# Patient Record
Sex: MALE | Race: WHITE | NOT HISPANIC OR LATINO | Employment: OTHER | URBAN - METROPOLITAN AREA
[De-identification: names, ages, dates, MRNs, and addresses within clinical notes are randomized per-mention and may not be internally consistent; named-entity substitution may affect disease eponyms.]

---

## 2017-01-26 ENCOUNTER — ALLSCRIPTS OFFICE VISIT (OUTPATIENT)
Dept: OTHER | Facility: OTHER | Age: 72
End: 2017-01-26

## 2017-11-02 ENCOUNTER — GENERIC CONVERSION - ENCOUNTER (OUTPATIENT)
Dept: OTHER | Facility: OTHER | Age: 72
End: 2017-11-02

## 2017-11-02 LAB
LEFT EYE DIABETIC RETINOPATHY: NORMAL
RIGHT EYE DIABETIC RETINOPATHY: NORMAL

## 2018-01-03 ENCOUNTER — ALLSCRIPTS OFFICE VISIT (OUTPATIENT)
Dept: OTHER | Facility: OTHER | Age: 73
End: 2018-01-03

## 2018-01-03 DIAGNOSIS — C61 MALIGNANT NEOPLASM OF PROSTATE (HCC): ICD-10-CM

## 2018-01-03 DIAGNOSIS — Z85.46 PERSONAL HISTORY OF MALIGNANT NEOPLASM OF PROSTATE: ICD-10-CM

## 2018-01-04 NOTE — PROGRESS NOTES
Assessment   1  History of malignant neoplasm of prostate (V10 46) (Z85 46)   2  Benign essential HTN (401 1) (I10)   3  Adenocarcinoma of prostate (185) (C61)   4  Controlled type 2 diabetes mellitus without complication, without long-term current use     of insulin (250 00) (E11 9)   5  Hypercholesterolemia (272 0) (E78 00)    Plan   Adenocarcinoma of prostate, PMH: History of malignant neoplasm of prostate    · (1) PSA, DIAGNOSTIC (FOLLOW-UP); Status:Active; Requested VUN:[id number]; Benign essential HTN    · AmLODIPine Besylate 10 MG Oral Tablet; Take 1 tablet by mouth  daily for blood    pressure   · Lisinopril 10 MG Oral Tablet; Take one tablet daily   · Metoprolol Succinate  MG Oral Tablet Extended Release 24 Hour; TAKE 1    TABLET ONCE DAILY  Controlled type 2 diabetes mellitus without complication, without long-term current use    of insulin    · MetFORMIN HCl  MG Oral Tablet Extended Release 24 Hour; Take 1    tablet daily   · *VB - Foot Exam; Status:Complete;   Done: 80XAW7316 04:57PM  Hypercholesterolemia    · Simvastatin 10 MG Oral Tablet; TAKE 1 TABLET BY MOUTH DAILY AS DIRECTED    Discussion/Summary      Check labs, will call with results, meds renewed  Possible side effects of new medications were reviewed with the patient/guardian today  The treatment plan was reviewed with the patient/guardian  The patient/guardian understands and agrees with the treatment plan      Chief Complaint   patient presented here for follow up on chronic conditions      History of Present Illness   feeling well, due for labs, hasn't been checking his sugars, taking his meds regularly  BP slightly elevated  denies ankle swelling  Active Problems   1  Benign essential HTN (401 1) (I10)   2  Controlled type 2 diabetes mellitus without complication, without long-term current use     of insulin (250 00) (E11 9)   3  History of malignant neoplasm of prostate (V10 46) (Z85 46)   4   Hypercholesterolemia (272 0) (E78 00)   5  Inguinal hernia (550 90) (K40 90)   6  Need for pneumococcal vaccination (V03 82) (Z23)    Past Medical History   1  History of Ankle fracture, right (824 8) (S82 891A)   2  History of Colon cancer screening (V76 51) (Z12 11)   3  History of Cough due to bronchospasm (519 11) (J98 01)   4  History of malignant neoplasm of prostate (V10 46) (Z85 46)   5  History of Muscle ache (729 1) (M79 1)     The active problems and past medical history were reviewed and updated today  Surgical History   1  Denied: History Of Prior Surgery    Family History   Mother    1  Family history of myocardial infarction (V17 3) (Z82 49)  Father    2  Family history of     Social History    · Never a smoker   · Occasional alcohol use  The social history was reviewed and updated today  Current Meds    1  Accu-Chek Jackie Plus In Vitro Strip; TEST once daily as directed; Therapy: 92OMY9046 to (Last Rx:14Clp2897)  Requested for: 64NEY5733 Ordered   2  Accu-Chek Jackie Plus w/Device Kit; TEST  ONCE DAILY; Therapy: 48ESI2327 to (Last Rx:19Iof9669)  Requested for: 21TYD4966 Ordered   3  Accu-Chek FastClix Lancets Miscellaneous; TEST DAILY; Therapy: 07ITK3992 to (Lisa Swan)  Requested for: 51YDS4109; Last     Rx:29Usd2258 Ordered   4  AmLODIPine Besylate 10 MG Oral Tablet; take 1 tablet every day; Therapy: 45Wfn8488 to (Evaluate:2017)  Requested for: 73Itb4123; Last     Rx:48Ljv7502 Ordered   5  Aspirin 81 MG TABS; Therapy: (Recorded:2016) to Recorded   6  Lisinopril 10 MG Oral Tablet; TAKE 1 TABLET DAILY; Therapy: 69FUA5372 to (Bradley Morales)  Requested for: 73Cmm7284; Last     Rx:90Brp2375 Ordered   7  MetFORMIN HCl  MG Oral Tablet Extended Release 24 Hour; TAKE 1 TABLET     DAILY AS DIRECTED  - PATIENT NEEDS APPT; Therapy: 75RVT0063 to (EGJZDJLA:37WTI5080)  Requested for: 17Qrb2929; Last     Rx:45Cxs4873 Ordered   8   Metoprolol Succinate  MG Oral Tablet Extended Release 24 Hour; TAKE 1 TABLET     DAILY - PATIENT NEEDS APPT; Therapy: 05Ycw4745 to (Evaluate:20Jan2018)  Requested for: 62Fwe4572; Last     Rx:35Mgx0288 Ordered   9  Simvastatin 10 MG Oral Tablet; TAKE 1 TABLET DAILY  - PATIENT NEEDS APPT; Therapy: 57DGP4080 to [phone])  Requested for: 18Yjz8466; Last     Rx:02Geh8473 Ordered     The medication list was reviewed and updated today  Allergies   1  No Known Drug Allergies    Vitals   Vital Signs    Recorded: 54NIW8629 04:34PM   Temperature 97 6 F, Tympanic   Heart Rate 68   Pulse Quality Normal   Respiration Quality Normal   Respiration 16   Systolic 586, LUE, Sitting   Diastolic 86, LUE, Sitting   Height 5 ft 8 in   Weight 188 lb 8 oz   BMI Calculated 28 66   BSA Calculated 1 99   O2 Saturation 97     Physical Exam        Constitutional      General appearance: No acute distress, well appearing and well nourished  Pulmonary      Respiratory effort: No increased work of breathing or signs of respiratory distress  Auscultation of lungs: Clear to auscultation, equal breath sounds bilaterally, no wheezes, no rales, no rhonci  Cardiovascular      Auscultation of heart: Normal rate and rhythm, normal S1 and S2, without murmurs  Examination of extremities for edema and/or varicosities: Normal        Psychiatric      Orientation to person, place and time: Normal        Mood and affect: Normal        Diabetic Foot Screen: Normal        Socks and shoes removed, the Right Foot: the foot was normal, no swelling, no erythema  The toes on the right were normal-- and-- with full range of motion  The sensory exam showed  normal vibratory sensation at the level of the toes on the right  Normal tactile sensation with monofilament testing throughout the right foot  Socks and shoes removed, Left Foot: the foot was normal, no swelling, no erythema   The toes on the left were normal-- and-- with full range of motion  The sensory exam showed  normal vibratory sensation at the level of the toes on the left , Normal tactile sensation with monofilament testing throughout the left foot  Pulses:      2+ in the dorsalis pedis on the right  Pulses:      2+ in the dorsalis pedis on the left  Assign Risk Category: 0: No loss of protective sensation, no deformity  No present risk        Results/Data   Trini Rush Foot Exam 77DBH7941 04:57PM Rich Harvey      Test Name Result Flag Reference   FOOT Geo Smith 45BKY4296          Future Appointments      Date/Time Provider Specialty Site   06/20/2018 04:15 PM Rachele Khalil [facility]     Signatures    Electronically signed by : SAY Stewart ; Carson  3 2018  5:48PM EST                       (Author)

## 2018-01-13 ENCOUNTER — APPOINTMENT (OUTPATIENT)
Dept: LAB | Facility: MEDICAL CENTER | Age: 73
End: 2018-01-13
Payer: COMMERCIAL

## 2018-01-13 DIAGNOSIS — C61 MALIGNANT NEOPLASM OF PROSTATE (HCC): ICD-10-CM

## 2018-01-13 DIAGNOSIS — Z23 ENCOUNTER FOR IMMUNIZATION: ICD-10-CM

## 2018-01-13 DIAGNOSIS — E78.00 PURE HYPERCHOLESTEROLEMIA: ICD-10-CM

## 2018-01-13 DIAGNOSIS — I10 ESSENTIAL (PRIMARY) HYPERTENSION: ICD-10-CM

## 2018-01-13 DIAGNOSIS — Z85.46 PERSONAL HISTORY OF MALIGNANT NEOPLASM OF PROSTATE: ICD-10-CM

## 2018-01-13 DIAGNOSIS — E11.9 TYPE 2 DIABETES MELLITUS WITHOUT COMPLICATIONS (HCC): ICD-10-CM

## 2018-01-13 LAB
ALBUMIN SERPL BCP-MCNC: 3.6 G/DL (ref 3.5–5)
ALP SERPL-CCNC: 108 U/L (ref 46–116)
ALT SERPL W P-5'-P-CCNC: 26 U/L (ref 12–78)
ANION GAP SERPL CALCULATED.3IONS-SCNC: 7 MMOL/L (ref 4–13)
AST SERPL W P-5'-P-CCNC: 12 U/L (ref 5–45)
BILIRUB SERPL-MCNC: 0.5 MG/DL (ref 0.2–1)
BUN SERPL-MCNC: 20 MG/DL (ref 5–25)
CALCIUM SERPL-MCNC: 8.6 MG/DL (ref 8.3–10.1)
CHLORIDE SERPL-SCNC: 112 MMOL/L (ref 100–108)
CHOLEST SERPL-MCNC: 136 MG/DL (ref 50–200)
CO2 SERPL-SCNC: 27 MMOL/L (ref 21–32)
CREAT SERPL-MCNC: 0.97 MG/DL (ref 0.6–1.3)
CREAT UR-MCNC: 67.2 MG/DL
EST. AVERAGE GLUCOSE BLD GHB EST-MCNC: 120 MG/DL
GFR SERPL CREATININE-BSD FRML MDRD: 78 ML/MIN/1.73SQ M
GLUCOSE P FAST SERPL-MCNC: 130 MG/DL (ref 65–99)
HBA1C MFR BLD: 5.8 % (ref 4.2–6.3)
HDLC SERPL-MCNC: 55 MG/DL (ref 40–60)
LDLC SERPL CALC-MCNC: 69 MG/DL (ref 0–100)
MICROALBUMIN UR-MCNC: 16.8 MG/L (ref 0–20)
MICROALBUMIN/CREAT 24H UR: 25 MG/G CREATININE (ref 0–30)
POTASSIUM SERPL-SCNC: 3.6 MMOL/L (ref 3.5–5.3)
PROT SERPL-MCNC: 6.9 G/DL (ref 6.4–8.2)
PSA SERPL-MCNC: 0.1 NG/ML (ref 0–4)
SODIUM SERPL-SCNC: 146 MMOL/L (ref 136–145)
TRIGL SERPL-MCNC: 62 MG/DL

## 2018-01-13 PROCEDURE — 80061 LIPID PANEL: CPT

## 2018-01-13 PROCEDURE — 80053 COMPREHEN METABOLIC PANEL: CPT

## 2018-01-13 PROCEDURE — 82570 ASSAY OF URINE CREATININE: CPT

## 2018-01-13 PROCEDURE — 36415 COLL VENOUS BLD VENIPUNCTURE: CPT

## 2018-01-13 PROCEDURE — 83036 HEMOGLOBIN GLYCOSYLATED A1C: CPT

## 2018-01-13 PROCEDURE — 84153 ASSAY OF PSA TOTAL: CPT

## 2018-01-13 PROCEDURE — 82043 UR ALBUMIN QUANTITATIVE: CPT

## 2018-01-13 NOTE — RESULT NOTES
Verified Results  (1) COMPREHENSIVE METABOLIC PANEL 52VID9684 76:24QH Phylicia Lowe Order Number: [id number]     Test Name Result Flag Reference   GLUCOSE,RANDM 112 mg/dL     If the patient is fasting, the ADA then defines impaired fasting glucose as > 100 mg/dL and diabetes as > or equal to 123 mg/dL  SODIUM 142 mmol/L  136-145   POTASSIUM 3 6 mmol/L  3 5-5 3   CHLORIDE 105 mmol/L  100-108   CARBON DIOXIDE 31 mmol/L  21-32   ANION GAP (CALC) 6 mmol/L  4-13   BLOOD UREA NITROGEN 17 mg/dL  5-25   CREATININE 1 13 mg/dL  0 60-1 30   Standardized to IDMS reference method   CALCIUM 8 9 mg/dL  8 3-10 1   BILI, TOTAL 0 51 mg/dL  0 20-1 00   ALK PHOSPHATAS 106 U/L     ALT (SGPT) 25 U/L  12-78   AST(SGOT) 14 U/L  5-45   ALBUMIN 3 8 g/dL  3 5-5 0   TOTAL PROTEIN 7 2 g/dL  6 4-8 2   eGFR Non-African American      >60 0 ml/min/1 73sq m   - Patient Instructions: This is a fasting blood test  Water,black tea or black  coffee only after 9:00pm the night before test Drink 2 glasses of water the morning of test   National Kidney Disease Education Program recommendations are as follows:  GFR calculation is accurate only with a steady state creatinine  Chronic Kidney disease less than 60 ml/min/1 73 sq  meters  Kidney failure less than 15 ml/min/1 73 sq  meters  (1) LIPID PANEL, FASTING 65Baw5180 08:29AM Uzma PEREZ Order Number: [id number]     Test Name Result Flag Reference   CHOLESTEROL 202 mg/dL H    HDL,DIRECT 69 mg/dL H 40-60   Specimen collection should occur prior to Metamizole administration due to the potential for falsely depressed results  LDL CHOLESTEROL CALCULATED 116 mg/dL H 0-100   - Patient Instructions: This is a fasting blood test  Water,black tea or black  coffee only after 9:00pm the night before test   Drink 2 glasses of water the morning of test     - Patient Instructions:  This is a fasting blood test  Water,black tea or black  coffee only after 9:00pm the night before test Drink 2 glasses of water the morning of test   Triglyceride:         Normal              <150 mg/dl       Borderline High    150-199 mg/dl       High               200-499 mg/dl       Very High          >499 mg/dl  Cholesterol:         Desirable        <200 mg/dl      Borderline High  200-239 mg/dl      High             >239 mg/dl  HDL Cholesterol:        High    >59 mg/dL      Low     <41 mg/dL  LDL CALCULATED:    This screening LDL is a calculated result  It does not have the accuracy of the Direct Measured LDL in the monitoring of patients with hyperlipidemia and/or statin therapy  Direct Measure LDL (RRV037) must be ordered separately in these patients  TRIGLYCERIDES 83 mg/dL  <=150   Specimen collection should occur prior to N-Acetylcysteine or Metamizole administration due to the potential for falsely depressed results  (1) HEMOGLOBIN A1C 28Dec2016 08:29AM Harry Newsome   TW Order Number: [id number]     Test Name Result Flag Reference   HEMOGLOBIN A1C 5 6 %  4 2-6 3   EST  AVG   GLUCOSE 114 mg/dl       (1) MICROALBUMIN CREATININE RATIO, RANDOM URINE 28Dec2016 08:29AM Harry Newsome   TW Order Number: [id number]     Test Name Result Flag Reference   MICROALBUMIN/ CREAT R 36 mg/g creatinine H 0-30   MICROALBUMIN,URINE 14 0 mg/L  0 0-20 0   CREATININE URINE 38 6 mg/dL

## 2018-01-14 ENCOUNTER — GENERIC CONVERSION - ENCOUNTER (OUTPATIENT)
Dept: OTHER | Facility: OTHER | Age: 73
End: 2018-01-14

## 2018-01-14 VITALS
RESPIRATION RATE: 16 BRPM | WEIGHT: 180 LBS | BODY MASS INDEX: 27.28 KG/M2 | DIASTOLIC BLOOD PRESSURE: 72 MMHG | SYSTOLIC BLOOD PRESSURE: 146 MMHG | TEMPERATURE: 97.8 F | HEIGHT: 68 IN | HEART RATE: 84 BPM | OXYGEN SATURATION: 97 %

## 2018-01-14 NOTE — MISCELLANEOUS
Message  spoke with his wife(he is hard of hearing)  we will mail his psa to dr waite-urology  also his vit d is low  his wife feels he stopped taking it and she will have him restart it   rec 2,000 units/day      Signatures   Electronically signed by : Mar De Los Santos DO; Jun 14 2016 11:04AM EST                       (Author)

## 2018-01-15 ENCOUNTER — GENERIC CONVERSION - ENCOUNTER (OUTPATIENT)
Dept: OTHER | Facility: OTHER | Age: 73
End: 2018-01-15

## 2018-01-23 VITALS
DIASTOLIC BLOOD PRESSURE: 86 MMHG | HEIGHT: 68 IN | OXYGEN SATURATION: 97 % | HEART RATE: 68 BPM | WEIGHT: 188.5 LBS | TEMPERATURE: 97.6 F | RESPIRATION RATE: 16 BRPM | SYSTOLIC BLOOD PRESSURE: 144 MMHG | BODY MASS INDEX: 28.57 KG/M2

## 2018-01-23 NOTE — RESULT NOTES
Verified Results  (1) COMPREHENSIVE METABOLIC PANEL 94NYS0710 96:73SY Sagar Nice Order Number: [id number]     Test Name Result Flag Reference   SODIUM 146 mmol/L H 136-145   POTASSIUM 3 6 mmol/L  3 5-5 3   CHLORIDE 112 mmol/L H 100-108   CARBON DIOXIDE 27 mmol/L  21-32   ANION GAP (CALC) 7 mmol/L  4-13   BLOOD UREA NITROGEN 20 mg/dL  5-25   CREATININE 0 97 mg/dL  0 60-1 30   Standardized to IDMS reference method   CALCIUM 8 6 mg/dL  8 3-10 1   BILI, TOTAL 0 50 mg/dL  0 20-1 00   ALK PHOSPHATAS 108 U/L     ALT (SGPT) 26 U/L  12-78   Specimen collection should occur prior to Sulfasalazine and/or Sulfapyridine administration due to the potential for falsely depressed results  AST(SGOT) 12 U/L  5-45   Specimen collection should occur prior to Sulfasalazine administration due to the potential for falsely depressed results  ALBUMIN 3 6 g/dL  3 5-5 0   TOTAL PROTEIN 6 9 g/dL  6 4-8 2   eGFR 78 ml/min/1 73sq m     National Kidney Disease Education Program recommendations are as follows:  GFR calculation is accurate only with a steady state creatinine  Chronic Kidney disease less than 60 ml/min/1 73 sq  meters  Kidney failure less than 15 ml/min/1 73 sq  meters  GLUCOSE FASTING 130 mg/dL H 65-99   Specimen collection should occur prior to Sulfasalazine administration due to the potential for falsely depressed results  Specimen collection should occur prior to Sulfapyridine administration due to the potential for falsely elevated results  (1) LIPID PANEL, FASTING 00YTD3800 09:11AM Sagar Nice Order Number: [id number]     Test Name Result Flag Reference   CHOLESTEROL 136 mg/dL     Cholesterol:    Desirable <200 mg/dl    Borderline 200-239 mg/dl    High>239   HDL,DIRECT 55 mg/dL  40-60   HDL Cholesterol:    High>59 mg/dL    Low <41 mg/dL   LDL CHOLESTEROL CALCULATED 69 mg/dL  0-100   This screening LDL is a calculated result     It does not have the accuracy of the Direct Measured LDL in the monitoring of patients with hyperlipidemia and/or statin therapy  Direct Measure LDL (CSG007) must be ordered separately in these patients  - Patient Instructions: This is a fasting blood test  Water,black tea or black  coffee only after 9:00pm the night before test   Drink 2 glasses of water the morning of test       Triglyceride:        Normal <150 mg/dl   Borderline High 150-199 mg/dl   High 200-499 mg/dl   Very High >499 mg/dl   TRIGLYCERIDES 62 mg/dL  <=150   Specimen collection should occur prior to N-Acetylcysteine or Metamizole administration due to the potential for falsely depressed results  (1) HEMOGLOBIN A1C 48GEN1314 09:11AM Sharmin Eloy    Order Number: [id number]     Test Name Result Flag Reference   HEMOGLOBIN A1C 5 8 %  4 2-6 3   EST  AVG   GLUCOSE 120 mg/dl       (1) MICROALBUMIN CREATININE RATIO, RANDOM URINE 34NQD2188 09:11AM Julien Viveros Order Number: [id number]     Test Name Result Flag Reference   MICROALBUMIN/ CREAT R 25 mg/g creatinine  0-30   MICROALBUMIN,URINE 16 8 mg/L  0 0-20 0   CREATININE URINE 67 2 mg/dL

## 2018-02-26 NOTE — RESULT NOTES
Verified Results  (1) PSA, DIAGNOSTIC (FOLLOW-UP) 62QMG3670 09:11AM Madelaine Tomasamelia Order Number: [id number]     Test Name Result Flag Reference   PSA 0 1 ng/mL  0 0-4 0   American Urological Association Guidelines define biochemical recurrence of prostate cancer as a detectable or rising PSA value post-radical prostatectomy that is greater than or equal to 0 2 ng/mL with a second confirmatory level of greater than or equal to 0 2 ng/mL

## 2018-04-26 DIAGNOSIS — E78.2 MIXED HYPERLIPIDEMIA: Primary | ICD-10-CM

## 2018-04-30 DIAGNOSIS — E78.2 MIXED HYPERLIPIDEMIA: ICD-10-CM

## 2018-04-30 RX ORDER — SIMVASTATIN 10 MG
10 TABLET ORAL DAILY
Qty: 90 TABLET | Refills: 0 | Status: SHIPPED | OUTPATIENT
Start: 2018-04-30 | End: 2018-04-30 | Stop reason: SDUPTHER

## 2018-05-03 RX ORDER — SIMVASTATIN 10 MG
TABLET ORAL
Qty: 90 TABLET | Refills: 0 | Status: SHIPPED | OUTPATIENT
Start: 2018-05-03 | End: 2018-08-27 | Stop reason: SDUPTHER

## 2018-06-24 DIAGNOSIS — I10 ESSENTIAL HYPERTENSION: Primary | ICD-10-CM

## 2018-06-25 RX ORDER — AMLODIPINE BESYLATE 10 MG/1
TABLET ORAL
Qty: 90 TABLET | Refills: 0 | Status: SHIPPED | OUTPATIENT
Start: 2018-06-25 | End: 2018-10-11 | Stop reason: SDUPTHER

## 2018-07-25 DIAGNOSIS — I10 ESSENTIAL HYPERTENSION: Primary | ICD-10-CM

## 2018-07-25 DIAGNOSIS — E11.9 TYPE 2 DIABETES MELLITUS WITHOUT COMPLICATION, WITHOUT LONG-TERM CURRENT USE OF INSULIN (HCC): ICD-10-CM

## 2018-07-25 RX ORDER — LISINOPRIL 10 MG/1
TABLET ORAL
Qty: 90 TABLET | Refills: 0 | Status: SHIPPED | OUTPATIENT
Start: 2018-07-25 | End: 2018-10-11 | Stop reason: SDUPTHER

## 2018-07-25 RX ORDER — METOPROLOL SUCCINATE 100 MG/1
TABLET, EXTENDED RELEASE ORAL
Qty: 90 TABLET | Refills: 0 | Status: SHIPPED | OUTPATIENT
Start: 2018-07-25 | End: 2018-10-11 | Stop reason: SDUPTHER

## 2018-07-25 RX ORDER — METFORMIN HYDROCHLORIDE 500 MG/1
TABLET, EXTENDED RELEASE ORAL
Qty: 90 TABLET | Refills: 0 | Status: SHIPPED | OUTPATIENT
Start: 2018-07-25 | End: 2018-10-11 | Stop reason: SDUPTHER

## 2018-08-27 DIAGNOSIS — E78.2 MIXED HYPERLIPIDEMIA: ICD-10-CM

## 2018-08-27 RX ORDER — SIMVASTATIN 10 MG
10 TABLET ORAL DAILY
Qty: 90 TABLET | Refills: 0 | Status: SHIPPED | OUTPATIENT
Start: 2018-08-27 | End: 2018-10-11 | Stop reason: SDUPTHER

## 2018-09-24 DIAGNOSIS — I10 ESSENTIAL HYPERTENSION: ICD-10-CM

## 2018-09-24 NOTE — TELEPHONE ENCOUNTER
Left message on home # advising he needs to make an appointment  Called the cell # but the mailbox was full and I could not leave a message

## 2018-09-26 RX ORDER — AMLODIPINE BESYLATE 10 MG/1
10 TABLET ORAL DAILY
Qty: 90 TABLET | Refills: 0 | OUTPATIENT
Start: 2018-09-26

## 2018-10-03 PROBLEM — C61 ADENOCARCINOMA OF PROSTATE (HCC): Status: ACTIVE | Noted: 2018-01-03

## 2018-10-05 ENCOUNTER — TRANSITIONAL CARE MANAGEMENT (OUTPATIENT)
Dept: FAMILY MEDICINE CLINIC | Facility: CLINIC | Age: 73
End: 2018-10-05

## 2018-10-05 DIAGNOSIS — I10 ESSENTIAL HYPERTENSION: ICD-10-CM

## 2018-10-05 RX ORDER — AMLODIPINE BESYLATE 10 MG/1
10 TABLET ORAL DAILY
Qty: 90 TABLET | Refills: 0 | OUTPATIENT
Start: 2018-10-05

## 2018-10-11 ENCOUNTER — OFFICE VISIT (OUTPATIENT)
Dept: FAMILY MEDICINE CLINIC | Facility: CLINIC | Age: 73
End: 2018-10-11
Payer: COMMERCIAL

## 2018-10-11 VITALS
TEMPERATURE: 97.8 F | BODY MASS INDEX: 27.16 KG/M2 | DIASTOLIC BLOOD PRESSURE: 84 MMHG | WEIGHT: 183.4 LBS | OXYGEN SATURATION: 97 % | SYSTOLIC BLOOD PRESSURE: 136 MMHG | HEIGHT: 69 IN | HEART RATE: 67 BPM

## 2018-10-11 DIAGNOSIS — E11.9 CONTROLLED TYPE 2 DIABETES MELLITUS WITHOUT COMPLICATION, WITHOUT LONG-TERM CURRENT USE OF INSULIN (HCC): Primary | ICD-10-CM

## 2018-10-11 DIAGNOSIS — E11.9 TYPE 2 DIABETES MELLITUS WITHOUT COMPLICATION, WITHOUT LONG-TERM CURRENT USE OF INSULIN (HCC): ICD-10-CM

## 2018-10-11 DIAGNOSIS — E78.00 HYPERCHOLESTEROLEMIA: ICD-10-CM

## 2018-10-11 DIAGNOSIS — I10 BENIGN ESSENTIAL HTN: ICD-10-CM

## 2018-10-11 DIAGNOSIS — I10 ESSENTIAL HYPERTENSION: ICD-10-CM

## 2018-10-11 DIAGNOSIS — E78.2 MIXED HYPERLIPIDEMIA: ICD-10-CM

## 2018-10-11 PROBLEM — H40.9 GLAUCOMA: Status: ACTIVE | Noted: 2018-10-11

## 2018-10-11 PROCEDURE — 99214 OFFICE O/P EST MOD 30 MIN: CPT | Performed by: PHYSICIAN ASSISTANT

## 2018-10-11 PROCEDURE — 1101F PT FALLS ASSESS-DOCD LE1/YR: CPT | Performed by: PHYSICIAN ASSISTANT

## 2018-10-11 PROCEDURE — 4010F ACE/ARB THERAPY RXD/TAKEN: CPT | Performed by: PHYSICIAN ASSISTANT

## 2018-10-11 PROCEDURE — 1160F RVW MEDS BY RX/DR IN RCRD: CPT | Performed by: PHYSICIAN ASSISTANT

## 2018-10-11 RX ORDER — METOPROLOL SUCCINATE 100 MG/1
100 TABLET, EXTENDED RELEASE ORAL DAILY
Qty: 90 TABLET | Refills: 0 | Status: SHIPPED | OUTPATIENT
Start: 2018-10-11 | End: 2019-01-19 | Stop reason: SDUPTHER

## 2018-10-11 RX ORDER — LISINOPRIL 10 MG/1
10 TABLET ORAL DAILY
Qty: 90 TABLET | Refills: 0 | Status: SHIPPED | OUTPATIENT
Start: 2018-10-11 | End: 2019-01-19 | Stop reason: SDUPTHER

## 2018-10-11 RX ORDER — TRAVOPROST 0.004 %
DROPS OPHTHALMIC (EYE)
Refills: 6 | COMMUNITY
Start: 2018-09-17 | End: 2019-04-18 | Stop reason: ALTCHOICE

## 2018-10-11 RX ORDER — AMLODIPINE BESYLATE 10 MG/1
10 TABLET ORAL DAILY
Qty: 90 TABLET | Refills: 0 | Status: CANCELLED | OUTPATIENT
Start: 2018-10-11

## 2018-10-11 RX ORDER — TAMSULOSIN HYDROCHLORIDE 0.4 MG/1
0.4 CAPSULE ORAL
Refills: 3 | COMMUNITY
Start: 2018-10-02 | End: 2019-12-11 | Stop reason: ALTCHOICE

## 2018-10-11 RX ORDER — BLOOD-GLUCOSE METER
EACH MISCELLANEOUS DAILY
Qty: 1 KIT | Refills: 0 | Status: SHIPPED | OUTPATIENT
Start: 2018-10-11 | End: 2019-10-04

## 2018-10-11 RX ORDER — METFORMIN HYDROCHLORIDE 500 MG/1
500 TABLET, EXTENDED RELEASE ORAL DAILY
Qty: 90 TABLET | Refills: 0 | Status: SHIPPED | OUTPATIENT
Start: 2018-10-11 | End: 2019-01-19 | Stop reason: SDUPTHER

## 2018-10-11 RX ORDER — LANCETS
EACH MISCELLANEOUS DAILY
Qty: 100 EACH | Refills: 3 | Status: SHIPPED | OUTPATIENT
Start: 2018-10-11

## 2018-10-11 RX ORDER — AMLODIPINE BESYLATE 10 MG/1
10 TABLET ORAL DAILY
Qty: 90 TABLET | Refills: 0 | Status: SHIPPED | OUTPATIENT
Start: 2018-10-11 | End: 2018-12-17 | Stop reason: SDUPTHER

## 2018-10-11 RX ORDER — SIMVASTATIN 10 MG
10 TABLET ORAL DAILY
Qty: 90 TABLET | Refills: 0 | Status: SHIPPED | OUTPATIENT
Start: 2018-10-11 | End: 2019-02-22 | Stop reason: SDUPTHER

## 2018-10-11 NOTE — PROGRESS NOTES
Assessment/Plan:  =   Diagnoses and all orders for this visit:    Controlled type 2 diabetes mellitus without complication, without long-term current use of insulin ([facility])  Comments:  Diabetes is at goal continue current regimen  Follow-up in 3 months  Orders:  -     Hemoglobin A1C  -     Blood Glucose Monitoring Suppl (ACCU-CHEK LORA PLUS) w/Device KIT; by Does not apply route daily E11 9 check  fbs  daily  -     glucose blood (ACCU-CHEK LORA PLUS) test strip; Check   Blood  Sugar  Daily  E11 9  -     ACCU-CHEK FASTCLIX LANCETS MISC; by Does not apply route daily E11 9  Check  fbs  daily    Benign essential HTN  Comments:  Blood pressure is at goal continue current regimen  Orders:  -     Comprehensive metabolic panel    Hypercholesterolemia  Comments:  Patient to continue statin and low-fat diet  Proceed with labs  Orders:  -     Comprehensive metabolic panel  -     Lipid panel    Mixed hyperlipidemia  -     simvastatin (ZOCOR) 10 mg tablet; Take 1 tablet (10 mg total) by mouth daily    Essential hypertension  -     metoprolol succinate (TOPROL-XL) 100 mg 24 hr tablet; Take 1 tablet (100 mg total) by mouth daily  -     lisinopril (ZESTRIL) 10 mg tablet; Take 1 tablet (10 mg total) by mouth daily  -     amLODIPine (NORVASC) 10 mg tablet; Take 1 tablet (10 mg total) by mouth daily For blood pressure    Type 2 diabetes mellitus without complication, without long-term current use of insulin ([facility])  -     metFORMIN (GLUCOPHAGE-XR) 500 mg 24 hr tablet; Take 1 tablet (500 mg total) by mouth daily    Other orders  -     tamsulosin (FLOMAX) 0 4 mg; 0 4 mg daily at bedtime  -     TRAVATAN Z 0 004 % ophthalmic solution; ADMINISTER 1 DROP INTO BOTH EYES AT BEDTIME, BRAND NECESSARY DROP          Subjective:      Patient ID: Erik Sanchez is a 68 y o  male  Patient presents for follow up chronic conditions  Patient has non-insulin diabetes mellitus type 2 without any complications    He is on metformin  mg at suppertime  Patient not checking his blood sugar at home  Patient has hyperlipidemia he is on simvastatin 10 mg  Patient is due for lipid panel  Patient has hypertension as well  He is maintained on amlodipine metoprolol 100 mg and lisinopril 10 mg  Patient is currently under care of eye doctor for glaucoma in his left eye  Patient currently using drops daily  Patient needs to make a follow-up patient has prostate cancer he is under care of Dr Lane Russ, oncology  The patient finished radiation treatments  Was recently placed on Flomax to help with nocturia  Patient will get flu vaccine at work  Patient's A1c in office is 5 3  Diabetes is under good control  Patient is interested in getting a glucometer and supplies        The following portions of the patient's history were reviewed and updated as appropriate:   He  has a past medical history of Prostate cancer ([facility] ) (2005)    He   Patient Active Problem List    Diagnosis Date Noted    Glaucoma 10/11/2018    Adenocarcinoma of prostate ([facility] ) 01/03/2018    Controlled type 2 diabetes mellitus without complication, without long-term current use of insulin ([place] [address] ) 07/25/2016    Inguinal hernia 02/08/2016    Benign essential HTN 01/12/2016    Hypercholesterolemia 01/12/2016     Current Outpatient Prescriptions   Medication Sig Dispense Refill    ACCU-CHEK FASTCLIX LANCETS MISC by Does not apply route daily E11 9  Check  fbs  daily 100 each 3    amLODIPine (NORVASC) 10 mg tablet Take 1 tablet (10 mg total) by mouth daily For blood pressure 90 tablet 0    aspirin 81 mg chewable tablet Chew 81 mg      Blood Glucose Monitoring Suppl (ACCU-CHEK LORA PLUS) w/Device KIT by Does not apply route daily E11 9 check  fbs  daily 1 kit 0    glucose blood (ACCU-CHEK LORA PLUS) test strip Check   Blood  Sugar  Daily  E11 9 100 each 3    lisinopril (ZESTRIL) 10 mg tablet Take 1 tablet (10 mg total) by mouth daily 90 tablet 0    metFORMIN (GLUCOPHAGE-XR) 500 mg 24 hr tablet Take 1 tablet (500 mg total) by mouth daily 90 tablet 0    metoprolol succinate (TOPROL-XL) 100 mg 24 hr tablet Take 1 tablet (100 mg total) by mouth daily 90 tablet 0    simvastatin (ZOCOR) 10 mg tablet Take 1 tablet (10 mg total) by mouth daily 90 tablet 0    tamsulosin (FLOMAX) 0 4 mg 0 4 mg daily at bedtime  3    TRAVATAN Z 0 004 % ophthalmic solution ADMINISTER 1 DROP INTO BOTH EYES AT BEDTIME, BRAND NECESSARY DROP  6     No current facility-administered medications for this visit  Current Outpatient Prescriptions on File Prior to Visit   Medication Sig    aspirin 81 mg chewable tablet Chew 81 mg    [DISCONTINUED] ACCU-CHEK FASTCLIX LANCETS MISC by Does not apply route daily    [DISCONTINUED] amLODIPine (NORVASC) 10 mg tablet TAKE 1 TABLET BY MOUTH DAILY FOR BLOOD PRESSURE    [DISCONTINUED] Blood Glucose Monitoring Suppl (ACCU-CHEK LORA PLUS) w/Device KIT by Does not apply route    [DISCONTINUED] Glucose Blood (ACCU-CHEK LORA PLUS VI) by In Vitro route daily    [DISCONTINUED] lisinopril (ZESTRIL) 10 mg tablet TAKE ONE TABLET DAILY    [DISCONTINUED] metFORMIN (GLUCOPHAGE-XR) 500 mg 24 hr tablet TAKE 1 TABLET DAILY    [DISCONTINUED] metoprolol succinate (TOPROL-XL) 100 mg 24 hr tablet TAKE 1 TABLET ONCE DAILY   [DISCONTINUED] simvastatin (ZOCOR) 10 mg tablet Take 1 tablet (10 mg total) by mouth daily     No current facility-administered medications on file prior to visit  He has No Known Allergies       Review of Systems   Constitutional: Negative for activity change, appetite change, chills, fatigue and fever  HENT: Negative for ear pain and sore throat  Eyes: Positive for visual disturbance  Respiratory: Negative for cough and shortness of breath  Cardiovascular: Negative for chest pain, palpitations and leg swelling  Gastrointestinal: Negative for abdominal pain, blood in stool, constipation, diarrhea and nausea  Genitourinary: Negative for difficulty urinating  Nocturia  Musculoskeletal: Negative for arthralgias, back pain and myalgias  Skin: Negative for rash  Neurological: Negative for dizziness, syncope and headaches  Psychiatric/Behavioral: Negative for sleep disturbance  Objective:        Physical Exam   Constitutional: He is oriented to person, place, and time  He appears well-developed and well-nourished  HENT:   Right Ear: External ear normal    Left Ear: External ear normal    Mouth/Throat: Oropharynx is clear and moist    Bilateral  Hearing  aides   Eyes: Pupils are equal, round, and reactive to light  Conjunctivae are normal    Neck: Neck supple  Carotid bruit is not present  No thyromegaly present  Cardiovascular: Normal rate, regular rhythm and normal heart sounds  No murmur heard  Pulmonary/Chest: Effort normal and breath sounds normal  He has no wheezes  Abdominal: Soft  Bowel sounds are normal  He exhibits no mass  There is no tenderness  Musculoskeletal: He exhibits no edema  Lymphadenopathy:     He has no cervical adenopathy  Neurological: He is alert and oriented to person, place, and time  Skin: Skin is warm and dry  Psychiatric: He has a normal mood and affect  His behavior is normal  Judgment and thought content normal    Nursing note and vitals reviewed

## 2018-10-20 ENCOUNTER — APPOINTMENT (OUTPATIENT)
Dept: LAB | Facility: MEDICAL CENTER | Age: 73
End: 2018-10-20
Payer: COMMERCIAL

## 2018-10-20 LAB
ALBUMIN SERPL BCP-MCNC: 3.6 G/DL (ref 3.5–5)
ALP SERPL-CCNC: 89 U/L (ref 46–116)
ALT SERPL W P-5'-P-CCNC: 34 U/L (ref 12–78)
ANION GAP SERPL CALCULATED.3IONS-SCNC: 7 MMOL/L (ref 4–13)
AST SERPL W P-5'-P-CCNC: 17 U/L (ref 5–45)
BILIRUB SERPL-MCNC: 0.53 MG/DL (ref 0.2–1)
BUN SERPL-MCNC: 18 MG/DL (ref 5–25)
CALCIUM SERPL-MCNC: 8.1 MG/DL (ref 8.3–10.1)
CHLORIDE SERPL-SCNC: 109 MMOL/L (ref 100–108)
CHOLEST SERPL-MCNC: 170 MG/DL (ref 50–200)
CO2 SERPL-SCNC: 26 MMOL/L (ref 21–32)
CREAT SERPL-MCNC: 0.98 MG/DL (ref 0.6–1.3)
EST. AVERAGE GLUCOSE BLD GHB EST-MCNC: 117 MG/DL
GFR SERPL CREATININE-BSD FRML MDRD: 77 ML/MIN/1.73SQ M
GLUCOSE P FAST SERPL-MCNC: 107 MG/DL (ref 65–99)
HBA1C MFR BLD: 5.7 % (ref 4.2–6.3)
HDLC SERPL-MCNC: 57 MG/DL (ref 40–60)
LDLC SERPL CALC-MCNC: 98 MG/DL (ref 0–100)
NONHDLC SERPL-MCNC: 113 MG/DL
POTASSIUM SERPL-SCNC: 3.4 MMOL/L (ref 3.5–5.3)
PROT SERPL-MCNC: 6.8 G/DL (ref 6.4–8.2)
SODIUM SERPL-SCNC: 142 MMOL/L (ref 136–145)
TRIGL SERPL-MCNC: 75 MG/DL

## 2018-10-20 PROCEDURE — 83036 HEMOGLOBIN GLYCOSYLATED A1C: CPT | Performed by: PHYSICIAN ASSISTANT

## 2018-10-20 PROCEDURE — 80053 COMPREHEN METABOLIC PANEL: CPT | Performed by: PHYSICIAN ASSISTANT

## 2018-10-20 PROCEDURE — 3044F HG A1C LEVEL LT 7.0%: CPT | Performed by: PHYSICIAN ASSISTANT

## 2018-10-20 PROCEDURE — 80061 LIPID PANEL: CPT | Performed by: PHYSICIAN ASSISTANT

## 2018-10-20 PROCEDURE — 36415 COLL VENOUS BLD VENIPUNCTURE: CPT | Performed by: PHYSICIAN ASSISTANT

## 2018-10-22 ENCOUNTER — TELEPHONE (OUTPATIENT)
Dept: FAMILY MEDICINE CLINIC | Facility: CLINIC | Age: 73
End: 2018-10-22

## 2018-10-22 DIAGNOSIS — E83.51 HYPOCALCEMIA: ICD-10-CM

## 2018-10-22 DIAGNOSIS — E87.6 HYPOKALEMIA: Primary | ICD-10-CM

## 2018-10-25 ENCOUNTER — TELEPHONE (OUTPATIENT)
Dept: FAMILY MEDICINE CLINIC | Facility: CLINIC | Age: 73
End: 2018-10-25

## 2018-10-29 ENCOUNTER — TELEPHONE (OUTPATIENT)
Dept: FAMILY MEDICINE CLINIC | Facility: CLINIC | Age: 73
End: 2018-10-29

## 2018-10-29 NOTE — TELEPHONE ENCOUNTER
----- Message from Jigar De Los Santos PA-C sent at 10/29/2018  8:34 AM EDT -----  I  Have  Called  This  Pt and  No  Response  Please  Call  Again  Mail  Repeat  Lab  Slips  To  Pt

## 2018-11-03 ENCOUNTER — APPOINTMENT (OUTPATIENT)
Dept: LAB | Facility: MEDICAL CENTER | Age: 73
End: 2018-11-03
Payer: COMMERCIAL

## 2018-11-03 DIAGNOSIS — E83.51 HYPOCALCEMIA: ICD-10-CM

## 2018-11-03 DIAGNOSIS — E87.6 HYPOKALEMIA: ICD-10-CM

## 2018-11-03 LAB
25(OH)D3 SERPL-MCNC: 18.6 NG/ML (ref 30–100)
ANION GAP SERPL CALCULATED.3IONS-SCNC: 3 MMOL/L (ref 4–13)
BUN SERPL-MCNC: 22 MG/DL (ref 5–25)
CALCIUM SERPL-MCNC: 8.2 MG/DL (ref 8.3–10.1)
CHLORIDE SERPL-SCNC: 107 MMOL/L (ref 100–108)
CO2 SERPL-SCNC: 27 MMOL/L (ref 21–32)
CREAT SERPL-MCNC: 1.02 MG/DL (ref 0.6–1.3)
GFR SERPL CREATININE-BSD FRML MDRD: 73 ML/MIN/1.73SQ M
GLUCOSE P FAST SERPL-MCNC: 125 MG/DL (ref 65–99)
POTASSIUM SERPL-SCNC: 3.5 MMOL/L (ref 3.5–5.3)
PTH-INTACT SERPL-MCNC: 109.6 PG/ML (ref 18.4–80.1)
SODIUM SERPL-SCNC: 137 MMOL/L (ref 136–145)

## 2018-11-03 PROCEDURE — 36415 COLL VENOUS BLD VENIPUNCTURE: CPT | Performed by: PHYSICIAN ASSISTANT

## 2018-11-03 PROCEDURE — 83970 ASSAY OF PARATHORMONE: CPT

## 2018-11-03 PROCEDURE — 80048 BASIC METABOLIC PNL TOTAL CA: CPT | Performed by: PHYSICIAN ASSISTANT

## 2018-11-03 PROCEDURE — 82306 VITAMIN D 25 HYDROXY: CPT | Performed by: PHYSICIAN ASSISTANT

## 2018-11-06 ENCOUNTER — TELEPHONE (OUTPATIENT)
Dept: FAMILY MEDICINE CLINIC | Facility: CLINIC | Age: 73
End: 2018-11-06

## 2018-11-06 DIAGNOSIS — E11.9 CONTROLLED TYPE 2 DIABETES MELLITUS WITHOUT COMPLICATION, WITHOUT LONG-TERM CURRENT USE OF INSULIN (HCC): Primary | ICD-10-CM

## 2018-11-06 DIAGNOSIS — E83.51 HYPOCALCEMIA: ICD-10-CM

## 2018-11-06 DIAGNOSIS — E21.3 HYPERPARATHYROIDISM (HCC): ICD-10-CM

## 2018-11-06 NOTE — TELEPHONE ENCOUNTER
Spoke    With  Pt's  Wife  Repeat  Labs   Show  Low  Calcium  Low  Vit  D and  HighPTH    Refer  To endocrinology

## 2018-12-17 DIAGNOSIS — I10 ESSENTIAL HYPERTENSION: ICD-10-CM

## 2018-12-18 RX ORDER — AMLODIPINE BESYLATE 10 MG/1
10 TABLET ORAL DAILY
Qty: 90 TABLET | Refills: 0 | Status: SHIPPED | OUTPATIENT
Start: 2018-12-18 | End: 2019-04-07 | Stop reason: SDUPTHER

## 2019-01-09 ENCOUNTER — OFFICE VISIT (OUTPATIENT)
Dept: FAMILY MEDICINE CLINIC | Facility: CLINIC | Age: 74
End: 2019-01-09
Payer: COMMERCIAL

## 2019-01-09 VITALS
BODY MASS INDEX: 27.67 KG/M2 | HEIGHT: 69 IN | SYSTOLIC BLOOD PRESSURE: 124 MMHG | HEART RATE: 75 BPM | TEMPERATURE: 97.9 F | DIASTOLIC BLOOD PRESSURE: 84 MMHG | OXYGEN SATURATION: 99 % | WEIGHT: 186.8 LBS

## 2019-01-09 DIAGNOSIS — E78.00 HYPERCHOLESTEROLEMIA: ICD-10-CM

## 2019-01-09 DIAGNOSIS — E11.9 CONTROLLED TYPE 2 DIABETES MELLITUS WITHOUT COMPLICATION, WITHOUT LONG-TERM CURRENT USE OF INSULIN (HCC): Primary | ICD-10-CM

## 2019-01-09 DIAGNOSIS — E11.9 CONTROLLED TYPE 2 DIABETES MELLITUS WITHOUT COMPLICATION, WITHOUT LONG-TERM CURRENT USE OF INSULIN (HCC): ICD-10-CM

## 2019-01-09 DIAGNOSIS — Z86.010 HISTORY OF COLON POLYPS: ICD-10-CM

## 2019-01-09 DIAGNOSIS — I10 BENIGN ESSENTIAL HTN: ICD-10-CM

## 2019-01-09 PROCEDURE — 99214 OFFICE O/P EST MOD 30 MIN: CPT | Performed by: PHYSICIAN ASSISTANT

## 2019-01-09 PROCEDURE — 3079F DIAST BP 80-89 MM HG: CPT | Performed by: PHYSICIAN ASSISTANT

## 2019-01-09 PROCEDURE — 3074F SYST BP LT 130 MM HG: CPT | Performed by: PHYSICIAN ASSISTANT

## 2019-01-09 RX ORDER — MELATONIN
1000 DAILY
COMMUNITY
End: 2020-07-06

## 2019-01-09 NOTE — PROGRESS NOTES
Assessment/Plan:     Diagnoses and all orders for this visit:    Controlled type 2 diabetes mellitus without complication, without long-term current use of insulin ([facility] [address] )  Comments:  Patient to continue his metformin daily  Check blood sugar daily  Continue low carb diet for good glycemic control  Orders:  -     glucose blood (ACCU-CHEK LORA PLUS) test strip; Check   Blood  Sugar  Daily  E11 9  -     Hemoglobin A1C; Future  -     Microalbumin / creatinine urine ratio; Future    Benign essential HTN  Comments:  Blood pressure is at goal continue current regimen  Orders:  -     Comprehensive metabolic panel; Future    Hypercholesterolemia  Comments:  Patient to continue statin therapy and low fat diet  Orders:  -     Lipid panel; Future    History of colon polyps  Comments: The patient to follow up with:  Rectal specialist for repeat colonoscopy due to history of polyps  Orders:  -     Ambulatory referral to Colorectal Surgery; Future    Controlled type 2 diabetes mellitus without complication, without long-term current use of insulin ([facility])  Comments:  Diabetes is at goal continue current regimen  Follow-up in 3 months  Orders:  -     glucose blood (ACCU-CHEK LORA PLUS) test strip; Check   Blood  Sugar  Daily  E11 9  -     Hemoglobin A1C; Future  -     Microalbumin / creatinine urine ratio; Future    Other orders  -     Calcium Carb-Cholecalciferol (CALCIUM 1000 + D PO); Take by mouth  -     cholecalciferol (VITAMIN D3) 1,000 units tablet; Take 1,000 Units by mouth daily          Subjective:      Patient ID: Yovana Mosquera is a 68 y o  male  Patient presents for follow up chronic conditions  Patient has non-insulin diabetes type 2 he is on metformin  mg once a day  Patient has been checking his blood sugar daily  Patient is also compliant with low carb low sugar diet  Patient has hypertension he is on amlodipine 10 mg, lisinopril 10 mg, and metoprolol  mg once a day    Patient also has hyperlipidemia he is on simvastatin 10 mg daily  Patient's last lab work showed that he has hyperparathyroidism  He has low calcium and low vitamin-D  Will start calcium and vitamin-D supplements over-the-counter  Patient is asymptomatic  The patient has a history of BPH and prostate cancer he is under care of Urology he is currently on generic Flomax 0 4 mg once a day  Patient has glaucoma he is on drops he is seen by his ophthalmologist regularly  Due for blood work in 2-3 months  The following portions of the patient's history were reviewed and updated as appropriate:   He  has a past medical history of Prostate cancer ([place] [id number] ) (2005)    He   Patient Active Problem List    Diagnosis Date Noted    History of colon polyps 01/09/2019    Hyperparathyroidism ([place] [address] ) 11/06/2018    Hypokalemia 10/22/2018    Hypocalcemia 10/22/2018    Glaucoma 10/11/2018    Adenocarcinoma of prostate ([place] [address] ) 01/03/2018    Controlled type 2 diabetes mellitus without complication, without long-term current use of insulin ([place] [id number] ) 07/25/2016    Inguinal hernia 02/08/2016    Benign essential HTN 01/12/2016    Hypercholesterolemia 01/12/2016     Current Outpatient Prescriptions   Medication Sig Dispense Refill    Calcium Carb-Cholecalciferol (CALCIUM 1000 + D PO) Take by mouth      cholecalciferol (VITAMIN D3) 1,000 units tablet Take 1,000 Units by mouth daily      ACCU-CHEK FASTCLIX LANCETS MISC by Does not apply route daily E11 9  Check  fbs  daily 100 each 3    amLODIPine (NORVASC) 10 mg tablet TAKE 1 TABLET (10 MG TOTAL) BY MOUTH DAILY FOR BLOOD PRESSURE 90 tablet 0    aspirin 81 mg chewable tablet Chew 81 mg      Blood Glucose Monitoring Suppl (ACCU-CHEK LORA PLUS) w/Device KIT by Does not apply route daily E11 9 check  fbs  daily 1 kit 0    glucose blood (ACCU-CHEK LORA PLUS) test strip Check   Blood  Sugar  Daily  E11 9 100 each 0    lisinopril (ZESTRIL) 10 mg tablet Take 1 tablet (10 mg total) by mouth daily 90 tablet 0    metFORMIN (GLUCOPHAGE-XR) 500 mg 24 hr tablet Take 1 tablet (500 mg total) by mouth daily 90 tablet 0    metoprolol succinate (TOPROL-XL) 100 mg 24 hr tablet Take 1 tablet (100 mg total) by mouth daily 90 tablet 0    simvastatin (ZOCOR) 10 mg tablet Take 1 tablet (10 mg total) by mouth daily 90 tablet 0    tamsulosin (FLOMAX) 0 4 mg 0 4 mg daily at bedtime  3    TRAVATAN Z 0 004 % ophthalmic solution ADMINISTER 1 DROP INTO BOTH EYES AT BEDTIME, BRAND NECESSARY DROP  6     No current facility-administered medications for this visit  Current Outpatient Prescriptions on File Prior to Visit   Medication Sig    ACCU-CHEK FASTCLIX LANCETS MISC by Does not apply route daily E11 9  Check  fbs  daily    amLODIPine (NORVASC) 10 mg tablet TAKE 1 TABLET (10 MG TOTAL) BY MOUTH DAILY FOR BLOOD PRESSURE    aspirin 81 mg chewable tablet Chew 81 mg    Blood Glucose Monitoring Suppl (ACCU-CHEK LORA PLUS) w/Device KIT by Does not apply route daily E11 9 check  fbs  daily    lisinopril (ZESTRIL) 10 mg tablet Take 1 tablet (10 mg total) by mouth daily    metFORMIN (GLUCOPHAGE-XR) 500 mg 24 hr tablet Take 1 tablet (500 mg total) by mouth daily    metoprolol succinate (TOPROL-XL) 100 mg 24 hr tablet Take 1 tablet (100 mg total) by mouth daily    simvastatin (ZOCOR) 10 mg tablet Take 1 tablet (10 mg total) by mouth daily    tamsulosin (FLOMAX) 0 4 mg 0 4 mg daily at bedtime    TRAVATAN Z 0 004 % ophthalmic solution ADMINISTER 1 DROP INTO BOTH EYES AT BEDTIME, BRAND NECESSARY DROP    [DISCONTINUED] glucose blood (ACCU-CHEK LORA PLUS) test strip Check   Blood  Sugar  Daily  E11 9     No current facility-administered medications on file prior to visit  He has No Known Allergies       Review of Systems   Constitutional: Negative for activity change, appetite change, chills, fatigue and fever  HENT: Negative for ear pain and sore throat  Eyes: Negative for visual disturbance     Respiratory: Negative for cough and shortness of breath  Cardiovascular: Negative for chest pain, palpitations and leg swelling  Gastrointestinal: Negative for abdominal pain, blood in stool, constipation, diarrhea and nausea  Genitourinary: Negative for difficulty urinating  Nocturia  Improved  With  flomax   Musculoskeletal: Negative for arthralgias, back pain and myalgias  Skin: Negative for rash  Neurological: Negative for dizziness, syncope and headaches  Psychiatric/Behavioral: Negative for sleep disturbance  Objective:        Physical Exam   Constitutional: He is oriented to person, place, and time  He appears well-developed and well-nourished  HENT:   Right Ear: Tympanic membrane, external ear and ear canal normal    Left Ear: Tympanic membrane, external ear and ear canal normal    Mouth/Throat: Oropharynx is clear and moist    Bilateral  Varinder  aides   Eyes: Pupils are equal, round, and reactive to light  Conjunctivae are normal    Neck: Neck supple  Carotid bruit is not present  No thyromegaly present  Cardiovascular: Normal rate, regular rhythm and normal heart sounds  Pulses are no weak pulses  No murmur heard  Pulses:       Dorsalis pedis pulses are 2+ on the right side, and 2+ on the left side  Pulmonary/Chest: Effort normal and breath sounds normal  He has no wheezes  Abdominal: Soft  Bowel sounds are normal  He exhibits no mass  There is no tenderness  Musculoskeletal: He exhibits no edema  Feet:   Right Foot:   Skin Integrity: Positive for callus  Left Foot:   Skin Integrity: Positive for callus  Lymphadenopathy:     He has no cervical adenopathy  Neurological: He is alert and oriented to person, place, and time  Skin: Skin is warm and dry  No erythema  No pallor  Psychiatric: He has a normal mood and affect  His behavior is normal  Judgment and thought content normal    Nursing note and vitals reviewed  Patient's shoes and socks removed  Right Foot/Ankle   Right Foot Inspection  Skin Exam: callus and callus                          Toe Exam: right toe deformity  Sensory   Vibration: intact    Monofilament testing: intact  Vascular    The right DP pulse is 2+  Left Foot/Ankle  Left Foot Inspection  Skin Exam: callus                         Toe Exam: left toe deformity                   Sensory   Vibration: intact    Monofilament: intact  Vascular    The left DP pulse is 2+  Assign Risk Category:  Deformity present; No loss of protective sensation;  No weak pulses       Risk: 1

## 2019-01-19 DIAGNOSIS — E78.2 MIXED HYPERLIPIDEMIA: ICD-10-CM

## 2019-01-19 DIAGNOSIS — E11.9 TYPE 2 DIABETES MELLITUS WITHOUT COMPLICATION, WITHOUT LONG-TERM CURRENT USE OF INSULIN (HCC): ICD-10-CM

## 2019-01-19 DIAGNOSIS — I10 ESSENTIAL HYPERTENSION: ICD-10-CM

## 2019-01-21 PROCEDURE — 4010F ACE/ARB THERAPY RXD/TAKEN: CPT | Performed by: PHYSICIAN ASSISTANT

## 2019-01-21 RX ORDER — LISINOPRIL 10 MG/1
TABLET ORAL
Qty: 90 TABLET | Refills: 0 | Status: SHIPPED | OUTPATIENT
Start: 2019-01-21 | End: 2019-04-20 | Stop reason: SDUPTHER

## 2019-01-21 RX ORDER — METOPROLOL SUCCINATE 100 MG/1
TABLET, EXTENDED RELEASE ORAL
Qty: 90 TABLET | Refills: 0 | Status: SHIPPED | OUTPATIENT
Start: 2019-01-21 | End: 2019-04-18 | Stop reason: SDUPTHER

## 2019-01-21 RX ORDER — METFORMIN HYDROCHLORIDE 500 MG/1
TABLET, EXTENDED RELEASE ORAL
Qty: 90 TABLET | Refills: 0 | Status: SHIPPED | OUTPATIENT
Start: 2019-01-21 | End: 2019-04-18 | Stop reason: SDUPTHER

## 2019-02-22 RX ORDER — SIMVASTATIN 10 MG
10 TABLET ORAL DAILY
Qty: 90 TABLET | Refills: 0 | Status: SHIPPED | OUTPATIENT
Start: 2019-02-22 | End: 2019-05-19 | Stop reason: SDUPTHER

## 2019-03-02 ENCOUNTER — HOSPITAL ENCOUNTER (EMERGENCY)
Facility: HOSPITAL | Age: 74
Discharge: HOME | End: 2019-03-02
Attending: EMERGENCY MEDICINE
Payer: COMMERCIAL

## 2019-03-02 ENCOUNTER — APPOINTMENT (EMERGENCY)
Dept: RADIOLOGY | Facility: HOSPITAL | Age: 74
End: 2019-03-02
Payer: COMMERCIAL

## 2019-03-02 VITALS
TEMPERATURE: 98.8 F | WEIGHT: 182.54 LBS | RESPIRATION RATE: 18 BRPM | DIASTOLIC BLOOD PRESSURE: 78 MMHG | HEART RATE: 77 BPM | SYSTOLIC BLOOD PRESSURE: 162 MMHG | BODY MASS INDEX: 26.96 KG/M2 | OXYGEN SATURATION: 98 %

## 2019-03-02 DIAGNOSIS — S40.011A CONTUSION OF RIGHT SHOULDER, INITIAL ENCOUNTER: Primary | ICD-10-CM

## 2019-03-02 DIAGNOSIS — S46.001A INJURY OF RIGHT ROTATOR CUFF, INITIAL ENCOUNTER: ICD-10-CM

## 2019-03-02 PROCEDURE — 73030 X-RAY EXAM OF SHOULDER: CPT

## 2019-03-02 PROCEDURE — 99283 EMERGENCY DEPT VISIT LOW MDM: CPT

## 2019-03-02 RX ORDER — ACETAMINOPHEN 325 MG/1
650 TABLET ORAL ONCE
Status: COMPLETED | OUTPATIENT
Start: 2019-03-02 | End: 2019-03-02

## 2019-03-02 RX ORDER — NAPROXEN 500 MG/1
500 TABLET ORAL 2 TIMES DAILY PRN
Qty: 20 TABLET | Refills: 0 | Status: SHIPPED | OUTPATIENT
Start: 2019-03-02 | End: 2019-04-18 | Stop reason: ALTCHOICE

## 2019-03-02 RX ADMIN — ACETAMINOPHEN 650 MG: 325 TABLET, FILM COATED ORAL at 16:43
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
## 2019-03-07 ENCOUNTER — OFFICE VISIT (OUTPATIENT)
Dept: OBGYN CLINIC | Facility: MEDICAL CENTER | Age: 74
End: 2019-03-07
Payer: COMMERCIAL

## 2019-03-07 VITALS
SYSTOLIC BLOOD PRESSURE: 137 MMHG | HEIGHT: 69 IN | WEIGHT: 186 LBS | BODY MASS INDEX: 27.55 KG/M2 | DIASTOLIC BLOOD PRESSURE: 74 MMHG | HEART RATE: 71 BPM

## 2019-03-07 DIAGNOSIS — M19.011 PRIMARY OSTEOARTHRITIS OF RIGHT SHOULDER: Primary | ICD-10-CM

## 2019-03-07 DIAGNOSIS — S46.001A ROTATOR CUFF INJURY, RIGHT, INITIAL ENCOUNTER: ICD-10-CM

## 2019-03-07 DIAGNOSIS — M25.512 CHRONIC LEFT SHOULDER PAIN: ICD-10-CM

## 2019-03-07 DIAGNOSIS — G89.29 CHRONIC LEFT SHOULDER PAIN: ICD-10-CM

## 2019-03-07 DIAGNOSIS — M12.812 LEFT ROTATOR CUFF TEAR ARTHROPATHY: ICD-10-CM

## 2019-03-07 DIAGNOSIS — M75.102 LEFT ROTATOR CUFF TEAR ARTHROPATHY: ICD-10-CM

## 2019-03-07 PROCEDURE — 20610 DRAIN/INJ JOINT/BURSA W/O US: CPT | Performed by: ORTHOPAEDIC SURGERY

## 2019-03-07 PROCEDURE — 99204 OFFICE O/P NEW MOD 45 MIN: CPT | Performed by: ORTHOPAEDIC SURGERY

## 2019-03-07 RX ORDER — LIDOCAINE HYDROCHLORIDE 10 MG/ML
1 INJECTION, SOLUTION INFILTRATION; PERINEURAL
Status: COMPLETED | OUTPATIENT
Start: 2019-03-07 | End: 2019-03-07

## 2019-03-07 RX ORDER — BUPIVACAINE HYDROCHLORIDE 2.5 MG/ML
1 INJECTION, SOLUTION INFILTRATION; PERINEURAL
Status: COMPLETED | OUTPATIENT
Start: 2019-03-07 | End: 2019-03-07

## 2019-03-07 RX ORDER — BETAMETHASONE SODIUM PHOSPHATE AND BETAMETHASONE ACETATE 3; 3 MG/ML; MG/ML
6 INJECTION, SUSPENSION INTRA-ARTICULAR; INTRALESIONAL; INTRAMUSCULAR; SOFT TISSUE
Status: COMPLETED | OUTPATIENT
Start: 2019-03-07 | End: 2019-03-07

## 2019-03-07 RX ADMIN — BETAMETHASONE SODIUM PHOSPHATE AND BETAMETHASONE ACETATE 6 MG: 3; 3 INJECTION, SUSPENSION INTRA-ARTICULAR; INTRALESIONAL; INTRAMUSCULAR; SOFT TISSUE at 15:07

## 2019-03-07 RX ADMIN — LIDOCAINE HYDROCHLORIDE 1 ML: 10 INJECTION, SOLUTION INFILTRATION; PERINEURAL at 15:07

## 2019-03-07 RX ADMIN — BUPIVACAINE HYDROCHLORIDE 1 ML: 2.5 INJECTION, SOLUTION INFILTRATION; PERINEURAL at 15:07
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
## 2019-03-14 ENCOUNTER — EVALUATION (OUTPATIENT)
Dept: PHYSICAL THERAPY | Facility: MEDICAL CENTER | Age: 74
End: 2019-03-14
Payer: COMMERCIAL

## 2019-03-14 DIAGNOSIS — G89.29 CHRONIC LEFT SHOULDER PAIN: ICD-10-CM

## 2019-03-14 DIAGNOSIS — M25.512 CHRONIC LEFT SHOULDER PAIN: ICD-10-CM

## 2019-03-14 DIAGNOSIS — M19.011 PRIMARY OSTEOARTHRITIS OF RIGHT SHOULDER: ICD-10-CM

## 2019-03-14 PROCEDURE — 97161 PT EVAL LOW COMPLEX 20 MIN: CPT | Performed by: PHYSICAL MEDICINE & REHABILITATION

## 2019-03-14 NOTE — PROGRESS NOTES
PT Evaluation     Today's date: 3/14/2019  Patient name: Erik Sanchez  : 1945  MRN: [id number]  Referring provider: Estevan Beltran DO  Dx:   Encounter Diagnosis     ICD-10-CM    1  Primary osteoarthritis of right shoulder M19 011 Ambulatory referral to Physical Therapy   2  Chronic left shoulder pain M25 512 Ambulatory referral to Physical Therapy    G89 [id number]                   Assessment  Assessment details: Pt is a pleasant 68 y o  male presenting to outpatient physical therapy with R shoulder pain following two falls within the past month secondary to slipping on ice  Pt presents with pain, decreased range of motion, decreased strength, and decreased tolerance to activity  Symptoms are consistent with mechanism of injury  Pt is a good candidate for outpatient physical therapy and would benefit from skilled physical therapy to address limitations and to achieve goals  If patient does not show improvements by end of current plan of care will likely refer back to MD for additional imaging to r/o underlying pathology  Thank you for this referral    Impairments: abnormal coordination, abnormal or restricted ROM, activity intolerance, impaired physical strength and pain with function  Understanding of Dx/Px/POC: good   Prognosis: good    Goals  ST  Patient will report 25% decrease in pain in 4 weeks  2  Patient will demonstrate 25% improvement in ROM in 4 weeks  3  Patient will demonstrate 1/2 grade improvement in strength in 4 weeks  LT  Patient will be able to perform IADLS without restriction or pain by discharge  2  Patient will be independent in HEP by discharge  3  Patient will be able to return to recreational/work duties without restriction or pain by discharge        Plan  Patient would benefit from: PT eval and skilled PT  Planned modality interventions: cryotherapy and thermotherapy: hydrocollator packs  Planned therapy interventions: IADL retraining, body mechanics training, flexibility, functional ROM exercises, home exercise program, neuromuscular re-education, manual therapy, postural training, strengthening, stretching, therapeutic activities, therapeutic exercise and joint mobilization  Frequency: 2x week  Duration in visits: 8  Duration in weeks: 4  Treatment plan discussed with: patient        Subjective Evaluation    History of Present Illness  Mechanism of injury: Patient presents with R shoulder pain present since 2 falls on ice within the past month  Since falls patient notes overall improvement since receiving injection at last visit with MD, is scheduled to receive additional injection tomorrow  Patient's main complaint at this time is pain at night which keeps him from sleeping, also tightness and discomfort through the R upper trap area  Patient notes intermittent extension of pain to the level of the elbow which wakes him  Patient denies N/T or other radicular symptoms    Pain  Current pain rating: 3  At best pain rating: 3  At worst pain ratin          Objective     Active Range of Motion     Right Shoulder   Flexion: 150 degrees   Abduction: 140 degrees   External rotation BTH: C7   Internal rotation BTB: L1     Passive Range of Motion     Additional Passive Range of Motion Details  PROM grossly WFL, increased tightness/discomfort through posterior arm with abduction and flexion at end range, patient denies change in sxs with elbow flexion/extension       Strength/Myotome Testing     Right Shoulder     Planes of Motion   Flexion: 4   Abduction: 4+   External rotation at 0°: 4+   Internal rotation at 0°: 4+     Isolated Muscles   Subscapularis: 4     General Comments:      Shoulder Comments   (+) empty can, subscap lift off, crossbody adduction  Patient denies TTP over joint line, proximal bicep tendon, deltoid insertion, upper trap, AC joint, supraspinatus  Mild soft tissue restrictions through R upper trap area      Flowsheet Rows      Most Recent Value   PT/OT G-Codes Current Score  58   Projected Score  68          Precautions: h/o L shoulder injury limits functional use of LUE    Daily Treatment Diary     Manual              R shoulder PROM             UT STM                                                        Exercise Diary              Pulleys             Scap squeeze             Wall slides             UT stretch             Rhomboid st             Levator st                                                                                                                                                                                                       Modalities

## 2019-03-15 ENCOUNTER — OFFICE VISIT (OUTPATIENT)
Dept: OBGYN CLINIC | Facility: MEDICAL CENTER | Age: 74
End: 2019-03-15
Payer: COMMERCIAL

## 2019-03-15 VITALS
SYSTOLIC BLOOD PRESSURE: 161 MMHG | HEIGHT: 69 IN | WEIGHT: 186 LBS | DIASTOLIC BLOOD PRESSURE: 84 MMHG | BODY MASS INDEX: 27.55 KG/M2 | HEART RATE: 68 BPM

## 2019-03-15 DIAGNOSIS — M25.511 RIGHT SHOULDER PAIN, UNSPECIFIED CHRONICITY: ICD-10-CM

## 2019-03-15 DIAGNOSIS — M19.011 ARTHRITIS OF RIGHT ACROMIOCLAVICULAR JOINT: Primary | ICD-10-CM

## 2019-03-15 DIAGNOSIS — S46.001A ROTATOR CUFF INJURY, RIGHT, INITIAL ENCOUNTER: ICD-10-CM

## 2019-03-15 PROCEDURE — 20605 DRAIN/INJ JOINT/BURSA W/O US: CPT | Performed by: ORTHOPAEDIC SURGERY

## 2019-03-15 PROCEDURE — 99213 OFFICE O/P EST LOW 20 MIN: CPT | Performed by: ORTHOPAEDIC SURGERY

## 2019-03-15 RX ORDER — METAXALONE 800 MG/1
800 TABLET ORAL 3 TIMES DAILY
Qty: 30 TABLET | Refills: 0 | Status: SHIPPED | OUTPATIENT
Start: 2019-03-15 | End: 2019-04-18 | Stop reason: ALTCHOICE

## 2019-03-15 RX ADMIN — LIDOCAINE HYDROCHLORIDE 1 ML: 10 INJECTION, SOLUTION INFILTRATION; PERINEURAL at 15:54

## 2019-03-15 RX ADMIN — BETAMETHASONE SODIUM PHOSPHATE AND BETAMETHASONE ACETATE 6 MG: 3; 3 INJECTION, SUSPENSION INTRA-ARTICULAR; INTRALESIONAL; INTRAMUSCULAR; SOFT TISSUE at 15:54

## 2019-03-15 NOTE — PROGRESS NOTES
Assessment/Plan:  1  Arthritis of right acromioclavicular joint  Medium joint arthrocentesis: R acromioclavicular    lidocaine (XYLOCAINE) 1 % injection 1 mL    betamethasone acetate-betamethasone sodium phosphate (CELESTONE) injection 6 mg    DISCONTINUED: metaxalone (SKELAXIN) 800 mg tablet   2  Rotator cuff injury, right, initial encounter  DISCONTINUED: metaxalone (SKELAXIN) 800 mg tablet   3  Right shoulder pain, unspecified chronicity  DISCONTINUED: metaxalone (SKELAXIN) 800 mg tablet     Patient Active Problem List   Diagnosis    Benign essential HTN    Controlled type 2 diabetes mellitus without complication, without long-term current use of insulin ([facility] [address] [facility]    Hypercholesterolemia    Adenocarcinoma of prostate ([facility] [address] [facility]    Glaucoma    Inguinal hernia    Hypokalemia    Hypocalcemia    Hyperparathyroidism ([facility] [address] [facility]    History of colon polyps    Primary osteoarthritis of right shoulder    Chronic left shoulder pain    Left rotator cuff tear arthropathy    Rotator cuff injury, right, initial encounter    Arthritis of right acromioclavicular joint       Discussion/Summary:    68 y o  male  Right shoulder pain secondary to rotator cuff tendinitis and AC arthritis  He received  AC injection of left shoulder today  Recommended he use heating pad and stretching for the trapezius but is also given prescription for Skelaxin as needed  He will continue physical therapy  Follow up in 6 weeks for repeat evaluation  If no improvement symptoms might consider MRI right shoulder  The assessment and plan were formulated by Dr Romana Prose and I assisted in carrying it out  Subjective:   Patient ID: Ellie Grimes is a 68 y o  male   HPI    Patient presents to the office for follow up of  Right shoulder pain  Since the last visit, the patient reports  Noticeable improvement in his symptoms since last week's subacromial injection  He still has pain mostly over the superior trapezius right shoulder    Does have some moderatel pain in the [facility] joint the right side  Pain is worse with cross-body activity,  Intermittent and does not radiate  Patient   Denies any new injuries to the  Right shoulder since the last visit  The following portions of the patient's history were reviewed and updated as appropriate: allergies, current medications, past family history, past social history, past surgical history and problem list     Social History     Socioeconomic History    Marital status: /Civil Union     Spouse name: Not on file    Number of children: Not on file    Years of education: Not on file    Highest education level: Not on file   Occupational History    Not on file   Social Needs    Financial resource strain: Not on file    Food insecurity:     Worry: Not on file     Inability: Not on file    Transportation needs:     Medical: Not on file     Non-medical: Not on file   Tobacco Use    Smoking status: Never Smoker    Smokeless tobacco: Never Used   Substance and Sexual Activity    Alcohol use: Yes     Comment: Occasional    Drug use: Not on file    Sexual activity: Not on file   Lifestyle    Physical activity:     Days per week: Not on file     Minutes per session: Not on file    Stress: Not on file   Relationships    Social connections:     Talks on phone: Not on file     Gets together: Not on file     Attends Christian service: Not on file     Active member of club or organization: Not on file     Attends meetings of clubs or organizations: Not on file     Relationship status: Not on file    Intimate partner violence:     Fear of current or ex partner: Not on file     Emotionally abused: Not on file     Physically abused: Not on file     Forced sexual activity: Not on file   Other Topics Concern    Not on file   Social History Narrative    Not on file     Past Medical History:   Diagnosis Date    Prostate cancer ([facility] [address] ) 2005    S/P prostate ca-2005 had radiation tx  History reviewed   No pertinent surgical history  No Known Allergies  Current Outpatient Medications on File Prior to Visit   Medication Sig Dispense Refill    ACCU-CHEK FASTCLIX LANCETS MISC by Does not apply route daily E11 9  Check  fbs  daily 100 each 3    aspirin 81 mg chewable tablet Chew 81 mg      Blood Glucose Monitoring Suppl (ACCU-CHEK LORA PLUS) w/Device KIT by Does not apply route daily E11 9 check  fbs  daily 1 kit 0    Calcium Carb-Cholecalciferol (CALCIUM 1000 + D PO) Take by mouth      cholecalciferol (VITAMIN D3) 1,000 units tablet Take 1,000 Units by mouth daily      glucose blood (ACCU-CHEK LORA PLUS) test strip Check   Blood  Sugar  Daily  E11 9 100 each 0    tamsulosin (FLOMAX) 0 4 mg 0 4 mg daily at bedtime  3     No current facility-administered medications on file prior to visit  Review of Systems - Negative except as noted in HPI        Objective:    Vitals:    03/15/19 1530   BP: 161/84   Pulse: 68       Physical Exam   Constitutional: He is oriented to person, place, and time  He appears well-developed and well-nourished  No distress  HENT:   Head: Normocephalic and atraumatic  Eyes: Conjunctivae are normal  No scleral icterus  Neck: Neck supple  No tracheal deviation present  Pulmonary/Chest: Effort normal  No respiratory distress  Neurological: He is alert and oriented to person, place, and time  Skin: Skin is warm and dry  Psychiatric: He has a normal mood and affect  His behavior is normal        Right Shoulder Exam     Tenderness   The patient is experiencing tenderness in the acromioclavicular joint      Range of Motion   Active abduction: abnormal   Passive abduction: normal   Extension: normal   External rotation: normal   Forward flexion: normal   Internal rotation 0 degrees: abnormal     Muscle Strength   Abduction: 5/5   Internal rotation: 5/5   External rotation: 5/5   Supraspinatus: 5/5   Subscapularis: 5/5   Biceps: 5/5     Tests   Hoang test: negative  Cross arm: positive  Impingement: negative  Drop arm: negative    Other   Erythema: absent  Scars: absent  Right shoulder sensation:  sensation to light touch intact in axillary, musculocutaneous, radial, median, and ulnar nerve distribution  Right shoulder pulse absent:  2+ radial pulse  I have personally reviewed pertinent films in PACS  Medium joint arthrocentesis: R acromioclavicular  Date/Time: 3/15/2019 3:54 PM  Consent given by: patient  Site marked: site marked  Timeout: Immediately prior to procedure a time out was called to verify the correct patient, procedure, equipment, support staff and site/side marked as required   Supporting Documentation  Indications: pain   Procedure Details  Location: shoulder - R acromioclavicular  Preparation: Patient was prepped and draped in the usual sterile fashion  Needle size: 25 G  Medications administered: 1 mL lidocaine 1 %; 6 mg betamethasone acetate-betamethasone sodium phosphate 6 (3-3) mg/mL    Patient tolerance: patient tolerated the procedure well with no immediate complications  Dressing:  Sterile dressing applied             Portions of the record may have been created with voice recognition software  Occasional wrong word or "sound a like" substitutions may have occurred due to the inherent limitations of voice recognition software  Read the chart carefully and recognize, using context, where substitutions have occurred

## 2019-03-18 RX ORDER — BETAMETHASONE SODIUM PHOSPHATE AND BETAMETHASONE ACETATE 3; 3 MG/ML; MG/ML
6 INJECTION, SUSPENSION INTRA-ARTICULAR; INTRALESIONAL; INTRAMUSCULAR; SOFT TISSUE
Status: COMPLETED | OUTPATIENT
Start: 2019-03-15 | End: 2019-03-15

## 2019-03-18 RX ORDER — LIDOCAINE HYDROCHLORIDE 10 MG/ML
1 INJECTION, SOLUTION INFILTRATION; PERINEURAL
Status: COMPLETED | OUTPATIENT
Start: 2019-03-15 | End: 2019-03-15

## 2019-03-20 ENCOUNTER — OFFICE VISIT (OUTPATIENT)
Dept: PHYSICAL THERAPY | Facility: MEDICAL CENTER | Age: 74
End: 2019-03-20
Payer: COMMERCIAL

## 2019-03-20 DIAGNOSIS — M19.011 PRIMARY OSTEOARTHRITIS OF RIGHT SHOULDER: Primary | ICD-10-CM

## 2019-03-20 DIAGNOSIS — G89.29 CHRONIC LEFT SHOULDER PAIN: ICD-10-CM

## 2019-03-20 DIAGNOSIS — M25.512 CHRONIC LEFT SHOULDER PAIN: ICD-10-CM

## 2019-03-20 PROCEDURE — 97140 MANUAL THERAPY 1/> REGIONS: CPT | Performed by: PHYSICAL MEDICINE & REHABILITATION

## 2019-03-20 PROCEDURE — 97110 THERAPEUTIC EXERCISES: CPT | Performed by: PHYSICAL MEDICINE & REHABILITATION

## 2019-03-20 PROCEDURE — 97112 NEUROMUSCULAR REEDUCATION: CPT | Performed by: PHYSICAL MEDICINE & REHABILITATION

## 2019-03-20 NOTE — PROGRESS NOTES
Daily Note     Today's date: 3/20/2019  Patient name: Lorenzo Duarte  : 1945  MRN: [id number]  Referring provider: Estefanía Palomino DO  Dx:   Encounter Diagnosis     ICD-10-CM    1  Primary osteoarthritis of right shoulder M19 011    2  Chronic left shoulder pain M25 512     G89 29                   Subjective: Patient presents without complaints of pain  Improved sxs following recent injection  Objective: See treatment diary below    Precautions: h/o L shoulder injury limits functional use of LUE    Daily Treatment Diary     Manual  3/20            R shoulder PROM LH            UT STM np                                                       Exercise Diary  3/20            Pulleys 5' UBE rev           Scap squeeze 15x Rows w/ TB           Wall slides 10x10"            UT stretch 3x20"            Rhomboid st 3x20"            Levator st 3x20"            No moneys with TB                                                                                                                                                                                          Modalities                                                           Assessment: Tolerated treatment well  Patient denies pain with TE or manual therapy today, UT STM np secondary to lack of tenderness or palpable restrictions  Patient exhibited good technique with therapeutic exercises and would benefit from continued PT  Progress per patient tolerance  Plan: Continue per plan of care

## 2019-03-25 ENCOUNTER — OFFICE VISIT (OUTPATIENT)
Dept: PHYSICAL THERAPY | Facility: MEDICAL CENTER | Age: 74
End: 2019-03-25
Payer: COMMERCIAL

## 2019-03-25 DIAGNOSIS — G89.29 CHRONIC LEFT SHOULDER PAIN: Primary | ICD-10-CM

## 2019-03-25 DIAGNOSIS — M25.512 CHRONIC LEFT SHOULDER PAIN: Primary | ICD-10-CM

## 2019-03-25 DIAGNOSIS — M19.011 PRIMARY OSTEOARTHRITIS OF RIGHT SHOULDER: ICD-10-CM

## 2019-03-25 PROCEDURE — 97140 MANUAL THERAPY 1/> REGIONS: CPT

## 2019-03-25 PROCEDURE — 97110 THERAPEUTIC EXERCISES: CPT

## 2019-03-25 PROCEDURE — 97112 NEUROMUSCULAR REEDUCATION: CPT

## 2019-03-25 NOTE — PROGRESS NOTES
Daily Note     Today's date: 3/25/2019  Patient name: Roseline Chun  : 1945  MRN: [id number]  Referring provider: Clarita Linares DO  Dx:   Encounter Diagnosis     ICD-10-CM    1  Chronic left shoulder pain M25 512     G89 29    2  Primary osteoarthritis of right shoulder M19 011                   Subjective: Patient noted no new complaints  Objective: See treatment diary below      Assessment: Tolerated treatment well  Added Rows with TB and added no monies into treatment with good patient response and no pain  Patient exhibited good technique with therapeutic exercises and would benefit from continued PT      Plan: Continue per plan of care       Precautions: h/o L shoulder injury limits functional use of LUE     Daily Treatment Diary      Manual  3/20 3/25                   R shoulder PROM LH  LH                    UT STM np  np                                                                                                  Exercise Diary  3/20  3/25                   Pulleys 5' UBE rev 5 '                   Scap squeeze 15x Rows w/ RTB  15x                    Wall slides 10x10"  10"X10                   UT stretch 3x20"  3X30"                   Rhomboid st 3x20"  3X20"                   Levator st 3x20"  3X20"                   No moneys with TB    15x RTB                                                                                                                                                                                                                                                                                                                                                 Modalities

## 2019-04-07 DIAGNOSIS — I10 ESSENTIAL HYPERTENSION: ICD-10-CM

## 2019-04-07 RX ORDER — AMLODIPINE BESYLATE 10 MG/1
10 TABLET ORAL DAILY
Qty: 90 TABLET | Refills: 0 | Status: SHIPPED | OUTPATIENT
Start: 2019-04-07 | End: 2019-06-30 | Stop reason: SDUPTHER

## 2019-04-18 ENCOUNTER — OFFICE VISIT (OUTPATIENT)
Dept: FAMILY MEDICINE CLINIC | Facility: CLINIC | Age: 74
End: 2019-04-18
Payer: COMMERCIAL

## 2019-04-18 VITALS
BODY MASS INDEX: 27.28 KG/M2 | WEIGHT: 184.2 LBS | HEART RATE: 74 BPM | TEMPERATURE: 98.5 F | OXYGEN SATURATION: 97 % | SYSTOLIC BLOOD PRESSURE: 140 MMHG | RESPIRATION RATE: 16 BRPM | HEIGHT: 69 IN | DIASTOLIC BLOOD PRESSURE: 80 MMHG

## 2019-04-18 DIAGNOSIS — C61 ADENOCARCINOMA OF PROSTATE (HCC): ICD-10-CM

## 2019-04-18 DIAGNOSIS — E11.9 TYPE 2 DIABETES MELLITUS WITHOUT COMPLICATION, WITHOUT LONG-TERM CURRENT USE OF INSULIN (HCC): ICD-10-CM

## 2019-04-18 DIAGNOSIS — I10 BENIGN ESSENTIAL HTN: ICD-10-CM

## 2019-04-18 DIAGNOSIS — E78.00 HYPERCHOLESTEROLEMIA: ICD-10-CM

## 2019-04-18 DIAGNOSIS — I10 ESSENTIAL HYPERTENSION: ICD-10-CM

## 2019-04-18 DIAGNOSIS — E11.9 CONTROLLED TYPE 2 DIABETES MELLITUS WITHOUT COMPLICATION, WITHOUT LONG-TERM CURRENT USE OF INSULIN (HCC): Primary | ICD-10-CM

## 2019-04-18 LAB — SL AMB POCT HEMOGLOBIN AIC: 5.5 (ref ?–6.5)

## 2019-04-18 PROCEDURE — 99214 OFFICE O/P EST MOD 30 MIN: CPT | Performed by: PHYSICIAN ASSISTANT

## 2019-04-18 PROCEDURE — 3008F BODY MASS INDEX DOCD: CPT | Performed by: PHYSICIAN ASSISTANT

## 2019-04-18 PROCEDURE — 1036F TOBACCO NON-USER: CPT | Performed by: PHYSICIAN ASSISTANT

## 2019-04-18 PROCEDURE — 1160F RVW MEDS BY RX/DR IN RCRD: CPT | Performed by: PHYSICIAN ASSISTANT

## 2019-04-18 PROCEDURE — 83036 HEMOGLOBIN GLYCOSYLATED A1C: CPT | Performed by: PHYSICIAN ASSISTANT

## 2019-04-18 PROCEDURE — 3044F HG A1C LEVEL LT 7.0%: CPT | Performed by: PHYSICIAN ASSISTANT

## 2019-04-19 ENCOUNTER — APPOINTMENT (OUTPATIENT)
Dept: LAB | Facility: MEDICAL CENTER | Age: 74
End: 2019-04-19
Payer: COMMERCIAL

## 2019-04-19 DIAGNOSIS — C61 ADENOCARCINOMA OF PROSTATE (HCC): ICD-10-CM

## 2019-04-19 DIAGNOSIS — E11.9 CONTROLLED TYPE 2 DIABETES MELLITUS WITHOUT COMPLICATION, WITHOUT LONG-TERM CURRENT USE OF INSULIN (HCC): ICD-10-CM

## 2019-04-19 DIAGNOSIS — I10 BENIGN ESSENTIAL HTN: ICD-10-CM

## 2019-04-19 DIAGNOSIS — E78.00 HYPERCHOLESTEROLEMIA: ICD-10-CM

## 2019-04-19 LAB
ALBUMIN SERPL BCP-MCNC: 3.6 G/DL (ref 3.5–5)
ALP SERPL-CCNC: 92 U/L (ref 46–116)
ALT SERPL W P-5'-P-CCNC: 26 U/L (ref 12–78)
ANION GAP SERPL CALCULATED.3IONS-SCNC: 3 MMOL/L (ref 4–13)
AST SERPL W P-5'-P-CCNC: 14 U/L (ref 5–45)
BILIRUB SERPL-MCNC: 0.63 MG/DL (ref 0.2–1)
BUN SERPL-MCNC: 22 MG/DL (ref 5–25)
CALCIUM SERPL-MCNC: 8.3 MG/DL (ref 8.3–10.1)
CHLORIDE SERPL-SCNC: 109 MMOL/L (ref 100–108)
CHOLEST SERPL-MCNC: 155 MG/DL (ref 50–200)
CO2 SERPL-SCNC: 27 MMOL/L (ref 21–32)
CREAT SERPL-MCNC: 0.93 MG/DL (ref 0.6–1.3)
CREAT UR-MCNC: 70.9 MG/DL
EST. AVERAGE GLUCOSE BLD GHB EST-MCNC: 123 MG/DL
GFR SERPL CREATININE-BSD FRML MDRD: 82 ML/MIN/1.73SQ M
GLUCOSE P FAST SERPL-MCNC: 119 MG/DL (ref 65–99)
HBA1C MFR BLD: 5.9 % (ref 4.2–6.3)
HDLC SERPL-MCNC: 52 MG/DL (ref 40–60)
LDLC SERPL CALC-MCNC: 87 MG/DL (ref 0–100)
MICROALBUMIN UR-MCNC: 10.9 MG/L (ref 0–20)
MICROALBUMIN/CREAT 24H UR: 15 MG/G CREATININE (ref 0–30)
NONHDLC SERPL-MCNC: 103 MG/DL
POTASSIUM SERPL-SCNC: 3.5 MMOL/L (ref 3.5–5.3)
PROT SERPL-MCNC: 6.8 G/DL (ref 6.4–8.2)
PSA SERPL-MCNC: 0.1 NG/ML (ref 0–4)
SODIUM SERPL-SCNC: 139 MMOL/L (ref 136–145)
TRIGL SERPL-MCNC: 82 MG/DL

## 2019-04-19 PROCEDURE — 83036 HEMOGLOBIN GLYCOSYLATED A1C: CPT

## 2019-04-19 PROCEDURE — 82570 ASSAY OF URINE CREATININE: CPT

## 2019-04-19 PROCEDURE — 80061 LIPID PANEL: CPT

## 2019-04-19 PROCEDURE — 82043 UR ALBUMIN QUANTITATIVE: CPT

## 2019-04-19 PROCEDURE — 84153 ASSAY OF PSA TOTAL: CPT

## 2019-04-19 PROCEDURE — 80053 COMPREHEN METABOLIC PANEL: CPT

## 2019-04-19 PROCEDURE — 3061F NEG MICROALBUMINURIA REV: CPT | Performed by: PHYSICIAN ASSISTANT

## 2019-04-19 PROCEDURE — 36415 COLL VENOUS BLD VENIPUNCTURE: CPT

## 2019-04-19 RX ORDER — METOPROLOL SUCCINATE 100 MG/1
100 TABLET, EXTENDED RELEASE ORAL DAILY
Qty: 90 TABLET | Refills: 0 | Status: SHIPPED | OUTPATIENT
Start: 2019-04-19 | End: 2019-07-21 | Stop reason: SDUPTHER

## 2019-04-19 RX ORDER — METFORMIN HYDROCHLORIDE 500 MG/1
500 TABLET, EXTENDED RELEASE ORAL DAILY
Qty: 90 TABLET | Refills: 0 | Status: SHIPPED | OUTPATIENT
Start: 2019-04-19 | End: 2019-07-21 | Stop reason: SDUPTHER

## 2019-04-20 DIAGNOSIS — I10 ESSENTIAL HYPERTENSION: ICD-10-CM

## 2019-04-22 PROCEDURE — 4010F ACE/ARB THERAPY RXD/TAKEN: CPT | Performed by: PHYSICIAN ASSISTANT

## 2019-04-22 RX ORDER — LISINOPRIL 10 MG/1
TABLET ORAL
Qty: 90 TABLET | Refills: 0 | Status: SHIPPED | OUTPATIENT
Start: 2019-04-22 | End: 2019-07-21 | Stop reason: SDUPTHER

## 2019-04-22 NOTE — PROGRESS NOTES
Patient has not returned to therapy in nearly a month and has not communicated intention to return  Patient will be formally discharged at this time

## 2019-05-19 DIAGNOSIS — E78.2 MIXED HYPERLIPIDEMIA: ICD-10-CM

## 2019-05-20 RX ORDER — SIMVASTATIN 10 MG
TABLET ORAL
Qty: 90 TABLET | Refills: 0 | Status: SHIPPED | OUTPATIENT
Start: 2019-05-20 | End: 2019-08-21 | Stop reason: SDUPTHER

## 2019-06-30 DIAGNOSIS — I10 ESSENTIAL HYPERTENSION: ICD-10-CM

## 2019-06-30 RX ORDER — AMLODIPINE BESYLATE 10 MG/1
10 TABLET ORAL DAILY
Qty: 30 TABLET | Refills: 2 | Status: SHIPPED | OUTPATIENT
Start: 2019-06-30 | End: 2019-08-21 | Stop reason: SDUPTHER

## 2019-07-21 DIAGNOSIS — E11.9 TYPE 2 DIABETES MELLITUS WITHOUT COMPLICATION, WITHOUT LONG-TERM CURRENT USE OF INSULIN (HCC): ICD-10-CM

## 2019-07-21 DIAGNOSIS — I10 ESSENTIAL HYPERTENSION: ICD-10-CM

## 2019-07-22 RX ORDER — LISINOPRIL 10 MG/1
TABLET ORAL
Qty: 30 TABLET | Refills: 2 | Status: SHIPPED | OUTPATIENT
Start: 2019-07-22 | End: 2019-08-21 | Stop reason: SDUPTHER

## 2019-07-22 RX ORDER — METOPROLOL SUCCINATE 100 MG/1
TABLET, EXTENDED RELEASE ORAL
Qty: 30 TABLET | Refills: 2 | Status: SHIPPED | OUTPATIENT
Start: 2019-07-22 | End: 2019-08-21 | Stop reason: SDUPTHER

## 2019-07-22 RX ORDER — METFORMIN HYDROCHLORIDE 500 MG/1
TABLET, EXTENDED RELEASE ORAL
Qty: 30 TABLET | Refills: 2 | Status: SHIPPED | OUTPATIENT
Start: 2019-07-22 | End: 2019-08-21 | Stop reason: SDUPTHER

## 2019-08-21 ENCOUNTER — OFFICE VISIT (OUTPATIENT)
Dept: FAMILY MEDICINE CLINIC | Facility: CLINIC | Age: 74
End: 2019-08-21
Payer: COMMERCIAL

## 2019-08-21 VITALS
TEMPERATURE: 97 F | HEIGHT: 69 IN | SYSTOLIC BLOOD PRESSURE: 170 MMHG | OXYGEN SATURATION: 96 % | RESPIRATION RATE: 16 BRPM | WEIGHT: 189.6 LBS | BODY MASS INDEX: 28.08 KG/M2 | HEART RATE: 77 BPM | DIASTOLIC BLOOD PRESSURE: 90 MMHG

## 2019-08-21 DIAGNOSIS — E78.00 HYPERCHOLESTEROLEMIA: ICD-10-CM

## 2019-08-21 DIAGNOSIS — C61 ADENOCARCINOMA OF PROSTATE (HCC): ICD-10-CM

## 2019-08-21 DIAGNOSIS — E11.9 CONTROLLED TYPE 2 DIABETES MELLITUS WITHOUT COMPLICATION, WITHOUT LONG-TERM CURRENT USE OF INSULIN (HCC): ICD-10-CM

## 2019-08-21 DIAGNOSIS — Z86.010 HISTORY OF COLON POLYPS: ICD-10-CM

## 2019-08-21 DIAGNOSIS — G89.29 CHRONIC PAIN OF BOTH KNEES: ICD-10-CM

## 2019-08-21 DIAGNOSIS — R35.1 BPH ASSOCIATED WITH NOCTURIA: ICD-10-CM

## 2019-08-21 DIAGNOSIS — E11.9 TYPE 2 DIABETES MELLITUS WITHOUT COMPLICATION, WITHOUT LONG-TERM CURRENT USE OF INSULIN (HCC): ICD-10-CM

## 2019-08-21 DIAGNOSIS — I10 ESSENTIAL HYPERTENSION: ICD-10-CM

## 2019-08-21 DIAGNOSIS — I10 BENIGN ESSENTIAL HTN: ICD-10-CM

## 2019-08-21 DIAGNOSIS — M25.561 CHRONIC PAIN OF BOTH KNEES: ICD-10-CM

## 2019-08-21 DIAGNOSIS — E78.2 MIXED HYPERLIPIDEMIA: ICD-10-CM

## 2019-08-21 DIAGNOSIS — M25.562 CHRONIC PAIN OF BOTH KNEES: ICD-10-CM

## 2019-08-21 DIAGNOSIS — E11.9 CONTROLLED TYPE 2 DIABETES MELLITUS WITHOUT COMPLICATION, WITHOUT LONG-TERM CURRENT USE OF INSULIN (HCC): Primary | ICD-10-CM

## 2019-08-21 DIAGNOSIS — N40.1 BPH ASSOCIATED WITH NOCTURIA: ICD-10-CM

## 2019-08-21 LAB — SL AMB POCT HEMOGLOBIN AIC: 5.5 (ref ?–6.5)

## 2019-08-21 PROCEDURE — 3008F BODY MASS INDEX DOCD: CPT | Performed by: PHYSICIAN ASSISTANT

## 2019-08-21 PROCEDURE — 3044F HG A1C LEVEL LT 7.0%: CPT | Performed by: PHYSICIAN ASSISTANT

## 2019-08-21 PROCEDURE — 1036F TOBACCO NON-USER: CPT | Performed by: PHYSICIAN ASSISTANT

## 2019-08-21 PROCEDURE — 99214 OFFICE O/P EST MOD 30 MIN: CPT | Performed by: PHYSICIAN ASSISTANT

## 2019-08-21 PROCEDURE — 1160F RVW MEDS BY RX/DR IN RCRD: CPT | Performed by: PHYSICIAN ASSISTANT

## 2019-08-21 PROCEDURE — 4010F ACE/ARB THERAPY RXD/TAKEN: CPT | Performed by: PHYSICIAN ASSISTANT

## 2019-08-21 PROCEDURE — 83036 HEMOGLOBIN GLYCOSYLATED A1C: CPT | Performed by: PHYSICIAN ASSISTANT

## 2019-08-21 RX ORDER — METFORMIN HYDROCHLORIDE 500 MG/1
TABLET, EXTENDED RELEASE ORAL
Qty: 30 TABLET | Refills: 2 | Status: SHIPPED | OUTPATIENT
Start: 2019-08-21 | End: 2019-08-21

## 2019-08-21 RX ORDER — LISINOPRIL 10 MG/1
10 TABLET ORAL DAILY
Qty: 90 TABLET | Refills: 1 | Status: SHIPPED | OUTPATIENT
Start: 2019-08-21 | End: 2020-03-12

## 2019-08-21 RX ORDER — SIMVASTATIN 10 MG
10 TABLET ORAL DAILY
Qty: 90 TABLET | Refills: 1 | Status: SHIPPED | OUTPATIENT
Start: 2019-08-21 | End: 2020-06-12 | Stop reason: SDUPTHER

## 2019-08-21 RX ORDER — AMLODIPINE BESYLATE 10 MG/1
10 TABLET ORAL DAILY
Qty: 90 TABLET | Refills: 1 | Status: SHIPPED | OUTPATIENT
Start: 2019-08-21 | End: 2019-10-13 | Stop reason: SDUPTHER

## 2019-08-21 RX ORDER — METOPROLOL SUCCINATE 100 MG/1
TABLET, EXTENDED RELEASE ORAL
Qty: 30 TABLET | Refills: 2 | Status: SHIPPED | OUTPATIENT
Start: 2019-08-21 | End: 2019-08-21

## 2019-08-21 RX ORDER — METFORMIN HYDROCHLORIDE 500 MG/1
500 TABLET, EXTENDED RELEASE ORAL DAILY
Qty: 90 TABLET | Refills: 1 | Status: SHIPPED | OUTPATIENT
Start: 2019-08-21 | End: 2020-03-12

## 2019-08-21 RX ORDER — SIMVASTATIN 10 MG
TABLET ORAL
Qty: 30 TABLET | Refills: 2 | Status: SHIPPED | OUTPATIENT
Start: 2019-08-21 | End: 2019-08-21

## 2019-08-21 RX ORDER — METOPROLOL SUCCINATE 100 MG/1
100 TABLET, EXTENDED RELEASE ORAL DAILY
Qty: 90 TABLET | Refills: 1 | Status: SHIPPED | OUTPATIENT
Start: 2019-08-21 | End: 2020-03-12

## 2019-08-21 RX ORDER — LISINOPRIL 10 MG/1
TABLET ORAL
Qty: 30 TABLET | Refills: 2 | Status: SHIPPED | OUTPATIENT
Start: 2019-08-21 | End: 2019-08-21

## 2019-08-21 NOTE — PROGRESS NOTES
Assessment/Plan:     Diagnoses and all orders for this visit:    Controlled type 2 diabetes mellitus without complication, without long-term current use of insulin (HCC)  Comments:  Diabetes is at goal continue current regimen  Check blood sugar daily  Orders:  -     POCT hemoglobin A1c  -     Hemoglobin A1C; Future    Benign essential HTN  Comments:  Blood pressure is at goal continue current regimen  Orders:  -     Comprehensive metabolic panel; Future    Hypercholesterolemia  Comments:  Continue low-fat diet and statin therapy  Orders:  -     Lipid panel; Future    History of colon polyps  Comments: Follow-up with:  Rectal  Orders:  -     Ambulatory referral to Colorectal Surgery; Future    BPH associated with nocturia  Comments:  BPH symptoms not controlled  Referred to Urology  Orders:  -     Ambulatory referral to Urology; Future    Adenocarcinoma of prostate [facility])  -     Ambulatory referral to Urology; Future    Chronic pain of both knees  Comments: Will obtain bilateral knee x-rays and refer patient to Orthopedic  Orders:  -     XR knee 3 vw right non injury; Future  -     XR knee 3 vw left non injury; Future  -     Ambulatory referral to Orthopedic Surgery; Future          Subjective:      Patient ID: Pita Augustin is a 69 y o  male  Patient presents for follow up chronic conditions  Patient has non-insulin diabetes mellitus type 2  He is on metformin  mg once a day  Patient is compliant with low carb diet  Patient checks his blood sugar daily  His current A1c done in the office today is 5 5  Patient has history of hyperparathyroidism he is asymptomatic he is on calcium vitamin-D and vitamin-D supplements over-the-counter  For lipid control the is on simvastatin 10 mg  For hypertension he is on amlodipine 10, lisinopril 10, and metoprolol  mg daily  Patient has a history of prostate cancer  He is having symptomatic BPH with nocturia  He is currently on Flomax 0 4 mg   We will refer patient back to his urologist   Patient is also due for repeat colonoscopy for colon polyps  Patient also having bilateral knee pain  Patient is interested in seeing orthopedic for possible cortisone or gel shot    Patient does have known arthritis in his  shoulders      The following portions of the patient's history were reviewed and updated as appropriate:   He   Patient Active Problem List    Diagnosis Date Noted    BPH associated with nocturia 08/21/2019    Arthritis of right acromioclavicular joint 03/15/2019    Primary osteoarthritis of right shoulder 03/07/2019    Chronic left shoulder pain 03/07/2019    Left rotator cuff tear arthropathy 03/07/2019    Rotator cuff injury, right, initial encounter 03/07/2019    History of colon polyps 01/09/2019    Hyperparathyroidism ([facility] [address] ) 11/06/2018    Hypokalemia 10/22/2018    Hypocalcemia 10/22/2018    Glaucoma 10/11/2018    Adenocarcinoma of prostate ([facility] [address] ) 01/03/2018    Controlled type 2 diabetes mellitus without complication, without long-term current [facility] 07/25/2016    Inguinal hernia 02/08/2016    Benign essential HTN 01/12/2016    Hypercholesterolemia 01/12/2016     Current Outpatient Medications   Medication Sig Dispense Refill    ACCU-CHEK FASTCLIX LANCETS MISC by Does not apply route daily E11 9  Check  fbs  daily 100 each 3    amLODIPine (NORVASC) 10 mg tablet TAKE 1 TABLET (10 MG TOTAL) BY MOUTH DAILY FOR BLOOD PRESSURE 30 tablet 2    aspirin 81 mg chewable tablet Chew 81 mg      Blood Glucose Monitoring Suppl (ACCU-CHEK LORA PLUS) w/Device KIT by Does not apply route daily E11 9 check  fbs  daily 1 kit 0    Calcium Carb-Cholecalciferol (CALCIUM 1000 + D PO) Take by mouth      cholecalciferol (VITAMIN D3) 1,000 units tablet Take 1,000 Units by mouth daily      glucose blood (ACCU-CHEK LORA PLUS) test strip Check   Blood  Sugar  Daily  E11 9 100 each 0    glucose blood (ACCU-CHEK LORA PLUS) test strip CHECK BLOOD SUGAR DAILY E11 9 90 each 3    lisinopril (ZESTRIL) 10 mg tablet TAKE 1 TABLET BY MOUTH EVERY DAY 30 tablet 2    metFORMIN (GLUCOPHAGE-XR) 500 mg 24 hr tablet TAKE 1 TABLET BY MOUTH EVERY DAY 30 tablet 2    metoprolol succinate (TOPROL-XL) 100 mg 24 hr tablet TAKE 1 TABLET BY MOUTH EVERY DAY 30 tablet 2    simvastatin (ZOCOR) 10 mg tablet TAKE 1 TABLET BY MOUTH EVERY DAY 90 tablet 0    tamsulosin (FLOMAX) 0 4 mg 0 4 mg daily at bedtime  3     No current facility-administered medications for this visit  Current Outpatient Medications on File Prior to Visit   Medication Sig    ACCU-CHEK FASTCLIX LANCETS MISC by Does not apply route daily E11 9  Check  fbs  daily    amLODIPine (NORVASC) 10 mg tablet TAKE 1 TABLET (10 MG TOTAL) BY MOUTH DAILY FOR BLOOD PRESSURE    aspirin 81 mg chewable tablet Chew 81 mg    Blood Glucose Monitoring Suppl (ACCU-CHEK LORA PLUS) w/Device KIT by Does not apply route daily E11 9 check  fbs  daily    Calcium Carb-Cholecalciferol (CALCIUM 1000 + D PO) Take by mouth    cholecalciferol (VITAMIN D3) 1,000 units tablet Take 1,000 Units by mouth daily    glucose blood (ACCU-CHEK LORA PLUS) test strip Check   Blood  Sugar  Daily  E11 9    lisinopril (ZESTRIL) 10 mg tablet TAKE 1 TABLET BY MOUTH EVERY DAY    metFORMIN (GLUCOPHAGE-XR) 500 mg 24 hr tablet TAKE 1 TABLET BY MOUTH EVERY DAY    metoprolol succinate (TOPROL-XL) 100 mg 24 hr tablet TAKE 1 TABLET BY MOUTH EVERY DAY    simvastatin (ZOCOR) 10 mg tablet TAKE 1 TABLET BY MOUTH EVERY DAY    tamsulosin (FLOMAX) 0 4 mg 0 4 mg daily at bedtime     No current facility-administered medications on file prior to visit  He has No Known Allergies       Review of Systems   Constitutional: Negative for activity change, appetite change, chills, fatigue and fever  HENT: Negative for ear pain and sore throat  Eyes: Negative for visual disturbance  Respiratory: Negative for cough and shortness of breath      Cardiovascular: Negative for chest pain, palpitations and leg swelling  Gastrointestinal: Negative for abdominal pain, blood in stool, constipation, diarrhea and nausea  Genitourinary: Positive for frequency (nocturia)  Negative for difficulty urinating  Musculoskeletal: Negative for arthralgias, back pain and myalgias  Skin: Negative for rash  Neurological: Negative for dizziness, syncope and headaches  Psychiatric/Behavioral: Positive for sleep disturbance  Objective:        Physical Exam   Constitutional: He is oriented to person, place, and time  He appears well-developed and well-nourished  HENT:   Head: Normocephalic and atraumatic  Right Ear: Tympanic membrane, external ear and ear canal normal    Left Ear: Tympanic membrane, external ear and ear canal normal    Mouth/Throat: Oropharynx is clear and moist    Eyes: Pupils are equal, round, and reactive to light  Conjunctivae are normal    Neck: Neck supple  Carotid bruit is not present  No thyromegaly present  Cardiovascular: Normal rate, regular rhythm and normal heart sounds  No murmur heard  Pulmonary/Chest: Effort normal and breath sounds normal  He has no wheezes  Abdominal: Soft  Bowel sounds are normal  He exhibits no mass  There is no tenderness  Musculoskeletal: He exhibits no edema  Bilateral knees are enlarged  No erythema no effusion  Lymphadenopathy:     He has no cervical adenopathy  Neurological: He is alert and oriented to person, place, and time  Skin: Skin is warm and dry  Psychiatric: He has a normal mood and affect  His behavior is normal  Judgment and thought content normal    Nursing note and vitals reviewed

## 2019-09-25 ENCOUNTER — APPOINTMENT (EMERGENCY)
Dept: RADIOLOGY | Facility: HOSPITAL | Age: 74
End: 2019-09-25
Payer: COMMERCIAL

## 2019-09-25 ENCOUNTER — HOSPITAL ENCOUNTER (EMERGENCY)
Facility: HOSPITAL | Age: 74
Discharge: HOME | End: 2019-09-25
Attending: EMERGENCY MEDICINE | Admitting: EMERGENCY MEDICINE
Payer: COMMERCIAL

## 2019-09-25 VITALS
RESPIRATION RATE: 18 BRPM | HEART RATE: 69 BPM | WEIGHT: 187.61 LBS | OXYGEN SATURATION: 100 % | TEMPERATURE: 98.2 F | DIASTOLIC BLOOD PRESSURE: 81 MMHG | BODY MASS INDEX: 27.79 KG/M2 | HEIGHT: 69 IN | SYSTOLIC BLOOD PRESSURE: 173 MMHG

## 2019-09-25 DIAGNOSIS — S93.409A SPRAIN OF ANKLE: Primary | ICD-10-CM

## 2019-09-25 PROCEDURE — 99283 EMERGENCY DEPT VISIT LOW MDM: CPT | Performed by: EMERGENCY MEDICINE

## 2019-09-25 PROCEDURE — 99283 EMERGENCY DEPT VISIT LOW MDM: CPT

## 2019-09-25 PROCEDURE — 73610 X-RAY EXAM OF ANKLE: CPT

## 2019-09-25 NOTE — ED PROVIDER NOTES
History  Chief Complaint   Patient presents with    Ankle Injury     right ankle injury, slipped and rolled injury     69 y o  Male presents with chief complaint of right ankle pain after a rolling type injury this morning  Patient slipped on some wet grass this morning and his right leg folded underneath him  Patient has a history of remote fracture to his right ankle when he was 18 that was never set properly and he has a chronic deformity of the joint  He reports increased swelling since the injury this morning only about 1 hour ago  Patient did not strike head with fall, no other injuries  History provided by:  Patient   used: No    Ankle Injury   Location:  Right ankle  Quality:  Rolled ankle  Severity:  Moderate  Onset quality:  Sudden  Duration:  1 hour  Timing:  Constant  Progression:  Unchanged  Chronicity:  New  Context:  Slipped on wet grass  Relieved by:  Rest  Worsened by:  Weight bearing, ambulation  Associated symptoms: rash (chronic medial malleolus right ankle)    Associated symptoms: no fever and no headaches        Prior to Admission Medications   Prescriptions Last Dose Informant Patient Reported? Taking?    ACCU-CHEK FASTCLIX LANCETS MISC  Self No No   Sig: by Does not apply route daily E11 9  Check  fbs  daily   Blood Glucose Monitoring Suppl (ACCU-CHEK LORA PLUS) w/Device KIT  Self No No   Sig: by Does not apply route daily E11 9 check  fbs  daily   Calcium Carb-Cholecalciferol (CALCIUM 1000 + D PO)  Self Yes No   Sig: Take by mouth   amLODIPine (NORVASC) 10 mg tablet   No No   Sig: TAKE 1 TABLET (10 MG TOTAL) BY MOUTH DAILY FOR BLOOD PRESSURE   aspirin 81 mg chewable tablet  Self Yes No   Sig: Chew 81 mg   cholecalciferol (VITAMIN D3) 1,000 units tablet  Self Yes No   Sig: Take 1,000 Units by mouth daily   glucose blood (ACCU-CHEK LORA PLUS) test strip  Self No No   Sig: Check   Blood  Sugar  Daily  E11 9   glucose blood (ACCU-CHEK LORA PLUS) test strip  Self No No   Sig: CHECK BLOOD SUGAR DAILY E11 9   lisinopril (ZESTRIL) 10 mg tablet   No No   Sig: Take 1 tablet (10 mg total) by mouth daily   metFORMIN (GLUCOPHAGE-XR) 500 mg 24 hr tablet   No No   Sig: Take 1 tablet (500 mg total) by mouth daily   metoprolol succinate (TOPROL-XL) 100 mg 24 hr tablet   No No   Sig: Take 1 tablet (100 mg total) by mouth daily   simvastatin (ZOCOR) 10 mg tablet   No No   Sig: Take 1 tablet (10 mg total) by mouth daily   tamsulosin (FLOMAX) 0 4 mg  Self Yes No   Si 4 mg daily at bedtime      Facility-Administered Medications: None       Past Medical History:   Diagnosis Date    Prostate cancer ([facility] [address] )     S/P prostate ca- had radiation tx  History reviewed  No pertinent surgical history  Family History   Problem Relation Age of Onset    Heart attack Mother      I have reviewed and agree with the history as documented  Social History     Tobacco Use    Smoking status: Never Smoker    Smokeless tobacco: Never Used   Substance Use Topics    Alcohol use: Yes     Comment: Occasional    Drug use: Not on file        Review of Systems   Constitutional: Negative for chills and fever  Musculoskeletal: Positive for arthralgias (right ankle) and joint swelling (right ankle)  Skin: Positive for rash (chronic medial malleolus right ankle)  Negative for wound  Neurological: Negative for weakness, numbness and headaches  Physical Exam  Physical Exam   Constitutional: He is oriented to person, place, and time  He appears well-developed and well-nourished  He appears distressed (mild)  HENT:   Head: Normocephalic and atraumatic  Eyes: Pupils are equal, round, and reactive to light  EOM are normal    Neck: Normal range of motion  No JVD present  Cardiovascular: Normal rate, regular rhythm, normal heart sounds and intact distal pulses  Exam reveals no gallop and no friction rub  No murmur heard    Pulmonary/Chest: Effort normal and breath sounds normal  No respiratory distress  He has no wheezes  He has no rales  He exhibits no tenderness  Musculoskeletal: Normal range of motion  He exhibits edema (right ankle), tenderness (right ankle) and deformity (chronic deformity right ankle)  Neurological: He is alert and oriented to person, place, and time  Skin: Skin is warm and dry  Psychiatric: He has a normal mood and affect  His behavior is normal  Judgment and thought content normal    Nursing note and vitals reviewed  Vital Signs  ED Triage Vitals [09/25/19 0642]   Temperature Pulse Respirations Blood Pressure SpO2   98 2 °F (36 8 °C) 69 18 (!) 173/81 100 %      Temp Source Heart Rate Source Patient Position - Orthostatic VS BP Location FiO2 (%)   Oral Monitor Sitting Left arm --      Pain Score       5           Vitals:    09/25/19 0642   BP: (!) 173/81   Pulse: 69   Patient Position - Orthostatic VS: Sitting         Visual Acuity      ED Medications  Medications - No data to display    Diagnostic Studies  Results Reviewed     None                 XR ankle 3+ views RIGHT   ED Interpretation by Prateek Bhandari MD (09/25 7924)   This film was interpreted independently by me  No acute fracture identified, chronic deformity does cause difficulty visualizing distal fibula in 2nd view                    Procedures  Procedures       ED Course                               MDM  Number of Diagnoses or Management Options  Sprain of ankle: new and requires workup  Diagnosis management comments: Background: 69 y o  male with right ankle pain after injury    Differential DX includes but is not limited to: fracture vs contusion vs sprain     Plan: imaging, symptom control         Amount and/or Complexity of Data Reviewed  Tests in the radiology section of CPT®: ordered and reviewed  Independent visualization of images, tracings, or specimens: yes    Patient Progress  Patient progress: stable      Disposition  Final diagnoses:   Sprain of ankle     Time reflects when diagnosis was documented in both MDM as applicable and the Disposition within this note     Time User Action Codes Description Comment    9/25/2019  7:49 AM Luis Cisneros Add [Z71 671A] Sprain of ankle       ED Disposition     ED Disposition Condition Date/Time Comment    Discharge Stable Wed Sep 25, 2019  7:49 AM Nolvia July discharge to home/self care  Follow-up Information     Follow up With Specialties Details Why Contact Info Additional [address] [facility] Schedule an appointment as soon as possible for a visit in 1 week  [address], [address] [address] [place], [place], [address]          Patient's Medications   Discharge Prescriptions    No medications on file     No discharge procedures on file      ED Provider  Electronically Signed by           Norma Pope MD  09/25/19 5012

## 2019-10-04 ENCOUNTER — APPOINTMENT (EMERGENCY)
Dept: ULTRASOUND IMAGING | Facility: HOSPITAL | Age: 74
End: 2019-10-04
Payer: COMMERCIAL

## 2019-10-04 ENCOUNTER — HOSPITAL ENCOUNTER (EMERGENCY)
Facility: HOSPITAL | Age: 74
Discharge: HOME | End: 2019-10-04
Attending: EMERGENCY MEDICINE
Payer: COMMERCIAL

## 2019-10-04 ENCOUNTER — APPOINTMENT (EMERGENCY)
Dept: RADIOLOGY | Facility: HOSPITAL | Age: 74
End: 2019-10-04
Payer: COMMERCIAL

## 2019-10-04 ENCOUNTER — OFFICE VISIT (OUTPATIENT)
Dept: URGENT CARE | Facility: MEDICAL CENTER | Age: 74
End: 2019-10-04
Payer: COMMERCIAL

## 2019-10-04 VITALS
TEMPERATURE: 97.5 F | BODY MASS INDEX: 27.62 KG/M2 | DIASTOLIC BLOOD PRESSURE: 70 MMHG | RESPIRATION RATE: 18 BRPM | HEART RATE: 70 BPM | WEIGHT: 187 LBS | OXYGEN SATURATION: 96 % | SYSTOLIC BLOOD PRESSURE: 155 MMHG

## 2019-10-04 VITALS
HEIGHT: 69 IN | TEMPERATURE: 97.6 F | RESPIRATION RATE: 18 BRPM | DIASTOLIC BLOOD PRESSURE: 84 MMHG | WEIGHT: 187.6 LBS | SYSTOLIC BLOOD PRESSURE: 158 MMHG | OXYGEN SATURATION: 97 % | BODY MASS INDEX: 27.78 KG/M2 | HEART RATE: 73 BPM

## 2019-10-04 DIAGNOSIS — S93.401A RIGHT ANKLE SPRAIN: Primary | ICD-10-CM

## 2019-10-04 DIAGNOSIS — M79.604 RIGHT LEG PAIN: Primary | ICD-10-CM

## 2019-10-04 PROCEDURE — 73610 X-RAY EXAM OF ANKLE: CPT

## 2019-10-04 PROCEDURE — 99284 EMERGENCY DEPT VISIT MOD MDM: CPT

## 2019-10-04 PROCEDURE — 99284 EMERGENCY DEPT VISIT MOD MDM: CPT | Performed by: EMERGENCY MEDICINE

## 2019-10-04 PROCEDURE — 99213 OFFICE O/P EST LOW 20 MIN: CPT | Performed by: PHYSICIAN ASSISTANT

## 2019-10-04 PROCEDURE — 93971 EXTREMITY STUDY: CPT

## 2019-10-04 RX ORDER — IBUPROFEN 400 MG/1
400 TABLET ORAL ONCE
Status: COMPLETED | OUTPATIENT
Start: 2019-10-04 | End: 2019-10-04

## 2019-10-04 RX ADMIN — IBUPROFEN 400 MG: 400 TABLET ORAL at 21:10

## 2019-10-04 NOTE — PATIENT INSTRUCTIONS
Right leg pain/ swelling  Referred to ER for evaluation  Follow up with PCP in 3-5 days  Proceed to  ER if symptoms worsen

## 2019-10-04 NOTE — PROGRESS NOTES
[facility]        NAME: Evelia Howe is a 69 y o  male  : 1945    MRN: [id number]  DATE: 2019  TIME: 5:01 PM    Assessment and Plan   Right leg pain [M79 604]  1  Right leg pain           Patient Instructions     Right leg pain/ swelling  Referred to ER for evaluation  Follow up with PCP in 3-5 days  Proceed to  ER if symptoms worsen  Chief Complaint     Chief Complaint   Patient presents with    Ankle Injury     Pt fell 9 days ago and injured his right ankle  Pt was seen at [facility] and was told his ankle was not broken  Since the fall, pt states his right knee feels like it is twisting inward  ambulating   Leg Injury         History of Present Illness       70 y/o male presents c/o pain to right leg  Patient states he injured his ankle 1 week ago but over the last three days he developed pain to calf and swelling of leg  Patient denies chest pain, SOB, palpitations      Review of Systems   Review of Systems   Constitutional: Negative  HENT: Negative  Eyes: Negative  Respiratory: Negative  Negative for apnea, cough, choking, chest tightness, shortness of breath, wheezing and stridor  Cardiovascular: Negative  Negative for chest pain  Musculoskeletal: Positive for gait problem           Current Medications       Current Outpatient Medications:     ACCU-CHEK FASTCLIX LANCETS MISC, by Does not apply route daily E11 9  Check  fbs  daily, Disp: 100 each, Rfl: 3    amLODIPine (NORVASC) 10 mg tablet, TAKE 1 TABLET (10 MG TOTAL) BY MOUTH DAILY FOR BLOOD PRESSURE, Disp: 90 tablet, Rfl: 1    aspirin 81 mg chewable tablet, Chew 81 mg, Disp: , Rfl:     Blood Glucose Monitoring Suppl (ACCU-CHEK LORA PLUS) w/Device KIT, by Does not apply route daily E11 9 check  fbs  daily, Disp: 1 kit, Rfl: 0    Calcium Carb-Cholecalciferol (CALCIUM 1000 + D PO), Take by mouth, Disp: , Rfl:     cholecalciferol (VITAMIN D3) 1,000 units tablet, Take 1,000 Units by mouth daily, Disp: , Rfl:     glucose blood (ACCU-CHEK LORA PLUS) test strip, Check   Blood  Sugar  Daily  E11 9, Disp: 100 each, Rfl: 0    lisinopril (ZESTRIL) 10 mg tablet, Take 1 tablet (10 mg total) by mouth daily, Disp: 90 tablet, Rfl: 1    metFORMIN (GLUCOPHAGE-XR) 500 mg 24 hr tablet, Take 1 tablet (500 mg total) by mouth daily, Disp: 90 tablet, Rfl: 1    metoprolol succinate (TOPROL-XL) 100 mg 24 hr tablet, Take 1 tablet (100 mg total) by mouth daily, Disp: 90 tablet, Rfl: 1    simvastatin (ZOCOR) 10 mg tablet, Take 1 tablet (10 mg total) by mouth daily, Disp: 90 tablet, Rfl: 1    tamsulosin (FLOMAX) 0 4 mg, 0 4 mg daily at bedtime, Disp: , Rfl: 3    glucose blood (ACCU-CHEK LORA PLUS) test strip, CHECK BLOOD SUGAR DAILY E11 9 (Patient not taking: Reported on 10/4/2019), Disp: 90 each, Rfl: 3    Current Allergies     Allergies as of 10/04/2019    (No Known Allergies)            The following portions of the patient's history were reviewed and updated as appropriate: allergies, current medications, past family history, past medical history, past social history, past surgical history and problem list      Past Medical History:   Diagnosis Date    Prostate cancer ([facility] [address] ) 2005    S/P prostate ca-2005 had radiation tx  History reviewed  No pertinent surgical history  Family History   Problem Relation Age of Onset    Heart attack Mother          Medications have been verified  Objective   /84   Pulse 73   Temp 97 6 °F (36 4 °C) (Temporal)   Resp 18   Ht 5' 9" (1 753 m)   Wt 85 1 kg (187 lb 9 6 oz)   SpO2 97%   BMI 27 70 kg/m²        Physical Exam     Physical Exam   Constitutional: He appears well-developed and well-nourished  No distress  Neck: Normal range of motion  Neck supple  Cardiovascular: Normal rate, regular rhythm, normal heart sounds and intact distal pulses  Pulmonary/Chest: Effort normal and breath sounds normal  No respiratory distress  He has no wheezes   He has no rales  He exhibits no tenderness  Musculoskeletal:        Right lower leg: He exhibits tenderness and swelling  He exhibits no bony tenderness, no edema, no deformity and no laceration  Legs:  Lymphadenopathy:     He has no cervical adenopathy  Skin: He is not diaphoretic

## 2019-10-05 NOTE — ED PROVIDER NOTES
History  Chief Complaint   Patient presents with    Leg Pain     pt c/o right leg pain from the knee down after he slipped on wet grass on an incline last week  He states it gets the worse as the day goes by       History provided by:  Patient   used: No     43-year-old male presented for further evaluation of continued right ankle and lower leg pain after a fall about a week ago when he slipped on wet grass at home  Pain moderate, worse with ambulation, now fairly diffuse throughout the calf  He was seen in the ED at that time, had negative x-rays  Noted worsening pain after walking continuously for work over the last week  Seen in urgent care and referred to the ED for concerns possible DVT as he has some edema in calf tenderness as well  He still has persistent pain along the medial malleolus  Chronic ankle deformity from prior fracture as a child  Prior to Admission Medications   Prescriptions Last Dose Informant Patient Reported? Taking?    ACCU-CHEK FASTCLIX LANCETS MISC  Self No Yes   Sig: by Does not apply route daily E11 9  Check  fbs  daily   Calcium Carb-Cholecalciferol (CALCIUM 1000 + D PO)  Self Yes Yes   Sig: Take by mouth   amLODIPine (NORVASC) 10 mg tablet  Self No Yes   Sig: TAKE 1 TABLET (10 MG TOTAL) BY MOUTH DAILY FOR BLOOD PRESSURE   aspirin 81 mg chewable tablet Not Taking at Unknown time Self Yes No   Sig: Chew 81 mg   cholecalciferol (VITAMIN D3) 1,000 units tablet  Self Yes Yes   Sig: Take 1,000 Units by mouth daily   lisinopril (ZESTRIL) 10 mg tablet  Self No Yes   Sig: Take 1 tablet (10 mg total) by mouth daily   metFORMIN (GLUCOPHAGE-XR) 500 mg 24 hr tablet  Self No Yes   Sig: Take 1 tablet (500 mg total) by mouth daily   metoprolol succinate (TOPROL-XL) 100 mg 24 hr tablet  Self No Yes   Sig: Take 1 tablet (100 mg total) by mouth daily   simvastatin (ZOCOR) 10 mg tablet  Self No Yes   Sig: Take 1 tablet (10 mg total) by mouth daily   tamsulosin (FLOMAX) 0 4 mg Self Yes Yes   Si 4 mg daily at bedtime      Facility-Administered Medications: None       Past Medical History:   Diagnosis Date    [facility] mellitus ([facility] [address] )     Hyperlipidemia     Hypertension     Prostate cancer ([facility] )     S/P prostate ca-2005 had radiation tx  History reviewed  No pertinent surgical history  Family History   Problem Relation Age of Onset    Heart attack Mother      I have reviewed and agree with the history as documented  Social History     Tobacco Use    Smoking status: Never Smoker    Smokeless tobacco: Never Used   Substance Use Topics    Alcohol use: Yes     Comment: Occasional    Drug use: Never        Review of Systems   Constitutional: Negative for activity change, appetite change and fever  Respiratory: Negative for chest tightness and shortness of breath  Musculoskeletal: Positive for gait problem  Negative for neck pain  Skin: Negative for color change and wound  Neurological: Negative for dizziness, weakness and numbness  All other systems reviewed and are negative  Physical Exam  Physical Exam   Constitutional: He is oriented to person, place, and time  He appears well-developed and well-nourished  HENT:   Head: Normocephalic  Cardiovascular: Normal rate, regular rhythm and intact distal pulses  Pulmonary/Chest: Effort normal  No respiratory distress  Musculoskeletal:   Chronic deformity right ankle  Tenderness over the medial malleolus  Some nonpitting edema and generalized right calf tenderness  Neurological: He is alert and oriented to person, place, and time  Skin: Skin is warm and dry  Psychiatric: He has a normal mood and affect  His behavior is normal    Nursing note and vitals reviewed        Vital Signs  ED Triage Vitals   Temperature Pulse Respirations Blood Pressure SpO2   10/04/19 2022 10/04/19 2022 10/04/19 2022 10/04/19 2022 10/04/19 2022   97 5 °F (36 4 °C) 73 18 168/77 95 %      Temp src Heart Rate Source Patient Position - Orthostatic VS BP Location FiO2 (%)   -- 10/04/19 2022 10/04/19 2022 10/04/19 2111 --    Monitor Lying Right arm       Pain Score       10/04/19 2022       9           Vitals:    10/04/19 2022 10/04/19 2111   BP: 168/77 155/70   Pulse: 73 70   Patient Position - Orthostatic VS: Lying Sitting         Visual Acuity      ED Medications  Medications   ibuprofen (MOTRIN) tablet 400 mg (400 mg Oral Given 10/4/19 2110)       Diagnostic Studies  Results Reviewed     None                 VAS lower limb venous duplex study, unilateral/limited   ED Interpretation by Sincere Edouard MD (10/04 2216)   Negative      XR ankle 3+ views RIGHT   ED Interpretation by Sincere Edouard MD (10/04 2201)   No acute fracture  Chronic deformity  Procedures  Procedures       ED Course                               MDM  Number of Diagnoses or Management Options  Right ankle sprain: established and worsening  Diagnosis management comments: 78-year-old male presented for evaluation of right ankle pain about a week after slipping and twisting it, injuring at home  X-rays negative previously  He has continued to have fairly persistent pain walking on it  Noted some swelling and calf pain as well  Was referred from urgent care for duplex  Duplex with negative  Repeat x-ray negative for fracture  He does have a chronic deformity from a prior fracture as a child  Advised continuing supportive care, OTC meds for pain         Amount and/or Complexity of Data Reviewed  Tests in the radiology section of CPT®: ordered and reviewed    Patient Progress  Patient progress: stable      Disposition  Final diagnoses:   Right ankle sprain     Time reflects when diagnosis was documented in both MDM as applicable and the Disposition within this note     Time User Action Codes Description Comment    10/4/2019 10:03 PM Ousmane Ireland Add [B88 670D] Left ankle sprain     10/5/2019  1:24 AM Yolette Pollard [S93 402A] Left ankle sprain     10/5/2019  1:24 AM Jeanette Seals Add [J43 663P] Right ankle sprain       ED Disposition     ED Disposition Condition Date/Time Comment    Discharge Stable Fri Oct 4, 2019 10:02 PM Bryce Lowery discharge to home/self care  Follow-up Information     Follow up With Specialties Details Why Contact Info    Lakeshia Hadley PA-C Family Medicine, Physician Assistant   [phone]  [address] [address]  [phone]            Discharge Medication List as of 10/4/2019 10:03 PM      CONTINUE these medications which have NOT CHANGED    Details   ACCU-CHEK FASTCLIX LANCETS MISC by Does not apply route daily E11 9  Check  fbs  daily, Starting Thu 10/11/2018, Normal      amLODIPine (NORVASC) 10 mg tablet TAKE 1 TABLET (10 MG TOTAL) BY MOUTH DAILY FOR BLOOD PRESSURE, Starting Wed 8/21/2019, Normal      Calcium Carb-Cholecalciferol (CALCIUM 1000 + D PO) Take by mouth, Historical Med      cholecalciferol (VITAMIN D3) 1,000 units tablet Take 1,000 Units by mouth daily, Historical Med      lisinopril (ZESTRIL) 10 mg tablet Take 1 tablet (10 mg total) by mouth daily, Starting Wed 8/21/2019, Normal      metFORMIN (GLUCOPHAGE-XR) 500 mg 24 hr tablet Take 1 tablet (500 mg total) by mouth daily, Starting Wed 8/21/2019, Normal      metoprolol succinate (TOPROL-XL) 100 mg 24 hr tablet Take 1 tablet (100 mg total) by mouth daily, Starting Wed 8/21/2019, Normal      simvastatin (ZOCOR) 10 mg tablet Take 1 tablet (10 mg total) by mouth daily, Starting Wed 8/21/2019, Normal      tamsulosin (FLOMAX) 0 4 mg 0 4 mg daily at bedtime, Starting Tue 10/2/2018, Historical Med      aspirin 81 mg chewable tablet Chew 81 mg, Starting Sat 3/14/2015, Historical Med           No discharge procedures on file      ED Provider  Electronically Signed by           Farhan Hays MD  10/05/19 3916

## 2019-10-06 PROCEDURE — 93971 EXTREMITY STUDY: CPT | Performed by: SURGERY

## 2019-10-08 ENCOUNTER — OFFICE VISIT (OUTPATIENT)
Dept: FAMILY MEDICINE CLINIC | Facility: CLINIC | Age: 74
End: 2019-10-08
Payer: COMMERCIAL

## 2019-10-08 VITALS
RESPIRATION RATE: 16 BRPM | SYSTOLIC BLOOD PRESSURE: 140 MMHG | HEART RATE: 80 BPM | BODY MASS INDEX: 26.84 KG/M2 | WEIGHT: 181.2 LBS | DIASTOLIC BLOOD PRESSURE: 78 MMHG | TEMPERATURE: 96.8 F | OXYGEN SATURATION: 96 % | HEIGHT: 69 IN

## 2019-10-08 DIAGNOSIS — Z87.81 HISTORY OF FRACTURE OF RIGHT ANKLE: ICD-10-CM

## 2019-10-08 DIAGNOSIS — S96.911A SPRAIN AND STRAIN OF RIGHT ANKLE: Primary | ICD-10-CM

## 2019-10-08 DIAGNOSIS — S93.401A SPRAIN AND STRAIN OF RIGHT ANKLE: Primary | ICD-10-CM

## 2019-10-08 PROCEDURE — 99214 OFFICE O/P EST MOD 30 MIN: CPT | Performed by: PHYSICIAN ASSISTANT

## 2019-10-08 NOTE — PROGRESS NOTES
Assessment/Plan:     Diagnoses and all orders for this visit:    Sprain and strain of right ankle  Comments:  Patient to continue rest ice and elevation  Referred to Orthopedic  Continue Aleve over-the-counter  Orders:  -     Ambulatory referral to Orthopedic Surgery; Future    History of fracture of right ankle  -     Ambulatory referral to Orthopedic Surgery; Future    Other orders  -     Naproxen Sodium (ALEVE PO); Take by mouth          Subjective:      Patient ID: Ras Dubon is a 69 y o  male  Patient presents for follow-up from emergency room  Patient had sprained his right ankle approximately 2 weeks ago  He twisted it in his yd  Patient was seen at urgent care he had an x-ray and that was normal   Patient went to urgent care again on October 4th for continued pain and swelling of the right ankle  Patient states his right lower leg also began to swell and be painful  Urgent care referred him to the emergency room to rule out DVT  Patient was re-x-rayed in the emergency room the x-ray was shown to be normal with no fracture  Venous Doppler of the right lower leg was negative for DVT  Patient has continued pain and swelling of the right lower leg and his ankle  Of note patient has a history of previously right ankle fracture  No surgical intervention  Patient states the fracture was left to heal by itself  Patient is having pain and difficulty ambulating  Patient currently using a cane        The following portions of the patient's history were reviewed and updated as appropriate:   He   Patient Active Problem List    Diagnosis Date Noted    Sprain and strain of right ankle 10/08/2019    BPH associated with nocturia 08/21/2019    Arthritis of right acromioclavicular joint 03/15/2019    Primary osteoarthritis of right shoulder 03/07/2019    Chronic left shoulder pain 03/07/2019    Left rotator cuff tear arthropathy 03/07/2019    Rotator cuff injury, right, initial encounter 03/07/2019  History of colon polyps 01/09/2019    Hyperparathyroidism ([place] [address] ) 11/06/2018    Hypokalemia 10/22/2018    Hypocalcemia 10/22/2018    Glaucoma 10/11/2018    Adenocarcinoma of prostate ([place] [address] ) 01/03/2018    Controlled type 2 diabetes mellitus without complication, without long-term current use of insulin ([place] [address] ) 07/25/2016    Inguinal hernia 02/08/2016    Benign essential HTN 01/12/2016    Hypercholesterolemia 01/12/2016     He  has no past surgical history on file  Current Outpatient Medications   Medication Sig Dispense Refill    ACCU-CHEK FASTCLIX LANCETS MISC by Does not apply route daily E11 9  Check  fbs  daily 100 each 3    amLODIPine (NORVASC) 10 mg tablet TAKE 1 TABLET (10 MG TOTAL) BY MOUTH DAILY FOR BLOOD PRESSURE 90 tablet 1    aspirin 81 mg chewable tablet Chew 81 mg      Calcium Carb-Cholecalciferol (CALCIUM 1000 + D PO) Take by mouth      cholecalciferol (VITAMIN D3) 1,000 units tablet Take 1,000 Units by mouth daily      lisinopril (ZESTRIL) 10 mg tablet Take 1 tablet (10 mg total) by mouth daily 90 tablet 1    metFORMIN (GLUCOPHAGE-XR) 500 mg 24 hr tablet Take 1 tablet (500 mg total) by mouth daily 90 tablet 1    metoprolol succinate (TOPROL-XL) 100 mg 24 hr tablet Take 1 tablet (100 mg total) by mouth daily 90 tablet 1    Naproxen Sodium (ALEVE PO) Take by mouth      simvastatin (ZOCOR) 10 mg tablet Take 1 tablet (10 mg total) by mouth daily 90 tablet 1    tamsulosin (FLOMAX) 0 4 mg 0 4 mg daily at bedtime  3     No current facility-administered medications for this visit        Current Outpatient Medications on File Prior to Visit   Medication Sig    ACCU-CHEK FASTCLIX LANCETS MISC by Does not apply route daily E11 9  Check  fbs  daily    amLODIPine (NORVASC) 10 mg tablet TAKE 1 TABLET (10 MG TOTAL) BY MOUTH DAILY FOR BLOOD PRESSURE    aspirin 81 mg chewable tablet Chew 81 mg    Calcium Carb-Cholecalciferol (CALCIUM 1000 + D PO) Take by mouth    cholecalciferol (VITAMIN D3) 1,000 units tablet Take 1,000 Units by mouth daily    lisinopril (ZESTRIL) 10 mg tablet Take 1 tablet (10 mg total) by mouth daily    metFORMIN (GLUCOPHAGE-XR) 500 mg 24 hr tablet Take 1 tablet (500 mg total) by mouth daily    metoprolol succinate (TOPROL-XL) 100 mg 24 hr tablet Take 1 tablet (100 mg total) by mouth daily    Naproxen Sodium (ALEVE PO) Take by mouth    simvastatin (ZOCOR) 10 mg tablet Take 1 tablet (10 mg total) by mouth daily    tamsulosin (FLOMAX) 0 4 mg 0 4 mg daily at bedtime     No current facility-administered medications on file prior to visit  He has No Known Allergies       Review of Systems   Respiratory: Negative for cough and shortness of breath  Cardiovascular: Negative for chest pain  Musculoskeletal: Positive for arthralgias and joint swelling  Objective:        Physical Exam   Constitutional: He is oriented to person, place, and time  He appears well-developed and well-nourished  No distress  HENT:   Head: Normocephalic and atraumatic  Right Ear: External ear normal    Left Ear: External ear normal    Bilateral  Hearing  aides   Eyes: Conjunctivae are normal    Pulmonary/Chest: Effort normal    Musculoskeletal: He exhibits edema, tenderness and deformity  Right ankle deformity  Medial malleolus  Patient has marked swelling of the ankle foot and of the right lower leg  Edema is nonpitting  Area is tender  Dorsalis pedis pulses intact  Patient has limited range of motion in the ankle  Patient states now because he is limping on the ankle he is going sone hip pain and low back pain  Patient has been resting elevating and applying ice  Patient taking over-the-counter Aleve   Neurological: He is oriented to person, place, and time  Skin: Skin is warm and dry  He is not diaphoretic  Vitals reviewed

## 2019-10-09 ENCOUNTER — APPOINTMENT (OUTPATIENT)
Dept: RADIOLOGY | Facility: MEDICAL CENTER | Age: 74
End: 2019-10-09
Payer: COMMERCIAL

## 2019-10-09 ENCOUNTER — OFFICE VISIT (OUTPATIENT)
Dept: OBGYN CLINIC | Facility: MEDICAL CENTER | Age: 74
End: 2019-10-09
Payer: COMMERCIAL

## 2019-10-09 VITALS
DIASTOLIC BLOOD PRESSURE: 65 MMHG | BODY MASS INDEX: 26.81 KG/M2 | WEIGHT: 181 LBS | SYSTOLIC BLOOD PRESSURE: 128 MMHG | HEART RATE: 79 BPM | HEIGHT: 69 IN

## 2019-10-09 DIAGNOSIS — S82.401A CLOSED FRACTURE OF SHAFT OF RIGHT FIBULA, UNSPECIFIED FRACTURE MORPHOLOGY, INITIAL ENCOUNTER: Primary | ICD-10-CM

## 2019-10-09 DIAGNOSIS — S96.911A SPRAIN AND STRAIN OF RIGHT ANKLE: ICD-10-CM

## 2019-10-09 DIAGNOSIS — Z87.81 HISTORY OF FRACTURE OF RIGHT ANKLE: ICD-10-CM

## 2019-10-09 DIAGNOSIS — S93.401A SPRAIN AND STRAIN OF RIGHT ANKLE: ICD-10-CM

## 2019-10-09 PROCEDURE — 73590 X-RAY EXAM OF LOWER LEG: CPT

## 2019-10-09 PROCEDURE — 99214 OFFICE O/P EST MOD 30 MIN: CPT | Performed by: ORTHOPAEDIC SURGERY

## 2019-10-09 RX ORDER — NAPROXEN 500 MG/1
500 TABLET ORAL 2 TIMES DAILY WITH MEALS
Qty: 60 TABLET | Refills: 1 | Status: SHIPPED | OUTPATIENT
Start: 2019-10-09 | End: 2019-12-10 | Stop reason: SDUPTHER

## 2019-10-09 NOTE — PROGRESS NOTES
Assessment:    History right ankle/subtalar injuries  Fibular fracture    Plan:     Weight Bearing  as Tolerated right LE    Prescribed patient Naprosyn 500 mg prn for pain    Provided patient with ankle brace    Will obtain new x-rays of right tibia/fibula at next office visit    Follow up in 1 month         The above stated was discussed in layman's terms and the patient expressed understanding  All questions were answered to the patient's satisfaction  Subjective:   Emy Lopez is a 69 y o  male who presents right ankle pain  Patient states 2 weeks ago  On 9/25/19 he was walking down a hill slipped on wet grass  and rolled his right ankle  Patient states he  felt increase pain and swelling   Patient went to the ED at [facility] and had x-rays right ankle and Ultrasound  of right LE to r/o DVT which came out negative     Patient was seen by his PCP yesterday and was advised to take OTC Aleve and to ice and elevate  Patient states he is having constant pain over lateral, medial and top right ankle  He notes swelling over lower leg  He notes pain with walking and radiating pain up the  leg  He is using a cane when walking  Patient has a history right ankle fracture in 1965 that  was treated non operatively  Review of systems negative unless otherwise specified in HPI    Past Medical History:   Diagnosis Date    Diabetes mellitus ([facility] [address] )     Hyperlipidemia     Hypertension     Prostate cancer ([facility] [address] ) 2005    S/P prostate ca-2005 had radiation tx  History reviewed  No pertinent surgical history      Family History   Problem Relation Age of Onset    Heart attack Mother        Social History     Occupational History    Not on file   Tobacco Use    Smoking status: Never Smoker    Smokeless tobacco: Never Used   Substance and Sexual Activity    Alcohol use: Yes     Comment: Occasional    Drug use: Never    Sexual activity: Not on file         Current Outpatient Medications:    ACCU-CHEK FASTCLIX LANCETS MISC, by Does not apply route daily E11 9  Check  fbs  daily, Disp: 100 each, Rfl: 3    amLODIPine (NORVASC) 10 mg tablet, TAKE 1 TABLET (10 MG TOTAL) BY MOUTH DAILY FOR BLOOD PRESSURE, Disp: 90 tablet, Rfl: 1    aspirin 81 mg chewable tablet, Chew 81 mg, Disp: , Rfl:     Calcium Carb-Cholecalciferol (CALCIUM 1000 + D PO), Take by mouth, Disp: , Rfl:     cholecalciferol (VITAMIN D3) 1,000 units tablet, Take 1,000 Units by mouth daily, Disp: , Rfl:     lisinopril (ZESTRIL) 10 mg tablet, Take 1 tablet (10 mg total) by mouth daily, Disp: 90 tablet, Rfl: 1    metFORMIN (GLUCOPHAGE-XR) 500 mg 24 hr tablet, Take 1 tablet (500 mg total) by mouth daily, Disp: 90 tablet, Rfl: 1    metoprolol succinate (TOPROL-XL) 100 mg 24 hr tablet, Take 1 tablet (100 mg total) by mouth daily, Disp: 90 tablet, Rfl: 1    Naproxen Sodium (ALEVE PO), Take by mouth, Disp: , Rfl:     simvastatin (ZOCOR) 10 mg tablet, Take 1 tablet (10 mg total) by mouth daily, Disp: 90 tablet, Rfl: 1    tamsulosin (FLOMAX) 0 4 mg, 0 4 mg daily at bedtime, Disp: , Rfl: 3    No Known Allergies         Vitals:    10/09/19 1411   BP: 128/65   Pulse: 79       Objective:            Physical Exam  · General: Awake, Alert, Oriented  · Eyes: Pupils equal, round and reactive to light  · Heart: regular rate and rhythm  · Lungs: No audible wheezing  · Abdomen: soft                    Ortho Exam  Right ankle  No lacerations, no abrasions, no open wounds   No erythema   Moderate swelling   TTP over posterior medial malleolus  TTP over proximal fibular  Neurovascularly Intact Distally     Diagnostics, reviewed and taken today if performed as documented:     The attending physician has personally reviewed the pertinent films in PACS and interpretation is as follows:  XR Right ankle:  Post traumatic arthritis tibio-talar joint, chronic medial clear space widening  XR Right tibia fibula:  Proximal fibular fracture with no medial clear space widening     Procedures, if performed today:    Procedures    None performed      Scribe Attestation    I,:   Thormiguel Yemi am acting as a scribe while in the presence of the attending physician :        I,:   Lyn Leon MD personally performed the services described in this documentation    as scribed in my presence :              Portions of the record may have been created with voice recognition software  Occasional wrong word or "sound a like" substitutions may have occurred due to the inherent limitations of voice recognition software  Read the chart carefully and recognize, using context, where substitutions have occurred

## 2019-10-13 DIAGNOSIS — I10 ESSENTIAL HYPERTENSION: ICD-10-CM

## 2019-10-14 RX ORDER — AMLODIPINE BESYLATE 10 MG/1
10 TABLET ORAL DAILY
Qty: 30 TABLET | Refills: 5 | Status: SHIPPED | OUTPATIENT
Start: 2019-10-14 | End: 2020-03-12

## 2019-11-20 ENCOUNTER — APPOINTMENT (OUTPATIENT)
Dept: RADIOLOGY | Facility: MEDICAL CENTER | Age: 74
End: 2019-11-20
Payer: COMMERCIAL

## 2019-11-20 ENCOUNTER — OFFICE VISIT (OUTPATIENT)
Dept: OBGYN CLINIC | Facility: MEDICAL CENTER | Age: 74
End: 2019-11-20
Payer: COMMERCIAL

## 2019-11-20 VITALS
SYSTOLIC BLOOD PRESSURE: 170 MMHG | DIASTOLIC BLOOD PRESSURE: 85 MMHG | WEIGHT: 186 LBS | HEART RATE: 69 BPM | BODY MASS INDEX: 27.47 KG/M2

## 2019-11-20 DIAGNOSIS — M25.571 PAIN, JOINT, ANKLE AND FOOT, RIGHT: ICD-10-CM

## 2019-11-20 DIAGNOSIS — M25.571 PAIN, JOINT, ANKLE AND FOOT, RIGHT: Primary | ICD-10-CM

## 2019-11-20 PROCEDURE — 99213 OFFICE O/P EST LOW 20 MIN: CPT | Performed by: ORTHOPAEDIC SURGERY

## 2019-11-20 PROCEDURE — 73610 X-RAY EXAM OF ANKLE: CPT

## 2019-11-20 PROCEDURE — 73590 X-RAY EXAM OF LOWER LEG: CPT

## 2019-11-20 NOTE — PROGRESS NOTES
69 y o male presenting his 2 month follow-up from right midshaft fibula fracture  Patient is overall doing well  He has some pain in the fibula with walking, but is able to tolerate ambulation without much difficulty  He does continue to complain of anterior ankle and medial/lateral malleolar pain, but states this is back to his baseline prior to injury  His other complaint this afternoon is restless legs at sleep  He states this has begun since his fall 2 months ago  Otherwise no numbness or tingling to the extremity  Review of Systems  Review of systems negative unless otherwise specified in HPI    Past Medical History  Past Medical History:   Diagnosis Date    [facility] mellitus [facility] [address] [facility]     Hyperlipidemia     Hypertension     Prostate cancer ([facility] [address] ) 2005    S/P prostate ca-2005 had radiation tx  Past Surgical History  History reviewed  No pertinent surgical history      Current Medications  Current Outpatient Medications on File Prior to Visit   Medication Sig Dispense Refill    ACCU-CHEK FASTCLIX LANCETS MISC by Does not apply route daily E11 9  Check  fbs  daily 100 each 3    amLODIPine (NORVASC) 10 mg tablet TAKE 1 TABLET (10 MG TOTAL) BY MOUTH DAILY FOR BLOOD PRESSURE 30 tablet 5    aspirin 81 mg chewable tablet Chew 81 mg      Calcium Carb-Cholecalciferol (CALCIUM 1000 + D PO) Take by mouth      cholecalciferol (VITAMIN D3) 1,000 units tablet Take 1,000 Units by mouth daily      lisinopril (ZESTRIL) 10 mg tablet Take 1 tablet (10 mg total) by mouth daily 90 tablet 1    metFORMIN (GLUCOPHAGE-XR) 500 mg 24 hr tablet Take 1 tablet (500 mg total) by mouth daily 90 tablet 1    metoprolol succinate (TOPROL-XL) 100 mg 24 hr tablet Take 1 tablet (100 mg total) by mouth daily 90 tablet 1    naproxen (NAPROSYN) 500 mg tablet Take 1 tablet (500 mg total) by mouth 2 (two) times a day with meals 60 tablet 1    Naproxen Sodium (ALEVE PO) Take by mouth      simvastatin (ZOCOR) 10 mg tablet Take 1 tablet (10 mg total) by mouth daily 90 tablet 1    tamsulosin (FLOMAX) 0 4 mg 0 4 mg daily at bedtime  3     No current facility-administered medications on file prior to visit  Recent Labs [facility])  0   Lab Value Date/Time    HCT 42 3 06/11/2016 0821    HGB 14 4 06/11/2016 0821    WBC 5 53 06/11/2016 0821    HGBA1C 5 5 08/21/2019 1703    HGBA1C 5 9 04/19/2019 0913         Physical exam  · General: Awake, Alert, Oriented  · Eyes: Pupils equal, round and reactive to light  · Heart: regular rate and rhythm  · Lungs: No audible wheezing  · Abdomen: soft  MSK right leg  -skin intact over the fibula without erythema or ecchymosis  -severe pes planus deformity  -minimal tenderness to palpation over the fibula  -motor intact to ankle dorsiflexion and plantar flexion, minimal foot inversion/eversion, and intact EHL/FHL  -Mild pitting edema noted to the mid leg region  -sensation intact sural/saphenous/SP/DP/tib  -2+ DP pulse    Imaging  Imaging reviewed with the patient  X-ray of the right hand tib-fib shows healing of previously visualized midshaft fibular fracture  Procedure  None indicated    Assessment/Plan:   74 y o male 2 months out from a right fibular shaft fracture  Patient is doing well  There is good evidence of fracture healing on x-rays  Patient should follow up with his primary physician for evaluation of a restless leg syndrome  No further orthopedic follow-up necessary

## 2019-11-22 ENCOUNTER — APPOINTMENT (OUTPATIENT)
Dept: LAB | Facility: MEDICAL CENTER | Age: 74
End: 2019-11-22
Payer: COMMERCIAL

## 2019-12-10 DIAGNOSIS — Z87.81 HISTORY OF FRACTURE OF RIGHT ANKLE: ICD-10-CM

## 2019-12-10 RX ORDER — NAPROXEN 500 MG/1
TABLET ORAL
Qty: 60 TABLET | Refills: 1 | Status: SHIPPED | OUTPATIENT
Start: 2019-12-10 | End: 2020-07-06

## 2019-12-11 ENCOUNTER — OFFICE VISIT (OUTPATIENT)
Dept: FAMILY MEDICINE CLINIC | Facility: CLINIC | Age: 74
End: 2019-12-11
Payer: COMMERCIAL

## 2019-12-11 VITALS
OXYGEN SATURATION: 98 % | WEIGHT: 186 LBS | HEIGHT: 69 IN | RESPIRATION RATE: 16 BRPM | TEMPERATURE: 98.2 F | DIASTOLIC BLOOD PRESSURE: 86 MMHG | BODY MASS INDEX: 27.55 KG/M2 | HEART RATE: 69 BPM | SYSTOLIC BLOOD PRESSURE: 162 MMHG

## 2019-12-11 DIAGNOSIS — E11.9 CONTROLLED TYPE 2 DIABETES MELLITUS WITHOUT COMPLICATION, WITHOUT LONG-TERM CURRENT USE OF INSULIN (HCC): Primary | ICD-10-CM

## 2019-12-11 DIAGNOSIS — N40.1 BPH ASSOCIATED WITH NOCTURIA: ICD-10-CM

## 2019-12-11 DIAGNOSIS — E21.3 HYPERPARATHYROIDISM (HCC): ICD-10-CM

## 2019-12-11 DIAGNOSIS — I10 BENIGN ESSENTIAL HTN: ICD-10-CM

## 2019-12-11 DIAGNOSIS — E87.6 HYPOKALEMIA: ICD-10-CM

## 2019-12-11 DIAGNOSIS — C61 ADENOCARCINOMA OF PROSTATE (HCC): ICD-10-CM

## 2019-12-11 DIAGNOSIS — R35.1 BPH ASSOCIATED WITH NOCTURIA: ICD-10-CM

## 2019-12-11 DIAGNOSIS — E78.00 HYPERCHOLESTEROLEMIA: ICD-10-CM

## 2019-12-11 PROCEDURE — 99214 OFFICE O/P EST MOD 30 MIN: CPT | Performed by: PHYSICIAN ASSISTANT

## 2019-12-11 PROCEDURE — 1036F TOBACCO NON-USER: CPT | Performed by: PHYSICIAN ASSISTANT

## 2019-12-11 PROCEDURE — 1160F RVW MEDS BY RX/DR IN RCRD: CPT | Performed by: PHYSICIAN ASSISTANT

## 2019-12-11 PROCEDURE — 3008F BODY MASS INDEX DOCD: CPT | Performed by: PHYSICIAN ASSISTANT

## 2019-12-11 RX ORDER — TERAZOSIN 2 MG/1
2 CAPSULE ORAL
Qty: 90 CAPSULE | Refills: 1 | Status: SHIPPED | OUTPATIENT
Start: 2019-12-11 | End: 2020-03-02

## 2019-12-11 NOTE — PROGRESS NOTES
Assessment/Plan:     Diagnoses and all orders for this visit:    Controlled type 2 diabetes mellitus without complication, without long-term current use of insulin ([facility])  Comments:  Diabetes is at goal continue current regimen  Continue low sugar and carb diet  Check blood sugar daily  Orders:  -     Hemoglobin A1C; Future  -     Microalbumin / creatinine urine ratio; Future    Benign essential HTN  Comments:  Continue current regimen  BP is a little elevated today  Patient states he had a stressful day at work  Hyperparathyroidism ([facility] [address] )  Comments:  Asymptomatic    Adenocarcinoma of prostate ([facility] [address] )  Comments: Follow-up with Urology    Hypercholesterolemia    Hypokalemia  Comments:  Repeat electrolytes  Orders:  -     Electrolyte panel    BPH associated with nocturia  Comments: Will try Hytrin 2 mg  Orders:  -     terazosin (HYTRIN) 2 mg capsule; Take 1 capsule (2 mg total) by mouth daily at bedtime          Subjective:      Patient ID: Panchito Ba is a 69 y o  male  Patient presents patient presents for follow up chronic conditions  Patient has non-insulin diabetes mellitus type 2 without complications  Patient is on metformin  a day  His current A1c is 5 3  Patient is compliant with low carb low sugar diet  Patient checks his blood sugar daily  For hypertension he is on metoprolol , amlodipine 10 and lisinopril 10 mg  For lipid control he is on simvastatin 10 mg  Patient also has hyperparathyroidism he is asymptomatic and not on any medication  Patient has a history of BPH he has nocturia times 3-4 night  Patient was on Flomax in the past this did not work  We will try Hytrin  Patient has a history of prostate cancer he is following with Urology  Patient has a deformity of his right ankle due to old fracture  Patient's all the orthopedic and there is nothing they can do for him he has chronic pain  When he is on his feet all day he has some pain and swelling    Other labs reviewed show normal lipid panel normal hepatic profile    Potassium was slightly low at 3 4 we will repeat potassium      The following portions of the patient's history were reviewed and updated as appropriate:   He   Patient Active Problem List    Diagnosis Date Noted    Sprain and strain of right ankle 10/08/2019    BPH associated with nocturia 08/21/2019    Arthritis of right acromioclavicular joint 03/15/2019    Primary osteoarthritis of right shoulder 03/07/2019    Chronic left shoulder pain 03/07/2019    Left rotator cuff tear arthropathy 03/07/2019    Rotator cuff injury, right, initial encounter 03/07/2019    History of colon polyps 01/09/2019    Hyperparathyroidism ([facility] [address] ) 11/06/2018    Hypokalemia 10/22/2018    Hypocalcemia 10/22/2018    Glaucoma 10/11/2018    Adenocarcinoma of prostate ([facility] [address] ) 01/03/2018    Controlled type 2 diabetes mellitus without complication, without long-term current use of insulin ([facility] [address] ) 07/25/2016    Inguinal hernia 02/08/2016    Benign essential HTN 01/12/2016    Hypercholesterolemia 01/12/2016     Current Outpatient Medications   Medication Sig Dispense Refill    ACCU-CHEK FASTCLIX LANCETS MISC by Does not apply route daily E11 9  Check  fbs  daily 100 each 3    amLODIPine (NORVASC) 10 mg tablet TAKE 1 TABLET (10 MG TOTAL) BY MOUTH DAILY FOR BLOOD PRESSURE 30 tablet 5    aspirin 81 mg chewable tablet Chew 81 mg      Calcium Carb-Cholecalciferol (CALCIUM 1000 + D PO) Take by mouth      cholecalciferol (VITAMIN D3) 1,000 units tablet Take 1,000 Units by mouth daily      lisinopril (ZESTRIL) 10 mg tablet Take 1 tablet (10 mg total) by mouth daily 90 tablet 1    metFORMIN (GLUCOPHAGE-XR) 500 mg 24 hr tablet Take 1 tablet (500 mg total) by mouth daily 90 tablet 1    metoprolol succinate (TOPROL-XL) 100 mg 24 hr tablet Take 1 tablet (100 mg total) by mouth daily 90 tablet 1    naproxen (NAPROSYN) 500 mg tablet TAKE 1 TABLET BY MOUTH TWICE A DAY WITH MEALS 60 tablet 1    Naproxen Sodium (ALEVE PO) Take by mouth      simvastatin (ZOCOR) 10 mg tablet Take 1 tablet (10 mg total) by mouth daily 90 tablet 1    terazosin (HYTRIN) 2 mg capsule Take 1 capsule (2 mg total) by mouth daily at bedtime 90 capsule 1     No current facility-administered medications for this visit  Current Outpatient Medications on File Prior to Visit   Medication Sig    ACCU-CHEK FASTCLIX LANCETS MISC by Does not apply route daily E11 9  Check  fbs  daily    amLODIPine (NORVASC) 10 mg tablet TAKE 1 TABLET (10 MG TOTAL) BY MOUTH DAILY FOR BLOOD PRESSURE    aspirin 81 mg chewable tablet Chew 81 mg    Calcium Carb-Cholecalciferol (CALCIUM 1000 + D PO) Take by mouth    cholecalciferol (VITAMIN D3) 1,000 units tablet Take 1,000 Units by mouth daily    lisinopril (ZESTRIL) 10 mg tablet Take 1 tablet (10 mg total) by mouth daily    metFORMIN (GLUCOPHAGE-XR) 500 mg 24 hr tablet Take 1 tablet (500 mg total) by mouth daily    metoprolol succinate (TOPROL-XL) 100 mg 24 hr tablet Take 1 tablet (100 mg total) by mouth daily    naproxen (NAPROSYN) 500 mg tablet TAKE 1 TABLET BY MOUTH TWICE A DAY WITH MEALS    Naproxen Sodium (ALEVE PO) Take by mouth    simvastatin (ZOCOR) 10 mg tablet Take 1 tablet (10 mg total) by mouth daily    [DISCONTINUED] tamsulosin (FLOMAX) 0 4 mg 0 4 mg daily at bedtime     No current facility-administered medications on file prior to visit  He has No Known Allergies       Review of Systems   Constitutional: Negative for activity change, appetite change, chills, fatigue and fever  HENT: Positive for sore throat and voice change  Negative for ear pain  Eyes: Negative for visual disturbance  Respiratory: Positive for cough  Negative for shortness of breath  Cardiovascular: Negative for chest pain, palpitations and leg swelling  Gastrointestinal: Negative for abdominal pain, blood in stool, constipation, diarrhea and nausea     Genitourinary: Positive for frequency  Negative for difficulty urinating  Nocturia   Musculoskeletal: Positive for arthralgias (right   ankle  deformity  andpain  old  fracture)  Negative for back pain and myalgias  Skin: Negative for rash  Neurological: Negative for dizziness, syncope and headaches  Psychiatric/Behavioral: Negative for sleep disturbance  Objective:        Physical Exam   Constitutional: He is oriented to person, place, and time  He appears well-developed and well-nourished  HENT:   Head: Normocephalic and atraumatic  Right Ear: Tympanic membrane, external ear and ear canal normal    Left Ear: Tympanic membrane, external ear and ear canal normal    Mouth/Throat: Oropharynx is clear and moist    Eyes: Pupils are equal, round, and reactive to light  Conjunctivae are normal    Neck: Carotid bruit is not present  No thyromegaly present  Cardiovascular: Normal rate, regular rhythm and normal heart sounds  Pulses are no weak pulses  No murmur heard  Pulses:       Dorsalis pedis pulses are 2+ on the right side, and 2+ on the left side  Pulmonary/Chest: Effort normal and breath sounds normal  He has no wheezes  Abdominal: Soft  Bowel sounds are normal  He exhibits no mass  There is no tenderness  Musculoskeletal: He exhibits no edema  Feet:   Right Foot:   Skin Integrity: Positive for callus and dry skin  Left Foot:   Skin Integrity: Positive for callus and dry skin  Lymphadenopathy:     He has no cervical adenopathy  Neurological: He is alert and oriented to person, place, and time  Skin: Skin is warm and dry  No rash noted  No erythema  Psychiatric: He has a normal mood and affect  His behavior is normal  Judgment and thought content normal    Nursing note and vitals reviewed  Diabetic Foot Exam    Patient's shoes and socks removed  Right Foot/Ankle   Right Foot Inspection  Skin Exam: dry skin, callus and callus                            Sensory   Vibration: intact    Monofilament testing: intact  Vascular    The right DP pulse is 2+  Left Foot/Ankle  Left Foot Inspection  Skin Exam: dry skin and callus                                         Sensory   Vibration: intact    Monofilament: intact  Vascular    The left DP pulse is 2+  Assign Risk Category:  Deformity present; No loss of protective sensation;  No weak pulses       Risk: 2

## 2020-03-01 DIAGNOSIS — N40.1 BPH ASSOCIATED WITH NOCTURIA: ICD-10-CM

## 2020-03-01 DIAGNOSIS — R35.1 BPH ASSOCIATED WITH NOCTURIA: ICD-10-CM

## 2020-03-02 RX ORDER — TERAZOSIN 2 MG/1
2 CAPSULE ORAL
Qty: 90 CAPSULE | Refills: 0 | Status: SHIPPED | OUTPATIENT
Start: 2020-03-02 | End: 2020-06-01

## 2020-03-12 ENCOUNTER — OFFICE VISIT (OUTPATIENT)
Dept: FAMILY MEDICINE CLINIC | Facility: CLINIC | Age: 75
End: 2020-03-12
Payer: MEDICARE

## 2020-03-12 VITALS
HEART RATE: 81 BPM | TEMPERATURE: 98.1 F | RESPIRATION RATE: 16 BRPM | HEIGHT: 69 IN | BODY MASS INDEX: 27.4 KG/M2 | DIASTOLIC BLOOD PRESSURE: 82 MMHG | OXYGEN SATURATION: 96 % | WEIGHT: 185 LBS | SYSTOLIC BLOOD PRESSURE: 170 MMHG

## 2020-03-12 DIAGNOSIS — C61 ADENOCARCINOMA OF PROSTATE (HCC): ICD-10-CM

## 2020-03-12 DIAGNOSIS — E11.9 TYPE 2 DIABETES MELLITUS WITHOUT COMPLICATION, WITHOUT LONG-TERM CURRENT USE OF INSULIN (HCC): ICD-10-CM

## 2020-03-12 DIAGNOSIS — E21.3 HYPERPARATHYROIDISM (HCC): ICD-10-CM

## 2020-03-12 DIAGNOSIS — I10 ESSENTIAL HYPERTENSION: ICD-10-CM

## 2020-03-12 DIAGNOSIS — R35.1 BPH ASSOCIATED WITH NOCTURIA: ICD-10-CM

## 2020-03-12 DIAGNOSIS — E78.00 HYPERCHOLESTEROLEMIA: ICD-10-CM

## 2020-03-12 DIAGNOSIS — I10 BENIGN ESSENTIAL HTN: ICD-10-CM

## 2020-03-12 DIAGNOSIS — N40.1 BPH ASSOCIATED WITH NOCTURIA: ICD-10-CM

## 2020-03-12 DIAGNOSIS — E11.9 CONTROLLED TYPE 2 DIABETES MELLITUS WITHOUT COMPLICATION, WITHOUT LONG-TERM CURRENT USE OF INSULIN (HCC): Primary | ICD-10-CM

## 2020-03-12 LAB — SL AMB POCT HEMOGLOBIN AIC: 5.2 (ref ?–6.5)

## 2020-03-12 PROCEDURE — 1036F TOBACCO NON-USER: CPT | Performed by: PHYSICIAN ASSISTANT

## 2020-03-12 PROCEDURE — 3044F HG A1C LEVEL LT 7.0%: CPT | Performed by: PHYSICIAN ASSISTANT

## 2020-03-12 PROCEDURE — 3077F SYST BP >= 140 MM HG: CPT | Performed by: PHYSICIAN ASSISTANT

## 2020-03-12 PROCEDURE — 1160F RVW MEDS BY RX/DR IN RCRD: CPT | Performed by: PHYSICIAN ASSISTANT

## 2020-03-12 PROCEDURE — 4010F ACE/ARB THERAPY RXD/TAKEN: CPT | Performed by: PHYSICIAN ASSISTANT

## 2020-03-12 PROCEDURE — 3008F BODY MASS INDEX DOCD: CPT | Performed by: PHYSICIAN ASSISTANT

## 2020-03-12 PROCEDURE — 83036 HEMOGLOBIN GLYCOSYLATED A1C: CPT | Performed by: PHYSICIAN ASSISTANT

## 2020-03-12 PROCEDURE — 4040F PNEUMOC VAC/ADMIN/RCVD: CPT | Performed by: PHYSICIAN ASSISTANT

## 2020-03-12 PROCEDURE — 99214 OFFICE O/P EST MOD 30 MIN: CPT | Performed by: PHYSICIAN ASSISTANT

## 2020-03-12 PROCEDURE — 3079F DIAST BP 80-89 MM HG: CPT | Performed by: PHYSICIAN ASSISTANT

## 2020-03-12 RX ORDER — METOPROLOL SUCCINATE 100 MG/1
100 TABLET, EXTENDED RELEASE ORAL DAILY
Qty: 90 TABLET | Refills: 1 | Status: SHIPPED | OUTPATIENT
Start: 2020-03-12 | End: 2020-06-12 | Stop reason: SDUPTHER

## 2020-03-12 RX ORDER — LISINOPRIL 10 MG/1
10 TABLET ORAL DAILY
Qty: 90 TABLET | Refills: 1 | Status: SHIPPED | OUTPATIENT
Start: 2020-03-12 | End: 2020-06-12 | Stop reason: SDUPTHER

## 2020-03-12 RX ORDER — AMLODIPINE BESYLATE 10 MG/1
10 TABLET ORAL DAILY
Qty: 90 TABLET | Refills: 1 | Status: SHIPPED | OUTPATIENT
Start: 2020-03-12 | End: 2020-06-12 | Stop reason: SDUPTHER

## 2020-03-12 RX ORDER — METFORMIN HYDROCHLORIDE 500 MG/1
500 TABLET, EXTENDED RELEASE ORAL DAILY
Qty: 90 TABLET | Refills: 1 | Status: SHIPPED | OUTPATIENT
Start: 2020-03-12 | End: 2020-06-12 | Stop reason: SDUPTHER

## 2020-03-12 NOTE — PROGRESS NOTES
Diabetic Foot Exam    Patient's shoes and socks removed  Right Foot/Ankle   Right Foot Inspection  Skin Exam: dry skin, callus, pre-ulcer and callus                          Toe Exam: Right toe deformity: Right foot and ankle deformity  Sensory   Vibration: intact    Monofilament testing: intact  Vascular    The right DP pulse is 2+  Left Foot/Ankle  Left Foot Inspection  Skin Exam: dry skin and callus                         Toe Exam: no left toe deformity                   Sensory   Vibration: intact    Monofilament: intact  Vascular    The left DP pulse is 2+  Assign Risk Category:  Deformity present; No loss of protective sensation; No weak pulses       Risk: 1  Assessment/Plan:     Diagnoses and all orders for this visit:    Controlled type 2 diabetes mellitus without complication, without long-term current use of insulin ([facility])  Comments:  Continue metformin ER 1 a day  Check blood sugar daily  Eat low carb low sugar diet  Orders:  -     POCT hemoglobin A1c  -     Hemoglobin A1C; Future  -     Microalbumin / creatinine urine ratio  -     Ambulatory referral to Podiatry; Future    Benign essential HTN  Comments:  Blood pressure is at goal continue current regimen  Orders:  -     Comprehensive metabolic panel; Future    Hypercholesterolemia  Comments:  Continue statin therapy and low-fat diet  Orders:  -     Lipid panel; Future    Adenocarcinoma of prostate ([facility] [address] )    Essential hypertension  -     metoprolol succinate (TOPROL-XL) 100 mg 24 hr tablet; Take 1 tablet (100 mg total) by mouth daily  -     lisinopril (ZESTRIL) 10 mg tablet; Take 1 tablet (10 mg total) by mouth daily  -     amLODIPine (NORVASC) 10 mg tablet; Take 1 tablet (10 mg total) by mouth daily For blood pressure    Type 2 diabetes mellitus without complication, without long-term current use of insulin ([facility])  Comments:  Referred to Podiatry  Orders:  -     metFORMIN (GLUCOPHAGE-XR) 500 mg 24 hr tablet;  Take 1 tablet (500 mg total) by mouth daily    Hyperparathyroidism ([facility] [address] )  Comments:  Stable no symptoms    BPH associated with nocturia  Comments:  Minimal improvement with terazosin 2 mg          Subjective:      Patient ID: Miguelito Rapp is a 69 y o  male  Patient presents for up chronic conditions  Patient has non-insulin diabetes mellitus type 2  He is currently on metformin  mg once a day  Patient checks his blood sugar daily  Hemoglobin A1c done in the office is 5 2  For blood pressure control he is on lisinopril 10 mg, amlodipine 10 mg, and metoprolol  mg 1 a day  For lipid control he is on simvastatin 10 mg  History of prostate cancer  He has BPH he is on Hytrin 2 mg a day  Patient states minimal improvement in urination  History of hyperparathyroidism  Patient is on calcium vitamin-D supplements  Patient states he has been dealing with some constipation on and off  Advised increase fluids and add some fiber  Patient has chronic right ankle deformity and swelling of his lower right leg  History of fracture  Patient states that causes his left lower back to have discomfort at times due to his abnormal gait  Patient due for colonoscopy          The following portions of the patient's history were reviewed and updated as appropriate:   He   Patient Active Problem List    Diagnosis Date Noted    Sprain and strain of right ankle 10/08/2019    BPH associated with nocturia 08/21/2019    Arthritis of right acromioclavicular joint 03/15/2019    Primary osteoarthritis of right shoulder 03/07/2019    Chronic left shoulder pain 03/07/2019    Left rotator cuff tear arthropathy 03/07/2019    Rotator cuff injury, right, initial encounter 03/07/2019    History of colon polyps 01/09/2019    Hyperparathyroidism ([facility] [address] ) 11/06/2018    Hypokalemia 10/22/2018    Hypocalcemia 10/22/2018    Glaucoma 10/11/2018    Adenocarcinoma of prostate ([facility] [address] ) 01/03/2018    Controlled type 2 diabetes mellitus without complication, without long-term current use of insulin ([facility] [address] ) 07/25/2016    Inguinal hernia 02/08/2016    Benign essential HTN 01/12/2016    Hypercholesterolemia 01/12/2016     Current Outpatient Medications   Medication Sig Dispense Refill    ACCU-CHEK FASTCLIX LANCETS MISC by Does not apply route daily E11 9  Check  fbs  daily 100 each 3    amLODIPine (NORVASC) 10 mg tablet Take 1 tablet (10 mg total) by mouth daily For blood pressure 90 tablet 1    aspirin 81 mg chewable tablet Chew 81 mg      Calcium Carb-Cholecalciferol (CALCIUM 1000 + D PO) Take by mouth      cholecalciferol (VITAMIN D3) 1,000 units tablet Take 1,000 Units by mouth daily      lisinopril (ZESTRIL) 10 mg tablet Take 1 tablet (10 mg total) by mouth daily 90 tablet 1    metFORMIN (GLUCOPHAGE-XR) 500 mg 24 hr tablet Take 1 tablet (500 mg total) by mouth daily 90 tablet 1    metoprolol succinate (TOPROL-XL) 100 mg 24 hr tablet Take 1 tablet (100 mg total) by mouth daily 90 tablet 1    naproxen (NAPROSYN) 500 mg tablet TAKE 1 TABLET BY MOUTH TWICE A DAY WITH MEALS 60 tablet 1    simvastatin (ZOCOR) 10 mg tablet Take 1 tablet (10 mg total) by mouth daily 90 tablet 1    terazosin (HYTRIN) 2 mg capsule TAKE 1 CAPSULE (2 MG TOTAL) BY MOUTH DAILY AT BEDTIME 90 capsule 0     No current facility-administered medications for this visit        Current Outpatient Medications on File Prior to Visit   Medication Sig    ACCU-CHEK FASTCLIX LANCETS MISC by Does not apply route daily E11 9  Check  fbs  daily    aspirin 81 mg chewable tablet Chew 81 mg    Calcium Carb-Cholecalciferol (CALCIUM 1000 + D PO) Take by mouth    cholecalciferol (VITAMIN D3) 1,000 units tablet Take 1,000 Units by mouth daily    naproxen (NAPROSYN) 500 mg tablet TAKE 1 TABLET BY MOUTH TWICE A DAY WITH MEALS    simvastatin (ZOCOR) 10 mg tablet Take 1 tablet (10 mg total) by mouth daily    terazosin (HYTRIN) 2 mg capsule TAKE 1 CAPSULE (2 MG TOTAL) BY MOUTH DAILY AT BEDTIME    [DISCONTINUED] amLODIPine (NORVASC) 10 mg tablet TAKE 1 TABLET (10 MG TOTAL) BY MOUTH DAILY FOR BLOOD PRESSURE    [DISCONTINUED] lisinopril (ZESTRIL) 10 mg tablet Take 1 tablet (10 mg total) by mouth daily    [DISCONTINUED] metFORMIN (GLUCOPHAGE-XR) 500 mg 24 hr tablet Take 1 tablet (500 mg total) by mouth daily    [DISCONTINUED] metoprolol succinate (TOPROL-XL) 100 mg 24 hr tablet Take 1 tablet (100 mg total) by mouth daily    [DISCONTINUED] Naproxen Sodium (ALEVE PO) Take by mouth     No current facility-administered medications on file prior to visit  He has No Known Allergies       Review of Systems   Constitutional: Negative for activity change, appetite change, chills, fatigue and fever  HENT: Negative for ear pain and sore throat  Eyes: Negative for visual disturbance  Respiratory: Negative for cough and shortness of breath  Cardiovascular: Negative for chest pain, palpitations and leg swelling  Gastrointestinal: Negative for abdominal pain, blood in stool, constipation, diarrhea and nausea  Genitourinary: Negative for difficulty urinating  Musculoskeletal: Positive for arthralgias, back pain and gait problem  Negative for myalgias  Skin: Negative for rash  Neurological: Negative for dizziness, syncope and headaches  Psychiatric/Behavioral: Negative for sleep disturbance  Objective:        Physical Exam   Constitutional: He is oriented to person, place, and time  He appears well-developed and well-nourished  No distress  HENT:   Head: Normocephalic and atraumatic  Right Ear: Tympanic membrane, external ear and ear canal normal    Left Ear: Tympanic membrane, external ear and ear canal normal    Mouth/Throat: Oropharynx is clear and moist    Eyes: Pupils are equal, round, and reactive to light  Conjunctivae are normal    Neck: Carotid bruit is not present  No thyromegaly present  Cardiovascular: Normal rate, regular rhythm and normal heart sounds  Pulses are no weak pulses     No murmur heard  Pulses:       Dorsalis pedis pulses are 2+ on the right side, and 2+ on the left side  Pulmonary/Chest: Effort normal and breath sounds normal  He has no wheezes  Abdominal: Soft  Bowel sounds are normal  He exhibits no mass  There is no tenderness  Diastasis rectus   Musculoskeletal: He exhibits deformity  He exhibits no edema  Left ankle deformity from prior fracture  Patient has chronic edema  She is nonpitting  Feet:   Right Foot:   Skin Integrity: Positive for callus and dry skin  Left Foot:   Skin Integrity: Positive for callus and dry skin  Lymphadenopathy:     He has no cervical adenopathy  Neurological: He is alert and oriented to person, place, and time  Skin: Skin is warm and dry  No rash noted  He is not diaphoretic  No erythema  Psychiatric: He has a normal mood and affect  His behavior is normal  Judgment and thought content normal    Nursing note and vitals reviewed

## 2020-05-31 DIAGNOSIS — N40.1 BPH ASSOCIATED WITH NOCTURIA: ICD-10-CM

## 2020-05-31 DIAGNOSIS — R35.1 BPH ASSOCIATED WITH NOCTURIA: ICD-10-CM

## 2020-06-01 RX ORDER — TERAZOSIN 2 MG/1
2 CAPSULE ORAL
Qty: 90 CAPSULE | Refills: 0 | Status: SHIPPED | OUTPATIENT
Start: 2020-06-01 | End: 2020-08-24

## 2020-06-12 ENCOUNTER — APPOINTMENT (OUTPATIENT)
Dept: LAB | Facility: MEDICAL CENTER | Age: 75
End: 2020-06-12
Payer: MEDICARE

## 2020-06-12 ENCOUNTER — OFFICE VISIT (OUTPATIENT)
Dept: FAMILY MEDICINE CLINIC | Facility: CLINIC | Age: 75
End: 2020-06-12
Payer: MEDICARE

## 2020-06-12 VITALS
TEMPERATURE: 97.1 F | OXYGEN SATURATION: 97 % | SYSTOLIC BLOOD PRESSURE: 150 MMHG | WEIGHT: 178.4 LBS | HEART RATE: 70 BPM | BODY MASS INDEX: 26.42 KG/M2 | HEIGHT: 69 IN | RESPIRATION RATE: 16 BRPM | DIASTOLIC BLOOD PRESSURE: 80 MMHG

## 2020-06-12 DIAGNOSIS — Z87.81 HISTORY OF FRACTURE OF RIGHT ANKLE: ICD-10-CM

## 2020-06-12 DIAGNOSIS — I10 BENIGN ESSENTIAL HTN: ICD-10-CM

## 2020-06-12 DIAGNOSIS — C61 ADENOCARCINOMA OF PROSTATE (HCC): ICD-10-CM

## 2020-06-12 DIAGNOSIS — E78.00 HYPERCHOLESTEROLEMIA: ICD-10-CM

## 2020-06-12 DIAGNOSIS — E11.9 CONTROLLED TYPE 2 DIABETES MELLITUS WITHOUT COMPLICATION, WITHOUT LONG-TERM CURRENT USE OF INSULIN (HCC): ICD-10-CM

## 2020-06-12 DIAGNOSIS — I10 ESSENTIAL HYPERTENSION: ICD-10-CM

## 2020-06-12 DIAGNOSIS — E78.2 MIXED HYPERLIPIDEMIA: ICD-10-CM

## 2020-06-12 DIAGNOSIS — E21.3 HYPERPARATHYROIDISM (HCC): ICD-10-CM

## 2020-06-12 DIAGNOSIS — E11.9 TYPE 2 DIABETES MELLITUS WITHOUT COMPLICATION, WITHOUT LONG-TERM CURRENT USE OF INSULIN (HCC): ICD-10-CM

## 2020-06-12 DIAGNOSIS — Z00.00 MEDICARE ANNUAL WELLNESS VISIT, SUBSEQUENT: Primary | ICD-10-CM

## 2020-06-12 DIAGNOSIS — Z01.818 PREOPERATIVE CLEARANCE: ICD-10-CM

## 2020-06-12 LAB
ALBUMIN SERPL BCP-MCNC: 3.7 G/DL (ref 3.5–5)
ALP SERPL-CCNC: 101 U/L (ref 46–116)
ALT SERPL W P-5'-P-CCNC: 22 U/L (ref 12–78)
ANION GAP SERPL CALCULATED.3IONS-SCNC: 5 MMOL/L (ref 4–13)
AST SERPL W P-5'-P-CCNC: 12 U/L (ref 5–45)
BILIRUB SERPL-MCNC: 0.42 MG/DL (ref 0.2–1)
BUN SERPL-MCNC: 23 MG/DL (ref 5–25)
CALCIUM SERPL-MCNC: 8.5 MG/DL (ref 8.3–10.1)
CHLORIDE SERPL-SCNC: 108 MMOL/L (ref 100–108)
CHOLEST SERPL-MCNC: 161 MG/DL (ref 50–200)
CO2 SERPL-SCNC: 24 MMOL/L (ref 21–32)
CREAT SERPL-MCNC: 1.29 MG/DL (ref 0.6–1.3)
CREAT UR-MCNC: 108 MG/DL
GFR SERPL CREATININE-BSD FRML MDRD: 55 ML/MIN/1.73SQ M
GLUCOSE P FAST SERPL-MCNC: 114 MG/DL (ref 65–99)
HDLC SERPL-MCNC: 58 MG/DL
LDLC SERPL CALC-MCNC: 90 MG/DL (ref 0–100)
MICROALBUMIN UR-MCNC: 10 MG/L (ref 0–20)
MICROALBUMIN/CREAT 24H UR: 9 MG/G CREATININE (ref 0–30)
NONHDLC SERPL-MCNC: 103 MG/DL
POTASSIUM SERPL-SCNC: 3.7 MMOL/L (ref 3.5–5.3)
PROT SERPL-MCNC: 7.2 G/DL (ref 6.4–8.2)
PSA SERPL-MCNC: 0.1 NG/ML (ref 0–4)
SL AMB POCT HEMOGLOBIN AIC: 5.4 (ref ?–6.5)
SODIUM SERPL-SCNC: 137 MMOL/L (ref 136–145)
TRIGL SERPL-MCNC: 64 MG/DL

## 2020-06-12 PROCEDURE — 3008F BODY MASS INDEX DOCD: CPT | Performed by: PHYSICIAN ASSISTANT

## 2020-06-12 PROCEDURE — 99214 OFFICE O/P EST MOD 30 MIN: CPT | Performed by: PHYSICIAN ASSISTANT

## 2020-06-12 PROCEDURE — 1160F RVW MEDS BY RX/DR IN RCRD: CPT | Performed by: PHYSICIAN ASSISTANT

## 2020-06-12 PROCEDURE — 1123F ACP DISCUSS/DSCN MKR DOCD: CPT | Performed by: PHYSICIAN ASSISTANT

## 2020-06-12 PROCEDURE — 4010F ACE/ARB THERAPY RXD/TAKEN: CPT | Performed by: PHYSICIAN ASSISTANT

## 2020-06-12 PROCEDURE — 82043 UR ALBUMIN QUANTITATIVE: CPT | Performed by: PHYSICIAN ASSISTANT

## 2020-06-12 PROCEDURE — 36415 COLL VENOUS BLD VENIPUNCTURE: CPT | Performed by: PHYSICIAN ASSISTANT

## 2020-06-12 PROCEDURE — 80061 LIPID PANEL: CPT | Performed by: PHYSICIAN ASSISTANT

## 2020-06-12 PROCEDURE — G0402 INITIAL PREVENTIVE EXAM: HCPCS | Performed by: PHYSICIAN ASSISTANT

## 2020-06-12 PROCEDURE — 3044F HG A1C LEVEL LT 7.0%: CPT | Performed by: PHYSICIAN ASSISTANT

## 2020-06-12 PROCEDURE — 80053 COMPREHEN METABOLIC PANEL: CPT | Performed by: PHYSICIAN ASSISTANT

## 2020-06-12 PROCEDURE — 1170F FXNL STATUS ASSESSED: CPT | Performed by: PHYSICIAN ASSISTANT

## 2020-06-12 PROCEDURE — 83036 HEMOGLOBIN GLYCOSYLATED A1C: CPT | Performed by: PHYSICIAN ASSISTANT

## 2020-06-12 PROCEDURE — 1125F AMNT PAIN NOTED PAIN PRSNT: CPT | Performed by: PHYSICIAN ASSISTANT

## 2020-06-12 PROCEDURE — 1036F TOBACCO NON-USER: CPT | Performed by: PHYSICIAN ASSISTANT

## 2020-06-12 PROCEDURE — 82570 ASSAY OF URINE CREATININE: CPT | Performed by: PHYSICIAN ASSISTANT

## 2020-06-12 PROCEDURE — 3077F SYST BP >= 140 MM HG: CPT | Performed by: PHYSICIAN ASSISTANT

## 2020-06-12 PROCEDURE — 4040F PNEUMOC VAC/ADMIN/RCVD: CPT | Performed by: PHYSICIAN ASSISTANT

## 2020-06-12 PROCEDURE — 3079F DIAST BP 80-89 MM HG: CPT | Performed by: PHYSICIAN ASSISTANT

## 2020-06-12 PROCEDURE — 84153 ASSAY OF PSA TOTAL: CPT

## 2020-06-12 RX ORDER — LISINOPRIL 10 MG/1
10 TABLET ORAL DAILY
Qty: 90 TABLET | Refills: 1 | Status: SHIPPED | OUTPATIENT
Start: 2020-06-12 | End: 2020-10-21

## 2020-06-12 RX ORDER — METOPROLOL SUCCINATE 100 MG/1
100 TABLET, EXTENDED RELEASE ORAL DAILY
Qty: 90 TABLET | Refills: 1 | Status: SHIPPED | OUTPATIENT
Start: 2020-06-12 | End: 2021-03-30

## 2020-06-12 RX ORDER — METFORMIN HYDROCHLORIDE 500 MG/1
500 TABLET, EXTENDED RELEASE ORAL DAILY
Qty: 90 TABLET | Refills: 1 | Status: SHIPPED | OUTPATIENT
Start: 2020-06-12 | End: 2021-03-15

## 2020-06-12 RX ORDER — AMLODIPINE BESYLATE 10 MG/1
10 TABLET ORAL DAILY
Qty: 90 TABLET | Refills: 1 | Status: SHIPPED | OUTPATIENT
Start: 2020-06-12 | End: 2021-01-27

## 2020-06-12 RX ORDER — SIMVASTATIN 10 MG
10 TABLET ORAL DAILY
Qty: 90 TABLET | Refills: 1 | Status: SHIPPED | OUTPATIENT
Start: 2020-06-12 | End: 2020-09-30

## 2020-06-17 ENCOUNTER — OFFICE VISIT (OUTPATIENT)
Dept: OBGYN CLINIC | Facility: MEDICAL CENTER | Age: 75
End: 2020-06-17
Payer: MEDICARE

## 2020-06-17 ENCOUNTER — APPOINTMENT (OUTPATIENT)
Dept: RADIOLOGY | Facility: MEDICAL CENTER | Age: 75
End: 2020-06-17
Payer: MEDICARE

## 2020-06-17 ENCOUNTER — APPOINTMENT (OUTPATIENT)
Dept: LAB | Facility: MEDICAL CENTER | Age: 75
End: 2020-06-17
Payer: MEDICARE

## 2020-06-17 VITALS
BODY MASS INDEX: 26.6 KG/M2 | HEIGHT: 69 IN | WEIGHT: 179.6 LBS | SYSTOLIC BLOOD PRESSURE: 138 MMHG | DIASTOLIC BLOOD PRESSURE: 73 MMHG | HEART RATE: 67 BPM

## 2020-06-17 DIAGNOSIS — Z87.81 HISTORY OF FRACTURE OF RIGHT ANKLE: ICD-10-CM

## 2020-06-17 DIAGNOSIS — M19.171 POST-TRAUMATIC OSTEOARTHRITIS, RIGHT ANKLE AND FOOT: Primary | ICD-10-CM

## 2020-06-17 DIAGNOSIS — Z01.818 PREOP TESTING: ICD-10-CM

## 2020-06-17 DIAGNOSIS — M19.171 POST-TRAUMATIC OSTEOARTHRITIS, RIGHT ANKLE AND FOOT: ICD-10-CM

## 2020-06-17 LAB
BASOPHILS # BLD AUTO: 0.05 THOUSANDS/ΜL (ref 0–0.1)
BASOPHILS NFR BLD AUTO: 1 % (ref 0–1)
EOSINOPHIL # BLD AUTO: 0.18 THOUSAND/ΜL (ref 0–0.61)
EOSINOPHIL NFR BLD AUTO: 3 % (ref 0–6)
ERYTHROCYTE [DISTWIDTH] IN BLOOD BY AUTOMATED COUNT: 12.5 % (ref 11.6–15.1)
HCT VFR BLD AUTO: 39.1 % (ref 36.5–49.3)
HGB BLD-MCNC: 13.8 G/DL (ref 12–17)
IMM GRANULOCYTES # BLD AUTO: 0.02 THOUSAND/UL (ref 0–0.2)
IMM GRANULOCYTES NFR BLD AUTO: 0 % (ref 0–2)
LYMPHOCYTES # BLD AUTO: 1.19 THOUSANDS/ΜL (ref 0.6–4.47)
LYMPHOCYTES NFR BLD AUTO: 17 % (ref 14–44)
MCH RBC QN AUTO: 32.1 PG (ref 26.8–34.3)
MCHC RBC AUTO-ENTMCNC: 35.3 G/DL (ref 31.4–37.4)
MCV RBC AUTO: 91 FL (ref 82–98)
MONOCYTES # BLD AUTO: 0.73 THOUSAND/ΜL (ref 0.17–1.22)
MONOCYTES NFR BLD AUTO: 10 % (ref 4–12)
NEUTROPHILS # BLD AUTO: 5.04 THOUSANDS/ΜL (ref 1.85–7.62)
NEUTS SEG NFR BLD AUTO: 69 % (ref 43–75)
NRBC BLD AUTO-RTO: 0 /100 WBCS
PLATELET # BLD AUTO: 151 THOUSANDS/UL (ref 149–390)
PMV BLD AUTO: 12.5 FL (ref 8.9–12.7)
RBC # BLD AUTO: 4.3 MILLION/UL (ref 3.88–5.62)
WBC # BLD AUTO: 7.21 THOUSAND/UL (ref 4.31–10.16)

## 2020-06-17 PROCEDURE — 3008F BODY MASS INDEX DOCD: CPT | Performed by: ORTHOPAEDIC SURGERY

## 2020-06-17 PROCEDURE — 1160F RVW MEDS BY RX/DR IN RCRD: CPT | Performed by: ORTHOPAEDIC SURGERY

## 2020-06-17 PROCEDURE — 85610 PROTHROMBIN TIME: CPT

## 2020-06-17 PROCEDURE — 85730 THROMBOPLASTIN TIME PARTIAL: CPT

## 2020-06-17 PROCEDURE — 1170F FXNL STATUS ASSESSED: CPT | Performed by: ORTHOPAEDIC SURGERY

## 2020-06-17 PROCEDURE — 73610 X-RAY EXAM OF ANKLE: CPT

## 2020-06-17 PROCEDURE — 3075F SYST BP GE 130 - 139MM HG: CPT | Performed by: ORTHOPAEDIC SURGERY

## 2020-06-17 PROCEDURE — 4040F PNEUMOC VAC/ADMIN/RCVD: CPT | Performed by: ORTHOPAEDIC SURGERY

## 2020-06-17 PROCEDURE — 3044F HG A1C LEVEL LT 7.0%: CPT | Performed by: ORTHOPAEDIC SURGERY

## 2020-06-17 PROCEDURE — 73590 X-RAY EXAM OF LOWER LEG: CPT

## 2020-06-17 PROCEDURE — 3078F DIAST BP <80 MM HG: CPT | Performed by: ORTHOPAEDIC SURGERY

## 2020-06-17 PROCEDURE — 85025 COMPLETE CBC W/AUTO DIFF WBC: CPT

## 2020-06-17 PROCEDURE — 1036F TOBACCO NON-USER: CPT | Performed by: ORTHOPAEDIC SURGERY

## 2020-06-17 PROCEDURE — 36415 COLL VENOUS BLD VENIPUNCTURE: CPT

## 2020-06-17 PROCEDURE — 99214 OFFICE O/P EST MOD 30 MIN: CPT | Performed by: ORTHOPAEDIC SURGERY

## 2020-06-17 RX ORDER — CEFAZOLIN SODIUM 2 G/50ML
2000 SOLUTION INTRAVENOUS ONCE
Status: CANCELLED | OUTPATIENT
Start: 2020-07-10 | End: 2020-06-17

## 2020-06-17 NOTE — H&P (VIEW-ONLY)
SUBJECTIVE  Patient is a 41-year-old male with diabetes, hypertension and history of prostate cancer here for right ankle pain  Patient was previously seen for right fibular shaft fracture that was sustained 9/23/2019  Today patient states that he continues to have diffuse right ankle pain with all ambulation  Patient states that he is only taking Aleve for extreme pain  Pt denies right ankle pain prior to injury  Patient states that at rest he has had restless leg syndrome since injury  Patient denies new injury  Pt is taking 81 mg aspirin  ROS:   General: No fever, no chills, no weight loss, no weight gain  HEENT:  No loss of hearing, no nose bleeds, no sore throat  Eyes:  No eye pain, no red eyes, no visual disturbance  Respiratory:  No cough, no shortness of breath, no wheezing  Cardiovascular:  No chest pain, no palpitations, no edema  GI: No abdominal pain, no nausea, no vomiting  Endocrine: No frequent urination, no excessive thirst  Urinary:  No dysuria, no hematuria, no incontinence  Musculoskeletal: see HPI and PE  Skin:  No rash, no wounds  Neurological:  No dizziness, no headache, no numbness  Psychiatric:  No difficulty concentrating, no depression, no suicide thoughts, no anxiety  Review of all other systems is negative    PMH:  Past Medical History:   Diagnosis Date    Diabetes mellitus ([facility] [address] )     Hyperlipidemia     Hypertension     Prostate cancer ([facility] [address] ) 2005    S/P prostate ca-2005 had radiation tx  PSH:  History reviewed  No pertinent surgical history      Medications:  Current Outpatient Medications   Medication Sig Dispense Refill    ACCU-CHEK FASTCLIX LANCETS MISC by Does not apply route daily E11 9  Check  fbs  daily 100 each 3    amLODIPine (NORVASC) 10 mg tablet Take 1 tablet (10 mg total) by mouth daily For blood pressure 90 tablet 1    aspirin 81 mg chewable tablet Chew 81 mg      Calcium Carb-Cholecalciferol (CALCIUM 1000 + D PO) Take by mouth      cholecalciferol (VITAMIN D3) 1,000 units tablet Take 1,000 Units by mouth daily      lisinopril (ZESTRIL) 10 mg tablet Take 1 tablet (10 mg total) by mouth daily 90 tablet 1    metFORMIN (GLUCOPHAGE-XR) 500 mg 24 hr tablet Take 1 tablet (500 mg total) by mouth daily 90 tablet 1    metoprolol succinate (TOPROL-XL) 100 mg 24 hr tablet Take 1 tablet (100 mg total) by mouth daily 90 tablet 1    naproxen (NAPROSYN) 500 mg tablet TAKE 1 TABLET BY MOUTH TWICE A DAY WITH MEALS 60 tablet 1    simvastatin (ZOCOR) 10 mg tablet Take 1 tablet (10 mg total) by mouth daily 90 tablet 1    terazosin (HYTRIN) 2 mg capsule TAKE 1 CAPSULE (2 MG TOTAL) BY MOUTH DAILY AT BEDTIME 90 capsule 0     No current facility-administered medications for this visit  Allergies:  No Known Allergies    Family History:  Family History   Problem Relation Age of Onset    Heart attack Mother        Social History:  Social History     Occupational History    Not on file   Tobacco Use    Smoking status: Never Smoker    Smokeless tobacco: Never Used   Substance and Sexual Activity    Alcohol use: Yes     Comment: Occasional    Drug use: Never    Sexual activity: Not on file       Physical Exam:  General :  Alert, cooperative, no distress, appears stated age  Blood pressure 138/73, pulse 67, height 5' 9" (1 753 m), weight 81 5 kg (179 lb 9 6 oz)  Head:  Normocephalic, without obvious abnormality, atraumatic   Eyes:  Conjunctiva/corneas clear, EOM's intact,   Ears: Both ears normal appearance, no hearing deficits      Nose: Nares normal, septum midline, no drainage    Neck: Supple,  trachea midline, no adenopathy, no tenderness, no mass   Back:   Symmetric, no curvature, ROM normal, no tenderness   Lungs:   Respirations unlabored   Chest Wall:  No tenderness or deformity   Extremities: Extremities normal, atraumatic, no cyanosis or edema      Pulses: 2+ and symmetric   Skin: Skin color, texture, turgor normal, no rashes or lesions      Neurologic: Normal Right Ankle Exam     Comments:  Sensation intact to light touch  psorisis medial ankle  Neurologically and vascularly intact   Burning sensation on the lateral aspect of the ankle with inversion of subtalar joint  No pain with eversion           Generalized swelling with about 1+ pitting edema going to the mid leg region  Limited dorsiflexion of the ankle    Imaging Studies: The following imaging studies were reviewed in office today  My findings are noted  X-rays of the right ankle and tib-fib performed today show increased degenerative changes at the tibiotalar calcaneal joint and calcification at the syndesmosis    Assessment  Encounter Diagnosis   Name Primary?  History of fracture of right ankle Yes         Plan:    After reviewing x-rays and examining patient he was advised that he may have had some instability caused by his previous injury that subsequently increase the degenerative changes in his foot and ankle  Patient was advised that the swelling is currently experiencing is also due to the previous injury  Patient was provided with Tubigrip to help reduce his swelling  TTC fusion was discussed at length with patient as the best way to resolve his daily pain with ambulation  including risks and benefits  CT scan was ordered for further evaluation and pre-surgical planning  Detailed consent was signed in the office today  Complications include but not limited to infection, bleeding, scarring, nerve injury, vascular injury, continued pain, malunion, nonunion, continued pain, decreased range of motion (as suspected), DVT, PE, death, loss of limb    All these were understood and patient was consented for tibiotalar calcaneal fusion of the right lower extremity    Will obtain medical clearance prior to surgical intervention    Scribe Attestation    I,:   Capo Grant am acting as a scribe while in the presence of the attending physician :        I,:   Sandrine Romo Ezra Elder MD personally performed the services described in this documentation    as scribed in my presence :

## 2020-06-18 LAB
APTT PPP: 27 SECONDS (ref 23–37)
INR PPP: 1.07 (ref 0.84–1.19)
PROTHROMBIN TIME: 13.5 SECONDS (ref 11.6–14.5)

## 2020-06-26 ENCOUNTER — HOSPITAL ENCOUNTER (OUTPATIENT)
Dept: CT IMAGING | Facility: HOSPITAL | Age: 75
Discharge: HOME | End: 2020-06-26
Attending: ORTHOPAEDIC SURGERY
Payer: MEDICARE

## 2020-06-26 DIAGNOSIS — M19.171 POST-TRAUMATIC OSTEOARTHRITIS, RIGHT ANKLE AND FOOT: ICD-10-CM

## 2020-06-26 DIAGNOSIS — Z87.81 HISTORY OF FRACTURE OF RIGHT ANKLE: ICD-10-CM

## 2020-06-26 PROCEDURE — 73700 CT LOWER EXTREMITY W/O DYE: CPT

## 2020-07-01 ENCOUNTER — TELEPHONE (OUTPATIENT)
Dept: OBGYN CLINIC | Facility: MEDICAL CENTER | Age: 75
End: 2020-07-01

## 2020-07-02 ENCOUNTER — ANESTHESIA EVENT (OUTPATIENT)
Dept: PERIOP | Facility: HOSPITAL | Age: 75
DRG: 494 | End: 2020-07-02
Payer: MEDICARE

## 2020-07-03 DIAGNOSIS — M19.171 POST-TRAUMATIC OSTEOARTHRITIS, RIGHT ANKLE AND FOOT: ICD-10-CM

## 2020-07-03 DIAGNOSIS — Z01.818 PREOP TESTING: ICD-10-CM

## 2020-07-03 PROCEDURE — U0003 INFECTIOUS AGENT DETECTION BY NUCLEIC ACID (DNA OR RNA); SEVERE ACUTE RESPIRATORY SYNDROME CORONAVIRUS 2 (SARS-COV-2) (CORONAVIRUS DISEASE [COVID-19]), AMPLIFIED PROBE TECHNIQUE, MAKING USE OF HIGH THROUGHPUT TECHNOLOGIES AS DESCRIBED BY CMS-2020-01-R: HCPCS | Performed by: PHYSICIAN ASSISTANT

## 2020-07-08 LAB — SARS-COV-2 RNA SPEC QL NAA+PROBE: NOT DETECTED

## 2020-07-10 ENCOUNTER — HOSPITAL ENCOUNTER (INPATIENT)
Facility: HOSPITAL | Age: 75
LOS: 2 days | Discharge: HOME | DRG: 494 | End: 2020-07-14
Attending: ORTHOPAEDIC SURGERY | Admitting: ORTHOPAEDIC SURGERY
Payer: MEDICARE

## 2020-07-10 ENCOUNTER — ANESTHESIA (OUTPATIENT)
Dept: PERIOP | Facility: HOSPITAL | Age: 75
DRG: 494 | End: 2020-07-10
Payer: MEDICARE

## 2020-07-10 ENCOUNTER — HOSPITAL ENCOUNTER (OUTPATIENT)
Dept: RADIOLOGY | Facility: HOSPITAL | Age: 75
Setting detail: OUTPATIENT SURGERY
Discharge: HOME | DRG: 494 | End: 2020-07-10
Payer: MEDICARE

## 2020-07-10 DIAGNOSIS — M19.171 POST-TRAUMATIC OSTEOARTHRITIS, RIGHT ANKLE AND FOOT: ICD-10-CM

## 2020-07-10 DIAGNOSIS — Z87.81 STATUS POST ORIF OF FRACTURE OF ANKLE: Primary | ICD-10-CM

## 2020-07-10 DIAGNOSIS — Z98.890 STATUS POST SURGERY: ICD-10-CM

## 2020-07-10 DIAGNOSIS — Z98.890 STATUS POST ORIF OF FRACTURE OF ANKLE: Primary | ICD-10-CM

## 2020-07-10 LAB
GLUCOSE SERPL-MCNC: 120 MG/DL (ref 65–140)
GLUCOSE SERPL-MCNC: 88 MG/DL (ref 65–140)

## 2020-07-10 PROCEDURE — 73610 X-RAY EXAM OF ANKLE: CPT

## 2020-07-10 PROCEDURE — 0SGF04Z FUSION OF RIGHT ANKLE JOINT WITH INTERNAL FIXATION DEVICE, OPEN APPROACH: ICD-10-PCS | Performed by: ORTHOPAEDIC SURGERY

## 2020-07-10 PROCEDURE — 27870 FUSION OF ANKLE JOINT OPEN: CPT | Performed by: ORTHOPAEDIC SURGERY

## 2020-07-10 PROCEDURE — C1769 GUIDE WIRE: HCPCS | Performed by: ORTHOPAEDIC SURGERY

## 2020-07-10 PROCEDURE — 82948 REAGENT STRIP/BLOOD GLUCOSE: CPT

## 2020-07-10 PROCEDURE — C1713 ANCHOR/SCREW BN/BN,TIS/BN: HCPCS | Performed by: ORTHOPAEDIC SURGERY

## 2020-07-10 DEVICE — 4.5MM CANNULATED SCREW PARTIALLY THREADED/64MM: Type: IMPLANTABLE DEVICE | Site: ANKLE | Status: FUNCTIONAL

## 2020-07-10 DEVICE — 4.5MM CANNULATED SCREW PARTIALLY THREADED/56MM: Type: IMPLANTABLE DEVICE | Site: ANKLE | Status: FUNCTIONAL

## 2020-07-10 DEVICE — 4.5MM CANNULATED SCREW PARTIALLY THREADED/68MM: Type: IMPLANTABLE DEVICE | Site: ANKLE | Status: FUNCTIONAL

## 2020-07-10 RX ORDER — PROPOFOL 10 MG/ML
INJECTION, EMULSION INTRAVENOUS AS NEEDED
Status: DISCONTINUED | OUTPATIENT
Start: 2020-07-10 | End: 2020-07-10 | Stop reason: SURG

## 2020-07-10 RX ORDER — DOCUSATE SODIUM 100 MG/1
100 CAPSULE, LIQUID FILLED ORAL 2 TIMES DAILY
Qty: 10 CAPSULE | Refills: 0 | Status: SHIPPED | OUTPATIENT
Start: 2020-07-10 | End: 2022-04-21

## 2020-07-10 RX ORDER — SODIUM CHLORIDE, SODIUM LACTATE, POTASSIUM CHLORIDE, CALCIUM CHLORIDE 600; 310; 30; 20 MG/100ML; MG/100ML; MG/100ML; MG/100ML
125 INJECTION, SOLUTION INTRAVENOUS CONTINUOUS
Status: DISCONTINUED | OUTPATIENT
Start: 2020-07-10 | End: 2020-07-10

## 2020-07-10 RX ORDER — ROPIVACAINE HYDROCHLORIDE 5 MG/ML
INJECTION, SOLUTION EPIDURAL; INFILTRATION; PERINEURAL
Status: COMPLETED | OUTPATIENT
Start: 2020-07-10 | End: 2020-07-10

## 2020-07-10 RX ORDER — OXYCODONE HYDROCHLORIDE 10 MG/1
10 TABLET ORAL EVERY 4 HOURS PRN
Status: DISCONTINUED | OUTPATIENT
Start: 2020-07-10 | End: 2020-07-14 | Stop reason: HOSPADM

## 2020-07-10 RX ORDER — LISINOPRIL 10 MG/1
10 TABLET ORAL DAILY
Status: DISCONTINUED | OUTPATIENT
Start: 2020-07-11 | End: 2020-07-14 | Stop reason: HOSPADM

## 2020-07-10 RX ORDER — METOPROLOL SUCCINATE 100 MG/1
100 TABLET, EXTENDED RELEASE ORAL DAILY
Status: DISCONTINUED | OUTPATIENT
Start: 2020-07-11 | End: 2020-07-14 | Stop reason: HOSPADM

## 2020-07-10 RX ORDER — HYDROMORPHONE HCL/PF 1 MG/ML
0.2 SYRINGE (ML) INJECTION
Status: DISCONTINUED | OUTPATIENT
Start: 2020-07-10 | End: 2020-07-10 | Stop reason: HOSPADM

## 2020-07-10 RX ORDER — ONDANSETRON 2 MG/ML
4 INJECTION INTRAMUSCULAR; INTRAVENOUS EVERY 6 HOURS PRN
Status: DISCONTINUED | OUTPATIENT
Start: 2020-07-10 | End: 2020-07-14 | Stop reason: HOSPADM

## 2020-07-10 RX ORDER — ONDANSETRON 2 MG/ML
4 INJECTION INTRAMUSCULAR; INTRAVENOUS ONCE AS NEEDED
Status: DISCONTINUED | OUTPATIENT
Start: 2020-07-10 | End: 2020-07-10 | Stop reason: HOSPADM

## 2020-07-10 RX ORDER — HYDROMORPHONE HCL/PF 1 MG/ML
0.5 SYRINGE (ML) INJECTION EVERY 2 HOUR PRN
Status: ACTIVE | OUTPATIENT
Start: 2020-07-10 | End: 2020-07-12

## 2020-07-10 RX ORDER — MIDAZOLAM HYDROCHLORIDE 2 MG/2ML
INJECTION, SOLUTION INTRAMUSCULAR; INTRAVENOUS
Status: COMPLETED | OUTPATIENT
Start: 2020-07-10 | End: 2020-07-10

## 2020-07-10 RX ORDER — AMLODIPINE BESYLATE 10 MG/1
10 TABLET ORAL DAILY
Status: DISCONTINUED | OUTPATIENT
Start: 2020-07-11 | End: 2020-07-14 | Stop reason: HOSPADM

## 2020-07-10 RX ORDER — ONDANSETRON 2 MG/ML
INJECTION INTRAMUSCULAR; INTRAVENOUS AS NEEDED
Status: DISCONTINUED | OUTPATIENT
Start: 2020-07-10 | End: 2020-07-10 | Stop reason: SURG

## 2020-07-10 RX ORDER — CEFAZOLIN SODIUM 2 G/50ML
2000 SOLUTION INTRAVENOUS EVERY 8 HOURS
Status: COMPLETED | OUTPATIENT
Start: 2020-07-10 | End: 2020-07-11

## 2020-07-10 RX ORDER — FENTANYL CITRATE/PF 50 MCG/ML
25 SYRINGE (ML) INJECTION
Status: DISCONTINUED | OUTPATIENT
Start: 2020-07-10 | End: 2020-07-10 | Stop reason: HOSPADM

## 2020-07-10 RX ORDER — CEFAZOLIN SODIUM 2 G/50ML
2000 SOLUTION INTRAVENOUS ONCE
Status: COMPLETED | OUTPATIENT
Start: 2020-07-10 | End: 2020-07-10

## 2020-07-10 RX ORDER — SODIUM CHLORIDE, SODIUM LACTATE, POTASSIUM CHLORIDE, CALCIUM CHLORIDE 600; 310; 30; 20 MG/100ML; MG/100ML; MG/100ML; MG/100ML
75 INJECTION, SOLUTION INTRAVENOUS CONTINUOUS
Status: DISCONTINUED | OUTPATIENT
Start: 2020-07-10 | End: 2020-07-13

## 2020-07-10 RX ORDER — OXYCODONE HYDROCHLORIDE 5 MG/1
TABLET ORAL
Qty: 30 TABLET | Refills: 0 | Status: SHIPPED | OUTPATIENT
Start: 2020-07-10 | End: 2020-09-14 | Stop reason: ALTCHOICE

## 2020-07-10 RX ORDER — TERAZOSIN 1 MG/1
2 CAPSULE ORAL
Status: DISCONTINUED | OUTPATIENT
Start: 2020-07-10 | End: 2020-07-14 | Stop reason: HOSPADM

## 2020-07-10 RX ORDER — LIDOCAINE HYDROCHLORIDE 10 MG/ML
INJECTION, SOLUTION EPIDURAL; INFILTRATION; INTRACAUDAL; PERINEURAL AS NEEDED
Status: DISCONTINUED | OUTPATIENT
Start: 2020-07-10 | End: 2020-07-10 | Stop reason: SURG

## 2020-07-10 RX ORDER — FENTANYL CITRATE 50 UG/ML
INJECTION, SOLUTION INTRAMUSCULAR; INTRAVENOUS AS NEEDED
Status: DISCONTINUED | OUTPATIENT
Start: 2020-07-10 | End: 2020-07-10 | Stop reason: SURG

## 2020-07-10 RX ORDER — EPHEDRINE SULFATE 50 MG/ML
INJECTION INTRAVENOUS AS NEEDED
Status: DISCONTINUED | OUTPATIENT
Start: 2020-07-10 | End: 2020-07-10 | Stop reason: SURG

## 2020-07-10 RX ORDER — OXYCODONE HYDROCHLORIDE 5 MG/1
5 TABLET ORAL EVERY 4 HOURS PRN
Status: DISCONTINUED | OUTPATIENT
Start: 2020-07-10 | End: 2020-07-14 | Stop reason: HOSPADM

## 2020-07-10 RX ORDER — DOCUSATE SODIUM 100 MG/1
100 CAPSULE, LIQUID FILLED ORAL 2 TIMES DAILY
Status: DISCONTINUED | OUTPATIENT
Start: 2020-07-10 | End: 2020-07-14 | Stop reason: HOSPADM

## 2020-07-10 RX ORDER — SENNOSIDES 8.6 MG
650 CAPSULE ORAL EVERY 8 HOURS PRN
Qty: 30 TABLET | Refills: 0 | Status: SHIPPED | OUTPATIENT
Start: 2020-07-10 | End: 2020-08-09

## 2020-07-10 RX ADMIN — FENTANYL CITRATE 25 MCG: 50 INJECTION, SOLUTION INTRAMUSCULAR; INTRAVENOUS at 14:41

## 2020-07-10 RX ADMIN — DOCUSATE SODIUM 100 MG: 100 CAPSULE, LIQUID FILLED ORAL at 18:18

## 2020-07-10 RX ADMIN — PROPOFOL 200 MG: 10 INJECTION, EMULSION INTRAVENOUS at 13:41

## 2020-07-10 RX ADMIN — CEFAZOLIN SODIUM 2000 MG: 2 SOLUTION INTRAVENOUS at 21:00

## 2020-07-10 RX ADMIN — SODIUM CHLORIDE, SODIUM LACTATE, POTASSIUM CHLORIDE, AND CALCIUM CHLORIDE: .6; .31; .03; .02 INJECTION, SOLUTION INTRAVENOUS at 13:30

## 2020-07-10 RX ADMIN — PHENYLEPHRINE HYDROCHLORIDE 20 MCG/MIN: 10 INJECTION INTRAVENOUS at 13:47

## 2020-07-10 RX ADMIN — FENTANYL CITRATE 25 MCG: 50 INJECTION, SOLUTION INTRAMUSCULAR; INTRAVENOUS at 16:29

## 2020-07-10 RX ADMIN — ONDANSETRON 4 MG: 2 INJECTION INTRAMUSCULAR; INTRAVENOUS at 14:16

## 2020-07-10 RX ADMIN — PHENYLEPHRINE HYDROCHLORIDE 30 MCG/MIN: 10 INJECTION INTRAVENOUS at 14:06

## 2020-07-10 RX ADMIN — SODIUM CHLORIDE, SODIUM LACTATE, POTASSIUM CHLORIDE, AND CALCIUM CHLORIDE 125 ML/HR: .6; .31; .03; .02 INJECTION, SOLUTION INTRAVENOUS at 09:21

## 2020-07-10 RX ADMIN — EPHEDRINE SULFATE 10 MG: 50 INJECTION, SOLUTION INTRAVENOUS at 13:46

## 2020-07-10 RX ADMIN — EPHEDRINE SULFATE 5 MG: 50 INJECTION, SOLUTION INTRAVENOUS at 14:52

## 2020-07-10 RX ADMIN — CEFAZOLIN SODIUM 2000 MG: 2 SOLUTION INTRAVENOUS at 13:44

## 2020-07-10 RX ADMIN — FENTANYL CITRATE 25 MCG: 50 INJECTION, SOLUTION INTRAMUSCULAR; INTRAVENOUS at 13:43

## 2020-07-10 RX ADMIN — TERAZOSIN HYDROCHLORIDE 2 MG: 1 CAPSULE ORAL at 21:58

## 2020-07-10 RX ADMIN — ROPIVACAINE HYDROCHLORIDE 40 ML: 5 INJECTION, SOLUTION EPIDURAL; INFILTRATION; PERINEURAL at 09:25

## 2020-07-10 RX ADMIN — FENTANYL CITRATE 25 MCG: 50 INJECTION, SOLUTION INTRAMUSCULAR; INTRAVENOUS at 16:26

## 2020-07-10 RX ADMIN — MIDAZOLAM 2 MG: 1 INJECTION INTRAMUSCULAR; INTRAVENOUS at 09:25

## 2020-07-10 RX ADMIN — SODIUM CHLORIDE, SODIUM LACTATE, POTASSIUM CHLORIDE, AND CALCIUM CHLORIDE 75 ML/HR: .6; .31; .03; .02 INJECTION, SOLUTION INTRAVENOUS at 22:50

## 2020-07-10 RX ADMIN — LIDOCAINE HYDROCHLORIDE 50 MG: 10 INJECTION, SOLUTION EPIDURAL; INFILTRATION; INTRACAUDAL; PERINEURAL at 13:41

## 2020-07-10 NOTE — INTERVAL H&P NOTE
H&P reviewed  After examining the patient I find no changes in the patients condition since the H&P had been written      Vitals:    07/10/20 0842   BP: 126/67   Pulse: 62   Resp: 18   Temp: (!) 96 5 °F (35 8 °C)   SpO2: 97%   Patient seen and examined  To the operating room for right tibiotalar calcaneal fusion  Pros and cons of operative and non operative intervention explained to patient  Will follow up postoperatively

## 2020-07-10 NOTE — ANESTHESIA POSTPROCEDURE EVALUATION
Post-Op Assessment Note    CV Status:  Stable    Pain management: adequate     Mental Status:  Awake and sleepy (responds to name)   Hydration Status:  Euvolemic   PONV Controlled:  Controlled   Airway Patency:  Patent   Post Op Vitals Reviewed: Yes      Staff: Anesthesiologist, CRNA   Comments: VSS, reflexes intact, maintains own airway          /67 (07/10/20 1635)    Temp (!) 97 4 °F (36 3 °C) (07/10/20 1635)    Pulse (P) 100 (07/10/20 1632)   Resp 13 (07/10/20 1635)    SpO2 100 % (07/10/20 1635)

## 2020-07-10 NOTE — OP NOTE
OPERATIVE REPORT  PATIENT NAME: Roseline Chun    :  1945  MRN: [id number]  Pt Location:  OR ROOM 15    SURGERY DATE: 7/10/2020    Surgeon(s) and Role:     * Barbara Nuñez MD - Primary     * Zelda Lee PA-C - Emely Davis MD - Assisting    Preop Diagnosis:  Post-traumatic osteoarthritis, right ankle and foot [M19 171]    Post-Op Diagnosis Codes:     * Post-traumatic osteoarthritis, right ankle and foot [M19 171]    Procedure(s) (LRB):  ARTHRODESIS/ TIBIAL-TALAR ANKLE FUSION (Right)         Specimen(s):  * No specimens in log *    Estimated Blood Loss:   200 mL    Drains:  * No LDAs found *    Anesthesia Type:   General    Operative Indications:  Post-traumatic osteoarthritis, right ankle and foot [M19 171]    Briefly this is a 81-year-old male with a remote history of right ankle fracture as well as a more recent history of right fibular shaft fracture  Patient has developed posttraumatic osteoarthritis of his right tibiotalar joint as well as auto fusion of his right subtalar joint and synostosis of right tib-fib joint  Patient has longstanding history of diffuse right ankle pain is indicated for right ankle fusion  Risks and benefits of right pantalar fusion was discussed at length with the patient informed consent was obtained  Patient accepted these risks prior to proceeding to the operating room  Operative Findings:  Significant hindfoot valgus, previous auto fusion of subtalar joint    Implants:  4 5 mm partially threaded cannulated screws (X4)    Complications:   Inability to place a pantalar nail in appropriate position given patient anatomy, intraoperative decision to convert to isolated tibiotalar fusion  Procedure and Technique:  Patient was taken to the operating room table and placed in a supine position    Patient was provided a general anesthetic and endotracheal intubation was performed by the anesthesia team   Right lower extremity was prepped and draped in normal sterile fashion  Preoperative time-out was performed identifying the correct operative extremity  Preoperative antibiotics and DVT prophylaxis were addressed  Attention was turned to the right ankle where anterolateral approach to the ankle was performed  Sharp incision was carried through skin subcutaneous tissue approximately 5 cm proximal to the tibiotalar joint extending to the base of the 4th metatarsal   Superficial peroneal nerve was identified and protected  The extensor retinaculum was incised  Arthrotomy revealed the tibiotalar joint  The previously fused subtalar joint was visualized  A laminar  was inserted into the tibiotalar joint and using combination of pneumatic powered lobo and saw, the remaining articular cartilage of the tibial plafond and talus was removed  The bone was decorticated to bleeding bone  Under biplanar fluoroscopy a guidewire was then inserted through the calcaneus and talar body to level of the plafond  13 mm opening Reamer was then inserted over the guidewire to the level of the tibial plafond  Under biplanar fluoroscopy multiple attempts were made to insert a guidewire through the plafond and into the tibial intramedullary canal   However the patient's tibiotalar joint and subtalar joint were in extreme valgus precluding the ability to pass the hindfoot nail into an appropriate position  At this point given the previous auto fusion of subtalar joint, decision was made to perform an isolated tibiotalar and distal tibia fibular joint  Guidewires for 4 5mm partially threaded cannulated screws were inserted in a crossing fashion from proximal to distal through the distal tibia and into the body of the talus  Appropriate position was confirmed under biplanar fluoroscopy and appropriately sized 4 5 mm partially-threaded cannulated screws were inserted to appropriate depth    A 3rd 4 5mm partially-threaded cannulated screw was inserted from the distal fibula into the tibial body  Finally,  a 4th 4 5 mm partially-threaded cannulated screw was inserted from posterior to anterior medial to the Achilles tendon from the posterior aspect of the distal fibula to the talar neck  All surgical incisions were copiously irrigated with normal saline solution  The surgical incision on proximal aspect of the plantar foot was closed with 2 0 nylon  Two percutaneous stab incisions over the medial ankle and posterior ankle were closed with 2 nylon and horizontal mattress fashion  The anterolateral incision was closed in a layered manner consisting of closure of the capsular layer as well as the subcuticular layer  Skin was closed with 2 nylon suture  Sterile dressings were applied to the incisions consisting of Acticoat, 4 x 4 gauze, ABD and sterile Webril  Patient's right ankle was placed in a bulky Jules dressing  Patient was extubated taken to PACU in stable condition    Postoperative plans will include nonweightbearing status to the operative extremity and DVT prophylaxis with Lovenox while in house and 81mg ASA BID upon discharge for 30 days  Dr Amalia Ferrera was present for the entirety of the surgery           Patient Disposition:  PACU  and extubated and stable    SIGNATURE: Hemalatha Mathew MD  DATE: July 10, 2020  TIME: 4:35 PM

## 2020-07-10 NOTE — ANESTHESIA PREPROCEDURE EVALUATION
Review of Systems/Medical History  Patient summary reviewed  Chart reviewed      Cardiovascular  Exercise tolerance (METS): >4,  Hyperlipidemia, Hypertension controlled,    Pulmonary  Negative pulmonary ROS        GI/Hepatic    No GERD ,        Negative  ROS        Endo/Other  Diabetes well controlled type 2 Oral agent,      GYN       Hematology  Negative hematology ROS      Musculoskeletal  Negative musculoskeletal ROS        Neurology  Negative neurology ROS      Psychology   Negative psychology ROS              Physical Exam    Airway    Mallampati score: II  TM Distance: >3 FB  Neck ROM: full     Dental   No notable dental hx     Cardiovascular  Cardiovascular exam normal    Pulmonary  Pulmonary exam normal     Other Findings        Anesthesia Plan  ASA Score- 2     Anesthesia Type- general and regional with ASA Monitors  Additional Monitors:   Airway Plan: LMA  Plan Factors-  Patient did not smoke on day of surgery  Induction- intravenous  Postoperative Plan-     Informed Consent- Anesthetic plan and risks discussed with patient  I personally reviewed this patient with the CRNA  Discussed and agreed on the Anesthesia Plan with the HILDA Richards

## 2020-07-10 NOTE — ANESTHESIA PROCEDURE NOTES
Peripheral Block    Patient location during procedure: holding area  Start time: 7/10/2020 10:25 AM  Reason for block: procedure for pain, at surgeon's request and post-op pain management  Staffing  Anesthesiologist: Griselda Blount MD  Performed: anesthesiologist   Preanesthetic Checklist  Completed: patient identified, site marked, surgical consent, pre-op evaluation, timeout performed, IV checked, risks and benefits discussed and monitors and equipment checked  Peripheral Block  Patient position: left lateral decubitus  Prep: ChloraPrep  Patient monitoring: heart rate, cardiac monitor and frequent blood pressure checks  Block type: adductor canal block and popliteal  Laterality: right  Injection technique: single-shot  Procedures: ultrasound guided, Ultrasound guidance required for the procedure to increase accuracy and safety of medication placement and decrease risk of complications    Ultrasound permanent image savedropivacaine (NAROPIN) 0 5 % perineural infiltration, 40 mL  midazolam (VERSED) 2 mg/2 mL IV, 2 mg  Needle  Needle type: Stimuplex   Needle gauge: 22 G  Needle length: 10 cm  Needle localization: nerve stimulator and ultrasound guidance  Needle insertion depth: 7 cm  Assessment  Injection assessment: incremental injection, local visualized surrounding nerve on ultrasound, negative aspiration for heme and transient paresthesias  Paresthesia pain: immediately resolved  Heart rate change: no  Slow fractionated injection: no  Post-procedure:  site cleaned  patient tolerated the procedure well with no immediate complications

## 2020-07-10 NOTE — DISCHARGE INSTRUCTIONS
Discharge Instructions - Orthopedics  Fernand Schilder 69 y o  male MRN: [de-identified]  Unit/Bed#: Operating Room    Weight Bearing Status:                                           Non-weight bearing right leg    DVT prophylaxis  Take 81 mg aspirin twice a day for 30 days after surgery     Pain:  Continue analgesics as directed    Dressing Instructions:   Please keep clean, dry and intact until follow up     Appt Instructions: If you do not have your appointment, please call the clinic at [phone]   Otherwise followup as scheduled     Contact the office sooner if you experience any increased numbness/tingling in the extremities

## 2020-07-11 LAB
ANION GAP SERPL CALCULATED.3IONS-SCNC: 3 MMOL/L (ref 4–13)
BUN SERPL-MCNC: 19 MG/DL (ref 5–25)
CALCIUM SERPL-MCNC: 8 MG/DL (ref 8.3–10.1)
CHLORIDE SERPL-SCNC: 110 MMOL/L (ref 100–108)
CO2 SERPL-SCNC: 28 MMOL/L (ref 21–32)
CREAT SERPL-MCNC: 0.95 MG/DL (ref 0.6–1.3)
ERYTHROCYTE [DISTWIDTH] IN BLOOD BY AUTOMATED COUNT: 12.3 % (ref 11.6–15.1)
GFR SERPL CREATININE-BSD FRML MDRD: 79 ML/MIN/1.73SQ M
GLUCOSE P FAST SERPL-MCNC: 120 MG/DL (ref 65–99)
GLUCOSE SERPL-MCNC: 120 MG/DL (ref 65–140)
GLUCOSE SERPL-MCNC: 96 MG/DL (ref 65–140)
HCT VFR BLD AUTO: 35.7 % (ref 36.5–49.3)
HGB BLD-MCNC: 12.4 G/DL (ref 12–17)
MCH RBC QN AUTO: 32.5 PG (ref 26.8–34.3)
MCHC RBC AUTO-ENTMCNC: 34.7 G/DL (ref 31.4–37.4)
MCV RBC AUTO: 94 FL (ref 82–98)
PLATELET # BLD AUTO: 106 THOUSANDS/UL (ref 149–390)
PMV BLD AUTO: 11.8 FL (ref 8.9–12.7)
POTASSIUM SERPL-SCNC: 3.4 MMOL/L (ref 3.5–5.3)
RBC # BLD AUTO: 3.82 MILLION/UL (ref 3.88–5.62)
SODIUM SERPL-SCNC: 141 MMOL/L (ref 136–145)
WBC # BLD AUTO: 6.86 THOUSAND/UL (ref 4.31–10.16)

## 2020-07-11 PROCEDURE — 97167 OT EVAL HIGH COMPLEX 60 MIN: CPT

## 2020-07-11 PROCEDURE — 97163 PT EVAL HIGH COMPLEX 45 MIN: CPT

## 2020-07-11 PROCEDURE — 80048 BASIC METABOLIC PNL TOTAL CA: CPT | Performed by: PHYSICIAN ASSISTANT

## 2020-07-11 PROCEDURE — NC001 PR NO CHARGE: Performed by: ORTHOPAEDIC SURGERY

## 2020-07-11 PROCEDURE — 85027 COMPLETE CBC AUTOMATED: CPT | Performed by: PHYSICIAN ASSISTANT

## 2020-07-11 PROCEDURE — 97530 THERAPEUTIC ACTIVITIES: CPT

## 2020-07-11 RX ORDER — ACETAMINOPHEN 325 MG/1
650 TABLET ORAL EVERY 6 HOURS PRN
Status: DISCONTINUED | OUTPATIENT
Start: 2020-07-11 | End: 2020-07-14 | Stop reason: HOSPADM

## 2020-07-11 RX ORDER — ACETAMINOPHEN 325 MG/1
650 TABLET ORAL EVERY 6 HOURS PRN
Status: DISCONTINUED | OUTPATIENT
Start: 2020-07-11 | End: 2020-07-11

## 2020-07-11 RX ADMIN — DOCUSATE SODIUM 100 MG: 100 CAPSULE, LIQUID FILLED ORAL at 16:17

## 2020-07-11 RX ADMIN — TERAZOSIN HYDROCHLORIDE 2 MG: 1 CAPSULE ORAL at 21:14

## 2020-07-11 RX ADMIN — DOCUSATE SODIUM 100 MG: 100 CAPSULE, LIQUID FILLED ORAL at 08:21

## 2020-07-11 RX ADMIN — SODIUM CHLORIDE, SODIUM LACTATE, POTASSIUM CHLORIDE, AND CALCIUM CHLORIDE 75 ML/HR: .6; .31; .03; .02 INJECTION, SOLUTION INTRAVENOUS at 11:52

## 2020-07-11 RX ADMIN — ENOXAPARIN SODIUM 40 MG: 40 INJECTION SUBCUTANEOUS at 08:20

## 2020-07-11 RX ADMIN — LISINOPRIL 10 MG: 10 TABLET ORAL at 08:21

## 2020-07-11 RX ADMIN — METOPROLOL SUCCINATE 100 MG: 100 TABLET, EXTENDED RELEASE ORAL at 08:21

## 2020-07-11 RX ADMIN — CEFAZOLIN SODIUM 2000 MG: 2 SOLUTION INTRAVENOUS at 04:46

## 2020-07-11 RX ADMIN — ACETAMINOPHEN 650 MG: 325 TABLET, FILM COATED ORAL at 22:10

## 2020-07-11 RX ADMIN — AMLODIPINE BESYLATE 10 MG: 10 TABLET ORAL at 08:21

## 2020-07-11 NOTE — UTILIZATION REVIEW
Initial Clinical Review    OP/NC BED 7/10 UPGRADED TO INPATIENT 7/12 @ 0828 FOR CONTINUED CARE POST-OP ANKLE FUSION WITH >2MN STAY    07/12/20 0829  Inpatient Admission Once     Transfer Service: Orthopedic Surgery       Question Answer Comment   Admitting Physician Gracia Hamilton    Level of Care Med Surg    Estimated length of stay More than 2 Midnights    Certification I certify that inpatient services are medically necessary for this patient for a duration of greater than two midnights  See H&P and MD Progress Notes for additional information about the patient's course of treatment  07/12/20 0828       Elective OP surgical procedure  Age/Sex: 69 y o  male  Surgery Date: 7/10/2020  Procedure: ARTHRODESIS/ TIBIAL-TALAR ANKLE FUSION (Right)   Anesthesia: General  Operative Findings:  Significant hindfoot valgus, previous auto fusion of subtalar joint    POD#1 Progress Note:  Pt has no c/o this AM   NWB RLE in bulky crowley splint  Analgesics prn  lovenox for DVT ppx  PT/OT eval done --- recommending rehab vs home pending family support available  needs to clear stairs piror to d/c home        Vital Signs:  Date/Time  Temp  Pulse  Resp  BP  MAP (mmHg)  SpO2  O2 Flow Rate (L/min)  O2 Device   07/11/20 1500  99 4 °F (37 4 °C)  80  17  153/77    96 %       07/11/20 0749  99 4 °F (37 4 °C)  85  18  142/65    98 %    None (Room air)   07/10/20 2258  98 9 °F (37 2 °C)  67  16  125/67  91  96 %    None (Room air)   07/10/20 2040  98 9 °F (37 2 °C)  67  16  140/70  95      None (Room air)   07/10/20 2035            95 %       07/10/20 2030            95 %       07/10/20 2025            94 %       07/10/20 2020            94 %       07/10/20 2015            95 %       07/10/20 2010            96 %       07/10/20 2005            94 %       07/10/20 2000            96 %       07/10/20 1955            95 %       07/10/20 1950            96 %        Scheduled Medications:  Medications:  amLODIPine 10 mg Oral Daily   docusate sodium 100 mg Oral BID   enoxaparin 40 mg Subcutaneous Daily   lisinopril 10 mg Oral Daily   metoprolol succinate 100 mg Oral Daily   terazosin 2 mg Oral HS     Continuous IV Infusions:  lactated ringers 75 mL/hr Intravenous Continuous     PRN Meds:  HYDROmorphone 0 5 mg Intravenous Q2H PRN   ondansetron 4 mg Intravenous Q6H PRN   oxyCODONE 10 mg Oral Q4H PRN   oxyCODONE 5 mg Oral Q4H PRN       7/12 --   Plan:  NWB RLE in bulky crowley  Pain control  DVT ppx: Enoxaparin (Lovenox)  PT/OT  Will continue to assess for acute blood loss anemia  Dispo: Ortho will follow     PT eval done -- (rehab vs home pending family support available)  Needs to clear stairs prior to d/c home      Network Utilization Review Department  [email or web address]  ATTENTION: Please call with any questions or concerns to [phone] and carefully listen to the prompts so that you are directed to the right person  All voicemails are confidential   Boone Catherine all requests for admission clinical reviews, approved or denied determinations and any other requests to dedicated fax number below belonging to the campus where the patient is receiving treatment   List of dedicated fax numbers for the Facilities:  [address] DENIALS (Administrative/Medical Necessity) [phone]   [address] (Maternity/NICU/Pediatrics) [phone]   Grzegorz Machado [phone]   Caterina Andrews [phone]   Joshua Bairesorest [phone]   [address]  [phone]   [address] [phone]   [facility]  [phone]   [address] [facility]  [address] [facility] 1000 [address] [facility] [phone]

## 2020-07-11 NOTE — PROGRESS NOTES
Subjective: No acute events overnight  No acute distress  Resting comfortably in bed    Objective:  A 10 point ROS was performed; negative except as noted above       Lab Results   Component Value Date/Time    WBC 6 86 07/11/2020 05:56 AM    HGB 12 4 07/11/2020 05:56 AM       Vitals:    07/10/20 2258   BP: 125/67   Pulse: 67   Resp: 16   Temp: 98 9 °F (37 2 °C)   SpO2: 96%     Right lower extremity  Dressing saturated   Motor intact to EHL/FHL/TA/GS  Sensation intact to light touch to dp/sp/tib/marci/saph distributions  Toes warm and well perfused with brisk capillary refill    Assessment: 69 y o  male POD 1 S/P Tibiotalocalcaneal fusion    Plan:  NWB RLE in bulky crowley splint  Pain control  DVT ppx: Enoxaparin (Lovenox)  PT/OT  Will continue to assess for acute blood loss anemia  Dispo: Ortho will follow

## 2020-07-11 NOTE — PLAN OF CARE
Problem: PHYSICAL THERAPY ADULT  Goal: Performs mobility at highest level of function for planned discharge setting  See evaluation for individualized goals  Description  Treatment/Interventions: Functional transfer training, LE strengthening/ROM, Elevations, Therapeutic exercise, Endurance training, Patient/family training, Equipment eval/education, Bed mobility, Gait training, Compensatory technique education, Spoke to nursing, OT, Family  Equipment Recommended: Walker(RW)       See flowsheet documentation for full assessment, interventions and recommendations  Note:   Prognosis: Good  Problem List: Decreased strength, Decreased endurance, Impaired balance, Decreased mobility, Decreased coordination, Decreased skin integrity, Orthopedic restrictions, Pain  Assessment: Pt is a 69 y o  male seen for PT evaluation s/p admit to [facility] on 7/10/20  Two pt identifiers were used to confirm  Pt presented s/p R ankle fusion which was performed on 7/10/20  Pt was admitted with a primary dx of: post-traumatic osteoarthritis, right ankle and foot  PT now consulted for assessment of mobility and d/c needs  Pts current co morbidities effecting treatment include: HTN, DM, glaucoma, hyperparahyroidism and personal factors including 2 KRISTIAN home environment  Pt currently lives in a two story home with his wife and son  Pts current clinical presentation is Unstable/ Unpredictable (high complexity) due to Ongoing medical management for primary dx, Increased reliance on more restrictive AD compared to baseline, decreased activity tolerance compared to baseline, fall risk, increased assistance needed from caregiver at current time, significant change in functional ability secondary to change to NWB on RLE, continuous pulse oximetry monitoring, multiple lines, decline in overall functional mobility status  Prior to admission, pt was I with ambulation without the use of an AD as per pt   Upon evaluation, pt currently is requiring supervision assist for bed mobility; min A for transfers and supervision assist for ambulation w/ RW  Pt displays hopping gait pattern, decreased step and stride length, decreased gait speed, narrow LEVON  Pt presents at PT eval functioning below baseline and currently w/ overall mobility deficits secondary to: decreased strength, decreased endurance, impaired balance, impaired coordination  Pt currently at a fall risk secondary to impairments listed above  Based on PT evaluation, pt will continue to benefit from skilled acute PT interventions to address stated impairments; to maximize functional mobility; for ongoing pt/ family training; and DME needs  At conclusion of PT session pt was left seated at EOB, agreeable to participate in additional therapy session  Provided pt education regarding PT plan to improve functional mobility status  PT is currently recommending post acute inpatient rehab vs home with home PT pending family support  Pt agreeable to plan and goals as stated on evaluation  PT will continue to follow during hospital stay  Barriers to Discharge: Inaccessible home environment, Decreased caregiver support     PT Discharge Recommendation: Other (Comment)(rehab vs home pending family support available)     PT - OK to Discharge: No    See flowsheet documentation for full assessment

## 2020-07-11 NOTE — PHYSICAL THERAPY NOTE
Physical Therapy Evaluation and Treatment    Patient Name: Jono Dye    TZCTS'J Date: 7/11/2020     Problem List  Active Problems:    * No active hospital problems  *       Past Medical History  Past Medical History:   Diagnosis Date    Diabetes mellitus ([facility] [address] )     Hyperlipidemia     Hypertension     Prostate cancer ([facility] ) 2005    S/P prostate ca-2005 had radiation tx  Past Surgical History  Past Surgical History:   Procedure Laterality Date    COLONOSCOPY        Time In: 1310  Time Out: 9109     07/11/20 1340   Note Type   Note type Eval/Treat   Pain Assessment   Pain Assessment Tool Pain Assessment not indicated - pt denies pain   Home Living   Type of Home House   Home Layout Two level;Bed/bath upstairs   Bathroom Shower/Tub Tub/shower unit   Bathroom Toilet Standard   Prior Function   Level of [place] Independent with ADLs and functional mobility   Lives With Spouse; Son   Boris 69 Help From Family   ADL Assistance Independent   IADLs Independent   Falls in the last 6 months 0   Vocational Retired   Restrictions/Precautions   [place] Bearing Precautions Per Order Yes   RLE [place] Bearing Per Order NWB   Other Precautions Multiple lines; Fall Risk;Pain;Hard of hearing   General   Family/Caregiver Present No   Cognition   Overall Cognitive Status WFL   Arousal/Participation Cooperative   Orientation Level Oriented X4   Following Commands Follows one step commands without difficulty   Comments pt very [place], used paper and pen to communicate   RLE Assessment   RLE Assessment X   Strength RLE   RLE Overall Strength 4/5  (hip and quad strength)   LLE Assessment   LLE Assessment WFL   Coordination   Movements are Fluid and Coordinated 0   Coordination and Movement Description slow movements, guarded posture   Bed Mobility   Supine to Sit 5  Supervision   Additional items Increased time required;Verbal cues   Transfers   Sit to Stand 4  Minimal assistance   Additional items Increased time required   Stand to Sit 4  Minimal assistance   Additional items Increased time required   Ambulation/Elevation   Gait pattern Decreased foot clearance;Narrow LEVON; Excessively slow  (hopping gait pattern)   Gait Assistance 5  Supervision   Assistive Device Rolling walker   Distance 20'   Balance   Static Sitting Good   Dynamic Sitting Good   Static Standing Fair +  (RW)   Dynamic Standing Fair  (RW)   Ambulatory Fair  (RW)   Endurance Deficit   Endurance Deficit Yes   Endurance Deficit Description weakness, fatigue   Activity Tolerance   Activity Tolerance Patient tolerated treatment well   Medical Staff Made Aware OT Liz Schmid   Nurse Made Aware RN cleared patient for PT evaluation   Assessment   Prognosis Good   Problem List Decreased strength;Decreased endurance; Impaired balance;Decreased mobility; Decreased coordination;Decreased skin integrity;Orthopedic restrictions;Pain   Assessment Pt is a 69 y o  male seen for PT evaluation s/p admit to [facility] on 7/10/20  Two pt identifiers were used to confirm  Pt presented s/p R ankle fusion which was performed on 7/10/20  Pt was admitted with a primary dx of: post-traumatic osteoarthritis, right ankle and foot  PT now consulted for assessment of mobility and d/c needs  Pts current co morbidities effecting treatment include: HTN, DM, glaucoma, hyperparahyroidism and personal factors including 2 KRISTIAN home environment  Pt currently lives in a two story home with his wife and son   Pts current clinical presentation is Unstable/ Unpredictable (high complexity) due to Ongoing medical management for primary dx, Increased reliance on more restrictive AD compared to baseline, decreased activity tolerance compared to baseline, fall risk, increased assistance needed from caregiver at current time, significant change in functional ability secondary to change to NWB on RLE, continuous pulse oximetry monitoring, multiple lines, decline in overall functional mobility status  Prior to admission, pt was I with ambulation without the use of an AD as per pt  Upon evaluation, pt currently is requiring supervision assist for bed mobility; min A for transfers and supervision assist for ambulation w/ RW  Pt displays hopping gait pattern, decreased step and stride length, decreased gait speed, narrow LEVON  Pt presents at PT eval functioning below baseline and currently w/ overall mobility deficits secondary to: decreased strength, decreased endurance, impaired balance, impaired coordination  Pt currently at a fall risk secondary to impairments listed above  Based on PT evaluation, pt will continue to benefit from skilled acute PT interventions to address stated impairments; to maximize functional mobility; for ongoing pt/ family training; and DME needs  At conclusion of PT session pt was left seated at EOB, agreeable to participate in additional therapy session  Provided pt education regarding PT plan to improve functional mobility status  PT is currently recommending post acute inpatient rehab vs home with home PT pending family support  Pt agreeable to plan and goals as stated on evaluation  PT will continue to follow during hospital stay  Barriers to Discharge Inaccessible home environment;Decreased caregiver support   Goals   Patient Goals go home   STG Expiration Date 07/25/20   Short Term Goal #1 1  Pt will increased strength by 1 grade in order to increase functional independence with transfers  2  Pt will increase balance grade by 1 in order to improve safety  3  Pt will improve transfer ability to mod I to increase functional independence and decrease caregiver burden  4  Pt will perform bed mobility independently to decrease caregiver burden  5  Pt will be able to amb 50 ft with RW and mod I to increase functional independence in home and community environments   6  Pt will be able to ascend and descend 12 stairs mod I to increase functional independence within the home environment  7  Pt will be able to maintain NWB of RLE throughout 100% of therapy sessions  8  Pt will be independent with HEP  PT Treatment Day 0   Plan   Treatment/Interventions Functional transfer training;LE strengthening/ROM; Elevations; Therapeutic exercise; Endurance training;Patient/family training;Equipment eval/education; Bed mobility;Gait training; Compensatory technique education;Spoke to nursing;OT;Family   PT Frequency Other (Comment)  (3-5x/wk)   Recommendation   PT Discharge Recommendation Other (Comment)  (rehab vs home pending family support available)   Equipment Recommended Walker  (RW)   PT - OK to Discharge No   Additional Comments needs to clear stairs prior to d/c home, ok to d/c to rehab   Modified [place] Scale   Modified [place] Scale 4   Barthel Index   Feeding 10   Bathing 0   Grooming Score 5   Dressing Score 5   Bladder Score 10   Bowels Score 10   Toilet Use Score 5   Transfers (Bed/Chair) Score 10   Mobility (Level Surface) Score 0   Stairs Score 5   Barthel Index Score 60       Additional Therapy Session    Time In: 1325  Time Out: 1340    Pt agreeable to participate in follow up therapy session  Pt performed performed sit to stand transfer with supervision assist and cues for hand placement  Pt performed stand pivot transfer to bedside commode with supervision assist  Pt performed perineal hygiene independently and performed stand pivot transfer back into bed with supervision assist  Pt performed sit to supine transfer with supervision assist  Provided pt education regarding safe functional mobility strategies, goal of therapy session, benefits of discharging to rehab vs home with home therapy  Skilled acute PT interventions are indicated to address listed functional deficits focusing on strength, endurance and functional mobility      Cortney Greer PT, DPT

## 2020-07-11 NOTE — OCCUPATIONAL THERAPY NOTE
Occupational Therapy Evaluation     Patient Name: Logan CAROLINA Date: 7/11/2020  Problem List  Active Problems:    * No active hospital problems  *    Past Medical History  Past Medical History:   Diagnosis Date    [facility] [address] )     Hyperlipidemia     Hypertension     Prostate cancer ([facility] [address] ) 2005    S/P prostate ca-2005 had radiation tx  Past Surgical History  Past Surgical History:   Procedure Laterality Date    COLONOSCOPY             07/11/20 1341   Note Type   Note type Eval only   Restrictions/Precautions   Weight Bearing Precautions Per Order Yes   RLE Weight Bearing Per Order NWB   Braces or Orthoses Splint  (R LE)   Other Precautions WBS; Multiple lines; Fall Risk;Pain;Hard of hearing   Pain Assessment   Pain Assessment Tool Pain Assessment not indicated - pt denies pain   Pain Score No Pain   Home Living   Type of Home House   Home Layout Two level;Bed/bath upstairs   Bathroom Shower/Tub Tub/shower unit   Bathroom Toilet Standard   Additional Comments Pt history limited as pt is VERY [place] and most communication is through writing  Pt reports living in a 2 story home  Prior Function   Level of [place] Independent with ADLs and functional mobility   Lives With Spouse; Son   Boris 69 Help From Family   ADL Assistance Independent   IADLs Independent   Falls in the last 6 months 0   Vocational Retired   Lifestyle   Autonomy Pt reports being I with ADLS, IADLS and mobility without device PTA  Reciprocal Relationships Pt lives with his wife and son who both work, leaving pt alone throughout the day      Service to Others Retired   Intrinsic Gratification Active PTA   ADL   Where Assessed Edge of bed   Eating Assistance 1 [facility] [address]  [address] [place] [address]  3  Moderate [place] 3  Moderate [address] [place] [address] 4  Minimal Assistance   Bed Mobility   Supine to Sit 4  Minimal assistance   Additional items Assist x 1; Increased time required;Verbal cues;LE management   Sit to Supine 5  Supervision   Additional items Assist x 1; Increased time required;Verbal cues;LE management   Additional Comments After OT session pt in bed with all needs within reach  Transfers   Sit to Stand 4  Minimal assistance   Additional items Increased time required   Stand to Sit 4  Minimal assistance   Additional items Increased time required   Functional Mobility   Functional Mobility 4  Minimal assistance   Additional Comments Pt demonstrated short household mobility with RW  Additional items Rolling walker   Balance   Static Sitting Good   Dynamic Sitting Fair +   Static Standing Fair   Dynamic Standing Poor +   Ambulatory Poor +   Activity Tolerance   Activity Tolerance Patient tolerated treatment well   Medical Staff Made Aware PT Hans   Nurse Made Aware RN confirmed okay to see pt   RUE Assessment   RUE Assessment WFL   LUE Assessment   LUE Assessment WFL   Cognition   Overall Cognitive Status WFL   Arousal/Participation Alert; Cooperative   Attention Attends with cues to redirect   Orientation Level Oriented X4   Memory Within functional limits   Following Commands Follows one step commands without difficulty   Comments Pt is VERY [place], but is able to communicate through pen and paper  Pt demonstrated good understanding of NWB status  Assessment   Limitation Decreased ADL status; Decreased endurance;Decreased high-level ADLs; Decreased self-care trans   Prognosis Good   Assessment Pt is a 69 y o  male admitted to [facility] on 7/10/2020 s/p "ARTHRODESIS/ TIBIAL-TALAR ANKLE FUSION (Right) " on 7/10/2020  Comorbidities include a h/o type 2 DM, benign essential HTN, hyperparathyroidism, sprain of right ankle, chrnoic left shoulder pain, inguinal hernia, and adenocarcinoma of prostate  Pt with active OT orders and activity as tolerated orders   Pt is NWB on R LE  Pt resides in a 2 story home with his wife and son, who both work during the day  Pt was I w/  ADLS and IADLS, & required no use of DME PTA  Currently pt is min A for bed mobility, functional transfers and functional mobility, supervision for UB ADLS and mod A for LB ADLS  Pt is limited at this time 2*: pain, endurance, activity tolerance, functional mobility, functional standing tolerance and decreased I w/ ADLS/IADLS  The following Occupational Performance Areas to address include: grooming, bathing/shower, toilet hygiene, dressing, functional mobility and clothing management  Based on the aforementioned OT evaluation, functional performance deficits, and assessments, pt has been identified as a high complexity evaluation  From OT standpoint, anticipate d/c STR  If pt is able to have increased support, pt may progress to home upon d/c  Clarification of home setup/ support required as history was limited by pt's hearing deficit  Pt to continue to benefit from acute immediate OT services to address the following goals 3-5x/week to  w/in 7-10 days: [place] Simple Goals   Patient Goals To return home    LTG Time Frame 7-10   Long Term Goal #1 See goals below   Plan   Treatment Interventions ADL retraining;Functional transfer training; Endurance training;UE strengthening/ROM; Patient/family training;Equipment evaluation/education; Compensatory technique education;Continued evaluation; Energy conservation; Activityengagement   Goal Expiration Date 20   OT Frequency 3-5x/wk   Recommendation   OT Discharge Recommendation Post-Acute Rehabilitation Services  (pending available support, see assessment )   OT - OK to Discharge Yes  (When medically appropriate)   Modified [place] Scale   Modified [place] Scale 4     GOALS    1) Pt will increase activity tolerance to G for 30 min txment sessions    2) Pt will complete UB/LB dressing/self care w/ mod I using adaptive device and DME as needed    3) Pt will complete bathing w/ Mod I w/ use of AE and DME as needed    4) Pt will complete toileting w/ mod I w/ G hygiene/thoroughness using DME as needed    5) Pt will improve functional transfers to Mod I on/off all surfaces using DME as needed w/ G balance/safety     6) Pt will improve functional mobility during ADL/IADL/leisure tasks to Mod I using DME as needed w/ G balance/safety     7) Pt will participate in simulated IADL management task with DME as needed to increase independence to  w/ G safety and endurance    8) Pt will demonstrate G carryover of pt/caregiver education and training as appropriate  9) Pt will demonstrate 100% carryover of energy conservation techniques t/o functional I/ADL/leisure tasks w/o cues s/p skilled education    10) Pt will independently identify and utilize 2-3 coping strategies to increase positive affect and promote overall well-being      11) Pt will engage in ongoing cognitive assessment w/ G participation to assist w/ safe d/c planning/recommendations (as needed)    Belen Ashby, MOT, OTR/L

## 2020-07-11 NOTE — PHYSICIAN ADVISOR
Current patient class: Outpatient Surgery  The patient is currently on Hospital Day: 2 at [address] [place] [address]      This patient was originally admitted to the hospital under observation status  After admission the patient was reevaluated and determined to require further hospitalization  The patient is now documented to require at least a 2nd midnight in the hospital  As such the patient is now expected to satisfy the 2 midnight benchmark and is therefore eligible for inpatient admission  After review of the relevant documentation, labs, vital signs and test results, the patient is appropriate for INPATIENT ADMISSION  Admission to the hospital as an inpatient is a complex decision making process which requires the practitioner to consider the patients presenting complaint, history and physical examination and all relevant testing  With this in mind, in this case, the patient was deemed appropriate for INPATIENT ADMISSION  After review of the documentation and testing available at the time of the admission I concur with this clinical determination of medical necessity  Rationale is as follows: The patient is a 69 yrs Male who presented to the ED at 7/10/2020  7:21 AM with a chief complaint of 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  Patient came for arthrodesis/tibial/talar fusion  Postoperatively patient is nonweightbearing on the right lower extremity  Patient is getting pain control and being monitored for postop blood loss anemia  Given the need for further monitoring in an elderly patient the patient is going to cross the 2 midnight benchmark as set by Medicare secondary to ongoing monitoring and therapy evaluations for safe discharge planning and is inpatient status appropriate      The patients vitals on arrival were ED Triage Vitals   Temperature Pulse Respirations Blood Pressure SpO2   07/10/20 0842 07/10/20 0842 07/10/20 0842 07/10/20 0589 07/10/20 0842   (!) 96 5 °F (35 8 °C) 62 18 126/67 97 %      Temp Source Heart Rate Source Patient Position - Orthostatic VS BP Location FiO2 (%)   07/10/20 0842 07/10/20 0842 07/10/20 1810 07/10/20 1810 --   Tympanic Monitor Lying Left arm       Pain Score       07/10/20 1645       No Pain           Past Medical History:   Diagnosis Date    [facility] mellitus ([facility] [address] )     Hyperlipidemia     Hypertension     Prostate cancer ([facility] [address] ) 2005    S/P prostate ca-2005 had radiation tx       Past Surgical History:   Procedure Laterality Date    COLONOSCOPY         The patient was admitted to the hospital at N/A on N/A for the following diagnosis:  Post-traumatic osteoarthritis, right ankle and foot [M19 171]    Consults have been placed to:   None    Vitals:    07/10/20 2258 07/11/20 0556 07/11/20 0749 07/11/20 1500   BP: 125/67  142/65 153/77   BP Location: Left arm  Left arm Left arm   Pulse: 67  85 80   Resp: 16  18 17   Temp: 98 9 °F (37 2 °C)  99 4 °F (37 4 °C) 99 4 °F (37 4 °C)   TempSrc: Oral  Oral Oral   SpO2: 96%  98% 96%   Weight:  85 4 kg (188 lb 4 8 oz)     Height:           Most recent labs:    Recent Labs     07/11/20  0556   WBC 6 86   HGB 12 4   HCT 35 7*   *   K 3 4*   CALCIUM 8 0*   BUN 19   CREATININE 0 95       Scheduled Meds:  Current Facility-Administered Medications:  amLODIPine 10 mg Oral Daily Corinne Orts, PA-C    docusate sodium 100 mg Oral BID Corinne Orts, PA-C    enoxaparin 40 mg Subcutaneous Daily Corinne Orts, PA-C    HYDROmorphone 0 5 mg Intravenous Q2H PRN Corinne Orts, PA-C    lactated ringers 75 mL/hr Intravenous Continuous Lockie Scarce, CRNA Last Rate: 75 mL/hr (07/11/20 1152)   lisinopril 10 mg Oral Daily Corinne Orts, PA-C    metoprolol succinate 100 mg Oral Daily Corinne Orts, PA-C    ondansetron 4 mg Intravenous Q6H PRN Corinne Orts, PA-C    oxyCODONE 10 mg Oral Q4H PRN Corinne OrZAHRAA walls    oxyCODONE 5 mg Oral Q4H PRN Corinne ZAHRAA Jay terazosin 2 mg Oral HS Bam Adamson PA-C      Continuous Infusions:  lactated ringers 75 mL/hr Last Rate: 75 mL/hr (07/11/20 1152)     PRN Meds:  HYDROmorphone    ondansetron    oxyCODONE    oxyCODONE    Surgical procedures (if appropriate):  Procedure(s):  ARTHRODESIS/ TIBIAL-TALAR ANKLE FUSION

## 2020-07-12 PROCEDURE — 99024 POSTOP FOLLOW-UP VISIT: CPT | Performed by: ORTHOPAEDIC SURGERY

## 2020-07-12 RX ADMIN — DOCUSATE SODIUM 100 MG: 100 CAPSULE, LIQUID FILLED ORAL at 08:43

## 2020-07-12 RX ADMIN — DOCUSATE SODIUM 100 MG: 100 CAPSULE, LIQUID FILLED ORAL at 17:00

## 2020-07-12 RX ADMIN — LISINOPRIL 10 MG: 10 TABLET ORAL at 08:43

## 2020-07-12 RX ADMIN — SODIUM CHLORIDE, SODIUM LACTATE, POTASSIUM CHLORIDE, AND CALCIUM CHLORIDE 75 ML/HR: .6; .31; .03; .02 INJECTION, SOLUTION INTRAVENOUS at 01:21

## 2020-07-12 RX ADMIN — AMLODIPINE BESYLATE 10 MG: 10 TABLET ORAL at 08:44

## 2020-07-12 RX ADMIN — ACETAMINOPHEN 650 MG: 325 TABLET, FILM COATED ORAL at 08:43

## 2020-07-12 RX ADMIN — ENOXAPARIN SODIUM 40 MG: 40 INJECTION SUBCUTANEOUS at 08:43

## 2020-07-12 RX ADMIN — TERAZOSIN HYDROCHLORIDE 2 MG: 1 CAPSULE ORAL at 21:07

## 2020-07-12 RX ADMIN — METOPROLOL SUCCINATE 100 MG: 100 TABLET, EXTENDED RELEASE ORAL at 08:44

## 2020-07-12 NOTE — PROGRESS NOTES
Subjective: No acute events overnight  No acute distress  Resting comfortably in bed    Objective:  A 10 point ROS was performed; negative except as noted above       Lab Results   Component Value Date/Time    WBC 6 86 07/11/2020 05:56 AM    HGB 12 4 07/11/2020 05:56 AM       Vitals:    07/11/20 2315   BP: 149/67   Pulse: 74   Resp: 18   Temp: 99 5 °F (37 5 °C)   SpO2: 96%     Right lower extremity  Dressing C/D/I  Moving toes with sensation intact to exposed part of toes  Toes warm and well perfused with brisk capillary refill    Assessment: 69 y o  male POD 2 R Ankle Fusion    Plan:  NWB RLE in bulky crowley  Pain control  DVT ppx: Enoxaparin (Lovenox)  PT/OT  Will continue to assess for acute blood loss anemia  Dispo: Ortho will follow

## 2020-07-13 PROBLEM — Z87.81 STATUS POST ORIF OF FRACTURE OF ANKLE: Status: ACTIVE | Noted: 2020-07-13

## 2020-07-13 PROBLEM — Z98.890 STATUS POST ORIF OF FRACTURE OF ANKLE: Status: ACTIVE | Noted: 2020-07-13

## 2020-07-13 PROCEDURE — 97116 GAIT TRAINING THERAPY: CPT

## 2020-07-13 PROCEDURE — 97535 SELF CARE MNGMENT TRAINING: CPT

## 2020-07-13 PROCEDURE — NC001 PR NO CHARGE: Performed by: ORTHOPAEDIC SURGERY

## 2020-07-13 RX ORDER — METHOCARBAMOL 750 MG/1
750 TABLET, FILM COATED ORAL EVERY 6 HOURS PRN
Status: DISCONTINUED | OUTPATIENT
Start: 2020-07-13 | End: 2020-07-14 | Stop reason: HOSPADM

## 2020-07-13 RX ORDER — ASPIRIN 81 MG/1
81 TABLET, CHEWABLE ORAL 2 TIMES DAILY
Qty: 60 TABLET | Refills: 0 | Status: SHIPPED | OUTPATIENT
Start: 2020-07-13 | End: 2021-10-21

## 2020-07-13 RX ADMIN — ENOXAPARIN SODIUM 40 MG: 40 INJECTION SUBCUTANEOUS at 08:42

## 2020-07-13 RX ADMIN — AMLODIPINE BESYLATE 10 MG: 10 TABLET ORAL at 08:42

## 2020-07-13 RX ADMIN — DOCUSATE SODIUM 100 MG: 100 CAPSULE, LIQUID FILLED ORAL at 17:11

## 2020-07-13 RX ADMIN — LISINOPRIL 10 MG: 10 TABLET ORAL at 08:42

## 2020-07-13 RX ADMIN — METOPROLOL SUCCINATE 100 MG: 100 TABLET, EXTENDED RELEASE ORAL at 08:42

## 2020-07-13 RX ADMIN — TERAZOSIN HYDROCHLORIDE 2 MG: 1 CAPSULE ORAL at 21:19

## 2020-07-13 RX ADMIN — DOCUSATE SODIUM 100 MG: 100 CAPSULE, LIQUID FILLED ORAL at 08:42

## 2020-07-13 NOTE — SOCIAL WORK
CM met with pt at bedside to introduce CM role and begin discharge planning  Pt resides with his wife, Smith Olivo ([phone]) in a 2 story house that has 0 [address]  Pt reports being independent with ADLs PTA  Pt uses a cane to ambulate and has crutches  Pt drives and is retired  Pt indicates no history of VNA, STR, inpatient MH or D/A treatment  PCP is Dr Nova Ordonez ([phone]) and pt uses [facility] pharmacy in [place] for prescriptions  Pt reports that his wife will provide transportation at time of discharge  CM department to remain available for discharge concerns or needs  CM reviewed d/c planning process including the following: identifying help at home, patient preference for d/c planning needs, Discharge Lounge, [facility] Meds to Bed program, availability of treatment team to discuss questions or concerns patient and/or family may have regarding understanding medications and recognizing signs and symptoms once discharged  CM also encouraged patient to follow up with all recommended appointments after discharge  Patient advised of importance for patient and family to participate in managing patients medical well being

## 2020-07-13 NOTE — PLAN OF CARE
Problem: OCCUPATIONAL THERAPY ADULT  Goal: Performs self-care activities at highest level of function for planned discharge setting  See evaluation for individualized goals  Description  Treatment Interventions: ADL retraining, Functional transfer training, Endurance training, UE strengthening/ROM, Patient/family training, Equipment evaluation/education, Compensatory technique education, Continued evaluation, Energy conservation, Activityengagement          See flowsheet documentation for full assessment, interventions and recommendations  Note:   Limitation: Decreased ADL status, Decreased endurance, Decreased high-level ADLs, Decreased self-care trans  Prognosis: Good  Assessment: Patient participated in Skilled OT session 7/13/2020 with interventions consisting of ADL re training with the use of correct body mechnaics, Energy Conservation techniques, safety awareness and fall prevention techniques, maintaining weight bearing restrictions,  therapeutic activities to: increase activity tolerance, increase standing tolerance time with unilateral UE support to complete sink level ADLs, increase postural control, increase trunk control and increase OOB/ sitting tolerance   Patient agreeable to OT treatment session, upon arrival patient was found supine in bed  In comparison to previous session, patient with improvements in bed mobility, LB dressing, activity tolerance, functional mobility, and transfers  Patient requiring ocassional safety reminders  Patient continues to be functioning below baseline level, occupational performance remains limited secondary to factors listed above and increased risk for falls and injury  From OT standpoint, recommendation at time of d/c would be Home with family support once medically stable     Patient to benefit from continued Occupational Therapy treatment while in the hospital to address deficits as defined above and maximize level of functional independence with ADLs and functional mobility        OT Discharge Recommendation: Return to previous environment with social support(w/ family support)  OT - OK to Discharge: Yes(when medically stable)     Ricki Thompson MS, OTR/L

## 2020-07-13 NOTE — PROGRESS NOTES
Subjective: No acute events overnight  No acute distress  Resting comfortably in bed    Objective:  A 10 point ROS was performed; negative except as noted above       Lab Results   Component Value Date/Time    WBC 6 86 07/11/2020 05:56 AM    HGB 12 4 07/11/2020 05:56 AM       Vitals:    07/12/20 2248   BP: 158/77   Pulse: 65   Resp: 18   Temp: 98 7 °F (37 1 °C)   SpO2: 97%     Right lower extremity  Dressing C/D/I  Motor and sensory intact to distal toes that are exposed out of splint  Toes warm and well perfused with brisk capillary refill    Assessment: 69 y o  male  POD 3 R Ankle Fusion    Plan:  NWB RLE in bulky crowley splint  Pain control  DVT ppx: Lovenox  PT/OT  Will continue to assess for acute blood loss anemia  Dispo: Ortho will follow

## 2020-07-13 NOTE — OCCUPATIONAL THERAPY NOTE
Occupational Therapy Treatment Note      Rich Ferrara    7/13/2020    Principal Problem:    Status post right tibial-talar ankle fusion      Past Medical History:   Diagnosis Date    Diabetes mellitus ([facility] [address] )     Hyperlipidemia     Hypertension     Prostate cancer ([facility] [address] ) 2005    S/P prostate ca-2005 had radiation tx  Past Surgical History:   Procedure Laterality Date    COLONOSCOPY      [place] ARTHRODESIS,ANKLE,OPEN Right 7/10/2020    Procedure: ARTHRODESIS/ TIBIAL-TALAR ANKLE FUSION;  Surgeon: Marleny Cain MD;  Location: [facility];  Service: Orthopedics        07/13/20 1157   Restrictions/Precautions   Weight Bearing Precautions Per Order Yes   RLE Weight Bearing Per Order NWB   Other Precautions Pain; Fall Risk   Lifestyle   Autonomy Pt reports being I with ADLS, IADLS and mobility without device PTA  Reciprocal Relationships Pt lives with his wife and son who both work, leaving pt alone throughout the day  Service to Others Retired   Intrinsic Gratification Active PTA   Pain Assessment   Pain Assessment Tool Pain Assessment not indicated - pt denies pain   Pain Score No Pain   ADL   LB Dressing Assistance 4  Minimal Assistance   LB Dressing Deficit Setup;Steadying;Supervision/safety; Increased time to complete; Thread RLE into pants; Thread LLE into pants;Pull up over hips   LB Dressing Comments Pt completed LB dressing while seated EOB w/ setup  Able to thread over B/L LE and pull up over waist w/ CTG in standing   Bed Mobility   Supine to Sit 5  Supervision   Additional items HOB elevated; Increased time required;Verbal cues   Sit to Supine Unable to assess   Additional Comments Pt went from supine to sit w/ HOB elevated, sat EOB w/ Fair sitting balance/trunk control    Transfers   Sit to Stand 5  Supervision   Additional items Assist x 1; Increased time required;Verbal cues   Stand to Sit 5  Supervision   Additional items Increased time required;Verbal cues   Additional Comments Pt performed STS transfer from EOB w/ S, use of RW for support  Able to carryover WBS   Functional Mobility   Functional Mobility 4  Minimal assistance   Additional Comments Pt ambulated short household distance w/ CTG, RW for support  Able to maintain WBS during functional mobility   Additional items Rolling walker   Cognition   Overall Cognitive Status WFL   Arousal/Participation Responsive; Cooperative   Attention Within functional limits   Orientation Level Oriented X4   Memory Within functional limits   Following Commands Follows all commands and directions without difficulty   Comments Pt is pleasant and cooperative; able to recall and carryover WBS   Activity Tolerance   Activity Tolerance Patient tolerated treatment well   Medical Staff Made Aware PT, RN   Assessment   Assessment Patient participated in Skilled OT session 7/13/2020 with interventions consisting of ADL re training with the use of correct body mechnaics, Energy Conservation techniques, safety awareness and fall prevention techniques, maintaining weight bearing restrictions,  therapeutic activities to: increase activity tolerance, increase standing tolerance time with unilateral UE support to complete sink level ADLs, increase postural control, increase trunk control and increase OOB/ sitting tolerance   Patient agreeable to OT treatment session, upon arrival patient was found supine in bed  In comparison to previous session, patient with improvements in bed mobility, LB dressing, activity tolerance, functional mobility, and transfers  Patient requiring ocassional safety reminders  Patient continues to be functioning below baseline level, occupational performance remains limited secondary to factors listed above and increased risk for falls and injury  From OT standpoint, recommendation at time of d/c would be Home with family support once medically stable     Patient to benefit from continued Occupational Therapy treatment while in the hospital to address deficits as defined above and maximize level of functional independence with ADLs and functional mobility  Plan   Treatment Interventions ADL retraining;Functional transfer training; Endurance training;Patient/family training;Equipment evaluation/education; Compensatory technique education;Continued evaluation; Energy conservation; Activityengagement   Goal Expiration Date 07/21/20   OT Treatment Day 1   OT Frequency 3-5x/wk   Recommendation   OT Discharge Recommendation Return to previous environment with social support  (w/ family support)   Equipment Recommended Bedside commode   OT - OK to Discharge Yes  (when medically stable)   Barthel Index   Feeding 10   Bathing 0   Grooming Score 5   Dressing Score 5   Bladder Score 10   Bowels Score 10   Toilet Use Score 5   Transfers (Bed/Chair) Score 10   Mobility (Level Surface) Score 10   Stairs Score 5   Barthel Index Score 70       Shaylee Kaur MS, OTR/L

## 2020-07-13 NOTE — SOCIAL WORK
CM was informed that pt is recommended for a rolling walker and bedside commode  CM met with pt who is agreeable to a referral to [facility]  RW script in pt's chart, CM asked physician to put in a script for a bedside commode  1500    BS and RW delivered to pt's room  No additional CM needs expressed or identified at this time

## 2020-07-13 NOTE — PHYSICAL THERAPY NOTE
PHYSICAL THERAPY NOTE          Patient Name: Paige Montague  PXSAS'W Date: 7/13/2020 07/13/20 1158   Pain Assessment   Pain Assessment Tool Pain Assessment not indicated - pt denies pain   Pain Score No Pain   Hospital Pain Intervention(s) Repositioned   Restrictions/Precautions   Weight Bearing Precautions Per Order Yes   RLE Weight Bearing Per Order NWB   Braces or Orthoses Splint   Other Precautions Pain; Fall Risk;Multiple lines   General   Chart Reviewed Yes   Family/Caregiver Present No   Cognition   Orientation Level Oriented X4   Subjective   Subjective Pt willing and agreeable to PT session   Bed Mobility   Supine to Sit 5  Supervision   Additional items HOB elevated   Sit to Supine Unable to assess   Additional Comments Pt left resting in chair, call bell in reaching   Transfers   Sit to Stand 5  Supervision   Additional items Assist x 1; Increased time required   Stand to Sit 5  Supervision   Additional items Assist x 1; Increased time required   Ambulation/Elevation   Gait pattern Excessively slow; Shuffling;Decreased foot clearance  (hopping)   Gait Assistance 5  Supervision   Assistive Device Rolling walker   Distance 15+130  (hopping)   Stair Management Assistance 4  Minimal assist   Additional items Assist x 1; Increased time required;Verbal cues   Stair Management Technique Bumping;Backward   Number of Stairs 3   Balance   Static Sitting Good   Dynamic Sitting Fair +   Static Standing Fair   Dynamic Standing Poor +   Ambulatory Poor +   Endurance Deficit   Endurance Deficit Yes   Endurance Deficit Description weakness, fatigue   Activity Tolerance   Activity Tolerance Patient limited by fatigue;Patient limited by pain   Medical Staff Made Aware OT   Nurse Made Aware yes, nsg gave clearance to work with pt   Exercises   Balance training  dynamic balance during ADls and hopping   Assessment   Prognosis Good   Problem List Decreased strength;Decreased endurance; Impaired balance;Decreased mobility; Decreased coordination;Decreased safety awareness;Pain;Orthopedic restrictions   Assessment Pt demonstrated improved tolerance for hopping this session  Continues to required close S for all mobility with deficits in balance  Educated pt and spouse on stair management technique  Pt offered no additional questions or concerns related to D/C  Barriers to Discharge Inaccessible home environment;Decreased caregiver support   Goals   Patient Goals To go home   STG Expiration Date 07/25/20   PT Treatment Day 1   Plan   Treatment/Interventions Spoke to case management;Spoke to nursing;Gait training;Bed mobility; Patient/family training; Endurance training   PT Frequency Other (Comment)  (3-5x/wk)   Recommendation   PT Discharge Recommendation Home with skilled therapy  (HHPT progress to OP PT)   Equipment Recommended Trixie Xavier   PT - OK to Discharge Yes     Kodi Akhtar PT

## 2020-07-13 NOTE — PLAN OF CARE
Problem: PHYSICAL THERAPY ADULT  Goal: Performs mobility at highest level of function for planned discharge setting  See evaluation for individualized goals  Description  Treatment/Interventions: Functional transfer training, LE strengthening/ROM, Elevations, Therapeutic exercise, Endurance training, Patient/family training, Equipment eval/education, Bed mobility, Gait training, Compensatory technique education, Spoke to nursing, OT, Family  Equipment Recommended: Walker(RW)       See flowsheet documentation for full assessment, interventions and recommendations  Outcome: Progressing  Note:   Prognosis: Good  Problem List: Decreased strength, Decreased endurance, Impaired balance, Decreased mobility, Decreased coordination, Decreased safety awareness, Pain, Orthopedic restrictions  Assessment: Pt demonstrated improved tolerance for hopping this session  Continues to required close S for all mobility with deficits in balance  Educated pt and spouse on stair management technique  Pt offered no additional questions or concerns related to D/C  Barriers to Discharge: Inaccessible home environment, Decreased caregiver support     PT Discharge Recommendation: Home with skilled therapy(HHPT progress to OP PT)     PT - OK to Discharge: Yes    See flowsheet documentation for full assessment

## 2020-07-14 VITALS
SYSTOLIC BLOOD PRESSURE: 148 MMHG | TEMPERATURE: 98.7 F | RESPIRATION RATE: 18 BRPM | OXYGEN SATURATION: 96 % | DIASTOLIC BLOOD PRESSURE: 70 MMHG | WEIGHT: 178.79 LBS | HEIGHT: 69 IN | HEART RATE: 74 BPM | BODY MASS INDEX: 26.48 KG/M2

## 2020-07-14 PROCEDURE — 99024 POSTOP FOLLOW-UP VISIT: CPT | Performed by: ORTHOPAEDIC SURGERY

## 2020-07-14 PROCEDURE — NC001 PR NO CHARGE: Performed by: ORTHOPAEDIC SURGERY

## 2020-07-14 RX ORDER — METHOCARBAMOL 500 MG/1
500 TABLET, FILM COATED ORAL 4 TIMES DAILY PRN
Qty: 40 TABLET | Refills: 0 | Status: SHIPPED | OUTPATIENT
Start: 2020-07-14 | End: 2022-04-21

## 2020-07-14 RX ADMIN — METOPROLOL SUCCINATE 100 MG: 100 TABLET, EXTENDED RELEASE ORAL at 09:40

## 2020-07-14 RX ADMIN — ENOXAPARIN SODIUM 40 MG: 40 INJECTION SUBCUTANEOUS at 09:40

## 2020-07-14 RX ADMIN — LISINOPRIL 10 MG: 10 TABLET ORAL at 09:40

## 2020-07-14 RX ADMIN — AMLODIPINE BESYLATE 10 MG: 10 TABLET ORAL at 09:39

## 2020-07-14 RX ADMIN — DOCUSATE SODIUM 100 MG: 100 CAPSULE, LIQUID FILLED ORAL at 09:40

## 2020-07-14 NOTE — PROGRESS NOTES
Subjective: No acute events overnight  No acute distress  Resting comfortably in bed    Objective:  A 10 point ROS was performed; negative except as noted above       Lab Results   Component Value Date/Time    WBC 6 86 07/11/2020 05:56 AM    HGB 12 4 07/11/2020 05:56 AM       Vitals:    07/14/20 0100   BP: 139/75   Pulse: 66   Resp: 18   Temp: 98 6 °F (37 °C)   SpO2: 92%     Right lower extremity  Dressing C/D/I  Motor and sensory intact distally in exposed toes  Toes warm and well perfused with brisk capillary refill    Assessment: 69 y o  male POD 4 R Ankle Fusion    Plan:  NWB RLE in bulky crowley splint  Pain control  DVT ppx: Enoxaparin (Lovenox)  PT/OT  Will continue to assess for acute blood loss anemia  Dispo: Ortho will follow

## 2020-07-14 NOTE — NURSING NOTE
Joanne Albrecht, ortho resident, made aware of and visualized drainage to bottom of pt's dressing on right foot  Was instructed by Joanne Albrecht to hold discharge until he calls me back  Pt and wife informed

## 2020-07-14 NOTE — DISCHARGE SUMMARY
Discharge Summary - Orthopedics   Maria G Pratt 69 y o  male MRN: [de-identified]  Unit/Bed#: -01    Attending Physician: Kayce Ortega    Admitting diagnosis: Post-traumatic osteoarthritis, right ankle and foot [M19 171]    Discharge diagnosis: Status post right tibial-talar ankle fusion    Date of admission: 7/10/2020    Date of discharge: 07/14/20    Procedure: ARTHRODESIS/ TIBIAL-TALAR ANKLE FUSION (Right)     HPI: 69 y o  male with a history of right ankle fracture and fibular fracture who has been seen by Dr Kayce Ortega in clinic  Pt has developed posttraumatic osteoarthritis of his right tibiotalar joint as well as auto fusion of his right subtalar joint and synostosis of right tib-fib joint  and was scheduled for right ankle arthrodesis  Prior to surgery the risks and benefits of surgery were explained and informed consent was obtained  Hospital course: Pt was taken to the OR on 7/10  Surgery went without complications and pt was discharged to the PACU in a stable condition and was transferred to the floor  On discharge date pt was cleared by PT and the medicine team and determined to be safe for discharge  Daily discussion was had with the patient, nursing staff, orthopaedic team, and family members if present  All questions were answered to the patients satisifaction  0   Lab Value Date/Time    HGB 12 4 07/11/2020 0556    HGB 13 8 06/17/2020 1451    HGB 14 4 06/11/2016 0821       Vital signs remained stable and pt was resuscitated with IVF as needed  Discharge Instructions:   · NWB right lower extremity   · Keep dressings clean and dry at all times   · Complete DVT prophylaxis with ASA 21mg BID for 30 days postop   · Physical therapy  · Body mass index is 26 4 kg/m²  · Follow-up as scheduled, otherwise call for appt  Discharge Medications: For the complete list of discharge medications, please refer to the patient's medication reconciliation

## 2020-07-14 NOTE — NURSING NOTE
AVS reviewed with pt and wife  Pt denied having any questions about DC home  Pt will go to [facility] on his way out to  prescriptions  Pt left unit via wheelchair- all belongings taken with pt

## 2020-07-17 ENCOUNTER — TRANSITIONAL CARE MANAGEMENT (OUTPATIENT)
Dept: FAMILY MEDICINE CLINIC | Facility: CLINIC | Age: 75
End: 2020-07-17

## 2020-07-22 ENCOUNTER — APPOINTMENT (OUTPATIENT)
Dept: RADIOLOGY | Facility: MEDICAL CENTER | Age: 75
End: 2020-07-22
Payer: MEDICARE

## 2020-07-22 ENCOUNTER — OFFICE VISIT (OUTPATIENT)
Dept: OBGYN CLINIC | Facility: MEDICAL CENTER | Age: 75
End: 2020-07-22

## 2020-07-22 VITALS — DIASTOLIC BLOOD PRESSURE: 72 MMHG | HEART RATE: 79 BPM | SYSTOLIC BLOOD PRESSURE: 126 MMHG

## 2020-07-22 DIAGNOSIS — Z98.890 STATUS POST SURGERY: ICD-10-CM

## 2020-07-22 DIAGNOSIS — Z98.890 STATUS POST SURGERY: Primary | ICD-10-CM

## 2020-07-22 PROCEDURE — 3074F SYST BP LT 130 MM HG: CPT | Performed by: ORTHOPAEDIC SURGERY

## 2020-07-22 PROCEDURE — 1111F DSCHRG MED/CURRENT MED MERGE: CPT | Performed by: ORTHOPAEDIC SURGERY

## 2020-07-22 PROCEDURE — 73600 X-RAY EXAM OF ANKLE: CPT

## 2020-07-22 PROCEDURE — 99024 POSTOP FOLLOW-UP VISIT: CPT | Performed by: ORTHOPAEDIC SURGERY

## 2020-07-22 PROCEDURE — 4040F PNEUMOC VAC/ADMIN/RCVD: CPT | Performed by: ORTHOPAEDIC SURGERY

## 2020-07-22 PROCEDURE — 1160F RVW MEDS BY RX/DR IN RCRD: CPT | Performed by: ORTHOPAEDIC SURGERY

## 2020-07-22 PROCEDURE — 3078F DIAST BP <80 MM HG: CPT | Performed by: ORTHOPAEDIC SURGERY

## 2020-07-22 NOTE — PROGRESS NOTES
69 y o male presents for 2 weeks postoperative visit status post surgical fixation of right ankle fracture fixation  Patient states he is doing well and has minimal pain he denies any calf pain chest pain shortness of breath numbness tingling  Review of Systems  Review of systems negative unless otherwise specified in HPI    Past Medical History  Past Medical History:   Diagnosis Date    Diabetes mellitus ([facility] )     Hyperlipidemia     Hypertension     Prostate cancer ([facility] [address] [facility] ) 2005    S/P prostate ca-2005 had radiation tx         Past Surgical History  Past Surgical History:   Procedure Laterality Date    COLONOSCOPY      [facility] ARTHRODESIS,ANKLE,OPEN Right 7/10/2020    Procedure: ARTHRODESIS/ TIBIAL-TALAR ANKLE FUSION;  Surgeon: Barbara Nuñez MD;  [facility];  Service: Orthopedics       Current Medications  Current Outpatient Medications on File Prior to Visit   Medication Sig Dispense Refill    ACCU-CHEK FASTCLIX LANCETS MISC by Does not apply route daily E11 9  Check  fbs  daily 100 each 3    acetaminophen (TYLENOL) 650 mg CR tablet Take 1 tablet (650 mg total) by mouth every 8 (eight) hours as needed for mild pain 30 tablet 0    amLODIPine (NORVASC) 10 mg tablet Take 1 tablet (10 mg total) by mouth daily For blood pressure 90 tablet 1    aspirin 81 mg chewable tablet Chew 1 tablet (81 mg total) 2 (two) times a day Take for DVT prophylaxis for 30 days postoperatively 60 tablet 0    docusate sodium (COLACE) 100 mg capsule Take 1 capsule (100 mg total) by mouth 2 (two) times a day 10 capsule 0    lisinopril (ZESTRIL) 10 mg tablet Take 1 tablet (10 mg total) by mouth daily 90 tablet 1    metFORMIN (GLUCOPHAGE-XR) 500 mg 24 hr tablet Take 1 tablet (500 mg total) by mouth daily 90 tablet 1    methocarbamol (ROBAXIN) 500 mg tablet Take 1 tablet (500 mg total) by mouth 4 (four) times a day as needed for muscle spasms for up to 10 days 40 tablet 0    metoprolol succinate (TOPROL-XL) 100 mg 24 hr tablet Take 1 tablet (100 mg total) by mouth daily 90 tablet 1    oxyCODONE (ROXICODONE) 5 mg immediate release tablet Take 1 tablets every 6 hrs as needed for pain control 30 tablet 0    simvastatin (ZOCOR) 10 mg tablet Take 1 tablet (10 mg total) by mouth daily 90 tablet 1    terazosin (HYTRIN) 2 mg capsule TAKE 1 CAPSULE (2 MG TOTAL) BY MOUTH DAILY AT BEDTIME 90 capsule 0     No current facility-administered medications on file prior to visit  Recent Labs [facility]  0   Lab Value Date/Time    HCT 35 7 (L) 07/11/2020 0556    HGB 12 4 07/11/2020 0556    WBC 6 86 07/11/2020 0556    INR 1 07 06/17/2020 1451    HGBA1C 5 4 06/12/2020 0847    HGBA1C 5 3 11/22/2019 0858         Physical exam  · General: Awake, Alert, Oriented  · Eyes: Pupils equal, round and reactive to light  · Heart: regular rate and rhythm  · Lungs: No audible wheezing  ·   right Ankle  · Examination patient's right ankle well healing anterior lateral anterior medial and healed incisions no erythema minimal swelling sensation intact superficial deep peroneal nerve sensation distal pulses present    Imaging  X-rays reviewed personally by myself in the office today x-rays reveal no evidence of hardware failure well aligned tibiotalar joint fusion    Assessment:  Status post right ankle tibiotalar fusion doing well  Plan:   Nonweightbearing right lower extremity  Advised patient to keep incision clean dry and intact  No soaking of incision  Patient may utilize regular shoes at this time  Follow-up in 8 weeks time repeat x-rays two views right ankle

## 2020-08-23 DIAGNOSIS — R35.1 BPH ASSOCIATED WITH NOCTURIA: ICD-10-CM

## 2020-08-23 DIAGNOSIS — N40.1 BPH ASSOCIATED WITH NOCTURIA: ICD-10-CM

## 2020-08-24 RX ORDER — TERAZOSIN 2 MG/1
2 CAPSULE ORAL
Qty: 90 CAPSULE | Refills: 0 | Status: SHIPPED | OUTPATIENT
Start: 2020-08-24 | End: 2020-10-16

## 2020-09-11 PROBLEM — S93.401A SPRAIN AND STRAIN OF RIGHT ANKLE: Status: RESOLVED | Noted: 2019-10-08 | Resolved: 2020-09-11

## 2020-09-11 PROBLEM — S96.911A SPRAIN AND STRAIN OF RIGHT ANKLE: Status: RESOLVED | Noted: 2019-10-08 | Resolved: 2020-09-11

## 2020-09-14 ENCOUNTER — OFFICE VISIT (OUTPATIENT)
Dept: FAMILY MEDICINE CLINIC | Facility: CLINIC | Age: 75
End: 2020-09-14
Payer: MEDICARE

## 2020-09-14 VITALS
TEMPERATURE: 97.8 F | WEIGHT: 172 LBS | DIASTOLIC BLOOD PRESSURE: 58 MMHG | HEART RATE: 78 BPM | BODY MASS INDEX: 25.48 KG/M2 | RESPIRATION RATE: 16 BRPM | SYSTOLIC BLOOD PRESSURE: 94 MMHG | OXYGEN SATURATION: 98 % | HEIGHT: 69 IN

## 2020-09-14 DIAGNOSIS — E78.00 HYPERCHOLESTEROLEMIA: ICD-10-CM

## 2020-09-14 DIAGNOSIS — M19.171 POST-TRAUMATIC OSTEOARTHRITIS, RIGHT ANKLE AND FOOT: ICD-10-CM

## 2020-09-14 DIAGNOSIS — N40.1 BPH ASSOCIATED WITH NOCTURIA: ICD-10-CM

## 2020-09-14 DIAGNOSIS — Z23 ENCOUNTER FOR IMMUNIZATION: ICD-10-CM

## 2020-09-14 DIAGNOSIS — E11.9 CONTROLLED TYPE 2 DIABETES MELLITUS WITHOUT COMPLICATION, WITHOUT LONG-TERM CURRENT USE OF INSULIN (HCC): Primary | ICD-10-CM

## 2020-09-14 DIAGNOSIS — I10 BENIGN ESSENTIAL HTN: ICD-10-CM

## 2020-09-14 DIAGNOSIS — E21.3 HYPERPARATHYROIDISM (HCC): ICD-10-CM

## 2020-09-14 DIAGNOSIS — R35.1 BPH ASSOCIATED WITH NOCTURIA: ICD-10-CM

## 2020-09-14 PROCEDURE — 90662 IIV NO PRSV INCREASED AG IM: CPT

## 2020-09-14 PROCEDURE — 99214 OFFICE O/P EST MOD 30 MIN: CPT | Performed by: PHYSICIAN ASSISTANT

## 2020-09-14 PROCEDURE — G0008 ADMIN INFLUENZA VIRUS VAC: HCPCS

## 2020-09-14 NOTE — PROGRESS NOTES
Diabetic Foot Exam    Patient's shoes and socks removed  Right Foot/Ankle   Right Foot Inspection  Skin Exam: dry skin, callus and callus                            Sensory   Vibration: intact    Monofilament testing: intact  Vascular    The right DP pulse is 0  Left Foot/Ankle  Left Foot Inspection  Skin Exam: dry skin and callus                         Toe Exam: left toe deformity                   Sensory   Vibration: intact    Monofilament: intact  Vascular    The left DP pulse is 2+  Assessment/Plan:     Diagnoses and all orders for this visit:    Controlled type 2 diabetes mellitus without complication, without long-term current use of insulin ([facility])  Comments:  Diabetes is at goal continue low carb low sugar diet metformin  once a day  Check blood sugar daily  Orders:  -     POCT hemoglobin A1c; Future  -     Comprehensive metabolic panel; Future  -     Lipid panel; Future  -     Hemoglobin A1C; Future    Benign essential HTN  Comments:  Blood pressure is at goal continue current regimen  Orders:  -     Comprehensive metabolic panel; Future    Hyperparathyroidism [facility])  Comments:  Patient is asymptomatic will continue to monitor    Hypercholesterolemia  Comments:  Continue statin therapy and low-fat diet  Orders:  -     Lipid panel; Future    Post-traumatic osteoarthritis, right ankle and foot  Comments:  Patient will follow-up with Orthopedic as directed  Orders:  -     Ambulatory referral to Internal Medicine    BPH associated with nocturia  Comments:  Patient will follow-up with Urology  We will continue Hytrin 2 mg          Subjective:      Patient ID: Rain James is a 77 y o  male  Patient presents in the office for follow up chronic conditions  Patient has non-insulin diabetes mellitus type 2  He is currently on metformin  mg daily  He is compliant with low carb low sugar diet  Patient checks his blood sugar regularly    He has hypertension for which his blood pressure is controlled with amlodipine 10 mg, lisinopril 10 mg and metoprolol  mg  Patient also has hyperparathyroidism which is asymptomatic  His lipids are controlled with simvastatin 10 mg and low-fat diet  Patient has BPH he is on Hytrin 2 mg  Still has nocturia 3-4 times a night  Also has a history of prostate cancer  He needs to find a new urologist   Flu vaccine given today  Patient is status post right ankle fusion  He is under care of orthopedic  The incision is well healed  Still some swelling  Patient is nonweightbearing at this time  Flu vaccine given today    Hemoglobin A1c in the office is 5 5      The following portions of the patient's history were reviewed and updated as appropriate:   He   Patient Active Problem List    Diagnosis Date Noted    Status post right tibial-talar ankle fusion 07/13/2020    BPH associated with nocturia 08/21/2019    Arthritis of right acromioclavicular joint 03/15/2019    Primary osteoarthritis of right shoulder 03/07/2019    Chronic left shoulder pain 03/07/2019    Left rotator cuff tear arthropathy 03/07/2019    Rotator cuff injury, right, initial encounter 03/07/2019    History of colon polyps 01/09/2019    Hyperparathyroidism ([facility] [address] ) 11/06/2018    Hypokalemia 10/22/2018    Hypocalcemia 10/22/2018    Glaucoma 10/11/2018    Adenocarcinoma of prostate ([address] ) 01/03/2018    Controlled type 2 diabetes mellitus without complication, without long-term current use of insulin ([facility] [address] ) 07/25/2016    Inguinal hernia 02/08/2016    Benign essential HTN 01/12/2016    Hypercholesterolemia 01/12/2016     Current Outpatient Medications   Medication Sig Dispense Refill    ACCU-CHEK FASTCLIX LANCETS MISC by Does not apply route daily E11 9  Check  fbs  daily 100 each 3    amLODIPine (NORVASC) 10 mg tablet Take 1 tablet (10 mg total) by mouth daily For blood pressure 90 tablet 1    docusate sodium (COLACE) 100 mg capsule Take 1 capsule (100 mg total) by mouth 2 (two) times a day 10 capsule 0    lisinopril (ZESTRIL) 10 mg tablet Take 1 tablet (10 mg total) by mouth daily 90 tablet 1    metFORMIN (GLUCOPHAGE-XR) 500 mg 24 hr tablet Take 1 tablet (500 mg total) by mouth daily 90 tablet 1    metoprolol succinate (TOPROL-XL) 100 mg 24 hr tablet Take 1 tablet (100 mg total) by mouth daily 90 tablet 1    simvastatin (ZOCOR) 10 mg tablet Take 1 tablet (10 mg total) by mouth daily 90 tablet 1    terazosin (HYTRIN) 2 mg capsule TAKE 1 CAPSULE (2 MG TOTAL) BY MOUTH DAILY AT BEDTIME 90 capsule 0    aspirin 81 mg chewable tablet Chew 1 tablet (81 mg total) 2 (two) times a day Take for DVT prophylaxis for 30 days postoperatively 60 tablet 0    methocarbamol (ROBAXIN) 500 mg tablet Take 1 tablet (500 mg total) by mouth 4 (four) times a day as needed for muscle spasms for up to 10 days 40 tablet 0     No current facility-administered medications for this visit        Current Outpatient Medications on File Prior to Visit   Medication Sig    ACCU-CHEK FASTCLIX LANCETS MISC by Does not apply route daily E11 9  Check  fbs  daily    amLODIPine (NORVASC) 10 mg tablet Take 1 tablet (10 mg total) by mouth daily For blood pressure    docusate sodium (COLACE) 100 mg capsule Take 1 capsule (100 mg total) by mouth 2 (two) times a day    lisinopril (ZESTRIL) 10 mg tablet Take 1 tablet (10 mg total) by mouth daily    metFORMIN (GLUCOPHAGE-XR) 500 mg 24 hr tablet Take 1 tablet (500 mg total) by mouth daily    metoprolol succinate (TOPROL-XL) 100 mg 24 hr tablet Take 1 tablet (100 mg total) by mouth daily    simvastatin (ZOCOR) 10 mg tablet Take 1 tablet (10 mg total) by mouth daily    terazosin (HYTRIN) 2 mg capsule TAKE 1 CAPSULE (2 MG TOTAL) BY MOUTH DAILY AT BEDTIME    [DISCONTINUED] oxyCODONE (ROXICODONE) 5 mg immediate release tablet Take 1 tablets every 6 hrs as needed for pain control    aspirin 81 mg chewable tablet Chew 1 tablet (81 mg total) 2 (two) times a day Take for DVT prophylaxis for 30 days postoperatively    methocarbamol (ROBAXIN) 500 mg tablet Take 1 tablet (500 mg total) by mouth 4 (four) times a day as needed for muscle spasms for up to 10 days     No current facility-administered medications on file prior to visit  He has No Known Allergies       Review of Systems   Constitutional: Negative for activity change, appetite change, chills, fatigue and fever  HENT: Negative for ear pain and sore throat  Eyes: Negative for visual disturbance  Respiratory: Negative for cough and shortness of breath  Cardiovascular: Negative for chest pain, palpitations and leg swelling  Gastrointestinal: Negative for abdominal pain, blood in stool, constipation, diarrhea and nausea  Genitourinary: Negative for difficulty urinating  Nocturia   Musculoskeletal: Negative for arthralgias, back pain and myalgias  Rightt  Ankle   fusion   Skin: Negative for rash  Neurological: Negative for dizziness, syncope and headaches  Psychiatric/Behavioral: Negative for sleep disturbance  Objective:        Physical Exam  Vitals signs and nursing note reviewed  Constitutional:       General: He is not in acute distress  Appearance: Normal appearance  He is well-developed  He is not ill-appearing  HENT:      Head: Normocephalic and atraumatic  Right Ear: Tympanic membrane, ear canal and external ear normal       Left Ear: Tympanic membrane, ear canal and external ear normal    Eyes:      Conjunctiva/sclera: Conjunctivae normal       Pupils: Pupils are equal, round, and reactive to light  Neck:      Thyroid: No thyromegaly  Vascular: No carotid bruit  Cardiovascular:      Rate and Rhythm: Normal rate and regular rhythm  Pulses:           Dorsalis pedis pulses are 0 on the right side and 2+ on the left side  Heart sounds: Normal heart sounds  No murmur     Pulmonary:      Effort: Pulmonary effort is normal       Breath sounds: Normal breath sounds  No wheezing  Abdominal:      General: Bowel sounds are normal       Palpations: Abdomen is soft  There is no mass  Tenderness: There is no abdominal tenderness  Musculoskeletal:         General: Deformity present  Right lower leg: No edema  Left lower leg: No edema  Comments: Surgical scar right ankle  This is well healed  He does have some edema on the top of his foot  Patient is status post right ankle fusion   Feet:      Right foot:      Skin integrity: Callus and dry skin present  Left foot:      Skin integrity: Callus and dry skin present  Lymphadenopathy:      Cervical: No cervical adenopathy  Skin:     General: Skin is warm and dry  Neurological:      General: No focal deficit present  Mental Status: He is alert and oriented to person, place, and time  Psychiatric:         Behavior: Behavior normal          Thought Content:  Thought content normal          Judgment: Judgment normal

## 2020-09-21 ENCOUNTER — TELEPHONE (OUTPATIENT)
Dept: OBGYN CLINIC | Facility: HOSPITAL | Age: 75
End: 2020-09-21

## 2020-09-21 NOTE — TELEPHONE ENCOUNTER
Patient sees Dr Amalia Ferrera  He sees him on 09/23 at 2:30  He called stating that he may arrive a little late to his appointment  I advised if he was going to be any more than 15 minutes late he would have to reschedule  He states that he will call the day of the appointment if he is going to be late

## 2020-09-23 ENCOUNTER — OFFICE VISIT (OUTPATIENT)
Dept: OBGYN CLINIC | Facility: MEDICAL CENTER | Age: 75
End: 2020-09-23

## 2020-09-23 ENCOUNTER — APPOINTMENT (OUTPATIENT)
Dept: RADIOLOGY | Facility: MEDICAL CENTER | Age: 75
End: 2020-09-23
Payer: MEDICARE

## 2020-09-23 VITALS — SYSTOLIC BLOOD PRESSURE: 122 MMHG | TEMPERATURE: 97.4 F | HEART RATE: 90 BPM | DIASTOLIC BLOOD PRESSURE: 75 MMHG

## 2020-09-23 DIAGNOSIS — M25.571 PAIN, JOINT, ANKLE AND FOOT, RIGHT: ICD-10-CM

## 2020-09-23 DIAGNOSIS — M25.571 PAIN, JOINT, ANKLE AND FOOT, RIGHT: Primary | ICD-10-CM

## 2020-09-23 PROCEDURE — 73600 X-RAY EXAM OF ANKLE: CPT

## 2020-09-23 PROCEDURE — 99024 POSTOP FOLLOW-UP VISIT: CPT | Performed by: ORTHOPAEDIC SURGERY

## 2020-09-23 NOTE — PROGRESS NOTES
77 y o male presents for Ten and a half weeks postoperative visit status post surgical fixation of right ankle tibiotalar fusion  Patient states he has been able to bear weight over the past 3-4 days in his right ankle with minimal pain  Patient does complain of swelling which is relieved with elevation and compression sock an Ace bandage use  Patient ambulates with assistance of a walker  He has been unable to utilize a Cam walker boot due to the position of his ankle and previously auto fused subtalar joint  Denies any changes in numbness tingling right lower extremity    Review of Systems  Review of systems negative unless otherwise specified in HPI    Past Medical History  Past Medical History:   Diagnosis Date    Diabetes mellitus ([facility] [address] )     Hyperlipidemia     Hypertension     Prostate cancer ([facility] [address] ) 2005    S/P prostate ca-2005 had radiation tx         Past Surgical History  Past Surgical History:   Procedure Laterality Date    COLONOSCOPY      [place] ARTHRODESIS,ANKLE,OPEN Right 7/10/2020    Procedure: ARTHRODESIS/ TIBIAL-TALAR ANKLE FUSION;  Surgeon: Severino Jules MD;  Location: [facility];  Service: Orthopedics       Current Medications  Current Outpatient Medications on File Prior to Visit   Medication Sig Dispense Refill    ACCU-CHEK FASTCLIX LANCETS MISC by Does not apply route daily E11 9  Check  fbs  daily 100 each 3    amLODIPine (NORVASC) 10 mg tablet Take 1 tablet (10 mg total) by mouth daily For blood pressure 90 tablet 1    docusate sodium (COLACE) 100 mg capsule Take 1 capsule (100 mg total) by mouth 2 (two) times a day 10 capsule 0    lisinopril (ZESTRIL) 10 mg tablet Take 1 tablet (10 mg total) by mouth daily 90 tablet 1    metFORMIN (GLUCOPHAGE-XR) 500 mg 24 hr tablet Take 1 tablet (500 mg total) by mouth daily 90 tablet 1    metoprolol succinate (TOPROL-XL) 100 mg 24 hr tablet Take 1 tablet (100 mg total) by mouth daily 90 tablet 1    simvastatin (ZOCOR) 10 mg tablet Take 1 tablet (10 mg total) by mouth daily 90 tablet 1    terazosin (HYTRIN) 2 mg capsule TAKE 1 CAPSULE (2 MG TOTAL) BY MOUTH DAILY AT BEDTIME 90 capsule 0    aspirin 81 mg chewable tablet Chew 1 tablet (81 mg total) 2 (two) times a day Take for DVT prophylaxis for 30 days postoperatively 60 tablet 0    methocarbamol (ROBAXIN) 500 mg tablet Take 1 tablet (500 mg total) by mouth 4 (four) times a day as needed for muscle spasms for up to 10 days 40 tablet 0     No current facility-administered medications on file prior to visit          Recent Labs [facility])  0   Lab Value Date/Time    HCT 35 7 (L) 07/11/2020 0556    HGB 12 4 07/11/2020 0556    WBC 6 86 07/11/2020 0556    INR 1 07 06/17/2020 1451    HGBA1C 5 4 06/12/2020 0847    HGBA1C 5 3 11/22/2019 0858         Physical exam  · General: Awake, Alert, Oriented  · Eyes: Pupils equal, round and reactive to light  · Heart: regular rate and rhythm  · Lungs: No audible wheezing    right Ankle  · Examination patient's right lower extremity well-healed lateral incision no erythema no motion to right ankle with dorsi plantar flexion or inversion eversion motion sensation intact distal pulses present no pain palpation medial lateral malleoli    Imaging  X-ray images reviewed personally by me office by myself x-rays reveal well fused tibiotalar joint no evidence of hardware failure in excellent alignment    Assessment:  Status post 10 half weeks right tibiotalar ankle fusion doing well  Plan:  Case discussed with Dr Louis Anna as tolerated lower extremity  Home range of motion stretching strengthening exercise  Compression stockings ordered  Follow-up in 2 months time or sooner if needed repeat x-rays right ankle

## 2020-09-30 DIAGNOSIS — E78.2 MIXED HYPERLIPIDEMIA: ICD-10-CM

## 2020-09-30 RX ORDER — SIMVASTATIN 10 MG
TABLET ORAL
Qty: 90 TABLET | Refills: 1 | Status: SHIPPED | OUTPATIENT
Start: 2020-09-30 | End: 2021-04-02

## 2020-10-16 DIAGNOSIS — R35.1 BPH ASSOCIATED WITH NOCTURIA: ICD-10-CM

## 2020-10-16 DIAGNOSIS — N40.1 BPH ASSOCIATED WITH NOCTURIA: ICD-10-CM

## 2020-10-16 RX ORDER — TERAZOSIN 2 MG/1
2 CAPSULE ORAL
Qty: 90 CAPSULE | Refills: 0 | Status: SHIPPED | OUTPATIENT
Start: 2020-10-16 | End: 2021-03-19 | Stop reason: ALTCHOICE

## 2020-10-20 DIAGNOSIS — I10 ESSENTIAL HYPERTENSION: ICD-10-CM

## 2020-10-21 RX ORDER — LISINOPRIL 10 MG/1
TABLET ORAL
Qty: 90 TABLET | Refills: 1 | Status: SHIPPED | OUTPATIENT
Start: 2020-10-21 | End: 2021-05-17

## 2020-12-09 ENCOUNTER — TELEPHONE (OUTPATIENT)
Dept: FAMILY MEDICINE CLINIC | Facility: CLINIC | Age: 75
End: 2020-12-09

## 2020-12-15 ENCOUNTER — TELEPHONE (OUTPATIENT)
Dept: OBGYN CLINIC | Facility: HOSPITAL | Age: 75
End: 2020-12-15

## 2021-01-27 DIAGNOSIS — I10 ESSENTIAL HYPERTENSION: ICD-10-CM

## 2021-01-27 RX ORDER — AMLODIPINE BESYLATE 10 MG/1
10 TABLET ORAL DAILY
Qty: 90 TABLET | Refills: 0 | Status: SHIPPED | OUTPATIENT
Start: 2021-01-27 | End: 2021-05-04

## 2021-02-23 ENCOUNTER — IMMUNIZATIONS (OUTPATIENT)
Dept: FAMILY MEDICINE CLINIC | Facility: HOSPITAL | Age: 76
End: 2021-02-23

## 2021-02-23 DIAGNOSIS — Z23 ENCOUNTER FOR IMMUNIZATION: Primary | ICD-10-CM

## 2021-02-23 PROCEDURE — 91300 SARS-COV-2 / COVID-19 MRNA VACCINE (PFIZER-BIONTECH) 30 MCG: CPT

## 2021-02-23 PROCEDURE — 0001A SARS-COV-2 / COVID-19 MRNA VACCINE (PFIZER-BIONTECH) 30 MCG: CPT

## 2021-02-24 DIAGNOSIS — E11.9 CONTROLLED TYPE 2 DIABETES MELLITUS WITHOUT COMPLICATION, WITHOUT LONG-TERM CURRENT USE OF INSULIN (HCC): ICD-10-CM

## 2021-02-24 RX ORDER — BLOOD SUGAR DIAGNOSTIC
STRIP MISCELLANEOUS
Qty: 100 EACH | Refills: 1 | Status: SHIPPED | OUTPATIENT
Start: 2021-02-24 | End: 2021-08-17

## 2021-03-15 DIAGNOSIS — E11.9 TYPE 2 DIABETES MELLITUS WITHOUT COMPLICATION, WITHOUT LONG-TERM CURRENT USE OF INSULIN (HCC): ICD-10-CM

## 2021-03-15 RX ORDER — METFORMIN HYDROCHLORIDE 500 MG/1
TABLET, EXTENDED RELEASE ORAL
Qty: 90 TABLET | Refills: 1 | Status: SHIPPED | OUTPATIENT
Start: 2021-03-15 | End: 2021-08-17

## 2021-03-17 ENCOUNTER — IMMUNIZATIONS (OUTPATIENT)
Dept: FAMILY MEDICINE CLINIC | Facility: HOSPITAL | Age: 76
End: 2021-03-17

## 2021-03-17 DIAGNOSIS — Z23 ENCOUNTER FOR IMMUNIZATION: Primary | ICD-10-CM

## 2021-03-17 PROCEDURE — 91300 SARS-COV-2 / COVID-19 MRNA VACCINE (PFIZER-BIONTECH) 30 MCG: CPT

## 2021-03-17 PROCEDURE — 0002A SARS-COV-2 / COVID-19 MRNA VACCINE (PFIZER-BIONTECH) 30 MCG: CPT

## 2021-03-19 ENCOUNTER — OFFICE VISIT (OUTPATIENT)
Dept: FAMILY MEDICINE CLINIC | Facility: CLINIC | Age: 76
End: 2021-03-19
Payer: MEDICARE

## 2021-03-19 VITALS
SYSTOLIC BLOOD PRESSURE: 128 MMHG | RESPIRATION RATE: 18 BRPM | HEART RATE: 83 BPM | HEIGHT: 69 IN | WEIGHT: 176 LBS | BODY MASS INDEX: 26.07 KG/M2 | TEMPERATURE: 97.8 F | DIASTOLIC BLOOD PRESSURE: 70 MMHG | OXYGEN SATURATION: 95 %

## 2021-03-19 DIAGNOSIS — I10 BENIGN ESSENTIAL HTN: ICD-10-CM

## 2021-03-19 DIAGNOSIS — R35.1 BPH ASSOCIATED WITH NOCTURIA: ICD-10-CM

## 2021-03-19 DIAGNOSIS — C61 ADENOCARCINOMA OF PROSTATE (HCC): ICD-10-CM

## 2021-03-19 DIAGNOSIS — N40.1 BPH ASSOCIATED WITH NOCTURIA: ICD-10-CM

## 2021-03-19 DIAGNOSIS — E78.00 HYPERCHOLESTEROLEMIA: ICD-10-CM

## 2021-03-19 DIAGNOSIS — E11.9 CONTROLLED TYPE 2 DIABETES MELLITUS WITHOUT COMPLICATION, WITHOUT LONG-TERM CURRENT USE OF INSULIN (HCC): Primary | ICD-10-CM

## 2021-03-19 DIAGNOSIS — E21.3 HYPERPARATHYROIDISM (HCC): ICD-10-CM

## 2021-03-19 LAB — SL AMB POCT HEMOGLOBIN AIC: 5.4 (ref ?–6.5)

## 2021-03-19 PROCEDURE — 83036 HEMOGLOBIN GLYCOSYLATED A1C: CPT | Performed by: PHYSICIAN ASSISTANT

## 2021-03-19 PROCEDURE — 99214 OFFICE O/P EST MOD 30 MIN: CPT | Performed by: PHYSICIAN ASSISTANT

## 2021-03-30 DIAGNOSIS — I10 ESSENTIAL HYPERTENSION: ICD-10-CM

## 2021-03-30 RX ORDER — METOPROLOL SUCCINATE 100 MG/1
TABLET, EXTENDED RELEASE ORAL
Qty: 90 TABLET | Refills: 1 | Status: SHIPPED | OUTPATIENT
Start: 2021-03-30 | End: 2021-08-17

## 2021-04-02 DIAGNOSIS — E78.2 MIXED HYPERLIPIDEMIA: ICD-10-CM

## 2021-04-02 RX ORDER — SIMVASTATIN 10 MG
TABLET ORAL
Qty: 90 TABLET | Refills: 1 | Status: SHIPPED | OUTPATIENT
Start: 2021-04-02 | End: 2022-01-12

## 2021-05-04 DIAGNOSIS — I10 ESSENTIAL HYPERTENSION: ICD-10-CM

## 2021-05-04 RX ORDER — AMLODIPINE BESYLATE 10 MG/1
10 TABLET ORAL DAILY
Qty: 90 TABLET | Refills: 0 | Status: SHIPPED | OUTPATIENT
Start: 2021-05-04 | End: 2021-08-09

## 2021-05-15 DIAGNOSIS — I10 ESSENTIAL HYPERTENSION: ICD-10-CM

## 2021-05-17 RX ORDER — LISINOPRIL 10 MG/1
TABLET ORAL
Qty: 90 TABLET | Refills: 1 | Status: SHIPPED | OUTPATIENT
Start: 2021-05-17 | End: 2021-12-09

## 2021-07-07 PROCEDURE — 88305 TISSUE EXAM BY PATHOLOGIST: CPT | Performed by: PATHOLOGY

## 2021-07-08 ENCOUNTER — LAB REQUISITION (OUTPATIENT)
Dept: LAB | Facility: HOSPITAL | Age: 76
End: 2021-07-08
Payer: MEDICARE

## 2021-07-08 DIAGNOSIS — K63.5 POLYP OF COLON: ICD-10-CM

## 2021-07-08 DIAGNOSIS — Z12.11 ENCOUNTER FOR SCREENING FOR MALIGNANT NEOPLASM OF COLON: ICD-10-CM

## 2021-07-08 DIAGNOSIS — Z86.010 PERSONAL HISTORY OF COLONIC POLYPS: ICD-10-CM

## 2021-07-16 ENCOUNTER — OFFICE VISIT (OUTPATIENT)
Dept: FAMILY MEDICINE CLINIC | Facility: CLINIC | Age: 76
End: 2021-07-16
Payer: MEDICARE

## 2021-07-16 VITALS
OXYGEN SATURATION: 98 % | RESPIRATION RATE: 16 BRPM | DIASTOLIC BLOOD PRESSURE: 82 MMHG | HEIGHT: 67 IN | HEART RATE: 70 BPM | WEIGHT: 171 LBS | BODY MASS INDEX: 26.84 KG/M2 | SYSTOLIC BLOOD PRESSURE: 150 MMHG | TEMPERATURE: 98 F

## 2021-07-16 DIAGNOSIS — E21.3 HYPERPARATHYROIDISM (HCC): ICD-10-CM

## 2021-07-16 DIAGNOSIS — E11.9 CONTROLLED TYPE 2 DIABETES MELLITUS WITHOUT COMPLICATION, WITHOUT LONG-TERM CURRENT USE OF INSULIN (HCC): ICD-10-CM

## 2021-07-16 DIAGNOSIS — R35.1 BPH ASSOCIATED WITH NOCTURIA: ICD-10-CM

## 2021-07-16 DIAGNOSIS — I10 BENIGN ESSENTIAL HTN: ICD-10-CM

## 2021-07-16 DIAGNOSIS — N40.1 BPH ASSOCIATED WITH NOCTURIA: ICD-10-CM

## 2021-07-16 DIAGNOSIS — Z00.00 MEDICARE ANNUAL WELLNESS VISIT, SUBSEQUENT: Primary | ICD-10-CM

## 2021-07-16 DIAGNOSIS — C61 ADENOCARCINOMA OF PROSTATE (HCC): ICD-10-CM

## 2021-07-16 DIAGNOSIS — E78.00 HYPERCHOLESTEROLEMIA: ICD-10-CM

## 2021-07-16 LAB — SL AMB POCT HEMOGLOBIN AIC: 5.3 (ref ?–6.5)

## 2021-07-16 PROCEDURE — G0439 PPPS, SUBSEQ VISIT: HCPCS | Performed by: PHYSICIAN ASSISTANT

## 2021-07-16 PROCEDURE — 1123F ACP DISCUSS/DSCN MKR DOCD: CPT | Performed by: PHYSICIAN ASSISTANT

## 2021-07-16 PROCEDURE — 99214 OFFICE O/P EST MOD 30 MIN: CPT | Performed by: PHYSICIAN ASSISTANT

## 2021-07-16 PROCEDURE — 83036 HEMOGLOBIN GLYCOSYLATED A1C: CPT | Performed by: PHYSICIAN ASSISTANT

## 2021-07-16 NOTE — PROGRESS NOTES
Diabetic Foot Exam    Patient's shoes and socks removed  Right Foot/Ankle   Right Foot Inspection  Skin Exam: skin intact, dry skin, callus and callus                          Toe Exam: right toe deformity  Sensory   Vibration: intact    Monofilament testing: intact  Vascular    The right DP pulse is 2+  Left Foot/Ankle  Left Foot Inspection  Skin Exam: skin intact, dry skin and callus                                         Sensory   Vibration: intact    Monofilament: intact  Vascular    The left DP pulse is 2+  Assessment/Plan:     Diagnoses and all orders for this visit:    Medicare annual wellness visit, subsequent    Controlled type 2 diabetes mellitus without complication, without long-term current use of insulin ([facility] )  Comments:  Diabetes is at goal continue current regimen  Orders:  -     POCT hemoglobin A1c  -     Comprehensive metabolic panel; Future  -     Hemoglobin A1C; Future    Benign essential HTN  Comments:  Blood pressure is at goal continue current regimen    Hyperparathyroidism ([facility] )  Comments:  Patient is asymptomatic  Orders:  -     Vitamin D 25 hydroxy; Future  -     PTH, intact; Future    Hypercholesterolemia  Comments:  Continue statin therapy and low-fat diet  Orders:  -     Lipid panel; Future    BPH associated with nocturia  Comments:  Patient is not on any medication    Adenocarcinoma of prostate ([facility] )  Comments:  Check PSA  Orders:  -     CBC and differential; Future  -     PSA Total, Diagnostic; Future          Subjective:      Patient ID: Suyapa Love is a 77 y o  male  Patient presents in the office for follow up chronic conditions  Patient has non-insulin diabetes mellitus type 2 without complication  He is on metformin  a day  His A1c in the office today is 5 3  He has lost 5 lb  He is compliant diet physically active  Today blood pressure is well controlled with metoprolol  asthma lisinopril 10 and amlodipine at 10 mg   He has a history of hyperparathyroidism without symptoms  For lipid control he is on simvastatin 10 mg  He has a history of prostate cancer and has BPH  He is not on any medication  He has not seen Urology  Patient is due for diagnostic PSA  Patient is status post right ankle fusion        The following portions of the patient's history were reviewed and updated as appropriate:   He   Patient Active Problem List    Diagnosis Date Noted    Status post right tibial-talar ankle fusion 07/13/2020    BPH associated with nocturia 08/21/2019    Arthritis of right acromioclavicular joint 03/15/2019    Primary osteoarthritis of right shoulder 03/07/2019    Chronic left shoulder pain 03/07/2019    Left rotator cuff tear arthropathy 03/07/2019    Rotator cuff injury, right, initial encounter 03/07/2019    History of colon polyps 01/09/2019    Hyperparathyroidism ([facility] [address] ) 11/06/2018    Hypokalemia 10/22/2018    Hypocalcemia 10/22/2018    Glaucoma 10/11/2018    Adenocarcinoma of prostate ([facility] [address] ) 01/03/2018    Controlled type 2 diabetes mellitus without complication, without long-term current use of insulin ([facility] ) 07/25/2016    Inguinal hernia 02/08/2016    Benign essential HTN 01/12/2016    Hypercholesterolemia 01/12/2016     Current Outpatient Medications   Medication Sig Dispense Refill    ACCU-CHEK FASTCLIX LANCETS MISC by Does not apply route daily E11 9  Check  fbs  daily 100 each 3    amLODIPine (NORVASC) 10 mg tablet TAKE 1 TABLET (10 MG TOTAL) BY MOUTH DAILY FOR BLOOD PRESSURE 90 tablet 0    aspirin 81 mg chewable tablet Chew 1 tablet (81 mg total) 2 (two) times a day Take for DVT prophylaxis for 30 days postoperatively 60 tablet 0    docusate sodium (COLACE) 100 mg capsule Take 1 capsule (100 mg total) by mouth 2 (two) times a day 10 capsule 0    glucose blood (Accu-Chek Jackie Plus) test strip CHECK BLOOD SUGAR DAILY E11 9 100 each 1    lisinopril (ZESTRIL) 10 mg tablet TAKE 1 TABLET BY MOUTH EVERY DAY 90 tablet 1    metFORMIN (GLUCOPHAGE-XR) 500 mg 24 hr tablet TAKE 1 TABLET BY MOUTH EVERY DAY 90 tablet 1    metoprolol succinate (TOPROL-XL) 100 mg 24 hr tablet TAKE 1 TABLET BY MOUTH EVERY DAY 90 tablet 1    simvastatin (ZOCOR) 10 mg tablet TAKE 1 TABLET BY MOUTH EVERY DAY 90 tablet 1    methocarbamol (ROBAXIN) 500 mg tablet Take 1 tablet (500 mg total) by mouth 4 (four) times a day as needed for muscle spasms for up to 10 days (Patient not taking: Reported on 7/16/2021) 40 tablet 0     No current facility-administered medications for this visit  He has No Known Allergies       Review of Systems   Constitutional: Negative for activity change, appetite change, chills, fatigue and fever  HENT: Negative for ear pain and sore throat  Eyes: Negative for visual disturbance  Respiratory: Negative for cough and shortness of breath  Cardiovascular: Negative for chest pain, palpitations and leg swelling  Gastrointestinal: Negative for abdominal pain, blood in stool, constipation, diarrhea and nausea  Genitourinary: Negative for difficulty urinating  Musculoskeletal: Negative for arthralgias, back pain and myalgias  Skin: Negative for rash  Neurological: Negative for dizziness, syncope and headaches  Psychiatric/Behavioral: Negative for sleep disturbance  Objective:        Physical Exam  Vitals and nursing note reviewed  Constitutional:       General: He is not in acute distress  Appearance: Normal appearance  He is well-developed  He is not ill-appearing  HENT:      Head: Normocephalic and atraumatic  Right Ear: Tympanic membrane, ear canal and external ear normal       Left Ear: Tympanic membrane, ear canal and external ear normal       Ears:      Comments: Bilateral hearing aids     Mouth/Throat:      Pharynx: No posterior oropharyngeal erythema  Eyes:      Conjunctiva/sclera: Conjunctivae normal       Pupils: Pupils are equal, round, and reactive to light     Neck:      Thyroid: No thyromegaly  Vascular: No carotid bruit  Cardiovascular:      Rate and Rhythm: Normal rate and regular rhythm  Pulses:           Dorsalis pedis pulses are 2+ on the right side and 2+ on the left side  Heart sounds: Normal heart sounds  No murmur heard  Pulmonary:      Effort: Pulmonary effort is normal       Breath sounds: Normal breath sounds  No wheezing  Abdominal:      General: Bowel sounds are normal       Palpations: Abdomen is soft  There is no mass  Tenderness: There is no abdominal tenderness  Musculoskeletal:      Right lower leg: No edema  Left lower leg: No edema  Comments: Deformity right ankle   Feet:      Right foot:      Skin integrity: Callus and dry skin present  Left foot:      Skin integrity: Callus and dry skin present  Lymphadenopathy:      Cervical: No cervical adenopathy  Skin:     General: Skin is warm and dry  Neurological:      General: No focal deficit present  Mental Status: He is alert and oriented to person, place, and time  Psychiatric:         Mood and Affect: Mood normal          Behavior: Behavior normal          Thought Content:  Thought content normal          Judgment: Judgment normal

## 2021-07-16 NOTE — PATIENT INSTRUCTIONS
Medicare Preventive Visit Patient Instructions  Thank you for completing your Welcome to Medicare Visit or Medicare Annual Wellness Visit today  Your next wellness visit will be due in one year (7/17/2022)  The screening/preventive services that you may require over the next 5-10 years are detailed below  Some tests may not apply to you based off risk factors and/or age  Screening tests ordered at today's visit but not completed yet may show as past due  Also, please note that scanned in results may not display below  Preventive Screenings:  Service Recommendations Previous Testing/Comments   Colorectal Cancer Screening  · Colonoscopy    · Fecal Occult Blood Test (FOBT)/Fecal Immunochemical Test (FIT)  · Fecal DNA/Cologuard Test  · Flexible Sigmoidoscopy Age: 54-65 years old   Colonoscopy: every 10 years (May be performed more frequently if at higher risk)  OR  FOBT/FIT: every 1 year  OR  Cologuard: every 3 years  OR  Sigmoidoscopy: every 5 years  Screening may be recommended earlier than age 48 if at higher risk for colorectal cancer  Also, an individualized decision between you and your healthcare provider will decide whether screening between the ages of 74-80 would be appropriate   Colonoscopy: 07/07/2021  FOBT/FIT: Not on file  Cologuard: Not on file  Sigmoidoscopy: Not on file    Screening Current     Prostate Cancer Screening Individualized decision between patient and health care provider in men between ages of 53-78   Medicare will cover every 12 months beginning on the day after your 50th birthday PSA: 0 1 ng/mL     History Prostate Cancer     Hepatitis C Screening Once for adults born between 1945 and 1965  More frequently in patients at high risk for Hepatitis C Hep C Antibody: Not on file        Diabetes Screening 1-2 times per year if you're at risk for diabetes or have pre-diabetes Fasting glucose: 120 mg/dL   A1C: 5 4    Screening Not Indicated  History Diabetes   Cholesterol Screening Once every 5 years if you don't have a lipid disorder  May order more often based on risk factors  Lipid panel: 06/12/2020    Screening Not Indicated  History Lipid Disorder      Other Preventive Screenings Covered by Medicare:  1  Abdominal Aortic Aneurysm (AAA) Screening: covered once if your at risk  You're considered to be at risk if you have a family history of AAA or a male between the age of 73-68 who smoking at least 100 cigarettes in your lifetime  2  Lung Cancer Screening: covers low dose CT scan once per year if you meet all of the following conditions: (1) Age 50-69; (2) No signs or symptoms of lung cancer; (3) Current smoker or have quit smoking within the last 15 years; (4) You have a tobacco smoking history of at least 30 pack years (packs per day x number of years you smoked); (5) You get a written order from a healthcare provider  3  Glaucoma Screening: covered annually if you're considered high risk: (1) You have diabetes OR (2) Family history of glaucoma OR (3)  aged 48 and older OR (3)  American aged 72 and older  3  Osteoporosis Screening: covered every 2 years if you meet one of the following conditions: (1) Have a vertebral abnormality; (2) On glucocorticoid therapy for more than 3 months; (3) Have primary hyperparathyroidism; (4) On osteoporosis medications and need to assess response to drug therapy  5  HIV Screening: covered annually if you're between the age of 12-76  Also covered annually if you are younger than 13 and older than 72 with risk factors for HIV infection  For pregnant patients, it is covered up to 3 times per pregnancy      Immunizations:  Immunization Recommendations   Influenza Vaccine Annual influenza vaccination during flu season is recommended for all persons aged >= 6 months who do not have contraindications   Pneumococcal Vaccine (Prevnar and Pneumovax)  * Prevnar = PCV13  * Pneumovax = PPSV23 Adults 25-60 years old: 1-3 doses may be recommended based on certain risk factors  Adults 72 years old: Prevnar (PCV13) vaccine recommended followed by Pneumovax (PPSV23) vaccine  If already received PPSV23 since turning 65, then PCV13 recommended at least one year after PPSV23 dose  Hepatitis B Vaccine 3 dose series if at intermediate or high risk (ex: diabetes, end stage renal disease, liver disease)   Tetanus (Td) Vaccine - COST NOT COVERED BY MEDICARE PART B Following completion of primary series, a booster dose should be given every 10 years to maintain immunity against tetanus  Td may also be given as tetanus wound prophylaxis  Tdap Vaccine - COST NOT COVERED BY MEDICARE PART B Recommended at least once for all adults  For pregnant patients, recommended with each pregnancy  Shingles Vaccine (Shingrix) - COST NOT COVERED BY MEDICARE PART B  2 shot series recommended in those aged 48 and above     Health Maintenance Due:      Topic Date Due    Hepatitis C Screening  Never done    Colorectal Cancer Screening  07/07/2026     Immunizations Due:      Topic Date Due    DTaP,Tdap,and Td Vaccines (1 - Tdap) Never done    Pneumococcal Vaccine: 65+ Years (1 of 2 - PPSV23) 02/01/2017    Influenza Vaccine (1) 09/01/2021     Advance Directives   What are advance directives? Advance directives are legal documents that state your wishes and plans for medical care  These plans are made ahead of time in case you lose your ability to make decisions for yourself  Advance directives can apply to any medical decision, such as the treatments you want, and if you want to donate organs  What are the types of advance directives? There are many types of advance directives, and each state has rules about how to use them  You may choose a combination of any of the following:  · Living will: This is a written record of the treatment you want  You can also choose which treatments you do not want, which to limit, and which to stop at a certain time   This includes surgery, medicine, IV fluid, and tube feedings  · Durable power of  for healthcare [facility]): This is a written record that states who you want to make healthcare choices for you when you are unable to make them for yourself  This person, called a proxy, is usually a family member or a friend  You may choose more than 1 proxy  · Do not resuscitate (DNR) order:  A DNR order is used in case your heart stops beating or you stop breathing  It is a request not to have certain forms of treatment, such as CPR  A DNR order may be included in other types of advance directives  · Medical directive: This covers the care that you want if you are in a coma, near death, or unable to make decisions for yourself  You can list the treatments you want for each condition  Treatment may include pain medicine, surgery, blood transfusions, dialysis, IV or tube feedings, and a ventilator (breathing machine)  · Values history: This document has questions about your views, beliefs, and how you feel and think about life  This information can help others choose the care that you would choose  Why are advance directives important? An advance directive helps you control your care  Although spoken wishes may be used, it is better to have your wishes written down  Spoken wishes can be misunderstood, or not followed  Treatments may be given even if you do not want them  An advance directive may make it easier for your family to make difficult choices about your care  Fall Prevention    Fall prevention  includes ways to make your home and other areas safer  It also includes ways you can move more carefully to prevent a fall  Health conditions that cause changes in your blood pressure, vision, or muscle strength and coordination may increase your risk for falls  Medicines may also increase your risk for falls if they make you dizzy, weak, or sleepy  Fall prevention tips:   · Stand or sit up slowly  · Use assistive devices as directed      · Wear shoes that fit well and have soles that   · Wear a personal alarm  · Stay active  · Manage your medical conditions  Home Safety Tips:  · Add items to prevent falls in the bathroom  · Keep paths clear  · Install bright lights in your home  · Keep items you use often on shelves within reach  · Paint or place reflective tape on the edges of your stairs  Weight Management   Why it is important to manage your weight:  Being overweight increases your risk of health conditions such as heart disease, high blood pressure, type 2 diabetes, and certain types of cancer  It can also increase your risk for osteoarthritis, sleep apnea, and other respiratory problems  Aim for a slow, steady weight loss  Even a small amount of weight loss can lower your risk of health problems  How to lose weight safely:  A safe and healthy way to lose weight is to eat fewer calories and get regular exercise  You can lose up about 1 pound a week by decreasing the number of calories you eat by 500 calories each day  Healthy meal plan for weight management:  A healthy meal plan includes a variety of foods, contains fewer calories, and helps you stay healthy  A healthy meal plan includes the following:  · Eat whole-grain foods more often  A healthy meal plan should contain fiber  Fiber is the part of grains, fruits, and vegetables that is not broken down by your body  Whole-grain foods are healthy and provide extra fiber in your diet  Some examples of whole-grain foods are whole-wheat breads and pastas, oatmeal, brown rice, and bulgur  · Eat a variety of vegetables every day  Include dark, leafy greens such as spinach, kale, chaparro greens, and mustard greens  Eat yellow and orange vegetables such as carrots, sweet potatoes, and winter squash  · Eat a variety of fruits every day  Choose fresh or canned fruit (canned in its own juice or light syrup) instead of juice  Fruit juice has very little or no fiber    · Eat low-fat dairy foods  Drink fat-free (skim) milk or 1% milk  Eat fat-free yogurt and low-fat cottage cheese  Try low-fat cheeses such as mozzarella and other reduced-fat cheeses  · Choose meat and other protein foods that are low in fat  Choose beans or other legumes such as split peas or lentils  Choose fish, skinless poultry (chicken or turkey), or lean cuts of red meat (beef or pork)  Before you cook meat or poultry, cut off any visible fat  · Use less fat and oil  Try baking foods instead of frying them  Add less fat, such as margarine, sour cream, regular salad dressing and mayonnaise to foods  Eat fewer high-fat foods  Some examples of high-fat foods include french fries, doughnuts, ice cream, and cakes  · Eat fewer sweets  Limit foods and drinks that are high in sugar  This includes candy, cookies, regular soda, and sweetened drinks  Exercise:  Exercise at least 30 minutes per day on most days of the week  Some examples of exercise include walking, biking, dancing, and swimming  You can also fit in more physical activity by taking the stairs instead of the elevator or parking farther away from stores  Ask your healthcare provider about the best exercise plan for you  © Copyright [employer] 2018 Information is for End User's use only and may not be sold, redistributed or otherwise used for commercial purposes   All illustrations and images included in CareNotes® are the copyrighted property of [employer]  or [address] [place] [address] [employer]

## 2021-07-16 NOTE — PROGRESS NOTES
Assessment and Plan:     Problem List Items Addressed This Visit        Endocrine    Controlled type 2 diabetes mellitus without complication, without long-term current use of insulin ([address] )    Relevant Orders    POCT hemoglobin A1c    Hyperparathyroidism ([address] )       Cardiovascular and Mediastinum    Benign essential HTN       Other    Hypercholesterolemia    BPH associated with nocturia      Other Visit Diagnoses     Medicare annual wellness visit, subsequent    -  Primary           Preventive health issues were discussed with patient, and age appropriate screening tests were ordered as noted in patient's After Visit Summary  Personalized health advice and appropriate referrals for health education or preventive services given if needed, as noted in patient's After Visit Summary  History of Present Illness:     Patient presents for Medicare Annual Wellness visit    Patient Care Team:  Keyona Paz PA-C as PCP - General (Family Medicine)     Problem List:     Patient Active Problem List   Diagnosis    Benign essential HTN    Controlled type 2 diabetes mellitus without complication, without long-term current use of insulin ([address] )    Hypercholesterolemia    Adenocarcinoma of prostate ([address] )    Glaucoma    Inguinal hernia    Hypokalemia    Hypocalcemia    Hyperparathyroidism ([address] )    History of colon polyps    Primary osteoarthritis of right shoulder    Chronic left shoulder pain    Left rotator cuff tear arthropathy    Rotator cuff injury, right, initial encounter    Arthritis of right acromioclavicular joint    BPH associated with nocturia    Status post right tibial-talar ankle fusion      Past Medical and Surgical History:     Past Medical History:   Diagnosis Date    Diabetes mellitus ([address] )     Hyperlipidemia     Hypertension     Prostate cancer ([address] ) 2005    S/P prostate ca-2005 had radiation tx       Past Surgical History:   Procedure Laterality Date    COLONOSCOPY      GA ARTHRODESIS,ANKLE,OPEN Right 7/10/2020    Procedure: ARTHRODESIS/ TIBIAL-TALAR ANKLE FUSION;  Surgeon: Tona Tamayo MD;  Location: [facility];  Service: Orthopedics      Family History:     Family History   Problem Relation Age of Onset    Heart attack Mother       Social History:     Social History     Socioeconomic History    Marital status: /Civil Union     Spouse name: None    Number of children: None    Years of education: None    Highest education level: None   Occupational History    None   Tobacco Use    Smoking status: Never Smoker    Smokeless tobacco: Never Used   Vaping Use    Vaping Use: Never used   Substance and Sexual Activity    Alcohol use: Yes     Comment: Occasional    Drug use: Never    Sexual activity: None   Other Topics Concern    None   Social History Narrative    None     Social Determinants of Health     Financial Resource Strain:     Difficulty of Paying Living Expenses:    Food Insecurity:     Worried About Running Out of Food in the Last Year:     Ran Out of Food in the Last Year:    Transportation Needs:     Lack of Transportation (Medical):      Lack of Transportation (Non-Medical):    Physical Activity:     Days of Exercise per Week:     Minutes of Exercise per Session:    Stress:     Feeling of Stress :    Social Connections:     Frequency of Communication with Friends and Family:     Frequency of Social Gatherings with Friends and Family:     Attends Religion Services:     Active Member of Clubs or Organizations:     Attends Club or Organization Meetings:     Marital Status:    Intimate Partner Violence:     Fear of Current or Ex-Partner:     Emotionally Abused:     Physically Abused:     Sexually Abused:       Medications and Allergies:     Current Outpatient Medications   Medication Sig Dispense Refill    ACCU-CHEK FASTCLIX LANCETS MISC by Does not apply route daily E11 9  Check  fbs  daily 100 each 3    amLODIPine (NORVASC) 10 mg tablet TAKE 1 TABLET (10 MG TOTAL) BY MOUTH DAILY FOR BLOOD PRESSURE 90 tablet 0    aspirin 81 mg chewable tablet Chew 1 tablet (81 mg total) 2 (two) times a day Take for DVT prophylaxis for 30 days postoperatively 60 tablet 0    docusate sodium (COLACE) 100 mg capsule Take 1 capsule (100 mg total) by mouth 2 (two) times a day 10 capsule 0    glucose blood (Accu-Chek Jackie Plus) test strip CHECK BLOOD SUGAR DAILY E11 9 100 each 1    lisinopril (ZESTRIL) 10 mg tablet TAKE 1 TABLET BY MOUTH EVERY DAY 90 tablet 1    metFORMIN (GLUCOPHAGE-XR) 500 mg 24 hr tablet TAKE 1 TABLET BY MOUTH EVERY DAY 90 tablet 1    metoprolol succinate (TOPROL-XL) 100 mg 24 hr tablet TAKE 1 TABLET BY MOUTH EVERY DAY 90 tablet 1    simvastatin (ZOCOR) 10 mg tablet TAKE 1 TABLET BY MOUTH EVERY DAY 90 tablet 1    methocarbamol (ROBAXIN) 500 mg tablet Take 1 tablet (500 mg total) by mouth 4 (four) times a day as needed for muscle spasms for up to 10 days (Patient not taking: Reported on 7/16/2021) 40 tablet 0     No current facility-administered medications for this visit       No Known Allergies   Immunizations:     Immunization History   Administered Date(s) Administered    INFLUENZA 10/27/2014, 10/27/2015, 10/27/2016, 11/16/2016, 10/13/2017, 10/26/2018    Influenza Injectable, MDCK, Preservative Free, Quadrivalent, 0 5 mL 10/26/2018    Influenza Quadrivalent Preservative Free 3 years and older IM 11/16/2016    Influenza, high dose seasonal 0 7 mL 09/14/2020    Influenza, injectable, quadrivalent, preservative free 0 5 mL 10/25/2019    Pneumococcal Conjugate 13-Valent 12/07/2016    SARS-CoV-2 / COVID-19 mRNA IM ([employer]) 02/23/2021, 03/17/2021      Health Maintenance:         Topic Date Due    Hepatitis C Screening  Never done    Colorectal Cancer Screening  07/07/2026         Topic Date Due    DTaP,Tdap,and Td Vaccines (1 - Tdap) Never done    Pneumococcal Vaccine: 65+ Years (1 of 2 - PPSV23) 02/01/2017    Influenza Vaccine (1) 09/01/2021      Medicare Health Risk Assessment:     /82 (BP Location: Right arm, Patient Position: Sitting, Cuff Size: Standard)   Pulse 70   Temp 98 °F (36 7 °C) (Temporal)   Resp 16   Ht 5' 7" (1 702 m)   Wt 77 6 kg (171 lb)   SpO2 98%   BMI 26 78 kg/m²      Huong Lamas is here for his Subsequent Wellness visit  Health Risk Assessment:   Patient rates overall health as good  Patient feels that their physical health rating is slightly worse  Patient is satisfied with their life  Eyesight was rated as slightly worse  Hearing was rated as same  Patient feels that their emotional and mental health rating is same  Patients states they are sometimes angry  Patient states they are sometimes unusually tired/fatigued  Pain experienced in the last 7 days has been none  Patient states that he has experienced no weight loss or gain in last 6 months  Depression Screening:   PHQ-2 Score: 0      Fall Risk Screening: In the past year, patient has experienced: history of falling in past year    Injured during fall?: No      Home Safety:  Patient does not have trouble with stairs inside or outside of their home  Patient has working smoke alarms and has working carbon monoxide detector  Home safety hazards include: none  Nutrition:   Current diet is Regular  Medications:   Patient is currently taking over-the-counter supplements  OTC medications include: see medication list  Patient is able to manage medications  Activities of Daily Living (ADLs)/Instrumental Activities of Daily Living (IADLs):   Walk and transfer into and out of bed and chair?: Yes  Dress and groom yourself?: Yes    Bathe or shower yourself?: Yes    Feed yourself?  Yes  Do your laundry/housekeeping?: Yes  Manage your money, pay your bills and track your expenses?: Yes  Make your own meals?: Yes    Do your own shopping?: Yes    Previous Hospitalizations:   Any hospitalizations or ED visits within the last 12 months?: No    How many hospitalizations have you had in the last year?: 1-2    Advance Care Planning:     Advanced directive: Yes      Cognitive Screening:   Provider or family/friend/caregiver concerned regarding cognition?: No    PREVENTIVE SCREENINGS      Cardiovascular Screening:    General: History Lipid Disorder    Due for: Lipid Panel      Diabetes Screening:     General: History Diabetes and Screening Current      Colorectal Cancer Screening:     General: Screening Current      Prostate Cancer Screening:    General: History Prostate Cancer    Due for: PSA      Abdominal Aortic Aneurysm (AAA) Screening:    Risk factors include: age between 73-67 yo        Lung Cancer Screening:     General: Screening Not Indicated    Screening, Brief Intervention, and Referral to Treatment (SBIRT)    Screening    Typical number of drinks in a week: 1    Single Item Drug Screening:  How often have you used an illegal drug (including marijuana) or a prescription medication for non-medical reasons in the past year? never    Single Item Drug Screen Score: 0  Interpretation: Negative screen for possible drug use disorder    Brief Intervention  Alcohol & drug use screenings were reviewed  No concerns regarding substance use disorder identified         James Deleon PA-C

## 2021-08-09 DIAGNOSIS — I10 ESSENTIAL HYPERTENSION: ICD-10-CM

## 2021-08-09 RX ORDER — AMLODIPINE BESYLATE 10 MG/1
10 TABLET ORAL DAILY
Qty: 90 TABLET | Refills: 0 | Status: SHIPPED | OUTPATIENT
Start: 2021-08-09 | End: 2021-11-08

## 2021-08-17 DIAGNOSIS — E11.9 TYPE 2 DIABETES MELLITUS WITHOUT COMPLICATION, WITHOUT LONG-TERM CURRENT USE OF INSULIN (HCC): ICD-10-CM

## 2021-08-17 DIAGNOSIS — E11.9 CONTROLLED TYPE 2 DIABETES MELLITUS WITHOUT COMPLICATION, WITHOUT LONG-TERM CURRENT USE OF INSULIN (HCC): ICD-10-CM

## 2021-08-17 DIAGNOSIS — I10 ESSENTIAL HYPERTENSION: ICD-10-CM

## 2021-08-17 RX ORDER — METOPROLOL SUCCINATE 100 MG/1
TABLET, EXTENDED RELEASE ORAL
Qty: 90 TABLET | Refills: 1 | Status: SHIPPED | OUTPATIENT
Start: 2021-08-17 | End: 2022-03-09

## 2021-08-17 RX ORDER — BLOOD SUGAR DIAGNOSTIC
STRIP MISCELLANEOUS
Qty: 100 STRIP | Refills: 1 | Status: SHIPPED | OUTPATIENT
Start: 2021-08-17

## 2021-08-17 RX ORDER — METFORMIN HYDROCHLORIDE 500 MG/1
TABLET, EXTENDED RELEASE ORAL
Qty: 90 TABLET | Refills: 1 | Status: SHIPPED | OUTPATIENT
Start: 2021-08-17 | End: 2022-03-09

## 2021-10-16 ENCOUNTER — APPOINTMENT (OUTPATIENT)
Dept: LAB | Facility: MEDICAL CENTER | Age: 76
End: 2021-10-16
Payer: MEDICARE

## 2021-10-16 DIAGNOSIS — C61 ADENOCARCINOMA OF PROSTATE (HCC): ICD-10-CM

## 2021-10-16 DIAGNOSIS — E11.9 CONTROLLED TYPE 2 DIABETES MELLITUS WITHOUT COMPLICATION, WITHOUT LONG-TERM CURRENT USE OF INSULIN (HCC): ICD-10-CM

## 2021-10-16 DIAGNOSIS — E21.3 HYPERPARATHYROIDISM (HCC): ICD-10-CM

## 2021-10-16 DIAGNOSIS — E78.00 HYPERCHOLESTEROLEMIA: ICD-10-CM

## 2021-10-16 LAB
25(OH)D3 SERPL-MCNC: 24.2 NG/ML (ref 30–100)
ALBUMIN SERPL BCP-MCNC: 3.6 G/DL (ref 3.5–5)
ALP SERPL-CCNC: 114 U/L (ref 46–116)
ALT SERPL W P-5'-P-CCNC: 18 U/L (ref 12–78)
ANION GAP SERPL CALCULATED.3IONS-SCNC: 3 MMOL/L (ref 4–13)
AST SERPL W P-5'-P-CCNC: 14 U/L (ref 5–45)
BASOPHILS # BLD AUTO: 0.05 THOUSANDS/ΜL (ref 0–0.1)
BASOPHILS NFR BLD AUTO: 1 % (ref 0–1)
BILIRUB SERPL-MCNC: 0.72 MG/DL (ref 0.2–1)
BUN SERPL-MCNC: 21 MG/DL (ref 5–25)
CALCIUM SERPL-MCNC: 9.1 MG/DL (ref 8.3–10.1)
CHLORIDE SERPL-SCNC: 109 MMOL/L (ref 100–108)
CHOLEST SERPL-MCNC: 150 MG/DL (ref 50–200)
CO2 SERPL-SCNC: 29 MMOL/L (ref 21–32)
CREAT SERPL-MCNC: 0.93 MG/DL (ref 0.6–1.3)
EOSINOPHIL # BLD AUTO: 0.2 THOUSAND/ΜL (ref 0–0.61)
EOSINOPHIL NFR BLD AUTO: 3 % (ref 0–6)
ERYTHROCYTE [DISTWIDTH] IN BLOOD BY AUTOMATED COUNT: 12.1 % (ref 11.6–15.1)
EST. AVERAGE GLUCOSE BLD GHB EST-MCNC: 105 MG/DL
GFR SERPL CREATININE-BSD FRML MDRD: 80 ML/MIN/1.73SQ M
GLUCOSE P FAST SERPL-MCNC: 101 MG/DL (ref 65–99)
HBA1C MFR BLD: 5.3 %
HCT VFR BLD AUTO: 41.7 % (ref 36.5–49.3)
HDLC SERPL-MCNC: 53 MG/DL
HGB BLD-MCNC: 14.2 G/DL (ref 12–17)
IMM GRANULOCYTES # BLD AUTO: 0.03 THOUSAND/UL (ref 0–0.2)
IMM GRANULOCYTES NFR BLD AUTO: 0 % (ref 0–2)
LDLC SERPL CALC-MCNC: 78 MG/DL (ref 0–100)
LYMPHOCYTES # BLD AUTO: 1.42 THOUSANDS/ΜL (ref 0.6–4.47)
LYMPHOCYTES NFR BLD AUTO: 20 % (ref 14–44)
MCH RBC QN AUTO: 32.3 PG (ref 26.8–34.3)
MCHC RBC AUTO-ENTMCNC: 34.1 G/DL (ref 31.4–37.4)
MCV RBC AUTO: 95 FL (ref 82–98)
MONOCYTES # BLD AUTO: 0.74 THOUSAND/ΜL (ref 0.17–1.22)
MONOCYTES NFR BLD AUTO: 11 % (ref 4–12)
NEUTROPHILS # BLD AUTO: 4.6 THOUSANDS/ΜL (ref 1.85–7.62)
NEUTS SEG NFR BLD AUTO: 65 % (ref 43–75)
NONHDLC SERPL-MCNC: 97 MG/DL
NRBC BLD AUTO-RTO: 0 /100 WBCS
PLATELET # BLD AUTO: 162 THOUSANDS/UL (ref 149–390)
PMV BLD AUTO: 11.8 FL (ref 8.9–12.7)
POTASSIUM SERPL-SCNC: 3.7 MMOL/L (ref 3.5–5.3)
PROT SERPL-MCNC: 6.8 G/DL (ref 6.4–8.2)
PSA SERPL-MCNC: 0.1 NG/ML (ref 0–4)
PTH-INTACT SERPL-MCNC: 99.1 PG/ML (ref 18.4–80.1)
RBC # BLD AUTO: 4.4 MILLION/UL (ref 3.88–5.62)
SODIUM SERPL-SCNC: 141 MMOL/L (ref 136–145)
TRIGL SERPL-MCNC: 95 MG/DL
WBC # BLD AUTO: 7.04 THOUSAND/UL (ref 4.31–10.16)

## 2021-10-16 PROCEDURE — 36415 COLL VENOUS BLD VENIPUNCTURE: CPT

## 2021-10-16 PROCEDURE — 83036 HEMOGLOBIN GLYCOSYLATED A1C: CPT

## 2021-10-16 PROCEDURE — 84153 ASSAY OF PSA TOTAL: CPT

## 2021-10-16 PROCEDURE — 80061 LIPID PANEL: CPT

## 2021-10-16 PROCEDURE — 83970 ASSAY OF PARATHORMONE: CPT

## 2021-10-16 PROCEDURE — 82306 VITAMIN D 25 HYDROXY: CPT

## 2021-10-16 PROCEDURE — 85025 COMPLETE CBC W/AUTO DIFF WBC: CPT

## 2021-10-16 PROCEDURE — 80053 COMPREHEN METABOLIC PANEL: CPT

## 2021-10-21 ENCOUNTER — OFFICE VISIT (OUTPATIENT)
Dept: FAMILY MEDICINE CLINIC | Facility: CLINIC | Age: 76
End: 2021-10-21
Payer: MEDICARE

## 2021-10-21 VITALS
DIASTOLIC BLOOD PRESSURE: 82 MMHG | BODY MASS INDEX: 27.4 KG/M2 | SYSTOLIC BLOOD PRESSURE: 144 MMHG | WEIGHT: 174.6 LBS | HEART RATE: 76 BPM | TEMPERATURE: 97.6 F | OXYGEN SATURATION: 97 % | HEIGHT: 67 IN

## 2021-10-21 DIAGNOSIS — Z23 ENCOUNTER FOR IMMUNIZATION: ICD-10-CM

## 2021-10-21 DIAGNOSIS — Z87.19 HISTORY OF LEFT INGUINAL HERNIA: ICD-10-CM

## 2021-10-21 DIAGNOSIS — R19.03 ABDOMINAL MASS, RIGHT LOWER QUADRANT: ICD-10-CM

## 2021-10-21 DIAGNOSIS — E21.3 HYPERPARATHYROIDISM (HCC): ICD-10-CM

## 2021-10-21 DIAGNOSIS — I10 BENIGN ESSENTIAL HTN: ICD-10-CM

## 2021-10-21 DIAGNOSIS — E78.00 HYPERCHOLESTEROLEMIA: ICD-10-CM

## 2021-10-21 DIAGNOSIS — E11.9 CONTROLLED TYPE 2 DIABETES MELLITUS WITHOUT COMPLICATION, WITHOUT LONG-TERM CURRENT USE OF INSULIN (HCC): Primary | ICD-10-CM

## 2021-10-21 PROCEDURE — 99214 OFFICE O/P EST MOD 30 MIN: CPT | Performed by: PHYSICIAN ASSISTANT

## 2021-10-21 PROCEDURE — G0008 ADMIN INFLUENZA VIRUS VAC: HCPCS

## 2021-10-21 PROCEDURE — 90662 IIV NO PRSV INCREASED AG IM: CPT

## 2021-10-28 ENCOUNTER — HOSPITAL ENCOUNTER (OUTPATIENT)
Dept: ULTRASOUND IMAGING | Facility: HOSPITAL | Age: 76
Discharge: HOME | End: 2021-10-28
Payer: MEDICARE

## 2021-10-28 DIAGNOSIS — Z87.19 HISTORY OF LEFT INGUINAL HERNIA: ICD-10-CM

## 2021-10-28 DIAGNOSIS — R19.03 ABDOMINAL MASS, RIGHT LOWER QUADRANT: ICD-10-CM

## 2021-10-28 PROCEDURE — 76705 ECHO EXAM OF ABDOMEN: CPT

## 2021-10-29 ENCOUNTER — TELEPHONE (OUTPATIENT)
Dept: FAMILY MEDICINE CLINIC | Facility: CLINIC | Age: 76
End: 2021-10-29

## 2021-11-01 ENCOUNTER — TELEPHONE (OUTPATIENT)
Dept: FAMILY MEDICINE CLINIC | Facility: CLINIC | Age: 76
End: 2021-11-01

## 2021-11-01 DIAGNOSIS — K40.20 NON-RECURRENT BILATERAL INGUINAL HERNIA WITHOUT OBSTRUCTION OR GANGRENE: Primary | ICD-10-CM

## 2021-11-17 ENCOUNTER — CONSULT (OUTPATIENT)
Dept: SURGERY | Facility: CLINIC | Age: 76
End: 2021-11-17
Payer: MEDICARE

## 2021-11-17 ENCOUNTER — PREP FOR PROCEDURE (OUTPATIENT)
Dept: SURGERY | Facility: CLINIC | Age: 76
End: 2021-11-17

## 2021-11-17 VITALS — HEIGHT: 67 IN | TEMPERATURE: 97.8 F | WEIGHT: 173.8 LBS | BODY MASS INDEX: 27.28 KG/M2

## 2021-11-17 DIAGNOSIS — K40.20 NON-RECURRENT BILATERAL INGUINAL HERNIA WITHOUT OBSTRUCTION OR GANGRENE: Primary | ICD-10-CM

## 2021-11-17 PROCEDURE — 99204 OFFICE O/P NEW MOD 45 MIN: CPT | Performed by: SURGERY

## 2021-11-17 RX ORDER — HEPARIN SODIUM 5000 [USP'U]/ML
5000 INJECTION, SOLUTION INTRAVENOUS; SUBCUTANEOUS ONCE
Status: CANCELLED | OUTPATIENT
Start: 2021-11-17 | End: 2021-11-17

## 2021-11-29 ENCOUNTER — APPOINTMENT (OUTPATIENT)
Dept: LAB | Facility: MEDICAL CENTER | Age: 76
End: 2021-11-29
Payer: MEDICARE

## 2021-11-29 ENCOUNTER — OFFICE VISIT (OUTPATIENT)
Dept: URGENT CARE | Facility: MEDICAL CENTER | Age: 76
End: 2021-11-29
Payer: MEDICARE

## 2021-11-29 DIAGNOSIS — K40.20 NON-RECURRENT BILATERAL INGUINAL HERNIA WITHOUT OBSTRUCTION OR GANGRENE: ICD-10-CM

## 2021-11-29 LAB
ALBUMIN SERPL BCP-MCNC: 3.4 G/DL (ref 3.5–5)
ALP SERPL-CCNC: 113 U/L (ref 46–116)
ALT SERPL W P-5'-P-CCNC: 21 U/L (ref 12–78)
ANION GAP SERPL CALCULATED.3IONS-SCNC: 11 MMOL/L (ref 4–13)
AST SERPL W P-5'-P-CCNC: 13 U/L (ref 5–45)
ATRIAL RATE: 72 BPM
BASOPHILS # BLD AUTO: 0.06 THOUSANDS/ΜL (ref 0–0.1)
BASOPHILS NFR BLD AUTO: 1 % (ref 0–1)
BILIRUB SERPL-MCNC: 0.94 MG/DL (ref 0.2–1)
BUN SERPL-MCNC: 16 MG/DL (ref 5–25)
CALCIUM ALBUM COR SERPL-MCNC: 9.5 MG/DL (ref 8.3–10.1)
CALCIUM SERPL-MCNC: 9 MG/DL (ref 8.3–10.1)
CHLORIDE SERPL-SCNC: 106 MMOL/L (ref 100–108)
CO2 SERPL-SCNC: 25 MMOL/L (ref 21–32)
CREAT SERPL-MCNC: 1 MG/DL (ref 0.6–1.3)
EOSINOPHIL # BLD AUTO: 0.2 THOUSAND/ΜL (ref 0–0.61)
EOSINOPHIL NFR BLD AUTO: 3 % (ref 0–6)
ERYTHROCYTE [DISTWIDTH] IN BLOOD BY AUTOMATED COUNT: 12.4 % (ref 11.6–15.1)
GFR SERPL CREATININE-BSD FRML MDRD: 73 ML/MIN/1.73SQ M
GLUCOSE P FAST SERPL-MCNC: 118 MG/DL (ref 65–99)
HCT VFR BLD AUTO: 42.2 % (ref 36.5–49.3)
HGB BLD-MCNC: 14.8 G/DL (ref 12–17)
IMM GRANULOCYTES # BLD AUTO: 0.02 THOUSAND/UL (ref 0–0.2)
IMM GRANULOCYTES NFR BLD AUTO: 0 % (ref 0–2)
LYMPHOCYTES # BLD AUTO: 1.35 THOUSANDS/ΜL (ref 0.6–4.47)
LYMPHOCYTES NFR BLD AUTO: 20 % (ref 14–44)
MCH RBC QN AUTO: 32 PG (ref 26.8–34.3)
MCHC RBC AUTO-ENTMCNC: 35.1 G/DL (ref 31.4–37.4)
MCV RBC AUTO: 91 FL (ref 82–98)
MONOCYTES # BLD AUTO: 0.7 THOUSAND/ΜL (ref 0.17–1.22)
MONOCYTES NFR BLD AUTO: 10 % (ref 4–12)
NEUTROPHILS # BLD AUTO: 4.45 THOUSANDS/ΜL (ref 1.85–7.62)
NEUTS SEG NFR BLD AUTO: 66 % (ref 43–75)
NRBC BLD AUTO-RTO: 0 /100 WBCS
P AXIS: 49 DEGREES
PLATELET # BLD AUTO: 168 THOUSANDS/UL (ref 149–390)
PMV BLD AUTO: 11.5 FL (ref 8.9–12.7)
POTASSIUM SERPL-SCNC: 3.4 MMOL/L (ref 3.5–5.3)
PR INTERVAL: 160 MS
PROT SERPL-MCNC: 6.9 G/DL (ref 6.4–8.2)
QRS AXIS: 52 DEGREES
QRSD INTERVAL: 96 MS
QT INTERVAL: 412 MS
QTC INTERVAL: 451 MS
RBC # BLD AUTO: 4.62 MILLION/UL (ref 3.88–5.62)
SODIUM SERPL-SCNC: 142 MMOL/L (ref 136–145)
T WAVE AXIS: 44 DEGREES
VENTRICULAR RATE: 72 BPM
WBC # BLD AUTO: 6.78 THOUSAND/UL (ref 4.31–10.16)

## 2021-11-29 PROCEDURE — 85025 COMPLETE CBC W/AUTO DIFF WBC: CPT

## 2021-11-29 PROCEDURE — 36415 COLL VENOUS BLD VENIPUNCTURE: CPT

## 2021-11-29 PROCEDURE — 80053 COMPREHEN METABOLIC PANEL: CPT

## 2021-12-09 DIAGNOSIS — I10 ESSENTIAL HYPERTENSION: ICD-10-CM

## 2021-12-09 RX ORDER — LISINOPRIL 10 MG/1
TABLET ORAL
Qty: 90 TABLET | Refills: 1 | Status: SHIPPED | OUTPATIENT
Start: 2021-12-09 | End: 2022-05-31

## 2021-12-15 ENCOUNTER — ANESTHESIA EVENT (OUTPATIENT)
Dept: PERIOP | Facility: HOSPITAL | Age: 76
End: 2021-12-15
Payer: MEDICARE

## 2021-12-16 ENCOUNTER — HOSPITAL ENCOUNTER (OUTPATIENT)
Facility: HOSPITAL | Age: 76
Setting detail: OUTPATIENT SURGERY
Discharge: HOME | End: 2021-12-16
Attending: SURGERY | Admitting: SURGERY
Payer: MEDICARE

## 2021-12-16 ENCOUNTER — ANESTHESIA (OUTPATIENT)
Dept: PERIOP | Facility: HOSPITAL | Age: 76
End: 2021-12-16
Payer: MEDICARE

## 2021-12-16 VITALS
RESPIRATION RATE: 18 BRPM | OXYGEN SATURATION: 96 % | WEIGHT: 174 LBS | SYSTOLIC BLOOD PRESSURE: 166 MMHG | HEIGHT: 69 IN | DIASTOLIC BLOOD PRESSURE: 79 MMHG | HEART RATE: 73 BPM | TEMPERATURE: 97.3 F | BODY MASS INDEX: 25.77 KG/M2

## 2021-12-16 DIAGNOSIS — K40.20 NON-RECURRENT BILATERAL INGUINAL HERNIA WITHOUT OBSTRUCTION OR GANGRENE: Primary | ICD-10-CM

## 2021-12-16 LAB
GLUCOSE SERPL-MCNC: 121 MG/DL (ref 65–140)
GLUCOSE SERPL-MCNC: 96 MG/DL (ref 65–140)

## 2021-12-16 PROCEDURE — C1781 MESH (IMPLANTABLE): HCPCS | Performed by: SURGERY

## 2021-12-16 PROCEDURE — 49650 LAP ING HERNIA REPAIR INIT: CPT | Performed by: SURGERY

## 2021-12-16 PROCEDURE — 82948 REAGENT STRIP/BLOOD GLUCOSE: CPT

## 2021-12-16 DEVICE — BARD 3DMAX MESH RIGHT LARGE
Type: IMPLANTABLE DEVICE | Site: INGUINAL | Status: FUNCTIONAL
Brand: BARD 3DMAX MESH

## 2021-12-16 DEVICE — CAPSURE PERMANENT FIXATION SYSTEM 30 PERMANENT FASTENERS
Type: IMPLANTABLE DEVICE | Site: INGUINAL | Status: FUNCTIONAL
Brand: CAPSURE PERMANENT FIXATION SYSTEM

## 2021-12-16 DEVICE — BARD 3DMAX MESH LEFT LARGE
Type: IMPLANTABLE DEVICE | Site: INGUINAL | Status: FUNCTIONAL
Brand: BARD 3DMAX MESH

## 2021-12-16 RX ORDER — SUCCINYLCHOLINE/SOD CL,ISO/PF 100 MG/5ML
SYRINGE (ML) INTRAVENOUS AS NEEDED
Status: DISCONTINUED | OUTPATIENT
Start: 2021-12-16 | End: 2021-12-16

## 2021-12-16 RX ORDER — HYDROMORPHONE HCL/PF 1 MG/ML
0.2 SYRINGE (ML) INJECTION EVERY 4 HOURS PRN
Status: DISCONTINUED | OUTPATIENT
Start: 2021-12-16 | End: 2021-12-16 | Stop reason: HOSPADM

## 2021-12-16 RX ORDER — BUPIVACAINE HYDROCHLORIDE AND EPINEPHRINE 2.5; 5 MG/ML; UG/ML
INJECTION, SOLUTION EPIDURAL; INFILTRATION; INTRACAUDAL; PERINEURAL AS NEEDED
Status: DISCONTINUED | OUTPATIENT
Start: 2021-12-16 | End: 2021-12-16 | Stop reason: HOSPADM

## 2021-12-16 RX ORDER — ONDANSETRON 2 MG/ML
4 INJECTION INTRAMUSCULAR; INTRAVENOUS ONCE
Status: DISCONTINUED | OUTPATIENT
Start: 2021-12-16 | End: 2021-12-16 | Stop reason: HOSPADM

## 2021-12-16 RX ORDER — ONDANSETRON 2 MG/ML
INJECTION INTRAMUSCULAR; INTRAVENOUS AS NEEDED
Status: DISCONTINUED | OUTPATIENT
Start: 2021-12-16 | End: 2021-12-16

## 2021-12-16 RX ORDER — FENTANYL CITRATE 50 UG/ML
INJECTION, SOLUTION INTRAMUSCULAR; INTRAVENOUS AS NEEDED
Status: DISCONTINUED | OUTPATIENT
Start: 2021-12-16 | End: 2021-12-16

## 2021-12-16 RX ORDER — FENTANYL CITRATE/PF 50 MCG/ML
25 SYRINGE (ML) INJECTION
Status: DISCONTINUED | OUTPATIENT
Start: 2021-12-16 | End: 2021-12-16 | Stop reason: HOSPADM

## 2021-12-16 RX ORDER — MEPERIDINE HYDROCHLORIDE 25 MG/ML
12.5 INJECTION INTRAMUSCULAR; INTRAVENOUS; SUBCUTANEOUS ONCE
Status: DISCONTINUED | OUTPATIENT
Start: 2021-12-16 | End: 2021-12-16 | Stop reason: HOSPADM

## 2021-12-16 RX ORDER — NEOSTIGMINE METHYLSULFATE 1 MG/ML
INJECTION INTRAVENOUS AS NEEDED
Status: DISCONTINUED | OUTPATIENT
Start: 2021-12-16 | End: 2021-12-16

## 2021-12-16 RX ORDER — CEFAZOLIN SODIUM 1 G/3ML
INJECTION, POWDER, FOR SOLUTION INTRAMUSCULAR; INTRAVENOUS AS NEEDED
Status: DISCONTINUED | OUTPATIENT
Start: 2021-12-16 | End: 2021-12-16

## 2021-12-16 RX ORDER — ONDANSETRON 2 MG/ML
4 INJECTION INTRAMUSCULAR; INTRAVENOUS ONCE AS NEEDED
Status: DISCONTINUED | OUTPATIENT
Start: 2021-12-16 | End: 2021-12-16 | Stop reason: HOSPADM

## 2021-12-16 RX ORDER — PROMETHAZINE HYDROCHLORIDE 25 MG/ML
12.5 INJECTION, SOLUTION INTRAMUSCULAR; INTRAVENOUS ONCE AS NEEDED
Status: DISCONTINUED | OUTPATIENT
Start: 2021-12-16 | End: 2021-12-16 | Stop reason: HOSPADM

## 2021-12-16 RX ORDER — DEXAMETHASONE SODIUM PHOSPHATE 10 MG/ML
INJECTION, SOLUTION INTRAMUSCULAR; INTRAVENOUS AS NEEDED
Status: DISCONTINUED | OUTPATIENT
Start: 2021-12-16 | End: 2021-12-16

## 2021-12-16 RX ORDER — LIDOCAINE HYDROCHLORIDE 10 MG/ML
0.5 INJECTION, SOLUTION EPIDURAL; INFILTRATION; INTRACAUDAL; PERINEURAL ONCE AS NEEDED
Status: DISCONTINUED | OUTPATIENT
Start: 2021-12-16 | End: 2021-12-16 | Stop reason: HOSPADM

## 2021-12-16 RX ORDER — ALBUTEROL SULFATE 2.5 MG/3ML
2.5 SOLUTION RESPIRATORY (INHALATION) ONCE AS NEEDED
Status: DISCONTINUED | OUTPATIENT
Start: 2021-12-16 | End: 2021-12-16 | Stop reason: HOSPADM

## 2021-12-16 RX ORDER — SODIUM CHLORIDE, SODIUM LACTATE, POTASSIUM CHLORIDE, CALCIUM CHLORIDE 600; 310; 30; 20 MG/100ML; MG/100ML; MG/100ML; MG/100ML
125 INJECTION, SOLUTION INTRAVENOUS CONTINUOUS
Status: DISCONTINUED | OUTPATIENT
Start: 2021-12-16 | End: 2021-12-16 | Stop reason: HOSPADM

## 2021-12-16 RX ORDER — HYDROCODONE BITARTRATE AND ACETAMINOPHEN 5; 325 MG/1; MG/1
1 TABLET ORAL EVERY 4 HOURS PRN
Status: DISCONTINUED | OUTPATIENT
Start: 2021-12-16 | End: 2021-12-16 | Stop reason: HOSPADM

## 2021-12-16 RX ORDER — OXYCODONE HYDROCHLORIDE 5 MG/1
5 TABLET ORAL EVERY 4 HOURS PRN
Qty: 10 TABLET | Refills: 0 | Status: SHIPPED | OUTPATIENT
Start: 2021-12-16 | End: 2021-12-26

## 2021-12-16 RX ORDER — CEFAZOLIN SODIUM 1 G/50ML
1000 SOLUTION INTRAVENOUS
Status: DISCONTINUED | OUTPATIENT
Start: 2021-12-17 | End: 2021-12-16 | Stop reason: HOSPADM

## 2021-12-16 RX ORDER — METOPROLOL TARTRATE 5 MG/5ML
INJECTION INTRAVENOUS AS NEEDED
Status: DISCONTINUED | OUTPATIENT
Start: 2021-12-16 | End: 2021-12-16

## 2021-12-16 RX ORDER — GLYCOPYRROLATE 0.2 MG/ML
INJECTION INTRAMUSCULAR; INTRAVENOUS AS NEEDED
Status: DISCONTINUED | OUTPATIENT
Start: 2021-12-16 | End: 2021-12-16

## 2021-12-16 RX ORDER — HYDROMORPHONE HCL/PF 1 MG/ML
0.5 SYRINGE (ML) INJECTION
Status: DISCONTINUED | OUTPATIENT
Start: 2021-12-16 | End: 2021-12-16 | Stop reason: HOSPADM

## 2021-12-16 RX ORDER — HEPARIN SODIUM 5000 [USP'U]/ML
5000 INJECTION, SOLUTION INTRAVENOUS; SUBCUTANEOUS ONCE
Status: COMPLETED | OUTPATIENT
Start: 2021-12-16 | End: 2021-12-16

## 2021-12-16 RX ORDER — PROPOFOL 10 MG/ML
INJECTION, EMULSION INTRAVENOUS AS NEEDED
Status: DISCONTINUED | OUTPATIENT
Start: 2021-12-16 | End: 2021-12-16

## 2021-12-16 RX ORDER — ROCURONIUM BROMIDE 10 MG/ML
INJECTION, SOLUTION INTRAVENOUS AS NEEDED
Status: DISCONTINUED | OUTPATIENT
Start: 2021-12-16 | End: 2021-12-16

## 2021-12-16 RX ORDER — LABETALOL 20 MG/4 ML (5 MG/ML) INTRAVENOUS SYRINGE
5
Status: DISCONTINUED | OUTPATIENT
Start: 2021-12-16 | End: 2021-12-16 | Stop reason: HOSPADM

## 2021-12-16 RX ADMIN — FENTANYL CITRATE 50 MCG: 50 INJECTION INTRAMUSCULAR; INTRAVENOUS at 13:41

## 2021-12-16 RX ADMIN — DEXAMETHASONE SODIUM PHOSPHATE 10 MG: 10 INJECTION, SOLUTION INTRAMUSCULAR; INTRAVENOUS at 13:23

## 2021-12-16 RX ADMIN — ROCURONIUM BROMIDE 30 MG: 50 INJECTION, SOLUTION INTRAVENOUS at 13:26

## 2021-12-16 RX ADMIN — METOROPROLOL TARTRATE 2 MG: 5 INJECTION, SOLUTION INTRAVENOUS at 14:28

## 2021-12-16 RX ADMIN — FENTANYL CITRATE 50 MCG: 50 INJECTION INTRAMUSCULAR; INTRAVENOUS at 13:16

## 2021-12-16 RX ADMIN — PROPOFOL 150 MG: 10 INJECTION, EMULSION INTRAVENOUS at 13:16

## 2021-12-16 RX ADMIN — NEOSTIGMINE METHYLSULFATE 2 MG: 1 INJECTION INTRAVENOUS at 14:30

## 2021-12-16 RX ADMIN — HEPARIN SODIUM 5000 UNITS: 5000 INJECTION INTRAVENOUS; SUBCUTANEOUS at 12:41

## 2021-12-16 RX ADMIN — CEFAZOLIN 1000 MG: 1 INJECTION, POWDER, FOR SOLUTION INTRAMUSCULAR; INTRAVENOUS at 13:25

## 2021-12-16 RX ADMIN — GLYCOPYRROLATE 0.2 MG: 0.2 INJECTION, SOLUTION INTRAMUSCULAR; INTRAVENOUS at 14:30

## 2021-12-16 RX ADMIN — ONDANSETRON 4 MG: 2 INJECTION INTRAMUSCULAR; INTRAVENOUS at 14:28

## 2021-12-16 RX ADMIN — SODIUM CHLORIDE, SODIUM LACTATE, POTASSIUM CHLORIDE, AND CALCIUM CHLORIDE 125 ML/HR: .6; .31; .03; .02 INJECTION, SOLUTION INTRAVENOUS at 12:35

## 2021-12-16 RX ADMIN — Medication 100 MG: at 13:16

## 2021-12-20 ENCOUNTER — TELEPHONE (OUTPATIENT)
Dept: SURGERY | Facility: CLINIC | Age: 76
End: 2021-12-20

## 2021-12-30 ENCOUNTER — OFFICE VISIT (OUTPATIENT)
Dept: SURGERY | Facility: CLINIC | Age: 76
End: 2021-12-30

## 2021-12-30 VITALS — BODY MASS INDEX: 25.76 KG/M2 | WEIGHT: 173.9 LBS | HEIGHT: 69 IN | TEMPERATURE: 96.4 F

## 2021-12-30 DIAGNOSIS — K40.20 NON-RECURRENT BILATERAL INGUINAL HERNIA WITHOUT OBSTRUCTION OR GANGRENE: Primary | ICD-10-CM

## 2021-12-30 PROCEDURE — 99024 POSTOP FOLLOW-UP VISIT: CPT | Performed by: SURGERY

## 2022-01-12 DIAGNOSIS — E78.2 MIXED HYPERLIPIDEMIA: ICD-10-CM

## 2022-01-12 RX ORDER — SIMVASTATIN 10 MG
TABLET ORAL
Qty: 90 TABLET | Refills: 1 | Status: SHIPPED | OUTPATIENT
Start: 2022-01-12 | End: 2022-05-31

## 2022-03-08 DIAGNOSIS — I10 ESSENTIAL HYPERTENSION: ICD-10-CM

## 2022-03-08 DIAGNOSIS — E11.9 TYPE 2 DIABETES MELLITUS WITHOUT COMPLICATION, WITHOUT LONG-TERM CURRENT USE OF INSULIN (HCC): ICD-10-CM

## 2022-03-09 RX ORDER — METFORMIN HYDROCHLORIDE 500 MG/1
TABLET, EXTENDED RELEASE ORAL
Qty: 90 TABLET | Refills: 1 | Status: SHIPPED | OUTPATIENT
Start: 2022-03-09

## 2022-03-09 RX ORDER — METOPROLOL SUCCINATE 100 MG/1
TABLET, EXTENDED RELEASE ORAL
Qty: 90 TABLET | Refills: 1 | Status: SHIPPED | OUTPATIENT
Start: 2022-03-09

## 2022-04-19 ENCOUNTER — RA CDI HCC (OUTPATIENT)
Dept: OTHER | Facility: HOSPITAL | Age: 77
End: 2022-04-19

## 2022-04-19 NOTE — PROGRESS NOTES
[address]  coding opportunities       Chart reviewed, no opportunity found: CHART REVIEWED, NO OPPORTUNITY FOUND        Patients Insurance     Medicare Insurance: Medicare

## 2022-04-21 ENCOUNTER — APPOINTMENT (OUTPATIENT)
Dept: LAB | Facility: MEDICAL CENTER | Age: 77
End: 2022-04-21
Payer: MEDICARE

## 2022-04-21 ENCOUNTER — OFFICE VISIT (OUTPATIENT)
Dept: FAMILY MEDICINE CLINIC | Facility: CLINIC | Age: 77
End: 2022-04-21
Payer: MEDICARE

## 2022-04-21 VITALS
DIASTOLIC BLOOD PRESSURE: 74 MMHG | BODY MASS INDEX: 25.83 KG/M2 | TEMPERATURE: 97.2 F | HEIGHT: 69 IN | HEART RATE: 66 BPM | WEIGHT: 174.4 LBS | SYSTOLIC BLOOD PRESSURE: 160 MMHG | OXYGEN SATURATION: 98 %

## 2022-04-21 DIAGNOSIS — C61 ADENOCARCINOMA OF PROSTATE (HCC): ICD-10-CM

## 2022-04-21 DIAGNOSIS — E78.00 HYPERCHOLESTEROLEMIA: ICD-10-CM

## 2022-04-21 DIAGNOSIS — I10 BENIGN ESSENTIAL HTN: ICD-10-CM

## 2022-04-21 DIAGNOSIS — E21.3 HYPERPARATHYROIDISM (HCC): ICD-10-CM

## 2022-04-21 DIAGNOSIS — N40.1 BPH ASSOCIATED WITH NOCTURIA: ICD-10-CM

## 2022-04-21 DIAGNOSIS — R35.1 BPH ASSOCIATED WITH NOCTURIA: ICD-10-CM

## 2022-04-21 DIAGNOSIS — E11.9 CONTROLLED TYPE 2 DIABETES MELLITUS WITHOUT COMPLICATION, WITHOUT LONG-TERM CURRENT USE OF INSULIN (HCC): ICD-10-CM

## 2022-04-21 DIAGNOSIS — E11.9 CONTROLLED TYPE 2 DIABETES MELLITUS WITHOUT COMPLICATION, WITHOUT LONG-TERM CURRENT USE OF INSULIN (HCC): Primary | ICD-10-CM

## 2022-04-21 LAB
ALBUMIN SERPL BCP-MCNC: 3.8 G/DL (ref 3.5–5)
ALP SERPL-CCNC: 106 U/L (ref 46–116)
ALT SERPL W P-5'-P-CCNC: 19 U/L (ref 12–78)
ANION GAP SERPL CALCULATED.3IONS-SCNC: 4 MMOL/L (ref 4–13)
AST SERPL W P-5'-P-CCNC: 18 U/L (ref 5–45)
BILIRUB SERPL-MCNC: 0.7 MG/DL (ref 0.2–1)
BUN SERPL-MCNC: 20 MG/DL (ref 5–25)
CALCIUM SERPL-MCNC: 9 MG/DL (ref 8.3–10.1)
CHLORIDE SERPL-SCNC: 107 MMOL/L (ref 100–108)
CHOLEST SERPL-MCNC: 146 MG/DL
CO2 SERPL-SCNC: 28 MMOL/L (ref 21–32)
CREAT SERPL-MCNC: 1.18 MG/DL (ref 0.6–1.3)
CREAT UR-MCNC: 163 MG/DL
GFR SERPL CREATININE-BSD FRML MDRD: 60 ML/MIN/1.73SQ M
GLUCOSE P FAST SERPL-MCNC: 115 MG/DL (ref 65–99)
HDLC SERPL-MCNC: 59 MG/DL
LDLC SERPL CALC-MCNC: 75 MG/DL (ref 0–100)
MICROALBUMIN UR-MCNC: 18.7 MG/L (ref 0–20)
MICROALBUMIN/CREAT 24H UR: 11 MG/G CREATININE (ref 0–30)
NONHDLC SERPL-MCNC: 87 MG/DL
POTASSIUM SERPL-SCNC: 3.8 MMOL/L (ref 3.5–5.3)
PROT SERPL-MCNC: 7.1 G/DL (ref 6.4–8.2)
SODIUM SERPL-SCNC: 139 MMOL/L (ref 136–145)
TRIGL SERPL-MCNC: 61 MG/DL

## 2022-04-21 PROCEDURE — 36415 COLL VENOUS BLD VENIPUNCTURE: CPT

## 2022-04-21 PROCEDURE — 80061 LIPID PANEL: CPT

## 2022-04-21 PROCEDURE — 82570 ASSAY OF URINE CREATININE: CPT

## 2022-04-21 PROCEDURE — 80053 COMPREHEN METABOLIC PANEL: CPT

## 2022-04-21 PROCEDURE — 82043 UR ALBUMIN QUANTITATIVE: CPT

## 2022-04-21 PROCEDURE — 99214 OFFICE O/P EST MOD 30 MIN: CPT | Performed by: PHYSICIAN ASSISTANT

## 2022-04-21 PROCEDURE — 83036 HEMOGLOBIN GLYCOSYLATED A1C: CPT | Performed by: PHYSICIAN ASSISTANT

## 2022-04-21 RX ORDER — FINASTERIDE 5 MG/1
5 TABLET, FILM COATED ORAL DAILY
Qty: 90 TABLET | Refills: 1 | Status: SHIPPED | OUTPATIENT
Start: 2022-04-21

## 2022-04-21 NOTE — PROGRESS NOTES
BMI Counseling: Body mass index is 25 75 kg/m²  The BMI is above normal  Nutrition recommendations include decreasing portion sizes and moderation in carbohydrate intake  Exercise recommendations include exercising 3-5 times per week  Rationale for BMI follow-up plan is due to patient being overweight or obese  Dog  walking    Depression Screening and Follow-up Plan: Patient was screened for depression during today's encounter  They screened negative with a PHQ-2 score of 0  Assessment/Plan:     Diagnoses and all orders for this visit:    Controlled type 2 diabetes mellitus without complication, without long-term current use of insulin ([facility])  Comments:  Diabetes is at goal continue current regimen  Hyperparathyroidism ([facility] )  Comments:  Asymptomatic    Benign essential HTN  Comments:  Blood pressure is not at goal we will continue current regimen Add  finasteride  This will also benefit patient has BPH  Orders:  -     finasteride (PROSCAR) 5 mg tablet; Take 1 tablet (5 mg total) by mouth daily    Hypercholesterolemia    Adenocarcinoma of prostate ([facility] )    BPH associated with nocturia  Comments:  Trial of  finasteride  Orders:  -     finasteride (PROSCAR) 5 mg tablet; Take 1 tablet (5 mg total) by mouth daily          Subjective:      Patient ID: Manuela Jacques is a 77 y o  male  Patient presents to office for follow up chronic conditions  Patient has non-insulin diabetes mellitus type 2  He is currently on metformin  a day  His A1c in the office today is 5 5  Patient also has hyperparathyroidism he is asymptomatic  Her blood pressure he is on metoprolol , lisinopril 10 and amlodipine 10 mg  For hyperlipidemia he is on simvastatin 10 mg  Patient is due for routine lab work  Patient has a history of prostate cancer  PSA is up-to-date        The following portions of the patient's history were reviewed and updated as appropriate:   He   Patient Active Problem List    Diagnosis Date Noted    Non-recurrent bilateral inguinal hernia without obstruction or gangrene 11/01/2021    Status post right tibial-talar ankle fusion 07/13/2020    BPH associated with nocturia 08/21/2019    Arthritis of right acromioclavicular joint 03/15/2019    Primary osteoarthritis of right shoulder 03/07/2019    Chronic left shoulder pain 03/07/2019    Left rotator cuff tear arthropathy 03/07/2019    Rotator cuff injury, right, initial encounter 03/07/2019    History of colon polyps 01/09/2019    Hyperparathyroidism ([facility] ) 11/06/2018    Hypokalemia 10/22/2018    Hypocalcemia 10/22/2018    Glaucoma 10/11/2018    Adenocarcinoma of prostate ([facility] ) 01/03/2018    Controlled type 2 diabetes mellitus without complication, without long-term current use of insulin ([facility] ) 07/25/2016    Inguinal hernia 02/08/2016    Benign essential HTN 01/12/2016    Hypercholesterolemia 01/12/2016     Current Outpatient Medications   Medication Sig Dispense Refill    Accu-Chek Jackie Plus test strip CHECK BLOOD SUGAR DAILY E11 9 100 strip 1    ACCU-CHEK FASTCLIX LANCETS MISC by Does not apply route daily E11 9  Check  fbs  daily 100 each 3    amLODIPine (NORVASC) 10 mg tablet TAKE 1 TABLET (10 MG TOTAL) BY MOUTH DAILY FOR BLOOD PRESSURE 90 tablet 1    aspirin 81 mg chewable tablet Chew 1 tablet (81 mg total) 2 (two) times a day Take for DVT prophylaxis for 30 days postoperatively 60 tablet 0    finasteride (PROSCAR) 5 mg tablet Take 1 tablet (5 mg total) by mouth daily 90 tablet 1    lisinopril (ZESTRIL) 10 mg tablet TAKE 1 TABLET BY MOUTH EVERY DAY 90 tablet 1    metFORMIN (GLUCOPHAGE-XR) 500 mg 24 hr tablet TAKE 1 TABLET BY MOUTH EVERY DAY 90 tablet 1    metoprolol succinate (TOPROL-XL) 100 mg 24 hr tablet TAKE 1 TABLET BY MOUTH EVERY DAY 90 tablet 1    simvastatin (ZOCOR) 10 mg tablet TAKE 1 TABLET BY MOUTH EVERY DAY 90 tablet 1     No current facility-administered medications for this visit       He has No Known Allergies       Review of Systems   Constitutional: Negative for activity change, appetite change, chills, fatigue and fever  HENT: Negative for ear pain and sore throat  Eyes: Negative for visual disturbance  Respiratory: Negative for cough and shortness of breath  Cardiovascular: Negative for chest pain, palpitations and leg swelling  Gastrointestinal: Negative for abdominal pain, blood in stool, constipation, diarrhea and nausea  Genitourinary: Negative for difficulty urinating  Nocturia 1-2   Musculoskeletal: Positive for arthralgias  Negative for back pain and myalgias  Right   ankle   Skin: Negative for rash  Neurological: Negative for dizziness, syncope and headaches  Psychiatric/Behavioral: Negative for sleep disturbance  Objective:        Physical Exam  Vitals and nursing note reviewed  Constitutional:       Appearance: Normal appearance  He is well-developed  HENT:      Head: Normocephalic and atraumatic  Comments: Bilateral  Hearing  aids     Right Ear: External ear normal       Left Ear: Tympanic membrane and external ear normal    Eyes:      Conjunctiva/sclera: Conjunctivae normal       Pupils: Pupils are equal, round, and reactive to light  Neck:      Thyroid: No thyromegaly  Vascular: No carotid bruit  Cardiovascular:      Rate and Rhythm: Normal rate and regular rhythm  Pulses: no weak pulses          Dorsalis pedis pulses are 2+ on the right side and 2+ on the left side  Heart sounds: Normal heart sounds  No murmur heard  Pulmonary:      Effort: Pulmonary effort is normal       Breath sounds: Normal breath sounds  No wheezing  Abdominal:      General: Bowel sounds are normal       Palpations: Abdomen is soft  There is no mass  Tenderness: There is no abdominal tenderness  Musculoskeletal:      Right lower leg: No edema  Left lower leg: No edema     Feet:      Right foot:      Skin integrity: Callus and dry skin present  Left foot:      Skin integrity: Callus and dry skin present  Lymphadenopathy:      Cervical: No cervical adenopathy  Skin:     General: Skin is warm and dry  Coloration: Skin is not pale  Findings: No erythema  Neurological:      General: No focal deficit present  Mental Status: He is alert and oriented to person, place, and time  Psychiatric:         Mood and Affect: Mood normal          Behavior: Behavior normal          Thought Content: Thought content normal          Judgment: Judgment normal       Diabetic Foot Exam    Patient's shoes and socks removed  Right Foot/Ankle   Right Foot Inspection  Skin Exam: skin intact, dry skin, callus and callus  Toe Exam: right toe deformity  Sensory   Vibration: intact  Monofilament testing: intact    Vascular  The right DP pulse is 2+  Left Foot/Ankle  Left Foot Inspection  Skin Exam: skin intact, dry skin and callus  Toe Exam: left toe deformity (hammer  toe 3rd)  Sensory   Vibration: intact  Monofilament testing: intact    Vascular  The left DP pulse is 2+       Assign Risk Category  Deformity present  No loss of protective sensation  No weak pulses  Risk: 1

## 2022-05-03 ENCOUNTER — OFFICE VISIT (OUTPATIENT)
Dept: FAMILY MEDICINE CLINIC | Facility: CLINIC | Age: 77
End: 2022-05-03
Payer: MEDICARE

## 2022-05-03 VITALS
BODY MASS INDEX: 25.62 KG/M2 | HEIGHT: 69 IN | HEART RATE: 64 BPM | TEMPERATURE: 97.9 F | SYSTOLIC BLOOD PRESSURE: 132 MMHG | WEIGHT: 173 LBS | OXYGEN SATURATION: 97 % | DIASTOLIC BLOOD PRESSURE: 64 MMHG

## 2022-05-03 DIAGNOSIS — H61.23 BILATERAL IMPACTED CERUMEN: Primary | ICD-10-CM

## 2022-05-03 LAB — SL AMB POCT HEMOGLOBIN AIC: 5.5 (ref ?–6.5)

## 2022-05-03 PROCEDURE — 69209 REMOVE IMPACTED EAR WAX UNI: CPT | Performed by: PHYSICIAN ASSISTANT

## 2022-05-03 NOTE — PROGRESS NOTES
Assessment/Plan:     Diagnoses and all orders for this visit:    Bilateral impacted cerumen  Comments:  Bilateral ear irrigation successful    Other orders  -     Ear cerumen removal          Subjective:      Patient ID: Emy Lopez is a 77 y o  male  Patient here in the office to have his ears irrigated  Patient wears bilateral hearing aids  He states both his ears are blocked    On exam he does have cerumen impaction bilateral      The following portions of the patient's history were reviewed and updated as appropriate:   He   Patient Active Problem List    Diagnosis Date Noted    Non-recurrent bilateral inguinal hernia without obstruction or gangrene 11/01/2021    Status post right tibial-talar ankle fusion 07/13/2020    BPH associated with nocturia 08/21/2019    Arthritis of right acromioclavicular joint 03/15/2019    Primary osteoarthritis of right shoulder 03/07/2019    Chronic left shoulder pain 03/07/2019    Left rotator cuff tear arthropathy 03/07/2019    Rotator cuff injury, right, initial encounter 03/07/2019    History of colon polyps 01/09/2019    Hyperpar[facility]roidism ([facility] ) 11/06/2018    Hypokalemia 10/22/2018    Hypocalcemia 10/22/2018    Glaucoma 10/11/2018    Adenocarcinoma of prostate ([facility] ) 01/03/2018    Controlled type 2 diabetes mellitus without complication, without long-term current use of insulin ([facility]) 07/25/2016    Inguinal hernia 02/08/2016    Benign essential HTN 01/12/2016    Hypercholesterolemia 01/12/2016     Current Outpatient Medications   Medication Sig Dispense Refill    Accu-Chek Jackie Plus test strip CHECK BLOOD SUGAR DAILY E11 9 100 strip 1    ACCU-CHEK FASTCLIX LANCETS MISC by Does not apply route daily E11 9  Check  fbs  daily 100 each 3    amLODIPine (NORVASC) 10 mg tablet TAKE 1 TABLET (10 MG TOTAL) BY MOUTH DAILY FOR BLOOD PRESSURE 90 tablet 1    finasteride (PROSCAR) 5 mg tablet Take 1 tablet (5 mg total) by mouth daily 90 tablet 1    lisinopril (ZESTRIL) 10 mg tablet TAKE 1 TABLET BY MOUTH EVERY DAY 90 tablet 1    metFORMIN (GLUCOPHAGE-XR) 500 mg 24 hr tablet TAKE 1 TABLET BY MOUTH EVERY DAY 90 tablet 1    metoprolol succinate (TOPROL-XL) 100 mg 24 hr tablet TAKE 1 TABLET BY MOUTH EVERY DAY 90 tablet 1    simvastatin (ZOCOR) 10 mg tablet TAKE 1 TABLET BY MOUTH EVERY DAY 90 tablet 1    aspirin 81 mg chewable tablet Chew 1 tablet (81 mg total) 2 (two) times a day Take for DVT prophylaxis for 30 days postoperatively 60 tablet 0     No current facility-administered medications for this visit  He has No Known Allergies       Review of Systems   HENT: Positive for hearing loss  Negative for ear discharge and ear pain  Objective:        Physical Exam  Vitals and nursing note reviewed  Constitutional:       Appearance: Normal appearance  HENT:      Head: Normocephalic and atraumatic  Right Ear: External ear normal  There is impacted cerumen  Left Ear: External ear normal    Eyes:      Conjunctiva/sclera: Conjunctivae normal    Pulmonary:      Effort: Pulmonary effort is normal    Skin:     General: Skin is warm and dry  Neurological:      Mental Status: He is alert and oriented to person, place, and time  Ear cerumen removal    Date/Time: 5/3/2022 2:38 PM  Performed by: Aj Luo PA-C  Authorized by: Aj Luo PA-C   Universal Protocol:  Consent: Verbal consent obtained  Written consent not obtained  Consent given by: parent  Patient identity confirmed: verbally with patient      Patient location:  Clinic  Indications / Diagnosis:  Cerumen impaction  Procedure details:     Local anesthetic:  None    Location:  L ear and R ear    Procedure type: irrigation only      Approach:  External  Post-procedure details:     Complication:  None    Hearing quality:  Improved    Patient tolerance of procedure:   Tolerated well, no immediate complications

## 2022-05-10 DIAGNOSIS — I10 ESSENTIAL HYPERTENSION: ICD-10-CM

## 2022-05-10 RX ORDER — AMLODIPINE BESYLATE 10 MG/1
TABLET ORAL
Qty: 90 TABLET | Refills: 1 | Status: SHIPPED | OUTPATIENT
Start: 2022-05-10

## 2022-05-28 DIAGNOSIS — I10 ESSENTIAL HYPERTENSION: ICD-10-CM

## 2022-05-28 DIAGNOSIS — E78.2 MIXED HYPERLIPIDEMIA: ICD-10-CM

## 2022-05-31 RX ORDER — LISINOPRIL 10 MG/1
TABLET ORAL
Qty: 90 TABLET | Refills: 1 | Status: SHIPPED | OUTPATIENT
Start: 2022-05-31

## 2022-05-31 RX ORDER — SIMVASTATIN 10 MG
TABLET ORAL
Qty: 90 TABLET | Refills: 1 | Status: SHIPPED | OUTPATIENT
Start: 2022-05-31

## 2022-06-02 ENCOUNTER — NEW PATIENT COMPREHENSIVE (OUTPATIENT)
Dept: URBAN - METROPOLITAN AREA CLINIC 6 | Facility: CLINIC | Age: 77
End: 2022-06-02

## 2022-06-02 DIAGNOSIS — H40.1111: ICD-10-CM

## 2022-06-02 DIAGNOSIS — E11.9: ICD-10-CM

## 2022-06-02 DIAGNOSIS — H40.1123: ICD-10-CM

## 2022-06-02 PROCEDURE — 92020 GONIOSCOPY: CPT

## 2022-06-02 PROCEDURE — 92202 OPSCPY EXTND ON/MAC DRAW: CPT

## 2022-06-02 PROCEDURE — 92133 CPTRZD OPH DX IMG PST SGM ON: CPT

## 2022-06-02 PROCEDURE — 92004 COMPRE OPH EXAM NEW PT 1/>: CPT

## 2022-06-02 PROCEDURE — 76514 ECHO EXAM OF EYE THICKNESS: CPT

## 2022-06-02 ASSESSMENT — VISUAL ACUITY
OD_CC: 20/50
OS_CC: 20/400

## 2022-06-02 ASSESSMENT — TONOMETRY
OS_IOP_MMHG: 38
OD_IOP_MMHG: 11
OD_IOP_MMHG: 27
OS_IOP_MMHG: 12

## 2022-06-02 ASSESSMENT — PACHYMETRY
OD_CT_UM: 598
OS_CT_UM: 615

## 2022-06-27 ENCOUNTER — IOP CHECK (OUTPATIENT)
Dept: URBAN - METROPOLITAN AREA CLINIC 6 | Facility: CLINIC | Age: 77
End: 2022-06-27

## 2022-06-27 DIAGNOSIS — H40.1123: ICD-10-CM

## 2022-06-27 DIAGNOSIS — H40.1111: ICD-10-CM

## 2022-06-27 DIAGNOSIS — H25.813: ICD-10-CM

## 2022-06-27 PROCEDURE — 92012 INTRM OPH EXAM EST PATIENT: CPT

## 2022-06-27 ASSESSMENT — VISUAL ACUITY
OS_CC: 20/400
OD_CC: 20/40
OU_CC: 20/40-1
OU_CC: J3

## 2022-06-27 ASSESSMENT — TONOMETRY
OS_IOP_MMHG: 16
OD_IOP_MMHG: 12
OD_IOP_MMHG: 15
OS_IOP_MMHG: 13

## 2022-07-15 ENCOUNTER — RA CDI HCC (OUTPATIENT)
Dept: OTHER | Facility: HOSPITAL | Age: 77
End: 2022-07-15

## 2022-07-15 NOTE — PROGRESS NOTES
[facility]  coding opportunities       Chart reviewed, no opportunity found: CHART REVIEWED, NO OPPORTUNITY FOUND        Patients Insurance     Medicare Insurance: Medicare

## 2022-07-21 ENCOUNTER — OFFICE VISIT (OUTPATIENT)
Dept: FAMILY MEDICINE CLINIC | Facility: CLINIC | Age: 77
End: 2022-07-21
Payer: MEDICARE

## 2022-07-21 VITALS
WEIGHT: 169.2 LBS | TEMPERATURE: 97.2 F | SYSTOLIC BLOOD PRESSURE: 144 MMHG | HEIGHT: 69 IN | OXYGEN SATURATION: 97 % | HEART RATE: 69 BPM | BODY MASS INDEX: 25.06 KG/M2 | DIASTOLIC BLOOD PRESSURE: 78 MMHG

## 2022-07-21 DIAGNOSIS — H26.9 CATARACT OF LEFT EYE, UNSPECIFIED CATARACT TYPE: ICD-10-CM

## 2022-07-21 DIAGNOSIS — E21.3 HYPERPARATHYROIDISM (HCC): ICD-10-CM

## 2022-07-21 DIAGNOSIS — R35.1 BPH ASSOCIATED WITH NOCTURIA: ICD-10-CM

## 2022-07-21 DIAGNOSIS — Z00.00 MEDICARE ANNUAL WELLNESS VISIT, SUBSEQUENT: ICD-10-CM

## 2022-07-21 DIAGNOSIS — E78.00 HYPERCHOLESTEROLEMIA: ICD-10-CM

## 2022-07-21 DIAGNOSIS — I10 BENIGN ESSENTIAL HTN: ICD-10-CM

## 2022-07-21 DIAGNOSIS — Z01.818 PREOPERATIVE EXAMINATION: ICD-10-CM

## 2022-07-21 DIAGNOSIS — E11.9 CONTROLLED TYPE 2 DIABETES MELLITUS WITHOUT COMPLICATION, WITHOUT LONG-TERM CURRENT USE OF INSULIN (HCC): Primary | ICD-10-CM

## 2022-07-21 DIAGNOSIS — N40.1 BPH ASSOCIATED WITH NOCTURIA: ICD-10-CM

## 2022-07-21 DIAGNOSIS — H40.9 GLAUCOMA OF LEFT EYE, UNSPECIFIED GLAUCOMA TYPE: ICD-10-CM

## 2022-07-21 LAB — SL AMB POCT HEMOGLOBIN AIC: 5.5 (ref ?–6.5)

## 2022-07-21 PROCEDURE — 83036 HEMOGLOBIN GLYCOSYLATED A1C: CPT | Performed by: PHYSICIAN ASSISTANT

## 2022-07-21 PROCEDURE — G0438 PPPS, INITIAL VISIT: HCPCS | Performed by: PHYSICIAN ASSISTANT

## 2022-07-21 PROCEDURE — 99215 OFFICE O/P EST HI 40 MIN: CPT | Performed by: PHYSICIAN ASSISTANT

## 2022-07-21 NOTE — PATIENT INSTRUCTIONS
Medicare Preventive Visit Patient Instructions  Thank you for completing your Welcome to Medicare Visit or Medicare Annual Wellness Visit today  Your next wellness visit will be due in one year (7/22/2023)  The screening/preventive services that you may require over the next 5-10 years are detailed below  Some tests may not apply to you based off risk factors and/or age  Screening tests ordered at today's visit but not completed yet may show as past due  Also, please note that scanned in results may not display below  Preventive Screenings:  Service Recommendations Previous Testing/Comments   Colorectal Cancer Screening  · Colonoscopy    · Fecal Occult Blood Test (FOBT)/Fecal Immunochemical Test (FIT)  · Fecal DNA/Cologuard Test  · Flexible Sigmoidoscopy Age: 54-65 years old   Colonoscopy: every 10 years (May be performed more frequently if at higher risk)  OR  FOBT/FIT: every 1 year  OR  Cologuard: every 3 years  OR  Sigmoidoscopy: every 5 years  Screening may be recommended earlier than age 48 if at higher risk for colorectal cancer  Also, an individualized decision between you and your healthcare provider will decide whether screening between the ages of 74-80 would be appropriate   Colonoscopy: 07/07/2021  FOBT/FIT: Not on file  Cologuard: Not on file  Sigmoidoscopy: Not on file    Screening Current     Prostate Cancer Screening Individualized decision between patient and health care provider in men between ages of 53-78   Medicare will cover every 12 months beginning on the day after your 50th birthday PSA: 0 1 ng/mL     History Prostate Cancer  Screening Not Indicated     Hepatitis C Screening Once for adults born between 1945 and 1965  More frequently in patients at high risk for Hepatitis C Hep C Antibody: Not on file        Diabetes Screening 1-2 times per year if you're at risk for diabetes or have pre-diabetes Fasting glucose: 115 mg/dL   A1C: 5 5    Screening Not Indicated  History Diabetes Cholesterol Screening Once every 5 years if you don't have a lipid disorder  May order more often based on risk factors  Lipid panel: 04/21/2022    Screening Not Indicated  History Lipid Disorder      Other Preventive Screenings Covered by Medicare:  1  Abdominal Aortic Aneurysm (AAA) Screening: covered once if your at risk  You're considered to be at risk if you have a family history of AAA or a male between the age of 73-68 who smoking at least 100 cigarettes in your lifetime  2  Lung Cancer Screening: covers low dose CT scan once per year if you meet all of the following conditions: (1) Age 50-69; (2) No signs or symptoms of lung cancer; (3) Current smoker or have quit smoking within the last 15 years; (4) You have a tobacco smoking history of at least 30 pack years (packs per day x number of years you smoked); (5) You get a written order from a healthcare provider  3  Glaucoma Screening: covered annually if you're considered high risk: (1) You have diabetes OR (2) Family history of glaucoma OR (3)  aged 48 and older OR (3)  American aged 72 and older  3  Osteoporosis Screening: covered every 2 years if you meet one of the following conditions: (1) Have a vertebral abnormality; (2) On glucocorticoid therapy for more than 3 months; (3) Have primary hyperparathyroidism; (4) On osteoporosis medications and need to assess response to drug therapy  5  HIV Screening: covered annually if you're between the age of 12-76  Also covered annually if you are younger than 13 and older than 72 with risk factors for HIV infection  For pregnant patients, it is covered up to 3 times per pregnancy      Immunizations:  Immunization Recommendations   Influenza Vaccine Annual influenza vaccination during flu season is recommended for all persons aged >= 6 months who do not have contraindications   Pneumococcal Vaccine (Prevnar and Pneumovax)  * Prevnar = PCV13  * Pneumovax = PPSV23 Adults 25-60 years old: 1-3 doses may be recommended based on certain risk factors  Adults 72 years old: Prevnar (PCV13) vaccine recommended followed by Pneumovax (PPSV23) vaccine  If already received PPSV23 since turning 65, then PCV13 recommended at least one year after PPSV23 dose  Hepatitis B Vaccine 3 dose series if at intermediate or high risk (ex: diabetes, end stage renal disease, liver disease)   Tetanus (Td) Vaccine - COST NOT COVERED BY MEDICARE PART B Following completion of primary series, a booster dose should be given every 10 years to maintain immunity against tetanus  Td may also be given as tetanus wound prophylaxis  Tdap Vaccine - COST NOT COVERED BY MEDICARE PART B Recommended at least once for all adults  For pregnant patients, recommended with each pregnancy  Shingles Vaccine (Shingrix) - COST NOT COVERED BY MEDICARE PART B  2 shot series recommended in those aged 48 and above     Health Maintenance Due:      Topic Date Due    Hepatitis C Screening  Never done    Colorectal Cancer Screening  07/07/2026     Immunizations Due:      Topic Date Due    Pneumococcal Vaccine: 65+ Years (2 - PPSV23 or PCV20) 12/07/2017    COVID-19 Vaccine (3 - Booster for Pfizer series) 08/17/2021    Influenza Vaccine (1) 09/01/2022     Advance Directives   What are advance directives? Advance directives are legal documents that state your wishes and plans for medical care  These plans are made ahead of time in case you lose your ability to make decisions for yourself  Advance directives can apply to any medical decision, such as the treatments you want, and if you want to donate organs  What are the types of advance directives? There are many types of advance directives, and each state has rules about how to use them  You may choose a combination of any of the following:  · Living will: This is a written record of the treatment you want   You can also choose which treatments you do not want, which to limit, and which to stop at a certain time  This includes surgery, medicine, IV fluid, and tube feedings  · Durable power of  for healthcare [facility]): This is a written record that states who you want to make healthcare choices for you when you are unable to make them for yourself  This person, called a proxy, is usually a family member or a friend  You may choose more than 1 proxy  · Do not resuscitate (DNR) order:  A DNR order is used in case your heart stops beating or you stop breathing  It is a request not to have certain forms of treatment, such as CPR  A DNR order may be included in other types of advance directives  · Medical directive: This covers the care that you want if you are in a coma, near death, or unable to make decisions for yourself  You can list the treatments you want for each condition  Treatment may include pain medicine, surgery, blood transfusions, dialysis, IV or tube feedings, and a ventilator (breathing machine)  · Values history: This document has questions about your views, beliefs, and how you feel and think about life  This information can help others choose the care that you would choose  Why are advance directives important? An advance directive helps you control your care  Although spoken wishes may be used, it is better to have your wishes written down  Spoken wishes can be misunderstood, or not followed  Treatments may be given even if you do not want them  An advance directive may make it easier for your family to make difficult choices about your care  © Copyright [employer] 2018 Information is for End User's use only and may not be sold, redistributed or otherwise used for commercial purposes   All illustrations and images included in CareNotes® are the copyrighted property of A D A M [employer] Inc  or [facility] [email or web address]

## 2022-07-21 NOTE — PROGRESS NOTES
Diabetic Foot Exam    Patient's shoes and socks removed  Right Foot/Ankle   Right Foot Inspection  Skin Exam: callus and callus  Toe Exam: right toe deformity  Sensory   Vibration: intact  Monofilament testing: intact    Vascular  The right DP pulse is 2+  Left Foot/Ankle  Left Foot Inspection  Skin Exam: callus  Toe Exam: left toe deformity  Sensory   Vibration: intact  Monofilament testing: intact    Vascular  The left DP pulse is 2+  Assign Risk Category  Deformity present  No loss of protective sensation  No weak pulses  Risk: 1   Assessment and Plan:     Problem List Items Addressed This Visit        Endocrine    Controlled type 2 diabetes mellitus without complication, without long-term current use of insulin ([facility] [address] ) - Primary    Hyperparathyroidism ([facility] [address] )       Cardiovascular and Mediastinum    Benign essential HTN       Other    Hypercholesterolemia    BPH associated with nocturia      Other Visit Diagnoses     Medicare annual wellness visit, subsequent            BMI Counseling: Body mass index is 24 99 kg/m²  Follow-up plan was not completed due to elderly patient (73 years old) where weight reduction/weight gain would complicate underlying health condition such as: illness or physical disability  Depression Screening and Follow-up Plan: Patient was screened for depression during today's encounter  They screened negative with a PHQ-2 score of 0  Preventive health issues were discussed with patient, and age appropriate screening tests were ordered as noted in patient's After Visit Summary  Personalized health advice and appropriate referrals for health education or preventive services given if needed, as noted in patient's After Visit Summary  History of Present Illness:     Patient presents for a Medicare Wellness Visit    Presents in the office for follow up chronic conditions  Patient has non-insulin diabetes mellitus type 2   He is on metformin  mg once a day   He does check his blood sugar daily and is compliant with low carb low sugar diet  History of hyperparathyroidism  We are monitoring his levels  Blood pressure patient takes amlodipine, metoprolol and lisinopril  For hyperlipidemia he is on simvastatin 10 mg  He has a history of prostate cancer and BPH he is on finasteride 5 mg  Hemoglobin A1c in the office today is 5 5       Patient Care Team:  Nova Ordonez PA-C as PCP - General (Family Medicine)     Review of Systems:     Review of Systems   Constitutional: Negative for activity change, appetite change, chills, fatigue and fever  HENT: Negative for ear pain and sore throat  Eyes: Positive for visual disturbance  Left   Eye cataract and  glaaucoma   Respiratory: Negative for cough and shortness of breath  Cardiovascular: Negative for chest pain, palpitations and leg swelling  Gastrointestinal: Negative for abdominal pain, blood in stool, constipation, diarrhea and nausea  Genitourinary: Negative for difficulty urinating  Musculoskeletal: Negative for arthralgias, back pain and myalgias  Skin: Negative for rash  Neurological: Negative for dizziness, syncope and headaches  Psychiatric/Behavioral: Negative for sleep disturbance          Problem List:     Patient Active Problem List   Diagnosis    Benign essential HTN    Controlled type 2 diabetes mellitus without complication, without long-term current use of insulin ([address] )    Hypercholesterolemia    Adenocarcinoma of prostate ([address] )    Glaucoma    Inguinal hernia    Hypokalemia    Hypocalcemia    Hyperparathyroidism ([address] )    History of colon polyps    Primary osteoarthritis of right shoulder    Chronic left shoulder pain    Left rotator cuff tear arthropathy    Rotator cuff injury, right, initial encounter    Arthritis of right acromioclavicular joint    BPH associated with nocturia    Status post right tibial-talar ankle fusion    Non-recurrent bilateral inguinal hernia without obstruction or gangrene      Past Medical and Surgical History:     Past Medical History:   Diagnosis Date    Diabetes mellitus ([facility] [address] )     Hyperlipidemia     Hypertension     Prostate cancer ([facility] [address] ) 2005    S/P prostate ca-2005 had radiation tx       Past Surgical History:   Procedure Laterality Date    COLONOSCOPY      MS ARTHRODESIS,ANKLE,OPEN Right 7/10/2020    Procedure: ARTHRODESIS/ TIBIAL-TALAR ANKLE FUSION;  Surgeon: Meng Méndez MD;  Location: [facility];  Service: Orthopedics    MS Griselda Tineo 19 Bilateral 12/16/2021    Procedure: LAPAROSCOPIC REPAIR HERNIA INGUINAL WITH MESH;  Surgeon: Sergo Hamilton MD;  Location: [facility];  Service: General      Family History:     Family History   Problem Relation Age of Onset    Heart attack Mother       Social History:     Social History     Socioeconomic History    Marital status: /Civil Union     Spouse name: None    Number of children: None    Years of education: None    Highest education level: None   Occupational History    None   Tobacco Use    Smoking status: Never Smoker    Smokeless tobacco: Never Used   Vaping Use    Vaping Use: Never used   Substance and Sexual Activity    Alcohol use: Yes     Comment: Occasional    Drug use: Never    Sexual activity: None   Other Topics Concern    None   Social History Narrative    None     Social Determinants of Health     Financial Resource Strain: Not on file   Food Insecurity: Not on file   Transportation Needs: Not on file   Physical Activity: Not on file   Stress: Not on file   Social Connections: Not on file   Intimate Partner Violence: Not on file   Housing Stability: Not on file      Medications and Allergies:     Current Outpatient Medications   Medication Sig Dispense Refill    Accu-Chek Jackie Plus test strip CHECK BLOOD SUGAR DAILY E11 9 100 strip 1    ACCU-CHEK FASTCLIX LANCETS MISC by Does not apply route daily E11 9  Check  fbs daily 100 each 3    amLODIPine (NORVASC) 10 mg tablet TAKE 1 TABLET BY MOUTH DAILY FOR BLOOD PRESSURE  90 tablet 1    finasteride (PROSCAR) 5 mg tablet Take 1 tablet (5 mg total) by mouth daily 90 tablet 1    lisinopril (ZESTRIL) 10 mg tablet TAKE 1 TABLET BY MOUTH EVERY DAY 90 tablet 1    metFORMIN (GLUCOPHAGE-XR) 500 mg 24 hr tablet TAKE 1 TABLET BY MOUTH EVERY DAY 90 tablet 1    metoprolol succinate (TOPROL-XL) 100 mg 24 hr tablet TAKE 1 TABLET BY MOUTH EVERY DAY 90 tablet 1    simvastatin (ZOCOR) 10 mg tablet TAKE 1 TABLET BY MOUTH EVERY DAY 90 tablet 1    aspirin 81 mg chewable tablet Chew 1 tablet (81 mg total) 2 (two) times a day Take for DVT prophylaxis for 30 days postoperatively 60 tablet 0     No current facility-administered medications for this visit  No Known Allergies   Immunizations:     Immunization History   Administered Date(s) Administered    COVID-19 PFIZER VACCINE 0 3 ML IM 02/23/2021, 03/17/2021    INFLUENZA 10/27/2014, 10/27/2015, 10/27/2016, 11/16/2016, 10/13/2017, 10/26/2018    Influenza Injectable, MDCK, Preservative Free, Quadrivalent, 0 5 mL 10/26/2018    Influenza Quadrivalent Preservative Free 3 years and older IM 11/16/2016    Influenza, high dose seasonal 0 7 mL 09/14/2020, 10/21/2021    Influenza, injectable, quadrivalent, preservative free 0 5 mL 10/25/2019    Pneumococcal Conjugate 13-Valent 12/07/2016      Health Maintenance:         Topic Date Due    Hepatitis C Screening  Never done    Colorectal Cancer Screening  07/07/2026         Topic Date Due    Pneumococcal Vaccine: 65+ Years (2 - PPSV23 or PCV20) 12/07/2017    COVID-19 Vaccine (3 - Booster for Gonzales Peter series) 08/17/2021    Influenza Vaccine (1) 09/01/2022      Medicare Screening Tests and Risk Assessments:     Kiley Andrews is here for his Subsequent Wellness visit  Health Risk Assessment:   Patient rates overall health as fair  Patient feels that their physical health rating is slightly better   Patient is very satisfied with their life  Eyesight was rated as slightly worse  Hearing was rated as same  Patient feels that their emotional and mental health rating is same  Patients states they are never, rarely angry  Patient states they are sometimes unusually tired/fatigued  Pain experienced in the last 7 days has been none  Patient states that he has experienced no weight loss or gain in last 6 months  Depression Screening:   PHQ-2 Score: 0      Fall Risk Screening: In the past year, patient has experienced: no history of falling in past year      Home Safety:  Patient has trouble with stairs inside or outside of their home  Patient has working smoke alarms and has working carbon monoxide detector  Home safety hazards include: none  Nutrition:   Current diet is Regular  Medications:   Patient is currently taking over-the-counter supplements  OTC medications include: see medication list  Patient is able to manage medications  Activities of Daily Living (ADLs)/Instrumental Activities of Daily Living (IADLs):   Walk and transfer into and out of bed and chair?: Yes  Dress and groom yourself?: Yes    Bathe or shower yourself?: Yes    Feed yourself?  Yes  Do your laundry/housekeeping?: Yes  Manage your money, pay your bills and track your expenses?: Yes  Make your own meals?: Yes    Do your own shopping?: Yes    Previous Hospitalizations:   Any hospitalizations or ED visits within the last 12 months?: No      Advance Care Planning:   Living will: Yes    Advanced directive: Yes      Cognitive Screening:   Provider or family/friend/caregiver concerned regarding cognition?: No    PREVENTIVE SCREENINGS      Cardiovascular Screening:    General: History Lipid Disorder and Screening Current      Diabetes Screening:     General: History Diabetes and Screening Current      Colorectal Cancer Screening:     General: Screening Current      Prostate Cancer Screening:    General: History Prostate Cancer and Screening Current      Abdominal Aortic Aneurysm (AAA) Screening:    Risk factors include: age between 73-69 yo        Lung Cancer Screening:     General: Screening Not Indicated    Screening, Brief Intervention, and Referral to Treatment (SBIRT)    Screening  Typical number of drinks in a day: 0  Typical number of drinks in a week: 1  Interpretation: Low risk drinking behavior  Single Item Drug Screening:  How often have you used an illegal drug (including marijuana) or a prescription medication for non-medical reasons in the past year? never    Single Item Drug Screen Score: 0  Interpretation: Negative screen for possible drug use disorder    Brief Intervention  Alcohol & drug use screenings were reviewed  No concerns regarding substance use disorder identified  No exam data present     Physical Exam:     /78   Pulse 69   Temp (!) 97 2 °F (36 2 °C) (Temporal)   Ht 5' 9" (1 753 m)   Wt 76 7 kg (169 lb 3 2 oz)   SpO2 97%   BMI 24 99 kg/m²     Physical Exam  Vitals and nursing note reviewed  Constitutional:       Appearance: Normal appearance  He is not ill-appearing  HENT:      Head: Normocephalic and atraumatic  Right Ear: Tympanic membrane, ear canal and external ear normal       Left Ear: Tympanic membrane, ear canal and external ear normal    Eyes:      Extraocular Movements: Extraocular movements intact  Pupils: Pupils are equal, round, and reactive to light  Neck:      Thyroid: No thyromegaly  Vascular: No carotid bruit  Cardiovascular:      Rate and Rhythm: Normal rate and regular rhythm  Pulses: Normal pulses  no weak pulses          Dorsalis pedis pulses are 2+ on the right side and 2+ on the left side  Heart sounds: Normal heart sounds  No murmur heard  Pulmonary:      Effort: Pulmonary effort is normal       Breath sounds: Normal breath sounds  Abdominal:      General: Bowel sounds are normal       Palpations: Abdomen is soft  There is no mass        Tenderness: There is no abdominal tenderness  Musculoskeletal:      Right lower leg: No edema  Left lower leg: No edema  Feet:      Right foot:      Skin integrity: Callus present  Left foot:      Skin integrity: Callus present  Skin:     General: Skin is warm and dry  Coloration: Skin is not pale  Findings: No erythema  Neurological:      General: No focal deficit present  Mental Status: He is alert and oriented to person, place, and time  Psychiatric:         Mood and Affect: Mood normal          Behavior: Behavior normal          Thought Content:  Thought content normal          Judgment: Judgment normal           Urmila Vargas PA-C

## 2022-07-21 NOTE — PROGRESS NOTES
Assessment/Plan:     Diagnoses and all orders for this visit:    Controlled type 2 diabetes mellitus without complication, without long-term current use of insulin ([facility])  Comments:  Diabetes is at goal continue current regimen  Check blood sugar daily    Benign essential HTN  Comments:  Blood pressure is at goal continue current regimen    Hypercholesterolemia  Comments:  Continue statin therapy and low-fat diet    BPH associated with nocturia  Comments:  Symptoms controlled with finasteride    Hyperparathyroidism ([address] )    Medicare annual wellness visit, subsequent          Subjective:      Patient ID: Rose Ingram is a 77 y o  male  Patient presents in the office for preoperative examination  Patient is scheduled early August she to have left eye surgery  He is having cataract removed and a shunt put in the left eye due to glaucoma  Patient's chronic medical conditions currently stable and well controlled        The following portions of the patient's history were reviewed and updated as appropriate:   He   Patient Active Problem List    Diagnosis Date Noted    Non-recurrent bilateral inguinal hernia without obstruction or gangrene 11/01/2021    Status post right tibial-talar ankle fusion 07/13/2020    BPH associated with nocturia 08/21/2019    Arthritis of right acromioclavicular joint 03/15/2019    Primary osteoarthritis of right shoulder 03/07/2019    Chronic left shoulder pain 03/07/2019    Left rotator cuff tear arthropathy 03/07/2019    Rotator cuff injury, right, initial encounter 03/07/2019    History of colon polyps 01/09/2019    Hyperparathyroidism ([address] ) 11/06/2018    Hypokalemia 10/22/2018    Hypocalcemia 10/22/2018    Glaucoma 10/11/2018    Adenocarcinoma of prostate ([address] ) 01/03/2018    Controlled type 2 diabetes mellitus without complication, without long-term current use of insulin ([address] ) 07/25/2016    Inguinal hernia 02/08/2016    Benign essential HTN 01/12/2016    Hypercholesterolemia 01/12/2016     Current Outpatient Medications   Medication Sig Dispense Refill    Accu-Chek Jackie Plus test strip CHECK BLOOD SUGAR DAILY E11 9 100 strip 1    ACCU-CHEK FASTCLIX LANCETS MISC by Does not apply route daily E11 9  Check  fbs  daily 100 each 3    amLODIPine (NORVASC) 10 mg tablet TAKE 1 TABLET BY MOUTH DAILY FOR BLOOD PRESSURE  90 tablet 1    finasteride (PROSCAR) 5 mg tablet Take 1 tablet (5 mg total) by mouth daily 90 tablet 1    lisinopril (ZESTRIL) 10 mg tablet TAKE 1 TABLET BY MOUTH EVERY DAY 90 tablet 1    metFORMIN (GLUCOPHAGE-XR) 500 mg 24 hr tablet TAKE 1 TABLET BY MOUTH EVERY DAY 90 tablet 1    metoprolol succinate (TOPROL-XL) 100 mg 24 hr tablet TAKE 1 TABLET BY MOUTH EVERY DAY 90 tablet 1    simvastatin (ZOCOR) 10 mg tablet TAKE 1 TABLET BY MOUTH EVERY DAY 90 tablet 1    aspirin 81 mg chewable tablet Chew 1 tablet (81 mg total) 2 (two) times a day Take for DVT prophylaxis for 30 days postoperatively 60 tablet 0     No current facility-administered medications for this visit  He has No Known Allergies       Review of Systems   Constitutional: Negative for activity change, appetite change, chills, fatigue and fever  HENT: Negative for ear pain and sore throat  Eyes: Positive for visual disturbance  Respiratory: Negative for cough and shortness of breath  Cardiovascular: Negative for chest pain, palpitations and leg swelling  Gastrointestinal: Negative for abdominal pain, blood in stool, constipation, diarrhea and nausea  Genitourinary: Negative for difficulty urinating  Musculoskeletal: Negative for arthralgias, back pain and myalgias  Skin: Negative for rash  Neurological: Negative for dizziness, syncope and headaches  Psychiatric/Behavioral: Negative for sleep disturbance  Objective:        Physical Exam  Vitals and nursing note reviewed  Constitutional:       Appearance: Normal appearance  He is well-developed     HENT: Head: Normocephalic and atraumatic  Right Ear: External ear normal       Left Ear: External ear normal    Eyes:      Conjunctiva/sclera: Conjunctivae normal       Pupils: Pupils are equal, round, and reactive to light  Neck:      Thyroid: No thyromegaly  Vascular: No carotid bruit  Cardiovascular:      Rate and Rhythm: Normal rate and regular rhythm  Heart sounds: Normal heart sounds  No murmur heard  Pulmonary:      Effort: Pulmonary effort is normal       Breath sounds: Normal breath sounds  No wheezing  Abdominal:      General: Bowel sounds are normal       Palpations: Abdomen is soft  There is no mass  Tenderness: There is no abdominal tenderness  Musculoskeletal:      Right lower leg: No edema  Left lower leg: No edema  Lymphadenopathy:      Cervical: No cervical adenopathy  Skin:     General: Skin is warm and dry  Neurological:      General: No focal deficit present  Mental Status: He is alert and oriented to person, place, and time  Psychiatric:         Mood and Affect: Mood normal          Behavior: Behavior normal          Thought Content:  Thought content normal          Judgment: Judgment normal

## 2022-07-25 ENCOUNTER — FOLLOW UP (OUTPATIENT)
Dept: URBAN - METROPOLITAN AREA CLINIC 6 | Facility: CLINIC | Age: 77
End: 2022-07-25

## 2022-07-25 DIAGNOSIS — H25.813: ICD-10-CM

## 2022-07-25 DIAGNOSIS — H40.1123: ICD-10-CM

## 2022-07-25 DIAGNOSIS — H40.1111: ICD-10-CM

## 2022-07-25 PROCEDURE — 92136 OPHTHALMIC BIOMETRY: CPT

## 2022-07-25 PROCEDURE — 92083 EXTENDED VISUAL FIELD XM: CPT

## 2022-07-25 PROCEDURE — 92014 COMPRE OPH EXAM EST PT 1/>: CPT

## 2022-07-25 ASSESSMENT — KERATOMETRY
OD_K1POWER_DIOPTERS: 44.50
OS_K1POWER_DIOPTERS: 44.50
OD_AXISANGLE_DEGREES: 13
OS_AXISANGLE_DEGREES: 2
OS_K2POWER_DIOPTERS: 46.50
OD_K2POWER_DIOPTERS: 46.00
OS_AXISANGLE2_DEGREES: 92
OD_AXISANGLE2_DEGREES: 103

## 2022-07-25 ASSESSMENT — VISUAL ACUITY
OS_GLARE: 20/400
OD_GLARE: 20/400
OD_CC: 20/40-2
OS_CC: 20/400
OU_CC: J5

## 2022-07-25 ASSESSMENT — TONOMETRY
OD_IOP_MMHG: 13
OS_IOP_MMHG: 10

## 2022-08-06 NOTE — PROGRESS NOTES
Diabetic Foot Exam    Patient's shoes and socks removed  Right Foot/Ankle   Right Foot Inspection  Skin Exam: dry skin, callus and callus                          Toe Exam: right toe deformity  Sensory   Vibration: intact    Monofilament testing: intact  Vascular    The right DP pulse is 2+  Left Foot/Ankle  Left Foot Inspection  Skin Exam: dry skin and callus                         Toe Exam: left toe deformity                   Sensory   Vibration: intact    Monofilament: intact  Vascular    The left DP pulse is 2+  BMI Counseling: Body mass index is 25 99 kg/m²  The BMI is above normal  Nutrition recommendations include decreasing portion sizes, consuming healthier snacks and moderation in carbohydrate intake  Exercise recommendations include exercising 3-5 times per week  Patient walks his 3 dogs separately least 2 times a day      Assessment/Plan:     Diagnoses and all orders for this visit:    Controlled type 2 diabetes mellitus without complication, without long-term current use of insulin ([facility])  Comments:  Diabetes is at goal continue current regimen  check blood sugar daily  Orders:  -     POCT hemoglobin A1c    Benign essential HTN  Comments:  Blood pressure is at goal continue current regimen    Hypercholesterolemia  Comments:  Continue statin therapy and low-fat diet  Check labs    Adenocarcinoma of prostate [facility])  Comments:  Patient to follow-up with Urology    BPH associated with nocturia  Comments:  Patient is off terazosin    Hyperparathyroidism ([facility] [address] )  Comments:  Asymptomatic continue to monitor labs    Other orders  -     Cancel: Microalbumin / creatinine urine ratio          Subjective:      Patient ID: Blondell Kayser is a 77 y o  male  Patient presents in the office for follow up chronic conditions  Patient has non-insulin diabetes mellitus type 2  He has no complications  He is currently on metformin  mg once a day period he checks his blood sugar at home daily    His A1c done Inpatient consult to Pulmonology  Consult performed by: Bebeto Javed MD  Consult ordered by: Tahmina Ribeiro DO  Reason for consult: Pneumonia    PULMONARY MEDICINE CONSULT      HPI  81year old person with PMH of atrial fibrillation, diabetes mellitus, hyperlipidemia, peptic ulcer disease and as described below admitted to hospital for management of acute hypoxemic respiratory failure secondary to pneumococcal and parainfluenza pneumonia. Mr Fahad Zaman feels a little better today, no cough, no sputum production, minimal dyspnea. He remains hypoxemic and requiring oxygen supplementation with 3L NC.   He is receiving ceftriaxone and doxycycline.     -Social: Lives at home, he is an , used to work at a steel mill.  -Quit smoking 14 years ago  -Denies having pets at home    Review of Systems - History obtained from the patient  General ROS: positive for  - fatigue and malaise  Psychological ROS: negative for - anxiety  Ophthalmic ROS: negative for - blurry vision  ENT ROS: negative for - nasal discharge  Allergy and Immunology ROS: negative for - nasal congestion  Hematological and Lymphatic ROS: negative for - bleeding problems, blood clots, or bruising  Endocrine ROS: negative for - polydipsia/polyuria  Breast ROS: negative for breast lumps  Respiratory ROS: positive for - cough and shortness of breath  Cardiovascular ROS: negative for - chest pain  Gastrointestinal ROS: no abdominal pain, change in bowel habits, or black or bloody stools  Genito-Urinary ROS: no dysuria, trouble voiding, or hematuria  Musculoskeletal ROS: negative for - muscular weakness  Neurological ROS: no TIA or stroke symptoms  Dermatological ROS: negative for - rash      1) Acute hypoxemic respiratory failure secondary to pneumococcal and parainfluenza pneumonia  --Oxygen supplementation to maintain sats > 89%  --Antibiotic regimen: Ceftriaxone and doxycycline X 7 days - I increased ceftriaxone dose to 2g q 24 hrs and changed doxycycline to IV.   --Cultures reviewed  --Continue with bronchodilators  --Receiving steroids as per primary team  --DVT prophylaxis - Anticoagulated with apixaban       Rest of supportive care as per primary team    CKD IV  --Being managed by nephrology    Hypertension  --On antihypertensives    Hyperlipidemia  --Continue statin     Atrial fibrillation   --Anticoagulated with apixaban      BP (!) 171/98   Pulse 96   Temp 98.7 °F (37.1 °C) (Infrared)   Resp 24   Ht 5' 9\" (1.753 m)   Wt 196 lb 9 oz (89.2 kg)   SpO2 92%   BMI 29.03 kg/m²   General: Awake , oriented to place, time and person, comfortable  HEENT: No head lesions, PERRL, EOMI, mouth without lesions, no nasal lesions, no cervical adenopathy palpated  Respiratory: Lungs with diminished breath sounds bilaterally, no adventitious sounds auscultated, no accessory muscle use  CV: Regular rate, no murmurs, no JVD, no leg edema  Abdomen: Soft, non tender, + bowel sounds, no lesions  Skin: Hydrated, adequate turgor, echymosis on arms, capillary refill <2 seconds  Extremities: Muscular strength 4/5 in 4 limbs, moves 4 limbs spontaneously, distal pulses present  Neurology: Awake and alert, follows commands, moves 4 limbs on command and spontaneously, neck is supple, no meningitic signs present. Past Medical History:   Diagnosis Date    A-fib ([facility]     Diabetes mellitus ([facility]     H pylori ulcer 8/22/2012    Hyperlipidemia     Hypertension     PONV (postoperative nausea and vomiting)     PUD (peptic ulcer disease) 7/26/2012    Urinary frequency      No family history on file.   Social History     Socioeconomic History    Marital status:      Spouse name: Not on file    Number of children: Not on file    Years of education: Not on file    Highest education level: Not on file   Occupational History    Occupation: / mill/ inspection   Tobacco Use    Smoking status: Former     Packs/day: 1.00     Years: 60.00     Pack years: in the office today is 5 4  He has hypertension for which he is on amlodipine 10 mg, lisinopril 10 mg and metoprolol  mg  For hyperlipidemia he is on simvastatin 10 mg  He has a history of hyperparathyroidism  This is asymptomatic  History of prostate cancer and BPH  Patient states he is off terazosin for did not work  Patient due for colon cancer screening with colonoscopy  The following portions of the patient's history were reviewed and updated as appropriate:   He  has a past medical history of Diabetes mellitus ([facility] [address] ), Hyperlipidemia, Hypertension, and Prostate cancer ([facility] [address] ) (2005)  Current Outpatient Medications   Medication Sig Dispense Refill    ACCU-CHEK FASTCLIX LANCETS MISC by Does not apply route daily E11 9  Check  fbs  daily 100 each 3    amLODIPine (NORVASC) 10 mg tablet TAKE 1 TABLET (10 MG TOTAL) BY MOUTH DAILY FOR BLOOD PRESSURE 90 tablet 0    docusate sodium (COLACE) 100 mg capsule Take 1 capsule (100 mg total) by mouth 2 (two) times a day 10 capsule 0    glucose blood (Accu-Chek Jackie Plus) test strip CHECK BLOOD SUGAR DAILY E11 9 100 each 1    lisinopril (ZESTRIL) 10 mg tablet TAKE 1 TABLET BY MOUTH EVERY DAY 90 tablet 1    metFORMIN (GLUCOPHAGE-XR) 500 mg 24 hr tablet TAKE 1 TABLET BY MOUTH EVERY DAY 90 tablet 1    metoprolol succinate (TOPROL-XL) 100 mg 24 hr tablet Take 1 tablet (100 mg total) by mouth daily 90 tablet 1    simvastatin (ZOCOR) 10 mg tablet TAKE 1 TABLET BY MOUTH EVERY DAY 90 tablet 1    aspirin 81 mg chewable tablet Chew 1 tablet (81 mg total) 2 (two) times a day Take for DVT prophylaxis for 30 days postoperatively 60 tablet 0    methocarbamol (ROBAXIN) 500 mg tablet Take 1 tablet (500 mg total) by mouth 4 (four) times a day as needed for muscle spasms for up to 10 days 40 tablet 0     No current facility-administered medications for this visit  He has No Known Allergies       Review of Systems   Constitutional: Negative for activity change, 60. 00     Types: Cigarettes     Start date: 1950     Quit date: 2010     Years since quittin.0    Smokeless tobacco: Never   Substance and Sexual Activity    Alcohol use: Yes     Comment: 1/2 glass  wine  everyday    Drug use: No    Sexual activity: Not on file   Other Topics Concern    Not on file   Social History Narrative    Not on file     Social Determinants of Health     Financial Resource Strain: Not on file   Food Insecurity: Not on file   Transportation Needs: Not on file   Physical Activity: Not on file   Stress: Not on file   Social Connections: Not on file   Intimate Partner Violence: Not on file   Housing Stability: Not on file     Current Facility-Administered Medications   Medication Dose Route Frequency Provider Last Rate Last Admin    polyethylene glycol (GLYCOLAX) packet 17 g  17 g Oral Daily Damien Haas DO        docusate sodium (COLACE) capsule 100 mg  100 mg Oral BID Damien Haas DO   100 mg at 22 4240    methylPREDNISolone sodium (SOLU-MEDROL) injection 40 mg  40 mg IntraVENous Q8H Damien Haas DO        acetylcysteine (MUCOMYST) 10 % solution 400 mg  4 mL Inhalation BID KELLY Zaragoza - CNP        metoprolol succinate (TOPROL XL) extended release tablet 25 mg  25 mg Oral Nightly Nette Hawley MD        doxycycline hyclate (VIBRAMYCIN) capsule 100 mg  100 mg Oral 2 times per day Adilene Haas DO   100 mg at 22 0809    pantoprazole (PROTONIX) tablet 40 mg  40 mg Oral QAM AC KELLY Zaragoza CNP   40 mg at 22 0550    epoetin samantha-epbx (RETACRIT) injection 3,000 Units  3,000 Units SubCUTAneous Once per day on  Prakash Corey MD   3,000 Units at 22 0804    amLODIPine (NORVASC) tablet 5 mg  5 mg Oral Daily Bob Brown DO   5 mg at 22 0809    apixaban (ELIQUIS) tablet 2.5 mg  2.5 mg Oral BID Bob Brown DO   2.5 mg at 22 3953    allopurinol (ZYLOPRIM) tablet 100 mg  100 mg Oral Daily Bob Brown DO   100 mg at appetite change, chills, fatigue and fever  HENT: Negative for ear pain and sore throat  Eyes: Negative for visual disturbance  Respiratory: Negative for cough and shortness of breath  Cardiovascular: Negative for chest pain, palpitations and leg swelling  Gastrointestinal: Negative for abdominal pain, blood in stool, constipation, diarrhea and nausea  Genitourinary: Negative for difficulty urinating  Musculoskeletal: Negative for back pain and myalgias  Arthralgias: right  ankle  Skin: Negative for rash  Neurological: Negative for dizziness, syncope and headaches  Psychiatric/Behavioral: Negative for sleep disturbance  Objective:        Physical Exam  Vitals signs and nursing note reviewed  Constitutional:       General: He is not in acute distress  Appearance: Normal appearance  He is well-developed  He is not ill-appearing  HENT:      Head: Normocephalic and atraumatic  Right Ear: Tympanic membrane, ear canal and external ear normal       Left Ear: Tympanic membrane, ear canal and external ear normal    Eyes:      Conjunctiva/sclera: Conjunctivae normal       Pupils: Pupils are equal, round, and reactive to light  Neck:      Thyroid: No thyromegaly  Vascular: No carotid bruit  Cardiovascular:      Rate and Rhythm: Normal rate and regular rhythm  Pulses:           Dorsalis pedis pulses are 2+ on the right side and 2+ on the left side  Heart sounds: Normal heart sounds  No murmur  Pulmonary:      Effort: Pulmonary effort is normal       Breath sounds: Normal breath sounds  No wheezing  Abdominal:      General: Bowel sounds are normal       Palpations: Abdomen is soft  There is no mass  Tenderness: There is no abdominal tenderness  Musculoskeletal:      Right lower leg: No edema  Left lower leg: No edema  Feet:      Right foot:      Skin integrity: Callus and dry skin present        Left foot:      Skin integrity: Callus and dry skin 08/06/22 1127    gabapentin (NEURONTIN) capsule 100 mg  100 mg Oral BID Trell Quails, DO   100 mg at 08/06/22 5439    metoprolol succinate (TOPROL XL) extended release tablet 50 mg  50 mg Oral Daily Trell Quails, DO   50 mg at 08/06/22 3289    tamsulosin (FLOMAX) capsule 0.4 mg  0.4 mg Oral BID Trell Quails, DO   0.4 mg at 08/06/22 4476    glucose chewable tablet 16 g  4 tablet Oral PRN Trell Quails, DO   16 g at 08/03/22 6568    dextrose bolus 10% 125 mL  125 mL IntraVENous PRN Trell Quails, DO        Or    dextrose bolus 10% 250 mL  250 mL IntraVENous PRN Trell Quails, DO        glucagon (rDNA) injection 1 mg  1 mg SubCUTAneous PRN Trell Quails, DO        dextrose 10 % infusion   IntraVENous Continuous PRN Trell Quails, DO        insulin lispro (HUMALOG) injection vial 0-8 Units  0-8 Units SubCUTAneous TID WC Trell Quails, DO   6 Units at 08/05/22 1650    insulin lispro (HUMALOG) injection vial 0-4 Units  0-4 Units SubCUTAneous Nightly Trell Quails, DO        cefTRIAXone (ROCEPHIN) 1,000 mg in sterile water 10 mL IV syringe  1,000 mg IntraVENous Q24H Trell Quails, DO   1,000 mg at 08/06/22 0940    albuterol (PROVENTIL) nebulizer solution 2.5 mg  2.5 mg Nebulization Q6H PRN Terll Quails, DO   2.5 mg at 08/02/22 2131    Arformoterol Tartrate (BROVANA) nebulizer solution 15 mcg  15 mcg Nebulization BID Trell Quails, DO   15 mcg at 08/06/22 0528    ipratropium-albuterol (DUONEB) nebulizer solution 1 ampule  1 ampule Inhalation Q4H WA Trell Quails, DO   1 ampule at 08/06/22 0851    budesonide (PULMICORT) nebulizer suspension 500 mcg  500 mcg Nebulization BID Trell Quails, DO   500 mcg at 08/06/22 0528    ondansetron [facility]) injection 4 mg  4 mg IntraVENous Q6H PRN Trell Quails, DO   4 mg at 08/04/22 2101    ascorbic acid (VITAMIN C) tablet 1,000 mg  1,000 mg Oral Daily Trell Quails, DO   1,000 mg at 08/05/22 0804    zinc sulfate (ZINCATE) capsule 50 mg  50 mg Oral Daily Trell Quails, DO   50 mg at 08/06/22 6093   MEDICATIONS: present  Lymphadenopathy:      Cervical: No cervical adenopathy  Skin:     General: Skin is warm and dry  Neurological:      General: No focal deficit present  Mental Status: He is alert and oriented to person, place, and time  Psychiatric:         Behavior: Behavior normal          Thought Content:  Thought content normal          Judgment: Judgment normal  polyethylene glycol  17 g Oral Daily    docusate sodium  100 mg Oral BID    methylPREDNISolone  40 mg IntraVENous Q8H    acetylcysteine  4 mL Inhalation BID    metoprolol succinate  25 mg Oral Nightly    doxycycline hyclate  100 mg Oral 2 times per day    pantoprazole  40 mg Oral QAM AC    epoetin samantha-epbx  3,000 Units SubCUTAneous Once per day on Mon Wed Fri    amLODIPine  5 mg Oral Daily    apixaban  2.5 mg Oral BID    allopurinol  100 mg Oral Daily    gabapentin  100 mg Oral BID    metoprolol succinate  50 mg Oral Daily    tamsulosin  0.4 mg Oral BID    insulin lispro  0-8 Units SubCUTAneous TID WC    insulin lispro  0-4 Units SubCUTAneous Nightly    cefTRIAXone (ROCEPHIN) IV  1,000 mg IntraVENous Q24H    Arformoterol Tartrate  15 mcg Nebulization BID    ipratropium-albuterol  1 ampule Inhalation Q4H WA    budesonide  500 mcg Nebulization BID    ascorbic acid  1,000 mg Oral Daily    zinc sulfate  50 mg Oral Daily      dextrose       glucose, dextrose bolus **OR** dextrose bolus, glucagon (rDNA), dextrose, albuterol, ondansetron    OBJECTIVE:  Vitals:    08/06/22 1130   BP: (!) 171/98   Pulse: 96   Resp: 24   Temp: 98.7 °F (37.1 °C)   SpO2: 92%        O2 Flow Rate (L/min): 6 L/min  O2 Device: Nasal cannula        LABS:  WBC   Date Value Ref Range Status   08/06/2022 11.0 4.5 - 11.5 E9/L Final   08/05/2022 12.1 (H) 4.5 - 11.5 E9/L Final   08/04/2022 12.2 (H) 4.5 - 11.5 E9/L Final     Hemoglobin   Date Value Ref Range Status   08/06/2022 9.5 (L) 12.5 - 16.5 g/dL Final   08/05/2022 8.4 (L) 12.5 - 16.5 g/dL Final   08/04/2022 9.0 (L) 12.5 - 16.5 g/dL Final     Hematocrit   Date Value Ref Range Status   08/06/2022 29.7 (L) 37.0 - 54.0 % Final   08/05/2022 26.4 (L) 37.0 - 54.0 % Final   08/04/2022 27.7 (L) 37.0 - 54.0 % Final     MCV   Date Value Ref Range Status   08/06/2022 83.7 80.0 - 99.9 fL Final   08/05/2022 83.5 80.0 - 99.9 fL Final   08/04/2022 83.2 80.0 - 99.9 fL Final     Platelets   Date Value Ref Range Status   08/06/2022 236 130 - 450 E9/L Final   08/05/2022 194 130 - 450 E9/L Final   08/04/2022 186 130 - 450 E9/L Final     Sodium   Date Value Ref Range Status   08/06/2022 132 132 - 146 mmol/L Final   08/05/2022 132 132 - 146 mmol/L Final   08/04/2022 133 132 - 146 mmol/L Final     Potassium   Date Value Ref Range Status   08/06/2022 4.5 3.5 - 5.0 mmol/L Final   08/05/2022 4.1 3.5 - 5.0 mmol/L Final   08/04/2022 3.9 3.5 - 5.0 mmol/L Final     Potassium reflex Magnesium   Date Value Ref Range Status   08/02/2022 3.7 3.5 - 5.0 mmol/L Final   05/29/2020 4.0 3.5 - 5.0 mmol/L Final     Chloride   Date Value Ref Range Status   08/06/2022 100 98 - 107 mmol/L Final   08/05/2022 100 98 - 107 mmol/L Final   08/04/2022 99 98 - 107 mmol/L Final     CO2   Date Value Ref Range Status   08/06/2022 18 (L) 22 - 29 mmol/L Final   08/05/2022 21 (L) 22 - 29 mmol/L Final   08/04/2022 24 22 - 29 mmol/L Final     BUN   Date Value Ref Range Status   08/06/2022 43 (H) 6 - 23 mg/dL Final   08/05/2022 49 (H) 6 - 23 mg/dL Final   08/04/2022 54 (H) 6 - 23 mg/dL Final     Creatinine   Date Value Ref Range Status   08/06/2022 2.2 (H) 0.7 - 1.2 mg/dL Final   08/05/2022 2.4 (H) 0.7 - 1.2 mg/dL Final   08/04/2022 2.7 (H) 0.7 - 1.2 mg/dL Final     Glucose   Date Value Ref Range Status   08/06/2022 221 (H) 74 - 99 mg/dL Final   08/05/2022 163 (H) 74 - 99 mg/dL Final   08/04/2022 112 (H) 74 - 99 mg/dL Final     Calcium   Date Value Ref Range Status   08/06/2022 9.3 8.6 - 10.2 mg/dL Final   08/05/2022 8.9 8.6 - 10.2 mg/dL Final   08/04/2022 8.7 8.6 - 10.2 mg/dL Final     Total Protein   Date Value Ref Range Status   08/02/2022 6.9 6.4 - 8.3 g/dL Final     Albumin   Date Value Ref Range Status   08/02/2022 4.2 3.5 - 5.2 g/dL Final     Total Bilirubin   Date Value Ref Range Status   08/02/2022 0.6 0.0 - 1.2 mg/dL Final     Alkaline Phosphatase   Date Value Ref Range Status   08/02/2022 87 40 - 129 U/L Final     AST   Date Value Ref Range Status 08/02/2022 18 0 - 39 U/L Final     ALT   Date Value Ref Range Status   08/02/2022 22 0 - 40 U/L Final     GFR Non-   Date Value Ref Range Status   08/06/2022 28 >=60 mL/min/1.73 Final     Comment:     Chronic Kidney Disease: less than 60 ml/min/1.73 sq.m. Kidney Failure: less than 15 ml/min/1.73 sq.m. Results valid for patients 18 years and older. 08/05/2022 26 >=60 mL/min/1.73 Final     Comment:     Chronic Kidney Disease: less than 60 ml/min/1.73 sq.m. Kidney Failure: less than 15 ml/min/1.73 sq.m. Results valid for patients 18 years and older. 08/04/2022 22 >=60 mL/min/1.73 Final     Comment:     Chronic Kidney Disease: less than 60 ml/min/1.73 sq.m. Kidney Failure: less than 15 ml/min/1.73 sq.m. Results valid for patients 18 years and older. GFR    Date Value Ref Range Status   08/06/2022 34  Final   08/05/2022 31  Final   08/04/2022 27  Final     Magnesium   Date Value Ref Range Status   08/06/2022 1.8 1.6 - 2.6 mg/dL Final   08/03/2022 1.6 1.6 - 2.6 mg/dL Final   06/04/2022 1.9 1.6 - 2.6 mg/dL Final     Phosphorus   Date Value Ref Range Status   08/06/2022 2.5 2.5 - 4.5 mg/dL Final   08/05/2022 2.8 2.5 - 4.5 mg/dL Final   08/03/2022 2.9 2.5 - 4.5 mg/dL Final     No results for input(s): PH, PO2, PCO2, HCO3, BE, O2SAT in the last 72 hours. RADIOLOGY:  XR CHEST PORTABLE   Final Result   Worsening seen of the airspace disease within the lung fields bilaterally. Dense opacification now present within the left upper and mid lung. US RETROPERITONEAL COMPLETE   Final Result   1. Medical renal disease is suggested, without obstruction   2. Enlarged prostate   3. Small right renal angiomyolipoma redemonstrated      RECOMMENDATIONS:   Unavailable         XR CHEST PORTABLE   Final Result   Slight improvement of left lung infiltrates.          XR CHEST PORTABLE   Final Result   Significant left-sided infiltrates             PROBLEM

## 2022-09-01 ENCOUNTER — TELEPHONE (OUTPATIENT)
Dept: FAMILY MEDICINE CLINIC | Facility: CLINIC | Age: 77
End: 2022-09-01

## 2022-09-01 NOTE — TELEPHONE ENCOUNTER
Patients surgery was rescheduled from august  till 9/14, Will you still be able to clear him using the July pre op note? [facility] will send over a new form to fill out /  Office will accept the July physical but the form will need a current/today date

## 2022-09-10 ENCOUNTER — APPOINTMENT (OUTPATIENT)
Dept: RADIOLOGY | Facility: HOSPITAL | Age: 77
DRG: 176 | End: 2022-09-10
Payer: MEDICARE

## 2022-09-10 ENCOUNTER — APPOINTMENT (INPATIENT)
Dept: CT IMAGING | Facility: HOSPITAL | Age: 77
DRG: 176 | End: 2022-09-10
Payer: MEDICARE

## 2022-09-10 ENCOUNTER — APPOINTMENT (EMERGENCY)
Dept: CT IMAGING | Facility: HOSPITAL | Age: 77
DRG: 176 | End: 2022-09-10
Payer: MEDICARE

## 2022-09-10 ENCOUNTER — HOSPITAL ENCOUNTER (INPATIENT)
Facility: HOSPITAL | Age: 77
LOS: 3 days | DRG: 176 | End: 2022-09-13
Attending: EMERGENCY MEDICINE | Admitting: STUDENT IN AN ORGANIZED HEALTH CARE EDUCATION/TRAINING PROGRAM
Payer: MEDICARE

## 2022-09-10 DIAGNOSIS — R91.8 PULMONARY MASS: ICD-10-CM

## 2022-09-10 DIAGNOSIS — I26.99 PULMONARY EMBOLISM (HCC): Primary | ICD-10-CM

## 2022-09-10 PROBLEM — D64.9 ANEMIA: Status: ACTIVE | Noted: 2022-09-10

## 2022-09-10 LAB
2HR DELTA HS TROPONIN: 0 NG/L
ALBUMIN SERPL BCP-MCNC: 2.3 G/DL (ref 3.5–5)
ALP SERPL-CCNC: 114 U/L (ref 46–116)
ALT SERPL W P-5'-P-CCNC: 44 U/L (ref 12–78)
ANION GAP SERPL CALCULATED.3IONS-SCNC: 9 MMOL/L (ref 4–13)
APTT PPP: 30 SECONDS (ref 23–37)
AST SERPL W P-5'-P-CCNC: 24 U/L (ref 5–45)
BASOPHILS # BLD AUTO: 0.03 THOUSANDS/ΜL (ref 0–0.1)
BASOPHILS NFR BLD AUTO: 0 % (ref 0–1)
BILIRUB SERPL-MCNC: 0.75 MG/DL (ref 0.2–1)
BUN SERPL-MCNC: 17 MG/DL (ref 5–25)
CALCIUM ALBUM COR SERPL-MCNC: 9.7 MG/DL (ref 8.3–10.1)
CALCIUM SERPL-MCNC: 8.3 MG/DL (ref 8.3–10.1)
CARDIAC TROPONIN I PNL SERPL HS: 5 NG/L
CARDIAC TROPONIN I PNL SERPL HS: 5 NG/L
CHLORIDE SERPL-SCNC: 106 MMOL/L (ref 96–108)
CO2 SERPL-SCNC: 28 MMOL/L (ref 21–32)
CREAT SERPL-MCNC: 1.23 MG/DL (ref 0.6–1.3)
D DIMER PPP FEU-MCNC: 2.82 UG/ML FEU
EOSINOPHIL # BLD AUTO: 0.33 THOUSAND/ΜL (ref 0–0.61)
EOSINOPHIL NFR BLD AUTO: 4 % (ref 0–6)
ERYTHROCYTE [DISTWIDTH] IN BLOOD BY AUTOMATED COUNT: 11.9 % (ref 11.6–15.1)
GFR SERPL CREATININE-BSD FRML MDRD: 56 ML/MIN/1.73SQ M
GLUCOSE SERPL-MCNC: 109 MG/DL (ref 65–140)
GLUCOSE SERPL-MCNC: 116 MG/DL (ref 65–140)
HCT VFR BLD AUTO: 33.9 % (ref 36.5–49.3)
HGB BLD-MCNC: 11.5 G/DL (ref 12–17)
IMM GRANULOCYTES # BLD AUTO: 0.06 THOUSAND/UL (ref 0–0.2)
IMM GRANULOCYTES NFR BLD AUTO: 1 % (ref 0–2)
INR PPP: 1.15 (ref 0.84–1.19)
LACTATE SERPL-SCNC: 0.9 MMOL/L (ref 0.5–2)
LYMPHOCYTES # BLD AUTO: 0.83 THOUSANDS/ΜL (ref 0.6–4.47)
LYMPHOCYTES NFR BLD AUTO: 9 % (ref 14–44)
MCH RBC QN AUTO: 31 PG (ref 26.8–34.3)
MCHC RBC AUTO-ENTMCNC: 33.9 G/DL (ref 31.4–37.4)
MCV RBC AUTO: 91 FL (ref 82–98)
MONOCYTES # BLD AUTO: 0.97 THOUSAND/ΜL (ref 0.17–1.22)
MONOCYTES NFR BLD AUTO: 10 % (ref 4–12)
NEUTROPHILS # BLD AUTO: 7.07 THOUSANDS/ΜL (ref 1.85–7.62)
NEUTS SEG NFR BLD AUTO: 76 % (ref 43–75)
NRBC BLD AUTO-RTO: 0 /100 WBCS
NT-PROBNP SERPL-MCNC: 2496 PG/ML
PLATELET # BLD AUTO: 242 THOUSANDS/UL (ref 149–390)
PMV BLD AUTO: 11.4 FL (ref 8.9–12.7)
POTASSIUM SERPL-SCNC: 3.7 MMOL/L (ref 3.5–5.3)
PROCALCITONIN SERPL-MCNC: 0.15 NG/ML
PROT SERPL-MCNC: 6.7 G/DL (ref 6.4–8.4)
PROTHROMBIN TIME: 14.5 SECONDS (ref 11.6–14.5)
RBC # BLD AUTO: 3.71 MILLION/UL (ref 3.88–5.62)
SODIUM SERPL-SCNC: 143 MMOL/L (ref 135–147)
WBC # BLD AUTO: 9.29 THOUSAND/UL (ref 4.31–10.16)

## 2022-09-10 PROCEDURE — 83880 ASSAY OF NATRIURETIC PEPTIDE: CPT | Performed by: EMERGENCY MEDICINE

## 2022-09-10 PROCEDURE — G1004 CDSM NDSC: HCPCS

## 2022-09-10 PROCEDURE — 71275 CT ANGIOGRAPHY CHEST: CPT

## 2022-09-10 PROCEDURE — 82948 REAGENT STRIP/BLOOD GLUCOSE: CPT

## 2022-09-10 PROCEDURE — 36415 COLL VENOUS BLD VENIPUNCTURE: CPT | Performed by: EMERGENCY MEDICINE

## 2022-09-10 PROCEDURE — 96375 TX/PRO/DX INJ NEW DRUG ADDON: CPT

## 2022-09-10 PROCEDURE — 83540 ASSAY OF IRON: CPT | Performed by: PHYSICIAN ASSISTANT

## 2022-09-10 PROCEDURE — 84145 PROCALCITONIN (PCT): CPT | Performed by: EMERGENCY MEDICINE

## 2022-09-10 PROCEDURE — 83605 ASSAY OF LACTIC ACID: CPT | Performed by: EMERGENCY MEDICINE

## 2022-09-10 PROCEDURE — 99223 1ST HOSP IP/OBS HIGH 75: CPT | Performed by: PHYSICIAN ASSISTANT

## 2022-09-10 PROCEDURE — 94640 AIRWAY INHALATION TREATMENT: CPT

## 2022-09-10 PROCEDURE — 99285 EMERGENCY DEPT VISIT HI MDM: CPT | Performed by: EMERGENCY MEDICINE

## 2022-09-10 PROCEDURE — 96365 THER/PROPH/DIAG IV INF INIT: CPT

## 2022-09-10 PROCEDURE — 83550 IRON BINDING TEST: CPT | Performed by: PHYSICIAN ASSISTANT

## 2022-09-10 PROCEDURE — 85379 FIBRIN DEGRADATION QUANT: CPT | Performed by: EMERGENCY MEDICINE

## 2022-09-10 PROCEDURE — 99285 EMERGENCY DEPT VISIT HI MDM: CPT

## 2022-09-10 PROCEDURE — 71045 X-RAY EXAM CHEST 1 VIEW: CPT

## 2022-09-10 PROCEDURE — 85025 COMPLETE CBC W/AUTO DIFF WBC: CPT | Performed by: EMERGENCY MEDICINE

## 2022-09-10 PROCEDURE — 85610 PROTHROMBIN TIME: CPT | Performed by: EMERGENCY MEDICINE

## 2022-09-10 PROCEDURE — 84484 ASSAY OF TROPONIN QUANT: CPT | Performed by: EMERGENCY MEDICINE

## 2022-09-10 PROCEDURE — 85730 THROMBOPLASTIN TIME PARTIAL: CPT | Performed by: EMERGENCY MEDICINE

## 2022-09-10 PROCEDURE — 82728 ASSAY OF FERRITIN: CPT | Performed by: PHYSICIAN ASSISTANT

## 2022-09-10 PROCEDURE — 80053 COMPREHEN METABOLIC PANEL: CPT | Performed by: EMERGENCY MEDICINE

## 2022-09-10 PROCEDURE — 70450 CT HEAD/BRAIN W/O DYE: CPT

## 2022-09-10 PROCEDURE — 87040 BLOOD CULTURE FOR BACTERIA: CPT | Performed by: EMERGENCY MEDICINE

## 2022-09-10 RX ORDER — HEPARIN SODIUM 1000 [USP'U]/ML
6000 INJECTION, SOLUTION INTRAVENOUS; SUBCUTANEOUS ONCE
Status: COMPLETED | OUTPATIENT
Start: 2022-09-10 | End: 2022-09-10

## 2022-09-10 RX ORDER — ACETAMINOPHEN 325 MG/1
650 TABLET ORAL EVERY 4 HOURS PRN
Status: DISCONTINUED | OUTPATIENT
Start: 2022-09-10 | End: 2022-09-13 | Stop reason: HOSPADM

## 2022-09-10 RX ORDER — AMLODIPINE BESYLATE 10 MG/1
10 TABLET ORAL DAILY
Status: DISCONTINUED | OUTPATIENT
Start: 2022-09-11 | End: 2022-09-13 | Stop reason: HOSPADM

## 2022-09-10 RX ORDER — ONDANSETRON 2 MG/ML
4 INJECTION INTRAMUSCULAR; INTRAVENOUS EVERY 6 HOURS PRN
Status: DISCONTINUED | OUTPATIENT
Start: 2022-09-10 | End: 2022-09-13 | Stop reason: HOSPADM

## 2022-09-10 RX ORDER — HEPARIN SODIUM 1000 [USP'U]/ML
6000 INJECTION, SOLUTION INTRAVENOUS; SUBCUTANEOUS
Status: DISCONTINUED | OUTPATIENT
Start: 2022-09-10 | End: 2022-09-13 | Stop reason: HOSPADM

## 2022-09-10 RX ORDER — PRAVASTATIN SODIUM 20 MG
20 TABLET ORAL
Status: DISCONTINUED | OUTPATIENT
Start: 2022-09-11 | End: 2022-09-13 | Stop reason: HOSPADM

## 2022-09-10 RX ORDER — HEPARIN SODIUM 10000 [USP'U]/100ML
3-30 INJECTION, SOLUTION INTRAVENOUS
Status: DISCONTINUED | OUTPATIENT
Start: 2022-09-10 | End: 2022-09-13 | Stop reason: HOSPADM

## 2022-09-10 RX ORDER — METOPROLOL SUCCINATE 100 MG/1
100 TABLET, EXTENDED RELEASE ORAL DAILY
Status: DISCONTINUED | OUTPATIENT
Start: 2022-09-11 | End: 2022-09-13 | Stop reason: HOSPADM

## 2022-09-10 RX ORDER — FINASTERIDE 5 MG/1
5 TABLET, FILM COATED ORAL DAILY
Status: DISCONTINUED | OUTPATIENT
Start: 2022-09-11 | End: 2022-09-13 | Stop reason: HOSPADM

## 2022-09-10 RX ORDER — ALBUTEROL SULFATE 2.5 MG/3ML
5 SOLUTION RESPIRATORY (INHALATION) ONCE
Status: COMPLETED | OUTPATIENT
Start: 2022-09-10 | End: 2022-09-10

## 2022-09-10 RX ORDER — LISINOPRIL 10 MG/1
10 TABLET ORAL DAILY
Status: DISCONTINUED | OUTPATIENT
Start: 2022-09-11 | End: 2022-09-13 | Stop reason: HOSPADM

## 2022-09-10 RX ORDER — HEPARIN SODIUM 1000 [USP'U]/ML
3000 INJECTION, SOLUTION INTRAVENOUS; SUBCUTANEOUS
Status: DISCONTINUED | OUTPATIENT
Start: 2022-09-10 | End: 2022-09-13 | Stop reason: HOSPADM

## 2022-09-10 RX ADMIN — IOHEXOL 85 ML: 350 INJECTION, SOLUTION INTRAVENOUS at 18:08

## 2022-09-10 RX ADMIN — IPRATROPIUM BROMIDE 0.5 MG: 0.5 SOLUTION RESPIRATORY (INHALATION) at 16:40

## 2022-09-10 RX ADMIN — HEPARIN SODIUM 6000 UNITS: 1000 INJECTION INTRAVENOUS; SUBCUTANEOUS at 19:18

## 2022-09-10 RX ADMIN — CEFEPIME HYDROCHLORIDE 2000 MG: 2 INJECTION, POWDER, FOR SOLUTION INTRAVENOUS at 19:07

## 2022-09-10 RX ADMIN — VANCOMYCIN HYDROCHLORIDE 1250 MG: 5 INJECTION, POWDER, LYOPHILIZED, FOR SOLUTION INTRAVENOUS at 19:25

## 2022-09-10 RX ADMIN — HEPARIN SODIUM 18 UNITS/KG/HR: 10000 INJECTION, SOLUTION INTRAVENOUS at 19:19

## 2022-09-10 RX ADMIN — ALBUTEROL SULFATE 5 MG: 2.5 SOLUTION RESPIRATORY (INHALATION) at 16:40

## 2022-09-10 NOTE — ASSESSMENT & PLAN NOTE
· Likely multifactorial 2/2 COVID and malignancy   · CTA 09/10: revealed acute pulmonary emboli in the RLL pulmonary artery and several segmental and subsegmental branches, as well as several LLL subsegmental branches  RV/LV ratio 0 84, less than 0 9  GGO in RLL, likley pulmonary infarct  Additional findings as below  · Hemodynamics are stable, no hypoxia   · Received IV vanc/cefepime for GGO, procal normal  mmonitor off antibiotics as likely infarct   · Started on IV heparin gtt, continue   Adjust per protocol

## 2022-09-10 NOTE — ASSESSMENT & PLAN NOTE
Lab Results   Component Value Date    HGBA1C 5 5 07/21/2022     · Hold home metformin   · glucochecks ac   · CCD

## 2022-09-10 NOTE — H&P
[address] [facility]  H&P- Jazmin Certain 1945, 69 y o  male MRN: [id number]  Unit/Bed#: FT 05 Encounter: [id number]  Primary Care Provider: Scott Almaguer PA-C   Date and time admitted to hospital: 9/10/2022  4:11 PM    * Other pulmonary embolism without acute cor pulmonale ([facility] [address] )  Assessment & Plan  · Likely multifactorial 2/2 COVID and malignancy   · CTA 09/10: revealed acute pulmonary emboli in the RLL pulmonary artery and several segmental and subsegmental branches, as well as several LLL subsegmental branches  RV/LV ratio 0 84, less than 0 9  GGO in RLL, likley pulmonary infarct  Additional findings as below  · Hemodynamics are stable, no hypoxia   · Received IV vanc/cefepime for GGO, procal normal  mmonitor off antibiotics as likely infarct   · Started on IV heparin gtt, continue  Adjust per protocol     Anemia  Assessment & Plan  Hgb 11 5, decreased from baseline of 14  · Check iron panel   · Monitor hgb q6hr     Pulmonary mass  Assessment & Plan  · CTA also revealed RUL pulmonary mass measuring up to 6 2 cm as described, compatible with primary lung malignancy  Scattered additional solid nodules measuring up to 1 2 cm the RML, suspicious for metastatic disease  several large right hilar and mediastinal lymph nodes measuring up to 2 1 cm, also concerning for metastatic disease    · CT head r/o brain mets  · Oncology consult       Adenocarcinoma of prostate [facility])  Assessment & Plan  S/p RT in 2005     Hypercholesterolemia  Assessment & Plan  · Continue statin     Controlled type 2 diabetes mellitus without complication, without long-term current use of insulin ([facility] )  Assessment & Plan  Lab Results   Component Value Date    HGBA1C 5 5 07/21/2022     · Hold home metformin   · glucochecks ac   · CCD    Benign essential HTN  Assessment & Plan  · BP slightly elevated   · Continue home amlodipine, lisinopril, metoprolol  · Monitor hemodynamics closely      VTE Pharmacologic Prophylaxis:   High Risk (Score >/= 5) - Pharmacological DVT Prophylaxis Ordered: heparin  Sequential Compression Devices Ordered  Code Status: Level 1 - Full Code   Discussion with family: Patient declined call to   Anticipated Length of Stay: Patient will be admitted on an inpatient basis with an anticipated length of stay of greater than 2 midnights secondary to PE, pulmonary mass  Total Time for Visit, including Counseling / Coordination of Care: 60 minutes Greater than 50% of this total time spent on direct patient counseling and coordination of care  Chief Complaint:   Chief Complaint   Patient presents with    Cough     Had covid in August about 5 weeks ago  Pt states that since then has been coughing up a lot of "junk" from his throat and recently has noticed blood in the sputum as well         History of Present Illness:  Reta Larios is a 69 y o  male with a PMH of prostate cancer s/p RT, T2DM, HTN, HDL, R ankle fracture 6m ago s/p fusion who presents with hemoptysis  Patient has had cough x 3 weeks with BRB in sputum  Has had shortness of breath with exertion for over 1 month  Denies chest pain  Has had had associated night sweats for past 3 weeks as well  Reports some weight loss over past yes but is unable to quantify  Hx COVID approximately 4 weeks ago  Denies headaches, vision changes, numbness/tingling in extremities, abdominal pain, n/v/d  Has nocturia, no hematuria  Denies pain or swelling in LE  Denies hx of smoking  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, 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   No hx of DVT  Review of Systems:  A 10-point review of systems was obtained  Pertinent positives and negatives are outlined in the HPI above  Remainder of review of systems are otherwise negative         Past Medical and Surgical History:   Past Medical History:   Diagnosis Date    Diabetes mellitus ([facility] [address] )     Hyperlipidemia     Hypertension     Prostate cancer ([facility] [address] ) 2005 S/P prostate ca-2005 had radiation tx  Past Surgical History:   Procedure Laterality Date    COLONOSCOPY      ME ARTHRODESIS,ANKLE,OPEN Right 7/10/2020    Procedure: ARTHRODESIS/ TIBIAL-TALAR ANKLE FUSION;  Surgeon: Alexa Sweet MD;  Location: [facility]IN [facility];  Service: Orthopedics    ME Griselda Tineo 19 Bilateral 12/16/2021    Procedure: LAPAROSCOPIC REPAIR HERNIA INGUINAL WITH MESH;  Surgeon: Rocío Coffey MD;  Location: [facility];  Service: General       Meds/Allergies:  Prior to Admission medications    Medication Sig Start Date End Date Taking? Authorizing Provider   Accu-Chek Jackie Plus test strip CHECK BLOOD SUGAR DAILY E11 9 8/17/21   Dominic Rey PA-C   ACCU-CHEK FASTCLIX LANCETS MISC by Does not apply route daily E11 9  Check  fbs  daily 10/11/18   Dominic Rey PA-C   amLODIPine (NORVASC) 10 mg tablet TAKE 1 TABLET BY MOUTH DAILY FOR BLOOD PRESSURE  5/10/22   Dominic Rey PA-C   aspirin 81 mg chewable tablet Chew 1 tablet (81 mg total) 2 (two) times a day Take for DVT prophylaxis for 30 days postoperatively 7/13/20 10/21/21  Giselle Agrawal MD   finasteride (PROSCAR) 5 mg tablet Take 1 tablet (5 mg total) by mouth daily 4/21/22   Dominic Rey PA-C   lisinopril (ZESTRIL) 10 mg tablet TAKE 1 TABLET BY MOUTH EVERY DAY 5/31/22   Dominic Rey PA-C   metFORMIN (GLUCOPHAGE-XR) 500 mg 24 hr tablet TAKE 1 TABLET BY MOUTH EVERY DAY 3/9/22   Dominic Rey PA-C   metoprolol succinate (TOPROL-XL) 100 mg 24 hr tablet TAKE 1 TABLET BY MOUTH EVERY DAY 3/9/22   Dominic Rey PA-C   simvastatin (ZOCOR) 10 mg tablet TAKE 1 TABLET BY MOUTH EVERY DAY 5/31/22   Dominic Rey PA-C     I have reviewed home medications with patient personally      Allergies: No Known Allergies    Social History:  Marital Status: /Civil Union   Occupation:   Patient Pre-hospital Living Situation: Home, With spouse  Patient Pre-hospital Level of Mobility: walks  Patient Pre-hospital Diet Restrictions:   Substance Use History:   Social History     Substance and Sexual Activity   Alcohol Use Yes    Comment: Occasional     Social History     Tobacco Use   Smoking Status Never Smoker   Smokeless Tobacco Never Used     Social History     Substance and Sexual Activity   Drug Use Never       Family History:  Family History   Problem Relation Age of Onset    Heart attack Mother        Physical Exam:     Vitals:   Blood Pressure: 160/79 (09/10/22 1915)  Pulse: 87 (09/10/22 1915)  Temperature: 99 6 °F (37 6 °C) (09/10/22 1528)  Temp Source: Oral (09/10/22 1528)  Respirations: (!) 25 (09/10/22 1915)  SpO2: 96 % (09/10/22 1915)    Physical Exam  Vitals and nursing note reviewed  Constitutional:       Appearance: He is well-developed  HENT:      Head: Normocephalic and atraumatic  Mouth/Throat:      Mouth: Mucous membranes are moist    Eyes:      General: No scleral icterus  Extraocular Movements: Extraocular movements intact  Conjunctiva/sclera: Conjunctivae normal       Pupils: Pupils are equal, round, and reactive to light  Cardiovascular:      Rate and Rhythm: Normal rate and regular rhythm  Heart sounds: Normal heart sounds  No murmur heard  Pulmonary:      Effort: Pulmonary effort is normal  No respiratory distress  Breath sounds: Normal breath sounds  No wheezing  Abdominal:      General: Abdomen is flat  Bowel sounds are normal       Palpations: Abdomen is soft  Tenderness: There is no abdominal tenderness  Musculoskeletal:         General: Normal range of motion  Cervical back: Neck supple  Right lower leg: No edema  Left lower leg: No edema  Skin:     General: Skin is warm and dry  Neurological:      General: No focal deficit present  Mental Status: He is alert and oriented to person, place, and time  Mental status is at baseline     Psychiatric:         Mood and Affect: Mood normal           Additional Data:     Lab Results:  Results from last 7 days   Lab Units 09/10/22  1630   WBC Thousand/uL 9 29   HEMOGLOBIN g/dL 11 5*   HEMATOCRIT % 33 9*   PLATELETS Thousands/uL 242   NEUTROS PCT % 76*   LYMPHS PCT % 9*   MONOS PCT % 10   EOS PCT % 4     Results from last 7 days   Lab Units 09/10/22  1630   SODIUM mmol/L 143   POTASSIUM mmol/L 3 7   CHLORIDE mmol/L 106   CO2 mmol/L 28   BUN mg/dL 17   CREATININE mg/dL 1 23   ANION GAP mmol/L 9   CALCIUM mg/dL 8 3   ALBUMIN g/dL 2 3*   TOTAL BILIRUBIN mg/dL 0 75   ALK PHOS U/L 114   ALT U/L 44   AST U/L 24   GLUCOSE RANDOM mg/dL 116     Results from last 7 days   Lab Units 09/10/22  1630   INR  1 15     Results from last 7 days   Lab Units 09/10/22  1948   POC GLUCOSE mg/dl 109         Results from last 7 days   Lab Units 09/10/22  1630   LACTIC ACID mmol/L 0 9   PROCALCITONIN ng/ml 0 15       Imaging: Reviewed radiology reports from this admission including: chest CT scan  CTA ED chest PE Study   Final Result by Jason Mooney MD (09/10 1900)      1  Acute pulmonary emboli in the right lower lobe pulmonary artery and several segmental and subsegmental branches, as well as several left lower lobe subsegmental branches  Measured RV/LV ratio is within normal limits at 0 84, less than 0 9         2   Ground glass opacification in the right lower lobe, likely pulmonary infarction  Moderate right and trace left pleural effusions  3   Right upper lobe pulmonary mass measuring up to 6 2 cm as described, compatible with primary lung malignancy  Scattered additional solid nodules measuring up to 1 2 cm the right middle lobe, suspicious for metastatic disease  4  Several large right hilar and mediastinal lymph nodes measuring up to 2 1 cm, also concerning for metastatic disease         I personally discussed this study with Jyothi Ramirez on 9/10/2022 at 6:58 PM                Workstation performed: [id number]         XR chest 1 view portable    (Results Pending)   CT head wo contrast    (Results Pending)       EKG and Other Studies Reviewed on Admission:   · EKG: NSR  HR 77     ** Please Note: This note has been constructed using a voice recognition system   **

## 2022-09-10 NOTE — ASSESSMENT & PLAN NOTE
· CTA also revealed RUL pulmonary mass measuring up to 6 2 cm as described, compatible with primary lung malignancy  Scattered additional solid nodules measuring up to 1 2 cm the RML, suspicious for metastatic disease  several large right hilar and mediastinal lymph nodes measuring up to 2 1 cm, also concerning for metastatic disease    · CT head r/o brain mets  · Oncology consult

## 2022-09-11 LAB
4HR DELTA HS TROPONIN: 3 NG/L
APTT PPP: 40 SECONDS (ref 23–37)
APTT PPP: 53 SECONDS (ref 23–37)
APTT PPP: 63 SECONDS (ref 23–37)
BASOPHILS # BLD AUTO: 0.07 THOUSANDS/ΜL (ref 0–0.1)
BASOPHILS NFR BLD AUTO: 1 % (ref 0–1)
CARDIAC TROPONIN I PNL SERPL HS: 8 NG/L
EOSINOPHIL # BLD AUTO: 0.37 THOUSAND/ΜL (ref 0–0.61)
EOSINOPHIL NFR BLD AUTO: 4 % (ref 0–6)
ERYTHROCYTE [DISTWIDTH] IN BLOOD BY AUTOMATED COUNT: 12 % (ref 11.6–15.1)
FERRITIN SERPL-MCNC: 579 NG/ML (ref 8–388)
GLUCOSE SERPL-MCNC: 104 MG/DL (ref 65–140)
GLUCOSE SERPL-MCNC: 115 MG/DL (ref 65–140)
GLUCOSE SERPL-MCNC: 115 MG/DL (ref 65–140)
GLUCOSE SERPL-MCNC: 161 MG/DL (ref 65–140)
GLUCOSE SERPL-MCNC: 163 MG/DL (ref 65–140)
HCT VFR BLD AUTO: 32.2 % (ref 36.5–49.3)
HCT VFR BLD AUTO: 32.8 % (ref 36.5–49.3)
HCT VFR BLD AUTO: 35.3 % (ref 36.5–49.3)
HGB BLD-MCNC: 11.1 G/DL (ref 12–17)
HGB BLD-MCNC: 11.2 G/DL (ref 12–17)
HGB BLD-MCNC: 12.1 G/DL (ref 12–17)
IMM GRANULOCYTES # BLD AUTO: 0.02 THOUSAND/UL (ref 0–0.2)
IMM GRANULOCYTES NFR BLD AUTO: 0 % (ref 0–2)
IRON SATN MFR SERPL: 23 % (ref 20–50)
IRON SERPL-MCNC: 27 UG/DL (ref 65–175)
LYMPHOCYTES # BLD AUTO: 0.87 THOUSANDS/ΜL (ref 0.6–4.47)
LYMPHOCYTES NFR BLD AUTO: 10 % (ref 14–44)
MCH RBC QN AUTO: 31.7 PG (ref 26.8–34.3)
MCHC RBC AUTO-ENTMCNC: 34.5 G/DL (ref 31.4–37.4)
MCV RBC AUTO: 92 FL (ref 82–98)
MONOCYTES # BLD AUTO: 1.06 THOUSAND/ΜL (ref 0.17–1.22)
MONOCYTES NFR BLD AUTO: 12 % (ref 4–12)
NEUTROPHILS # BLD AUTO: 6.72 THOUSANDS/ΜL (ref 1.85–7.62)
NEUTS SEG NFR BLD AUTO: 73 % (ref 43–75)
NRBC BLD AUTO-RTO: 0 /100 WBCS
PLATELET # BLD AUTO: 237 THOUSANDS/UL (ref 149–390)
PMV BLD AUTO: 11.3 FL (ref 8.9–12.7)
RBC # BLD AUTO: 3.5 MILLION/UL (ref 3.88–5.62)
TIBC SERPL-MCNC: 117 UG/DL (ref 250–450)
WBC # BLD AUTO: 9.11 THOUSAND/UL (ref 4.31–10.16)

## 2022-09-11 PROCEDURE — 84484 ASSAY OF TROPONIN QUANT: CPT | Performed by: EMERGENCY MEDICINE

## 2022-09-11 PROCEDURE — 99233 SBSQ HOSP IP/OBS HIGH 50: CPT | Performed by: INTERNAL MEDICINE

## 2022-09-11 PROCEDURE — 85730 THROMBOPLASTIN TIME PARTIAL: CPT | Performed by: INTERNAL MEDICINE

## 2022-09-11 PROCEDURE — 85014 HEMATOCRIT: CPT | Performed by: PHYSICIAN ASSISTANT

## 2022-09-11 PROCEDURE — 85018 HEMOGLOBIN: CPT | Performed by: PHYSICIAN ASSISTANT

## 2022-09-11 PROCEDURE — 85018 HEMOGLOBIN: CPT | Performed by: INTERNAL MEDICINE

## 2022-09-11 PROCEDURE — 82948 REAGENT STRIP/BLOOD GLUCOSE: CPT

## 2022-09-11 PROCEDURE — 85014 HEMATOCRIT: CPT | Performed by: INTERNAL MEDICINE

## 2022-09-11 PROCEDURE — 36415 COLL VENOUS BLD VENIPUNCTURE: CPT | Performed by: PHYSICIAN ASSISTANT

## 2022-09-11 PROCEDURE — 85025 COMPLETE CBC W/AUTO DIFF WBC: CPT | Performed by: PHYSICIAN ASSISTANT

## 2022-09-11 PROCEDURE — 85730 THROMBOPLASTIN TIME PARTIAL: CPT | Performed by: STUDENT IN AN ORGANIZED HEALTH CARE EDUCATION/TRAINING PROGRAM

## 2022-09-11 RX ORDER — TIMOLOL MALEATE 5 MG/ML
1 SOLUTION OPHTHALMIC DAILY
COMMUNITY

## 2022-09-11 RX ORDER — TRAVOPROST OPHTHALMIC SOLUTION 0.04 MG/ML
1 SOLUTION OPHTHALMIC
COMMUNITY

## 2022-09-11 RX ORDER — INSULIN LISPRO 100 [IU]/ML
1-5 INJECTION, SOLUTION INTRAVENOUS; SUBCUTANEOUS
Status: DISCONTINUED | OUTPATIENT
Start: 2022-09-11 | End: 2022-09-13 | Stop reason: HOSPADM

## 2022-09-11 RX ORDER — BRIMONIDINE TARTRATE 2 MG/ML
1 SOLUTION/ DROPS OPHTHALMIC 2 TIMES DAILY
COMMUNITY

## 2022-09-11 RX ORDER — CYCLOPENTOLATE HYDROCHLORIDE 10 MG/ML
1 SOLUTION/ DROPS OPHTHALMIC ONCE
COMMUNITY

## 2022-09-11 RX ORDER — OFLOXACIN 3 MG/ML
1 SOLUTION/ DROPS OPHTHALMIC 4 TIMES DAILY
COMMUNITY

## 2022-09-11 RX ADMIN — AMLODIPINE BESYLATE 10 MG: 10 TABLET ORAL at 09:40

## 2022-09-11 RX ADMIN — PRAVASTATIN SODIUM 20 MG: 20 TABLET ORAL at 17:03

## 2022-09-11 RX ADMIN — INSULIN LISPRO 1 UNITS: 100 INJECTION, SOLUTION INTRAVENOUS; SUBCUTANEOUS at 18:05

## 2022-09-11 RX ADMIN — FINASTERIDE 5 MG: 5 TABLET, FILM COATED ORAL at 11:14

## 2022-09-11 RX ADMIN — HEPARIN SODIUM 6000 UNITS: 1000 INJECTION INTRAVENOUS; SUBCUTANEOUS at 03:22

## 2022-09-11 RX ADMIN — LISINOPRIL 10 MG: 10 TABLET ORAL at 09:40

## 2022-09-11 RX ADMIN — METOPROLOL SUCCINATE 100 MG: 100 TABLET, EXTENDED RELEASE ORAL at 09:40

## 2022-09-11 RX ADMIN — HEPARIN SODIUM 3000 UNITS: 1000 INJECTION INTRAVENOUS; SUBCUTANEOUS at 11:05

## 2022-09-11 RX ADMIN — HEPARIN SODIUM 24 UNITS/KG/HR: 10000 INJECTION, SOLUTION INTRAVENOUS at 12:03

## 2022-09-11 NOTE — ASSESSMENT & PLAN NOTE
· BP slightly elevated   · Continue home amlodipine, lisinopril, metoprolol  · Monitor hemodynamics closely

## 2022-09-11 NOTE — ASSESSMENT & PLAN NOTE
· CTA also revealed RUL pulmonary mass measuring up to 6 2 cm as described, compatible with primary lung malignancy  Scattered additional solid nodules measuring up to 1 2 cm the RML, suspicious for metastatic disease  several large right hilar and mediastinal lymph nodes measuring up to 2 1 cm, also concerning for metastatic disease    · CT head r/o brain mets  · Oncology consult- input pending

## 2022-09-11 NOTE — ASSESSMENT & PLAN NOTE
· BP slightly elevated   · Continue home amlodipine, lisinopril, metoprolol  · Monitor hemodynamics closely      Blood pressure 143/69, pulse 72, temperature 98 °F (36 7 °C), temperature source Oral, resp  rate 18, SpO2 96 %

## 2022-09-11 NOTE — ASSESSMENT & PLAN NOTE
Lab Results   Component Value Date    HGBA1C 5 5 07/21/2022       Recent Labs     09/10/22  1948 09/11/22  0629 09/11/22  1158   POCGLU 109 115 115       Blood Sugar Average: Last 72 hrs:  (P) 113

## 2022-09-11 NOTE — PROGRESS NOTES
[facility]  Progress Note Remy Weston 1945, 69 y o  male MRN: [id number]  Unit/Bed#: ED 10 Encounter: [id number]  Primary Care Provider: Denisse Erazo PA-C   Date and time admitted to hospital: 9/10/2022  4:11 PM    Anemia  Assessment & Plan  Hgb 11 5, decreased from baseline of 14  · Check iron panel   · Monitor hgb q12hr     Pulmonary mass  Assessment & Plan  · CTA also revealed RUL pulmonary mass measuring up to 6 2 cm as described, compatible with primary lung malignancy  Scattered additional solid nodules measuring up to 1 2 cm the RML, suspicious for metastatic disease  several large right hilar and mediastinal lymph nodes measuring up to 2 1 cm, also concerning for metastatic disease  · CT head r/o brain mets  · Oncology consult- input pending      Adenocarcinoma of prostate [facility])  Assessment & Plan  S/p RT in 2005  Hypercholesterolemia  Assessment & Plan  · Continue statins    Controlled type 2 diabetes mellitus without complication, without long-term current use of insulin (HCC)  Assessment & Plan  Lab Results   Component Value Date    HGBA1C 5 5 07/21/2022     · Hold home metformin   · glucochecks ac and insulin coverage as needed    Benign essential HTN  Assessment & Plan  · BP slightly elevated   · Continue home amlodipine, lisinopril, metoprolol  · Monitor hemodynamics closely      Blood pressure 143/69, pulse 72, temperature 98 °F (36 7 °C), temperature source Oral, resp  rate 18, SpO2 96 %  * Other pulmonary embolism without acute cor pulmonale (HCC)  Assessment & Plan  · Likely multifactorial 2/2 COVID and malignancy   · CTA 09/10: revealed acute pulmonary emboli in the RLL pulmonary artery and several segmental and subsegmental branches, as well as several LLL subsegmental branches  RV/LV ratio 0 84, less than 0 9  GGO in RLL, likley pulmonary infarct  Additional findings as below     · Hemodynamics are stable, no hypoxia   · Received IV vanc/cefepime for GGO, procal normal  monitor off antibiotics as likely infarct   · Started on IV heparin gtt, continue  Adjust per protocol  TE Pharmacologic Prophylaxis: Heparin Drip    Patient Centered Rounds: I have performed bedside rounds with nursing staff today  Discussions with Specialists or Other Care Team Provider:  Care team, await oncology input  Education and Discussions with Family / Patient:  Patient    Current Length of Stay: 1 day(s)  Current Patient Status: Inpatient     Certification Statement: The patient will continue to require additional inpatient hospital stay due to Other pulmonary embolism without acute cor pulmonale [facility])  Discharge Plan / Estimated Discharge Date:  1-2 days    Code Status: Level 1 - Full Code  ______________________________________________________________________________    Subjective:   Patient seen and examined by me  No chest pain, palpitations or diaphoresis at this point of time  Patient does have weakness of the weakness is generalized  No fever or chills  Patient does not have any significant shortness of breath- but does have some shortness of breath with some exertion  No diarrhea or dysuria    Objective:   Vitals: Blood pressure 143/69, pulse 72, temperature 98 °F (36 7 °C), temperature source Oral, resp  rate 18, SpO2 96 %      Physical Exam:   General appearance: alert, appears stated age and cooperative  Constitutional- looks a little weak  HEENT - atraumatic and normocephalic  Neck- supple  Skin - no fresh rash  Extremities no fresh focal deformities  Cardiovascular- S1-S2 heard  Respiratory- bilateral air entry present, no crackles or rhonchi  Skin - no fresh rash  Abdomen - normal bowel sounds present, no rebound tenderness  CNS- No fresh focal deficits  Psych- no acute psychosis     Additional Data:   Labs:  Results from last 7 days   Lab Units 09/11/22  0517 09/11/22  0039 09/10/22  1630   WBC Thousand/uL 9 11  --  9 29   HEMOGLOBIN g/dL 11 1* 11 2* 11 5* HEMATOCRIT % 32 2* 32 8* 33 9*   MCV fL 92  --  91   PLATELETS Thousands/uL 237  --  242   INR   --   --  1 15     Results from last 7 days   Lab Units 09/10/22  1630   SODIUM mmol/L 143   POTASSIUM mmol/L 3 7   CHLORIDE mmol/L 106   CO2 mmol/L 28   ANION GAP mmol/L 9   BUN mg/dL 17   CREATININE mg/dL 1 23   CALCIUM mg/dL 8 3   ALBUMIN g/dL 2 3*   TOTAL BILIRUBIN mg/dL 0 75   ALK PHOS U/L 114   ALT U/L 44   AST U/L 24   EGFR ml/min/1 73sq m 56   GLUCOSE RANDOM mg/dL 116             Results from last 7 days   Lab Units 09/10/22  1906   NT-PRO BNP pg/mL 2,496*      Results from last 7 days   Lab Units 09/10/22  1630   LACTIC ACID mmol/L 0 9   PROCALCITONIN ng/ml 0 15     Results from last 7 days   Lab Units 09/11/22  1158 09/11/22  0629 09/10/22  1948   POC GLUCOSE mg/dl 115 115 109             * I Have Reviewed All Lab Data Listed Above  Cultures:   Results from last 7 days   Lab Units 09/10/22  1631 09/10/22  1630   BLOOD CULTURE  Received in Microbiology Lab  Culture in Progress  Received in Microbiology Lab  Culture in Progress  Imaging:  Imaging Reports Reviewed Today Include:   XR chest 1 view portable    Result Date: 9/11/2022  Impression: 1   5 cm right upper lobe mass highly suspicious for neoplasm  2   Patchy opacity right lung base which may represent atelectasis or pneumonia  3   Right pleural effusion  Patient has had a follow-up CT chest since the time of this examination  Workstation performed: [id number]     CT head wo contrast    Result Date: 9/10/2022  Impression: No acute intracranial abnormality is seen  No discrete evidence of intracranial metastatic disease  Other findings as above  Workstation performed: [id number]     CTA ED chest PE Study    Result Date: 9/10/2022  Impression: 1  Acute pulmonary emboli in the right lower lobe pulmonary artery and several segmental and subsegmental branches, as well as several left lower lobe subsegmental branches   Measured RV/LV ratio is within normal limits at 0 84, less than 0 9   2   Ground glass opacification in the right lower lobe, likely pulmonary infarction  Moderate right and trace left pleural effusions  3   Right upper lobe pulmonary mass measuring up to 6 2 cm as described, compatible with primary lung malignancy  Scattered additional solid nodules measuring up to 1 2 cm the right middle lobe, suspicious for metastatic disease  4  Several large right hilar and mediastinal lymph nodes measuring up to 2 1 cm, also concerning for metastatic disease  I personally discussed this study with Lynne LUTZ on 9/10/2022 at 6:58 PM  Workstation performed: [id number]     Scheduled Meds:  Current Facility-Administered Medications   Medication Dose Route Frequency Provider Last Rate    acetaminophen  650 mg Oral Q4H PRN Stephanie Ciaramella, PA-C      amLODIPine  10 mg Oral Daily [employer], PA-C      finasteride  5 mg Oral Daily Stephanie Kiranella, PA-C      heparin (porcine)  3-30 Units/kg/hr (Order-Specific) Intravenous Titrated Ghada Pimentel MD 24 Units/kg/hr (09/11/22 1203)    heparin (porcine)  3,000 Units Intravenous Q1H PRN Ghada Pimentel MD      heparin (porcine)  6,000 Units Intravenous Q1H PRN Ghada Pimentel MD      lisinopril  10 mg Oral Daily [employer], PA-C      metoprolol succinate  100 mg Oral Daily Stephanie Yashiraramella, PA-ESTEFANIA      ondansetron  4 mg Intravenous Q6H PRN Stephanie Acramella, PA-C      pravastatin  20 mg Oral Daily With [employer], ZAHRAA Garibay MD  [facility]    ** Please Note: This note has been constructed using a voice recognition system   **

## 2022-09-11 NOTE — ASSESSMENT & PLAN NOTE
· Likely multifactorial 2/2 COVID and malignancy   · CTA 09/10: revealed acute pulmonary emboli in the RLL pulmonary artery and several segmental and subsegmental branches, as well as several LLL subsegmental branches  RV/LV ratio 0 84, less than 0 9  GGO in RLL, likley pulmonary infarct  Additional findings as below  · Hemodynamics are stable, no hypoxia   · Received IV vanc/cefepime for GGO, procal normal  monitor off antibiotics as likely infarct   · Started on IV heparin gtt, continue  Adjust per protocol

## 2022-09-12 ENCOUNTER — APPOINTMENT (OUTPATIENT)
Dept: MRI IMAGING | Facility: HOSPITAL | Age: 77
DRG: 176 | End: 2022-09-12
Payer: MEDICARE

## 2022-09-12 ENCOUNTER — APPOINTMENT (INPATIENT)
Dept: CT IMAGING | Facility: HOSPITAL | Age: 77
DRG: 176 | End: 2022-09-12
Payer: MEDICARE

## 2022-09-12 DIAGNOSIS — I10 ESSENTIAL HYPERTENSION: ICD-10-CM

## 2022-09-12 DIAGNOSIS — E11.9 TYPE 2 DIABETES MELLITUS WITHOUT COMPLICATION, WITHOUT LONG-TERM CURRENT USE OF INSULIN (HCC): ICD-10-CM

## 2022-09-12 LAB
ANION GAP SERPL CALCULATED.3IONS-SCNC: 8 MMOL/L (ref 4–13)
APTT PPP: 67 SECONDS (ref 23–37)
BUN SERPL-MCNC: 15 MG/DL (ref 5–25)
CALCIUM SERPL-MCNC: 8.3 MG/DL (ref 8.3–10.1)
CHLORIDE SERPL-SCNC: 107 MMOL/L (ref 96–108)
CO2 SERPL-SCNC: 27 MMOL/L (ref 21–32)
CREAT SERPL-MCNC: 0.82 MG/DL (ref 0.6–1.3)
GFR SERPL CREATININE-BSD FRML MDRD: 85 ML/MIN/1.73SQ M
GLUCOSE SERPL-MCNC: 104 MG/DL (ref 65–140)
GLUCOSE SERPL-MCNC: 108 MG/DL (ref 65–140)
GLUCOSE SERPL-MCNC: 129 MG/DL (ref 65–140)
GLUCOSE SERPL-MCNC: 140 MG/DL (ref 65–140)
GLUCOSE SERPL-MCNC: 149 MG/DL (ref 65–140)
GLUCOSE SERPL-MCNC: 160 MG/DL (ref 65–140)
HCT VFR BLD AUTO: 34.6 % (ref 36.5–49.3)
HCT VFR BLD AUTO: 35.4 % (ref 36.5–49.3)
HGB BLD-MCNC: 12 G/DL (ref 12–17)
HGB BLD-MCNC: 12 G/DL (ref 12–17)
POTASSIUM SERPL-SCNC: 3.6 MMOL/L (ref 3.5–5.3)
SODIUM SERPL-SCNC: 142 MMOL/L (ref 135–147)

## 2022-09-12 PROCEDURE — G1004 CDSM NDSC: HCPCS

## 2022-09-12 PROCEDURE — 85018 HEMOGLOBIN: CPT | Performed by: INTERNAL MEDICINE

## 2022-09-12 PROCEDURE — A9585 GADOBUTROL INJECTION: HCPCS | Performed by: INTERNAL MEDICINE

## 2022-09-12 PROCEDURE — 82948 REAGENT STRIP/BLOOD GLUCOSE: CPT

## 2022-09-12 PROCEDURE — 99232 SBSQ HOSP IP/OBS MODERATE 35: CPT | Performed by: INTERNAL MEDICINE

## 2022-09-12 PROCEDURE — 99223 1ST HOSP IP/OBS HIGH 75: CPT | Performed by: INTERNAL MEDICINE

## 2022-09-12 PROCEDURE — 85014 HEMATOCRIT: CPT | Performed by: INTERNAL MEDICINE

## 2022-09-12 PROCEDURE — 74177 CT ABD & PELVIS W/CONTRAST: CPT

## 2022-09-12 PROCEDURE — 80048 BASIC METABOLIC PNL TOTAL CA: CPT | Performed by: INTERNAL MEDICINE

## 2022-09-12 PROCEDURE — 70553 MRI BRAIN STEM W/O & W/DYE: CPT

## 2022-09-12 PROCEDURE — 85730 THROMBOPLASTIN TIME PARTIAL: CPT | Performed by: INTERNAL MEDICINE

## 2022-09-12 PROCEDURE — 99222 1ST HOSP IP/OBS MODERATE 55: CPT | Performed by: PHYSICIAN ASSISTANT

## 2022-09-12 RX ADMIN — LISINOPRIL 10 MG: 10 TABLET ORAL at 09:39

## 2022-09-12 RX ADMIN — FINASTERIDE 5 MG: 5 TABLET, FILM COATED ORAL at 09:36

## 2022-09-12 RX ADMIN — METOPROLOL SUCCINATE 100 MG: 100 TABLET, EXTENDED RELEASE ORAL at 09:37

## 2022-09-12 RX ADMIN — INSULIN LISPRO 1 UNITS: 100 INJECTION, SOLUTION INTRAVENOUS; SUBCUTANEOUS at 12:18

## 2022-09-12 RX ADMIN — IOHEXOL 100 ML: 350 INJECTION, SOLUTION INTRAVENOUS at 15:21

## 2022-09-12 RX ADMIN — HEPARIN SODIUM 24 UNITS/KG/HR: 10000 INJECTION, SOLUTION INTRAVENOUS at 17:04

## 2022-09-12 RX ADMIN — AMLODIPINE BESYLATE 10 MG: 10 TABLET ORAL at 09:36

## 2022-09-12 RX ADMIN — HEPARIN SODIUM 24 UNITS/KG/HR: 10000 INJECTION, SOLUTION INTRAVENOUS at 04:37

## 2022-09-12 RX ADMIN — GADOBUTROL 7 ML: 604.72 INJECTION INTRAVENOUS at 18:31

## 2022-09-12 RX ADMIN — PRAVASTATIN SODIUM 20 MG: 20 TABLET ORAL at 17:04

## 2022-09-12 NOTE — CONSULTS
Consultation - Pulmonary Medicine   Wander Priest 69 y o  male MRN: [de-identified]  Unit/Bed#: -01 Encounter: [id number]      Assessment:  1  Hemoptysis  2  Abnormal chest CT with RUL pulmonary mass, hilar/mediastinal LAD and pleural effusion  3  Acute PE    Plan:   Patient presented with cough and hemoptysis which began about 3 weeks ago  CTA on admission shows acute PE in the right lower lobe pulmonary artery and several segmental and subsegmental branches  Also shows right upper lobe pulmonary mass measuring up to 6 2 cm with scattered additional solid nodules, several large right hilar and mediastinal lymph  Findings are suspicious for likely primary lung malignancy, he is a nonsmoker but does have significant secondhand smoke exposure  He is awaiting MRI of the brain as well as CT scan of the abdomen and pelvis    If no accessible lesions in the abdomen/pelvis, would consider thoracentesis given sizeable right sided pleural effusion to aid in diagnosis  Can consider EBUS if no abdominal lesions and need for further tissue or if thoracentesis were non-diagnostic   He is currently on room air with sats in the mid to high 90s, no significant respiratory symptoms, states still having episodes of cough, no further hemoptysis  He is currently on heparin for acute PE, will likely need lifelong anticoagulation  Will follow up once CT abd/pel is done  History of Present Illness   Physician Requesting Consult: Francia Stevenson MD  Reason for Consult / Principal Problem: Lung mass  Hx and PE limited by: None  HPI: Wander Priest is a 69y o  year old male nonsmoker with past medical history of prostate cancer in 2005, hypertension, diabetes, hyperlipidemia who presents with complaint of cough with streaky hemoptysis that started about 3-4 weeks ago  His wife had COVID about 5 weeks ago, he began coughing about a week or so afterwards    Continued to have some bright red blood mixed with his sputum during that time which has since subsided  He is still having some coughing fits  He denies any significant shortness of breath  Does note some weight loss recently as well  He is a lifelong nonsmoker  His ex-wife did smoke heavily so he does have exposure to secondhand smoke  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  He denies any prior pulmonary history  Consults    Review of Systems   Constitutional: Positive for fatigue and unexpected weight change  HENT: Negative  Respiratory: Positive for cough  Negative for shortness of breath  Cardiovascular: Negative  Gastrointestinal: Negative  Genitourinary: Negative  Musculoskeletal: Negative  Skin: Negative  Allergic/Immunologic: Negative  Neurological: Negative  Psychiatric/Behavioral: Negative  Historical Information   Past Medical History:   Diagnosis Date    Diabetes mellitus ([facility] [address] )     Hyperlipidemia     Hypertension     Prostate cancer ([facility] [address] ) 2005    S/P prostate ca-2005 had radiation tx       Past Surgical History:   Procedure Laterality Date    COLONOSCOPY      NH ARTHRODESIS,ANKLE,OPEN Right 7/10/2020    Procedure: ARTHRODESIS/ TIBIAL-TALAR ANKLE FUSION;  Surgeon: Sergey Duarte MD;  [facility];  Service: Orthopedics    NH [address] Bilateral 12/16/2021    Procedure: LAPAROSCOPIC REPAIR HERNIA INGUINAL WITH MESH;  Surgeon: Louise Foreman MD;  [facility];  Service: General     Social History   Social History     Substance and Sexual Activity   Alcohol Use Yes    Comment: Occasional     Social History     Substance and Sexual Activity   Drug Use Never     E-Cigarette/Vaping    E-Cigarette Use Never User      E-Cigarette/Vaping Substances     Social History     Tobacco Use   Smoking Status Never Smoker   Smokeless Tobacco Never Used     Occupational History: Worked as     Family History:   Family History Problem Relation Age of Onset    Heart attack Mother        Meds/Allergies   all current active meds have been reviewed, pertinent pulmonary meds have been reviewed and current meds:   Current Facility-Administered Medications   Medication Dose Route Frequency    acetaminophen (TYLENOL) tablet 650 mg  650 mg Oral Q4H PRN    amLODIPine (NORVASC) tablet 10 mg  10 mg Oral Daily    finasteride (PROSCAR) tablet 5 mg  5 mg Oral Daily    heparin (porcine) 25,000 units in 0 45% NaCl 250 mL infusion (premix)  3-30 Units/kg/hr (Order-Specific) Intravenous Titrated    heparin (porcine) injection 3,000 Units  3,000 Units Intravenous Q1H PRN    heparin (porcine) injection 6,000 Units  6,000 Units Intravenous Q1H PRN    insulin lispro (HumaLOG) 100 units/mL subcutaneous injection 1-5 Units  1-5 Units Subcutaneous TID AC    insulin lispro (HumaLOG) 100 units/mL subcutaneous injection 1-5 Units  1-5 Units Subcutaneous HS    lisinopril (ZESTRIL) tablet 10 mg  10 mg Oral Daily    metoprolol succinate (TOPROL-XL) 24 hr tablet 100 mg  100 mg Oral Daily    ondansetron (ZOFRAN) injection 4 mg  4 mg Intravenous Q6H PRN    pravastatin (PRAVACHOL) tablet 20 mg  20 mg Oral Daily With Dinner       No Known Allergies    Objective   Vitals: Blood pressure 151/87, pulse 73, temperature 98 1 °F (36 7 °C), temperature source Oral, resp  rate 19, height 5' 9" (1 753 m), weight 76 6 kg (168 lb 14 4 oz), SpO2 96 %  ,Body mass index is 24 94 kg/m²  Intake/Output Summary (Last 24 hours) at 9/12/2022 1454  Last data filed at 9/12/2022 0908  Gross per 24 hour   Intake 780 ml   Output --   Net 780 ml     Invasive Devices  Report    Peripheral Intravenous Line  Duration           Peripheral IV 09/10/22 Left Antecubital 1 day    Peripheral IV 09/10/22 Right Arm 1 day                Physical Exam  Vitals reviewed  Constitutional:       General: He is not in acute distress  Appearance: Normal appearance  He is not ill-appearing     HENT: Head: Normocephalic and atraumatic  Mouth/Throat:      Pharynx: Oropharynx is clear  Eyes:      Conjunctiva/sclera: Conjunctivae normal    Cardiovascular:      Rate and Rhythm: Normal rate and regular rhythm  Pulmonary:      Effort: Pulmonary effort is normal  No respiratory distress  Breath sounds: Examination of the right-lower field reveals decreased breath sounds  Decreased breath sounds present  No wheezing, rhonchi or rales  Abdominal:      General: Abdomen is flat  There is no distension  Musculoskeletal:         General: Normal range of motion  Cervical back: Normal range of motion  Neurological:      Mental Status: He is alert  Lab Results:   I have personally reviewed pertinent lab results  , CBC:   Lab Results   Component Value Date    HGB 12 0 09/12/2022    HCT 35 4 (L) 09/12/2022   , CMP:   Lab Results   Component Value Date    SODIUM 142 09/12/2022    K 3 6 09/12/2022     09/12/2022    CO2 27 09/12/2022    BUN 15 09/12/2022    CREATININE 0 82 09/12/2022    CALCIUM 8 3 09/12/2022    EGFR 85 09/12/2022     Imaging Studies: I have personally reviewed pertinent reports  and I have personally reviewed pertinent films in PACS  EKG, Pathology, and Other Studies: I have personally reviewed pertinent reports      VTE Prophylaxis: Heparin    Code Status: Level 1 - Full Code  Advance Directive and Living Will:      Power of :    POLST:

## 2022-09-12 NOTE — ASSESSMENT & PLAN NOTE
Hgb 11 5, decreased from baseline of 14 on 9/11  Hgb being monitored q12h - most recent 12 1  · Iron panel : S iron : 27 (low), TIBC : 117 (low), % sat : 23 (normal), ferritin: 579 (elevated)   · Monitor hgb q12hr

## 2022-09-12 NOTE — ASSESSMENT & PLAN NOTE
· Likely multifactorial 2/2 COVID and malignancy   · CTA 09/10: revealed acute pulmonary emboli in the RLL pulmonary artery and several segmental and subsegmental branches, as well as several LLL subsegmental branches  RV/LV ratio 0 84, less than 0 9  GGO in RLL, likley pulmonary infarct  Additional findings as below  · Hemodynamics are stable, no hypoxia   · Received IV vanc/cefepime for GGO, procal normal  monitor off antibiotics as likely infarct   · Started on IV heparin gtt, continue  Adjust per protocol  Will continue on heparin drip for now, as patient is being evaluated for the possible need for biopsy

## 2022-09-12 NOTE — CASE MANAGEMENT
Case Management Assessment & Discharge Planning Note    Patient name Dora Beverly  Location /-95 MRN [id number]  : 1945 Date 2022       Current Admission Date: 9/10/2022  Current Admission Diagnosis:Other pulmonary embolism without acute cor pulmonale [facility])   Patient Active Problem List    Diagnosis Date Noted    Other pulmonary embolism without acute cor pulmonale ([facility] [address] ) 09/10/2022    Pulmonary mass 09/10/2022    Anemia 09/10/2022    Non-recurrent bilateral inguinal hernia without obstruction or gangrene 2021    Status post right tibial-talar ankle fusion 2020    BPH associated with nocturia 2019    Arthritis of right acromioclavicular joint 03/15/2019    Primary osteoarthritis of right shoulder 2019    Chronic left shoulder pain 2019    Left rotator cuff tear arthropathy 2019    Rotator cuff injury, right, initial encounter 2019    History of colon polyps 2019    Hyperparathyroidism ([facility] [address] ) 2018    Hypokalemia 10/22/2018    Hypocalcemia 10/22/2018    Glaucoma 10/11/2018    Adenocarcinoma of prostate ([facility] [address] ) 2018    Controlled type 2 diabetes mellitus without complication, without long-term current use of insulin ([facility] [address] ) 2016    Inguinal hernia 2016    Benign essential HTN 2016    Hypercholesterolemia 2016      LOS (days): 2  Geometric Mean LOS (GMLOS) (days): 2 60  Days to GMLOS:0 9     OBJECTIVE:    Risk of Unplanned Readmission Score: 13 57         Current admission status: Inpatient       Preferred Pharmacy:   [address] [address] [address]  [facility]   [address] [facility] [place] [address]  Phone: [phone] Fax: [address] [place] [address] [facility] [address] [address] [address]  Phone: [phone] Fax: [phone]    Primary Care Provider: Nimesh Newman PA-C    Primary Insurance: MEDICARE  Secondary Insurance: AETNA    ASSESSMENT:  Active Health Care Proxies    There are no active Health Care Proxies on file  Advance Directives  Does patient have a Health Care POA?: Yes  Does patient have Advance Directives?: Yes  Advance Directives: Living will, Power of  for health care  Primary Contact: wife Sushila Nice         Readmission Root Cause  30 Day Readmission: No    Patient Information  Admitted from[de-identified] Home  Mental Status: Alert  During Assessment patient was accompanied by: Not accompanied during assessment  Assessment information provided by[de-identified] Patient  Primary Caregiver: Self  Support Systems: Spouse/significant other  [place] of Residence: [address] [place] [address] do you live in?: [address] entry access options   Select all that apply : No steps to enter home  Type of Current Residence: 2 story home  Upon entering residence, is there a bedroom on the main floor (no further steps)?: No  A bedroom is located on the following floor levels of residence (select all that apply):: 2nd Floor  Upon entering residence, is there a bathroom on the main floor (no further steps)?: Yes  Number of steps to 2nd floor from main floor: One Flight  In the last 12 months, was there a time when you were not able to pay the mortgage or rent on time?: No  In the last 12 months, how many places have you lived?: 1  In the last 12 months, was there a time when you did not have a steady place to sleep or slept in a shelter (including now)?: No  Homeless/housing insecurity resource given?: No  Living Arrangements: Lives w/ Spouse/significant other  Is patient a ?: Yes  Is patient active with [facility])?: No    Activities of Daily Living Prior to Admission  Functional Status: Independent  Completes ADLs independently?: Yes  Ambulates independently?: Yes  Does patient use assisted devices?: No  Does patient currently own DME?: No  Does patient have a history of Outpatient Therapy (PT/OT)?: Yes  Does the patient have a history of Short-Term Rehab?: No  Does patient have a history of HHC?: No  Does patient currently have [address]?: No         Patient Information Continued  Income Source: Pension/long-term  Does patient have prescription coverage?: Yes  Within the past 12 months, you worried that your food would run out before you got the money to buy more : Never true  Within the past 12 months, the food you bought just didn't last and you didn't have money to get more : Never true  Food insecurity resource given?: No  Does patient receive dialysis treatments?: No  Does patient have a history of substance abuse?: No  Does patient have a history of Mental Health Diagnosis?: No    PHQ 2/9 Screening   Reviewed PHQ 2/9 Depression Screening Score?: No    Means of Transportation  Means of Transport to Appts[de-identified] Drives Self  In the past 12 months, has lack of transportation kept you from medical appointments or from getting medications?: No  In the past 12 months, has lack of transportation kept you from meetings, work, or from getting things needed for daily living?: No  Was application for public transport provided?: No        DISCHARGE DETAILS:    Discharge planning discussed with[de-identified] patient  Freedom of Choice: Yes  Comments - Freedom of Choice: refusing VNA    IPTA  Cooks, cleans, drives  CM contacted family/caregiver?: No- see comments (pt will update his wife himself)  Were Treatment Team discharge recommendations reviewed with patient/caregiver?: Yes  Did patient/caregiver verbalize understanding of patient care needs?: Yes  Were patient/caregiver advised of the risks associated with not following Treatment Team discharge recommendations?: Yes    Contacts  Patient Contacts: Meggan Astorga  Relationship to Patient[de-identified] [address]         Is the patient interested in [address] at discharge?: No    DME Referral Provided  Referral made for DME?: No    Other Referral/Resources/Interventions Provided:  Referral Comments: Refusing VNA services on discharge-no anticiapted d/c needs at this time    Pt is a tentative d/c in 24-48 hrs pending oncology consult per [facility]    Would you like to participate in our [address] service program?  : No - Declined    Treatment Team Recommendation: Home  Discharge Destination Plan[de-identified] Home  Transport at Discharge : Family

## 2022-09-12 NOTE — ASSESSMENT & PLAN NOTE
Lab Results   Component Value Date    HGBA1C 5 5 07/21/2022     · Hold home metformin   · glucochecks ac and insulin coverage as needed

## 2022-09-12 NOTE — ASSESSMENT & PLAN NOTE
· CTA also revealed RUL pulmonary mass measuring up to 6 2 cm as described, compatible with primary lung malignancy  Scattered additional solid nodules measuring up to 1 2 cm the RML, suspicious for metastatic disease  several large right hilar and mediastinal lymph nodes measuring up to 2 1 cm, also concerning for metastatic disease  · CT head r/o brain mets-- no discrete evidence of intracranial metastatic disease    · 9/12 : As per Hematology Oncology, recommended to obtain MRI head with and without contrast or atleast CT head with contrast to definitively exclude brain metastasis, CT abdomen/pelvis with contrast, consider pulmonology consult to evaluate for possible EBUS for tissue biopsy--will obtain

## 2022-09-12 NOTE — ASSESSMENT & PLAN NOTE
· BP slightly elevated   · Continue home amlodipine, lisinopril, metoprolol  · Monitor hemodynamics closely      Blood pressure 151/87, pulse 73, temperature 98 1 °F (36 7 °C), resp  rate 16, height 5' 9" (1 753 m), weight 76 6 kg (168 lb 14 4 oz), SpO2 94 %

## 2022-09-12 NOTE — PROGRESS NOTES
[address] [facility]  Progress Note Juan C Aparicio 1945, 69 y o  male MRN: [id number]  Unit/Bed#: -Saida Encounter: [id number]  Primary Care Provider: Twila Rodgers PA-C   Date and time admitted to hospital: 9/10/2022  4:11 PM    * Pulmonary mass  Assessment & Plan  · CTA also revealed RUL pulmonary mass measuring up to 6 2 cm as described, compatible with primary lung malignancy  Scattered additional solid nodules measuring up to 1 2 cm the RML, suspicious for metastatic disease  several large right hilar and mediastinal lymph nodes measuring up to 2 1 cm, also concerning for metastatic disease  · CT head r/o brain mets-- no discrete evidence of intracranial metastatic disease  · 9/12 : As per Hematology Oncology, recommended to obtain MRI head with and without contrast or atleast CT head with contrast to definitively exclude brain metastasis, CT abdomen/pelvis with contrast, consider pulmonology consult to evaluate for possible EBUS for tissue biopsy--will obtain       Anemia  Assessment & Plan  Hgb 11 5, decreased from baseline of 14 on 9/11  Hgb being monitored q12h - most recent 12 1  · Iron panel : S iron : 27 (low), TIBC : 117 (low), % sat : 23 (normal), ferritin: 579 (elevated)   · Monitor hgb q12hr     Other pulmonary embolism without acute cor pulmonale (HCC)  Assessment & Plan  · Likely multifactorial 2/2 COVID and malignancy   · CTA 09/10: revealed acute pulmonary emboli in the RLL pulmonary artery and several segmental and subsegmental branches, as well as several LLL subsegmental branches  RV/LV ratio 0 84, less than 0 9  GGO in RLL, likley pulmonary infarct  Additional findings as below  · Hemodynamics are stable, no hypoxia   · Received IV vanc/cefepime for GGO, procal normal  monitor off antibiotics as likely infarct   · Started on IV heparin gtt, continue  Adjust per protocol   Will continue on heparin drip for now, as patient is being evaluated for the possible need for biopsy  Adenocarcinoma of prostate [facility])  Assessment & Plan  S/p RT in 2005  Hypercholesterolemia  Assessment & Plan  · Continue statins    Controlled type 2 diabetes mellitus without complication, without long-term current use of insulin [facility])  Assessment & Plan  Lab Results   Component Value Date    HGBA1C 5 5 07/21/2022     · Hold home metformin   · glucochecks ac and insulin coverage as needed    Benign essential HTN  Assessment & Plan  · BP slightly elevated   · Continue home amlodipine, lisinopril, metoprolol  · Monitor hemodynamics closely      Blood pressure 151/87, pulse 73, temperature 98 1 °F (36 7 °C), resp  rate 16, height 5' 9" (1 753 m), weight 76 6 kg (168 lb 14 4 oz), SpO2 94 %  INTERNAL MEDICINE RESIDENCY PROGRESS NOTE     Name: Shaye Place   Age & Sex: 69 y o  male   MRN: [id number]  Unit/Bed#: -01   Encounter: [id number]      PATIENT INFORMATION     Name: Shaye Place   Age & Sex: 69 y o  male   MRN: [id number]  Hospital Stay Days: 2    ASSESSMENT/PLAN     Principal Problem:    Pulmonary mass  Active Problems:    Benign essential HTN    Controlled type 2 diabetes mellitus without complication, without long-term current use of insulin ([facility] [address] )    Hypercholesterolemia    Adenocarcinoma of prostate ([facility] [address] )    Other pulmonary embolism without acute cor pulmonale (HCC)    Anemia      * Pulmonary mass  Assessment & Plan  · CTA also revealed RUL pulmonary mass measuring up to 6 2 cm as described, compatible with primary lung malignancy  Scattered additional solid nodules measuring up to 1 2 cm the RML, suspicious for metastatic disease  several large right hilar and mediastinal lymph nodes measuring up to 2 1 cm, also concerning for metastatic disease  · CT head r/o brain mets-- no discrete evidence of intracranial metastatic disease    · 9/12 : As per Hematology Oncology, recommended to obtain MRI head with and without contrast or atleast CT head with contrast to definitively exclude brain metastasis, CT abdomen/pelvis with contrast, consider pulmonology consult to evaluate for possible EBUS for tissue biopsy--will obtain       Anemia  Assessment & Plan  Hgb 11 5, decreased from baseline of 14 on 9/11  Hgb being monitored q12h - most recent 12 1  · Iron panel : S iron : 27 (low), TIBC : 117 (low), % sat : 23 (normal), ferritin: 579 (elevated)   · Monitor hgb q12hr     Other pulmonary embolism without acute cor pulmonale (HCC)  Assessment & Plan  · Likely multifactorial 2/2 COVID and malignancy   · CTA 09/10: revealed acute pulmonary emboli in the RLL pulmonary artery and several segmental and subsegmental branches, as well as several LLL subsegmental branches  RV/LV ratio 0 84, less than 0 9  GGO in RLL, likley pulmonary infarct  Additional findings as below  · Hemodynamics are stable, no hypoxia   · Received IV vanc/cefepime for GGO, procal normal  monitor off antibiotics as likely infarct   · Started on IV heparin gtt, continue  Adjust per protocol  Will continue on heparin drip for now, as patient is being evaluated for the possible need for biopsy  Adenocarcinoma of prostate [facility])  Assessment & Plan  S/p RT in 2005  Hypercholesterolemia  Assessment & Plan  · Continue statins    Controlled type 2 diabetes mellitus without complication, without long-term current use of insulin [facility])  Assessment & Plan  Lab Results   Component Value Date    HGBA1C 5 5 07/21/2022     · Hold home metformin   · glucochecks ac and insulin coverage as needed    Benign essential HTN  Assessment & Plan  · BP slightly elevated   · Continue home amlodipine, lisinopril, metoprolol  · Monitor hemodynamics closely      Blood pressure 151/87, pulse 73, temperature 98 1 °F (36 7 °C), resp  rate 16, height 5' 9" (1 753 m), weight 76 6 kg (168 lb 14 4 oz), SpO2 94 %  Disposition:  Pending further evaluation with imaging studies and pulmonology consult for possible EBUS      SUBJECTIVE     Patient seen and examined  No acute events overnight  Patient denies any new or worsening complaints  No chest pain or shortness of breath  No cough  OBJECTIVE     Vitals:    22 0600 22 0632 22 0737 22 0939   BP:  156/90 151/87    BP Location:       Pulse:  78 73    Resp:   19    Temp:  98 3 °F (36 8 °C) 98 1 °F (36 7 °C)    TempSrc:   Oral    SpO2:  94% 94% 96%   Weight: 76 6 kg (168 lb 14 4 oz)      Height:          Temperature:   Temp (24hrs), Av 2 °F (36 8 °C), Min:97 8 °F (36 6 °C), Max:98 3 °F (36 8 °C)    Temperature: 98 1 °F (36 7 °C)  Intake & Output:  I/O       09/10 07 07 07 07 07 0700    P  O   360 420    IV Piggyback 256 7      Total Intake(mL/kg) 256 7 360 (4 7) 420 (5 5)    Urine (mL/kg/hr) 400 300 (0 2)     Stool 0      Total Output 400 300     Net -143 3 +60 +420           Unmeasured Urine Occurrence  1 x     Unmeasured Stool Occurrence 0 x          Weights:   IBW (Ideal Body Weight): 70 7 kg    Body mass index is 24 94 kg/m²  Weight (last 2 days)     Date/Time Weight    22 0600 76 6 (168 9)    22 14:09:08 76 7 (169)        Physical Exam  Vitals and nursing note reviewed  Constitutional:       General: He is not in acute distress  Appearance: He is well-developed  HENT:      Head: Normocephalic and atraumatic  Eyes:      General:         Right eye: No discharge  Left eye: No discharge  Conjunctiva/sclera: Conjunctivae normal    Cardiovascular:      Rate and Rhythm: Normal rate  Pulses: Normal pulses  Heart sounds: No murmur heard  Pulmonary:      Effort: Pulmonary effort is normal  No respiratory distress  Abdominal:      Palpations: Abdomen is soft  Tenderness: There is no abdominal tenderness  Musculoskeletal:      Cervical back: Neck supple  Skin:     General: Skin is warm and dry  Capillary Refill: Capillary refill takes less than 2 seconds     Neurological:      Mental Status: He is alert  LABORATORY DATA     Labs: I have personally reviewed pertinent reports  Results from last 7 days   Lab Units 09/12/22  0828 09/11/22  2327 09/11/22  0517 09/11/22  0039 09/10/22  1630   WBC Thousand/uL  --   --  9 11  --  9 29   HEMOGLOBIN g/dL 12 0 12 1 11 1*   < > 11 5*   HEMATOCRIT % 35 4* 35 3* 32 2*   < > 33 9*   PLATELETS Thousands/uL  --   --  237  --  242   NEUTROS PCT %  --   --  73  --  76*   MONOS PCT %  --   --  12  --  10    < > = values in this interval not displayed  Results from last 7 days   Lab Units 09/12/22  1154 09/10/22  1630   POTASSIUM mmol/L 3 6 3 7   CHLORIDE mmol/L 107 106   CO2 mmol/L 27 28   BUN mg/dL 15 17   CREATININE mg/dL 0 82 1 23   CALCIUM mg/dL 8 3 8 3   ALK PHOS U/L  --  114   ALT U/L  --  44   AST U/L  --  24              Results from last 7 days   Lab Units 09/12/22  0012 09/11/22  1721 09/11/22  0935 09/11/22  0143 09/10/22  1630   INR   --   --   --   --  1 15   PTT seconds 67* 63* 53*   < > 30    < > = values in this interval not displayed  Results from last 7 days   Lab Units 09/10/22  1630   LACTIC ACID mmol/L 0 9           IMAGING & DIAGNOSTIC TESTING     Radiology Results: I have personally reviewed pertinent reports  XR chest 1 view portable    Result Date: 9/11/2022  Impression: 1   5 cm right upper lobe mass highly suspicious for neoplasm  2   Patchy opacity right lung base which may represent atelectasis or pneumonia  3   Right pleural effusion  Patient has had a follow-up CT chest since the time of this examination  Workstation performed: [id number]     CT head wo contrast    Result Date: 9/10/2022  Impression: No acute intracranial abnormality is seen  No discrete evidence of intracranial metastatic disease  Other findings as above  Workstation performed: [id number]     CTA ED chest PE Study    Result Date: 9/10/2022  Impression: 1    Acute pulmonary emboli in the right lower lobe pulmonary artery and several segmental and subsegmental branches, as well as several left lower lobe subsegmental branches  Measured RV/LV ratio is within normal limits at 0 84, less than 0 9   2   Ground glass opacification in the right lower lobe, likely pulmonary infarction  Moderate right and trace left pleural effusions  3   Right upper lobe pulmonary mass measuring up to 6 2 cm as described, compatible with primary lung malignancy  Scattered additional solid nodules measuring up to 1 2 cm the right middle lobe, suspicious for metastatic disease  4  Several large right hilar and mediastinal lymph nodes measuring up to 2 1 cm, also concerning for metastatic disease  I personally discussed this study with Charleen Samayoa on 9/10/2022 at 6:58 PM  Workstation performed: [id number]     Other Diagnostic Testing: I have personally reviewed pertinent reports      ACTIVE MEDICATIONS     Current Facility-Administered Medications   Medication Dose Route Frequency    acetaminophen (TYLENOL) tablet 650 mg  650 mg Oral Q4H PRN    amLODIPine (NORVASC) tablet 10 mg  10 mg Oral Daily    finasteride (PROSCAR) tablet 5 mg  5 mg Oral Daily    heparin (porcine) 25,000 units in 0 45% NaCl 250 mL infusion (premix)  3-30 Units/kg/hr (Order-Specific) Intravenous Titrated    heparin (porcine) injection 3,000 Units  3,000 Units Intravenous Q1H PRN    heparin (porcine) injection 6,000 Units  6,000 Units Intravenous Q1H PRN    insulin lispro (HumaLOG) 100 units/mL subcutaneous injection 1-5 Units  1-5 Units Subcutaneous TID AC    insulin lispro (HumaLOG) 100 units/mL subcutaneous injection 1-5 Units  1-5 Units Subcutaneous HS    lisinopril (ZESTRIL) tablet 10 mg  10 mg Oral Daily    metoprolol succinate (TOPROL-XL) 24 hr tablet 100 mg  100 mg Oral Daily    ondansetron (ZOFRAN) injection 4 mg  4 mg Intravenous Q6H PRN    pravastatin (PRAVACHOL) tablet 20 mg  20 mg Oral Daily With Dinner       VTE Pharmacologic Prophylaxis: Heparin  VTE Mechanical Prophylaxis: sequential compression device    Portions of the record may have been created with voice recognition software  Occasional wrong word or "sound a like" substitutions may have occurred due to the inherent limitations of voice recognition software    Read the chart carefully and recognize, using context, where substitutions have occurred   ==  Dulce Brewer MD  [address]  Internal Medicine Residency PGY-3

## 2022-09-12 NOTE — CONSULTS
Medical Oncology/Hematology Consult Note  Kaila Llanos, male, 69 y o , 1945,  /-[birth year], [id number]     Reason for admission:  Pulmonary embolism  Reason for consultation:  PE, RUL lung mass    ASSESSMENT AND PLAN:     1  RUL lung mass   CTA ED chest PE study 09/10/2022 findings of acute PE moderate grade trace left pleural effusions, right upper lobe pulmonary mass up to 6 2 cm concerning for primary lung malignancy, scattered additional solid nodules measuring up to 1 2 cm right middle lobe, suspicious for metastatic disease  Some right hilar and mediastinal lymph nodes measuring to 2 1 cm concerning for metastatic disease   CT head without contrast negative for intracranial abnormality  Plan:   Patient will need MRI with and without contrast or at least CT head with contrast to definitively exclude brain metastasis  Recommend CT abdomen and pelvis with contrast   Recommend Pulm consult for evaluation of EBUS for tissue bx if CT abdomen pelvis do not reveal distant metastasis  Will arrange follow up appointment for 2 weeks after bx for discussion of pathology and treatment plan at [facility]  Questionable small lymphadenopathy right cervical chain, 3 lymph nodes <1 cm - recommend consideration for outpatient PETCT for further evaluation     2  PE   No heart strain, likely secondary to suspected malignancy   Recommend transition to oral anticoagulation prior to discharge   Will likely need lifelong anticoagulation, can be managed by PCP    3  History prostate cancer  · In , s/p RT x 10 weeks at [address]  · PSA stable, to be repeated as outpatient    PSA diagnostic  Date Value Ref Range   10/16/2021 0 1 0 0 - 4 0 ng/mL   2020 0 1 0 0 - 4 0 ng/mL   2019 0 1 0 0 - 4 0 ng/mL     Patient understands and is in agreement with this plan  Thank you for the opportunity to participate in this patient's care      ECO    History of present illness:  Patient is a 52-year-old male past medical history significant for diabetes mellitus, HLD, HTN, prostate cancer 2005 s/p RT who presented to emergency department for evaluation of cough x 3 weeks with blood tinged mucous  [facility] (wife) had covid 5 weeks ago, one week after her quarantine patient started cough with blood tinged mucous  Has been the same since  Lost a few pounds due to decreased appetite  Normally 176lbs today 168  No fevers, frequent infections, no night sweats  Some chills during night  No fevers  No n/v/d abdominal pain, no blood in stool or urine  No dark tarry stool  Urinary frequency, no other urinary sx  Frequency since prostate CA dx  Never smoker, ex wife of 13 years smoked frequently and patient has significant second hand smoke exposure  ETOH socially     No personal hx blood clots  No family hx clotting or bleeding disorders  No family hx malignancy  Review of Systems:   Review of Systems   Constitutional: Negative for chills, fever and unexpected weight change  HENT: Negative for ear pain and sore throat  Eyes: Negative for pain and visual disturbance  Respiratory: Positive for cough  Negative for chest tightness and shortness of breath  Cardiovascular: Negative for chest pain and palpitations  Gastrointestinal: Negative for abdominal pain, blood in stool, constipation, diarrhea, nausea and vomiting  Genitourinary: Negative for dysuria and hematuria  Musculoskeletal: Negative for arthralgias and back pain  Skin: Negative for color change and rash  Neurological: Negative for seizures and syncope  Hematological: Positive for adenopathy (small, right neck)  All other systems reviewed and are negative  PHYSICAL EXAM:    /87   Pulse 73   Temp 98 1 °F (36 7 °C) (Oral)   Resp 19   Ht 5' 9" (1 753 m)   Wt 76 6 kg (168 lb 14 4 oz)   SpO2 96%   BMI 24 94 kg/m²     Physical Exam  Vitals and nursing note reviewed     Constitutional:       General: He is not in acute distress  Appearance: Normal appearance  He is not ill-appearing  HENT:      Head: Normocephalic and atraumatic  Eyes:      General: No scleral icterus  Cardiovascular:      Rate and Rhythm: Normal rate and regular rhythm  Pulses: Normal pulses  Heart sounds: Normal heart sounds  No murmur heard  Pulmonary:      Effort: Pulmonary effort is normal       Breath sounds: Normal breath sounds  No wheezing or rhonchi  Chest:      Chest wall: No tenderness  Abdominal:      General: Bowel sounds are normal  There is no distension  Palpations: Abdomen is soft  There is no mass  Tenderness: There is no abdominal tenderness  Musculoskeletal:      Right lower leg: No edema  Left lower leg: No edema  Lymphadenopathy:      Cervical: Cervical adenopathy present  Right cervical: Superficial cervical adenopathy present  Comments: Questionable small lymphadenopathy right superficial cervical chain x 3  Approximately 1cm diameter  Skin:     General: Skin is warm and dry  Coloration: Skin is not pale  Neurological:      Mental Status: He is alert and oriented to person, place, and time  Psychiatric:         Thought Content:  Thought content normal          LABS:     Recent Results (from the past 48 hour(s))   CBC and differential    Collection Time: 09/10/22  4:30 PM   Result Value Ref Range    WBC 9 29 4 31 - 10 16 Thousand/uL    RBC 3 71 (L) 3 88 - 5 62 Million/uL    Hemoglobin 11 5 (L) 12 0 - 17 0 g/dL    Hematocrit 33 9 (L) 36 5 - 49 3 %    MCV 91 82 - 98 fL    MCH 31 0 26 8 - 34 3 pg    MCHC 33 9 31 4 - 37 4 g/dL    RDW 11 9 11 6 - 15 1 %    MPV 11 4 8 9 - 12 7 fL    Platelets 438 560 - 622 Thousands/uL    nRBC 0 /100 WBCs    Neutrophils Relative 76 (H) 43 - 75 %    Immat GRANS % 1 0 - 2 %    Lymphocytes Relative 9 (L) 14 - 44 %    Monocytes Relative 10 4 - 12 %    Eosinophils Relative 4 0 - 6 %    Basophils Relative 0 0 - 1 %    Neutrophils Absolute 7  07 1 85 - 7 62 Thousands/µL    Immature Grans Absolute 0 06 0 00 - 0 20 Thousand/uL    Lymphocytes Absolute 0 83 0 60 - 4 47 Thousands/µL    Monocytes Absolute 0 97 0 17 - 1 22 Thousand/µL    Eosinophils Absolute 0 33 0 00 - 0 61 Thousand/µL    Basophils Absolute 0 03 0 00 - 0 10 Thousands/µL   Comprehensive metabolic panel    Collection Time: 09/10/22  4:30 PM   Result Value Ref Range    Sodium 143 135 - 147 mmol/L    Potassium 3 7 3 5 - 5 3 mmol/L    Chloride 106 96 - 108 mmol/L    CO2 28 21 - 32 mmol/L    ANION GAP 9 4 - 13 mmol/L    BUN 17 5 - 25 mg/dL    Creatinine 1 23 0 60 - 1 30 mg/dL    Glucose 116 65 - 140 mg/dL    Calcium 8 3 8 3 - 10 1 mg/dL    Corrected Calcium 9 7 8 3 - 10 1 mg/dL    AST 24 5 - 45 U/L    ALT 44 12 - 78 U/L    Alkaline Phosphatase 114 46 - 116 U/L    Total Protein 6 7 6 4 - 8 4 g/dL    Albumin 2 3 (L) 3 5 - 5 0 g/dL    Total Bilirubin 0 75 0 20 - 1 00 mg/dL    eGFR 56 ml/min/1 73sq m   Lactic acid    Collection Time: 09/10/22  4:30 PM   Result Value Ref Range    LACTIC ACID 0 9 0 5 - 2 0 mmol/L   Procalcitonin    Collection Time: 09/10/22  4:30 PM   Result Value Ref Range    Procalcitonin 0 15 <=0 25 ng/ml   Protime-INR    Collection Time: 09/10/22  4:30 PM   Result Value Ref Range    Protime 14 5 11 6 - 14 5 seconds    INR 1 15 0 84 - 1 19   APTT    Collection Time: 09/10/22  4:30 PM   Result Value Ref Range    PTT 30 23 - 37 seconds   Blood culture #1    Collection Time: 09/10/22  4:30 PM    Specimen: Arm, Left; Blood   Result Value Ref Range    Blood Culture No Growth at 24 hrs      D-dimer, quantitative    Collection Time: 09/10/22  4:30 PM   Result Value Ref Range    D-Dimer, Quant 2 82 (H) <0 50 ug/ml FEU   Iron Saturation %    Collection Time: 09/10/22  4:30 PM   Result Value Ref Range    Iron Saturation 23 20 - 50 %    TIBC 117 (L) 250 - 450 ug/dL    Iron 27 (L) 65 - 175 ug/dL   Ferritin    Collection Time: 09/10/22  4:30 PM   Result Value Ref Range    Ferritin 579 (H) 8 - 388 ng/mL   Blood culture #2    Collection Time: 09/10/22  4:31 PM    Specimen: Arm, Left; Blood   Result Value Ref Range    Blood Culture No Growth at 24 hrs      NT-BNP PRO    Collection Time: 09/10/22  7:06 PM   Result Value Ref Range    NT-proBNP 2,496 (H) <450 pg/mL   HS Troponin 0hr (reflex protocol)    Collection Time: 09/10/22  7:06 PM   Result Value Ref Range    hs TnI 0hr 5 "Refer to ACS Flowchart"- see link ng/L   Fingerstick Glucose (POCT)    Collection Time: 09/10/22  7:48 PM   Result Value Ref Range    POC Glucose 109 65 - 140 mg/dl   HS Troponin I 2hr    Collection Time: 09/10/22  9:42 PM   Result Value Ref Range    hs TnI 2hr 5 "Refer to ACS Flowchart"- see link ng/L    Delta 2hr hsTnI 0 <20 ng/L   HS Troponin I 4hr    Collection Time: 09/11/22 12:39 AM   Result Value Ref Range    hs TnI 4hr 8 "Refer to ACS Flowchart"- see link ng/L    Delta 4hr hsTnI 3 <20 ng/L   Hemoglobin and hematocrit, blood    Collection Time: 09/11/22 12:39 AM   Result Value Ref Range    Hemoglobin 11 2 (L) 12 0 - 17 0 g/dL    Hematocrit 32 8 (L) 36 5 - 49 3 %   APTT    Collection Time: 09/11/22  1:43 AM   Result Value Ref Range    PTT 40 (H) 23 - 37 seconds   CBC and differential    Collection Time: 09/11/22  5:17 AM   Result Value Ref Range    WBC 9 11 4 31 - 10 16 Thousand/uL    RBC 3 50 (L) 3 88 - 5 62 Million/uL    Hemoglobin 11 1 (L) 12 0 - 17 0 g/dL    Hematocrit 32 2 (L) 36 5 - 49 3 %    MCV 92 82 - 98 fL    MCH 31 7 26 8 - 34 3 pg    MCHC 34 5 31 4 - 37 4 g/dL    RDW 12 0 11 6 - 15 1 %    MPV 11 3 8 9 - 12 7 fL    Platelets 046 427 - 560 Thousands/uL    nRBC 0 /100 WBCs    Neutrophils Relative 73 43 - 75 %    Immat GRANS % 0 0 - 2 %    Lymphocytes Relative 10 (L) 14 - 44 %    Monocytes Relative 12 4 - 12 %    Eosinophils Relative 4 0 - 6 %    Basophils Relative 1 0 - 1 %    Neutrophils Absolute 6 72 1 85 - 7 62 Thousands/µL    Immature Grans Absolute 0 02 0 00 - 0 20 Thousand/uL    Lymphocytes Absolute 0 87 0 60 - 4 47 Thousands/µL    Monocytes Absolute 1 06 0 17 - 1 22 Thousand/µL    Eosinophils Absolute 0 37 0 00 - 0 61 Thousand/µL    Basophils Absolute 0 07 0 00 - 0 10 Thousands/µL   Fingerstick Glucose (POCT)    Collection Time: 09/11/22  6:29 AM   Result Value Ref Range    POC Glucose 115 65 - 140 mg/dl   APTT    Collection Time: 09/11/22  9:35 AM   Result Value Ref Range    PTT 53 (H) 23 - 37 seconds   Fingerstick Glucose (POCT)    Collection Time: 09/11/22 11:58 AM   Result Value Ref Range    POC Glucose 115 65 - 140 mg/dl   Fingerstick Glucose (POCT)    Collection Time: 09/11/22  2:27 PM   Result Value Ref Range    POC Glucose 163 (H) 65 - 140 mg/dl   Fingerstick Glucose (POCT)    Collection Time: 09/11/22  3:44 PM   Result Value Ref Range    POC Glucose 161 (H) 65 - 140 mg/dl   APTT    Collection Time: 09/11/22  5:21 PM   Result Value Ref Range    PTT 63 (H) 23 - 37 seconds   Fingerstick Glucose (POCT)    Collection Time: 09/11/22  8:45 PM   Result Value Ref Range    POC Glucose 104 65 - 140 mg/dl   Hemoglobin and hematocrit, blood    Collection Time: 09/11/22 11:27 PM   Result Value Ref Range    Hemoglobin 12 1 12 0 - 17 0 g/dL    Hematocrit 35 3 (L) 36 5 - 49 3 %   APTT    Collection Time: 09/12/22 12:12 AM   Result Value Ref Range    PTT 67 (H) 23 - 37 seconds   Fingerstick Glucose (POCT)    Collection Time: 09/12/22  7:14 AM   Result Value Ref Range    POC Glucose 108 65 - 140 mg/dl   Fingerstick Glucose (POCT)    Collection Time: 09/12/22  7:44 AM   Result Value Ref Range    POC Glucose 104 65 - 140 mg/dl   Hemoglobin and hematocrit, blood    Collection Time: 09/12/22  8:28 AM   Result Value Ref Range    Hemoglobin 12 0 12 0 - 17 0 g/dL    Hematocrit 35 4 (L) 36 5 - 49 3 %   Fingerstick Glucose (POCT)    Collection Time: 09/12/22 10:58 AM   Result Value Ref Range    POC Glucose 160 (H) 65 - 140 mg/dl       XR chest 1 view portable    Result Date: 9/11/2022  Narrative: CHEST INDICATION:   cough   COMPARISON:  None EXAM PERFORMED/VIEWS:  XR CHEST PORTABLE FINDINGS: Mild cardiomegaly  There is a 5 cm round mass in the right upper lobe  There is patchy opacity in the right lung base in addition to evidence of a right pleural effusion  Osteoarthritis both shoulders with evidence of bilateral chronic rotator cuff tears  Impression: 1   5 cm right upper lobe mass highly suspicious for neoplasm  2   Patchy opacity right lung base which may represent atelectasis or pneumonia  3   Right pleural effusion  Patient has had a follow-up CT chest since the time of this examination  Workstation performed: [id number]     CT head wo contrast    Result Date: 9/10/2022  Narrative: CT BRAIN - WITHOUT CONTRAST INDICATION:   new pulm mass, PE  r/o brain mets  COMPARISON:  CTA chest dated 9/10/2022 TECHNIQUE:  CT examination of the brain was performed  In addition to axial images, sagittal and coronal 2D reformatted images were created and submitted for interpretation  Radiation dose length product (DLP) for this visit:  804 mGy-cm   This examination, like all CT scans performed in the [facility], was performed utilizing techniques to minimize radiation dose exposure, including the use of iterative reconstruction and automated exposure control  IMAGE QUALITY:  Diagnostic  FINDINGS: PARENCHYMA: Decreased attenuation is noted in periventricular and subcortical white matter demonstrating an appearance that is statistically most likely to represent mild microangiopathic change  Gray-white differentiation appears maintained  No CT signs of acute territorial infarction  No intracranial mass, mass effect or midline shift  No acute parenchymal hemorrhage  Mild parenchymal atrophy VENTRICLES AND EXTRA-AXIAL SPACES:  Ventricles and extra-axial CSF spaces are prominent commensurate with the degree of volume loss  No hydrocephalus  No acute extra-axial hemorrhage   VISUALIZED ORBITS AND PARANASAL SINUSES:  Mild mucosal thickening in the bilateral maxillary sinuses  Otherwise grossly unremarkable  CALVARIUM AND EXTRACRANIAL SOFT TISSUES:  Normal      Impression: No acute intracranial abnormality is seen  No discrete evidence of intracranial metastatic disease  Other findings as above  Workstation performed: [id number]     CTA ED chest PE Study    Result Date: 9/10/2022  Narrative: CTA - CHEST WITH IV CONTRAST - PULMONARY ANGIOGRAM INDICATION:   ,chest pain, hemoptysis, recent covid, elevated ddimer  COMPARISON: None  TECHNIQUE: CTA examination of the chest was performed using angiographic technique according to a protocol specifically tailored to evaluate for pulmonary embolism  Axial, sagittal, and coronal 2D reformatted images were created from the source data and  submitted for interpretation  In addition, coronal 3D MIP postprocessing was performed on the acquisition scanner  Radiation dose length product (DLP) for this visit:  444 mGy-cm   This examination, like all CT scans performed in the [facility], was performed utilizing techniques to minimize radiation dose exposure, including the use of iterative reconstruction and automated exposure control  IV Contrast:  85 mL of iohexol (OMNIPAQUE)  FINDINGS: PULMONARY ARTERIAL TREE:  Acute pulmonary emboli in the right lower lobe pulmonary artery and several segmental and subsegmental branches, as well as several left lower lobe subsegmental branches  LUNGS:  Ground glass opacification in the right lower lobe, likely pulmonary infarction  Bibasilar atelectasis  There is a 6 2 x 6 1 x 5 2 cm right upper lobe pulmonary mass causing obstruction of segmental right upper lobe bronchi as well as encasement and narrowing of several segmental and subsegmental right upper lobe pulmonary artery branches  The mass abuts the mediastinum medially and the pleura along its superolateral aspect   There is a 1 2 cm right middle lobe nodule (series 3 image 98), a 6 mm left lower lobe nodule (series 3 image 70) and an additional 6 mm left lower lobe nodule (series 3 image 79)  PLEURA:  Moderate right pleural effusion  Trace left pleural effusion  HEART/GREAT VESSELS:  Unremarkable for patient's age  RV to LV ratio of 0 84 No thoracic aortic aneurysm  MEDIASTINUM AND FAUSTO:  Several enlarged right hilar and mediastinal lymph nodes measuring up to 2 1 cm in the precarinal region  CHEST WALL AND LOWER NECK:   Unremarkable  VISUALIZED STRUCTURES IN THE UPPER ABDOMEN:  Likely right renal simple cyst  OSSEOUS STRUCTURES:  Spinal degenerative changes are noted  No acute fracture or destructive osseous lesion  Impression: 1  Acute pulmonary emboli in the right lower lobe pulmonary artery and several segmental and subsegmental branches, as well as several left lower lobe subsegmental branches  Measured RV/LV ratio is within normal limits at 0 84, less than 0 9   2   Ground glass opacification in the right lower lobe, likely pulmonary infarction  Moderate right and trace left pleural effusions  3   Right upper lobe pulmonary mass measuring up to 6 2 cm as described, compatible with primary lung malignancy  Scattered additional solid nodules measuring up to 1 2 cm the right middle lobe, suspicious for metastatic disease  4  Several large right hilar and mediastinal lymph nodes measuring up to 2 1 cm, also concerning for metastatic disease  I personally discussed this study with Donavon Cunningham on 9/10/2022 at 6:58 PM  Workstation performed: [id number]         HISTORY:    Past Medical History:   Diagnosis Date    Diabetes mellitus ([facility] [address] )     Hyperlipidemia     Hypertension     Prostate cancer ([facility] [address] ) 2005    S/P prostate ca-2005 had radiation tx         Past Surgical History:   Procedure Laterality Date    COLONOSCOPY      [place] ARTHRODESIS,ANKLE,OPEN Right 7/10/2020    Procedure: ARTHRODESIS/ TIBIAL-TALAR ANKLE FUSION;  Surgeon: Nayely Schultz MD;  Location: [facility];  Service: Orthopedics    NJ LAP,INGUINAL HERNIA REPR,INITIAL Bilateral 12/16/2021    Procedure: LAPAROSCOPIC REPAIR HERNIA INGUINAL WITH MESH;  Surgeon: Wu Charles MD;  Location: [facility];  Service: General       Family History   Problem Relation Age of Onset    Heart attack Mother        Social History     Socioeconomic History    Marital status: /Civil Union     Spouse name: None    Number of children: None    Years of education: None    Highest education level: None   Occupational History    None   Tobacco Use    Smoking status: Never Smoker    Smokeless tobacco: Never Used   Vaping Use    Vaping Use: Never used   Substance and Sexual Activity    Alcohol use: Yes     Comment: Occasional    Drug use: Never    Sexual activity: None   Other Topics Concern    None   Social History Narrative    None     Social Determinants of Health     Financial Resource Strain: Not on file   Food Insecurity: Not on file   Transportation Needs: Not on file   Physical Activity: Not on file   Stress: Not on file   Social Connections: Not on file   Intimate Partner Violence: Not on file   Housing Stability: Not on file         Current Facility-Administered Medications:     acetaminophen (TYLENOL) tablet 650 mg, 650 mg, Oral, Q4H PRN, Stephanie Ciaramella, PA-C    amLODIPine (NORVASC) tablet 10 mg, 10 mg, Oral, Daily, Stephanie Ciaramella, PA-C, 10 mg at 09/12/22 0936    finasteride (PROSCAR) tablet 5 mg, 5 mg, Oral, Daily, Stephanie Ciaramella, PA-C, 5 mg at 09/12/22 0936    heparin (porcine) 25,000 units in 0 45% NaCl 250 mL infusion (premix), 3-30 Units/kg/hr (Order-Specific), Intravenous, Titrated, Lian Esteves MD, Last Rate: 18 mL/hr at 09/12/22 0908, 24 Units/kg/hr at 09/12/22 0908    heparin (porcine) injection 3,000 Units, 3,000 Units, Intravenous, Q1H PRN, Lian Esteves MD, 3,000 Units at 09/11/22 1105    heparin (porcine) injection 6,000 Units, 6,000 Units, Intravenous, Q1H PRN, Lian Esteves MD, 6,000 Units at 09/11/22 0404    insulin lispro (HumaLOG) 100 units/mL subcutaneous injection 1-5 Units, 1-5 Units, Subcutaneous, TID AC, 1 Units at 09/11/22 1805 **AND** Fingerstick Glucose (POCT), , , TID AC, Komal Duarte MD    insulin lispro (HumaLOG) 100 units/mL subcutaneous injection 1-5 Units, 1-5 Units, Subcutaneous, HS, Komal Duarte MD    lisinopril (ZESTRIL) tablet 10 mg, 10 mg, Oral, Daily, TAMIA Laird-ESTEFANIA, 10 mg at 09/12/22 2066    metoprolol succinate (TOPROL-XL) 24 hr tablet 100 mg, 100 mg, Oral, Daily, TAMIA Laird-ESTEFANIA, 100 mg at 09/12/22 0937    ondansetron (ZOFRAN) injection 4 mg, 4 mg, Intravenous, Q6H PRN, Stephanie Villarreal PA-C    pravastatin (PRAVACHOL) tablet 20 mg, 20 mg, Oral, Daily With Shari Cargo, ZAHRAA, 20 mg at 09/11/22 1703    Medications Prior to Admission   Medication    brimonidine tartrate 0 2 % ophthalmic solution    cyclopentolate (CYCLOGYL) 1 % ophthalmic solution    ofloxacin (OCUFLOX) 0 3 % ophthalmic solution    timolol (TIMOPTIC-XE) 0 5 % ophthalmic gel-forming    travoprost (TRAVATAN-Z) 0 004 % ophthalmic solution    Accu-Chek Jackie Plus test strip    ACCU-CHEK FASTCLIX LANCETS MISC    amLODIPine (NORVASC) 10 mg tablet    aspirin 81 mg chewable tablet    finasteride (PROSCAR) 5 mg tablet    lisinopril (ZESTRIL) 10 mg tablet    metFORMIN (GLUCOPHAGE-XR) 500 mg 24 hr tablet    metoprolol succinate (TOPROL-XL) 100 mg 24 hr tablet    simvastatin (ZOCOR) 10 mg tablet       No Known Allergies    Labs and pertinent reports reviewed  This note has been generated by voice recognition software system  Therefore, there may be spelling, grammar, and or syntax errors  Please contact if questions arise

## 2022-09-13 ENCOUNTER — HOSPITAL ENCOUNTER (INPATIENT)
Facility: HOSPITAL | Age: 77
LOS: 4 days | Discharge: HOME | End: 2022-09-17
Attending: HOSPITALIST | Admitting: INTERNAL MEDICINE
Payer: MEDICARE

## 2022-09-13 ENCOUNTER — APPOINTMENT (OUTPATIENT)
Dept: NON INVASIVE DIAGNOSTICS | Facility: HOSPITAL | Age: 77
DRG: 176 | End: 2022-09-13
Payer: MEDICARE

## 2022-09-13 VITALS
HEART RATE: 73 BPM | HEIGHT: 69 IN | TEMPERATURE: 97.9 F | SYSTOLIC BLOOD PRESSURE: 138 MMHG | RESPIRATION RATE: 14 BRPM | DIASTOLIC BLOOD PRESSURE: 62 MMHG | OXYGEN SATURATION: 95 % | BODY MASS INDEX: 23.9 KG/M2 | WEIGHT: 161.38 LBS

## 2022-09-13 DIAGNOSIS — R59.0 SUPRACLAVICULAR ADENOPATHY: ICD-10-CM

## 2022-09-13 DIAGNOSIS — R91.8 PULMONARY MASS: ICD-10-CM

## 2022-09-13 DIAGNOSIS — I26.99 OTHER PULMONARY EMBOLISM WITHOUT ACUTE COR PULMONALE (HCC): Primary | ICD-10-CM

## 2022-09-13 DIAGNOSIS — C34.11 MALIGNANT NEOPLASM OF UPPER LOBE OF RIGHT LUNG (HCC): ICD-10-CM

## 2022-09-13 PROBLEM — C61 ADENOCARCINOMA OF PROSTATE (HCC): Status: RESOLVED | Noted: 2018-01-03 | Resolved: 2022-09-13

## 2022-09-13 LAB
ANION GAP SERPL CALCULATED.3IONS-SCNC: 8 MMOL/L (ref 4–13)
APPEARANCE FLD: NORMAL
APTT PPP: 103 SECONDS (ref 23–37)
APTT PPP: 56 SECONDS (ref 23–37)
APTT PPP: 59 SECONDS (ref 23–37)
BASOPHILS NFR FLD MANUAL: 0 %
BUN SERPL-MCNC: 11 MG/DL (ref 5–25)
CALCIUM SERPL-MCNC: 8.2 MG/DL (ref 8.3–10.1)
CHLORIDE SERPL-SCNC: 108 MMOL/L (ref 96–108)
CO2 SERPL-SCNC: 26 MMOL/L (ref 21–32)
COLOR FLD: YELLOW
CREAT SERPL-MCNC: 0.92 MG/DL (ref 0.6–1.3)
EOSINOPHIL NFR FLD MANUAL: 2 %
GFR SERPL CREATININE-BSD FRML MDRD: 80 ML/MIN/1.73SQ M
GLUCOSE FLD-MCNC: 148 MG/DL
GLUCOSE SERPL-MCNC: 112 MG/DL (ref 65–140)
GLUCOSE SERPL-MCNC: 113 MG/DL (ref 65–140)
GLUCOSE SERPL-MCNC: 116 MG/DL (ref 65–140)
GLUCOSE SERPL-MCNC: 141 MG/DL (ref 65–140)
HCT VFR BLD AUTO: 38.3 % (ref 36.5–49.3)
HGB BLD-MCNC: 12.7 G/DL (ref 12–17)
HISTIOCYTES NFR FLD: 9 %
LDH FLD L TO P-CCNC: 261 U/L
LYMPHOCYTES NFR BLD AUTO: 29 %
MONO+MESO NFR FLD MANUAL: 17 %
MONOCYTES NFR BLD AUTO: 23 %
NEUTS BAND NFR FLD MANUAL: 0 %
NEUTS SEG NFR BLD AUTO: 20 %
PH BODY FLUID: 7.8
POTASSIUM SERPL-SCNC: 3.6 MMOL/L (ref 3.5–5.3)
PROT FLD-MCNC: 3.4 G/DL
SITE: NORMAL
SODIUM SERPL-SCNC: 142 MMOL/L (ref 135–147)
TOTAL CELLS COUNTED SPEC: 100
WBC # FLD MANUAL: 1870 /UL

## 2022-09-13 PROCEDURE — 82945 GLUCOSE OTHER FLUID: CPT | Performed by: PHYSICIAN ASSISTANT

## 2022-09-13 PROCEDURE — 88341 IMHCHEM/IMCYTCHM EA ADD ANTB: CPT | Performed by: PATHOLOGY

## 2022-09-13 PROCEDURE — 99223 1ST HOSP IP/OBS HIGH 75: CPT | Performed by: INTERNAL MEDICINE

## 2022-09-13 PROCEDURE — 87070 CULTURE OTHR SPECIMN AEROBIC: CPT | Performed by: PHYSICIAN ASSISTANT

## 2022-09-13 PROCEDURE — 82948 REAGENT STRIP/BLOOD GLUCOSE: CPT

## 2022-09-13 PROCEDURE — 83615 LACTATE (LD) (LDH) ENZYME: CPT | Performed by: PHYSICIAN ASSISTANT

## 2022-09-13 PROCEDURE — 0W993ZZ DRAINAGE OF RIGHT PLEURAL CAVITY, PERCUTANEOUS APPROACH: ICD-10-PCS | Performed by: FAMILY MEDICINE

## 2022-09-13 PROCEDURE — 85014 HEMATOCRIT: CPT | Performed by: INTERNAL MEDICINE

## 2022-09-13 PROCEDURE — 80048 BASIC METABOLIC PNL TOTAL CA: CPT | Performed by: INTERNAL MEDICINE

## 2022-09-13 PROCEDURE — 32555 ASPIRATE PLEURA W/ IMAGING: CPT | Performed by: RADIOLOGY

## 2022-09-13 PROCEDURE — 99239 HOSP IP/OBS DSCHRG MGMT >30: CPT | Performed by: FAMILY MEDICINE

## 2022-09-13 PROCEDURE — 83986 ASSAY PH BODY FLUID NOS: CPT | Performed by: PHYSICIAN ASSISTANT

## 2022-09-13 PROCEDURE — 84157 ASSAY OF PROTEIN OTHER: CPT | Performed by: PHYSICIAN ASSISTANT

## 2022-09-13 PROCEDURE — 32555 ASPIRATE PLEURA W/ IMAGING: CPT

## 2022-09-13 PROCEDURE — 85730 THROMBOPLASTIN TIME PARTIAL: CPT | Performed by: FAMILY MEDICINE

## 2022-09-13 PROCEDURE — 99232 SBSQ HOSP IP/OBS MODERATE 35: CPT | Performed by: INTERNAL MEDICINE

## 2022-09-13 PROCEDURE — 85730 THROMBOPLASTIN TIME PARTIAL: CPT | Performed by: PHYSICIAN ASSISTANT

## 2022-09-13 PROCEDURE — 87205 SMEAR GRAM STAIN: CPT | Performed by: PHYSICIAN ASSISTANT

## 2022-09-13 PROCEDURE — 88305 TISSUE EXAM BY PATHOLOGIST: CPT | Performed by: PATHOLOGY

## 2022-09-13 PROCEDURE — 88342 IMHCHEM/IMCYTCHM 1ST ANTB: CPT | Performed by: PATHOLOGY

## 2022-09-13 PROCEDURE — 85018 HEMOGLOBIN: CPT | Performed by: INTERNAL MEDICINE

## 2022-09-13 PROCEDURE — 89051 BODY FLUID CELL COUNT: CPT | Performed by: PHYSICIAN ASSISTANT

## 2022-09-13 PROCEDURE — 88112 CYTOPATH CELL ENHANCE TECH: CPT | Performed by: PATHOLOGY

## 2022-09-13 PROCEDURE — 85730 THROMBOPLASTIN TIME PARTIAL: CPT | Performed by: INTERNAL MEDICINE

## 2022-09-13 RX ORDER — ACETAMINOPHEN 325 MG/1
650 TABLET ORAL EVERY 4 HOURS PRN
Status: DISCONTINUED | OUTPATIENT
Start: 2022-09-13 | End: 2022-09-17 | Stop reason: HOSPADM

## 2022-09-13 RX ORDER — METFORMIN HYDROCHLORIDE 500 MG/1
TABLET, EXTENDED RELEASE ORAL
Qty: 90 TABLET | Refills: 1 | Status: SHIPPED | OUTPATIENT
Start: 2022-09-13

## 2022-09-13 RX ORDER — HEPARIN SODIUM 10000 [USP'U]/100ML
3-30 INJECTION, SOLUTION INTRAVENOUS
Status: DISCONTINUED | OUTPATIENT
Start: 2022-09-13 | End: 2022-09-16

## 2022-09-13 RX ORDER — HEPARIN SODIUM 10000 [USP'U]/100ML
3-30 INJECTION, SOLUTION INTRAVENOUS
Status: CANCELLED | OUTPATIENT
Start: 2022-09-13

## 2022-09-13 RX ORDER — FINASTERIDE 5 MG/1
5 TABLET, FILM COATED ORAL DAILY
Status: CANCELLED | OUTPATIENT
Start: 2022-09-14

## 2022-09-13 RX ORDER — LISINOPRIL 10 MG/1
10 TABLET ORAL DAILY
Status: CANCELLED | OUTPATIENT
Start: 2022-09-14

## 2022-09-13 RX ORDER — SENNOSIDES 8.6 MG
2 TABLET ORAL DAILY PRN
Status: DISCONTINUED | OUTPATIENT
Start: 2022-09-13 | End: 2022-09-17 | Stop reason: HOSPADM

## 2022-09-13 RX ORDER — HEPARIN SODIUM 1000 [USP'U]/ML
3000 INJECTION, SOLUTION INTRAVENOUS; SUBCUTANEOUS
Status: DISCONTINUED | OUTPATIENT
Start: 2022-09-13 | End: 2022-09-16

## 2022-09-13 RX ORDER — HEPARIN SODIUM 1000 [USP'U]/ML
6000 INJECTION, SOLUTION INTRAVENOUS; SUBCUTANEOUS
Status: CANCELLED | OUTPATIENT
Start: 2022-09-13

## 2022-09-13 RX ORDER — HEPARIN SODIUM 1000 [USP'U]/ML
3000 INJECTION, SOLUTION INTRAVENOUS; SUBCUTANEOUS
Status: CANCELLED | OUTPATIENT
Start: 2022-09-13

## 2022-09-13 RX ORDER — AMLODIPINE BESYLATE 10 MG/1
10 TABLET ORAL DAILY
Status: DISCONTINUED | OUTPATIENT
Start: 2022-09-14 | End: 2022-09-17 | Stop reason: HOSPADM

## 2022-09-13 RX ORDER — INSULIN LISPRO 100 [IU]/ML
1-5 INJECTION, SOLUTION INTRAVENOUS; SUBCUTANEOUS
Status: DISCONTINUED | OUTPATIENT
Start: 2022-09-14 | End: 2022-09-17 | Stop reason: HOSPADM

## 2022-09-13 RX ORDER — AMLODIPINE BESYLATE 10 MG/1
10 TABLET ORAL DAILY
Status: CANCELLED | OUTPATIENT
Start: 2022-09-14

## 2022-09-13 RX ORDER — LIDOCAINE HYDROCHLORIDE 10 MG/ML
INJECTION, SOLUTION EPIDURAL; INFILTRATION; INTRACAUDAL; PERINEURAL CODE/TRAUMA/SEDATION MEDICATION
Status: COMPLETED | OUTPATIENT
Start: 2022-09-13 | End: 2022-09-13

## 2022-09-13 RX ORDER — PRAVASTATIN SODIUM 20 MG
20 TABLET ORAL
Status: CANCELLED | OUTPATIENT
Start: 2022-09-13

## 2022-09-13 RX ORDER — INSULIN LISPRO 100 [IU]/ML
1-5 INJECTION, SOLUTION INTRAVENOUS; SUBCUTANEOUS
Status: DISCONTINUED | OUTPATIENT
Start: 2022-09-13 | End: 2022-09-17 | Stop reason: HOSPADM

## 2022-09-13 RX ORDER — METOPROLOL SUCCINATE 100 MG/1
TABLET, EXTENDED RELEASE ORAL
Qty: 90 TABLET | Refills: 1 | Status: SHIPPED | OUTPATIENT
Start: 2022-09-13

## 2022-09-13 RX ORDER — ONDANSETRON 2 MG/ML
4 INJECTION INTRAMUSCULAR; INTRAVENOUS EVERY 6 HOURS PRN
Status: CANCELLED | OUTPATIENT
Start: 2022-09-13

## 2022-09-13 RX ORDER — PRAVASTATIN SODIUM 20 MG
20 TABLET ORAL
Status: DISCONTINUED | OUTPATIENT
Start: 2022-09-14 | End: 2022-09-17 | Stop reason: HOSPADM

## 2022-09-13 RX ORDER — ONDANSETRON 2 MG/ML
4 INJECTION INTRAMUSCULAR; INTRAVENOUS EVERY 6 HOURS PRN
Status: DISCONTINUED | OUTPATIENT
Start: 2022-09-13 | End: 2022-09-17 | Stop reason: HOSPADM

## 2022-09-13 RX ORDER — METOPROLOL SUCCINATE 100 MG/1
100 TABLET, EXTENDED RELEASE ORAL DAILY
Status: DISCONTINUED | OUTPATIENT
Start: 2022-09-14 | End: 2022-09-17 | Stop reason: HOSPADM

## 2022-09-13 RX ORDER — METOPROLOL SUCCINATE 100 MG/1
100 TABLET, EXTENDED RELEASE ORAL DAILY
Status: CANCELLED | OUTPATIENT
Start: 2022-09-14

## 2022-09-13 RX ORDER — LISINOPRIL 10 MG/1
10 TABLET ORAL DAILY
Status: DISCONTINUED | OUTPATIENT
Start: 2022-09-14 | End: 2022-09-17 | Stop reason: HOSPADM

## 2022-09-13 RX ORDER — ACETAMINOPHEN 325 MG/1
650 TABLET ORAL EVERY 4 HOURS PRN
Status: CANCELLED | OUTPATIENT
Start: 2022-09-13

## 2022-09-13 RX ORDER — HEPARIN SODIUM 1000 [USP'U]/ML
6000 INJECTION, SOLUTION INTRAVENOUS; SUBCUTANEOUS
Status: DISCONTINUED | OUTPATIENT
Start: 2022-09-13 | End: 2022-09-16

## 2022-09-13 RX ORDER — INSULIN LISPRO 100 [IU]/ML
1-5 INJECTION, SOLUTION INTRAVENOUS; SUBCUTANEOUS
Status: CANCELLED | OUTPATIENT
Start: 2022-09-13

## 2022-09-13 RX ORDER — BENZONATATE 100 MG/1
100 CAPSULE ORAL 3 TIMES DAILY PRN
Status: DISCONTINUED | OUTPATIENT
Start: 2022-09-13 | End: 2022-09-17 | Stop reason: HOSPADM

## 2022-09-13 RX ORDER — FINASTERIDE 5 MG/1
5 TABLET, FILM COATED ORAL DAILY
Status: DISCONTINUED | OUTPATIENT
Start: 2022-09-14 | End: 2022-09-17 | Stop reason: HOSPADM

## 2022-09-13 RX ADMIN — HEPARIN SODIUM 3000 UNITS: 1000 INJECTION INTRAVENOUS; SUBCUTANEOUS at 23:52

## 2022-09-13 RX ADMIN — HEPARIN SODIUM 24 UNITS/KG/HR: 10000 INJECTION, SOLUTION INTRAVENOUS at 22:54

## 2022-09-13 RX ADMIN — LISINOPRIL 10 MG: 10 TABLET ORAL at 08:48

## 2022-09-13 RX ADMIN — LIDOCAINE HYDROCHLORIDE 8 ML: 10 INJECTION, SOLUTION EPIDURAL; INFILTRATION; INTRACAUDAL; PERINEURAL at 15:26

## 2022-09-13 RX ADMIN — HEPARIN SODIUM 26 UNITS/KG/HR: 10000 INJECTION, SOLUTION INTRAVENOUS at 07:19

## 2022-09-13 RX ADMIN — HEPARIN SODIUM 3000 UNITS: 1000 INJECTION INTRAVENOUS; SUBCUTANEOUS at 07:19

## 2022-09-13 RX ADMIN — FINASTERIDE 5 MG: 5 TABLET, FILM COATED ORAL at 08:48

## 2022-09-13 RX ADMIN — PRAVASTATIN SODIUM 20 MG: 20 TABLET ORAL at 16:07

## 2022-09-13 RX ADMIN — METOPROLOL SUCCINATE 100 MG: 100 TABLET, EXTENDED RELEASE ORAL at 08:48

## 2022-09-13 RX ADMIN — AMLODIPINE BESYLATE 10 MG: 10 TABLET ORAL at 08:48

## 2022-09-13 NOTE — NURSING NOTE
Pt notified PCA of swollen, red, and tender IV site on left arm where heparin drip was running during morning blood work around 0600  I immediately examined the site and upon assessment, the IV was infiltrated  Infiltrated IV removed and heparin drip placed onto existing IV on the right arm  Patient was asked when he noticed symptoms of infiltration and patient stated he noticed them last night  From my assessment last night, IV on the left arm was not infiltrated and flushed fine with heparin running  Patient did not ring at all during the night to notify staff of symptoms of infiltration  Patient educated to call staff if any more signs of infiltration occur with his right arm IV  On call SLIM made aware, no further orders

## 2022-09-13 NOTE — ASSESSMENT & PLAN NOTE
· CTA also revealed RUL pulmonary mass measuring up to 6 2 cm as described, compatible with primary lung malignancy  Scattered additional solid nodules measuring up to 1 2 cm the RML, suspicious for metastatic disease  several large right hilar and mediastinal lymph nodes measuring up to 2 1 cm, also concerning for metastatic disease  · CT head r/o brain mets-- no discrete evidence of intracranial metastatic disease  · 9/12 : As per Hematology Oncology, recommended to obtain MRI head with and without contrast or atleast CT head with contrast to definitively exclude brain metastasis, CT abdomen/pelvis with contrast,   · Pulmonology consultation appreciated  The patient to be transferred to [facility]    Dr Kilo Nieves is aware and will likely be performing EBUS on Thursday

## 2022-09-13 NOTE — H&P
[address] [facility]  H&P- Khris Cage 1945, 69 y o  male MRN: [id number]  Unit/Bed#: [facility] 928-01 Encounter: [id number]  Primary Care Provider: Nova Ordonez PA-C   Date and time admitted to hospital: No admission date for patient encounter  Pulmonary mass  Assessment & Plan  · CTA also revealed RUL pulmonary mass measuring up to 6 2 cm as described, compatible with primary lung malignancy   Scattered additional solid nodules measuring up to 1 2 cm the RML, suspicious for metastatic disease  several large right hilar and mediastinal lymph nodes measuring up to 2 1 cm, also concerning for metastatic disease  · MRI brain: No MR evidence of acute ischemia  No enhancing lesions to suggest metastatic disease  · CT abdomen and pelvis: no evidence of malignancy  · S/p thoracentesis today with 400 mL clear cordell pleural fluid removed from right chest   · Pulmonology consultation appreciated for EBUS on Thursday      Other pulmonary embolism without acute cor pulmonale (HCC)  Assessment & Plan  · Likely multifactorial 2/2 COVID and malignancy   · CTA 09/10: revealed acute pulmonary emboli in the RLL pulmonary artery and several segmental and subsegmental branches, as well as several LLL subsegmental branches  RV/LV ratio 0 84, less than 0 9   GGO in RLL, likley pulmonary infarct  Additional findings as below     · Hemodynamics are stable, no hypoxia   · Received IV vanc/cefepime for GGO, procal normal  monitor off antibiotics as likely infarct   · Started on IV heparin gtt, continue continue the heparin and speak to case management to run Eliquis cost      Controlled type 2 diabetes mellitus without complication, without long-term current use of insulin [facility])  Assessment & Plan  Lab Results   Component Value Date    HGBA1C 5 5 07/21/2022       Recent Labs     09/12/22  2035 09/13/22  0725 09/13/22  1105 09/13/22  1555   POCGLU 149* 113 141* 112       Blood Sugar Average: Last 72 hrs:  · diabetic diet   · Hold home metformin   · Glucose checks ac and insulin coverage as needed      Anemia  Assessment & Plan  Hgb 11 5, decreased from baseline of 14 on 9/11  Hgb being monitored q12h - most recent 12 7  · Iron panel : S iron : 27 (low), TIBC : 117 (low), % sat : 23 (normal), ferritin: 579 (elevated)   ·    Hemoglobin is stable      Benign essential HTN  Assessment & Plan  · BP slightly elevated   · Continue home amlodipine, lisinopril, metoprolol  · Monitor hemodynamics closely          VTE Pharmacologic Prophylaxis:   Moderate Risk (Score 3-4) - Pharmacological DVT Prophylaxis Ordered: heparin drip  Code Status: Level 1 - Full Code   Discussion with family: Patient declined call to   Anticipated Length of Stay: Patient will be admitted on an inpatient basis with an anticipated length of stay of greater than 2 midnights secondary to PE and pulmonary mass  Total Time for Visit, including Counseling / Coordination of Care: 45 minutes Greater than 50% of this total time spent on direct patient counseling and coordination of care  Chief Complaint: shortness of breath    History of Present Illness:  Simeon Busch is a 69 y o  male with a PMH of DM,prostate cancer, HTN who presents with shortness of breath  He was admitted to [facility] and found to have a pulmonary embolism and pulmonary mass of the RUL  He was admitted and started on IV heparin  He reports improvement to his shortness of breath and denies any CP or significant cough  He was [facility] for a bronchoscopy biopsy by pulmonology  Review of Systems:  Review of Systems   All other systems reviewed and are negative  Past Medical and Surgical History:   Past Medical History:   Diagnosis Date    Diabetes mellitus ([facility] [address] )     Hyperlipidemia     Hypertension     Prostate cancer ([facility] [address] ) 2005    S/P prostate ca-2005 had radiation tx         Past Surgical History:   Procedure Laterality Date    COLONOSCOPY      IR THORACENTESIS  9/13/2022    MI ARTHRODESIS,ANKLE,OPEN Right 7/10/2020    Procedure: ARTHRODESIS/ TIBIAL-TALAR ANKLE FUSION;  Surgeon: Marleny Cain MD;  Location: [facility];  Service: Orthopedics    MI Griselda Tineo [address] Bilateral 12/16/2021    Procedure: LAPAROSCOPIC REPAIR HERNIA INGUINAL WITH MESH;  Surgeon: Rosalie Nicholas MD;  Location: [facility];  Service: General       Meds/Allergies:  Prior to Admission medications    Medication Sig Start Date End Date Taking? Authorizing Provider   Accu-Chek Jackie Plus test strip CHECK BLOOD SUGAR DAILY E11 9 8/17/21   Tracy Beatty PA-C   ACCU-CHEK FASTCLIX LANCETS MISC by Does not apply route daily E11 9  Check  fbs  daily 10/11/18   Tracy Beatty PA-C   amLODIPine (NORVASC) 10 mg tablet TAKE 1 TABLET BY MOUTH DAILY FOR BLOOD PRESSURE  5/10/22   Tracy Beatty PA-C   aspirin 81 mg chewable tablet Chew 1 tablet (81 mg total) 2 (two) times a day Take for DVT prophylaxis for 30 days postoperatively 7/13/20 10/21/21  Godfrey Culver MD   brimonidine tartrate 0 2 % ophthalmic solution Administer 1 drop into the left eye 2 (two) times a day    Historical Provider, MD   cyclopentolate (CYCLOGYL) 1 % ophthalmic solution Administer 1 drop to both eyes once    Historical Provider, MD   finasteride (PROSCAR) 5 mg tablet Take 1 tablet (5 mg total) by mouth daily 4/21/22   Tracy Beatty PA-C   lisinopril (ZESTRIL) 10 mg tablet TAKE 1 TABLET BY MOUTH EVERY DAY 5/31/22   Tracy Beatty PA-C   metFORMIN (GLUCOPHAGE-XR) 500 mg 24 hr tablet TAKE 1 TABLET BY MOUTH EVERY DAY 9/13/22   Tracy Beatty PA-C   metoprolol succinate (TOPROL-XL) 100 mg 24 hr tablet TAKE 1 TABLET BY MOUTH EVERY DAY 9/13/22   Tracy Beatty PA-C   ofloxacin (OCUFLOX) 0 3 % ophthalmic solution 1 drop 4 (four) times a day Start v2 days before surgery   Originally surgery scheduled 9/14/22    Historical Provider, MD   simvastatin (ZOCOR) 10 mg tablet TAKE 1 TABLET BY MOUTH EVERY DAY 5/31/22   Sonia Palacios PA-C   timolol (TIMOPTIC-XE) 0 5 % ophthalmic gel-forming 1 drop daily    Historical Provider, MD   travoprost (TRAVATAN-Z) 0 004 % ophthalmic solution 1 drop daily at bedtime    Historical Provider, MD   metFORMIN (GLUCOPHAGE-XR) 500 mg 24 hr tablet TAKE 1 TABLET BY MOUTH EVERY DAY 3/9/22 9/13/22  Sonia Palacios PA-C   metoprolol succinate (TOPROL-XL) 100 mg 24 hr tablet TAKE 1 TABLET BY MOUTH EVERY DAY 3/9/22 9/13/22  Sonia Palacios PA-C     I have reviewed home medications using recent Epic encounter  Allergies: No Known Allergies    Social History:  Marital Status: /Civil Union   Occupation: None  Patient Pre-hospital Living Situation: Home  Patient Pre-hospital Level of Mobility: walks  Patient Pre-hospital Diet Restrictions: None  Substance Use History:   Social History     Substance and Sexual Activity   Alcohol Use Yes    Comment: Occasional     Social History     Tobacco Use   Smoking Status Never Smoker   Smokeless Tobacco Never Used     Social History     Substance and Sexual Activity   Drug Use Never       Family History:  Family History   Problem Relation Age of Onset    Heart attack Mother        Physical Exam:     Vitals:        Physical Exam  Constitutional:       General: He is not in acute distress  Appearance: He is not toxic-appearing  HENT:      Head: Normocephalic and atraumatic  Nose: Nose normal       Mouth/Throat:      Mouth: Mucous membranes are moist       Pharynx: Oropharynx is clear  Eyes:      Extraocular Movements: Extraocular movements intact  Cardiovascular:      Rate and Rhythm: Normal rate and regular rhythm  Pulmonary:      Effort: Pulmonary effort is normal       Breath sounds: No rales  Abdominal:      Palpations: Abdomen is soft  Musculoskeletal:      Right lower leg: Edema present  Left lower leg: Edema present  Neurological:      General: No focal deficit present        Mental Status: He is alert and oriented to person, place, and time  Psychiatric:         Mood and Affect: Mood normal          Behavior: Behavior normal           Additional Data:     Lab Results:  Results from last 7 days   Lab Units 09/13/22  0835 09/11/22  2327 09/11/22  0517   WBC Thousand/uL  --   --  9 11   HEMOGLOBIN g/dL 12 7   < > 11 1*   HEMATOCRIT % 38 3   < > 32 2*   PLATELETS Thousands/uL  --   --  237   NEUTROS PCT %  --   --  73   LYMPHS PCT %  --   --  10*   MONOS PCT %  --   --  12   EOS PCT %  --   --  4    < > = values in this interval not displayed  Results from last 7 days   Lab Units 09/13/22  0605 09/12/22  1154 09/10/22  1630   SODIUM mmol/L 142   < > 143   POTASSIUM mmol/L 3 6   < > 3 7   CHLORIDE mmol/L 108   < > 106   CO2 mmol/L 26   < > 28   BUN mg/dL 11   < > 17   CREATININE mg/dL 0 92   < > 1 23   ANION GAP mmol/L 8   < > 9   CALCIUM mg/dL 8 2*   < > 8 3   ALBUMIN g/dL  --   --  2 3*   TOTAL BILIRUBIN mg/dL  --   --  0 75   ALK PHOS U/L  --   --  114   ALT U/L  --   --  44   AST U/L  --   --  24   GLUCOSE RANDOM mg/dL 116   < > 116    < > = values in this interval not displayed  Results from last 7 days   Lab Units 09/10/22  1630   INR  1 15     Results from last 7 days   Lab Units 09/13/22  1555 09/13/22  1105 09/13/22  0725 09/12/22  2035 09/12/22  1713 09/12/22  1058 09/12/22  0744 09/12/22  0714 09/11/22  2045 09/11/22  1544 09/11/22  1427 09/11/22  1158   POC GLUCOSE mg/dl 112 141* 113 149* 140 160* 104 108 104 161* 163* 115         Results from last 7 days   Lab Units 09/10/22  1630   LACTIC ACID mmol/L 0 9   PROCALCITONIN ng/ml 0 15       Imaging: Reviewed radiology reports from this admission including: chest CT scan, abdominal/pelvic CT and MRI brain  No orders to display       EKG and Other Studies Reviewed on Admission:   · EKG: No EKG obtained  ** Please Note: This note has been constructed using a voice recognition system   **

## 2022-09-13 NOTE — ASSESSMENT & PLAN NOTE
· Likely multifactorial 2/2 COVID and malignancy   · CTA 09/10: revealed acute pulmonary emboli in the RLL pulmonary artery and several segmental and subsegmental branches, as well as several LLL subsegmental branches  RV/LV ratio 0 84, less than 0 9   GGO in RLL, likley pulmonary infarct  Additional findings as below     · Hemodynamics are stable, no hypoxia   · Received IV vanc/cefepime for GGO, procal normal  monitor off antibiotics as likely infarct   · Started on IV heparin gtt, continue continue the heparin and speak to case management to run Eliquis cost

## 2022-09-13 NOTE — ASSESSMENT & PLAN NOTE
· BP slightly elevated   · Continue home amlodipine, lisinopril, metoprolol  · Monitor hemodynamics closely      Blood pressure 151/85, pulse 82, temperature 98 4 °F (36 9 °C), resp  rate 18, height 5' 9" (1 753 m), weight 73 2 kg (161 lb 6 oz), SpO2 95 %

## 2022-09-13 NOTE — ASSESSMENT & PLAN NOTE
Hgb 11 5, decreased from baseline of 14 on 9/11  Hgb being monitored q12h - most recent 12 7  · Iron panel : S iron : 27 (low), TIBC : 117 (low), % sat : 23 (normal), ferritin: 579 (elevated)   ·      Hemoglobin is stable

## 2022-09-13 NOTE — ASSESSMENT & PLAN NOTE
· CTA also revealed RUL pulmonary mass measuring up to 6 2 cm as described, compatible with primary lung malignancy   Scattered additional solid nodules measuring up to 1 2 cm the RML, suspicious for metastatic disease  several large right hilar and mediastinal lymph nodes measuring up to 2 1 cm, also concerning for metastatic disease  · MRI brain: No MR evidence of acute ischemia  No enhancing lesions to suggest metastatic disease    · CT abdomen and pelvis: no evidence of malignancy  · S/p thoracentesis today with 400 mL clear cordell pleural fluid removed from right chest   · Pulmonology consultation appreciated for EBUS on Thursday

## 2022-09-13 NOTE — PROGRESS NOTES
Progress Note - Pulmonary   Shaye Place 69 y o  male MRN: [de-identified]  Unit/Bed#: -01 Encounter: [id number]    Assessment:  1  Hemoptysis  2  Abnormal chest CT with RUL pulmonary mass, hilar/mediastinal LAD and pleural effusion  3  Acute PE    Plan:  Patient with right upper lobe pulmonary mass measuring up to 6 2 cm with scattered additional nodules, several large right hilar and mediastinal lymph nodes, suspicious for likely primary lung malignancy  CT abdomen and pelvis did not show any abdominal lesions  Plan will be for EBUS for tissue diagnosis  Will also plan for thoracentesis today given moderate right-sided pleural effusion  EBUS will be planned for Thursday and [address], patient will be transferred there for procedure and transferred back postprocedure  Continue heparin infusion until EBUS done - can then transition to [address] [place] [address], will likely need long term anticoagulation given likely malignancy  He remains on room air with sats in the mid to high 90s, hemoptysis has resolved  Will continue to follow  Subjective:   Patient resting in bed  He denies any acute complaints today  Objective:     Vitals: Blood pressure 151/85, pulse 82, temperature 98 4 °F (36 9 °C), resp  rate 18, height 5' 9" (1 753 m), weight 73 2 kg (161 lb 6 oz), SpO2 95 %  ,Body mass index is 23 83 kg/m²        Intake/Output Summary (Last 24 hours) at 9/13/2022 1131  Last data filed at 9/13/2022 7405  Gross per 24 hour   Intake 240 ml   Output --   Net 240 ml       Invasive Devices  Report    Peripheral Intravenous Line  Duration           Peripheral IV 09/10/22 Right Arm 2 days                Physical Exam: /85   Pulse 82   Temp 98 4 °F (36 9 °C)   Resp 18   Ht 5' 9" (1 753 m)   Wt 73 2 kg (161 lb 6 oz)   SpO2 95%   BMI 23 83 kg/m²   General appearance: alert and oriented, in no acute distress  Head: Normocephalic, without obvious abnormality, atraumatic  Eyes: negative findings: conjunctivae and sclerae normal  Lungs: Diminished right base  Heart: regular rate and rhythm  Abdomen: normal findings: soft, non-tender  Extremities: No edema  Skin: warm and dry  Neurologic: Mental status: Alert, oriented, thought content appropriate     Labs: I have personally reviewed pertinent lab results  , CBC:   Lab Results   Component Value Date    HGB 12 7 09/13/2022    HCT 38 3 09/13/2022   , CMP:   Lab Results   Component Value Date    SODIUM 142 09/13/2022    K 3 6 09/13/2022     09/13/2022    CO2 26 09/13/2022    BUN 11 09/13/2022    CREATININE 0 92 09/13/2022    CALCIUM 8 2 (L) 09/13/2022    EGFR 80 09/13/2022     Imaging and other studies: I have personally reviewed pertinent reports     and I have personally reviewed pertinent films in PACS

## 2022-09-13 NOTE — ASSESSMENT & PLAN NOTE
Instructed to call immediately if any new distortion, blurring, decreased vision or eye pain. · BP slightly elevated   · Continue home amlodipine, lisinopril, metoprolol  · Monitor hemodynamics closely

## 2022-09-13 NOTE — CASE MANAGEMENT
Case Management Discharge Planning Note    Patient name Reta Last  Location /-21 MRN [id number]  : 1945 Date 2022       Current Admission Date: 9/10/2022  Current Admission Diagnosis:Pulmonary mass   Patient Active Problem List    Diagnosis Date Noted    Other pulmonary embolism without acute cor pulmonale ([facility] [address] ) 09/10/2022    Pulmonary mass 09/10/2022    Anemia 09/10/2022    Non-recurrent bilateral inguinal hernia without obstruction or gangrene 2021    Status post right tibial-talar ankle fusion 2020    BPH associated with nocturia 2019    Arthritis of right acromioclavicular joint 03/15/2019    Primary osteoarthritis of right shoulder 2019    Chronic left shoulder pain 2019    Left rotator cuff tear arthropathy 2019    Rotator cuff injury, right, initial encounter 2019    History of colon polyps 2019    Hyperparathyroidism ([facility] [address] ) 2018    Hypokalemia 10/22/2018    Hypocalcemia 10/22/2018    Glaucoma 10/11/2018    Controlled type 2 diabetes mellitus without complication, without long-term current use of insulin ([facility] [address] ) 2016    Inguinal hernia 2016    Benign essential HTN 2016    Hypercholesterolemia 2016      LOS (days): 3  Geometric Mean LOS (GMLOS) (days): 2 60  Days to GMLOS:-0 2     OBJECTIVE:  Risk of Unplanned Readmission Score: 15 87         Current admission status: Inpatient   Preferred Pharmacy:   [address]   [address] [facility]  Phone: [phone] Fax: [address], [place] [address] [facility] [address] [place] [address] [address] [facility] [address] [address]  Phone: [phone] Fax: [phone]    Primary Care Provider: Jigar De Los Santos PA-C    Primary Insurance: MEDICARE  Secondary Insurance: Marycarmen Kelly    DISCHARGE DETAILS:    Additional Comments: Per the medical team, the patient will be transferring to [facility] for a EBUS procedure on Thursday  PACU will faciliate the transfer

## 2022-09-13 NOTE — ASSESSMENT & PLAN NOTE
Hgb 11 5, decreased from baseline of 14 on 9/11  Hgb being monitored q12h - most recent 12 1  · Iron panel : S iron : 27 (low), TIBC : 117 (low), % sat : 23 (normal), ferritin: 579 (elevated)   ·      Hemoglobin is stable

## 2022-09-13 NOTE — DISCHARGE SUMMARY
[address] [facility]  Discharge- Iqra Drivers 1945, 69 y o  male MRN: [id number]  Unit/Bed#: -01 Encounter: [id number]  Primary Care Provider: Radha Benavides PA-C   Date and time admitted to hospital: 9/10/2022  4:11 PM    Anemia  Assessment & Plan  Hgb 11 5, decreased from baseline of 14 on 9/11  Hgb being monitored q12h - most recent 12 1  · Iron panel : S iron : 27 (low), TIBC : 117 (low), % sat : 23 (normal), ferritin: 579 (elevated)   ·    Hemoglobin is stable    Other pulmonary embolism without acute cor pulmonale (HCC)  Assessment & Plan  · Likely multifactorial 2/2 COVID and malignancy   · CTA 09/10: revealed acute pulmonary emboli in the RLL pulmonary artery and several segmental and subsegmental branches, as well as several LLL subsegmental branches  RV/LV ratio 0 84, less than 0 9  GGO in RLL, likley pulmonary infarct  Additional findings as below  · Hemodynamics are stable, no hypoxia   · Received IV vanc/cefepime for GGO, procal normal  monitor off antibiotics as likely infarct   · Started on IV heparin gtt, continue continue the heparin and speak to case management to run Eliquis cost    Hypercholesterolemia  Assessment & Plan  · Continue statins    Controlled type 2 diabetes mellitus without complication, without long-term current use of insulin (HCC)  Assessment & Plan  Lab Results   Component Value Date    HGBA1C 5 5 07/21/2022     · Hold home metformin   · glucochecks ac and insulin coverage as needed    Benign essential HTN  Assessment & Plan  · BP slightly elevated   · Continue home amlodipine, lisinopril, metoprolol  · Monitor hemodynamics closely      Blood pressure 151/85, pulse 82, temperature 98 4 °F (36 9 °C), resp  rate 18, height 5' 9" (1 753 m), weight 73 2 kg (161 lb 6 oz), SpO2 95 %  * Pulmonary mass  Assessment & Plan  · CTA also revealed RUL pulmonary mass measuring up to 6 2 cm as described, compatible with primary lung malignancy    Scattered additional solid nodules measuring up to 1 2 cm the RML, suspicious for metastatic disease  several large right hilar and mediastinal lymph nodes measuring up to 2 1 cm, also concerning for metastatic disease  · CT head r/o brain mets-- no discrete evidence of intracranial metastatic disease  · 9/12 : As per Hematology Oncology, recommended to obtain MRI head with and without contrast or atleast CT head with contrast to definitively exclude brain metastasis, CT abdomen/pelvis with contrast,   · Pulmonology consultation appreciated  The patient to be transferred to [facility]  Dr Nolan Ontiveros is aware and will likely be performing EBUS on Thursday      Adenocarcinoma of prostate (HCC)-resolved as of 9/13/2022  Assessment & Plan  S/p RT in 2005  Discharging Physician / Practitioner: Lissette Watson MD  PCP: Scott Almaguer PA-C  Admission Date:   Admission Orders (From admission, onward)     Ordered        09/10/22 1939  INPATIENT ADMISSION  Once                      Discharge Date: 09/13/22    Medical Problems             Resolved Problems  Date Reviewed: 9/13/2022          Resolved    Adenocarcinoma of prostate ([facility] [address] ) 9/13/2022     Resolved by  Lissette Watson MD                Consultations During Hospital Stay:  · Pulmonology  · Oncology    Procedures Performed:   · Please see imaging in the computer  Patient had multiple CT scans as well as an MRI    Significant Findings / Test Results:   · Please see the CT scan findings    Incidental Findings:   · Please CT scan findings     ·         Reason for Admission:  Hemoptysis    Hospital Course:     Jazmin Healy is a 69 y o  male patient who originally presented to the hospital on 9/10/2022 due to hemoptysis  History presenting Patricio Sanches is a 69 y o  male with a PMH of prostate cancer s/p RT, T2DM, HTN, HDL, R ankle fracture 6m ago s/p fusion who presents with hemoptysis  Patient has had cough x 3 weeks with BRB in sputum   Has had shortness of breath with exertion for over 1 month  Denies chest pain  Has had had associated night sweats for past 3 weeks as well  Reports some weight loss over past yes but is unable to quantify  Hx COVID approximately 4 weeks ago  Denies headaches, vision changes, numbness/tingling in extremities, abdominal pain, n/v/d  Has nocturia, no hematuria  Denies pain or swelling in LE  Denies hx of smoking  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, 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   No hx of DVT  Hospital course: The patient was admitted for above reasons  The patient was noted to have this lung mass which is most likely a malignancy  Patient was seen by pulmonology as well as Oncology  CT scan of the abdomen pelvis also done as well as an MRI of the brain  Patient does have the lung mass with some mediastinal adenopathy  Patient is also supposed to undergo a thoracentesis whether will be here about 1 yet to be determined depending on timing of transfer  Pulmonology did speak to Dr Celina Richards at [place] for EBUS who has accepted the patient in transfer  The patient also was noted to have an acute PE for which the patient is placed on a heparin drip  This could likely be transitioned to Eliquis  We can have case management run the cost of Eliquis  I do not think the patient will need to be in the hospitals for long after the biopsy will need close outpatient follow-up  Please see above list of diagnoses and related plan for additional information  Condition at Discharge: good     Discharge Day Visit / Exam:     Subjective:  Patient seen examined    He is doing well  Vitals: Blood Pressure: 151/85 (09/13/22 0608)  Pulse: 82 (09/13/22 0608)  Temperature: 98 4 °F (36 9 °C) (09/13/22 0608)  Temp Source: Oral (09/12/22 2208)  Respirations: 18 (09/12/22 2208)  Height: 5' 9" (175 3 cm) (09/11/22 1409)  Weight - Scale: 73 2 kg (161 lb 6 oz) (09/13/22 0600)  SpO2: 95 % (09/13/22 4384)  Exam:   Physical Exam  (   General Appearance:    Alert, cooperative, no distress, appears stated age                               Lungs:     Clear to auscultation bilaterally, respirations unlabored       Heart:    Regular rate and rhythm, S1 and S2 normal, no murmur, rub    or gallop   Abdomen:     Soft, non-tender, bowel sounds active all four quadrants,     no masses, no organomegaly           Extremities:   Extremities normal, atraumatic, no cyanosis or edema         Discharge instructions/Information to patient and family:   See after visit summary for information provided to patient and family  Provisions for Follow-Up Care:  See after visit summary for information related to follow-up care and any pertinent home health orders  Disposition:     [facility]      Planned Readmission:  None anticipated     Discharge Statement:  I spent 35 minutes discharging the patient  This time was spent on the day of discharge  I had direct contact with the patient on the day of discharge  Greater than 50% of the total time was spent examining patient, answering all patient questions, arranging and discussing plan of care with patient as well as directly providing post-discharge instructions  Additional time then spent on discharge activities  Discharge Medications:  See after visit summary for reconciled discharge medications provided to patient and family        ** Please Note: This note has been constructed using a voice recognition system **

## 2022-09-13 NOTE — ASSESSMENT & PLAN NOTE
Lab Results   Component Value Date    HGBA1C 5 5 07/21/2022       Recent Labs     09/12/22 2035 09/13/22  0725 09/13/22  1105 09/13/22  1555   POCGLU 149* 113 141* 112       Blood Sugar Average: Last 72 hrs:  · diabetic diet   · Hold home metformin   · Glucose checks ac and insulin coverage as needed

## 2022-09-13 NOTE — PLAN OF CARE
Problem: CARDIOVASCULAR - ADULT  Goal: Maintains optimal cardiac output and hemodynamic stability  Description: INTERVENTIONS:  - Monitor I/O, vital signs and rhythm  - Monitor for S/S and trends of decreased cardiac output  - Administer and titrate ordered vasoactive medications to optimize hemodynamic stability  - Assess quality of pulses, skin color and temperature  - Assess for signs of decreased coronary artery perfusion  - Instruct patient to report change in severity of symptoms  Outcome: Progressing  Goal: Absence of cardiac dysrhythmias or at baseline rhythm  Description: INTERVENTIONS:  - Continuous cardiac monitoring, vital signs, obtain 12 lead EKG if ordered  - Administer antiarrhythmic and heart rate control medications as ordered  - Monitor electrolytes and administer replacement therapy as ordered  Outcome: Progressing     Problem: RESPIRATORY - ADULT  Goal: Achieves optimal ventilation and oxygenation  Description: INTERVENTIONS:  - Assess for changes in respiratory status  - Assess for changes in mentation and behavior  - Position to facilitate oxygenation and minimize respiratory effort  - Oxygen administered by appropriate delivery if ordered  - Initiate smoking cessation education as indicated  - Encourage broncho-pulmonary hygiene including cough, deep breathe, Incentive Spirometry  - Assess the need for suctioning and aspirate as needed  - Assess and instruct to report SOB or any respiratory difficulty  - Respiratory Therapy support as indicated  Outcome: Progressing     Problem: MOBILITY - ADULT  Goal: Maintain or return to baseline ADL function  Description: INTERVENTIONS:  -  Assess patient's ability to carry out ADLs; assess patient's baseline for ADL function and identify physical deficits which impact ability to perform ADLs (bathing, care of mouth/teeth, toileting, grooming, dressing, etc )  - Assess/evaluate cause of self-care deficits   - Assess range of motion  - Assess patient's mobility; develop plan if impaired  - Assess patient's need for assistive devices and provide as appropriate  - Encourage maximum independence but intervene and supervise when necessary  - Involve family in performance of ADLs  - Assess for home care needs following discharge   - Consider OT consult to assist with ADL evaluation and planning for discharge  - Provide patient education as appropriate  Outcome: Progressing  Goal: Maintains/Returns to pre admission functional level  Description: INTERVENTIONS:  - Perform BMAT or MOVE assessment daily    - Set and communicate daily mobility goal to care team and patient/family/caregiver     - Collaborate with rehabilitation services on mobility goals if consulted  - Record patient progress and toleration of activity level   Outcome: Progressing     Problem: PAIN - ADULT  Goal: Verbalizes/displays adequate comfort level or baseline comfort level  Description: Interventions:  - Encourage patient to monitor pain and request assistance  - Assess pain using appropriate pain scale  - Administer analgesics based on type and severity of pain and evaluate response  - Implement non-pharmacological measures as appropriate and evaluate response  - Consider cultural and social influences on pain and pain management  - Notify physician/advanced practitioner if interventions unsuccessful or patient reports new pain  Outcome: Progressing     Problem: INFECTION - ADULT  Goal: Absence or prevention of progression during hospitalization  Description: INTERVENTIONS:  - Assess and monitor for signs and symptoms of infection  - Monitor lab/diagnostic results  - Monitor all insertion sites, i e  indwelling lines, tubes, and drains  - Monitor endotracheal if appropriate and nasal secretions for changes in amount and color  - [place] appropriate cooling/warming therapies per order  - Administer medications as ordered  - Instruct and encourage patient and family to use good hand hygiene technique  - Identify and instruct in appropriate isolation precautions for identified infection/condition  Outcome: Progressing     Problem: SAFETY ADULT  Goal: Maintain or return to baseline ADL function  Description: INTERVENTIONS:  -  Assess patient's ability to carry out ADLs; assess patient's baseline for ADL function and identify physical deficits which impact ability to perform ADLs (bathing, care of mouth/teeth, toileting, grooming, dressing, etc )  - Assess/evaluate cause of self-care deficits   - Assess range of motion  - Assess patient's mobility; develop plan if impaired  - Assess patient's need for assistive devices and provide as appropriate  - Encourage maximum independence but intervene and supervise when necessary  - Involve family in performance of ADLs  - Assess for home care needs following discharge   - Consider OT consult to assist with ADL evaluation and planning for discharge  - Provide patient education as appropriate  Outcome: Progressing  Goal: Maintains/Returns to pre admission functional level  Description: INTERVENTIONS:  - Perform BMAT or MOVE assessment daily    - Set and communicate daily mobility goal to care team and patient/family/caregiver     - Collaborate with rehabilitation services on mobility goals if consulted  - Record patient progress and toleration of activity level   Outcome: Progressing  Goal: Patient will remain free of falls  Description: INTERVENTIONS:  - Educate patient/family on patient safety including physical limitations  - Instruct patient to call for assistance with activity   - Consult OT/PT to assist with strengthening/mobility   - Keep Call bell within reach  - Keep bed low and locked with side rails adjusted as appropriate  - Keep care items and personal belongings within reach  - Initiate and maintain comfort rounds  - Make Fall Risk Sign visible to staff  - Apply yellow socks and bracelet for high fall risk patients  - Consider moving patient to room near nurses station  Outcome: Progressing     Problem: DISCHARGE PLANNING  Goal: Discharge to home or other facility with appropriate resources  Description: INTERVENTIONS:  - Identify barriers to discharge w/patient and caregiver  - Arrange for needed discharge resources and transportation as appropriate  - Identify discharge learning needs (meds, wound care, etc )  - Arrange for interpretive services to assist at discharge as needed  - Refer to Case Management Department for coordinating discharge planning if the patient needs post-hospital services based on physician/advanced practitioner order or complex needs related to functional status, cognitive ability, or social support system  Outcome: Progressing     Problem: Knowledge Deficit  Goal: Patient/family/caregiver demonstrates understanding of disease process, treatment plan, medications, and discharge instructions  Description: Complete learning assessment and assess knowledge base    Interventions:  - Provide teaching at level of understanding  - Provide teaching via preferred learning methods  Outcome: Progressing

## 2022-09-13 NOTE — PLAN OF CARE
Problem: CARDIOVASCULAR - ADULT  Goal: Maintains optimal cardiac output and hemodynamic stability  Description: INTERVENTIONS:  - Monitor I/O, vital signs and rhythm  - Monitor for S/S and trends of decreased cardiac output  - Administer and titrate ordered vasoactive medications to optimize hemodynamic stability  - Assess quality of pulses, skin color and temperature  - Assess for signs of decreased coronary artery perfusion  - Instruct patient to report change in severity of symptoms  Outcome: Progressing     Problem: RESPIRATORY - ADULT  Goal: Achieves optimal ventilation and oxygenation  Description: INTERVENTIONS:  - Assess for changes in respiratory status  - Assess for changes in mentation and behavior  - Position to facilitate oxygenation and minimize respiratory effort  - Oxygen administered by appropriate delivery if ordered  - Initiate smoking cessation education as indicated  - Encourage broncho-pulmonary hygiene including cough, deep breathe, Incentive Spirometry  - Assess the need for suctioning and aspirate as needed  - Assess and instruct to report SOB or any respiratory difficulty  - Respiratory Therapy support as indicated  Outcome: Progressing     Problem: MOBILITY - ADULT  Goal: Maintain or return to baseline ADL function  Description: INTERVENTIONS:  -  Assess patient's ability to carry out ADLs; assess patient's baseline for ADL function and identify physical deficits which impact ability to perform ADLs (bathing, care of mouth/teeth, toileting, grooming, dressing, etc )  - Assess/evaluate cause of self-care deficits   - Assess range of motion  - Assess patient's mobility; develop plan if impaired  - Assess patient's need for assistive devices and provide as appropriate  - Encourage maximum independence but intervene and supervise when necessary  - Involve family in performance of ADLs  - Assess for home care needs following discharge   - Consider OT consult to assist with ADL evaluation and planning for discharge  - Provide patient education as appropriate  Outcome: Progressing     Problem: PAIN - ADULT  Goal: Verbalizes/displays adequate comfort level or baseline comfort level  Description: Interventions:  - Encourage patient to monitor pain and request assistance  - Assess pain using appropriate pain scale  - Administer analgesics based on type and severity of pain and evaluate response  - Implement non-pharmacological measures as appropriate and evaluate response  - Consider cultural and social influences on pain and pain management  - Notify physician/advanced practitioner if interventions unsuccessful or patient reports new pain  Outcome: Progressing

## 2022-09-13 NOTE — BRIEF OP NOTE (RAD/CATH)
INTERVENTIONAL RADIOLOGY PROCEDURE NOTE    Date: 9/13/2022    Procedure: IR THORACENTESIS     Preoperative diagnosis:   1  Pulmonary embolism ([address] )    2  Pulmonary mass         Postoperative diagnosis: Same  Surgeon: Doroteo Lyles MD     Assistant: None  No qualified resident was available  Blood loss:  Trace    Specimens:  Sent to lab as requested    Findings:  400 mL clear cordell pleural fluid removed from right chest under ultrasound guidance  Complications: None immediate      Anesthesia: local

## 2022-09-14 ENCOUNTER — APPOINTMENT (OUTPATIENT)
Dept: RADIOLOGY | Facility: HOSPITAL | Age: 77
End: 2022-09-14
Payer: MEDICARE

## 2022-09-14 ENCOUNTER — APPOINTMENT (INPATIENT)
Dept: NON INVASIVE DIAGNOSTICS | Facility: HOSPITAL | Age: 77
End: 2022-09-14
Payer: MEDICARE

## 2022-09-14 LAB
ALBUMIN SERPL BCP-MCNC: 2.1 G/DL (ref 3.5–5)
ALP SERPL-CCNC: 92 U/L (ref 46–116)
ALT SERPL W P-5'-P-CCNC: 48 U/L (ref 12–78)
ANION GAP SERPL CALCULATED.3IONS-SCNC: 5 MMOL/L (ref 4–13)
AORTIC ROOT: 3.7 CM
APICAL FOUR CHAMBER EJECTION FRACTION: 53 %
APTT PPP: 127 SECONDS (ref 23–37)
APTT PPP: 56 SECONDS (ref 23–37)
APTT PPP: 85 SECONDS (ref 23–37)
AST SERPL W P-5'-P-CCNC: 33 U/L (ref 5–45)
AV REGURGITATION PRESSURE HALF TIME: 589 MS
BILIRUB DIRECT SERPL-MCNC: 0.21 MG/DL (ref 0–0.2)
BILIRUB SERPL-MCNC: 0.6 MG/DL (ref 0.2–1)
BUN SERPL-MCNC: 16 MG/DL (ref 5–25)
CALCIUM SERPL-MCNC: 8.3 MG/DL (ref 8.3–10.1)
CHLORIDE SERPL-SCNC: 110 MMOL/L (ref 96–108)
CO2 SERPL-SCNC: 26 MMOL/L (ref 21–32)
CREAT SERPL-MCNC: 0.85 MG/DL (ref 0.6–1.3)
E WAVE DECELERATION TIME: 199 MS
ERYTHROCYTE [DISTWIDTH] IN BLOOD BY AUTOMATED COUNT: 12.1 % (ref 11.6–15.1)
FLUAV RNA RESP QL NAA+PROBE: NEGATIVE
FLUBV RNA RESP QL NAA+PROBE: NEGATIVE
FRACTIONAL SHORTENING: 19 % (ref 28–44)
GFR SERPL CREATININE-BSD FRML MDRD: 84 ML/MIN/1.73SQ M
GLUCOSE SERPL-MCNC: 103 MG/DL (ref 65–140)
GLUCOSE SERPL-MCNC: 117 MG/DL (ref 65–140)
GLUCOSE SERPL-MCNC: 118 MG/DL (ref 65–140)
GLUCOSE SERPL-MCNC: 122 MG/DL (ref 65–140)
GLUCOSE SERPL-MCNC: 140 MG/DL (ref 65–140)
GLUCOSE SERPL-MCNC: 147 MG/DL (ref 65–140)
HCT VFR BLD AUTO: 31.5 % (ref 36.5–49.3)
HGB BLD-MCNC: 10.6 G/DL (ref 12–17)
INTERVENTRICULAR SEPTUM IN DIASTOLE (PARASTERNAL SHORT AXIS VIEW): 1.2 CM
INTERVENTRICULAR SEPTUM: 1.2 CM (ref 0.6–1.1)
LAAS-AP2: 22.7 CM2
LAAS-AP4: 18.2 CM2
LDH SERPL-CCNC: 198 U/L (ref 81–234)
LEFT ATRIUM AREA SYSTOLE SINGLE PLANE A4C: 22 CM2
LEFT ATRIUM SIZE: 4.8 CM
LEFT INTERNAL DIMENSION IN SYSTOLE: 3.9 CM (ref 2.1–4)
LEFT VENTRICLE DIASTOLIC VOLUME (MOD BIPLANE): 130 ML
LEFT VENTRICLE SYSTOLIC VOLUME (MOD BIPLANE): 58 ML
LEFT VENTRICULAR INTERNAL DIMENSION IN DIASTOLE: 4.8 CM (ref 3.5–6)
LEFT VENTRICULAR POSTERIOR WALL IN END DIASTOLE: 1.2 CM
LEFT VENTRICULAR STROKE VOLUME: 42 ML
LV EF: 55 %
LVSV (TEICH): 42 ML
MCH RBC QN AUTO: 31.1 PG (ref 26.8–34.3)
MCHC RBC AUTO-ENTMCNC: 33.7 G/DL (ref 31.4–37.4)
MCV RBC AUTO: 92 FL (ref 82–98)
MV E'TISSUE VEL-SEP: 6 CM/S
MV PEAK A VEL: 0.9 M/S
MV PEAK E VEL: 97 CM/S
MV STENOSIS PRESSURE HALF TIME: 58 MS
MV VALVE AREA P 1/2 METHOD: 3.79 CM2
PLATELET # BLD AUTO: 261 THOUSANDS/UL (ref 149–390)
PMV BLD AUTO: 11.1 FL (ref 8.9–12.7)
POTASSIUM SERPL-SCNC: 3.6 MMOL/L (ref 3.5–5.3)
PROT SERPL-MCNC: 6.1 G/DL (ref 6.4–8.4)
RBC # BLD AUTO: 3.41 MILLION/UL (ref 3.88–5.62)
RIGHT ATRIUM AREA SYSTOLE A4C: 19.5 CM2
RIGHT VENTRICLE ID DIMENSION: 4.2 CM
RSV RNA RESP QL NAA+PROBE: NEGATIVE
SARS-COV-2 RNA RESP QL NAA+PROBE: POSITIVE
SL CV AV DECELERATION TIME RETROGRADE: 2030 MS
SL CV AV PEAK GRADIENT RETROGRADE: 86 MMHG
SL CV LEFT ATRIUM LENGTH A2C: 6.3 CM
SL CV LV EF: 60
SL CV PED ECHO LEFT VENTRICLE DIASTOLIC VOLUME (MOD BIPLANE) 2D: 107 ML
SL CV PED ECHO LEFT VENTRICLE SYSTOLIC VOLUME (MOD BIPLANE) 2D: 65 ML
SODIUM SERPL-SCNC: 141 MMOL/L (ref 135–147)
WBC # BLD AUTO: 8.07 THOUSAND/UL (ref 4.31–10.16)

## 2022-09-14 PROCEDURE — 99232 SBSQ HOSP IP/OBS MODERATE 35: CPT | Performed by: PHYSICIAN ASSISTANT

## 2022-09-14 PROCEDURE — 82948 REAGENT STRIP/BLOOD GLUCOSE: CPT

## 2022-09-14 PROCEDURE — 93306 TTE W/DOPPLER COMPLETE: CPT | Performed by: INTERNAL MEDICINE

## 2022-09-14 PROCEDURE — 0241U HB NFCT DS VIR RESP RNA 4 TRGT: CPT | Performed by: INTERNAL MEDICINE

## 2022-09-14 PROCEDURE — 85027 COMPLETE CBC AUTOMATED: CPT | Performed by: INTERNAL MEDICINE

## 2022-09-14 PROCEDURE — 76536 US EXAM OF HEAD AND NECK: CPT

## 2022-09-14 PROCEDURE — 85730 THROMBOPLASTIN TIME PARTIAL: CPT | Performed by: INTERNAL MEDICINE

## 2022-09-14 PROCEDURE — 93306 TTE W/DOPPLER COMPLETE: CPT

## 2022-09-14 PROCEDURE — 80076 HEPATIC FUNCTION PANEL: CPT | Performed by: INTERNAL MEDICINE

## 2022-09-14 PROCEDURE — 83615 LACTATE (LD) (LDH) ENZYME: CPT | Performed by: INTERNAL MEDICINE

## 2022-09-14 PROCEDURE — 99223 1ST HOSP IP/OBS HIGH 75: CPT | Performed by: INTERNAL MEDICINE

## 2022-09-14 PROCEDURE — 80048 BASIC METABOLIC PNL TOTAL CA: CPT | Performed by: INTERNAL MEDICINE

## 2022-09-14 RX ORDER — FERROUS SULFATE 325(65) MG
325 TABLET ORAL
Status: DISCONTINUED | OUTPATIENT
Start: 2022-09-15 | End: 2022-09-16

## 2022-09-14 RX ADMIN — LISINOPRIL 10 MG: 10 TABLET ORAL at 09:28

## 2022-09-14 RX ADMIN — HEPARIN SODIUM 3000 UNITS: 1000 INJECTION INTRAVENOUS; SUBCUTANEOUS at 14:47

## 2022-09-14 RX ADMIN — PRAVASTATIN SODIUM 20 MG: 20 TABLET ORAL at 16:18

## 2022-09-14 RX ADMIN — AMLODIPINE BESYLATE 10 MG: 10 TABLET ORAL at 09:29

## 2022-09-14 RX ADMIN — METOPROLOL SUCCINATE 100 MG: 100 TABLET, EXTENDED RELEASE ORAL at 09:28

## 2022-09-14 RX ADMIN — HEPARIN SODIUM 25 UNITS/KG/HR: 10000 INJECTION, SOLUTION INTRAVENOUS at 16:18

## 2022-09-14 RX ADMIN — FINASTERIDE 5 MG: 5 TABLET, FILM COATED ORAL at 09:28

## 2022-09-14 NOTE — ASSESSMENT & PLAN NOTE
· Likely multifactorial 2/2 COVID (reportedly had a few weeks ago) and malignancy   · CTA 09/10: revealed acute pulmonary emboli in the RLL pulmonary artery and several segmental and subsegmental branches, as well as several LLL subsegmental branches  RV/LV ratio 0 84, less than 0 9   GGO in RLL, likley pulmonary infarct  Additional findings as below     · Hemodynamics are stable, no hypoxia   · Received IV vanc/cefepime for GGO, procal normal  Monitor off antibiotics as likely infarct   · Started on IV heparin gtt, continue continue the heparin, eventual transition to PO

## 2022-09-14 NOTE — ASSESSMENT & PLAN NOTE
Hgb 10 6, decreased from baseline of 14 on 9/11  Hgb being monitored q12h   · Iron panel noted, started supplementation

## 2022-09-14 NOTE — CONSULTS
Consultation - Pulmonary Medicine   Maria G Pratt 69 y o  male MRN: [de-identified]  Unit/Bed#: [facility] 928-01 Encounter: [id number]      Physician Requesting Consult: Dr Amadeo Hays  Reason for Consult:  Pulmonary embolism/pulmonary mass/pleural effusion    Assessment/Plan:    1  Pulmonary mass  · History of exposed to secondhand smoke exposure  · Presented with hemoptysis x3 weeks  Wife has covid 5 weeks ago and since then he has been coughing  · CT reveals 6 2 cm right upper lobe pulmonary mass, additional nodules measuring up to 1 2 cm in the right middle lobe, several large right hilar and mediastinal lymph nodes measuring 2 1 cm  · CT head did not show any evidence of intracranial metastatic disease  · Pleural effusions was obtained: cytology pending  · For staging purpose, MRI of brain no MR evidence of acute ischemia, no enhancing lesions to suggest metastatic disease  · Outpatient PET CT cervical lymphadenopathy chain  · Patient is transferred for EBUS for tissue biopsy on Thursday    2  Moderate left Pleural effusion   · POA, Patient has hemoptysis x3 weeks  · CTA showed pleural effusion, has thoracentesis on 9/13/2022  · Fluid LDH is 261  · Fluid protein is 3 4  · WBC is 1,870, showed neutrophils 20/lymph 29/eosinophils 2/mesothelial cells 17/monocytes 23/histocytes 9  · Glucose is 148   · C/s pending   · Cytology pending  · Gram stain rare polys no organisms    3  Pulmonary embolism  · Presented with hemoptysis, hx of covid and malignancy in smoker, no hypoxia, no fever and no tachycardia  · CTA chest on 9/10, showed pulmonary emboli in the RLL pulmonary artery and several segmental and subsegmental branches and LLL subsegmental branches  Also has GGO RLL, likely pulmonary infarct  · Does not look like Right heart strain, no ECHO in chart, would get one for baseline  · Currently on IV heparin (tomorrow hold heparin 6 hours prior to EBUS)   · Monitor PTT and adjust     4    Prostate cancer  · S/p radiation treatment in 2005  · Stable PSA    5  DM  6  HLD  7  HTN   ______________________________________________________________________    HPI:    Terrie Sotelo is a 69 y o  male male nonsmoker, hypertension, diabetes, hyperlipidemia, prostate cancer radiation treatment in 2005, wife has Lloyd it is 5 weeks ago, presented to [facility] with cough for 3 weeks and hemoptysis  CTA lung obtain showed pulmonary emboli the subsegmental right and left lungs, Ground glass opacity RLL, Rt medium pleural effusion and 6 2 cm right upper lobe mass along with other small nodes concerning for malignancy  As he is being worked up for diagnosis and staging for oncology treatment, he was transferred to [address] [place] [address] for pulmonary evaluation and EBUS on Thursday  PFT results:  None in chart    Review of Systems:  Patient has hemoptysis with blood streaks, he has sputum production, chills/shakes for three weeks, he did not remember having fever, nausea/vomiting, sob, chest pain  He is stable now, he has swelling of left lower ext with ankle fracture, no swelling note  Full 12 point review of systems was performed  Aside from what was mentioned in the HPI, it is otherwise negative  Historical Information   Past Medical History:   Diagnosis Date    Diabetes mellitus ([facility] [address] [facility] )     Hyperlipidemia     Hypertension     Prostate cancer ([facility] [address] [facility] ) 2005    S/P prostate ca-2005 had radiation tx       Past Surgical History:   Procedure Laterality Date    COLONOSCOPY      IR THORACENTESIS  9/13/2022    TX ARTHRODESIS,ANKLE,OPEN Right 7/10/2020    Procedure: ARTHRODESIS/ TIBIAL-TALAR ANKLE FUSION;  Surgeon: Kyler Lowery MD;  [facility]: [facility];  Service: Orthopedics    TX [address] Bilateral 12/16/2021    Procedure: LAPAROSCOPIC REPAIR HERNIA INGUINAL WITH MESH;  Surgeon: Abena James MD;  Location[facility];  Service: General     Social History   Social History     Substance and Sexual Activity   Alcohol Use Yes    Comment: Occasional     Social History     Tobacco Use   Smoking Status Never Smoker   Smokeless Tobacco Never Used       Occupational history:  Works in [employer]    Family History:   Family History   Problem Relation Age of Onset    Heart attack Mother        Medications: The patient's active and prehospital medications were reviewed  Current Facility-Administered Medications   Medication Dose Route Frequency Provider Last Rate    acetaminophen  650 mg Oral Q4H PRN Avinash Vega MD      amLODIPine  10 mg Oral Daily Avinash Vega MD      benzonatate  100 mg Oral TID PRN Avinash Vega MD      finasteride  5 mg Oral Daily Avinash Vega MD      heparin (porcine)  3-30 Units/kg/hr (Order-Specific) Intravenous Titrated Avinash Vega MD 23 Units/kg/hr (09/14/22 0654)    heparin (porcine)  3,000 Units Intravenous Q1H PRN Avinash Vega MD      heparin (porcine)  6,000 Units Intravenous Q1H PRN Avinash Vega MD      insulin lispro  1-5 Units Subcutaneous TID AC Avinash Vega MD      insulin lispro  1-5 Units Subcutaneous HS Avinash Vega MD      lisinopril  10 mg Oral Daily Avinash Vega MD      metoprolol succinate  100 mg Oral Daily Avinash Vega MD      ondansetron  4 mg Intravenous Q6H PRN Avinash Vega MD      pravastatin  20 mg Oral Daily With Angie Aly MD      senna  2 tablet Oral Daily PRN Avinash Vega MD           PhysicalExamination:  Vitals:   Vitals:    09/13/22 2230 09/14/22 0100 09/14/22 0600 09/14/22 0757   BP:  147/76  150/78   Pulse:  77     Resp:  18  18   Temp:  97 7 °F (36 5 °C)  97 7 °F (36 5 °C)   TempSrc:  Oral     SpO2: 97%      Weight:  73 2 kg (161 lb 6 oz) 73 1 kg (161 lb 2 5 oz)    Height:  5' 9" (1 753 m)       Body mass index is 23 8 kg/m²    Temp  Min: 97 7 °F (36 5 °C)  Max: 99 6 °F (37 6 °C)  IBW (Ideal Body Weight): 70 7 kg    SpO2: 97 %,   SpO2 Activity: At Rest,   O2 Device: None (Room air)    /78 Pulse 77   Temp 97 7 °F (36 5 °C)   Resp 18   Ht 5' 9" (1 753 m)   Wt 73 1 kg (161 lb 2 5 oz)   SpO2 97%   BMI 23 80 kg/m²     General Appearance:    Alert, cooperative, no distress, appears stated age   Head:    Normocephalic, without obvious abnormality, atraumatic   Eyes:    PERRL, conjunctiva/corneas clear, EOM's intact, fundi     benign, both eyes        Ears:    Normal TM's and external ear canals, both ears   Nose:   Nares normal, septum midline, mucosa normal, no drainage    or sinus tenderness   Throat:   Lips, mucosa, and tongue normal; teeth and gums normal   Neck:   Supple, symmetrical, trachea midline, no adenopathy;        thyroid:  No enlargement/tenderness/nodules; no carotid    bruit or JVD   Back:     Symmetric, no curvature, ROM normal, no CVA tenderness   Lungs:     Clear to auscultation bilaterally, respirations unlabored   Chest wall:    No tenderness or deformity   Heart:    Regular rate and rhythm, S1 and S2 normal, no murmur, rub    or gallop   Abdomen:     Soft, non-tender, bowel sounds active all four quadrants,     no masses, no organomegaly           Extremities:   Extremities normal, atraumatic, no cyanosis or edema   Pulses:   2+ and symmetric all extremities   Skin:   Skin color, texture, turgor normal, no rashes or lesions   Lymph nodes:   Cervical, supraclavicular, and axillary nodes normal   Neurologic:   CNII-XII intact  Normal strength, sensation and reflexes       throughout        Diagnostic Data:  CBC:   Results from last 7 days   Lab Units 09/14/22  0436 09/13/22  0835 09/12/22 2016 09/11/22  2327 09/11/22  0517 09/11/22  0039 09/10/22  1630   WBC Thousand/uL 8 07  --   --   --  9 11  --  9 29   HEMOGLOBIN g/dL 10 6* 12 7 12 0   < > 11 1*   < > 11 5*   HEMATOCRIT % 31 5* 38 3 34 6*   < > 32 2*   < > 33 9*   PLATELETS Thousands/uL 261  --   --   --  237  --  242    < > = values in this interval not displayed         CMP:   Results from last 7 days   Lab Units 09/14/22  0436 09/13/22  0605 09/12/22  1154 09/10/22  1630   SODIUM mmol/L 141 142 142 143   POTASSIUM mmol/L 3 6 3 6 3 6 3 7   CHLORIDE mmol/L 110* 108 107 106   CO2 mmol/L 26 26 27 28   BUN mg/dL 16 11 15 17   CREATININE mg/dL 0 85 0 92 0 82 1 23   CALCIUM mg/dL 8 3 8 2* 8 3 8 3   ALK PHOS U/L 92  --   --  114   ALT U/L 48  --   --  44   AST U/L 33  --   --  24         Microbiology:  Results from last 7 days   Lab Units 09/13/22  1534 09/10/22  1631 09/10/22  1630   BLOOD CULTURE   --  No Growth at 72 hrs  No Growth at 72 hrs  GRAM STAIN RESULT  Rare Polys  No organisms seen  --   --        ABG: No results found for: PHART, EPO4HPG, PO2ART, CIV4YVI, M1WQTPYX, BEART, SOURCE    Imaging:  MRI of brain: patient has no metastasis  CTA of chest: pleural effusion, subsegmental/segmental PE in the right and left LL, has Right Upper lobe mass 7 2 cm         Cardiac lab/EKG/telemetry/ECHO:   Results from last 7 days   Lab Units 09/10/22  1906   NT-PRO BNP pg/mL 2,496*            Sasha Paige MD

## 2022-09-14 NOTE — QUICK NOTE
Patient tested positive for covid  However, he is asymptomatic at this time, so discussed with Dr Kristie López from pulmonary/critical care  No need for isolation at this time and no need to treat him at this time

## 2022-09-14 NOTE — PLAN OF CARE
Problem: CARDIOVASCULAR - ADULT  Goal: Maintains optimal cardiac output and hemodynamic stability  Description: INTERVENTIONS:  - Monitor I/O, vital signs and rhythm  - Monitor for S/S and trends of decreased cardiac output  - Administer and titrate ordered vasoactive medications to optimize hemodynamic stability  - Assess quality of pulses, skin color and temperature  - Assess for signs of decreased coronary artery perfusion  - Instruct patient to report change in severity of symptoms  Outcome: Adequate for Discharge  Goal: Absence of cardiac dysrhythmias or at baseline rhythm  Description: INTERVENTIONS:  - Continuous cardiac monitoring, vital signs, obtain 12 lead EKG if ordered  - Administer antiarrhythmic and heart rate control medications as ordered  - Monitor electrolytes and administer replacement therapy as ordered  Outcome: Adequate for Discharge     Problem: RESPIRATORY - ADULT  Goal: Achieves optimal ventilation and oxygenation  Description: INTERVENTIONS:  - Assess for changes in respiratory status  - Assess for changes in mentation and behavior  - Position to facilitate oxygenation and minimize respiratory effort  - Oxygen administered by appropriate delivery if ordered  - Initiate smoking cessation education as indicated  - Encourage broncho-pulmonary hygiene including cough, deep breathe, Incentive Spirometry  - Assess the need for suctioning and aspirate as needed  - Assess and instruct to report SOB or any respiratory difficulty  - Respiratory Therapy support as indicated  Outcome: Adequate for Discharge     Problem: MOBILITY - ADULT  Goal: Maintain or return to baseline ADL function  Description: INTERVENTIONS:  -  Assess patient's ability to carry out ADLs; assess patient's baseline for ADL function and identify physical deficits which impact ability to perform ADLs (bathing, care of mouth/teeth, toileting, grooming, dressing, etc )  - Assess/evaluate cause of self-care deficits   - Assess range of motion  - Assess patient's mobility; develop plan if impaired  - Assess patient's need for assistive devices and provide as appropriate  - Encourage maximum independence but intervene and supervise when necessary  - Involve family in performance of ADLs  - Assess for home care needs following discharge   - Consider OT consult to assist with ADL evaluation and planning for discharge  - Provide patient education as appropriate  Outcome: Adequate for Discharge  Goal: Maintains/Returns to pre admission functional level  Description: INTERVENTIONS:  - Perform BMAT or MOVE assessment daily    - Set and communicate daily mobility goal to care team and patient/family/caregiver     - Collaborate with rehabilitation services on mobility goals if consulted  - Record patient progress and toleration of activity level   Outcome: Adequate for Discharge     Problem: PAIN - ADULT  Goal: Verbalizes/displays adequate comfort level or baseline comfort level  Description: Interventions:  - Encourage patient to monitor pain and request assistance  - Assess pain using appropriate pain scale  - Administer analgesics based on type and severity of pain and evaluate response  - Implement non-pharmacological measures as appropriate and evaluate response  - Consider cultural and social influences on pain and pain management  - Notify physician/advanced practitioner if interventions unsuccessful or patient reports new pain  Outcome: Adequate for Discharge     Problem: INFECTION - ADULT  Goal: Absence or prevention of progression during hospitalization  Description: INTERVENTIONS:  - Assess and monitor for signs and symptoms of infection  - Monitor lab/diagnostic results  - Monitor all insertion sites, i e  indwelling lines, tubes, and drains  - Monitor endotracheal if appropriate and nasal secretions for changes in amount and color  - [place] appropriate cooling/warming therapies per order  - Administer medications as ordered  - Instruct and encourage patient and family to use good hand hygiene technique  - Identify and instruct in appropriate isolation precautions for identified infection/condition  Outcome: Adequate for Discharge     Problem: SAFETY ADULT  Goal: Maintain or return to baseline ADL function  Description: INTERVENTIONS:  -  Assess patient's ability to carry out ADLs; assess patient's baseline for ADL function and identify physical deficits which impact ability to perform ADLs (bathing, care of mouth/teeth, toileting, grooming, dressing, etc )  - Assess/evaluate cause of self-care deficits   - Assess range of motion  - Assess patient's mobility; develop plan if impaired  - Assess patient's need for assistive devices and provide as appropriate  - Encourage maximum independence but intervene and supervise when necessary  - Involve family in performance of ADLs  - Assess for home care needs following discharge   - Consider OT consult to assist with ADL evaluation and planning for discharge  - Provide patient education as appropriate  Outcome: Adequate for Discharge  Goal: Maintains/Returns to pre admission functional level  Description: INTERVENTIONS:  - Perform BMAT or MOVE assessment daily    - Set and communicate daily mobility goal to care team and patient/family/caregiver     - Collaborate with rehabilitation services on mobility goals if consulted  - Record patient progress and toleration of activity level   Outcome: Adequate for Discharge  Goal: Patient will remain free of falls  Description: INTERVENTIONS:  - Educate patient/family on patient safety including physical limitations  - Instruct patient to call for assistance with activity   - Consult OT/PT to assist with strengthening/mobility   - Keep Call bell within reach  - Keep bed low and locked with side rails adjusted as appropriate  - Keep care items and personal belongings within reach  - Initiate and maintain comfort rounds  - Make Fall Risk Sign visible to staff  - Apply yellow socks and bracelet for high fall risk patients  - Consider moving patient to room near nurses station  Outcome: Adequate for Discharge     Problem: DISCHARGE PLANNING  Goal: Discharge to home or other facility with appropriate resources  Description: INTERVENTIONS:  - Identify barriers to discharge w/patient and caregiver  - Arrange for needed discharge resources and transportation as appropriate  - Identify discharge learning needs (meds, wound care, etc )  - Arrange for interpretive services to assist at discharge as needed  - Refer to Case Management Department for coordinating discharge planning if the patient needs post-hospital services based on physician/advanced practitioner order or complex needs related to functional status, cognitive ability, or social support system  Outcome: Adequate for Discharge     Problem: Knowledge Deficit  Goal: Patient/family/caregiver demonstrates understanding of disease process, treatment plan, medications, and discharge instructions  Description: Complete learning assessment and assess knowledge base    Interventions:  - Provide teaching at level of understanding  - Provide teaching via preferred learning methods  Outcome: Adequate for Discharge     Problem: Potential for Falls  Goal: Patient will remain free of falls  Description: INTERVENTIONS:  - Educate patient/family on patient safety including physical limitations  - Instruct patient to call for assistance with activity   - Consult OT/PT to assist with strengthening/mobility   - Keep Call bell within reach  - Keep bed low and locked with side rails adjusted as appropriate  - Keep care items and personal belongings within reach  - Initiate and maintain comfort rounds  - Make Fall Risk Sign visible to staff  - Apply yellow socks and bracelet for high fall risk patients  - Consider moving patient to room near nurses station  Outcome: Adequate for Discharge

## 2022-09-14 NOTE — PLAN OF CARE
Problem: PAIN - ADULT  Goal: Verbalizes/displays adequate comfort level or baseline comfort level  Description: Interventions:  - Encourage patient to monitor pain and request assistance  - Assess pain using appropriate pain scale  - Administer analgesics based on type and severity of pain and evaluate response  - Implement non-pharmacological measures as appropriate and evaluate response  - Consider cultural and social influences on pain and pain management  - Notify physician/advanced practitioner if interventions unsuccessful or patient reports new pain  Outcome: Progressing     Problem: INFECTION - ADULT  Goal: Absence or prevention of progression during hospitalization  Description: INTERVENTIONS:  - Assess and monitor for signs and symptoms of infection  - Monitor lab/diagnostic results  - Monitor all insertion sites, i e  indwelling lines, tubes, and drains  - Monitor endotracheal if appropriate and nasal secretions for changes in amount and color  - [place] appropriate cooling/warming therapies per order  - Administer medications as ordered  - Instruct and encourage patient and family to use good hand hygiene technique  - Identify and instruct in appropriate isolation precautions for identified infection/condition  Outcome: Progressing  Goal: Absence of fever/infection during neutropenic period  Description: INTERVENTIONS:  - Monitor WBC    Outcome: Progressing     Problem: SAFETY ADULT  Goal: Patient will remain free of falls  Description: INTERVENTIONS:  - Educate patient/family on patient safety including physical limitations  - Instruct patient to call for assistance with activity   - Consult OT/PT to assist with strengthening/mobility   - Keep Call bell within reach  - Keep bed low and locked with side rails adjusted as appropriate  - Keep care items and personal belongings within reach  - Initiate and maintain comfort rounds  - Make Fall Risk Sign visible to staff  - Apply yellow socks and bracelet for high fall risk patients  - Consider moving patient to room near nurses station  Outcome: Progressing  Goal: Maintain or return to baseline ADL function  Description: INTERVENTIONS:  -  Assess patient's ability to carry out ADLs; assess patient's baseline for ADL function and identify physical deficits which impact ability to perform ADLs (bathing, care of mouth/teeth, toileting, grooming, dressing, etc )  - Assess/evaluate cause of self-care deficits   - Assess range of motion  - Assess patient's mobility; develop plan if impaired  - Assess patient's need for assistive devices and provide as appropriate  - Encourage maximum independence but intervene and supervise when necessary  - Involve family in performance of ADLs  - Assess for home care needs following discharge   - Consider OT consult to assist with ADL evaluation and planning for discharge  - Provide patient education as appropriate  Outcome: Progressing  Goal: Maintains/Returns to pre admission functional level  Description: INTERVENTIONS:  - Perform BMAT or MOVE assessment daily    - Set and communicate daily mobility goal to care team and patient/family/caregiver     - Collaborate with rehabilitation services on mobility goals if consulted  - Out of bed for toileting  - Record patient progress and toleration of activity level   Outcome: Progressing     Problem: DISCHARGE PLANNING  Goal: Discharge to home or other facility with appropriate resources  Description: INTERVENTIONS:  - Identify barriers to discharge w/patient and caregiver  - Arrange for needed discharge resources and transportation as appropriate  - Identify discharge learning needs (meds, wound care, etc )  - Arrange for interpretive services to assist at discharge as needed  - Refer to Case Management Department for coordinating discharge planning if the patient needs post-hospital services based on physician/advanced practitioner order or complex needs related to functional status, cognitive ability, or social support system  Outcome: Progressing     Problem: Knowledge Deficit  Goal: Patient/family/caregiver demonstrates understanding of disease process, treatment plan, medications, and discharge instructions  Description: Complete learning assessment and assess knowledge base    Interventions:  - Provide teaching at level of understanding  - Provide teaching via preferred learning methods  Outcome: Progressing     Problem: Potential for Falls  Goal: Patient will remain free of falls  Description: INTERVENTIONS:  - Educate patient/family on patient safety including physical limitations  - Instruct patient to call for assistance with activity   - Consult OT/PT to assist with strengthening/mobility   - Keep Call bell within reach  - Keep bed low and locked with side rails adjusted as appropriate  - Keep care items and personal belongings within reach  - Initiate and maintain comfort rounds  - Make Fall Risk Sign visible to staff  - Apply yellow socks and bracelet for high fall risk patients  - Consider moving patient to room near nurses station  Outcome: Progressing

## 2022-09-14 NOTE — NURSING NOTE
Made aware by charge nurse at [address] that patient was being transported to [facility] with transport coming at [facility]  Went into the pt's room to get pt ready for transport and saw that pt's IV was leaking in right [facility] where pt was receiving his heparin drip  Multiple nurses including charge nurse attempted to get new IV with no success  Charge nurse called ICU to see if someone could come to do ultrasound guided IV  ICU nurse came up around 1950 to do ultrasound guided IV and was successful  ICU nurse placed 20G right above the right AC  Pt rehooked up to heparin drip on paramedic's pump and left facility with paramedics at 2015  Called in report to Nabil Bowman from P[address] at [address] [place] [address] At [place]

## 2022-09-14 NOTE — ASSESSMENT & PLAN NOTE
Lab Results   Component Value Date    HGBA1C 5 5 07/21/2022       Recent Labs     09/13/22  1555 09/13/22  2351 09/14/22  0753 09/14/22  1126   POCGLU 112 122 103 140       Blood Sugar Average: Last 72 hrs:  · (P) 843 [id number] diet   · Hold home metformin   · Glucose checks ac and insulin coverage as needed

## 2022-09-14 NOTE — PROGRESS NOTES
[address] [facility]  Progress Note Tawanda Curry 1945, 69 y o  male MRN: [id number]  Unit/Bed#: [facility] 928-01 Encounter: [id number]  Primary Care Provider: Tamika Hobson PA-C   Date and time admitted to hospital: 9/13/2022  9:20 PM    * Pulmonary mass  Assessment & Plan  · CTA also revealed RUL pulmonary mass measuring up to 6 2 cm as described, compatible with primary lung malignancy   Scattered additional solid nodules measuring up to 1 2 cm the RML, suspicious for metastatic disease  several large right hilar and mediastinal lymph nodes measuring up to 2 1 cm, also concerning for metastatic disease  · MRI brain: No MR evidence of acute ischemia  No enhancing lesions to suggest metastatic disease  · CT abdomen and pelvis: no evidence of malignancy  · S/p thoracentesis today with 400 mL clear cordell pleural fluid removed from right chest   · Pulmonology consultation appreciated for EBUS on Thursday    Other pulmonary embolism without acute cor pulmonale (HCC)  Assessment & Plan  · Likely multifactorial 2/2 COVID (reportedly had a few weeks ago) and malignancy   · CTA 09/10: revealed acute pulmonary emboli in the RLL pulmonary artery and several segmental and subsegmental branches, as well as several LLL subsegmental branches  RV/LV ratio 0 84, less than 0 9   GGO in RLL, likley pulmonary infarct  Additional findings as below     · Hemodynamics are stable, no hypoxia   · Received IV vanc/cefepime for GGO, procal normal  Monitor off antibiotics as likely infarct   · Started on IV heparin gtt, continue continue the heparin, eventual transition to PO     Anemia  Assessment & Plan  Hgb 10 6, decreased from baseline of 14 on 9/11  Hgb being monitored q12h   · Iron panel noted, started supplementation     Controlled type 2 diabetes mellitus without complication, without long-term current use of insulin [facility])  Assessment & Plan  Lab Results   Component Value Date    HGBA1C 5 5 07/21/2022 Recent Labs     22  1555 22  2351 22  0753 22  1126   POCGLU 112 122 103 140       Blood Sugar Average: Last 72 hrs:  · (P) 948 [id number] diet   · Hold home metformin   · Glucose checks ac and insulin coverage as needed    Benign essential HTN  Assessment & Plan  · BP slightly elevated   · Continue home amlodipine, lisinopril, metoprolol  · Monitor hemodynamics closely          VTE Pharmacologic Prophylaxis:   Moderate Risk (Score 3-4) - Pharmacological DVT Prophylaxis Ordered: heparin drip  Patient Centered Rounds: I performed bedside rounds with nursing staff today  Discussions with Specialists or Other Care Team Provider: RN    Education and Discussions with Family / Patient: Patient declined call to   Time Spent for Care: 30 minutes  More than 50% of total time spent on counseling and coordination of care as described above  Current Length of Stay: 1 day(s)  Current Patient Status: Inpatient   Certification Statement: The patient will continue to require additional inpatient hospital stay due to heparin gtt, EBUS tomorrow  Discharge Plan: Anticipate discharge in 48 hrs to home  Code Status: Level 1 - Full Code    Subjective:   Patient has no acute complaints today, feels well  Objective:     Vitals:   Temp (24hrs), Av 8 °F (36 6 °C), Min:97 7 °F (36 5 °C), Max:97 9 °F (36 6 °C)    Temp:  [97 7 °F (36 5 °C)-97 9 °F (36 6 °C)] 97 7 °F (36 5 °C)  HR:  [73-80] 80  Resp:  [14-18] 18  BP: (134-150)/(62-78) 147/76  SpO2:  [95 %-98 %] 97 %  Body mass index is 23 78 kg/m²  Input and Output Summary (last 24 hours):   No intake or output data in the 24 hours ending 22 1145    Physical Exam:   Physical Exam  Vitals reviewed  Constitutional:       General: He is not in acute distress  Appearance: He is not toxic-appearing  HENT:      Head: Normocephalic and atraumatic     Eyes:      Extraocular Movements: Extraocular movements intact  Cardiovascular:      Rate and Rhythm: Normal rate and regular rhythm  Abdominal:      General: Bowel sounds are normal  There is no distension  Palpations: Abdomen is soft  Tenderness: There is no abdominal tenderness  Musculoskeletal:         General: Normal range of motion  Cervical back: Normal range of motion  Right lower leg: Edema present  Left lower leg: Edema present  Neurological:      General: No focal deficit present  Mental Status: He is alert and oriented to person, place, and time  Psychiatric:         Mood and Affect: Mood normal          Behavior: Behavior normal          Thought Content: Thought content normal           Additional Data:     Labs:  Results from last 7 days   Lab Units 09/14/22  0436 09/11/22  2327 09/11/22  0517   WBC Thousand/uL 8 07  --  9 11   HEMOGLOBIN g/dL 10 6*   < > 11 1*   HEMATOCRIT % 31 5*   < > 32 2*   PLATELETS Thousands/uL 261  --  237   NEUTROS PCT %  --   --  73   LYMPHS PCT %  --   --  10*   MONOS PCT %  --   --  12   EOS PCT %  --   --  4    < > = values in this interval not displayed       Results from last 7 days   Lab Units 09/14/22  0436   SODIUM mmol/L 141   POTASSIUM mmol/L 3 6   CHLORIDE mmol/L 110*   CO2 mmol/L 26   BUN mg/dL 16   CREATININE mg/dL 0 85   ANION GAP mmol/L 5   CALCIUM mg/dL 8 3   ALBUMIN g/dL 2 1*   TOTAL BILIRUBIN mg/dL 0 60   ALK PHOS U/L 92   ALT U/L 48   AST U/L 33   GLUCOSE RANDOM mg/dL 118     Results from last 7 days   Lab Units 09/10/22  1630   INR  1 15     Results from last 7 days   Lab Units 09/14/22  1126 09/14/22  0753 09/13/22  2351 09/13/22  1555 09/13/22  1105 09/13/22  0725 09/12/22  2035 09/12/22  1713 09/12/22  1058 09/12/22  0744 09/12/22  0714 09/11/22  2045   POC GLUCOSE mg/dl 140 103 122 112 141* 113 149* 140 160* 104 108 104         Results from last 7 days   Lab Units 09/10/22  1630   LACTIC ACID mmol/L 0 9   PROCALCITONIN ng/ml 0 15       Lines/Drains:  Invasive Devices Report    Peripheral Intravenous Line  Duration           Peripheral IV 09/13/22 Distal;Right;Upper;Ventral (anterior) Arm <1 day                      Imaging: No pertinent imaging reviewed  Recent Cultures (last 7 days):   Results from last 7 days   Lab Units 09/13/22  1534 09/10/22  1631 09/10/22  1630   BLOOD CULTURE   --  No Growth at 72 hrs  No Growth at 72 hrs  GRAM STAIN RESULT  Rare Polys  No organisms seen  --   --        Last 24 Hours Medication List:   Current Facility-Administered Medications   Medication Dose Route Frequency Provider Last Rate    acetaminophen  650 mg Oral Q4H PRN Marybel Lozano MD      amLODIPine  10 mg Oral Daily Marybel Lozano MD      benzonatate  100 mg Oral TID PRN MD Demarco Shanks [START ON 9/15/2022] ferrous sulfate  325 mg Oral Daily With Breakfast Klarissa Jackson PA-C      finasteride  5 mg Oral Daily Marybel Lozano MD      heparin (porcine)  3-30 Units/kg/hr (Order-Specific) Intravenous Titrated Marybel Lozano MD 23 Units/kg/hr (09/14/22 0654)    heparin (porcine)  3,000 Units Intravenous Q1H PRN Marybel Lozano MD      heparin (porcine)  6,000 Units Intravenous Q1H PRN Marybel Lozano MD      insulin lispro  1-5 Units Subcutaneous TID AC Marybel Lozano MD      insulin lispro  1-5 Units Subcutaneous HS Marybel Lozano MD      lisinopril  10 mg Oral Daily Marybel Lozano MD      metoprolol succinate  100 mg Oral Daily Marybel Lozano MD      ondansetron  4 mg Intravenous Q6H PRN Marybel Lozano MD      pravastatin  20 mg Oral Daily With Mike Salmeron MD      senna  2 tablet Oral Daily PRN Marybel Lozano MD          Today, Patient Was Seen By: Krzysztof Diaz PA-C    **Please Note: This note may have been constructed using a voice recognition system  **

## 2022-09-15 ENCOUNTER — APPOINTMENT (OUTPATIENT)
Dept: RADIOLOGY | Facility: HOSPITAL | Age: 77
End: 2022-09-15
Payer: MEDICARE

## 2022-09-15 LAB
APTT PPP: 37 SECONDS (ref 23–37)
APTT PPP: 68 SECONDS (ref 23–37)
GLUCOSE SERPL-MCNC: 108 MG/DL (ref 65–140)
GLUCOSE SERPL-MCNC: 111 MG/DL (ref 65–140)
GLUCOSE SERPL-MCNC: 114 MG/DL (ref 65–140)
GLUCOSE SERPL-MCNC: 198 MG/DL (ref 65–140)

## 2022-09-15 PROCEDURE — 88341 IMHCHEM/IMCYTCHM EA ADD ANTB: CPT | Performed by: PATHOLOGY

## 2022-09-15 PROCEDURE — 07B53ZX EXCISION OF RIGHT AXILLARY LYMPHATIC, PERCUTANEOUS APPROACH, DIAGNOSTIC: ICD-10-PCS | Performed by: INTERNAL MEDICINE

## 2022-09-15 PROCEDURE — 82948 REAGENT STRIP/BLOOD GLUCOSE: CPT

## 2022-09-15 PROCEDURE — NC001 PR NO CHARGE: Performed by: PHYSICIAN ASSISTANT

## 2022-09-15 PROCEDURE — 76942 ECHO GUIDE FOR BIOPSY: CPT

## 2022-09-15 PROCEDURE — 38505 NEEDLE BIOPSY LYMPH NODES: CPT | Performed by: INTERNAL MEDICINE

## 2022-09-15 PROCEDURE — 38505 NEEDLE BIOPSY LYMPH NODES: CPT

## 2022-09-15 PROCEDURE — 76942 ECHO GUIDE FOR BIOPSY: CPT | Performed by: INTERNAL MEDICINE

## 2022-09-15 PROCEDURE — 99232 SBSQ HOSP IP/OBS MODERATE 35: CPT | Performed by: PHYSICIAN ASSISTANT

## 2022-09-15 PROCEDURE — 85730 THROMBOPLASTIN TIME PARTIAL: CPT | Performed by: INTERNAL MEDICINE

## 2022-09-15 PROCEDURE — 88333 PATH CONSLTJ SURG CYTO XM 1: CPT | Performed by: PATHOLOGY

## 2022-09-15 PROCEDURE — 88342 IMHCHEM/IMCYTCHM 1ST ANTB: CPT | Performed by: PATHOLOGY

## 2022-09-15 PROCEDURE — 99232 SBSQ HOSP IP/OBS MODERATE 35: CPT | Performed by: INTERNAL MEDICINE

## 2022-09-15 PROCEDURE — 88305 TISSUE EXAM BY PATHOLOGIST: CPT | Performed by: PATHOLOGY

## 2022-09-15 RX ORDER — LIDOCAINE HYDROCHLORIDE 10 MG/ML
INJECTION, SOLUTION EPIDURAL; INFILTRATION; INTRACAUDAL; PERINEURAL CODE/TRAUMA/SEDATION MEDICATION
Status: COMPLETED | OUTPATIENT
Start: 2022-09-15 | End: 2022-09-15

## 2022-09-15 RX ADMIN — PRAVASTATIN SODIUM 20 MG: 20 TABLET ORAL at 17:09

## 2022-09-15 RX ADMIN — HEPARIN SODIUM 6000 UNITS: 1000 INJECTION INTRAVENOUS; SUBCUTANEOUS at 04:13

## 2022-09-15 RX ADMIN — INSULIN LISPRO 1 UNITS: 100 INJECTION, SOLUTION INTRAVENOUS; SUBCUTANEOUS at 17:09

## 2022-09-15 RX ADMIN — HEPARIN SODIUM 29 UNITS/KG/HR: 10000 INJECTION, SOLUTION INTRAVENOUS at 22:30

## 2022-09-15 RX ADMIN — LISINOPRIL 10 MG: 10 TABLET ORAL at 13:46

## 2022-09-15 RX ADMIN — FINASTERIDE 5 MG: 5 TABLET, FILM COATED ORAL at 13:46

## 2022-09-15 RX ADMIN — LIDOCAINE HYDROCHLORIDE 10 ML: 10 INJECTION, SOLUTION EPIDURAL; INFILTRATION; INTRACAUDAL; PERINEURAL at 12:58

## 2022-09-15 RX ADMIN — METOPROLOL SUCCINATE 100 MG: 100 TABLET, EXTENDED RELEASE ORAL at 13:45

## 2022-09-15 RX ADMIN — HEPARIN SODIUM 29 UNITS/KG/HR: 10000 INJECTION, SOLUTION INTRAVENOUS at 04:16

## 2022-09-15 RX ADMIN — AMLODIPINE BESYLATE 10 MG: 10 TABLET ORAL at 13:46

## 2022-09-15 NOTE — ASSESSMENT & PLAN NOTE
· CTA also revealed RUL pulmonary mass measuring up to 6 2 cm as described, compatible with primary lung malignancy   Scattered additional solid nodules measuring up to 1 2 cm the RML, suspicious for metastatic disease  several large right hilar and mediastinal lymph nodes measuring up to 2 1 cm, also concerning for metastatic disease  · MRI brain: No MR evidence of acute ischemia  No enhancing lesions to suggest metastatic disease  · CT abdomen and pelvis: no evidence of malignancy  · S/p thoracentesis today with 400 mL clear cordell pleural fluid removed from right chest   · Pulmonology consultation appreciated  Initial plan was for EBUS however has palpable supraclavicular nodes, so plan for lymph node bx with IR on 9/15    If tissue sample is insufficient will proceed with EBUS

## 2022-09-15 NOTE — PLAN OF CARE
Problem: PAIN - ADULT  Goal: Verbalizes/displays adequate comfort level or baseline comfort level  Description: Interventions:  - Encourage patient to monitor pain and request assistance  - Assess pain using appropriate pain scale  - Administer analgesics based on type and severity of pain and evaluate response  - Implement non-pharmacological measures as appropriate and evaluate response  - Consider cultural and social influences on pain and pain management  - Notify physician/advanced practitioner if interventions unsuccessful or patient reports new pain  Outcome: Progressing     Problem: INFECTION - ADULT  Goal: Absence or prevention of progression during hospitalization  Description: INTERVENTIONS:  - Assess and monitor for signs and symptoms of infection  - Monitor lab/diagnostic results  - Monitor all insertion sites, i e  indwelling lines, tubes, and drains  - Monitor endotracheal if appropriate and nasal secretions for changes in amount and color  - [employer] appropriate cooling/warming therapies per order  - Administer medications as ordered  - Instruct and encourage patient and family to use good hand hygiene technique  - Identify and instruct in appropriate isolation precautions for identified infection/condition  Outcome: Progressing  Goal: Absence of fever/infection during neutropenic period  Description: INTERVENTIONS:  - Monitor WBC    Outcome: Progressing     Problem: SAFETY ADULT  Goal: Patient will remain free of falls  Description: INTERVENTIONS:  - Educate patient/family on patient safety including physical limitations  - Instruct patient to call for assistance with activity   - Consult OT/PT to assist with strengthening/mobility   - Keep Call bell within reach  - Keep bed low and locked with side rails adjusted as appropriate  - Keep care items and personal belongings within reach  - Initiate and maintain comfort rounds  - Consider moving patient to room near nurses station  Outcome: Progressing  Goal: Maintain or return to baseline ADL function  Description: INTERVENTIONS:  -  Assess patient's ability to carry out ADLs; assess patient's baseline for ADL function and identify physical deficits which impact ability to perform ADLs (bathing, care of mouth/teeth, toileting, grooming, dressing, etc )  - Assess/evaluate cause of self-care deficits   - Assess range of motion  - Assess patient's mobility; develop plan if impaired  - Assess patient's need for assistive devices and provide as appropriate  - Encourage maximum independence but intervene and supervise when necessary  - Involve family in performance of ADLs  - Assess for home care needs following discharge   - Consider OT consult to assist with ADL evaluation and planning for discharge  - Provide patient education as appropriate  Outcome: Progressing  Goal: Maintains/Returns to pre admission functional level  Description: INTERVENTIONS:  - Perform BMAT or MOVE assessment daily    - Set and communicate daily mobility goal to care team and patient/family/caregiver     - Collaborate with rehabilitation services on mobility goals if consulted  - Ambulate patient 3 times a day  - Out of bed for toileting  - Record patient progress and toleration of activity level   Outcome: Progressing     Problem: DISCHARGE PLANNING  Goal: Discharge to home or other facility with appropriate resources  Description: INTERVENTIONS:  - Identify barriers to discharge w/patient and caregiver  - Arrange for needed discharge resources and transportation as appropriate  - Identify discharge learning needs (meds, wound care, etc )  - Arrange for interpretive services to assist at discharge as needed  - Refer to Case Management Department for coordinating discharge planning if the patient needs post-hospital services based on physician/advanced practitioner order or complex needs related to functional status, cognitive ability, or social support system  Outcome: Progressing     Problem: Knowledge Deficit  Goal: Patient/family/caregiver demonstrates understanding of disease process, treatment plan, medications, and discharge instructions  Description: Complete learning assessment and assess knowledge base    Interventions:  - Provide teaching at level of understanding  - Provide teaching via preferred learning methods  Outcome: Progressing     Problem: Potential for Falls  Goal: Patient will remain free of falls  Description: INTERVENTIONS:  - Educate patient/family on patient safety including physical limitations  - Instruct patient to call for assistance with activity   - Consult OT/PT to assist with strengthening/mobility   - Keep Call bell within reach  - Keep bed low and locked with side rails adjusted as appropriate  - Keep care items and personal belongings within reach  - Initiate and maintain comfort rounds  - Consider moving patient to room near nurses station  Outcome: Progressing

## 2022-09-15 NOTE — DISCHARGE INSTRUCTIONS
POST BIOPSY    Care after your procedure:    1  Limit your activities for 24 hours after your biopsy  2  No driving day of biopsy  3  Return to your normal diet  Small sips of flat soda will help with mild nausea  4  Remove band-aid or dressing 24 hours after procedure  Contact Interventional Radiology at [phone] Bubba PATIENTS: Contact Interventional Radiology at [phone]) Jeanette Panda PATIENTS: Contact Interventional Radiology at [phone]) if:    1  Difficulty breathing, nausea or vomiting  2  Chills or fever above 101 degrees F      3  Pain at biopsy site not relieved by medication  4  Develop any redness, swelling, heat, unusual drainage, heavy bruising or bleeding from biopsy site

## 2022-09-15 NOTE — ASSESSMENT & PLAN NOTE
· Likely multifactorial 2/2 COVID (reportedly had a few weeks ago) and malignancy   · CTA 09/10: revealed acute pulmonary emboli in the RLL pulmonary artery and several segmental and subsegmental branches, as well as several LLL subsegmental branches  RV/LV ratio 0 84, less than 0 9   GGO in RLL, likley pulmonary infarct  Additional findings as below     · Hemodynamics are stable, no hypoxia   · Received IV vanc/cefepime for GGO, procal normal  Monitor off antibiotics as likely infarct   · Echo: EF 61%, I2WC, RV systolic function normal, mild to moderate AR  · Started on IV heparin gtt, continue the heparin until there are no more procedures, eventual transition to PO (Xarelto sent to [facility] for price check)

## 2022-09-15 NOTE — ASSESSMENT & PLAN NOTE
Hgb 10 6, decreased from baseline of 14 on 9/11  Monitor CBC   · Iron panel noted, started supplementation

## 2022-09-15 NOTE — CASE MANAGEMENT
Case Management Discharge Planning Note    Patient name Merry Alvarez  Location [address] 303-82 MRN [id number]  : 1945 Date 9/15/2022       Current Admission Date: 2022  Current Admission Diagnosis:Pulmonary mass   Patient Active Problem List    Diagnosis Date Noted    Other pulmonary embolism without acute cor pulmonale ([facility] [address] ) 09/10/2022    Pulmonary mass 09/10/2022    Anemia 09/10/2022    Non-recurrent bilateral inguinal hernia without obstruction or gangrene 2021    Status post right tibial-talar ankle fusion 2020    BPH associated with nocturia 2019    Arthritis of right acromioclavicular joint 03/15/2019    Primary osteoarthritis of right shoulder 2019    Chronic left shoulder pain 2019    Left rotator cuff tear arthropathy 2019    Rotator cuff injury, right, initial encounter 2019    History of colon polyps 2019    Hyperparathyroidism ([facility] [address] ) 2018    Hypokalemia 10/22/2018    Hypocalcemia 10/22/2018    Glaucoma 10/11/2018    Controlled type 2 diabetes mellitus without complication, without long-term current use of insulin ([facility] [address] ) 2016    Inguinal hernia 2016    Benign essential HTN 2016    Hypercholesterolemia 2016      LOS (days): 2  Geometric Mean LOS (GMLOS) (days): 4 90  Days to GMLOS:3 3     OBJECTIVE:  Risk of Unplanned Readmission Score: 13 85         Current admission status: Inpatient   Preferred Pharmacy:   [address] [address] [address]  [facility]   [address] [place] [address]  Phone: [phone] [facility]ax: [facility] [address] [facility] [address] [address] [address] [facility] [address] [address]  Phone: [phone] Fax: [phone]    Primary Care Provider: Harden Schwab, PA-C    Primary Insurance: MEDICARE  Secondary Insurance: Devra Goldmann    DISCHARGE DETAILS:    Discharge planning discussed with[de-identified] wife  Freedom of Choice: Yes Contacts  Patient Contacts: Jen Betancourt  Relationship to Patient[de-identified] Family  Contact Method: Phone  Phone Number: [phone]  Reason/Outcome: Continuity of Care, Emergency Contact, Discharge Planning                   Would you like to participate in our [address] service program?  : Yes     Liang Ashton month free then $32 89 month

## 2022-09-15 NOTE — PROGRESS NOTES
Progress Note - Pulmonary   Iqra Drivers 69 y o  male MRN: [de-identified]  [facility] Encounter: [id number]    Assessment/Plan:    1  Pulmonary mass  History of exposed to secondhand smoke exposure  Presented with hemoptysis x3 weeks  Wife has covid 5 weeks ago and since then he has been coughing  CT on 8/12 at [facility] revealed 6 2 cm right upper lobe pulmonary mass, additional nodules measuring up to 1 2 cm in the right middle lobe, several large right hilar and mediastinal lymph nodes measuring 2 1 cm  CT head did not show any evidence of intracranial metastatic disease  Pleural effusions was obtained: cytology pending, fluid is exudative, possible malignancy  For staging purpose, MRI of brain no MR evidence of acute ischemia, no enhancing lesions to suggest metastatic disease  US of head/neck soft tissues on 9/14, showed Several abnormal lymph nodes, the largest measuring 2 5 x 1 1 x 1 6 cm, correspond to the area of palpable concern in the right supraclavicular and infraclavicular region  Tissue sampling is recommended  Diet   Will hold A/C until lymph node aspiration/biopsy today  NPO past midnight if biopsy results are inconclusive for EBUS in AM  If they have diagnosis today will not need biopsy         2  Moderate left Pleural effusion   POA, Patient has hemoptysis x3 weeks  Noted on CTA, has thoracentesis on 9/13/2022  Fluid LDH is 261/serum LDH is 198>1  Fluid protein is 3 4/serum Protein is 6 1> 55 looks exudative  WBC is 1,870, showed neutrophils 20/lymph 29/eosinophils 2/mesothelial cells 17/monocytes 23/histocytes 9  Glucose is 148   C/s pending   Cytology pending  Gram stain rare polys no organisms     3  Pulmonary embolism  Presented with hemoptysis, hx of covid and malignancy in smoker, no hypoxia, no fever and no tachycardia  CTA chest on 9/10, showed pulmonary emboli in the RLL pulmonary artery and several segmental and subsegmental branches and LLL subsegmental branches  Also has GGO RLL, likely pulmonary infarct  Does not look like Right heart strain, no ECHO in chart, would get one for baseline  Currently on IV heparin (tomorrow hold heparin 6 hours prior to EBUS)   Monitor PTT and adjust      4  Prostate cancer  S/p radiation treatment in 2005  Stable PSA     5  DM  6  HLD  7  HTN: persistently hypertensive, hydrate patient and consider restarting lisinopril                Chief Complaint:    Blank Ernandez is a 69 y o  male male nonsmoker, hypertension, diabetes, hyperlipidemia, prostate cancer radiation treatment in 2005, wife has Lloyd it is 5 weeks ago, presented to [facility] with cough for 3 weeks and hemoptysis  CTA lung obtain showed pulmonary emboli the subsegmental right and left lungs, Ground glass opacity RLL, Rt medium pleural effusion and 6 2 cm right upper lobe mass along with other small nodes concerning for malignancy  Patient has clavicular lymph nodes on the right and are palpable, confirmed by US  IR intervention to get biopsy today  Subjective:  Patient has no acute complaints at this time, denies sob, chest pain, nausea/vomiting, headache, he feels 'really good'       Objective:    Vitals: Blood pressure 142/72, pulse 67, temperature 97 9 °F (36 6 °C), temperature source Temporal, resp  rate 17, height 5' 9" (1 753 m), weight 73 6 kg (162 lb 4 1 oz), SpO2 93 %  ,Body mass index is 23 96 kg/m²  Intake/Output Summary (Last 24 hours) at 9/15/2022 0803  Last data filed at 9/15/2022 0410  Gross per 24 hour   Intake 699 79 ml   Output --   Net 699 79 ml       Invasive Devices  Report    Peripheral Intravenous Line  Duration           Peripheral IV 09/13/22 Distal;Right;Upper;Ventral (anterior) Arm 1 day                Physical Exam:      Physical Exam  Constitutional:       Appearance: He is well-developed  HENT:      Head: Normocephalic and atraumatic  Eyes:      Pupils: Pupils are equal, round, and reactive to light     Cardiovascular:      Rate and Rhythm: Normal rate and regular rhythm  Heart sounds: Normal heart sounds  Pulmonary:      Effort: Pulmonary effort is normal  No respiratory distress  Breath sounds: Normal breath sounds  No wheezing  Comments: egophony  Abdominal:      General: Bowel sounds are normal  There is no distension  Palpations: Abdomen is soft  There is no mass  Tenderness: There is no abdominal tenderness  Hernia: No hernia is present  Musculoskeletal:         General: No swelling, tenderness, deformity or signs of injury  Normal range of motion  Skin:     General: Skin is warm  Coloration: Skin is not jaundiced or pale  Findings: No erythema  Neurological:      Mental Status: He is alert and oriented to person, place, and time  Cranial Nerves: No cranial nerve deficit  Sensory: No sensory deficit  Motor: No weakness  Coordination: Coordination normal    Psychiatric:         Behavior: Behavior normal          Thought Content: Thought content normal          Judgment: Judgment normal          Labs:  Labs reviewed  Imaging and other studies: I have personally reviewed pertinent reports

## 2022-09-15 NOTE — ASSESSMENT & PLAN NOTE
Lab Results   Component Value Date    HGBA1C 5 5 07/21/2022       Recent Labs     09/14/22  1126 09/14/22  1743 09/14/22  2105 09/15/22  0743   POCGLU 140 117 147* 114       Blood Sugar Average: Last 72 hrs:  · (P) 123 [id number] diet   · Hold home metformin   · Glucose checks ac and insulin coverage as needed

## 2022-09-15 NOTE — BRIEF OP NOTE (RAD/CATH)
INTERVENTIONAL RADIOLOGY PROCEDURE NOTE    Date: 9/15/2022    Procedure: Ultrasound-guided supraclavicular lymph node biopsy    Preoperative diagnosis:   1  Other pulmonary embolism without acute cor pulmonale (HCC)    2  Pulmonary mass    3  Supraclavicular adenopathy         Postoperative diagnosis: Same  Surgeon: James Mauricio MD     Assistant: None  No qualified resident was available  Blood loss: 2 mL    Specimens: 4 core biopsy specimens of supraclavicular lymph node     Findings: Ultrasound-guided core biopsy of a right supraclavicular lymph node with 4 core biopsy specimens obtained  Complications: None immediate      Anesthesia: local

## 2022-09-15 NOTE — PROGRESS NOTES
[address] [facility]  Progress Note Parker Bellamy 1945, 69 y o  male MRN: [id number]  Unit/Bed#: [facility] 818-01 Encounter: [id number]  Primary Care Provider: Dominic Rey PA-C   Date and time admitted to hospital: 9/13/2022  9:20 PM    * Pulmonary mass  Assessment & Plan  · CTA also revealed RUL pulmonary mass measuring up to 6 2 cm as described, compatible with primary lung malignancy   Scattered additional solid nodules measuring up to 1 2 cm the RML, suspicious for metastatic disease  several large right hilar and mediastinal lymph nodes measuring up to 2 1 cm, also concerning for metastatic disease  · MRI brain: No MR evidence of acute ischemia  No enhancing lesions to suggest metastatic disease  · CT abdomen and pelvis: no evidence of malignancy  · S/p thoracentesis today with 400 mL clear cordell pleural fluid removed from right chest   · Pulmonology consultation appreciated  Initial plan was for EBUS however has palpable supraclavicular nodes, so plan for lymph node bx with IR on 9/15  If tissue sample is insufficient will proceed with EBUS     Other pulmonary embolism without acute cor pulmonale (HCC)  Assessment & Plan  · Likely multifactorial 2/2 COVID (reportedly had a few weeks ago) and malignancy   · CTA 09/10: revealed acute pulmonary emboli in the RLL pulmonary artery and several segmental and subsegmental branches, as well as several LLL subsegmental branches  RV/LV ratio 0 84, less than 0 9   GGO in RLL, likley pulmonary infarct  Additional findings as below     · Hemodynamics are stable, no hypoxia   · Received IV vanc/cefepime for GGO, procal normal  Monitor off antibiotics as likely infarct   · Echo: EF 84%, A8WK, RV systolic function normal, mild to moderate AR  · Started on IV heparin gtt, continue the heparin until there are no more procedures, eventual transition to PO (Xarelto sent to [facility] for price check)    Anemia  Assessment & Plan  Hgb 10 6, decreased from baseline of 14 on   Monitor CBC   · Iron panel noted, started supplementation     Controlled type 2 diabetes mellitus without complication, without long-term current use of insulin [facility])  Assessment & Plan  Lab Results   Component Value Date    HGBA1C 5 5 2022       Recent Labs     22  1126 22  1743 22  2105 09/15/22  0743   POCGLU 140 117 147* 114       Blood Sugar Average: Last 72 hrs:  · (P) 123 [id number] diet   · Hold home metformin   · Glucose checks ac and insulin coverage as needed    Benign essential HTN  Assessment & Plan  · BP slightly elevated   · Continue home amlodipine, lisinopril, metoprolol  · Monitor hemodynamics closely          VTE Pharmacologic Prophylaxis:   Moderate Risk (Score 3-4) - Pharmacological DVT Prophylaxis Ordered: heparin drip  held for bx today     Patient Centered Rounds: I performed bedside rounds with nursing staff today  Discussions with Specialists or Other Care Team Provider: pulmonology     Education and Discussions with Family / Patient: Updated  (wife) at bedside  Time Spent for Care: 20 minutes  More than 50% of total time spent on counseling and coordination of care as described above  Current Length of Stay: 2 day(s)  Current Patient Status: Inpatient   Certification Statement: The patient will continue to require additional inpatient hospital stay due to heparin gtt for PE needs eventual transition to PO, lymph node bx today  Discharge Plan: Anticipate discharge in 24-48 hrs to home  Code Status: Level 1 - Full Code    Subjective:   Patient has no acute complaints at this time, denies sob  Objective:     Vitals:   Temp (24hrs), Av 7 °F (36 5 °C), Min:97 3 °F (36 3 °C), Max:97 9 °F (36 6 °C)    Temp:  [97 3 °F (36 3 °C)-97 9 °F (36 6 °C)] 97 9 °F (36 6 °C)  HR:  [67] 67  Resp:  [17] 17  BP: (140-142)/(70-73) 142/72  SpO2:  [93 %-94 %] 93 %  Body mass index is 23 96 kg/m²       Input and Output Summary (last 24 hours): Intake/Output Summary (Last 24 hours) at 9/15/2022 1025  Last data filed at 9/15/2022 0410  Gross per 24 hour   Intake 699 79 ml   Output --   Net 699 79 ml       Physical Exam:   Physical Exam  Vitals reviewed  Constitutional:       General: He is not in acute distress  Appearance: He is not toxic-appearing  HENT:      Head: Normocephalic and atraumatic  Eyes:      Extraocular Movements: Extraocular movements intact  Cardiovascular:      Rate and Rhythm: Normal rate and regular rhythm  Pulmonary:      Effort: Pulmonary effort is normal  No respiratory distress  Breath sounds: Normal breath sounds  Musculoskeletal:         General: Normal range of motion  Cervical back: Normal range of motion  Neurological:      General: No focal deficit present  Mental Status: He is alert and oriented to person, place, and time  Psychiatric:         Mood and Affect: Mood normal          Behavior: Behavior normal          Thought Content: Thought content normal           Additional Data:     Labs:  Results from last 7 days   Lab Units 09/14/22  0436 09/11/22  2327 09/11/22  0517   WBC Thousand/uL 8 07  --  9 11   HEMOGLOBIN g/dL 10 6*   < > 11 1*   HEMATOCRIT % 31 5*   < > 32 2*   PLATELETS Thousands/uL 261  --  237   NEUTROS PCT %  --   --  73   LYMPHS PCT %  --   --  10*   MONOS PCT %  --   --  12   EOS PCT %  --   --  4    < > = values in this interval not displayed       Results from last 7 days   Lab Units 09/14/22  0436   SODIUM mmol/L 141   POTASSIUM mmol/L 3 6   CHLORIDE mmol/L 110*   CO2 mmol/L 26   BUN mg/dL 16   CREATININE mg/dL 0 85   ANION GAP mmol/L 5   CALCIUM mg/dL 8 3   ALBUMIN g/dL 2 1*   TOTAL BILIRUBIN mg/dL 0 60   ALK PHOS U/L 92   ALT U/L 48   AST U/L 33   GLUCOSE RANDOM mg/dL 118     Results from last 7 days   Lab Units 09/10/22  1630   INR  1 15     Results from last 7 days   Lab Units 09/15/22  0743 09/14/22  2105 09/14/22  1743 09/14/22  1126 09/14/22  0753 09/13/22  2351 09/13/22  1555 09/13/22  1105 09/13/22  0725 09/12/22  2035 09/12/22  1713 09/12/22  1058   POC GLUCOSE mg/dl 114 147* 117 140 103 122 112 141* 113 149* 140 160*         Results from last 7 days   Lab Units 09/10/22  1630   LACTIC ACID mmol/L 0 9   PROCALCITONIN ng/ml 0 15       Lines/Drains:  Invasive Devices  Report    Peripheral Intravenous Line  Duration           Peripheral IV 09/13/22 Distal;Right;Upper;Ventral (anterior) Arm 1 day                  Imaging: Reviewed radiology reports from this admission including: ECHO    Recent Cultures (last 7 days):   Results from last 7 days   Lab Units 09/13/22  1534 09/10/22  1631 09/10/22  1630   BLOOD CULTURE   --  No Growth After 4 Days  No Growth After 4 Days     GRAM STAIN RESULT  Rare Polys  No organisms seen  --   --    BODY FLUID CULTURE, STERILE  No growth  --   --        Last 24 Hours Medication List:   Current Facility-Administered Medications   Medication Dose Route Frequency Provider Last Rate    acetaminophen  650 mg Oral Q4H PRN Leslie Coffey MD      amLODIPine  10 mg Oral Daily Leslie Coffey MD      benzonatate  100 mg Oral TID PRN Leslie Coffey MD      ferrous sulfate  325 mg Oral Daily With Breakfast Klarissa Jackson PA-C      finasteride  5 mg Oral Daily Leslie Coffey MD      heparin (porcine)  3-30 Units/kg/hr (Order-Specific) Intravenous Titrated Leslie Coffey MD Stopped (09/15/22 1000)    heparin (porcine)  3,000 Units Intravenous Q1H PRN Lelsie Coffey MD      heparin (porcine)  6,000 Units Intravenous Q1H PRN Leslie Coffey MD      insulin lispro  1-5 Units Subcutaneous TID AC Leslie Coffey MD      insulin lispro  1-5 Units Subcutaneous HS Leslie Coffey MD      lisinopril  10 mg Oral Daily Leslie Coffey MD      metoprolol succinate  100 mg Oral Daily Leslie Coffey MD      ondansetron  4 mg Intravenous Q6H PRN Leslie Coffey MD      pravastatin  20 mg Oral Daily With [employer] Ela Whitfield MD      senna  2 tablet Oral Daily PRN Malry Paige MD          Today, Patient Was Seen By: Ramona Simons PA-C    **Please Note: This note may have been constructed using a voice recognition system  **

## 2022-09-15 NOTE — CASE MANAGEMENT
Case Management Assessment & Discharge Planning Note    Patient name Logan Hope  Location [address]/PPHP 476-47 MRN [id number]  : 1945 Date 9/15/2022       Current Admission Date: 2022  Current Admission Diagnosis:Pulmonary mass   Patient Active Problem List    Diagnosis Date Noted    Other pulmonary embolism without acute cor pulmonale ([facility] [address] ) 09/10/2022    Pulmonary mass 09/10/2022    Anemia 09/10/2022    Non-recurrent bilateral inguinal hernia without obstruction or gangrene 2021    Status post right tibial-talar ankle fusion 2020    BPH associated with nocturia 2019    Arthritis of right acromioclavicular joint 03/15/2019    Primary osteoarthritis of right shoulder 2019    Chronic left shoulder pain 2019    Left rotator cuff tear arthropathy 2019    Rotator cuff injury, right, initial encounter 2019    History of colon polyps 2019    Hyperparathyroidism ([facility] [address] ) 2018    Hypokalemia 10/22/2018    Hypocalcemia 10/22/2018    Glaucoma 10/11/2018    Controlled type 2 diabetes mellitus without complication, without long-term current use of insulin ([facility] [address] ) 2016    Inguinal hernia 2016    Benign essential HTN 2016    Hypercholesterolemia 2016      LOS (days): 2  Geometric Mean LOS (GMLOS) (days): 4 90  Days to GMLOS:3 4     OBJECTIVE:  PATIENT READMITTED TO HOSPITAL  Risk of Unplanned Readmission Score: 13 85         Current admission status: Inpatient       Preferred Pharmacy:   [address] [address] [address]  [facility]   [address] [facility]  Phone: [phone] Fax: [facility] [address], [place] - [address] [facility] [address] [address]  Phone: [phone] Fax: [phone]    Primary Care Provider: Lane Whitaker PA-C    Primary Insurance: MEDICARE  Secondary Insurance: [place]:  Brayan Zheng Proxies    There are no active Health Care Proxies on file  Advance Directives  Does patient have a [address]?: Yes  Does patient have Advance Directives?: Yes  Advance Directives: Living will  Primary Contact: wife Irma              Patient Information  Admitted from[de-identified] Facility  Mental Status: Alert  Support Systems: Spouse/significant other  [place] of Residence: [address] do you live in?: [address] entry access options  Select all that apply : No steps to enter home  Type of Current Residence: 2 story home  Upon entering residence, is there a bedroom on the main floor (no further steps)?: No  A bedroom is located on the following floor levels of residence (select all that apply):: 2nd Floor  Upon entering residence, is there a bathroom on the main floor (no further steps)?: No  Indicate which floors of current residence have a bathroom (select all the apply):: 2nd Floor  Number of steps to 2nd floor from main floor: One Flight  Living Arrangements: Lives w/ Spouse/significant other                        Means of Transportation  Means of Transport to [facility][de-identified] [facility] [address]:    Discharge planning discussed with[de-identified] wife  Freedom of Choice: Yes                   Contacts  Patient Contacts: Bharti Quiros  Relationship to Patient[de-identified] Family  Contact Method: Phone  Phone Number: [phone]  Reason/Outcome: Continuity of Care, Emergency Contact, Discharge Planning                   Would you like to participate in our \[facility] [place] [facility]\"" [facility] service program?  : Yes     Transfer outside hospital    CM reviewed d/c planning process including the following: identifying help at home, patient preference for d/c planning needs, Discharge [place] Meds to Bed program, availability of treatment team to discuss questions or concerns patient and/or family may have regarding understanding medications and recognizing signs and symptoms once discharged    CM also encouraged patient to follow up with all recommended appointments after discharge  Patient advised of importance for patient and family to participate in managing patients medical well being  Patient/caregiver received discharge checklist   Content reviewed  Patient/caregiver encouraged to participate in discharge plan of care prior to discharge home

## 2022-09-15 NOTE — TELEMEDICINE
e-Consult (IPC)  - Interventional Radiology  Philly Waters 69 y o  male MRN: [de-identified]  [facility] Encounter: [id number]          Interventional Radiology has been consulted to evaluate Philly Waters    We were consulted by pulmonology concerning this patient with right supraclavicular lymph node  IP Consult to IR  Consult performed by: Iam Herman PA-C  Consult ordered by: Ingrid Tan DO        09/15/22    Assessment/Recommendation:     69year old male with pmh of DM2, prostate ca, originally presenting to [facility] for PE and found RUL pulmonary mass  Patient originally scheduled for EBUS today but found to have right palpable supraclavicular nodes  Would like to avoid anesthesia due to recent hx of PE    - will plan for right supraclavicular lymph node biopsy pending IR scheduling (hopefully today)  - please hold heparin gtt 1 hour prior (approximately 10AM)  - patient does not need to be NPO as node is superficial and will use local     11-20 minutes, >50% of the total time devoted to medical consultative verbal/EMR discussion between providers  Written report will be generated in the EMR  Thank you for allowing Interventional Radiology to participate in the care of Philly Waters  Please don't hesitate to call or TigerText us with any questions       Iam Herman PA-C

## 2022-09-15 NOTE — ED PROVIDER NOTES
History  Chief Complaint   Patient presents with    Cough     Had covid in August about 5 weeks ago  Pt states that since then has been coughing up a lot of "junk" from his throat and recently has noticed blood in the sputum as well     60-year-old male presenting to the emergency department for evaluation of cough  Patient was diagnosed with COVID on August 5th, he improved slightly shortly after though in the past 3 weeks develop worsening cough, the cough is now productive of some blood-tinged sputum  Patient's wife discovered this today and encouraged him to come in for evaluation  He states he is mildly short of breath with exertion from time to time, but presently does not feel any shortness of breath this time  He does have some mild substernal chest tightness/pressure  He denies any fevers or chills  He states that the cough has been increasingly productive of the blood-tinged sputum particularly over the past several days to week  Prior to Admission Medications   Prescriptions Last Dose Informant Patient Reported? Taking? ACCU-CHEK FASTCLIX LANCETS MISC  Self No No   Sig: by Does not apply route daily E11 9  Check  fbs  daily   Accu-Chek Jackie Plus test strip   No No   Sig: CHECK BLOOD SUGAR DAILY E11 9   amLODIPine (NORVASC) 10 mg tablet   No No   Sig: TAKE 1 TABLET BY MOUTH DAILY FOR BLOOD PRESSURE     aspirin 81 mg chewable tablet  Self No No   Sig: Chew 1 tablet (81 mg total) 2 (two) times a day Take for DVT prophylaxis for 30 days postoperatively   brimonidine tartrate 0 2 % ophthalmic solution   Yes Yes   Sig: Administer 1 drop into the left eye 2 (two) times a day   cyclopentolate (CYCLOGYL) 1 % ophthalmic solution   Yes Yes   Sig: Administer 1 drop to both eyes once   finasteride (PROSCAR) 5 mg tablet   No No   Sig: Take 1 tablet (5 mg total) by mouth daily   lisinopril (ZESTRIL) 10 mg tablet   No No   Sig: TAKE 1 TABLET BY MOUTH EVERY DAY   metFORMIN (GLUCOPHAGE-XR) 500 mg 24 hr tablet   No No   Sig: TAKE 1 TABLET BY MOUTH EVERY DAY   metoprolol succinate (TOPROL-XL) 100 mg 24 hr tablet   No No   Sig: TAKE 1 TABLET BY MOUTH EVERY DAY   ofloxacin (OCUFLOX) 0 3 % ophthalmic solution   Yes Yes   Si drop 4 (four) times a day Start v2 days before surgery  Originally surgery scheduled 22   simvastatin (ZOCOR) 10 mg tablet   No No   Sig: TAKE 1 TABLET BY MOUTH EVERY DAY   timolol (TIMOPTIC-XE) 0 5 % ophthalmic gel-forming   Yes Yes   Si drop daily   travoprost (TRAVATAN-Z) 0 004 % ophthalmic solution   Yes Yes   Si drop daily at bedtime      Facility-Administered Medications: None       Past Medical History:   Diagnosis Date    Diabetes mellitus ([facility] [place] [address] )     Hyperlipidemia     Hypertension     Prostate cancer ([facility] [place] [address] )     S/P prostate ca-2005 had radiation tx  Past Surgical History:   Procedure Laterality Date    COLONOSCOPY      IR THORACENTESIS  2022    NH ARTHRODESIS,ANKLE,OPEN Right 7/10/2020    Procedure: ARTHRODESIS/ TIBIAL-TALAR ANKLE FUSION;  Surgeon: Padma Cheng MD;  [facility]: [facility];  Service: Orthopedics    NH Griselda Tine[facility] Bilateral 2021    Procedure: LAPAROSCOPIC REPAIR HERNIA INGUINAL WITH MESH;  Surgeon: Nina Murry MD;  Location: [facility];  Service: General       Family History   Problem Relation Age of Onset    Heart attack Mother      I have reviewed and agree with the history as documented  E-Cigarette/Vaping    E-Cigarette Use Never User      E-Cigarette/Vaping Substances     Social History     Tobacco Use    Smoking status: Never Smoker    Smokeless tobacco: Never Used   Vaping Use    Vaping Use: Never used   Substance Use Topics    Alcohol use: Yes     Comment: Occasional    Drug use: Never       Review of Systems   Constitutional: Negative for appetite change, chills, fatigue and fever  HENT: Negative for sneezing and sore throat  Eyes: Negative for visual disturbance     Respiratory: Positive for cough and shortness of breath  Negative for choking, chest tightness and wheezing  Cardiovascular: Negative for chest pain and palpitations  Gastrointestinal: Negative for abdominal pain, constipation, diarrhea, nausea and vomiting  Genitourinary: Negative for difficulty urinating and dysuria  Neurological: Negative for dizziness, weakness, light-headedness, numbness and headaches  All other systems reviewed and are negative  Physical Exam  Physical Exam  Vitals and nursing note reviewed  Constitutional:       General: He is not in acute distress  Appearance: He is well-developed  He is not diaphoretic  HENT:      Head: Normocephalic and atraumatic  Eyes:      Pupils: Pupils are equal, round, and reactive to light  Neck:      Vascular: No JVD  Trachea: No tracheal deviation  Cardiovascular:      Rate and Rhythm: Normal rate and regular rhythm  Heart sounds: Normal heart sounds  No murmur heard  No friction rub  No gallop  Pulmonary:      Effort: Pulmonary effort is normal  No respiratory distress  Breath sounds: Normal breath sounds  No wheezing or rales  Abdominal:      General: Bowel sounds are normal  There is no distension  Palpations: Abdomen is soft  Tenderness: There is no abdominal tenderness  There is no guarding or rebound  Skin:     General: Skin is warm and dry  Coloration: Skin is not pale  Neurological:      Mental Status: He is alert and oriented to person, place, and time  Cranial Nerves: No cranial nerve deficit  Motor: No abnormal muscle tone     Psychiatric:         Behavior: Behavior normal          Vital Signs  ED Triage Vitals   Temperature Pulse Respirations Blood Pressure SpO2   09/10/22 1528 09/10/22 1528 09/10/22 1528 09/10/22 1528 09/10/22 1528   99 6 °F (37 6 °C) 80 18 109/58 95 %      Temp Source Heart Rate Source Patient Position - Orthostatic VS BP Location FiO2 (%)   09/10/22 1528 09/10/22 1528 09/10/22 1528 09/10/22 1528 --   Oral Brachial Sitting Left arm       Pain Score       09/11/22 0028       No Pain           Vitals:    09/13/22 0608 09/13/22 1421 09/13/22 1513 09/13/22 1535   BP: 151/85 134/70 147/74 138/62   Pulse: 82 73 75 73   Patient Position - Orthostatic VS:             Visual Acuity  Visual Acuity    Flowsheet Row Most Recent Value   L Pupil Size (mm) 2   R Pupil Size (mm) 2   L Pupil Shape Round   R Pupil Shape Round          ED Medications  Medications   albuterol inhalation solution 5 mg (5 mg Nebulization Given 9/10/22 1640)   ipratropium (ATROVENT) 0 02 % inhalation solution 0 5 mg (0 5 mg Nebulization Given 9/10/22 1640)   iohexol (OMNIPAQUE) 350 MG/ML injection (MULTI-DOSE) 85 mL (85 mL Intravenous Given 9/10/22 1808)   vancomycin (VANCOCIN) 1,250 mg in sodium chloride 0 9 % 250 mL IVPB (1,250 mg Intravenous New Bag 9/10/22 1925)   cefepime (MAXIPIME) 2 g/50 mL dextrose IVPB (0 mg Intravenous Stopped 9/10/22 2141)   heparin (porcine) injection 6,000 Units (6,000 Units Intravenous Given 9/10/22 1918)   iohexol (OMNIPAQUE) 350 MG/ML injection (SINGLE-DOSE) 100 mL (100 mL Intravenous Given 9/12/22 1521)   Gadobutrol injection (SINGLE-DOSE) SOLN 7 mL (7 mL Intravenous Given 9/12/22 1831)   lidocaine (PF) (XYLOCAINE-MPF) 1 % injection (8 mL Infiltration Given 9/13/22 1526)       Diagnostic Studies  Results Reviewed     Procedure Component Value Units Date/Time    Blood culture #1 [[id number]] Collected: 09/10/22 1630    Lab Status: Preliminary result Specimen: Blood from Arm, Left Updated: 09/14/22 0003     Blood Culture No Growth at 72 hrs  Blood culture #2 [[id number]] Collected: 09/10/22 1631    Lab Status: Preliminary result Specimen: Blood from Arm, Left Updated: 09/14/22 0003     Blood Culture No Growth at 72 hrs      Hemoglobin and hematocrit, blood [[id number]]  (Abnormal) Collected: 09/12/22 0828    Lab Status: Final result Specimen: Blood from Hand, Right Updated: 09/12/22 0835     Hemoglobin 12 0 g/dL      Hematocrit 35 4 %     Hemoglobin and hematocrit, blood [[id number]]  (Abnormal) Collected: 09/11/22 2327    Lab Status: Final result Specimen: Blood from Arm, Left Updated: 09/11/22 2333     Hemoglobin 12 1 g/dL      Hematocrit 35 3 %     APTT [[id number]]  (Abnormal) Collected: 09/11/22 1721    Lab Status: Final result Specimen: Blood from Arm, Right Updated: 09/11/22 1742     PTT 63 seconds     Iron Saturation % [[id number]]  (Abnormal) Collected: 09/10/22 1630    Lab Status: Final result Specimen: Blood from Arm, Left Updated: 09/11/22 1327     Iron Saturation 23 %      TIBC 117 ug/dL      Iron 27 ug/dL     Ferritin [[id number]]  (Abnormal) Collected: 09/10/22 1630    Lab Status: Final result Specimen: Blood from Arm, Left Updated: 09/11/22 1327     Ferritin 579 ng/mL     Fingerstick Glucose (POCT) [[id number]]  (Normal) Collected: 09/11/22 1158    Lab Status: Final result Updated: 09/11/22 1209     POC Glucose 115 mg/dl     APTT [[id number]]  (Abnormal) Collected: 09/11/22 0935    Lab Status: Final result Specimen: Blood from Arm, Right Updated: 09/11/22 1013     PTT 53 seconds     Fingerstick Glucose (POCT) [[id number]]  (Normal) Collected: 09/11/22 0629    Lab Status: Final result Updated: 09/11/22 0630     POC Glucose 115 mg/dl     CBC and differential [[id number]]  (Abnormal) Collected: 09/11/22 0517    Lab Status: Final result Specimen: Blood from Arm, Left Updated: 09/11/22 0527     WBC 9 11 Thousand/uL      RBC 3 50 Million/uL      Hemoglobin 11 1 g/dL      Hematocrit 32 2 %      MCV 92 fL      MCH 31 7 pg      MCHC 34 5 g/dL      RDW 12 0 %      MPV 11 3 fL      Platelets 870 Thousands/uL      nRBC 0 /100 WBCs      Neutrophils Relative 73 %      Immat GRANS % 0 %      Lymphocytes Relative 10 %      Monocytes Relative 12 %      Eosinophils Relative 4 %      Basophils Relative 1 %      Neutrophils Absolute 6 72 Thousands/µL      Immature Grans Absolute 0 02 Thousand/uL Lymphocytes Absolute 0 87 Thousands/µL      Monocytes Absolute 1 06 Thousand/µL      Eosinophils Absolute 0 37 Thousand/µL      Basophils Absolute 0 07 Thousands/µL     APTT [[id number]]  (Abnormal) Collected: 09/11/22 0143    Lab Status: Final result Specimen: Blood from Arm, Left Updated: 09/11/22 0209     PTT 40 seconds     HS Troponin I 4hr [[id number]]  (Normal) Collected: 09/11/22 0039    Lab Status: Final result Specimen: Blood from Arm, Left Updated: 09/11/22 0115     hs TnI 4hr 8 ng/L      Delta 4hr hsTnI 3 ng/L     Hemoglobin and hematocrit, blood [[id number]]  (Abnormal) Collected: 09/11/22 0039    Lab Status: Final result Specimen: Blood from Arm, Left Updated: 09/11/22 0049     Hemoglobin 11 2 g/dL      Hematocrit 32 8 %     HS Troponin I 2hr [[id number]]  (Normal) Collected: 09/10/22 2142    Lab Status: Final result Specimen: Blood from Arm, Right Updated: 09/10/22 2218     hs TnI 2hr 5 ng/L      Delta 2hr hsTnI 0 ng/L     Fingerstick Glucose (POCT) [[id number]]  (Normal) Collected: 09/10/22 1948    Lab Status: Final result Updated: 09/10/22 1950     POC Glucose 109 mg/dl     HS Troponin 0hr (reflex protocol) [[id number]]  (Normal) Collected: 09/10/22 1906    Lab Status: Final result Specimen: Blood from Arm, Right Updated: 09/10/22 1941     hs TnI 0hr 5 ng/L     NT-BNP PRO [[id number]]  (Abnormal) Collected: 09/10/22 1906    Lab Status: Final result Specimen: Blood from Arm, Right Updated: 09/10/22 1940     NT-proBNP 2,496 pg/mL     D-dimer, quantitative [[id number]]  (Abnormal) Collected: 09/10/22 1630    Lab Status: Final result Specimen: Blood from Arm, Left Updated: 09/10/22 1746     D-Dimer, Quant 2 82 ug/ml FEU     Narrative: In the evaluation for possible pulmonary embolism, in the appropriate (Well's Score of 4 or less) patient, the age adjusted d-dimer cutoff for this patient can be calculated as:    Age x 0 01 (in ug/mL) for Age-adjusted D-dimer exclusion threshold for a patient over 50 years  Procalcitonin [[id number]]  (Normal) Collected: 09/10/22 1630    Lab Status: Final result Specimen: Blood from Arm, Left Updated: 09/10/22 1720     Procalcitonin 0 15 ng/ml     Lactic acid [[id number]]  (Normal) Collected: 09/10/22 1630    Lab Status: Final result Specimen: Blood from Arm, Left Updated: 09/10/22 1716     LACTIC ACID 0 9 mmol/L     Narrative:      Result may be elevated if tourniquet was used during collection      Comprehensive metabolic panel [[id number]]  (Abnormal) Collected: 09/10/22 1630    Lab Status: Final result Specimen: Blood from Arm, Left Updated: 09/10/22 1709     Sodium 143 mmol/L      Potassium 3 7 mmol/L      Chloride 106 mmol/L      CO2 28 mmol/L      ANION GAP 9 mmol/L      BUN 17 mg/dL      Creatinine 1 23 mg/dL      Glucose 116 mg/dL      Calcium 8 3 mg/dL      Corrected Calcium 9 7 mg/dL      AST 24 U/L      ALT 44 U/L      Alkaline Phosphatase 114 U/L      Total Protein 6 7 g/dL      Albumin 2 3 g/dL      Total Bilirubin 0 75 mg/dL      eGFR 56 ml/min/1 73sq m     Narrative:      [place] guidelines for Chronic Kidney Disease (CKD):     Stage 1 with normal or high GFR (GFR > 90 mL/min/1 73 square meters)    Stage 2 Mild CKD (GFR = 60-89 mL/min/1 73 square meters)    Stage 3A Moderate CKD (GFR = 45-59 mL/min/1 73 square meters)    Stage 3B Moderate CKD (GFR = 30-44 mL/min/1 73 square meters)    Stage 4 Severe CKD (GFR = 15-29 mL/min/1 73 square meters)    Stage 5 End Stage CKD (GFR <15 mL/min/1 73 square meters)  Note: GFR calculation is accurate only with a steady state creatinine    Protime-INR [[id number]]  (Normal) Collected: 09/10/22 1630    Lab Status: Final result Specimen: Blood from Arm, Left Updated: 09/10/22 1704     Protime 14 5 seconds      INR 1 15    APTT [[id number]]  (Normal) Collected: 09/10/22 1630    Lab Status: Final result Specimen: Blood from Arm, Left Updated: 09/10/22 1704     PTT 30 seconds     CBC and differential [[id number]]  (Abnormal) Collected: 09/10/22 1630    Lab Status: Final result Specimen: Blood from Arm, Left Updated: 09/10/22 1649     WBC 9 29 Thousand/uL      RBC 3 71 Million/uL      Hemoglobin 11 5 g/dL      Hematocrit 33 9 %      MCV 91 fL      MCH 31 0 pg      MCHC 33 9 g/dL      RDW 11 9 %      MPV 11 4 fL      Platelets 194 Thousands/uL      nRBC 0 /100 WBCs      Neutrophils Relative 76 %      Immat GRANS % 1 %      Lymphocytes Relative 9 %      Monocytes Relative 10 %      Eosinophils Relative 4 %      Basophils Relative 0 %      Neutrophils Absolute 7 07 Thousands/µL      Immature Grans Absolute 0 06 Thousand/uL      Lymphocytes Absolute 0 83 Thousands/µL      Monocytes Absolute 0 97 Thousand/µL      Eosinophils Absolute 0 33 Thousand/µL      Basophils Absolute 0 03 Thousands/µL                  IR IN-Patient Thoracentesis   Final Result by Graham Washington MD (09/13 1619)   Impression:   1  Successful ultrasound-guided thoracentesis yielding 400 mL clear cordell pleural fluid  Workstation performed: [id number]         MRI brain w wo contrast   Final Result by Nargis Angelo MD (09/12 2222)      No MR evidence of acute ischemia  No enhancing lesions to suggest metastatic disease  Workstation performed: [id number]         CT abdomen pelvis w contrast   Final Result by Ina Harris MD (09/13 1420)         1  No metastatic disease to the abdomen or pelvis  2   Moderate size right pleural effusion with small left effusion, right lower lobe compressive atelectasis versus infiltrate and right middle lobe nodule suspicious for metastasis  3   Nonspecific punctate densities in the pelvic bones as described  4   Mild wall thickening of the urinary bladder  Correlate with urinalysis to exclude a urinary tract infection                 Workstation performed: [id number]         CT head wo contrast   Final Result by Corine Flores DO (09/10 2223)      No acute intracranial abnormality is seen  No discrete evidence of intracranial metastatic disease  Other findings as above  Workstation performed: [id number]         CTA ED chest PE Study   Final Result by Callum Koenig MD (09/10 1900)      1  Acute pulmonary emboli in the right lower lobe pulmonary artery and several segmental and subsegmental branches, as well as several left lower lobe subsegmental branches  Measured RV/LV ratio is within normal limits at 0 84, less than 0 9         2   Ground glass opacification in the right lower lobe, likely pulmonary infarction  Moderate right and trace left pleural effusions  3   Right upper lobe pulmonary mass measuring up to 6 2 cm as described, compatible with primary lung malignancy  Scattered additional solid nodules measuring up to 1 2 cm the right middle lobe, suspicious for metastatic disease  4  Several large right hilar and mediastinal lymph nodes measuring up to 2 1 cm, also concerning for metastatic disease  I personally discussed this study with Dixie Starkey on 9/10/2022 at 6:58 PM                Workstation performed: [id number]         XR chest 1 view portable   Final Result by Mera Vargas MD (09/11 0050)         1   5 cm right upper lobe mass highly suspicious for neoplasm  2   Patchy opacity right lung base which may represent atelectasis or pneumonia  3   Right pleural effusion  Patient has had a follow-up CT chest since the time of this examination  Workstation performed: [id number]                    Procedures  Procedures         ED Course                               SBIRT 20yo+    Flowsheet Row Most Recent Value   SBIRT (23 yo +)    In order to provide better care to our patients, we are screening all of our patients for alcohol and drug use  Would it be okay to ask you these screening questions? Yes Filed at: 09/10/2022 4704   Initial Alcohol Screen: US AUDIT-C     1   How often do you have a drink containing alcohol? 0 Filed at: 09/10/2022 1613   2  How many drinks containing alcohol do you have on a typical day you are drinking? 0 Filed at: 09/10/2022 1613   3a  Male UNDER 65: How often do you have five or more drinks on one occasion? 0 Filed at: 09/10/2022 1613   3b  FEMALE Any Age, or MALE 65+: How often do you have 4 or more drinks on one occassion? 0 Filed at: 09/10/2022 1613   Audit-C Score 0 Filed at: 09/10/2022 1613   KEYSHA: How many times in the past year have you    Used an illegal drug or used a prescription medication for non-medical reasons? Never Filed at: 09/10/2022 1613                    [facility]  Number of Diagnoses or Management Options  [facility]  Diagnosis management comments: 76-year-old male with cough and hemoptysis, fairly normal exam here, I am concerned about pulmonary embolism given recent diagnosis of COVID, will check labs EKG chest x-ray troponin, D-dimer, reassess  D-dimer is elevated and chest x-ray has a suspicious right upper lung mass  I am concerned about potential malignancy and my concern for PE is further heightened  Will order CTA chest and reassess  CT demonstrates bilateral PE as well as further findings very suspicious for malignancy  I informed the patient and his wife of the CT findings, will admit for heparin as well as discussion of workup of possible malignancy  Will also give antibiotics given possibility postobstructive pneumonia        Disposition  Final diagnoses:   Pulmonary embolism ([facility] [address] )     Time reflects when diagnosis was documented in both MDM as applicable and the Disposition within this note     Time User Action Codes Description Comment    9/10/2022  7:39 PM Ron Ray Add [I26 99] Pulmonary embolism ([facility] [address] )     9/10/2022  8:21 PM Ricardo Mcclellan, [place] [address] [R91 [id number]] Pulmonary mass       ED Disposition     ED Disposition   Admit    Condition   Stable    Date/Time   Sat Sep 10, 2022  7:39 PM    Comment   Case was discussed with SLIM and the patient's admission status was agreed to be Admission Status: inpatient status to the service of Dr Katie Benton   Follow-up Information    None         Discharge Medication List as of 9/13/2022  8:48 PM      START taking these medications    Details   metFORMIN (GLUCOPHAGE-XR) 500 mg 24 hr tablet TAKE 1 TABLET BY MOUTH EVERY DAY, Normal         CONTINUE these medications which have CHANGED    Details   metoprolol succinate (TOPROL-XL) 100 mg 24 hr tablet TAKE 1 TABLET BY MOUTH EVERY DAY, Normal         CONTINUE these medications which have NOT CHANGED    Details   brimonidine tartrate 0 2 % ophthalmic solution Administer 1 drop into the left eye 2 (two) times a day, Historical Med      cyclopentolate (CYCLOGYL) 1 % ophthalmic solution Administer 1 drop to both eyes once, Historical Med      ofloxacin (OCUFLOX) 0 3 % ophthalmic solution 1 drop 4 (four) times a day Start v2 days before surgery   Originally surgery scheduled 9/14/22, Historical Med      timolol (TIMOPTIC-XE) 0 5 % ophthalmic gel-forming 1 drop daily, Historical Med      travoprost (TRAVATAN-Z) 0 004 % ophthalmic solution 1 drop daily at bedtime, Historical Med      Accu-Chek Jackie Plus test strip CHECK BLOOD SUGAR DAILY E11 9, Normal      ACCU-CHEK FASTCLIX LANCETS MISC by Does not apply route daily E11 9  Check  fbs  daily, Starting Thu 10/11/2018, Normal      amLODIPine (NORVASC) 10 mg tablet TAKE 1 TABLET BY MOUTH DAILY FOR BLOOD PRESSURE , Normal      aspirin 81 mg chewable tablet Chew 1 tablet (81 mg total) 2 (two) times a day Take for DVT prophylaxis for 30 days postoperatively, Starting Mon 7/13/2020, Until Thu 10/21/2021, Normal      finasteride (PROSCAR) 5 mg tablet Take 1 tablet (5 mg total) by mouth daily, Starting Thu 4/21/2022, Normal      lisinopril (ZESTRIL) 10 mg tablet TAKE 1 TABLET BY MOUTH EVERY DAY, Normal      simvastatin (ZOCOR) 10 mg tablet TAKE 1 TABLET BY MOUTH EVERY DAY, Normal             No discharge procedures on file      PDMP Review       Value Time User    PDMP Reviewed  Yes 12/16/2021  2:32 PM Pamela Lopez MD          ED Provider  Electronically Signed by           Ana Cristina Powell MD  09/14/22 0940

## 2022-09-16 LAB
ANION GAP SERPL CALCULATED.3IONS-SCNC: 4 MMOL/L (ref 4–13)
APTT PPP: 80 SECONDS (ref 23–37)
BACTERIA BLD CULT: NORMAL
BACTERIA BLD CULT: NORMAL
BACTERIA SPEC BFLD CULT: NO GROWTH
BUN SERPL-MCNC: 20 MG/DL (ref 5–25)
CALCIUM SERPL-MCNC: 8.3 MG/DL (ref 8.3–10.1)
CHLORIDE SERPL-SCNC: 109 MMOL/L (ref 96–108)
CO2 SERPL-SCNC: 26 MMOL/L (ref 21–32)
CREAT SERPL-MCNC: 1 MG/DL (ref 0.6–1.3)
ERYTHROCYTE [DISTWIDTH] IN BLOOD BY AUTOMATED COUNT: 12.3 % (ref 11.6–15.1)
GFR SERPL CREATININE-BSD FRML MDRD: 72 ML/MIN/1.73SQ M
GLUCOSE SERPL-MCNC: 100 MG/DL (ref 65–140)
GLUCOSE SERPL-MCNC: 102 MG/DL (ref 65–140)
GLUCOSE SERPL-MCNC: 137 MG/DL (ref 65–140)
GLUCOSE SERPL-MCNC: 145 MG/DL (ref 65–140)
GLUCOSE SERPL-MCNC: 155 MG/DL (ref 65–140)
GRAM STN SPEC: NORMAL
GRAM STN SPEC: NORMAL
HCT VFR BLD AUTO: 33.7 % (ref 36.5–49.3)
HGB BLD-MCNC: 11.2 G/DL (ref 12–17)
MCH RBC QN AUTO: 31 PG (ref 26.8–34.3)
MCHC RBC AUTO-ENTMCNC: 33.2 G/DL (ref 31.4–37.4)
MCV RBC AUTO: 93 FL (ref 82–98)
PLATELET # BLD AUTO: 258 THOUSANDS/UL (ref 149–390)
PMV BLD AUTO: 10.9 FL (ref 8.9–12.7)
POTASSIUM SERPL-SCNC: 3.9 MMOL/L (ref 3.5–5.3)
RBC # BLD AUTO: 3.61 MILLION/UL (ref 3.88–5.62)
SODIUM SERPL-SCNC: 139 MMOL/L (ref 135–147)
WBC # BLD AUTO: 9.59 THOUSAND/UL (ref 4.31–10.16)

## 2022-09-16 PROCEDURE — 88341 IMHCHEM/IMCYTCHM EA ADD ANTB: CPT | Performed by: PATHOLOGY

## 2022-09-16 PROCEDURE — 80048 BASIC METABOLIC PNL TOTAL CA: CPT | Performed by: INTERNAL MEDICINE

## 2022-09-16 PROCEDURE — 88112 CYTOPATH CELL ENHANCE TECH: CPT | Performed by: PATHOLOGY

## 2022-09-16 PROCEDURE — 82948 REAGENT STRIP/BLOOD GLUCOSE: CPT

## 2022-09-16 PROCEDURE — 85027 COMPLETE CBC AUTOMATED: CPT | Performed by: INTERNAL MEDICINE

## 2022-09-16 PROCEDURE — 85730 THROMBOPLASTIN TIME PARTIAL: CPT | Performed by: INTERNAL MEDICINE

## 2022-09-16 PROCEDURE — 99232 SBSQ HOSP IP/OBS MODERATE 35: CPT | Performed by: INTERNAL MEDICINE

## 2022-09-16 PROCEDURE — 88342 IMHCHEM/IMCYTCHM 1ST ANTB: CPT | Performed by: PATHOLOGY

## 2022-09-16 PROCEDURE — NC001 PR NO CHARGE: Performed by: INTERNAL MEDICINE

## 2022-09-16 PROCEDURE — 88305 TISSUE EXAM BY PATHOLOGIST: CPT | Performed by: PATHOLOGY

## 2022-09-16 RX ORDER — FERROUS SULFATE 325(65) MG
325 TABLET ORAL EVERY OTHER DAY
Status: DISCONTINUED | OUTPATIENT
Start: 2022-09-17 | End: 2022-09-17 | Stop reason: HOSPADM

## 2022-09-16 RX ADMIN — FERROUS SULFATE TAB 325 MG (65 MG ELEMENTAL FE) 325 MG: 325 (65 FE) TAB at 08:22

## 2022-09-16 RX ADMIN — APIXABAN 10 MG: 5 TABLET, FILM COATED ORAL at 17:04

## 2022-09-16 RX ADMIN — METOPROLOL SUCCINATE 100 MG: 100 TABLET, EXTENDED RELEASE ORAL at 08:22

## 2022-09-16 RX ADMIN — FINASTERIDE 5 MG: 5 TABLET, FILM COATED ORAL at 08:22

## 2022-09-16 RX ADMIN — PRAVASTATIN SODIUM 20 MG: 20 TABLET ORAL at 17:04

## 2022-09-16 RX ADMIN — LISINOPRIL 10 MG: 10 TABLET ORAL at 08:22

## 2022-09-16 RX ADMIN — AMLODIPINE BESYLATE 10 MG: 10 TABLET ORAL at 08:22

## 2022-09-16 RX ADMIN — INSULIN LISPRO 1 UNITS: 100 INJECTION, SOLUTION INTRAVENOUS; SUBCUTANEOUS at 17:04

## 2022-09-16 RX ADMIN — HEPARIN SODIUM 29 UNITS/KG/HR: 10000 INJECTION, SOLUTION INTRAVENOUS at 10:45

## 2022-09-16 NOTE — ASSESSMENT & PLAN NOTE
· CTA also revealed RUL pulmonary mass measuring up to 6 2 cm as described, compatible with primary lung malignancy   Scattered additional solid nodules measuring up to 1 2 cm the RML, suspicious for metastatic disease  several large right hilar and mediastinal lymph nodes measuring up to 2 1 cm, also concerning for metastatic disease  · MRI brain: No MR evidence of acute ischemia  No enhancing lesions to suggest metastatic disease    · CT abdomen and pelvis: no evidence of malignancy  · S/p thoracentesis today with 400 mL clear cordell pleural fluid removed from right chest   · Patient is status post lymph node biopsy pending results

## 2022-09-16 NOTE — PROGRESS NOTES
Progress Note - Pulmonary   Ellie Grimes 69 y o  male MRN: [de-identified]  Unit/Bed#: [facility] 818-01 Encounter: [id number]      Assessment & Recommendations:    1  Abnormal CT Chest - RUL mass lesion, mediastinal adenopathy, right pleural effusion - suspected bronchogenic carcinoma  · Supraclavicular LN prelim c/w malignancy  · Discussed diagnosis with him extensively  · Follow up final pathology report  · Will need outpatient CT/PET, PFTs, and medical oncology   · Will present at Thoracic Tumor Board       2  Right pleural effusion - exudative/lymphocytic    · Follow up rate of return    · PF cytology negative x 1         3  Acute bilateral segmental/subsegmental PE    · Currently on UFH gtt  · Will transition to DOAC or LMWH after discussion with medical oncology         4  Scant hemoptysis - resolved       5  Recent COVID-19 infection - symptoms > 5 weeks ago, no evidence of active infection, no COVID-19 directed therapies at this time      Pulmonary follow up as per d/c instructions, will sign off, please call with any further questions or concerns    Subjective:   Feels well, no hemoptysis, no chest pain, no pleurisy  Objective:     Vitals: Blood pressure 147/73, pulse 68, temperature 98 1 °F (36 7 °C), resp  rate 16, height 5' 9" (1 753 m), weight 73 6 kg (162 lb 4 1 oz), SpO2 96 % , RA, Body mass index is 23 96 kg/m²        Intake/Output Summary (Last 24 hours) at 9/16/2022 1227  Last data filed at 9/15/2022 2229  Gross per 24 hour   Intake 484 47 ml   Output --   Net 484 47 ml         Physical Exam  Gen: Awake, alert, oriented x 3, no acute distress  HEENT: Mucous membranes moist, no oral lesions, no thrush  NECK: No accessory muscle use, JVP not elevated, Right supraclavicular dressing in place w/o bleeding  Cardiac: Regular, single S1, single S2, no murmurs, no rubs, no gallops  Lungs: clear, no wheeze, no rales, on rhonchi  Abdomen: normoactive bowel sounds, soft nontender, nondistended, no rebound or rigidity, no guarding  Extremities: no cyanosis, no clubbing, no edema    Labs: I have personally reviewed pertinent lab results  Laboratory and Diagnostics  Results from last 7 days   Lab Units 09/16/22 0429 09/14/22 0436 09/13/22  0835 09/12/22 2016 09/12/22  0828 09/11/22  2327 09/11/22  0517 09/11/22  0039 09/10/22  1630   WBC Thousand/uL 9 59 8 07  --   --   --   --  9 11  --  9 29   HEMOGLOBIN g/dL 11 2* 10 6* 12 7 12 0 12 0 12 1 11 1*   < > 11 5*   HEMATOCRIT % 33 7* 31 5* 38 3 34 6* 35 4* 35 3* 32 2*   < > 33 9*   PLATELETS Thousands/uL 258 261  --   --   --   --  237  --  242   NEUTROS PCT %  --   --   --   --   --   --  73  --  76*   MONOS PCT %  --   --   --   --   --   --  12  --  10    < > = values in this interval not displayed  Results from last 7 days   Lab Units 09/16/22 0429 09/14/22 0436 09/13/22  0605 09/12/22  1154 09/10/22  1630   SODIUM mmol/L 139 141 142 142 143   POTASSIUM mmol/L 3 9 3 6 3 6 3 6 3 7   CHLORIDE mmol/L 109* 110* 108 107 106   CO2 mmol/L 26 26 26 27 28   ANION GAP mmol/L 4 5 8 8 9   BUN mg/dL 20 16 11 15 17   CREATININE mg/dL 1 00 0 85 0 92 0 82 1 23   CALCIUM mg/dL 8 3 8 3 8 2* 8 3 8 3   GLUCOSE RANDOM mg/dL 102 118 116 129 116   ALT U/L  --  48  --   --  44   AST U/L  --  33  --   --  24   ALK PHOS U/L  --  92  --   --  114   ALBUMIN g/dL  --  2 1*  --   --  2 3*   TOTAL BILIRUBIN mg/dL  --  0 60  --   --  0 75          Results from last 7 days   Lab Units 09/16/22  0233 09/15/22  2034 09/15/22  0300 09/14/22 2058 09/14/22  1317 09/14/22  0436 09/13/22  2226 09/11/22  0143 09/10/22  1630   INR   --   --   --   --   --   --   --   --  1 15   PTT seconds 80* 68* 37 85* 56* 127* 59*   < > 30    < > = values in this interval not displayed            Results from last 7 days   Lab Units 09/10/22  1630   LACTIC ACID mmol/L 0 9     Results from last 7 days   Lab Units 09/10/22  1630   FERRITIN ng/mL 579*     Results from last 7 days   Lab Units 09/14/22  0436   LD U/L 198 Results from last 7 days   Lab Units 09/10/22  1630   D-DIMER QUANTITATIVE ug/ml FEU 2 82*     Results from last 7 days   Lab Units 09/10/22  1630   PROCALCITONIN ng/ml 0 15       BNP 2496, Trop Neg         Microbiology:    9/14/2022 - COVID-19 (+)    FLU/RSV neg    Right PF 9/13 - no organisms, no pmn, , TP 3 4, WBC 1870 (29%L), cytology negative    Right SC LN FNA/Core 9/15 - prelim (+) for malignancy       Imaging and other studies: no new images  Neck US - enlarged right SC and infraclavicular LNs larges 2 5x1  1x1 6cm      CT angio 9/10 - acute bilateral R>L segmental and subsegmental PEs - RV/LV <0 9, noted large right [id number] RUL mass lesion with combined right hilar as well as 4R and Level 7 LAD, moderate right effusion, right basilar infiltrate suspicious for possible infarct or atelectasis         CT/MRI head w/o evidence of metastatic disease         CT Abd/Pelvis w/o evidence of metastatic disease        TTE 9/2022 - EF 60%, grade II diastolic dysfunction, RV ULN normal function       Oscar Brito DO, [facility]

## 2022-09-16 NOTE — PROGRESS NOTES
Progress Note - Pulmonary   Irven Nataly 69 y o  male MRN: [de-identified]  Unit/Bed#: [facility] 818-01 Encounter: [id number]    Assessment/Plan:     1  Pulmonary mass  History of exposed to secondhand smoke exposure  Presented on admission with hemoptysis x3 weeks  Wife has covid 5 weeks ago and since then he has been coughing  CT on 8/12 at [facility] revealed 6 2 cm right upper lobe pulmonary mass, additional nodules measuring up to 1 2 cm in the right middle lobe, several large right hilar and mediastinal lymph nodes measuring 2 1 cm  CT head did not show any evidence of intracranial metastatic disease  Pleural effusions was obtained: cytology is normal  For staging purpose, MRI of brain no MR evidence of acute ischemia, no enhancing lesions to suggest metastatic disease  US of head/neck soft tissues on 9/14, showed Several abnormal lymph nodes, the largest measuring 2 5 x 1 1 x 1 6 cm, correspond to the area of palpable concern in the right supraclavicular and infraclavicular region  S/p Lymph node biopsy and has features of cancer, will need to wait for pathology results          2  Moderate left Pleural effusion   POA, Patient has hemoptysis x3 weeks  Noted on CTA, has thoracentesis on 9/13/2022  Pleural fluid looks exudative  Cytology is negative      3  Pulmonary embolism  Presented with hemoptysis, hx of covid and malignancy in smoker, no hypoxia, no fever and no tachycardia  CTA chest on 9/10, showed pulmonary emboli in the RLL pulmonary artery and several segmental and subsegmental branches and LLL subsegmental branches  Also has GGO RLL, likely pulmonary infarct  Does not look like Right heart strain, no ECHO in chart, would get one for baseline  Currently on IV heparin, may change to oral at this point to [facility]  Please discuss with hematology and price check  Need oncology follow up and pulmonary outpatient follow up         4   Prostate cancer  S/p radiation treatment in 2005  Stable PSA     5          DM  6  HLD  7  HTN:per primary team     Will sign off at this point  Thank you for the consult       Chief Complaint:  69year old man with DM2, HTN, HLP, and prostate cancer, nonsmoker presented for dyspnea and hemoptysis for 4-5 weeks  Noted wife with COVID-19 and he had some mild symptoms but did not test (+)  He noted progressive dyspnea and cough and was evaluated at [facility], found to have b/l PEs with RUL mass and mediastinal adenopathy  Placed on UFH gtt, had IR thoracentesis for 400cc 9/13  IR was able to do infraclavicular lymph node biopsy, this is concerning for malignancy  He will need pathology  Subjective:    Feeling better, no chest pain, no sob, no cough  He has no bleeding at the site of lymph node biopsy, no swelling or tenderness  Objective:    Vitals: Blood pressure 147/73, pulse 68, temperature 98 1 °F (36 7 °C), resp  rate 16, height 5' 9" (1 753 m), weight 73 6 kg (162 lb 4 1 oz), SpO2 96 %  ,Body mass index is 23 96 kg/m²  Intake/Output Summary (Last 24 hours) at 9/16/2022 1319  Last data filed at 9/15/2022 2229  Gross per 24 hour   Intake 484 47 ml   Output --   Net 484 47 ml       Invasive Devices  Report    Peripheral Intravenous Line  Duration           Peripheral IV 09/13/22 Distal;Right;Upper;Ventral (anterior) Arm 2 days                Physical Exam:     Physical Exam  Constitutional:       Appearance: He is well-developed  HENT:      Head: Normocephalic and atraumatic  Eyes:      Pupils: Pupils are equal, round, and reactive to light  Cardiovascular:      Rate and Rhythm: Normal rate and regular rhythm  Heart sounds: Normal heart sounds  Pulmonary:      Effort: Pulmonary effort is normal       Breath sounds: Normal breath sounds  Abdominal:      General: Bowel sounds are normal       Palpations: Abdomen is soft  Musculoskeletal:         General: Normal range of motion  Skin:     General: Skin is warm  Findings: No erythema  Neurological:      Mental Status: He is alert and oriented to person, place, and time  Psychiatric:         Behavior: Behavior normal          Thought Content: Thought content normal          Judgment: Judgment normal          Labs:   CBC:   Lab Results   Component Value Date    WBC 9 59 09/16/2022    HGB 11 2 (L) 09/16/2022    HCT 33 7 (L) 09/16/2022    MCV 93 09/16/2022     09/16/2022    MCH 31 0 09/16/2022    MCHC 33 2 09/16/2022    RDW 12 3 09/16/2022    MPV 10 9 09/16/2022   , CMP:   Lab Results   Component Value Date    SODIUM 139 09/16/2022    K 3 9 09/16/2022     (H) 09/16/2022    CO2 26 09/16/2022    BUN 20 09/16/2022    CREATININE 1 00 09/16/2022    CALCIUM 8 3 09/16/2022    EGFR 72 09/16/2022            Imaging and other studies: I have personally reviewed pertinent reports

## 2022-09-16 NOTE — ASSESSMENT & PLAN NOTE
Lab Results   Component Value Date    HGBA1C 5 5 07/21/2022       Recent Labs     09/15/22  2105 09/16/22  0714 09/16/22  1052 09/16/22  1604   POCGLU 111 100 137 155*       Blood Sugar Average: Last 72 hrs:  · (P) 129 [id number] diet   · Hold home metformin   · Glucose checks ac and insulin coverage as needed

## 2022-09-16 NOTE — ASSESSMENT & PLAN NOTE
· Likely multifactorial 2/2 COVID (reportedly had a few weeks ago) and malignancy   · CTA 09/10: revealed acute pulmonary emboli in the RLL pulmonary artery and several segmental and subsegmental branches, as well as several LLL subsegmental branches  RV/LV ratio 0 84, less than 0 9   GGO in RLL, likley pulmonary infarct  Additional findings as below     · Hemodynamics are stable, no hypoxia   · Received IV vanc/cefepime for GGO, procal normal  Monitor off antibiotics as likely infarct   · Echo: EF 56%, C8BK, RV systolic function normal, mild to moderate AR  · Transition to Eliquis, Lovenox and doacs, even in the setting of malignancy

## 2022-09-16 NOTE — QUICK NOTE
Discussed patient's case with medical oncology  Okay to discharge on Xarelto and with outpatient med onc follow up  I will discuss his case on multidisciplinary thoracic tumor board either this Monday or the following Monday  PET CT and PFTs to be performed as an outpatient

## 2022-09-16 NOTE — PLAN OF CARE
Problem: PAIN - ADULT  Goal: Verbalizes/displays adequate comfort level or baseline comfort level  Description: Interventions:  - Encourage patient to monitor pain and request assistance  - Assess pain using appropriate pain scale  - Administer analgesics based on type and severity of pain and evaluate response  - Implement non-pharmacological measures as appropriate and evaluate response  - Consider cultural and social influences on pain and pain management  - Notify physician/advanced practitioner if interventions unsuccessful or patient reports new pain  Outcome: Progressing     Problem: INFECTION - ADULT  Goal: Absence or prevention of progression during hospitalization  Description: INTERVENTIONS:  - Assess and monitor for signs and symptoms of infection  - Monitor lab/diagnostic results  - Monitor all insertion sites, i e  indwelling lines, tubes, and drains  - Monitor endotracheal if appropriate and nasal secretions for changes in amount and color  - [place] appropriate cooling/warming therapies per order  - Administer medications as ordered  - Instruct and encourage patient and family to use good hand hygiene technique  - Identify and instruct in appropriate isolation precautions for identified infection/condition  Outcome: Progressing  Goal: Absence of fever/infection during neutropenic period  Description: INTERVENTIONS:  - Monitor WBC    Outcome: Progressing     Problem: SAFETY ADULT  Goal: Patient will remain free of falls  Description: INTERVENTIONS:  - Educate patient/family on patient safety including physical limitations  - Instruct patient to call for assistance with activity   - Consult OT/PT to assist with strengthening/mobility   - Keep Call bell within reach  - Keep bed low and locked with side rails adjusted as appropriate  - Keep care items and personal belongings within reach  - Initiate and maintain comfort rounds  - Consider moving patient to room near nurses station  Outcome: Progressing  Goal: Maintain or return to baseline ADL function  Description: INTERVENTIONS:  -  Assess patient's ability to carry out ADLs; assess patient's baseline for ADL function and identify physical deficits which impact ability to perform ADLs (bathing, care of mouth/teeth, toileting, grooming, dressing, etc )  - Assess/evaluate cause of self-care deficits   - Assess range of motion  - Assess patient's mobility; develop plan if impaired  - Assess patient's need for assistive devices and provide as appropriate  - Encourage maximum independence but intervene and supervise when necessary  - Involve family in performance of ADLs  - Assess for home care needs following discharge   - Consider OT consult to assist with ADL evaluation and planning for discharge  - Provide patient education as appropriate  Outcome: Progressing  Goal: Maintains/Returns to pre admission functional level  Description: INTERVENTIONS:  - Perform BMAT or MOVE assessment daily    - Set and communicate daily mobility goal to care team and patient/family/caregiver     - Collaborate with rehabilitation services on mobility goals if consulted  - Ambulate patient 3 times a day  - Out of bed for toileting  - Record patient progress and toleration of activity level   Outcome: Progressing     Problem: DISCHARGE PLANNING  Goal: Discharge to home or other facility with appropriate resources  Description: INTERVENTIONS:  - Identify barriers to discharge w/patient and caregiver  - Arrange for needed discharge resources and transportation as appropriate  - Identify discharge learning needs (meds, wound care, etc )  - Arrange for interpretive services to assist at discharge as needed  - Refer to Case Management Department for coordinating discharge planning if the patient needs post-hospital services based on physician/advanced practitioner order or complex needs related to functional status, cognitive ability, or social support system  Outcome: Progressing     Problem: Knowledge Deficit  Goal: Patient/family/caregiver demonstrates understanding of disease process, treatment plan, medications, and discharge instructions  Description: Complete learning assessment and assess knowledge base    Interventions:  - Provide teaching at level of understanding  - Provide teaching via preferred learning methods  Outcome: Progressing     Problem: Potential for Falls  Goal: Patient will remain free of falls  Description: INTERVENTIONS:  - Educate patient/family on patient safety including physical limitations  - Instruct patient to call for assistance with activity   - Consult OT/PT to assist with strengthening/mobility   - Keep Call bell within reach  - Keep bed low and locked with side rails adjusted as appropriate  - Keep care items and personal belongings within reach  - Initiate and maintain comfort rounds  - Consider moving patient to room near nurses station  Outcome: Progressing

## 2022-09-16 NOTE — PLAN OF CARE
Problem: PAIN - ADULT  Goal: Verbalizes/displays adequate comfort level or baseline comfort level  Description: Interventions:  - Encourage patient to monitor pain and request assistance  - Assess pain using appropriate pain scale  - Administer analgesics based on type and severity of pain and evaluate response  - Implement non-pharmacological measures as appropriate and evaluate response  - Consider cultural and social influences on pain and pain management  - Notify physician/advanced practitioner if interventions unsuccessful or patient reports new pain  Outcome: Progressing     Problem: INFECTION - ADULT  Goal: Absence or prevention of progression during hospitalization  Description: INTERVENTIONS:  - Assess and monitor for signs and symptoms of infection  - Monitor lab/diagnostic results  - Monitor all insertion sites, i e  indwelling lines, tubes, and drains  - Monitor endotracheal if appropriate and nasal secretions for changes in amount and color  - [place] appropriate cooling/warming therapies per order  - Administer medications as ordered  - Instruct and encourage patient and family to use good hand hygiene technique  - Identify and instruct in appropriate isolation precautions for identified infection/condition  Outcome: Progressing  Goal: Absence of fever/infection during neutropenic period  Description: INTERVENTIONS:  - Monitor WBC    Outcome: Progressing     Problem: SAFETY ADULT  Goal: Patient will remain free of falls  Description: INTERVENTIONS:  - Educate patient/family on patient safety including physical limitations  - Instruct patient to call for assistance with activity   - Consult OT/PT to assist with strengthening/mobility   - Keep Call bell within reach  - Keep bed low and locked with side rails adjusted as appropriate  - Keep care items and personal belongings within reach  - Initiate and maintain comfort rounds  - Consider moving patient to room near nurses station  Outcome: Progressing  Goal: Maintain or return to baseline ADL function  Description: INTERVENTIONS:  -  Assess patient's ability to carry out ADLs; assess patient's baseline for ADL function and identify physical deficits which impact ability to perform ADLs (bathing, care of mouth/teeth, toileting, grooming, dressing, etc )  - Assess/evaluate cause of self-care deficits   - Assess range of motion  - Assess patient's mobility; develop plan if impaired  - Assess patient's need for assistive devices and provide as appropriate  - Encourage maximum independence but intervene and supervise when necessary  - Involve family in performance of ADLs  - Assess for home care needs following discharge   - Consider OT consult to assist with ADL evaluation and planning for discharge  - Provide patient education as appropriate  Outcome: Progressing  Goal: Maintains/Returns to pre admission functional level  Description: INTERVENTIONS:  - Perform BMAT or MOVE assessment daily    - Set and communicate daily mobility goal to care team and patient/family/caregiver     - Collaborate with rehabilitation services on mobility goals if consulted  - Ambulate patient 3 times a day  - Out of bed for toileting  - Record patient progress and toleration of activity level   Outcome: Progressing     Problem: DISCHARGE PLANNING  Goal: Discharge to home or other facility with appropriate resources  Description: INTERVENTIONS:  - Identify barriers to discharge w/patient and caregiver  - Arrange for needed discharge resources and transportation as appropriate  - Identify discharge learning needs (meds, wound care, etc )  - Arrange for interpretive services to assist at discharge as needed  - Refer to Case Management Department for coordinating discharge planning if the patient needs post-hospital services based on physician/advanced practitioner order or complex needs related to functional status, cognitive ability, or social support system  Outcome: Progressing     Problem: Knowledge Deficit  Goal: Patient/family/caregiver demonstrates understanding of disease process, treatment plan, medications, and discharge instructions  Description: Complete learning assessment and assess knowledge base    Interventions:  - Provide teaching at level of understanding  - Provide teaching via preferred learning methods  Outcome: Progressing     Problem: Potential for Falls  Goal: Patient will remain free of falls  Description: INTERVENTIONS:  - Educate patient/family on patient safety including physical limitations  - Instruct patient to call for assistance with activity   - Consult OT/PT to assist with strengthening/mobility   - Keep Call bell within reach  - Keep bed low and locked with side rails adjusted as appropriate  - Keep care items and personal belongings within reach  - Initiate and maintain comfort rounds  - Consider moving patient to room near nurses station  Outcome: Progressing

## 2022-09-16 NOTE — PROGRESS NOTES
[facility] [facility]  Progress Note Eddye Close 1945, 69 y o  male MRN: [id number]  Unit/Bed#: [facility] 818-01 Encounter: [id number]  Primary Care Provider: Malik Greer PA-C   Date and time admitted to hospital: 9/13/2022  9:20 PM    Anemia  Assessment & Plan  Hgb 10 6, decreased from baseline of 14 on 9/11  Monitor CBC   · Iron panel noted, started supplementation     Other pulmonary embolism without acute cor pulmonale (HCC)  Assessment & Plan  · Likely multifactorial 2/2 COVID (reportedly had a few weeks ago) and malignancy   · CTA 09/10: revealed acute pulmonary emboli in the RLL pulmonary artery and several segmental and subsegmental branches, as well as several LLL subsegmental branches  RV/LV ratio 0 84, less than 0 9   GGO in RLL, likley pulmonary infarct  Additional findings as below     · Hemodynamics are stable, no hypoxia   · Received IV vanc/cefepime for GGO, procal normal  Monitor off antibiotics as likely infarct   · Echo: EF 20%, H4SY, RV systolic function normal, mild to moderate AR  · Transition to Eliquis, Lovenox and doacs, even in the setting of malignancy    Controlled type 2 diabetes mellitus without complication, without long-term current use of insulin [facility])  Assessment & Plan  Lab Results   Component Value Date    HGBA1C 5 5 07/21/2022       Recent Labs     09/15/22  2105 09/16/22  0714 09/16/22  1052 09/16/22  1604   POCGLU 111 100 137 155*       Blood Sugar Average: Last 72 hrs:  · (P) 129 [id number] diet   · Hold home metformin   · Glucose checks ac and insulin coverage as needed    Benign essential HTN  Assessment & Plan  · BP slightly elevated   · Continue home amlodipine, lisinopril, metoprolol  · Monitor hemodynamics closely      * Pulmonary mass  Assessment & Plan  · CTA also revealed RUL pulmonary mass measuring up to 6 2 cm as described, compatible with primary lung malignancy   Scattered additional solid nodules measuring up to 1 2 cm the RML, suspicious for metastatic disease  several large right hilar and mediastinal lymph nodes measuring up to 2 1 cm, also concerning for metastatic disease  · MRI brain: No MR evidence of acute ischemia  No enhancing lesions to suggest metastatic disease  · CT abdomen and pelvis: no evidence of malignancy  · S/p thoracentesis today with 400 mL clear cordell pleural fluid removed from right chest   · Patient is status post lymph node biopsy pending results           VTE Pharmacologic Prophylaxis:   High Risk (Score >/= 5) - Pharmacological DVT Prophylaxis Ordered: apixaban (Eliquis)  Sequential Compression Devices Ordered  Patient Centered Rounds: I performed bedside rounds with nursing staff today  Discussions with Specialists or Other Care Team Provider:     Education and Discussions with Family / Patient: Patient declined call to   Time Spent for Care: 30 minutes  More than 50% of total time spent on counseling and coordination of care as described above  Current Length of Stay: 3 day(s)  Current Patient Status: Inpatient   Certification Statement: The patient will continue to require additional inpatient hospital stay due to Pending discharge likely tomorrow  Discharge Plan: Anticipate discharge tomorrow to home with home services  Code Status: Level 1 - Full Code    Subjective:   Patient denies any acute complaints today    Objective:     Vitals:   Temp (24hrs), Av 1 °F (36 7 °C), Min:98 1 °F (36 7 °C), Max:98 1 °F (36 7 °C)    Temp:  [98 1 °F (36 7 °C)] 98 1 °F (36 7 °C)  HR:  [68-79] 73  Resp:  [16-18] 18  BP: (146-147)/(73-74) 146/74  SpO2:  [92 %-96 %] 94 %  Body mass index is 23 96 kg/m²  Input and Output Summary (last 24 hours): Intake/Output Summary (Last 24 hours) at 2022 1857  Last data filed at 2022 1843  Gross per 24 hour   Intake 484 47 ml   Output --   Net 484 47 ml       Physical Exam:   Physical Exam  Vitals and nursing note reviewed  Constitutional:       General: He is not in acute distress  Appearance: He is well-developed  He is not ill-appearing, toxic-appearing or diaphoretic  HENT:      Head: Normocephalic and atraumatic  Eyes:      General: No scleral icterus  Conjunctiva/sclera: Conjunctivae normal    Cardiovascular:      Rate and Rhythm: Normal rate and regular rhythm  Heart sounds: No murmur heard  No friction rub  No gallop  Pulmonary:      Effort: Pulmonary effort is normal  No respiratory distress  Breath sounds: Normal breath sounds  No stridor  No wheezing, rhonchi or rales  Chest:      Chest wall: No tenderness  Abdominal:      General: There is no distension  Palpations: Abdomen is soft  There is no mass  Tenderness: There is no abdominal tenderness  There is no guarding or rebound  Hernia: No hernia is present  Musculoskeletal:         General: No swelling, tenderness, deformity or signs of injury  Cervical back: Neck supple  Skin:     General: Skin is warm and dry  Neurological:      Mental Status: He is alert and oriented to person, place, and time  Additional Data:     Labs:  Results from last 7 days   Lab Units 09/16/22  0429 09/11/22  2327 09/11/22  0517   WBC Thousand/uL 9 59   < > 9 11   HEMOGLOBIN g/dL 11 2*   < > 11 1*   HEMATOCRIT % 33 7*   < > 32 2*   PLATELETS Thousands/uL 258   < > 237   NEUTROS PCT %  --   --  73   LYMPHS PCT %  --   --  10*   MONOS PCT %  --   --  12   EOS PCT %  --   --  4    < > = values in this interval not displayed       Results from last 7 days   Lab Units 09/16/22  0429 09/14/22  0436   SODIUM mmol/L 139 141   POTASSIUM mmol/L 3 9 3 6   CHLORIDE mmol/L 109* 110*   CO2 mmol/L 26 26   BUN mg/dL 20 16   CREATININE mg/dL 1 00 0 85   ANION GAP mmol/L 4 5   CALCIUM mg/dL 8 3 8 3   ALBUMIN g/dL  --  2 1*   TOTAL BILIRUBIN mg/dL  --  0 60   ALK PHOS U/L  --  92   ALT U/L  --  48   AST U/L  --  33   GLUCOSE RANDOM mg/dL 102 118 Results from last 7 days   Lab Units 09/10/22  1630   INR  1 15     Results from last 7 days   Lab Units 09/16/22  1604 09/16/22  1052 09/16/22  0714 09/15/22  2105 09/15/22  1537 09/15/22  1048 09/15/22  0743 09/14/22  2105 09/14/22  1743 09/14/22  1126 09/14/22  0753 09/13/22  2351   POC GLUCOSE mg/dl 155* 137 100 111 198* 108 114 147* 117 140 103 122         Results from last 7 days   Lab Units 09/10/22  1630   LACTIC ACID mmol/L 0 9   PROCALCITONIN ng/ml 0 15       Lines/Drains:  Invasive Devices  Report    Peripheral Intravenous Line  Duration           Peripheral IV 09/13/22 Distal;Right;Upper;Ventral (anterior) Arm 2 days                      Imaging: No pertinent imaging reviewed  Recent Cultures (last 7 days):   Results from last 7 days   Lab Units 09/13/22  1534 09/10/22  1631 09/10/22  1630   BLOOD CULTURE   --  No Growth After 5 Days  No Growth After 5 Days     GRAM STAIN RESULT  Rare Polys  No organisms seen  --   --    BODY FLUID CULTURE, STERILE  No growth  --   --        Last 24 Hours Medication List:   Current Facility-Administered Medications   Medication Dose Route Frequency Provider Last Rate    acetaminophen  650 mg Oral Q4H PRN Flash Roper MD      amLODIPine  10 mg Oral Daily Flash Roper MD      apixaban  10 mg Oral BID Pola Loera DO      benzonatate  100 mg Oral TID PRN Flash Roper MD     [facility] [START ON 9/17/2022] ferrous sulfate  325 mg Oral Every Other Day Pola Loera DO      finasteride  5 mg Oral Daily Flash Roper MD      insulin lispro  1-5 Units Subcutaneous TID AC Flash Roper MD      insulin lispro  1-5 Units Subcutaneous HS Flash Roper MD      lisinopril  10 mg Oral Daily Flash Roper MD      metoprolol succinate  100 mg Oral Daily Flash Roper MD      ondansetron  4 mg Intravenous Q6H PRN Flash Roper MD      pravastatin  20 mg Oral Daily With Humphrey Olvera MD      senna  2 tablet Oral Daily PRN Flash Roper MD          Today, Patient Was Seen By: Juanita Eldridge DO    **Please Note: This note may have been constructed using a voice recognition system  **

## 2022-09-17 VITALS
TEMPERATURE: 97.9 F | BODY MASS INDEX: 22.48 KG/M2 | HEART RATE: 76 BPM | SYSTOLIC BLOOD PRESSURE: 146 MMHG | WEIGHT: 151.8 LBS | DIASTOLIC BLOOD PRESSURE: 73 MMHG | OXYGEN SATURATION: 92 % | RESPIRATION RATE: 19 BRPM | HEIGHT: 69 IN

## 2022-09-17 LAB
ANION GAP SERPL CALCULATED.3IONS-SCNC: 5 MMOL/L (ref 4–13)
BASOPHILS # BLD AUTO: 0.05 THOUSANDS/ΜL (ref 0–0.1)
BASOPHILS NFR BLD AUTO: 1 % (ref 0–1)
BUN SERPL-MCNC: 17 MG/DL (ref 5–25)
CALCIUM SERPL-MCNC: 8.6 MG/DL (ref 8.3–10.1)
CHLORIDE SERPL-SCNC: 110 MMOL/L (ref 96–108)
CO2 SERPL-SCNC: 24 MMOL/L (ref 21–32)
CREAT SERPL-MCNC: 1.06 MG/DL (ref 0.6–1.3)
EOSINOPHIL # BLD AUTO: 0.3 THOUSAND/ΜL (ref 0–0.61)
EOSINOPHIL NFR BLD AUTO: 4 % (ref 0–6)
ERYTHROCYTE [DISTWIDTH] IN BLOOD BY AUTOMATED COUNT: 12.5 % (ref 11.6–15.1)
GFR SERPL CREATININE-BSD FRML MDRD: 67 ML/MIN/1.73SQ M
GLUCOSE SERPL-MCNC: 103 MG/DL (ref 65–140)
GLUCOSE SERPL-MCNC: 116 MG/DL (ref 65–140)
GLUCOSE SERPL-MCNC: 150 MG/DL (ref 65–140)
HCT VFR BLD AUTO: 34.6 % (ref 36.5–49.3)
HGB BLD-MCNC: 11.4 G/DL (ref 12–17)
IMM GRANULOCYTES # BLD AUTO: 0.05 THOUSAND/UL (ref 0–0.2)
IMM GRANULOCYTES NFR BLD AUTO: 1 % (ref 0–2)
LYMPHOCYTES # BLD AUTO: 1.32 THOUSANDS/ΜL (ref 0.6–4.47)
LYMPHOCYTES NFR BLD AUTO: 16 % (ref 14–44)
MCH RBC QN AUTO: 30.6 PG (ref 26.8–34.3)
MCHC RBC AUTO-ENTMCNC: 32.9 G/DL (ref 31.4–37.4)
MCV RBC AUTO: 93 FL (ref 82–98)
MONOCYTES # BLD AUTO: 0.71 THOUSAND/ΜL (ref 0.17–1.22)
MONOCYTES NFR BLD AUTO: 9 % (ref 4–12)
NEUTROPHILS # BLD AUTO: 5.67 THOUSANDS/ΜL (ref 1.85–7.62)
NEUTS SEG NFR BLD AUTO: 69 % (ref 43–75)
NRBC BLD AUTO-RTO: 0 /100 WBCS
PLATELET # BLD AUTO: 249 THOUSANDS/UL (ref 149–390)
PMV BLD AUTO: 11.6 FL (ref 8.9–12.7)
POTASSIUM SERPL-SCNC: 4 MMOL/L (ref 3.5–5.3)
RBC # BLD AUTO: 3.72 MILLION/UL (ref 3.88–5.62)
SODIUM SERPL-SCNC: 139 MMOL/L (ref 135–147)
WBC # BLD AUTO: 8.1 THOUSAND/UL (ref 4.31–10.16)

## 2022-09-17 PROCEDURE — 80048 BASIC METABOLIC PNL TOTAL CA: CPT | Performed by: INTERNAL MEDICINE

## 2022-09-17 PROCEDURE — 99239 HOSP IP/OBS DSCHRG MGMT >30: CPT | Performed by: INTERNAL MEDICINE

## 2022-09-17 PROCEDURE — 82948 REAGENT STRIP/BLOOD GLUCOSE: CPT

## 2022-09-17 PROCEDURE — 85025 COMPLETE CBC W/AUTO DIFF WBC: CPT | Performed by: INTERNAL MEDICINE

## 2022-09-17 RX ADMIN — METOPROLOL SUCCINATE 100 MG: 100 TABLET, EXTENDED RELEASE ORAL at 08:18

## 2022-09-17 RX ADMIN — FINASTERIDE 5 MG: 5 TABLET, FILM COATED ORAL at 08:18

## 2022-09-17 RX ADMIN — FERROUS SULFATE TAB 325 MG (65 MG ELEMENTAL FE) 325 MG: 325 (65 FE) TAB at 08:18

## 2022-09-17 RX ADMIN — APIXABAN 10 MG: 5 TABLET, FILM COATED ORAL at 08:18

## 2022-09-17 RX ADMIN — AMLODIPINE BESYLATE 10 MG: 10 TABLET ORAL at 08:19

## 2022-09-17 RX ADMIN — LISINOPRIL 10 MG: 10 TABLET ORAL at 08:18

## 2022-09-17 NOTE — ASSESSMENT & PLAN NOTE
· Likely multifactorial 2/2 COVID (reportedly had a few weeks ago) and malignancy   · CTA 09/10: revealed acute pulmonary emboli in the RLL pulmonary artery and several segmental and subsegmental branches, as well as several LLL subsegmental branches  RV/LV ratio 0 84, less than 0 9   GGO in RLL, likley pulmonary infarct  Additional findings as below     · Hemodynamics are stable, no hypoxia   · Received IV vanc/cefepime for GGO, procal normal  Monitor off antibiotics as likely infarct   · Echo: EF 34%, A1FI, RV systolic function normal, mild to moderate AR  · Transitioned to Xarelto   · Patient will be discharged home on Xarelto

## 2022-09-17 NOTE — ASSESSMENT & PLAN NOTE
Hgb 10 6, decreased from baseline of 14 on 9/11  Anemia panel consistent with anemia of chronic disease  Monitor CBC

## 2022-09-17 NOTE — DISCHARGE SUMMARY
[address] [facility]  Discharge- Jazmin Certain 1945, 69 y o  male MRN: [id number]  Unit/Bed#: [facility] 818-01 Encounter: [id number]  Primary Care Provider: Scott Almaguer PA-C   Date and time admitted to hospital: 9/13/2022  9:20 PM    * Pulmonary mass  Assessment & Plan  · CTA also revealed RUL pulmonary mass measuring up to 6 2 cm as described, compatible with primary lung malignancy   Scattered additional solid nodules measuring up to 1 2 cm the RML, suspicious for metastatic disease  several large right hilar and mediastinal lymph nodes measuring up to 2 1 cm, also concerning for metastatic disease  · MRI brain: No MR evidence of acute ischemia  No enhancing lesions to suggest metastatic disease  · CT abdomen and pelvis: no evidence of malignancy  · S/p thoracentesis today with 400 mL clear cordell pleural fluid removed from right chest   · Patient is status post lymph node biopsy pending results   · Outpatient pulmonology follow-up    Other pulmonary embolism without acute cor pulmonale (HCC)  Assessment & Plan  · Likely multifactorial 2/2 COVID (reportedly had a few weeks ago) and malignancy   · CTA 09/10: revealed acute pulmonary emboli in the RLL pulmonary artery and several segmental and subsegmental branches, as well as several LLL subsegmental branches  RV/LV ratio 0 84, less than 0 9   GGO in RLL, likley pulmonary infarct  Additional findings as below     · Hemodynamics are stable, no hypoxia   · Received IV vanc/cefepime for GGO, procal normal  Monitor off antibiotics as likely infarct   · Echo: EF 53%, J5GS, RV systolic function normal, mild to moderate AR  · Transitioned to Xarelto   · Patient will be discharged home on Xarelto    Anemia  Assessment & Plan  Hgb 10 6, decreased from baseline of 14 on 9/11  Anemia panel consistent with anemia of chronic disease  Monitor CBC     Controlled type 2 diabetes mellitus without complication, without long-term current use of insulin [facility])  Assessment & Plan  Lab Results   Component Value Date    HGBA1C 5 5 07/21/2022       Recent Labs     09/16/22  1604 09/16/22  2048 09/17/22  0739 09/17/22  1056   POCGLU 155* 145* 116 150*       Blood Sugar Average: Last 72 hrs:  · (P) 131  5diabetic diet   · Hold home metformin   · Glucose checks ac and insulin coverage as needed  · Resume metformin after discharge    Benign essential HTN  Assessment & Plan  · Acceptable BP  · Continue home amlodipine, lisinopril, metoprolol  · Monitor hemodynamics closely        Medical Problems             Resolved Problems  Date Reviewed: 9/17/2022   None               Discharging Physician / Practitioner: Zoila Abernathy DO  PCP: Chelsy Yadav PA-C  Admission Date:   Admission Orders (From admission, onward)     Ordered        09/13/22 2131  Inpatient Admission  Once                      Discharge Date: 09/17/22    Consultations During Hospital Stay:  · Pulmonology    Procedures Performed:   · Right supraclavicular lymph node biopsy by IR  · Right Thoracentesis with 400 mL of clear cordell pleural fluid removed    Significant Findings / Test Results:   · As above    Incidental Findings:   · As above    Test Results Pending at Discharge (will require follow up): · Lymph node biopsy     Outpatient Tests Requested:  · Outpatient pulmonology follow-up    Complications:  None    Reason for Admission:   Pulmonary mass    Hospital Course:       Olivia Brand is a 69 y o  male patient who originally presented to the hospital on 9/13/2022 due to pulmonary mass and acute PE  Patient ith a PMH of DM,prostate cancer, HTN who presents with shortness of breath  He was admitted to [facility] and found to have a pulmonary embolism and pulmonary mass of the RUL  He was admitted and started on IV heparin  Dick Simple He was transferred to [facility] for a bronchoscopy biopsy by pulmonology    Patient was evaluated by pulmonology, he underwent right supraclavicular lymph node biopsy by IR without complication    Currently patient is in stable condition, he was transitioned to oral Xarelto, he will be discharged home to follow-up with his PCP and with pulmonology as an outpatient      Please see above list of diagnoses and related plan for additional information  Condition at Discharge: stable    Discharge Day Visit / Exam:   Subjective:      Patient seen and examined  Comfortable in bed  No chest pain or  shortness of breath  On room air    Vitals: Blood Pressure: 146/73 (09/17/22 0741)  Pulse: 76 (09/17/22 0741)  Temperature: 97 9 °F (36 6 °C) (09/17/22 0741)  Temp Source: Oral (09/16/22 2148)  Respirations: 19 (09/17/22 0741)  Height: 5' 9" (175 3 cm) (09/14/22 1017)  Weight - Scale: 68 9 kg (151 lb 12 8 oz) (09/17/22 0600)  SpO2: 92 % (09/17/22 0741)  Exam:   Physical Exam   Patient is awake alert oriented no acute distress  Lung clear to auscultation bilateral  Heart positive S1 S2 no murmur  Abdomen soft nontender  Lower extremities no edema  Discussion with Family: Updated  (wife) via phone  Discharge instructions/Information to patient and family:   See after visit summary for information provided to patient and family  Provisions for Follow-Up Care:  See after visit summary for information related to follow-up care and any pertinent home health orders  Disposition:   Home    Planned Readmission: no     Discharge Statement:  I spent 45  minutes discharging the patient  This time was spent on the day of discharge  I had direct contact with the patient on the day of discharge  Greater than 50% of the total time was spent examining patient, answering all patient questions, arranging and discussing plan of care with patient as well as directly providing post-discharge instructions  Additional time then spent on discharge activities  Discharge Medications:  See after visit summary for reconciled discharge medications provided to patient and/or family        **Please Note: This note may have been constructed using a voice recognition system**

## 2022-09-17 NOTE — ASSESSMENT & PLAN NOTE
· CTA also revealed RUL pulmonary mass measuring up to 6 2 cm as described, compatible with primary lung malignancy   Scattered additional solid nodules measuring up to 1 2 cm the RML, suspicious for metastatic disease  several large right hilar and mediastinal lymph nodes measuring up to 2 1 cm, also concerning for metastatic disease  · MRI brain: No MR evidence of acute ischemia  No enhancing lesions to suggest metastatic disease    · CT abdomen and pelvis: no evidence of malignancy  · S/p thoracentesis today with 400 mL clear cordell pleural fluid removed from right chest   · Patient is status post lymph node biopsy pending results   · Outpatient pulmonology follow-up

## 2022-09-17 NOTE — ASSESSMENT & PLAN NOTE
Lab Results   Component Value Date    HGBA1C 5 5 07/21/2022       Recent Labs     09/16/22  1604 09/16/22 2048 09/17/22  0739 09/17/22  1056   POCGLU 155* 145* 116 150*       Blood Sugar Average: Last 72 hrs:  · (P) 131  5diabetic diet   · Hold home metformin   · Glucose checks ac and insulin coverage as needed  · Resume metformin after discharge

## 2022-09-19 PROCEDURE — 88305 TISSUE EXAM BY PATHOLOGIST: CPT | Performed by: PATHOLOGY

## 2022-09-19 PROCEDURE — 88342 IMHCHEM/IMCYTCHM 1ST ANTB: CPT | Performed by: PATHOLOGY

## 2022-09-19 PROCEDURE — 88333 PATH CONSLTJ SURG CYTO XM 1: CPT | Performed by: PATHOLOGY

## 2022-09-19 PROCEDURE — 88341 IMHCHEM/IMCYTCHM EA ADD ANTB: CPT | Performed by: PATHOLOGY

## 2022-09-20 ENCOUNTER — TRANSITIONAL CARE MANAGEMENT (OUTPATIENT)
Dept: FAMILY MEDICINE CLINIC | Facility: CLINIC | Age: 77
End: 2022-09-20

## 2022-09-21 ENCOUNTER — PATIENT OUTREACH (OUTPATIENT)
Dept: HEMATOLOGY ONCOLOGY | Facility: CLINIC | Age: 77
End: 2022-09-21

## 2022-09-21 ENCOUNTER — DOCUMENTATION (OUTPATIENT)
Dept: HEMATOLOGY ONCOLOGY | Facility: CLINIC | Age: 77
End: 2022-09-21

## 2022-09-21 NOTE — PROGRESS NOTES
Chart reviewed in preparation of Thoracic Tumor Conference presentation by Dr Krista Gaucher on 9/26/22  Patient with history of DM, prostate cancer and HTN presented to ED with shortness of breath  He was admitted to [facility] and found to have a PE and RUL pulmonary mass  He was transferred to B  He underwent bronchoscopy with biopsy on 9/13/22 with pathology negative for malignancy  Right supraclavicular LN biopsy on 9/15/22 was positive for metastatic adenocarcinoma of lung origin

## 2022-09-21 NOTE — PROGRESS NOTES
Called and left message on patient's home phone requesting call back  Patient's cell phone voice mail was full

## 2022-09-22 ENCOUNTER — PATIENT OUTREACH (OUTPATIENT)
Dept: HEMATOLOGY ONCOLOGY | Facility: CLINIC | Age: 77
End: 2022-09-22

## 2022-09-22 NOTE — PROGRESS NOTES
In-basket message received from Dr Moffett Senters to add pt to 9/26/22 tumor board  Chart Reviewed and tumor board prep completed

## 2022-09-24 DIAGNOSIS — E78.2 MIXED HYPERLIPIDEMIA: ICD-10-CM

## 2022-09-26 ENCOUNTER — DOCUMENTATION (OUTPATIENT)
Dept: HEMATOLOGY ONCOLOGY | Facility: CLINIC | Age: 77
End: 2022-09-26

## 2022-09-26 DIAGNOSIS — C34.11 MALIGNANT NEOPLASM OF UPPER LOBE OF RIGHT LUNG (HCC): Primary | ICD-10-CM

## 2022-09-26 RX ORDER — SIMVASTATIN 10 MG
TABLET ORAL
Qty: 90 TABLET | Refills: 1 | Status: SHIPPED | OUTPATIENT
Start: 2022-09-26

## 2022-09-26 NOTE — PROGRESS NOTES
THORACIC ONCOLOGY MULTIDISCIPLINARY CASE REVIEW    DATE:  2022    PRESENTING DOCTOR:  Dr Sourav Samuel    DIAGNOSIS:  NSCLC~adenocarcinoma  STAGING:  At least stage IIIB vs IV    Olivia Brand is a 69 y o  male who was presented at the Thoracic Oncology Multidisciplinary Tumor Conference today  He has a history of DM, prostate cancer and HTN  He presented to ED with shortness of breath  He was admitted to [facility] and found to have a PE and RUL pulmonary mass  He was transferred to [facility]  He underwent bronchoscopy with biopsy on 22 with pathology negative for malignancy  Right supraclavicular LN biopsy on 9/15/22 was positive for metastatic adenocarcinoma of lung origin  PHYSICIAN RECOMMENDED PLAN:  Send tissue for molecular testing  If PET/CT is negative for metastatic disease, repeat thoracentesis to complete staging  Imaging reviewed:   22 CT abdomen and pelvis-No metastatic disease to the abdomen or pelvis  9/10/22 CTA ed chest PE-Right upper lobe pulmonary mass measuring up to 6 2 cm compatible with primary lung malignancy  Scattered additional solid nodules measuring up to 1 2 cm the right middle lobe, suspicious for metastatic disease  Several large right hilar and mediastinal lymph nodes measuring up to 2 1 cm, also concerning for metastatic disease  Pathology reviewed:   9/15/22 Right supraclavicular LN biopsy: metastatic adenocarcinoma of lung origin     PFT's reviewed:  Scheduled for 10/5/22    Future imagin22 PET/CT    Referrals:  TBD    Procedures:  TBD      Team agreed to plan  NCCN guidelines were readily available for review at this discussion    The final treatment plan will be left to the discretion of the patient and the treating physician       DISCLAIMERS:  TO THE TREATING PHYSICIAN:  This conference is a meeting of clinicians from various specialty areas who evaluate and discuss patients for whom a multidisciplinary treatment approach is being considered  Please note that the above opinion was a consensus of the conference attendees and is intended only to assist in quality care of the discussed patient  The responsibility for follow up on the input given during the conference, along with any final decisions regarding plan of care, is that of the patient and the patient's provider  Accordingly, appointments have only been recommended based on this information and have NOT been scheduled unless otherwise noted  TO THE PATIENT:  This summary is a brief record of major aspects of your cancer treatment  You may choose to share a copy with any of your doctors or nurses  However, this is not a detailed or comprehensive record of your care

## 2022-09-26 NOTE — QUICK NOTE
Final pathology report results dated 9/19/22 for the right supraclavicular lymph node biopsy consistent with Metastatic adenocarcinoma of lung origin    Outpatient pulmonology follow-up

## 2022-09-27 ENCOUNTER — PATIENT OUTREACH (OUTPATIENT)
Dept: HEMATOLOGY ONCOLOGY | Facility: CLINIC | Age: 77
End: 2022-09-27

## 2022-09-27 ENCOUNTER — OFFICE VISIT (OUTPATIENT)
Dept: FAMILY MEDICINE CLINIC | Facility: CLINIC | Age: 77
End: 2022-09-27
Payer: MEDICARE

## 2022-09-27 VITALS
HEIGHT: 69 IN | WEIGHT: 164.4 LBS | TEMPERATURE: 97.4 F | OXYGEN SATURATION: 98 % | DIASTOLIC BLOOD PRESSURE: 84 MMHG | HEART RATE: 72 BPM | BODY MASS INDEX: 24.35 KG/M2 | SYSTOLIC BLOOD PRESSURE: 136 MMHG

## 2022-09-27 DIAGNOSIS — I26.99 OTHER PULMONARY EMBOLISM WITHOUT ACUTE COR PULMONALE (HCC): ICD-10-CM

## 2022-09-27 DIAGNOSIS — R91.8 PULMONARY MASS: Primary | ICD-10-CM

## 2022-09-27 PROCEDURE — 99496 TRANSJ CARE MGMT HIGH F2F 7D: CPT | Performed by: PHYSICIAN ASSISTANT

## 2022-09-27 NOTE — PROGRESS NOTES
Intake received/ Chart reviewed: 9/27/22    Pathology completed: 9/15/22   Imaging completed: PET Scheduled 9/30/22 9/12/22 MRI brain                                     9/12/22 CT A/P                                     9/10/22 CT Head                                     9/10/22 CTA    All records needed are in patients chart  No records retrieval needed at this time

## 2022-09-27 NOTE — PROGRESS NOTES
Assessment/Plan:  TCM Call     Date and time call was made  9/20/2022 10:12 AM    Hospital care reviewed  Records reviewed    Patient was hospitialized at  [facility]    Date of Admission  09/13/22    Date of discharge  09/17/22    Diagnosis  Pulmonary mass    Disposition  Home    Were the patients medications reviewed and updated  Yes    Current Symptoms  None      TCM Call     Post hospital issues  None    Should patient be enrolled in anticoag monitoring? No    Scheduled for follow up? Yes    Not clinically warranted  Following with orthopedics    Did you obtain your prescribed medications  Yes    Do you need help managing your prescriptions or medications  No    Is transportation to your appointment needed  No    I have advised the patient to call PCP with any new or worsening symptoms  Katherine Mcmahan MA    Living Arrangements  Spouse or Significiant other    Have you fallen in the last 12 months  No    Interperter language line needed  No    Counseling  Patient    Counseling topics  Importance of RX compliance         Diagnoses and all orders for this visit:    Pulmonary mass    Other pulmonary embolism without acute cor pulmonale (HCC)  -     rivaroxaban (Xarelto) 20 mg tablet; Take 1 tablet (20 mg total) by mouth daily with breakfast          Subjective:      Patient ID: Kaila Llanos is a 69 y o  male  Patient presents in the office for transition of care management  Patient was hospitalized September 13 through September 17  He was diagnosed with recent COVID and acute pulmonary embolism  He was started on Xarelto  He was also found to have a lung mass  He had thoracentesis and biopsy  The biopsy of this mass came back negative  However patient had a biopsy of these right supraclavicular lymph node which did come back positive for adenocarcinoma  Patient is scheduled for a PET scan  He is also scheduled with Pulmonary follow-up    Patient is tolerating the Xarelto he has had no abnormal bleeding he has some easy bruising  The following portions of the patient's history were reviewed and updated as appropriate:   He   Patient Active Problem List    Diagnosis Date Noted    Other pulmonary embolism without acute cor pulmonale ([facility] ) 09/10/2022    Pulmonary mass 09/10/2022    Anemia 09/10/2022    Non-recurrent bilateral inguinal hernia without obstruction or gangrene 11/01/2021    Status post right tibial-talar ankle fusion 07/13/2020    BPH associated with nocturia 08/21/2019    Arthritis of right acromioclavicular joint 03/15/2019    Primary osteoarthritis of right shoulder 03/07/2019    Chronic left shoulder pain 03/07/2019    Left rotator cuff tear arthropathy 03/07/2019    Rotator cuff injury, right, initial encounter 03/07/2019    History of colon polyps 01/09/2019    Hyperparathyroidism ([facility] ) 11/06/2018    Hypokalemia 10/22/2018    Hypocalcemia 10/22/2018    Glaucoma 10/11/2018    Controlled type 2 diabetes mellitus without complication, without long-term current use of insulin [facility] ) 07/25/2016    Inguinal hernia 02/08/2016    Benign essential HTN 01/12/2016    Hypercholesterolemia 01/12/2016     Current Outpatient Medications   Medication Sig Dispense Refill    Accu-Chek Jackie Plus test strip CHECK BLOOD SUGAR DAILY E11 9 100 strip 1    ACCU-CHEK FASTCLIX LANCETS MISC by Does not apply route daily E11 9  Check  fbs  daily 100 each 3    amLODIPine (NORVASC) 10 mg tablet TAKE 1 TABLET BY MOUTH DAILY FOR BLOOD PRESSURE   90 tablet 1    brimonidine tartrate 0 2 % ophthalmic solution Administer 1 drop into the left eye 2 (two) times a day      cyclopentolate (CYCLOGYL) 1 % ophthalmic solution Administer 1 drop to both eyes once      finasteride (PROSCAR) 5 mg tablet Take 1 tablet (5 mg total) by mouth daily 90 tablet 1    lisinopril (ZESTRIL) 10 mg tablet TAKE 1 TABLET BY MOUTH EVERY DAY 90 tablet 1    metFORMIN (GLUCOPHAGE-XR) 500 mg 24 hr tablet TAKE 1 TABLET BY MOUTH EVERY DAY 90 tablet 1    metoprolol succinate (TOPROL-XL) 100 mg 24 hr tablet TAKE 1 TABLET BY MOUTH EVERY DAY 90 tablet 1    ofloxacin (OCUFLOX) 0 3 % ophthalmic solution 1 drop 4 (four) times a day Start v2 days before surgery  Originally surgery scheduled 9/14/22      rivaroxaban (Xarelto) 15 mg tablet Take 1 tablet (15 mg total) by mouth 2 (two) times a day for 21 days 42 tablet 0    [START ON 10/7/2022] rivaroxaban (Xarelto) 20 mg tablet Take 1 tablet (20 mg total) by mouth daily with breakfast 30 tablet 0    simvastatin (ZOCOR) 10 mg tablet TAKE 1 TABLET BY MOUTH EVERY DAY 90 tablet 1    timolol (TIMOPTIC-XE) 0 5 % ophthalmic gel-forming 1 drop daily      travoprost (TRAVATAN-Z) 0 004 % ophthalmic solution 1 drop daily at bedtime       No current facility-administered medications for this visit  He has No Known Allergies       Review of Systems   Constitutional: Negative for activity change, appetite change and fever  Respiratory: Negative for cough and shortness of breath  Cardiovascular: Negative for chest pain and leg swelling  Objective:        Physical Exam  Vitals and nursing note reviewed  Constitutional:       Appearance: Normal appearance  He is normal weight  He is not ill-appearing  HENT:      Head: Normocephalic and atraumatic  Comments: Bilateral  Hearing  aids     Left Ear: External ear normal    Eyes:      Conjunctiva/sclera: Conjunctivae normal    Neck:      Comments: Right  Supraclavicular  adenopathy  Cardiovascular:      Rate and Rhythm: Normal rate and regular rhythm  Heart sounds: Normal heart sounds  Pulmonary:      Effort: Pulmonary effort is normal       Breath sounds: Normal breath sounds  Skin:     General: Skin is warm and dry  Neurological:      General: No focal deficit present  Mental Status: He is alert and oriented to person, place, and time     Psychiatric:         Mood and Affect: Mood normal          Behavior: Behavior normal          Thought Content:  Thought content normal

## 2022-09-28 ENCOUNTER — PATIENT OUTREACH (OUTPATIENT)
Dept: HEMATOLOGY ONCOLOGY | Facility: CLINIC | Age: 77
End: 2022-09-28

## 2022-09-28 DIAGNOSIS — C34.91 ADENOCARCINOMA OF RIGHT LUNG (HCC): Primary | ICD-10-CM

## 2022-09-28 NOTE — PROGRESS NOTES
Initial outreach  Called and spoke to patient  Introduced myself and explained the role of a Nurse Navigator  Reviewed upcoming appointments including date, time and location  Assessed for barriers of care  He denies pain  He stated his wife works but he is able to drive himself to his appointments if she is unavailable  Patient was having a difficult time hearing me on the phone  Will reach out via e-mail to provide my contact information

## 2022-09-29 ENCOUNTER — TELEPHONE (OUTPATIENT)
Dept: SURGICAL ONCOLOGY | Facility: CLINIC | Age: 77
End: 2022-09-29

## 2022-09-29 NOTE — TELEPHONE ENCOUNTER
09/29/22  Intake received  Medicare A/B   [employer] Senior Supplement   MED SUPP PLAN N  No outreach needed

## 2022-09-30 ENCOUNTER — TELEPHONE (OUTPATIENT)
Dept: PULMONOLOGY | Facility: CLINIC | Age: 77
End: 2022-09-30

## 2022-09-30 ENCOUNTER — HOSPITAL ENCOUNTER (OUTPATIENT)
Dept: RADIOLOGY | Age: 77
Discharge: HOME | End: 2022-09-30
Payer: MEDICARE

## 2022-09-30 DIAGNOSIS — C34.11 MALIGNANT NEOPLASM OF UPPER LOBE OF RIGHT LUNG (HCC): ICD-10-CM

## 2022-09-30 PROCEDURE — A9552 F18 FDG: HCPCS

## 2022-09-30 PROCEDURE — 78815 PET IMAGE W/CT SKULL-THIGH: CPT

## 2022-09-30 NOTE — TELEPHONE ENCOUNTER
Marissa Jay from [place] has called in re: Pt Lester Saucedo  Pt was recently seen In the hospital by us  Jassi Johnston is stating they have received a order for a test on Molecular Profile Testing in which they have David Silvestre as the requestor  Jassi Johnston states this would have to be requested by a doctor as she is unable to request this as a PA in their system  Jassi Johnston states this is part of [facility] reflex protocol and is requesting it be requested by a Dr  At our practice  Pt is scheduled for a hosp follow up on 10/07 at [facility] with Sharri [facility]      Please advise

## 2022-09-30 NOTE — TELEPHONE ENCOUNTER
I left Shilpi Neves a message regarding the specimen  I ordered the cytology in the hospital but not the biopsy - not sure which specimen this is for  I asked him to let us know how we could get the order to him from Dr Stanley Teague  As its special testing, I'm thinking it is not put in through [address] [place] [address] Rd

## 2022-10-04 NOTE — TELEPHONE ENCOUNTER
Sav Tucker, from  [place] called to speak w/ Lencho  He stated he has no meetings in the next day and a half and will keep his phone on him at all times to await her call    Please advise: [phone]

## 2022-10-05 ENCOUNTER — HOSPITAL ENCOUNTER (OUTPATIENT)
Dept: PULMONOLOGY | Facility: HOSPITAL | Age: 77
Discharge: HOME | End: 2022-10-05
Payer: MEDICARE

## 2022-10-05 DIAGNOSIS — C34.11 MALIGNANT NEOPLASM OF UPPER LOBE OF RIGHT LUNG (HCC): ICD-10-CM

## 2022-10-05 PROCEDURE — 94060 EVALUATION OF WHEEZING: CPT

## 2022-10-05 PROCEDURE — 94727 GAS DIL/WSHOT DETER LNG VOL: CPT

## 2022-10-05 PROCEDURE — 94729 DIFFUSING CAPACITY: CPT | Performed by: INTERNAL MEDICINE

## 2022-10-05 PROCEDURE — 94760 N-INVAS EAR/PLS OXIMETRY 1: CPT

## 2022-10-05 PROCEDURE — 94060 EVALUATION OF WHEEZING: CPT | Performed by: INTERNAL MEDICINE

## 2022-10-05 PROCEDURE — 94729 DIFFUSING CAPACITY: CPT

## 2022-10-05 PROCEDURE — 94727 GAS DIL/WSHOT DETER LNG VOL: CPT | Performed by: INTERNAL MEDICINE

## 2022-10-05 RX ORDER — ALBUTEROL SULFATE 2.5 MG/3ML
2.5 SOLUTION RESPIRATORY (INHALATION) ONCE
Status: COMPLETED | OUTPATIENT
Start: 2022-10-05 | End: 2022-10-05

## 2022-10-05 RX ADMIN — ALBUTEROL SULFATE 2.5 MG: 2.5 SOLUTION RESPIRATORY (INHALATION) at 12:48

## 2022-10-06 ENCOUNTER — CONSULT (OUTPATIENT)
Dept: HEMATOLOGY ONCOLOGY | Facility: CLINIC | Age: 77
End: 2022-10-06
Payer: MEDICARE

## 2022-10-06 ENCOUNTER — PATIENT OUTREACH (OUTPATIENT)
Dept: CASE MANAGEMENT | Facility: HOSPITAL | Age: 77
End: 2022-10-06

## 2022-10-06 VITALS
BODY MASS INDEX: 24.44 KG/M2 | DIASTOLIC BLOOD PRESSURE: 80 MMHG | OXYGEN SATURATION: 99 % | RESPIRATION RATE: 17 BRPM | HEIGHT: 69 IN | HEART RATE: 67 BPM | SYSTOLIC BLOOD PRESSURE: 128 MMHG | WEIGHT: 165 LBS

## 2022-10-06 DIAGNOSIS — Z86.39 HISTORY OF DIABETES MELLITUS: ICD-10-CM

## 2022-10-06 DIAGNOSIS — I27.82 OTHER CHRONIC PULMONARY EMBOLISM WITHOUT ACUTE COR PULMONALE (HCC): ICD-10-CM

## 2022-10-06 DIAGNOSIS — I10 BENIGN ESSENTIAL HTN: ICD-10-CM

## 2022-10-06 DIAGNOSIS — C77.8 MALIGNANT NEOPLASM METASTATIC TO LYMPH NODES OF MULTIPLE SITES (HCC): ICD-10-CM

## 2022-10-06 DIAGNOSIS — J90 PLEURAL EFFUSION: ICD-10-CM

## 2022-10-06 DIAGNOSIS — R91.8 MULTIPLE LUNG NODULES ON CT: Primary | ICD-10-CM

## 2022-10-06 DIAGNOSIS — C34.11 MALIGNANT NEOPLASM OF UPPER LOBE OF RIGHT LUNG (HCC): ICD-10-CM

## 2022-10-06 DIAGNOSIS — E78.00 HYPERCHOLESTEROLEMIA: ICD-10-CM

## 2022-10-06 PROCEDURE — 99204 OFFICE O/P NEW MOD 45 MIN: CPT | Performed by: INTERNAL MEDICINE

## 2022-10-06 NOTE — PATIENT INSTRUCTIONS
Chest x-ray in 1 week  Specimen to [facility] as soon as possible  Specimen number [id number], lymph node biopsy  Diagnosis lung cancer  Appointment with our group member at [facility] in 2 weeks

## 2022-10-06 NOTE — PROGRESS NOTES
MSW received referral and chart reviewed  MSW will initiate initial assessment and DT screening after patient's Hema/Onc appointment  MSW will continue to follow

## 2022-10-06 NOTE — PROGRESS NOTES
Consultation - Medical Oncology   Rola Holliday 69 y o  male MRN: [de-identified]  Unit/Bed#:  Encounter: [id number]  Referring physician:  Neo Holland DO  Date of service:  10/06/2022  Reason for Consult:  Lung cancer  HPI: Rola Holliday is a 69y o  year old male   Patient had COVID in August 2022  Recently in September 2022 he was hospitalized with bilateral pulmonary emboli and CT also showed a large mass in right upper lung and other lung nodules, hilar and mediastinal lymphadenopathy   He was found to have palpable lymph nodes in right lower neck/supraclavicular area   Workup also showed right pleural effusion and patient had right thoracentesis on 09/13/22 and that was exudate but was negative for malignancy  Patient had right supraclavicular lymph node biopsy on 09/15/22 and that showed metastatic adenocarcinoma of lung  Patient has some cough with exertional dyspnea  He has tiredness  No significant change in appetite and weight  No chest pain, palpitations and hemoptysis  Patient had PET scan and MRI brain  See all the results below  History of diabetes mellitus, hypertension and increased cholesterol  Patient had prostate cancer in 2009 and had radiation treatments  Nonsmoker  No family history of cancer      ROS:  10/06/22 Reviewed 12 systems: See symptoms in HPI  Presently no other neurological, cardiac, pulmonary, GI and  symptoms other than listed in HPI  Other symptoms are in HPI  No  fever, chills, bleeding, bone pains, skin rash, weight loss, night sweats, arthritic symptoms,  weakness, numbness, claudication and gait problem  No frequent infections  Not unusually sensitive to heat or cold  No swelling of the ankles  No swollen glands  Patient is anxious       Physical Exam:  Vitals:    10/06/22 1103   BP: 128/80   BP Location: Left arm   Patient Position: Sitting   Cuff Size: Adult   Pulse: 67   Resp: 17   SpO2: 99%   Weight: 74 8 kg (165 lb)   Height: 5' 9" (1 753 m)     Alert, oriented, not in distress, vitals are above, no icterus, no oral thrush, has palpable lymph nodes in right lower neck/supraclavicular area,  clear lung fields, regular heart rate, abdomen  soft and non tender, no palpable abdominal mass, no ascites, no edema of ankles, no calf tenderness, no focal neurological deficit, no skin rash, no palpable lymphadenopathy in axillary areas,  no clubbing  Patient is anxious  Performance status 1   Historical Information   Past Medical History:   Diagnosis Date    Diabetes mellitus ([facility] )     Hyperlipidemia     Hypertension     Prostate cancer ([facility] ) 2005    S/P prostate ca-2005 had radiation tx       Past Surgical History:   Procedure Laterality Date    COLONOSCOPY      IR BIOPSY LYMPH NODE  9/15/2022    IR THORACENTESIS  9/13/2022    WI ARTHRODESIS,ANKLE,OPEN Right 7/10/2020    Procedure: ARTHRODESIS/ TIBIAL-TALAR ANKLE FUSION;  Surgeon: Neeraj Osei MD;  Location: [facility];  Service: [address] Bilateral 12/16/2021    Procedure: LAPAROSCOPIC REPAIR HERNIA INGUINAL WITH MESH;  Surgeon: Mukesh Wray MD;  Location: [facility];  Service: General     Social History   Social History     Substance and Sexual Activity   Alcohol Use Yes    Comment: Occasional     Social History     Substance and Sexual Activity   Drug Use Never     Social History     Tobacco Use   Smoking Status Never Smoker   Smokeless Tobacco Never Used     Family History:   Family History   Problem Relation Age of Onset    Heart attack Mother          Current Outpatient Medications:     Accu-Chek Jackie Plus test strip, CHECK BLOOD SUGAR DAILY E11 9, Disp: 100 strip, Rfl: 1    ACCU-CHEK FASTCLIX LANCETS MISC, by Does not apply route daily E11 9  Check  fbs  daily, Disp: 100 each, Rfl: 3    amLODIPine (NORVASC) 10 mg tablet, TAKE 1 TABLET BY MOUTH DAILY FOR BLOOD PRESSURE , Disp: 90 tablet, Rfl: 1    brimonidine tartrate 0 2 % ophthalmic solution, Administer 1 drop into the left eye 2 (two) times a day, Disp: , Rfl:     cyclopentolate (CYCLOGYL) 1 % ophthalmic solution, Administer 1 drop to both eyes once, Disp: , Rfl:     finasteride (PROSCAR) 5 mg tablet, Take 1 tablet (5 mg total) by mouth daily, Disp: 90 tablet, Rfl: 1    lisinopril (ZESTRIL) 10 mg tablet, TAKE 1 TABLET BY MOUTH EVERY DAY, Disp: 90 tablet, Rfl: 1    metFORMIN (GLUCOPHAGE-XR) 500 mg 24 hr tablet, TAKE 1 TABLET BY MOUTH EVERY DAY, Disp: 90 tablet, Rfl: 1    metoprolol succinate (TOPROL-XL) 100 mg 24 hr tablet, TAKE 1 TABLET BY MOUTH EVERY DAY, Disp: 90 tablet, Rfl: 1    ofloxacin (OCUFLOX) 0 3 % ophthalmic solution, 1 drop 4 (four) times a day Start v2 days before surgery  Originally surgery scheduled 9/14/22, Disp: , Rfl:     rivaroxaban (Xarelto) 15 mg tablet, Take 1 tablet (15 mg total) by mouth 2 (two) times a day for 21 days, Disp: 42 tablet, Rfl: 0    [START ON 10/7/2022] rivaroxaban (Xarelto) 20 mg tablet, Take 1 tablet (20 mg total) by mouth daily with breakfast, Disp: 30 tablet, Rfl: 0    simvastatin (ZOCOR) 10 mg tablet, TAKE 1 TABLET BY MOUTH EVERY DAY, Disp: 90 tablet, Rfl: 1    timolol (TIMOPTIC-XE) 0 5 % ophthalmic gel-forming, 1 drop daily, Disp: , Rfl:     travoprost (TRAVATAN-Z) 0 004 % ophthalmic solution, 1 drop daily at bedtime, Disp: , Rfl:   No current facility-administered medications for this visit  No Known Allergies        Lab Results: I have reviewed all pertinent labs  LABS:  Results for orders placed or performed during the hospital encounter of 09/30/22   Fingerstick Glucose (POCT)   Result Value Ref Range    POC Glucose 115 65 - 140 mg/dl   Normal liver function tests except total protein 6 1 and albumin 2 1  Normal BMP  Hemoglobin 11 4  WBC 8100 and platelets 283,553  Normal differential   Protein in right pleural fluid 3 4 g       Imaging Studies: I have personally reviewed pertinent reports     OSSEOUS STRUCTURES:  No FDG avid lesions are seen   CT images: Stable  Spine degenerative change      IMPRESSION:  1  Large hypermetabolic right upper lung mass extending to the right hilum most compatible with malignancy  Additional small bilateral lung nodules favoring metastases  2   Mediastinal, right axillary and right greater than left cervical metastatic adenopathy  3   No hypermetabolic metastases visualized in the abdomen pelvis  4   Mild subcutaneous FDG activity just below the level of the right zygomatic arch, SUV 2 1, without discrete CT abnormality is nonspecific but may be inflammatory  This may be correlated clinically               Workstation performed: [id number]          Imaging    NM PET CT skull base to mid thigh (Order: [id number]) - 9/30/2022     EXTRACRANIAL SOFT TISSUES:  Normal      IMPRESSION:     No MR evidence of acute ischemia  No enhancing lesions to suggest metastatic disease      Workstation performed: [id number]          Imaging    MRI brain w wo contrast (Order: [id number]) - 9/12/2022    Pathology, and Other Studies: I have personally reviewed pertinent reports  Collected 9/15/2022  1:02 PM     Status: Final result     Visible to patient: No (inaccessible in [facility])     0 Result Notes    Component  Resulting Agency   Case Report   Surgical Pathology Report                         Case: [id number]                                    Authorizing Provider: Lalit Edmond DO           Collected:           09/15/2022 1302               Ordering Location:     [address]      Received:            09/15/2022 [address] [place] [address] 8                                                               Pathologist:           Ashely Moreno MD                                                           Specimen:    Lymph Node, right supraclavicular                                                          Final Diagnosis   A   Lymph node, right supraclavicular biopsy:  - Metastatic adenocarcinoma of lung origin  -  Immunohistochemical stains performed with appropriate controls show the tumor to be positive for TTF1 and napsin and negative for CK5/6 and p40, supporting the diagnosis       Electronically signed by Becky Albarran MD on 9/19/2022 at  9:10 AM   Additional Information  [facility]        Component    Case Report   Non-gynecologic Cytology                          Case: [id number]                                   Authorizing Provider: David Plascencia PA-C      Collected:           09/13/2022                    Ordering Location:     [facility] Received:            09/13/2022 1251                                      3rd Floor Med Surg Unit                                                       Pathologist:           Miriam Solorio MD                                                                  Specimens:   A) - Pleural, Right                                                                                  B) - Pleural, Right, CELL BLOCK                                                            Final Diagnosis   A  B  Pleural, Right,  (ThinPrep and cell block preparations): See Note  Negative for malignancy  Mesothelial cells, lymphocytes, histiocytes and neutrophils      Satisfactory for evaluation      Comment: Immunohistochemistry is performed on cell block to help in the assessment of this case  D2-40 highlights benign mesothelial cells and LINWOOD-EP4 and MOC-31 are negative, supporting the above diagnosis  Electronically signed by Miriam Solorio MD on 9/16/2022 at  8:48 AM   Note        Insufficient quantity of fluid was submitted to the cytology department  We require at least two 50ml tubes with CytoLyt to be submitted in order to insure we have an adequate representation of the fluid extracted  Please be aware that insufficient quantity may result in a false-negative result    Gross Description      Reviewed test results and discussed      Assessment and Plan:  See diagnoses, orders instructions below    Patient had COVID in August 2022  Recently in September 2022 he was hospitalized with bilateral pulmonary emboli and CT also showed a large mass in right upper lung and other lung nodules, hilar and mediastinal lymphadenopathy   He was found to have palpable lymph nodes in right lower neck/supraclavicular area   Workup also showed right pleural effusion and patient had right thoracentesis on 09/13/22 and that was exudate but was negative for malignancy  Patient had right supraclavicular lymph node biopsy on 09/15/22 and that showed metastatic adenocarcinoma of lung  Patient has some cough with exertional dyspnea  He has tiredness  No significant change in appetite and weight  No chest pain, palpitations and hemoptysis  Patient had PET scan and MRI brain  See all the results below  History of diabetes mellitus, hypertension and increased cholesterol  Patient had prostate cancer in 2009 and had radiation treatments  Nonsmoker  No family history of cancer      Physical examination and test results are as recorded and discussed  Most likely patient has stage IV adenocarcinoma of lung with metastatic disease to lung and lymph nodes including lymph nodes of lower neck and supraclavicular areas  I would recommend systemic therapy to start with  Specimen will be sent to [facility] for molecular testing and plan therapy  I explained all this to patient  Questions answered  Patient lives near [facility] and he will like to have follow-up and visit at [place] [address] and that will be with our group    Patient will have follow-up chest x-ray for pleural effusion  Discussed the importance of eating healthy foods and activities as tolerated  Patient appears to be capable of self-care  Goal is prolongation of survival and prevention of bleeding and blood clot on Xarelto  He is on Xarelto for bilateral pulmonary emboli  All discussed in detail  Questions answered  1  Malignant neoplasm of upper lobe of right lung [facility])    - Ambulatory Referral to Hematology / Oncology  - XR chest pa & lateral; Future    2  Multiple lung nodules on CT      3  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 ([facility] )      4  Pleural effusion    - XR chest pa & lateral; Future    5  Other chronic pulmonary embolism without acute cor pulmonale (HCC)      6  History of diabetes mellitus      7  Benign essential HTN      8  Hypercholesterolemia      Chest x-ray in 1 week  Specimen to [facility] as soon as possible  Specimen number [id number], lymph node biopsy  Diagnosis lung cancer  Appointment with our group member at [facility] in 2 weeks  Patient will continue to follow with  primary physician and other consultants  Patient voiced understanding and agrees      Disclaimer: This document was prepared using [employer] technology  If a word or phrase is confusing, or does not make sense, this is likely due to recognition error which was not discovered during the providers review  If you believe an error has occurred, please Contact me through [employer] service for kenya? cation  Counseling / Coordination of Care    Gerhardt Sep   Provided counseling and support

## 2022-10-07 ENCOUNTER — PATIENT OUTREACH (OUTPATIENT)
Dept: HEMATOLOGY ONCOLOGY | Facility: CLINIC | Age: 77
End: 2022-10-07

## 2022-10-07 ENCOUNTER — OFFICE VISIT (OUTPATIENT)
Dept: PULMONOLOGY | Facility: CLINIC | Age: 77
End: 2022-10-07
Payer: MEDICARE

## 2022-10-07 VITALS
WEIGHT: 163 LBS | HEIGHT: 69 IN | SYSTOLIC BLOOD PRESSURE: 139 MMHG | OXYGEN SATURATION: 98 % | HEART RATE: 76 BPM | BODY MASS INDEX: 24.14 KG/M2 | TEMPERATURE: 98.1 F | RESPIRATION RATE: 18 BRPM | DIASTOLIC BLOOD PRESSURE: 78 MMHG

## 2022-10-07 DIAGNOSIS — J90 PLEURAL EFFUSION: ICD-10-CM

## 2022-10-07 DIAGNOSIS — C34.11 MALIGNANT NEOPLASM OF UPPER LOBE OF RIGHT LUNG (HCC): Primary | ICD-10-CM

## 2022-10-07 DIAGNOSIS — I26.99 OTHER ACUTE PULMONARY EMBOLISM WITHOUT ACUTE COR PULMONALE (HCC): ICD-10-CM

## 2022-10-07 PROCEDURE — 99213 OFFICE O/P EST LOW 20 MIN: CPT | Performed by: PHYSICIAN ASSISTANT

## 2022-10-07 RX ORDER — PREDNISOLONE ACETATE 10 MG/ML
SUSPENSION/ DROPS OPHTHALMIC
COMMUNITY
Start: 2022-07-26

## 2022-10-07 RX ORDER — BROMFENAC SODIUM 0.7 MG/ML
SOLUTION/ DROPS OPHTHALMIC
COMMUNITY
Start: 2022-07-26

## 2022-10-07 RX ORDER — TIMOLOL MALEATE 5 MG/ML
SOLUTION/ DROPS OPHTHALMIC
COMMUNITY
Start: 2022-08-28

## 2022-10-07 NOTE — PROGRESS NOTES
Initial outreach  Called and spoke to patient  Introduced myself and explained the role of a Care Coordinator  Reviewed upcoming appointments including date, time and location  Assessed for barriers of care  He drives and is able to drive himself to appointments  Pt resides with wife and is a good source of support  Patient denies any pain or symptoms at this time  Stated he is eating and has a great appetite  We discussed the need of a CXR ordered post his consult per request by Dr Weaver Staff in one week  We discussed on where he could go and their is no appointment needed  He has no treatment plan at this time  His specimen was sent for futher testing and has a follow up with Dr Weaver Staff in two weeks to discuss further and possible treatment plan  Will follow up with him after his f/u  Provided my direct phone number for questions or concerns moving forward  Patient was appreciative

## 2022-10-08 NOTE — PROGRESS NOTES
Assessment/Plan:   Diagnoses and all orders for this visit:    Malignant neoplasm of upper lobe of right lung (HCC)    Other acute pulmonary embolism without acute cor pulmonale (HCC)    Pleural effusion    Other orders  -     Prolensa 0 07 % SOLN  -     prednisoLONE acetate (PRED FORTE) 1 % ophthalmic suspension  -     timolol (TIMOPTIC) 0 5 % ophthalmic solution; INSTILL 1 DROP LEFT EYE EVERY MORNING     Patient is here today for follow-up  He is overall feeling well, denies any significant shortness of breath or cough  Tolerating the Xarelto without difficulty  EBUS had initially been planned though he had a palpable supraclavicular lymph node which was biopsied which revealed metastatic adenocarcinoma of lung primary  Specimen has been sent to [facility] for testing any as a follow-up with Oncology in the next couple of weeks  His recent PET scan showed large hypermetabolic right upper lung mass extending to the right hilum, additional small bilateral lung nodules favoring metastases, mediastinal right axillary and right greater than left cervical metastatic adenopathy, pleural effusion with mild FDG activity likely a malignant effusion  He did have cytology sent from pleural fluid while in the hospital which was negative for malignancy  At this time oncology has not ordered a repeat thoracentesis  Recent PFT showed mild restriction  He will continue follow-up with oncology and begin treatment for his lung cancer  He can follow-up with us in about 4 months or sooner if necessary  Return in about 4 months (around 2/7/2023)  All questions are answered to the patient's satisfaction and understanding  He verbalizes understanding  He is encouraged to call with any further questions or concerns  Portions of the record may have been created with voice recognition software    Occasional wrong word or "sound a like" substitutions may have occurred due to the inherent limitations of voice recognition software  Read the chart carefully and recognize, using context, where substitutions have occurred  Electronically Signed by Viviana Peacock PA-C    ______________________________________________________________________    Chief Complaint:   Chief Complaint   Patient presents with    Follow-up       Patient ID: Colletta Morgans is a 69 y o  y o  male has a past medical history of Diabetes mellitus ([facility] [address] ), Hyperlipidemia, Hypertension, and Prostate cancer ([facility] [address] ) (2005)  10/7/2022  Patient presents today for follow-up visit  Patient is a 69 male nonsmoker with past medical history of prostate cancer in 2005, hypertension, diabetes, hyperlipidemia  He had a recent hospitalization for hemoptysis and shortness of breath, was found to have acute PE as well as right upper lobe pulmonary mass as well as mediastinal lymphadenopathy  He was transferred to [facility] and biopsy of supraclavicular node for tissue diagnosis                           He is a lifelong nonsmoker  His ex-wife did smoke heavily so he does have exposure to secondhand smoke  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  He denies any prior pulmonary history  He is here today for follow-up  He is doing well since his hospitalization, denies any significant shortness of breath or cough  He did see the oncologist yesterday and is awaiting testing before starting treatment for his lung cancer          Review of Systems   Constitutional: Negative  HENT: Negative  Respiratory: Negative  Cardiovascular: Negative  Gastrointestinal: Negative  Genitourinary: Negative  Musculoskeletal: Negative  Skin: Negative  Allergic/Immunologic: Negative  Neurological: Negative  Psychiatric/Behavioral: Negative  Smoking history: He reports that he has never smoked   He has never used smokeless tobacco     The following portions of the patient's history were reviewed and updated as appropriate: allergies, current medications, past family history, past medical history, past social history, past surgical history and problem list     Immunization History   Administered Date(s) Administered    COVID-19 PFIZER VACCINE 0 3 ML IM 02/23/2021, 03/17/2021    INFLUENZA 10/27/2014, 10/27/2015, 10/27/2016, 11/16/2016, 10/13/2017, 10/26/2018    Influenza Injectable, MDCK, Preservative Free, Quadrivalent, 0 5 mL 10/26/2018    Influenza Quadrivalent Preservative Free 3 years and older IM 11/16/2016    Influenza, high dose seasonal 0 7 mL 09/14/2020, 10/21/2021    Influenza, injectable, quadrivalent, preservative free 0 5 mL 10/25/2019    Pneumococcal Conjugate 13-Valent 12/07/2016     Current Outpatient Medications   Medication Sig Dispense Refill    Accu-Chek Jackie Plus test strip CHECK BLOOD SUGAR DAILY E11 9 100 strip 1    ACCU-CHEK FASTCLIX LANCETS MISC by Does not apply route daily E11 9  Check  fbs  daily 100 each 3    amLODIPine (NORVASC) 10 mg tablet TAKE 1 TABLET BY MOUTH DAILY FOR BLOOD PRESSURE  90 tablet 1    brimonidine tartrate 0 2 % ophthalmic solution Administer 1 drop into the left eye 2 (two) times a day      cyclopentolate (CYCLOGYL) 1 % ophthalmic solution Administer 1 drop to both eyes once      finasteride (PROSCAR) 5 mg tablet Take 1 tablet (5 mg total) by mouth daily 90 tablet 1    lisinopril (ZESTRIL) 10 mg tablet TAKE 1 TABLET BY MOUTH EVERY DAY 90 tablet 1    metFORMIN (GLUCOPHAGE-XR) 500 mg 24 hr tablet TAKE 1 TABLET BY MOUTH EVERY DAY 90 tablet 1    metoprolol succinate (TOPROL-XL) 100 mg 24 hr tablet TAKE 1 TABLET BY MOUTH EVERY DAY 90 tablet 1    ofloxacin (OCUFLOX) 0 3 % ophthalmic solution 1 drop 4 (four) times a day Start v2 days before surgery   Originally surgery scheduled 9/14/22      prednisoLONE acetate (PRED FORTE) 1 % ophthalmic suspension       Prolensa 0 07 % SOLN       rivaroxaban (Xarelto) 20 mg tablet Take 1 tablet (20 mg total) by mouth daily with breakfast 30 tablet 0    simvastatin (ZOCOR) 10 mg tablet TAKE 1 TABLET BY MOUTH EVERY DAY 90 tablet 1    timolol (TIMOPTIC) 0 5 % ophthalmic solution INSTILL 1 DROP LEFT EYE EVERY MORNING      timolol (TIMOPTIC-XE) 0 5 % ophthalmic gel-forming 1 drop daily      travoprost (TRAVATAN-Z) 0 004 % ophthalmic solution 1 drop daily at bedtime      rivaroxaban (Xarelto) 15 mg tablet Take 1 tablet (15 mg total) by mouth 2 (two) times a day for 21 days 42 tablet 0     No current facility-administered medications for this visit  Allergies: Patient has no known allergies  Objective:  Vitals:    10/07/22 1532 10/07/22 1533   BP: 139/78    BP Location: Left arm    Patient Position: Sitting    Cuff Size: Standard    Pulse: 76    Resp: 18    Temp: 98 1 °F (36 7 °C)    TempSrc: Tympanic    SpO2: 98% 98%   Weight: 73 9 kg (163 lb)    Height: 5' 9" (1 753 m)    Oxygen Therapy  SpO2: 98 %  Oxygen Therapy: None (Room air)    Wt Readings from Last 3 Encounters:   10/07/22 73 9 kg (163 lb)   10/06/22 74 8 kg (165 lb)   09/27/22 74 6 kg (164 lb 6 4 oz)     Body mass index is 24 07 kg/m²  Physical Exam  Constitutional:       General: He is not in acute distress  Appearance: Normal appearance  He is not ill-appearing  HENT:      Head: Normocephalic and atraumatic  Mouth/Throat:      Pharynx: Oropharynx is clear  Eyes:      Conjunctiva/sclera: Conjunctivae normal    Cardiovascular:      Rate and Rhythm: Normal rate and regular rhythm  Pulmonary:      Effort: Pulmonary effort is normal  No respiratory distress  Breath sounds: Normal breath sounds  No decreased breath sounds, wheezing, rhonchi or rales  Abdominal:      General: Abdomen is flat  There is no distension  Musculoskeletal:         General: Normal range of motion  Cervical back: Normal range of motion  Right lower leg: No edema  Left lower leg: No edema  Skin:     General: Skin is warm and dry  Neurological:      Mental Status: He is alert and oriented to person, place, and time  Psychiatric:         Mood and Affect: Mood normal          Behavior: Behavior normal          Lab Review:   Lab Results   Component Value Date    K 4 0 09/17/2022     (H) 09/17/2022    CO2 24 09/17/2022    BUN 17 09/17/2022    CREATININE 1 06 09/17/2022    CALCIUM 8 6 09/17/2022     Lab Results   Component Value Date    WBC 8 10 09/17/2022    HGB 11 4 (L) 09/17/2022    HCT 34 6 (L) 09/17/2022    MCV 93 09/17/2022     09/17/2022       Diagnostics:  I have personally reviewed pertinent reports  and I have personally reviewed pertinent films in PACS  Reviewed hospital imaging and PET scan  Office Spirometry Results:     ESS:    XR chest 1 view portable    Result Date: 9/11/2022  Narrative: CHEST INDICATION:   cough  COMPARISON:  None EXAM PERFORMED/VIEWS:  XR CHEST PORTABLE FINDINGS: Mild cardiomegaly  There is a 5 cm round mass in the right upper lobe  There is patchy opacity in the right lung base in addition to evidence of a right pleural effusion  Osteoarthritis both shoulders with evidence of bilateral chronic rotator cuff tears  Impression: 1   5 cm right upper lobe mass highly suspicious for neoplasm  2   Patchy opacity right lung base which may represent atelectasis or pneumonia  3   Right pleural effusion  Patient has had a follow-up CT chest since the time of this examination  Workstation performed: [id number]     CT head wo contrast    Result Date: 9/10/2022  Narrative: CT BRAIN - WITHOUT CONTRAST INDICATION:   new pulm mass, PE  r/o brain mets  COMPARISON:  CTA chest dated 9/10/2022 TECHNIQUE:  CT examination of the brain was performed  In addition to axial images, sagittal and coronal 2D reformatted images were created and submitted for interpretation  Radiation dose length product (DLP) for this visit:  804 mGy-cm     This examination, like all CT scans performed in the [facility], was performed utilizing techniques to minimize radiation dose exposure, including the use of iterative reconstruction and automated exposure control  IMAGE QUALITY:  Diagnostic  FINDINGS: PARENCHYMA: Decreased attenuation is noted in periventricular and subcortical white matter demonstrating an appearance that is statistically most likely to represent mild microangiopathic change  Gray-white differentiation appears maintained  No CT signs of acute territorial infarction  No intracranial mass, mass effect or midline shift  No acute parenchymal hemorrhage  Mild parenchymal atrophy VENTRICLES AND EXTRA-AXIAL SPACES:  Ventricles and extra-axial CSF spaces are prominent commensurate with the degree of volume loss  No hydrocephalus  No acute extra-axial hemorrhage  VISUALIZED ORBITS AND PARANASAL SINUSES:  Mild mucosal thickening in the bilateral maxillary sinuses  Otherwise grossly unremarkable  CALVARIUM AND EXTRACRANIAL SOFT TISSUES:  Normal      Impression: No acute intracranial abnormality is seen  No discrete evidence of intracranial metastatic disease  Other findings as above  Workstation performed: [id number]     MRI brain w wo contrast    Result Date: 9/12/2022  Narrative: MRI BRAIN WITH AND WITHOUT CONTRAST INDICATION: to r/o brain metastasis  COMPARISON:  None  TECHNIQUE: Sagittal T1, axial T2, axial FLAIR, axial T1, axial Anderson, axial diffusion  Sagittal, axial T1 postcontrast   Axial bravo postcontrast with coronal reconstructions  IV Contrast:  7 mL of Gadobutrol injection (SINGLE-DOSE)  IMAGE QUALITY:   Diagnostic  FINDINGS: BRAIN PARENCHYMA:  There is no discrete mass, mass effect or midline shift  There is no intracranial hemorrhage  Normal posterior fossa  Diffusion imaging is unremarkable  There are no white matter changes in the cerebral hemispheres  Postcontrast imaging of the brain demonstrates no abnormal enhancement   VENTRICLES:  Normal for the patient's age  SELLA AND PITUITARY GLAND:  Normal  ORBITS:  Normal  PARANASAL SINUSES:  Normal  VASCULATURE:  Evaluation of the major intracranial vasculature demonstrates appropriate flow voids  CALVARIUM AND SKULL BASE:  Normal  EXTRACRANIAL SOFT TISSUES:  Normal      Impression: No MR evidence of acute ischemia  No enhancing lesions to suggest metastatic disease  Workstation performed: [id number]      head neck soft tissue    Result Date: 9/14/2022  Narrative: HEAD AND NECK SOFT TISSUE ULTRASOUND INDICATION:   lymph node cervical/paraclavicular  Need to know if these are lymph nodes to see if biopsy can be obtained  If not he might need EBUS tomorrow    COMPARISON:  None TECHNIQUE:   Real-time ultrasound of the right subclavicular region was performed with a linear transducer with both volumetric sweeps and still imaging techniques  FINDINGS:  In the area of palpable concern in the right supraclavicular and infraclavicular region are several enlarged, lobulated, hypoechoic lymph nodes with loss of fatty su, measuring 2 5 x 1 1 x 1 6 cm, 1 1 x 0 1 x 1 0 cm and 1 0 x 1 1 x 1 1 cm  Impression: Several abnormal lymph nodes, the largest measuring 2 5 x 1 1 x 1 6 cm, correspond to the area of palpable concern in the right supraclavicular and infraclavicular region  Tissue sampling is recommended  Workstation performed: [id number]      IN-Patient Thoracentesis    Result Date: 9/13/2022  Narrative: Ultrasound-guided thoracentesis Clinical History: New right lung mass, PE, and pleural effusion  Patient presents for thoracentesis    Technique: The patient was brought to the interventional radiology area and placed in the sitting position on the side of a stretcher  The right chest was then examined with ultrasound and the skin was marked  The skin was then prepped, and draped in usual sterile fashion  Lidocaine was administered to the skin and a small skin incision was made   Under direct ultrasound guidance, a 5 [place] multisidehole catheter was advanced into the pleural space  400 mL clear cordell pleural fluid was aspirated  Specimens were sent to the lab as requested   The patient tolerated the procedure well and suffered no complications  Impression: Impression: 1  Successful ultrasound-guided thoracentesis yielding 400 mL clear cordell pleural fluid  Workstation performed: [id number]     NM PET CT skull base to mid thigh    Result Date: 9/30/2022  Narrative: PET/CT SCAN INDICATION:  C34 11: Malignant neoplasm of upper lobe, right bronchus or lung   , staging MODIFIER: PI COMPARISON: CT 9/12/2022 and priors CELL TYPE:  Metastatic Adenocarcinoma, right supraclavicular node biopsy 9/15/2022 TECHNIQUE:   8 3 mCi F-18-FDG administered IV  Multiplanar attenuation corrected and non attenuation corrected PET images were acquired 60 minutes post injection  Contiguous, low dose, axial CT sections were obtained from the vertex through the femurs   Intravenous contrast material was not utilized  This examination, like all CT scans performed in the [facility], was performed utilizing techniques to minimize radiation dose exposure, including the use of iterative reconstruction and automated exposure control  Fasting serum glucose: 1:15 mg/dl FINDINGS: VISUALIZED BRAIN:   No acute abnormalities are seen  HEAD/NECK:   There is extensive bilateral hypermetabolic cervical adenopathy, extending superiorly on the right to the level of the hyoid, compatible with metastases  Adenopathy extends into the mediastinum  Example right posterior cervical kamilla mass at the level of the hyoid image 3/86 measures 1 3 x 1 2 cm, SUV 14 6  Node posterior to the left inferior thyroid pole image 3/107 measures 1 1 x 1 9 cm, SUV 16 4  Numerous additional hypermetabolic nodes are present   Mild subcutaneous activity just below the level of the right zygomatic arch, SUV 2 1, without discrete CT abnormality is nonspecific but may be inflammatory  CT images: Left maxillary sinus mucosal thickening  CHEST:   Large right upper lung mass extending to the right hilum demonstrates intense FDG activity, compatible with malignancy  SUV measures 17  This mass measures approximately 5 x 8 x 6 cm based on the PET images  There are scattered small bilateral lung nodules, the majority which are too small for accurate PET evaluation, but are most concerning for metastases  Example right middle anterior subpleural nodule image 3/168 measures 1 3 x 1 3 cm, SUV 7 5  Left lower lung nodule image 3/140 measures 0 9 x 0 7 cm, SUV 2 3  Multiple hypermetabolic mediastinal nodes are compatible with metastases, including paratracheal, subcarinal, superior and anterior mediastinal adenopathy  Example right paratracheal kamilla mass image 3/128 measures 2 3 x 3 4 cm, SUV 15 7  Node anterior to the right mainstem bronchus image 3/134 measures 2 3 x 2 2 cm, SUV 12 7  Moderate right pleural effusion with mild FDG activity, SUV 1 5, likely a malignant effusion  There are hypermetabolic deep right axillary nodes also compatible with metastases  Example node image 3/107 measures 1 5 x 1 cm, SUV 11  Adjacent node image 3/108 measures 1 1 x 1 3 cm, SUV 8 2  CT images: Right basilar volume loss  Coronary atherosclerosis  Gynecomastia  ABDOMEN:   No FDG avid soft tissue lesions are seen  CT images: Exophytic right upper renal cyst measures 5 8 x 6 cm  Cholelithiasis  Left pelvic kidney  PELVIS: No FDG avid soft tissue lesions are seen  CT images: Right greater than left fat-containing inguinal hernias  Prostate calcifications  OSSEOUS STRUCTURES: No FDG avid lesions are seen  CT images: Stable  Spine degenerative change  Impression: 1  Large hypermetabolic right upper lung mass extending to the right hilum most compatible with malignancy  Additional small bilateral lung nodules favoring metastases   2   Mediastinal, right axillary and right greater than left cervical metastatic adenopathy  3   No hypermetabolic metastases visualized in the abdomen pelvis  4   Mild subcutaneous FDG activity just below the level of the right zygomatic arch, SUV 2 1, without discrete CT abnormality is nonspecific but may be inflammatory  This may be correlated clinically  Workstation performed: [id number]     IR biopsy lymph node    Result Date: 9/16/2022  Narrative: Ultrasound-guided right supraclavicular lymph node biopsy Clinical History: 64-year-old male with history of a pulmonary mass and right supraclavicular lymphadenopathy  Conscious sedation time: n/a Technique: The patient was brought to the interventional radiology area and placed supine on the stretcher  Prior imaging studies were reviewed  After a brief survey of the region of interest with ultrasound, a site on the skin was marked, prepped, and  draped in the usual sterile fashion  Lidocaine was administered to the skin and a small skin incision was made  A 17-gauge cannula was placed percutaneously into an abnormal appearing right supraclavicular lymph node under direct ultrasound guidance  Through this cannula, 4 passes were made with an 18 gauge coring needle  The specimen was sent to the lab in formalin  The patient tolerated the procedure well and suffered no complications  Impression: Impression: Ultrasound guided core biopsy of an abnormal appearing right supraclavicular lymph node  Workstation performed: [id number]     CTA ED chest PE Study    Result Date: 9/10/2022  Narrative: CTA - CHEST WITH IV CONTRAST - PULMONARY ANGIOGRAM INDICATION:   ,chest pain, hemoptysis, recent covid, elevated ddimer  COMPARISON: None  TECHNIQUE: CTA examination of the chest was performed using angiographic technique according to a protocol specifically tailored to evaluate for pulmonary embolism  Axial, sagittal, and coronal 2D reformatted images were created from the source data and  submitted for interpretation    In addition, coronal 3D MIP postprocessing was performed on the acquisition scanner  Radiation dose length product (DLP) for this visit:  444 mGy-cm   This examination, like all CT scans performed in the [facility], was performed utilizing techniques to minimize radiation dose exposure, including the use of iterative reconstruction and automated exposure control  IV Contrast:  85 mL of iohexol (OMNIPAQUE)  FINDINGS: PULMONARY ARTERIAL TREE:  Acute pulmonary emboli in the right lower lobe pulmonary artery and several segmental and subsegmental branches, as well as several left lower lobe subsegmental branches  LUNGS:  Ground glass opacification in the right lower lobe, likely pulmonary infarction  Bibasilar atelectasis  There is a 6 2 x 6 1 x 5 2 cm right upper lobe pulmonary mass causing obstruction of segmental right upper lobe bronchi as well as encasement and narrowing of several segmental and subsegmental right upper lobe pulmonary artery branches  The mass abuts the mediastinum medially and the pleura along its superolateral aspect  There is a 1 2 cm right middle lobe nodule (series 3 image 98), a 6 mm left lower lobe nodule (series 3 image 70) and an additional 6 mm left lower lobe nodule (series 3 image 79)  PLEURA:  Moderate right pleural effusion  Trace left pleural effusion  HEART/GREAT VESSELS:  Unremarkable for patient's age  RV to LV ratio of 0 84 No thoracic aortic aneurysm  MEDIASTINUM AND FAUSTO:  Several enlarged right hilar and mediastinal lymph nodes measuring up to 2 1 cm in the precarinal region  CHEST WALL AND LOWER NECK:   Unremarkable  VISUALIZED STRUCTURES IN THE UPPER ABDOMEN:  Likely right renal simple cyst  OSSEOUS STRUCTURES:  Spinal degenerative changes are noted  No acute fracture or destructive osseous lesion  Impression: 1    Acute pulmonary emboli in the right lower lobe pulmonary artery and several segmental and subsegmental branches, as well as several left lower lobe subsegmental branches  Measured RV/LV ratio is within normal limits at 0 84, less than 0 9   2   Ground glass opacification in the right lower lobe, likely pulmonary infarction  Moderate right and trace left pleural effusions  3   Right upper lobe pulmonary mass measuring up to 6 2 cm as described, compatible with primary lung malignancy  Scattered additional solid nodules measuring up to 1 2 cm the right middle lobe, suspicious for metastatic disease  4  Several large right hilar and mediastinal lymph nodes measuring up to 2 1 cm, also concerning for metastatic disease  I personally discussed this study with Nick López on 9/10/2022 at 6:58 PM  Workstation performed: [id number]     CT abdomen pelvis w contrast    Result Date: 9/13/2022  Narrative: CT ABDOMEN AND PELVIS WITH IV CONTRAST INDICATION:   PE, has right lung mass  COMPARISON:  CT thorax September 10, 2020 TECHNIQUE:  CT examination of the abdomen and pelvis was performed  Axial, sagittal, and coronal 2D reformatted images were created from the source data and submitted for interpretation  Radiation dose length product (DLP) for this visit:  644 mGy-cm   This examination, like all CT scans performed in the [facility], was performed utilizing techniques to minimize radiation dose exposure, including the use of iterative reconstruction and automated exposure control  IV Contrast:  100 mL of iohexol (OMNIPAQUE) Enteric Contrast:  Enteric contrast was not administered  FINDINGS: ABDOMEN LOWER CHEST:  Moderate size right pleural effusion is seen with a small left pleural effusion  Compressive atelectasis versus infiltrate in the right lung base  Subpleural nodule in the right middle lobe measures 12 mm  Small pericardial effusion  LIVER/BILIARY TREE:  Area of low density along the subcapsular area of the right lobe of the liver series 2 image 9 likely related to a prominent diaphragmatic insertion   GALLBLADDER:  There are gallstone(s) within the gallbladder, without pericholecystic inflammatory changes  SPLEEN:  Unremarkable  PANCREAS:  Unremarkable  ADRENAL GLANDS:  Unremarkable  KIDNEYS/URETERS:  6 cm cyst upper pole right kidney  Other smaller right renal cysts  Pelvic left kidney  No hydronephrosis  STOMACH AND BOWEL:  Unremarkable  APPENDIX:  A normal appendix was visualized  ABDOMINOPELVIC CAVITY:  No ascites  No pneumoperitoneum  No lymphadenopathy  VESSELS:  Atherosclerotic changes are present  No evidence of aneurysm  PELVIS REPRODUCTIVE ORGANS:  Unremarkable for patient's age  URINARY BLADDER:  Mild wall thickening of the urinary bladder  Correlate with urinalysis to exclude a cystitis    ABDOMINAL WALL/INGUINAL REGIONS:  Left inguinal hernia repair  Small fat-containing umbilical hernia  OSSEOUS STRUCTURES:  No acute fracture or destructive osseous lesion  Spinal degenerative changes are noted  Nonspecific punctate foci of bony sclerosis in the sacrum series 2 image 58 and left iliac bone series 2 image 60 which are nonspecific     Impression: 1  No metastatic disease to the abdomen or pelvis  2   Moderate size right pleural effusion with small left effusion, right lower lobe compressive atelectasis versus infiltrate and right middle lobe nodule suspicious for metastasis  3   Nonspecific punctate densities in the pelvic bones as described  4   Mild wall thickening of the urinary bladder  Correlate with urinalysis to exclude a urinary tract infection  Workstation performed: [id number]     Complete PFT with post bronchodilator    Result Date: 10/5/2022  Narrative: Pulmonary Function Test Report Angie Rojo 69 y o  male MRN: [de-identified] Date of Testing:  10/05/2022 Date of Interpretation: 10/05/22 Requesting Physician: Luis Galarza Reason for Testing:  Right upper lobe mass Reference set for interpretation: NHANES III Procedure: The patient was taken to pulmonary function testing laboratory    The patient demonstrated good effort and cooperation  The results of this test meet ATS standards for acceptability and repeatability  Data set appears appropriate for interpretation  Post bronchodilator was performed after the administration of 2 5mg albuterol in 3cc normal saline administered via nebulizer per bronchodilator protocol  Results: FEV1/FVC Ratio:  64 97 % Forced Vital Capacity:  3 62 L (92 % predicted) FEV1:  2 35 L (80 % predicted) After administration of bronchodilator FEV1:  2 47 (+ 5 %) Lung volumes by body plethysmography: Total Lung Capacity 85 % predicted Residual volume 80 % predicted  RV/TLC ratio 92 % DLCO for patients hemoglobin level:  53 % (66 % when corrected for Hb) Interpretation: · Spirometry shows a mild restrictive defect without a significant bronchodilator response · Lung volumes are normal · There is a moderate diffusing impairment that corrects to normal with comparison to D/VA ratio · Flow volume loop is consistent with obstructive disease Jonatan Fish MD [facility]    Echo complete w/ contrast if indicated    Result Date: 9/14/2022  Narrative: Sushila Hector  Left Ventricle: Left ventricular cavity size is normal  Wall thickness is mildly increased  The left ventricular ejection fraction is 60%  Systolic function is normal  Wall motion is normal  Diastolic function is moderately abnormal, consistent with grade II (pseudonormal) relaxation    Right Ventricle: Right ventricular cavity size is upper normal  Systolic function is normal    Left Atrium: The atrium is mild-moderately dilated    Aortic Valve: There is mild to moderate regurgitation

## 2022-10-10 NOTE — PROGRESS NOTES
Hematology/Oncology Outpatient Office Note    Date of Service: 10/20/2022    [facility]  [phone]    Reason for Consultation:   Chief Complaint   Patient presents with   • Consult       Cancer Stage at diagnosis: BETTE    Referral Physician: No ref  provider found    Primary Care Physician:  Anuradha Toscano PA-C     Nickname: Audie Conley    Spouse: Brenda Fowler    Original ECO    Today's ECO    Goals and Barriers:  Current Goal: Minimize effects of disease burden, extend life  Barriers to accomplishing this: None    Patient's Capacity to Self Care:  Patient is able to self care and goes up and down his two story house    Code Status:  Not addressed today    Advanced directives: Not addressed today    ASSESSMENT & PLAN      Diagnosis ICD-10-CM Associated Orders   1  Malignant neoplasm of upper lobe of right lung (HCC)  C34 11    2  Chemotherapy induced nausea and vomiting  R11 2     T45 1X5A          This is a 69 y o  c PMHx notable for DM, HLP, HTN, prostate cancer 2005 s/p RT, b/l PE, COVID-19, being seen in consultation for metastatic lung cancer    The patient has already had next generation sequencing testing in the form of Caris sent and we will follow-up on these results  Pleural fluid analysis of right pleural was negative for malignancy  Discussion of decision making  • Oncology history updated, accordingly, during this visit  • Goals of care/patient communication  o I discussed with the patient the clinical course leading up to their cancer diagnosis  I reviewed relevant office notes, imaging reports and pathology result as well   o I told the patient that this is a case of incurable disease and what this means  We discussed that the goal of anti-cancer therapy is to provide best quality of life, extend overall survival, and progression free survival as shown in clinical trials   We also discussed that there might be a point when the cancer will no longer respond to this anti-neoplastic therapy  As a result, we also discussed the role of the palliative care team being introduced early in the treatment course  We will be making this referral  o I explained the risks/benefits of the proposed cancer therapy: Nolene Mackey as well as Osimertinib and after discussion including understanding risks of possible life-threatening complications and therapy-related malignancy development, informed consent for blood products and treatment has been signed and obtained  • TNM/Staging At Diagnosis  Cancer Staging  Malignant neoplasm of upper lobe of right lung [facility])  Staging form: Lung, AJCC 8th Edition  - Clinical stage from 9/15/2022: Stage BETTE (cT4, cN3, cM1a) - Signed by Bethanie Martin MD on 10/10/2022  Stage prefix: Initial diagnosis  • Disease Features/Tumor Markers/Genetics  o Tumor Marker: n/a  o Notable Path Features:   9/15/2022: Lymph node, right supraclavicular biopsy: Metastatic adenocarcinoma of lung origin  Immunohistochemical stains performed with appropriate controls show the tumor to be positive for TTF1 and napsin and negative for CK5/6 and p40, supporting the diagnosis  9/15/2022 Caris: 4 muts/Mb, MSI-stable, PDL1 +1 CPS, EGFR exon 19  • Treatment: Osimertinib 80mg   • Other Supportive care:   • Treatment Team Members  o Surgeon  o Rad Onc  o Palliative: referral  • Labs: 9/17/2022: Hb 11 4, MCV 93, plt 249k, WBC 8 1k, creat 1 06  • Diagnostics  9/12/2022 MRI Brain w/wo c: No MR evidence of acute ischemia  No enhancing lesions to suggest metastatic disease  9/30/2022 PET/CT: Large hypermetabolic right upper lung mass extending to the right hilum most compatible with malignancy  Additional small bilateral lung nodules favoring metastases  Mediastinal, right axillary and right greater than left cervical metastatic adenopathy  No hypermetabolic metastases visualized in the abdomen pelvis   Mild subcutaneous FDG activity just below the level of the right zygomatic arch, SUV 2 1, without discrete CT abnormality is nonspecific but may be inflammatory            Discussion of decision making    I personally reviewed the following lab results, the image studies, pathology, other specialty/physicians consult notes and recommendations, and outside medical records from [facility]  I had a lengthy discussion with the patient and shared the work-up findings  We discussed the diagnosis and management plan as below  I spent 37 minutes reviewing the records (labs, clinician notes, outside records, medical history, ordering medicine/tests/procedures, interpreting the imaging/labs previously done) and coordination of care as well as direct time with the patient today, of which greater than 50% of the time was spent in counseling and coordination of care with the patient/family  Thoughts on approach to systemic therapy in the first line and subsequent lines of therapy:    From an overall performance status basis, I believe Ellie Grimes can tolerate systemic treatment due to an ECOG PS 0  We will treat him based on the [facility] trial which showed mOS 38 6 months  28% of patients were still taking the medicine after 3 years  15% of patients discontinued the medicine due to SAEs  Otherwise, if there is progression I would consider the below:     Several trials have focused on combining multiple chemotherapies with immunotherapy  A few are detailed below with the caveat that they enrolled primarily ECOG 0-1 (fit patients) with most patients being below the age of 76  [id number]: mOS 19 2 months, mPFS 8 3 months (excluded if they had untreated metastases of the central nervous system)  Good to use for Cher to penetrate CNS  Print this out (https://shea org/  org/doi/pdf/10 1056/RSWYyp6462692)     KEYNOTE-024 study (PDL1>50%): clinical outcomes of pembrolizumab (single agent) was mPFS 10 3 months    It is ideal to incorporate immunotherapy + chemo to induce tumor necrosis and apoptosis  Immunotherapy leads to release of neoantigens and stimulates a robust immune response  Nice review of immunotherapy versus chemotherapy in NSCLC :  [email or web address] [email or web address]     KEYNOTE-407 (Squamous metastatic NSCLC) : Pembro + chemo mPFS was 6 4 months  Patients were excluded if they had symptomatic central nervous system metastases      KEYNOTE-189 (Non-squamous metastatic NSCLC) : Pembro + chemo mPFS was 8 8 months)  Excluded active CNS  However, "subjects with previously treated brain metastases may participate provided they are clinically stable for at least 2 weeks and, have no evidence of new or enlarging brain metastases and also are off steroids 3 days prior to dosing with study medication"  People that met this criteria? ?? Not known      Discussion of disease response monitoring: We discussed with patient at length the role of imaging and potential blood monitoring of burden of disease  We will opt to get CT chest, abdomen, pelvis with contrast every 3 months in the interim  To monitor response to therapy  · Plan/Recommendations  · Continue lifelong anticoagulation with Xarelto due to PE with underlying malignancy  · Zofran 8mg q8 prn nausea/vomiting to be sent home  · Osimertinib 80mg to be sent for and will get CBC, CMP monthly for 3 months as standing  · Palliative care consult  · Restaging CT CAP in 3 months        Follow Up:  Every 3 weeks while on systemic chemotherapy     All questions were answered to the patient's satisfaction during this encounter  The patient knows the contact information for our office and knows to reach out for any relevant concerns related to this encounter   They are to call for any temperature 100 4 or higher, new symptoms including but not restricted to shaking chills, decreased appetite, nausea, vomiting, diarrhea, increased fatigue, shortness of breath or chest pain, confusion, and not feeling the strength to come to the clinic  For all other listed problems and medical diagnosis in their chart - they are managed by PCP and/or other specialists, which the patient acknowledges  Thank you very much for your consultation and making us a part of this patient's care  We are continuing to follow closely with you  Please do not hesitate to reach out to me with any additional questions or concerns  Tomy Ariza MD  Hematology & Medical Oncology Staff Physician             Disclaimer: This document was prepared using [employer] Direct technology  If a word or phrase is confusing, or does not make sense, this is likely due to recognition error which was not discovered during this clinician's review  If you believe an error has occurred, please contact me through [address] [place] line for kenya? cation  ONCOLOGY HISTORY OF PRESENT ILLNESS        Oncology History   Malignant neoplasm of upper lobe of right lung ([facility] )   9/15/2022 -  Cancer Staged    Staging form: Lung, AJCC 8th Edition  - Clinical stage from 9/15/2022: Stage BETTE (cT4, cN3, cM1a) - Signed by Ivanna Herrera MD on 10/10/2022  Stage prefix: Initial diagnosis       10/5/2022 Initial Diagnosis    Malignant neoplasm of upper lobe of right lung ([facility] )       He had 3 weeks of very severe coughing last month  SUBJECTIVE  (INTERVAL HISTORY)      Clotting History B/l PE 2022   Bleeding History None   Cancer History Prostate cancer 2005 s/p RT, Lung cancer   Family Cancer History None   H/O Blood/Plt Transfusion None   Tobacco/etoh/drug abuse Denies nicotine use in the past, social etoh use       Cancer Screening history n/a   Occupation Retired , worked on [employer]         I have reviewed the relevant past medical, surgical, social and family history  I have also reviewed allergies and medications for this patient  Review of Systems    No pain      Baseline weight: 179 lbs    He has lost 17 lbs unintentionally since June 2022  No recent cough  Denies F/C, N/V, SOB, CP, LH, HA, rash, itching, gen weakness, melena, hematuria, hematochezia, LOC, falls, diarrhea, or constipation       A 10-point review of system was performed, pertinent positive and negative were detailed as above  Otherwise, the 10-point review of system was negative  Past Medical History:   Diagnosis Date   • Diabetes mellitus ([facility] )    • Hyperlipidemia    • Hypertension    • Pro[facility] ([facility] ) 2005    S/P prostate ca-2005 had radiation tx         Past Surgical History:   Procedure Laterality Date   • COLONOSCOPY     • IR BIOPSY LYMPH NODE  9/15/2022   • IR THORACENTESIS  9/13/2022   • SD ARTHRODESIS,ANKLE,OPEN Right 7/10/2020    Procedure: ARTHRODESIS/ TIBIAL-TALAR ANKLE FUSION;  Surgeon: Hollie Rose MD;  Location: [facility];  Service: Orthopedics   • SD [address] Bilateral 12/16/2021    Procedure: LAPAROSCOPIC REPAIR HERNIA INGUINAL WITH MESH;  Surgeon: Manjeet Beltran MD;  Location: [facility];  Service: General       Family History   Problem Relation Age of Onset   • Heart attack Mother        Social History     Socioeconomic History   • Marital status: /Civil Union     Spouse name: Not on file   • Number of children: Not on file   • Years of education: Not on file   • Highest education level: Not on file   Occupational History   • Not on file   Tobacco Use   • Smoking status: Never Smoker   • Smokeless tobacco: Never Used   Vaping Use   • Vaping Use: Never used   Substance and Sexual Activity   • Alcohol use: Yes     Comment: Occasional   • Drug use: Never   • Sexual activity: Not on file   Other Topics Concern   • Not on file   Social History Narrative   • Not on file     Social Determinants of Health     Financial Resource Strain: Not on file   Food Insecurity: No Food Insecurity   • Worried About Running Out of Food in the Last Year: Never true   • Ran Out of Food in the Last Year: Never true   Transportation Needs: No Transportation Needs   • Lack of Transportation (Medical): No   • Lack of Transportation (Non-Medical): No   Physical Activity: Not on file   Stress: Not on file   Social Connections: Not on file   Intimate Partner Violence: Not on file   Housing Stability: Low Risk    • Unable to Pay for Housing in the Last Year: No   • Number of Places Lived in the Last Year: 1   • Unstable Housing in the Last Year: No       No Known Allergies    Current Outpatient Medications   Medication Sig Dispense Refill   • Accu-Chek Jackie Plus test strip CHECK BLOOD SUGAR DAILY E11 9 100 strip 1   • ACCU-CHEK FASTCLIX LANCETS MISC by Does not apply route daily E11 9  Check  fbs  daily 100 each 3   • amLODIPine (NORVASC) 10 mg tablet TAKE 1 TABLET BY MOUTH DAILY FOR BLOOD PRESSURE  90 tablet 1   • brimonidine tartrate 0 2 % ophthalmic solution Administer 1 drop into the left eye 2 (two) times a day     • cyclopentolate (CYCLOGYL) 1 % ophthalmic solution Administer 1 drop to both eyes once     • finasteride (PROSCAR) 5 mg tablet TAKE 1 TABLET (5 MG TOTAL) BY MOUTH DAILY  90 tablet 1   • lisinopril (ZESTRIL) 10 mg tablet TAKE 1 TABLET BY MOUTH EVERY DAY 90 tablet 1   • metFORMIN (GLUCOPHAGE-XR) 500 mg 24 hr tablet TAKE 1 TABLET BY MOUTH EVERY DAY 90 tablet 1   • metoprolol succinate (TOPROL-XL) 100 mg 24 hr tablet TAKE 1 TABLET BY MOUTH EVERY DAY 90 tablet 1   • ofloxacin (OCUFLOX) 0 3 % ophthalmic solution 1 drop 4 (four) times a day Start v2 days before surgery   Originally surgery scheduled 9/14/22     • prednisoLONE acetate (PRED FORTE) 1 % ophthalmic suspension      • Prolensa 0 07 % SOLN      • rivaroxaban (Xarelto) 20 mg tablet Take 1 tablet (20 mg total) by mouth daily with breakfast 30 tablet 0   • simvastatin (ZOCOR) 10 mg tablet TAKE 1 TABLET BY MOUTH EVERY DAY 90 tablet 1   • timolol (TIMOPTIC) 0 5 % ophthalmic solution INSTILL 1 DROP LEFT EYE EVERY MORNING     • timolol (TIMOPTIC-XE) 0 5 % ophthalmic gel-forming 1 drop daily     • travoprost (TRAVATAN-Z) 0 004 % ophthalmic solution 1 drop daily at bedtime     • rivaroxaban (Xarelto) 15 mg tablet Take 1 tablet (15 mg total) by mouth 2 (two) times a day for 21 days 42 tablet 0     No current facility-administered medications for this visit  (Not in a hospital admission)      Objective:     24 Hour Vitals Assessment:     Vitals:    10/20/22 1312   BP: 164/92   Pulse: 68   Resp: 16   Temp: 98 °F (36 7 °C)   SpO2: 97%       PHYSICIAN EXAM:    General: Appearance: alert, cooperative, no distress  HEENT: Normocephalic, atraumatic  No scleral icterus  conjunctivae clear  EOMI  Chest: No tenderness to palpation  No open wound noted  Lungs: Clear to auscultation bilaterally, Respirations unlabored  Cardiac: Regular rate and rhythm, +S1and S2  Abdomen: Soft, non-tender, non-distended  Bowel sounds are normal   Extremities:  No cyanosis, clubbing  Chronic pitting edema +1 in the R ankle from an old surgery/screws in the ankle  Skin: Skin color, turgor are normal  No rashes  Lymphatics: no palpable supra-cervical, axillary, or inguinal adenopathy  Neurologic: Awake, Alert, and oriented, no gross focal deficits noted b/l  DATA REVIEW:    Pathology Result:    Final Diagnosis   Date Value Ref Range Status   09/15/2022   Final    A  Lymph node, right supraclavicular biopsy:  -  Metastatic adenocarcinoma of lung origin  -  Immunohistochemical stains performed with appropriate controls show the tumor to be positive for TTF1 and napsin and negative for CK5/6 and p40, supporting the diagnosis  09/13/2022   Final    A  B  Pleural, Right,  (ThinPrep and cell block preparations): See Note  Negative for malignancy  Mesothelial cells, lymphocytes, histiocytes and neutrophils  Satisfactory for evaluation  Comment: Immunohistochemistry is performed on cell block to help in the assessment of this case    D2-40 highlights benign mesothelial cells and LINWOOD-EP4 and MOC-31 are negative, supporting the above diagnosis  07/07/2021   Final    A  Colon, ascending polyp, biopsy:  -  Tubular adenoma, negative for high-grade dysplasia  B  Colon, descending polyp, biopsy:  -  Hyperplastic polyp  Image Results:   Image result are reviewed and documented in Hematology/Oncology history    XR chest pa & lateral  Narrative: CHEST     INDICATION:   C34 11: Malignant neoplasm of upper lobe, right bronchus or lung  J90: Pleural effusion, not elsewhere classified  COMPARISON:  PET CT 9/30/2022  EXAM PERFORMED/VIEWS:  XR CHEST PA & LATERAL  DUAL ENERGY SUBTRACTION  FINDINGS:    Cardiomediastinal silhouette appears unremarkable  Redemonstration of right upper lobe mass and right middle lobe nodule  Trace right effusion  No pneumothorax  Osseous structures appear within normal limits for patient age  Impression: Right upper lobe mass and right middle lobe nodule  Trace right effusion  Workstation performed: [id number]      LABS:  Lab data are reviewed and documented in HemOnc history  Lab Results   Component Value Date    HGB 11 4 (L) 09/17/2022    HCT 34 6 (L) 09/17/2022    MCV 93 09/17/2022     09/17/2022    WBC 8 10 09/17/2022    NRBC 0 09/17/2022     Lab Results   Component Value Date    K 4 0 09/17/2022     (H) 09/17/2022    CO2 24 09/17/2022    BUN 17 09/17/2022    CREATININE 1 06 09/17/2022    GLUF 115 (H) 04/21/2022    CALCIUM 8 6 09/17/2022    CORRECTEDCA 9 7 09/10/2022    AST 33 09/14/2022    ALT 48 09/14/2022    ALKPHOS 92 09/14/2022    EGFR 67 09/17/2022       Lab Results   Component Value Date    IRON 27 (L) 09/10/2022    TIBC 117 (L) 09/10/2022    FERRITIN 579 (H) 09/10/2022       No results found for: [id number]    No results for input(s): WBC, CREAT in the last 72 hours      Invalid input(s):  PLT    By:  Vazquez Glass, 10/20/2022, 1:20 PM

## 2022-10-14 ENCOUNTER — HOSPITAL ENCOUNTER (OUTPATIENT)
Dept: RADIOLOGY | Facility: HOSPITAL | Age: 77
End: 2022-10-14
Payer: MEDICARE

## 2022-10-14 DIAGNOSIS — J90 PLEURAL EFFUSION: ICD-10-CM

## 2022-10-14 DIAGNOSIS — C34.11 MALIGNANT NEOPLASM OF UPPER LOBE OF RIGHT LUNG (HCC): ICD-10-CM

## 2022-10-14 PROCEDURE — 71046 X-RAY EXAM CHEST 2 VIEWS: CPT

## 2022-10-18 ENCOUNTER — PATIENT OUTREACH (OUTPATIENT)
Dept: CASE MANAGEMENT | Facility: HOSPITAL | Age: 77
End: 2022-10-18

## 2022-10-18 ENCOUNTER — RA CDI HCC (OUTPATIENT)
Dept: OTHER | Facility: HOSPITAL | Age: 77
End: 2022-10-18

## 2022-10-18 DIAGNOSIS — N40.1 BPH ASSOCIATED WITH NOCTURIA: ICD-10-CM

## 2022-10-18 DIAGNOSIS — R35.1 BPH ASSOCIATED WITH NOCTURIA: ICD-10-CM

## 2022-10-18 DIAGNOSIS — I10 BENIGN ESSENTIAL HTN: ICD-10-CM

## 2022-10-18 RX ORDER — FINASTERIDE 5 MG/1
5 TABLET, FILM COATED ORAL DAILY
Qty: 90 TABLET | Refills: 1 | Status: SHIPPED | OUTPATIENT
Start: 2022-10-18

## 2022-10-18 NOTE — PROGRESS NOTES
MSW phoned and left a voice message for a call back   MSW awaits return call   MSW will continue to follow   MSW left message on home phone number because the voicemail for cell phone number was full

## 2022-10-20 ENCOUNTER — OFFICE VISIT (OUTPATIENT)
Dept: HEMATOLOGY ONCOLOGY | Facility: CLINIC | Age: 77
End: 2022-10-20
Payer: MEDICARE

## 2022-10-20 ENCOUNTER — DOCUMENTATION (OUTPATIENT)
Dept: HEMATOLOGY ONCOLOGY | Facility: CLINIC | Age: 77
End: 2022-10-20

## 2022-10-20 VITALS
BODY MASS INDEX: 23.99 KG/M2 | OXYGEN SATURATION: 97 % | SYSTOLIC BLOOD PRESSURE: 164 MMHG | HEIGHT: 69 IN | DIASTOLIC BLOOD PRESSURE: 92 MMHG | WEIGHT: 162 LBS | HEART RATE: 68 BPM | RESPIRATION RATE: 16 BRPM | TEMPERATURE: 98 F

## 2022-10-20 DIAGNOSIS — C34.11 MALIGNANT NEOPLASM OF UPPER LOBE OF RIGHT LUNG (HCC): Primary | ICD-10-CM

## 2022-10-20 DIAGNOSIS — T45.1X5A CHEMOTHERAPY INDUCED NAUSEA AND VOMITING: ICD-10-CM

## 2022-10-20 DIAGNOSIS — R11.2 CHEMOTHERAPY INDUCED NAUSEA AND VOMITING: ICD-10-CM

## 2022-10-20 PROBLEM — Z86.39 HISTORY OF DIABETES MELLITUS: Status: RESOLVED | Noted: 2022-10-06 | Resolved: 2022-10-20

## 2022-10-20 PROCEDURE — 99214 OFFICE O/P EST MOD 30 MIN: CPT | Performed by: INTERNAL MEDICINE

## 2022-10-20 RX ORDER — ONDANSETRON 8 MG/1
8 TABLET, ORALLY DISINTEGRATING ORAL EVERY 8 HOURS PRN
Qty: 20 TABLET | Refills: 0 | Status: SHIPPED | OUTPATIENT
Start: 2022-10-20

## 2022-10-20 NOTE — PROGRESS NOTES
Reviewed [facility] printout for Osimertinib daily  Reviewed possible side effects and office handouts with RN Teams number and [facility] Number  Patient was given [facility] printouts for [place] carboplatin and alimta  Consent was obtained during visit on 10/20/22  Copy given to patient and uploaded into chart on 10/20/22       Email sent to Oral chemotherapy team on 10/20/22

## 2022-10-24 ENCOUNTER — OFFICE VISIT (OUTPATIENT)
Dept: FAMILY MEDICINE CLINIC | Facility: CLINIC | Age: 77
End: 2022-10-24
Payer: MEDICARE

## 2022-10-24 VITALS
HEIGHT: 69 IN | BODY MASS INDEX: 25 KG/M2 | HEART RATE: 78 BPM | OXYGEN SATURATION: 97 % | TEMPERATURE: 97.7 F | WEIGHT: 168.8 LBS | SYSTOLIC BLOOD PRESSURE: 132 MMHG | DIASTOLIC BLOOD PRESSURE: 80 MMHG

## 2022-10-24 DIAGNOSIS — I26.99 OTHER ACUTE PULMONARY EMBOLISM WITHOUT ACUTE COR PULMONALE (HCC): ICD-10-CM

## 2022-10-24 DIAGNOSIS — E11.9 CONTROLLED TYPE 2 DIABETES MELLITUS WITHOUT COMPLICATION, WITHOUT LONG-TERM CURRENT USE OF INSULIN (HCC): Primary | ICD-10-CM

## 2022-10-24 DIAGNOSIS — E78.00 HYPERCHOLESTEROLEMIA: ICD-10-CM

## 2022-10-24 DIAGNOSIS — E21.3 HYPERPARATHYROIDISM (HCC): ICD-10-CM

## 2022-10-24 DIAGNOSIS — Z23 ENCOUNTER FOR IMMUNIZATION: ICD-10-CM

## 2022-10-24 DIAGNOSIS — C34.11 MALIGNANT NEOPLASM OF UPPER LOBE OF RIGHT LUNG (HCC): ICD-10-CM

## 2022-10-24 LAB — SL AMB POCT HEMOGLOBIN AIC: 5.3 (ref ?–6.5)

## 2022-10-24 PROCEDURE — 90662 IIV NO PRSV INCREASED AG IM: CPT | Performed by: PHYSICIAN ASSISTANT

## 2022-10-24 PROCEDURE — 99214 OFFICE O/P EST MOD 30 MIN: CPT | Performed by: PHYSICIAN ASSISTANT

## 2022-10-24 PROCEDURE — G0008 ADMIN INFLUENZA VIRUS VAC: HCPCS | Performed by: PHYSICIAN ASSISTANT

## 2022-10-24 PROCEDURE — 83036 HEMOGLOBIN GLYCOSYLATED A1C: CPT | Performed by: PHYSICIAN ASSISTANT

## 2022-10-24 NOTE — PROGRESS NOTES
Diabetic Foot Exam    Patient's shoes and socks removed  Right Foot/Ankle   Right Foot Inspection  Skin Exam: skin intact, callus and callus  Sensory   Vibration: intact  Monofilament testing: intact    Vascular  The right DP pulse is 2+  Left Foot/Ankle  Left Foot Inspection  Skin Exam: skin intact and callus  Sensory   Vibration: intact  Monofilament testing: intact    Vascular  The left DP pulse is 2+  Assign Risk Category  Deformity present  No loss of protective sensation  No weak pulses  Risk: 1  Assessment/Plan:     Diagnoses and all orders for this visit:    Controlled type 2 diabetes mellitus without complication, without long-term current use of insulin ([facility])  Comments:  Diabetes is at goal we will continue metformin ER  Orders:  -     POCT hemoglobin A1c  -     Basic metabolic panel    Hyperparathyroidism ([facility] [address] )  Comments:  Check labs  Orders:  -     PTH, intact; Future  -     Vitamin D 25 hydroxy    Malignant neoplasm of upper lobe of right lung (HCC)  Comments:  Patient is following with Pulmonary and Oncology    Other acute pulmonary embolism without acute cor pulmonale ([facility])  Comments:  Continue Xarelto 20 mg daily    Hypercholesterolemia  Comments:  Continue simvastatin 20 mg and low-fat diet  Check labs  Orders:  -     Lipid panel          Subjective:      Patient ID: Ludivina Park is a 69 y o  male  Presents in the office for follow up chronic conditions  Patient has non-insulin diabetes mellitus type 2  He is on metformin  once a day  Patient does check his blood sugar regularly  He also has hypertension  He is taking metoprolol  mg, lisinopril 10 and amlodipine 10 mg  For lipid control he is on simvastatin 10 mg  Patient had a recent pulmonary embolism  He is on Xarelto 20 mg a day  Patient denies any abnormal bleeding  He was found to have lung cancer at this time  He is currently seeing Oncology  Patient states he is scheduled to take oral chemo  Patient taking finasteride 5 mg for BPH  He is following with ophthalmology for glaucoma  Flu vaccine updated today  Hemoglobin A1c in the office today is 5 3      The following portions of the patient's history were reviewed and updated as appropriate:   He   Patient Active Problem List    Diagnosis Date Noted   • Multiple lung nodules on CT 10/06/2022   • 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 ([facility] [address] ) 10/06/2022   • Pleural effusion 10/06/2022   • Malignant neoplasm of upper lobe of right lung ([facility] [address] )    • Other pulmonary embolism without acute cor pulmonale ([address] ) 09/10/2022   • Pulmonary mass 09/10/2022   • Anemia 09/10/2022   • Non-recurrent bilateral inguinal hernia without obstruction or gangrene 11/01/2021   • Status post right tibial-talar ankle fusion 07/13/2020   • BPH associated with nocturia 08/21/2019   • Arthritis of right acromioclavicular joint 03/15/2019   • Primary osteoarthritis of right shoulder 03/07/2019   • Chronic left shoulder pain 03/07/2019   • Left rotator cuff tear arthropathy 03/07/2019   • Rotator cuff injury, right, initial encounter 03/07/2019   • History of colon polyps 01/09/2019   • Hyperparathyroidism ([facility] [address] ) 11/06/2018   • Hypokalemia 10/22/2018   • Hypocalcemia 10/22/2018   • Glaucoma 10/11/2018   • Controlled type 2 diabetes mellitus without complication, without long-term current use of insulin ([facility] [address] ) 07/25/2016   • Inguinal hernia 02/08/2016   • Benign essential HTN 01/12/2016   • Hypercholesterolemia 01/12/2016     Current Outpatient Medications   Medication Sig Dispense Refill   • Accu-Chek Jackie Plus test strip CHECK BLOOD SUGAR DAILY E11 9 100 strip 1   • ACCU-CHEK FASTCLIX LANCETS MISC by Does not apply route daily E11 9  Check  fbs  daily 100 each 3   • amLODIPine (NORVASC) 10 mg tablet TAKE 1 TABLET BY MOUTH DAILY FOR BLOOD PRESSURE   90 tablet 1   • brimonidine tartrate 0 2 % ophthalmic solution Administer 1 drop into the left eye 2 (two) times a day     • cyclopentolate (CYCLOGYL) 1 % ophthalmic solution Administer 1 drop to both eyes once     • finasteride (PROSCAR) 5 mg tablet TAKE 1 TABLET (5 MG TOTAL) BY MOUTH DAILY  90 tablet 1   • lisinopril (ZESTRIL) 10 mg tablet TAKE 1 TABLET BY MOUTH EVERY DAY 90 tablet 1   • metFORMIN (GLUCOPHAGE-XR) 500 mg 24 hr tablet TAKE 1 TABLET BY MOUTH EVERY DAY 90 tablet 1   • metoprolol succinate (TOPROL-XL) 100 mg 24 hr tablet TAKE 1 TABLET BY MOUTH EVERY DAY 90 tablet 1   • ofloxacin (OCUFLOX) 0 3 % ophthalmic solution 1 drop 4 (four) times a day Start v2 days before surgery  Originally surgery scheduled 9/14/22     • prednisoLONE acetate (PRED FORTE) 1 % ophthalmic suspension      • Prolensa 0 07 % SOLN      • rivaroxaban (Xarelto) 20 mg tablet Take 1 tablet (20 mg total) by mouth daily with breakfast 30 tablet 0   • simvastatin (ZOCOR) 10 mg tablet TAKE 1 TABLET BY MOUTH EVERY DAY 90 tablet 1   • timolol (TIMOPTIC) 0 5 % ophthalmic solution INSTILL 1 DROP LEFT EYE EVERY MORNING     • travoprost (TRAVATAN-Z) 0 004 % ophthalmic solution 1 drop daily at bedtime     • ondansetron (Zofran ODT) 8 mg disintegrating tablet Take 1 tablet (8 mg total) by mouth every 8 (eight) hours as needed for nausea or vomiting (Patient not taking: Reported on 10/24/2022) 20 tablet 0   • timolol (TIMOPTIC-XE) 0 5 % ophthalmic gel-forming 1 drop daily (Patient not taking: Reported on 10/24/2022)       No current facility-administered medications for this visit  He has No Known Allergies       Review of Systems   Constitutional: Negative for activity change, appetite change, chills, fatigue and fever  HENT: Negative for ear pain and sore throat  Eyes: Negative for visual disturbance  Respiratory: Negative for cough and shortness of breath  Cardiovascular: Negative for chest pain, palpitations and leg swelling  Gastrointestinal: Negative for abdominal pain, blood in stool, constipation, diarrhea and nausea     Genitourinary: Negative for difficulty urinating  Musculoskeletal: Negative for arthralgias, back pain and myalgias  Skin: Negative for rash  Neurological: Negative for dizziness, syncope and headaches  Psychiatric/Behavioral: Negative for sleep disturbance  Objective:        Physical Exam  Vitals and nursing note reviewed  Constitutional:       Appearance: Normal appearance  He is well-developed  HENT:      Head: Normocephalic and atraumatic  Comments: Hearing  aides     Right Ear: External ear normal       Left Ear: Ear canal and external ear normal    Eyes:      Conjunctiva/sclera: Conjunctivae normal       Pupils: Pupils are equal, round, and reactive to light  Neck:      Thyroid: No thyromegaly  Vascular: No carotid bruit  Cardiovascular:      Rate and Rhythm: Normal rate and regular rhythm  Pulses: no weak pulses          Dorsalis pedis pulses are 2+ on the right side and 2+ on the left side  Heart sounds: Normal heart sounds  No murmur heard  Pulmonary:      Effort: Pulmonary effort is normal       Breath sounds: Normal breath sounds  No wheezing  Abdominal:      General: Bowel sounds are normal       Palpations: Abdomen is soft  There is no mass  Tenderness: There is no abdominal tenderness  Feet:      Right foot:      Skin integrity: Callus present  Left foot:      Skin integrity: Callus present  Lymphadenopathy:      Cervical: No cervical adenopathy  Skin:     General: Skin is warm and dry  Neurological:      General: No focal deficit present  Mental Status: He is alert and oriented to person, place, and time  Psychiatric:         Behavior: Behavior normal          Thought Content:  Thought content normal          Judgment: Judgment normal

## 2022-10-24 NOTE — PROGRESS NOTES
10-20-22  Received new oral chemo referral-Tagrisso // Per Dulce Eid is required    Completed [place] application for free drug due to no open funding with foundations  Forwarded to providers office for signature  Once copay is received will contact patient to discuss patient assistance    10-24-22  Copay received $[phone]  93  Call to patient to discuss free drug program, timeframe for processing as well as required documents  Per conversation with patient he has doctors appointment tomorrow and will take documents with him and requests they be sent to me    10-27-22  Patients documents, income received  Providers portion received  Application faxed to [facility]    11-3-22  For patient Nereida Shea [birth year]    Received notice from [facility] regarding patients free drug program  Patient has been approved eff 11-2-22 thru 12-31-23    Rae Medellin: Please forward script to [id number],   Also please fax paper script to [facility] @ [phone]  Once script is attached to patient file they will contact to set up shipment  Call placed to patient to inform of approval however no response

## 2022-10-25 ENCOUNTER — PATIENT OUTREACH (OUTPATIENT)
Dept: HEMATOLOGY ONCOLOGY | Facility: CLINIC | Age: 77
End: 2022-10-25

## 2022-10-25 NOTE — PROGRESS NOTES
Subsequent outreach: Attempted to reach patient today to re-assess for any barriers to care and offer any supportive services needed  I was unable to leave a message as his VM box is full  I will try again at a later date/ time

## 2022-10-26 ENCOUNTER — CONSULT (OUTPATIENT)
Dept: PALLIATIVE MEDICINE | Facility: CLINIC | Age: 77
End: 2022-10-26

## 2022-10-26 VITALS
SYSTOLIC BLOOD PRESSURE: 140 MMHG | TEMPERATURE: 97.3 F | DIASTOLIC BLOOD PRESSURE: 70 MMHG | OXYGEN SATURATION: 96 % | WEIGHT: 166.8 LBS | HEART RATE: 76 BPM | BODY MASS INDEX: 24.63 KG/M2

## 2022-10-26 DIAGNOSIS — Z51.5 PALLIATIVE CARE PATIENT: ICD-10-CM

## 2022-10-26 DIAGNOSIS — C77.8 MALIGNANT NEOPLASM METASTATIC TO LYMPH NODES OF MULTIPLE SITES (HCC): ICD-10-CM

## 2022-10-26 DIAGNOSIS — C34.11 MALIGNANT NEOPLASM OF UPPER LOBE OF RIGHT LUNG (HCC): Primary | ICD-10-CM

## 2022-10-26 NOTE — PROGRESS NOTES
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 [facility])      Other Visit Diagnoses     Palliative care patient            · Malignant neoplasm of upper lobe of right lung- continue to follow with Oncology for management and treatment  · Malignant neoplasm of lymph nodes of multiple sites- continue to follow with Oncology for management and treatment  · Palliative care patient- introduced palliative care and symptom management to patient   · Psychosocial support- supportive listening provided    Medications adjusted this encounter:  Requested Prescriptions      No prescriptions requested or ordered in this encounter     No orders of the defined types were placed in this encounter  There are no discontinued medications  PPS: [de-identified]      Louis Galindo was seen today for symptoms and planning cares related to above illnesses  Above orders were sent electronically, or provided in hardcopy in clinic  I have reviewed the patient's controlled substance dispensing history in the Prescription Drug Monitoring Program in compliance with the [facility] regulations before prescribing any controlled substances  We appreciate the referral, and wish for him to continue to follow with us  If there are questions or concerns, please contact us through our clinic/answering service 24 hours a day, seven days a week  [address]  [facility] Palliative and Supportive Care          Visit Information    Accompanied By: No one    Source of History: Self    History Limitations: None      Chief Complaint  No chief complaint on file      · TNM/Staging At Diagnosis  · Cancer Staging  Malignant neoplasm of upper lobe of right lung [facility])  Staging form: Lung, AJCC 8th Edition  - Clinical stage from 9/15/2022: Stage BETTE (cT4, cN3, cM1a) - Signed by Con Campbell MD on 10/10/2022  Stage prefix: Initial diagnosis  · Disease Features/Tumor Markers/Genetics  ? Tumor Marker: n/a  ? Notable Path Features:   9/15/2022: Lymph node, right supraclavicular biopsy: Metastatic adenocarcinoma of lung origin  Immunohistochemical stains performed with appropriate controls show the tumor to be positive for TTF1 and napsin and negative for CK5/6 and p40, supporting the diagnosis  9/15/2022 Caris: 4 muts/Mb, MSI-stable, PDL1 +1 CPS, EGFR exon 19  · Treatment: Osimertinib 80mg   · Other Supportive care:   · Treatment Team Members  ? Surgeon  ? Rad Onc  ? Palliative: referral  · Labs: 9/17/2022: Hb 11 4, MCV 93, plt 249k, WBC 8 1k, creat 1 06  · Diagnostics  9/12/2022 MRI Brain w/wo c: No MR evidence of acute ischemia  No enhancing lesions to suggest metastatic disease  9/30/2022 PET/CT: Large hypermetabolic right upper lung mass extending to the right hilum most compatible with malignancy   Additional small bilateral lung nodules favoring metastases  Mediastinal, right axillary and right greater than left cervical metastatic adenopathy  No hypermetabolic metastases visualized in the abdomen pelvis   Mild subcutaneous FDG activity just below the level of the right zygomatic arch, SUV 2 1, without discrete CT abnormality is nonspecific but may be inflammatory       Oncology History   Malignant neoplasm of upper lobe of right lung ([address] )   9/15/2022 -  Cancer Staged     Staging form: Lung, AJCC 8th Edition  - Clinical stage from 9/15/2022: Stage BETTE (cT4, cN3, cM1a) - Signed by Con Campbell MD on 10/10/2022  Stage prefix: Initial diagnosis         10/5/2022 Initial Diagnosis     Malignant neoplasm of upper lobe of right lung (HCC)       History of Present Illness      Clark Petty is a 69 y o  male who presents as a referral from Dr Felice Choi of Oncology for primary palliative diagnosis of malignant neoplasm of upper lobe of right lung  Consultation is requested for: symptom management, emotional support in the setting of serious illness  Patient states he was diagnosed with lung cancer when he had hemoptysis when he had Covid-19 2 months ago  He is planning on starting oral therapy, waiting for financial approval   Denies symptoms of pain, chest pain, shortness of breath, or cough, appetite is good, denies nausea or vomiting  Occasional constipation, denies diarrhea  Pertinent Palliative Care Domains    Physical Symptoms:ambulates    Psychological Symptoms:no anxiety    Social Aspects: support system wife Aric Lopez has 5 children and Sole Fuentes has 1 child (second marriage)  Lorrine Grate 46, Belkis 48, Glen 52 December 1970, Eli 46 sometime between January and March 1970,  and [address]     Advance Directive and Living Will:    Yes, not on file  Power of :  Yes, not on file  POLST:  No    Historical Data  Past Medical History:   Diagnosis Date   • Diabetes mellitus ([address] )    • Hyperlipidemia    • Hypertension    • Prostate cancer ([address] ) 2005    S/P prostate ca-2005 had radiation tx       Past Surgical History:   Procedure Laterality Date   • COLONOSCOPY     • IR BIOPSY LYMPH NODE  9/15/2022   • IR THORACENTESIS  9/13/2022   • DC ARTHRODESIS,ANKLE,OPEN Right 7/10/2020    Procedure: ARTHRODESIS/ TIBIAL-TALAR ANKLE FUSION;  Surgeon: Russell Jordan MD;  Location: [facility];  Service: Orthopedics   • DC LAP,INGUINAL HERNIA REPR,INITIAL Bilateral 12/16/2021    Procedure: LAPAROSCOPIC REPAIR HERNIA INGUINAL WITH MESH;  Surgeon: Eneida Sanchez MD;  Location: [facility];  Service: General     Social History     Socioeconomic History   • Marital status: /Civil Union     Spouse name: Not on file   • Number of children: Not on file   • Years of education: Not on file   • Highest education level: Not on file   Occupational History   • Not on file Tobacco Use   • Smoking status: Never Smoker   • Smokeless tobacco: Never Used   Vaping Use   • Vaping Use: Never used   Substance and Sexual Activity   • Alcohol use: Yes     Comment: Occasional   • Drug use: Never   • Sexual activity: Not on file   Other Topics Concern   • Not on file   Social History Narrative   • Not on file     Social Determinants of Health     Financial Resource Strain: Not on file   Food Insecurity: No Food Insecurity   • Worried About Running Out of Food in the Last Year: Never true   • Ran Out of Food in the Last Year: Never true   Transportation Needs: No Transportation Needs   • Lack of Transportation (Medical): No   • Lack of Transportation (Non-Medical): No   Physical Activity: Not on file   Stress: Not on file   Social Connections: Not on file   Intimate Partner Violence: Not on file   Housing Stability: Low Risk    • Unable to Pay for Housing in the Last Year: No   • Number of Places Lived in the Last Year: 1   • Unstable Housing in the Last Year: No     Family History   Problem Relation Age of Onset   • Heart attack Mother      No Known Allergies  Current Outpatient Medications   Medication Sig Dispense Refill   • Accu-Chek Jackie Plus test strip CHECK BLOOD SUGAR DAILY E11 9 100 strip 1   • ACCU-CHEK FASTCLIX LANCETS MISC by Does not apply route daily E11 9  Check  fbs  daily 100 each 3   • amLODIPine (NORVASC) 10 mg tablet TAKE 1 TABLET BY MOUTH DAILY FOR BLOOD PRESSURE  90 tablet 1   • brimonidine tartrate 0 2 % ophthalmic solution Administer 1 drop into the left eye 2 (two) times a day     • cyclopentolate (CYCLOGYL) 1 % ophthalmic solution Administer 1 drop to both eyes once     • finasteride (PROSCAR) 5 mg tablet TAKE 1 TABLET (5 MG TOTAL) BY MOUTH DAILY   90 tablet 1   • lisinopril (ZESTRIL) 10 mg tablet TAKE 1 TABLET BY MOUTH EVERY DAY 90 tablet 1   • metFORMIN (GLUCOPHAGE-XR) 500 mg 24 hr tablet TAKE 1 TABLET BY MOUTH EVERY DAY 90 tablet 1   • metoprolol succinate (TOPROL-XL) 100 mg 24 hr tablet TAKE 1 TABLET BY MOUTH EVERY DAY 90 tablet 1   • ofloxacin (OCUFLOX) 0 3 % ophthalmic solution 1 drop 4 (four) times a day Start v2 days before surgery  Originally surgery scheduled 9/14/22     • ondansetron (Zofran ODT) 8 mg disintegrating tablet Take 1 tablet (8 mg total) by mouth every 8 (eight) hours as needed for nausea or vomiting (Patient not taking: Reported on 10/24/2022) 20 tablet 0   • prednisoLONE acetate (PRED FORTE) 1 % ophthalmic suspension      • Prolensa 0 07 % SOLN      • rivaroxaban (Xarelto) 20 mg tablet Take 1 tablet (20 mg total) by mouth daily with breakfast 30 tablet 0   • simvastatin (ZOCOR) 10 mg tablet TAKE 1 TABLET BY MOUTH EVERY DAY 90 tablet 1   • timolol (TIMOPTIC) 0 5 % ophthalmic solution INSTILL 1 DROP LEFT EYE EVERY MORNING     • timolol (TIMOPTIC-XE) 0 5 % ophthalmic gel-forming 1 drop daily (Patient not taking: Reported on 10/24/2022)     • travoprost (TRAVATAN-Z) 0 004 % ophthalmic solution 1 drop daily at bedtime       No current facility-administered medications for this visit  Review of Systems   Constitutional: Negative for decreased appetite, malaise/fatigue and weight loss  Cardiovascular: Negative for chest pain  Respiratory: Positive for cough  Negative for shortness of breath  Musculoskeletal: Negative  Gastrointestinal: Positive for constipation  Negative for abdominal pain, diarrhea, dysphagia, nausea and vomiting  Psychiatric/Behavioral: Negative for altered mental status and depression  The patient does not have insomnia and is not nervous/anxious  Vital Signs    /70 (BP Location: Left arm, Cuff Size: Standard)   Pulse 76   Temp (!) 97 3 °F (36 3 °C) (Temporal)   Wt 75 7 kg (166 lb 12 8 oz)   SpO2 96%   BMI 24 63 kg/m²      Physical Exam and Objective Data  Physical Exam  Constitutional:       General: He is not in acute distress  Appearance: Normal appearance  He is not ill-appearing     HENT: Head: Normocephalic and atraumatic  Right Ear: External ear normal       Left Ear: External ear normal    Eyes:      Extraocular Movements: Extraocular movements intact  Cardiovascular:      Rate and Rhythm: Normal rate  Pulmonary:      Effort: Pulmonary effort is normal    Skin:     General: Skin is warm and dry  Neurological:      Mental Status: He is alert and oriented to person, place, and time  Psychiatric:         Mood and Affect: Mood normal          Behavior: Behavior normal          Thought Content: Thought content normal          Judgment: Judgment normal            Radiology and Laboratory:  I personally reviewed:  NM PET CT skull base to mid thigh    Status: Final result               PACS Images     Show images for NM PET CT skull base to mid thigh      Study Result    Result Text      PET/CT SCAN     INDICATION:  C34 11: Malignant neoplasm of upper lobe, right bronchus or lung   , staging     MODIFIER: PI     COMPARISON: CT 9/12/2022 and priors     CELL TYPE:  Metastatic Adenocarcinoma, right supraclavicular node biopsy 9/15/2022     TECHNIQUE:   8 3 mCi F-18-FDG administered IV  Multiplanar attenuation corrected and non attenuation corrected PET images were acquired 60 minutes post injection  Contiguous, low dose, axial CT sections were obtained from the vertex through the femurs   Intravenous contrast material was not utilized  This examination, like all CT scans performed in the [facility], was performed utilizing techniques to minimize radiation dose exposure, including the use of iterative reconstruction and   automated exposure control       Fasting serum glucose: 1:15 mg/dl     FINDINGS:      VISUALIZED BRAIN:     No acute abnormalities are seen      HEAD/NECK:     There is extensive bilateral hypermetabolic cervical adenopathy, extending superiorly on the right to the level of the hyoid, compatible with metastases    Adenopathy extends into the mediastinum      Example right posterior cervical kamilla mass at the level of the hyoid image 3/86 measures 1 3 x 1 2 cm, SUV 14 6  Node posterior to the left inferior thyroid pole image 3/107 measures 1 1 x 1 9 cm, SUV 16 4  Numerous additional hypermetabolic nodes are present      Mild subcutaneous activity just below the level of the right zygomatic arch, SUV 2 1, without discrete CT abnormality is nonspecific but may be inflammatory      CT images: Left maxillary sinus mucosal thickening      CHEST:     Large right upper lung mass extending to the right hilum demonstrates intense FDG activity, compatible with malignancy  SUV measures 17  This mass measures approximately 5 x 8 x 6 cm based on the PET images      There are scattered small bilateral lung nodules, the majority which are too small for accurate PET evaluation, but are most concerning for metastases  Example right middle anterior subpleural nodule image 3/168 measures 1 3 x 1 3 cm, SUV 7 5  Left lower lung nodule image 3/140 measures 0 9 x 0 7 cm, SUV 2 3      Multiple hypermetabolic mediastinal nodes are compatible with metastases, including paratracheal, subcarinal, superior and anterior mediastinal adenopathy  Example right paratracheal kamilla mass image 3/128 measures 2 3 x 3 4 cm, SUV 15 7  Node anterior to the right mainstem bronchus image 3/134 measures 2 3 x 2 2 cm, SUV 12 7      Moderate right pleural effusion with mild FDG activity, SUV 1 5, likely a malignant effusion      There are hypermetabolic deep right axillary nodes also compatible with metastases  Example node image 3/107 measures 1 5 x 1 cm, SUV 11  Adjacent node image 3/108 measures 1 1 x 1 3 cm, SUV 8 2      CT images: Right basilar volume loss  Coronary atherosclerosis  Gynecomastia      ABDOMEN:     No FDG avid soft tissue lesions are seen  CT images: Exophytic right upper renal cyst measures 5 8 x 6 cm  Cholelithiasis    Left pelvic kidney      PELVIS:   No FDG avid soft tissue lesions are seen  CT images: Right greater than left fat-containing inguinal hernias  Prostate calcifications      OSSEOUS STRUCTURES:  No FDG avid lesions are seen  CT images: Stable  Spine degenerative change      IMPRESSION:  1  Large hypermetabolic right upper lung mass extending to the right hilum most compatible with malignancy  Additional small bilateral lung nodules favoring metastases  2   Mediastinal, right axillary and right greater than left cervical metastatic adenopathy  3   No hypermetabolic metastases visualized in the abdomen pelvis  4   Mild subcutaneous FDG activity just below the level of the right zygomatic arch, SUV 2 1, without discrete CT abnormality is nonspecific but may be inflammatory  This may be correlated clinically                  MRI brain w wo contrast    Status: Final result       PACS Images     Show images for MRI brain w wo contrast    Study Result    Narrative & Impression   MRI BRAIN WITH AND WITHOUT CONTRAST     INDICATION: to r/o brain metastasis      COMPARISON:  None      TECHNIQUE:  Sagittal T1, axial T2, axial FLAIR, axial T1, axial [place], axial diffusion  Sagittal, axial T1 postcontrast   Axial bravo postcontrast with coronal reconstructions           IV Contrast:  7 mL of Gadobutrol injection (SINGLE-DOSE)      IMAGE QUALITY:   Diagnostic      FINDINGS:     BRAIN PARENCHYMA:  There is no discrete mass, mass effect or midline shift  There is no intracranial hemorrhage  Normal posterior fossa    Diffusion imaging is unremarkable        There are no white matter changes in the cerebral hemispheres      Postcontrast imaging of the brain demonstrates no abnormal enhancement      VENTRICLES:  Normal for the patient's age      SELLA AND PITUITARY GLAND:  Normal      ORBITS:  Normal      PARANASAL SINUSES:  Normal      VASCULATURE:  Evaluation of the major intracranial vasculature demonstrates appropriate flow voids      CALVARIUM AND SKULL BASE: Normal      EXTRACRANIAL SOFT TISSUES:  Normal      IMPRESSION:     No MR evidence of acute ischemia  No enhancing lesions to suggest metastatic disease      Workstation performed: [id number]          Imaging    MRI brain w wo contrast (Order: [id number]) - 9/12/2022    CT abdomen pelvis w contrast    Status: Final result       PACS Images     Show images for CT abdomen pelvis w contrast    Study Result    Narrative & Impression   CT ABDOMEN AND PELVIS WITH IV CONTRAST     INDICATION:   PE, has right lung mass      COMPARISON:  CT thorax September 10, 2020     TECHNIQUE:  CT examination of the abdomen and pelvis was performed  Axial, sagittal, and coronal 2D reformatted images were created from the source data and submitted for interpretation      Radiation dose length product (DLP) for this visit:  644 mGy-cm   This examination, like all CT scans performed in the [facility], was performed utilizing techniques to minimize radiation dose exposure, including the use of iterative   reconstruction and automated exposure control      IV Contrast:  100 mL of iohexol (OMNIPAQUE)  Enteric Contrast:  Enteric contrast was not administered      FINDINGS:     ABDOMEN     LOWER CHEST:  Moderate size right pleural effusion is seen with a small left pleural effusion  Compressive atelectasis versus infiltrate in the right lung base  Subpleural nodule in the right middle lobe measures 12 mm  Small pericardial effusion      LIVER/BILIARY TREE:  Area of low density along the subcapsular area of the right lobe of the liver series 2 image 9 likely related to a prominent diaphragmatic insertion      GALLBLADDER:  There are gallstone(s) within the gallbladder, without pericholecystic inflammatory changes      SPLEEN:  Unremarkable      PANCREAS:  Unremarkable      ADRENAL GLANDS:  Unremarkable      KIDNEYS/URETERS:  6 cm cyst upper pole right kidney  Other smaller right renal cysts  Pelvic left kidney    No hydronephrosis      STOMACH AND BOWEL:  Unremarkable      APPENDIX:  A normal appendix was visualized      ABDOMINOPELVIC CAVITY:  No ascites  No pneumoperitoneum  No lymphadenopathy      VESSELS:  Atherosclerotic changes are present  No evidence of aneurysm      PELVIS     REPRODUCTIVE ORGANS:  Unremarkable for patient's age      URINARY BLADDER:  Mild wall thickening of the urinary bladder  Correlate with urinalysis to exclude a cystitis        ABDOMINAL WALL/INGUINAL REGIONS:  Left inguinal hernia repair  Small fat-containing umbilical hernia      OSSEOUS STRUCTURES:  No acute fracture or destructive osseous lesion  Spinal degenerative changes are noted  Nonspecific punctate foci of bony sclerosis in the sacrum series 2 image 58 and left iliac bone series 2 image 60 which are nonspecific     IMPRESSION:        1  No metastatic disease to the abdomen or pelvis  2   Moderate size right pleural effusion with small left effusion, right lower lobe compressive atelectasis versus infiltrate and right middle lobe nodule suspicious for metastasis  3   Nonspecific punctate densities in the pelvic bones as described  4   Mild wall thickening of the urinary bladder  Correlate with urinalysis to exclude a urinary tract infection               Workstation performed: [id number]            40 minutes was spent face to face with Sid Needy with greater than 50% of the time spent in counseling or coordination of care including discussions of symptom management and introduction to palliative care  All of the patient's questions were answered during this discussion

## 2022-10-27 ENCOUNTER — PATIENT OUTREACH (OUTPATIENT)
Dept: HEMATOLOGY ONCOLOGY | Facility: CLINIC | Age: 77
End: 2022-10-27

## 2022-10-27 NOTE — PROGRESS NOTES
Subsequent Outreach: Called and spoke to patient  Reviewed upcoming appointments including date, time and location  Reminded him of his next Med onc appt scheduled on 11/22/22, as he was not aware of date/ time/ location  We spoke about activating his mychart  He just established care with palliative  Assessed for barriers of care  Denies symptoms of pain, chest pain, shortness of breath, or cough  His appetite is good  Denies nausea or vomiting  Occasional constipation, denies diarrhea  He is planning on starting oral therapy (Osimertinib 80mg), waiting for financial approval  I will f/u with team on status  We mutually agreed I would follow up with him in one week  Otherwise, I provided my direct phone number for questions or concerns moving forward  Patient was appreciative

## 2022-10-28 ENCOUNTER — PATIENT OUTREACH (OUTPATIENT)
Dept: CASE MANAGEMENT | Facility: HOSPITAL | Age: 77
End: 2022-10-28

## 2022-10-28 NOTE — PROGRESS NOTES
Biopsychosocial and Barriers Assessment  l  Cancer Diagnosis: Lung Cancer   Home/Cell Phone: [phone]   Emergency Contact: Samson Holstein (Spouse) [phone] (Mobile)  Marital Status:   Interpretation concerns, speaks another language, preferred language: English  Cultural concerns: No  Ability to read or write: Yes    Caregiver/Support: wife-Keila, and five children, and friends in [address] [place] [address] [address]  Children: five children  Child/Elder care: No    Housing: Two story house  Home Setup: Bedroom and full bath upstairs  Lives With: Wife- Sole Fuentes  Daily Living Activities: independent  Durable Medical Equipment: No  Ambulation: Independent     [facility] [place]  High co-pays with insurance: No  High co-pays with medication coverage: No  No medication coverage: No    Primary Care Provider: Steve Rose PA-C  Hx of Home Health Care: No   Hx of Short term rehab: No  Mental Health Hx: No  Substance Abuse Hx: No  Employment: Retired  [place] Status/Location: yes, [facility]  Ability to pay bills: yes  Patient is educated on cancer compassion funds  POA/LW/AD: no   Patient requested the advance directive form be mailed to his home  MSW mailed the form to patient's home address  Transportation Plan/Concerns:  Patient transports self  Patient is educated on [employer]  What do you know about your Cancer Diagnosis    What has your doctor told you about your cancer diagnosis: Lung Cancer    What has your doctor told you about your cancer treatment: pills    What specific concerns do you have about your diagnosis and treatment: no concerns    Have you been made aware of any hair loss associated with treatment: no     Additional Comments:    MSW phoned and spoke with patient  Patient states he has a supportive family  Patient states he is taking it one day at time  Patient states he takes medicaton for his lung cancer    Patient states his wife - Soel Fuentes  works at [employer] and he is retired  Patient states he has concerns about monies at times because he is on fixed income and his wife does not earn a high salary  MSW notified patient compassion funds is available if he is behind on his utility bills or rent/mortgage  Patient is appreciative  MSW mailed advance directive to patient's home per patient's request   MSW provided emotional support services  Patient is provided this MSW's phone number for future needs  MSW will close this case however will remain available

## 2022-10-29 NOTE — TELEPHONE ENCOUNTER
I attempted to call and no answer  Any further special testing needs to be ordered by his oncologist     Thank you    Carlo Cristin, DO

## 2022-11-03 ENCOUNTER — PATIENT OUTREACH (OUTPATIENT)
Dept: HEMATOLOGY ONCOLOGY | Facility: CLINIC | Age: 77
End: 2022-11-03

## 2022-11-03 DIAGNOSIS — C34.11 MALIGNANT NEOPLASM OF UPPER LOBE OF RIGHT LUNG (HCC): Primary | ICD-10-CM

## 2022-11-03 DIAGNOSIS — C34.11 MALIGNANT NEOPLASM OF UPPER LOBE OF RIGHT LUNG (HCC): ICD-10-CM

## 2022-11-03 DIAGNOSIS — I26.99 OTHER PULMONARY EMBOLISM WITHOUT ACUTE COR PULMONALE (HCC): ICD-10-CM

## 2022-11-03 RX ORDER — RIVAROXABAN 20 MG/1
TABLET, FILM COATED ORAL
Qty: 30 TABLET | Refills: 0 | Status: SHIPPED | OUTPATIENT
Start: 2022-11-03

## 2022-11-03 NOTE — PROGRESS NOTES
Subsequent Outreach: Called and spoke to patient  Reviewed upcoming appointments including date, time and location  Assessed for barriers of care  His appetite seems great  Denies symptoms of pain, chest pain, shortness of breath, or cough  His appetite is good  Denies nausea or vomiting  Denies constipation or diarrhea at this time  He received his prescription Tagrisso 80mg brand name and has started oral therapy as of today 11/3  We mutually agreed I would follow up with him in two weeks and see how his doing  Otherwise, I provided my direct phone number for questions or concerns moving forward  Patient was appreciative

## 2022-11-03 NOTE — TELEPHONE ENCOUNTER
Please forward script to [facility] PLAAILYN PERKINS,   Also please fax paper script to [facility] @ [phone]  Once script is attached to patient file they will contact to set up shipment  Osimertinib 80mg daily    Left vm on patient home phone after attempt with pateint mobile phone unable to leave vm

## 2022-11-04 DIAGNOSIS — I10 ESSENTIAL HYPERTENSION: ICD-10-CM

## 2022-11-04 RX ORDER — AMLODIPINE BESYLATE 10 MG/1
TABLET ORAL
Qty: 90 TABLET | Refills: 1 | Status: SHIPPED | OUTPATIENT
Start: 2022-11-04

## 2022-11-18 ENCOUNTER — APPOINTMENT (OUTPATIENT)
Dept: LAB | Facility: MEDICAL CENTER | Age: 77
End: 2022-11-18

## 2022-11-18 DIAGNOSIS — E21.3 HYPERPARATHYROIDISM (HCC): ICD-10-CM

## 2022-11-18 DIAGNOSIS — I10 BENIGN ESSENTIAL HTN: ICD-10-CM

## 2022-11-18 DIAGNOSIS — C61 ADENOCARCINOMA OF PROSTATE (HCC): ICD-10-CM

## 2022-11-18 DIAGNOSIS — E78.00 HYPERCHOLESTEROLEMIA: ICD-10-CM

## 2022-11-18 DIAGNOSIS — E11.9 CONTROLLED TYPE 2 DIABETES MELLITUS WITHOUT COMPLICATION, WITHOUT LONG-TERM CURRENT USE OF INSULIN (HCC): ICD-10-CM

## 2022-11-18 LAB
25(OH)D3 SERPL-MCNC: 18.3 NG/ML (ref 30–100)
ALBUMIN SERPL BCP-MCNC: 3.7 G/DL (ref 3.5–5)
ALP SERPL-CCNC: 96 U/L (ref 46–116)
ALT SERPL W P-5'-P-CCNC: 21 U/L (ref 12–78)
ANION GAP SERPL CALCULATED.3IONS-SCNC: 6 MMOL/L (ref 4–13)
AST SERPL W P-5'-P-CCNC: 15 U/L (ref 5–45)
BASOPHILS # BLD AUTO: 0.04 THOUSANDS/ÂΜL (ref 0–0.1)
BASOPHILS NFR BLD AUTO: 1 % (ref 0–1)
BILIRUB SERPL-MCNC: 1 MG/DL (ref 0.2–1)
BUN SERPL-MCNC: 19 MG/DL (ref 5–25)
CALCIUM SERPL-MCNC: 9.2 MG/DL (ref 8.3–10.1)
CHLORIDE SERPL-SCNC: 104 MMOL/L (ref 96–108)
CHOLEST SERPL-MCNC: 151 MG/DL
CO2 SERPL-SCNC: 29 MMOL/L (ref 21–32)
CREAT SERPL-MCNC: 1.1 MG/DL (ref 0.6–1.3)
EOSINOPHIL # BLD AUTO: 0.15 THOUSAND/ÂΜL (ref 0–0.61)
EOSINOPHIL NFR BLD AUTO: 2 % (ref 0–6)
ERYTHROCYTE [DISTWIDTH] IN BLOOD BY AUTOMATED COUNT: 13.2 % (ref 11.6–15.1)
EST. AVERAGE GLUCOSE BLD GHB EST-MCNC: 94 MG/DL
GFR SERPL CREATININE-BSD FRML MDRD: 64 ML/MIN/1.73SQ M
GLUCOSE P FAST SERPL-MCNC: 112 MG/DL (ref 65–99)
HBA1C MFR BLD: 4.9 %
HCT VFR BLD AUTO: 44 % (ref 36.5–49.3)
HDLC SERPL-MCNC: 73 MG/DL
HGB BLD-MCNC: 14.6 G/DL (ref 12–17)
IMM GRANULOCYTES # BLD AUTO: 0.02 THOUSAND/UL (ref 0–0.2)
IMM GRANULOCYTES NFR BLD AUTO: 0 % (ref 0–2)
LDLC SERPL CALC-MCNC: 64 MG/DL (ref 0–100)
LYMPHOCYTES # BLD AUTO: 0.69 THOUSANDS/ÂΜL (ref 0.6–4.47)
LYMPHOCYTES NFR BLD AUTO: 11 % (ref 14–44)
MCH RBC QN AUTO: 30.9 PG (ref 26.8–34.3)
MCHC RBC AUTO-ENTMCNC: 33.2 G/DL (ref 31.4–37.4)
MCV RBC AUTO: 93 FL (ref 82–98)
MONOCYTES # BLD AUTO: 0.52 THOUSAND/ÂΜL (ref 0.17–1.22)
MONOCYTES NFR BLD AUTO: 8 % (ref 4–12)
NEUTROPHILS # BLD AUTO: 4.95 THOUSANDS/ÂΜL (ref 1.85–7.62)
NEUTS SEG NFR BLD AUTO: 78 % (ref 43–75)
NONHDLC SERPL-MCNC: 78 MG/DL
NRBC BLD AUTO-RTO: 0 /100 WBCS
PLATELET # BLD AUTO: 119 THOUSANDS/UL (ref 149–390)
PMV BLD AUTO: 12.5 FL (ref 8.9–12.7)
POTASSIUM SERPL-SCNC: 3.7 MMOL/L (ref 3.5–5.3)
PROT SERPL-MCNC: 7.8 G/DL (ref 6.4–8.4)
PSA SERPL-MCNC: <0.1 NG/ML (ref 0–4)
PTH-INTACT SERPL-MCNC: 85 PG/ML (ref 18.4–80.1)
RBC # BLD AUTO: 4.72 MILLION/UL (ref 3.88–5.62)
SODIUM SERPL-SCNC: 139 MMOL/L (ref 135–147)
TRIGL SERPL-MCNC: 72 MG/DL
WBC # BLD AUTO: 6.37 THOUSAND/UL (ref 4.31–10.16)

## 2022-11-21 ENCOUNTER — PATIENT OUTREACH (OUTPATIENT)
Dept: HEMATOLOGY ONCOLOGY | Facility: CLINIC | Age: 77
End: 2022-11-21

## 2022-11-21 NOTE — PROGRESS NOTES
Hematology/Oncology Outpatient Office Note    Date of Service: 2022    [facility] HEMATOLOGY ONCOLOGY SPECIALISTS REINA Garcia  [facility]  [phone]    Reason for Consultation:   Chief Complaint   Patient presents with   • Follow-up     Cancer Stage at diagnosis: BETTE    Referral Physician: No ref  provider found    Primary Care Physician:  Sun Parr PA-C     Nickname: Gume Deaner    Spouse: Yulissa Pearson    Original ECO    Today's ECO     Goals and Barriers:  Current Goal: Minimize effects of disease burden, extend life  Barriers to accomplishing this: None    Patient's Capacity to Self Care:  Patient is able to self care and goes up and down his two story house    Code Status:  Not addressed today    Advanced directives: Not addressed today    ASSESSMENT & PLAN      Diagnosis ICD-10-CM Associated Orders   1  Malignant neoplasm of upper lobe of right lung (HCC)  C34 11       2  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 ([facility] [address] )  C77 8       3  Multiple lung nodules on CT  R91 8       4  Chemotherapy induced nausea and vomiting  R11 2     T45 1X5A             This is a 69 y o  c PMHx notable for DM, HLP, HTN, prostate cancer 2005 s/p RT, b/l PE, COVID-19, being seen in consultation for metastatic lung cancer    The patient has already had next generation sequencing testing in the form of Caris sent and we will follow-up on these results  Pleural fluid analysis of right pleural was negative for malignancy  Discussion of decision making  • Oncology history updated, accordingly, during this visit  • Goals of care/patient communication  o I discussed with the patient the clinical course leading up to their cancer diagnosis  I reviewed relevant office notes, imaging reports and pathology result as well   o I told the patient that this is a case of incurable disease and what this means   We discussed that the goal of anti-cancer therapy is to provide best quality of life, extend overall survival, and progression free survival as shown in clinical trials  We also discussed that there might be a point when the cancer will no longer respond to this anti-neoplastic therapy  As a result, we also discussed the role of the palliative care team being introduced early in the treatment course  We will be making this referral  o I explained the risks/benefits of the proposed cancer therapy: Elbridge Pickerel as well as Osimertinib and after discussion including understanding risks of possible life-threatening complications and therapy-related malignancy development, informed consent for blood products and treatment has been signed and obtained  • TNM/Staging At Diagnosis  Cancer Staging  Malignant neoplasm of upper lobe of right lung [facility])  Staging form: Lung, AJCC 8th Edition  - Clinical stage from 9/15/2022: Stage BETTE (cT4, cN3, cM1a) - Signed by Leonarda Shaffer MD on 10/10/2022  Stage prefix: Initial diagnosis  • Disease Features/Tumor Markers/Genetics  o Tumor Marker: n/a  o Notable Path Features:   9/15/2022: Lymph node, right supraclavicular biopsy: Metastatic adenocarcinoma of lung origin  Immunohistochemical stains performed with appropriate controls show the tumor to be positive for TTF1 and napsin and negative for CK5/6 and p40, supporting the diagnosis  9/15/2022 Caris: 4 muts/Mb, MSI-stable, PDL1 +1 CPS, EGFR exon 19  • Treatment: Osimertinib 80mg   • Other Supportive care:   • Treatment Team Members  o Surgeon: N/A  o Rad Onc: N/A  o Palliative: Sincere Barraza  • Labs:   o 9/17/2022: Hb 11 4, MCV 93, plt 249k, WBC 8 1k, creat 1 06  o 11/18/2022: Hgb 14 6, MCV 93, PLT 119K, diff acceptable  CMP acceptable - normal BUN, Cr  Vitamin D 18  3  • Diagnostics  9/12/2022 MRI Brain w/wo c: No MR evidence of acute ischemia   No enhancing lesions to suggest metastatic disease  9/30/2022 PET/CT: Large hypermetabolic right upper lung mass extending to the right hilum most compatible with malignancy  Additional small bilateral lung nodules favoring metastases  Mediastinal, right axillary and right greater than left cervical metastatic adenopathy  No hypermetabolic metastases visualized in the abdomen pelvis  Mild subcutaneous FDG activity just below the level of the right zygomatic arch, SUV 2 1, without discrete CT abnormality is nonspecific but may be inflammatory            Discussion of decision making    I personally reviewed the following lab results, the image studies, pathology, other specialty/physicians consult notes and recommendations, and outside medical records from [facility]  I had a lengthy discussion with the patient and shared the work-up findings  We discussed the diagnosis and management plan as below  I spent 20 minutes reviewing the records (labs, clinician notes, outside records, medical history, ordering medicine/tests/procedures, interpreting the imaging/labs previously done) and coordination of care as well as direct time with the patient today, of which greater than 50% of the time was spent in counseling and coordination of care with the patient/family  Thoughts on approach to systemic therapy in the first line and subsequent lines of therapy:    From an overall performance status basis, I believe Panchito Ba can tolerate systemic treatment due to an ECOG PS 0  We will treat him based on the [facility] trial which showed mOS 38 6 months  28% of patients were still taking the medicine after 3 years  15% of patients discontinued the medicine due to SAEs  Otherwise, if there is progression I would consider the below:     Several trials have focused on combining multiple chemotherapies with immunotherapy  A few are detailed below with the caveat that they enrolled primarily ECOG 0-1 (fit patients) with most patients being below the age of 76      [id number]: mOS 19 2 months, mPFS 8 3 months (excluded if they had untreated metastases of the central nervous system)  Good to use for Cher to penetrate CNS  Print this out (https://shea org/  org/doi/pdf/10 1056/AJMZav1005007)     KEYNOTE-024 study (PDL1>50%): clinical outcomes of pembrolizumab (single agent) was mPFS 10 3 months    It is ideal to incorporate immunotherapy + chemo to induce tumor necrosis and apoptosis  Immunotherapy leads to release of neoantigens and stimulates a robust immune response  Nice review of immunotherapy versus chemotherapy in NSCLC :  [email or web address] [email or web address]     KEYNOTE-407 (Squamous metastatic NSCLC) : Pembro + chemo mPFS was 6 4 months  Patients were excluded if they had symptomatic central nervous system metastases      KEYNOTE-189 (Non-squamous metastatic NSCLC) : Pembro + chemo mPFS was 8 8 months)  Excluded active CNS  However, "subjects with previously treated brain metastases may participate provided they are clinically stable for at least 2 weeks and, have no evidence of new or enlarging brain metastases and also are off steroids 3 days prior to dosing with study medication"  People that met this criteria? ?? Not known      Discussion of disease response monitoring: We discussed with patient at length the role of imaging and potential blood monitoring of burden of disease  We will opt to get CT chest, abdomen, pelvis with contrast every 3 months in the interim  To monitor response to therapy  · Plan/Recommendations  · Patient currently on Osimertinib 80mg once daily for stage [place] adenocarcinoma of lung  CBC, CMP to be done once monthly  · Restage CT CAP w/c scheduled for 1/10/2023  · Continue lifelong anticoagulation with Xarelto due to PE with underlying malignancy  · Patient was found to be low in vitamin-D with level at 18  PCP already arranged patient to start vitamin D3 1000 mg to 2000 mg once daily    · Continue F/U with palliative care   · Restaging CT CAP in 3 months    Follow Up:  Every 3 weeks while on systemic chemotherapy  Next follow up 12/15/22 with me & follow up with Dr iSmi Rodrigues 1/19/2023    All questions were answered to the patient's satisfaction during this encounter  The patient knows the contact information for our office and knows to reach out for any relevant concerns related to this encounter  They are to call for any temperature 100 4 or higher, new symptoms including but not restricted to shaking chills, decreased appetite, nausea, vomiting, diarrhea, increased fatigue, shortness of breath or chest pain, confusion, and not feeling the strength to come to the clinic  For all other listed problems and medical diagnosis in their chart - they are managed by PCP and/or other specialists, which the patient acknowledges  Thank you very much for your consultation and making us a part of this patient's care  We are continuing to follow closely with you  Please do not hesitate to reach out to me with any additional questions or concerns  Zafar Fields PA-C   Hematology & Medical Oncology         Disclaimer: This document was prepared using [employer] Fluency Direct technology  If a word or phrase is confusing, or does not make sense, this is likely due to recognition error which was not discovered during this clinician's review  If you believe an error has occurred, please contact me through [address] [place] line for kenya? cation  ONCOLOGY HISTORY OF PRESENT ILLNESS        Oncology History   Malignant neoplasm of upper lobe of right lung ([facility] [address] )   9/15/2022 -  Cancer Staged    Staging form: Lung, AJCC 8th Edition  - Clinical stage from 9/15/2022: Stage BETTE (cT4, cN3, cM1a) - Signed by Jonnie Parham MD on 10/10/2022  Stage prefix: Initial diagnosis       10/5/2022 Initial Diagnosis    Malignant neoplasm of upper lobe of right lung ([facility] [address] )       He had 3 weeks of very severe coughing last month      SUBJECTIVE  (INTERVAL HISTORY)      Clotting History B/l PE 2022   Bleeding History None   Cancer History Prostate cancer 2005 s/p RT, Lung cancer   Family Cancer History None   H/O Blood/Plt Transfusion None   Tobacco/etoh/drug abuse Denies nicotine use in the past, social etoh use       Cancer Screening history n/a   Occupation Retired , worked on [employer]         I have reviewed the relevant past medical, surgical, social and family history  I have also reviewed allergies and medications for this patient  Review of Systems    No pain  Baseline weight: 179 lbs    He has lost 17 lbs unintentionally since June 2022  No recent cough  Interval History 11/22/2022:  Patient came in for follow-up  He is tolerating treatment very well and offers no complaints in today's visit  He has no new headache, vision change, chest pain, shortness of breath, abdominal pain, nausea/vomiting/diarrhea, bleeding anywhere, rashes, hemoptysis  No changes in bowel habits  Appetite is improved  He states he believes his shortness of breath which was present in the past has actually improved  A 10-point review of system was performed, pertinent positive and negative were detailed as above  Otherwise, the 10-point review of system was negative  Past Medical History:   Diagnosis Date   • Diabetes mellitus ([facility] [address] )    • Hyperlipidemia    • Hypertension    • Prostate cancer ([facility] [address] ) 2005    S/P prostate ca-2005 had radiation tx         Past Surgical History:   Procedure Laterality Date   • COLONOSCOPY     • IR BIOPSY LYMPH NODE  9/15/2022   • IR THORACENTESIS  9/13/2022   • NV ARTHRODESIS,ANKLE,OPEN Right 7/10/2020    Procedure: ARTHRODESIS/ TIBIAL-TALAR ANKLE FUSION;  Surgeon: Yahaira Ellison MD;  Location: [facility];  Service: Orthopedics   • NV LAP,INGUINAL HERNIA REPR,INITIAL Bilateral 12/16/2021    Procedure: LAPAROSCOPIC REPAIR HERNIA INGUINAL WITH MESH;  Surgeon: Amanda Sandoval MD;  Location: [facility];  Service: General       Family History Problem Relation Age of Onset   • Heart attack Mother        Social History     Socioeconomic History   • Marital status: /Civil Union     Spouse name: Not on file   • Number of children: Not on file   • Years of education: Not on file   • Highest education level: Not on file   Occupational History   • Not on file   Tobacco Use   • Smoking status: Never   • Smokeless tobacco: Never   Vaping Use   • Vaping Use: Never used   Substance and Sexual Activity   • Alcohol use: Yes     Comment: Occasional   • Drug use: Never   • Sexual activity: Not on file   Other Topics Concern   • Not on file   Social History Narrative   • Not on file     Social Determinants of Health     Financial Resource Strain: Not on file   Food Insecurity: No Food Insecurity   • Worried About Running Out of Food in the Last Year: Never true   • Ran Out of Food in the Last Year: Never true   Transportation Needs: No Transportation Needs   • Lack of Transportation (Medical): No   • Lack of Transportation (Non-Medical):  No   Physical Activity: Not on file   Stress: Not on file   Social Connections: Not on file   Intimate Partner Violence: Not on file   Housing Stability: Low Risk    • Unable to Pay for Housing in the Last Year: No   • Number of Places Lived in the Last Year: 1   • Unstable Housing in the Last Year: No       No Known Allergies    Current Outpatient Medications   Medication Sig Dispense Refill   • Accu-Chek Jackie Plus test strip CHECK BLOOD SUGAR DAILY E11 9 100 strip 1   • ACCU-CHEK FASTCLIX LANCETS MISC by Does not apply route daily E11 9  Check  fbs  daily 100 each 3   • amLODIPine (NORVASC) 10 mg tablet TAKE 1 TABLET BY MOUTH EVERY DAY FOR BLOOD PRESSURE 90 tablet 1   • brimonidine tartrate 0 2 % ophthalmic solution Administer 1 drop into the left eye 2 (two) times a day     • cyclopentolate (CYCLOGYL) 1 % ophthalmic solution Administer 1 drop to both eyes once     • finasteride (PROSCAR) 5 mg tablet TAKE 1 TABLET (5 MG TOTAL) BY MOUTH DAILY  90 tablet 1   • lisinopril (ZESTRIL) 10 mg tablet TAKE 1 TABLET BY MOUTH EVERY DAY 90 tablet 1   • metFORMIN (GLUCOPHAGE-XR) 500 mg 24 hr tablet TAKE 1 TABLET BY MOUTH EVERY DAY 90 tablet 1   • metoprolol succinate (TOPROL-XL) 100 mg 24 hr tablet TAKE 1 TABLET BY MOUTH EVERY DAY 90 tablet 1   • ofloxacin (OCUFLOX) 0 3 % ophthalmic solution 1 drop 4 (four) times a day Start v2 days before surgery  Originally surgery scheduled 9/14/22     • ondansetron (Zofran ODT) 8 mg disintegrating tablet Take 1 tablet (8 mg total) by mouth every 8 (eight) hours as needed for nausea or vomiting 20 tablet 0   • Osimertinib Mesylate 80 MG TABS Take 1 tablet (80 mg total) by mouth in the morning 30 tablet 6   • Osimertinib Mesylate 80 MG TABS Take 1 tablet (80 mg total) by mouth in the morning 30 tablet 6   • prednisoLONE acetate (PRED FORTE) 1 % ophthalmic suspension      • Prolensa 0 07 % SOLN      • simvastatin (ZOCOR) 10 mg tablet TAKE 1 TABLET BY MOUTH EVERY DAY 90 tablet 1   • timolol (TIMOPTIC) 0 5 % ophthalmic solution INSTILL 1 DROP LEFT EYE EVERY MORNING     • timolol (TIMOPTIC-XE) 0 5 % ophthalmic gel-forming 1 drop daily     • travoprost (TRAVATAN-Z) 0 004 % ophthalmic solution 1 drop daily at bedtime     • Xarelto 20 MG tablet TAKE 1 TABLET BY MOUTH EVERY DAY WITH BREAKFAST 30 tablet 0     No current facility-administered medications for this visit  (Not in a hospital admission)      Objective:     24 Hour Vitals Assessment:     Vitals:    11/22/22 0834   BP: 156/86   Pulse: 72   Resp: 16   Temp: 97 5 °F (36 4 °C)   SpO2: 95%       PHYSICIAN EXAM:    General: Appearance: alert, cooperative, no distress  HEENT: Normocephalic, atraumatic  No scleral icterus  conjunctivae clear  EOMI  Chest: No tenderness to palpation  No open wound noted  Lungs: Respirations unlabored  Cardiac: Regular rate and rhythm, +S1and S2  Abdomen: Soft, non-tender, non-distended  Extremities:  No cyanosis, clubbing  Chronic pitting edema +1 in the R ankle from an old surgery/screws in the ankle  Skin: Skin color, turgor are normal  No rashes  Lymphatics: no palpable supra-cervical, axillary, or inguinal adenopathy  Neurologic: Awake, Alert, and oriented, no gross focal deficits noted b/l  DATA REVIEW:    Pathology Result:    Final Diagnosis   Date Value Ref Range Status   09/15/2022   Final    A  Lymph node, right supraclavicular biopsy:  -  Metastatic adenocarcinoma of lung origin  -  Immunohistochemical stains performed with appropriate controls show the tumor to be positive for TTF1 and napsin and negative for CK5/6 and p40, supporting the diagnosis  09/13/2022   Final    A  B  Pleural, Right,  (ThinPrep and cell block preparations): See Note  Negative for malignancy  Mesothelial cells, lymphocytes, histiocytes and neutrophils  Satisfactory for evaluation  Comment: Immunohistochemistry is performed on cell block to help in the assessment of this case  D2-40 highlights benign mesothelial cells and LINWOOD-EP4 and MOC-31 are negative, supporting the above diagnosis  07/07/2021   Final    A  Colon, ascending polyp, biopsy:  -  Tubular adenoma, negative for high-grade dysplasia  B  Colon, descending polyp, biopsy:  -  Hyperplastic polyp  Image Results:   Image result are reviewed and documented in Hematology/Oncology history    XR chest pa & lateral  Narrative: CHEST     INDICATION:   C34 11: Malignant neoplasm of upper lobe, right bronchus or lung  J90: Pleural effusion, not elsewhere classified  COMPARISON:  PET CT 9/30/2022  EXAM PERFORMED/VIEWS:  XR CHEST PA & LATERAL  DUAL ENERGY SUBTRACTION  FINDINGS:    Cardiomediastinal silhouette appears unremarkable  Redemonstration of right upper lobe mass and right middle lobe nodule  Trace right effusion  No pneumothorax  Osseous structures appear within normal limits for patient age    Impression: Right upper lobe mass and right middle lobe nodule  Trace right effusion  Workstation performed: [id number]      LABS:  Lab data are reviewed and documented in HemOnc history  Lab Results   Component Value Date    HGB 14 6 11/18/2022    HCT 44 0 11/18/2022    MCV 93 11/18/2022     (L) 11/18/2022    WBC 6 37 11/18/2022    NRBC 0 11/18/2022     Lab Results   Component Value Date    K 3 7 11/18/2022     11/18/2022    CO2 29 11/18/2022    BUN 19 11/18/2022    CREATININE 1 10 11/18/2022    GLUF 112 (H) 11/18/2022    CALCIUM 9 2 11/18/2022    CORRECTEDCA 9 7 09/10/2022    AST 15 11/18/2022    ALT 21 11/18/2022    ALKPHOS 96 11/18/2022    EGFR 64 11/18/2022       Lab Results   Component Value Date    IRON 27 (L) 09/10/2022    TIBC 117 (L) 09/10/2022    FERRITIN 579 (H) 09/10/2022       No results found for: [id number]    No results for input(s): WBC, CREAT in the last 72 hours      Invalid input(s):  PLT    By:  Kelle Chol, 11/22/2022, 8:54 AM Patent

## 2022-11-21 NOTE — PROGRESS NOTES
Subsequent outreach Attempt: Attempted to reach patient today to re-assess for any barriers to care and offer any supportive services needed  I was unable to leave a message as his VM box is full   I will try again at a later date/ time

## 2022-11-22 ENCOUNTER — OFFICE VISIT (OUTPATIENT)
Dept: HEMATOLOGY ONCOLOGY | Facility: CLINIC | Age: 77
End: 2022-11-22

## 2022-11-22 VITALS
HEART RATE: 72 BPM | DIASTOLIC BLOOD PRESSURE: 86 MMHG | WEIGHT: 163.5 LBS | OXYGEN SATURATION: 95 % | BODY MASS INDEX: 24.22 KG/M2 | TEMPERATURE: 97.5 F | RESPIRATION RATE: 16 BRPM | HEIGHT: 69 IN | SYSTOLIC BLOOD PRESSURE: 156 MMHG

## 2022-11-22 DIAGNOSIS — C34.11 MALIGNANT NEOPLASM OF UPPER LOBE OF RIGHT LUNG (HCC): Primary | ICD-10-CM

## 2022-11-22 DIAGNOSIS — R91.8 MULTIPLE LUNG NODULES ON CT: ICD-10-CM

## 2022-11-22 DIAGNOSIS — T45.1X5A CHEMOTHERAPY INDUCED NAUSEA AND VOMITING: ICD-10-CM

## 2022-11-22 DIAGNOSIS — R11.2 CHEMOTHERAPY INDUCED NAUSEA AND VOMITING: ICD-10-CM

## 2022-11-22 DIAGNOSIS — C77.8 MALIGNANT NEOPLASM METASTATIC TO LYMPH NODES OF MULTIPLE SITES (HCC): ICD-10-CM

## 2022-11-23 DIAGNOSIS — I10 ESSENTIAL HYPERTENSION: ICD-10-CM

## 2022-11-23 RX ORDER — LISINOPRIL 10 MG/1
TABLET ORAL
Qty: 90 TABLET | Refills: 1 | Status: SHIPPED | OUTPATIENT
Start: 2022-11-23

## 2022-11-29 ENCOUNTER — PATIENT OUTREACH (OUTPATIENT)
Dept: HEMATOLOGY ONCOLOGY | Facility: CLINIC | Age: 77
End: 2022-11-29

## 2022-11-30 ENCOUNTER — PATIENT OUTREACH (OUTPATIENT)
Dept: HEMATOLOGY ONCOLOGY | Facility: CLINIC | Age: 77
End: 2022-11-30

## 2022-11-30 NOTE — PROGRESS NOTES
Subsequent Outreach Attempt x2: Attempted to reach patient today to re-assess for any barriers to care and offer any supportive services needed  I was unable to leave a VM as voice mailbox is full  Will re-attempt to outreach at a later time/date

## 2022-12-01 ENCOUNTER — PATIENT OUTREACH (OUTPATIENT)
Dept: HEMATOLOGY ONCOLOGY | Facility: CLINIC | Age: 77
End: 2022-12-01

## 2022-12-01 NOTE — PROGRESS NOTES
Subsequent Outreach Attempt x3: Attempted to reach patient today to re-assess for any barriers to care and offer any supportive services needed  I was unable to leave a VM as voice mailbox is full  Will re-attempt to outreach at a later time/date     - Oral therapy: Tagrisso 80mg qd    - Sees Palliative routinely [facility] Berta Alcantara    -  Plan is for a CT chest, abdomen, pelvis with contrast every 3 months in the interim, to monitor response to therapy   (Scheduled 1/10/23)     - I will re-attempt to outreach after his f/u on 12/15/22

## 2022-12-08 ENCOUNTER — PATIENT OUTREACH (OUTPATIENT)
Dept: CASE MANAGEMENT | Facility: HOSPITAL | Age: 77
End: 2022-12-08

## 2022-12-08 NOTE — PROGRESS NOTES
MSW received patient's original completed and notarized advance directive in the mail  MSW had advance directive scanned in patient's chart  MSW mailed the original advance directive to patient's home address

## 2022-12-15 ENCOUNTER — TELEPHONE (OUTPATIENT)
Dept: HEMATOLOGY ONCOLOGY | Facility: CLINIC | Age: 77
End: 2022-12-15

## 2022-12-15 NOTE — TELEPHONE ENCOUNTER
Appointment Cancellation Or Reschedule     Person calling in Patient    If other than patient calling, are they listed on the communication consent form? na   Provider GUILLE BROWN [facility]   Office Visit Date and Time 12/15/22 @ 1pm   Office Visit Location [facility]   Did patient want to reschedule their office appointment? If so, when was it scheduled to? Yes 1/4/2023 @ 3pm   Did you have STAR scheduled for this appointment? no   Do you need STAR set up for your new appointment? If yes, please send to "PATIENT RIDESHARE" pool for STAR rescheduling no   If you are cancelling appointment, can we notify STAR to cancel ride? If yes, please send to "PATIENT RIDESHARE" pool for STAR to cancel service na   Is this patient calling to reschedule an infusion appointment? no   When is their next infusion appointment? na   Is this patient a Chemo patient? no   Reason for Cancellation or Reschedule Patient called to reschedule due to bad weather     If the patient is a treatment patient, please route this to the office nurse  If the patient is not on treatment, please route to the office MA  If the patient is a surgical oncology patient, please route to surg/onc clinical pool

## 2022-12-16 ENCOUNTER — PATIENT OUTREACH (OUTPATIENT)
Dept: HEMATOLOGY ONCOLOGY | Facility: CLINIC | Age: 77
End: 2022-12-16

## 2022-12-16 NOTE — PROGRESS NOTES
Subsequent Outreach Attempt x4: Attempted to reach patient today to re-assess for any barriers to care and offer any supportive services needed  I was unable to leave Vm as VM box is full  I will re-attempt to outreach at a later time/ date

## 2022-12-19 DIAGNOSIS — C34.11 MALIGNANT NEOPLASM OF UPPER LOBE OF RIGHT LUNG (HCC): ICD-10-CM

## 2022-12-20 ENCOUNTER — PATIENT OUTREACH (OUTPATIENT)
Dept: HEMATOLOGY ONCOLOGY | Facility: CLINIC | Age: 77
End: 2022-12-20

## 2022-12-20 NOTE — PROGRESS NOTES
Subsequent Outreach Attempt x5: Attempted to reach patient today to re-assess for any barriers to care and offer any supportive services needed  I was unable to leave Vm as VM box is full  I will re-attempt to outreach at a later time/ date

## 2022-12-21 ENCOUNTER — OFFICE VISIT (OUTPATIENT)
Dept: PALLIATIVE MEDICINE | Facility: CLINIC | Age: 77
End: 2022-12-21

## 2022-12-21 VITALS
BODY MASS INDEX: 24.34 KG/M2 | OXYGEN SATURATION: 97 % | DIASTOLIC BLOOD PRESSURE: 72 MMHG | RESPIRATION RATE: 18 BRPM | WEIGHT: 164.3 LBS | SYSTOLIC BLOOD PRESSURE: 126 MMHG | HEIGHT: 69 IN | TEMPERATURE: 97.9 F | HEART RATE: 74 BPM

## 2022-12-21 DIAGNOSIS — R91.8 MULTIPLE LUNG NODULES ON CT: ICD-10-CM

## 2022-12-21 DIAGNOSIS — C34.11 MALIGNANT NEOPLASM OF UPPER LOBE OF RIGHT LUNG (HCC): Primary | ICD-10-CM

## 2022-12-21 DIAGNOSIS — C77.8 MALIGNANT NEOPLASM METASTATIC TO LYMPH NODES OF MULTIPLE SITES (HCC): ICD-10-CM

## 2022-12-21 DIAGNOSIS — Z51.5 PALLIATIVE CARE PATIENT: ICD-10-CM

## 2022-12-21 NOTE — PROGRESS NOTES
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 [facility])       Other    Multiple lung nodules on CT   Other Visit Diagnoses     Nausea        Palliative care patient            · Malignant neoplasm of upper lobe of right lung- continue to follow with Oncology for management and treamtnet  · 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- continue to follow with Oncology for management and treatment  · Multiple lung nodules on CT- repeat CT scan in January 2023  Continue to follow with Oncology for management and treatment  · Palliative care patient- stable  Continue to monitor symptoms  · Psychosocial support- supportive listening provided    Medications adjusted this encounter:  Requested Prescriptions      No prescriptions requested or ordered in this encounter     No orders of the defined types were placed in this encounter  There are no discontinued medications  Sena Hatch was seen today for symptoms and planning cares related to above illnesses  I have reviewed the patient's controlled substance dispensing history in the Prescription Drug Monitoring Program in compliance with the [facility] regulations before prescribing any controlled substances  They are invited to continue to follow with us  If there are questions or concerns, please contact us through our clinic/answering service 24 hours a day, seven days a week  RADHA Gallo  [facility]        Visit Information    Accompanied By: No one    Source of History: Self    History Limitations: None      Chief Complaint  No chief complaint on file      Copied from Oncology 11/22/2022  ONCOLOGY HISTORY OF PRESENT ILLNESS             Oncology History   Malignant neoplasm of upper lobe of right lung ([facility] [address] )   9/15/2022 -  Cancer Staged     Staging form: Lung, AJCC 8th Edition  - Clinical stage from 9/15/2022: Stage BETTE (cT4, cN3, cM1a) - Signed by Isabell Merchant MD on 10/10/2022  Stage prefix: Initial diagnosis         10/5/2022 Initial Diagnosis     Malignant neoplasm of upper lobe of right lung ([facility] [address] )         He had 3 weeks of very severe coughing last month  History of Present Illness      Sena Hatch is a 69 y o  male who presents in follow up of symptoms related to malignant neoplasm of upper lobe of right lung, metastatic to lymph nodes, multiple sites  Pertinent issues include: symptom management  Patient presents to the office alone, in good spirits, states is doing well, tolerating oral treatment  He denies pain, states he has a good appetite, denies nausea or vomiting  Denies weakness or fatigue, enjoys walking his dog 3 times per day  He will be spending the holidays with family  Past medical, surgical, social, and family histories are reviewed and pertinent updates are made  Review of Systems   Constitutional: Negative for malaise/fatigue  Cardiovascular: Negative for chest pain  Respiratory: Negative for cough and shortness of breath  Musculoskeletal: Negative  Gastrointestinal: Negative for constipation, diarrhea, dysphagia, nausea and vomiting  Psychiatric/Behavioral: Negative  Vital Signs  Pulse 74   Temp 97 9 °F (36 6 °C) (Temporal)   Resp 18   Ht 5' 9" (1 753 m)   Wt 74 5 kg (164 lb 4 8 oz)   SpO2 97%   BMI 24 26 kg/m²      Physical Exam and Objective Data  Physical Exam  Vitals and nursing note reviewed  Constitutional:       General: He is not in acute distress  Appearance: Normal appearance  He is not ill-appearing     HENT:      Right Ear: External ear normal       Left Ear: External ear normal    Eyes:      Extraocular Movements: Extraocular movements intact  Cardiovascular:      Rate and Rhythm: Normal rate  Pulmonary:      Effort: Pulmonary effort is normal    Skin:     General: Skin is warm and dry  Neurological:      Mental Status: He is alert and oriented to person, place, and time  Psychiatric:         Mood and Affect: Mood normal          Behavior: Behavior normal          Thought Content: Thought content normal          Judgment: Judgment normal            Radiology and Laboratory:  No new studies for review    40 minutes was spent face to face with Shayy Karine with greater than 50% of the time spent in counseling or coordination of care including discussions of symptom management and psychosocial support  All of the patient's or agent's questions were answered during this discussion

## 2023-01-01 ENCOUNTER — TELEPHONE (OUTPATIENT)
Dept: LAB | Facility: HOSPITAL | Age: 78
End: 2023-01-01

## 2023-01-03 DIAGNOSIS — Z91.041 CONTRAST MEDIA ALLERGY: Primary | ICD-10-CM

## 2023-01-03 RX ORDER — METHYLPREDNISOLONE 32 MG/1
TABLET ORAL
Qty: 2 TABLET | Refills: 0 | Status: SHIPPED | OUTPATIENT
Start: 2023-01-03

## 2023-01-03 RX ORDER — DIPHENHYDRAMINE HCL 50 MG
CAPSULE ORAL
Qty: 1 CAPSULE | Refills: 0 | Status: SHIPPED | OUTPATIENT
Start: 2023-01-03

## 2023-01-04 ENCOUNTER — APPOINTMENT (OUTPATIENT)
Dept: LAB | Facility: HOSPITAL | Age: 78
End: 2023-01-04

## 2023-01-10 ENCOUNTER — HOSPITAL ENCOUNTER (OUTPATIENT)
Dept: CT IMAGING | Facility: HOSPITAL | Age: 78
Discharge: HOME | End: 2023-01-10
Attending: INTERNAL MEDICINE

## 2023-01-10 DIAGNOSIS — C34.11 MALIGNANT NEOPLASM OF UPPER LOBE OF RIGHT LUNG (HCC): ICD-10-CM

## 2023-01-10 RX ADMIN — IOHEXOL 100 ML: 350 INJECTION, SOLUTION INTRAVENOUS at 15:31

## 2023-01-11 NOTE — PROGRESS NOTES
Hematology/Oncology Outpatient Office Note    Date of Service: 2023    [facility] HEMATOLOGY ONCOLOGY SPECIALISTS REINA Garcia  [facility]  [phone]    Reason for Consultation:   Chief Complaint   Patient presents with   • Follow-up     12 week follow-up with CT SCAN     Cancer Stage at diagnosis: BETTE    Referral Physician: No ref  provider found    Primary Care Physician:  Jenna Carvajal PA-C     Nickname: Jayne Salgado    Spouse: Breanne Baum    Original ECO    Today's ECO     Goals and Barriers:  Current Goal: Minimize effects of disease burden, extend life  Barriers to accomplishing this: None    Patient's Capacity to Self Care:  Patient is able to self care and goes up and down his two story house    Code Status:  Not addressed today    Advanced directives: Not addressed today    ASSESSMENT & PLAN      Diagnosis ICD-10-CM Associated Orders   1  Malignant neoplasm of upper lobe of right lung [facility])  C34 11             This is a 69 y o  c PMHx notable for DM, HLP, HTN, prostate cancer 2005 s/p RT, b/l PE, COVID-19, being seen in consultation for metastatic lung cancer    The patient has already had next generation sequencing testing in the form of Caris sent and we will follow-up on these results  Pleural fluid analysis of right pleural was negative for malignancy  Discussion of decision making  • Oncology history updated, accordingly, during this visit  • Goals of care/patient communication  o I discussed with the patient the clinical course leading up to their cancer diagnosis  I reviewed relevant office notes, imaging reports and pathology result as well   o I told the patient that this is a case of incurable disease and what this means  We discussed that the goal of anti-cancer therapy is to provide best quality of life, extend overall survival, and progression free survival as shown in clinical trials   We also discussed that there might be a point when the cancer will no longer respond to this anti-neoplastic therapy  As a result, we also discussed the role of the palliative care team being introduced early in the treatment course  We will be making this referral  o I explained the risks/benefits of the proposed cancer therapy: Talia Felt as well as Osimertinib and after discussion including understanding risks of possible life-threatening complications and therapy-related malignancy development, informed consent for blood products and treatment has been signed and obtained  • TNM/Staging At Diagnosis  Cancer Staging  Malignant neoplasm of upper lobe of right lung [facility])  Staging form: Lung, AJCC 8th Edition  - Clinical stage from 9/15/2022: Stage BETTE (cT4, cN3, cM1a) - Signed by Nimisha Cardenas MD on 10/10/2022  Stage prefix: Initial diagnosis  • Disease Features/Tumor Markers/Genetics  o Tumor Marker: 11/18/2022: PSA <0 1  o Notable Path Features:   9/15/2022: Lymph node, right supraclavicular biopsy: Metastatic adenocarcinoma of lung origin  Immunohistochemical stains performed with appropriate controls show the tumor to be positive for TTF1 and napsin and negative for CK5/6 and p40, supporting the diagnosis  9/15/2022 Caris: 4 muts/Mb, MSI-stable, PDL1 +1 CPS, EGFR exon 19  • Treatment: Osimertinib 80mg   • Other Supportive care:   • Treatment Team Members  o Surgeon: N/A  o Rad Onc: N/A  o Palliative: Zara Rodriguez  • Labs:   o 9/17/2022: Hb 11 4, MCV 93, plt 249k, WBC 8 1k, creat 1 06  o 11/18/2022: Hgb 14 6, MCV 93, PLT 119K, diff acceptable  CMP acceptable - normal BUN, Cr  Vitamin D 18  3  • Diagnostics  9/12/2022 MRI Brain w/wo c: No MR evidence of acute ischemia  No enhancing lesions to suggest metastatic disease  9/30/2022 PET/CT: Large hypermetabolic right upper lung mass extending to the right hilum most compatible with malignancy    Additional small bilateral lung nodules favoring metastases  Mediastinal, right axillary and right greater than left cervical metastatic adenopathy  No hypermetabolic metastases visualized in the abdomen pelvis  Mild subcutaneous FDG activity just below the level of the right zygomatic arch, SUV 2 1, without discrete CT abnormality is nonspecific but may be inflammatory      1/10/2023 CT CAP w/c: 1/10/2023 CT CAP w/c  excellent NH  Treatment response with decrease in the size of the right upper lobe lung (to 2 7 x 3 3 cm down from 6 x 4 9 cm, abuts the mediastinal pleura  Decrease in the size of the mediastinal LN down from 2 6 to 1 3 cm  Resolution of the previously noted the pulmonary emboli  No new measurable lung nodule seen  No new abdominopelvic lymphadenopathy or new visceral metastatic         Discussion of decision making    I personally reviewed the following lab results, the image studies, pathology, other specialty/physicians consult notes and recommendations, and outside medical records from [facility]  I had a lengthy discussion with the patient and shared the work-up findings  We discussed the diagnosis and management plan as below  I spent 33 minutes reviewing the records (labs, clinician notes, outside records, medical history, ordering medicine/tests/procedures, interpreting the imaging/labs previously done) and coordination of care as well as direct time with the patient today, of which greater than 50% of the time was spent in counseling and coordination of care with the patient/family  Thoughts on approach to systemic therapy in the first line and subsequent lines of therapy:    From an overall performance status basis, I believe Shanon Mccurdy can tolerate systemic treatment due to an ECOG PS 0  We will treat him based on the [facility] trial which showed mOS 38 6 months  28% of patients were still taking the medicine after 3 years  15% of patients discontinued the medicine due to SAEs       Otherwise, if there is progression I would consider the below: Several trials have focused on combining multiple chemotherapies with immunotherapy  A few are detailed below with the caveat that they enrolled primarily ECOG 0-1 (fit patients) with most patients being below the age of 76  [id number]: mOS 19 2 months, mPFS 8 3 months (excluded if they had untreated metastases of the central nervous system)  Good to use for Cher to penetrate CNS  Print this out (https://shea org/  org/doi/pdf/10 1056/GZMDpu0917135)     KEYNOTE-024 study (PDL1>50%): clinical outcomes of pembrolizumab (single agent) was mPFS 10 3 months    It is ideal to incorporate immunotherapy + chemo to induce tumor necrosis and apoptosis  Immunotherapy leads to release of neoantigens and stimulates a robust immune response  Nice review of immunotherapy versus chemotherapy in NSCLC :  [email or web address] [email or web address]     KEYNOTE-407 (Squamous metastatic NSCLC) : Pembro + chemo mPFS was 6 4 months  Patients were excluded if they had symptomatic central nervous system metastases      KEYNOTE-189 (Non-squamous metastatic NSCLC) : Pembro + chemo mPFS was 8 8 months)  Excluded active CNS  However, "subjects with previously treated brain metastases may participate provided they are clinically stable for at least 2 weeks and, have no evidence of new or enlarging brain metastases and also are off steroids 3 days prior to dosing with study medication"  People that met this criteria? ?? Not known      Discussion of disease response monitoring: We discussed with patient at length the role of imaging and potential blood monitoring of burden of disease  We will opt to get CT chest, abdomen, pelvis with contrast every 3 months in the interim  To monitor response to therapy  · Plan/Recommendations  · Patient currently on Osimertinib 80mg once daily for stage BETTE adenocarcinoma of lung   CBC, CMP to be done in 3 months  · Continue lifelong anticoagulation with Xarelto due to PE with underlying malignancy  · Patient was found to be low in vitamin-D with level at 18  PCP already arranged patient to be on vitamin D3 2000 mg once daily  · Continue F/U with palliative care   · Restaging CT CAP in 3 months    Follow Up:  3 months    All questions were answered to the patient's satisfaction during this encounter  The patient knows the contact information for our office and knows to reach out for any relevant concerns related to this encounter  They are to call for any temperature 100 4 or higher, new symptoms including but not restricted to shaking chills, decreased appetite, nausea, vomiting, diarrhea, increased fatigue, shortness of breath or chest pain, confusion, and not feeling the strength to come to the clinic  For all other listed problems and medical diagnosis in their chart - they are managed by PCP and/or other specialists, which the patient acknowledges  Thank you very much for your consultation and making us a part of this patient's care  We are continuing to follow closely with you  Please do not hesitate to reach out to me with any additional questions or concerns  Chencho Juan MD   Staff, Hematology & Medical Oncology         Disclaimer: This document was prepared using [employer] Fluency Direct technology  If a word or phrase is confusing, or does not make sense, this is likely due to recognition error which was not discovered during this clinician's review  If you believe an error has occurred, please contact me through [address] [place] line for kenya? cation        ONCOLOGY HISTORY OF PRESENT ILLNESS        Oncology History   Malignant neoplasm of upper lobe of right lung ([facility] [address] )   9/15/2022 -  Cancer Staged    Staging form: Lung, AJCC 8th Edition  - Clinical stage from 9/15/2022: Stage BETTE (cT4, cN3, cM1a) - Signed by Chencho Juan MD on 10/10/2022  Stage prefix: Initial diagnosis       10/5/2022 Initial Diagnosis    Malignant neoplasm of upper lobe of right lung ([facility] [address] ) 11/3/2022: Tagrisso 80mg started      SUBJECTIVE  (INTERVAL HISTORY)      Clotting History B/l PE 2022   Bleeding History None   Cancer History Prostate cancer 2005 s/p RT, Lung cancer   Family Cancer History None   H/O Blood/Plt Transfusion None   Tobacco/etoh/drug abuse Denies nicotine use in the past, social etoh use       Cancer Screening history n/a   Occupation Retired , worked on [employer]         I have reviewed the relevant past medical, surgical, social and family history  I have also reviewed allergies and medications for this patient  Review of Systems    No pain  Baseline weight: 179 lbs    He has lost 17 lbs unintentionally since June 2022  No recent cough  Interval History   Patient came in for follow-up  He has no new headache, vision change, chest pain, shortness of breath, abdominal pain, nausea/vomiting/diarrhea, bleeding anywhere, rashes, hemoptysis  No changes in bowel habits  Appetite is good  No SOB a this time  Able to walk up and down the hill several times a day as he walks his dog  A 10-point review of system was performed, pertinent positive and negative were detailed as above  Otherwise, the 10-point review of system was negative  Past Medical History:   Diagnosis Date   • Diabetes mellitus ([facility] [address] )    • Hyperlipidemia    • Hypertension    • Prostate cancer ([facility] [address] ) 2005    S/P prostate ca-2005 had radiation tx         Past Surgical History:   Procedure Laterality Date   • COLONOSCOPY     • IR BIOPSY LYMPH NODE  9/15/2022   • IR THORACENTESIS  9/13/2022   • VA ARTHRODESIS ANKLE OPEN Right 7/10/2020    Procedure: ARTHRODESIS/ TIBIAL-TALAR ANKLE FUSION;  Surgeon: Jairon Aponte MD;  Location: [facility];  Service: Orthopedics   • VA LAPAROSCOPY SURG RPR INITIAL INGUINAL HERNIA Bilateral 12/16/2021    Procedure: LAPAROSCOPIC REPAIR HERNIA INGUINAL WITH MESH;  Surgeon: Nithya Hahn MD;  Location: [facility];  Service: General Family History   Problem Relation Age of Onset   • Heart attack Mother        Social History     Socioeconomic History   • Marital status: /Civil Union     Spouse name: Not on file   • Number of children: Not on file   • Years of education: Not on file   • Highest education level: Not on file   Occupational History   • Not on file   Tobacco Use   • Smoking status: Never   • Smokeless tobacco: Never   Vaping Use   • Vaping Use: Never used   Substance and Sexual Activity   • Alcohol use: Yes     Comment: Occasional   • Drug use: Never   • Sexual activity: Not on file   Other Topics Concern   • Not on file   Social History Narrative   • Not on file     Social Determinants of Health     Financial Resource Strain: Not on file   Food Insecurity: No Food Insecurity   • Worried About Running Out of Food in the Last Year: Never true   • Ran Out of Food in the Last Year: Never true   Transportation Needs: No Transportation Needs   • Lack of Transportation (Medical): No   • Lack of Transportation (Non-Medical):  No   Physical Activity: Not on file   Stress: Not on file   Social Connections: Not on file   Intimate Partner Violence: Not on file   Housing Stability: Low Risk    • Unable to Pay for Housing in the Last Year: No   • Number of Places Lived in the Last Year: 1   • Unstable Housing in the Last Year: No       No Known Allergies    Current Outpatient Medications   Medication Sig Dispense Refill   • Accu-Chek Jackie Plus test strip CHECK BLOOD SUGAR DAILY E11 9 100 strip 1   • ACCU-CHEK FASTCLIX LANCETS MISC by Does not apply route daily E11 9  Check  fbs  daily 100 each 3   • amLODIPine (NORVASC) 10 mg tablet TAKE 1 TABLET BY MOUTH EVERY DAY FOR BLOOD PRESSURE 90 tablet 1   • apixaban (Eliquis) 5 mg Take 1 tablet (5 mg total) by mouth 2 (two) times a day 60 tablet 1   • brimonidine tartrate 0 2 % ophthalmic solution Administer 1 drop into the left eye 2 (two) times a day     • cyclopentolate (CYCLOGYL) 1 % ophthalmic solution Administer 1 drop to both eyes once     • diphenhydrAMINE (BENADRYL) 50 mg capsule Take 1 capsule 1 hour prior to contrast study 1 capsule 0   • finasteride (PROSCAR) 5 mg tablet TAKE 1 TABLET (5 MG TOTAL) BY MOUTH DAILY  90 tablet 1   • lisinopril (ZESTRIL) 10 mg tablet TAKE 1 TABLET BY MOUTH EVERY DAY 90 tablet 1   • metFORMIN (GLUCOPHAGE-XR) 500 mg 24 hr tablet TAKE 1 TABLET BY MOUTH EVERY DAY 90 tablet 1   • methylPREDNISolone (MEDROL) 32 MG tablet Take 1 tablet by mouth 12 hours prior to contrast study and then 2 hours prior to contrast study 2 tablet 0   • metoprolol succinate (TOPROL-XL) 100 mg 24 hr tablet TAKE 1 TABLET BY MOUTH EVERY DAY 90 tablet 1   • ofloxacin (OCUFLOX) 0 3 % ophthalmic solution 1 drop 4 (four) times a day Start v2 days before surgery  Originally surgery scheduled 9/14/22     • ondansetron (Zofran ODT) 8 mg disintegrating tablet Take 1 tablet (8 mg total) by mouth every 8 (eight) hours as needed for nausea or vomiting 20 tablet 0   • Osimertinib Mesylate 80 MG TABS Take 1 tablet (80 mg total) by mouth in the morning 30 tablet 6   • prednisoLONE acetate (PRED FORTE) 1 % ophthalmic suspension      • Prolensa 0 07 % SOLN      • simvastatin (ZOCOR) 10 mg tablet TAKE 1 TABLET BY MOUTH EVERY DAY 90 tablet 1   • timolol (TIMOPTIC) 0 5 % ophthalmic solution INSTILL 1 DROP LEFT EYE EVERY MORNING     • timolol (TIMOPTIC-XE) 0 5 % ophthalmic gel-forming 1 drop daily     • travoprost (TRAVATAN-Z) 0 004 % ophthalmic solution 1 drop daily at bedtime     • Osimertinib Mesylate 80 MG TABS Take 1 tablet (80 mg total) by mouth in the morning 30 tablet 0     No current facility-administered medications for this visit         (Not in a hospital admission)      Objective:     24 Hour Vitals Assessment:     Vitals:    01/19/23 1331   BP: 132/80   Pulse: 86   Resp: 18   Temp: (!) 97 4 °F (36 3 °C)   SpO2: 96%       Weight (last 2 days)     Date/Time Weight    01/19/23 1331 73 9 (163) PHYSICIAN EXAM:    General: Appearance: alert, cooperative, no distress  HEENT: Normocephalic, atraumatic  No scleral icterus  conjunctivae clear  EOMI  Chest: No tenderness to palpation  No open wound noted  Lungs: Respirations unlabored  Cardiac: Regular rate and rhythm, +S1and S2  Abdomen: Soft, non-tender, non-distended  Extremities:  No cyanosis, clubbing  Chronic pitting edema +1 in the R ankle from an old surgery/screws in the ankle  Skin: Skin color, turgor are normal  No rashes  Lymphatics: no palpable supra-cervical, axillary, or inguinal adenopathy  Neurologic: Awake, Alert, and oriented, no gross focal deficits noted b/l  DATA REVIEW:    Pathology Result:    Final Diagnosis   Date Value Ref Range Status   09/15/2022   Final    A  Lymph node, right supraclavicular biopsy:  -  Metastatic adenocarcinoma of lung origin  -  Immunohistochemical stains performed with appropriate controls show the tumor to be positive for TTF1 and napsin and negative for CK5/6 and p40, supporting the diagnosis  09/13/2022   Final    A  B  Pleural, Right,  (ThinPrep and cell block preparations): See Note  Negative for malignancy  Mesothelial cells, lymphocytes, histiocytes and neutrophils  Satisfactory for evaluation  Comment: Immunohistochemistry is performed on cell block to help in the assessment of this case  D2-40 highlights benign mesothelial cells and LINWOOD-EP4 and MOC-31 are negative, supporting the above diagnosis  07/07/2021   Final    A  Colon, ascending polyp, biopsy:  -  Tubular adenoma, negative for high-grade dysplasia  B  Colon, descending polyp, biopsy:  -  Hyperplastic polyp  Image Results:   Image result are reviewed and documented in Hematology/Oncology history    CT chest abdomen pelvis w contrast  Narrative: CT CHEST, ABDOMEN AND PELVIS WITH IV CONTRAST    INDICATION:   C34 11: Malignant neoplasm of upper lobe, right bronchus or lung      COMPARISON:  CT from September 10, 2022, 6 PET scan from September 30, 2022    TECHNIQUE: CT examination of the chest, abdomen and pelvis was performed  Axial, sagittal, and coronal 2D reformatted images were created from the source data and submitted for interpretation  Radiation dose length product (DLP) for this visit:  655 mGy-cm   This examination, like all CT scans performed in the [facility], was performed utilizing techniques to minimize radiation dose exposure, including the use of iterative   reconstruction and automated exposure control  IV Contrast:  100 mL of iohexol (OMNIPAQUE)  Enteric Contrast: Enteric contrast was administered  FINDINGS:    CHEST    LUNGS:  The previously noted right upper lobe mass, smaller  Measuring 2 7 x 3 2 cm, previously measuring 6 x 4 9 cm, abuts the mediastinal pleura  Left lower lobe lung nodule, image 72 series 604, stable  A left lower lobe lung nodule measuring about 3 mm, image 43 series 2, image 76 series 604, stable  Additional nonmeasurable nodule/granuloma seen in image 78 series 604, left upper lobe  A perifissural nodule seen in the right lower lung measuring about 6 mm, stable  A pleuroparenchymal nodular density seen in the right middle lobe, image 53 series 2 measuring about 8 mm, smaller  Previously measuring about 1 5 cm  No new measurable lung nodule seen  PLEURA:  Small right effusion seen  Mild basilar pleural thickening on the left side    HEART/GREAT VESSELS: Heart is unremarkable for patient's age  No thoracic aortic aneurysm  Ascending aorta measures 3 7    MEDIASTINUM AND FAUSTO:  No new mediastinal lymphadenopathy seen  The previously noted lower right paratracheal lymph node measures 1 3 cm, previously measuring 2 6 cm  Calcified subcarinal lymph nodes, smaller  Prevascular lymph nodes: Nonmeasurable  Upper right paratracheal lymph nodes are nonmeasurable  CHEST WALL AND LOWER NECK:  Unremarkable      ABDOMEN    LIVER/BILIARY TREE:  No focal liver lesion seen    GALLBLADDER:  Cholelithiasis is seen    SPLEEN:  Unremarkable  PANCREAS:  Unremarkable  ADRENAL GLANDS:  Unremarkable  KIDNEYS/URETERS:  Right renal cyst seen  There is atrophic left kidney    STOMACH AND BOWEL:  Moderate amount of fecal debris in the colon seen    Nonspecific thickening of the cecum probably due to underdistention, stable  APPENDIX:  No findings to suggest appendicitis  ABDOMINOPELVIC CAVITY:  No ascites  No pneumoperitoneum  No lymphadenopathy  VESSELS:  Celiac trunk, SMA are patent MIGUEL  PELVIS    REPRODUCTIVE ORGANS:  Prostate calcification seen    URINARY BLADDER:  Mild bladder wall thickening seen    ABDOMINAL WALL/INGUINAL REGIONS:  Umbilical hernia containing fat seen    OSSEOUS STRUCTURES: Sclerotic foci seen within the right sacral ala, left hip: History of the left iliac bone, right iliac bone, stable   Impression: Treatment response with decrease in the size of the right upper lobe lung with decrease in the size of the mediastinal    Resolution of the previously noted the pulmonary emboli  No new measurable lung nodule seen    No new abdominopelvic lymphadenopathy or new visceral metastatic    Routine surveillance can be continued    Workstation performed: [id number]      LABS:  Lab data are reviewed and documented in HemOnc history         Lab Results   Component Value Date    HGB 13 5 01/04/2023    HCT 39 8 01/04/2023    MCV 90 01/04/2023     (L) 01/04/2023    WBC 5 78 01/04/2023    NRBC 0 01/04/2023     Lab Results   Component Value Date    K 3 8 01/04/2023     01/04/2023    CO2 29 01/04/2023    BUN 22 01/04/2023    CREATININE 1 19 01/04/2023    GLUF 112 (H) 11/18/2022    CALCIUM 8 7 01/04/2023    CORRECTEDCA 9 7 09/10/2022    AST 15 01/04/2023    ALT 16 01/04/2023    ALKPHOS 90 01/04/2023    EGFR 58 01/04/2023       Lab Results   Component Value Date    IRON 27 (L) 09/10/2022    TIBC 117 (L) 09/10/2022    FERRITIN 579 (H) 09/10/2022 No results found for: [id number]    No results for input(s): WBC, CREAT in the last 72 hours      Invalid input(s):  PLT    By:  Sumaya Kinney, 1/19/2023, 1:36 PM

## 2023-01-12 ENCOUNTER — TELEPHONE (OUTPATIENT)
Dept: FAMILY MEDICINE CLINIC | Facility: CLINIC | Age: 78
End: 2023-01-12

## 2023-01-12 DIAGNOSIS — I26.99 OTHER ACUTE PULMONARY EMBOLISM WITHOUT ACUTE COR PULMONALE (HCC): Primary | ICD-10-CM

## 2023-01-12 NOTE — TELEPHONE ENCOUNTER
spoke  With   Pt   michela  Is  500 00 $    New  Year  And  deductable  Not  Met  Will try  eliquis   5  Mg  Bid  H/o  Of  PE  In  September

## 2023-01-12 NOTE — TELEPHONE ENCOUNTER
Patient is out of his xarelto and went to fill it at [facility] in [place] and it is now not covered by insurance and is $500  Is there something else he can take?

## 2023-01-13 ENCOUNTER — PATIENT OUTREACH (OUTPATIENT)
Dept: HEMATOLOGY ONCOLOGY | Facility: CLINIC | Age: 78
End: 2023-01-13

## 2023-01-13 NOTE — PROGRESS NOTES
Subsequent Outreach: Called finally was able to get a hold of patient  Reviewed upcoming appointments including date, time and location  Assessed for barriers of care  Denies symptoms of pain, chest pain, shortness of breath, or cough  Appetite is good  Denies nausea or vomiting  Denies constipation or diarrhea  Patient continues to see palliative care closely  He states he feels good overall and tolerating oral chemo well  We mutually agreed for me to follow up with him in about 3 weeks or so  Otherwise, he has my direct phone number for questions or concerns  Patient was appreciative

## 2023-01-17 NOTE — TELEPHONE ENCOUNTER
spoke  With   Patients  Completed  4  Months  Of xaralto   eliquis and  xaralto cost  too  Much  Next  Option  Is  Coumadin  However,  Pt  had  Ct  Of  His  Lungs , abd and  Pelvis  Which  Shows  Resolution  Of  Pulmonary  Embolism  Will hold  Off  anticoag therapy  at this  Time  Pt  Has  F/u  With   Oncology this  Week  Javier Donis

## 2023-01-19 ENCOUNTER — OFFICE VISIT (OUTPATIENT)
Dept: HEMATOLOGY ONCOLOGY | Facility: CLINIC | Age: 78
End: 2023-01-19

## 2023-01-19 VITALS
RESPIRATION RATE: 18 BRPM | WEIGHT: 163 LBS | TEMPERATURE: 97.4 F | HEIGHT: 69 IN | BODY MASS INDEX: 24.14 KG/M2 | DIASTOLIC BLOOD PRESSURE: 80 MMHG | SYSTOLIC BLOOD PRESSURE: 132 MMHG | OXYGEN SATURATION: 96 % | HEART RATE: 86 BPM

## 2023-01-19 DIAGNOSIS — C34.11 MALIGNANT NEOPLASM OF UPPER LOBE OF RIGHT LUNG (HCC): Primary | ICD-10-CM

## 2023-01-24 ENCOUNTER — OFFICE VISIT (OUTPATIENT)
Dept: FAMILY MEDICINE CLINIC | Facility: CLINIC | Age: 78
End: 2023-01-24

## 2023-01-24 VITALS
TEMPERATURE: 97.7 F | DIASTOLIC BLOOD PRESSURE: 84 MMHG | HEIGHT: 69 IN | OXYGEN SATURATION: 98 % | SYSTOLIC BLOOD PRESSURE: 130 MMHG | HEART RATE: 73 BPM | WEIGHT: 166 LBS | BODY MASS INDEX: 24.59 KG/M2

## 2023-01-24 DIAGNOSIS — I26.99 OTHER ACUTE PULMONARY EMBOLISM WITHOUT ACUTE COR PULMONALE (HCC): ICD-10-CM

## 2023-01-24 DIAGNOSIS — E78.00 HYPERCHOLESTEROLEMIA: ICD-10-CM

## 2023-01-24 DIAGNOSIS — E11.9 CONTROLLED TYPE 2 DIABETES MELLITUS WITHOUT COMPLICATION, WITHOUT LONG-TERM CURRENT USE OF INSULIN (HCC): Primary | ICD-10-CM

## 2023-01-24 DIAGNOSIS — I10 BENIGN ESSENTIAL HTN: ICD-10-CM

## 2023-01-24 DIAGNOSIS — R35.1 BPH ASSOCIATED WITH NOCTURIA: ICD-10-CM

## 2023-01-24 DIAGNOSIS — N40.1 BPH ASSOCIATED WITH NOCTURIA: ICD-10-CM

## 2023-01-24 DIAGNOSIS — E21.3 HYPERPARATHYROIDISM (HCC): ICD-10-CM

## 2023-01-24 DIAGNOSIS — C34.11 MALIGNANT NEOPLASM OF UPPER LOBE OF RIGHT LUNG (HCC): ICD-10-CM

## 2023-01-24 NOTE — PROGRESS NOTES
Diabetic Foot Exam    Patient's shoes and socks removed  Right Foot/Ankle   Right Foot Inspection  Skin Exam: skin intact, dry skin, callus and callus  No ulcer  Toe Exam: right toe deformity  Sensory   Vibration: intact  Monofilament testing: intact    Vascular  The right DP pulse is 2+  Left Foot/Ankle  Left Foot Inspection  Skin Exam: skin intact, dry skin and callus  Sensory   Monofilament testing: intact    Vascular  The left DP pulse is 2+  Assign Risk Category  Deformity present  No loss of protective sensation  No weak pulses  Risk: 0  Assessment/Plan:     Diagnoses and all orders for this visit:    Controlled type 2 diabetes mellitus without complication, without long-term current use of insulin ([facility])  Comments:  Diabetes is at goal continue current regimen  Orders:  -     Hemoglobin A1C; Future  -     Microalbumin / creatinine urine ratio; Future    Malignant neoplasm of upper lobe of right lung [facility])  Comments:  Patient under care of oncology  On oral chemotherapy    Other acute pulmonary embolism without acute cor pulmonale ([facility])  Comments:  Currently off anticoagulation  We will try patient assistance programs    Benign essential HTN  Comments:  Blood pressure is at goal continue current regimen    Hypercholesterolemia  Comments:  Continue statin therapy and low-fat diet  Orders:  -     Lipid panel; Future    Hyperparathyroidism ([facility] [address] )    BPH associated with nocturia  Comments:  Continue finasteride          Subjective:      Patient ID: Kendal Kelley is a 69 y o  male  Patient presents in the office for follow-up chronic conditions  Patient had an insulin diabetes mellitus type 2 without complication  His current regimen includes metformin  mg a day  Last A1c was normal   Blood pressure control he is on metoprolol  mg, lisinopril 10 mg and amlodipine milligrams  3 of hyperparathyroidism asymptomatic  Lipids he is on simvastatin 10 mg    BPH he is on finasteride 5 mg   Last PSA was normal   History of lung cancer  Currently on oral chemotherapy  He is under care of otology  History of pulmonary embolism which has solved by CT scan  Patient will need lifelong anticoagulation due to underlying latency  Patient unable to afford Eliquis or alto  He does not want to be on Coumadin    We will see if we canget patient approved for patient assistant program       The following portions of the patient's history were reviewed and updated as appropriate:   He   Patient Active Problem List    Diagnosis Date Noted   • Multiple lung nodules on CT 10/06/2022   • 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 ([facility] [address] ) 10/06/2022   • Pleural effusion 10/06/2022   • Malignant neoplasm of upper lobe of right lung [facility])    • Other pulmonary embolism without acute cor pulmonale ([address] ) 09/10/2022   • Pulmonary mass 09/10/2022   • Anemia 09/10/2022   • Non-recurrent bilateral inguinal hernia without obstruction or gangrene 11/01/2021   • Status post right tibial-talar ankle fusion 07/13/2020   • BPH associated with nocturia 08/21/2019   • Arthritis of right acromioclavicular joint 03/15/2019   • Primary osteoarthritis of right shoulder 03/07/2019   • Chronic left shoulder pain 03/07/2019   • Left rotator cuff tear arthropathy 03/07/2019   • Rotator cuff injury, right, initial encounter 03/07/2019   • History of colon polyps 01/09/2019   • Hyperparathyroidism ([facility] [address] ) 11/06/2018   • Hypokalemia 10/22/2018   • Hypocalcemia 10/22/2018   • Glaucoma 10/11/2018   • Controlled type 2 diabetes mellitus without complication, without long-term current use of insulin ([facility] [address] ) 07/25/2016   • Inguinal hernia 02/08/2016   • Benign essential HTN 01/12/2016   • Hypercholesterolemia 01/12/2016     Current Outpatient Medications   Medication Sig Dispense Refill   • Accu-Chek Jackie Plus test strip CHECK BLOOD SUGAR DAILY E11 9 100 strip 1   • ACCU-CHEK FASTCLIX LANCETS MISC by Does not apply route daily E11 9  Check  fbs  daily 100 each 3   • amLODIPine (NORVASC) 10 mg tablet TAKE 1 TABLET BY MOUTH EVERY DAY FOR BLOOD PRESSURE 90 tablet 1   • brimonidine tartrate 0 2 % ophthalmic solution Administer 1 drop into the left eye 2 (two) times a day     • cyclopentolate (CYCLOGYL) 1 % ophthalmic solution Administer 1 drop to both eyes once     • diphenhydrAMINE (BENADRYL) 50 mg capsule Take 1 capsule 1 hour prior to contrast study 1 capsule 0   • finasteride (PROSCAR) 5 mg tablet TAKE 1 TABLET (5 MG TOTAL) BY MOUTH DAILY  90 tablet 1   • lisinopril (ZESTRIL) 10 mg tablet TAKE 1 TABLET BY MOUTH EVERY DAY 90 tablet 1   • metFORMIN (GLUCOPHAGE-XR) 500 mg 24 hr tablet TAKE 1 TABLET BY MOUTH EVERY DAY 90 tablet 1   • methylPREDNISolone (MEDROL) 32 MG tablet Take 1 tablet by mouth 12 hours prior to contrast study and then 2 hours prior to contrast study 2 tablet 0   • metoprolol succinate (TOPROL-XL) 100 mg 24 hr tablet TAKE 1 TABLET BY MOUTH EVERY DAY 90 tablet 1   • ofloxacin (OCUFLOX) 0 3 % ophthalmic solution 1 drop 4 (four) times a day Start v2 days before surgery   Originally surgery scheduled 9/14/22     • ondansetron (Zofran ODT) 8 mg disintegrating tablet Take 1 tablet (8 mg total) by mouth every 8 (eight) hours as needed for nausea or vomiting 20 tablet 0   • Osimertinib Mesylate 80 MG TABS Take 1 tablet (80 mg total) by mouth in the morning 30 tablet 6   • prednisoLONE acetate (PRED FORTE) 1 % ophthalmic suspension      • Prolensa 0 07 % SOLN      • simvastatin (ZOCOR) 10 mg tablet TAKE 1 TABLET BY MOUTH EVERY DAY 90 tablet 1   • timolol (TIMOPTIC) 0 5 % ophthalmic solution INSTILL 1 DROP LEFT EYE EVERY MORNING     • timolol (TIMOPTIC-XE) 0 5 % ophthalmic gel-forming 1 drop daily     • travoprost (TRAVATAN-Z) 0 004 % ophthalmic solution 1 drop daily at bedtime     • apixaban (Eliquis) 5 mg Take 1 tablet (5 mg total) by mouth 2 (two) times a day (Patient not taking: Reported on 1/24/2023) 60 tablet 1   • Osimertinib Mesylate 80 MG TABS Take 1 tablet (80 mg total) by mouth in the morning 30 tablet 0     No current facility-administered medications for this visit  He has No Known Allergies       Review of Systems   Constitutional: Negative for activity change, appetite change, chills, fatigue and fever  HENT: Negative for ear pain and sore throat  Eyes: Negative for visual disturbance  Respiratory: Negative for cough and shortness of breath  Cardiovascular: Negative for chest pain, palpitations and leg swelling  Gastrointestinal: Negative for abdominal pain, blood in stool, constipation, diarrhea and nausea  Genitourinary: Negative for difficulty urinating  Musculoskeletal: Negative for arthralgias, back pain and myalgias  Skin: Negative for rash  Neurological: Negative for dizziness, syncope and headaches  Psychiatric/Behavioral: Negative for sleep disturbance  Objective:        Physical Exam  Vitals and nursing note reviewed  Constitutional:       Appearance: Normal appearance  He is well-developed  He is not ill-appearing  HENT:      Head: Normocephalic and atraumatic  Comments: Bilateral  Hearing  aids     Right Ear: External ear normal       Left Ear: External ear normal       Mouth/Throat:      Pharynx: No posterior oropharyngeal erythema  Eyes:      Conjunctiva/sclera: Conjunctivae normal       Pupils: Pupils are equal, round, and reactive to light  Neck:      Thyroid: No thyromegaly  Vascular: No carotid bruit  Comments: Right  Clavicular  Node  Is  gone  Cardiovascular:      Rate and Rhythm: Normal rate and regular rhythm  Pulses: no weak pulses          Dorsalis pedis pulses are 2+ on the right side and 2+ on the left side  Heart sounds: Normal heart sounds  No murmur heard  Pulmonary:      Effort: Pulmonary effort is normal       Breath sounds: Normal breath sounds  No wheezing     Abdominal:      General: Bowel sounds are normal       Palpations: Abdomen is soft  There is no mass  Tenderness: There is no abdominal tenderness  Musculoskeletal:      Right lower leg: No edema  Left lower leg: No edema  Feet:      Right foot:      Skin integrity: Callus and dry skin present  No ulcer  Left foot:      Skin integrity: Callus and dry skin present  Lymphadenopathy:      Cervical: No cervical adenopathy  Skin:     General: Skin is warm and dry  Neurological:      General: No focal deficit present  Mental Status: He is alert and oriented to person, place, and time  Psychiatric:         Mood and Affect: Mood normal          Behavior: Behavior normal          Thought Content:  Thought content normal          Judgment: Judgment normal

## 2023-02-16 ENCOUNTER — PATIENT OUTREACH (OUTPATIENT)
Dept: HEMATOLOGY ONCOLOGY | Facility: CLINIC | Age: 78
End: 2023-02-16

## 2023-02-16 NOTE — PROGRESS NOTES
Subsequent Outreach Attempt x1: Attempted to reach patient today to re-assess for any barriers to care and offer any supportive services needed  I was unable to leave Vm as VM box is full  I will re-attempt to outreach at a later time/ date

## 2023-02-17 ENCOUNTER — PATIENT OUTREACH (OUTPATIENT)
Dept: HEMATOLOGY ONCOLOGY | Facility: CLINIC | Age: 78
End: 2023-02-17

## 2023-02-17 NOTE — PROGRESS NOTES
Subsequent Outreach Attempt x2: Attempted to reach patient today to re-assess for any barriers to care and offer any supportive services needed  Left VM in detail to give me a callback at my direct number [phone]  Otherwise, I will re-attempt to outreach at a later time/ date

## 2023-02-22 ENCOUNTER — PATIENT OUTREACH (OUTPATIENT)
Dept: HEMATOLOGY ONCOLOGY | Facility: CLINIC | Age: 78
End: 2023-02-22

## 2023-02-22 NOTE — PROGRESS NOTES
Subsequent Outreach Attempt x3: Attempted to reach patient today to re-assess for any barriers to care and offer any supportive services needed  I was unable to leave Vm as VM box is full  I will re-attempt to outreach at a later time/ date

## 2023-02-27 ENCOUNTER — OFFICE VISIT (OUTPATIENT)
Dept: PALLIATIVE MEDICINE | Facility: CLINIC | Age: 78
End: 2023-02-27

## 2023-02-27 VITALS
WEIGHT: 163 LBS | HEART RATE: 74 BPM | BODY MASS INDEX: 24.07 KG/M2 | TEMPERATURE: 97.3 F | SYSTOLIC BLOOD PRESSURE: 160 MMHG | DIASTOLIC BLOOD PRESSURE: 80 MMHG | OXYGEN SATURATION: 96 %

## 2023-02-27 DIAGNOSIS — Z51.5 PALLIATIVE CARE PATIENT: ICD-10-CM

## 2023-02-27 DIAGNOSIS — R91.8 MULTIPLE LUNG NODULES ON CT: ICD-10-CM

## 2023-02-27 DIAGNOSIS — C77.8 MALIGNANT NEOPLASM METASTATIC TO LYMPH NODES OF MULTIPLE SITES (HCC): ICD-10-CM

## 2023-02-27 DIAGNOSIS — C34.11 MALIGNANT NEOPLASM OF UPPER LOBE OF RIGHT LUNG (HCC): Primary | ICD-10-CM

## 2023-02-27 NOTE — PROGRESS NOTES
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 [facility])       Other    Multiple lung nodules on CT   Other Visit Diagnoses     Palliative care patient              • Malignant neoplasm of upper lobe of right lung- continue to follow with Oncology for management and treamtnet  • 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- continue to follow with Oncology for management and treatment  • Multiple lung nodules on CT- repeat CT scan chest- treatment response with decrease in the size of right upper lobe lung with decrease in the size of the mediastinal  • Palliative care patient- stable  Continue to monitor symptoms  • Psychosocial support- supportive listening provided    Medications adjusted this encounter:  Requested Prescriptions      No prescriptions requested or ordered in this encounter     No orders of the defined types were placed in this encounter  There are no discontinued medications  Kendal Kelley was seen today for symptoms and planning cares related to above illnesses  I have reviewed the patient's controlled substance dispensing history in the Prescription Drug Monitoring Program in compliance with the [facility] regulations before prescribing any controlled substances  They are invited to continue to follow with us  If there are questions or concerns, please contact us through our clinic/answering service 24 hours a day, seven days a week      RADHA Mcintyre  [facility] Palliative and Supportive Care  [phone]      Visit Information    Accompanied By: No one    Source of History: Self    History Limitations: None        Chief Complaint  Chief Complaint   Patient presents with   • Follow-up   • Pain       History of Present Illness      Emily Phan is a 69 y o  male who presents in follow up of symptoms related to malignant neoplasm of upper lobe of right lung, metastatic to lymph nodes, multiple sites  Pertinent issues include: pain, neoplasm related  Patient states he has mild low back pain, 2/10, that he may have slept the wrong way  Denies chest pain, shortness of breath  Appetite is fair, no nausea or vomiting, diarrhea or constipation  Past medical, surgical, social, and family histories are reviewed and pertinent updates are made  Review of Systems   Constitutional: Negative for malaise/fatigue  Cardiovascular: Negative for chest pain  Respiratory: Negative for shortness of breath  Musculoskeletal: Positive for back pain  Gastrointestinal: Negative for abdominal pain, constipation, diarrhea, nausea and vomiting  Vital Signs    /80 (BP Location: Left arm, Cuff Size: Standard)   Pulse 74   Temp (!) 97 3 °F (36 3 °C) (Temporal)   Wt 73 9 kg (163 lb)   SpO2 96%   BMI 24 07 kg/m²     Physical Exam and Objective Data  Physical Exam  Vitals reviewed  Constitutional:       General: He is not in acute distress  HENT:      Head: Normocephalic and atraumatic  Eyes:      Extraocular Movements: Extraocular movements intact  Cardiovascular:      Rate and Rhythm: Normal rate  Pulmonary:      Effort: Pulmonary effort is normal    Skin:     General: Skin is warm and dry  Neurological:      Mental Status: He is alert and oriented to person, place, and time  Psychiatric:         Mood and Affect: Mood normal          Behavior: Behavior normal          Thought Content:  Thought content normal          Judgment: Judgment normal            Radiology and Laboratory:  CT chest abdomen pelvis w contrast  Status: Final result     PACS Images     Show images for CT chest abdomen pelvis w contrast  Study Result    Narrative & Impression   CT CHEST, ABDOMEN AND PELVIS WITH IV CONTRAST     INDICATION:   C34 11: Malignant neoplasm of upper lobe, right bronchus or lung      COMPARISON:  CT from September 10, 2022, 6 PET scan from September 30, 2022     TECHNIQUE: CT examination of the chest, abdomen and pelvis was performed  Axial, sagittal, and coronal 2D reformatted images were created from the source data and submitted for interpretation      Radiation dose length product (DLP) for this visit:  655 mGy-cm   This examination, like all CT scans performed in the [facility], was performed utilizing techniques to minimize radiation dose exposure, including the use of iterative   reconstruction and automated exposure control      IV Contrast:  100 mL of iohexol (OMNIPAQUE)  Enteric Contrast: Enteric contrast was administered      FINDINGS:     CHEST     LUNGS:  The previously noted right upper lobe mass, smaller  Measuring 2 7 x 3 2 cm, previously measuring 6 x 4 9 cm, abuts the mediastinal pleura  Left lower lobe lung nodule, image 72 series 604, stable  A left lower lobe lung nodule measuring about 3 mm, image 43 series 2, image 76 series 604, stable  Additional nonmeasurable nodule/granuloma seen in image 78 series 604, left upper lobe  A perifissural nodule seen in the right lower lung measuring about 6 mm, stable  A pleuroparenchymal nodular density seen in the right middle lobe, image 53 series 2 measuring about 8 mm, smaller  Previously measuring about 1 5 cm  No new measurable lung nodule seen  PLEURA:  Small right effusion seen  Mild basilar pleural thickening on the left side     HEART/GREAT VESSELS: Heart is unremarkable for patient's age  No thoracic aortic aneurysm   Ascending aorta measures 3 7     MEDIASTINUM AND FAUSTO:  No new mediastinal lymphadenopathy seen  The previously noted lower right paratracheal lymph node measures 1 3 cm, previously measuring 2 6 cm  Calcified subcarinal lymph nodes, smaller  Prevascular lymph nodes: Nonmeasurable  Upper right paratracheal lymph nodes are nonmeasurable  CHEST WALL AND LOWER NECK:  Unremarkable      ABDOMEN     LIVER/BILIARY TREE:  No focal liver lesion seen     GALLBLADDER:  Cholelithiasis is seen     SPLEEN:  Unremarkable      PANCREAS:  Unremarkable      ADRENAL GLANDS:  Unremarkable      KIDNEYS/URETERS:  Right renal cyst seen  There is atrophic left kidney     STOMACH AND BOWEL:  Moderate amount of fecal debris in the colon seen     Nonspecific thickening of the cecum probably due to underdistention, stable  APPENDIX:  No findings to suggest appendicitis      ABDOMINOPELVIC CAVITY:  No ascites  No pneumoperitoneum  No lymphadenopathy      VESSELS:  Celiac trunk, SMA are patent MIGUEL      PELVIS     REPRODUCTIVE ORGANS:  Prostate calcification seen     URINARY BLADDER:  Mild bladder wall thickening seen     ABDOMINAL WALL/INGUINAL REGIONS:  Umbilical hernia containing fat seen     OSSEOUS STRUCTURES: Sclerotic foci seen within the right sacral ala, left hip: History of the left iliac bone, right iliac bone, stable      IMPRESSION:     Treatment response with decrease in the size of the right upper lobe lung with decrease in the size of the mediastinal     Resolution of the previously noted the pulmonary emboli  No new measurable lung nodule seen     No new abdominopelvic lymphadenopathy or new visceral metastatic     Routine surveillance can be continued     Workstation performed: [id number]       28 minutes was spent reviewing chart and  face to face with Ricardo Louie with greater than 50% of the time spent in counseling or coordination of care including discussions of symptom management  All of the patient's or agent's questions were answered during this discussion

## 2023-03-01 ENCOUNTER — PATIENT OUTREACH (OUTPATIENT)
Dept: HEMATOLOGY ONCOLOGY | Facility: CLINIC | Age: 78
End: 2023-03-01

## 2023-03-01 NOTE — PROGRESS NOTES
Subsequent Outreach Attempt x4: Attempted to reach patient today to re-assess for any barriers to care and offer any supportive services needed  I was unable to leave VM as VM box continues to be full  He recently saw palliative care and he continue to f/u with them  I will re-attempt to outreach at a later time/ date

## 2023-03-15 ENCOUNTER — PATIENT OUTREACH (OUTPATIENT)
Dept: HEMATOLOGY ONCOLOGY | Facility: CLINIC | Age: 78
End: 2023-03-15

## 2023-03-15 NOTE — PROGRESS NOTES
Called Pito Davison and he said the assistance program wants his most recent tax return   He is having taxes done tonight and he will mail a copy with his application tomorrow

## 2023-03-15 NOTE — PROGRESS NOTES
Subsequent Outreach: Called and was able to speak with patient after multiple previous attempts  He states, he does not know how to go in and delete his VM  We reviewed upcoming appointments including date, time and location  Assessed for barriers of care  He states he is tolerating oral chemotherapy well  Denies symptoms of pain, chest pain, shortness of breath, or cough  Appetite is good  Denies nausea or vomiting  Denies constipation or diarrhea  He continues to follow up with palliative care routinely  He noted, he can't afford Eliquis or Xarlto  Patient was seen by his PCP on 1/24/23, noted to possibly see if patient may qualify for a patient assistant program  I don't see any follow up notes  Will loop in PCP to have staff follow up  He states, he has been taking two baby aspirin daily  We mutually agreed for me to f/u with him in about a month  Otherwise, he has my direct phone number for questions or concerns  Patient was appreciative

## 2023-03-25 DIAGNOSIS — I10 ESSENTIAL HYPERTENSION: ICD-10-CM

## 2023-03-25 DIAGNOSIS — E11.9 TYPE 2 DIABETES MELLITUS WITHOUT COMPLICATION, WITHOUT LONG-TERM CURRENT USE OF INSULIN (HCC): ICD-10-CM

## 2023-03-27 RX ORDER — METOPROLOL SUCCINATE 100 MG/1
TABLET, EXTENDED RELEASE ORAL
Qty: 90 TABLET | Refills: 0 | Status: SHIPPED | OUTPATIENT
Start: 2023-03-27

## 2023-03-27 RX ORDER — METFORMIN HYDROCHLORIDE 500 MG/1
TABLET, EXTENDED RELEASE ORAL
Qty: 90 TABLET | Refills: 0 | Status: SHIPPED | OUTPATIENT
Start: 2023-03-27

## 2023-04-01 ENCOUNTER — APPOINTMENT (OUTPATIENT)
Dept: LAB | Facility: MEDICAL CENTER | Age: 78
End: 2023-04-01

## 2023-04-01 DIAGNOSIS — C34.11 MALIGNANT NEOPLASM OF UPPER LOBE OF RIGHT LUNG (HCC): ICD-10-CM

## 2023-04-01 DIAGNOSIS — E78.00 HYPERCHOLESTEROLEMIA: ICD-10-CM

## 2023-04-01 DIAGNOSIS — E11.9 CONTROLLED TYPE 2 DIABETES MELLITUS WITHOUT COMPLICATION, WITHOUT LONG-TERM CURRENT USE OF INSULIN (HCC): ICD-10-CM

## 2023-04-01 LAB
ALBUMIN SERPL BCP-MCNC: 3.6 G/DL (ref 3.5–5)
ALP SERPL-CCNC: 88 U/L (ref 46–116)
ALT SERPL W P-5'-P-CCNC: 16 U/L (ref 12–78)
ANION GAP SERPL CALCULATED.3IONS-SCNC: 3 MMOL/L (ref 4–13)
AST SERPL W P-5'-P-CCNC: 12 U/L (ref 5–45)
BASOPHILS # BLD AUTO: 0.02 THOUSANDS/ÂΜL (ref 0–0.1)
BASOPHILS NFR BLD AUTO: 0 % (ref 0–1)
BILIRUB SERPL-MCNC: 0.59 MG/DL (ref 0.2–1)
BUN SERPL-MCNC: 18 MG/DL (ref 5–25)
CALCIUM SERPL-MCNC: 8.9 MG/DL (ref 8.3–10.1)
CHLORIDE SERPL-SCNC: 108 MMOL/L (ref 96–108)
CHOLEST SERPL-MCNC: 144 MG/DL
CO2 SERPL-SCNC: 30 MMOL/L (ref 21–32)
CREAT SERPL-MCNC: 1.11 MG/DL (ref 0.6–1.3)
CREAT UR-MCNC: 56.3 MG/DL
EOSINOPHIL # BLD AUTO: 0.13 THOUSAND/ÂΜL (ref 0–0.61)
EOSINOPHIL NFR BLD AUTO: 2 % (ref 0–6)
ERYTHROCYTE [DISTWIDTH] IN BLOOD BY AUTOMATED COUNT: 12.8 % (ref 11.6–15.1)
GFR SERPL CREATININE-BSD FRML MDRD: 64 ML/MIN/1.73SQ M
GLUCOSE P FAST SERPL-MCNC: 123 MG/DL (ref 65–99)
HCT VFR BLD AUTO: 41.5 % (ref 36.5–49.3)
HDLC SERPL-MCNC: 65 MG/DL
HGB BLD-MCNC: 13.9 G/DL (ref 12–17)
IMM GRANULOCYTES # BLD AUTO: 0.02 THOUSAND/UL (ref 0–0.2)
IMM GRANULOCYTES NFR BLD AUTO: 0 % (ref 0–2)
LDLC SERPL CALC-MCNC: 63 MG/DL (ref 0–100)
LYMPHOCYTES # BLD AUTO: 0.79 THOUSANDS/ÂΜL (ref 0.6–4.47)
LYMPHOCYTES NFR BLD AUTO: 14 % (ref 14–44)
MCH RBC QN AUTO: 31.3 PG (ref 26.8–34.3)
MCHC RBC AUTO-ENTMCNC: 33.5 G/DL (ref 31.4–37.4)
MCV RBC AUTO: 94 FL (ref 82–98)
MICROALBUMIN UR-MCNC: 17.1 MG/L (ref 0–20)
MICROALBUMIN/CREAT 24H UR: 30 MG/G CREATININE (ref 0–30)
MONOCYTES # BLD AUTO: 0.65 THOUSAND/ÂΜL (ref 0.17–1.22)
MONOCYTES NFR BLD AUTO: 12 % (ref 4–12)
NEUTROPHILS # BLD AUTO: 3.93 THOUSANDS/ÂΜL (ref 1.85–7.62)
NEUTS SEG NFR BLD AUTO: 72 % (ref 43–75)
NONHDLC SERPL-MCNC: 79 MG/DL
NRBC BLD AUTO-RTO: 0 /100 WBCS
PLATELET # BLD AUTO: 107 THOUSANDS/UL (ref 149–390)
PMV BLD AUTO: 11.8 FL (ref 8.9–12.7)
POTASSIUM SERPL-SCNC: 4.2 MMOL/L (ref 3.5–5.3)
PROT SERPL-MCNC: 6.9 G/DL (ref 6.4–8.4)
RBC # BLD AUTO: 4.44 MILLION/UL (ref 3.88–5.62)
SODIUM SERPL-SCNC: 141 MMOL/L (ref 135–147)
TRIGL SERPL-MCNC: 78 MG/DL
WBC # BLD AUTO: 5.54 THOUSAND/UL (ref 4.31–10.16)

## 2023-04-02 LAB
EST. AVERAGE GLUCOSE BLD GHB EST-MCNC: 100 MG/DL
HBA1C MFR BLD: 5.1 %

## 2023-04-24 ENCOUNTER — OFFICE VISIT (OUTPATIENT)
Dept: FAMILY MEDICINE CLINIC | Facility: CLINIC | Age: 78
End: 2023-04-24

## 2023-04-24 VITALS
HEIGHT: 69 IN | BODY MASS INDEX: 23.67 KG/M2 | DIASTOLIC BLOOD PRESSURE: 82 MMHG | SYSTOLIC BLOOD PRESSURE: 138 MMHG | OXYGEN SATURATION: 98 % | WEIGHT: 159.8 LBS | HEART RATE: 74 BPM | TEMPERATURE: 97.8 F

## 2023-04-24 DIAGNOSIS — R35.1 BPH ASSOCIATED WITH NOCTURIA: ICD-10-CM

## 2023-04-24 DIAGNOSIS — E21.3 HYPERPARATHYROIDISM (HCC): ICD-10-CM

## 2023-04-24 DIAGNOSIS — E11.9 CONTROLLED TYPE 2 DIABETES MELLITUS WITHOUT COMPLICATION, WITHOUT LONG-TERM CURRENT USE OF INSULIN (HCC): Primary | ICD-10-CM

## 2023-04-24 DIAGNOSIS — E11.9 CONTROLLED TYPE 2 DIABETES MELLITUS WITHOUT COMPLICATION, WITHOUT LONG-TERM CURRENT USE OF INSULIN (HCC): ICD-10-CM

## 2023-04-24 DIAGNOSIS — E78.00 HYPERCHOLESTEROLEMIA: ICD-10-CM

## 2023-04-24 DIAGNOSIS — D69.6 PLATELETS DECREASED (HCC): ICD-10-CM

## 2023-04-24 DIAGNOSIS — I10 BENIGN ESSENTIAL HTN: ICD-10-CM

## 2023-04-24 DIAGNOSIS — N40.1 BPH ASSOCIATED WITH NOCTURIA: ICD-10-CM

## 2023-04-24 DIAGNOSIS — C34.11 MALIGNANT NEOPLASM OF UPPER LOBE OF RIGHT LUNG (HCC): ICD-10-CM

## 2023-04-24 RX ORDER — BLOOD SUGAR DIAGNOSTIC
STRIP MISCELLANEOUS
Qty: 100 STRIP | Refills: 1 | Status: SHIPPED | OUTPATIENT
Start: 2023-04-24

## 2023-04-24 RX ORDER — LANCETS
EACH MISCELLANEOUS DAILY
Qty: 100 EACH | Refills: 3 | Status: SHIPPED | OUTPATIENT
Start: 2023-04-24

## 2023-04-24 NOTE — PROGRESS NOTES
Diabetic Foot Exam    Patient's shoes and socks removed  Right Foot/Ankle   Right Foot Inspection  Skin Exam: skin intact, dry skin, callus and callus  Toe Exam: right toe deformity  Sensory   Vibration: intact  Monofilament testing: intact    Vascular  The right DP pulse is 2+  Left Foot/Ankle  Left Foot Inspection  Skin Exam: skin intact, dry skin and callus  Toe Exam: left toe deformity  Sensory   Monofilament testing: intact    Assign Risk Category  Deformity present  No loss of protective sensation  No weak pulses  Risk: 0  Assessment/Plan:     Diagnoses and all orders for this visit:    Controlled type 2 diabetes mellitus without complication, without long-term current use of insulin ([facility])  Comments:  Diabetes is at goal continue current regimen  Check blood sugar daily  Orders:  -     Accu-Chek FastClix Lancets MISC; Use daily E11 9  Check  fbs  daily  -     glucose blood (Accu-Chek Jackie Plus) test strip; Use as instructed    Hyperparathyroidism ([facility] [address] )  Comments:  She is asymptomatic  Malignant neoplasm of upper lobe of right lung [facility])  Comments:  Patient following with oncology    Benign essential HTN  Comments:  Pressure is at goal continue current regimen    Hypercholesterolemia  Comments:  Its are at goal continue statin therapy and low-fat diet    BPH associated with nocturia  Comments:  Patient on finasteride  Having nocturia 1-3 times per night    Controlled type 2 diabetes mellitus without complication, without long-term current use of insulin ([facility])  Comments:  Diabetes is at goal continue current regimen  Follow-up in 3 months  Orders:  -     Accu-Chek FastClix Lancets MISC; Use daily E11 9  Check  fbs  daily  -     glucose blood (Accu-Chek Jackie Plus) test strip; Use as instructed    Platelets decreased ([facility])          Subjective:      Patient ID: Hiram Camargo is a 69 y o  male  Patient presents in the office for follow-up chronic conditions    Patient has not insulin diabetes mellitus type 2  Currently on metformin  mg once a day  Current A1c is 5 1  Does follow a low-carb low sugar diet  Has hyperparathyroidism  This is asymptomatic  For blood pressure control he is on metoprolol  mg, lisinopril 10 mg and amlodipine 10 mg  Lipid control he is on simvastatin 10 mg  Tree of BPH on finasteride  He is currently under care of oncology for lung cancer  He is on oral chemotherapy  Patient has a history of PE  Mark Meager was too much for him he is taking aspirin 162 mg a day  Any recurrent clotting issues due to the malignancy will need to start Coumadin  Labs reviewed with patient show urine microalbumin is normal   Lipid panel is at goal   Fasting blood sugar at 123    Back and renal functions are normal   BC is stable      The following portions of the patient's history were reviewed and updated as appropriate:   He   Patient Active Problem List    Diagnosis Date Noted   • Platelets decreased ([address] ) 04/24/2023   • Multiple lung nodules on CT 10/06/2022   • 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 ([address] ) 10/06/2022   • Pleural effusion 10/06/2022   • Malignant neoplasm of upper lobe of right lung ([address] )    • Other pulmonary embolism without acute cor pulmonale ([address] ) 09/10/2022   • Pulmonary mass 09/10/2022   • Anemia 09/10/2022   • Non-recurrent bilateral inguinal hernia without obstruction or gangrene 11/01/2021   • Status post right tibial-talar ankle fusion 07/13/2020   • BPH associated with nocturia 08/21/2019   • Arthritis of right acromioclavicular joint 03/15/2019   • Primary osteoarthritis of right shoulder 03/07/2019   • Chronic left shoulder pain 03/07/2019   • Left rotator cuff tear arthropathy 03/07/2019   • Rotator cuff injury, right, initial encounter 03/07/2019   • History of colon polyps 01/09/2019   • Hyperparathyroidism ([address] ) 11/06/2018   • Hypokalemia 10/22/2018   • Hypocalcemia 10/22/2018   • Glaucoma 10/11/2018   • Controlled type 2 diabetes mellitus without complication, without long-term current use of insulin ([facility] [address] ) 07/25/2016   • Inguinal hernia 02/08/2016   • Benign essential HTN 01/12/2016   • Hypercholesterolemia 01/12/2016     Current Outpatient Medications   Medication Sig Dispense Refill   • Accu-Chek FastClix Lancets MISC Use daily E11 9  Check  fbs  daily 100 each 3   • amLODIPine (NORVASC) 10 mg tablet TAKE 1 TABLET BY MOUTH EVERY DAY FOR BLOOD PRESSURE 90 tablet 1   • brimonidine tartrate 0 2 % ophthalmic solution Administer 1 drop into the left eye 2 (two) times a day     • diphenhydrAMINE (BENADRYL) 50 mg capsule Take 1 capsule 1 hour prior to contrast study 1 capsule 0   • finasteride (PROSCAR) 5 mg tablet TAKE 1 TABLET (5 MG TOTAL) BY MOUTH DAILY  90 tablet 1   • glucose blood (Accu-Chek Jackie Plus) test strip Use as instructed 100 strip 1   • lisinopril (ZESTRIL) 10 mg tablet TAKE 1 TABLET BY MOUTH EVERY DAY 90 tablet 1   • metFORMIN (GLUCOPHAGE-XR) 500 mg 24 hr tablet TAKE 1 TABLET BY MOUTH EVERY DAY 90 tablet 0   • metoprolol succinate (TOPROL-XL) 100 mg 24 hr tablet TAKE 1 TABLET BY MOUTH EVERY DAY 90 tablet 0   • ofloxacin (OCUFLOX) 0 3 % ophthalmic solution 1 drop 4 (four) times a day Start v2 days before surgery   Originally surgery scheduled 9/14/22     • ondansetron (Zofran ODT) 8 mg disintegrating tablet Take 1 tablet (8 mg total) by mouth every 8 (eight) hours as needed for nausea or vomiting 20 tablet 0   • prednisoLONE acetate (PRED FORTE) 1 % ophthalmic suspension      • simvastatin (ZOCOR) 10 mg tablet TAKE 1 TABLET BY MOUTH EVERY DAY 90 tablet 1   • timolol (TIMOPTIC) 0 5 % ophthalmic solution INSTILL 1 DROP LEFT EYE EVERY MORNING     • apixaban (Eliquis) 5 mg Take 1 tablet (5 mg total) by mouth 2 (two) times a day (Patient not taking: Reported on 4/19/2023) 180 tablet 3   • cyclopentolate (CYCLOGYL) 1 % ophthalmic solution Administer 1 drop to both eyes once (Patient not taking: Reported on 4/24/2023)     • methylPREDNISolone (MEDROL) 32 MG tablet Take 1 tablet by mouth 12 hours prior to contrast study and then 2 hours prior to contrast study (Patient not taking: Reported on 4/19/2023) 2 tablet 0   • Osimertinib Mesylate 80 MG TABS Take 1 tablet (80 mg total) by mouth in the morning (Patient not taking: Reported on 4/24/2023) 30 tablet 6   • Osimertinib Mesylate 80 MG TABS Take 1 tablet (80 mg total) by mouth in the morning 30 tablet 0   • Prolensa 0 07 % SOLN  (Patient not taking: Reported on 4/24/2023)     • timolol (TIMOPTIC-XE) 0 5 % ophthalmic gel-forming 1 drop daily (Patient not taking: Reported on 4/24/2023)     • travoprost (TRAVATAN-Z) 0 004 % ophthalmic solution 1 drop daily at bedtime (Patient not taking: Reported on 4/24/2023)       No current facility-administered medications for this visit  He has No Known Allergies       Review of Systems   Constitutional: Negative for activity change, appetite change, chills, fatigue and fever  HENT: Positive for postnasal drip  Negative for ear pain and sore throat  Eyes: Negative for visual disturbance  Respiratory: Positive for cough  Negative for shortness of breath  Cardiovascular: Negative for chest pain, palpitations and leg swelling  Gastrointestinal: Negative for abdominal pain, blood in stool, constipation, diarrhea and nausea  Genitourinary: Negative for difficulty urinating  Nocturia  1-3   Musculoskeletal: Negative for arthralgias, back pain and myalgias  Skin: Negative for rash  Neurological: Negative for dizziness, syncope and headaches  Psychiatric/Behavioral: Negative for sleep disturbance  Objective:        Physical Exam  Vitals and nursing note reviewed  Constitutional:       General: He is not in acute distress  Appearance: Normal appearance  He is well-developed  He is not ill-appearing  HENT:      Head: Normocephalic and atraumatic        Comments: Bilateral  Hearing  aids     Right Ear: External ear normal       Left Ear: External ear normal       Mouth/Throat:      Pharynx: No posterior oropharyngeal erythema  Eyes:      Conjunctiva/sclera: Conjunctivae normal       Pupils: Pupils are equal, round, and reactive to light  Neck:      Thyroid: No thyromegaly  Vascular: No carotid bruit  Cardiovascular:      Rate and Rhythm: Normal rate and regular rhythm  Pulses: no weak pulses          Dorsalis pedis pulses are 2+ on the right side  Heart sounds: Normal heart sounds  No murmur heard  Pulmonary:      Effort: Pulmonary effort is normal       Breath sounds: Normal breath sounds  No wheezing  Abdominal:      General: Bowel sounds are normal       Palpations: Abdomen is soft  There is no mass  Tenderness: There is no abdominal tenderness  Musculoskeletal:      Right lower leg: No edema  Left lower leg: No edema  Feet:      Right foot:      Skin integrity: Callus and dry skin present  Left foot:      Skin integrity: Callus and dry skin present  Lymphadenopathy:      Cervical: No cervical adenopathy  Skin:     General: Skin is warm and dry  Neurological:      General: No focal deficit present  Mental Status: He is alert and oriented to person, place, and time  Psychiatric:         Mood and Affect: Mood normal          Behavior: Behavior normal          Thought Content:  Thought content normal          Judgment: Judgment normal

## 2023-04-25 ENCOUNTER — OFFICE VISIT (OUTPATIENT)
Dept: PALLIATIVE MEDICINE | Facility: CLINIC | Age: 78
End: 2023-04-25

## 2023-04-25 VITALS
DIASTOLIC BLOOD PRESSURE: 68 MMHG | OXYGEN SATURATION: 97 % | TEMPERATURE: 97.2 F | SYSTOLIC BLOOD PRESSURE: 130 MMHG | HEART RATE: 67 BPM | HEIGHT: 69 IN | WEIGHT: 157 LBS | BODY MASS INDEX: 23.25 KG/M2

## 2023-04-25 DIAGNOSIS — C77.8 MALIGNANT NEOPLASM METASTATIC TO LYMPH NODES OF MULTIPLE SITES (HCC): ICD-10-CM

## 2023-04-25 DIAGNOSIS — R91.8 MULTIPLE LUNG NODULES ON CT: ICD-10-CM

## 2023-04-25 DIAGNOSIS — Z51.5 PALLIATIVE CARE PATIENT: ICD-10-CM

## 2023-04-25 DIAGNOSIS — C34.11 MALIGNANT NEOPLASM OF UPPER LOBE OF RIGHT LUNG (HCC): Primary | ICD-10-CM

## 2023-04-25 DIAGNOSIS — R11.0 NAUSEA: ICD-10-CM

## 2023-04-25 NOTE — PROGRESS NOTES
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- continue to follow with Oncology for management and treatment  • Multiple lung nodules on CT- continue to follow with Oncology for management and treatment  • Nausea- continue ondansetron ODT 8 mg eery 8 hours as needed for nausa  • Palliative care patient- stable   Continue to monitor symptoms  • Psychosocial support- supportive listening provided  Medications adjusted this encounter:  Requested Prescriptions      No prescriptions requested or ordered in this encounter     No orders of the defined types were placed in this encounter  There are no discontinued medications  Norma Wick was seen today for symptoms and planning cares related to above illnesses  I have reviewed the patient's controlled substance dispensing history in the Prescription Drug Monitoring Program in compliance with the [facility] regulations before prescribing any controlled substances  They are invited to continue to follow with us  If there are questions or concerns, please contact us through our clinic/answering service 24 hours a day, seven days a week      RADHA Tyson  [facility]  [phone]      Visit Information    Accompanied By: No one    Source of History: Self    History Limitations: None      Chief Complaint  Chief Complaint   Patient presents with   • "Follow-up       History of Present Illness      Star Son is a 69 y o  male who presents in follow up of symptoms related to malignant neoplasm of upper lobe of right lung, metastatic to lymph nodes, multiple sites  Pertinent issues include: symptom management, nausea  Patient presents today, states he has PND from seasonal allergies  He continues oral chemotherapy without adverse effects  Denies pain, chest pain or shortness of breath  Appetite is good, tolerating diet, no nausea or vomiting  Past medical, surgical, social, and family histories are reviewed and pertinent updates are made  Review of Systems   Constitutional: Negative for malaise/fatigue  HENT:        PND   Cardiovascular: Negative for chest pain and dyspnea on exertion  Respiratory: Negative for cough and shortness of breath  Musculoskeletal: Negative  Gastrointestinal: Negative for constipation, diarrhea, nausea and vomiting  Neurological: Negative  Vital Signs    /68 (BP Location: Left arm, Patient Position: Sitting, Cuff Size: Standard)   Pulse 67   Temp (!) 97 2 °F (36 2 °C) (Temporal)   Ht 5' 9\" (1 753 m)   Wt 71 2 kg (157 lb)   SpO2 97%   BMI 23 18 kg/m²     Physical Exam and Objective Data  Physical Exam  Constitutional:       General: He is not in acute distress  Appearance: He is not ill-appearing  HENT:      Head: Normocephalic and atraumatic  Eyes:      Extraocular Movements: Extraocular movements intact  Cardiovascular:      Rate and Rhythm: Normal rate  Pulmonary:      Effort: Pulmonary effort is normal    Skin:     General: Skin is warm and dry  Neurological:      Mental Status: He is alert and oriented to person, place, and time     Psychiatric:         Mood and Affect: Mood normal            Radiology and Laboratory:  I personally reviewed the following results:   CT chest abdomen pelvis w contrast  Status: Final result     PACS Images     Show images for CT chest abdomen " pelvis w contrast  Study Result    Narrative & Impression   CT CHEST, ABDOMEN AND PELVIS WITH IV CONTRAST     INDICATION:   C34 11: Malignant neoplasm of upper lobe, right bronchus or lung      COMPARISON: January 10, 2023 TECHNIQUE: CT examination of the chest, abdomen and pelvis was performed  Multiplanar 2D reformatted images were created from the source data      Radiation dose length product (DLP) for this visit:  694 mGy-cm   This examination, like all CT scans performed in the [facility], was performed utilizing techniques to minimize radiation dose exposure, including the use of iterative   reconstruction and automated exposure control      IV Contrast:  100 mL of iohexol (OMNIPAQUE)  Enteric Contrast: Enteric contrast was administered      FINDINGS:     CHEST     LUNGS:  Spiculated mass right upper lobe now measures 3 6 x 2 5 cm, previously measuring 4 1 x 2 5 cm        A perifissural lung nodule seen right lower lobe, largest, previously measuring 5 mm  Currently measuring 1 2 cmX 1 cm this is seen in image 95 series 2  Additional nonmeasurable nodules are seen in the left lung  There is a few scattered granulomas     Pleuroparenchymal nodule right middle lobe image 98 series 2, stable  Left lower lobe lung nodule measuring about 5 mm, image 87 series 2, stable  PLEURA:  Small right effusion seen with pleural thickening, decreased from the previous study     HEART/GREAT VESSELS: Heart is unremarkable for patient's age    No thoracic aortic aneurysm      MEDIASTINUM AND FAUSTO:  Lower right paratracheal, left paratracheal lymph node remain unchanged  Partly calcified subcarinal lymph nodes are seen, stable     CHEST WALL AND LOWER NECK:  No significant axillary lymph node alignment seen     ABDOMEN     LIVER/BILIARY TREE:  Unremarkable      GALLBLADDER:  Cholelithiasis seen     SPLEEN:  Unremarkable      PANCREAS:  Unremarkable      ADRENAL GLANDS:  Unremarkable      KIDNEYS/URETERS:  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 STOMACH AND BOWEL:  No abnormal bowel wall thickening seen  Moderate amount of fecal debris in the colon seen  Ileocecal junction appear unremarkable     APPENDIX:  No findings to suggest appendicitis      ABDOMINOPELVIC CAVITY:  No ascites  No pneumoperitoneum  No lymphadenopathy      VESSELS:  Unremarkable for patient's age      PELVIS     REPRODUCTIVE ORGANS:  Unremarkable for patient's age      URINARY BLADDER:  Bladder wall thickening seen     ABDOMINAL WALL/INGUINAL REGIONS:  Unremarkable      OSSEOUS STRUCTURES:  A sclerotic focus seen within the posterior aspect of the left iliac bone     Bone island, stable additional sclerotic foci in the sacrum, stable     IMPRESSION:  There is enlargement of the right lower lung nodule now measures 1 2 x 1 cm, previously measuring 5 mm        The previously noted the right upper lobe lung mass continues to decrease in size  Additional multiple nonmeasurable lung nodules as seen on the previous study, stable There is no new visceral metastatic disease in the abdomen and pelvis  The study was marked in EPIC for significant notification            Workstation performed: [id number]       30 minutes was spent face to face with [facility] [place] with greater than 50% of the time spent in counseling or coordination of care including discussions of symptom management  All of the patient's or agent's questions were answered during this discussion

## 2023-05-03 DIAGNOSIS — I10 ESSENTIAL HYPERTENSION: ICD-10-CM

## 2023-05-03 RX ORDER — AMLODIPINE BESYLATE 10 MG/1
TABLET ORAL
Qty: 90 TABLET | Refills: 1 | Status: SHIPPED | OUTPATIENT
Start: 2023-05-03

## 2023-05-08 ENCOUNTER — RADIATION ONCOLOGY CONSULT (OUTPATIENT)
Dept: RADIATION ONCOLOGY | Facility: CLINIC | Age: 78
End: 2023-05-08
Attending: RADIOLOGY

## 2023-05-08 ENCOUNTER — CLINICAL SUPPORT (OUTPATIENT)
Dept: RADIATION ONCOLOGY | Facility: CLINIC | Age: 78
End: 2023-05-08
Attending: RADIOLOGY

## 2023-05-08 VITALS
SYSTOLIC BLOOD PRESSURE: 140 MMHG | HEART RATE: 71 BPM | RESPIRATION RATE: 16 BRPM | OXYGEN SATURATION: 99 % | BODY MASS INDEX: 23.33 KG/M2 | TEMPERATURE: 97 F | WEIGHT: 158 LBS | DIASTOLIC BLOOD PRESSURE: 78 MMHG

## 2023-05-08 DIAGNOSIS — C34.11 MALIGNANT NEOPLASM OF UPPER LOBE OF RIGHT LUNG (HCC): ICD-10-CM

## 2023-05-08 DIAGNOSIS — C34.11 MALIGNANT NEOPLASM OF UPPER LOBE OF RIGHT LUNG (HCC): Primary | ICD-10-CM

## 2023-05-08 NOTE — PROGRESS NOTES
Consultation - Radiation Oncology      [facility]:[id number] : 1945  Encounter: [id number]  Patient Information: Morelia Alvarez COMPLAINT  Chief Complaint   Patient presents with   • Lung Cancer   • Consult     Cancer Staging   Malignant neoplasm of upper lobe of right lung [facility])  Staging form: Lung, AJCC 8th Edition  - Clinical stage from 9/15/2022: Stage BETTE (cT4, cN3, cM1a) - Signed by Humaira Cabrera MD on 10/10/2022  Stage prefix: Initial diagnosis           History of Present Illness   Fely Hadley is a 69y o  year old male with stage BETTE (cT4, 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, cM1a) adenocarcinoma of right upper lung  He is currently on Osimertinib with good partial response, but was noted with solitary progression of a right lower lobe metastasis  He presents today for consult for consideration for radiation for oligoprogression      9/10/23 Hospital admission with pulmonary emboli  CT at that time noted a 6 2cm right upper lobe lung mass incidentally associated with right hilar and mediastinal lymphadenopathy      9/10/22 CTA ED chest  1  Acute pulmonary emboli in the right lower lobe pulmonary artery and several segmental and subsegmental branches, as well as several left lower lobe subsegmental branches  Measured RV/LV ratio is within normal limits at 0 84, less than 0 9      2   Ground glass opacification in the right lower lobe, likely pulmonary infarction   Moderate right and trace left pleural effusions    3   Right upper lobe pulmonary mass measuring up to 6 2 cm as described, compatible with primary lung malignancy   Scattered additional solid nodules measuring up to 1 2 cm the right middle lobe, suspicious for metastatic disease    4  Several large right hilar and mediastinal lymph nodes measuring up to 2 1 cm, also concerning for metastatic disease      22 CT A/P w contrast  1   No metastatic disease to the abdomen or pelvis    2   Moderate size right pleural effusion with small left effusion, right lower lobe compressive atelectasis versus infiltrate and right middle lobe nodule suspicious for metastasis  3   Nonspecific punctate densities in the pelvic bones as described  4   Mild wall thickening of the urinary bladder   Correlate with urinalysis to exclude a urinary tract infection      9/12/22 MRI brain w wo contrast  No MR evidence of acute ischemia  No enhancing lesions to suggest metastatic disease      9/13/22 IR thoracentesis   400 mL clear cordell pleural fluid was aspirated  A  B  Pleural, Right,  (ThinPrep and cell block preparations): See Note  Negative for malignancy  Mesothelial cells, lymphocytes, histiocytes and neutrophils      9/15/22 IR lymph node biopsy  Pathology demonstrated NSCLC - adenocarcinoma      9/26/22 Thoracic Oncology Multidisciplinary Tumor Conference   PHYSICIAN RECOMMENDED PLAN:  Send tissue for molecular testing   If PET/CT is negative for metastatic disease, repeat thoracentesis to complete staging       9/30/22 PET CT  1   Large hypermetabolic right upper lung mass extending to the right hilum most compatible with malignancy   Additional small bilateral lung nodules favoring metastases  2   Mediastinal, right axillary and right greater than left cervical metastatic adenopathy  3   No hypermetabolic metastases visualized in the abdomen pelvis    4   Mild subcutaneous FDG activity just below the level of the right zygomatic arch, SUV 2 1, without discrete CT abnormality is nonspecific but may be inflammatory   This may be correlated clinically      10/5/22 PFT  FEV1/FVC Ratio:  64 97 %   Forced Vital Capacity:  3 62 L   (92 % predicted)  FEV1:  2 35 L (80 % predicted)  After administration of bronchodilator FEV1:  2 47 (+ 5 %)   Lung volumes by body plethysmography:   Total Lung Capacity 85 % predicted   Residual volume 80 % predicted    RV/TLC ratio 92 %   DLCO for patients hemoglobin level:  53 % (66 % when corrected for Hb)      10/20/22 Dr Andriy Andrade  Continue lifelong anticoagulation with Xarelto due to PE with underlying malignancy  Zofran 8mg q8 prn nausea/vomiting to be sent home  Osimertinib 80mg to be sent for and will get CBC, CMP monthly for 3 months as standing    Palliative care consult     1/10/23 CT C/A/P w contrast  Treatment response with decrease in the size of the right upper lobe lung with decrease in the size of the mediastinal   Resolution of the previously noted the pulmonary emboli  No new measurable lung nodule seen   No new abdominopelvic lymphadenopathy or new visceral metastatic     23 CT C/A/P w contrast  There is enlargement of the right lower lung nodule now measures 1 2 x 1 cm, previously measuring 5 mm      The previously noted the right upper lobe lung mass continues to decrease in size  Additional multiple nonmeasurable lung nodules as seen on the previous study, stable There is no new visceral metastatic disease in the abdomen and pelvis     23 Dr Gilberto Mccord  Patient currently on Osimertinib 80mg once daily for stage BETTE adenocarcinoma of lung  CBC, CMP to be done in 3 months  Continue lifelong anticoagulation with Xarelto due to PE with underlying malignancy; he can't afford it and is doing ASA instead  Continue F/U with palliative care   Referral to Rad Onc to consider SBRT to the RLL growing nodule  Restaging CT CAP in 3 months     23 Palliative Care    The patient states that he feels well  He denies significant respiratory symptoms and creating shortness of breath, cough, fever, or progressive dyspnea on exertion  He denies chest pain or palpitations    He has no significant side effects associated with Osimertinib       Upcomin/10/23 CT  23 Palliative Care  23 Dr Christy Obregon   Oncology History   Malignant neoplasm of upper lobe of right lung [facility])   9/15/2022 -  Cancer Staged    Staging form: Lung, AJCC 8th Edition  - Clinical stage from 9/15/2022: Stage BETTE (cT4, cN3, cM1a) - Signed by Porfirio Cuenca MD on 10/10/2022  Stage prefix: Initial diagnosis       9/15/2022 Biopsy    IR lung biopsy    A  Lymph node, right supraclavicular biopsy:  -  Metastatic adenocarcinoma of lung origin  -  Immunohistochemical stains performed with appropriate controls show the tumor to be positive for TTF1 and napsin and negative for CK5/6 and p40, supporting the diagnosis     10/5/2022 Initial Diagnosis    Malignant neoplasm of upper lobe of right lung [facility])           Past Medical History:   Diagnosis Date   • Diabetes mellitus ([facility] [address] )    • Hyperlipidemia    • Hypertension    • Lung cancer ([facility] [address] )    • Prostate cancer ([facility] [address] ) 2005    S/P prostate ca-2005 had radiation tx       Past Surgical History:   Procedure Laterality Date   • COLONOSCOPY     • IR BIOPSY LYMPH NODE  9/15/2022   • IR THORACENTESIS  9/13/2022   • HI ARTHRODESIS ANKLE OPEN Right 7/10/2020    Procedure: ARTHRODESIS/ TIBIAL-TALAR ANKLE FUSION;  Surgeon: Cynthia Adams MD;  Location: [facility];  Service: Orthopedics   • HI LAPAROSCOPY SURG RPR INITIAL INGUINAL HERNIA Bilateral 12/16/2021    Procedure: LAPAROSCOPIC REPAIR HERNIA INGUINAL WITH MESH;  Surgeon: Mansi Piper MD;  Location: [facility];  Service: General       Family History   Problem Relation Age of Onset   • Heart attack Mother        Social History   Social History     Substance and Sexual Activity   Alcohol Use Yes    Comment: Occasional     Social History     Substance and Sexual Activity   Drug Use Never     Social History     Tobacco Use   Smoking Status Never   Smokeless Tobacco Never         Meds/Allergies     Current Outpatient Medications:   •  Accu-Chek FastClix Lancets MISC, Use daily E11 9  Check  fbs  daily, Disp: 100 each, Rfl: 3  •  amLODIPine (NORVASC) 10 mg tablet, TAKE 1 TABLET BY MOUTH EVERY DAY FOR BLOOD PRESSURE, Disp: 90 tablet, Rfl: 1  •  brimonidine tartrate 0 2 % ophthalmic solution, Administer 1 drop into the left eye 2 (two) times a day, Disp: , Rfl:   • finasteride (PROSCAR) 5 mg tablet, TAKE 1 TABLET (5 MG TOTAL) BY MOUTH DAILY  , Disp: 90 tablet, Rfl: 1  •  glucose blood (Accu-Chek Jackie Plus) test strip, Use as instructed, Disp: 100 strip, Rfl: 1  •  lisinopril (ZESTRIL) 10 mg tablet, TAKE 1 TABLET BY MOUTH EVERY DAY, Disp: 90 tablet, Rfl: 1  •  metFORMIN (GLUCOPHAGE-XR) 500 mg 24 hr tablet, TAKE 1 TABLET BY MOUTH EVERY DAY, Disp: 90 tablet, Rfl: 0  •  metoprolol succinate (TOPROL-XL) 100 mg 24 hr tablet, TAKE 1 TABLET BY MOUTH EVERY DAY, Disp: 90 tablet, Rfl: 0  •  ofloxacin (OCUFLOX) 0 3 % ophthalmic solution, 1 drop 4 (four) times a day Start v2 days before surgery   Originally surgery scheduled 9/14/22, Disp: , Rfl:   •  Osimertinib Mesylate 80 MG TABS, Take 1 tablet (80 mg total) by mouth in the morning, Disp: 30 tablet, Rfl: 6  •  simvastatin (ZOCOR) 10 mg tablet, TAKE 1 TABLET BY MOUTH EVERY DAY, Disp: 90 tablet, Rfl: 1  •  apixaban (Eliquis) 5 mg, Take 1 tablet (5 mg total) by mouth 2 (two) times a day (Patient not taking: Reported on 4/19/2023), Disp: 180 tablet, Rfl: 3  •  cyclopentolate (CYCLOGYL) 1 % ophthalmic solution, Administer 1 drop to both eyes once (Patient not taking: Reported on 4/24/2023), Disp: , Rfl:   •  diphenhydrAMINE (BENADRYL) 50 mg capsule, Take 1 capsule 1 hour prior to contrast study (Patient not taking: Reported on 5/8/2023), Disp: 1 capsule, Rfl: 0  •  methylPREDNISolone (MEDROL) 32 MG tablet, Take 1 tablet by mouth 12 hours prior to contrast study and then 2 hours prior to contrast study (Patient not taking: Reported on 4/19/2023), Disp: 2 tablet, Rfl: 0  •  ondansetron (Zofran ODT) 8 mg disintegrating tablet, Take 1 tablet (8 mg total) by mouth every 8 (eight) hours as needed for nausea or vomiting (Patient not taking: Reported on 4/25/2023), Disp: 20 tablet, Rfl: 0  •  Osimertinib Mesylate 80 MG TABS, Take 1 tablet (80 mg total) by mouth in the morning, Disp: 30 tablet, Rfl: 0  •  prednisoLONE acetate (PRED FORTE) 1 % ophthalmic suspension, , Disp: , Rfl:   •  Prolensa 0 07 % SOLN, , Disp: , Rfl:   •  timolol (TIMOPTIC) 0 5 % ophthalmic solution, INSTILL 1 DROP LEFT EYE EVERY MORNING (Patient not taking: Reported on 5/8/2023), Disp: , Rfl:   •  timolol (TIMOPTIC-XE) 0 5 % ophthalmic gel-forming, 1 drop daily (Patient not taking: Reported on 4/24/2023), Disp: , Rfl:   •  travoprost (TRAVATAN-Z) 0 004 % ophthalmic solution, 1 drop daily at bedtime (Patient not taking: Reported on 4/24/2023), Disp: , Rfl:   No Known Allergies      Review of Systems   Constitutional: Negative  HENT: Positive for dental problem, hearing loss, postnasal drip and tinnitus  Eyes:        Wears glasses   Respiratory: Positive for cough (white phlegm)  Gastrointestinal: Negative  Genitourinary: Positive for frequency and urgency  Nocturia x 3   Musculoskeletal: Negative  Skin:        Skin cancer removed last week left chest; sutures will be removed next week   Allergic/Immunologic: Positive for environmental allergies  Neurological: Negative  Hematological: Bruises/bleeds easily  Psychiatric/Behavioral: Negative  OBJECTIVE:   /78   Pulse 71   Temp (!) 97 °F (36 1 °C)   Resp 16   Wt 71 7 kg (158 lb)   SpO2 99%   BMI 23 33 kg/m²   Performance Status: Karnofsky: 90 - Able to carry on normal activity; minor signs or symptoms of disease     Physical Exam  Vitals and nursing note reviewed  Constitutional:       General: He is not in acute distress  Cardiovascular:      Rate and Rhythm: Normal rate and regular rhythm  Heart sounds: No murmur heard  Pulmonary:      Breath sounds: No wheezing, rhonchi or rales  Abdominal:      Palpations: Abdomen is soft  Tenderness: There is no abdominal tenderness  Musculoskeletal:      Right lower leg: No edema  Left lower leg: No edema  Lymphadenopathy:      Cervical: No cervical adenopathy     Neurological:      Mental Status: He is alert and oriented to person, place, and time  Gait: Gait normal          RESULTS  Imaging Studies  CT chest abdomen pelvis w contrast    Result Date: 4/14/2023  Narrative: CT CHEST, ABDOMEN AND PELVIS WITH IV CONTRAST INDICATION:   C34 11: Malignant neoplasm of upper lobe, right bronchus or lung  COMPARISON: January 10, 2023 TECHNIQUE: CT examination of the chest, abdomen and pelvis was performed  Multiplanar 2D reformatted images were created from the source data  Radiation dose length product (DLP) for this visit:  694 mGy-cm   This examination, like all CT scans performed in the [facility], was performed utilizing techniques to minimize radiation dose exposure, including the use of iterative reconstruction and automated exposure control  IV Contrast:  100 mL of iohexol (OMNIPAQUE) Enteric Contrast: Enteric contrast was administered  FINDINGS: CHEST LUNGS:  Spiculated mass right upper lobe now measures 3 6 x 2 5 cm, previously measuring 4 1 x 2 5 cm  A perifissural lung nodule seen right lower lobe, largest, previously measuring 5 mm  Currently measuring 1 2 cmX 1 cm this is seen in image 95 series 2 Additional nonmeasurable nodules are seen in the left lung  There is a few scattered granulomas Pleuroparenchymal nodule right middle lobe image 98 series 2, stable Left lower lobe lung nodule measuring about 5 mm, image 87 series 2, stable PLEURA:  Small right effusion seen with pleural thickening, decreased from the previous study HEART/GREAT VESSELS: Heart is unremarkable for patient's age  No thoracic aortic aneurysm  MEDIASTINUM AND FAUSTO:  Lower right paratracheal, left paratracheal lymph node remain unchanged Partly calcified subcarinal lymph nodes are seen, stable CHEST WALL AND LOWER NECK:  No significant axillary lymph node alignment seen ABDOMEN LIVER/BILIARY TREE:  Unremarkable  GALLBLADDER:  Cholelithiasis seen SPLEEN:  Unremarkable  PANCREAS:  Unremarkable  ADRENAL GLANDS:  Unremarkable  KIDNEYS/URETERS:  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 STOMACH AND BOWEL:  No abnormal bowel wall thickening seen Moderate amount of fecal debris in the colon seen Ileocecal junction appear unremarkable APPENDIX:  No findings to suggest appendicitis  ABDOMINOPELVIC CAVITY:  No ascites  No pneumoperitoneum  No lymphadenopathy  VESSELS:  Unremarkable for patient's age  PELVIS REPRODUCTIVE ORGANS:  Unremarkable for patient's age  URINARY BLADDER:  Bladder wall thickening seen ABDOMINAL WALL/INGUINAL REGIONS:  Unremarkable  OSSEOUS STRUCTURES:  A sclerotic focus seen within the posterior aspect of the left iliac bone Bone island, stable additional sclerotic foci in the sacrum, stable     Impression: There is enlargement of the right lower lung nodule now measures 1 2 x 1 cm, previously measuring 5 mm  The previously noted the right upper lobe lung mass continues to decrease in size Additional multiple nonmeasurable lung nodules as seen on the previous study, stable There is no new visceral metastatic disease in the abdomen and pelvis The study was marked in EPIC for significant notification   Workstation performed: [id number]       Pathology:   Collected 9/15/2022 13:02      Status: Final result      Visible to patient: No (inaccessible in [facility])      0 Result Notes  Component  Resulting Agency   Case Report   Surgical Pathology Report                         Case: [id number]                                    Authorizing Provider: Marii Kennedy DO           Collected:           09/15/2022 1302               Ordering Location:     [facility]      Received:            09/15/2022 [address] [place] [address] 8                                                               Pathologist:           Arleen Lewis MD                                                           Specimen:    Lymph Node, right supraclavicular                                                          Final Diagnosis   A  Lymph node, right supraclavicular biopsy:  -  Metastatic adenocarcinoma of lung origin  -  Immunohistochemical stains performed with appropriate controls show the tumor to be positive for TTF1 and napsin and negative for CK5/6 and p40, supporting the diagnosis       Electronically signed by Grey Méndez MD on 9/19/2022 at 0910              ASSESSMENT  1  Malignant neoplasm of upper lobe of right lung [facility])  Ambulatory referral to Radiation Oncology        Cancer Staging   Malignant neoplasm of upper lobe of right lung [facility])  Staging form: Lung, AJCC 8th Edition  - Clinical stage from 9/15/2022: Stage BETTE (cT4, cN3, cM1a) - Signed by Tere Burks MD on 10/10/2022  Stage prefix: Initial diagnosis        PLAN/DISCUSSION  Judith De Oliveira is a 69 y o   male with with a history of stage IV NSCLC - adenocarcinoma, of the right lung with distant metastases involving axillary and bilateral cervical lymph nodes  He is maintained on Osmertinib with excellent tolerance and stable partial response  He has one area of progression in a right lower lobe metastasis  I offered the patient SBRT to the right lower lobe for oligoprogression of disease  We would plan 35Gy in 5 fractions as tolerated  I explained that this would be done in an effort to extend his ability to stay on current therapy longer as he is otherwise doing well and tolerating treatment well  I explained that this not a curative treatment and there is always risk of progression elsewhere or even in the treatment field that would necessitate moving to next line therapy  Alternative would be to move to next line systemic therapy at this time  The lesion is reasonable in size and location for SBRT  The main concern is the use of concurrent Osmertinib, which has had several reports of increased risk of G2-4 pneumonitis even when moderate doses of radiation are used for thoracic treatment    These reports "have largely been small in number and there are some conflicting studies as well, but it is a concern  Therefore, I discussed with the patient stopping Osmertinib 1 week prior to treatment and holding therapy until at least 1 week after treatment  This is commonly used in practice based on previous trial employing both medication and radiation  This would not eliminate risk of pneumonitis, but hopefully reduce risk  Other risks and acute and late side effects and potential toxicities of radiation were discussed with the patient at length  In general, we would expect treatment to be well tolerated with other main concern being fibrosis with possible decrease in lung function  SBRT, however, is associated with small lung function decrease in most patients with most symptomatic primarily with some increase in dyspnea on exertion if any  Based on location, few other significant toxicities would be expected  The patient was given the opportunity to ask questions and all questions were answered to his satisfaction  He was undecided, but deferred to recommendations from his team       I have discussed concerns and plans with Dr Sydney Lindsay  He feels that patient's best option is still to proceed with SBRT knowing increased possible risk  He agrees with stopping Osmertinib for treatment to reduce risk  4DCT simulation next week  We will plan to begin radiation soon thereafter  Total Time Spent  50 minutes spent reviewing EMR in preparation for visit, with the patient, coordination with other providers, and documentation  Greater than 50% of total time was spent with the patient and/or family for counseling and/or coordination of care  Sangita Rosen MD  6/1/5729,4:43 PM      Portions of the record may have been created with voice recognition software  Occasional wrong word or \"sound a like\" substitutions may have occurred due to the inherent limitations of voice recognition software    Read the " chart carefully and recognize, using context, where substitutions have occurred

## 2023-05-08 NOTE — LETTER
May 15, 2023     Jeny Henley, [facility] [address]  [address] [address]    Patient: Aura Jones   YOB: 1945   Date of Visit: 2023       Dear Dr Wahl Amandeep:    Thank you for referring Aura Jones to me for evaluation  Below are my notes for this consultation  If you have questions, please do not hesitate to call me  I look forward to following your patient along with you  Sincerely,        Rajeev Palacios MD        CC: No Recipients  Rajeev Palacios MD  5/15/2023 10:19 AM  Sign when Signing Visit  Consultation - Radiation Oncology      QBU:[id number] : 1945  Encounter: [id number]  Patient Information: Alo Nuñez  Chief Complaint   Patient presents with   • Lung Cancer   • Consult     Cancer Staging   Malignant neoplasm of upper lobe of right lung [facility])  Staging form: Lung, AJCC 8th Edition  - Clinical stage from 9/15/2022: Stage BETTE (cT4, cN3, cM1a) - Signed by Jeny Henley MD on 10/10/2022  Stage prefix: Initial diagnosis           History of Present Illness   Aura Jones is a 69y o  year old male with stage BETTE (cT4, 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, cM1a) adenocarcinoma of right upper lung  He is currently on Osimertinib with good partial response, but was noted with solitary progression of a right lower lobe metastasis  He presents today for consult for consideration for radiation for oligoprogression      9/10/23 Hospital admission with pulmonary emboli  CT at that time noted a 6 2cm right upper lobe lung mass incidentally associated with right hilar and mediastinal lymphadenopathy      9/10/22 CTA ED chest  1  Acute pulmonary emboli in the right lower lobe pulmonary artery and several segmental and subsegmental branches, as well as several left lower lobe subsegmental branches    Measured RV/LV ratio is within normal limits at 0 84, less than 0 9      2   Ground glass opacification in the right lower lobe, likely pulmonary infarction   Moderate right and trace left pleural effusions    3   Right upper lobe pulmonary mass measuring up to 6 2 cm as described, compatible with primary lung malignancy   Scattered additional solid nodules measuring up to 1 2 cm the right middle lobe, suspicious for metastatic disease    4  Several large right hilar and mediastinal lymph nodes measuring up to 2 1 cm, also concerning for metastatic disease      9/12/22 CT A/P w contrast  1   No metastatic disease to the abdomen or pelvis  2   Moderate size right pleural effusion with small left effusion, right lower lobe compressive atelectasis versus infiltrate and right middle lobe nodule suspicious for metastasis  3   Nonspecific punctate densities in the pelvic bones as described  4   Mild wall thickening of the urinary bladder   Correlate with urinalysis to exclude a urinary tract infection      9/12/22 MRI brain w wo contrast  No MR evidence of acute ischemia  No enhancing lesions to suggest metastatic disease      9/13/22 IR thoracentesis   400 mL clear cordell pleural fluid was aspirated  A  B  Pleural, Right,  (ThinPrep and cell block preparations): See Note  Negative for malignancy  Mesothelial cells, lymphocytes, histiocytes and neutrophils      9/15/22 IR lymph node biopsy  Pathology demonstrated NSCLC - adenocarcinoma      9/26/22 Thoracic Oncology Multidisciplinary Tumor Conference   PHYSICIAN RECOMMENDED PLAN:  Send tissue for molecular testing   If PET/CT is negative for metastatic disease, repeat thoracentesis to complete staging       9/30/22 PET CT  1   Large hypermetabolic right upper lung mass extending to the right hilum most compatible with malignancy   Additional small bilateral lung nodules favoring metastases  2   Mediastinal, right axillary and right greater than left cervical metastatic adenopathy  3   No hypermetabolic metastases visualized in the abdomen pelvis    4   Mild subcutaneous FDG activity just below the level of the right zygomatic arch, SUV 2 1, without discrete CT abnormality is nonspecific but may be inflammatory   This may be correlated clinically      10/5/22 PFT  FEV1/FVC Ratio:  64 97 %   Forced Vital Capacity:  3 62 L   (92 % predicted)  FEV1:  2 35 L (80 % predicted)  After administration of bronchodilator FEV1:  2 47 (+ 5 %)   Lung volumes by body plethysmography:   Total Lung Capacity 85 % predicted   Residual volume 80 % predicted    RV/TLC ratio 92 %   DLCO for patients hemoglobin level:  53 % (66 % when corrected for Hb)      10/20/22 Dr Shola Hebert  Continue lifelong anticoagulation with Xarelto due to PE with underlying malignancy  Zofran 8mg q8 prn nausea/vomiting to be sent home  Osimertinib 80mg to be sent for and will get CBC, CMP monthly for 3 months as standing    Palliative care consult     1/10/23 CT C/A/P w contrast  Treatment response with decrease in the size of the right upper lobe lung with decrease in the size of the mediastinal   Resolution of the previously noted the pulmonary emboli  No new measurable lung nodule seen   No new abdominopelvic lymphadenopathy or new visceral metastatic     4/11/23 CT C/A/P w contrast  There is enlargement of the right lower lung nodule now measures 1 2 x 1 cm, previously measuring 5 mm      The previously noted the right upper lobe lung mass continues to decrease in size  Additional multiple nonmeasurable lung nodules as seen on the previous study, stable There is no new visceral metastatic disease in the abdomen and pelvis     4/19/23 Dr Shola Hebert  Patient currently on Osimertinib 80mg once daily for stage BETTE adenocarcinoma of lung  CBC, CMP to be done in 3 months  Continue lifelong anticoagulation with Xarelto due to PE with underlying malignancy; he can't afford it and is doing ASA instead  Continue F/U with palliative care   Referral to Rad Onc to consider SBRT to the RLL growing nodule  Restaging CT CAP in 3 months     4/25/23 Palliative Care    The patient states that he feels well    He denies significant respiratory symptoms and creating shortness of breath, cough, fever, or progressive dyspnea on exertion  He denies chest pain or palpitations  He has no significant side effects associated with Osimertinib       Upcomin/10/23 CT  23 Palliative Care  23 Dr Asha Rosado   Oncology History   Malignant neoplasm of upper lobe of right lung ([facility] [address] )   9/15/2022 -  Cancer Staged    Staging form: Lung, AJCC 8th Edition  - Clinical stage from 9/15/2022: Stage BETTE (cT4, cN3, cM1a) - Signed by Stalin Barber MD on 10/10/2022  Stage prefix: Initial diagnosis       9/15/2022 Biopsy    IR lung biopsy    A  Lymph node, right supraclavicular biopsy:  -  Metastatic adenocarcinoma of lung origin  -  Immunohistochemical stains performed with appropriate controls show the tumor to be positive for TTF1 and napsin and negative for CK5/6 and p40, supporting the diagnosis     10/5/2022 Initial Diagnosis    Malignant neoplasm of upper lobe of right lung [facility])           Past Medical History:   Diagnosis Date   • Diabetes mellitus ([facility] [address] )    • Hyperlipidemia    • Hypertension    • Lung cancer ([facility] [address] )    • Prostate cancer ([facility] [address] ) 2005    S/P prostate ca-2005 had radiation tx       Past Surgical History:   Procedure Laterality Date   • COLONOSCOPY     • IR BIOPSY LYMPH NODE  9/15/2022   • IR THORACENTESIS  2022   • OH ARTHRODESIS ANKLE OPEN Right 7/10/2020    Procedure: ARTHRODESIS/ TIBIAL-TALAR ANKLE FUSION;  Surgeon: Aleksandra Armstrong MD;  Location: [facility];  Service: Orthopedics   • OH LAPAROSCOPY SURG RPR INITIAL INGUINAL HERNIA Bilateral 2021    Procedure: LAPAROSCOPIC REPAIR HERNIA INGUINAL WITH MESH;  Surgeon: Elan Wilson MD;  Location: [facility];  Service: General       Family History   Problem Relation Age of Onset   • Heart attack Mother        Social History   Social History     Substance and Sexual Activity   Alcohol Use Yes    Comment: Occasional     Social History Substance and Sexual Activity   Drug Use Never     Social History     Tobacco Use   Smoking Status Never   Smokeless Tobacco Never         Meds/Allergies      Current Outpatient Medications:   •  Accu-Chek FastClix Lancets MISC, Use daily E11 9  Check  fbs  daily, Disp: 100 each, Rfl: 3  •  amLODIPine (NORVASC) 10 mg tablet, TAKE 1 TABLET BY MOUTH EVERY DAY FOR BLOOD PRESSURE, Disp: 90 tablet, Rfl: 1  •  brimonidine tartrate 0 2 % ophthalmic solution, Administer 1 drop into the left eye 2 (two) times a day, Disp: , Rfl:   •  finasteride (PROSCAR) 5 mg tablet, TAKE 1 TABLET (5 MG TOTAL) BY MOUTH DAILY  , Disp: 90 tablet, Rfl: 1  •  glucose blood (Accu-Chek Jackie Plus) test strip, Use as instructed, Disp: 100 strip, Rfl: 1  •  lisinopril (ZESTRIL) 10 mg tablet, TAKE 1 TABLET BY MOUTH EVERY DAY, Disp: 90 tablet, Rfl: 1  •  metFORMIN (GLUCOPHAGE-XR) 500 mg 24 hr tablet, TAKE 1 TABLET BY MOUTH EVERY DAY, Disp: 90 tablet, Rfl: 0  •  metoprolol succinate (TOPROL-XL) 100 mg 24 hr tablet, TAKE 1 TABLET BY MOUTH EVERY DAY, Disp: 90 tablet, Rfl: 0  •  ofloxacin (OCUFLOX) 0 3 % ophthalmic solution, 1 drop 4 (four) times a day Start v2 days before surgery   Originally surgery scheduled 9/14/22, Disp: , Rfl:   •  Osimertinib Mesylate 80 MG TABS, Take 1 tablet (80 mg total) by mouth in the morning, Disp: 30 tablet, Rfl: 6  •  simvastatin (ZOCOR) 10 mg tablet, TAKE 1 TABLET BY MOUTH EVERY DAY, Disp: 90 tablet, Rfl: 1  •  apixaban (Eliquis) 5 mg, Take 1 tablet (5 mg total) by mouth 2 (two) times a day (Patient not taking: Reported on 4/19/2023), Disp: 180 tablet, Rfl: 3  •  cyclopentolate (CYCLOGYL) 1 % ophthalmic solution, Administer 1 drop to both eyes once (Patient not taking: Reported on 4/24/2023), Disp: , Rfl:   •  diphenhydrAMINE (BENADRYL) 50 mg capsule, Take 1 capsule 1 hour prior to contrast study (Patient not taking: Reported on 5/8/2023), Disp: 1 capsule, Rfl: 0  •  methylPREDNISolone (MEDROL) 32 MG tablet, Take 1 tablet by mouth 12 hours prior to contrast study and then 2 hours prior to contrast study (Patient not taking: Reported on 4/19/2023), Disp: 2 tablet, Rfl: 0  •  ondansetron (Zofran ODT) 8 mg disintegrating tablet, Take 1 tablet (8 mg total) by mouth every 8 (eight) hours as needed for nausea or vomiting (Patient not taking: Reported on 4/25/2023), Disp: 20 tablet, Rfl: 0  •  Osimertinib Mesylate 80 MG TABS, Take 1 tablet (80 mg total) by mouth in the morning, Disp: 30 tablet, Rfl: 0  •  prednisoLONE acetate (PRED FORTE) 1 % ophthalmic suspension, , Disp: , Rfl:   •  Prolensa 0 07 % SOLN, , Disp: , Rfl:   •  timolol (TIMOPTIC) 0 5 % ophthalmic solution, INSTILL 1 DROP LEFT EYE EVERY MORNING (Patient not taking: Reported on 5/8/2023), Disp: , Rfl:   •  timolol (TIMOPTIC-XE) 0 5 % ophthalmic gel-forming, 1 drop daily (Patient not taking: Reported on 4/24/2023), Disp: , Rfl:   •  travoprost (TRAVATAN-Z) 0 004 % ophthalmic solution, 1 drop daily at bedtime (Patient not taking: Reported on 4/24/2023), Disp: , Rfl:   No Known Allergies      Review of Systems   Constitutional: Negative  HENT: Positive for dental problem, hearing loss, postnasal drip and tinnitus  Eyes:        Wears glasses   Respiratory: Positive for cough (white phlegm)  Gastrointestinal: Negative  Genitourinary: Positive for frequency and urgency  Nocturia x 3   Musculoskeletal: Negative  Skin:        Skin cancer removed last week left chest; sutures will be removed next week   Allergic/Immunologic: Positive for environmental allergies  Neurological: Negative  Hematological: Bruises/bleeds easily  Psychiatric/Behavioral: Negative  OBJECTIVE:   /78   Pulse 71   Temp (!) 97 °F (36 1 °C)   Resp 16   Wt 71 7 kg (158 lb)   SpO2 99%   BMI 23 33 kg/m²   Performance Status: Karnofsky: 90 - Able to carry on normal activity; minor signs or symptoms of disease     Physical Exam  Vitals and nursing note reviewed  Constitutional:       General: He is not in acute distress  Cardiovascular:      Rate and Rhythm: Normal rate and regular rhythm  Heart sounds: No murmur heard  Pulmonary:      Breath sounds: No wheezing, rhonchi or rales  Abdominal:      Palpations: Abdomen is soft  Tenderness: There is no abdominal tenderness  Musculoskeletal:      Right lower leg: No edema  Left lower leg: No edema  Lymphadenopathy:      Cervical: No cervical adenopathy  Neurological:      Mental Status: He is alert and oriented to person, place, and time  Gait: Gait normal          RESULTS  Imaging Studies  CT chest abdomen pelvis w contrast    Result Date: 4/14/2023  Narrative: CT CHEST, ABDOMEN AND PELVIS WITH IV CONTRAST INDICATION:   C34 11: Malignant neoplasm of upper lobe, right bronchus or lung  COMPARISON: January 10, 2023 TECHNIQUE: CT examination of the chest, abdomen and pelvis was performed  Multiplanar 2D reformatted images were created from the source data  Radiation dose length product (DLP) for this visit:  694 mGy-cm   This examination, like all CT scans performed in the [facility], was performed utilizing techniques to minimize radiation dose exposure, including the use of iterative reconstruction and automated exposure control  IV Contrast:  100 mL of iohexol (OMNIPAQUE) Enteric Contrast: Enteric contrast was administered  FINDINGS: CHEST LUNGS:  Spiculated mass right upper lobe now measures 3 6 x 2 5 cm, previously measuring 4 1 x 2 5 cm  A perifissural lung nodule seen right lower lobe, largest, previously measuring 5 mm  Currently measuring 1 2 cmX 1 cm this is seen in image 95 series 2 Additional nonmeasurable nodules are seen in the left lung    There is a few scattered granulomas Pleuroparenchymal nodule right middle lobe image 98 series 2, stable Left lower lobe lung nodule measuring about 5 mm, image 87 series 2, stable PLEURA:  Small right effusion seen with pleural thickening, decreased from the previous study HEART/GREAT VESSELS: Heart is unremarkable for patient's age  No thoracic aortic aneurysm  MEDIASTINUM AND FAUSTO:  Lower right paratracheal, left paratracheal lymph node remain unchanged Partly calcified subcarinal lymph nodes are seen, stable CHEST WALL AND LOWER NECK:  No significant axillary lymph node alignment seen ABDOMEN LIVER/BILIARY TREE:  Unremarkable  GALLBLADDER:  Cholelithiasis seen SPLEEN:  Unremarkable  PANCREAS:  Unremarkable  ADRENAL GLANDS:  Unremarkable  KIDNEYS/URETERS:  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 STOMACH AND BOWEL:  No abnormal bowel wall thickening seen Moderate amount of fecal debris in the colon seen Ileocecal junction appear unremarkable APPENDIX:  No findings to suggest appendicitis  ABDOMINOPELVIC CAVITY:  No ascites  No pneumoperitoneum  No lymphadenopathy  VESSELS:  Unremarkable for patient's age  PELVIS REPRODUCTIVE ORGANS:  Unremarkable for patient's age  URINARY BLADDER:  Bladder wall thickening seen ABDOMINAL WALL/INGUINAL REGIONS:  Unremarkable  OSSEOUS STRUCTURES:  A sclerotic focus seen within the posterior aspect of the left iliac bone Bone island, stable additional sclerotic foci in the sacrum, stable     Impression: There is enlargement of the right lower lung nodule now measures 1 2 x 1 cm, previously measuring 5 mm  The previously noted the right upper lobe lung mass continues to decrease in size Additional multiple nonmeasurable lung nodules as seen on the previous study, stable There is no new visceral metastatic disease in the abdomen and pelvis The study was marked in EPIC for significant notification   Workstation performed: [id number]       Pathology:   Collected 9/15/2022 13:02      Status: Final result      Visible to patient: No (inaccessible in [facility])      0 Result Notes  Component  Resulting Agency   Case Report   Surgical Pathology Report                         Case: [id number]                                    Authorizing Provider: Keyla Cottrell DO           Collected:           09/15/2022 1302               Ordering Location:     [facility]      Received:            09/15/2022 1319                                      [facility]                                                               Pathologist:           Janet Blake MD                                                           Specimen:    Lymph Node, right supraclavicular                                                          Final Diagnosis   A  Lymph node, right supraclavicular biopsy:  -  Metastatic adenocarcinoma of lung origin  -  Immunohistochemical stains performed with appropriate controls show the tumor to be positive for TTF1 and napsin and negative for CK5/6 and p40, supporting the diagnosis       Electronically signed by Janet Blake MD on 9/19/2022 at 0910              ASSESSMENT  1  Malignant neoplasm of upper lobe of right lung [facility])  Ambulatory referral to Radiation Oncology        Cancer Staging   Malignant neoplasm of upper lobe of right lung [facility])  Staging form: Lung, AJCC 8th Edition  - Clinical stage from 9/15/2022: Stage BETTE (cT4, cN3, cM1a) - Signed by Polo Fraser MD on 10/10/2022  Stage prefix: Initial diagnosis        PLAN/DISCUSSION  Mosoe Pedersen is a 69 y o   male with with a history of stage IV NSCLC - adenocarcinoma, of the right lung with distant metastases involving axillary and bilateral cervical lymph nodes  He is maintained on Osmertinib with excellent tolerance and stable partial response  He has one area of progression in a right lower lobe metastasis  I offered the patient SBRT to the right lower lobe for oligoprogression of disease  We would plan 35Gy in 5 fractions as tolerated    I explained that this would be done in an effort to extend his ability to stay on current therapy longer as he is otherwise doing well and tolerating treatment well   I explained that this not a curative treatment and there is always risk of progression elsewhere or even in the treatment field that would necessitate moving to next line therapy  Alternative would be to move to next line systemic therapy at this time  The lesion is reasonable in size and location for SBRT  The main concern is the use of concurrent Osmertinib, which has had several reports of increased risk of G2-4 pneumonitis even when moderate doses of radiation are used for thoracic treatment  These reports have largely been small in number and there are some conflicting studies as well, but it is a concern  Therefore, I discussed with the patient stopping Osmertinib 1 week prior to treatment and holding therapy until at least 1 week after treatment  This is commonly used in practice based on previous trial employing both medication and radiation  This would not eliminate risk of pneumonitis, but hopefully reduce risk  Other risks and acute and late side effects and potential toxicities of radiation were discussed with the patient at length  In general, we would expect treatment to be well tolerated with other main concern being fibrosis with possible decrease in lung function  SBRT, however, is associated with small lung function decrease in most patients with most symptomatic primarily with some increase in dyspnea on exertion if any  Based on location, few other significant toxicities would be expected  The patient was given the opportunity to ask questions and all questions were answered to his satisfaction  He was undecided, but deferred to recommendations from his team       I have discussed concerns and plans with Dr Dylan Andino  He feels that patient's best option is still to proceed with SBRT knowing increased possible risk  He agrees with stopping Osmertinib for treatment to reduce risk  4DCT simulation next week    We will plan to begin radiation soon "thereafter  Total Time Spent  50 minutes spent reviewing EMR in preparation for visit, with the patient, coordination with other providers, and documentation  Greater than 50% of total time was spent with the patient and/or family for counseling and/or coordination of care  Chico Daugherty MD  8/9/2216,6:18 PM      Portions of the record may have been created with voice recognition software  Occasional wrong word or \"sound a like\" substitutions may have occurred due to the inherent limitations of voice recognition software  Read the chart carefully and recognize, using context, where substitutions have occurred              "

## 2023-05-08 NOTE — PROGRESS NOTES
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, cM1a) adenocarcinoma of right lung  Lung nodule was found on imaging during hospitalization for bilateral pulmonary emboli in September 2022  He is being referred by Dr Frandy Lino for consideration of SBRT to RLL growing nodule  He is currently on Osimertinib  He presents today for consult  9/10/23 Hospital admission    9/10/22 CTA ED chest  1  Acute pulmonary emboli in the right lower lobe pulmonary artery and several segmental and subsegmental branches, as well as several left lower lobe subsegmental branches  Measured RV/LV ratio is within normal limits at 0 84, less than 0 9      2   Ground glass opacification in the right lower lobe, likely pulmonary infarction  Moderate right and trace left pleural effusions  3   Right upper lobe pulmonary mass measuring up to 6 2 cm as described, compatible with primary lung malignancy  Scattered additional solid nodules measuring up to 1 2 cm the right middle lobe, suspicious for metastatic disease  4  Several large right hilar and mediastinal lymph nodes measuring up to 2 1 cm, also concerning for metastatic disease  9/10/22 CT head wo contrast  No acute intracranial abnormality is seen  No discrete evidence of intracranial metastatic disease  9/12/22 CT A/P w contrast  1  No metastatic disease to the abdomen or pelvis  2   Moderate size right pleural effusion with small left effusion, right lower lobe compressive atelectasis versus infiltrate and right middle lobe nodule suspicious for metastasis  3   Nonspecific punctate densities in the pelvic bones as described  4   Mild wall thickening of the urinary bladder  Correlate with urinalysis to exclude a urinary tract infection  9/12/22 MRI brain w wo contrast  No MR evidence of acute ischemia  No enhancing lesions to suggest metastatic disease      9/13/22 IR thoracentesis   400 mL clear cordell pleural fluid was aspirated  A  B  Pleural, Right,  (ThinPrep and cell block preparations): See Note  Negative for malignancy  Mesothelial cells, lymphocytes, histiocytes and neutrophils  9/15/22 IR lymph node biopsy    9/26/22 Thoracic Oncology Multidisciplinary Tumor Conference   PHYSICIAN RECOMMENDED PLAN:  Send tissue for molecular testing  If PET/CT is negative for metastatic disease, repeat thoracentesis to complete staging  9/30/22 PET CT  1  Large hypermetabolic right upper lung mass extending to the right hilum most compatible with malignancy  Additional small bilateral lung nodules favoring metastases  2   Mediastinal, right axillary and right greater than left cervical metastatic adenopathy  3   No hypermetabolic metastases visualized in the abdomen pelvis  4   Mild subcutaneous FDG activity just below the level of the right zygomatic arch, SUV 2 1, without discrete CT abnormality is nonspecific but may be inflammatory  This may be correlated clinically  10/5/22 PFT  FEV1/FVC Ratio:  64 97 %   Forced Vital Capacity:  3 62 L   (92 % predicted)  FEV1:  2 35 L (80 % predicted)  After administration of bronchodilator FEV1:  2 47 (+ 5 %)   Lung volumes by body plethysmography:   Total Lung Capacity 85 % predicted   Residual volume 80 % predicted    RV/TLC ratio 92 %   DLCO for patients hemoglobin level:  53 % (66 % when corrected for Hb)     10/6/22 Dr Angela Gilliam  Chest x-ray in 1 week  Specimen to [facility] as soon as possible  Specimen number [id number], lymph node biopsy  Diagnosis lung cancer  Appointment with our group member at [facility] in 2 weeks  recommend systemic therapy to start with  Xarelto for bilateral pulmonary emboli    10/20/22 Dr Kim Maria lifelong anticoagulation with Xarelto due to PE with underlying malignancy  Zofran 8mg q8 prn nausea/vomiting to be sent home  Osimertinib 80mg to be sent for and will get CBC, CMP monthly for 3 months as standing     Palliative care consult  Restaging CT CAP in 3 months    1/10/23 CT C/A/P w contrast  Treatment response with decrease in the size of the right upper lobe lung with decrease in the size of the mediastinal   Resolution of the previously noted the pulmonary emboli  No new measurable lung nodule seen   No new abdominopelvic lymphadenopathy or new visceral metastatic   Routine surveillance can be continued     23 CT C/A/P w contrast  There is enlargement of the right lower lung nodule now measures 1 2 x 1 cm, previously measuring 5 mm  The previously noted the right upper lobe lung mass continues to decrease in size  Additional multiple nonmeasurable lung nodules as seen on the previous study, stable There is no new visceral metastatic disease in the abdomen and pelvis    23 Dr Shola Hebert  Patient currently on Osimertinib 80mg once daily for stage BETTE adenocarcinoma of lung  CBC, CMP to be done in 3 months  Continue lifelong anticoagulation with Xarelto due to PE with underlying malignancy; he can't afford it and is doing ASA instead  Continue F/U with palliative care   Referral to Rad Onc to consider SBRT to the RLL growing nodule  Restaging CT CAP in 3 months    23 Palliative Care    Upcomin/10/23 CT  23 Palliative Care  23 Dr Shola Hebert      Oncology History   Malignant neoplasm of upper lobe of right lung ([facility] [address] )   9/15/2022 -  Cancer Staged    Staging form: Lung, AJCC 8th Edition  - Clinical stage from 9/15/2022: Stage BETTE (cT4, cN3, cM1a) - Signed by Brina Bennett MD on 10/10/2022  Stage prefix: Initial diagnosis       9/15/2022 Biopsy    IR lung biopsy    A  Lymph node, right supraclavicular biopsy:  -  Metastatic adenocarcinoma of lung origin    -  Immunohistochemical stains performed with appropriate controls show the tumor to be positive for TTF1 and napsin and negative for CK5/6 and p40, supporting the diagnosis     10/5/2022 Initial Diagnosis    [facility]         Review of Systems:  Review of Systems   Constitutional: Negative  HENT: Positive for dental problem, hearing loss, postnasal drip and tinnitus  Eyes:        Wears glasses   Respiratory: Positive for cough (white phlegm)  Gastrointestinal: Negative  Genitourinary: Positive for frequency and urgency  Nocturia x 3   Musculoskeletal: Negative  Skin:        Skin cancer removed last week left chest; sutures will be removed next week   Allergic/Immunologic: Positive for environmental allergies  Neurological: Negative  Hematological: Bruises/bleeds easily  Psychiatric/Behavioral: Negative  Clinical Trial: no        Pain assessment: 0    PFT 10/5/22    Prior Radiation yes  Prostate cancer 2009  [facility]    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, side effects, SIM    MST completed    Implantable Devices (Port, pacemaker, pain stimulator) no    Hip Replacement no    Health Maintenance   Topic Date Due   • Hepatitis C Screening  Never done   • Pneumococcal Vaccine: 65+ Years (2 - PPSV23 if available, else PCV20) 12/07/2017   • COVID-19 Vaccine (3 - Pfizer risk series) 04/14/2021   • HEMOGLOBIN A1C  07/01/2023   • Medicare Annual Wellness Visit (AWV)  07/21/2023   • Depression Screening  10/24/2023   • Fall Risk  10/24/2023   • Diabetic Foot Exam  04/24/2024   • BMI: Adult  04/25/2024   • DM Eye Exam  04/29/2024   • Colorectal Cancer Screening  07/07/2026   • Influenza Vaccine  Completed   • HIB Vaccine  Aged Out   • IPV Vaccine  Aged Out   • Hepatitis A Vaccine  Aged Out   • Meningococcal ACWY Vaccine  Aged Out   • HPV Vaccine  Aged Out       Past Medical History:   Diagnosis Date   • Diabetes mellitus ([facility] )    • Hyperlipidemia    • Hypertension    • Prostate cancer ([facility] ) 2005    S/P prostate ca-2005 had radiation tx         Past Surgical History:   Procedure Laterality Date   • COLONOSCOPY     • IR BIOPSY LYMPH NODE  9/15/2022   • IR THORACENTESIS  9/13/2022   • [place] ARTHRODESIS ANKLE OPEN Right 7/10/2020    Procedure: ARTHRODESIS/ TIBIAL-TALAR ANKLE FUSION;  Surgeon: Luly Riggs MD;  [facility];  Service: Orthopedics   • [place] LAPAROSCOPY SURG RPR INITIAL INGUINAL HERNIA Bilateral 12/16/2021    Procedure: LAPAROSCOPIC REPAIR HERNIA INGUINAL WITH MESH;  Surgeon: Pat Cobb MD;  Location: [facility];  Service: General       Family History   Problem Relation Age of Onset   • Heart attack Mother        Social History     Tobacco Use   • Smoking status: Never   • Smokeless tobacco: Never   Vaping Use   • Vaping Use: Never used   Substance Use Topics   • Alcohol use: Yes     Comment: Occasional   • Drug use: Never          Current Outpatient Medications:   •  Accu-Chek FastClix Lancets MISC, Use daily E11 9  Check  fbs  daily, Disp: 100 each, Rfl: 3  •  amLODIPine (NORVASC) 10 mg tablet, TAKE 1 TABLET BY MOUTH EVERY DAY FOR BLOOD PRESSURE, Disp: 90 tablet, Rfl: 1  •  apixaban (Eliquis) 5 mg, Take 1 tablet (5 mg total) by mouth 2 (two) times a day (Patient not taking: Reported on 4/19/2023), Disp: 180 tablet, Rfl: 3  •  brimonidine tartrate 0 2 % ophthalmic solution, Administer 1 drop into the left eye 2 (two) times a day, Disp: , Rfl:   •  cyclopentolate (CYCLOGYL) 1 % ophthalmic solution, Administer 1 drop to both eyes once (Patient not taking: Reported on 4/24/2023), Disp: , Rfl:   •  diphenhydrAMINE (BENADRYL) 50 mg capsule, Take 1 capsule 1 hour prior to contrast study, Disp: 1 capsule, Rfl: 0  •  finasteride (PROSCAR) 5 mg tablet, TAKE 1 TABLET (5 MG TOTAL) BY MOUTH DAILY  , Disp: 90 tablet, Rfl: 1  •  glucose blood (Accu-Chek Jackie Plus) test strip, Use as instructed, Disp: 100 strip, Rfl: 1  •  lisinopril (ZESTRIL) 10 mg tablet, TAKE 1 TABLET BY MOUTH EVERY DAY, Disp: 90 tablet, Rfl: 1  •  metFORMIN (GLUCOPHAGE-XR) 500 mg 24 hr tablet, TAKE 1 TABLET BY MOUTH EVERY DAY, Disp: 90 tablet, Rfl: 0  •  methylPREDNISolone (MEDROL) 32 MG tablet, Take 1 tablet by mouth 12 hours prior to contrast study and then 2 hours prior to contrast study (Patient not taking: Reported on 4/19/2023), Disp: 2 tablet, Rfl: 0  •  metoprolol succinate (TOPROL-XL) 100 mg 24 hr tablet, TAKE 1 TABLET BY MOUTH EVERY DAY, Disp: 90 tablet, Rfl: 0  •  ofloxacin (OCUFLOX) 0 3 % ophthalmic solution, 1 drop 4 (four) times a day Start v2 days before surgery  Originally surgery scheduled 9/14/22, Disp: , Rfl:   •  ondansetron (Zofran ODT) 8 mg disintegrating tablet, Take 1 tablet (8 mg total) by mouth every 8 (eight) hours as needed for nausea or vomiting (Patient not taking: Reported on 4/25/2023), Disp: 20 tablet, Rfl: 0  •  Osimertinib Mesylate 80 MG TABS, Take 1 tablet (80 mg total) by mouth in the morning (Patient not taking: Reported on 4/24/2023), Disp: 30 tablet, Rfl: 6  •  Osimertinib Mesylate 80 MG TABS, Take 1 tablet (80 mg total) by mouth in the morning, Disp: 30 tablet, Rfl: 0  •  prednisoLONE acetate (PRED FORTE) 1 % ophthalmic suspension, , Disp: , Rfl:   •  Prolensa 0 07 % SOLN, , Disp: , Rfl:   •  simvastatin (ZOCOR) 10 mg tablet, TAKE 1 TABLET BY MOUTH EVERY DAY, Disp: 90 tablet, Rfl: 1  •  timolol (TIMOPTIC) 0 5 % ophthalmic solution, INSTILL 1 DROP LEFT EYE EVERY MORNING, Disp: , Rfl:   •  timolol (TIMOPTIC-XE) 0 5 % ophthalmic gel-forming, 1 drop daily (Patient not taking: Reported on 4/24/2023), Disp: , Rfl:   •  travoprost (TRAVATAN-Z) 0 004 % ophthalmic solution, 1 drop daily at bedtime (Patient not taking: Reported on 4/24/2023), Disp: , Rfl:     No Known Allergies     There were no vitals filed for this visit

## 2023-05-15 PROCEDURE — 77263 THER RADIOLOGY TX PLNG CPLX: CPT | Performed by: RADIOLOGY

## 2023-05-17 ENCOUNTER — APPOINTMENT (OUTPATIENT)
Dept: RADIATION ONCOLOGY | Facility: CLINIC | Age: 78
End: 2023-05-17
Attending: RADIOLOGY
Payer: MEDICARE

## 2023-05-17 PROCEDURE — 77334 RADIATION TREATMENT AID(S): CPT | Performed by: RADIOLOGY

## 2023-05-17 PROCEDURE — 77399 UNLISTED PX MED RADJ PHYSICS: CPT | Performed by: RADIOLOGY

## 2023-05-17 PROCEDURE — 77470 SPECIAL RADIATION TREATMENT: CPT | Performed by: RADIOLOGY

## 2023-05-18 DIAGNOSIS — I10 ESSENTIAL HYPERTENSION: ICD-10-CM

## 2023-05-18 RX ORDER — LISINOPRIL 10 MG/1
TABLET ORAL
Qty: 90 TABLET | Refills: 1 | Status: SHIPPED | OUTPATIENT
Start: 2023-05-18

## 2023-05-30 PROCEDURE — 77338 DESIGN MLC DEVICE FOR IMRT: CPT | Performed by: RADIOLOGY

## 2023-05-30 PROCEDURE — 77300 RADIATION THERAPY DOSE PLAN: CPT | Performed by: RADIOLOGY

## 2023-05-30 PROCEDURE — 77301 RADIOTHERAPY DOSE PLAN IMRT: CPT | Performed by: RADIOLOGY

## 2023-05-31 PROCEDURE — 77373 STRTCTC BDY RAD THER TX DLVR: CPT | Performed by: RADIOLOGY

## 2023-06-02 ENCOUNTER — APPOINTMENT (OUTPATIENT)
Dept: RADIATION ONCOLOGY | Facility: CLINIC | Age: 78
End: 2023-06-02
Attending: RADIOLOGY
Payer: MEDICARE

## 2023-06-02 PROCEDURE — 77373 STRTCTC BDY RAD THER TX DLVR: CPT | Performed by: RADIOLOGY

## 2023-06-05 ENCOUNTER — APPOINTMENT (OUTPATIENT)
Dept: RADIATION ONCOLOGY | Facility: CLINIC | Age: 78
End: 2023-06-05
Attending: RADIOLOGY
Payer: MEDICARE

## 2023-06-05 PROCEDURE — 77373 STRTCTC BDY RAD THER TX DLVR: CPT | Performed by: RADIOLOGY

## 2023-06-06 ENCOUNTER — APPOINTMENT (OUTPATIENT)
Dept: RADIATION ONCOLOGY | Facility: CLINIC | Age: 78
End: 2023-06-06
Payer: MEDICARE

## 2023-06-07 ENCOUNTER — APPOINTMENT (OUTPATIENT)
Dept: RADIATION ONCOLOGY | Facility: CLINIC | Age: 78
End: 2023-06-07
Attending: RADIOLOGY
Payer: MEDICARE

## 2023-06-07 PROCEDURE — 77373 STRTCTC BDY RAD THER TX DLVR: CPT | Performed by: RADIOLOGY

## 2023-06-09 ENCOUNTER — APPOINTMENT (OUTPATIENT)
Dept: RADIATION ONCOLOGY | Facility: CLINIC | Age: 78
End: 2023-06-09
Attending: RADIOLOGY
Payer: MEDICARE

## 2023-06-09 DIAGNOSIS — C34.11 MALIGNANT NEOPLASM OF UPPER LOBE OF RIGHT LUNG (HCC): Primary | ICD-10-CM

## 2023-06-09 PROCEDURE — 77336 RADIATION PHYSICS CONSULT: CPT | Performed by: RADIOLOGY

## 2023-06-09 PROCEDURE — 77373 STRTCTC BDY RAD THER TX DLVR: CPT | Performed by: RADIOLOGY

## 2023-06-10 DIAGNOSIS — E78.2 MIXED HYPERLIPIDEMIA: ICD-10-CM

## 2023-06-12 RX ORDER — SIMVASTATIN 10 MG
TABLET ORAL
Qty: 90 TABLET | Refills: 1 | Status: SHIPPED | OUTPATIENT
Start: 2023-06-12

## 2023-06-25 DIAGNOSIS — E11.9 TYPE 2 DIABETES MELLITUS WITHOUT COMPLICATION, WITHOUT LONG-TERM CURRENT USE OF INSULIN (HCC): ICD-10-CM

## 2023-06-25 DIAGNOSIS — I10 ESSENTIAL HYPERTENSION: ICD-10-CM

## 2023-06-26 RX ORDER — METFORMIN HYDROCHLORIDE 500 MG/1
TABLET, EXTENDED RELEASE ORAL
Qty: 90 TABLET | Refills: 0 | Status: SHIPPED | OUTPATIENT
Start: 2023-06-26

## 2023-06-26 RX ORDER — METOPROLOL SUCCINATE 100 MG/1
TABLET, EXTENDED RELEASE ORAL
Qty: 90 TABLET | Refills: 0 | Status: SHIPPED | OUTPATIENT
Start: 2023-06-26

## 2023-07-07 ENCOUNTER — APPOINTMENT (OUTPATIENT)
Dept: LAB | Facility: MEDICAL CENTER | Age: 78
End: 2023-07-07
Payer: MEDICARE

## 2023-07-07 DIAGNOSIS — E11.9 CONTROLLED TYPE 2 DIABETES MELLITUS WITHOUT COMPLICATION, WITHOUT LONG-TERM CURRENT USE OF INSULIN (HCC): ICD-10-CM

## 2023-07-07 DIAGNOSIS — C34.11 MALIGNANT NEOPLASM OF UPPER LOBE OF RIGHT LUNG (HCC): ICD-10-CM

## 2023-07-07 LAB
ALBUMIN SERPL BCP-MCNC: 3.6 G/DL (ref 3.5–5)
ALP SERPL-CCNC: 111 U/L (ref 46–116)
ALT SERPL W P-5'-P-CCNC: 17 U/L (ref 12–78)
ANION GAP SERPL CALCULATED.3IONS-SCNC: 2 MMOL/L
AST SERPL W P-5'-P-CCNC: 18 U/L (ref 5–45)
BASOPHILS # BLD AUTO: 0.03 THOUSANDS/ÂΜL (ref 0–0.1)
BASOPHILS NFR BLD AUTO: 1 % (ref 0–1)
BILIRUB SERPL-MCNC: 0.45 MG/DL (ref 0.2–1)
BUN SERPL-MCNC: 22 MG/DL (ref 5–25)
CALCIUM SERPL-MCNC: 8.8 MG/DL (ref 8.3–10.1)
CHLORIDE SERPL-SCNC: 111 MMOL/L (ref 96–108)
CO2 SERPL-SCNC: 29 MMOL/L (ref 21–32)
CREAT SERPL-MCNC: 1.09 MG/DL (ref 0.6–1.3)
EOSINOPHIL # BLD AUTO: 0.16 THOUSAND/ÂΜL (ref 0–0.61)
EOSINOPHIL NFR BLD AUTO: 3 % (ref 0–6)
ERYTHROCYTE [DISTWIDTH] IN BLOOD BY AUTOMATED COUNT: 13.2 % (ref 11.6–15.1)
GFR SERPL CREATININE-BSD FRML MDRD: 65 ML/MIN/1.73SQ M
GLUCOSE P FAST SERPL-MCNC: 114 MG/DL (ref 65–99)
HCT VFR BLD AUTO: 39.6 % (ref 36.5–49.3)
HGB BLD-MCNC: 13.5 G/DL (ref 12–17)
IMM GRANULOCYTES # BLD AUTO: 0.01 THOUSAND/UL (ref 0–0.2)
IMM GRANULOCYTES NFR BLD AUTO: 0 % (ref 0–2)
LYMPHOCYTES # BLD AUTO: 0.6 THOUSANDS/ÂΜL (ref 0.6–4.47)
LYMPHOCYTES NFR BLD AUTO: 11 % (ref 14–44)
MCH RBC QN AUTO: 32.1 PG (ref 26.8–34.3)
MCHC RBC AUTO-ENTMCNC: 34.1 G/DL (ref 31.4–37.4)
MCV RBC AUTO: 94 FL (ref 82–98)
MONOCYTES # BLD AUTO: 0.7 THOUSAND/ÂΜL (ref 0.17–1.22)
MONOCYTES NFR BLD AUTO: 12 % (ref 4–12)
NEUTROPHILS # BLD AUTO: 4.13 THOUSANDS/ÂΜL (ref 1.85–7.62)
NEUTS SEG NFR BLD AUTO: 73 % (ref 43–75)
NRBC BLD AUTO-RTO: 0 /100 WBCS
PLATELET # BLD AUTO: 112 THOUSANDS/UL (ref 149–390)
PMV BLD AUTO: 12.2 FL (ref 8.9–12.7)
POTASSIUM SERPL-SCNC: 4.2 MMOL/L (ref 3.5–5.3)
PROT SERPL-MCNC: 6.6 G/DL (ref 6.4–8.4)
RBC # BLD AUTO: 4.2 MILLION/UL (ref 3.88–5.62)
SODIUM SERPL-SCNC: 142 MMOL/L (ref 135–147)
WBC # BLD AUTO: 5.63 THOUSAND/UL (ref 4.31–10.16)

## 2023-07-07 PROCEDURE — 85025 COMPLETE CBC W/AUTO DIFF WBC: CPT

## 2023-07-07 PROCEDURE — 36415 COLL VENOUS BLD VENIPUNCTURE: CPT

## 2023-07-07 PROCEDURE — 80053 COMPREHEN METABOLIC PANEL: CPT

## 2023-07-07 PROCEDURE — 83036 HEMOGLOBIN GLYCOSYLATED A1C: CPT

## 2023-07-09 LAB
EST. AVERAGE GLUCOSE BLD GHB EST-MCNC: 100 MG/DL
HBA1C MFR BLD: 5.1 %

## 2023-07-19 ENCOUNTER — RADIATION ONCOLOGY FOLLOW-UP (OUTPATIENT)
Dept: RADIATION ONCOLOGY | Facility: CLINIC | Age: 78
End: 2023-07-19
Attending: RADIOLOGY
Payer: MEDICARE

## 2023-07-19 VITALS
OXYGEN SATURATION: 98 % | HEART RATE: 72 BPM | WEIGHT: 157 LBS | BODY MASS INDEX: 23.18 KG/M2 | DIASTOLIC BLOOD PRESSURE: 70 MMHG | RESPIRATION RATE: 16 BRPM | TEMPERATURE: 98 F | SYSTOLIC BLOOD PRESSURE: 140 MMHG

## 2023-07-19 DIAGNOSIS — C34.11 MALIGNANT NEOPLASM OF UPPER LOBE OF RIGHT LUNG (HCC): Primary | ICD-10-CM

## 2023-07-19 PROCEDURE — 99211 OFF/OP EST MAY X REQ PHY/QHP: CPT | Performed by: RADIOLOGY

## 2023-07-19 PROCEDURE — 99024 POSTOP FOLLOW-UP VISIT: CPT | Performed by: RADIOLOGY

## 2023-07-19 NOTE — PROGRESS NOTES
Follow-up - Radiation Oncology   Zuri Klein 1945 69 y.o. male [id number]      History of Present Illness   Cancer Staging   Malignant neoplasm of upper lobe of right lung [facility])  Staging form: Lung, AJCC 8th Edition  - Clinical stage from 9/15/2022: Stage BETTE (cT4, cN3, cM1a) - Signed by Marcell Farley MD on 10/10/2022  Stage prefix: Initial diagnosis      Zuri Klein is a 69 y.o. man with a history of stage IV adenocarcinoma of the lung undergoing treatment with osmertinib with excellent tolerance and partial response. He was noted with oligoprogressionrecently completed a course of SBRT for oligoprogression of disease on 23. He returns today for follow up.      7/10/23 CT C/A/P w contrast - Rescheduled for     The patient has no complaints. He has resumed osmertinib. He denies respiratory symptoms including cough, shortness of breath, progressive dyspnea on exertion, fever. He denies chest pain or palpitations.       Upcomin23 Palliative Care  23 Dr. Roxi Willis   Oncology History   Malignant neoplasm of upper lobe of right lung ([address])   9/15/2022 -  Cancer Staged    Staging form: Lung, AJCC 8th Edition  - Clinical stage from 9/15/2022: Stage BETTE (cT4, cN3, cM1a) - Signed by Marcell Farley MD on 10/10/2022  Stage prefix: Initial diagnosis       9/15/2022 Biopsy    IR lung biopsy    A. Lymph node, right supraclavicular biopsy:  -  Metastatic adenocarcinoma of lung origin.   -  Immunohistochemical stains performed with appropriate controls show the tumor to be positive for TTF1 and napsin and negative for CK5/6 and p40, supporting the diagnosis     10/5/2022 Initial Diagnosis    Malignant neoplasm of upper lobe of right lung ([address])     2023 - 2023 Radiation    Treatments:  Course: C1 SBRT RLL  Plan ID Energy Fractions Dose per Fraction (cGy) Dose Correction (cGy) Total Dose Delivered (cGy) Elapsed Days   BH SBRT RLL 6X-FFF  700 0 3,500 9 Treatment Dates:  5/31/2023 - 6/9/2023. Past Medical History:   Diagnosis Date   • Diabetes mellitus ([address])    • Hyperlipidemia    • Hypertension    • Lung cancer ([address])    • Prostate cancer ([address]) 2005    S/P prostate ca-2005 had radiation tx. Past Surgical History:   Procedure Laterality Date   • COLONOSCOPY     • IR BIOPSY LYMPH NODE  9/15/2022   • IR THORACENTESIS  9/13/2022   • [place] ARTHRODESIS ANKLE OPEN Right 7/10/2020    Procedure: ARTHRODESIS/ TIBIAL-TALAR ANKLE FUSION;  Surgeon: Tricia Adkins MD;  [facility];  Service: Orthopedics   • [place] LAPAROSCOPY SURG RPR INITIAL INGUINAL HERNIA Bilateral 12/16/2021    Procedure: LAPAROSCOPIC REPAIR HERNIA INGUINAL WITH MESH;  Surgeon: Carley Lobato MD;  Location: [facility];  Service: General       Social History   Social History     Substance and Sexual Activity   Alcohol Use Yes    Comment: Occasional     Social History     Substance and Sexual Activity   Drug Use Never     Social History     Tobacco Use   Smoking Status Never   Smokeless Tobacco Never         Meds/Allergies     Current Outpatient Medications:   •  Accu-Chek FastClix Lancets MISC, Use daily E11.9  Check  fbs  daily, Disp: 100 each, Rfl: 3  •  amLODIPine (NORVASC) 10 mg tablet, TAKE 1 TABLET BY MOUTH EVERY DAY FOR BLOOD PRESSURE, Disp: 90 tablet, Rfl: 1  •  brimonidine tartrate 0.2 % ophthalmic solution, Administer 1 drop into the left eye 2 (two) times a day, Disp: , Rfl:   •  finasteride (PROSCAR) 5 mg tablet, TAKE 1 TABLET (5 MG TOTAL) BY MOUTH DAILY. , Disp: 90 tablet, Rfl: 1  •  glucose blood (Accu-Chek Jackie Plus) test strip, Use as instructed, Disp: 100 strip, Rfl: 1  •  lisinopril (ZESTRIL) 10 mg tablet, TAKE 1 TABLET BY MOUTH EVERY DAY, Disp: 90 tablet, Rfl: 1  •  metFORMIN (GLUCOPHAGE-XR) 500 mg 24 hr tablet, TAKE 1 TABLET BY MOUTH EVERY DAY, Disp: 90 tablet, Rfl: 0  •  metoprolol succinate (TOPROL-XL) 100 mg 24 hr tablet, TAKE 1 TABLET BY MOUTH EVERY DAY, Disp: 90 tablet, Rfl: 0  •  ofloxacin (OCUFLOX) 0.3 % ophthalmic solution, 1 drop 4 (four) times a day Start v2 days before surgery.  Originally surgery scheduled 9/14/22, Disp: , Rfl:   •  simvastatin (ZOCOR) 10 mg tablet, TAKE 1 TABLET BY MOUTH EVERY DAY, Disp: 90 tablet, Rfl: 1  •  apixaban (Eliquis) 5 mg, Take 1 tablet (5 mg total) by mouth 2 (two) times a day (Patient not taking: Reported on 4/19/2023), Disp: 180 tablet, Rfl: 3  •  cyclopentolate (CYCLOGYL) 1 % ophthalmic solution, Administer 1 drop to both eyes once (Patient not taking: Reported on 4/24/2023), Disp: , Rfl:   •  diphenhydrAMINE (BENADRYL) 50 mg capsule, Take 1 capsule 1 hour prior to contrast study (Patient not taking: Reported on 5/8/2023), Disp: 1 capsule, Rfl: 0  •  methylPREDNISolone (MEDROL) 32 MG tablet, Take 1 tablet by mouth 12 hours prior to contrast study and then 2 hours prior to contrast study (Patient not taking: Reported on 4/19/2023), Disp: 2 tablet, Rfl: 0  •  ondansetron (Zofran ODT) 8 mg disintegrating tablet, Take 1 tablet (8 mg total) by mouth every 8 (eight) hours as needed for nausea or vomiting (Patient not taking: Reported on 4/25/2023), Disp: 20 tablet, Rfl: 0  •  Osimertinib Mesylate 80 MG TABS, Take 1 tablet (80 mg total) by mouth in the morning, Disp: 30 tablet, Rfl: 6  •  Osimertinib Mesylate 80 MG TABS, Take 1 tablet (80 mg total) by mouth in the morning, Disp: 30 tablet, Rfl: 0  •  prednisoLONE acetate (PRED FORTE) 1 % ophthalmic suspension, , Disp: , Rfl:   •  Prolensa 0.07 % SOLN, , Disp: , Rfl:   •  timolol (TIMOPTIC) 0.5 % ophthalmic solution, INSTILL 1 DROP LEFT EYE EVERY MORNING (Patient not taking: Reported on 5/8/2023), Disp: , Rfl:   •  timolol (TIMOPTIC-XE) 0.5 % ophthalmic gel-forming, 1 drop daily (Patient not taking: Reported on 4/24/2023), Disp: , Rfl:   •  travoprost (TRAVATAN-Z) 0.004 % ophthalmic solution, 1 drop daily at bedtime (Patient not taking: Reported on 4/24/2023), Disp: , Rfl:   No Known Allergies      Review of Systems   Constitutional: Negative. HENT: Negative. Eyes: Negative. Respiratory: Negative. Cardiovascular: Negative. Gastrointestinal: Negative. Endocrine: Negative. Genitourinary: Negative. Musculoskeletal: Negative. Skin: Negative. Allergic/Immunologic: Negative. Neurological: Negative. Hematological: Negative. Psychiatric/Behavioral: Negative. OBJECTIVE:   /70   Pulse 72   Temp 98 °F (36.7 °C)   Resp 16   Wt 71.2 kg (157 lb)   SpO2 98%   BMI 23.18 kg/m²   Karnofsky: 90 - Able to carry on normal activity; minor signs or symptoms of disease     Physical Exam  Vitals and nursing note reviewed. Constitutional:       General: He is not in acute distress. Cardiovascular:      Rate and Rhythm: Normal rate and regular rhythm. Pulmonary:      Breath sounds: No wheezing, rhonchi or rales. Abdominal:      General: There is no distension. Palpations: Abdomen is soft. Tenderness: There is no abdominal tenderness. Musculoskeletal:      Right lower leg: No edema. Left lower leg: No edema. Lymphadenopathy:      Cervical: No cervical adenopathy. Neurological:      Mental Status: He is alert and oriented to person, place, and time. Assessment/Plan:  Carlos Pearson is a 69 y.o. man with a history of stage IV NSCLC maintained on Osmertinib with excellent tolerance and partial response. He has one area of progression in a right lower lobe metastasis. He completed SBRT on 6/9/2023 without clinical toxicity. CT imaging was rescheduled due to car difficulties to 8/8/2023. He continues on osmertinib and has follow-up with Dr. Linsey Wilkins on 9/27/23. I offered telemedicine discussion of CT imaging results after completion with follow-up management as indicated and coordinated with Dr. Linsey Wilkins. If imaging stable then we would plan to follow-up again in 4 months. He agreed.      Tamy Salvador MD  8/67/3358,6:38 PM    Portions of the record may have been created with voice recognition software.  Occasional wrong word or "sound a like" substitutions may have occurred due to the inherent limitations of voice recognition software.  Read the chart carefully and recognize, using context, where substitutions have occurred.

## 2023-07-19 NOTE — PROGRESS NOTES
Zuri Klein 1945 is a 69 y.o. male with a history of stage IV adenocarcinoma of the lung undergoing treatment with Osmertinib with excellent tolerance and stable partial response. He recently completed a course of SBRT for oligoprogression of disease on 23. HE returns today for follow up. Osmertinib was discontinued 1 week prior and will continue to be held 1 week after radiation treatment in an effort to minimize risk of pneumonitis. The patient tolerated treatment well without significant toxicity. 7/10/23 CT C/A/P w contrast - Rescheduled for     [facility]  23 Dr. Moe Arevalo          Follow up visit     Oncology History Overview Note   with a history of stage IV adenocarcinoma of the lung undergoing treatment with Osmertinib with excellent tolerance and stable partial response. He recently completed a course of SBRT for oligoprogression of disease on 23. HE returns today for follow up. Osmertinib was discontinued 1 week prior and will continue to be held 1 week after radiation treatment in an effort to minimize risk of pneumonitis. The patient tolerated treatment well without significant toxicity. 7/10/23 CT C/A/P w contrast      [facility]  23 Dr. Moe Arevalo         Malignant neoplasm of upper lobe of right lung ([address])   9/15/2022 -  Cancer Staged    Staging form: Lung, AJCC 8th Edition  - Clinical stage from 9/15/2022: Stage BETTE (cT4, cN3, cM1a) - Signed by Marcell Farley MD on 10/10/2022  Stage prefix: Initial diagnosis       9/15/2022 Biopsy    IR lung biopsy    A. Lymph node, right supraclavicular biopsy:  -  Metastatic adenocarcinoma of lung origin.   -  Immunohistochemical stains performed with appropriate controls show the tumor to be positive for TTF1 and napsin and negative for CK5/6 and p40, supporting the diagnosis     10/5/2022 Initial Diagnosis    Malignant neoplasm of upper lobe of right lung ([address]) 5/31/2023 - 6/9/2023 Radiation    Treatments:  Course: C1 SBRT RLL  Plan ID Energy Fractions Dose per Fraction (cGy) Dose Correction (cGy) Total Dose Delivered (cGy) Elapsed Days   BH SBRT RLL 6X-FFF 5 / 5 700 0 3,500 9    Treatment Dates:  5/31/2023 - 6/9/2023. Review of Systems:  Review of Systems   Constitutional: Negative. HENT: Negative. Eyes: Negative. Respiratory: Negative. Cardiovascular: Negative. Gastrointestinal: Negative. Endocrine: Negative. Genitourinary: Negative. Musculoskeletal: Negative. Skin: Negative. Allergic/Immunologic: Negative. Neurological: Negative. Hematological: Negative. Psychiatric/Behavioral: Negative.         Clinical Trial: no    IPSS Questionnaire (AUA-7):  Teaching    Health Maintenance   Topic Date Due   • Hepatitis C Screening  Never done   • Pneumococcal Vaccine: 65+ Years (2 - PPSV23 if available, else PCV20) 02/01/2017   • COVID-19 Vaccine (3 - Pfizer risk series) 04/14/2021   • Medicare Annual Wellness Visit (AWV)  07/21/2023   • Influenza Vaccine (1) 09/01/2023   • HEMOGLOBIN A1C  10/07/2023   • Fall Risk  10/24/2023   • Diabetic Foot Exam  04/24/2024   • DM Eye Exam  04/29/2024   • Depression Screening  05/08/2024   • BMI: Adult  05/08/2024   • Colorectal Cancer Screening  07/07/2026   • HIB Vaccine  Aged Out   • IPV Vaccine  Aged Out   • Hepatitis A Vaccine  Aged Out   • Meningococcal ACWY Vaccine  Aged Out   • HPV Vaccine  Aged Out     Patient Active Problem List   Diagnosis   • Benign essential HTN   • Controlled type 2 diabetes mellitus without complication, without long-term current use of insulin ([address])   • Hypercholesterolemia   • Glaucoma   • Inguinal hernia   • Hypokalemia   • Hypocalcemia   • Hyperparathyroidism ([address])   • History of colon polyps   • Primary osteoarthritis of right shoulder   • Chronic left shoulder pain   • Left rotator cuff tear arthropathy   • Rotator cuff injury, right, initial encounter   • Arthritis of right acromioclavicular joint   • BPH associated with nocturia   • Status post right tibial-talar ankle fusion   • Non-recurrent bilateral inguinal hernia without obstruction or gangrene   • Other pulmonary embolism without acute cor pulmonale (HCC)   • Pulmonary mass   • Anemia   • Malignant neoplasm of upper lobe of right lung (HCC)   • Multiple lung nodules on CT   • 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      Past Surgical History:   Procedure Laterality Date   • COLONOSCOPY     • IR BIOPSY LYMPH NODE  9/15/2022   • IR THORACENTESIS  9/13/2022   • NE ARTHRODESIS ANKLE OPEN Right 7/10/2020    Procedure: ARTHRODESIS/ TIBIAL-TALAR ANKLE FUSION;  Surgeon: Mark Mandel MD;  Location: [facility];  Service: Orthopedics   • NE LAPAROSCOPY SURG RPR INITIAL INGUINAL HERNIA Bilateral 12/16/2021    Procedure: LAPAROSCOPIC REPAIR HERNIA INGUINAL WITH MESH;  Surgeon: Jamila Pickett MD;  Location: [facility];  Service: General     Family History   Problem Relation Age of Onset   • Heart attack Mother      Social History     Socioeconomic History   • Marital status: /Civil Union     Spouse name: Not on file   • Number of children: Not on file   • Years of education: Not on file   • Highest education level: Not on file   Occupational History   • Not on file   Tobacco Use   • Smoking status: Never   • Smokeless tobacco: Never   Vaping Use   • Vaping Use: Never used   Substance and Sexual Activity   • Alcohol use: Yes     Comment: Occasional   • Drug use: Never   • Sexual activity: Not on file   Other Topics Concern   • Not on file   Social History Narrative   • Not on file     Social Determinants of Health     Financial Resource Strain: Not on file   Food Insecurity: No Food Insecurity (9/12/2022)    Hunger Vital Sign    • Worried About Running Out of Food in the Last Year: Never true    • Ran Out of Food in the Last Year: Never true   Transportation Needs: No Transportation Needs (9/12/2022)    PRAPARE - Transportation    • Lack of Transportation (Medical): No    • Lack of Transportation (Non-Medical): No   Physical Activity: Not on file   Stress: Not on file   Social Connections: Not on file   Intimate Partner Violence: Not on file   Housing Stability: Low Risk  (9/12/2022)    Housing Stability Vital Sign    • Unable to Pay for Housing in the Last Year: No    • Number of Places Lived in the Last Year: 1    • Unstable Housing in the Last Year: No       Current Outpatient Medications:   •  Accu-Chek FastClix Lancets MISC, Use daily E11.9  Check  fbs  daily, Disp: 100 each, Rfl: 3  •  amLODIPine (NORVASC) 10 mg tablet, TAKE 1 TABLET BY MOUTH EVERY DAY FOR BLOOD PRESSURE, Disp: 90 tablet, Rfl: 1  •  brimonidine tartrate 0.2 % ophthalmic solution, Administer 1 drop into the left eye 2 (two) times a day, Disp: , Rfl:   •  finasteride (PROSCAR) 5 mg tablet, TAKE 1 TABLET (5 MG TOTAL) BY MOUTH DAILY. , Disp: 90 tablet, Rfl: 1  •  glucose blood (Accu-Chek Jackie Plus) test strip, Use as instructed, Disp: 100 strip, Rfl: 1  •  lisinopril (ZESTRIL) 10 mg tablet, TAKE 1 TABLET BY MOUTH EVERY DAY, Disp: 90 tablet, Rfl: 1  •  metFORMIN (GLUCOPHAGE-XR) 500 mg 24 hr tablet, TAKE 1 TABLET BY MOUTH EVERY DAY, Disp: 90 tablet, Rfl: 0  •  metoprolol succinate (TOPROL-XL) 100 mg 24 hr tablet, TAKE 1 TABLET BY MOUTH EVERY DAY, Disp: 90 tablet, Rfl: 0  •  ofloxacin (OCUFLOX) 0.3 % ophthalmic solution, 1 drop 4 (four) times a day Start v2 days before surgery.  Originally surgery scheduled 9/14/22, Disp: , Rfl:   •  simvastatin (ZOCOR) 10 mg tablet, TAKE 1 TABLET BY MOUTH EVERY DAY, Disp: 90 tablet, Rfl: 1  •  apixaban (Eliquis) 5 mg, Take 1 tablet (5 mg total) by mouth 2 (two) times a day (Patient not taking: Reported on 4/19/2023), Disp: 180 tablet, Rfl: 3  •  cyclopentolate (CYCLOGYL) 1 % ophthalmic solution, Administer 1 drop to both eyes once (Patient not taking: Reported on 4/24/2023), Disp: , Rfl:   •  diphenhydrAMINE (BENADRYL) 50 mg capsule, Take 1 capsule 1 hour prior to contrast study (Patient not taking: Reported on 5/8/2023), Disp: 1 capsule, Rfl: 0  •  methylPREDNISolone (MEDROL) 32 MG tablet, Take 1 tablet by mouth 12 hours prior to contrast study and then 2 hours prior to contrast study (Patient not taking: Reported on 4/19/2023), Disp: 2 tablet, Rfl: 0  •  ondansetron (Zofran ODT) 8 mg disintegrating tablet, Take 1 tablet (8 mg total) by mouth every 8 (eight) hours as needed for nausea or vomiting (Patient not taking: Reported on 4/25/2023), Disp: 20 tablet, Rfl: 0  •  Osimertinib Mesylate 80 MG TABS, Take 1 tablet (80 mg total) by mouth in the morning, Disp: 30 tablet, Rfl: 6  •  Osimertinib Mesylate 80 MG TABS, Take 1 tablet (80 mg total) by mouth in the morning, Disp: 30 tablet, Rfl: 0  •  prednisoLONE acetate (PRED FORTE) 1 % ophthalmic suspension, , Disp: , Rfl:   •  Prolensa 0.07 % SOLN, , Disp: , Rfl:   •  timolol (TIMOPTIC) 0.5 % ophthalmic solution, INSTILL 1 DROP LEFT EYE EVERY MORNING (Patient not taking: Reported on 5/8/2023), Disp: , Rfl:   •  timolol (TIMOPTIC-XE) 0.5 % ophthalmic gel-forming, 1 drop daily (Patient not taking: Reported on 4/24/2023), Disp: , Rfl:   •  travoprost (TRAVATAN-Z) 0.004 % ophthalmic solution, 1 drop daily at bedtime (Patient not taking: Reported on 4/24/2023), Disp: , Rfl:   No Known Allergies  Vitals:    07/19/23 1349   BP: 140/70   Pulse: 72   Resp: 16   Temp: 98 °F (36.7 °C)   SpO2: 98%   Weight: 71.2 kg (157 lb)      Pain Score: 0-No pain

## 2023-07-25 ENCOUNTER — OFFICE VISIT (OUTPATIENT)
Dept: FAMILY MEDICINE CLINIC | Facility: CLINIC | Age: 78
End: 2023-07-25
Payer: MEDICARE

## 2023-07-25 VITALS
WEIGHT: 155.8 LBS | HEIGHT: 69 IN | OXYGEN SATURATION: 96 % | SYSTOLIC BLOOD PRESSURE: 160 MMHG | HEART RATE: 71 BPM | DIASTOLIC BLOOD PRESSURE: 84 MMHG | BODY MASS INDEX: 23.08 KG/M2 | TEMPERATURE: 97.9 F

## 2023-07-25 DIAGNOSIS — E21.3 HYPERPARATHYROIDISM (HCC): ICD-10-CM

## 2023-07-25 DIAGNOSIS — Z00.00 MEDICARE ANNUAL WELLNESS VISIT, SUBSEQUENT: ICD-10-CM

## 2023-07-25 DIAGNOSIS — R35.1 BPH ASSOCIATED WITH NOCTURIA: ICD-10-CM

## 2023-07-25 DIAGNOSIS — C34.11 MALIGNANT NEOPLASM OF UPPER LOBE OF RIGHT LUNG (HCC): ICD-10-CM

## 2023-07-25 DIAGNOSIS — D69.6 PLATELETS DECREASED (HCC): ICD-10-CM

## 2023-07-25 DIAGNOSIS — N40.1 BPH ASSOCIATED WITH NOCTURIA: ICD-10-CM

## 2023-07-25 DIAGNOSIS — I10 BENIGN ESSENTIAL HTN: ICD-10-CM

## 2023-07-25 DIAGNOSIS — I26.99 OTHER ACUTE PULMONARY EMBOLISM WITHOUT ACUTE COR PULMONALE (HCC): ICD-10-CM

## 2023-07-25 DIAGNOSIS — E78.00 HYPERCHOLESTEROLEMIA: ICD-10-CM

## 2023-07-25 DIAGNOSIS — E11.9 CONTROLLED TYPE 2 DIABETES MELLITUS WITHOUT COMPLICATION, WITHOUT LONG-TERM CURRENT USE OF INSULIN (HCC): Primary | ICD-10-CM

## 2023-07-25 PROCEDURE — 99214 OFFICE O/P EST MOD 30 MIN: CPT | Performed by: PHYSICIAN ASSISTANT

## 2023-07-25 PROCEDURE — G0439 PPPS, SUBSEQ VISIT: HCPCS | Performed by: PHYSICIAN ASSISTANT

## 2023-07-25 NOTE — PATIENT INSTRUCTIONS
Medicare Preventive Visit Patient Instructions  Thank you for completing your Welcome to Medicare Visit or Medicare Annual Wellness Visit today. Your next wellness visit will be due in one year (7/25/2024). The screening/preventive services that you may require over the next 5-10 years are detailed below. Some tests may not apply to you based off risk factors and/or age. Screening tests ordered at today's visit but not completed yet may show as past due. Also, please note that scanned in results may not display below. Preventive Screenings:  Service Recommendations Previous Testing/Comments   Colorectal Cancer Screening  · Colonoscopy    · Fecal Occult Blood Test (FOBT)/Fecal Immunochemical Test (FIT)  · Fecal DNA/Cologuard Test  · Flexible Sigmoidoscopy Age: 43-73 years old   Colonoscopy: every 10 years (May be performed more frequently if at higher risk)  OR  FOBT/FIT: every 1 year  OR  Cologuard: every 3 years  OR  Sigmoidoscopy: every 5 years  Screening may be recommended earlier than age 39 if at higher risk for colorectal cancer. Also, an individualized decision between you and your healthcare provider will decide whether screening between the ages of 77-80 would be appropriate.  Colonoscopy: 07/07/2021  FOBT/FIT: Not on file  Cologuard: Not on file  Sigmoidoscopy: Not on file    Screening Current     Prostate Cancer Screening Individualized decision between patient and health care provider in men between ages of 53-66   Medicare will cover every 12 months beginning on the day after your 50th birthday PSA: <0.1 ng/mL     History Prostate Cancer  Screening Not Indicated     Hepatitis C Screening Once for adults born between 1945 and 1965  More frequently in patients at high risk for Hepatitis C Hep C Antibody: Not on file        Diabetes Screening 1-2 times per year if you're at risk for diabetes or have pre-diabetes Fasting glucose: 114 mg/dL (7/7/2023)  A1C: 5.1 % (7/7/2023)  Screening Not Indicated  History Diabetes   Cholesterol Screening Once every 5 years if you don't have a lipid disorder. May order more often based on risk factors. Lipid panel: 04/01/2023  Screening Not Indicated  History Lipid Disorder      Other Preventive Screenings Covered by Medicare:  1. Abdominal Aortic Aneurysm (AAA) Screening: covered once if your at risk. You're considered to be at risk if you have a family history of AAA or a male between the age of 70-76 who smoking at least 100 cigarettes in your lifetime. 2. Lung Cancer Screening: covers low dose CT scan once per year if you meet all of the following conditions: (1) Age 48-67; (2) No signs or symptoms of lung cancer; (3) Current smoker or have quit smoking within the last 15 years; (4) You have a tobacco smoking history of at least 20 pack years (packs per day x number of years you smoked); (5) You get a written order from a healthcare provider. 3. Glaucoma Screening: covered annually if you're considered high risk: (1) You have diabetes OR (2) Family history of glaucoma OR (3)  aged 48 and older OR (3)  American aged 72 and older  3. Osteoporosis Screening: covered every 2 years if you meet one of the following conditions: (1) Have a vertebral abnormality; (2) On glucocorticoid therapy for more than 3 months; (3) Have primary hyperparathyroidism; (4) On osteoporosis medications and need to assess response to drug therapy. 5. HIV Screening: covered annually if you're between the age of 14-79. Also covered annually if you are younger than 13 and older than 72 with risk factors for HIV infection. For pregnant patients, it is covered up to 3 times per pregnancy.     Immunizations:  Immunization Recommendations   Influenza Vaccine Annual influenza vaccination during flu season is recommended for all persons aged >= 6 months who do not have contraindications   Pneumococcal Vaccine   * Pneumococcal conjugate vaccine = PCV13 (Prevnar 13), PCV15 (Vaxneuvance), PCV20 (Prevnar 20)  * Pneumococcal polysaccharide vaccine = PPSV23 (Pneumovax) Adults 2364 years old: 1-3 doses may be recommended based on certain risk factors  Adults 72 years old: 1-2 doses may be recommended based off what pneumonia vaccine you previously received   Hepatitis B Vaccine 3 dose series if at intermediate or high risk (ex: diabetes, end stage renal disease, liver disease)   Tetanus (Td) Vaccine - COST NOT COVERED BY MEDICARE PART B Following completion of primary series, a booster dose should be given every 10 years to maintain immunity against tetanus. Td may also be given as tetanus wound prophylaxis. Tdap Vaccine - COST NOT COVERED BY MEDICARE PART B Recommended at least once for all adults. For pregnant patients, recommended with each pregnancy. Shingles Vaccine (Shingrix) - COST NOT COVERED BY MEDICARE PART B  2 shot series recommended in those aged 48 and above     Health Maintenance Due:      Topic Date Due   • Hepatitis C Screening  Never done   • Colorectal Cancer Screening  07/07/2026     Immunizations Due:      Topic Date Due   • Pneumococcal Vaccine: 65+ Years (2 - PPSV23 if available, else PCV20) 02/01/2017   • COVID-19 Vaccine (3 - Pfizer risk series) 04/14/2021   • Influenza Vaccine (1) 09/01/2023     Advance Directives   What are advance directives? Advance directives are legal documents that state your wishes and plans for medical care. These plans are made ahead of time in case you lose your ability to make decisions for yourself. Advance directives can apply to any medical decision, such as the treatments you want, and if you want to donate organs. What are the types of advance directives? There are many types of advance directives, and each state has rules about how to use them. You may choose a combination of any of the following:  · Living will: This is a written record of the treatment you want.  You can also choose which treatments you do not want, which to limit, and which to stop at a certain time. This includes surgery, medicine, IV fluid, and tube feedings. · Durable power of  for [facility]): This is a written record that states who you want to make healthcare choices for you when you are unable to make them for yourself. This person, called a proxy, is usually a family member or a friend. You may choose more than 1 proxy. · Do not resuscitate (DNR) order:  A DNR order is used in case your heart stops beating or you stop breathing. It is a request not to have certain forms of treatment, such as CPR. A DNR order may be included in other types of advance directives. · Medical directive: This covers the care that you want if you are in a coma, near death, or unable to make decisions for yourself. You can list the treatments you want for each condition. Treatment may include pain medicine, surgery, blood transfusions, dialysis, IV or tube feedings, and a ventilator (breathing machine). · Values history: This document has questions about your views, beliefs, and how you feel and think about life. This information can help others choose the care that you would choose. Why are advance directives important? An advance directive helps you control your care. Although spoken wishes may be used, it is better to have your wishes written down. Spoken wishes can be misunderstood, or not followed. Treatments may be given even if you do not want them. An advance directive may make it easier for your family to make difficult choices about your care. Fall Prevention    Fall prevention  includes ways to make your home and other areas safer. It also includes ways you can move more carefully to prevent a fall. Health conditions that cause changes in your blood pressure, vision, or muscle strength and coordination may increase your risk for falls. Medicines may also increase your risk for falls if they make you dizzy, weak, or sleepy.    Fall prevention tips:   · Stand or sit up slowly. · Use assistive devices as directed. · Wear shoes that fit well and have soles that . · Wear a personal alarm. · Stay active. · Manage your medical conditions. Home Safety Tips:  · Add items to prevent falls in the bathroom. · Keep paths clear. · Install bright lights in your home. · Keep items you use often on shelves within reach. · Paint or place reflective tape on the edges of your stairs. © Copyright [employer] 2018 Information is for End User's use only and may not be sold, redistributed or otherwise used for commercial purposes.  All illustrations and images included in CareNotes® are the copyrighted property of [employer] or [address] [place] [address]

## 2023-07-25 NOTE — PROGRESS NOTES
Assessment and Plan:     Problem List Items Addressed This Visit        Endocrine    Hyperparathyroidism ([address])    Relevant Orders    PTH, intact    Controlled type 2 diabetes mellitus without complication, without long-term current use of insulin ([address]) - Primary    Relevant Orders    Hemoglobin A1C       Respiratory    Malignant neoplasm of upper lobe of right lung ([address])       Cardiovascular and Mediastinum    Benign essential HTN    Relevant Orders    Comprehensive metabolic panel    Other pulmonary embolism without acute cor pulmonale (HCC)       Other    BPH associated with nocturia    Hypercholesterolemia    Relevant Orders    Lipid Panel with Direct LDL reflex    Platelets decreased ([address])    Relevant Orders    CBC and differential   Other Visit Diagnoses     Medicare annual wellness visit, subsequent              Depression Screening and Follow-up Plan: Patient was screened for depression during today's encounter. They screened negative with a PHQ-2 score of 0. Preventive health issues were discussed with patient, and age appropriate screening tests were ordered as noted in patient's After Visit Summary. Personalized health advice and appropriate referrals for health education or preventive services given if needed, as noted in patient's After Visit Summary. History of Present Illness:     Patient presents for a Medicare Wellness Visit    Patient presents in the office for follow-up chronic conditions. Patient has not insulin diabetes mellitus type 2 without complication. His current regimen is metformin  mg once a day. X his blood sugar daily. His current A1c is 5.1. Fasting blood sugar of 114. Blood pressure is controlled with lisinopril 10 mg, amlodipine 10 mg and metoprolol  mg. Hyperlipidemia he is on simvastatin 10 mg. History of BPH on finasteride. This medicine is working well. Striae of hyperparathyroidism. Patient is asymptomatic.   Patient is under care of oncology for lung cancer. He is on oral chemo. This has very well. The lymph nodes in the neck have shrunk. The tumor itself is shrinking as well. Was complicated by pulmonary embolism. Anticoagulation he is on aspirin 81 mg 2/day. Other labs show normal hepatic and renal functions. His CBC shows thrombocytopenia     Patient Care Team:  Sarita Hernandes PA-C as PCP - General (Family Medicine)  Daniela Horowitz, RN as Nurse Navigator (Oncology)  Levar Yang MA as Care Coordinator (Oncology)  Gennaro Draper PA-C as Physician Assistant (Hematology and Oncology)  Andre Danville as Nurse Practitioner (Nurse Practitioner)  Makayla Palomo MD as Medical Oncologist (Hematology and Oncology)  Consuelo Bermudez RN as Registered Nurse (Hematology and Oncology)  Sherry Sanford PA-C (Pulmonary Disease)  BENNIE Rubio as  (Gloria Pedroza)  Jack Agosto MD as Consulting Physician (Palliative Care)  Lewis Fuller MD (Radiation Oncology)     Review of Systems:     Review of Systems   Constitutional: Negative for activity change, appetite change, chills, fatigue and fever. Eating  weel  But  Unable  To  Gain  Weight. HENT: Negative for ear pain and sore throat. Eyes: Negative for visual disturbance. Respiratory: Negative for cough and shortness of breath. Cardiovascular: Negative for chest pain, palpitations and leg swelling. Gastrointestinal: Negative for abdominal pain, blood in stool, constipation, diarrhea and nausea. Genitourinary: Negative for difficulty urinating. Musculoskeletal: Negative for arthralgias, back pain and myalgias. Skin: Negative for rash. Neurological: Negative for dizziness, syncope and headaches. Psychiatric/Behavioral: Negative for sleep disturbance.         Problem List:     Patient Active Problem List   Diagnosis   • Benign essential HTN   • Controlled type 2 diabetes mellitus without complication, without long-term current use of insulin ([address])   • Hypercholesterolemia   • Glaucoma   • Inguinal hernia   • Hypokalemia   • Hypocalcemia   • Hyperparathyroidism ([address])   • History of colon polyps   • Primary osteoarthritis of right shoulder   • Chronic left shoulder pain   • Left rotator cuff tear arthropathy   • Rotator cuff injury, right, initial encounter   • Arthritis of right acromioclavicular joint   • BPH associated with nocturia   • Status post right tibial-talar ankle fusion   • Non-recurrent bilateral inguinal hernia without obstruction or gangrene   • Other pulmonary embolism without acute cor pulmonale (HCC)   • Pulmonary mass   • Anemia   • Malignant neoplasm of upper lobe of right lung (HCC)   • Multiple lung nodules on CT   • 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      Past Surgical History:   Procedure Laterality Date   • COLONOSCOPY     • IR BIOPSY LYMPH NODE  9/15/2022   • IR THORACENTESIS  9/13/2022   • CA ARTHRODESIS ANKLE OPEN Right 7/10/2020    Procedure: ARTHRODESIS/ TIBIAL-TALAR ANKLE FUSION;  Surgeon: Domnick Lanes, MD;  Location: [facility];  Service: Orthopedics   • CA LAPAROSCOPY SURG RPR INITIAL INGUINAL HERNIA Bilateral 12/16/2021    Procedure: LAPAROSCOPIC REPAIR HERNIA INGUINAL WITH MESH;  Surgeon: Jose Gupta MD;  Location: [facility];  Service: General      Family History:     Family History   Problem Relation Age of Onset   • Heart attack Mother       Social History:     Social History     Socioeconomic History   • Marital status: /Civil Union     Spouse name: None   • Number of children: None   • Years of education: None   • Highest education level: None Occupational History   • None   Tobacco Use   • Smoking status: Never   • Smokeless tobacco: Never   Vaping Use   • Vaping Use: Never used   Substance and Sexual Activity   • Alcohol use: Yes     Comment: Occasional   • Drug use: Never   • Sexual activity: None   Other Topics Concern   • None   Social History Narrative   • None     Social Determinants of Health     Financial Resource Strain: Low Risk  (7/25/2023)    Overall Financial Resource Strain (CARDIA)    • Difficulty of Paying Living Expenses: Not very hard   Food Insecurity: No Food Insecurity (9/12/2022)    Hunger Vital Sign    • Worried About Running Out of Food in the Last Year: Never true    • Ran Out of Food in the Last Year: Never true   Transportation Needs: No Transportation Needs (7/25/2023)    PRAPARE - Transportation    • Lack of Transportation (Medical): No    • Lack of Transportation (Non-Medical): No   Physical Activity: Not on file   Stress: Not on file   Social Connections: Not on file   Intimate Partner Violence: Not on file   Housing Stability: Low Risk  (9/12/2022)    Housing Stability Vital Sign    • Unable to Pay for Housing in the Last Year: No    • Number of Places Lived in the Last Year: 1    • Unstable Housing in the Last Year: No      Medications and Allergies:     Current Outpatient Medications   Medication Sig Dispense Refill   • Accu-Chek FastClix Lancets MISC Use daily E11.9  Check  fbs  daily 100 each 3   • amLODIPine (NORVASC) 10 mg tablet TAKE 1 TABLET BY MOUTH EVERY DAY FOR BLOOD PRESSURE 90 tablet 1   • brimonidine tartrate 0.2 % ophthalmic solution Administer 1 drop into the left eye 2 (two) times a day     • finasteride (PROSCAR) 5 mg tablet TAKE 1 TABLET (5 MG TOTAL) BY MOUTH DAILY.  90 tablet 1   • glucose blood (Accu-Chek Jackie Plus) test strip Use as instructed 100 strip 1   • lisinopril (ZESTRIL) 10 mg tablet TAKE 1 TABLET BY MOUTH EVERY DAY 90 tablet 1   • metFORMIN (GLUCOPHAGE-XR) 500 mg 24 hr tablet TAKE 1 TABLET BY MOUTH EVERY DAY 90 tablet 0   • metoprolol succinate (TOPROL-XL) 100 mg 24 hr tablet TAKE 1 TABLET BY MOUTH EVERY DAY 90 tablet 0   • Osimertinib Mesylate 80 MG TABS Take 1 tablet (80 mg total) by mouth in the morning 30 tablet 6   • simvastatin (ZOCOR) 10 mg tablet TAKE 1 TABLET BY MOUTH EVERY DAY 90 tablet 1   • diphenhydrAMINE (BENADRYL) 50 mg capsule Take 1 capsule 1 hour prior to contrast study (Patient not taking: Reported on 5/8/2023) 1 capsule 0   • methylPREDNISolone (MEDROL) 32 MG tablet Take 1 tablet by mouth 12 hours prior to contrast study and then 2 hours prior to contrast study (Patient not taking: Reported on 4/19/2023) 2 tablet 0   • ondansetron (Zofran ODT) 8 mg disintegrating tablet Take 1 tablet (8 mg total) by mouth every 8 (eight) hours as needed for nausea or vomiting (Patient not taking: Reported on 4/25/2023) 20 tablet 0   • Osimertinib Mesylate 80 MG TABS Take 1 tablet (80 mg total) by mouth in the morning 30 tablet 0   • timolol (TIMOPTIC-XE) 0.5 % ophthalmic gel-forming 1 drop daily (Patient not taking: Reported on 4/24/2023)     • travoprost (TRAVATAN-Z) 0.004 % ophthalmic solution 1 drop daily at bedtime (Patient not taking: Reported on 4/24/2023)       No current facility-administered medications for this visit.      No Known Allergies   Immunizations:     Immunization History   Administered Date(s) Administered   • COVID-19 PFIZER VACCINE 0.3 ML IM 02/23/2021, 03/17/2021   • INFLUENZA 10/27/2014, 10/27/2015, 10/27/2016, 11/16/2016, 10/13/2017, 10/26/2018   • Influenza Injectable, MDCK, Preservative Free, Quadrivalent, 0.5 mL 10/26/2018   • Influenza Quadrivalent Preservative Free 3 years and older IM 11/16/2016   • Influenza, high dose seasonal 0.7 mL 09/14/2020, 10/21/2021, 10/24/2022   • Influenza, injectable, quadrivalent, preservative free 0.5 mL 10/25/2019   • Pneumococcal Conjugate 13-Valent 12/07/2016      Health Maintenance:         Topic Date Due   • Hepatitis C Screening Never done   • Colorectal Cancer Screening  07/07/2026         Topic Date Due   • Pneumococcal Vaccine: 65+ Years (2 - PPSV23 if available, else PCV20) 02/01/2017   • COVID-19 Vaccine (3 - Pfizer risk series) 04/14/2021   • Influenza Vaccine (1) 09/01/2023      Medicare Screening Tests and Risk Assessments:     Jonnie Mccarty is here for his Subsequent Wellness visit. Health Risk Assessment:   Patient rates overall health as good. Patient feels that their physical health rating is same. Patient is satisfied with their life. Eyesight was rated as slightly worse. Hearing was rated as same. Patient feels that their emotional and mental health rating is same. Patients states they are sometimes angry. Patient states they are never, rarely unusually tired/fatigued. Pain experienced in the last 7 days has been none. Patient states that he has experienced no weight loss or gain in last 6 months. Depression Screening:   PHQ-2 Score: 0      Fall Risk Screening: In the past year, patient has experienced: history of falling in past year    Number of falls: 1  Injured during fall?: Yes    Feels unsteady when standing or walking?: No    Worried about falling?: No      Home Safety:  Patient does not have trouble with stairs inside or outside of their home. Patient has no working smoke alarms and has working carbon monoxide detector. Home safety hazards include: none. animals    Nutrition:   Current diet is Regular. Medications:   Patient is currently taking over-the-counter supplements. OTC medications include: see medication list. Patient is able to manage medications. Activities of Daily Living (ADLs)/Instrumental Activities of Daily Living (IADLs):   Walk and transfer into and out of bed and chair?: Yes  Dress and groom yourself?: Yes    Bathe or shower yourself?: Yes    Feed yourself?  Yes  Do your laundry/housekeeping?: Yes  Manage your money, pay your bills and track your expenses?: No  Make your own meals?: Yes    Do your own shopping?: Yes    ADL comments: Wife helps    Previous Hospitalizations:   Any hospitalizations or ED visits within the last 12 months?: No      Advance Care Planning:   Living will: Yes    Advanced directive: Yes      Cognitive Screening:   Provider or family/friend/caregiver concerned regarding cognition?: No    PREVENTIVE SCREENINGS      Cardiovascular Screening:    General: History Lipid Disorder    Due for: Lipid Panel      Diabetes Screening:     General: History Diabetes and Screening Current      Colorectal Cancer Screening:     General: Screening Current      Prostate Cancer Screening:    General: History Prostate Cancer and Screening Not Indicated      Lung Cancer Screening:     General: History Lung Cancer and Screening Current    Screening, Brief Intervention, and Referral to Treatment (SBIRT)    Screening  Typical number of drinks in a day: 0  Typical number of drinks in a week: 1  Interpretation: Low risk drinking behavior. Single Item Drug Screening:  How often have you used an illegal drug (including marijuana) or a prescription medication for non-medical reasons in the past year? never    Single Item Drug Screen Score: 0  Interpretation: Negative screen for possible drug use disorder    Brief Intervention  Alcohol & drug use screenings were reviewed. No concerns regarding substance use disorder identified. No results found. Diabetic Foot Exam    Patient's shoes and socks removed. Right Foot/Ankle   Right Foot Inspection  Skin Exam: skin intact. No ulcer. Toe Exam: right toe deformity. No tenderness and erythema    Sensory   Vibration: intact  Monofilament testing: intact    Vascular  The right DP pulse is 2+. Left Foot/Ankle  Left Foot Inspection  Skin Exam: skin intact. No ulcer. Toe Exam: No tenderness and no left toe deformity. Sensory   Vibration: intact  Monofilament testing: intact    Vascular  The left DP pulse is 2+.      Assign Risk Category  Deformity present  No loss of protective sensation  No weak pulses  Risk: 0     Physical Exam:     /84 (BP Location: Left arm, Patient Position: Sitting, Cuff Size: Standard)   Pulse 71   Temp 97.9 °F (36.6 °C) (Temporal)   Ht 5' 9" (1.753 m)   Wt 70.7 kg (155 lb 12.8 oz)   SpO2 96%   BMI 23.01 kg/m²     Physical Exam  Cardiovascular:      Pulses: no weak pulses          Dorsalis pedis pulses are 2+ on the right side and 2+ on the left side. Feet:      Right foot:      Skin integrity: No ulcer. Left foot:      Skin integrity: No ulcer.           Jonathan Jerome PA-C

## 2023-07-31 ENCOUNTER — TELEPHONE (OUTPATIENT)
Dept: NUTRITION | Facility: CLINIC | Age: 78
End: 2023-07-31

## 2023-07-31 ENCOUNTER — OFFICE VISIT (OUTPATIENT)
Dept: PALLIATIVE MEDICINE | Facility: CLINIC | Age: 78
End: 2023-07-31
Payer: MEDICARE

## 2023-07-31 VITALS
WEIGHT: 157.2 LBS | BODY MASS INDEX: 23.28 KG/M2 | HEIGHT: 69 IN | OXYGEN SATURATION: 97 % | SYSTOLIC BLOOD PRESSURE: 132 MMHG | HEART RATE: 69 BPM | TEMPERATURE: 97.4 F | DIASTOLIC BLOOD PRESSURE: 62 MMHG

## 2023-07-31 DIAGNOSIS — R11.0 NAUSEA: ICD-10-CM

## 2023-07-31 DIAGNOSIS — Z51.5 PALLIATIVE CARE PATIENT: ICD-10-CM

## 2023-07-31 DIAGNOSIS — C34.11 MALIGNANT NEOPLASM OF UPPER LOBE OF RIGHT LUNG (HCC): Primary | ICD-10-CM

## 2023-07-31 PROCEDURE — 99214 OFFICE O/P EST MOD 30 MIN: CPT | Performed by: NURSE PRACTITIONER

## 2023-07-31 NOTE — TELEPHONE ENCOUNTER
Received notification by Palliative Care on 7/31/23 that pt has triggered for oncology nutrition care (reason for referral: lung cancer). Rocio Perez today to establish care. No answer. Voice mail full, no ability to LVM. Will attempt to contact again tomorrow.

## 2023-07-31 NOTE — PROGRESS NOTES
Outpatient Follow-Up - Palliative and Supportive Care   Karina Ivy 69 y.o. male [de-identified]    Assessment & Plan  Problem List Items Addressed This Visit        Respiratory    Malignant neoplasm of upper lobe of right lung [facility]) - Primary   Other Visit Diagnoses     Nausea        Palliative care patient            • Malignant neoplasm of upper lobe of right lung-  States he is doing well with current treatment. 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 Continue ondansetron ODT 8 mg every 8 hours as needed for nausea   • Palliative care patient- stable. Ongoing symptom management as needed  • Patient is requesting to meet with Oncology Nutritionist for inability to gain weight. Appetite is fair to good. • Psychosocial support- supportive listening provided. Medications adjusted this encounter:  Requested Prescriptions      No prescriptions requested or ordered in this encounter     No orders of the defined types were placed in this encounter. There are no discontinued medications. Karina Ivy was seen today for symptoms and planning cares related to above illnesses. I have reviewed the patient's controlled substance dispensing history in the Prescription Drug Monitoring Program in compliance with the [facility] regulations before prescribing any controlled substances. They are invited to continue to follow with us. If there are questions or concerns, please contact us through our clinic/answering service 24 hours a day, seven days a week.     RADHA Harden  [facility] Palliative and Supportive Care  [phone]      Visit Information    Accompanied By: No one    Source of History: Self    History Limitations: None      Chief Complaint  No chief complaint on file. History of Present Illness      Atul Dobson is a 69 y.o. male who presents in follow up of symptoms related to malignant neoplasm of upper lobe of right lung, metastatic to lymph nodes, multiple sites. Pertinent issues include: symptom management, nausea  Patient states he is feeling well today, he is disappointed that he is not gaining weight, appetite is fair to good, no nausea or vomiting. He is requesting to speak to Oncology Nutritionist.  Denies pain, chest pain, or shortness of breath. Past medical, surgical, social, and family histories are reviewed and pertinent updates are made. Review of Systems   Constitutional: Negative for malaise/fatigue. Cardiovascular: Negative for chest pain and dyspnea on exertion. Respiratory: Negative for shortness of breath. Gastrointestinal: Negative for nausea and vomiting. Vital Signs  /62 (BP Location: Left arm, Patient Position: Sitting, Cuff Size: Standard)   Pulse 69   Temp (!) 97.4 °F (36.3 °C) (Temporal)   Ht 5' 9" (1.753 m)   Wt 71.3 kg (157 lb 3.2 oz)   SpO2 97%   BMI 23.21 kg/m²       Physical Exam and Objective Data  Physical Exam  Vitals reviewed. Constitutional:       General: He is not in acute distress. Appearance: He is not ill-appearing. Cardiovascular:      Rate and Rhythm: Normal rate. Pulmonary:      Effort: Pulmonary effort is normal.   Musculoskeletal:      Right lower leg: No edema. Left lower leg: No edema. Skin:     General: Skin is warm and dry. Neurological:      Mental Status: He is alert and oriented to person, place, and time.    Psychiatric:         Mood and Affect: Mood normal.           Radiology and Laboratory:  I personally reviewed the following results:   CT chest abdomen pelvis w contrast  Status: Final result     PACS Images     Show images for CT chest abdomen pelvis w contrast  Study Result    Narrative & Impression   CT CHEST, ABDOMEN AND PELVIS WITH IV CONTRAST     INDICATION:   C34.11: Malignant neoplasm of upper lobe, right bronchus or lung.     COMPARISON: January 10, 2023 TECHNIQUE: CT examination of the chest, abdomen and pelvis was performed. Multiplanar 2D reformatted images were created from the source data.     Radiation dose length product (DLP) for this visit:  694 mGy-cm . This examination, like all CT scans performed in the [facility], was performed utilizing techniques to minimize radiation dose exposure, including the use of iterative   reconstruction and automated exposure control.     IV Contrast:  100 mL of iohexol (OMNIPAQUE)  Enteric Contrast: Enteric contrast was administered.     FINDINGS:     CHEST     LUNGS:  Spiculated mass right upper lobe now measures 3.6 x 2.5 cm, previously measuring 4.1 x 2.5 cm.       A perifissural lung nodule seen right lower lobe, largest, previously measuring 5 mm. Currently measuring 1.2 cmX 1 cm this is seen in image 95 series 2  Additional nonmeasurable nodules are seen in the left lung. There is a few scattered granulomas     Pleuroparenchymal nodule right middle lobe image 98 series 2, stable  Left lower lobe lung nodule measuring about 5 mm, image 87 series 2, stable  PLEURA:  Small right effusion seen with pleural thickening, decreased from the previous study     HEART/GREAT VESSELS: Heart is unremarkable for patient's age.   No thoracic aortic aneurysm.     MEDIASTINUM AND FAUSTO:  Lower right paratracheal, left paratracheal lymph node remain unchanged  Partly calcified subcarinal lymph nodes are seen, stable     CHEST WALL AND LOWER NECK:  No significant axillary lymph node alignment seen     ABDOMEN     LIVER/BILIARY TREE:  Unremarkable.     GALLBLADDER:  Cholelithiasis seen     SPLEEN:  Unremarkable.     PANCREAS:  Unremarkable.     ADRENAL GLANDS:  Unremarkable.     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     ABDOMINOPELVIC CAVITY:  No ascites. No pneumoperitoneum. No lymphadenopathy.     VESSELS:  Unremarkable for patient's age.     PELVIS     REPRODUCTIVE ORGANS:  Unremarkable for patient's age.     URINARY BLADDER:  Bladder wall thickening seen     ABDOMINAL WALL/INGUINAL REGIONS:  Unremarkable.     OSSEOUS STRUCTURES:  A sclerotic focus seen within the posterior aspect of the left iliac bone     Bone island, stable additional sclerotic foci in the sacrum, stable     IMPRESSION:  There is enlargement of the right lower lung nodule now measures 1.2 x 1 cm, previously measuring 5 mm.       The previously noted the right upper lobe lung mass continues to decrease in size  Additional multiple nonmeasurable lung nodules as seen on the previous study, stable There is no new visceral metastatic disease in the abdomen and pelvis  The study was marked in EPIC for significant notification.           Workstation performed: [id number]           30 minutes was spent face to face with Messer Canes with greater than 50% of the time spent in counseling or coordination of care including discussions of symptom management. All of the patient's or agent's questions were answered during this discussion.

## 2023-08-01 ENCOUNTER — TELEPHONE (OUTPATIENT)
Dept: NUTRITION | Facility: CLINIC | Age: 78
End: 2023-08-01

## 2023-08-01 NOTE — TELEPHONE ENCOUNTER
Second attempt to contact Yane March to establish oncology nutrition care, no answer, voicemail full unable to LVM @[phone]. Attempted to contact at home number, no answer, LVM with reason for call and RD contact info.

## 2023-08-03 ENCOUNTER — TELEPHONE (OUTPATIENT)
Dept: HEMATOLOGY ONCOLOGY | Facility: CLINIC | Age: 78
End: 2023-08-03

## 2023-08-03 NOTE — TELEPHONE ENCOUNTER
I called patient regarding an appointment that they have scheduled with Dr. Pavan Hernandez scheduled on 9/27/23     Mailbox is full      Patient can be HOMA to Dr Pj Byrne or Dr Jared Mena

## 2023-08-08 ENCOUNTER — HOSPITAL ENCOUNTER (OUTPATIENT)
Dept: CT IMAGING | Facility: HOSPITAL | Age: 78
Discharge: HOME | End: 2023-08-08
Attending: INTERNAL MEDICINE
Payer: MEDICARE

## 2023-08-08 DIAGNOSIS — C34.11 MALIGNANT NEOPLASM OF UPPER LOBE OF RIGHT LUNG (HCC): ICD-10-CM

## 2023-08-08 PROCEDURE — 74177 CT ABD & PELVIS W/CONTRAST: CPT

## 2023-08-08 PROCEDURE — G1004 CDSM NDSC: HCPCS

## 2023-08-08 PROCEDURE — 71260 CT THORAX DX C+: CPT

## 2023-08-08 RX ADMIN — IOHEXOL 100 ML: 350 INJECTION, SOLUTION INTRAVENOUS at 13:05

## 2023-08-15 ENCOUNTER — CLINICAL SUPPORT (OUTPATIENT)
Dept: RADIATION ONCOLOGY | Facility: CLINIC | Age: 78
End: 2023-08-15
Attending: RADIOLOGY

## 2023-08-15 ENCOUNTER — TELEMEDICINE (OUTPATIENT)
Dept: RADIATION ONCOLOGY | Facility: CLINIC | Age: 78
End: 2023-08-15
Attending: RADIOLOGY

## 2023-08-15 VITALS
TEMPERATURE: 97 F | BODY MASS INDEX: 23.18 KG/M2 | OXYGEN SATURATION: 98 % | DIASTOLIC BLOOD PRESSURE: 70 MMHG | HEART RATE: 71 BPM | SYSTOLIC BLOOD PRESSURE: 128 MMHG | WEIGHT: 157 LBS | RESPIRATION RATE: 16 BRPM

## 2023-08-15 DIAGNOSIS — C34.11 MALIGNANT NEOPLASM OF UPPER LOBE OF RIGHT LUNG (HCC): Primary | ICD-10-CM

## 2023-08-15 PROCEDURE — 99024 POSTOP FOLLOW-UP VISIT: CPT | Performed by: RADIOLOGY

## 2023-08-15 NOTE — PROGRESS NOTES
Jessee Kayser 1945 is a 69 y.o. male with a history of stage IV adenocarcinoma of the lung undergoing treatment with osmertinib with excellent tolerance and partial response. He was noted with oligoprogressionrecently completed a course of SBRT for oligoprogression of disease on 23. He presents today for follow up today. 23 Palliative Care    23 CT C/A/P w contrast- not read      Upcomin23 Dr. Lula Roberts  10/23/23 Palliative Care  23 Dr. Mary Calvillo      Follow up visit     Oncology History   Malignant neoplasm of upper lobe of right lung ([address])   9/15/2022 -  Cancer Staged    Staging form: Lung, AJCC 8th Edition  - Clinical stage from 9/15/2022: Stage BETTE (cT4, cN3, cM1a) - Signed by Kristian Duong MD on 10/10/2022  Stage prefix: Initial diagnosis       9/15/2022 Biopsy    IR lung biopsy    A. Lymph node, right supraclavicular biopsy:  -  Metastatic adenocarcinoma of lung origin. -  Immunohistochemical stains performed with appropriate controls show the tumor to be positive for TTF1 and napsin and negative for CK5/6 and p40, supporting the diagnosis     10/5/2022 Initial Diagnosis    Malignant neoplasm of upper lobe of right lung ([address])     2023 - 2023 Radiation    Treatments:  Course: C1 SBRT RLL  Plan ID Energy Fractions Dose per Fraction (cGy) Dose Correction (cGy) Total Dose Delivered (cGy) Elapsed Days   BH SBRT RLL 6X-FFF  700 0 3,500 9    Treatment Dates:  2023 - 2023. Review of Systems:  Review of Systems   Constitutional: Negative. HENT: Positive for hearing loss. Eyes: Negative. Wears glasses   Respiratory: Negative. Cardiovascular: Negative. Gastrointestinal: Negative. Endocrine: Negative. Genitourinary:        Nocturia x 3-4   Musculoskeletal: Negative. Skin: Negative. Allergic/Immunologic: Negative. Neurological: Negative. Hematological: Negative. Psychiatric/Behavioral: Negative.         Clinical Trial: no          Health Maintenance   Topic Date Due   • Hepatitis C Screening  Never done   • Falls: Plan of Care  Never done   • Pneumococcal Vaccine: 65+ Years (2 - PPSV23 if available, else PCV20) 02/01/2017   • COVID-19 Vaccine (3 - Pfizer risk series) 04/14/2021   • Influenza Vaccine (1) 09/01/2023   • HEMOGLOBIN A1C  10/07/2023   • DM Eye Exam  04/29/2024   • Fall Risk  07/25/2024   • Depression Screening  07/25/2024   • Medicare Annual Wellness Visit (AWV)  07/25/2024   • Diabetic Foot Exam  07/25/2024   • BMI: Adult  07/31/2024   • Colorectal Cancer Screening  07/07/2026   • HIB Vaccine  Aged Out   • IPV Vaccine  Aged Out   • Hepatitis A Vaccine  Aged Out   • Meningococcal ACWY Vaccine  Aged Out   • HPV Vaccine  Aged Out     Patient Active Problem List   Diagnosis   • Benign essential HTN   • Controlled type 2 diabetes mellitus without complication, without long-term current use of insulin ([address])   • Hypercholesterolemia   • Glaucoma   • Inguinal hernia   • Hypokalemia   • Hypocalcemia   • Hyperparathyroidism ([address])   • History of colon polyps   • Primary osteoarthritis of right shoulder   • Chronic left shoulder pain   • Left rotator cuff tear arthropathy   • Rotator cuff injury, right, initial encounter   • Arthritis of right acromioclavicular joint   • BPH associated with nocturia   • Status post right tibial-talar ankle fusion   • Non-recurrent bilateral inguinal hernia without obstruction or gangrene   • Other pulmonary embolism without acute cor pulmonale (HCC)   • Pulmonary mass   • Anemia   • Malignant neoplasm of upper lobe of right lung (HCC)   • Multiple lung nodules on CT   • 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 Past Surgical History:   Procedure Laterality Date   • COLONOSCOPY     • IR BIOPSY LYMPH NODE  9/15/2022   • IR THORACENTESIS  9/13/2022   • IA ARTHRODESIS ANKLE OPEN Right 7/10/2020    Procedure: ARTHRODESIS/ TIBIAL-TALAR ANKLE FUSION;  Surgeon: Caryn Reilly MD;  [facility];  Service: Orthopedics   • IA LAPAROSCOPY SURG RPR INITIAL INGUINAL HERNIA Bilateral 12/16/2021    Procedure: LAPAROSCOPIC REPAIR HERNIA INGUINAL WITH MESH;  Surgeon: Rajendra Parra MD;  [facility];  Service: General     Family History   Problem Relation Age of Onset   • Heart attack Mother      Social History     Socioeconomic History   • Marital status: /Civil Union     Spouse name: Not on file   • Number of children: Not on file   • Years of education: Not on file   • Highest education level: Not on file   Occupational History   • Not on file   Tobacco Use   • Smoking status: Never   • Smokeless tobacco: Never   Vaping Use   • Vaping Use: Never used   Substance and Sexual Activity   • Alcohol use: Yes     Comment: Occasional   • Drug use: Never   • Sexual activity: Not on file   Other Topics Concern   • Not on file   Social History Narrative   • Not on file     Social Determinants of Health     Financial Resource Strain: Low Risk  (7/25/2023)    Overall Financial Resource Strain (CARDIA)    • Difficulty of Paying Living Expenses: Not very hard   Food Insecurity: No Food Insecurity (9/12/2022)    Hunger Vital Sign    • Worried About Running Out of Food in the Last Year: Never true    • Ran Out of Food in the Last Year: Never true   Transportation Needs: No Transportation Needs (7/25/2023)    PRAPARE - Transportation    • Lack of Transportation (Medical): No    • Lack of Transportation (Non-Medical):  No   Physical Activity: Not on file   Stress: Not on file   Social Connections: Not on file   Intimate Partner Violence: Not on file   Housing Stability: Low Risk  (9/12/2022)    Housing Stability Vital Sign • Unable to Pay for Housing in the Last Year: No    • Number of Places Lived in the Last Year: 1    • Unstable Housing in the Last Year: No       Current Outpatient Medications:   •  Accu-Chek FastClix Lancets MISC, Use daily E11.9  Check  fbs  daily, Disp: 100 each, Rfl: 3  •  amLODIPine (NORVASC) 10 mg tablet, TAKE 1 TABLET BY MOUTH EVERY DAY FOR BLOOD PRESSURE, Disp: 90 tablet, Rfl: 1  •  brimonidine tartrate 0.2 % ophthalmic solution, Administer 1 drop into the left eye 2 (two) times a day, Disp: , Rfl:   •  diphenhydrAMINE (BENADRYL) 50 mg capsule, Take 1 capsule 1 hour prior to contrast study (Patient not taking: Reported on 5/8/2023), Disp: 1 capsule, Rfl: 0  •  finasteride (PROSCAR) 5 mg tablet, TAKE 1 TABLET (5 MG TOTAL) BY MOUTH DAILY. , Disp: 90 tablet, Rfl: 1  •  glucose blood (Accu-Chek Jackie Plus) test strip, Use as instructed, Disp: 100 strip, Rfl: 1  •  lisinopril (ZESTRIL) 10 mg tablet, TAKE 1 TABLET BY MOUTH EVERY DAY, Disp: 90 tablet, Rfl: 1  •  metFORMIN (GLUCOPHAGE-XR) 500 mg 24 hr tablet, TAKE 1 TABLET BY MOUTH EVERY DAY, Disp: 90 tablet, Rfl: 0  •  methylPREDNISolone (MEDROL) 32 MG tablet, Take 1 tablet by mouth 12 hours prior to contrast study and then 2 hours prior to contrast study (Patient not taking: Reported on 4/19/2023), Disp: 2 tablet, Rfl: 0  •  metoprolol succinate (TOPROL-XL) 100 mg 24 hr tablet, TAKE 1 TABLET BY MOUTH EVERY DAY, Disp: 90 tablet, Rfl: 0  •  ondansetron (Zofran ODT) 8 mg disintegrating tablet, Take 1 tablet (8 mg total) by mouth every 8 (eight) hours as needed for nausea or vomiting (Patient not taking: Reported on 4/25/2023), Disp: 20 tablet, Rfl: 0  •  Osimertinib Mesylate 80 MG TABS, Take 1 tablet (80 mg total) by mouth in the morning (Patient not taking: Reported on 7/31/2023), Disp: 30 tablet, Rfl: 6  •  Osimertinib Mesylate 80 MG TABS, Take 1 tablet (80 mg total) by mouth in the morning, Disp: 30 tablet, Rfl: 0  •  simvastatin (ZOCOR) 10 mg tablet, TAKE 1 TABLET BY MOUTH EVERY DAY, Disp: 90 tablet, Rfl: 1  •  timolol (TIMOPTIC-XE) 0.5 % ophthalmic gel-forming, 1 drop daily, Disp: , Rfl:   •  travoprost (TRAVATAN-Z) 0.004 % ophthalmic solution, 1 drop daily at bedtime (Patient not taking: Reported on 4/24/2023), Disp: , Rfl:   No Known Allergies  There were no vitals filed for this visit.

## 2023-08-15 NOTE — PROGRESS NOTES
Follow-up - Radiation Oncology   Ayde Cisneros 1945 69 y.o. male [id number]      History of Present Illness   Cancer Staging   Malignant neoplasm of upper lobe of right lung [facility])  Staging form: Lung, AJCC 8th Edition  - Clinical stage from 9/15/2022: Stage BETTE (cT4, cN3, cM1a) - Signed by Tammy Knox MD on 10/10/2022  Stage prefix: Initial diagnosis      Ayde Cisneros is a 69 y.o. man with a history of stage IV adenocarcinoma of the lung undergoing treatment with osmertinib with excellent tolerance and partial response.  He was noted with oligoprogression and completed a course of SBRT on 23. He presents today for follow up today.     23 Palliative Care     23 CT C/A/P w contrast- not read    The patient has no complaints. He generally feels well and denies respiratory symptoms including cough, shortness of breath, progressive dyspnea on exertion, or fever.     Upcomin23 Dr. Cristina Johnson  10/23/23 Palliative Care  23 Dr. Shelton Love         Historical Information   Oncology History   Malignant neoplasm of upper lobe of right lung ([address])   9/15/2022 -  Cancer Staged    Staging form: Lung, AJCC 8th Edition  - Clinical stage from 9/15/2022: Stage BETTE (cT4, cN3, cM1a) - Signed by Tammy Knox MD on 10/10/2022  Stage prefix: Initial diagnosis       9/15/2022 Biopsy    IR lung biopsy    A. Lymph node, right supraclavicular biopsy:  -  Metastatic adenocarcinoma of lung origin.   -  Immunohistochemical stains performed with appropriate controls show the tumor to be positive for TTF1 and napsin and negative for CK5/6 and p40, supporting the diagnosis     10/5/2022 Initial Diagnosis    Malignant neoplasm of upper lobe of right lung ([address])     2023 - 2023 Radiation    Treatments:  Course: C1 SBRT RLL  Plan ID Energy Fractions Dose per Fraction (cGy) Dose Correction (cGy) Total Dose Delivered (cGy) Elapsed Days   BH SBRT RLL 6X-FFF  700 0 3,500 9    Treatment Dates:  2023 - 6/9/2023. Past Medical History:   Diagnosis Date   • Diabetes mellitus ([address])    • Hyperlipidemia    • Hypertension    • Lung cancer ([address])    • Prostate cancer ([address]) 2005    S/P prostate ca-2005 had radiation tx. Past Surgical History:   Procedure Laterality Date   • COLONOSCOPY     • IR BIOPSY LYMPH NODE  9/15/2022   • IR THORACENTESIS  9/13/2022   • LA ARTHRODESIS ANKLE OPEN Right 7/10/2020    Procedure: ARTHRODESIS/ TIBIAL-TALAR ANKLE FUSION;  Surgeon: Arvella Duverney, MD;  [facility];  Service: Orthopedics   • LA LAPAROSCOPY SURG RPR INITIAL INGUINAL HERNIA Bilateral 12/16/2021    Procedure: LAPAROSCOPIC REPAIR HERNIA INGUINAL WITH MESH;  Surgeon: Iqra Jennings MD;  [facility];  Service: General       Social History   Social History     Substance and Sexual Activity   Alcohol Use Yes    Comment: Occasional     Social History     Substance and Sexual Activity   Drug Use Never     Social History     Tobacco Use   Smoking Status Never   Smokeless Tobacco Never         Meds/Allergies     Current Outpatient Medications:   •  Accu-Chek FastClix Lancets MISC, Use daily E11.9  Check  fbs  daily, Disp: 100 each, Rfl: 3  •  amLODIPine (NORVASC) 10 mg tablet, TAKE 1 TABLET BY MOUTH EVERY DAY FOR BLOOD PRESSURE, Disp: 90 tablet, Rfl: 1  •  brimonidine tartrate 0.2 % ophthalmic solution, Administer 1 drop into the left eye 2 (two) times a day, Disp: , Rfl:   •  finasteride (PROSCAR) 5 mg tablet, TAKE 1 TABLET (5 MG TOTAL) BY MOUTH DAILY. , Disp: 90 tablet, Rfl: 1  •  glucose blood (Accu-Chek Jackie Plus) test strip, Use as instructed, Disp: 100 strip, Rfl: 1  •  lisinopril (ZESTRIL) 10 mg tablet, TAKE 1 TABLET BY MOUTH EVERY DAY, Disp: 90 tablet, Rfl: 1  •  metFORMIN (GLUCOPHAGE-XR) 500 mg 24 hr tablet, TAKE 1 TABLET BY MOUTH EVERY DAY, Disp: 90 tablet, Rfl: 0  •  metoprolol succinate (TOPROL-XL) 100 mg 24 hr tablet, TAKE 1 TABLET BY MOUTH EVERY DAY, Disp: 90 tablet, Rfl: 0  •  Osimertinib Mesylate 80 MG TABS, Take 1 tablet (80 mg total) by mouth in the morning, Disp: 30 tablet, Rfl: 6  •  simvastatin (ZOCOR) 10 mg tablet, TAKE 1 TABLET BY MOUTH EVERY DAY, Disp: 90 tablet, Rfl: 1  •  timolol (TIMOPTIC-XE) 0.5 % ophthalmic gel-forming, 1 drop daily, Disp: , Rfl:   •  diphenhydrAMINE (BENADRYL) 50 mg capsule, Take 1 capsule 1 hour prior to contrast study (Patient not taking: Reported on 5/8/2023), Disp: 1 capsule, Rfl: 0  •  methylPREDNISolone (MEDROL) 32 MG tablet, Take 1 tablet by mouth 12 hours prior to contrast study and then 2 hours prior to contrast study (Patient not taking: Reported on 4/19/2023), Disp: 2 tablet, Rfl: 0  •  ondansetron (Zofran ODT) 8 mg disintegrating tablet, Take 1 tablet (8 mg total) by mouth every 8 (eight) hours as needed for nausea or vomiting (Patient not taking: Reported on 4/25/2023), Disp: 20 tablet, Rfl: 0  •  Osimertinib Mesylate 80 MG TABS, Take 1 tablet (80 mg total) by mouth in the morning, Disp: 30 tablet, Rfl: 0  •  travoprost (TRAVATAN-Z) 0.004 % ophthalmic solution, 1 drop daily at bedtime (Patient not taking: Reported on 4/24/2023), Disp: , Rfl:   No Known Allergies      Review of Systems   Constitutional: Negative. HENT: Positive for hearing loss. Eyes: Negative. Wears glasses   Respiratory: Negative. Cardiovascular: Negative. Gastrointestinal: Negative. Endocrine: Negative. Genitourinary:        Nocturia x 3-4   Musculoskeletal: Negative. Skin: Negative. Allergic/Immunologic: Negative. Neurological: Negative. Hematological: Negative. Psychiatric/Behavioral: Negative. OBJECTIVE:   /70   Pulse 71   Temp (!) 97 °F (36.1 °C)   Resp 16   Wt 71.2 kg (157 lb)   SpO2 98%   BMI 23.18 kg/m²   Karnofsky: 90 - Able to carry on normal activity; minor signs or symptoms of disease     Physical Exam  Vitals and nursing note reviewed. Constitutional:       General: He is not in acute distress.   Cardiovascular: Rate and Rhythm: Normal rate. Pulmonary:      Breath sounds: No wheezing, rhonchi or rales. Abdominal:      Palpations: Abdomen is soft. Tenderness: There is no abdominal tenderness. Musculoskeletal:      Right lower leg: No edema. Left lower leg: No edema. Lymphadenopathy:      Cervical: No cervical adenopathy. Neurological:      Mental Status: He is alert. Gait: Gait normal.           Assessment/Plan:  Maldonado Oliva is a 69 y.o. man with a history of stage IV adenocarcinoma of the lung undergoing treatment with osmertinib with excellent tolerance and partial response.  He was noted with oligoprogression and completed a course of SBRT on 6/9/23. He tolerated treatment well without complication. CT scan was done, but report not available at time of visit. I reviewed imaging personally and there is some enlargement of the treated tumor. Full restaging report will need to be reviewed as there may be other areas of progression. We will follow-up on official report and plan to discuss with Dr. Bebe Sandoval. Depending on the report, then we will plan to call patient with next step in management plan. He agreed with plan. Lewis Fuller MD  1/10/5415,8:23 PM    Portions of the record may have been created with voice recognition software.  Occasional wrong word or "sound a like" substitutions may have occurred due to the inherent limitations of voice recognition software.  Read the chart carefully and recognize, using context, where substitutions have occurred. Imaging Studies- Addendum  CT chest abdomen pelvis w contrast    Result Date: 8/15/2023  Narrative: CT CHEST, ABDOMEN AND PELVIS WITH IV CONTRAST INDICATION:   C34.11: Malignant neoplasm of upper lobe, right bronchus or lung. COMPARISON: CT chest abdomen pelvis 4/11/2023. TECHNIQUE: CT examination of the chest, abdomen and pelvis was performed. Multiplanar 2D reformatted images were created from the source data.  This examination, like all CT scans performed in the [facility], was performed utilizing techniques to minimize radiation dose exposure, including the use of iterative reconstruction and automated exposure control. Radiation dose length product (DLP) for this visit:  648 mGy-cm IV Contrast:  100 mL of iohexol (OMNIPAQUE) Enteric Contrast: Enteric contrast was not administered. FINDINGS: CHEST LUNGS: Interval enlargement of spiculated right upper lobe mass due to enlarging rounded nodular component at the caudal aspect of the mass. Based on remeasuring the mass measures 3.8 x 2.8 x 2.3 cm, previously 3.0 x 2.8 x 1.8 cm using similar technique. The enlarging caudal component can be best demonstrated by comparing 3/85 of the current exam to 3/80 of the 4/11/2023 exam. Continued enlargement of fissural-based right lower lobe nodule with a now more spiculated appearance measuring 2.1 x 1.1 cm (3/188). Multiple new nodules are present bilaterally including a new fissural-based nodule in the right middle lobe measuring 2.1 x 1.4 cm (3/198). Representative additional smaller new nodules as follows: 1.3 cm juxtapleural right lower lobe nodule (3/212), multiple small nodules at the right base (3/211), 0.7 cm medial juxtapleural left lower lobe nodule (3/203), 0.5 cm perifissural left lower lobe nodule (3/174), 0.8 cm left lower lobe nodule (3/209). PLEURA: Trace right pleural effusion and right pleural thickening. HEART/GREAT VESSELS: Heart is unremarkable for patient's age. No thoracic aortic aneurysm. Coronary artery and aortic calcifications. MEDIASTINUM AND FAUSTO: New mediastinal and right hilar lymphadenopathy. For example large subcarinal kamilla conglomerate now measures 2.4 cm in the short axis, previously subcentimeter. Enlargement of a now 1.4 cm left lower paratracheal lymph node (2/50) previously 0.5 cm. CHEST WALL AND LOWER NECK:  Unremarkable.  ABDOMEN LIVER/BILIARY TREE: Stable sub-5 mm hypoattenuating focus in the caudate, too small to further characterize. GALLBLADDER:  There are gallstone(s) within the gallbladder, without pericholecystic inflammatory changes. SPLEEN:  Unremarkable. PANCREAS:  Unremarkable. ADRENAL GLANDS:  Unremarkable. KIDNEYS/URETERS: Ectopic pelvic left kidney. No hydronephrosis. Right upper pole simple renal cysts. Subcentimeter hypoattenuating lesions lesions which are too small to further characterize but stable and likely to also reflect cysts. STOMACH AND BOWEL:  Unremarkable. APPENDIX:  No findings to suggest appendicitis. ABDOMINOPELVIC CAVITY:  No ascites. No pneumoperitoneum. No lymphadenopathy. VESSELS:  Unremarkable for patient's age. PELVIS REPRODUCTIVE ORGANS:  Unremarkable for patient's age. URINARY BLADDER: Diffuse bladder wall thickening. ABDOMINAL WALL/INGUINAL REGIONS: Tiny fat-containing umbilical hernia. OSSEOUS STRUCTURES:  No acute fracture or destructive osseous lesion. Stable bone islands. Impression: 1. Findings concerning for worsening metastatic disease. 2.  Enlargement of dominant right upper lobe pulmonary mass, specifically of a nodular caudal component. 3.  Multiple new and enlarging bilateral pulmonary nodules. 4.  New mediastinal and right hilar lymphadenopathy. 5.  Chronic bladder wall thickening may be correlated with symptoms and/or urinalysis for evidence of cystitis. The study was marked in EPIC for significant notification. Workstation performed: [id number]     I have reached out to Dr. Karen Yun for discussion. I will plan to call patient with results once management plan has been discussed.

## 2023-08-16 ENCOUNTER — TELEPHONE (OUTPATIENT)
Dept: HEMATOLOGY ONCOLOGY | Facility: CLINIC | Age: 78
End: 2023-08-16

## 2023-08-16 ENCOUNTER — TELEPHONE (OUTPATIENT)
Dept: RADIATION ONCOLOGY | Facility: CLINIC | Age: 78
End: 2023-08-16

## 2023-08-16 NOTE — TELEPHONE ENCOUNTER
Called patient to schedule a new appt . Left message with [facility] [place] tel number . Ps. Please transfer patient to me .  Thank you

## 2023-08-17 ENCOUNTER — TELEPHONE (OUTPATIENT)
Dept: HEMATOLOGY ONCOLOGY | Facility: CLINIC | Age: 78
End: 2023-08-17

## 2023-08-18 ENCOUNTER — TELEPHONE (OUTPATIENT)
Dept: RADIATION ONCOLOGY | Facility: CLINIC | Age: 78
End: 2023-08-18

## 2023-08-21 ENCOUNTER — TELEPHONE (OUTPATIENT)
Dept: HEMATOLOGY ONCOLOGY | Facility: CLINIC | Age: 78
End: 2023-08-21

## 2023-08-21 NOTE — TELEPHONE ENCOUNTER
Appointment Change  Cancel, Reschedule, Change to Virtual      Who are you speaking with? Patient   If it is not the patient, are they listed on an active communication consent form? N/A   Which provider is the appointment scheduled with? Dr. Tresea Jeans   When is the appointment scheduled? Please list date and time  08/22/2023 @4PM    At which location is the appointment scheduled to take place? [facility]   Was the appointment rescheduled or changed from an in person visit to a virtual visit? If so, please list the details of the change. Yes, 08/23/2023 @4PM    What is the reason for the appointment change? Scheduling conflict    Was [facility] transport scheduled for this visit? No   Does [facility] transport need to be scheduled for the new visit (if applicable) No   Does the patient need an infusion appointment rescheduled? No   Does the patient have an infusion appointment scheduled? If so, when? No   Is the patient undergoing chemotherapy? No   Was the no-show policy reviewed for appointments being changed with less then 24 hours of notice?  No

## 2023-08-21 NOTE — TELEPHONE ENCOUNTER
Called all three listed numbers, no answer and full mailboxes, needs to be rescheduled to sooner in the day or another day

## 2023-08-22 ENCOUNTER — TELEPHONE (OUTPATIENT)
Dept: HEMATOLOGY ONCOLOGY | Facility: CLINIC | Age: 78
End: 2023-08-22

## 2023-08-22 NOTE — TELEPHONE ENCOUNTER
Called patient with change in time of appt .  Unable to live message do to mail box being full ob both sofia number and home number

## 2023-08-23 ENCOUNTER — OFFICE VISIT (OUTPATIENT)
Dept: HEMATOLOGY ONCOLOGY | Facility: CLINIC | Age: 78
End: 2023-08-23
Payer: MEDICARE

## 2023-08-23 VITALS
RESPIRATION RATE: 18 BRPM | OXYGEN SATURATION: 94 % | HEIGHT: 69 IN | TEMPERATURE: 98.2 F | WEIGHT: 152 LBS | DIASTOLIC BLOOD PRESSURE: 82 MMHG | SYSTOLIC BLOOD PRESSURE: 140 MMHG | BODY MASS INDEX: 22.51 KG/M2 | HEART RATE: 78 BPM

## 2023-08-23 DIAGNOSIS — C34.11 MALIGNANT NEOPLASM OF UPPER LOBE OF RIGHT LUNG (HCC): Primary | ICD-10-CM

## 2023-08-23 PROCEDURE — 99215 OFFICE O/P EST HI 40 MIN: CPT | Performed by: INTERNAL MEDICINE

## 2023-08-23 NOTE — PROGRESS NOTES
[facility] HEMATOLOGY ONCOLOGY SPECIALISTS [facility]  [facility]  1945      PRIMARY HEMATOLOGIC/ONCOLOGIC DIAGNOSIS:  1. Stage IV adenocarcinoma of the lung. 9/15/2022 Caris: 4 muts/Mb, MSI-stable, PDL1 +1 CPS, EGFR exon 19  2. Bilateral PE, on Xarelto  3. Hx of prostate cancer dx 2005. S/p RT. PATHOLOGY:   Case Report   Surgical Pathology Report                         Case: [id number]                                    Authorizing Provider: Tenisha Funez DO           Collected:           09/15/2022 1302               Ordering Location:     [facility]      Received:            09/15/2022 1319                                      [facility]                                                               Pathologist:           Magdalena Masterson MD                                                           Specimen:    Lymph Node, right supraclavicular                                                          Final Diagnosis   A. Lymph node, right supraclavicular biopsy:  -  Metastatic adenocarcinoma of lung origin. -  Immunohistochemical stains performed with appropriate controls show the tumor to be positive for TTF1 and napsin and negative for CK5/6 and p40, supporting the diagnosis. STAGING: Cancer Staging   Malignant neoplasm of upper lobe of right lung [facility])  Staging form: Lung, AJCC 8th Edition  - Clinical stage from 9/15/2022: Stage BETTE (cT4, cN3, cM1a) - Signed by Sarah Berg MD on 10/10/2022  Stage prefix: Initial diagnosis         Oncology History   Malignant neoplasm of upper lobe of right lung ([address])   9/15/2022 -  Cancer Staged    Staging form: Lung, AJCC 8th Edition  - Clinical stage from 9/15/2022: Stage BETTE (cT4, cN3, cM1a) - Signed by Sarah Berg MD on 10/10/2022  Stage prefix: Initial diagnosis       9/15/2022 Biopsy    IR lung biopsy    A.  Lymph node, right supraclavicular biopsy:  - Metastatic adenocarcinoma of lung origin. -  Immunohistochemical stains performed with appropriate controls show the tumor to be positive for TTF1 and napsin and negative for CK5/6 and p40, supporting the diagnosis     10/5/2022 Initial Diagnosis    Malignant neoplasm of upper lobe of right lung ([address])     5/31/2023 - 6/9/2023 Radiation    Treatments:  Course: C1 SBRT RLL  Plan ID Energy Fractions Dose per Fraction (cGy) Dose Correction (cGy) Total Dose Delivered (cGy) Elapsed Days   BH SBRT RLL 6X-FFF 5 / 5 700 0 3,500 9    Treatment Dates:  5/31/2023 - 6/9/2023. INTERIM HISTORY:  Kp Barragan is a 68 yo male who presents for evaluation and management of lung cancer. The patient followed with Dr. Yenifer Yepez in the past.He is on Osimertinib - tolerating it well. He is s/p SBRT to RLL lung nodule. He had re-staging scans done 8/8/23 and is here for discussion of results.      PAST MEDICAL,PAST SURGICAL, FAMILY AND SOCIAL HISTORY:    Patient Active Problem List   Diagnosis   • Benign essential HTN   • Controlled type 2 diabetes mellitus without complication, without long-term current use of insulin ([address])   • Hypercholesterolemia   • Glaucoma   • Inguinal hernia   • Hypokalemia   • Hypocalcemia   • Hyperparathyroidism ([address])   • History of colon polyps   • Primary osteoarthritis of right shoulder   • Chronic left shoulder pain   • Left rotator cuff tear arthropathy   • Rotator cuff injury, right, initial encounter   • Arthritis of right acromioclavicular joint   • BPH associated with nocturia   • Status post right tibial-talar ankle fusion   • Non-recurrent bilateral inguinal hernia without obstruction or gangrene   • Other pulmonary embolism without acute cor pulmonale (HCC)   • Pulmonary mass   • Anemia   • Malignant neoplasm of upper lobe of right lung (HCC)   • Multiple lung nodules on CT   • 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 Past Surgical History:   Procedure Laterality Date   • COLONOSCOPY     • IR BIOPSY LYMPH NODE  9/15/2022   • IR THORACENTESIS  9/13/2022   • IL ARTHRODESIS ANKLE OPEN Right 7/10/2020    Procedure: ARTHRODESIS/ TIBIAL-TALAR ANKLE FUSION;  Surgeon: Myra Marin MD;  Location: [facility];  Service: Orthopedics   • IL LAPAROSCOPY SURG RPR INITIAL INGUINAL HERNIA Bilateral 12/16/2021    Procedure: LAPAROSCOPIC REPAIR HERNIA INGUINAL WITH MESH;  Surgeon: Reed Taylor MD;  Location: [facility];  Service: General     Family History   Problem Relation Age of Onset   • Heart attack Mother      Social History     Socioeconomic History   • Marital status: /Civil Union     Spouse name: Not on file   • Number of children: Not on file   • Years of education: Not on file   • Highest education level: Not on file   Occupational History   • Not on file   Tobacco Use   • Smoking status: Never   • Smokeless tobacco: Never   Vaping Use   • Vaping Use: Never used   Substance and Sexual Activity   • Alcohol use: Yes     Comment: Occasional   • Drug use: Never   • Sexual activity: Not on file   Other Topics Concern   • Not on file   Social History Narrative   • Not on file     Social Determinants of Health     Financial Resource Strain: Low Risk  (7/25/2023)    Overall Financial Resource Strain (CARDIA)    • Difficulty of Paying Living Expenses: Not very hard   Food Insecurity: No Food Insecurity (9/12/2022)    Hunger Vital Sign    • Worried About Running Out of Food in the Last Year: Never true    • Ran Out of Food in the Last Year: Never true   Transportation Needs: No Transportation Needs (7/25/2023)    PRAPARE - Transportation    • Lack of Transportation (Medical): No    • Lack of Transportation (Non-Medical):  No Physical Activity: Not on file   Stress: Not on file   Social Connections: Not on file   Intimate Partner Violence: Not on file   Housing Stability: Low Risk  (9/12/2022)    Housing Stability Vital Sign    • Unable to Pay for Housing in the Last Year: No    • Number of Places Lived in the Last Year: 1    • Unstable Housing in the Last Year: No       Current Outpatient Medications:   •  Accu-Chek FastClix Lancets MISC, Use daily E11.9  Check  fbs  daily, Disp: 100 each, Rfl: 3  •  amLODIPine (NORVASC) 10 mg tablet, TAKE 1 TABLET BY MOUTH EVERY DAY FOR BLOOD PRESSURE, Disp: 90 tablet, Rfl: 1  •  brimonidine tartrate 0.2 % ophthalmic solution, Administer 1 drop into the left eye 2 (two) times a day, Disp: , Rfl:   •  diphenhydrAMINE (BENADRYL) 50 mg capsule, Take 1 capsule 1 hour prior to contrast study (Patient not taking: Reported on 5/8/2023), Disp: 1 capsule, Rfl: 0  •  finasteride (PROSCAR) 5 mg tablet, TAKE 1 TABLET (5 MG TOTAL) BY MOUTH DAILY. , Disp: 90 tablet, Rfl: 1  •  glucose blood (Accu-Chek Jackie Plus) test strip, Use as instructed, Disp: 100 strip, Rfl: 1  •  lisinopril (ZESTRIL) 10 mg tablet, TAKE 1 TABLET BY MOUTH EVERY DAY, Disp: 90 tablet, Rfl: 1  •  metFORMIN (GLUCOPHAGE-XR) 500 mg 24 hr tablet, TAKE 1 TABLET BY MOUTH EVERY DAY, Disp: 90 tablet, Rfl: 0  •  methylPREDNISolone (MEDROL) 32 MG tablet, Take 1 tablet by mouth 12 hours prior to contrast study and then 2 hours prior to contrast study (Patient not taking: Reported on 4/19/2023), Disp: 2 tablet, Rfl: 0  •  metoprolol succinate (TOPROL-XL) 100 mg 24 hr tablet, TAKE 1 TABLET BY MOUTH EVERY DAY, Disp: 90 tablet, Rfl: 0  •  ondansetron (Zofran ODT) 8 mg disintegrating tablet, Take 1 tablet (8 mg total) by mouth every 8 (eight) hours as needed for nausea or vomiting (Patient not taking: Reported on 4/25/2023), Disp: 20 tablet, Rfl: 0  •  Osimertinib Mesylate 80 MG TABS, Take 1 tablet (80 mg total) by mouth in the morning, Disp: 30 tablet, Rfl: 6  •  Osimertinib Mesylate 80 MG TABS, Take 1 tablet (80 mg total) by mouth in the morning, Disp: 30 tablet, Rfl: 0  •  simvastatin (ZOCOR) 10 mg tablet, TAKE 1 TABLET BY MOUTH EVERY DAY, Disp: 90 tablet, Rfl: 1  •  timolol (TIMOPTIC-XE) 0.5 % ophthalmic gel-forming, 1 drop daily, Disp: , Rfl:   •  travoprost (TRAVATAN-Z) 0.004 % ophthalmic solution, 1 drop daily at bedtime (Patient not taking: Reported on 4/24/2023), Disp: , Rfl:   No Known Allergies  There were no vitals filed for this visit. ROS:  CONSTITUTIONAL:  No fever. No chills. No dizziness. No weakness. EYES:  No pain, erythema, or discharge. No blurring of vision. ENT:  No sore throat, URI symptoms. No epistaxis. No tinnitus. CARDIOVASCULAR:  No chest pain. No palpitations. No lower extremity edema. RESPIRATORY:  No shortness of breath, cough, pain with respiration, pleuritic chest pain. No hemoptysis. No dyspnea. No paroxysmal nocturnal dyspnea. GASTROINTESTINAL:  Normal appetite. No nausea, vomiting, diarrhea. No pain. No bloating. No melena. GENITOURINARY:  No frequency, urgency, nocturia. No hematuria or dysuria. MUSCULOSKELETAL:  No arthralgias or myalgias. INTEGUMENTARY:  No swelling. No bruising. No contusions. No abrasions. No lymphangitis. NEUROLOGIC:  No headache. No neck pain. No numbness or tingling of the extremities. No weakness. PSYCHIATRIC:  No confusion. ENDOCRINE:  No fatigue. No weakness. No history of thyroid, diabetes or adrenal problems. HEMATOLOGICAL:  No bleeding. No petechiae. No bruising.     PHYSICAL EXAM:    GENERAL: AAO x 3  HEENT: AT,NC  CVS: S1S2 RRR  LUNGS: CTA b/l  ABD: NT,ND, +BS  EXTR: no edema  NEURO: CN II-XII grossly intact    LABS:  I have reviewed pertinent labs:  CBC:   Lab Results   Component Value Date    WBC 5.63 07/07/2023    RBC 4.20 07/07/2023    HGB 13.5 07/07/2023    HCT 39.6 07/07/2023    MCV 94 07/07/2023     (L) 07/07/2023    MCH 32.1 07/07/2023    MCHC 34.1 07/07/2023    RDW 13.2 07/07/2023    MPV 12.2 07/07/2023    NEUTROABS 4.13 07/07/2023     CMP:   Lab Results   Component Value Date    SODIUM 142 07/07/2023    K 4.2 07/07/2023     (H) 07/07/2023    CO2 29 07/07/2023    AGAP 2 07/07/2023    BUN 22 07/07/2023    CREATININE 1.09 07/07/2023    GLUC 120 01/04/2023    GLUF 114 (H) 07/07/2023    CALCIUM 8.8 07/07/2023    AST 18 07/07/2023    ALT 17 07/07/2023    ALKPHOS 111 07/07/2023    TP 6.6 07/07/2023    ALB 3.6 07/07/2023    TBILI 0.45 07/07/2023    EGFR 65 07/07/2023     Liver Enzymes:   Lab Results   Component Value Date    AST 18 07/07/2023    ALT 17 07/07/2023    ALKPHOS 111 07/07/2023    TP 6.6 07/07/2023    ALB 3.6 07/07/2023    TBILI 0.45 07/07/2023    BILIDIR 0.21 (H) 09/14/2022     Vitamin B12 No results found for: "[id number]"  Iron Study   Lab Results   Component Value Date    FERRITIN 579 (H) 09/10/2022    CONCFE 23 09/10/2022    TIBC 117 (L) 09/10/2022    IRON 27 (L) 09/10/2022     Folate No results found for: "FOLATE"  Magnesium No results found for: "MG"  Phosphorus No results found for: "PHOS"  Coagulation Panel   Lab Results   Component Value Date    DDIMER 2.82 (H) 09/10/2022    PROTIME 14.5 09/10/2022    INR 1.15 09/10/2022    PTT 80 (H) 09/16/2022       IMAGING:  CT chest abdomen pelvis w contrast    Result Date: 8/15/2023  Narrative: CT CHEST, ABDOMEN AND PELVIS WITH IV CONTRAST INDICATION:   C34.11: Malignant neoplasm of upper lobe, right bronchus or lung. COMPARISON: CT chest abdomen pelvis 4/11/2023. TECHNIQUE: CT examination of the chest, abdomen and pelvis was performed. Multiplanar 2D reformatted images were created from the source data. This examination, like all CT scans performed in the [facility], was performed utilizing techniques to minimize radiation dose exposure, including the use of iterative reconstruction and automated exposure control.  Radiation dose length product (DLP) for this visit:  648 mGy-cm IV Contrast:  100 mL of iohexol (OMNIPAQUE) Enteric Contrast: Enteric contrast was not administered. FINDINGS: CHEST LUNGS: Interval enlargement of spiculated right upper lobe mass due to enlarging rounded nodular component at the caudal aspect of the mass. Based on remeasuring the mass measures 3.8 x 2.8 x 2.3 cm, previously 3.0 x 2.8 x 1.8 cm using similar technique. The enlarging caudal component can be best demonstrated by comparing 3/85 of the current exam to 3/80 of the 4/11/2023 exam. Continued enlargement of fissural-based right lower lobe nodule with a now more spiculated appearance measuring 2.1 x 1.1 cm (3/188). Multiple new nodules are present bilaterally including a new fissural-based nodule in the right middle lobe measuring 2.1 x 1.4 cm (3/198). Representative additional smaller new nodules as follows: 1.3 cm juxtapleural right lower lobe nodule (3/212), multiple small nodules at the right base (3/211), 0.7 cm medial juxtapleural left lower lobe nodule (3/203), 0.5 cm perifissural left lower lobe nodule (3/174), 0.8 cm left lower lobe nodule (3/209). PLEURA: Trace right pleural effusion and right pleural thickening. HEART/GREAT VESSELS: Heart is unremarkable for patient's age. No thoracic aortic aneurysm. Coronary artery and aortic calcifications. MEDIASTINUM AND FAUSTO: New mediastinal and right hilar lymphadenopathy. For example large subcarinal kamilla conglomerate now measures 2.4 cm in the short axis, previously subcentimeter. Enlargement of a now 1.4 cm left lower paratracheal lymph node (2/50) previously 0.5 cm. CHEST WALL AND LOWER NECK:  Unremarkable. ABDOMEN LIVER/BILIARY TREE: Stable sub-5 mm hypoattenuating focus in the caudate, too small to further characterize. GALLBLADDER:  There are gallstone(s) within the gallbladder, without pericholecystic inflammatory changes. SPLEEN:  Unremarkable. PANCREAS:  Unremarkable. ADRENAL GLANDS:  Unremarkable. KIDNEYS/URETERS: Ectopic pelvic left kidney. No hydronephrosis.  Right upper pole simple renal cysts. Subcentimeter hypoattenuating lesions lesions which are too small to further characterize but stable and likely to also reflect cysts. STOMACH AND BOWEL:  Unremarkable. APPENDIX:  No findings to suggest appendicitis. ABDOMINOPELVIC CAVITY:  No ascites. No pneumoperitoneum. No lymphadenopathy. VESSELS:  Unremarkable for patient's age. PELVIS REPRODUCTIVE ORGANS:  Unremarkable for patient's age. URINARY BLADDER: Diffuse bladder wall thickening. ABDOMINAL WALL/INGUINAL REGIONS: Tiny fat-containing umbilical hernia. OSSEOUS STRUCTURES:  No acute fracture or destructive osseous lesion. Stable bone islands. Impression: 1. Findings concerning for worsening metastatic disease. 2.  Enlargement of dominant right upper lobe pulmonary mass, specifically of a nodular caudal component. 3.  Multiple new and enlarging bilateral pulmonary nodules. 4.  New mediastinal and right hilar lymphadenopathy. 5.  Chronic bladder wall thickening may be correlated with symptoms and/or urinalysis for evidence of cystitis. The study was marked in EPIC for significant notification. Workstation performed: [id number]     I reviewed the above laboratory and imaging data. ASSESSMENT/PLAN:  1. Stage IV adenocarcinoma of the lung. 9/15/2022 Caris: 4 muts/Mb, MSI-stable, PDL1 +1 CPS, EGFR exon 19. On Osimertinib. CT 8/8/23 c/w disease progression--interval enlargement of spiculated right upper lobe mass due to enlarging rounded nodular component at the caudal aspect of the mass. The mass measures 3.8 x 2.8 x 2.3 cm, previously 3.0 x 2.8 x 1.8 cm. Continued enlargement of fissural-based right lower lobe nodule with a now more spiculated appearance measuring 2.1 x 1.1 cm. Multiple new nodules present bilaterally including a new fissural-based nodule in the right middle lobe measuring 2.1 x 1.4 cm.  Additional smaller new nodules : 1.3 cm juxtapleural right lower lobe nodule, multiple small nodules at the right base, 0.7 cm medial juxtapleural left lower lobe nodule, 0.5 cm perifissural left lower lobe nodule, 0.8 cm left lower lobe nodule. Will send liquid biopsy. NCCN guideline recommended subsequent therapy options include switching to a first-line systemic therapy regimen for metastatic NSCLC. Flare phenomenon may occur due to Osimertinib discontinuation. If that occurs TKI will be restarted. Will plan to initiate Carboplatin + Pemetrexed. Patient will be scheduled for chemotherapy teaching. Will consider addition of Bevacizumab with subsequent cycles.

## 2023-08-24 ENCOUNTER — TELEPHONE (OUTPATIENT)
Dept: HEMATOLOGY ONCOLOGY | Facility: CLINIC | Age: 78
End: 2023-08-24

## 2023-08-24 NOTE — TELEPHONE ENCOUNTER
Tried calling patient but his mailbox was full and could not leave a message. Was trying to schedule 2 week f/u with Dr. Francisco Toscano.

## 2023-08-28 ENCOUNTER — APPOINTMENT (OUTPATIENT)
Dept: LAB | Facility: HOSPITAL | Age: 78
End: 2023-08-28
Payer: MEDICARE

## 2023-08-28 DIAGNOSIS — C77.8 MALIGNANT HISTIOCYTOSIS OF LYMPH NODES OF MULTIPLE SITES (HCC): ICD-10-CM

## 2023-08-28 DIAGNOSIS — C34.90 MALIGNANT NEOPLASM OF LUNG, UNSPECIFIED LATERALITY, UNSPECIFIED PART OF LUNG (HCC): ICD-10-CM

## 2023-08-28 DIAGNOSIS — C96.A MALIGNANT HISTIOCYTOSIS OF LYMPH NODES OF MULTIPLE SITES (HCC): ICD-10-CM

## 2023-08-28 LAB — MISCELLANEOUS LAB TEST RESULT: NORMAL

## 2023-08-28 PROCEDURE — 36415 COLL VENOUS BLD VENIPUNCTURE: CPT

## 2023-08-30 ENCOUNTER — TELEPHONE (OUTPATIENT)
Dept: HEMATOLOGY ONCOLOGY | Facility: CLINIC | Age: 78
End: 2023-08-30

## 2023-08-30 RX ORDER — SODIUM CHLORIDE 9 MG/ML
20 INJECTION, SOLUTION INTRAVENOUS ONCE
OUTPATIENT
Start: 2023-09-18

## 2023-08-30 RX ORDER — CYANOCOBALAMIN 1000 UG/ML
1000 INJECTION, SOLUTION INTRAMUSCULAR; SUBCUTANEOUS ONCE
OUTPATIENT
Start: 2023-09-11 | End: 2023-08-30

## 2023-08-30 RX ORDER — MAGNESIUM SULFATE HEPTAHYDRATE 40 MG/ML
2 INJECTION, SOLUTION INTRAVENOUS ONCE AS NEEDED
Status: CANCELLED | OUTPATIENT
Start: 2023-09-05

## 2023-08-30 RX ORDER — SODIUM CHLORIDE 9 MG/ML
20 INJECTION, SOLUTION INTRAVENOUS ONCE
Status: CANCELLED | OUTPATIENT
Start: 2023-09-05

## 2023-08-30 NOTE — TELEPHONE ENCOUNTER
Attempted to call patient again on Cell, Home and also wifes number. Unable to leave message on all numbers due to mailbox being full and wife has no option for vm.

## 2023-08-30 NOTE — TELEPHONE ENCOUNTER
Attempted to call patient on his cell phone and house phone, no answer and not able to leave messages due to both mailboxes being full. Attempted to call wife Nishant Werner, no answer and no option to leave voicemail secondary to mailbox being full.

## 2023-09-06 ENCOUNTER — TELEPHONE (OUTPATIENT)
Dept: HEMATOLOGY ONCOLOGY | Facility: CLINIC | Age: 78
End: 2023-09-06

## 2023-09-06 DIAGNOSIS — C34.11 MALIGNANT NEOPLASM OF UPPER LOBE OF RIGHT LUNG (HCC): Primary | ICD-10-CM

## 2023-09-07 ENCOUNTER — TELEPHONE (OUTPATIENT)
Dept: INFUSION CENTER | Facility: CLINIC | Age: 78
End: 2023-09-07

## 2023-09-07 DIAGNOSIS — C34.11 MALIGNANT NEOPLASM OF UPPER LOBE OF RIGHT LUNG (HCC): Primary | ICD-10-CM

## 2023-09-07 NOTE — TELEPHONE ENCOUNTER
NEW PATIENT PHONE CALL  S/w patient regarding his upcoming appts. Went over how we run and our policies. Answered all questions. Aware of where to come as well.

## 2023-09-11 ENCOUNTER — HOSPITAL ENCOUNTER (OUTPATIENT)
Dept: INFUSION CENTER | Facility: CLINIC | Age: 78
Discharge: HOME | End: 2023-09-11
Payer: MEDICARE

## 2023-09-11 DIAGNOSIS — C34.11 MALIGNANT NEOPLASM OF UPPER LOBE OF RIGHT LUNG (HCC): Primary | ICD-10-CM

## 2023-09-11 PROCEDURE — 96372 THER/PROPH/DIAG INJ SC/IM: CPT

## 2023-09-11 RX ORDER — FOLIC ACID 1 MG/1
1 TABLET ORAL DAILY
Qty: 30 TABLET | Refills: 6 | Status: SHIPPED | OUTPATIENT
Start: 2023-09-11 | End: 2023-10-11

## 2023-09-11 RX ORDER — CYANOCOBALAMIN 1000 UG/ML
1000 INJECTION, SOLUTION INTRAMUSCULAR; SUBCUTANEOUS ONCE
Status: COMPLETED | OUTPATIENT
Start: 2023-09-11 | End: 2023-09-11

## 2023-09-11 RX ORDER — DEXAMETHASONE 4 MG/1
4 TABLET ORAL 2 TIMES DAILY WITH MEALS
Qty: 6 TABLET | Refills: 6 | Status: SHIPPED | OUTPATIENT
Start: 2023-09-11

## 2023-09-11 RX ADMIN — CYANOCOBALAMIN 1000 MCG: 1000 INJECTION, SOLUTION INTRAMUSCULAR; SUBCUTANEOUS at 10:03

## 2023-09-11 NOTE — PROGRESS NOTES
Pt to clinic for B12 injection. Pt offers no complaints. Tolerated injection without complications. AVS printed. Pt aware of next appointment.

## 2023-09-18 ENCOUNTER — APPOINTMENT (OUTPATIENT)
Dept: LAB | Facility: MEDICAL CENTER | Age: 78
End: 2023-09-18
Payer: MEDICARE

## 2023-09-18 DIAGNOSIS — C34.11 MALIGNANT NEOPLASM OF UPPER LOBE OF RIGHT LUNG (HCC): ICD-10-CM

## 2023-09-18 LAB
ALBUMIN SERPL BCP-MCNC: 3.6 G/DL (ref 3.5–5)
ALP SERPL-CCNC: 85 U/L (ref 34–104)
ALT SERPL W P-5'-P-CCNC: 10 U/L (ref 7–52)
ANION GAP SERPL CALCULATED.3IONS-SCNC: 9 MMOL/L
AST SERPL W P-5'-P-CCNC: 15 U/L (ref 13–39)
BASOPHILS # BLD AUTO: 0.03 THOUSANDS/ÂΜL (ref 0–0.1)
BASOPHILS NFR BLD AUTO: 1 % (ref 0–1)
BILIRUB SERPL-MCNC: 0.69 MG/DL (ref 0.2–1)
BUN SERPL-MCNC: 20 MG/DL (ref 5–25)
CALCIUM SERPL-MCNC: 8.9 MG/DL (ref 8.4–10.2)
CHLORIDE SERPL-SCNC: 105 MMOL/L (ref 96–108)
CO2 SERPL-SCNC: 29 MMOL/L (ref 21–32)
CREAT SERPL-MCNC: 1.18 MG/DL (ref 0.6–1.3)
EOSINOPHIL # BLD AUTO: 0.12 THOUSAND/ÂΜL (ref 0–0.61)
EOSINOPHIL NFR BLD AUTO: 2 % (ref 0–6)
ERYTHROCYTE [DISTWIDTH] IN BLOOD BY AUTOMATED COUNT: 13.2 % (ref 11.6–15.1)
GFR SERPL CREATININE-BSD FRML MDRD: 59 ML/MIN/1.73SQ M
GLUCOSE SERPL-MCNC: 110 MG/DL (ref 65–140)
HCT VFR BLD AUTO: 37.8 % (ref 36.5–49.3)
HGB BLD-MCNC: 12.6 G/DL (ref 12–17)
IMM GRANULOCYTES # BLD AUTO: 0.02 THOUSAND/UL (ref 0–0.2)
IMM GRANULOCYTES NFR BLD AUTO: 0 % (ref 0–2)
LYMPHOCYTES # BLD AUTO: 0.44 THOUSANDS/ÂΜL (ref 0.6–4.47)
LYMPHOCYTES NFR BLD AUTO: 7 % (ref 14–44)
MCH RBC QN AUTO: 31.5 PG (ref 26.8–34.3)
MCHC RBC AUTO-ENTMCNC: 33.3 G/DL (ref 31.4–37.4)
MCV RBC AUTO: 95 FL (ref 82–98)
MONOCYTES # BLD AUTO: 0.6 THOUSAND/ÂΜL (ref 0.17–1.22)
MONOCYTES NFR BLD AUTO: 10 % (ref 4–12)
NEUTROPHILS # BLD AUTO: 4.98 THOUSANDS/ÂΜL (ref 1.85–7.62)
NEUTS SEG NFR BLD AUTO: 80 % (ref 43–75)
NRBC BLD AUTO-RTO: 0 /100 WBCS
PLATELET # BLD AUTO: 130 THOUSANDS/UL (ref 149–390)
PMV BLD AUTO: 11.8 FL (ref 8.9–12.7)
POTASSIUM SERPL-SCNC: 3.9 MMOL/L (ref 3.5–5.3)
PROT SERPL-MCNC: 6.3 G/DL (ref 6.4–8.4)
RBC # BLD AUTO: 4 MILLION/UL (ref 3.88–5.62)
SODIUM SERPL-SCNC: 143 MMOL/L (ref 135–147)
WBC # BLD AUTO: 6.19 THOUSAND/UL (ref 4.31–10.16)

## 2023-09-18 PROCEDURE — 85025 COMPLETE CBC W/AUTO DIFF WBC: CPT

## 2023-09-18 PROCEDURE — 80053 COMPREHEN METABOLIC PANEL: CPT

## 2023-09-18 PROCEDURE — 36415 COLL VENOUS BLD VENIPUNCTURE: CPT

## 2023-09-19 ENCOUNTER — HOSPITAL ENCOUNTER (OUTPATIENT)
Dept: INFUSION CENTER | Facility: CLINIC | Age: 78
Discharge: HOME | End: 2023-09-19
Payer: MEDICARE

## 2023-09-19 ENCOUNTER — TELEPHONE (OUTPATIENT)
Dept: NUTRITION | Facility: CLINIC | Age: 78
End: 2023-09-19

## 2023-09-19 VITALS
TEMPERATURE: 97.6 F | BODY MASS INDEX: 23.52 KG/M2 | SYSTOLIC BLOOD PRESSURE: 158 MMHG | HEART RATE: 79 BPM | RESPIRATION RATE: 18 BRPM | DIASTOLIC BLOOD PRESSURE: 76 MMHG | HEIGHT: 68 IN | WEIGHT: 155.2 LBS

## 2023-09-19 DIAGNOSIS — C34.11 MALIGNANT NEOPLASM OF UPPER LOBE OF RIGHT LUNG (HCC): Primary | ICD-10-CM

## 2023-09-19 PROCEDURE — 96413 CHEMO IV INFUSION 1 HR: CPT

## 2023-09-19 PROCEDURE — 96411 CHEMO IV PUSH ADDL DRUG: CPT

## 2023-09-19 PROCEDURE — 96367 TX/PROPH/DG ADDL SEQ IV INF: CPT

## 2023-09-19 RX ORDER — ONDANSETRON HYDROCHLORIDE 8 MG/1
8 TABLET, FILM COATED ORAL EVERY 8 HOURS PRN
Qty: 20 TABLET | Refills: 0 | Status: SHIPPED | OUTPATIENT
Start: 2023-09-19

## 2023-09-19 RX ORDER — SODIUM CHLORIDE 9 MG/ML
20 INJECTION, SOLUTION INTRAVENOUS ONCE
Status: COMPLETED | OUTPATIENT
Start: 2023-09-19 | End: 2023-09-19

## 2023-09-19 RX ADMIN — FOSAPREPITANT 150 MG: 150 INJECTION, POWDER, LYOPHILIZED, FOR SOLUTION INTRAVENOUS at 12:02

## 2023-09-19 RX ADMIN — DEXAMETHASONE SODIUM PHOSPHATE: 10 INJECTION, SOLUTION INTRAMUSCULAR; INTRAVENOUS at 11:39

## 2023-09-19 RX ADMIN — PEMETREXED DISODIUM 920 MG: 500 INJECTION, POWDER, LYOPHILIZED, FOR SOLUTION INTRAVENOUS at 12:39

## 2023-09-19 RX ADMIN — CARBOPLATIN 380.5 MG: 10 INJECTION, SOLUTION INTRAVENOUS at 12:52

## 2023-09-19 RX ADMIN — SODIUM CHLORIDE 20 ML/HR: 0.9 INJECTION, SOLUTION INTRAVENOUS at 11:39

## 2023-09-19 NOTE — PROGRESS NOTES
Pt presents for D1C1 of chemotherapy, offers no complaints, tolerated infusion, AVS provided, d/c from unit stable

## 2023-09-19 NOTE — TELEPHONE ENCOUNTER
Fariha Jimenez to set up nutrition consult after receiving notification by infusion RN on 9/19/23 that pt is appropriate for oncology nutrition care (reason for referral: Malnutrition Screening Tool (MST) Triggers: scored a 2 indicating 14-23# (6.4-10.5 kg) recent wt loss and is not eating poorly due to a decreased appetite. (Date of MST: 9/19/23) and lung ca). Spoke with Kayla Thurman, introduced self and oncology nutrition services available. Call was disconnected. Attempted to call back with no answer, unable to leave voicemail. Will attempt to call again in the near future.

## 2023-09-21 ENCOUNTER — TELEPHONE (OUTPATIENT)
Dept: NUTRITION | Facility: CLINIC | Age: 78
End: 2023-09-21

## 2023-09-21 NOTE — TELEPHONE ENCOUNTER
Second attempt to contact Cameron Winter to establish oncology nutrition care. Spoke with Cameron Winter. Discussed oncology nutrition services available and the benefits of meeting for a consultation. Cameron Winter is agreeable to nutrition consult during his next infusion. Initial oncology nutrition consultation scheduled for 10/9/23 during Glen's infusion.

## 2023-10-03 ENCOUNTER — APPOINTMENT (OUTPATIENT)
Dept: RADIOLOGY | Facility: HOSPITAL | Age: 78
DRG: 175 | End: 2023-10-03
Payer: MEDICARE

## 2023-10-03 ENCOUNTER — APPOINTMENT (EMERGENCY)
Dept: VASCULAR ULTRASOUND | Facility: HOSPITAL | Age: 78
DRG: 175 | End: 2023-10-03
Payer: MEDICARE

## 2023-10-03 ENCOUNTER — HOSPITAL ENCOUNTER (INPATIENT)
Facility: HOSPITAL | Age: 78
LOS: 13 days | Discharge: HOME HEALTH | DRG: 175 | End: 2023-10-16
Attending: EMERGENCY MEDICINE | Admitting: STUDENT IN AN ORGANIZED HEALTH CARE EDUCATION/TRAINING PROGRAM
Payer: MEDICARE

## 2023-10-03 ENCOUNTER — APPOINTMENT (EMERGENCY)
Dept: RADIOLOGY | Facility: HOSPITAL | Age: 78
DRG: 175 | End: 2023-10-03
Payer: MEDICARE

## 2023-10-03 ENCOUNTER — APPOINTMENT (EMERGENCY)
Dept: CT IMAGING | Facility: HOSPITAL | Age: 78
DRG: 175 | End: 2023-10-03
Payer: MEDICARE

## 2023-10-03 DIAGNOSIS — J90 PLEURAL EFFUSION: ICD-10-CM

## 2023-10-03 DIAGNOSIS — I48.91 ATRIAL FIBRILLATION WITH RAPID VENTRICULAR RESPONSE (HCC): Primary | ICD-10-CM

## 2023-10-03 DIAGNOSIS — I26.94 MULTIPLE SUBSEGMENTAL PULMONARY EMBOLI WITHOUT ACUTE COR PULMONALE (HCC): ICD-10-CM

## 2023-10-03 DIAGNOSIS — I26.99 PULMONARY EMBOLI (HCC): ICD-10-CM

## 2023-10-03 DIAGNOSIS — R06.00 DYSPNEA, UNSPECIFIED TYPE: ICD-10-CM

## 2023-10-03 DIAGNOSIS — I48.91 ATRIAL FIBRILLATION WITH RVR (HCC): ICD-10-CM

## 2023-10-03 DIAGNOSIS — R91.8 MULTIPLE LUNG NODULES ON CT: ICD-10-CM

## 2023-10-03 DIAGNOSIS — C77.8 MALIGNANT NEOPLASM METASTATIC TO LYMPH NODES OF MULTIPLE SITES (HCC): ICD-10-CM

## 2023-10-03 PROBLEM — R93.5 ABNORMAL CT OF THE ABDOMEN: Status: ACTIVE | Noted: 2023-10-03

## 2023-10-03 PROBLEM — N17.9 AKI (ACUTE KIDNEY INJURY) (HCC): Status: ACTIVE | Noted: 2023-10-03

## 2023-10-03 LAB
2HR DELTA HS TROPONIN: 0 NG/L
4HR DELTA HS TROPONIN: 1 NG/L
ABO GROUP BLD: NORMAL
ABO GROUP BLD: NORMAL
ALBUMIN SERPL BCP-MCNC: 3.7 G/DL (ref 3.5–5)
ALP SERPL-CCNC: 86 U/L (ref 34–104)
ALT SERPL W P-5'-P-CCNC: 25 U/L (ref 7–52)
ANION GAP SERPL CALCULATED.3IONS-SCNC: 12 MMOL/L
ANISOCYTOSIS BLD QL SMEAR: PRESENT
APTT PPP: 121 SECONDS (ref 23–37)
APTT PPP: 34 SECONDS (ref 23–37)
AST SERPL W P-5'-P-CCNC: 20 U/L (ref 13–39)
ATRIAL RATE: 133 BPM
ATRIAL RATE: 133 BPM
ATRIAL RATE: 163 BPM
BACTERIA UR QL AUTO: ABNORMAL /HPF
BASOPHILS # BLD AUTO: 0 THOUSANDS/ÂΜL (ref 0–0.1)
BASOPHILS NFR BLD AUTO: 0 % (ref 0–1)
BILIRUB SERPL-MCNC: 0.5 MG/DL (ref 0.2–1)
BILIRUB UR QL STRIP: NEGATIVE
BLD GP AB SCN SERPL QL: NEGATIVE
BNP SERPL-MCNC: 662 PG/ML (ref 0–100)
BUN SERPL-MCNC: 48 MG/DL (ref 5–25)
CALCIUM SERPL-MCNC: 8.8 MG/DL (ref 8.4–10.2)
CARDIAC TROPONIN I PNL SERPL HS: 5 NG/L
CARDIAC TROPONIN I PNL SERPL HS: 5 NG/L
CARDIAC TROPONIN I PNL SERPL HS: 6 NG/L
CHLORIDE SERPL-SCNC: 107 MMOL/L (ref 96–108)
CLARITY UR: CLEAR
CO2 SERPL-SCNC: 19 MMOL/L (ref 21–32)
COLOR UR: YELLOW
CREAT SERPL-MCNC: 1.99 MG/DL (ref 0.6–1.3)
D DIMER PPP FEU-MCNC: >20 UG/ML FEU
EOSINOPHIL # BLD AUTO: 0.03 THOUSAND/ÂΜL (ref 0–0.61)
EOSINOPHIL NFR BLD AUTO: 2 % (ref 0–6)
ERYTHROCYTE [DISTWIDTH] IN BLOOD BY AUTOMATED COUNT: 13.6 % (ref 11.6–15.1)
FLUAV RNA RESP QL NAA+PROBE: NEGATIVE
FLUBV RNA RESP QL NAA+PROBE: NEGATIVE
GFR SERPL CREATININE-BSD FRML MDRD: 31 ML/MIN/1.73SQ M
GLUCOSE SERPL-MCNC: 130 MG/DL (ref 65–140)
GLUCOSE SERPL-MCNC: 169 MG/DL (ref 65–140)
GLUCOSE UR STRIP-MCNC: NEGATIVE MG/DL
HCT VFR BLD AUTO: 34 % (ref 36.5–49.3)
HGB BLD-MCNC: 11.4 G/DL (ref 12–17)
HGB UR QL STRIP.AUTO: NEGATIVE
IMM GRANULOCYTES # BLD AUTO: 0.01 THOUSAND/UL (ref 0–0.2)
IMM GRANULOCYTES NFR BLD AUTO: 1 % (ref 0–2)
INR PPP: 1.24 (ref 0.84–1.19)
KETONES UR STRIP-MCNC: NEGATIVE MG/DL
LACTATE SERPL-SCNC: 1.9 MMOL/L (ref 0.5–2)
LEUKOCYTE ESTERASE UR QL STRIP: NEGATIVE
LG PLATELETS BLD QL SMEAR: PRESENT
LIPASE SERPL-CCNC: 11 U/L (ref 11–82)
LYMPHOCYTES # BLD AUTO: 0.53 THOUSANDS/ÂΜL (ref 0.6–4.47)
LYMPHOCYTES NFR BLD AUTO: 26 % (ref 14–44)
MAGNESIUM SERPL-MCNC: 2.4 MG/DL (ref 1.9–2.7)
MCH RBC QN AUTO: 32.5 PG (ref 26.8–34.3)
MCHC RBC AUTO-ENTMCNC: 33.5 G/DL (ref 31.4–37.4)
MCV RBC AUTO: 97 FL (ref 82–98)
MONOCYTES # BLD AUTO: 0.46 THOUSAND/ÂΜL (ref 0.17–1.22)
MONOCYTES NFR BLD AUTO: 23 % (ref 4–12)
MUCOUS THREADS UR QL AUTO: ABNORMAL
NEUTROPHILS # BLD AUTO: 0.99 THOUSANDS/ÂΜL (ref 1.85–7.62)
NEUTS SEG NFR BLD AUTO: 48 % (ref 43–75)
NITRITE UR QL STRIP: NEGATIVE
NON-SQ EPI CELLS URNS QL MICRO: ABNORMAL /HPF
NRBC BLD AUTO-RTO: 0 /100 WBCS
PH UR STRIP.AUTO: 5 [PH]
PLATELET # BLD AUTO: 67 THOUSANDS/UL (ref 149–390)
PLATELET BLD QL SMEAR: ABNORMAL
PMV BLD AUTO: 13.6 FL (ref 8.9–12.7)
POLYCHROMASIA BLD QL SMEAR: PRESENT
POTASSIUM SERPL-SCNC: 4 MMOL/L (ref 3.5–5.3)
PROT SERPL-MCNC: 6.6 G/DL (ref 6.4–8.4)
PROT UR STRIP-MCNC: ABNORMAL MG/DL
PROTHROMBIN TIME: 16.3 SECONDS (ref 11.6–14.5)
QRS AXIS: 82 DEGREES
QRS AXIS: 82 DEGREES
QRS AXIS: 97 DEGREES
QRSD INTERVAL: 84 MS
QRSD INTERVAL: 84 MS
QRSD INTERVAL: 86 MS
QT INTERVAL: 330 MS
QT INTERVAL: 344 MS
QT INTERVAL: 348 MS
QTC INTERVAL: 502 MS
QTC INTERVAL: 512 MS
QTC INTERVAL: 525 MS
RBC # BLD AUTO: 3.51 MILLION/UL (ref 3.88–5.62)
RBC #/AREA URNS AUTO: ABNORMAL /HPF
RBC MORPH BLD: PRESENT
RH BLD: POSITIVE
RH BLD: POSITIVE
RSV RNA RESP QL NAA+PROBE: NEGATIVE
SARS-COV-2 RNA RESP QL NAA+PROBE: NEGATIVE
SODIUM SERPL-SCNC: 138 MMOL/L (ref 135–147)
SP GR UR STRIP.AUTO: 1.02 (ref 1–1.03)
SPECIMEN EXPIRATION DATE: NORMAL
T WAVE AXIS: 71 DEGREES
T WAVE AXIS: 74 DEGREES
T WAVE AXIS: 87 DEGREES
TSH SERPL DL<=0.05 MIU/L-ACNC: 1.3 UIU/ML (ref 0.45–4.5)
UROBILINOGEN UR QL STRIP.AUTO: 0.2 E.U./DL
VENTRICULAR RATE: 130 BPM
VENTRICULAR RATE: 139 BPM
VENTRICULAR RATE: 140 BPM
WBC # BLD AUTO: 2.02 THOUSAND/UL (ref 4.31–10.16)
WBC #/AREA URNS AUTO: ABNORMAL /HPF

## 2023-10-03 PROCEDURE — 82948 REAGENT STRIP/BLOOD GLUCOSE: CPT

## 2023-10-03 PROCEDURE — 83735 ASSAY OF MAGNESIUM: CPT | Performed by: EMERGENCY MEDICINE

## 2023-10-03 PROCEDURE — 93971 EXTREMITY STUDY: CPT | Performed by: SURGERY

## 2023-10-03 PROCEDURE — 86901 BLOOD TYPING SEROLOGIC RH(D): CPT | Performed by: EMERGENCY MEDICINE

## 2023-10-03 PROCEDURE — 96365 THER/PROPH/DIAG IV INF INIT: CPT

## 2023-10-03 PROCEDURE — 84484 ASSAY OF TROPONIN QUANT: CPT | Performed by: PHYSICIAN ASSISTANT

## 2023-10-03 PROCEDURE — 93971 EXTREMITY STUDY: CPT

## 2023-10-03 PROCEDURE — 71275 CT ANGIOGRAPHY CHEST: CPT

## 2023-10-03 PROCEDURE — 83880 ASSAY OF NATRIURETIC PEPTIDE: CPT | Performed by: EMERGENCY MEDICINE

## 2023-10-03 PROCEDURE — 99285 EMERGENCY DEPT VISIT HI MDM: CPT

## 2023-10-03 PROCEDURE — 83690 ASSAY OF LIPASE: CPT | Performed by: PHYSICIAN ASSISTANT

## 2023-10-03 PROCEDURE — 86850 RBC ANTIBODY SCREEN: CPT | Performed by: EMERGENCY MEDICINE

## 2023-10-03 PROCEDURE — 84443 ASSAY THYROID STIM HORMONE: CPT | Performed by: EMERGENCY MEDICINE

## 2023-10-03 PROCEDURE — 96366 THER/PROPH/DIAG IV INF ADDON: CPT

## 2023-10-03 PROCEDURE — 96368 THER/DIAG CONCURRENT INF: CPT

## 2023-10-03 PROCEDURE — 80053 COMPREHEN METABOLIC PANEL: CPT | Performed by: EMERGENCY MEDICINE

## 2023-10-03 PROCEDURE — 93005 ELECTROCARDIOGRAM TRACING: CPT

## 2023-10-03 PROCEDURE — 36415 COLL VENOUS BLD VENIPUNCTURE: CPT | Performed by: EMERGENCY MEDICINE

## 2023-10-03 PROCEDURE — 93010 ELECTROCARDIOGRAM REPORT: CPT | Performed by: INTERNAL MEDICINE

## 2023-10-03 PROCEDURE — 84484 ASSAY OF TROPONIN QUANT: CPT | Performed by: EMERGENCY MEDICINE

## 2023-10-03 PROCEDURE — 85025 COMPLETE CBC W/AUTO DIFF WBC: CPT | Performed by: EMERGENCY MEDICINE

## 2023-10-03 PROCEDURE — 81001 URINALYSIS AUTO W/SCOPE: CPT | Performed by: EMERGENCY MEDICINE

## 2023-10-03 PROCEDURE — 86900 BLOOD TYPING SEROLOGIC ABO: CPT | Performed by: EMERGENCY MEDICINE

## 2023-10-03 PROCEDURE — 85379 FIBRIN DEGRADATION QUANT: CPT | Performed by: EMERGENCY MEDICINE

## 2023-10-03 PROCEDURE — 87040 BLOOD CULTURE FOR BACTERIA: CPT | Performed by: EMERGENCY MEDICINE

## 2023-10-03 PROCEDURE — 85610 PROTHROMBIN TIME: CPT | Performed by: EMERGENCY MEDICINE

## 2023-10-03 PROCEDURE — 85730 THROMBOPLASTIN TIME PARTIAL: CPT | Performed by: PHYSICIAN ASSISTANT

## 2023-10-03 PROCEDURE — 99291 CRITICAL CARE FIRST HOUR: CPT | Performed by: EMERGENCY MEDICINE

## 2023-10-03 PROCEDURE — 0241U HB NFCT DS VIR RESP RNA 4 TRGT: CPT | Performed by: EMERGENCY MEDICINE

## 2023-10-03 PROCEDURE — 96375 TX/PRO/DX INJ NEW DRUG ADDON: CPT

## 2023-10-03 PROCEDURE — 83605 ASSAY OF LACTIC ACID: CPT | Performed by: EMERGENCY MEDICINE

## 2023-10-03 PROCEDURE — 85730 THROMBOPLASTIN TIME PARTIAL: CPT | Performed by: EMERGENCY MEDICINE

## 2023-10-03 PROCEDURE — 74177 CT ABD & PELVIS W/CONTRAST: CPT

## 2023-10-03 PROCEDURE — 71045 X-RAY EXAM CHEST 1 VIEW: CPT

## 2023-10-03 PROCEDURE — 96376 TX/PRO/DX INJ SAME DRUG ADON: CPT

## 2023-10-03 RX ORDER — HEPARIN SODIUM 1000 [USP'U]/ML
5600 INJECTION, SOLUTION INTRAVENOUS; SUBCUTANEOUS ONCE
Status: COMPLETED | OUTPATIENT
Start: 2023-10-03 | End: 2023-10-03

## 2023-10-03 RX ORDER — PRAVASTATIN SODIUM 20 MG
20 TABLET ORAL
Status: DISCONTINUED | OUTPATIENT
Start: 2023-10-04 | End: 2023-10-16 | Stop reason: HOSPADM

## 2023-10-03 RX ORDER — TIMOLOL MALEATE 5 MG/ML
1 SOLUTION/ DROPS OPHTHALMIC DAILY
Status: DISCONTINUED | OUTPATIENT
Start: 2023-10-04 | End: 2023-10-16 | Stop reason: HOSPADM

## 2023-10-03 RX ORDER — HEPARIN SODIUM 1000 [USP'U]/ML
5600 INJECTION, SOLUTION INTRAVENOUS; SUBCUTANEOUS EVERY 6 HOURS PRN
Status: DISCONTINUED | OUTPATIENT
Start: 2023-10-03 | End: 2023-10-07

## 2023-10-03 RX ORDER — FOLIC ACID 1 MG/1
1 TABLET ORAL DAILY
Status: DISCONTINUED | OUTPATIENT
Start: 2023-10-04 | End: 2023-10-16 | Stop reason: HOSPADM

## 2023-10-03 RX ORDER — BRIMONIDINE TARTRATE 2 MG/ML
1 SOLUTION/ DROPS OPHTHALMIC 2 TIMES DAILY
Status: DISCONTINUED | OUTPATIENT
Start: 2023-10-03 | End: 2023-10-16 | Stop reason: HOSPADM

## 2023-10-03 RX ORDER — INSULIN LISPRO 100 [IU]/ML
1-6 INJECTION, SOLUTION INTRAVENOUS; SUBCUTANEOUS
Status: DISCONTINUED | OUTPATIENT
Start: 2023-10-04 | End: 2023-10-16 | Stop reason: HOSPADM

## 2023-10-03 RX ORDER — TRAVOPROST OPHTHALMIC SOLUTION 0.04 MG/ML
1 SOLUTION OPHTHALMIC
Status: DISCONTINUED | OUTPATIENT
Start: 2023-10-03 | End: 2023-10-16 | Stop reason: HOSPADM

## 2023-10-03 RX ORDER — INSULIN LISPRO 100 [IU]/ML
1-6 INJECTION, SOLUTION INTRAVENOUS; SUBCUTANEOUS
Status: DISCONTINUED | OUTPATIENT
Start: 2023-10-03 | End: 2023-10-16 | Stop reason: HOSPADM

## 2023-10-03 RX ORDER — FINASTERIDE 5 MG/1
5 TABLET, FILM COATED ORAL DAILY
Status: DISCONTINUED | OUTPATIENT
Start: 2023-10-04 | End: 2023-10-16 | Stop reason: HOSPADM

## 2023-10-03 RX ORDER — DILTIAZEM HYDROCHLORIDE 5 MG/ML
10 INJECTION INTRAVENOUS ONCE
Status: COMPLETED | OUTPATIENT
Start: 2023-10-03 | End: 2023-10-03

## 2023-10-03 RX ORDER — MAGNESIUM SULFATE HEPTAHYDRATE 40 MG/ML
2 INJECTION, SOLUTION INTRAVENOUS ONCE
Status: COMPLETED | OUTPATIENT
Start: 2023-10-03 | End: 2023-10-04

## 2023-10-03 RX ORDER — HEPARIN SODIUM 1000 [USP'U]/ML
2800 INJECTION, SOLUTION INTRAVENOUS; SUBCUTANEOUS EVERY 6 HOURS PRN
Status: DISCONTINUED | OUTPATIENT
Start: 2023-10-03 | End: 2023-10-07

## 2023-10-03 RX ORDER — METOPROLOL TARTRATE 5 MG/5ML
5 INJECTION INTRAVENOUS ONCE
Status: COMPLETED | OUTPATIENT
Start: 2023-10-03 | End: 2023-10-03

## 2023-10-03 RX ORDER — HEPARIN SODIUM 10000 [USP'U]/100ML
3-30 INJECTION, SOLUTION INTRAVENOUS
Status: DISCONTINUED | OUTPATIENT
Start: 2023-10-03 | End: 2023-10-07

## 2023-10-03 RX ORDER — METOPROLOL SUCCINATE 100 MG/1
100 TABLET, EXTENDED RELEASE ORAL DAILY
Status: DISCONTINUED | OUTPATIENT
Start: 2023-10-04 | End: 2023-10-04

## 2023-10-03 RX ORDER — DILTIAZEM HYDROCHLORIDE 5 MG/ML
15 INJECTION INTRAVENOUS ONCE
Status: COMPLETED | OUTPATIENT
Start: 2023-10-03 | End: 2023-10-03

## 2023-10-03 RX ADMIN — TRAVOPROST 1 DROP: 0.04 SOLUTION OPHTHALMIC at 22:42

## 2023-10-03 RX ADMIN — DILTIAZEM HYDROCHLORIDE 5 MG/HR: 5 INJECTION INTRAVENOUS at 18:28

## 2023-10-03 RX ADMIN — MAGNESIUM SULFATE HEPTAHYDRATE 2 G: 40 INJECTION, SOLUTION INTRAVENOUS at 20:37

## 2023-10-03 RX ADMIN — DILTIAZEM HYDROCHLORIDE 15 MG/HR: 5 INJECTION INTRAVENOUS at 20:57

## 2023-10-03 RX ADMIN — BRIMONIDINE TARTRATE 1 DROP: 2 SOLUTION/ DROPS OPHTHALMIC at 22:42

## 2023-10-03 RX ADMIN — HEPARIN SODIUM 5600 UNITS: 1000 INJECTION INTRAVENOUS; SUBCUTANEOUS at 17:00

## 2023-10-03 RX ADMIN — DILTIAZEM HYDROCHLORIDE 15 MG: 5 INJECTION INTRAVENOUS at 18:28

## 2023-10-03 RX ADMIN — DILTIAZEM HYDROCHLORIDE 10 MG: 5 INJECTION INTRAVENOUS at 17:02

## 2023-10-03 RX ADMIN — HEPARIN SODIUM 18 UNITS/KG/HR: 10000 INJECTION, SOLUTION INTRAVENOUS at 17:05

## 2023-10-03 RX ADMIN — IOHEXOL 100 ML: 350 INJECTION, SOLUTION INTRAVENOUS at 16:33

## 2023-10-03 RX ADMIN — METOROPROLOL TARTRATE 5 MG: 5 INJECTION, SOLUTION INTRAVENOUS at 20:52

## 2023-10-03 NOTE — ED PROVIDER NOTES
History  Chief Complaint   Patient presents with   • Shortness of Breath     Pt arrives ambulatory in no acute distress, lung ca pt, reports "hard time breathing"        History provided by:  Patient  Shortness of Breath  Severity:  Moderate  Onset quality:  Gradual  Duration:  2 weeks  Timing:  Constant  Progression:  Worsening  Chronicity:  New  Context comment:  Pt has h/o lung cancer, started a new chemo treatment, got an infusion 2 weeks ago thought he was fatigue from that, now devleopin leg swelling right greater than left  Relieved by:  Nothing  Worsened by:  Exertion  Ineffective treatments:  Rest  Associated symptoms: chest pain    Associated symptoms: no abdominal pain, no cough, no fever, no sputum production and no syncope    Risk factors: hx of cancer        Prior to Admission Medications   Prescriptions Last Dose Informant Patient Reported? Taking? Accu-Chek FastClix Lancets MISC  Self No No   Sig: Use daily E11.9  Check  fbs  daily   Osimertinib Mesylate 80 MG TABS  Self Yes Yes   Sig: Take 80 mg by mouth in the morning   amLODIPine (NORVASC) 10 mg tablet  Self No No   Sig: TAKE 1 TABLET BY MOUTH EVERY DAY FOR BLOOD PRESSURE   brimonidine tartrate 0.2 % ophthalmic solution  Self Yes No   Sig: Administer 1 drop into the left eye 2 (two) times a day   dexamethasone (DECADRON) 4 mg tablet   No No   Sig: Take 1 tablet (4 mg total) by mouth 2 (two) times a day with meals Take the day before treatment, the day of treatment and the day after treatment. diphenhydrAMINE (BENADRYL) 50 mg capsule  Self No No   Sig: Take 1 capsule 1 hour prior to contrast study   Patient not taking: Reported on 5/8/2023   finasteride (PROSCAR) 5 mg tablet  Self No No   Sig: TAKE 1 TABLET (5 MG TOTAL) BY MOUTH DAILY.    folic acid ( Folic Acid) 1 mg tablet   No No   Sig: Take 1 tablet (1 mg total) by mouth daily   glucose blood (Accu-Chek Jackie Plus) test strip  Self No No   Sig: Use as instructed   lisinopril (ZESTRIL) 10 mg tablet  Self No No   Sig: TAKE 1 TABLET BY MOUTH EVERY DAY   metFORMIN (GLUCOPHAGE-XR) 500 mg 24 hr tablet   No No   Sig: TAKE 1 TABLET BY MOUTH EVERY DAY   methylPREDNISolone (MEDROL) 32 MG tablet  Self No No   Sig: Take 1 tablet by mouth 12 hours prior to contrast study and then 2 hours prior to contrast study   Patient not taking: Reported on 2023   metoprolol succinate (TOPROL-XL) 100 mg 24 hr tablet   No No   Sig: TAKE 1 TABLET BY MOUTH EVERY DAY   ondansetron (ZOFRAN) 8 mg tablet   No No   Sig: Take 1 tablet (8 mg total) by mouth every 8 (eight) hours as needed for nausea or vomiting   simvastatin (ZOCOR) 10 mg tablet  Self No No   Sig: TAKE 1 TABLET BY MOUTH EVERY DAY   timolol (TIMOPTIC-XE) 0.5 % ophthalmic gel-forming  Self Yes No   Si drop daily   travoprost (TRAVATAN-Z) 0.004 % ophthalmic solution  Self Yes No   Si drop daily at bedtime   Patient not taking: Reported on 2023      Facility-Administered Medications: None       Past Medical History:   Diagnosis Date   • Diabetes mellitus ([address])    • Hyperlipidemia    • Hypertension    • Lung cancer ([address])    • Prostate cancer ([address]) 2005    S/P prostate ca-2005 had radiation tx. Past Surgical History:   Procedure Laterality Date   • COLONOSCOPY     • IR BIOPSY LYMPH NODE  9/15/2022   • IR THORACENTESIS  2022   • CT ARTHRODESIS ANKLE OPEN Right 7/10/2020    Procedure: ARTHRODESIS/ TIBIAL-TALAR ANKLE FUSION;  Surgeon: Yvrose Levine MD;  Location: [facility];  Service: Orthopedics   • CT LAPAROSCOPY SURG RPR INITIAL INGUINAL HERNIA Bilateral 2021    Procedure: LAPAROSCOPIC REPAIR HERNIA INGUINAL WITH MESH;  Surgeon: Matt Sam MD;  Location: [facility];  Service: General       Family History   Problem Relation Age of Onset   • Heart attack Mother      I have reviewed and agree with the history as documented.     E-Cigarette/Vaping   • E-Cigarette Use Never User      E-Cigarette/Vaping Substances     Social History Tobacco Use   • Smoking status: Never   • Smokeless tobacco: Never   Vaping Use   • Vaping Use: Never used   Substance Use Topics   • Alcohol use: Yes     Comment: Occasional   • Drug use: Never       Review of Systems   Constitutional: Negative for fever. Respiratory: Positive for shortness of breath. Negative for cough and sputum production. Cardiovascular: Positive for chest pain. Negative for syncope. Gastrointestinal: Negative for abdominal pain. All other systems reviewed and are negative. Physical Exam  Physical Exam  Vitals and nursing note reviewed. Constitutional:       General: He is not in acute distress. Appearance: He is well-developed. He is ill-appearing. He is not diaphoretic. HENT:      Head: Normocephalic and atraumatic. Nose: Nose normal.      Mouth/Throat:      Mouth: Mucous membranes are moist.   Eyes:      Extraocular Movements: Extraocular movements intact. Conjunctiva/sclera: Conjunctivae normal.      Pupils: Pupils are equal, round, and reactive to light. Cardiovascular:      Rate and Rhythm: Tachycardia present. Rhythm irregular. Heart sounds: Normal heart sounds. Pulmonary:      Effort: Tachypnea and accessory muscle usage present. No respiratory distress. Breath sounds: Examination of the left-middle field reveals decreased breath sounds. Examination of the left-lower field reveals decreased breath sounds. Decreased breath sounds present. Abdominal:      General: There is no distension. Palpations: Abdomen is soft. Tenderness: There is no abdominal tenderness. Musculoskeletal:         General: Normal range of motion. Cervical back: Normal range of motion and neck supple. Right lower leg: Edema (3+) present. Left lower leg: Edema (1+) present. Skin:     General: Skin is warm and dry. Neurological:      General: No focal deficit present.       Mental Status: He is alert and oriented to person, place, and time. Mental status is at baseline.    Psychiatric:         Mood and Affect: Mood normal.         Vital Signs  ED Triage Vitals   Temperature Pulse Respirations Blood Pressure SpO2   10/03/23 1149 10/03/23 1149 10/03/23 1149 10/03/23 1149 10/03/23 1149   (!) 97.1 °F (36.2 °C) (!) 131 18 110/72 97 %      Temp Source Heart Rate Source Patient Position - Orthostatic VS BP Location FiO2 (%)   10/03/23 2030 10/03/23 1630 10/03/23 1630 10/03/23 1630 --   Oral Monitor Lying Right arm       Pain Score       10/03/23 2030       No Pain           Vitals:    10/04/23 1000 10/04/23 1130 10/04/23 1322 10/04/23 1424   BP: 126/79 115/76 129/77 112/66   Pulse: (!) 108 (!) 127 (!) 134 (!) 138   Patient Position - Orthostatic VS:  Lying  Lying         Visual Acuity      ED Medications  Medications   heparin (porcine) 25,000 units in 0.45% NaCl 250 mL infusion (premix) (17 Units/kg/hr × 70 kg (Order-Specific) Intravenous New Bag 10/4/23 1435)   heparin (porcine) injection 5,600 Units (has no administration in time range)   heparin (porcine) injection 2,800 Units (2,800 Units Intravenous Given 10/4/23 0848)   brimonidine tartrate 0.2 % ophthalmic solution 1 drop (1 drop Left Eye Given 10/4/23 1714)   finasteride (PROSCAR) tablet 5 mg (5 mg Oral Given 90/3/88 3364)   folic acid (FOLVITE) tablet 1 mg (1 mg Oral Given 10/4/23 0804)   pravastatin (PRAVACHOL) tablet 20 mg (20 mg Oral Given 10/4/23 1658)   timolol (TIMOPTIC) 0.5 % ophthalmic solution 1 drop (1 drop Both Eyes Given 10/4/23 0804)   travoprost (TRAVATAN-Z) 0.004 % ophthalmic solution 1 drop (1 drop Both Eyes Given 10/3/23 2242)   insulin lispro (HumaLOG) 100 units/mL subcutaneous injection 1-6 Units ( Subcutaneous Not Given 10/4/23 1657)   insulin lispro (HumaLOG) 100 units/mL subcutaneous injection 1-6 Units ( Subcutaneous Not Given 10/3/23 2242)   influenza vaccine, high-dose (FLUZONE HIGH-DOSE) IM injection ALYSA 0.7 mL (has no administration in time range)   metoprolol tartrate (LOPRESSOR) tablet 50 mg (50 mg Oral Given 10/4/23 0804)   metoprolol (LOPRESSOR) injection 5 mg (5 mg Intravenous Given 10/4/23 1843)   heparin (porcine) injection 5,600 Units (5,600 Units Intravenous Given 10/3/23 1700)   iohexol (OMNIPAQUE) 350 MG/ML injection (SINGLE-DOSE) 100 mL (100 mL Intravenous Given 10/3/23 1633)   diltiazem (CARDIZEM) injection 10 mg (10 mg Intravenous Given 10/3/23 1702)   diltiazem (CARDIZEM) 125 mg in sodium chloride 0.9 % 125 mL infusion (0 mg/hr Intravenous Stopped 10/3/23 2057)   diltiazem (CARDIZEM) injection 15 mg (15 mg Intravenous Given 10/3/23 1828)   magnesium sulfate 2 g/50 mL IVPB (premix) 2 g (0 g Intravenous Stopped 10/4/23 0000)   metoprolol (LOPRESSOR) injection 5 mg (5 mg Intravenous Given 10/3/23 2052)   potassium chloride 20 mEq IVPB (premix) (20 mEq Intravenous New Bag 10/4/23 1109)   metoprolol tartrate (LOPRESSOR) tablet 50 mg (50 mg Oral Given 10/4/23 1424)   diltiazem (CARDIZEM) injection 15 mg (15 mg Intravenous Given 10/4/23 1657)       Diagnostic Studies  Results Reviewed     Procedure Component Value Units Date/Time    Blood culture #1 [[id number]] Collected: 10/03/23 1556    Lab Status: Preliminary result Specimen: Blood from Arm, Right Updated: 10/04/23 1801     Blood Culture No Growth at 24 hrs. Blood culture #2 [[id number]] Collected: 10/03/23 1558    Lab Status: Preliminary result Specimen: Blood from Hand, Left Updated: 10/04/23 1801     Blood Culture No Growth at 24 hrs.     HS Troponin I 4hr [[id number]]  (Normal) Collected: 10/03/23 2159    Lab Status: Final result Specimen: Blood from Arm, Right Updated: 10/03/23 2230     hs TnI 4hr 6 ng/L      Delta 4hr hsTnI 1 ng/L     Lipase [[id number]]  (Normal) Collected: 10/03/23 1556    Lab Status: Final result Specimen: Blood from Arm, Right Updated: 10/03/23 2035     Lipase 11 u/L     Urine Microscopic [[id number]]  (Abnormal) Collected: 10/03/23 1849    Lab Status: Final result Specimen: Urine, Clean Catch Updated: 10/03/23 1931     RBC, UA 1-2 /hpf      WBC, UA 1-2 /hpf      Epithelial Cells None Seen /hpf      Bacteria, UA Occasional /hpf      MUCUS THREADS Occasional    UA w Reflex to Microscopic w Reflex to Culture [[id number]]  (Abnormal) Collected: 10/03/23 1849    Lab Status: Final result Specimen: Urine, Clean Catch Updated: 10/03/23 1929     Color, UA Yellow     Clarity, UA Clear     Specific Gravity, UA 1.020     pH, UA 5.0     Leukocytes, UA Negative     Nitrite, UA Negative     Protein, UA 30 (1+) mg/dl      Glucose, UA Negative mg/dl      Ketones, UA Negative mg/dl      Urobilinogen, UA 0.2 E.U./dl      Bilirubin, UA Negative     Occult Blood, UA Negative    FLU/RSV/COVID - if FLU/RSV clinically relevant [[id number]]  (Normal) Collected: 10/03/23 1712    Lab Status: Final result Specimen: Nares from Nose Updated: 10/03/23 1757     SARS-CoV-2 Negative     INFLUENZA A PCR Negative     INFLUENZA B PCR Negative     RSV PCR Negative    Narrative:      FOR PEDIATRIC PATIENTS - copy/paste COVID Guidelines URL to browser: [email or web address]    SARS-CoV-2 assay is a Nucleic Acid Amplification assay intended for the  qualitative detection of nucleic acid from SARS-CoV-2 in nasopharyngeal  swabs. Results are for the presumptive identification of SARS-CoV-2 RNA. Positive results are indicative of infection with SARS-CoV-2, the virus  causing COVID-19, but do not rule out bacterial infection or co-infection  with other viruses. Laboratories within the [facility] and its  territories are required to report all positive results to the appropriate  public health authorities. Negative results do not preclude SARS-CoV-2  infection and should not be used as the sole basis for treatment or other  patient management decisions. Negative results must be combined with  clinical observations, patient history, and epidemiological information.   This test has not been FDA cleared or approved. This test has been authorized by FDA under an Emergency Use Authorization  (EUA). This test is only authorized for the duration of time the  declaration that circumstances exist justifying the authorization of the  emergency use of an in vitro diagnostic tests for detection of SARS-CoV-2  virus and/or diagnosis of COVID-19 infection under section 564(b)(1) of  the Act, 21 U. S.C. 197GDE-4(B)(8), unless the authorization is terminated  or revoked sooner. The test has been validated but independent review by FDA  and CLIA is pending. Test performed using "[employer] GeneXpert: This RT-PCR assay targets N2,  a region unique to SARS-CoV-2. A conserved region in the E-gene was chosen  for pan-Sarbecovirus detection which includes SARS-CoV-2. According to CMS-2020-01-R, this platform meets the definition of high-throughput technology. HS Troponin I 2hr [[id number]]  (Normal) Collected: 10/03/23 1712    Lab Status: Final result Specimen: Blood from Arm, Right Updated: 10/03/23 1743     hs TnI 2hr 5 ng/L      Delta 2hr hsTnI 0 ng/L     CBC and differential [[id number]]  (Abnormal) Collected: 10/03/23 1603    Lab Status: Final result Specimen: Blood from Arm, Left Updated: 10/03/23 1703     WBC 2.02 Thousand/uL      RBC 3.51 Million/uL      Hemoglobin 11.4 g/dL      Hematocrit 34.0 %      MCV 97 fL      MCH 32.5 pg      MCHC 33.5 g/dL      RDW 13.6 %      MPV 13.6 fL      Platelets 67 Thousands/uL      nRBC 0 /100 WBCs      Neutrophils Relative 48 %      Immat GRANS % 1 %      Lymphocytes Relative 26 %      Monocytes Relative 23 %      Eosinophils Relative 2 %      Basophils Relative 0 %      Neutrophils Absolute 0.99 Thousands/µL      Immature Grans Absolute 0.01 Thousand/uL      Lymphocytes Absolute 0.53 Thousands/µL      Monocytes Absolute 0.46 Thousand/µL      Eosinophils Absolute 0.03 Thousand/µL      Basophils Absolute 0.00 Thousands/µL     Narrative:       This is an appended report. These results have been appended to a previously verified report. Smear Review(Phlebs Do Not Order) [[id number]]  (Abnormal) Collected: 10/03/23 1603    Lab Status: Final result Specimen: Blood from Arm, Left Updated: 10/03/23 1703     RBC Morphology Present     Platelet Estimate Decreased     Large Platelet Present     Anisocytosis Present     Polychromasia Present    TSH, 3rd generation with Free T4 reflex [[id number]]  (Normal) Collected: 10/03/23 1556    Lab Status: Final result Specimen: Blood from Arm, Right Updated: 10/03/23 1650     TSH 3RD GENERATON 1.303 uIU/mL     D-Dimer [[id number]]  (Abnormal) Collected: 10/03/23 1556    Lab Status: Final result Specimen: Blood from Arm, Right Updated: 10/03/23 1647     D-Dimer, Quant >20.00 ug/ml FEU     Narrative: In the evaluation for possible pulmonary embolism, in the appropriate (Well's Score of 4 or less) patient, the age adjusted d-dimer cutoff for this patient can be calculated as:    Age x 0.01 (in ug/mL) for Age-adjusted D-dimer exclusion threshold for a patient over 50 years. B-Type Natriuretic Peptide(BNP) [[id number]]  (Abnormal) Collected: 10/03/23 1556    Lab Status: Final result Specimen: Blood from Arm, Right Updated: 10/03/23 1640      pg/mL     Protime-INR [[id number]]  (Abnormal) Collected: 10/03/23 1556    Lab Status: Final result Specimen: Blood from Arm, Right Updated: 10/03/23 1634     Protime 16.3 seconds      INR 1.24    APTT [[id number]]  (Normal) Collected: 10/03/23 1556    Lab Status: Final result Specimen: Blood from Arm, Right Updated: 10/03/23 1634     PTT 34 seconds     Lactic acid, plasma (w/reflex if result > 2.0) [[id number]]  (Normal) Collected: 10/03/23 1556    Lab Status: Final result Specimen: Blood from Arm, Right Updated: 10/03/23 1632     LACTIC ACID 1.9 mmol/L     Narrative:      Result may be elevated if tourniquet was used during collection.     Magnesium [[id number]]  (Normal) Collected: 10/03/23 1556 Lab Status: Final result Specimen: Blood from Arm, Right Updated: 10/03/23 1632     Magnesium 2.4 mg/dL     HS Troponin 0hr (reflex protocol) [[id number]]  (Normal) Collected: 10/03/23 1436    Lab Status: Final result Specimen: Blood from Arm, Left Updated: 10/03/23 1507     hs TnI 0hr 5 ng/L     Comprehensive metabolic panel [[id number]]  (Abnormal) Collected: 10/03/23 1436    Lab Status: Final result Specimen: Blood from Arm, Left Updated: 10/03/23 1459     Sodium 138 mmol/L      Potassium 4.0 mmol/L      Chloride 107 mmol/L      CO2 19 mmol/L      ANION GAP 12 mmol/L      BUN 48 mg/dL      Creatinine 1.99 mg/dL      Glucose 169 mg/dL      Calcium 8.8 mg/dL      AST 20 U/L      ALT 25 U/L      Alkaline Phosphatase 86 U/L      Total Protein 6.6 g/dL      Albumin 3.7 g/dL      Total Bilirubin 0.50 mg/dL      eGFR 31 ml/min/1.73sq m     Narrative:      [place] guidelines for Chronic Kidney Disease (CKD):   •  Stage 1 with normal or high GFR (GFR > 90 mL/min/1.73 square meters)  •  Stage 2 Mild CKD (GFR = 60-89 mL/min/1.73 square meters)  •  Stage 3A Moderate CKD (GFR = 45-59 mL/min/1.73 square meters)  •  Stage 3B Moderate CKD (GFR = 30-44 mL/min/1.73 square meters)  •  Stage 4 Severe CKD (GFR = 15-29 mL/min/1.73 square meters)  •  Stage 5 End Stage CKD (GFR <15 mL/min/1.73 square meters)  Note: GFR calculation is accurate only with a steady state creatinine                 PE Study with CT Abdomen and Pelvis with contrast   Final Result by Saul Palumbo MD (10/03 1839)      CTA CHEST:      1) Acute pulmonary emboli in the left distal pulmonary artery extending into the left lower lobar, and some of the segmental and subsegmental pulmonary arteries with additional segmental and subsegmental pulmonary emboli in the lingula. This was    discussed with Dr. Andria Hutchinson via HIPAA compliant secure electronic messaging on 10/3/2023 at 6:35 PM with confirmation of receipt. 2) Cardiomegaly.  Although the RV/LV ratio is normal, reflux of IV contrast into the dilated IVC and hepatic veins may suggest right heart failure. 3) Main pulmonary artery dilated up to 3.7 cm, suggestive of pulmonary arterial hypertension. 4) Moderate to large right pleural effusion with likely loculation at the apex, and small to moderate left pleural effusion, new since August 2023. No evidence of empyema. 5) Luminal narrowing in the region of the bifurcation of the bronchus intermedius, which is new since August 2023, possibly secondary to enlarging hilar lymphadenopathy or increasing postradiation fibrosis. 6) Airspace consolidations in the right middle and lower lobes, new since August 2023, likely at least in part representing atelectasis, likely secondary to the aforementioned partial bronchial occlusion. Some of the previously seen right middle and    lower lobe nodules/opacities are obscured. 7) Right upper lobe mass overall stable to mildly enlarged since August 2023. 8) Significant progression of metastatic disease throughout the left lung, with new enlarged nodules/masses, as detailed above. 9) Overall similar mediastinal lymphadenopathy compared to August 2023. Right hilum remains prominent, however discrete lymph nodes difficult to measure due to the phase of contrast.      CT ABDOMEN/PELVIS:      10) Mild fat stranding adjacent to head of the pancreas and the osbaldo hepatis/gallbladder. Recommend correlation with lipase level to exclude pancreatitis. No findings to suggest acute cholecystitis. No organized collections. 11) Mild circumferential wall thickening of the urinary bladder. Recommend correlation with urinalysis to exclude cystitis. 12) No other acute abdominal or pelvic pathology. 13) Additional findings as above.                Workstation performed: [id number]         VAS lower limb venous duplex study, unilateral/limited   Final Result by Akira Rome MD (10/03 1717)      XR chest 1 view portable   Final Result by Tracey Bell MD (10/03 2388)      Moderate right and small left pleural effusion with right base atelectasis, new since August 2023. Right base pneumonia not excluded in the appropriate clinical setting. Workstation performed: [id number]                    Procedures  ECG 12 Lead Documentation Only    Date/Time: 10/3/2023 11:54 AM    Performed by: Alisha Biggs MD  Authorized by: Alisha Biggs MD    Indications / Diagnosis:  Dyspnea  ECG reviewed by me, the ED Provider: yes    Patient location:  ED  Rate:     ECG rate:  139    ECG rate assessment: tachycardic    Rhythm:     Rhythm: atrial fibrillation    T waves:     T waves: non-specific    CriticalCare Time    Date/Time: 10/3/2023 6:12 PM    Performed by: Alisha Biggs MD  Authorized by: Alisha Biggs MD    Critical care provider statement:     Critical care time (minutes):  40    Critical care was necessary to treat or prevent imminent or life-threatening deterioration of the following conditions:  Circulatory failure and respiratory failure    Critical care was time spent personally by me on the following activities:  Examination of patient, ordering and review of radiographic studies, ordering and review of laboratory studies, ordering and performing treatments and interventions, interpretation of cardiac output measurements, discussions with consultants, development of treatment plan with patient or surrogate, obtaining history from patient or surrogate, re-evaluation of patient's condition and evaluation of patient's response to treatment  Comments:      Ordering patient's initial work-up for his dyspnea, getting chest x-ray and see pleural effusions history of prior PEs and getting DVT study ordering heparin with positive DVT while pending CAT scans. Patient PE positive.   Patient also with new onset A-fib and starting patient on heparin for both A-fib but then ordering boluses of Cardizem but then patient only having transient minimal response and needing to be started on Cardizem drip and then discussions with critical care and admission to critical care. ED Course  ED Course as of 10/04/23 1940   Tue Oct 03, 2023   1532 Verbal report from vascular that patient has acute occlusion to right leg c/w DVT   1841 Sent message Critical care about pt. SBIRT 20yo+    Flowsheet Row Most Recent Value   Initial Alcohol Screen: US AUDIT-C     1. How often do you have a drink containing alcohol? 0 Filed at: 10/03/2023 1148   2. How many drinks containing alcohol do you have on a typical day you are drinking? 0 Filed at: 10/03/2023 1148   3a. Male UNDER 65: How often do you have five or more drinks on one occasion? 0 Filed at: 10/03/2023 1148   3b. FEMALE Any Age, or MALE 65+: How often do you have 4 or more drinks on one occassion? 0 Filed at: 10/03/2023 1148   Audit-C Score 0 Filed at: 10/03/2023 1148   KEYSHA: How many times in the past year have you. .. Used an illegal drug or used a prescription medication for non-medical reasons? Never Filed at: 10/03/2023 1148                    Medical Decision Making  69-year-old male with history of lung cancer is coming in with complaint of new onset dyspnea with some new leg swelling he has not had fever or cough. Patient has had history of having blood clots in the past and his lung cancer was discovered when they were evaluating him for that. Was supposed to be on blood thinners but his insurance denied them and he could not afford them so has not been taking them. Patient also has a new rapid heart rate and appears on EKG to have rapid A-fib which patient has not had a diagnosis of. So given lung cancer and new rapid A-fib with dyspnea would be concerned about PE as potential trigger. Will get stat portable chest x-ray and review of that shows new pleural effusion.   Patient also has new lower extremity edema. Patient has been dyspneic for the past 2 weeks. Patient did not know or noticed that his heart rate was 130 or that he had A-fib so patient could have been in A-fib with RVR for the past multiple weeks. So could have some congestive heart failure contributing to the pleural effusion or could be related to the lung cancer. Will need to evaluate for DVT and PE as above also need to evaluate for potential pneumonia or congestive heart failure. We will his dyspnea could be numerous etiologies including lung cancer or other pulmonary like PE or could be cardiac. Will need cardiac markers and will need scans and could be infectious etiology. Patient will need new onset A-fib rate control will need anticoagulation we will do IV heparin because patient has had history of hemoptysis in the past.  Patient may also need to have his pleural effusion drained so we will do heparin so it can be turned off. Patient will need admission and potentially to ICU we will get CT scans of his chest and pelvis. Atrial fibrillation with rapid ventricular response ([address]): acute illness or injury     Details: New Onset  Dyspnea, unspecified type: acute illness or injury  Pleural effusion: acute illness or injury  Pulmonary emboli [facility]): acute illness or injury  Amount and/or Complexity of Data Reviewed  Labs: ordered. Radiology: ordered. ECG/medicine tests: ordered and independent interpretation performed. Risk  Prescription drug management. Decision regarding hospitalization.           Disposition  Final diagnoses:   Dyspnea, unspecified type   Atrial fibrillation with rapid ventricular response (HCC)   Pulmonary emboli (HCC)   Pleural effusion     Time reflects when diagnosis was documented in both MDM as applicable and the Disposition within this note     Time User Action Codes Description Comment    10/3/2023  5:34 PM Jesenia Collier, [facility] [R06.00] Dyspnea, unspecified type     10/3/2023 7:14 PM Sunny Santacruz Scriver Add [I48.91] Rapid atrial fibrillation ([address])     10/3/2023  7:14 PM Sunny Santacruz Scriver Remove [I48.91] Rapid atrial fibrillation ([address])     10/3/2023  7:14 PM Ardine Aver Add [I48.91] Atrial fibrillation with rapid ventricular response ([address])     10/3/2023  7:14 PM Ardine Aver Add [I26.99] Pulmonary emboli ([address])     10/3/2023  7:14 PM Ida, [address] [R06.00] Dyspnea, unspecified type     10/3/2023  7:14 PM Ardine Aver Modify [V82.39] Atrial fibrillation with rapid ventricular response ([address])     10/3/2023  7:14 PM Corbin Nichole Add [J90] Pleural effusion     10/4/2023  1:59 PM Hugh Salazar Add [I48.91] Atrial fibrillation with RVR ([address])     10/4/2023  1:59 PM Hugh Salazar Remove [I48.91] Atrial fibrillation with RVR [facility])       ED Disposition     ED Disposition   Admit    Condition   Stable    Date/Time   Tue Oct 3, 2023  5:34 PM    Comment              Follow-up Information    None         Current Discharge Medication List      CONTINUE these medications which have NOT CHANGED    Details   Osimertinib Mesylate 80 MG TABS Take 80 mg by mouth in the morning      Accu-Chek FastClix Lancets MISC Use daily E11.9  Check  fbs  daily  Qty: 100 each, Refills: 3    Associated Diagnoses: Controlled type 2 diabetes mellitus without complication, without long-term current use of insulin (HCC)      amLODIPine (NORVASC) 10 mg tablet TAKE 1 TABLET BY MOUTH EVERY DAY FOR BLOOD PRESSURE  Qty: 90 tablet, Refills: 1    Associated Diagnoses: Essential hypertension      brimonidine tartrate 0.2 % ophthalmic solution Administer 1 drop into the left eye 2 (two) times a day      dexamethasone (DECADRON) 4 mg tablet Take 1 tablet (4 mg total) by mouth 2 (two) times a day with meals Take the day before treatment, the day of treatment and the day after treatment.   Qty: 6 tablet, Refills: 6    Associated Diagnoses: Malignant neoplasm of upper lobe of right lung (HCC)      diphenhydrAMINE (BENADRYL) 50 mg capsule Take 1 capsule 1 hour prior to contrast study  Qty: 1 capsule, Refills: 0    Associated Diagnoses: Contrast media allergy      finasteride (PROSCAR) 5 mg tablet TAKE 1 TABLET (5 MG TOTAL) BY MOUTH DAILY.   Qty: 90 tablet, Refills: 1    Associated Diagnoses: Benign essential HTN; BPH associated with nocturia      folic acid (KP Folic Acid) 1 mg tablet Take 1 tablet (1 mg total) by mouth daily  Qty: 30 tablet, Refills: 6    Associated Diagnoses: Malignant neoplasm of upper lobe of right lung (HCC)      glucose blood (Accu-Chek Jackie Plus) test strip Use as instructed  Qty: 100 strip, Refills: 1    Associated Diagnoses: Controlled type 2 diabetes mellitus without complication, without long-term current use of insulin ([facility])      lisinopril (ZESTRIL) 10 mg tablet TAKE 1 TABLET BY MOUTH EVERY DAY  Qty: 90 tablet, Refills: 1    Associated Diagnoses: Essential hypertension      metFORMIN (GLUCOPHAGE-XR) 500 mg 24 hr tablet TAKE 1 TABLET BY MOUTH EVERY DAY  Qty: 90 tablet, Refills: 0    Associated Diagnoses: Type 2 diabetes mellitus without complication, without long-term current use of insulin ([facility])      methylPREDNISolone (MEDROL) 32 MG tablet Take 1 tablet by mouth 12 hours prior to contrast study and then 2 hours prior to contrast study  Qty: 2 tablet, Refills: 0    Associated Diagnoses: Contrast media allergy      metoprolol succinate (TOPROL-XL) 100 mg 24 hr tablet TAKE 1 TABLET BY MOUTH EVERY DAY  Qty: 90 tablet, Refills: 0    Associated Diagnoses: Essential hypertension      ondansetron (ZOFRAN) 8 mg tablet Take 1 tablet (8 mg total) by mouth every 8 (eight) hours as needed for nausea or vomiting  Qty: 20 tablet, Refills: 0    Associated Diagnoses: Malignant neoplasm of upper lobe of right lung (HCC)      simvastatin (ZOCOR) 10 mg tablet TAKE 1 TABLET BY MOUTH EVERY DAY  Qty: 90 tablet, Refills: 1    Associated Diagnoses: Mixed hyperlipidemia      timolol (TIMOPTIC-XE) 0.5 % ophthalmic gel-forming 1 drop daily      travoprost (TRAVATAN-Z) 0.004 % ophthalmic solution 1 drop daily at bedtime             No discharge procedures on file.     PDMP Review       Value Time User    PDMP Reviewed  Yes 2/27/2023  8:38 AM Arabella Peña, [place]          ED Provider  Electronically Signed by           Tiffany Phelps MD  10/04/23 1943

## 2023-10-04 ENCOUNTER — APPOINTMENT (INPATIENT)
Dept: NON INVASIVE DIAGNOSTICS | Facility: HOSPITAL | Age: 78
DRG: 175 | End: 2023-10-04
Payer: MEDICARE

## 2023-10-04 LAB
ANION GAP SERPL CALCULATED.3IONS-SCNC: 9 MMOL/L
AORTIC ROOT: 4.1 CM
APICAL FOUR CHAMBER EJECTION FRACTION: 35 %
APTT PPP: 48 SECONDS (ref 23–37)
APTT PPP: 57 SECONDS (ref 23–37)
APTT PPP: 68 SECONDS (ref 23–37)
ASCENDING AORTA: 4.2 CM
AV LVOT MEAN GRADIENT: 2 MMHG
AV LVOT PEAK GRADIENT: 3 MMHG
AV PEAK GRADIENT: 7 MMHG
AV REGURGITATION PRESSURE HALF TIME: 373 MS
BASOPHILS # BLD AUTO: 0 THOUSANDS/ÂΜL (ref 0–0.1)
BASOPHILS NFR BLD AUTO: 0 % (ref 0–1)
BUN SERPL-MCNC: 43 MG/DL (ref 5–25)
CA-I BLD-SCNC: 1.12 MMOL/L (ref 1.12–1.32)
CALCIUM SERPL-MCNC: 8.5 MG/DL (ref 8.4–10.2)
CHLORIDE SERPL-SCNC: 109 MMOL/L (ref 96–108)
CO2 SERPL-SCNC: 23 MMOL/L (ref 21–32)
CREAT SERPL-MCNC: 1.79 MG/DL (ref 0.6–1.3)
DOP CALC LVOT PEAK VEL VTI: 16.5 CM
DOP CALC LVOT PEAK VEL: 0.86 M/S
E WAVE DECELERATION TIME: 155 MS
EOSINOPHIL # BLD AUTO: 0.05 THOUSAND/ÂΜL (ref 0–0.61)
EOSINOPHIL NFR BLD AUTO: 3 % (ref 0–6)
ERYTHROCYTE [DISTWIDTH] IN BLOOD BY AUTOMATED COUNT: 13.9 % (ref 11.6–15.1)
FRACTIONAL SHORTENING: 11 (ref 28–44)
GFR SERPL CREATININE-BSD FRML MDRD: 35 ML/MIN/1.73SQ M
GLUCOSE SERPL-MCNC: 103 MG/DL (ref 65–140)
GLUCOSE SERPL-MCNC: 139 MG/DL (ref 65–140)
GLUCOSE SERPL-MCNC: 166 MG/DL (ref 65–140)
GLUCOSE SERPL-MCNC: 94 MG/DL (ref 65–140)
GLUCOSE SERPL-MCNC: 98 MG/DL (ref 65–140)
HCT VFR BLD AUTO: 29 % (ref 36.5–49.3)
HGB BLD-MCNC: 9.7 G/DL (ref 12–17)
IMM GRANULOCYTES # BLD AUTO: 0.01 THOUSAND/UL (ref 0–0.2)
IMM GRANULOCYTES NFR BLD AUTO: 1 % (ref 0–2)
INTERVENTRICULAR SEPTUM IN DIASTOLE (PARASTERNAL SHORT AXIS VIEW): 1.1 CM
INTERVENTRICULAR SEPTUM: 1.1 CM (ref 0.6–1.1)
LA/AORTA RATIO 2D: 1.1
LAAS-AP2: 30.7 CM2
LAAS-AP4: 33 CM2
LEFT ATRIUM SIZE: 4.5 CM
LEFT ATRIUM VOLUME (MOD BIPLANE): 120 ML
LEFT INTERNAL DIMENSION IN SYSTOLE: 4.1 CM (ref 2.1–4)
LEFT VENTRICULAR INTERNAL DIMENSION IN DIASTOLE: 4.6 CM (ref 3.5–6)
LEFT VENTRICULAR POSTERIOR WALL IN END DIASTOLE: 1.1 CM
LEFT VENTRICULAR STROKE VOLUME: 23 ML
LVSV (TEICH): 23 ML
LYMPHOCYTES # BLD AUTO: 0.48 THOUSANDS/ÂΜL (ref 0.6–4.47)
LYMPHOCYTES NFR BLD AUTO: 31 % (ref 14–44)
MAGNESIUM SERPL-MCNC: 2.7 MG/DL (ref 1.9–2.7)
MCH RBC QN AUTO: 32 PG (ref 26.8–34.3)
MCHC RBC AUTO-ENTMCNC: 33.4 G/DL (ref 31.4–37.4)
MCV RBC AUTO: 96 FL (ref 82–98)
MITRAL REGURGITATION PEAK VELOCITY: 4.46 M/S
MITRAL VALVE MEAN INFLOW VELOCITY: 3.63 M/S
MITRAL VALVE REGURGITANT PEAK GRADIENT: 80 MMHG
MONOCYTES # BLD AUTO: 0.4 THOUSAND/ÂΜL (ref 0.17–1.22)
MONOCYTES NFR BLD AUTO: 26 % (ref 4–12)
MV PEAK E VEL: 124 CM/S
MV STENOSIS PRESSURE HALF TIME: 45 MS
MV VALVE AREA P 1/2 METHOD: 4.89
NEUTROPHILS # BLD AUTO: 0.6 THOUSANDS/ÂΜL (ref 1.85–7.62)
NEUTS SEG NFR BLD AUTO: 39 % (ref 43–75)
NRBC BLD AUTO-RTO: 0 /100 WBCS
PHOSPHATE SERPL-MCNC: 3.8 MG/DL (ref 2.3–4.1)
PLATELET # BLD AUTO: 65 THOUSANDS/UL (ref 149–390)
PMV BLD AUTO: 12.5 FL (ref 8.9–12.7)
POTASSIUM SERPL-SCNC: 3.6 MMOL/L (ref 3.5–5.3)
RBC # BLD AUTO: 3.03 MILLION/UL (ref 3.88–5.62)
SL CV AV PEAK GRADIENT RETROGRADE: 71 MMHG
SL CV DOP CALC MV VTI RETROGRADE: 151 CM
SL CV LV EF: 35
SL CV MV MEAN GRADIENT RETROGRADE: 58 MMHG
SL CV PED ECHO LEFT VENTRICLE DIASTOLIC VOLUME (MOD BIPLANE) 2D: 97 ML
SL CV PED ECHO LEFT VENTRICLE SYSTOLIC VOLUME (MOD BIPLANE) 2D: 74 ML
SODIUM SERPL-SCNC: 141 MMOL/L (ref 135–147)
TR MAX PG: 32 MMHG
TR PEAK VELOCITY: 2.8 M/S
TRICUSPID ANNULAR PLANE SYSTOLIC EXCURSION: 2.5 CM
TRICUSPID VALVE PEAK REGURGITATION VELOCITY: 2.82 M/S
WBC # BLD AUTO: 1.54 THOUSAND/UL (ref 4.31–10.16)

## 2023-10-04 PROCEDURE — 82948 REAGENT STRIP/BLOOD GLUCOSE: CPT

## 2023-10-04 PROCEDURE — 93306 TTE W/DOPPLER COMPLETE: CPT

## 2023-10-04 PROCEDURE — 85730 THROMBOPLASTIN TIME PARTIAL: CPT | Performed by: NURSE PRACTITIONER

## 2023-10-04 PROCEDURE — 85025 COMPLETE CBC W/AUTO DIFF WBC: CPT | Performed by: NURSE PRACTITIONER

## 2023-10-04 PROCEDURE — 85730 THROMBOPLASTIN TIME PARTIAL: CPT | Performed by: PHYSICIAN ASSISTANT

## 2023-10-04 PROCEDURE — 84100 ASSAY OF PHOSPHORUS: CPT | Performed by: NURSE PRACTITIONER

## 2023-10-04 PROCEDURE — 82330 ASSAY OF CALCIUM: CPT | Performed by: NURSE PRACTITIONER

## 2023-10-04 PROCEDURE — 93306 TTE W/DOPPLER COMPLETE: CPT | Performed by: INTERNAL MEDICINE

## 2023-10-04 PROCEDURE — 80048 BASIC METABOLIC PNL TOTAL CA: CPT | Performed by: NURSE PRACTITIONER

## 2023-10-04 PROCEDURE — 83735 ASSAY OF MAGNESIUM: CPT | Performed by: NURSE PRACTITIONER

## 2023-10-04 PROCEDURE — 85730 THROMBOPLASTIN TIME PARTIAL: CPT | Performed by: INTERNAL MEDICINE

## 2023-10-04 RX ORDER — METOPROLOL TARTRATE 5 MG/5ML
5 INJECTION INTRAVENOUS EVERY 6 HOURS PRN
Status: DISCONTINUED | OUTPATIENT
Start: 2023-10-04 | End: 2023-10-04

## 2023-10-04 RX ORDER — DILTIAZEM HYDROCHLORIDE 5 MG/ML
15 INJECTION INTRAVENOUS ONCE
Status: COMPLETED | OUTPATIENT
Start: 2023-10-04 | End: 2023-10-04

## 2023-10-04 RX ORDER — POTASSIUM CHLORIDE 14.9 MG/ML
20 INJECTION INTRAVENOUS
Status: COMPLETED | OUTPATIENT
Start: 2023-10-04 | End: 2023-10-04

## 2023-10-04 RX ORDER — METOPROLOL TARTRATE 50 MG/1
50 TABLET, FILM COATED ORAL ONCE
Status: COMPLETED | OUTPATIENT
Start: 2023-10-04 | End: 2023-10-04

## 2023-10-04 RX ORDER — METOPROLOL TARTRATE 5 MG/5ML
5 INJECTION INTRAVENOUS EVERY 4 HOURS PRN
Status: DISCONTINUED | OUTPATIENT
Start: 2023-10-04 | End: 2023-10-07

## 2023-10-04 RX ORDER — METOPROLOL TARTRATE 50 MG/1
50 TABLET, FILM COATED ORAL EVERY 12 HOURS SCHEDULED
Status: DISCONTINUED | OUTPATIENT
Start: 2023-10-04 | End: 2023-10-05

## 2023-10-04 RX ADMIN — METOPROLOL TARTRATE 50 MG: 50 TABLET, FILM COATED ORAL at 14:24

## 2023-10-04 RX ADMIN — FOLIC ACID 1 MG: 1 TABLET ORAL at 08:04

## 2023-10-04 RX ADMIN — METOPROLOL TARTRATE 50 MG: 50 TABLET, FILM COATED ORAL at 21:44

## 2023-10-04 RX ADMIN — METOPROLOL TARTRATE 50 MG: 50 TABLET, FILM COATED ORAL at 08:04

## 2023-10-04 RX ADMIN — BRIMONIDINE TARTRATE 1 DROP: 2 SOLUTION/ DROPS OPHTHALMIC at 08:04

## 2023-10-04 RX ADMIN — HEPARIN SODIUM 17 UNITS/KG/HR: 10000 INJECTION, SOLUTION INTRAVENOUS at 14:35

## 2023-10-04 RX ADMIN — FINASTERIDE 5 MG: 5 TABLET, FILM COATED ORAL at 08:04

## 2023-10-04 RX ADMIN — HEPARIN SODIUM 2800 UNITS: 1000 INJECTION INTRAVENOUS; SUBCUTANEOUS at 22:28

## 2023-10-04 RX ADMIN — METOROPROLOL TARTRATE 5 MG: 5 INJECTION, SOLUTION INTRAVENOUS at 13:21

## 2023-10-04 RX ADMIN — BRIMONIDINE TARTRATE 1 DROP: 2 SOLUTION/ DROPS OPHTHALMIC at 17:14

## 2023-10-04 RX ADMIN — TIMOLOL MALEATE 1 DROP: 5 SOLUTION/ DROPS OPHTHALMIC at 08:04

## 2023-10-04 RX ADMIN — POTASSIUM CHLORIDE 20 MEQ: 14.9 INJECTION, SOLUTION INTRAVENOUS at 08:48

## 2023-10-04 RX ADMIN — METOROPROLOL TARTRATE 5 MG: 5 INJECTION, SOLUTION INTRAVENOUS at 08:47

## 2023-10-04 RX ADMIN — DILTIAZEM HYDROCHLORIDE 15 MG: 5 INJECTION INTRAVENOUS at 16:57

## 2023-10-04 RX ADMIN — HEPARIN SODIUM 2800 UNITS: 1000 INJECTION INTRAVENOUS; SUBCUTANEOUS at 08:48

## 2023-10-04 RX ADMIN — POTASSIUM CHLORIDE 20 MEQ: 14.9 INJECTION, SOLUTION INTRAVENOUS at 11:09

## 2023-10-04 RX ADMIN — TRAVOPROST 1 DROP: 0.04 SOLUTION OPHTHALMIC at 21:44

## 2023-10-04 RX ADMIN — INSULIN LISPRO 1 UNITS: 100 INJECTION, SOLUTION INTRAVENOUS; SUBCUTANEOUS at 21:44

## 2023-10-04 RX ADMIN — PRAVASTATIN SODIUM 20 MG: 20 TABLET ORAL at 16:58

## 2023-10-04 RX ADMIN — METOROPROLOL TARTRATE 5 MG: 5 INJECTION, SOLUTION INTRAVENOUS at 18:43

## 2023-10-04 RX ADMIN — METOROPROLOL TARTRATE 5 MG: 5 INJECTION, SOLUTION INTRAVENOUS at 23:53

## 2023-10-04 NOTE — PLAN OF CARE
Problem: PAIN - ADULT  Goal: Verbalizes/displays adequate comfort level or baseline comfort level  Description: Interventions:  - Encourage patient to monitor pain and request assistance  - Assess pain using appropriate pain scale  - Administer analgesics based on type and severity of pain and evaluate response  - Implement non-pharmacological measures as appropriate and evaluate response  - Consider cultural and social influences on pain and pain management  - Notify physician/advanced practitioner if interventions unsuccessful or patient reports new pain  Outcome: Progressing     Problem: INFECTION - ADULT  Goal: Absence or prevention of progression during hospitalization  Description: INTERVENTIONS:  - Assess and monitor for signs and symptoms of infection  - Monitor lab/diagnostic results  - Monitor all insertion sites, i.e. indwelling lines, tubes, and drains  - Monitor endotracheal if appropriate and nasal secretions for changes in amount and color  - [place] appropriate cooling/warming therapies per order  - Administer medications as ordered  - Instruct and encourage patient and family to use good hand hygiene technique  - Identify and instruct in appropriate isolation precautions for identified infection/condition  Outcome: Progressing  Goal: Absence of fever/infection during neutropenic period  Description: INTERVENTIONS:  - Monitor WBC    Outcome: Progressing     Problem: SAFETY ADULT  Goal: Patient will remain free of falls  Description: INTERVENTIONS:  - Educate patient/family on patient safety including physical limitations  - Instruct patient to call for assistance with activity   - Consult OT/PT to assist with strengthening/mobility   - Keep Call bell within reach  - Keep bed low and locked with side rails adjusted as appropriate  - Keep care items and personal belongings within reach  - Initiate and maintain comfort rounds  - Make Fall Risk Sign visible to staff  - Offer Toileting every 2 Hours, in advance of need  - Initiate/Maintain bed alarm  - Obtain necessary fall risk management equipment:   - Apply yellow socks and bracelet for high fall risk patients  - Consider moving patient to room near nurses station  Outcome: Progressing  Goal: Maintain or return to baseline ADL function  Description: INTERVENTIONS:  -  Assess patient's ability to carry out ADLs; assess patient's baseline for ADL function and identify physical deficits which impact ability to perform ADLs (bathing, care of mouth/teeth, toileting, grooming, dressing, etc.)  - Assess/evaluate cause of self-care deficits   - Assess range of motion  - Assess patient's mobility; develop plan if impaired  - Assess patient's need for assistive devices and provide as appropriate  - Encourage maximum independence but intervene and supervise when necessary  - Involve family in performance of ADLs  - Assess for home care needs following discharge   - Consider OT consult to assist with ADL evaluation and planning for discharge  - Provide patient education as appropriate  Outcome: Progressing  Goal: Maintains/Returns to pre admission functional level  Description: INTERVENTIONS:  - Perform BMAT or MOVE assessment daily.   - Set and communicate daily mobility goal to care team and patient/family/caregiver. - Collaborate with rehabilitation services on mobility goals if consulted  - Perform Range of Motion 3 times a day. - Reposition patient every 2 hours.   - Dangle patient 3 times a day  - Stand patient 3 times a day  - Ambulate patient 3 times a day  - Out of bed to chair 3 times a day   - Out of bed for meals 3 times a day  - Out of bed for toileting  - Record patient progress and toleration of activity level   Outcome: Progressing     Problem: DISCHARGE PLANNING  Goal: Discharge to home or other facility with appropriate resources  Description: INTERVENTIONS:  - Identify barriers to discharge w/patient and caregiver  - Arrange for needed discharge resources and transportation as appropriate  - Identify discharge learning needs (meds, wound care, etc.)  - Arrange for interpretive services to assist at discharge as needed  - Refer to Case Management Department for coordinating discharge planning if the patient needs post-hospital services based on physician/advanced practitioner order or complex needs related to functional status, cognitive ability, or social support system  Outcome: Progressing     Problem: Knowledge Deficit  Goal: Patient/family/caregiver demonstrates understanding of disease process, treatment plan, medications, and discharge instructions  Description: Complete learning assessment and assess knowledge base. Interventions:  - Provide teaching at level of understanding  - Provide teaching via preferred learning methods  Outcome: Progressing     Problem: Nutrition/Hydration-ADULT  Goal: Nutrient/Hydration intake appropriate for improving, restoring or maintaining nutritional needs  Description: Monitor and assess patient's nutrition/hydration status for malnutrition. Collaborate with interdisciplinary team and initiate plan and interventions as ordered. Monitor patient's weight and dietary intake as ordered or per policy. Utilize nutrition screening tool and intervene as necessary. Determine patient's food preferences and provide high-protein, high-caloric foods as appropriate.      INTERVENTIONS:  - Monitor oral intake, urinary output, labs, and treatment plans  - Assess nutrition and hydration status and recommend course of action  - Evaluate amount of meals eaten  - Assist patient with eating if necessary   - Allow adequate time for meals  - Recommend/ encourage appropriate diets, oral nutritional supplements, and vitamin/mineral supplements  - Order, calculate, and assess calorie counts as needed  - Recommend, monitor, and adjust tube feedings and TPN/PPN based on assessed needs  - Assess need for intravenous fluids  - Provide specific nutrition/hydration education as appropriate  - Include patient/family/caregiver in decisions related to nutrition  Outcome: Progressing

## 2023-10-04 NOTE — ASSESSMENT & PLAN NOTE
Lab Results   Component Value Date    HGBA1C 5.1 07/07/2023       No results for input(s): "POCGLU" in the last 72 hours.     Blood Sugar Average: Last 72 hrs:     · On metformin at home  · Will convert to SSI here

## 2023-10-04 NOTE — ASSESSMENT & PLAN NOTE
· He has a history of PE in 9/2022 and was on rivaroxaban at discharge but was stopped due to financial reasons and did not want to be on coumadin so he was on aspirin 81 mg BID  · Patient has been having worsening shortness of breath, specifically with exercise and right LE swelling for the last 2 weeks when he started his new chemo regimen  · Duplex obtained:  · "Evidence suggestive of Acute occlusive deep vein thrombosis noted in the Profunda, proximal-distal femoral, popliteal, gastrocnemius, posterior tibial, and peroneal veins"  · He had a CT PE study:  · "Acute pulmonary emboli in the left distal pulmonary artery extending into the left lower lobar, and some of the segmental and subsegmental pulmonary arteries with additional segmental and subsegmental pulmonary emboli in the lingula'  · "Cardiomegaly.  Although the RV/LV ratio is normal, reflux of IV contrast into the dilated IVC and hepatic veins may suggest right heart failure"  · "Main pulmonary artery dilated up to 3.7 cm, suggestive of pulmonary arterial hypertension"  · Above findings new as he had normal RV and no mention of pulmonary HTN on last ECHO  · Biomarkers show ; Troponin 5 -> 5  · PESI V  · Due to concern for new pulmonary hypertension, elevated BNP, high risk PESI score, Intermediate High Risk; PERT alert called  · Patient is a tPA candidate if he worsens so patient to be kept at [facility] for now    Plan:  · UFH infusion, VTE protocol  · Priority PERT ECHO  · Trend troponins  · Watch hemodynamics closely   · Will need AC lifelong going forward

## 2023-10-04 NOTE — ASSESSMENT & PLAN NOTE
· He has a history of PE in 9/2022 and was on rivaroxaban at discharge but was stopped due to financial reasons and did not want to be on coumadin so he was on aspirin 81 mg BID  · Patient has been having worsening shortness of breath, specifically with exercise and right LE swelling for the last 2 weeks when he started his new chemo regimen  · Duplex obtained:  · "Evidence suggestive of Acute occlusive deep vein thrombosis noted in the Profunda, proximal-distal femoral, popliteal, gastrocnemius, posterior tibial, and peroneal veins"  · He had a CT PE study:  · "Acute pulmonary emboli in the left distal pulmonary artery extending into the left lower lobar, and some of the segmental and subsegmental pulmonary arteries with additional segmental and subsegmental pulmonary emboli in the lingula'  · "Cardiomegaly.  Although the RV/LV ratio is normal, reflux of IV contrast into the dilated IVC and hepatic veins may suggest right heart failure"  · "Main pulmonary artery dilated up to 3.7 cm, suggestive of pulmonary arterial hypertension"  · Above findings new as he had normal RV and no mention of pulmonary HTN on last ECHO  · Biomarkers show ; Troponin 5 -> 5  · PESI V  · Due to concern for new pulmonary hypertension, elevated BNP, high risk PESI score, Intermediate High Risk; PERT alert called  · Patient is a tPA candidate if he worsens so patient to be kept at [facility] for now    Plan:  · UFH infusion, VTE protocol  · Priority PERT ECHO  · Watch hemodynamics closely   · Will need AC lifelong going forward

## 2023-10-04 NOTE — ASSESSMENT & PLAN NOTE
· CT Abdomen  · "Mild fat stranding adjacent to head of the pancreas and the osbaldo hepatis/gallbladder.  Recommend correlation with lipase level to exclude pancreatitis"  · No abdominal pain  · Lipase normal

## 2023-10-04 NOTE — ASSESSMENT & PLAN NOTE
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/15 s/p 5 cycles of XRT and had been on osimerinib   · He had rescan on 8/8 that unfortunately showed increased size of mass  · He was recently restarted on carboplastin + pemetrexed; last had cycle about 2 weeks ago  · Unfortunately on CT today:  · "Luminal narrowing in the region of the bifurcation of the bronchus intermedius, which is new since August 2023, possibly secondary to enlarging hilar lymphadenopathy or increasing postradiation fibrosis"  · "Airspace consolidations in the right middle and lower lobes, new since August 2023, likely at least in part representing atelectasis, likely secondary to the aforementioned partial bronchial occlusion."  · "Significant progression of metastatic disease throughout the left lung, with new enlarged nodules/masses"    Plan:  · Due to high association with pemetrexed and VTE as well as progressive disease on CT scan, may consider consulting [facility]

## 2023-10-04 NOTE — ASSESSMENT & PLAN NOTE
· Patient with history of malignant pleural effusions  · Per CT scan:  · "Moderate to large right pleural effusion with likely loculation at the apex, and small to moderate left pleural effusion, new since August 2023"  · Due to loculation would consider IR drainage tomorrow morning    Plan:  · IR consult  · NPO

## 2023-10-04 NOTE — ASSESSMENT & PLAN NOTE
· Presented with AFib RVR, new onset  · Slightly elevated BNP, trop negative thusfar  · Patient denies history though is on metoprolol  mg QD  · He has been on stable dose for many years  · Has not missed dose  · On CT has large effusion and PE  · Initiated on cardizem gtt in ED without any change in HR; tiraled IV lopressor overnight with success    Plan:  · Continue home does lopressor (converted to short acting form)  · Follow up ECHO  · [id number]; will need AC for this and PE

## 2023-10-04 NOTE — ASSESSMENT & PLAN NOTE
· Patient with history of malignant pleural effusions  · Per CT scan:  · "Moderate to large right pleural effusion with likely loculation at the apex, and small to moderate left pleural effusion, new since August 2023"  · Due to loculation would consider IR drainage tomorrow morning    Plan:  · IR consult  · NPO for procedure

## 2023-10-04 NOTE — ASSESSMENT & PLAN NOTE
Baseline Cr: 1.0-1.1  Admission Cr: 1.99  Etiology: Hypoperfusion from PE/Afib vs dehydration  Avoid nephrotoxic medications/hypotension.

## 2023-10-04 NOTE — ASSESSMENT & PLAN NOTE
· CT Abdomen  · "Mild fat stranding adjacent to head of the pancreas and the osbaldo hepatis/gallbladder.  Recommend correlation with lipase level to exclude pancreatitis"  · No abdominal pain  · Check a lipase for completeness

## 2023-10-04 NOTE — H&P
[address]  H&P  Name: Giulia Villarreal 69 y.o. male I MRN: [de-identified]  Unit/Bed#:  I Date of Admission: 10/3/2023   Date of Service: 10/3/2023 I Hospital Day: 0      Assessment/Plan   Atrial fibrillation with RVR ([address])  Assessment & Plan  · Presented with AFib RVR, new onset  · Slightly elevated BNP, trop negative thusfar  · Patient denies history though is on metoprolol  mg QD  · He has been on stable dose for many years  · Has not missed dose  · On CT has large effusion and PE    Plan:  · Initiated on cardizem gtt in ED without any change in HR  · Will trial IV lopressor; +/- converting to [facility] [facility] but must weigh risk of stroke as patient has been short of breath for several days  · Follow up ECHO  · [id number]; will need AC for this and PE    * Acute pulmonary embolism ([address])  Assessment & Plan  · He has a history of PE in 9/2022 and was on rivaroxaban at discharge but was stopped due to financial reasons and did not want to be on coumadin so he was on aspirin 81 mg BID  · Patient has been having worsening shortness of breath, specifically with exercise and right LE swelling for the last 2 weeks when he started his new chemo regimen  · Duplex obtained:  · "Evidence suggestive of Acute occlusive deep vein thrombosis noted in the Profunda, proximal-distal femoral, popliteal, gastrocnemius, posterior tibial, and peroneal veins"  · He had a CT PE study:  · "Acute pulmonary emboli in the left distal pulmonary artery extending into the left lower lobar, and some of the segmental and subsegmental pulmonary arteries with additional segmental and subsegmental pulmonary emboli in the lingula'  · "Cardiomegaly.  Although the RV/LV ratio is normal, reflux of IV contrast into the dilated IVC and hepatic veins may suggest right heart failure"  · "Main pulmonary artery dilated up to 3.7 cm, suggestive of pulmonary arterial hypertension"  · Above findings new as he had normal RV and no mention of pulmonary HTN on last ECHO  · Biomarkers show ; Troponin 5 -> 5  · PESI V  · Due to concern for new pulmonary hypertension, elevated BNP, high risk PESI score, Intermediate High Risk; PERT alert called  · Patient is a tPA candidate if he worsens so patient to be kept at [facility] for now    Plan:  · UFH infusion, VTE protocol  · Priority PERT ECHO  · Trend troponins  · Watch hemodynamics closely   · Will need AC lifelong going forward    Abnormal CT of the abdomen  Assessment & Plan  · CT Abdomen  · "Mild fat stranding adjacent to head of the pancreas and the osbaldo hepatis/gallbladder. Recommend correlation with lipase level to exclude pancreatitis"  · No abdominal pain  · Check a lipase for completeness    HILARY (acute kidney injury) ([address])  Assessment & Plan  Baseline Cr: 1.0-1.1  Admission Cr: 1.99  Etiology: Hypoperfusion from PE/Afib vs dehydration  Avoid nephrotoxic medications/hypotension.     Pleural effusion  Assessment & Plan  · Patient with history of malignant pleural effusions  · Per CT scan:  · "Moderate to large right pleural effusion with likely loculation at the apex, and small to moderate left pleural effusion, new since August 2023"  · Due to loculation would consider IR drainage tomorrow morning    Plan:  · IR consult  · NPO      Malignant neoplasm of upper lobe of right lung [facility])  Assessment & Plan  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/15 s/p 5 cycles of XRT and had been on osimerinib   · He had rescan on 8/8 that unfortunately showed increased size of mass  · He was recently restarted on carboplastin + pemetrexed; last had cycle about 2 weeks ago  · Unfortunately on CT today:  · "Luminal narrowing in the region of the bifurcation of the bronchus intermedius, which is new since August 2023, possibly secondary to enlarging hilar lymphadenopathy or increasing postradiation fibrosis"  · "Airspace consolidations in the right middle and lower lobes, new since August 2023, likely at least in part representing atelectasis, likely secondary to the aforementioned partial bronchial occlusion."  · "Significant progression of metastatic disease throughout the left lung, with new enlarged nodules/masses"    Plan:  · Due to high association with pemetrexed and VTE as well as progressive disease on CT scan, may consider consulting [facility]    Controlled type 2 diabetes mellitus without complication, without long-term current use of insulin ([address])  Assessment & Plan  Lab Results   Component Value Date    HGBA1C 5.1 07/07/2023       No results for input(s): "POCGLU" in the last 72 hours. Blood Sugar Average: Last 72 hrs:     · On metformin at home  · Will convert to SSI here         History of Present Illness     HPI: Andrea Potter is a 69 y.o. who presents with 2 weeks of progressive shortness of breath and right leg swelling. He has a PMH of lung adenoCa on chemo and s/p radiation, history of PE, DM II, Essential hypertension, prostate Ca s/p radiation in 2005. Patient had his last session  of chemotherapy about 2 weeks ago and had progressive shortness of breath with exertion and right leg swelling. Symptoms became more severe so he presented today and was found to be in new onset AFib with RVR. He had a + duplex for DVT in the right lower extremity and a CTA PE study + for PE in the "left distal pulmonary artery extending into the left lower lobar, and some of the segmental and subsegmental pulmonary arteries with additional segmental and subsegmental pulmonary emboli in the lingula." Of note patient with history PE but was not on [facility] due to financial reasons. RV/LV ratio normal but given high BNP, signs suggestive of right sided heart failure with cor pulmonale on CTA, PESI score of V PERT alert was called. Patient started on cardizem gtt, VTE protocol Hep gtt and referred for SD admission. History obtained from chart review and the patient.   Review of Systems   Constitutional: Positive for activity change. Respiratory: Positive for shortness of breath. Cardiovascular: Positive for palpitations and leg swelling. All other systems reviewed and are negative. Historical Information   Past Medical History:  No date: Diabetes mellitus ([address])  No date: Hyperlipidemia  No date: Hypertension  No date: Lung cancer [facility])  2005: Prostate cancer ([address])      Comment:  S/P prostate ca-2005 had radiation tx. Past Surgical History:  No date: COLONOSCOPY  9/15/2022: IR BIOPSY LYMPH NODE  9/13/2022: IR THORACENTESIS  7/10/2020: WA ARTHRODESIS ANKLE OPEN; Right      Comment:  Procedure: ARTHRODESIS/ TIBIAL-TALAR ANKLE FUSION;                 Surgeon: Olesya Allen MD;  Location: [facility];                 Service: Orthopedics  12/16/2021: WA LAPAROSCOPY SURG RPR INITIAL INGUINAL HERNIA; Bilateral      Comment:  Procedure: LAPAROSCOPIC REPAIR HERNIA INGUINAL WITH                MESH;  Surgeon: Millie Stone MD;  Location: [facility]                OR;  Service: General   Current Outpatient Medications   Medication Instructions   • Accu-Chek FastClix Lancets MISC Does not apply, Daily, E11.9  Check  fbs  daily   • amLODIPine (NORVASC) 10 mg tablet TAKE 1 TABLET BY MOUTH EVERY DAY FOR BLOOD PRESSURE   • brimonidine tartrate 0.2 % ophthalmic solution 1 drop, Left Eye, 2 times daily   • dexamethasone (DECADRON) 4 mg, Oral, 2 times daily with meals, Take the day before treatment, the day of treatment and the day after treatment.    • diphenhydrAMINE (BENADRYL) 50 mg capsule Take 1 capsule 1 hour prior to contrast study   • finasteride (PROSCAR) 5 mg, Oral, Daily   • folic acid (KP FOLIC ACID) 1 mg, Oral, Daily   • glucose blood (Accu-Chek Jackie Plus) test strip Use as instructed   • lisinopril (ZESTRIL) 10 mg tablet TAKE 1 TABLET BY MOUTH EVERY DAY   • metFORMIN (GLUCOPHAGE-XR) 500 mg 24 hr tablet TAKE 1 TABLET BY MOUTH EVERY DAY   • methylPREDNISolone (MEDROL) 32 MG tablet Take 1 tablet by mouth 12 hours prior to contrast study and then 2 hours prior to contrast study   • metoprolol succinate (TOPROL-XL) 100 mg 24 hr tablet TAKE 1 TABLET BY MOUTH EVERY DAY   • ondansetron (ZOFRAN) 8 mg, Oral, Every 8 hours PRN   • simvastatin (ZOCOR) 10 mg tablet TAKE 1 TABLET BY MOUTH EVERY DAY   • timolol (TIMOPTIC-XE) 0.5 % ophthalmic gel-forming 1 drop, Daily   • travoprost (TRAVATAN-Z) 0.004 % ophthalmic solution 1 drop, Daily at bedtime    No Known Allergies   Social History     Tobacco Use   • Smoking status: Never   • Smokeless tobacco: Never   Vaping Use   • Vaping Use: Never used   Substance Use Topics   • Alcohol use: Yes     Comment: Occasional   • Drug use: Never    Family History   Problem Relation Age of Onset   • Heart attack Mother           Objective                            Vitals I/O      Most Recent Min/Max in 24hrs   Temp 98.1 °F (36.7 °C) Temp  Min: 97.1 °F (36.2 °C)  Max: 98.1 °F (36.7 °C)   Pulse (!) 130 Pulse  Min: 122  Max: 141   Resp (!) 25 Resp  Min: 17  Max: 25   /83 BP  Min: 110/72  Max: 145/80   O2 Sat 93 % SpO2  Min: 93 %  Max: 97 %    No intake or output data in the 24 hours ending 10/03/23 2111      Diet NPO  Diet Agusto/CHO Controlled; Consistent Carbohydrate Diet Level 2 (5 carb servings/75 grams CHO/meal); Cardiac     Invasive Monitoring Physical exam    Physical Exam  Eyes:      General: Vision grossly intact. Conjunctiva/sclera: Conjunctivae normal.   Skin:     General: Skin is warm and dry. Capillary Refill: Capillary refill takes less than 2 seconds. HENT:      Head: Normocephalic and atraumatic. Mouth/Throat:      Mouth: Mucous membranes are moist.   Neck:     Vascular: No JVD. Cardiovascular:      Rate and Rhythm: Tachycardia present. Rhythm irregular. Pulses: Normal pulses. Heart sounds: No murmur heard. Musculoskeletal:         General: Swelling (right > left) present. Normal range of motion. Right lower leg: 3+ Edema present.       Left lower leg: Trace Edema present. Abdominal:      General: Bowel sounds are normal. There is no distension. Palpations: Abdomen is soft. Tenderness: There is no abdominal tenderness. Constitutional:       Appearance: He is well-developed and well-nourished. Pulmonary:      Effort: Tachypnea present. No accessory muscle usage or accessory muscle usage. Breath sounds: No wheezing or rhonchi. Psychiatric:         Mood and Affect:  mood and affect abnormal.  Neurological:      General: No focal deficit present. Mental Status: He is alert and oriented to person, place and time. Mental status is at baseline. Motor: Strength full and intact in all extremities. Genitourinary/Anorectal:     Comments: Deferred  Vitals and nursing note reviewed.             Diagnostic Studies      EKG:   Imaging:  I have personally reviewed pertinent films in PACS     Medications:  Scheduled PRN   brimonidine tartrate, 1 drop, BID  [START ON 10/4/2023] finasteride, 5 mg, Daily  [START ON 26/6/2164] folic acid, 1 mg, Daily  [START ON 10/4/2023] insulin lispro, 1-6 Units, TID AC  insulin lispro, 1-6 Units, HS  magnesium sulfate, 2 g, Once  [START ON 10/4/2023] metoprolol succinate, 100 mg, Daily  [START ON 10/4/2023] pravastatin, 20 mg, Daily With Dinner  [START ON 10/4/2023] timolol, 1 drop, Daily  travoprost, 1 drop, HS      heparin (porcine), 2,800 Units, Q6H PRN  heparin (porcine), 5,600 Units, Q6H PRN       Continuous    diltiazem, 1-15 mg/hr, Last Rate: 15 mg/hr (10/03/23 2057)  heparin (porcine), 3-30 Units/kg/hr (Order-Specific), Last Rate: 18 Units/kg/hr (10/03/23 1705)         Labs:    CBC    Recent Labs     10/03/23  1603   WBC 2.02*   HGB 11.4*   HCT 34.0*   PLT 67*     BMP    Recent Labs     10/03/23  1436   SODIUM 138   K 4.0      CO2 19*   AGAP 12   BUN 48*   CREATININE 1.99*   CALCIUM 8.8       Coags    Recent Labs     10/03/23  1556   INR 1.24*   PTT 34        Additional Electrolytes  Recent Labs 10/03/23  1556   MG 2.4          Blood Gas    No recent results  No recent results LFTs  Recent Labs     10/03/23  1436   ALT 25   AST 20   ALKPHOS 86   ALB 3.7   TBILI 0.50       Infectious  No recent results  Glucose  Recent Labs     10/03/23  1436   GLUC 169*             Critical Care Time Delivered: Upon my evaluation, this patient had a high probability of imminent or life-threatening deterioration due to Intermediate high risk PE with new onset AFib RVR with signs of end organ hypoperfusion with HILARY and possible acute cor pulmonale , which required my direct attention, intervention, and personal management. I have personally provided 65 minutes of critical care time, exclusive of procedures, teaching, family meetings, and any prior time recorded by providers other than myself.    Anticipated Length of Stay is > 2 midnights  Jennie Ghosh

## 2023-10-04 NOTE — ASSESSMENT & PLAN NOTE
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/15 s/p 5 cycles of XRT and had been on osimerinib   · He had rescan on 8/8 that unfortunately showed increased size of mass  · He was recently restarted on carboplastin + pemetrexed; last had cycle about 2 weeks ago  · Unfortunately on CT today:  · "Luminal narrowing in the region of the bifurcation of the bronchus intermedius, which is new since August 2023, possibly secondary to enlarging hilar lymphadenopathy or increasing postradiation fibrosis"  · "Airspace consolidations in the right middle and lower lobes, new since August 2023, likely at least in part representing atelectasis, likely secondary to the aforementioned partial bronchial occlusion."  · "Significant progression of metastatic disease throughout the left lung, with new enlarged nodules/masses"    Plan:  · Due to high association with pemetrexed and VTE as well as progressive disease on CT scan, may consider consulting [facility]

## 2023-10-04 NOTE — CASE MANAGEMENT
Case Management Assessment & Discharge Planning Note    Patient name Felix Chadwick  Location / MRN [id number]  : 1945 Date 10/4/2023       Current Admission Date: 10/3/2023  Current Admission Diagnosis:Acute pulmonary embolism 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)    • Acute pulmonary embolism ([address]) 09/10/2022   • Pulmonary mass 09/10/2022   • Anemia 09/10/2022   • Non-recurrent bilateral inguinal hernia without obstruction or gangrene 2021   • Status post right tibial-talar ankle fusion 2020   • BPH associated with nocturia 2019   • Arthritis of right acromioclavicular joint 03/15/2019   • Primary osteoarthritis of right shoulder 2019   • Chronic left shoulder pain 2019   • Left rotator cuff tear arthropathy 2019   • Rotator cuff injury, right, initial encounter 2019   • History of colon polyps 2019   • Hyperparathyroidism ([address]) 2018   • Hypokalemia 10/22/2018   • Hypocalcemia 10/22/2018   • Glaucoma 10/11/2018   • Controlled type 2 diabetes mellitus without complication, without long-term current use of insulin ([address]) 2016   • Inguinal hernia 2016   • Benign essential HTN 2016   • Hypercholesterolemia 2016      LOS (days): 1  Geometric Mean LOS (GMLOS) (days): 3.50  Days to GMLOS:2.6     OBJECTIVE:    Risk of Unplanned Readmission Score: 26.54         Current admission status: Inpatient       Preferred Pharmacy:   [address] [place] [address], [place] - [address] [place] [address] [address]  Phone: [phone] Fax: 12 [address], [address] - [address] KRISTIAN Higu[facility] [address] [facility] [address]  Phone: [phone] Fax: [phone]    [facility] [place], [place] [address]  Phone: [phone] Fax: [phone]    Primary Care Provider: Carlitos Garduno PA-C    Primary Insurance: MEDICARE  Secondary [facility]: [facility]    ASSESSMENT:  Active Health Care Proxies    There are no active Health Care Proxies on file. Advance Directives  Does patient have a [address]?: Yes  Does patient have Advance Directives?: Yes  Primary Contact: Roldan Jorge (Spouse)   [phone] (Mobile)         Readmission Root Cause  30 Day Readmission: No    Patient Information  Admitted from[de-identified] Home  Mental Status: Alert  During Assessment patient was accompanied by: Not accompanied during assessment  Assessment information provided by[de-identified] Patient  Primary Caregiver: Self  Support Systems: Spouse/significant other  [place] of Residence: [facility] [address] [place] [address] do you live in?: [facility], [address] [place] [address] entry access options. Select all that apply.: Stairs  Number of steps to enter home. : 1  Do the steps have railings?: No  Type of Current Residence: [address] home  Upon entering residence, is there a bedroom on the main floor (no further steps)?: No  A bedroom is located on the following floor levels of residence (select all that apply):: 2nd Floor  Upon entering residence, is there a bathroom on the main floor (no further steps)?: No  Indicate which floors of current residence have a bathroom (select all the apply):: 2nd Floor  Number of steps to 2nd floor from main floor:  (12 stairs to 2nd floor)  In the last 12 months, was there a time when you were not able to pay the mortgage or rent on time?: No  In the last 12 months, how many places have you lived?: 1  Homeless/housing insecurity resource given?: N/A  Living Arrangements: Lives w/ Spouse/significant other  Is patient a ?: Yes  Is patient active with  [facility])?: No    Activities of Daily Living Prior to Admission  Functional Status: Independent  Completes ADLs independently?: Yes  Ambulates independently?: Yes  Does patient use assisted devices?: No  Does patient currently own DME?: Yes  What DME does the patient currently own?: Joann Hull, Bedside Com[employer], Other (Comment) (glucometer)  Does patient have a history of Outpatient Therapy (PT/OT)?: Yes  Does the patient have a history of Short-Term Rehab?: No  Does patient have a history of HHC?: No  Does patient currently have [facility]?: No         Patient Information Continued  Does patient have prescription coverage?: Yes  Within the past 12 months, you worried that your food would run out before you got the money to buy more.: Never true  Within the past 12 months, the food you bought just didn't last and you didn't have money to get more.: Never true  Food insecurity resource given?: N/A  Does patient receive dialysis treatments?: No  Does patient have a history of substance abuse?: No  Does patient have a history of Mental Health Diagnosis?: No         Means of Transportation  Means of Transport to Appts[de-identified] Drives Self (spouse is active )  In the past 12 months, has lack of transportation kept you from medical appointments or from getting medications?: No  In the past 12 months, has lack of transportation kept you from meetings, work, or from getting things needed for daily living?: No  Was application for public transport provided?: N/A        DISCHARGE DETAILS:    Discharge planning discussed with[de-identified] pt at bedside  [place] of Choice: Yes  Comments - Freedom of Choice: Met w pt, introduced self and explained role of CM, including arranging DME, STR, home health, out pt tx.   Advised pt that CM will make appropriate referrals based on treatment team recommendations and MD orders, and he can consider recommended plan of care and choose from available vendors. CM contacted family/caregiver?: No- see comments (pt alert and oriented adult)  Were Treatment Team discharge recommendations reviewed with patient/caregiver?:  (none at present)          Contacts  Patient Contacts: Faustina Barry (Spouse)   [phone] (Mobile)  Relationship to Patient[de-identified] Family         DME Referral Provided  Referral made for DME?: No    Other Referral/Resources/Interventions Provided:  Referral Comments: Pt admitted d/t acute pulm embolism, A fib w RVR. Xarelto planned for DVT prophylaxis;  told it has already been priced and is acceptable to pt. AM-PAC of 24;  no post d/c needs identified by tx team at this time. CM will continue to follow.          Treatment Team Recommendation: Other (none at present)  Discharge Destination Plan[de-identified] Home  Transport at Discharge : Family

## 2023-10-04 NOTE — PLAN OF CARE
Problem: PAIN - ADULT  Goal: Verbalizes/displays adequate comfort level or baseline comfort level  Description: Interventions:  - Encourage patient to monitor pain and request assistance  - Assess pain using appropriate pain scale  - Administer analgesics based on type and severity of pain and evaluate response  - Implement non-pharmacological measures as appropriate and evaluate response  - Consider cultural and social influences on pain and pain management  - Notify physician/advanced practitioner if interventions unsuccessful or patient reports new pain  Outcome: Progressing     Problem: INFECTION - ADULT  Goal: Absence or prevention of progression during hospitalization  Description: INTERVENTIONS:  - Assess and monitor for signs and symptoms of infection  - Monitor lab/diagnostic results  - Monitor all insertion sites, i.e. indwelling lines, tubes, and drains  - Monitor endotracheal if appropriate and nasal secretions for changes in amount and color  - Bighorn appropriate cooling/warming therapies per order  - Administer medications as ordered  - Instruct and encourage patient and family to use good hand hygiene technique  - Identify and instruct in appropriate isolation precautions for identified infection/condition  Outcome: Progressing  Goal: Absence of fever/infection during neutropenic period  Description: INTERVENTIONS:  - Monitor WBC    Outcome: Progressing     Problem: SAFETY ADULT  Goal: Patient will remain free of falls  Description: INTERVENTIONS:  - Educate patient/family on patient safety including physical limitations  - Instruct patient to call for assistance with activity   - Consult OT/PT to assist with strengthening/mobility   - Keep Call bell within reach  - Keep bed low and locked with side rails adjusted as appropriate  - Keep care items and personal belongings within reach  - Initiate and maintain comfort rounds  - Make Fall Risk Sign visible to staff  - Offer Toileting every 2 Hours, in advance of need  - Initiate/Maintain bed alarm  - Obtain necessary fall risk management equipment: yes   - Apply yellow socks and bracelet for high fall risk patients  - Consider moving patient to room near nurses station  Outcome: Progressing     Problem: DISCHARGE PLANNING  Goal: Discharge to home or other facility with appropriate resources  Description: INTERVENTIONS:  - Identify barriers to discharge w/patient and caregiver  - Arrange for needed discharge resources and transportation as appropriate  - Identify discharge learning needs (meds, wound care, etc.)  - Arrange for interpretive services to assist at discharge as needed  - Refer to Case Management Department for coordinating discharge planning if the patient needs post-hospital services based on physician/advanced practitioner order or complex needs related to functional status, cognitive ability, or social support system  Outcome: Progressing     Problem: Knowledge Deficit  Goal: Patient/family/caregiver demonstrates understanding of disease process, treatment plan, medications, and discharge instructions  Description: Complete learning assessment and assess knowledge base. Interventions:  - Provide teaching at level of understanding  - Provide teaching via preferred learning methods  Outcome: Progressing     Problem: Nutrition/Hydration-ADULT  Goal: Nutrient/Hydration intake appropriate for improving, restoring or maintaining nutritional needs  Description: Monitor and assess patient's nutrition/hydration status for malnutrition. Collaborate with interdisciplinary team and initiate plan and interventions as ordered. Monitor patient's weight and dietary intake as ordered or per policy. Utilize nutrition screening tool and intervene as necessary. Determine patient's food preferences and provide high-protein, high-caloric foods as appropriate.      INTERVENTIONS:  - Monitor oral intake, urinary output, labs, and treatment plans  - Assess nutrition and hydration status and recommend course of action  - Evaluate amount of meals eaten  - Assist patient with eating if necessary   - Allow adequate time for meals  - Recommend/ encourage appropriate diets, oral nutritional supplements, and vitamin/mineral supplements  - Order, calculate, and assess calorie counts as needed  - Recommend, monitor, and adjust tube feedings and TPN/PPN based on assessed needs  - Assess need for intravenous fluids  - Provide specific nutrition/hydration education as appropriate  - Include patient/family/caregiver in decisions related to nutrition  Outcome: Progressing

## 2023-10-04 NOTE — ASSESSMENT & PLAN NOTE
· Presented with AFib RVR, new onset  · Slightly elevated BNP, trop negative thusfar  · Patient denies history though is on metoprolol  mg QD  · He has been on stable dose for many years  · Has not missed dose  · On CT has large effusion and PE    Plan:  · Initiated on cardizem gtt in ED without any change in HR  · Will trial IV lopressor; +/- [facility] but must weigh risk of stroke as patient has been short of breath for several days  · Follow up ECHO  · [id number]; will need AC for this and PE

## 2023-10-04 NOTE — ASSESSMENT & PLAN NOTE
Lab Results   Component Value Date    HGBA1C 5.1 07/07/2023       Recent Labs     10/03/23  2156   POCGLU 130       Blood Sugar Average: Last 72 hrs:  (P) 130   · On metformin at home  · Will convert to SSI here

## 2023-10-05 ENCOUNTER — APPOINTMENT (OUTPATIENT)
Dept: NON INVASIVE DIAGNOSTICS | Facility: HOSPITAL | Age: 78
DRG: 175 | End: 2023-10-05
Attending: STUDENT IN AN ORGANIZED HEALTH CARE EDUCATION/TRAINING PROGRAM
Payer: MEDICARE

## 2023-10-05 PROBLEM — J44.1 COPD WITH ACUTE EXACERBATION (HCC): Status: ACTIVE | Noted: 2023-10-05

## 2023-10-05 LAB
ANION GAP SERPL CALCULATED.3IONS-SCNC: 7 MMOL/L
APPEARANCE FLD: CLEAR
APTT PPP: 50 SECONDS (ref 23–37)
APTT PPP: 62 SECONDS (ref 23–37)
APTT PPP: 73 SECONDS (ref 23–37)
BASOPHILS # BLD AUTO: 0 THOUSANDS/ÂΜL (ref 0–0.1)
BASOPHILS NFR BLD AUTO: 0 % (ref 0–1)
BUN SERPL-MCNC: 50 MG/DL (ref 5–25)
CA-I BLD-SCNC: 1.13 MMOL/L (ref 1.12–1.32)
CALCIUM SERPL-MCNC: 8.4 MG/DL (ref 8.4–10.2)
CHLORIDE SERPL-SCNC: 109 MMOL/L (ref 96–108)
CO2 SERPL-SCNC: 22 MMOL/L (ref 21–32)
COLOR FLD: YELLOW
CREAT SERPL-MCNC: 1.88 MG/DL (ref 0.6–1.3)
EOSINOPHIL # BLD AUTO: 0.06 THOUSAND/ÂΜL (ref 0–0.61)
EOSINOPHIL NFR BLD AUTO: 3 % (ref 0–6)
EOSINOPHIL NFR FLD MANUAL: 1 %
ERYTHROCYTE [DISTWIDTH] IN BLOOD BY AUTOMATED COUNT: 14.2 % (ref 11.6–15.1)
GFR SERPL CREATININE-BSD FRML MDRD: 33 ML/MIN/1.73SQ M
GLUCOSE FLD-MCNC: 158 MG/DL
GLUCOSE SERPL-MCNC: 129 MG/DL (ref 65–140)
GLUCOSE SERPL-MCNC: 142 MG/DL (ref 65–140)
GLUCOSE SERPL-MCNC: 198 MG/DL (ref 65–140)
GLUCOSE SERPL-MCNC: 216 MG/DL (ref 65–140)
GLUCOSE SERPL-MCNC: 255 MG/DL (ref 65–140)
HCT VFR BLD AUTO: 29.6 % (ref 36.5–49.3)
HGB BLD-MCNC: 9.7 G/DL (ref 12–17)
HISTIOCYTES NFR FLD: 17 %
IMM GRANULOCYTES # BLD AUTO: 0.02 THOUSAND/UL (ref 0–0.2)
IMM GRANULOCYTES NFR BLD AUTO: 1 % (ref 0–2)
LDH FLD L TO P-CCNC: 109 U/L
LYMPHOCYTES # BLD AUTO: 0.5 THOUSANDS/ÂΜL (ref 0.6–4.47)
LYMPHOCYTES NFR BLD AUTO: 24 % (ref 14–44)
LYMPHOCYTES NFR BLD AUTO: 35 %
MAGNESIUM SERPL-MCNC: 2.7 MG/DL (ref 1.9–2.7)
MCH RBC QN AUTO: 31.9 PG (ref 26.8–34.3)
MCHC RBC AUTO-ENTMCNC: 32.8 G/DL (ref 31.4–37.4)
MCV RBC AUTO: 97 FL (ref 82–98)
MONO+MESO NFR FLD MANUAL: 2 %
MONOCYTES # BLD AUTO: 0.52 THOUSAND/ÂΜL (ref 0.17–1.22)
MONOCYTES NFR BLD AUTO: 16 %
MONOCYTES NFR BLD AUTO: 25 % (ref 4–12)
NEUTROPHILS # BLD AUTO: 1.01 THOUSANDS/ÂΜL (ref 1.85–7.62)
NEUTS SEG NFR BLD AUTO: 29 %
NEUTS SEG NFR BLD AUTO: 47 % (ref 43–75)
NRBC BLD AUTO-RTO: 0 /100 WBCS
PATHOLOGY REVIEW: NO
PH BODY FLUID: 7.5
PHOSPHATE SERPL-MCNC: 3.4 MG/DL (ref 2.3–4.1)
PLATELET # BLD AUTO: 94 THOUSANDS/UL (ref 149–390)
PMV BLD AUTO: 11.9 FL (ref 8.9–12.7)
POTASSIUM SERPL-SCNC: 4.1 MMOL/L (ref 3.5–5.3)
PROT FLD-MCNC: <3 G/DL
RBC # BLD AUTO: 3.04 MILLION/UL (ref 3.88–5.62)
SITE: NORMAL
SODIUM SERPL-SCNC: 138 MMOL/L (ref 135–147)
TOTAL CELLS COUNTED SPEC: 100
WBC # BLD AUTO: 2.11 THOUSAND/UL (ref 4.31–10.16)
WBC # FLD MANUAL: 221 /UL

## 2023-10-05 PROCEDURE — 84157 ASSAY OF PROTEIN OTHER: CPT | Performed by: STUDENT IN AN ORGANIZED HEALTH CARE EDUCATION/TRAINING PROGRAM

## 2023-10-05 PROCEDURE — 32555 ASPIRATE PLEURA W/ IMAGING: CPT

## 2023-10-05 PROCEDURE — 99223 1ST HOSP IP/OBS HIGH 75: CPT | Performed by: INTERNAL MEDICINE

## 2023-10-05 PROCEDURE — 0W993ZZ DRAINAGE OF RIGHT PLEURAL CAVITY, PERCUTANEOUS APPROACH: ICD-10-PCS | Performed by: STUDENT IN AN ORGANIZED HEALTH CARE EDUCATION/TRAINING PROGRAM

## 2023-10-05 PROCEDURE — 90662 IIV NO PRSV INCREASED AG IM: CPT | Performed by: NURSE PRACTITIONER

## 2023-10-05 PROCEDURE — 88112 CYTOPATH CELL ENHANCE TECH: CPT | Performed by: PATHOLOGY

## 2023-10-05 PROCEDURE — 83615 LACTATE (LD) (LDH) ENZYME: CPT | Performed by: STUDENT IN AN ORGANIZED HEALTH CARE EDUCATION/TRAINING PROGRAM

## 2023-10-05 PROCEDURE — 83986 ASSAY PH BODY FLUID NOS: CPT | Performed by: STUDENT IN AN ORGANIZED HEALTH CARE EDUCATION/TRAINING PROGRAM

## 2023-10-05 PROCEDURE — 85730 THROMBOPLASTIN TIME PARTIAL: CPT | Performed by: INTERNAL MEDICINE

## 2023-10-05 PROCEDURE — 80048 BASIC METABOLIC PNL TOTAL CA: CPT | Performed by: NURSE PRACTITIONER

## 2023-10-05 PROCEDURE — 82945 GLUCOSE OTHER FLUID: CPT | Performed by: STUDENT IN AN ORGANIZED HEALTH CARE EDUCATION/TRAINING PROGRAM

## 2023-10-05 PROCEDURE — 85730 THROMBOPLASTIN TIME PARTIAL: CPT | Performed by: STUDENT IN AN ORGANIZED HEALTH CARE EDUCATION/TRAINING PROGRAM

## 2023-10-05 PROCEDURE — 88305 TISSUE EXAM BY PATHOLOGIST: CPT | Performed by: PATHOLOGY

## 2023-10-05 PROCEDURE — 99233 SBSQ HOSP IP/OBS HIGH 50: CPT | Performed by: STUDENT IN AN ORGANIZED HEALTH CARE EDUCATION/TRAINING PROGRAM

## 2023-10-05 PROCEDURE — 87205 SMEAR GRAM STAIN: CPT | Performed by: STUDENT IN AN ORGANIZED HEALTH CARE EDUCATION/TRAINING PROGRAM

## 2023-10-05 PROCEDURE — 82330 ASSAY OF CALCIUM: CPT | Performed by: NURSE PRACTITIONER

## 2023-10-05 PROCEDURE — 85025 COMPLETE CBC W/AUTO DIFF WBC: CPT | Performed by: NURSE PRACTITIONER

## 2023-10-05 PROCEDURE — 83735 ASSAY OF MAGNESIUM: CPT | Performed by: NURSE PRACTITIONER

## 2023-10-05 PROCEDURE — 82948 REAGENT STRIP/BLOOD GLUCOSE: CPT

## 2023-10-05 PROCEDURE — G0008 ADMIN INFLUENZA VIRUS VAC: HCPCS | Performed by: NURSE PRACTITIONER

## 2023-10-05 PROCEDURE — 84100 ASSAY OF PHOSPHORUS: CPT | Performed by: NURSE PRACTITIONER

## 2023-10-05 PROCEDURE — 87070 CULTURE OTHR SPECIMN AEROBIC: CPT | Performed by: STUDENT IN AN ORGANIZED HEALTH CARE EDUCATION/TRAINING PROGRAM

## 2023-10-05 PROCEDURE — 89051 BODY FLUID CELL COUNT: CPT | Performed by: STUDENT IN AN ORGANIZED HEALTH CARE EDUCATION/TRAINING PROGRAM

## 2023-10-05 PROCEDURE — 0W993ZZ DRAINAGE OF RIGHT PLEURAL CAVITY, PERCUTANEOUS APPROACH: ICD-10-PCS | Performed by: RADIOLOGY

## 2023-10-05 RX ORDER — DILTIAZEM HYDROCHLORIDE 5 MG/ML
15 INJECTION INTRAVENOUS ONCE
Status: DISCONTINUED | OUTPATIENT
Start: 2023-10-05 | End: 2023-10-05

## 2023-10-05 RX ORDER — LIDOCAINE WITH 8.4% SOD BICARB 0.9%(10ML)
SYRINGE (ML) INJECTION AS NEEDED
Status: COMPLETED | OUTPATIENT
Start: 2023-10-05 | End: 2023-10-05

## 2023-10-05 RX ORDER — METHYLPREDNISOLONE SODIUM SUCCINATE 40 MG/ML
40 INJECTION, POWDER, LYOPHILIZED, FOR SOLUTION INTRAMUSCULAR; INTRAVENOUS EVERY 12 HOURS SCHEDULED
Status: DISCONTINUED | OUTPATIENT
Start: 2023-10-05 | End: 2023-10-06

## 2023-10-05 RX ADMIN — TRAVOPROST 1 DROP: 0.04 SOLUTION OPHTHALMIC at 21:21

## 2023-10-05 RX ADMIN — HEPARIN SODIUM 2800 UNITS: 1000 INJECTION INTRAVENOUS; SUBCUTANEOUS at 13:51

## 2023-10-05 RX ADMIN — BRIMONIDINE TARTRATE 1 DROP: 2 SOLUTION/ DROPS OPHTHALMIC at 09:37

## 2023-10-05 RX ADMIN — INSULIN LISPRO 3 UNITS: 100 INJECTION, SOLUTION INTRAVENOUS; SUBCUTANEOUS at 21:21

## 2023-10-05 RX ADMIN — HEPARIN SODIUM 19 UNITS/KG/HR: 10000 INJECTION, SOLUTION INTRAVENOUS at 10:05

## 2023-10-05 RX ADMIN — Medication 10 ML: at 12:31

## 2023-10-05 RX ADMIN — FINASTERIDE 5 MG: 5 TABLET, FILM COATED ORAL at 08:27

## 2023-10-05 RX ADMIN — METOPROLOL TARTRATE 25 MG: 25 TABLET, FILM COATED ORAL at 13:51

## 2023-10-05 RX ADMIN — PRAVASTATIN SODIUM 20 MG: 20 TABLET ORAL at 17:26

## 2023-10-05 RX ADMIN — METOROPROLOL TARTRATE 5 MG: 5 INJECTION, SOLUTION INTRAVENOUS at 05:17

## 2023-10-05 RX ADMIN — INFLUENZA A VIRUS A/VICTORIA/4897/2022 IVR-238 (H1N1) ANTIGEN (FORMALDEHYDE INACTIVATED), INFLUENZA A VIRUS A/DARWIN/9/2021 SAN-010 (H3N2) ANTIGEN (FORMALDEHYDE INACTIVATED), INFLUENZA B VIRUS B/PHUKET/3073/2013 ANTIGEN (FORMALDEHYDE INACTIVATED), AND INFLUENZA B VIRUS B/MICHIGAN/01/2021 ANTIGEN (FORMALDEHYDE INACTIVATED) 0.7 ML: 60; 60; 60; 60 INJECTION, SUSPENSION INTRAMUSCULAR at 18:16

## 2023-10-05 RX ADMIN — METOPROLOL TARTRATE 50 MG: 50 TABLET, FILM COATED ORAL at 08:27

## 2023-10-05 RX ADMIN — INSULIN LISPRO 1 UNITS: 100 INJECTION, SOLUTION INTRAVENOUS; SUBCUTANEOUS at 12:25

## 2023-10-05 RX ADMIN — METHYLPREDNISOLONE SODIUM SUCCINATE 40 MG: 40 INJECTION, POWDER, FOR SOLUTION INTRAMUSCULAR; INTRAVENOUS at 21:21

## 2023-10-05 RX ADMIN — AMIODARONE HYDROCHLORIDE 1 MG/MIN: 50 INJECTION, SOLUTION INTRAVENOUS at 09:37

## 2023-10-05 RX ADMIN — METHYLPREDNISOLONE SODIUM SUCCINATE 40 MG: 40 INJECTION, POWDER, FOR SOLUTION INTRAMUSCULAR; INTRAVENOUS at 10:58

## 2023-10-05 RX ADMIN — FOLIC ACID 1 MG: 1 TABLET ORAL at 08:27

## 2023-10-05 RX ADMIN — METOPROLOL TARTRATE 25 MG: 25 TABLET, FILM COATED ORAL at 21:21

## 2023-10-05 RX ADMIN — INSULIN LISPRO 2 UNITS: 100 INJECTION, SOLUTION INTRAVENOUS; SUBCUTANEOUS at 17:27

## 2023-10-05 RX ADMIN — AMIODARONE HYDROCHLORIDE 150 MG: 50 INJECTION, SOLUTION INTRAVENOUS at 09:06

## 2023-10-05 RX ADMIN — TIMOLOL MALEATE 1 DROP: 5 SOLUTION/ DROPS OPHTHALMIC at 09:37

## 2023-10-05 RX ADMIN — BRIMONIDINE TARTRATE 1 DROP: 2 SOLUTION/ DROPS OPHTHALMIC at 17:27

## 2023-10-05 NOTE — CASE MANAGEMENT
Case Management Discharge Planning Note    Patient name Surya Ferraro  Location /ICU 5 MRN [id number]  : 1945 Date 10/5/2023       Current Admission Date: 10/3/2023  Current Admission Diagnosis:Acute pulmonary embolism [facility])   Patient Active Problem List    Diagnosis Date Noted   • COPD with acute exacerbation ([address]) 10/05/2023   • Atrial fibrillation with RVR ([address]) 10/03/2023   • HILARY (acute kidney injury) ([address]) 10/03/2023   • Abnormal CT of the abdomen 10/03/2023   • Platelets decreased ([address]) 2023   • Multiple lung nodules on CT 10/06/2022   • 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 ([address]) 10/06/2022   • Pleural effusion 10/06/2022   • Malignant neoplasm of upper lobe of right lung [facility])    • Acute pulmonary embolism ([address]) 09/10/2022   • Pulmonary mass 09/10/2022   • Anemia 09/10/2022   • Non-recurrent bilateral inguinal hernia without obstruction or gangrene 2021   • Status post right tibial-talar ankle fusion 2020   • BPH associated with nocturia 2019   • Arthritis of right acromioclavicular joint 03/15/2019   • Primary osteoarthritis of right shoulder 2019   • Chronic left shoulder pain 2019   • Left rotator cuff tear arthropathy 2019   • Rotator cuff injury, right, initial encounter 2019   • History of colon polyps 2019   • Hyperparathyroidism ([address]) 2018   • Hypokalemia 10/22/2018   • Hypocalcemia 10/22/2018   • Glaucoma 10/11/2018   • Controlled type 2 diabetes mellitus without complication, without long-term current use of insulin ([address]) 2016   • Inguinal hernia 2016   • Benign essential HTN 2016   • Hypercholesterolemia 2016      LOS (days): 2  Geometric Mean LOS (GMLOS) (days): 3.40  Days to GMLOS:1.6     OBJECTIVE:  Risk of Unplanned Readmission Score: 27.64         Current admission status: Inpatient   Preferred Pharmacy:   [address] [place] [address] [address]  [address] [place] [address] [facility]  Phone: [phone] Fax: 12 [address], [address] [facility] [place] [address] [facility] [address] [facility] [address]  Phone: [phone] Fax: [phone]    [facility] [address] [facility] [place] [place] [address]  Phone: [phone] Fax: [phone]    Primary Care Provider: Dona Segovia PA-C    Primary Insurance: MEDICARE  Secondary Insurance: Joycelyn Mcduffie    DISCHARGE DETAILS:                                          Other Referral/Resources/Interventions Provided:  Referral Comments: Pt remains in ICU on IV amioderone gtt, IV lopressor, IV solumedrol. MD hoping for [address] discussion w pt and family. Per SLIM rounds this AM, d/c anticipated in 48-72 hrs. CM will continue to follow.          Treatment Team Recommendation: Home  Discharge Destination Plan[de-identified] Home  Transport at Discharge : Family

## 2023-10-05 NOTE — DISCHARGE INSTRUCTIONS
Thoracentesis   WHAT YOU NEED TO KNOW:   A thoracentesis is a procedure to remove extra fluid or air from between your lungs and your inner chest wall. Air or fluid buildup may make it hard for you to breathe. A thoracentesis allows your lungs to expand fully so you can breathe more easily. DISCHARGE INSTRUCTIONS:     Small amount of shoulder pain and bloody sputum is normal after a Thoracentesis. Rest:  Rest when you feel it is needed. Slowly start to do more each day. Return to your daily activities as directed. Resume your normal diet. Small sips of flat soda will help mild nausea. Do not smoke: If you smoke, it is never too late to quit. Ask for information about how to stop smoking if you need help. Contact Interventional Radiology at [phone] Bubba PATIENTS: Contact Interventional Radiology at [phone]) Ángel Andrade PATIENTS: Contact Interventional Radiology at [phone]) if:   You have a fever. Your puncture site is red, warm, swollen, or draining pus. You have questions or concerns about your procedure, medicine, or care. Seek care immediately or call 911 if:   Severe chest pain with inspiration and shortness of breath    Large amounts of blood in your sputum    Follow up with your healthcare provider as directed.

## 2023-10-05 NOTE — ASSESSMENT & PLAN NOTE
· Audible wheeze on exam  · Started IV solumedrol today  · Given afib with RVR, will start duonebz when HR improves/stabilizes

## 2023-10-05 NOTE — ASSESSMENT & PLAN NOTE
· Present on admission  · New onset afib with RVR  · Unclear etiology, EF is 35% with global hypokinesis   · Could be secondary to Afib vs ischemic cardiomyopathy   · Goals of care discussion is currently pending  · Heart rate poorly controlled, loaded with amiodarone  · Start amio infusion, continue metoprolol  · Monitor on telemetry

## 2023-10-05 NOTE — CONSULTS
Consultation - Cardiology   Sharon Alberto 69 y.o. male MRN: [de-identified]  Unit/Bed#:  Encounter: [id number]  10/05/23  4:02 PM    Assessment/ Plan:  1. New onset atrial fibrillation with RVR  • Noted on EKG and telemetry, HR averaging 150  • Echo pending  • Start amiodarone gtt, increase Lopressor to 25 mg q6h  • Continue heparin gtt    2. Acute PE/DVT, history of PE  • Management per primary service    3. Malignant lung neoplasm  • CT shows significant progression of metastatic disease throughout the left lung with new enlarging nodules/masses    4. Bilateral pleural effusions  • S/p IR thoracentesis today, likely secondary to malignancy    5. Hypertension  • Currently well controlled, continue monitor  • Continue Lopressor    6. Hyperlipidemia  • Continue pravastatin    7. HILARY  • Creatinine 1.88, continue to monitor    8. Anemia  • Hgb 9.7, continue to monitor    9. T2DM  • A1c 5.1          History of Present Illness   Physician Requesting Consult: Rula Concepcion MD    Reason for Consult / Principal Problem: New onset atrial fibrillation with RVR    HPI: Sharon Alberto is a 69y.o. year old male with history of lung adenocarcinoma on chemo and s/p radiation, prostate cancer s/p radiation in 2005, hypertension, hyperlipidemia, PE, anemia, T2DM who presents with shortness of breath and right lower extremity edema. Symptoms have been progressively worsening over the past 2 weeks since undergoing last session of chemotherapy. Patient found to have acute PE/DVT. Also noted to be in new onset atrial fibrillation with RVR. Cardiology consulted for further evaluation management. Patient does have previous history of PE but was not on anticoagulation due to financial burden. He has previously declined Coumadin for anticoagulation.   Denies chest pain, palpitations, or syncope    Inpatient consult to Cardiology  Consult performed by: Gena Carmichael PA-C  Consult ordered by: Linh Marin MD          EKG: Atrial fibrillation with rapid ventricular response      Review of Systems   Constitutional: Negative for chills and fever. HENT: Negative for ear pain and sore throat. Eyes: Negative for pain and visual disturbance. Respiratory: Positive for shortness of breath. Negative for chest tightness. Cardiovascular: Positive for leg swelling. Negative for chest pain and palpitations. Gastrointestinal: Negative for abdominal pain and vomiting. Genitourinary: Negative for dysuria and hematuria. Musculoskeletal: Negative for arthralgias and back pain. Skin: Negative for color change and rash. Neurological: Negative for seizures and syncope. All other systems reviewed and are negative. Historical Information   Past Medical History:   Diagnosis Date   • Diabetes mellitus ([address] [facility])    • Hyperlipidemia    • Hypertension    • Lung cancer ([facility])    • Prostate cancer ([facility] [address] [facility]) 2005    S/P prostate ca-2005 had radiation tx.      Past Surgical History:   Procedure Laterality Date   • COLONOSCOPY     • IR BIOPSY LYMPH NODE  9/15/2022   • IR THORACENTESIS  9/13/2022   • IN ARTHRODESIS ANKLE OPEN Right 7/10/2020    Procedure: ARTHRODESIS/ TIBIAL-TALAR ANKLE FUSION;  Surgeon: Flora Rooney MD;  Location: [facility];  Service: Orthopedics   • IN LAPAROSCOPY SURG RPR INITIAL INGUINAL HERNIA Bilateral 12/16/2021    Procedure: LAPAROSCOPIC REPAIR HERNIA INGUINAL WITH MESH;  Surgeon: Shayna Flores MD;  Location: [facility];  Service: General     Social History     Substance and Sexual Activity   Alcohol Use Yes    Comment: Occasional     Social History     Substance and Sexual Activity   Drug Use Never     Social History     Tobacco Use   Smoking Status Never   Smokeless Tobacco Never       Family History:   Family History   Problem Relation Age of Onset   • Heart attack Mother        Meds/Allergies   all current active meds have been reviewed  No Known Allergies    Objective   Vitals: Blood pressure 109/77, pulse (!) 124, temperature 98 °F (36.7 °C), temperature source Oral, resp. rate 22, height 5' 9" (1.753 m), weight 75.3 kg (166 lb), SpO2 94 %. , Body mass index is 24.51 kg/m².,   Orthostatic Blood Pressures    Flowsheet Row Most Recent Value   Blood Pressure 109/77 filed at 10/05/2023 1500   Patient Position - Orthostatic VS Sitting filed at 10/05/2023 7267          Systolic (14WYQ), NHP:392 , Min:101 , ZXV:053     Diastolic (34EJF), EQP:43, Min:67, Max:85        Intake/Output Summary (Last 24 hours) at 10/5/2023 1602  Last data filed at 10/5/2023 1301  Gross per 24 hour   Intake 554.82 ml   Output 2160 ml   Net -1605.18 ml       Invasive Devices     Peripheral Intravenous Line  Duration           Peripheral IV 10/03/23 Left Antecubital 2 days    Peripheral IV 10/05/23 Left;Ventral (anterior) Forearm <1 day                    Physical Exam:  GEN: Alert and oriented x 3, in no acute distress. Well appearing and well nourished. HEENT: Sclera anicteric, conjunctivae pink, mucous membranes moist. Oropharynx clear. NECK: Supple, no carotid bruits, no significant JVD. Trachea midline, no thyromegaly. HEART: Tachycardic, irregularly irregular, normal S1 and S2, no murmurs, clicks, gallops or rubs. PMI nondisplaced, no thrills. LUNGS: Decreased breath sounds; no wheezes, rales, or rhonchi. No increased work of breathing or signs of respiratory distress. ABDOMEN: Soft, nontender, nondistended, normoactive bowel sounds. EXTREMITIES: Skin warm and well perfused, no clubbing, cyanosis. 2+ right LE edema, 1+ left LE edema. NEURO: No focal findings. Normal speech. Mood and affect normal.   SKIN: Normal without suspicious lesions on exposed skin.     Lab Results:     Cardiac Profile:   Results from last 7 days   Lab Units 10/03/23  2159 10/03/23  1712 10/03/23  1436   HS TNI 0HR ng/L  --   --  5   HS TNI 2HR ng/L  --  5  --    HSTNI D2 ng/L  --  0  --    HS TNI 4HR ng/L 6  --   --    HSTNI D4 ng/L 1  --   -- CBC with diff:   Results from last 7 days   Lab Units 10/05/23  0434 10/04/23  0745 10/03/23  1603   WBC Thousand/uL 2.11* 1.54* 2.02*   HEMOGLOBIN g/dL 9.7* 9.7* 11.4*   HEMATOCRIT % 29.6* 29.0* 34.0*   MCV fL 97 96 97   PLATELETS Thousands/uL 94* 65* 67*   RBC Million/uL 3.04* 3.03* 3.51*   MCH pg 31.9 32.0 32.5   MCHC g/dL 32.8 33.4 33.5   RDW % 14.2 13.9 13.6   MPV fL 11.9 12.5 13.6*   NRBC AUTO /100 WBCs 0 0 0         CMP:   Results from last 7 days   Lab Units 10/05/23  0434 10/04/23  0745 10/03/23  1436   POTASSIUM mmol/L 4.1 3.6 4.0   CHLORIDE mmol/L 109* 109* 107   CO2 mmol/L 22 23 19*   BUN mg/dL 50* 43* 48*   CREATININE mg/dL 1.88* 1.79* 1.99*   CALCIUM mg/dL 8.4 8.5 8.8   AST U/L  --   --  20   ALT U/L  --   --  25   ALK PHOS U/L  --   --  86   EGFR ml/min/1.73sq m 33 35 31

## 2023-10-05 NOTE — BRIEF OP NOTE (RAD/CATH)
IR THORACENTESIS Procedure Note    PATIENT NAME: Collin Akers  : 1945  MRN: [id number]    Pre-op Diagnosis:   1. Atrial fibrillation with rapid ventricular response ([address])    2. Dyspnea, unspecified type    3. Pulmonary emboli ([address])    4. Pleural effusion      Post-op Diagnosis:   1. Atrial fibrillation with rapid ventricular response ([address])    2. Dyspnea, unspecified type    3. Pulmonary emboli ([address])    4. Pleural effusion        Surgeon:   Luis Diaz  Assistants:     No qualified resident was available, Resident is only observing    Estimated Blood Loss: minimal  Findings: large right pleural effusion. 1960 ml clear yellow pleural fluid. Specimens: sent as ordered.     Complications:  None immediate    Anesthesia: local    Luis Diaz     Date: 10/5/2023  Time: 12:56 PM

## 2023-10-05 NOTE — ASSESSMENT & PLAN NOTE
· Present on admission  · Unclear etiology, could be from hypoperfusion in setting of Afib/CHF  · Could be in setting of hypovolemia from poor PO intake  · For now, stabilize HR, consult cardiology   · No acute renal pathology noted on CT imaging  · Trend creatinine, consider nephrology consult in AM

## 2023-10-05 NOTE — ASSESSMENT & PLAN NOTE
· Noted to have bilateral pleural effusions  · Small left sided, Moderate to large right sided with loculation  · Consult IR for thoracentesis, 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  · Given EF 35%, could be transudative as well  · Follow up fluid studies

## 2023-10-05 NOTE — ASSESSMENT & PLAN NOTE
· Diffuse metastatic disease, refer to CT imaging reports  · I went over imaging with patient  · Discussed transition to level 3 code status, patient is undecided  · For now continue with stabilization of HR   · Once stabilized, further discuss goals of care  · Poor prognosis overall

## 2023-10-05 NOTE — ASSESSMENT & PLAN NOTE
· Present on admission  · With associated right lower extremity DVT  · Likely provoked by malignancy and new onset Afib with RVR  · CT imaging showing diffuse metastatic disease  · Started on heparin drip  · Continue anticoagulation for now

## 2023-10-05 NOTE — ASSESSMENT & PLAN NOTE
· Hemoglobin with downtrend since arrival  · Does not report any active signs of bleeding  · Likely multifactorial in setting of iron deficiency and chronic disease  · Given metastatic burden, could have small occult bleed/oozing  · For now, trend hemoglobin  · Continue with heparin gtt- benefits outweigh risks

## 2023-10-05 NOTE — PLAN OF CARE
Problem: PAIN - ADULT  Goal: Verbalizes/displays adequate comfort level or baseline comfort level  Description: Interventions:  - Encourage patient to monitor pain and request assistance  - Assess pain using appropriate pain scale  - Administer analgesics based on type and severity of pain and evaluate response  - Implement non-pharmacological measures as appropriate and evaluate response  - Consider cultural and social influences on pain and pain management  - Notify physician/advanced practitioner if interventions unsuccessful or patient reports new pain  Outcome: Progressing     Problem: INFECTION - ADULT  Goal: Absence or prevention of progression during hospitalization  Description: INTERVENTIONS:  - Assess and monitor for signs and symptoms of infection  - Monitor lab/diagnostic results  - Monitor all insertion sites, i.e. indwelling lines, tubes, and drains  - Monitor endotracheal if appropriate and nasal secretions for changes in amount and color  - [place] appropriate cooling/warming therapies per order  - Administer medications as ordered  - Instruct and encourage patient and family to use good hand hygiene technique  - Identify and instruct in appropriate isolation precautions for identified infection/condition  Outcome: Progressing  Goal: Absence of fever/infection during neutropenic period  Description: INTERVENTIONS:  - Monitor WBC    Outcome: Progressing     Problem: SAFETY ADULT  Goal: Patient will remain free of falls  Description: INTERVENTIONS:  - Educate patient/family on patient safety including physical limitations  - Instruct patient to call for assistance with activity   - Consult OT/PT to assist with strengthening/mobility   - Keep Call bell within reach  - Keep bed low and locked with side rails adjusted as appropriate  - Keep care items and personal belongings within reach  - Initiate and maintain comfort rounds  - Make Fall Risk Sign visible to staff  - Offer Toileting every 2 Hours, in advance of need  - Initiate/Maintain bed alarm  - Obtain necessary fall risk management equipment: yes   - Apply yellow socks and bracelet for high fall risk patients  - Consider moving patient to room near nurses station  Outcome: Progressing     Problem: DISCHARGE PLANNING  Goal: Discharge to home or other facility with appropriate resources  Description: INTERVENTIONS:  - Identify barriers to discharge w/patient and caregiver  - Arrange for needed discharge resources and transportation as appropriate  - Identify discharge learning needs (meds, wound care, etc.)  - Arrange for interpretive services to assist at discharge as needed  - Refer to Case Management Department for coordinating discharge planning if the patient needs post-hospital services based on physician/advanced practitioner order or complex needs related to functional status, cognitive ability, or social support system  Outcome: Progressing     Problem: Knowledge Deficit  Goal: Patient/family/caregiver demonstrates understanding of disease process, treatment plan, medications, and discharge instructions  Description: Complete learning assessment and assess knowledge base. Interventions:  - Provide teaching at level of understanding  - Provide teaching via preferred learning methods  Outcome: Progressing     Problem: Nutrition/Hydration-ADULT  Goal: Nutrient/Hydration intake appropriate for improving, restoring or maintaining nutritional needs  Description: Monitor and assess patient's nutrition/hydration status for malnutrition. Collaborate with interdisciplinary team and initiate plan and interventions as ordered. Monitor patient's weight and dietary intake as ordered or per policy. Utilize nutrition screening tool and intervene as necessary. Determine patient's food preferences and provide high-protein, high-caloric foods as appropriate.      INTERVENTIONS:  - Monitor oral intake, urinary output, labs, and treatment plans  - Assess nutrition and hydration status and recommend course of action  - Evaluate amount of meals eaten  - Assist patient with eating if necessary   - Allow adequate time for meals  - Recommend/ encourage appropriate diets, oral nutritional supplements, and vitamin/mineral supplements  - Order, calculate, and assess calorie counts as needed  - Recommend, monitor, and adjust tube feedings and TPN/PPN based on assessed needs  - Assess need for intravenous fluids  - Provide specific nutrition/hydration education as appropriate  - Include patient/family/caregiver in decisions related to nutrition  Outcome: Progressing

## 2023-10-05 NOTE — PROGRESS NOTES
[address]  Progress Note  Name: Mag Garnett  MRN: [de-identified]  Unit/Bed#:  I Date of Admission: 10/3/2023   Date of Service: 10/5/2023 I Hospital Day: 2    Assessment/Plan   * Acute pulmonary embolism ([address])  Assessment & Plan  · Present on admission  · With associated right lower extremity DVT  · Likely provoked by malignancy and new onset Afib with RVR  · CT imaging showing diffuse metastatic disease  · Started on heparin drip  · Continue anticoagulation for now    Atrial fibrillation with RVR ([address])  Assessment & Plan  · Present on admission  · New onset afib with RVR  · Unclear etiology, EF is 35% with global hypokinesis   · Could be secondary to Afib vs ischemic cardiomyopathy   · Goals of care discussion is currently pending  · Heart rate poorly controlled, loaded with amiodarone  · Start amio infusion, continue metoprolol  · Monitor on telemetry     Pleural effusion  Assessment & Plan  · Noted to have bilateral pleural effusions  · Small left sided, Moderate to large right sided with loculation  · Consult IR for thoracentesis, 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  · Given EF 35%, could be transudative as well  · Follow up fluid studies     COPD with acute exacerbation (HCC)  Assessment & Plan  · Audible wheeze on exam  · Started IV solumedrol today  · Given afib with RVR, will start duonebz when HR improves/stabilizes    Abnormal CT of the abdomen  Assessment & Plan  · Diffuse metastatic disease, refer to CT imaging reports  · I went over imaging with patient  · Discussed transition to level 3 code status, patient is undecided  · For now continue with stabilization of HR   · Once stabilized, further discuss goals of care  · Poor prognosis overall    HILARY (acute kidney injury) ([address])  Assessment & Plan  · Present on admission  · Unclear etiology, could be from hypoperfusion in setting of Afib/CHF  · Could be in setting of hypovolemia from poor PO intake  · For now, stabilize HR, consult cardiology   · No acute renal pathology noted on CT imaging  · Trend creatinine, consider nephrology consult in AM    Anemia  Assessment & Plan  · Hemoglobin with downtrend since arrival  · Does not report any active signs of bleeding  · Likely multifactorial in setting of iron deficiency and chronic disease  · Given metastatic burden, could have small occult bleed/oozing  · For now, trend hemoglobin  · Continue with heparin gtt- benefits outweigh risks         ACUTE cor pulmonale due to acute pulmonary embolism; as evidenced by high BNP, possible right sided heart failure on PE study, and new pulmonary hypertension on PE study; requiring cardiopulmonary monitoring, echocardiogram, and IV heparin gtt.     Chemotherapy induced pancytopenia, as evidenced by low blood counts in a patient that had last chemo treatment 2 weeks ago, requiring monitoring of CBC. VTE Pharmacologic Prophylaxis:   Moderate Risk (Score 3-4) - Pharmacological DVT Prophylaxis Ordered: heparin drip. Patient Centered Rounds: I performed bedside rounds with nursing staff today. Discussions with Specialists or Other Care Team Provider: cardiology       Total Time Spent on Date of Encounter in care of patient: 30+ mins. This time was spent on one or more of the following: performing physical exam; counseling and coordination of care; obtaining or reviewing history; documenting in the medical record; reviewing/ordering tests, medications or procedures; communicating with other healthcare professionals and discussing with patient's family/caregivers. Current Length of Stay: 2 day(s)  Current Patient Status: Inpatient   Certification Statement: The patient will continue to require additional inpatient hospital stay due to Afib with RVR, goals of care  Discharge Plan: Anticipate discharge in >72 hrs to discharge location to be determined pending rehab evaluations.     Code Status: Level 1 - Full Code    Subjective:   Patient feels better today     Objective:     Vitals:   Temp (24hrs), Av °F (36.7 °C), Min:98 °F (36.7 °C), Max:98 °F (36.7 °C)    Temp:  [98 °F (36.7 °C)] 98 °F (36.7 °C)  HR:  [124-138] 124  Resp:  [26-38] 26  BP: (112-129)/(66-77) 116/74  SpO2:  [92 %-93 %] 92 %  Body mass index is 24.51 kg/m². Input and Output Summary (last 24 hours): Intake/Output Summary (Last 24 hours) at 10/5/2023 1205  Last data filed at 10/4/2023 1801  Gross per 24 hour   Intake --   Output 325 ml   Net -325 ml       Physical Exam:   Physical Exam  Constitutional:       General: He is not in acute distress. Appearance: Normal appearance. He is diaphoretic. He is not toxic-appearing. Cardiovascular:      Rate and Rhythm: Tachycardia present. Rhythm irregular. Heart sounds: Normal heart sounds. No murmur heard. Pulmonary:      Effort: Pulmonary effort is normal. No respiratory distress. Breath sounds: Wheezing present. Abdominal:      General: Abdomen is flat. There is no distension. Palpations: Abdomen is soft. Tenderness: There is no abdominal tenderness. Musculoskeletal:      Right lower leg: Edema present. Left lower leg: No edema. Neurological:      General: No focal deficit present. Mental Status: He is alert and oriented to person, place, and time. Mental status is at baseline. Motor: No weakness.           Additional Data:     Labs:  Results from last 7 days   Lab Units 10/05/23  0434   WBC Thousand/uL 2.11*   HEMOGLOBIN g/dL 9.7*   HEMATOCRIT % 29.6*   PLATELETS Thousands/uL 94*   NEUTROS PCT % 47   LYMPHS PCT % 24   MONOS PCT % 25*   EOS PCT % 3     Results from last 7 days   Lab Units 10/05/23  0434 10/04/23  0745 10/03/23  1436   SODIUM mmol/L 138   < > 138   POTASSIUM mmol/L 4.1   < > 4.0   CHLORIDE mmol/L 109*   < > 107   CO2 mmol/L 22   < > 19*   BUN mg/dL 50*   < > 48*   CREATININE mg/dL 1.88*   < > 1.99*   ANION GAP mmol/L 7   < > 12   CALCIUM mg/dL 8.4   < > 8.8   ALBUMIN g/dL --   --  3.7   TOTAL BILIRUBIN mg/dL  --   --  0.50   ALK PHOS U/L  --   --  86   ALT U/L  --   --  25   AST U/L  --   --  20   GLUCOSE RANDOM mg/dL 129   < > 169*    < > = values in this interval not displayed. Results from last 7 days   Lab Units 10/03/23  1556   INR  1.24*     Results from last 7 days   Lab Units 10/05/23  0806 10/04/23  2143 10/04/23  1657 10/04/23  1129 10/04/23  0749 10/03/23  2156   POC GLUCOSE mg/dl 142* 166* 139 103 94 130         Results from last 7 days   Lab Units 10/03/23  1556   LACTIC ACID mmol/L 1.9       Lines/Drains:  Invasive Devices     Peripheral Intravenous Line  Duration           Peripheral IV 10/03/23 Ventral (anterior); Left Wrist 2 days    Peripheral IV 10/03/23 Left Antecubital 1 day    Peripheral IV 10/04/23 Distal;Right;Ventral (anterior) Forearm <1 day    Peripheral IV 10/05/23 Left;Ventral (anterior) Forearm <1 day                  Telemetry:  Telemetry Orders (From admission, onward)             24 Hour Telemetry Monitoring  Continuous x 24 Hours (Telem)        Question:  Reason for 24 Hour Telemetry  Answer:  Arrhythmias requiring acute medical intervention / PPM or ICD malfunction                 Telemetry Reviewed: Atrial fibrillation. HR averaging 160-180  Indication for Continued Telemetry Use: Arrthymias requiring medical therapy             Imaging: Reviewed radiology reports from this admission including: chest CT scan and abdominal/pelvic CT    Recent Cultures (last 7 days):   Results from last 7 days   Lab Units 10/03/23  1558 10/03/23  1556   BLOOD CULTURE  No Growth at 24 hrs. No Growth at 24 hrs.        Last 24 Hours Medication List:   Current Facility-Administered Medications   Medication Dose Route Frequency Provider Last Rate   • amiodarone (CORDARONE) 900 mg in dextrose 5 % 500 mL infusion  1 mg/min Intravenous Continuous Adriana Angelo PA-C 1 mg/min (10/05/23 8869)   • brimonidine tartrate  1 drop Left Eye BID RADHA Gross     • finasteride  5 mg Oral Daily Katlin Dimmer, CRNP     • folic acid  1 mg Oral Daily Katlin Dimmer, CRNP     • heparin (porcine)  3-30 Units/kg/hr (Order-Specific) Intravenous Titrated Katlin Dimmer, CRNP 19 Units/kg/hr (10/05/23 1005)   • heparin (porcine)  2,800 Units Intravenous Q6H PRN Katlin Dimmer, CRNP     • heparin (porcine)  5,600 Units Intravenous Q6H PRN Katlin Dimmer, CRNP     • influenza vaccine  0.7 mL Intramuscular Prior to discharge Katlin Dimmer, CRNP     • insulin lispro  1-6 Units Subcutaneous TID AC Katlin Dimmer, CRNP     • insulin lispro  1-6 Units Subcutaneous HS Katlin Dimmer, CRNP     • methylPREDNISolone sodium succinate  40 mg Intravenous Q12H MD Elise     • metoprolol  5 mg Intravenous Q4H PRN Hugh Salazar MD     • metoprolol tartrate  25 mg Oral Q6H Bret Lopez PA-C     • pravastatin  20 mg Oral Daily With [employer], CRNP     • timolol  1 drop Both Eyes Daily Katlin Dimmer, CRNP     • travoprost  1 drop Both Eyes HS Katlin Dimmer, CRNP          Today, Patient Was Seen By: Josiah Laura MD    **Please Note: This note may have been constructed using a voice recognition system. **

## 2023-10-06 LAB
ALBUMIN SERPL BCP-MCNC: 3.2 G/DL (ref 3.5–5)
ALP SERPL-CCNC: 78 U/L (ref 34–104)
ALT SERPL W P-5'-P-CCNC: 28 U/L (ref 7–52)
ANION GAP SERPL CALCULATED.3IONS-SCNC: 9 MMOL/L
APTT PPP: 47 SECONDS (ref 23–37)
APTT PPP: 55 SECONDS (ref 23–37)
APTT PPP: 87 SECONDS (ref 23–37)
AST SERPL W P-5'-P-CCNC: 17 U/L (ref 13–39)
BASOPHILS # BLD AUTO: 0.01 THOUSANDS/ÂΜL (ref 0–0.1)
BASOPHILS NFR BLD AUTO: 0 % (ref 0–1)
BILIRUB SERPL-MCNC: 0.32 MG/DL (ref 0.2–1)
BUN SERPL-MCNC: 56 MG/DL (ref 5–25)
CALCIUM ALBUM COR SERPL-MCNC: 9.3 MG/DL (ref 8.3–10.1)
CALCIUM SERPL-MCNC: 8.7 MG/DL (ref 8.4–10.2)
CHLORIDE SERPL-SCNC: 104 MMOL/L (ref 96–108)
CO2 SERPL-SCNC: 19 MMOL/L (ref 21–32)
CREAT SERPL-MCNC: 2.13 MG/DL (ref 0.6–1.3)
EOSINOPHIL # BLD AUTO: 0 THOUSAND/ÂΜL (ref 0–0.61)
EOSINOPHIL NFR BLD AUTO: 0 % (ref 0–6)
ERYTHROCYTE [DISTWIDTH] IN BLOOD BY AUTOMATED COUNT: 14.4 % (ref 11.6–15.1)
GFR SERPL CREATININE-BSD FRML MDRD: 28 ML/MIN/1.73SQ M
GLUCOSE SERPL-MCNC: 167 MG/DL (ref 65–140)
GLUCOSE SERPL-MCNC: 201 MG/DL (ref 65–140)
GLUCOSE SERPL-MCNC: 209 MG/DL (ref 65–140)
GLUCOSE SERPL-MCNC: 213 MG/DL (ref 65–140)
GLUCOSE SERPL-MCNC: 247 MG/DL (ref 65–140)
HCT VFR BLD AUTO: 31.9 % (ref 36.5–49.3)
HGB BLD-MCNC: 10.6 G/DL (ref 12–17)
IMM GRANULOCYTES # BLD AUTO: 0.09 THOUSAND/UL (ref 0–0.2)
IMM GRANULOCYTES NFR BLD AUTO: 1 % (ref 0–2)
LYMPHOCYTES # BLD AUTO: 0.34 THOUSANDS/ÂΜL (ref 0.6–4.47)
LYMPHOCYTES NFR BLD AUTO: 5 % (ref 14–44)
MAGNESIUM SERPL-MCNC: 2.6 MG/DL (ref 1.9–2.7)
MCH RBC QN AUTO: 32.2 PG (ref 26.8–34.3)
MCHC RBC AUTO-ENTMCNC: 33.2 G/DL (ref 31.4–37.4)
MCV RBC AUTO: 97 FL (ref 82–98)
MONOCYTES # BLD AUTO: 0.9 THOUSAND/ÂΜL (ref 0.17–1.22)
MONOCYTES NFR BLD AUTO: 13 % (ref 4–12)
NEUTROPHILS # BLD AUTO: 5.83 THOUSANDS/ÂΜL (ref 1.85–7.62)
NEUTS SEG NFR BLD AUTO: 81 % (ref 43–75)
NRBC BLD AUTO-RTO: 0 /100 WBCS
PLATELET # BLD AUTO: 145 THOUSANDS/UL (ref 149–390)
PMV BLD AUTO: 12.6 FL (ref 8.9–12.7)
POTASSIUM SERPL-SCNC: 4.3 MMOL/L (ref 3.5–5.3)
PROT SERPL-MCNC: 5.8 G/DL (ref 6.4–8.4)
RBC # BLD AUTO: 3.29 MILLION/UL (ref 3.88–5.62)
SODIUM SERPL-SCNC: 132 MMOL/L (ref 135–147)
WBC # BLD AUTO: 7.17 THOUSAND/UL (ref 4.31–10.16)

## 2023-10-06 PROCEDURE — 99223 1ST HOSP IP/OBS HIGH 75: CPT | Performed by: NURSE PRACTITIONER

## 2023-10-06 PROCEDURE — 99233 SBSQ HOSP IP/OBS HIGH 50: CPT | Performed by: STUDENT IN AN ORGANIZED HEALTH CARE EDUCATION/TRAINING PROGRAM

## 2023-10-06 PROCEDURE — 99232 SBSQ HOSP IP/OBS MODERATE 35: CPT | Performed by: INTERNAL MEDICINE

## 2023-10-06 PROCEDURE — 82948 REAGENT STRIP/BLOOD GLUCOSE: CPT

## 2023-10-06 PROCEDURE — 80053 COMPREHEN METABOLIC PANEL: CPT | Performed by: STUDENT IN AN ORGANIZED HEALTH CARE EDUCATION/TRAINING PROGRAM

## 2023-10-06 PROCEDURE — 85025 COMPLETE CBC W/AUTO DIFF WBC: CPT | Performed by: STUDENT IN AN ORGANIZED HEALTH CARE EDUCATION/TRAINING PROGRAM

## 2023-10-06 PROCEDURE — 83735 ASSAY OF MAGNESIUM: CPT | Performed by: STUDENT IN AN ORGANIZED HEALTH CARE EDUCATION/TRAINING PROGRAM

## 2023-10-06 PROCEDURE — 85730 THROMBOPLASTIN TIME PARTIAL: CPT | Performed by: STUDENT IN AN ORGANIZED HEALTH CARE EDUCATION/TRAINING PROGRAM

## 2023-10-06 RX ORDER — METHYLPREDNISOLONE SODIUM SUCCINATE 40 MG/ML
40 INJECTION, POWDER, LYOPHILIZED, FOR SOLUTION INTRAMUSCULAR; INTRAVENOUS DAILY
Status: DISCONTINUED | OUTPATIENT
Start: 2023-10-07 | End: 2023-10-08

## 2023-10-06 RX ORDER — INSULIN GLARGINE 100 [IU]/ML
2 INJECTION, SOLUTION SUBCUTANEOUS
Status: DISCONTINUED | OUTPATIENT
Start: 2023-10-06 | End: 2023-10-07

## 2023-10-06 RX ORDER — INSULIN LISPRO 100 [IU]/ML
3 INJECTION, SOLUTION INTRAVENOUS; SUBCUTANEOUS
Status: DISCONTINUED | OUTPATIENT
Start: 2023-10-06 | End: 2023-10-07

## 2023-10-06 RX ADMIN — FINASTERIDE 5 MG: 5 TABLET, FILM COATED ORAL at 08:39

## 2023-10-06 RX ADMIN — INSULIN LISPRO 3 UNITS: 100 INJECTION, SOLUTION INTRAVENOUS; SUBCUTANEOUS at 14:23

## 2023-10-06 RX ADMIN — PRAVASTATIN SODIUM 20 MG: 20 TABLET ORAL at 17:34

## 2023-10-06 RX ADMIN — METOPROLOL TARTRATE 25 MG: 25 TABLET, FILM COATED ORAL at 02:07

## 2023-10-06 RX ADMIN — BRIMONIDINE TARTRATE 1 DROP: 2 SOLUTION/ DROPS OPHTHALMIC at 17:34

## 2023-10-06 RX ADMIN — FOLIC ACID 1 MG: 1 TABLET ORAL at 08:39

## 2023-10-06 RX ADMIN — METOPROLOL TARTRATE 25 MG: 25 TABLET, FILM COATED ORAL at 20:15

## 2023-10-06 RX ADMIN — HEPARIN SODIUM 2800 UNITS: 1000 INJECTION INTRAVENOUS; SUBCUTANEOUS at 03:47

## 2023-10-06 RX ADMIN — AMIODARONE HYDROCHLORIDE 0.5 MG/MIN: 50 INJECTION, SOLUTION INTRAVENOUS at 22:09

## 2023-10-06 RX ADMIN — HEPARIN SODIUM 25 UNITS/KG/HR: 10000 INJECTION, SOLUTION INTRAVENOUS at 22:09

## 2023-10-06 RX ADMIN — INSULIN GLARGINE 2 UNITS: 100 INJECTION, SOLUTION SUBCUTANEOUS at 21:45

## 2023-10-06 RX ADMIN — METHYLPREDNISOLONE SODIUM SUCCINATE 40 MG: 40 INJECTION, POWDER, FOR SOLUTION INTRAMUSCULAR; INTRAVENOUS at 08:38

## 2023-10-06 RX ADMIN — METOPROLOL TARTRATE 25 MG: 25 TABLET, FILM COATED ORAL at 14:22

## 2023-10-06 RX ADMIN — HEPARIN SODIUM 2800 UNITS: 1000 INJECTION INTRAVENOUS; SUBCUTANEOUS at 17:33

## 2023-10-06 RX ADMIN — INSULIN LISPRO 2 UNITS: 100 INJECTION, SOLUTION INTRAVENOUS; SUBCUTANEOUS at 17:32

## 2023-10-06 RX ADMIN — INSULIN LISPRO 3 UNITS: 100 INJECTION, SOLUTION INTRAVENOUS; SUBCUTANEOUS at 17:32

## 2023-10-06 RX ADMIN — INSULIN LISPRO 2 UNITS: 100 INJECTION, SOLUTION INTRAVENOUS; SUBCUTANEOUS at 08:39

## 2023-10-06 RX ADMIN — AMIODARONE HYDROCHLORIDE 1 MG/MIN: 50 INJECTION, SOLUTION INTRAVENOUS at 01:24

## 2023-10-06 RX ADMIN — METOPROLOL TARTRATE 25 MG: 25 TABLET, FILM COATED ORAL at 08:39

## 2023-10-06 RX ADMIN — TIMOLOL MALEATE 1 DROP: 5 SOLUTION/ DROPS OPHTHALMIC at 08:39

## 2023-10-06 RX ADMIN — INSULIN LISPRO 2 UNITS: 100 INJECTION, SOLUTION INTRAVENOUS; SUBCUTANEOUS at 21:44

## 2023-10-06 RX ADMIN — HEPARIN SODIUM 23 UNITS/KG/HR: 10000 INJECTION, SOLUTION INTRAVENOUS at 03:50

## 2023-10-06 RX ADMIN — BRIMONIDINE TARTRATE 1 DROP: 2 SOLUTION/ DROPS OPHTHALMIC at 08:40

## 2023-10-06 RX ADMIN — TRAVOPROST 1 DROP: 0.04 SOLUTION OPHTHALMIC at 21:45

## 2023-10-06 NOTE — SOCIAL WORK
Palliative LCSW saw patient at the bedside today. LCSW appreciates the opportunity to provider patient/family with inpatient emotional support and guidance while patient continues to receive medical attention from the medical team     Topics discussed: [facility] team met with pt at bedside. Pt said he was feeling much better and breathing much better since he had fluid drained. He said that his legs are still swollen but he could not afford almost $500 a month for the medication he was prescribed. He said he had an treatment scheduled Monday but did not think he would be released to make it to this appointment and would need to reschedule. [facility] team reached out to oncology to discuss case. Areas that need follow-up:   Resources given:  Others present:  Trevor GARCIA    I have spent 60 minutes with Patient  today in which greater than 50% of this time was spent in counseling/coordination of care.     LCSW will continue to follow as requested by the medical team, patient, or family

## 2023-10-06 NOTE — PROGRESS NOTES
Cardiology Progress Note - Tiana Wu 69 y.o. male MRN: [de-identified]    Unit/Bed#:  Encounter: [id number]      Assessment/Plan:  1. New onset atrial fibrillation with RVR  • Telemetry shows A-fib with HR averaging 110-120 today  • Echo pending  • Continue Lopressor 25 mg q6h, decrease amiodarone gtt to 0.5 mg/min  • Continue heparin gtt     2. Acute PE/DVT, history of PE  • Management per primary service     3. New onset CM with EF 35%  • Echo shows EF 35%, severe global hypokinesis, mildly dilated RV with mildly reduced RV function, biatrial dilation, mild to moderate AI, moderate MR, mild TR, mildly dilated aortic root, small pericardial effusion  • Likely tachycardia induced cardiomyopathy, however cannot exclude possible alternative etiologies. Given overall clinical status including malignant pulmonary neoplasm, plan for optimization of medical management at this time. • Continue Lopressor, hold off ACE/ARB at this time due to HILARY and borderline soft BPs    4.  Malignant lung neoplasm s/p radiation therapy  • CT shows significant progression of metastatic disease throughout the left lung with new enlarging nodules/masses     5.  Bilateral pleural effusions  • S/p IR thoracentesis - 1960 mL clear yellow right pleural fluid drained     6. Hypertension  • Well controlled, continue to monitor  • Continue Lopressor     7. Hyperlipidemia  • Continue pravastatin     8. HILARY  • Creatinine 1.88, continue to monitor     9. Anemia  • Hgb 9.7, continue to monitor     10. T2DM  • A1c 5.1         Subjective:   Patient seen and examined. No significant events overnight. Patient is in a pleasant mood today and resting comfortably. Denies any new complaints at this time. All questions were answered. Objective:     Vitals: Blood pressure 113/84, pulse (!) 123, temperature (!) 97.4 °F (36.3 °C), temperature source Oral, resp. rate 21, height 5' 9" (1.753 m), weight 75.3 kg (166 lb), SpO2 97 %. , Body mass index is 24.51 kg/m².,   Orthostatic Blood Pressures    Flowsheet Row Most Recent Value   Blood Pressure 113/84 filed at 10/06/2023 1500   Patient Position - Orthostatic VS Sitting filed at 10/06/2023 1500            Intake/Output Summary (Last 24 hours) at 10/6/2023 1548  Last data filed at 10/6/2023 1026  Gross per 24 hour   Intake 1288.1 ml   Output 700 ml   Net 588.1 ml         Physical Exam:  GEN: Alert and oriented x 3, in no acute distress. Well appearing and well nourished. HEENT: Sclera anicteric, conjunctivae pink, mucous membranes moist. Oropharynx clear. NECK: Supple, no carotid bruits, no significant JVD. Trachea midline, no thyromegaly. HEART: Tachycardic, irregularly irregular rhythm, normal S1 and S2, no murmurs, clicks, gallops or rubs. PMI nondisplaced, no thrills. LUNGS: Decreased breath sounds; no wheezes, rales, or rhonchi. No increased work of breathing or signs of respiratory distress. ABDOMEN: Soft, nontender, nondistended, normoactive bowel sounds. EXTREMITIES: Skin warm and well perfused, no clubbing or cyanosis. 2+ right LE edema, 1+ left LE edema  NEURO: No focal findings. Normal speech. Mood and affect normal.   SKIN: Normal without suspicious lesions on exposed skin.         Medications:      Current Facility-Administered Medications:   •  amiodarone (CORDARONE) 900 mg in dextrose 5 % 500 mL infusion, 0.5 mg/min, Intravenous, Continuous, [place]ill Amirah Resendiz PA-C, Last Rate: 16.7 mL/hr at 10/06/23 1026, 0.5 mg/min at 10/06/23 1026  •  brimonidine tartrate 0.2 % ophthalmic solution 1 drop, 1 drop, Left Eye, BID, RADHA Pearson, 1 drop at 10/06/23 0840  •  finasteride (PROSCAR) tablet 5 mg, 5 mg, Oral, Daily, RADHA Pearson, 5 mg at 08/86/90 7960  •  folic acid (FOLVITE) tablet 1 mg, 1 mg, Oral, Daily, RADHA Pearson, 1 mg at 10/06/23 0839  •  heparin (porcine) 25,000 units in 0.45% NaCl 250 mL infusion (premix), 3-30 Units/kg/hr (Order-Specific), Intravenous, Titrated, RADHA Short, Last Rate: 16.1 mL/hr at 10/06/23 0350, 23 Units/kg/hr at 10/06/23 0350  •  heparin (porcine) injection 2,800 Units, 2,800 Units, Intravenous, Q6H PRN, RADHA Short, 2,800 Units at 10/06/23 0347  •  heparin (porcine) injection 5,600 Units, 5,600 Units, Intravenous, Q6H PRN, RADHA Short  •  insulin glargine (LANTUS) subcutaneous injection 2 Units 0.02 mL, 2 Units, Subcutaneous, HS, Willie Flores MD  •  insulin lispro (HumaLOG) 100 units/mL subcutaneous injection 1-6 Units, 1-6 Units, Subcutaneous, TID AC, 3 Units at 10/06/23 1423 **AND** Fingerstick Glucose (POCT), , , TID AC, RADHA Short  •  insulin lispro (HumaLOG) 100 units/mL subcutaneous injection 1-6 Units, 1-6 Units, Subcutaneous, HS, RADHA Short, 3 Units at 10/05/23 2121  •  insulin lispro (HumaLOG) 100 units/mL subcutaneous injection 3 Units, 3 Units, Subcutaneous, TID With Meals, Willie Flores MD, 3 Units at 10/06/23 1423  •  [START ON 10/7/2023] methylPREDNISolone sodium succinate (Solu-MEDROL) injection 40 mg, 40 mg, Intravenous, Daily, Willie Flores MD  •  metoprolol (LOPRESSOR) injection 5 mg, 5 mg, Intravenous, Q4H PRN, Hugh Salazar MD, 5 mg at 10/05/23 6994  •  metoprolol tartrate (LOPRESSOR) tablet 25 mg, 25 mg, Oral, Q6H, Shane Resendiz PA-C, 25 mg at 10/06/23 1422  •  pravastatin (PRAVACHOL) tablet 20 mg, 20 mg, Oral, Daily With Dinner, RADHA Short, 20 mg at 10/05/23 1726  •  timolol (TIMOPTIC) 0.5 % ophthalmic solution 1 drop, 1 drop, Both Eyes, Daily, RADHA Short, 1 drop at 10/06/23 0839  •  travoprost (TRAVATAN-Z) 0.004 % ophthalmic solution 1 drop, 1 drop, Both Eyes, HS, RADHA Short, 1 drop at 10/05/23 2121     Labs & Results:     Results from last 7 days   Lab Units 10/03/23  2159 10/03/23  1712 10/03/23  1436   HS TNI 0HR ng/L  --   --  5   HS TNI 2HR ng/L  --  5  --    HSTNI D2 ng/L  --  0  --    HS TNI 4HR ng/L 6 --   --    HSTNI D4 ng/L 1  --   --      Results from last 7 days   Lab Units 10/05/23  0434 10/04/23  0745 10/03/23  1603   WBC Thousand/uL 2.11* 1.54* 2.02*   HEMOGLOBIN g/dL 9.7* 9.7* 11.4*   HEMATOCRIT % 29.6* 29.0* 34.0*   PLATELETS Thousands/uL 94* 65* 67*         Results from last 7 days   Lab Units 10/05/23  0434 10/04/23  0745 10/03/23  1436   POTASSIUM mmol/L 4.1 3.6 4.0   CHLORIDE mmol/L 109* 109* 107   CO2 mmol/L 22 23 19*   BUN mg/dL 50* 43* 48*   CREATININE mg/dL 1.88* 1.79* 1.99*   CALCIUM mg/dL 8.4 8.5 8.8   ALK PHOS U/L  --   --  86   ALT U/L  --   --  25   AST U/L  --   --  20     Results from last 7 days   Lab Units 10/06/23  1008 10/06/23  0312 10/05/23  2006 10/03/23  2320 10/03/23  1556   INR   --   --   --   --  1.24*   PTT seconds 87* 55* 62*   < > 34    < > = values in this interval not displayed. Results from last 7 days   Lab Units 10/05/23  0434 10/04/23  0745 10/03/23  1556   MAGNESIUM mg/dL 2.7 2.7 2.4       Vitals: Blood pressure 113/84, pulse (!) 123, temperature (!) 97.4 °F (36.3 °C), temperature source Oral, resp. rate 21, height 5' 9" (1.753 m), weight 75.3 kg (166 lb), SpO2 97 %. , Body mass index is 24.51 kg/m².,   Orthostatic Blood Pressures    Flowsheet Row Most Recent Value   Blood Pressure 113/84 filed at 10/06/2023 1500   Patient Position - Orthostatic VS Sitting filed at 10/06/2023 6403          Systolic (64WZY), QSC:479 , Min:113 , XYU:405     Diastolic (67KBV), XGK:09, Min:73, Max:95        Intake/Output Summary (Last 24 hours) at 10/6/2023 1548  Last data filed at 10/6/2023 1026  Gross per 24 hour   Intake 1288.1 ml   Output 700 ml   Net 588.1 ml       Invasive Devices     Peripheral Intravenous Line  Duration           Peripheral IV 10/03/23 Left Antecubital 3 days    Peripheral IV 10/05/23 Left;Ventral (anterior) Forearm 1 day                  EKG: Atrial fibrillation with rapid ventricular response    Telemetry:  Telemetry Orders (From admission, onward) 24 Hour Telemetry Monitoring  Continuous x 24 Hours (Telem)        Question:  Reason for 24 Hour Telemetry  Answer:  Arrhythmias requiring acute medical intervention / PPM or ICD malfunction                 Telemetry Reviewed: Atrial fibrillation.  HR averaging 110-120  Indication for Continued Telemetry Use: Arrthymias requiring medical therapy    BP Readings from Last 3 Encounters:   10/06/23 113/84   09/19/23 158/76   08/23/23 140/82      Wt Readings from Last 3 Encounters:   10/04/23 75.3 kg (166 lb)   09/19/23 70.4 kg (155 lb 3.2 oz)   08/23/23 68.9 kg (152 lb)

## 2023-10-06 NOTE — ASSESSMENT & PLAN NOTE
· Diffuse metastatic disease, refer to CT imaging reports  · I went over imaging with patient and wife   · Discussed transition to level 3 code status, patient is undecided  · For now continue with stabilization of HR   · Once stabilized, further discuss goals of care  · Poor prognosis overall

## 2023-10-06 NOTE — ASSESSMENT & PLAN NOTE
· Present on admission  · Unclear etiology, could be from hypoperfusion in setting of Afib/CHF  · Could be in setting of hypovolemia from poor PO intake  · For now, stabilize HR, consult cardiology   · No acute renal pathology noted on CT imaging  · Trend creatinine, consider nephrology consult based on labs today

## 2023-10-06 NOTE — ASSESSMENT & PLAN NOTE
· Present on admission  · New onset afib with RVR  · Unclear etiology, EF is 35% with global hypokinesis   · Could be secondary to Afib vs ischemic cardiomyopathy   · Goals of care discussion are ongoing, palliative care consulted   · Heart rate is better controlled today, continue amiodarone infusion  · continue metoprolol, Monitor on telemetry

## 2023-10-06 NOTE — ASSESSMENT & PLAN NOTE
· Continue with IV solumedrol, taper to daily from BID  · Given afib with RVR, will start duonebz when HR improves/stabilizes

## 2023-10-06 NOTE — ASSESSMENT & PLAN NOTE
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

## 2023-10-06 NOTE — ASSESSMENT & PLAN NOTE
· Present on admission  · With associated right lower extremity DVT  · Likely provoked by malignancy and new onset Afib with RVR  · CT imaging showing diffuse metastatic disease  · Findings discussed with patient and his wife  · Continue on heparin drip  · Continue anticoagulation for now

## 2023-10-06 NOTE — ASSESSMENT & PLAN NOTE
Lab Results   Component Value Date    HGBA1C 5.1 07/07/2023       Recent Labs     10/05/23  1621 10/05/23  2052 10/06/23  0819 10/06/23  1129   POCGLU 216* 255* 201* 247*       Blood Sugar Average: Last 72 hrs:  (P) [id number]   · Could be better controlled   · Continue with sliding scale  · Add lispro and lantus today

## 2023-10-06 NOTE — PROGRESS NOTES
[address]  Progress Note  Name: Annie Ag  MRN: [de-identified]  Unit/Bed#:  I Date of Admission: 10/3/2023   Date of Service: 10/6/2023 I Hospital Day: 3    Assessment/Plan   * Acute pulmonary embolism ([address])  Assessment & Plan  · Present on admission  · With associated right lower extremity DVT  · Likely provoked by malignancy and new onset Afib with RVR  · CT imaging showing diffuse metastatic disease  · Findings discussed with patient and his wife  · Continue on heparin drip  · Continue anticoagulation for now    Atrial fibrillation with RVR ([address])  Assessment & Plan  · Present on admission  · New onset afib with RVR  · Unclear etiology, EF is 35% with global hypokinesis   · Could be secondary to Afib vs ischemic cardiomyopathy   · Goals of care discussion are ongoing, palliative care consulted   · Heart rate is better controlled today, continue amiodarone infusion  · continue metoprolol, Monitor on telemetry     Pleural effusion  Assessment & Plan  · Noted to have bilateral pleural effusions  · Small left sided, Moderate to large right sided with loculation  · Consulted IR for thoracentesis, 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  · Given EF 35%, could be transudative as well  · Status post thoracentesis on 10/5 with 2 liter fluid removal     COPD with acute exacerbation ([address])  Assessment & Plan  · Continue with IV solumedrol, taper to daily from BID  · Given afib with RVR, will start duonebz when HR improves/stabilizes    Abnormal CT of the abdomen  Assessment & Plan  · Diffuse metastatic disease, refer to CT imaging reports  · I went over imaging with patient and wife   · Discussed transition to level 3 code status, patient is undecided  · For now continue with stabilization of HR   · Once stabilized, further discuss goals of care  · Poor prognosis overall    HILARY (acute kidney injury) ([address])  Assessment & Plan  · Present on admission  · Unclear etiology, could be from hypoperfusion in setting of Afib/CHF  · Could be in setting of hypovolemia from poor PO intake  · For now, stabilize HR, consult cardiology   · No acute renal pathology noted on CT imaging  · Trend creatinine, consider nephrology consult based on labs today     Anemia  Assessment & Plan  · Hemoglobin with downtrend since arrival  · Does not report any active signs of bleeding  · Likely multifactorial in setting of iron deficiency and chronic disease  · Given metastatic burden, could have small occult bleed/oozing  · For now, trend hemoglobin  · Continue with heparin gtt- benefits outweigh risks    Controlled type 2 diabetes mellitus without complication, without long-term current use of insulin [facility])  Assessment & Plan  Lab Results   Component Value Date    HGBA1C 5.1 07/07/2023       Recent Labs     10/05/23  1621 10/05/23  2052 10/06/23  0819 10/06/23  1129   POCGLU 216* 255* 201* 247*       Blood Sugar Average: Last 72 hrs:  (P) [id number]   · Could be better controlled   · Continue with sliding scale  · Add lispro and lantus today              VTE Pharmacologic Prophylaxis:   Moderate Risk (Score 3-4) - Pharmacological DVT Prophylaxis Ordered: heparin drip. Patient Centered Rounds: I performed bedside rounds with nursing staff today. Discussions with Specialists or Other Care Team Provider: cardiology, palliative care     Education and Discussions with Family / Patient: Updated  (wife) via phone. Total Time Spent on Date of Encounter in care of patient: 30+ mins. This time was spent on one or more of the following: performing physical exam; counseling and coordination of care; obtaining or reviewing history; documenting in the medical record; reviewing/ordering tests, medications or procedures; communicating with other healthcare professionals and discussing with patient's family/caregivers. Current Length of Stay: 3 day(s)  Current Patient Status: Inpatient   Certification Statement:  The patient will continue to require additional inpatient hospital stay due to heart rate control, transition to PO anticoagulation, goals of care  Discharge Plan: Anticipate discharge in >72 hrs to discharge location to be determined pending rehab evaluations. Code Status: Level 1 - Full Code    Subjective:   Patient feels better today     Objective:     Vitals:   Temp (24hrs), Av.8 °F (36.6 °C), Min:97.5 °F (36.4 °C), Max:98 °F (36.7 °C)    Temp:  [97.5 °F (36.4 °C)-98 °F (36.7 °C)] 97.5 °F (36.4 °C)  HR:  [108-124] 112  Resp:  [19-31] 21  BP: (109-152)/(73-95) 125/73  SpO2:  [90 %-95 %] 95 %  Body mass index is 24.51 kg/m². Input and Output Summary (last 24 hours): Intake/Output Summary (Last 24 hours) at 10/6/2023 1303  Last data filed at 10/6/2023 1026  Gross per 24 hour   Intake 1288.1 ml   Output 700 ml   Net 588.1 ml       Physical Exam:   Physical Exam  Constitutional:       General: He is not in acute distress. Appearance: Normal appearance. He is not toxic-appearing. Cardiovascular:      Rate and Rhythm: Tachycardia present. Rhythm irregular. Heart sounds: Normal heart sounds. No murmur heard. Pulmonary:      Effort: Pulmonary effort is normal. No respiratory distress. Abdominal:      General: Abdomen is flat. There is no distension. Palpations: Abdomen is soft. Tenderness: There is no abdominal tenderness. Neurological:      General: No focal deficit present. Mental Status: He is alert and oriented to person, place, and time. Mental status is at baseline. Motor: No weakness.           Additional Data:     Labs:  Results from last 7 days   Lab Units 10/05/23  0434   WBC Thousand/uL 2.11*   HEMOGLOBIN g/dL 9.7*   HEMATOCRIT % 29.6*   PLATELETS Thousands/uL 94*   NEUTROS PCT % 47   LYMPHS PCT % 24   MONOS PCT % 25*   EOS PCT % 3     Results from last 7 days   Lab Units 10/05/23  0434 10/04/23  0745 10/03/23  1436   SODIUM mmol/L 138   < > 138   POTASSIUM mmol/L 4.1   < > 4.0   CHLORIDE mmol/L 109*   < > 107   CO2 mmol/L 22   < > 19*   BUN mg/dL 50*   < > 48*   CREATININE mg/dL 1.88*   < > 1.99*   ANION GAP mmol/L 7   < > 12   CALCIUM mg/dL 8.4   < > 8.8   ALBUMIN g/dL  --   --  3.7   TOTAL BILIRUBIN mg/dL  --   --  0.50   ALK PHOS U/L  --   --  86   ALT U/L  --   --  25   AST U/L  --   --  20   GLUCOSE RANDOM mg/dL 129   < > 169*    < > = values in this interval not displayed. Results from last 7 days   Lab Units 10/03/23  1556   INR  1.24*     Results from last 7 days   Lab Units 10/06/23  1129 10/06/23  0819 10/05/23  2052 10/05/23  1621 10/05/23  1225 10/05/23  0806 10/04/23  2143 10/04/23  1657 10/04/23  1129 10/04/23  0749 10/03/23  2156   POC GLUCOSE mg/dl 247* 201* 255* 216* 198* 142* 166* 139 103 94 130         Results from last 7 days   Lab Units 10/03/23  1556   LACTIC ACID mmol/L 1.9       Lines/Drains:  Invasive Devices     Peripheral Intravenous Line  Duration           Peripheral IV 10/03/23 Left Antecubital 2 days    Peripheral IV 10/05/23 Left;Ventral (anterior) Forearm 1 day                  Telemetry:  Telemetry Orders (From admission, onward)             24 Hour Telemetry Monitoring  Continuous x 24 Hours (Telem)        Question:  Reason for 24 Hour Telemetry  Answer:  Arrhythmias requiring acute medical intervention / PPM or ICD malfunction                              Imaging: No pertinent imaging reviewed. Recent Cultures (last 7 days):   Results from last 7 days   Lab Units 10/05/23  1243 10/03/23  1558 10/03/23  1556   BLOOD CULTURE   --  No Growth at 48 hrs. No Growth at 48 hrs.    GRAM STAIN RESULT  Rare Mononuclear Cells  No Polys or Bacteria seen  --   --    BODY FLUID CULTURE, STERILE  No growth  --   --        Last 24 Hours Medication List:   Current Facility-Administered Medications   Medication Dose Route Frequency Provider Last Rate   • amiodarone (CORDARONE) 900 mg in dextrose 5 % 500 mL infusion  0.5 mg/min Intravenous Continuous Adriana Angelo PA-C 0.5 mg/min (10/06/23 1026)   • brimonidine tartrate  1 drop Left Eye BID RADHA Gross     • finasteride  5 mg Oral Daily RADHA Gross     • folic acid  1 mg Oral Daily RADHA Gross     • heparin (porcine)  3-30 Units/kg/hr (Order-Specific) Intravenous Titrated RADHA Gross 23 Units/kg/hr (10/06/23 0350)   • heparin (porcine)  2,800 Units Intravenous Q6H PRN RADHA Gross     • heparin (porcine)  5,600 Units Intravenous Q6H PRN RADHA Gross     • insulin glargine  2 Units Subcutaneous HS Baltazar Shah MD     • insulin lispro  1-6 Units Subcutaneous TID AC RADHA Gross     • insulin lispro  1-6 Units Subcutaneous HS RADHA Gross     • insulin lispro  3 Units Subcutaneous TID With Meals Baltazar Shah MD     • [START ON 10/7/2023] methylPREDNISolone sodium succinate  40 mg Intravenous Daily Baltazar Shah MD     • metoprolol  5 mg Intravenous Q4H PRN Hugh Salazar MD     • metoprolol tartrate  25 mg Oral Q6H Adriana Angelo PA-C     • pravastatin  20 mg Oral Daily With [employer], GERALDNP     • timolol  1 drop Both Eyes Daily RADHA Gross     • travoprost  1 drop Both Eyes HS RADHA Gross          Today, Patient Was Seen By: Poncho Lopez MD    **Please Note: This note may have been constructed using a voice recognition system. **

## 2023-10-06 NOTE — PLAN OF CARE
Problem: PAIN - ADULT  Goal: Verbalizes/displays adequate comfort level or baseline comfort level  Description: Interventions:  - Encourage patient to monitor pain and request assistance  - Assess pain using appropriate pain scale  - Administer analgesics based on type and severity of pain and evaluate response  - Implement non-pharmacological measures as appropriate and evaluate response  - Consider cultural and social influences on pain and pain management  - Notify physician/advanced practitioner if interventions unsuccessful or patient reports new pain  Outcome: Progressing     Problem: INFECTION - ADULT  Goal: Absence or prevention of progression during hospitalization  Description: INTERVENTIONS:  - Assess and monitor for signs and symptoms of infection  - Monitor lab/diagnostic results  - Monitor all insertion sites, i.e. indwelling lines, tubes, and drains  - Monitor endotracheal if appropriate and nasal secretions for changes in amount and color  - [place] appropriate cooling/warming therapies per order  - Administer medications as ordered  - Instruct and encourage patient and family to use good hand hygiene technique  - Identify and instruct in appropriate isolation precautions for identified infection/condition  Outcome: Progressing  Goal: Absence of fever/infection during neutropenic period  Description: INTERVENTIONS:  - Monitor WBC    Outcome: Progressing     Problem: SAFETY ADULT  Goal: Patient will remain free of falls  Description: INTERVENTIONS:  - Educate patient/family on patient safety including physical limitations  - Instruct patient to call for assistance with activity   - Consult OT/PT to assist with strengthening/mobility   - Keep Call bell within reach  - Keep bed low and locked with side rails adjusted as appropriate  - Keep care items and personal belongings within reach  - Initiate and maintain comfort rounds  - Make Fall Risk Sign visible to staff  - Offer Toileting every 2 Hours, in advance of need  - Initiate/Maintain bed alarm  - Obtain necessary fall risk management equipment:   - Apply yellow socks and bracelet for high fall risk patients  - Consider moving patient to room near nurses station  Outcome: Progressing  Goal: Maintain or return to baseline ADL function  Description: INTERVENTIONS:  -  Assess patient's ability to carry out ADLs; assess patient's baseline for ADL function and identify physical deficits which impact ability to perform ADLs (bathing, care of mouth/teeth, toileting, grooming, dressing, etc.)  - Assess/evaluate cause of self-care deficits   - Assess range of motion  - Assess patient's mobility; develop plan if impaired  - Assess patient's need for assistive devices and provide as appropriate  - Encourage maximum independence but intervene and supervise when necessary  - Involve family in performance of ADLs  - Assess for home care needs following discharge   - Consider OT consult to assist with ADL evaluation and planning for discharge  - Provide patient education as appropriate  Outcome: Progressing  Goal: Maintains/Returns to pre admission functional level  Description: INTERVENTIONS:  - Perform BMAT or MOVE assessment daily.   - Set and communicate daily mobility goal to care team and patient/family/caregiver. - Collaborate with rehabilitation services on mobility goals if consulted  - Perform Range of Motion 3 times a day. - Reposition patient every 2 hours.   - Dangle patient 3 times a day  - Stand patient 3 times a day  - Ambulate patient 3 times a day  - Out of bed to chair 3 times a day   - Out of bed for meals 3 times a day  - Out of bed for toileting  - Record patient progress and toleration of activity level   Outcome: Progressing     Problem: DISCHARGE PLANNING  Goal: Discharge to home or other facility with appropriate resources  Description: INTERVENTIONS:  - Identify barriers to discharge w/patient and caregiver  - Arrange for needed discharge resources and transportation as appropriate  - Identify discharge learning needs (meds, wound care, etc.)  - Arrange for interpretive services to assist at discharge as needed  - Refer to Case Management Department for coordinating discharge planning if the patient needs post-hospital services based on physician/advanced practitioner order or complex needs related to functional status, cognitive ability, or social support system  Outcome: Progressing     Problem: Knowledge Deficit  Goal: Patient/family/caregiver demonstrates understanding of disease process, treatment plan, medications, and discharge instructions  Description: Complete learning assessment and assess knowledge base. Interventions:  - Provide teaching at level of understanding  - Provide teaching via preferred learning methods  Outcome: Progressing     Problem: Nutrition/Hydration-ADULT  Goal: Nutrient/Hydration intake appropriate for improving, restoring or maintaining nutritional needs  Description: Monitor and assess patient's nutrition/hydration status for malnutrition. Collaborate with interdisciplinary team and initiate plan and interventions as ordered. Monitor patient's weight and dietary intake as ordered or per policy. Utilize nutrition screening tool and intervene as necessary. Determine patient's food preferences and provide high-protein, high-caloric foods as appropriate.      INTERVENTIONS:  - Monitor oral intake, urinary output, labs, and treatment plans  - Assess nutrition and hydration status and recommend course of action  - Evaluate amount of meals eaten  - Assist patient with eating if necessary   - Allow adequate time for meals  - Recommend/ encourage appropriate diets, oral nutritional supplements, and vitamin/mineral supplements  - Order, calculate, and assess calorie counts as needed  - Recommend, monitor, and adjust tube feedings and TPN/PPN based on assessed needs  - Assess need for intravenous fluids  - Provide specific nutrition/hydration education as appropriate  - Include patient/family/caregiver in decisions related to nutrition  Outcome: Progressing

## 2023-10-07 ENCOUNTER — APPOINTMENT (INPATIENT)
Dept: RADIOLOGY | Facility: HOSPITAL | Age: 78
DRG: 175 | End: 2023-10-07
Payer: MEDICARE

## 2023-10-07 LAB
ALBUMIN SERPL BCP-MCNC: 3.1 G/DL (ref 3.5–5)
ALP SERPL-CCNC: 80 U/L (ref 34–104)
ALT SERPL W P-5'-P-CCNC: 29 U/L (ref 7–52)
ANION GAP SERPL CALCULATED.3IONS-SCNC: 8 MMOL/L
APTT PPP: 79 SECONDS (ref 23–37)
APTT PPP: 91 SECONDS (ref 23–37)
AST SERPL W P-5'-P-CCNC: 16 U/L (ref 13–39)
BASOPHILS # BLD AUTO: 0.01 THOUSANDS/ÂΜL (ref 0–0.1)
BASOPHILS NFR BLD AUTO: 0 % (ref 0–1)
BILIRUB SERPL-MCNC: 0.32 MG/DL (ref 0.2–1)
BUN SERPL-MCNC: 55 MG/DL (ref 5–25)
CALCIUM ALBUM COR SERPL-MCNC: 9.2 MG/DL (ref 8.3–10.1)
CALCIUM SERPL-MCNC: 8.5 MG/DL (ref 8.4–10.2)
CHLORIDE SERPL-SCNC: 105 MMOL/L (ref 96–108)
CO2 SERPL-SCNC: 21 MMOL/L (ref 21–32)
CREAT SERPL-MCNC: 2.02 MG/DL (ref 0.6–1.3)
EOSINOPHIL # BLD AUTO: 0.01 THOUSAND/ÂΜL (ref 0–0.61)
EOSINOPHIL NFR BLD AUTO: 0 % (ref 0–6)
ERYTHROCYTE [DISTWIDTH] IN BLOOD BY AUTOMATED COUNT: 14.6 % (ref 11.6–15.1)
GFR SERPL CREATININE-BSD FRML MDRD: 30 ML/MIN/1.73SQ M
GLUCOSE SERPL-MCNC: 129 MG/DL (ref 65–140)
GLUCOSE SERPL-MCNC: 141 MG/DL (ref 65–140)
GLUCOSE SERPL-MCNC: 145 MG/DL (ref 65–140)
GLUCOSE SERPL-MCNC: 165 MG/DL (ref 65–140)
GLUCOSE SERPL-MCNC: 166 MG/DL (ref 65–140)
HCT VFR BLD AUTO: 30.6 % (ref 36.5–49.3)
HGB BLD-MCNC: 10.3 G/DL (ref 12–17)
IMM GRANULOCYTES # BLD AUTO: 0.1 THOUSAND/UL (ref 0–0.2)
IMM GRANULOCYTES NFR BLD AUTO: 1 % (ref 0–2)
LYMPHOCYTES # BLD AUTO: 0.55 THOUSANDS/ÂΜL (ref 0.6–4.47)
LYMPHOCYTES NFR BLD AUTO: 8 % (ref 14–44)
MAGNESIUM SERPL-MCNC: 2.5 MG/DL (ref 1.9–2.7)
MCH RBC QN AUTO: 32 PG (ref 26.8–34.3)
MCHC RBC AUTO-ENTMCNC: 33.7 G/DL (ref 31.4–37.4)
MCV RBC AUTO: 95 FL (ref 82–98)
MONOCYTES # BLD AUTO: 0.93 THOUSAND/ÂΜL (ref 0.17–1.22)
MONOCYTES NFR BLD AUTO: 13 % (ref 4–12)
NEUTROPHILS # BLD AUTO: 5.67 THOUSANDS/ÂΜL (ref 1.85–7.62)
NEUTS SEG NFR BLD AUTO: 78 % (ref 43–75)
NRBC BLD AUTO-RTO: 0 /100 WBCS
PLATELET # BLD AUTO: 135 THOUSANDS/UL (ref 149–390)
PMV BLD AUTO: 12.7 FL (ref 8.9–12.7)
POTASSIUM SERPL-SCNC: 4 MMOL/L (ref 3.5–5.3)
PROT SERPL-MCNC: 5.5 G/DL (ref 6.4–8.4)
RBC # BLD AUTO: 3.22 MILLION/UL (ref 3.88–5.62)
SODIUM SERPL-SCNC: 134 MMOL/L (ref 135–147)
WBC # BLD AUTO: 7.27 THOUSAND/UL (ref 4.31–10.16)

## 2023-10-07 PROCEDURE — 99497 ADVNCD CARE PLAN 30 MIN: CPT | Performed by: STUDENT IN AN ORGANIZED HEALTH CARE EDUCATION/TRAINING PROGRAM

## 2023-10-07 PROCEDURE — 80053 COMPREHEN METABOLIC PANEL: CPT | Performed by: STUDENT IN AN ORGANIZED HEALTH CARE EDUCATION/TRAINING PROGRAM

## 2023-10-07 PROCEDURE — 99233 SBSQ HOSP IP/OBS HIGH 50: CPT | Performed by: STUDENT IN AN ORGANIZED HEALTH CARE EDUCATION/TRAINING PROGRAM

## 2023-10-07 PROCEDURE — 82948 REAGENT STRIP/BLOOD GLUCOSE: CPT

## 2023-10-07 PROCEDURE — 99232 SBSQ HOSP IP/OBS MODERATE 35: CPT | Performed by: INTERNAL MEDICINE

## 2023-10-07 PROCEDURE — 83735 ASSAY OF MAGNESIUM: CPT | Performed by: STUDENT IN AN ORGANIZED HEALTH CARE EDUCATION/TRAINING PROGRAM

## 2023-10-07 PROCEDURE — 71045 X-RAY EXAM CHEST 1 VIEW: CPT

## 2023-10-07 PROCEDURE — 85730 THROMBOPLASTIN TIME PARTIAL: CPT | Performed by: STUDENT IN AN ORGANIZED HEALTH CARE EDUCATION/TRAINING PROGRAM

## 2023-10-07 PROCEDURE — 85025 COMPLETE CBC W/AUTO DIFF WBC: CPT | Performed by: STUDENT IN AN ORGANIZED HEALTH CARE EDUCATION/TRAINING PROGRAM

## 2023-10-07 RX ORDER — INSULIN LISPRO 100 [IU]/ML
5 INJECTION, SOLUTION INTRAVENOUS; SUBCUTANEOUS
Status: DISCONTINUED | OUTPATIENT
Start: 2023-10-07 | End: 2023-10-16 | Stop reason: HOSPADM

## 2023-10-07 RX ORDER — AMIODARONE HYDROCHLORIDE 200 MG/1
200 TABLET ORAL
Status: DISCONTINUED | OUTPATIENT
Start: 2023-10-07 | End: 2023-10-16 | Stop reason: HOSPADM

## 2023-10-07 RX ORDER — FUROSEMIDE 10 MG/ML
40 INJECTION INTRAMUSCULAR; INTRAVENOUS ONCE
Status: COMPLETED | OUTPATIENT
Start: 2023-10-07 | End: 2023-10-07

## 2023-10-07 RX ORDER — FUROSEMIDE 10 MG/ML
40 INJECTION INTRAMUSCULAR; INTRAVENOUS
Status: DISCONTINUED | OUTPATIENT
Start: 2023-10-07 | End: 2023-10-07

## 2023-10-07 RX ORDER — INSULIN GLARGINE 100 [IU]/ML
4 INJECTION, SOLUTION SUBCUTANEOUS
Status: DISCONTINUED | OUTPATIENT
Start: 2023-10-07 | End: 2023-10-16 | Stop reason: HOSPADM

## 2023-10-07 RX ORDER — FUROSEMIDE 10 MG/ML
40 INJECTION INTRAMUSCULAR; INTRAVENOUS ONCE
Status: DISCONTINUED | OUTPATIENT
Start: 2023-10-07 | End: 2023-10-07

## 2023-10-07 RX ADMIN — METHYLPREDNISOLONE SODIUM SUCCINATE 40 MG: 40 INJECTION, POWDER, FOR SOLUTION INTRAMUSCULAR; INTRAVENOUS at 08:44

## 2023-10-07 RX ADMIN — METOPROLOL TARTRATE 37.5 MG: 25 TABLET, FILM COATED ORAL at 16:29

## 2023-10-07 RX ADMIN — INSULIN LISPRO 5 UNITS: 100 INJECTION, SOLUTION INTRAVENOUS; SUBCUTANEOUS at 17:53

## 2023-10-07 RX ADMIN — METOPROLOL TARTRATE 25 MG: 25 TABLET, FILM COATED ORAL at 02:23

## 2023-10-07 RX ADMIN — APIXABAN 10 MG: 5 TABLET, FILM COATED ORAL at 17:53

## 2023-10-07 RX ADMIN — INSULIN LISPRO 3 UNITS: 100 INJECTION, SOLUTION INTRAVENOUS; SUBCUTANEOUS at 08:45

## 2023-10-07 RX ADMIN — APIXABAN 10 MG: 5 TABLET, FILM COATED ORAL at 13:07

## 2023-10-07 RX ADMIN — METOPROLOL TARTRATE 37.5 MG: 25 TABLET, FILM COATED ORAL at 20:10

## 2023-10-07 RX ADMIN — INSULIN LISPRO 1 UNITS: 100 INJECTION, SOLUTION INTRAVENOUS; SUBCUTANEOUS at 17:52

## 2023-10-07 RX ADMIN — BRIMONIDINE TARTRATE 1 DROP: 2 SOLUTION/ DROPS OPHTHALMIC at 10:22

## 2023-10-07 RX ADMIN — BRIMONIDINE TARTRATE 1 DROP: 2 SOLUTION/ DROPS OPHTHALMIC at 17:53

## 2023-10-07 RX ADMIN — FINASTERIDE 5 MG: 5 TABLET, FILM COATED ORAL at 08:45

## 2023-10-07 RX ADMIN — FUROSEMIDE 40 MG: 10 INJECTION, SOLUTION INTRAMUSCULAR; INTRAVENOUS at 10:22

## 2023-10-07 RX ADMIN — INSULIN LISPRO 1 UNITS: 100 INJECTION, SOLUTION INTRAVENOUS; SUBCUTANEOUS at 21:25

## 2023-10-07 RX ADMIN — FOLIC ACID 1 MG: 1 TABLET ORAL at 08:45

## 2023-10-07 RX ADMIN — INSULIN LISPRO 5 UNITS: 100 INJECTION, SOLUTION INTRAVENOUS; SUBCUTANEOUS at 13:08

## 2023-10-07 RX ADMIN — PRAVASTATIN SODIUM 20 MG: 20 TABLET ORAL at 16:29

## 2023-10-07 RX ADMIN — AMIODARONE HYDROCHLORIDE 200 MG: 200 TABLET ORAL at 16:29

## 2023-10-07 RX ADMIN — TIMOLOL MALEATE 1 DROP: 5 SOLUTION/ DROPS OPHTHALMIC at 10:22

## 2023-10-07 RX ADMIN — AMIODARONE HYDROCHLORIDE 200 MG: 200 TABLET ORAL at 13:08

## 2023-10-07 RX ADMIN — METOPROLOL TARTRATE 25 MG: 25 TABLET, FILM COATED ORAL at 08:45

## 2023-10-07 RX ADMIN — TRAVOPROST 1 DROP: 0.04 SOLUTION OPHTHALMIC at 21:26

## 2023-10-07 RX ADMIN — INSULIN GLARGINE 4 UNITS: 100 INJECTION, SOLUTION SUBCUTANEOUS at 21:25

## 2023-10-07 NOTE — ASSESSMENT & PLAN NOTE
· Present on admission  · With associated right lower extremity DVT  · Likely provoked by malignancy and new onset Afib with RVR  · CT imaging showing diffuse metastatic disease  · Findings discussed with patient and his wife  · Continue on heparin drip  · Patient's insurance copay for eliquis is >$500  · Will transition him for now to eliquis and provide good RX coupons to facilitate medication as an outpatient

## 2023-10-07 NOTE — ASSESSMENT & PLAN NOTE
· Present on admission  · New onset afib with RVR  · Unclear etiology, EF is 35% with global hypokinesis   · Could be secondary to Afib vs ischemic cardiomyopathy   · Goals of care discussion are ongoing, palliative care consulted   · Heart rate is better controlled today, continue amiodarone infusion  · Transitioning to PO amiodarone, taper infusion  · continue metoprolol, Monitor on telemetry

## 2023-10-07 NOTE — ASSESSMENT & PLAN NOTE
Lab Results   Component Value Date    HGBA1C 5.1 07/07/2023       Recent Labs     10/06/23  1129 10/06/23  1608 10/06/23  2140 10/07/23  0805   POCGLU 247* 213* 209* 129       Blood Sugar Average: Last 72 hrs:  (P) [id number]   · Could be better controlled   · Continue with sliding scale  · Increase lispro to 5 units and lantus to 4 units

## 2023-10-07 NOTE — CASE MANAGEMENT
Case Management Discharge Planning Note    Patient name Shann Gowers  Location /ICU 5 MRN [id number]  : 1945 Date 10/6/2023       Current Admission Date: 10/3/2023  Current Admission Diagnosis:Acute pulmonary embolism [facility])   Patient Active Problem List    Diagnosis Date Noted   • COPD with acute exacerbation ([address]) 10/05/2023   • Atrial fibrillation with RVR ([address]) 10/03/2023   • HILARY (acute kidney injury) ([address]) 10/03/2023   • Abnormal CT of the abdomen 10/03/2023   • Platelets decreased ([address]) 2023   • Multiple lung nodules on CT 10/06/2022   • 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 ([address]) 10/06/2022   • Pleural effusion 10/06/2022   • Malignant neoplasm of upper lobe of right lung [facility])    • Acute pulmonary embolism ([address]) 09/10/2022   • Pulmonary mass 09/10/2022   • Anemia 09/10/2022   • Non-recurrent bilateral inguinal hernia without obstruction or gangrene 2021   • Status post right tibial-talar ankle fusion 2020   • BPH associated with nocturia 2019   • Arthritis of right acromioclavicular joint 03/15/2019   • Primary osteoarthritis of right shoulder 2019   • Chronic left shoulder pain 2019   • Left rotator cuff tear arthropathy 2019   • Rotator cuff injury, right, initial encounter 2019   • History of colon polyps 2019   • Hyperparathyroidism ([address]) 2018   • Hypokalemia 10/22/2018   • Hypocalcemia 10/22/2018   • Glaucoma 10/11/2018   • Controlled type 2 diabetes mellitus without complication, without long-term current use of insulin ([address]) 2016   • Inguinal hernia 2016   • Benign essential HTN 2016   • Hypercholesterolemia 2016      LOS (days): 3  Geometric Mean LOS (GMLOS) (days): 4.00  Days to GMLOS:0.9     OBJECTIVE:  Risk of Unplanned Readmission Score: 25.6         Current admission status: Inpatient   Preferred Pharmacy:   [facility] [address], [place] - [address]  [address] Cheryl Dickerson [address] [facility]  Phone: [phone] Fax: 12 [address], [address] [facility] [place] [address] [facility] [address] [place] [address]  Phone: [phone] Fax: [phone]    [facility] - [address] [facility] [place] [facility] [place] [address]  Phone: [phone] Fax: [phone]    Primary Care Provider: Max Shepard PA-C    Primary Insurance: MEDICARE  Secondary Insurance: Logan Wagner    DISCHARGE DETAILS:                                          Other Referral/Resources/Interventions Provided:  Referral Comments: Pt remains in ICU on IV amioderone gtt, IV lopressor, IV solumedrol. MD hoping for [address] [place] discussion w pt and family. Per SLIM rounds this AM, d/c anticipated in 48 hrs. CM will continue to follow.

## 2023-10-07 NOTE — ASSESSMENT & PLAN NOTE
· Hemoglobin with downtrend since arrival but now stabilized  · Does not report any active signs of bleeding  · Likely multifactorial in setting of iron deficiency and chronic disease  · Given metastatic burden, could have small occult bleed/oozing  · Stable for continuation of anticoagulation, transitioned to eliquis

## 2023-10-07 NOTE — ASSESSMENT & PLAN NOTE
· Diffuse metastatic disease, refer to CT imaging reports  · I went over imaging with patient and wife   · Discussed transition to level 3 code status, patient is undecided  · For now continue with stabilization of HR   · Once stabilized, further discuss goals of care  · Goals of care discussion completed with patient's wife and daughters  · As of right now, family is leaning towards level 3 but not comfort care  · Poor prognosis overall

## 2023-10-07 NOTE — ASSESSMENT & PLAN NOTE
· Present on admission  · Unclear etiology, could be from hypoperfusion in setting of Afib/CHF  · At this time suspecting hypovolemia from decompensated heart failure  · Patient has been receiving fluids with amiodarone and heparin drips but creatinine has not improved  · CXR today does show some mild vascular congestion and lower extremity edema +  · Given dose of IV lasix, continue for now   · Monitor creatinine

## 2023-10-07 NOTE — PROGRESS NOTES
Cardiology Progress Note - Jordi Aden 69 y.o. male MRN: [de-identified]    Unit/Bed#:  Encounter: [id number]      Assessment/Plan:  1.  New onset atrial fibrillation with RVR  • Telemetry shows A-fib with HR averaging 110-120 today  • Increase Lopressor to 37.5 mg q6h, transition to amiodarone 200 mg po tid  • Transition to oral AC per primary service     2.  Acute PE/DVT, history of PE  • Management per primary service     3.  New onset CM with EF 35%  • Echo shows EF 35%, severe global hypokinesis, mildly dilated RV with mildly reduced RV function, biatrial dilation, mild to moderate AI, moderate MR, mild TR, mildly dilated aortic root, small pericardial effusion  • Likely tachycardia induced cardiomyopathy, however cannot exclude possible alternative etiologies. Given progressive malignant lung neoplasm, plan for optimization of medical management at this time. • Increase Lopressor to 37.5 mg q6h, no ACE/ARB at this time due to HILARY and soft Bps  • Mildly hypervolemic, x1 dose IV Lasix 40 mg ordered     4.  Malignant lung neoplasm s/p radiation therapy  • CT shows significant progression of metastatic disease throughout the left lung with new enlarging nodules/masses  • Goals of care discussion ongoing     5.  Bilateral pleural effusions  • S/p IR thoracentesis - 1960 mL clear yellow right pleural fluid drained     6.  Hypertension  • BP running borderline soft, continue to monitor  • Increase Lopressor to 37.5 mg q6h     7.  Hyperlipidemia  • Continue pravastatin     8.  HILARY  • Creatinine trending up, likely cardiorenal syndrome, continue to monitor  • X1 dose IV Lasix 40 mg ordered     9.  Anemia  • Hgb stable     10.  T2DM, A1c 5.1         Subjective:   Patient seen and examined. No significant events overnight. Patient resting comfortably. He is in a positive mood today. Denies any new complaints at this time. All questions were answered.     Objective:     Vitals: Blood pressure 109/74, pulse (!) 130, temperature 97.7 °F (36.5 °C), temperature source Oral, resp. rate (!) 30, height 5' 9" (1.753 m), weight 75.3 kg (166 lb), SpO2 96 %. , Body mass index is 24.51 kg/m².,   Orthostatic Blood Pressures    Flowsheet Row Most Recent Value   Blood Pressure 109/74 filed at 10/07/2023 1121   Patient Position - Orthostatic VS Sitting filed at 10/07/2023 1121            Intake/Output Summary (Last 24 hours) at 10/7/2023 1327  Last data filed at 10/7/2023 0801  Gross per 24 hour   Intake 954.01 ml   Output 1425 ml   Net -470.99 ml         Physical Exam:  GEN: Alert and oriented x 3, in no acute distress. Well appearing and well nourished. HEENT: Sclera anicteric, conjunctivae pink, mucous membranes moist. Oropharynx clear. NECK: Supple, no carotid bruits, no significant JVD. Trachea midline, no thyromegaly. HEART: Tachycardic, irregularly irregular rhythm, normal S1 and S2, no murmurs, clicks, gallops or rubs. PMI nondisplaced, no thrills. LUNGS: Decreased breath sounds; no wheezes, rales, or rhonchi. No increased work of breathing or signs of respiratory distress. ABDOMEN: Soft, nontender, nondistended, normoactive bowel sounds. EXTREMITIES: Skin warm and well perfused, no clubbing or cyanosis. 2+ right LE edema, 1+ left LE edema. NEURO: No focal findings. Normal speech. Mood and affect normal.   SKIN: Normal without suspicious lesions on exposed skin.         Medications:      Current Facility-Administered Medications:   •  amiodarone tablet 200 mg, 200 mg, Oral, TID With Meals, Vicki Black PA-C, 200 mg at 10/07/23 1308  •  apixaban (ELIQUIS) tablet 10 mg, 10 mg, Oral, BID, Nura Schuster MD, 10 mg at 10/07/23 1307  •  brimonidine tartrate 0.2 % ophthalmic solution 1 drop, 1 drop, Left Eye, BID, RADHA Daily, 1 drop at 10/07/23 1022  •  finasteride (PROSCAR) tablet 5 mg, 5 mg, Oral, Daily, RADHA Daily, 5 mg at 19/56/05 0735  •  folic acid (FOLVITE) tablet 1 mg, 1 mg, Oral, Daily, RADHA Avila, 1 mg at 10/07/23 0845  •  insulin glargine (LANTUS) subcutaneous injection 4 Units 0.04 mL, 4 Units, Subcutaneous, HS, Karen Araujo MD  •  insulin lispro (HumaLOG) 100 units/mL subcutaneous injection 1-6 Units, 1-6 Units, Subcutaneous, TID AC, 2 Units at 10/06/23 1732 **AND** Fingerstick Glucose (POCT), , , TID AC, RADHA Avila  •  insulin lispro (HumaLOG) 100 units/mL subcutaneous injection 1-6 Units, 1-6 Units, Subcutaneous, HS, RADHA Avila, 2 Units at 10/06/23 2144  •  insulin lispro (HumaLOG) 100 units/mL subcutaneous injection 5 Units, 5 Units, Subcutaneous, TID With Meals, Karen Araujo MD, 5 Units at 10/07/23 1308  •  methylPREDNISolone sodium succinate (Solu-MEDROL) injection 40 mg, 40 mg, Intravenous, Daily, Karen Araujo MD, 40 mg at 10/07/23 0844  •  metoprolol tartrate (LOPRESSOR) partial tablet 37.5 mg, 37.5 mg, Oral, Q6H, Naila Dumont PA-C  •  pravastatin (PRAVACHOL) tablet 20 mg, 20 mg, Oral, Daily With Dinner, RADHA Avila, 20 mg at 10/06/23 1734  •  timolol (TIMOPTIC) 0.5 % ophthalmic solution 1 drop, 1 drop, Both Eyes, Daily, RADHA Avila, 1 drop at 10/07/23 1022  •  travoprost (TRAVATAN-Z) 0.004 % ophthalmic solution 1 drop, 1 drop, Both Eyes, HS, RADHA Avila, 1 drop at 10/06/23 2145     Labs & Results:     Results from last 7 days   Lab Units 10/03/23  2159 10/03/23  1712 10/03/23  1436   HS TNI 0HR ng/L  --   --  5   HS TNI 2HR ng/L  --  5  --    HSTNI D2 ng/L  --  0  --    HS TNI 4HR ng/L 6  --   --    HSTNI D4 ng/L 1  --   --      Results from last 7 days   Lab Units 10/07/23  0546 10/06/23  1804 10/05/23  0434   WBC Thousand/uL 7.27 7.17 2.11*   HEMOGLOBIN g/dL 10.3* 10.6* 9.7*   HEMATOCRIT % 30.6* 31.9* 29.6*   PLATELETS Thousands/uL 135* 145* 94*         Results from last 7 days   Lab Units 10/07/23  0546 10/06/23  1809 10/05/23  0434 10/04/23  0745 10/03/23  1436   POTASSIUM mmol/L 4.0 4.3 4.1   < > 4.0   CHLORIDE mmol/L 105 104 109*   < > 107   CO2 mmol/L 21 19* 22   < > 19*   BUN mg/dL 55* 56* 50*   < > 48*   CREATININE mg/dL 2.02* 2.13* 1.88*   < > 1.99*   CALCIUM mg/dL 8.5 8.7 8.4   < > 8.8   ALK PHOS U/L 80 78  --   --  86   ALT U/L 29 28  --   --  25   AST U/L 16 17  --   --  20    < > = values in this interval not displayed. Results from last 7 days   Lab Units 10/07/23  0546 10/06/23  2333 10/06/23  1612 10/03/23  2320 10/03/23  1556   INR   --   --   --   --  1.24*   PTT seconds 91* 79* 47*   < > 34    < > = values in this interval not displayed. Results from last 7 days   Lab Units 10/07/23  0546 10/06/23  1809 10/05/23  0434   MAGNESIUM mg/dL 2.5 2.6 2.7       Vitals: Blood pressure 109/74, pulse (!) 130, temperature 97.7 °F (36.5 °C), temperature source Oral, resp. rate (!) 30, height 5' 9" (1.753 m), weight 75.3 kg (166 lb), SpO2 96 %. , Body mass index is 24.51 kg/m².,   Orthostatic Blood Pressures    Flowsheet Row Most Recent Value   Blood Pressure 109/74 filed at 10/07/2023 1121   Patient Position - Orthostatic VS Sitting filed at 10/07/2023 4596          Systolic (41ROS), AE , Min:104 , VYK:311     Diastolic (01VNZ), ISZ:54, Min:62, Max:88        Intake/Output Summary (Last 24 hours) at 10/7/2023 1327  Last data filed at 10/7/2023 0801  Gross per 24 hour   Intake 954.01 ml   Output 1425 ml   Net -470.99 ml       Invasive Devices     Peripheral Intravenous Line  Duration           Peripheral IV 10/03/23 Left Antecubital 3 days    Peripheral IV 10/05/23 Left;Ventral (anterior) Forearm 2 days                  EKG: Atrial fibrillation with rapid ventricular response    Telemetry:  Telemetry Orders (From admission, onward)             24 Hour Telemetry Monitoring  Continuous x 24 Hours (Telem)        Question:  Reason for 24 Hour Telemetry  Answer:  Arrhythmias requiring acute medical intervention / PPM or ICD malfunction                 Telemetry Reviewed: Atrial fibrillation.  HR averaging 110-120s  Indication for Continued Telemetry Use: Arrthymias requiring medical therapy    BP Readings from Last 3 Encounters:   10/07/23 109/74   09/19/23 158/76   08/23/23 140/82      Wt Readings from Last 3 Encounters:   10/04/23 75.3 kg (166 lb)   09/19/23 70.4 kg (155 lb 3.2 oz)   08/23/23 68.9 kg (152 lb)

## 2023-10-07 NOTE — ASSESSMENT & PLAN NOTE
· Continue with IV solumedrol, tapered to daily from BID on 10/6  · Still with some wheeze, but improving  · Continue IV solumedrol daily for now   · Given afib with RVR, will start duonebz when HR improves/stabilizes

## 2023-10-07 NOTE — ASSESSMENT & PLAN NOTE
· Noted to have bilateral pleural effusions  · Small left sided, Moderate to large right sided with loculation  · Consulted IR for thoracentesis, 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  · Given EF 35%, could be transudative as well  · Status post thoracentesis on 10/5 with 2 liter fluid removal   · CXR today does not show re accumulation

## 2023-10-07 NOTE — PROGRESS NOTES
[address]  Progress Note  Name: Filiberto Curran  MRN: [de-identified]  Unit/Bed#:  I Date of Admission: 10/3/2023   Date of Service: 10/7/2023 I Hospital Day: 4    Assessment/Plan   * Acute pulmonary embolism [facility])  Assessment & Plan  Present on admission  With associated right lower extremity DVT  Likely provoked by malignancy and new onset Afib with RVR  CT imaging showing diffuse metastatic disease  Findings discussed with patient and his wife  Continue on heparin drip  Patient's insurance copay for eliquis is >$500  Will transition him for now to eliquis and provide good RX coupons to facilitate medication as an outpatient    Atrial fibrillation with RVR ([address])  Assessment & Plan  Present on admission  New onset afib with RVR  Unclear etiology, EF is 35% with global hypokinesis   Could be secondary to Afib vs ischemic cardiomyopathy   Goals of care discussion are ongoing, palliative care consulted   Heart rate is better controlled today, continue amiodarone infusion  Transitioning to PO amiodarone, taper infusion  continue metoprolol, Monitor on telemetry     Pleural effusion  Assessment & Plan  Noted to have bilateral pleural effusions  Small left sided, Moderate to large right sided with loculation  Consulted IR for thoracentesis, 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  Given EF 35%, could be transudative as well  Status post thoracentesis on 10/5 with 2 liter fluid removal   CXR today does not show re accumulation     COPD with acute exacerbation ([address])  Assessment & Plan  Continue with IV solumedrol, tapered to daily from BID on 10/6  Still with some wheeze, but improving  Continue IV solumedrol daily for now   Given afib with RVR, will start duonebz when HR improves/stabilizes    Abnormal CT of the abdomen  Assessment & Plan  Diffuse metastatic disease, refer to CT imaging reports  I went over imaging with patient and wife   Discussed transition to level 3 code status, patient is undecided  For now continue with stabilization of HR   Once stabilized, further discuss goals of care  Goals of care discussion completed with patient's wife and daughters  As of right now, family is leaning towards level 3 but not comfort care  Poor prognosis overall    HILARY (acute kidney injury) [facility])  Assessment & Plan  Present on admission  Unclear etiology, could be from hypoperfusion in setting of Afib/CHF  At this time suspecting hypovolemia from decompensated heart failure  Patient has been receiving fluids with amiodarone and heparin drips but creatinine has not improved  CXR today does show some mild vascular congestion and lower extremity edema +  Given dose of IV lasix, continue for now   Monitor creatinine     Anemia  Assessment & Plan  Hemoglobin with downtrend since arrival but now stabilized  Does not report any active signs of bleeding  Likely multifactorial in setting of iron deficiency and chronic disease  Given metastatic burden, could have small occult bleed/oozing  Stable for continuation of anticoagulation, transitioned to eliquis     Controlled type 2 diabetes mellitus without complication, without long-term current use of insulin [facility])  Assessment & Plan  Lab Results   Component Value Date    HGBA1C 5.1 07/07/2023       Recent Labs     10/06/23  1129 10/06/23  1608 10/06/23  2140 10/07/23  0805   POCGLU 247* 213* 209* 129       Blood Sugar Average: Last 72 hrs:  (P) [id number]   Could be better controlled   Continue with sliding scale  Increase lispro to 5 units and lantus to 4 units              VTE Pharmacologic Prophylaxis:   Moderate Risk (Score 3-4) - Pharmacological DVT Prophylaxis Ordered: apixaban (Eliquis). Patient Centered Rounds: I performed bedside rounds with nursing staff today. Discussions with Specialists or Other Care Team Provider: cardiology         Total Time Spent on Date of Encounter in care of patient: 30+ mins.  This time was spent on one or more of the following: performing physical exam; counseling and coordination of care; obtaining or reviewing history; documenting in the medical record; reviewing/ordering tests, medications or procedures; communicating with other healthcare professionals and discussing with patient's family/caregivers. Current Length of Stay: 4 day(s)  Current Patient Status: Inpatient   Certification Statement: The patient will continue to require additional inpatient hospital stay due to rate control, anticoagulation, HILARY  Discharge Plan: Anticipate discharge in >72 hrs to home. Code Status: Level 1 - Full Code    Subjective:   Patient feels well today     Objective:     Vitals:   Temp (24hrs), Av.9 °F (36.6 °C), Min:97.4 °F (36.3 °C), Max:98.1 °F (36.7 °C)    Temp:  [97.4 °F (36.3 °C)-98.1 °F (36.7 °C)] 98.1 °F (36.7 °C)  HR:  [116-132] 132  Resp:  [20-26] 26  BP: (104-119)/(62-88) 119/81  SpO2:  [95 %-97 %] 95 %  Body mass index is 24.51 kg/m². Input and Output Summary (last 24 hours): Intake/Output Summary (Last 24 hours) at 10/7/2023 1114  Last data filed at 10/7/2023 0801  Gross per 24 hour   Intake 954.01 ml   Output 1425 ml   Net -470.99 ml       Physical Exam:   Physical Exam  Constitutional:       General: He is not in acute distress. Appearance: Normal appearance. He is not toxic-appearing. Cardiovascular:      Rate and Rhythm: Normal rate and regular rhythm. Heart sounds: Normal heart sounds. No murmur heard. Pulmonary:      Effort: Pulmonary effort is normal. No respiratory distress. Breath sounds: Wheezing and rales present. Abdominal:      General: Abdomen is flat. There is no distension. Palpations: Abdomen is soft. Tenderness: There is no abdominal tenderness. Neurological:      General: No focal deficit present. Mental Status: He is alert and oriented to person, place, and time. Mental status is at baseline. Motor: No weakness.           Additional Data: Labs:  Results from last 7 days   Lab Units 10/07/23  0546   WBC Thousand/uL 7.27   HEMOGLOBIN g/dL 10.3*   HEMATOCRIT % 30.6*   PLATELETS Thousands/uL 135*   NEUTROS PCT % 78*   LYMPHS PCT % 8*   MONOS PCT % 13*   EOS PCT % 0     Results from last 7 days   Lab Units 10/07/23  0546   SODIUM mmol/L 134*   POTASSIUM mmol/L 4.0   CHLORIDE mmol/L 105   CO2 mmol/L 21   BUN mg/dL 55*   CREATININE mg/dL 2.02*   ANION GAP mmol/L 8   CALCIUM mg/dL 8.5   ALBUMIN g/dL 3.1*   TOTAL BILIRUBIN mg/dL 0.32   ALK PHOS U/L 80   ALT U/L 29   AST U/L 16   GLUCOSE RANDOM mg/dL 145*     Results from last 7 days   Lab Units 10/03/23  1556   INR  1.24*     Results from last 7 days   Lab Units 10/07/23  0805 10/06/23  2140 10/06/23  1608 10/06/23  1129 10/06/23  0819 10/05/23  2052 10/05/23  1621 10/05/23  1225 10/05/23  0806 10/04/23  2143 10/04/23  1657 10/04/23  1129   POC GLUCOSE mg/dl 129 209* 213* 247* 201* 255* 216* 198* 142* 166* 139 103         Results from last 7 days   Lab Units 10/03/23  1556   LACTIC ACID mmol/L 1.9       Lines/Drains:  Invasive Devices       Peripheral Intravenous Line  Duration             Peripheral IV 10/03/23 Left Antecubital 3 days    Peripheral IV 10/05/23 Left;Ventral (anterior) Forearm 2 days                      Telemetry:  Telemetry Orders (From admission, onward)               24 Hour Telemetry Monitoring  Continuous x 24 Hours (Telem)        Question:  Reason for 24 Hour Telemetry  Answer:  Arrhythmias requiring acute medical intervention / PPM or ICD malfunction                                Imaging: Reviewed radiology reports from this admission including: chest xray    Recent Cultures (last 7 days):   Results from last 7 days   Lab Units 10/05/23  1243 10/03/23  1558 10/03/23  1556   BLOOD CULTURE   --  No Growth at 72 hrs. No Growth at 72 hrs.    GRAM STAIN RESULT  Rare Mononuclear Cells  No Polys or Bacteria seen  --   --    BODY FLUID CULTURE, STERILE  No growth  --   --        Last 24 Hours Medication List:   Current Facility-Administered Medications   Medication Dose Route Frequency Provider Last Rate    amiodarone  200 mg Oral TID With Meals Xiomy Wilson PA-C      apixaban  10 mg Oral BID Romi Us MD      brimonidine tartrate  1 drop Left Eye BID Lannette Aravind, CRNP      finasteride  5 mg Oral Daily Lannette Aravind, CRDAYAN      folic acid  1 mg Oral Daily Lannette Greenhouse, CRNP      insulin glargine  4 Units Subcutaneous HS Romi Us MD      insulin lispro  1-6 Units Subcutaneous TID AC Lannettmarianela Nazario, CRNP      insulin lispro  1-6 Units Subcutaneous HS Lannette Greenhouse, CRNP      insulin lispro  5 Units Subcutaneous TID With Meals Romi Us MD      methylPREDNISolone sodium succinate  40 mg Intravenous Daily Romi Us MD      metoprolol tartrate  37.5 mg Oral Q6H Xiomy Wilson PA-C      pravastatin  20 mg Oral Daily With [employer], GERALDNP      timolol  1 drop Both Eyes Daily Lanalbaniae Aravind, CRDAYAN      travoprost  1 drop Both Eyes HS Lanelsi Nazario, RADHA          Today, Patient Was Seen By: Dianelys Hutchison MD    **Please Note: This note may have been constructed using a voice recognition system. **

## 2023-10-07 NOTE — ACP (ADVANCE CARE PLANNING)
Advanced Care Planning Progress Note    Serious Illness Conversation    1. What is your understanding now of where you are with your illness? Prognostic Understanding: appropriate understanding of prognosis     2. How much information about what is likely to be ahead with your illness would you like to have? Information: patient wants to be fully informed     3. What did you (clinician) communicate to the patient? Prognostic Communication: Time - I wish we were not in this situation, but I am worried that time may be as short as weeks (express as a range, e.g. days to weeks, weeks to months, months to a year). weeks     4. If your health situation worsens, what are your most important goals? Goals: be physically comfortable, have my medical decisions respected     5. What are the biggest fears and worries about the future and your health? Fears/Worries: pain     6. What abilities are so critical to your life that you cannot imagine living without them? Unacceptable Function: being chronically confused or not being myself, being in pain or very uncomfortable, being unable to interact with others, being unconscious, not being myself, being in chronic severe pain     7. What gives you strength as you think about the future with your illness? 8. If you become sicker, how much are you willing to go through for the possibility of gaining more time? Be in the hospital: Yes Have a feeding tube: No   Be in the ICU: Yes    Be on a ventilator: No Be uncomfortable: No   Be on dialysis: No    9. How much does your proxy and family know about your priorities and wishes? Discussion Discussion: extensive discussion with family about goals and wishes        How does this plan sound to you? I will do everything I can to help you through this.   Patient verbalized understanding of the plan     I have spent 30+minutes speaking with my patient on advanced care planning today or during this visit     Advanced directives Dane Bowman MD

## 2023-10-08 LAB
ALBUMIN SERPL BCP-MCNC: 3.2 G/DL (ref 3.5–5)
ALP SERPL-CCNC: 77 U/L (ref 34–104)
ALT SERPL W P-5'-P-CCNC: 29 U/L (ref 7–52)
ANION GAP SERPL CALCULATED.3IONS-SCNC: 7 MMOL/L
AST SERPL W P-5'-P-CCNC: 14 U/L (ref 13–39)
BACTERIA BLD CULT: NORMAL
BACTERIA BLD CULT: NORMAL
BACTERIA SPEC BFLD CULT: NO GROWTH
BASOPHILS # BLD AUTO: 0.01 THOUSANDS/ÂΜL (ref 0–0.1)
BASOPHILS NFR BLD AUTO: 0 % (ref 0–1)
BILIRUB SERPL-MCNC: 0.31 MG/DL (ref 0.2–1)
BUN SERPL-MCNC: 54 MG/DL (ref 5–25)
CALCIUM ALBUM COR SERPL-MCNC: 9.3 MG/DL (ref 8.3–10.1)
CALCIUM SERPL-MCNC: 8.7 MG/DL (ref 8.4–10.2)
CHLORIDE SERPL-SCNC: 107 MMOL/L (ref 96–108)
CO2 SERPL-SCNC: 23 MMOL/L (ref 21–32)
CREAT SERPL-MCNC: 1.9 MG/DL (ref 0.6–1.3)
EOSINOPHIL # BLD AUTO: 0 THOUSAND/ÂΜL (ref 0–0.61)
EOSINOPHIL NFR BLD AUTO: 0 % (ref 0–6)
ERYTHROCYTE [DISTWIDTH] IN BLOOD BY AUTOMATED COUNT: 14.9 % (ref 11.6–15.1)
GFR SERPL CREATININE-BSD FRML MDRD: 33 ML/MIN/1.73SQ M
GLUCOSE SERPL-MCNC: 107 MG/DL (ref 65–140)
GLUCOSE SERPL-MCNC: 136 MG/DL (ref 65–140)
GLUCOSE SERPL-MCNC: 198 MG/DL (ref 65–140)
GLUCOSE SERPL-MCNC: 203 MG/DL (ref 65–140)
GLUCOSE SERPL-MCNC: 91 MG/DL (ref 65–140)
GRAM STN SPEC: NORMAL
GRAM STN SPEC: NORMAL
HCT VFR BLD AUTO: 32.3 % (ref 36.5–49.3)
HGB BLD-MCNC: 10.8 G/DL (ref 12–17)
IMM GRANULOCYTES # BLD AUTO: 0.14 THOUSAND/UL (ref 0–0.2)
IMM GRANULOCYTES NFR BLD AUTO: 2 % (ref 0–2)
LYMPHOCYTES # BLD AUTO: 0.56 THOUSANDS/ÂΜL (ref 0.6–4.47)
LYMPHOCYTES NFR BLD AUTO: 7 % (ref 14–44)
MAGNESIUM SERPL-MCNC: 2.6 MG/DL (ref 1.9–2.7)
MCH RBC QN AUTO: 32.1 PG (ref 26.8–34.3)
MCHC RBC AUTO-ENTMCNC: 33.4 G/DL (ref 31.4–37.4)
MCV RBC AUTO: 96 FL (ref 82–98)
MONOCYTES # BLD AUTO: 1.08 THOUSAND/ÂΜL (ref 0.17–1.22)
MONOCYTES NFR BLD AUTO: 13 % (ref 4–12)
NEUTROPHILS # BLD AUTO: 6.54 THOUSANDS/ÂΜL (ref 1.85–7.62)
NEUTS SEG NFR BLD AUTO: 78 % (ref 43–75)
NRBC BLD AUTO-RTO: 0 /100 WBCS
PLATELET # BLD AUTO: 142 THOUSANDS/UL (ref 149–390)
PMV BLD AUTO: 12.1 FL (ref 8.9–12.7)
POTASSIUM SERPL-SCNC: 4.4 MMOL/L (ref 3.5–5.3)
PROT SERPL-MCNC: 5.6 G/DL (ref 6.4–8.4)
RBC # BLD AUTO: 3.36 MILLION/UL (ref 3.88–5.62)
SODIUM SERPL-SCNC: 137 MMOL/L (ref 135–147)
WBC # BLD AUTO: 8.33 THOUSAND/UL (ref 4.31–10.16)

## 2023-10-08 PROCEDURE — 99233 SBSQ HOSP IP/OBS HIGH 50: CPT | Performed by: STUDENT IN AN ORGANIZED HEALTH CARE EDUCATION/TRAINING PROGRAM

## 2023-10-08 PROCEDURE — 80053 COMPREHEN METABOLIC PANEL: CPT | Performed by: STUDENT IN AN ORGANIZED HEALTH CARE EDUCATION/TRAINING PROGRAM

## 2023-10-08 PROCEDURE — 85025 COMPLETE CBC W/AUTO DIFF WBC: CPT | Performed by: STUDENT IN AN ORGANIZED HEALTH CARE EDUCATION/TRAINING PROGRAM

## 2023-10-08 PROCEDURE — 82948 REAGENT STRIP/BLOOD GLUCOSE: CPT

## 2023-10-08 PROCEDURE — 83735 ASSAY OF MAGNESIUM: CPT | Performed by: STUDENT IN AN ORGANIZED HEALTH CARE EDUCATION/TRAINING PROGRAM

## 2023-10-08 PROCEDURE — 99232 SBSQ HOSP IP/OBS MODERATE 35: CPT | Performed by: INTERNAL MEDICINE

## 2023-10-08 RX ORDER — FUROSEMIDE 10 MG/ML
40 INJECTION INTRAMUSCULAR; INTRAVENOUS
Status: DISCONTINUED | OUTPATIENT
Start: 2023-10-08 | End: 2023-10-09

## 2023-10-08 RX ORDER — METOPROLOL TARTRATE 50 MG/1
50 TABLET, FILM COATED ORAL EVERY 12 HOURS SCHEDULED
Status: DISCONTINUED | OUTPATIENT
Start: 2023-10-08 | End: 2023-10-10

## 2023-10-08 RX ORDER — PREDNISONE 20 MG/1
40 TABLET ORAL DAILY
Status: DISCONTINUED | OUTPATIENT
Start: 2023-10-09 | End: 2023-10-11

## 2023-10-08 RX ADMIN — INSULIN LISPRO 2 UNITS: 100 INJECTION, SOLUTION INTRAVENOUS; SUBCUTANEOUS at 18:23

## 2023-10-08 RX ADMIN — METOPROLOL TARTRATE 37.5 MG: 25 TABLET, FILM COATED ORAL at 02:07

## 2023-10-08 RX ADMIN — INSULIN LISPRO 2 UNITS: 100 INJECTION, SOLUTION INTRAVENOUS; SUBCUTANEOUS at 21:37

## 2023-10-08 RX ADMIN — AMIODARONE HYDROCHLORIDE 200 MG: 200 TABLET ORAL at 12:18

## 2023-10-08 RX ADMIN — FUROSEMIDE 40 MG: 10 INJECTION, SOLUTION INTRAMUSCULAR; INTRAVENOUS at 18:22

## 2023-10-08 RX ADMIN — AMIODARONE HYDROCHLORIDE 200 MG: 200 TABLET ORAL at 09:32

## 2023-10-08 RX ADMIN — BRIMONIDINE TARTRATE 1 DROP: 2 SOLUTION/ DROPS OPHTHALMIC at 09:52

## 2023-10-08 RX ADMIN — INSULIN GLARGINE 4 UNITS: 100 INJECTION, SOLUTION SUBCUTANEOUS at 21:37

## 2023-10-08 RX ADMIN — FOLIC ACID 1 MG: 1 TABLET ORAL at 09:32

## 2023-10-08 RX ADMIN — APIXABAN 10 MG: 5 TABLET, FILM COATED ORAL at 09:32

## 2023-10-08 RX ADMIN — BRIMONIDINE TARTRATE 1 DROP: 2 SOLUTION/ DROPS OPHTHALMIC at 18:30

## 2023-10-08 RX ADMIN — TIMOLOL MALEATE 1 DROP: 5 SOLUTION/ DROPS OPHTHALMIC at 09:52

## 2023-10-08 RX ADMIN — METOPROLOL TARTRATE 37.5 MG: 25 TABLET, FILM COATED ORAL at 09:32

## 2023-10-08 RX ADMIN — TRAVOPROST 1 DROP: 0.04 SOLUTION OPHTHALMIC at 21:37

## 2023-10-08 RX ADMIN — APIXABAN 10 MG: 5 TABLET, FILM COATED ORAL at 18:22

## 2023-10-08 RX ADMIN — INSULIN LISPRO 5 UNITS: 100 INJECTION, SOLUTION INTRAVENOUS; SUBCUTANEOUS at 18:23

## 2023-10-08 RX ADMIN — PRAVASTATIN SODIUM 20 MG: 20 TABLET ORAL at 18:22

## 2023-10-08 RX ADMIN — FUROSEMIDE 40 MG: 10 INJECTION, SOLUTION INTRAMUSCULAR; INTRAVENOUS at 09:32

## 2023-10-08 RX ADMIN — METHYLPREDNISOLONE SODIUM SUCCINATE 40 MG: 40 INJECTION, POWDER, FOR SOLUTION INTRAMUSCULAR; INTRAVENOUS at 09:32

## 2023-10-08 RX ADMIN — METOPROLOL TARTRATE 50 MG: 50 TABLET, FILM COATED ORAL at 21:37

## 2023-10-08 RX ADMIN — FINASTERIDE 5 MG: 5 TABLET, FILM COATED ORAL at 09:32

## 2023-10-08 RX ADMIN — AMIODARONE HYDROCHLORIDE 200 MG: 200 TABLET ORAL at 18:22

## 2023-10-08 NOTE — ASSESSMENT & PLAN NOTE
· Continue with IV solumedrol, tapered to daily from BID on 10/6  · Still with some wheeze, but improving  · Continue IV solumedrol daily for now   · Taper to PO prednisone in AM

## 2023-10-08 NOTE — ASSESSMENT & PLAN NOTE
· Noted to have bilateral pleural effusions  · Small left sided, Moderate to large right sided with loculation  · Consulted IR for thoracentesis, 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  · Given EF 35%, could be transudative as well  · Status post thoracentesis on 10/5 with 2 liter fluid removal   · CXR does not show re accumulation

## 2023-10-08 NOTE — PROGRESS NOTES
[address]  Progress Note  Name: Dinah Cerna  MRN: [de-identified]  Unit/Bed#:  I Date of Admission: 10/3/2023   Date of Service: 10/8/2023 I Hospital Day: 5    Assessment/Plan   * Acute pulmonary embolism ([address])  Assessment & Plan  · Present on admission  · With associated right lower extremity DVT  · Likely provoked by malignancy and new onset Afib with RVR  · CT imaging showing diffuse metastatic disease  · Findings discussed with patient and his wife  · Continue on heparin drip  · Patient's insurance copay for eliquis is >$500  · Will transition him for now to eliquis and provide good RX coupons to facilitate medication as an outpatient    Atrial fibrillation with RVR ([address])  Assessment & Plan  · Present on admission  · New onset afib with RVR  · Unclear etiology, EF is 35% with global hypokinesis   · Could be secondary to Afib vs ischemic cardiomyopathy   · Goals of care discussion are ongoing, palliative care consulted   · He has been transitioned off IV amiodarone to PO meds  · Still Heart ranging from 120-140  · Not a candidate for cardioversion    Pleural effusion  Assessment & Plan  · Noted to have bilateral pleural effusions  · Small left sided, Moderate to large right sided with loculation  · Consulted IR for thoracentesis, 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  · Given EF 35%, could be transudative as well  · Status post thoracentesis on 10/5 with 2 liter fluid removal   · CXR does not show re accumulation     COPD with acute exacerbation ([address])  Assessment & Plan  · Continue with IV solumedrol, tapered to daily from BID on 10/6  · Still with some wheeze, but improving  · Continue IV solumedrol daily for now   · Taper to PO prednisone in AM    Abnormal CT of the abdomen  Assessment & Plan  · Diffuse metastatic disease, refer to CT imaging reports  · I went over imaging with patient and wife   · Discussed transition to level 3 code status, patient is undecided  · For now continue with stabilization of HR   · Once stabilized, further discuss goals of care  · Goals of care discussion completed with patient's wife and daughters  · As of right now, family is leaning towards level 3 but not comfort care  · Poor prognosis overall    HILARY (acute kidney injury) ([address])  Assessment & Plan  · Present on admission  · Unclear etiology, could be from hypoperfusion in setting of Afib/CHF  · At this time suspecting hypovolemia from decompensated heart failure  · Patient has been receiving fluids with amiodarone and heparin drips but creatinine has not improved  · CXR today does show some mild vascular congestion and lower extremity edema +  · Given dose of IV lasix, continue for now   · Creatinine improved today     Controlled type 2 diabetes mellitus without complication, without long-term current use of insulin [facility])  Assessment & Plan  Lab Results   Component Value Date    HGBA1C 5.1 07/07/2023       Recent Labs     10/07/23  1632 10/07/23  2107 10/08/23  0823 10/08/23  1219   POCGLU 166* 165* 91 136       Blood Sugar Average: Last 72 hrs:  (P) [id number]   · Better controlled   · Continue with sliding scale  · Increased lispro to 5 units and lantus to 4 units                VTE Pharmacologic Prophylaxis:   Moderate Risk (Score 3-4) - Pharmacological DVT Prophylaxis Ordered: heparin drip. Patient Centered Rounds: I performed bedside rounds with nursing staff today. Discussions with Specialists or Other Care Team Provider: cardiology     Education and Discussions with Family / Patient: Updated  (wife, daughter and son in law) at bedside. Total Time Spent on Date of Encounter in care of patient: 40+ mins.  This time was spent on one or more of the following: performing physical exam; counseling and coordination of care; obtaining or reviewing history; documenting in the medical record; reviewing/ordering tests, medications or procedures; communicating with other healthcare professionals and discussing with patient's family/caregivers. Current Length of Stay: 5 day(s)  Current Patient Status: Inpatient   Certification Statement: The patient will continue to require additional inpatient hospital stay due to goals of care  Discharge Plan: Anticipate discharge in 48-72 hrs to home with home services. Code Status: Level 1 - Full Code    Subjective:   Patient feels well overall     Objective:     Vitals:   Temp (24hrs), Av.7 °F (36.5 °C), Min:97.5 °F (36.4 °C), Max:97.9 °F (36.6 °C)    Temp:  [97.5 °F (36.4 °C)-97.9 °F (36.6 °C)] 97.5 °F (36.4 °C)  HR:  [118-135] 133  Resp:  [20-41] 20  BP: (114-134)/(72-96) 114/81  SpO2:  [93 %-97 %] 97 %  Body mass index is 24.51 kg/m². Input and Output Summary (last 24 hours): Intake/Output Summary (Last 24 hours) at 10/8/2023 1312  Last data filed at 10/8/2023 0519  Gross per 24 hour   Intake 107.14 ml   Output 350 ml   Net -242.86 ml       Physical Exam:   Physical Exam  Constitutional:       General: He is not in acute distress. Appearance: Normal appearance. He is not toxic-appearing. Cardiovascular:      Rate and Rhythm: Tachycardia present. Rhythm irregular. Heart sounds: Normal heart sounds. No murmur heard. Pulmonary:      Effort: Pulmonary effort is normal. No respiratory distress. Breath sounds: Wheezing present. Abdominal:      General: Abdomen is flat. There is no distension. Palpations: Abdomen is soft. Tenderness: There is no abdominal tenderness. Musculoskeletal:      Right lower leg: Edema present. Left lower leg: No edema. Skin:     Findings: Bruising present. Neurological:      General: No focal deficit present. Mental Status: He is alert and oriented to person, place, and time. Mental status is at baseline. Motor: No weakness.           Additional Data:     Labs:  Results from last 7 days   Lab Units 10/08/23  0519   WBC Thousand/uL 8.33   HEMOGLOBIN g/dL 10.8* HEMATOCRIT % 32.3*   PLATELETS Thousands/uL 142*   NEUTROS PCT % 78*   LYMPHS PCT % 7*   MONOS PCT % 13*   EOS PCT % 0     Results from last 7 days   Lab Units 10/08/23  0519   SODIUM mmol/L 137   POTASSIUM mmol/L 4.4   CHLORIDE mmol/L 107   CO2 mmol/L 23   BUN mg/dL 54*   CREATININE mg/dL 1.90*   ANION GAP mmol/L 7   CALCIUM mg/dL 8.7   ALBUMIN g/dL 3.2*   TOTAL BILIRUBIN mg/dL 0.31   ALK PHOS U/L 77   ALT U/L 29   AST U/L 14   GLUCOSE RANDOM mg/dL 107     Results from last 7 days   Lab Units 10/03/23  1556   INR  1.24*     Results from last 7 days   Lab Units 10/08/23  1219 10/08/23  0823 10/07/23  2107 10/07/23  1632 10/07/23  1115 10/07/23  0805 10/06/23  2140 10/06/23  1608 10/06/23  1129 10/06/23  0819 10/05/23  2052 10/05/23  1621   POC GLUCOSE mg/dl 136 91 165* 166* 141* 129 209* 213* 247* 201* 255* 216*         Results from last 7 days   Lab Units 10/03/23  1556   LACTIC ACID mmol/L 1.9       Lines/Drains:  Invasive Devices     Peripheral Intravenous Line  Duration           Peripheral IV 10/08/23 Right Antecubital <1 day                  Telemetry:  Telemetry Orders (From admission, onward)             24 Hour Telemetry Monitoring  Continuous x 24 Hours (Telem)        Question:  Reason for 24 Hour Telemetry  Answer:  Arrhythmias requiring acute medical intervention / PPM or ICD malfunction                              Imaging: No pertinent imaging reviewed. Recent Cultures (last 7 days):   Results from last 7 days   Lab Units 10/05/23  1243 10/03/23  1558 10/03/23  1556   BLOOD CULTURE   --  No Growth After 4 Days. No Growth After 4 Days.    GRAM STAIN RESULT  Rare Mononuclear Cells  No Polys or Bacteria seen  --   --    BODY FLUID CULTURE, STERILE  No growth  --   --        Last 24 Hours Medication List:   Current Facility-Administered Medications   Medication Dose Route Frequency Provider Last Rate   • amiodarone  200 mg Oral TID With Meals Renard Harris PA-C     • apixaban  10 mg Oral BID Ivonne Rapp MD     • brimonidine tartrate  1 drop Left Eye BID RADHA Wahl     • finasteride  5 mg Oral Daily RADHA Wahl     • folic acid  1 mg Oral Daily RADHA Wahl     • furosemide  40 mg Intravenous BID (diuretic) Ivonne Rapp MD     • insulin glargine  4 Units Subcutaneous HS Ivonne Rapp MD     • insulin lispro  1-6 Units Subcutaneous TID AC RADHA Wahl     • insulin lispro  1-6 Units Subcutaneous HS RADHA Wahl     • insulin lispro  5 Units Subcutaneous TID With Meals Ivonne Rapp MD     • methylPREDNISolone sodium succinate  40 mg Intravenous Daily Ivonne Rapp MD     • metoprolol tartrate  50 mg Oral Q12H [facility] Bret Lopez PA-C     • pravastatin  20 mg Oral Daily With [employer], GERALDNP     • timolol  1 drop Both Eyes Daily RADHA Wahl     • travoprost  1 drop Both Eyes HS RADHA Wahl          Today, Patient Was Seen By: Josiah Laura MD    **Please Note: This note may have been constructed using a voice recognition system. **

## 2023-10-08 NOTE — PROGRESS NOTES
Cardiology Progress Note - Ananya Rossi 69 y.o. male MRN: [de-identified]    Unit/Bed#:  Encounter: [id number]      Assessment/Plan:  1.  New onset atrial fibrillation with RVR  • Telemetry shows A-fib with HR averaging 110-120  • Increase Lopressor to 50 mg bid, continue po amiodarone  • Continue Eliquis for [facility]     2.  Acute PE/DVT, history of PE  • Continue Eliquis 10 mg bid     3.  New onset CM with EF 35%  • Echo shows EF 35%, severe global hypokinesis, mildly dilated RV with mildly reduced RV function, biatrial dilation, mild to moderate AI, moderate MR, mild TR, mildly dilated aortic root, small pericardial effusion  • Likely tachycardia induced cardiomyopathy, however cannot exclude alternative etiologies.  Given progressive malignant lung neoplasm, plan for optimization of medical management at this time. Discussed with patient and family who are in agreement with plan. • Increase Lopressor to 50 mg bid, no ACE/ARB due to HILARY and soft BPs  • Mildly hypervolemic, continue IV Lasix 40 mg BID     4.  Malignant lung neoplasm s/p radiation therapy  • CT shows significant progression of metastatic disease throughout the left lung with new enlarging nodules/masses  • GOC discussion ongoing     5.  Bilateral pleural effusions  • S/p IR thoracentesis - 1960 mL clear yellow right pleural fluid drained     6.  Hypertension  • BP running borderline soft, continue to monitor  • Increase Lopressor to 50 mg bid     7.  Hyperlipidemia  • Continue pravastatin     8.  HILARY  • Creatinine stable, likely cardiorenal syndrome, continue to monitor  • Continue IV Lasix 40 mg bid     9.  Anemia  • Hgb stable     10.  T2DM, A1c 5.1         Subjective:   Patient seen and examined. No significant events overnight. Patient resting in bed comfortably with family at bedside. Reports he feels well overall today. Goals of care discussion is ongoing. All questions were answered.     Objective:     Vitals: Blood pressure 125/81, pulse (!) 128, temperature 97.7 °F (36.5 °C), temperature source Oral, resp. rate (!) 23, height 5' 9" (1.753 m), weight 75.3 kg (166 lb), SpO2 96 %. , Body mass index is 24.51 kg/m².,   Orthostatic Blood Pressures    Flowsheet Row Most Recent Value   Blood Pressure 125/81 filed at 10/08/2023 1555   Patient Position - Orthostatic VS Sitting filed at 10/08/2023 1555            Intake/Output Summary (Last 24 hours) at 10/8/2023 1820  Last data filed at 10/8/2023 0519  Gross per 24 hour   Intake --   Output 350 ml   Net -350 ml         Physical Exam:  GEN: Alert and oriented x 3, in no acute distress. Well appearing and well nourished. HEENT: Sclera anicteric, conjunctivae pink, mucous membranes moist. Oropharynx clear. NECK: Supple, no carotid bruits, no significant JVD. Trachea midline, no thyromegaly. HEART: Tachycardic, irregularly irregular rhythm, normal S1 and S2, no murmurs, clicks, gallops or rubs. PMI nondisplaced, no thrills. LUNGS: Decreased breath sounds bilaterally; no wheezes, rales, or rhonchi. No increased work of breathing or signs of respiratory distress. ABDOMEN: Soft, nontender, nondistended, normoactive bowel sounds. EXTREMITIES: Skin warm and well perfused, no clubbing, cyanosis, or edema. NEURO: No focal findings. Normal speech. Mood and affect normal.   SKIN: Normal without suspicious lesions on exposed skin.         Medications:      Current Facility-Administered Medications:   •  amiodarone tablet 200 mg, 200 mg, Oral, TID With Meals, Angel Vergara PA-C, 200 mg at 10/08/23 1218  •  apixaban (ELIQUIS) tablet 10 mg, 10 mg, Oral, BID, Aris Santos MD, 10 mg at 10/08/23 0932  •  brimonidine tartrate 0.2 % ophthalmic solution 1 drop, 1 drop, Left Eye, BID, RADHA Delgado, 1 drop at 10/08/23 1149  •  finasteride (PROSCAR) tablet 5 mg, 5 mg, Oral, Daily, RADHA Delgado, 5 mg at 62/30/03 5936  •  folic acid (FOLVITE) tablet 1 mg, 1 mg, Oral, Daily, RADHA Delgado, 1 mg at 10/08/23 0932  •  furosemide (LASIX) injection 40 mg, 40 mg, Intravenous, BID (diuretic), Laura Yap MD, 40 mg at 10/08/23 0932  •  insulin glargine (LANTUS) subcutaneous injection 4 Units 0.04 mL, 4 Units, Subcutaneous, HS, Laura Yap MD, 4 Units at 10/07/23 2125  •  insulin lispro (HumaLOG) 100 units/mL subcutaneous injection 1-6 Units, 1-6 Units, Subcutaneous, TID AC, 1 Units at 10/07/23 1752 **AND** Fingerstick Glucose (POCT), , , TID AC, RADHA Morrell  •  insulin lispro (HumaLOG) 100 units/mL subcutaneous injection 1-6 Units, 1-6 Units, Subcutaneous, HS, RADHA Morrell, 1 Units at 10/07/23 2125  •  insulin lispro (HumaLOG) 100 units/mL subcutaneous injection 5 Units, 5 Units, Subcutaneous, TID With Meals, Laura Yap MD, 5 Units at 10/07/23 1753  •  metoprolol tartrate (LOPRESSOR) tablet 50 mg, 50 mg, Oral, Q12H [address] [facility], PATonieC  •  pravastatin (PRAVACHOL) tablet 20 mg, 20 mg, Oral, Daily With RADHA Bullock, 20 mg at 10/07/23 1629  •  [START ON 10/9/2023] predniSONE tablet 40 mg, 40 mg, Oral, Daily, Laura Yap MD  •  timolol (TIMOPTIC) 0.5 % ophthalmic solution 1 drop, 1 drop, Both Eyes, Daily, RADHA Morrell, 1 drop at 10/08/23 0952  •  travoprost (TRAVATAN-Z) 0.004 % ophthalmic solution 1 drop, 1 drop, Both Eyes, HS, RADHA Morrell, 1 drop at 10/07/23 2126     Labs & Results:     Results from last 7 days   Lab Units 10/03/23  2159 10/03/23  1712 10/03/23  1436   HS TNI 0HR ng/L  --   --  5   HS TNI 2HR ng/L  --  5  --    HSTNI D2 ng/L  --  0  --    HS TNI 4HR ng/L 6  --   --    HSTNI D4 ng/L 1  --   --      Results from last 7 days   Lab Units 10/08/23  0519 10/07/23  0546 10/06/23  1804   WBC Thousand/uL 8.33 7.27 7.17   HEMOGLOBIN g/dL 10.8* 10.3* 10.6*   HEMATOCRIT % 32.3* 30.6* 31.9*   PLATELETS Thousands/uL 142* 135* 145*         Results from last 7 days   Lab Units 10/08/23  0519 10/07/23  0546 10/06/23  1803 POTASSIUM mmol/L 4.4 4.0 4.3   CHLORIDE mmol/L 107 105 104   CO2 mmol/L 23 21 19*   BUN mg/dL 54* 55* 56*   CREATININE mg/dL 1.90* 2.02* 2.13*   CALCIUM mg/dL 8.7 8.5 8.7   ALK PHOS U/L 77 80 78   ALT U/L 29 29 28   AST U/L 14 16 17     Results from last 7 days   Lab Units 10/07/23  0546 10/06/23  2333 10/06/23  1612 10/03/23  2320 10/03/23  1556   INR   --   --   --   --  1.24*   PTT seconds 91* 79* 47*   < > 34    < > = values in this interval not displayed. Results from last 7 days   Lab Units 10/08/23  0519 10/07/23  0546 10/06/23  1809   MAGNESIUM mg/dL 2.6 2.5 2.6       Vitals: Blood pressure 125/81, pulse (!) 128, temperature 97.7 °F (36.5 °C), temperature source Oral, resp. rate (!) 23, height 5' 9" (1.753 m), weight 75.3 kg (166 lb), SpO2 96 %. , Body mass index is 24.51 kg/m².,   Orthostatic Blood Pressures    Flowsheet Row Most Recent Value   Blood Pressure 125/81 filed at 10/08/2023 1555   Patient Position - Orthostatic VS Sitting filed at 10/08/2023 1068          Systolic (20FLW), HQB:911 , Min:114 , ACC:857     Diastolic (22NZZ), YME:78, Min:72, Max:96        Intake/Output Summary (Last 24 hours) at 10/8/2023 1820  Last data filed at 10/8/2023 5290  Gross per 24 hour   Intake --   Output 350 ml   Net -350 ml       Invasive Devices     Peripheral Intravenous Line  Duration           Peripheral IV 10/08/23 Right Antecubital <1 day                  EKG: Atrial fibrillation with rapid ventricular response    Telemetry:  Telemetry Orders (From admission, onward)             24 Hour Telemetry Monitoring  Continuous x 24 Hours (Telem)        Question:  Reason for 24 Hour Telemetry  Answer:  Arrhythmias requiring acute medical intervention / PPM or ICD malfunction                 Telemetry Reviewed: Atrial fibrillation.  HR averaging 110-120  Indication for Continued Telemetry Use: Arrthymias requiring medical therapy    BP Readings from Last 3 Encounters:   10/08/23 125/81   09/19/23 158/76   08/23/23 140/82      Wt Readings from Last 3 Encounters:   10/04/23 75.3 kg (166 lb)   09/19/23 70.4 kg (155 lb 3.2 oz)   08/23/23 68.9 kg (152 lb) labor

## 2023-10-08 NOTE — ASSESSMENT & PLAN NOTE
· Present on admission  · New onset afib with RVR  · Unclear etiology, EF is 35% with global hypokinesis   · Could be secondary to Afib vs ischemic cardiomyopathy   · Goals of care discussion are ongoing, palliative care consulted   · He has been transitioned off IV amiodarone to PO meds  · Still Heart ranging from 120-140  · Not a candidate for cardioversion

## 2023-10-08 NOTE — ASSESSMENT & PLAN NOTE
Lab Results   Component Value Date    HGBA1C 5.1 07/07/2023       Recent Labs     10/07/23  1632 10/07/23  2107 10/08/23  0823 10/08/23  1219   POCGLU 166* 165* 91 136       Blood Sugar Average: Last 72 hrs:  (P) [id number]   · Better controlled   · Continue with sliding scale  · Increased lispro to 5 units and lantus to 4 units

## 2023-10-08 NOTE — ASSESSMENT & PLAN NOTE
· Present on admission  · Unclear etiology, could be from hypoperfusion in setting of Afib/CHF  · At this time suspecting hypovolemia from decompensated heart failure  · Patient has been receiving fluids with amiodarone and heparin drips but creatinine has not improved  · CXR today does show some mild vascular congestion and lower extremity edema +  · Given dose of IV lasix, continue for now   · Creatinine improved today

## 2023-10-09 ENCOUNTER — TELEPHONE (OUTPATIENT)
Dept: NUTRITION | Facility: CLINIC | Age: 78
End: 2023-10-09

## 2023-10-09 ENCOUNTER — HOSPITAL ENCOUNTER (OUTPATIENT)
Dept: INFUSION CENTER | Facility: CLINIC | Age: 78
Discharge: HOME | End: 2023-10-09

## 2023-10-09 ENCOUNTER — APPOINTMENT (INPATIENT)
Dept: RADIOLOGY | Facility: HOSPITAL | Age: 78
DRG: 175 | End: 2023-10-09
Payer: MEDICARE

## 2023-10-09 ENCOUNTER — HOME HEALTH ADMISSION (OUTPATIENT)
Dept: HOME HEALTH SERVICES | Facility: HOME HEALTHCARE | Age: 78
End: 2023-10-09
Payer: MEDICARE

## 2023-10-09 LAB
ALBUMIN SERPL BCP-MCNC: 3.1 G/DL (ref 3.5–5)
ALP SERPL-CCNC: 76 U/L (ref 34–104)
ALT SERPL W P-5'-P-CCNC: 33 U/L (ref 7–52)
ANION GAP SERPL CALCULATED.3IONS-SCNC: 6 MMOL/L
AST SERPL W P-5'-P-CCNC: 18 U/L (ref 13–39)
BASOPHILS # BLD AUTO: 0.02 THOUSANDS/ÂΜL (ref 0–0.1)
BASOPHILS NFR BLD AUTO: 0 % (ref 0–1)
BILIRUB SERPL-MCNC: 0.3 MG/DL (ref 0.2–1)
BUN SERPL-MCNC: 56 MG/DL (ref 5–25)
CALCIUM ALBUM COR SERPL-MCNC: 9.2 MG/DL (ref 8.3–10.1)
CALCIUM SERPL-MCNC: 8.5 MG/DL (ref 8.4–10.2)
CHLORIDE SERPL-SCNC: 107 MMOL/L (ref 96–108)
CO2 SERPL-SCNC: 25 MMOL/L (ref 21–32)
CREAT SERPL-MCNC: 1.93 MG/DL (ref 0.6–1.3)
EOSINOPHIL # BLD AUTO: 0 THOUSAND/ÂΜL (ref 0–0.61)
EOSINOPHIL NFR BLD AUTO: 0 % (ref 0–6)
ERYTHROCYTE [DISTWIDTH] IN BLOOD BY AUTOMATED COUNT: 15.1 % (ref 11.6–15.1)
GFR SERPL CREATININE-BSD FRML MDRD: 32 ML/MIN/1.73SQ M
GLUCOSE SERPL-MCNC: 110 MG/DL (ref 65–140)
GLUCOSE SERPL-MCNC: 135 MG/DL (ref 65–140)
GLUCOSE SERPL-MCNC: 141 MG/DL (ref 65–140)
GLUCOSE SERPL-MCNC: 219 MG/DL (ref 65–140)
GLUCOSE SERPL-MCNC: 228 MG/DL (ref 65–140)
HCT VFR BLD AUTO: 31 % (ref 36.5–49.3)
HGB BLD-MCNC: 10.2 G/DL (ref 12–17)
IMM GRANULOCYTES # BLD AUTO: 0.24 THOUSAND/UL (ref 0–0.2)
IMM GRANULOCYTES NFR BLD AUTO: 3 % (ref 0–2)
LYMPHOCYTES # BLD AUTO: 0.47 THOUSANDS/ÂΜL (ref 0.6–4.47)
LYMPHOCYTES NFR BLD AUTO: 5 % (ref 14–44)
MAGNESIUM SERPL-MCNC: 2.6 MG/DL (ref 1.9–2.7)
MCH RBC QN AUTO: 32.2 PG (ref 26.8–34.3)
MCHC RBC AUTO-ENTMCNC: 32.9 G/DL (ref 31.4–37.4)
MCV RBC AUTO: 98 FL (ref 82–98)
MONOCYTES # BLD AUTO: 1.19 THOUSAND/ÂΜL (ref 0.17–1.22)
MONOCYTES NFR BLD AUTO: 13 % (ref 4–12)
NEUTROPHILS # BLD AUTO: 7.43 THOUSANDS/ÂΜL (ref 1.85–7.62)
NEUTS SEG NFR BLD AUTO: 79 % (ref 43–75)
NRBC BLD AUTO-RTO: 0 /100 WBCS
PLATELET # BLD AUTO: 125 THOUSANDS/UL (ref 149–390)
PMV BLD AUTO: 11.9 FL (ref 8.9–12.7)
POTASSIUM SERPL-SCNC: 4.3 MMOL/L (ref 3.5–5.3)
PROT SERPL-MCNC: 5.4 G/DL (ref 6.4–8.4)
RBC # BLD AUTO: 3.17 MILLION/UL (ref 3.88–5.62)
SODIUM SERPL-SCNC: 138 MMOL/L (ref 135–147)
WBC # BLD AUTO: 9.35 THOUSAND/UL (ref 4.31–10.16)

## 2023-10-09 PROCEDURE — 82948 REAGENT STRIP/BLOOD GLUCOSE: CPT

## 2023-10-09 PROCEDURE — 83735 ASSAY OF MAGNESIUM: CPT

## 2023-10-09 PROCEDURE — 99232 SBSQ HOSP IP/OBS MODERATE 35: CPT | Performed by: STUDENT IN AN ORGANIZED HEALTH CARE EDUCATION/TRAINING PROGRAM

## 2023-10-09 PROCEDURE — 80053 COMPREHEN METABOLIC PANEL: CPT

## 2023-10-09 PROCEDURE — 88112 CYTOPATH CELL ENHANCE TECH: CPT | Performed by: PATHOLOGY

## 2023-10-09 PROCEDURE — 88305 TISSUE EXAM BY PATHOLOGIST: CPT | Performed by: PATHOLOGY

## 2023-10-09 PROCEDURE — 85025 COMPLETE CBC W/AUTO DIFF WBC: CPT

## 2023-10-09 PROCEDURE — 71045 X-RAY EXAM CHEST 1 VIEW: CPT

## 2023-10-09 RX ADMIN — METOPROLOL TARTRATE 50 MG: 50 TABLET, FILM COATED ORAL at 08:44

## 2023-10-09 RX ADMIN — AMIODARONE HYDROCHLORIDE 200 MG: 200 TABLET ORAL at 16:53

## 2023-10-09 RX ADMIN — INSULIN LISPRO 2 UNITS: 100 INJECTION, SOLUTION INTRAVENOUS; SUBCUTANEOUS at 12:01

## 2023-10-09 RX ADMIN — BRIMONIDINE TARTRATE 1 DROP: 2 SOLUTION/ DROPS OPHTHALMIC at 08:49

## 2023-10-09 RX ADMIN — METOPROLOL TARTRATE 50 MG: 50 TABLET, FILM COATED ORAL at 21:49

## 2023-10-09 RX ADMIN — INSULIN LISPRO 2 UNITS: 100 INJECTION, SOLUTION INTRAVENOUS; SUBCUTANEOUS at 21:49

## 2023-10-09 RX ADMIN — INSULIN LISPRO 5 UNITS: 100 INJECTION, SOLUTION INTRAVENOUS; SUBCUTANEOUS at 08:47

## 2023-10-09 RX ADMIN — FUROSEMIDE 40 MG: 10 INJECTION, SOLUTION INTRAMUSCULAR; INTRAVENOUS at 08:45

## 2023-10-09 RX ADMIN — INSULIN LISPRO 5 UNITS: 100 INJECTION, SOLUTION INTRAVENOUS; SUBCUTANEOUS at 16:54

## 2023-10-09 RX ADMIN — PRAVASTATIN SODIUM 20 MG: 20 TABLET ORAL at 16:53

## 2023-10-09 RX ADMIN — APIXABAN 10 MG: 5 TABLET, FILM COATED ORAL at 17:00

## 2023-10-09 RX ADMIN — BRIMONIDINE TARTRATE 1 DROP: 2 SOLUTION/ DROPS OPHTHALMIC at 17:48

## 2023-10-09 RX ADMIN — TRAVOPROST 1 DROP: 0.04 SOLUTION OPHTHALMIC at 21:52

## 2023-10-09 RX ADMIN — AMIODARONE HYDROCHLORIDE 200 MG: 200 TABLET ORAL at 12:02

## 2023-10-09 RX ADMIN — PREDNISONE 40 MG: 20 TABLET ORAL at 08:44

## 2023-10-09 RX ADMIN — TIMOLOL MALEATE 1 DROP: 5 SOLUTION/ DROPS OPHTHALMIC at 08:55

## 2023-10-09 RX ADMIN — FOLIC ACID 1 MG: 1 TABLET ORAL at 08:44

## 2023-10-09 RX ADMIN — INSULIN LISPRO 5 UNITS: 100 INJECTION, SOLUTION INTRAVENOUS; SUBCUTANEOUS at 12:01

## 2023-10-09 RX ADMIN — INSULIN GLARGINE 4 UNITS: 100 INJECTION, SOLUTION SUBCUTANEOUS at 21:49

## 2023-10-09 RX ADMIN — APIXABAN 10 MG: 5 TABLET, FILM COATED ORAL at 08:44

## 2023-10-09 RX ADMIN — AMIODARONE HYDROCHLORIDE 200 MG: 200 TABLET ORAL at 08:44

## 2023-10-09 RX ADMIN — FINASTERIDE 5 MG: 5 TABLET, FILM COATED ORAL at 08:44

## 2023-10-09 NOTE — ASSESSMENT & PLAN NOTE
· Continue with IV solumedrol, tapered to daily from BID on 10/6  · Still with some wheeze, but improving  · Was on IV solumedrol daily  · Taper to PO prednisone today

## 2023-10-09 NOTE — ASSESSMENT & PLAN NOTE
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

## 2023-10-09 NOTE — ASSESSMENT & PLAN NOTE
· Present on admission  · Unclear etiology, could be from hypoperfusion in setting of Afib/CHF  · At this time suspecting hypovolemia from decompensated heart failure  · Patient had been receiving fluids with amiodarone and heparin drips but creatinine has not improved  · CXR show some mild vascular congestion and lower extremity edema +  · Status post IV diuresis for 48 hours  · Creatinine improved   · Monitor lytes, weight, I/O

## 2023-10-09 NOTE — TELEPHONE ENCOUNTER
Kristian Galeano was scheduled for oncology nutrition consultation during infusion today. Apt cancelled due to hospitalization. Will reschedule apt once pt is discharged.

## 2023-10-09 NOTE — PROGRESS NOTES
[address]  Progress Note  Name: Mag Garnett  MRN: [de-identified]  Unit/Bed#: -02 I Date of Admission: 10/3/2023   Date of Service: 10/9/2023 I Hospital Day: 6    Assessment/Plan   * Acute pulmonary embolism ([address])  Assessment & Plan  · Present on admission  · With associated right lower extremity DVT  · Likely provoked by malignancy and new onset Afib with RVR  · CT imaging showing diffuse metastatic disease  · Findings discussed with patient and his wife and extended family  · Patient's insurance copay for eliquis is >$500  · Transitioned him for now to eliquis and provide good RX coupons to facilitate medication as an outpatient    Atrial fibrillation with RVR ([address])  Assessment & Plan  · Present on admission  · New onset afib with RVR  · Unclear etiology, EF is 35% with global hypokinesis   · Could be secondary to Afib vs ischemic cardiomyopathy   · Goals of care discussion are ongoing, palliative care consulted   · He has been transitioned off IV amiodarone to PO meds  · Still Heart ranging from 120-140  · After discussion with cardiology and family, patient is agreeable to being discharged when pleural effusion is stable with current heart rates given patient is not a candidate for cardioversion and ischemia evaluation  · Family will follow with palliative care and if patient decompensates rapidly, he will be transitioned to hospice either by coming back to the hospital or palliative care to facilitate     Pleural effusion  Assessment & Plan  · Noted to have bilateral pleural effusions  · Small left sided, Moderate to large right sided with loculation  · Consulted IR for thoracentesis, 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  · Given EF 35%, could be transudative as well  · Status post thoracentesis on 10/5 with 2 liter fluid removal   · CXR does show some re accumulation  · Will discuss with IR to see if repeat thoracentesis is indicated    COPD with acute exacerbation ([address])  Assessment & Plan  · Continue with IV solumedrol, tapered to daily from BID on 10/6  · Still with some wheeze, but improving  · Was on IV solumedrol daily  · Taper to PO prednisone today    Abnormal CT of the abdomen  Assessment & Plan  · Diffuse metastatic disease, refer to CT imaging reports  · I went over imaging with patient and wife   · Discussed transition to level 3 code status, patient is undecided  · For now continue with stabilization of HR   · Once stabilized, further discuss goals of care  · Goals of care discussion completed with patient's wife and daughters  · As of right now, family wants to be level 3 but not hospice   · Poor prognosis overall    HILARY (acute kidney injury) ([address])  Assessment & Plan  · Present on admission  · Unclear etiology, could be from hypoperfusion in setting of Afib/CHF  · At this time suspecting hypovolemia from decompensated heart failure  · Patient had been receiving fluids with amiodarone and heparin drips but creatinine has not improved  · CXR show some mild vascular congestion and lower extremity edema +  · Status post IV diuresis for 48 hours  · Creatinine improved   · Monitor lytes, weight, I/O    Anemia  Assessment & Plan  · Hemoglobin with downtrend since arrival but now stabilized  · Does not report any active signs of bleeding  · Likely multifactorial in setting of iron deficiency and chronic disease  · Given metastatic burden, could have small occult bleed/oozing  · Stable for continuation of anticoagulation, transitioned to eliquis     Controlled type 2 diabetes mellitus without complication, without long-term current use of insulin [facility])  Assessment & Plan  Lab Results   Component Value Date    HGBA1C 5.1 07/07/2023       Recent Labs     10/08/23  1553 10/08/23  2128 10/09/23  0752 10/09/23  1110   POCGLU 203* 198* 110 228*       Blood Sugar Average: Last 72 hrs:  (P) 174.3705692171722850   · Adequate for now  · Continue with sliding scale  · Increased lispro to 5 units and lantus to 4 units                VTE Pharmacologic Prophylaxis:   Moderate Risk (Score 3-4) - Pharmacological DVT Prophylaxis Ordered: apixaban (Eliquis). Patient Centered Rounds: I performed bedside rounds with nursing staff today. Discussions with Specialists or Other Care Team Provider: cardiology       Total Time Spent on Date of Encounter in care of patient: 30+ mins. This time was spent on one or more of the following: performing physical exam; counseling and coordination of care; obtaining or reviewing history; documenting in the medical record; reviewing/ordering tests, medications or procedures; communicating with other healthcare professionals and discussing with patient's family/caregivers. Current Length of Stay: 6 day(s)  Current Patient Status: Inpatient   Certification Statement: The patient will continue to require additional inpatient hospital stay due to IR discussion for pleural effusion  Discharge Plan: Anticipate discharge in 24-48 hrs to home. Code Status: Level 3 - DNAR and DNI    Subjective:   Patient feels well overall     Objective:     Vitals:   Temp (24hrs), Av.9 °F (36.6 °C), Min:97.6 °F (36.4 °C), Max:98.3 °F (36.8 °C)    Temp:  [97.6 °F (36.4 °C)-98.3 °F (36.8 °C)] 97.6 °F (36.4 °C)  HR:  [117-134] 120  Resp:  [23-24] 24  BP: (116-146)/(73-89) 116/79  SpO2:  [91 %-96 %] 95 %  Body mass index is 25.68 kg/m². Input and Output Summary (last 24 hours):   No intake or output data in the 24 hours ending 10/09/23 1214    Physical Exam:   Physical Exam  Constitutional:       General: He is not in acute distress. Appearance: Normal appearance. He is not toxic-appearing. Cardiovascular:      Rate and Rhythm: Tachycardia present. Rhythm irregular. Heart sounds: Normal heart sounds. No murmur heard. Pulmonary:      Effort: Pulmonary effort is normal. No respiratory distress. Breath sounds: Wheezing present. Abdominal:      General: Abdomen is flat.  There is no distension. Palpations: Abdomen is soft. Tenderness: There is no abdominal tenderness. Musculoskeletal:      Right lower leg: Edema present. Neurological:      General: No focal deficit present. Mental Status: He is alert and oriented to person, place, and time. Mental status is at baseline. Motor: No weakness.           Additional Data:     Labs:  Results from last 7 days   Lab Units 10/09/23  0504   WBC Thousand/uL 9.35   HEMOGLOBIN g/dL 10.2*   HEMATOCRIT % 31.0*   PLATELETS Thousands/uL 125*   NEUTROS PCT % 79*   LYMPHS PCT % 5*   MONOS PCT % 13*   EOS PCT % 0     Results from last 7 days   Lab Units 10/09/23  0504   SODIUM mmol/L 138   POTASSIUM mmol/L 4.3   CHLORIDE mmol/L 107   CO2 mmol/L 25   BUN mg/dL 56*   CREATININE mg/dL 1.93*   ANION GAP mmol/L 6   CALCIUM mg/dL 8.5   ALBUMIN g/dL 3.1*   TOTAL BILIRUBIN mg/dL 0.30   ALK PHOS U/L 76   ALT U/L 33   AST U/L 18   GLUCOSE RANDOM mg/dL 141*     Results from last 7 days   Lab Units 10/03/23  1556   INR  1.24*     Results from last 7 days   Lab Units 10/09/23  1110 10/09/23  0752 10/08/23  2128 10/08/23  1553 10/08/23  1219 10/08/23  0823 10/07/23  2107 10/07/23  1632 10/07/23  1115 10/07/23  0805 10/06/23  2140 10/06/23  1608   POC GLUCOSE mg/dl 228* 110 198* 203* 136 91 165* 166* 141* 129 209* 213*         Results from last 7 days   Lab Units 10/03/23  1556   LACTIC ACID mmol/L 1.9       Lines/Drains:  Invasive Devices     Peripheral Intravenous Line  Duration           Peripheral IV 10/08/23 Right Antecubital 1 day                  Telemetry:  Telemetry Orders (From admission, onward)             24 Hour Telemetry Monitoring  Continuous x 24 Hours (Telem)        Question:  Reason for 24 Hour Telemetry  Answer:  Arrhythmias requiring acute medical intervention / PPM or ICD malfunction                              Imaging: Reviewed radiology reports from this admission including: chest xray    Recent Cultures (last 7 days):   Results from last 7 days   Lab Units 10/05/23  1243 10/03/23  1558 10/03/23  1556   BLOOD CULTURE   --  No Growth After 5 Days. No Growth After 5 Days. GRAM STAIN RESULT  Rare Mononuclear Cells  No Polys or Bacteria seen  --   --    BODY FLUID CULTURE, STERILE  No growth  --   --        Last 24 Hours Medication List:   Current Facility-Administered Medications   Medication Dose Route Frequency Provider Last Rate   • amiodarone  200 mg Oral TID With Meals Dee Pinedo PA-C     • apixaban  10 mg Oral BID Dee Pinedo PA-C     • brimonidine tartrate  1 drop Left Eye BID Dee Pinedo PA-C     • finasteride  5 mg Oral Daily Dee Pinedo PA-C     • folic acid  1 mg Oral Daily Dee Pinedo PA-C     • insulin glargine  4 Units Subcutaneous HS Dee Pinedo PA-C     • insulin lispro  1-6 Units Subcutaneous TID AC Sergio Shipley PA-C     • insulin lispro  1-6 Units Subcutaneous HS Dee Pinedo PA-C     • insulin lispro  5 Units Subcutaneous TID With Meals Dee Pinedo PA-C     • metoprolol tartrate  50 mg Oral Q12H [address] Sergio Shipley PA-C     • pravastatin  20 mg Oral Daily With ZAHRAA Jones     • predniSONE  40 mg Oral Daily Sergio Shipley PA-C     • timolol  1 drop Both Eyes Daily Sergio Shipley PA-C     • travoprost  1 drop Both Eyes HS Dee Pinedo PA-C          Today, Patient Was Seen By: Candida Aj MD    **Please Note: This note may have been constructed using a voice recognition system. **

## 2023-10-09 NOTE — ASSESSMENT & PLAN NOTE
· Diffuse metastatic disease, refer to CT imaging reports  · I went over imaging with patient and wife   · Discussed transition to level 3 code status, patient is undecided  · For now continue with stabilization of HR   · Once stabilized, further discuss goals of care  · Goals of care discussion completed with patient's wife and daughters  · As of right now, family wants to be level 3 but not hospice   · Poor prognosis overall

## 2023-10-09 NOTE — ASSESSMENT & PLAN NOTE
· Present on admission  · With associated right lower extremity DVT  · Likely provoked by malignancy and new onset Afib with RVR  · CT imaging showing diffuse metastatic disease  · Findings discussed with patient and his wife and extended family  · Patient's insurance copay for eliquis is >$500  · Transitioned him for now to eliquis and provide good RX coupons to facilitate medication as an outpatient

## 2023-10-09 NOTE — ASSESSMENT & PLAN NOTE
Lab Results   Component Value Date    HGBA1C 5.1 07/07/2023       Recent Labs     10/08/23  1553 10/08/23  2128 10/09/23  0752 10/09/23  1110   POCGLU 203* 198* 110 228*       Blood Sugar Average: Last 72 hrs:  (P) 680.1943723006927263   · Adequate for now  · Continue with sliding scale  · Increased lispro to 5 units and lantus to 4 units

## 2023-10-09 NOTE — CASE MANAGEMENT
Case Management Progress Note    Patient name Wendy Lang  Location /-58 MRN [id number]  : 1945 Date 10/9/2023       LOS (days): 6  Geometric Mean LOS (GMLOS) (days): 4.00  Days to GMLOS:-1.7        OBJECTIVE:        Current admission status: Inpatient  Preferred Pharmacy:   [address] [facility] [address] [address] [address] [address] [place] [facility] [address]  Phone: [phone] Fax: [address], [address] [facility] [address] [facility] [address] [facility] [address]  Phone: [phone] Fax: [phone]    [facility] [address] [facility] [address]  Phone: [phone] Fax: [phone]    Primary Care Provider: Asif Hall PA-C    Primary Insurance: MEDICARE  Secondary Insurance: Maite Santana    PROGRESS NOTE: Pt is a tentative d/c in 24-48 hrs per SLIM  Goals of Care Meeting was done yesterday-family has decided on no hospice yet, but pt was made a DNR. Pt has prostate cancer and now a lung mass is noted. CM spoke to wife RHIANNA who is agreeable to VNA services for nursing and aide. Referral placed to [facility] [place] since this may transition to hospice shortly. Family was told that pt only had 6 wks to live.

## 2023-10-09 NOTE — ASSESSMENT & PLAN NOTE
· Present on admission  · New onset afib with RVR  · Unclear etiology, EF is 35% with global hypokinesis   · Could be secondary to Afib vs ischemic cardiomyopathy   · Goals of care discussion are ongoing, palliative care consulted   · He has been transitioned off IV amiodarone to PO meds  · Still Heart ranging from 120-140  · After discussion with cardiology and family, patient is agreeable to being discharged when pleural effusion is stable with current heart rates given patient is not a candidate for cardioversion and ischemia evaluation  · Family will follow with palliative care and if patient decompensates rapidly, he will be transitioned to hospice either by coming back to the hospital or palliative care to facilitate

## 2023-10-10 PROBLEM — I26.94 MULTIPLE SUBSEGMENTAL PULMONARY EMBOLI WITHOUT ACUTE COR PULMONALE (HCC): Status: ACTIVE | Noted: 2022-09-10

## 2023-10-10 LAB
ALBUMIN SERPL BCP-MCNC: 3.3 G/DL (ref 3.5–5)
ALP SERPL-CCNC: 77 U/L (ref 34–104)
ALT SERPL W P-5'-P-CCNC: 33 U/L (ref 7–52)
ANION GAP SERPL CALCULATED.3IONS-SCNC: 7 MMOL/L
AST SERPL W P-5'-P-CCNC: 15 U/L (ref 13–39)
BASOPHILS # BLD MANUAL: 0 THOUSAND/UL (ref 0–0.1)
BASOPHILS NFR MAR MANUAL: 0 % (ref 0–1)
BILIRUB SERPL-MCNC: 0.42 MG/DL (ref 0.2–1)
BUN SERPL-MCNC: 53 MG/DL (ref 5–25)
CALCIUM ALBUM COR SERPL-MCNC: 9.4 MG/DL (ref 8.3–10.1)
CALCIUM SERPL-MCNC: 8.8 MG/DL (ref 8.4–10.2)
CHLORIDE SERPL-SCNC: 107 MMOL/L (ref 96–108)
CO2 SERPL-SCNC: 27 MMOL/L (ref 21–32)
CREAT SERPL-MCNC: 1.75 MG/DL (ref 0.6–1.3)
EOSINOPHIL # BLD MANUAL: 0 THOUSAND/UL (ref 0–0.4)
EOSINOPHIL NFR BLD MANUAL: 0 % (ref 0–6)
ERYTHROCYTE [DISTWIDTH] IN BLOOD BY AUTOMATED COUNT: 16 % (ref 11.6–15.1)
GFR SERPL CREATININE-BSD FRML MDRD: 36 ML/MIN/1.73SQ M
GLUCOSE SERPL-MCNC: 112 MG/DL (ref 65–140)
GLUCOSE SERPL-MCNC: 114 MG/DL (ref 65–140)
GLUCOSE SERPL-MCNC: 148 MG/DL (ref 65–140)
GLUCOSE SERPL-MCNC: 304 MG/DL (ref 65–140)
GLUCOSE SERPL-MCNC: 76 MG/DL (ref 65–140)
HCT VFR BLD AUTO: 34.3 % (ref 36.5–49.3)
HGB BLD-MCNC: 11.3 G/DL (ref 12–17)
LYMPHOCYTES # BLD AUTO: 0.99 THOUSAND/UL (ref 0.6–4.47)
LYMPHOCYTES # BLD AUTO: 10 % (ref 14–44)
MAGNESIUM SERPL-MCNC: 2.7 MG/DL (ref 1.9–2.7)
MCH RBC QN AUTO: 32.3 PG (ref 26.8–34.3)
MCHC RBC AUTO-ENTMCNC: 32.9 G/DL (ref 31.4–37.4)
MCV RBC AUTO: 98 FL (ref 82–98)
MONOCYTES # BLD AUTO: 0.99 THOUSAND/UL (ref 0–1.22)
MONOCYTES NFR BLD: 10 % (ref 4–12)
NEUTROPHILS # BLD MANUAL: 7.94 THOUSAND/UL (ref 1.85–7.62)
NEUTS BAND NFR BLD MANUAL: 1 % (ref 0–8)
NEUTS SEG NFR BLD AUTO: 79 % (ref 43–75)
PLATELET # BLD AUTO: 159 THOUSANDS/UL (ref 149–390)
PLATELET BLD QL SMEAR: ADEQUATE
PMV BLD AUTO: 11.5 FL (ref 8.9–12.7)
POTASSIUM SERPL-SCNC: 3.9 MMOL/L (ref 3.5–5.3)
PROT SERPL-MCNC: 6 G/DL (ref 6.4–8.4)
RBC # BLD AUTO: 3.5 MILLION/UL (ref 3.88–5.62)
SODIUM SERPL-SCNC: 141 MMOL/L (ref 135–147)
WBC # BLD AUTO: 9.93 THOUSAND/UL (ref 4.31–10.16)

## 2023-10-10 PROCEDURE — 83735 ASSAY OF MAGNESIUM: CPT | Performed by: INTERNAL MEDICINE

## 2023-10-10 PROCEDURE — 85027 COMPLETE CBC AUTOMATED: CPT | Performed by: INTERNAL MEDICINE

## 2023-10-10 PROCEDURE — 99233 SBSQ HOSP IP/OBS HIGH 50: CPT | Performed by: INTERNAL MEDICINE

## 2023-10-10 PROCEDURE — 82948 REAGENT STRIP/BLOOD GLUCOSE: CPT

## 2023-10-10 PROCEDURE — 80053 COMPREHEN METABOLIC PANEL: CPT | Performed by: INTERNAL MEDICINE

## 2023-10-10 PROCEDURE — 85007 BL SMEAR W/DIFF WBC COUNT: CPT | Performed by: INTERNAL MEDICINE

## 2023-10-10 RX ORDER — ENOXAPARIN SODIUM 100 MG/ML
40 INJECTION SUBCUTANEOUS DAILY
Qty: 12 ML | Refills: 0 | Status: SHIPPED | OUTPATIENT
Start: 2023-10-10 | End: 2023-10-11 | Stop reason: CLARIF

## 2023-10-10 RX ADMIN — BRIMONIDINE TARTRATE 1 DROP: 2 SOLUTION/ DROPS OPHTHALMIC at 17:08

## 2023-10-10 RX ADMIN — INSULIN LISPRO 5 UNITS: 100 INJECTION, SOLUTION INTRAVENOUS; SUBCUTANEOUS at 11:51

## 2023-10-10 RX ADMIN — AMIODARONE HYDROCHLORIDE 200 MG: 200 TABLET ORAL at 11:50

## 2023-10-10 RX ADMIN — METOPROLOL TARTRATE 50 MG: 50 TABLET, FILM COATED ORAL at 09:04

## 2023-10-10 RX ADMIN — AMIODARONE HYDROCHLORIDE 200 MG: 200 TABLET ORAL at 16:27

## 2023-10-10 RX ADMIN — TIMOLOL MALEATE 1 DROP: 5 SOLUTION/ DROPS OPHTHALMIC at 10:44

## 2023-10-10 RX ADMIN — AMIODARONE HYDROCHLORIDE 200 MG: 200 TABLET ORAL at 09:05

## 2023-10-10 RX ADMIN — INSULIN LISPRO 4 UNITS: 100 INJECTION, SOLUTION INTRAVENOUS; SUBCUTANEOUS at 21:39

## 2023-10-10 RX ADMIN — BRIMONIDINE TARTRATE 1 DROP: 2 SOLUTION/ DROPS OPHTHALMIC at 10:44

## 2023-10-10 RX ADMIN — METOPROLOL TARTRATE 75 MG: 50 TABLET, FILM COATED ORAL at 21:39

## 2023-10-10 RX ADMIN — INSULIN GLARGINE 4 UNITS: 100 INJECTION, SOLUTION SUBCUTANEOUS at 21:39

## 2023-10-10 RX ADMIN — PRAVASTATIN SODIUM 20 MG: 20 TABLET ORAL at 16:27

## 2023-10-10 RX ADMIN — FINASTERIDE 5 MG: 5 TABLET, FILM COATED ORAL at 09:04

## 2023-10-10 RX ADMIN — TRAVOPROST 1 DROP: 0.04 SOLUTION OPHTHALMIC at 21:40

## 2023-10-10 RX ADMIN — INSULIN LISPRO 5 UNITS: 100 INJECTION, SOLUTION INTRAVENOUS; SUBCUTANEOUS at 16:28

## 2023-10-10 RX ADMIN — FOLIC ACID 1 MG: 1 TABLET ORAL at 09:05

## 2023-10-10 RX ADMIN — APIXABAN 10 MG: 5 TABLET, FILM COATED ORAL at 09:05

## 2023-10-10 RX ADMIN — PREDNISONE 40 MG: 20 TABLET ORAL at 09:05

## 2023-10-10 RX ADMIN — APIXABAN 10 MG: 5 TABLET, FILM COATED ORAL at 17:08

## 2023-10-10 NOTE — PLAN OF CARE
Problem: PAIN - ADULT  Goal: Verbalizes/displays adequate comfort level or baseline comfort level  Description: Interventions:  - Encourage patient to monitor pain and request assistance  - Assess pain using appropriate pain scale  - Administer analgesics based on type and severity of pain and evaluate response  - Implement non-pharmacological measures as appropriate and evaluate response  - Consider cultural and social influences on pain and pain management  - Notify physician/advanced practitioner if interventions unsuccessful or patient reports new pain  Outcome: Progressing     Problem: INFECTION - ADULT  Goal: Absence or prevention of progression during hospitalization  Description: INTERVENTIONS:  - Assess and monitor for signs and symptoms of infection  - Monitor lab/diagnostic results  - Monitor all insertion sites, i.e. indwelling lines, tubes, and drains  - Mozier appropriate cooling/warming therapies per order  - Administer medications as ordered  - Instruct and encourage patient and family to use good hand hygiene technique  - Identify and instruct in appropriate isolation precautions for identified infection/condition  Outcome: Progressing     Problem: Knowledge Deficit  Goal: Patient/family/caregiver demonstrates understanding of disease process, treatment plan, medications, and discharge instructions  Description: Complete learning assessment and assess knowledge base.   Interventions:  - Provide teaching at level of understanding  - Provide teaching via preferred learning methods  Outcome: Progressing

## 2023-10-10 NOTE — ASSESSMENT & PLAN NOTE
· Continue with IV solumedrol, tapered to daily from BID on 10/6  · Still with some wheeze, but improving  · Was on IV solumedrol daily  · Prednisone taper

## 2023-10-10 NOTE — ASSESSMENT & PLAN NOTE
Recent Labs     10/08/23  0519 10/09/23  0504 10/10/23  0937   HGB 10.8* 10.2* 11.3*       · Hemoglobin with downtrend since arrival but now stabilized  · Does not report any active signs of bleeding  · Likely multifactorial in setting of iron deficiency and chronic disease  · Given metastatic burden, could have small occult bleed/oozing  · Stable for continuation of anticoagulation, transitioned to eliquis

## 2023-10-10 NOTE — ASSESSMENT & PLAN NOTE
Lab Results   Component Value Date    HGBA1C 5.1 07/07/2023       Recent Labs     10/09/23  1110 10/09/23  1617 10/09/23  2056 10/10/23  0736   POCGLU 228* 135 219* 76       Blood Sugar Average: Last 72 hrs:  (P) 714.5026677252356112   Plan:  • Hold home regimen  • Diet Consistent CHO Level 2 (5 CHO servings/75g CHO per meal)  • Insulin regimen  o Humalog 5 units TID AC  o Lantus 4 units HS  o Glucose checks and Insulin correction ACHS  • Goal -180 while admitted, adjusting insulin regimen as appropriate  • Monitor for hypoglycemia and treat per protocol

## 2023-10-10 NOTE — CASE MANAGEMENT
Case Management Progress Note    Patient name Sharon Alberto  Location /-81 MRN [id number]  : 1945 Date 10/10/2023       LOS (days): 7  Geometric Mean LOS (GMLOS) (days): 4.00  Days to GMLOS:-2.7        OBJECTIVE:        Current admission status: Inpatient  Preferred Pharmacy:   [address] [facility] [address] [address] [address] [address] [address] [place] [facility] [address]  Phone: [phone] Fax: [address], [address] [facility] [address] [facility] [address] [facility] [address]  Phone: [phone] Fax: [phone]    [facility] [address] [place] [id number]  Phone: [phone] Fax: [phone]    Primary Care Provider: Carlitos Garduno PA-C    Primary Insurance: MEDICARE  Secondary Insurance: [employer]    PROGRESS NOTE:    Per rounding with SLIM, hem onc has been consulted for anticoagulation recommendations. Eliquis has a copay of over $500. He is anticipated to be discharged in 24-48 hours. [facility] has accepted and was reserved in [facility]. AVS updated to reflect the same. CM department will continue to follow patient through discharge.

## 2023-10-10 NOTE — ASSESSMENT & PLAN NOTE
· Present on admission  · With associated right lower extremity DVT  · Likely provoked by malignancy and new onset Afib with RVR  · CT imaging showing diffuse metastatic disease  · Findings discussed with patient and his wife and extended family  · Eliquis price check > $500 - discuss with home/onc for eliquis discount  · Now transitioned to eliquis and provide good RX coupons to facilitate medication as an outpatient  · Consult Heme onc - input appreciated regarding adequate affordable medicine on discharge

## 2023-10-10 NOTE — ASSESSMENT & PLAN NOTE
Pulse: (!) 134    · Present on admission  · New onset afib with RVR  · Unclear etiology, EF is 35% with global hypokinesis   · Could be secondary to Afib vs ischemic cardiomyopathy   · Goals of care discussion are ongoing, palliative care consulted   · He has been transitioned off IV amiodarone to PO meds  · Still Heart ranging from 120-140  · After discussion with cardiology and family, patient is agreeable to being discharged when pleural effusion is stable with current heart rates given patient is not a candidate for cardioversion and ischemia evaluation  · Family will follow with palliative care and if patient decompensates rapidly, he will be transitioned to hospice either by coming back to the hospital or palliative care to facilitate

## 2023-10-10 NOTE — PROGRESS NOTES
[facility]  Progress Note  Name: Marlyn Chacon  MRN: [de-identified]  Unit/Bed#: -02 I Date of Admission: 10/3/2023   Date of Service: 10/10/2023 I Hospital Day: 7    Assessment/Plan   * Multiple subsegmental pulmonary emboli without acute cor pulmonale (HCC)  Assessment & Plan  · Present on admission  · With associated right lower extremity DVT  · Likely provoked by malignancy and new onset Afib with RVR  · CT imaging showing diffuse metastatic disease  · Findings discussed with patient and his wife and extended family  · Eliquis price check > $500 - discuss with home/onc for eliquis discount  · Now transitioned to eliquis and provide good RX coupons to facilitate medication as an outpatient  · Consult Heme onc - input appreciated regarding adequate affordable medicine on discharge    COPD with acute exacerbation ([address])  Assessment & Plan  · Continue with IV solumedrol, tapered to daily from BID on 10/6  · Still with some wheeze, but improving  · Was on IV solumedrol daily  · Prednisone taper    Abnormal CT of the abdomen  Assessment & Plan  · Diffuse metastatic disease, refer to CT imaging reports  · For now continue with stabilization of HR   · Once stabilized, further discuss goals of care  · Goals of care discussion completed with patient's wife and daughters; to be ongoing throughout admission  · As of right now, DNR/DNI level 3 (not pursuing hospice)  · Guarded/poor prognosis    HILARY (acute kidney injury) [facility])  Assessment & Plan  · Present on admission    Recent Labs     10/08/23  0519 10/09/23  0504   CREATININE 1.90* 1.93*   EGFR 33 32     Estimated Creatinine Clearance: 32.1 mL/min (A) (by C-G formula based on SCr of 1.93 mg/dL (H)).     · Baseline 1.0-1.1  · Unclear etiology, could be from hypoperfusion in setting of Afib/CHF  · At this time suspecting hypovolemia from decompensated heart failure  · Patient had been receiving fluids with amiodarone and heparin drips but creatinine has not improved  · CXR show some mild vascular congestion and lower extremity edema +  · Status post IV diuresis for 48 hours  · Creatinine improved   · Monitor lytes, weight, I/O    Atrial fibrillation with RVR ([facility])  Assessment & Plan  Pulse: (!) 134    · Present on admission  · New onset afib with RVR  · Unclear etiology, EF is 35% with global hypokinesis   · Could be secondary to Afib vs ischemic cardiomyopathy   · Goals of care discussion are ongoing, palliative care consulted   · He has been transitioned off IV amiodarone to PO meds  · Still Heart ranging from 120-140  · After discussion with cardiology and family, patient is agreeable to being discharged when pleural effusion is stable with current heart rates given patient is not a candidate for cardioversion and ischemia evaluation  · Family will follow with palliative care and if patient decompensates rapidly, he will be transitioned to hospice either by coming back to the hospital or palliative care to facilitate     Pleural effusion  Assessment & Plan  · Noted to have bilateral pleural effusions  · Small left sided, Moderate to large right sided with loculation  · Consulted IR for thoracentesis, 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  · Given EF 35%, could be transudative as well  · Status post thoracentesis on 10/5 with 2 liter fluid removal   · CXR does show some re accumulation  · Will discuss with IR to see if repeat thoracentesis is indicated    Malignant neoplasm of upper lobe of right lung [facility])  Assessment & Plan  • 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, cM1a) diagnosed on 9/15 s/p 5 cycles of XRT and had been on osimerinib   • He had rescan on 8/8 that unfortunately showed increased size of mass  • He was recently restarted on carboplastin + pemetrexed; last had cycle about 2 weeks ago  • Unfortunately on CT:  ? "Luminal narrowing in the region of the bifurcation of the bronchus intermedius, which is new since August 2023, possibly secondary to enlarging hilar lymphadenopathy or increasing postradiation fibrosis"  ? "Airspace consolidations in the right middle and lower lobes, new since August 2023, likely at least in part representing atelectasis, likely secondary to the aforementioned partial bronchial occlusion."  ? "Significant progression of metastatic disease throughout the left lung, with new enlarged nodules/masses"  ·  There is noted high association with pemetrexed and VTE as well as progressive disease on CT scan    Anemia  Assessment & Plan  Recent Labs     10/08/23  0519 10/09/23  0504 10/10/23  0937   HGB 10.8* 10.2* 11.3*       · Hemoglobin with downtrend since arrival but now stabilized  · Does not report any active signs of bleeding  · Likely multifactorial in setting of iron deficiency and chronic disease  · Given metastatic burden, could have small occult bleed/oozing  · Stable for continuation of anticoagulation, transitioned to eliquis     Controlled type 2 diabetes mellitus without complication, without long-term current use of insulin [facility])  Assessment & Plan  Lab Results   Component Value Date    HGBA1C 5.1 07/07/2023       Recent Labs     10/09/23  1110 10/09/23  1617 10/09/23  2056 10/10/23  0736   POCGLU 228* 135 219* 76       Blood Sugar Average: Last 72 hrs:  (P) [id number]   Plan:  • Hold home regimen  • Diet Consistent CHO Level 2 (5 CHO servings/75g CHO per meal)  • Insulin regimen  o Humalog 5 units TID AC  o Lantus 4 units HS  o Glucose checks and Insulin correction ACHS  • Goal -180 while admitted, adjusting insulin regimen as appropriate  • Monitor for hypoglycemia and treat per protocol             VTE Pharmacologic Prophylaxis:   High Risk (Score >/= 5) - Pharmacological DVT Prophylaxis Ordered: apixaban (Eliquis). Sequential Compression Devices Ordered. Patient Centered Rounds: I performed bedside rounds with nursing staff today.   Discussions with Specialists or Other Care Team Provider:  [facility]  IP CONSULT TO PALLIATIVE CARE  IP CONSULT TO HEMATOLOGY    Education and Discussions with Family / Patient: patient    Time Spent for Care: 75 minutes. More than 50% of total time spent on counseling and coordination of care as described above. Current Length of Stay: 7 day(s)  Current Patient Status: Inpatient   Certification Statement: The patient will continue to require additional inpatient hospital stay due to plan noted above  Discharge Plan: Anticipate discharge in 24-48 hrs to home. Code Status: Level 3 - DNAR and DNI    Subjective:   Offers no new complaints at this time. Denies any chest pain or shortness of breath. No acute events reported overnight. Understanding of plan. All questions answered. Objective:     Vitals:   Temp (24hrs), Av.1 °F (36.7 °C), Min:97.7 °F (36.5 °C), Max:98.3 °F (36.8 °C)    Temp:  [97.7 °F (36.5 °C)-98.3 °F (36.8 °C)] 98.2 °F (36.8 °C)  HR:  [110-134] 134  Resp:  [17-20] 17  BP: (120-131)/(81-92) 127/92  SpO2:  [94 %-98 %] 96 %  Body mass index is 25 kg/m². Input and Output Summary (last 24 hours):   No intake or output data in the 24 hours ending 10/10/23 1231    Physical Exam:   Physical Exam  Constitutional:       General: He is not in acute distress. Appearance: Normal appearance. He is not toxic-appearing. Cardiovascular:      Rate and Rhythm: Tachycardia present. Rhythm irregular. Heart sounds: Normal heart sounds. No murmur heard. Pulmonary:      Effort: Pulmonary effort is normal. No respiratory distress. Breath sounds: Wheezing present. Abdominal:      General: Abdomen is flat. There is no distension. Palpations: Abdomen is soft. Tenderness: There is no abdominal tenderness. Musculoskeletal:      Right lower leg: Edema present. Neurological:      General: No focal deficit present. Mental Status: He is alert and oriented to person, place, and time. Mental status is at baseline. Motor: No weakness. Additional Data:     Labs:  Results from last 7 days   Lab Units 10/10/23  0937 10/09/23  0504   WBC Thousand/uL 9.93 9.35   HEMOGLOBIN g/dL 11.3* 10.2*   HEMATOCRIT % 34.3* 31.0*   PLATELETS Thousands/uL 159 125*   BANDS PCT % 1  --    NEUTROS PCT %  --  79*   LYMPHS PCT %  --  5*   LYMPHO PCT % 10*  --    MONOS PCT %  --  13*   MONO PCT % 10  --    EOS PCT % 0 0     Results from last 7 days   Lab Units 10/10/23  0937   SODIUM mmol/L 141   POTASSIUM mmol/L 3.9   CHLORIDE mmol/L 107   CO2 mmol/L 27   BUN mg/dL 53*   CREATININE mg/dL 1.75*   ANION GAP mmol/L 7   CALCIUM mg/dL 8.8   ALBUMIN g/dL 3.3*   TOTAL BILIRUBIN mg/dL 0.42   ALK PHOS U/L 77   ALT U/L 33   AST U/L 15   GLUCOSE RANDOM mg/dL 148*     Results from last 7 days   Lab Units 10/03/23  1556   INR  1.24*     Results from last 7 days   Lab Units 10/10/23  1119 10/10/23  0736 10/09/23  2056 10/09/23  1617 10/09/23  1110 10/09/23  0752 10/08/23  2128 10/08/23  1553 10/08/23  1219 10/08/23  0823 10/07/23  2107 10/07/23  1632   POC GLUCOSE mg/dl 112 76 219* 135 228* 110 198* 203* 136 91 165* 166*         Results from last 7 days   Lab Units 10/03/23  1556   LACTIC ACID mmol/L 1.9       Lines/Drains:  Invasive Devices     Peripheral Intravenous Line  Duration           Peripheral IV 10/08/23 Right Antecubital 2 days                      Imaging: Reviewed radiology reports from this admission including: chest xray and procedure reports    XR chest portable ICU    Result Date: 10/7/2023  Impression: Moderate right and small left effusion with no pneumothorax. Bilateral lung nodules corresponding with the CT. Workstation performed: [id number]     IR IN-Patient Thoracentesis    Result Date: 10/6/2023  Impression: Impression: Ultrasound-guided thoracentesis yielding 1960 mL clear, yellow right pleural fluid. Signed, performed, and dictated by RADHA Augustine under the supervision of Dr. Leeanna Perez.  Workstation performed: [id number]     Echo complete w/ contrast if indicated    Addendum Date: 10/4/2023    •  Left Ventricle: Left ventricular cavity size is normal. Wall thickness is mildly increased. The left ventricular ejection fraction is 35%. When compared to previous echocardiogram from 2022, left ventricular ejection fraction is significantly reduced. There is severe global hypokinesis. Unable to assess diastolic function due to atrial fibrillation. Tachycardia limits interpretation. •  Right Ventricle: Right ventricular cavity size is mildly dilated. Systolic function is mildly reduced. •  Left Atrium: The atrium is moderately dilated. •  Right Atrium: The atrium is mildly dilated. •  Aortic Valve: There is mild to moderate regurgitation. •  Mitral Valve: There is mild annular calcification. There is moderate regurgitation. •  Tricuspid Valve: There is mild regurgitation. Estimated RVSP 35 mm Hg. •  Aorta: The aortic root is mildly dilated. •  IVC/SVC: The inferior vena cava is dilated. •  Pericardium: There is a small pericardial effusion. PE Study with CT Abdomen and Pelvis with contrast    Result Date: 10/3/2023  Impression: CTA CHEST: 1) Acute pulmonary emboli in the left distal pulmonary artery extending into the left lower lobar, and some of the segmental and subsegmental pulmonary arteries with additional segmental and subsegmental pulmonary emboli in the lingula. This was discussed with Dr. Jesenia Collier via HIPAA compliant secure electronic messaging on 10/3/2023 at 6:35 PM with confirmation of receipt. 2) Cardiomegaly. Although the RV/LV ratio is normal, reflux of IV contrast into the dilated IVC and hepatic veins may suggest right heart failure. 3) Main pulmonary artery dilated up to 3.7 cm, suggestive of pulmonary arterial hypertension. 4) Moderate to large right pleural effusion with likely loculation at the apex, and small to moderate left pleural effusion, new since August 2023. No evidence of empyema.  5) Luminal narrowing in the region of the bifurcation of the bronchus intermedius, which is new since August 2023, possibly secondary to enlarging hilar lymphadenopathy or increasing postradiation fibrosis. 6) Airspace consolidations in the right middle and lower lobes, new since August 2023, likely at least in part representing atelectasis, likely secondary to the aforementioned partial bronchial occlusion. Some of the previously seen right middle and lower lobe nodules/opacities are obscured. 7) Right upper lobe mass overall stable to mildly enlarged since August 2023. 8) Significant progression of metastatic disease throughout the left lung, with new enlarged nodules/masses, as detailed above. 9) Overall similar mediastinal lymphadenopathy compared to August 2023. Right hilum remains prominent, however discrete lymph nodes difficult to measure due to the phase of contrast. CT ABDOMEN/PELVIS: 10) Mild fat stranding adjacent to head of the pancreas and the osbaldo hepatis/gallbladder. Recommend correlation with lipase level to exclude pancreatitis. No findings to suggest acute cholecystitis. No organized collections. 11) Mild circumferential wall thickening of the urinary bladder. Recommend correlation with urinalysis to exclude cystitis. 12) No other acute abdominal or pelvic pathology. 13) Additional findings as above. Workstation performed: [id number]     XR chest 1 view portable    Result Date: 10/3/2023  Impression: Moderate right and small left pleural effusion with right base atelectasis, new since August 2023. Right base pneumonia not excluded in the appropriate clinical setting. Workstation performed: [id number]       XR chest portable    Result Date: 10/9/2023  Impression Right pleural effusion is slightly increased in size; the left pleural effusion is not significantly changed. Resident: Louise Llanos, the attending radiologist, have reviewed the images and agree with the final report above.  Workstation performed: [id number]        Recent Cultures (last 7 days):   Results from last 7 days   Lab Units 10/05/23  1243 10/03/23  1558 10/03/23  1556   BLOOD CULTURE   --  No Growth After 5 Days. No Growth After 5 Days. GRAM STAIN RESULT  Rare Mononuclear Cells  No Polys or Bacteria seen  --   --    BODY FLUID CULTURE, STERILE  No growth  --   --        Last 24 Hours Medication List:   Current Facility-Administered Medications   Medication Dose Route Frequency Provider Last Rate   • amiodarone  200 mg Oral TID With Meals Balwinder Grace PA-C     • apixaban  10 mg Oral BID Balwinder Grace PA-C     • brimonidine tartrate  1 drop Left Eye BID Balwinder Grace PA-C     • finasteride  5 mg Oral Daily Balwinder Grace PA-C     • folic acid  1 mg Oral Daily Balwinder Grace PA-C     • insulin glargine  4 Units Subcutaneous HS Balwinder Grace PA-C     • insulin lispro  1-6 Units Subcutaneous TID AC Sergio Shipley PA-C     • insulin lispro  1-6 Units Subcutaneous HS Balwinder Grace PA-C     • insulin lispro  5 Units Subcutaneous TID With Meals Balwinder Grace PA-C     • metoprolol tartrate  50 mg Oral Q12H [address] Sergio Shipley PA-C     • pravastatin  20 mg Oral Daily With ZAHRAA Jones     • predniSONE  40 mg Oral Daily Sergio Shipley PA-C     • timolol  1 drop Both Eyes Daily Sergio Shipley PA-C     • travoprost  1 drop Both Eyes HS Balwinder Grace PA-C          Today, Patient Was Seen By: Aspen Eid DO    **Please Note: This note may have been constructed using a voice recognition system. **

## 2023-10-10 NOTE — ASSESSMENT & PLAN NOTE
· Diffuse metastatic disease, refer to CT imaging reports  · For now continue with stabilization of HR   · Once stabilized, further discuss goals of care  · Goals of care discussion completed with patient's wife and daughters; to be ongoing throughout admission  · As of right now, DNR/DNI level 3 (not pursuing hospice)  · Guarded/poor prognosis

## 2023-10-10 NOTE — ASSESSMENT & PLAN NOTE
• 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, cM1a) diagnosed on 9/15 s/p 5 cycles of XRT and had been on osimerinib   • He had rescan on 8/8 that unfortunately showed increased size of mass  • He was recently restarted on carboplastin + pemetrexed; last had cycle about 2 weeks ago  • Unfortunately on CT:  ? "Luminal narrowing in the region of the bifurcation of the bronchus intermedius, which is new since August 2023, possibly secondary to enlarging hilar lymphadenopathy or increasing postradiation fibrosis"  ? "Airspace consolidations in the right middle and lower lobes, new since August 2023, likely at least in part representing atelectasis, likely secondary to the aforementioned partial bronchial occlusion."  ?  "Significant progression of metastatic disease throughout the left lung, with new enlarged nodules/masses"  ·  There is noted high association with pemetrexed and VTE as well as progressive disease on CT scan

## 2023-10-10 NOTE — CONSULTS
Medical Oncology/Hematology Consult Note  Juan David Connolly, male, 69 y.o., 1945,  /-[birth year], [id number]     Reason for admission: PE   Reason for consultation: PE      ASSESSMENT AND PLAN:     1. Multiple subsegmental PE without acute cor pulmonale   History of bilateral pulmonary embolism in 09/2022 which was found at the same time as RUL pulmonary mass and mediastinal lymphadenopathy. Patient was previously on Xarelto however self-discontinued after approximately 1 month of therapy due to financial hardship and had been maintained on ASA 162mg daily. Patient now with recurrent PE off of anticoagulation in setting of metastatic cancer. Initially treated with heparin, now transitioned to Eliquis. Recommend continued  treatment with life long anticoagulation. Price check for Eliquis was $500. The patient and his family were provided an opvizor. They are to call and determine eligibility based on his insurance. Alternatively could consider  anticoagulation with Coumadin or Lovenox 1mg/kg sub q12hr. 2. Stage IV adenocarcinoma of the lung; 4 muts/Mb, MSI-stable, PDL1 +1 CPS, EGFR exon   Followed by Dr. Francisco Toscano. Patient initially diagnosed 09/2022 previously treated with osimertinib. CT 08/2023 showed evidence of disease progression. Switched to Carboplatin +Pemetrexed, c1 09/19/23. Next chemotherapy due 10/09 however missed due to current hospitalization. CT on admission revealed bilateral pleural effusions, luminal narrowing in the region of the bifurcation of the bronchus intermedius, new, possibly secondary to enlarging hilar lymphadenopathy or increasing postradiation fibrosis, and progression of disease in left lung. Next chemotherapy planned for 10/18. Will arrange for hospital follow-up with his oncologist prior to next chemotherapy session. Patient understands and is in agreement with this plan. Thank you for the opportunity to participate in this patient's care. Hematology/Oncology will sign off at this time. Please do not hesitate to reach out to our team with any additional questions or concerns. ECOG: 3    History of present illness: 67yo male with history of T2DM, HLP, HTN, prostate cancer 2005 s/p RT, b/l PE, and stage IV lung adenocarcinoma who presented to the emergency department with shortness of breath and lower extremity edema R>L. Patient was found to have bilateral subsegmental PE and atrial fibrillation with RVR. We are consulted for anticoagulation recommendations. Patient admits to history of DVT and PE approximately 1 year ago which was found at the same time as his lung cancer. Patient was previously on Xarelto however self-discontinued after approximately 1 month of therapy due to financial hardship and had been maintained on ASA 162mg daily. He denies hematochezia, melena, or hematuria. No falls at home. Ambulating without difficulty. In regards to his lung cancer, initially was diagnosed 09/15/2023. Patient has completed radiation therapy and had been on Osimertinib with progression of disease. This therapy was aborted and he recently started Carboplatin + Pemetrexed, cycle 1 09/19/2023. History of lifelong smoker. Ex-wife also smoked heavily so he was exposed to second hand smoke. 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 Review of Systems:   Review of Systems   Respiratory: Negative for chest tightness and shortness of breath. Cardiovascular: Positive for leg swelling. Negative for chest pain. Gastrointestinal: Negative for abdominal pain and blood in stool. Genitourinary: Negative for hematuria. Skin: Negative for rash. Neurological: Negative for dizziness and light-headedness. Hematological: Negative for adenopathy. All other systems reviewed and are negative.         PHYSICAL EXAM:    /92   Pulse (!) 134   Temp 98.2 °F (36.8 °C)   Resp 17   Ht 5' 9" (1.753 m)   Wt 76.8 kg (169 lb 5 oz)   SpO2 96%   BMI 25.00 kg/m²     Physical Exam  Vitals reviewed. HENT:      Head: Normocephalic and atraumatic. Eyes:      General: No scleral icterus. Extraocular Movements: Extraocular movements intact. Pupils: Pupils are equal, round, and reactive to light. Cardiovascular:      Rate and Rhythm: Normal rate. Rhythm irregular. Heart sounds: Normal heart sounds. Pulmonary:      Effort: Pulmonary effort is normal.      Breath sounds: Normal breath sounds. Abdominal:      General: Bowel sounds are normal.      Palpations: Abdomen is soft. There is no hepatomegaly or splenomegaly. Tenderness: There is no abdominal tenderness. Musculoskeletal:         General: Normal range of motion. Right lower leg: Edema present. Left lower leg: Edema present. Lymphadenopathy:      Cervical: No cervical adenopathy. Upper Body:      Right upper body: No supraclavicular or axillary adenopathy. Left upper body: No supraclavicular or axillary adenopathy. Skin:     General: Skin is warm and dry. Coloration: Skin is not jaundiced. Findings: No bruising or rash. Neurological:      General: No focal deficit present. Mental Status: He is alert. Mental status is at baseline. Cranial Nerves: No cranial nerve deficit. Motor: No weakness.    Psychiatric:         Mood and Affect: Mood normal.         LABS:     Recent Results (from the past 48 hour(s))   Fingerstick Glucose (POCT)    Collection Time: 10/08/23  3:53 PM   Result Value Ref Range    POC Glucose 203 (H) 65 - 140 mg/dl   Fingerstick Glucose (POCT)    Collection Time: 10/08/23  9:28 PM   Result Value Ref Range    POC Glucose 198 (H) 65 - 140 mg/dl   CBC and differential    Collection Time: 10/09/23  5:04 AM   Result Value Ref Range    WBC 9.35 4.31 - 10.16 Thousand/uL    RBC 3.17 (L) 3.88 - 5.62 Million/uL    Hemoglobin 10.2 (L) 12.0 - 17.0 g/dL    Hematocrit 31.0 (L) 36.5 - 49.3 %    MCV 98 82 - 98 fL    MCH 32.2 26.8 - 34.3 pg    MCHC 32.9 31.4 - 37.4 g/dL    RDW 15.1 11.6 - 15.1 %    MPV 11.9 8.9 - 12.7 fL    Platelets 118 (L) 340 - 390 Thousands/uL    nRBC 0 /100 WBCs    Neutrophils Relative 79 (H) 43 - 75 %    Immat GRANS % 3 (H) 0 - 2 %    Lymphocytes Relative 5 (L) 14 - 44 %    Monocytes Relative 13 (H) 4 - 12 %    Eosinophils Relative 0 0 - 6 %    Basophils Relative 0 0 - 1 %    Neutrophils Absolute 7.43 1.85 - 7.62 Thousands/µL    Immature Grans Absolute 0.24 (H) 0.00 - 0.20 Thousand/uL    Lymphocytes Absolute 0.47 (L) 0.60 - 4.47 Thousands/µL    Monocytes Absolute 1.19 0.17 - 1.22 Thousand/µL    Eosinophils Absolute 0.00 0.00 - 0.61 Thousand/µL    Basophils Absolute 0.02 0.00 - 0.10 Thousands/µL   Magnesium    Collection Time: 10/09/23  5:04 AM   Result Value Ref Range    Magnesium 2.6 1.9 - 2.7 mg/dL   Comprehensive metabolic panel    Collection Time: 10/09/23  5:04 AM   Result Value Ref Range    Sodium 138 135 - 147 mmol/L    Potassium 4.3 3.5 - 5.3 mmol/L    Chloride 107 96 - 108 mmol/L    CO2 25 21 - 32 mmol/L    ANION GAP 6 mmol/L    BUN 56 (H) 5 - 25 mg/dL    Creatinine 1.93 (H) 0.60 - 1.30 mg/dL    Glucose 141 (H) 65 - 140 mg/dL    Calcium 8.5 8.4 - 10.2 mg/dL    Corrected Calcium 9.2 8.3 - 10.1 mg/dL    AST 18 13 - 39 U/L    ALT 33 7 - 52 U/L    Alkaline Phosphatase 76 34 - 104 U/L    Total Protein 5.4 (L) 6.4 - 8.4 g/dL    Albumin 3.1 (L) 3.5 - 5.0 g/dL    Total Bilirubin 0.30 0.20 - 1.00 mg/dL    eGFR 32 ml/min/1.73sq m   Fingerstick Glucose (POCT)    Collection Time: 10/09/23  7:52 AM   Result Value Ref Range    POC Glucose 110 65 - 140 mg/dl   Fingerstick Glucose (POCT)    Collection Time: 10/09/23 11:10 AM   Result Value Ref Range    POC Glucose 228 (H) 65 - 140 mg/dl   Fingerstick Glucose (POCT)    Collection Time: 10/09/23  4:17 PM   Result Value Ref Range    POC Glucose 135 65 - 140 mg/dl   Fingerstick Glucose (POCT)    Collection Time: 10/09/23  8:56 PM Result Value Ref Range    POC Glucose 219 (H) 65 - 140 mg/dl   Fingerstick Glucose (POCT)    Collection Time: 10/10/23  7:36 AM   Result Value Ref Range    POC Glucose 76 65 - 140 mg/dl   Magnesium    Collection Time: 10/10/23  9:37 AM   Result Value Ref Range    Magnesium 2.7 1.9 - 2.7 mg/dL   Comprehensive metabolic panel    Collection Time: 10/10/23  9:37 AM   Result Value Ref Range    Sodium 141 135 - 147 mmol/L    Potassium 3.9 3.5 - 5.3 mmol/L    Chloride 107 96 - 108 mmol/L    CO2 27 21 - 32 mmol/L    ANION GAP 7 mmol/L    BUN 53 (H) 5 - 25 mg/dL    Creatinine 1.75 (H) 0.60 - 1.30 mg/dL    Glucose 148 (H) 65 - 140 mg/dL    Calcium 8.8 8.4 - 10.2 mg/dL    Corrected Calcium 9.4 8.3 - 10.1 mg/dL    AST 15 13 - 39 U/L    ALT 33 7 - 52 U/L    Alkaline Phosphatase 77 34 - 104 U/L    Total Protein 6.0 (L) 6.4 - 8.4 g/dL    Albumin 3.3 (L) 3.5 - 5.0 g/dL    Total Bilirubin 0.42 0.20 - 1.00 mg/dL    eGFR 36 ml/min/1.73sq m   CBC and differential    Collection Time: 10/10/23  9:37 AM   Result Value Ref Range    WBC 9.93 4.31 - 10.16 Thousand/uL    RBC 3.50 (L) 3.88 - 5.62 Million/uL    Hemoglobin 11.3 (L) 12.0 - 17.0 g/dL    Hematocrit 34.3 (L) 36.5 - 49.3 %    MCV 98 82 - 98 fL    MCH 32.3 26.8 - 34.3 pg    MCHC 32.9 31.4 - 37.4 g/dL    RDW 16.0 (H) 11.6 - 15.1 %    MPV 11.5 8.9 - 12.7 fL    Platelets 792 706 - 525 Thousands/uL   Manual Differential(PHLEBS Do Not Order)    Collection Time: 10/10/23  9:37 AM   Result Value Ref Range    Segmented % 79 (H) 43 - 75 %    Bands % 1 0 - 8 %    Lymphocytes % 10 (L) 14 - 44 %    Monocytes % 10 4 - 12 %    Eosinophils, % 0 0 - 6 %    Basophils % 0 0 - 1 %    Absolute Neutrophils 7.94 (H) 1.85 - 7.62 Thousand/uL    Lymphocytes Absolute 0.99 0.60 - 4.47 Thousand/uL    Monocytes Absolute 0.99 0.00 - 1.22 Thousand/uL    Eosinophils Absolute 0.00 0.00 - 0.40 Thousand/uL    Basophils Absolute 0.00 0.00 - 0.10 Thousand/uL    Total Counted      Platelet Estimate Adequate Adequate Fingerstick Glucose (POCT)    Collection Time: 10/10/23 11:19 AM   Result Value Ref Range    POC Glucose 112 65 - 140 mg/dl       XR chest portable    Result Date: 10/9/2023  Narrative: CHEST INDICATION:   evaluate for reaccumulation of effusion. COMPARISON: Chest radiograph 10/7/2023. EXAM PERFORMED/VIEWS:  XR CHEST PORTABLE FINDINGS: Unchanged mild cardiomegaly. At least moderate sized right pleural effusion is slightly increased since October 7, 2023. Small-to-moderate size left pleural effusion is not significantly changed. No pneumothorax. Unchanged right upper lobe pulmonary mass. Osseous structures appear within normal limits for patient age. Impression: Right pleural effusion is slightly increased in size; the left pleural effusion is not significantly changed. Resident: Tre Booker, the attending radiologist, have reviewed the images and agree with the final report above. Workstation performed: [id number]     XR chest portable ICU    Result Date: 10/7/2023  Narrative: CHEST INDICATION:   shortness of breath. COMPARISON: CXR and chest CT 10/03/2023. EXAM PERFORMED/VIEWS:  XR CHEST PORTABLE ICU. FINDINGS: Mild cardiomegaly. Bilateral nodules, largest in the right upper lobe. Decrease in right effusion after thoracentesis. Right base atelectasis. Persistent small left effusion. No pneumothorax. Upper abdomen normal. Bones normal for age. Impression: Moderate right and small left effusion with no pneumothorax. Bilateral lung nodules corresponding with the CT. Workstation performed: [id number]     IR IN-Patient Thoracentesis    Result Date: 10/6/2023  Narrative: Ultrasound-guided thoracentesis Clinical History: Pulmonary emboli, atrial fibrillation with rapid ventricular response, symptomatic pleural effusion. Technique: The patient was brought to the interventional radiology area and placed in the sitting position on the side of a stretcher.  The Right chest was then examined with ultrasound and the skin was marked. The skin was then prepped, and draped in usual sterile fashion. Lidocaine was administered to the skin. Under direct ultrasound guidance, a 5 [place] Yueh multisidehole catheter was advanced into the pleural space. 1960 mL clear, yellow right pleural fluid was aspirated. Multiple specimens were sent to the lab. The patient tolerated the procedure well and suffered no complications. Impression: Impression: Ultrasound-guided thoracentesis yielding 1960 mL clear, yellow right pleural fluid. Signed, performed, and dictated by RADHA Lieberman under the supervision of Dr. Ailyn Lopez. Workstation performed: [id number]     Echo complete w/ contrast if indicated    Addendum Date: 10/4/2023 Addendum:   •  Left Ventricle: Left ventricular cavity size is normal. Wall thickness is mildly increased. The left ventricular ejection fraction is 35%. When compared to previous echocardiogram from 2022, left ventricular ejection fraction is significantly reduced. There is severe global hypokinesis. Unable to assess diastolic function due to atrial fibrillation. Tachycardia limits interpretation. •  Right Ventricle: Right ventricular cavity size is mildly dilated. Systolic function is mildly reduced. •  Left Atrium: The atrium is moderately dilated. •  Right Atrium: The atrium is mildly dilated. •  Aortic Valve: There is mild to moderate regurgitation. •  Mitral Valve: There is mild annular calcification. There is moderate regurgitation. •  Tricuspid Valve: There is mild regurgitation. Estimated RVSP 35 mm Hg. •  Aorta: The aortic root is mildly dilated. •  IVC/SVC: The inferior vena cava is dilated. •  Pericardium: There is a small pericardial effusion. Result Date: 10/4/2023  Narrative: •  Left Ventricle: Left ventricular cavity size is normal. Wall thickness is mildly increased. The left ventricular ejection fraction is 35%.    When compared to previous echocardiogram from 2022, left ventricular ejection fraction is significantly reduced. There is severe global hypokinesis. Unable to assess diastolic function due to atrial fibrillation. Tachycardia limits interpretation. •  Right Ventricle: Right ventricular cavity size is mildly dilated. Systolic function is mildly reduced. •  Left Atrium: The atrium is moderately dilated. •  Right Atrium: The atrium is mildly dilated. •  Aortic Valve: There is mild to moderate regurgitation. •  Mitral Valve: There is mild annular calcification. There is moderate regurgitation. •  Tricuspid Valve: There is mild regurgitation. Estimated RVSP 35 mm Hg. •  IVC/SVC: The inferior vena cava is dilated. •  Pericardium: There is a small pericardial effusion. PE Study with CT Abdomen and Pelvis with contrast    Result Date: 10/3/2023  Narrative: CT PULMONARY ANGIOGRAM OF THE CHEST AND CT ABDOMEN AND PELVIS WITH INTRAVENOUS CONTRAST INDICATION:   dyspnea. As per review of electronic medical record, patient with history of prostate cancer in 2005 status post radiation therapy, stage IV adenocarcinoma of the lung initially diagnosed in September 2022 status post SBRT on Osimertinib and just started chemotherapy, bilateral pulmonary emboli on Xarelto COMPARISON: CT of the chest, abdomen and pelvis from 8/8/2023, 4/11/2023, and 9/12/2022 PET/CT from 9/30/2022, CT of the abdomen and pelvis from 6/5/2006, and abdominal MRI from 8/21/2013. TECHNIQUE:  CT examination of the chest, abdomen and pelvis was performed. Thin section CT angiographic technique was used in the chest in order to evaluate for pulmonary embolus and coronal 3D MIP postprocessing was performed on the acquisition scanner. Multiplanar 2D reformatted images were created from the source data.  This examination, like all CT scans performed in the [facility], was performed utilizing techniques to minimize radiation dose exposure, including the use of iterative reconstruction and automated exposure control. Radiation dose length product (DLP) for this visit:  1028 mGy-cm IV Contrast:  100 mL of iohexol (OMNIPAQUE) Enteric Contrast:  Enteric contrast was not administered. FINDINGS: CHEST PULMONARY ARTERIAL TREE: Acute pulmonary emboli are seen in the distal left pulmonary artery extending into the left lower lobar and some of the segmental and subsegmental pulmonary arteries. Segmental and subsegmental pulmonary emboli are seen in the lingula. Main pulmonary artery dilated up to 3.7 cm, suggestive of pulmonary arterial hypertension. BRONCHOPULMONARY: There is luminal narrowing in the region of the bifurcation of the bronchus intermedius, which is new since August 2023. This may be secondary to enlarging hilar lymphadenopathy. Otherwise clear central airways. There is a partially enhancing airspace consolidation in the right lower lobe and the inferior portion of the right middle lobe, new since 8/8/2023 likely in part representing atelectasis. Some of the previously seen nodules and opacities in the right middle and lower lobes are obscured. The spiculated mass in the right upper lobe medially measures 3.2 x 3.6 x 2.6 cm (CC x AP x transverse) (series 601 image 72 and series 3 image 90), compared to 3.8 x 3.9 x 2.3 cm in August 2023. There has been interval development of nodules and opacities in the left lung. For instance some representative nodules/opacities are as follows: - 4 mm left upper lobe nodule (series 3 image 103), new. - 8 mm left lower lobe nodule (series 3 image 160) previously 3 mm. -9 mm right lower lobe nodule medially (series 3 image 169), previously 3 mm. - 1.2 cm left lower lobe nodule abutting the major fissure (series 3 image 192), previously 5 mm. - 2 left lower lobe nodules measuring 1.2 cm and 1.1 cm (series 3 image 219), new. -2.9 x 1.7 cm mass in the left lower lobe laterally (series 3 image 220), previously 6 mm. -7 mm lingular nodule (series 3 image 212), new.  PLEURA: Interval development of a small to moderate left pleural effusion and moderate to large right pleural effusion since August 2023. There is likely some loculation of the right apex. No pleural enhancement or air in the pleural space to suggest empyema. No pneumothorax. HEART/GREAT VESSELS: The heart is mildly enlarged. No pericardial effusion. Although the RV/LV ratio is normal, there is reflux of IV contrast into the dilated IVC and hepatic veins, which may indicate right heart failure. Normal caliber thoracic aorta. MEDIASTINUM/LYMPH NODES: Several enlarged mediastinal lymph nodes are overall similar to August 2023, measuring 1.9 cm in the subcarinal region and 1.3 cm in the left lower paratracheal region. Evaluation for hilar lymphadenopathy is limited by the phase of contrast. The right hilum remains prominent, however discrete lymph nodes are difficult to measure. No convincing left hilar lymphadenopathy. No axillary lymphadenopathy. CHEST WALL AND LOWER NECK: Mild bilateral gynecomastia. ABDOMEN LIVER/BILIARY TREE: The liver is enlarged, measuring 18.6 cm in greatest craniocaudad dimension. Stable liver morphology with relative atrophy of the left hepatic lobe. No focal hepatic lesions. No biliary ductal dilation. GALLBLADDER: A gallstone is seen within the gallbladder. No pericholecystic inflammatory change. SPLEEN: Unremarkable. PANCREAS: There is mild fat stranding adjacent to the head of the pancreas and the gallbladder. The rest of the pancreas is within normal limits. No dilation of the main pancreatic duct. ADRENAL GLANDS:  Unremarkable. KIDNEYS/URETERS: The left kidney is rotated and displaced into the pelvis. 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 Symmetric renal enhancement. No intrarenal or ureteral calculi. No hydronephrosis. There is a simple cyst in the right kidney.   Several subcentimeter hypodense lesions are seen in both kidneys which cannot be accurately assessed without IV contrast, however statistically likely represent additional cysts. STOMACH AND BOWEL:  Evaluation of the gastrointestinal tract is somewhat limited by underdistention and lack of oral contrast. No bowel obstruction or convincing inflammation. APPENDIX: Normal appendix. ABDOMINOPELVIC CAVITY:  No pneumoperitoneum. Small amount of free fluid in the pelvis. No evidence of abscess. LYMPH NODES: No abdominal or pelvic lymphadenopathy. VESSELS: Normal caliber aorta. Patent major branch vessels. Scattered atherosclerotic calcifications in the abdominal aorta and its major branches. PELVIS REPRODUCTIVE ORGANS: Normal prostate. URINARY BLADDER: Mild circumferential wall thickening of the urinary bladder. ABDOMINAL WALL/INGUINAL REGIONS: No ventral abdominal wall hernia. MUSCULOSKELETAL:  No focal aggressive osseous lesions. Several subcentimeter densely sclerotic lesions are seen in the iliac bones, the sacrum, and the left 6th rib. These are stable since September 2022, possibly representing bone islands. Degenerative changes of the spine. Impression: CTA CHEST: 1) Acute pulmonary emboli in the left distal pulmonary artery extending into the left lower lobar, and some of the segmental and subsegmental pulmonary arteries with additional segmental and subsegmental pulmonary emboli in the lingula. This was discussed with Dr. Kala Moctezuma via HIPAA compliant secure electronic messaging on 10/3/2023 at 6:35 PM with confirmation of receipt. 2) Cardiomegaly. Although the RV/LV ratio is normal, reflux of IV contrast into the dilated IVC and hepatic veins may suggest right heart failure. 3) Main pulmonary artery dilated up to 3.7 cm, suggestive of pulmonary arterial hypertension. 4) Moderate to large right pleural effusion with likely loculation at the apex, and small to moderate left pleural effusion, new since August 2023. No evidence of empyema.  5) Luminal narrowing in the region of the bifurcation of the bronchus intermedius, which is new since August 2023, possibly secondary to enlarging hilar lymphadenopathy or increasing postradiation fibrosis. 6) Airspace consolidations in the right middle and lower lobes, new since August 2023, likely at least in part representing atelectasis, likely secondary to the aforementioned partial bronchial occlusion. Some of the previously seen right middle and lower lobe nodules/opacities are obscured. 7) Right upper lobe mass overall stable to mildly enlarged since August 2023. 8) Significant progression of metastatic disease throughout the left lung, with new enlarged nodules/masses, as detailed above. 9) Overall similar mediastinal lymphadenopathy compared to August 2023. Right hilum remains prominent, however discrete lymph nodes difficult to measure due to the phase of contrast. CT ABDOMEN/PELVIS: 10) Mild fat stranding adjacent to head of the pancreas and the osbaldo hepatis/gallbladder. Recommend correlation with lipase level to exclude pancreatitis. No findings to suggest acute cholecystitis. No organized collections. 11) Mild circumferential wall thickening of the urinary bladder. Recommend correlation with urinalysis to exclude cystitis. 12) No other acute abdominal or pelvic pathology. 13) Additional findings as above. Workstation performed: [id number]     VAS lower limb venous duplex study, unilateral/limited    Result Date: 10/3/2023  Narrative:  [facility] REPORT CLINICAL: Indications: Patient presents with right lower extremity edema x one month. Operative History: no cardiovascular surgeries Risk Factors The patient has history of HTN, Diabetes (NIDDM (oral meds)) and Hyperlipidemia.   FINDINGS:  Right          Impression              Thrombus           FV Prox        Occlusive Subsegmental  Acute - Occlusive  FV Mid         Occlusive Subsegmental  Acute - Occlusive  FV Dist        Occlusive Subsegmental  Acute - Occlusive  PFV            Occlusive Subsegmental  Acute - Occlusive  Popliteal Occlusive Subsegmental  Acute - Occlusive  PostTibial     Occlusive Subsegmental  Acute - Occlusive  Peroneal       Occlusive Subsegmental  Acute - Occlusive  Gastrocnemius  Occlusive Subsegmental  Acute - Occlusive     CONCLUSION: Impression: RIGHT LOWER LIMB Evidence suggestive of Acute occlusive deep vein thrombosis noted in the Profunda, proximal-distal femoral, popliteal, gastrocnemius, posterior tibial, and peroneal veins. No evidence of superficial thrombophlebitis noted. Popliteal and posterior tibial arterial Doppler waveform's are triphasic. LEFT LOWER LIMB LIMITED Evaluation shows no evidence of thrombus in the common femoral vein. Doppler evaluation shows a normal response to augmentation maneuvers. Tech Note: There is an echogenic structure located in the Right inguinal region measuring approximately 1.7 x 0.8 x 3.6 cm suggestive of enlarged lymphatic channels. Technical findings were given to [facility] on the floor. SIGNATURE: Electronically Signed by: Robert Romano on 2023-10-03 05:17:43 PM    XR chest 1 view portable    Result Date: 10/3/2023  Narrative: CHEST INDICATION:   sob. COMPARISON: Chest CT 8/8/2023, CXR 9/10/2022 and 10/14/2022. EXAM PERFORMED/VIEWS:  XR CHEST PORTABLE. FINDINGS: Mild cardiomegaly. Moderate right and small left pleural effusion with right base atelectasis, new since August 2023. Redemonstration of right upper lobe nodule. No pneumothorax. Upper abdomen normal. Bones normal for age. Impression: Moderate right and small left pleural effusion with right base atelectasis, new since August 2023. Right base pneumonia not excluded in the appropriate clinical setting. Workstation performed: [id number]         HISTORY:    Past Medical History:   Diagnosis Date   • Diabetes mellitus ([address])    • Hyperlipidemia    • Hypertension    • Lung cancer ([address])    • Prostate cancer ([address]) 2005    S/P prostate ca-2005 had radiation tx.        Past Surgical History:   Procedure Laterality Date   • COLONOSCOPY     • IR BIOPSY LYMPH NODE  9/15/2022   • IR THORACENTESIS  9/13/2022   • IR THORACENTESIS  10/5/2023   • NH ARTHRODESIS ANKLE OPEN Right 7/10/2020    Procedure: ARTHRODESIS/ TIBIAL-TALAR ANKLE FUSION;  Surgeon: Margrett Heimlich, MD;  Location: [facility];  Service: Orthopedics   • NH LAPAROSCOPY SURG RPR INITIAL INGUINAL HERNIA Bilateral 12/16/2021    Procedure: LAPAROSCOPIC REPAIR HERNIA INGUINAL WITH MESH;  Surgeon: Brit Lyon MD;  Location: [facility];  Service: General       Family History   Problem Relation Age of Onset   • Heart attack Mother        Social History     Socioeconomic History   • Marital status: /Civil Union     Spouse name: Not on file   • Number of children: Not on file   • Years of education: Not on file   • Highest education level: Not on file   Occupational History   • Not on file   Tobacco Use   • Smoking status: Never   • Smokeless tobacco: Never   Vaping Use   • Vaping Use: Never used   Substance and Sexual Activity   • Alcohol use: Yes     Comment: Occasional   • Drug use: Never   • Sexual activity: Not on file   Other Topics Concern   • Not on file   Social History Narrative   • Not on file     Social Determinants of Health     Financial Resource Strain: Low Risk  (7/25/2023)    Overall Financial Resource Strain (CARDIA)    • Difficulty of Paying Living Expenses: Not very hard   Food Insecurity: No Food Insecurity (10/4/2023)    Hunger Vital Sign    • Worried About Running Out of Food in the Last Year: Never true    • Ran Out of Food in the Last Year: Never true   Transportation Needs: No Transportation Needs (10/4/2023)    PRAPARE - Transportation    • Lack of Transportation (Medical): No    • Lack of Transportation (Non-Medical):  No   Physical Activity: Not on file   Stress: Not on file   Social Connections: Not on file   Intimate Partner Violence: Not on file   Housing Stability: Unknown (10/4/2023)    Housing Stability Vital Sign    • Unable to Pay for Housing in the Last Year: No    • Number of Places Lived in the Last Year: 1    • Unstable Housing in the Last Year: Not on file         Current Facility-Administered Medications:   •  amiodarone tablet 200 mg, 200 mg, Oral, TID With Meals, Chrissy Pinedo PA-C, 200 mg at 10/10/23 1150  •  apixaban (ELIQUIS) tablet 10 mg, 10 mg, Oral, BID, Chrissy Khris, PA-C, 10 mg at 10/10/23 6667  •  brimonidine tartrate 0.2 % ophthalmic solution 1 drop, 1 drop, Left Eye, BID, Chrissy Khris, PA-C, 1 drop at 10/10/23 1044  •  finasteride (PROSCAR) tablet 5 mg, 5 mg, Oral, Daily, Chrissy Pinedo, PA-C, 5 mg at 45/11/58 1527  •  folic acid (FOLVITE) tablet 1 mg, 1 mg, Oral, Daily, TAMIA Gudino-C, 1 mg at 10/10/23 0905  •  insulin glargine (LANTUS) subcutaneous injection 4 Units 0.04 mL, 4 Units, Subcutaneous, HS, Chrissy Pinedo PA-C, 4 Units at 10/09/23 2149  •  insulin lispro (HumaLOG) 100 units/mL subcutaneous injection 1-6 Units, 1-6 Units, Subcutaneous, TID AC, 2 Units at 10/09/23 1201 **AND** Fingerstick Glucose (POCT), , , TID AC, Sergio Shipley PA-C  •  insulin lispro (HumaLOG) 100 units/mL subcutaneous injection 1-6 Units, 1-6 Units, Subcutaneous, HS, Chrissy Pinedo, PA-C, 2 Units at 10/09/23 2149  •  insulin lispro (HumaLOG) 100 units/mL subcutaneous injection 5 Units, 5 Units, Subcutaneous, TID With Meals, Chrissy Pinedo PA-C, 5 Units at 10/10/23 1151  •  metoprolol tartrate (LOPRESSOR) tablet 75 mg, 75 mg, Oral, Q12H [address], [place],   •  pravastatin (PRAVACHOL) tablet 20 mg, 20 mg, Oral, Daily With Dinner, Chrissy Pinedo PA-C, 20 mg at 10/09/23 1653  •  predniSONE tablet 40 mg, 40 mg, Oral, Daily, Sergio Shipley PA-C, 40 mg at 10/10/23 0905  •  timolol (TIMOPTIC) 0.5 % ophthalmic solution 1 drop, 1 drop, Both Eyes, Daily, Sergio Shipley PA-C, 1 drop at 10/10/23 1044  •  travoprost (TRAVATAN-Z) 0.004 % ophthalmic solution 1 drop, 1 drop, Both Eyes, HS, Sergio Shipley, ZAHRAA, 1 drop at 10/09/23 4173    Medications Prior to Admission   Medication   • Osimertinib Mesylate 80 MG TABS   • Accu-Chek FastClix Lancets MISC   • amLODIPine (NORVASC) 10 mg tablet   • brimonidine tartrate 0.2 % ophthalmic solution   • dexamethasone (DECADRON) 4 mg tablet   • diphenhydrAMINE (BENADRYL) 50 mg capsule   • finasteride (PROSCAR) 5 mg tablet   • folic acid ( Folic Acid) 1 mg tablet   • glucose blood (Accu-Chek Jackie Plus) test strip   • lisinopril (ZESTRIL) 10 mg tablet   • metFORMIN (GLUCOPHAGE-XR) 500 mg 24 hr tablet   • methylPREDNISolone (MEDROL) 32 MG tablet   • metoprolol succinate (TOPROL-XL) 100 mg 24 hr tablet   • ondansetron (ZOFRAN) 8 mg tablet   • simvastatin (ZOCOR) 10 mg tablet   • timolol (TIMOPTIC-XE) 0.5 % ophthalmic gel-forming   • travoprost (TRAVATAN-Z) 0.004 % ophthalmic solution       No Known Allergies    Labs and pertinent reports reviewed. This note has been generated by voice recognition software system. Therefore, there may be spelling, grammar, and or syntax errors. Please contact if questions arise.

## 2023-10-10 NOTE — ASSESSMENT & PLAN NOTE
· Present on admission    Recent Labs     10/08/23  0519 10/09/23  0504   CREATININE 1.90* 1.93*   EGFR 33 32     Estimated Creatinine Clearance: 32.1 mL/min (A) (by C-G formula based on SCr of 1.93 mg/dL (H)).     · Baseline 1.0-1.1  · Unclear etiology, could be from hypoperfusion in setting of Afib/CHF  · At this time suspecting hypovolemia from decompensated heart failure  · Patient had been receiving fluids with amiodarone and heparin drips but creatinine has not improved  · CXR show some mild vascular congestion and lower extremity edema +  · Status post IV diuresis for 48 hours  · Creatinine improved   · Monitor lytes, weight, I/O

## 2023-10-11 ENCOUNTER — DOCUMENTATION (OUTPATIENT)
Dept: HEMATOLOGY ONCOLOGY | Facility: CLINIC | Age: 78
End: 2023-10-11

## 2023-10-11 DIAGNOSIS — I26.99 OTHER PULMONARY EMBOLISM WITHOUT ACUTE COR PULMONALE, UNSPECIFIED CHRONICITY (HCC): Primary | ICD-10-CM

## 2023-10-11 LAB
ALBUMIN SERPL BCP-MCNC: 3.1 G/DL (ref 3.5–5)
ALP SERPL-CCNC: 82 U/L (ref 34–104)
ALT SERPL W P-5'-P-CCNC: 31 U/L (ref 7–52)
ANION GAP SERPL CALCULATED.3IONS-SCNC: 6 MMOL/L
AST SERPL W P-5'-P-CCNC: 14 U/L (ref 13–39)
BASOPHILS # BLD AUTO: 0.01 THOUSANDS/ÂΜL (ref 0–0.1)
BASOPHILS NFR BLD AUTO: 0 % (ref 0–1)
BILIRUB SERPL-MCNC: 0.33 MG/DL (ref 0.2–1)
BUN SERPL-MCNC: 51 MG/DL (ref 5–25)
CALCIUM ALBUM COR SERPL-MCNC: 9.1 MG/DL (ref 8.3–10.1)
CALCIUM SERPL-MCNC: 8.4 MG/DL (ref 8.4–10.2)
CHLORIDE SERPL-SCNC: 110 MMOL/L (ref 96–108)
CO2 SERPL-SCNC: 25 MMOL/L (ref 21–32)
CREAT SERPL-MCNC: 1.67 MG/DL (ref 0.6–1.3)
EOSINOPHIL # BLD AUTO: 0.01 THOUSAND/ÂΜL (ref 0–0.61)
EOSINOPHIL NFR BLD AUTO: 0 % (ref 0–6)
ERYTHROCYTE [DISTWIDTH] IN BLOOD BY AUTOMATED COUNT: 16.2 % (ref 11.6–15.1)
GFR SERPL CREATININE-BSD FRML MDRD: 38 ML/MIN/1.73SQ M
GLUCOSE SERPL-MCNC: 133 MG/DL (ref 65–140)
GLUCOSE SERPL-MCNC: 171 MG/DL (ref 65–140)
GLUCOSE SERPL-MCNC: 186 MG/DL (ref 65–140)
GLUCOSE SERPL-MCNC: 231 MG/DL (ref 65–140)
GLUCOSE SERPL-MCNC: 71 MG/DL (ref 65–140)
HCT VFR BLD AUTO: 33.1 % (ref 36.5–49.3)
HGB BLD-MCNC: 10.9 G/DL (ref 12–17)
IMM GRANULOCYTES # BLD AUTO: 0.26 THOUSAND/UL (ref 0–0.2)
IMM GRANULOCYTES NFR BLD AUTO: 2 % (ref 0–2)
LYMPHOCYTES # BLD AUTO: 0.54 THOUSANDS/ÂΜL (ref 0.6–4.47)
LYMPHOCYTES NFR BLD AUTO: 5 % (ref 14–44)
MAGNESIUM SERPL-MCNC: 2.7 MG/DL (ref 1.9–2.7)
MCH RBC QN AUTO: 32.6 PG (ref 26.8–34.3)
MCHC RBC AUTO-ENTMCNC: 32.9 G/DL (ref 31.4–37.4)
MCV RBC AUTO: 99 FL (ref 82–98)
MONOCYTES # BLD AUTO: 1.41 THOUSAND/ÂΜL (ref 0.17–1.22)
MONOCYTES NFR BLD AUTO: 12 % (ref 4–12)
NEUTROPHILS # BLD AUTO: 9.19 THOUSANDS/ÂΜL (ref 1.85–7.62)
NEUTS SEG NFR BLD AUTO: 81 % (ref 43–75)
NRBC BLD AUTO-RTO: 0 /100 WBCS
PLATELET # BLD AUTO: 159 THOUSANDS/UL (ref 149–390)
PMV BLD AUTO: 12 FL (ref 8.9–12.7)
POTASSIUM SERPL-SCNC: 4.2 MMOL/L (ref 3.5–5.3)
PROT SERPL-MCNC: 5.6 G/DL (ref 6.4–8.4)
RBC # BLD AUTO: 3.34 MILLION/UL (ref 3.88–5.62)
SODIUM SERPL-SCNC: 141 MMOL/L (ref 135–147)
WBC # BLD AUTO: 11.42 THOUSAND/UL (ref 4.31–10.16)

## 2023-10-11 PROCEDURE — 99233 SBSQ HOSP IP/OBS HIGH 50: CPT | Performed by: INTERNAL MEDICINE

## 2023-10-11 PROCEDURE — 94664 DEMO&/EVAL PT USE INHALER: CPT

## 2023-10-11 PROCEDURE — 94760 N-INVAS EAR/PLS OXIMETRY 1: CPT

## 2023-10-11 PROCEDURE — 83735 ASSAY OF MAGNESIUM: CPT | Performed by: INTERNAL MEDICINE

## 2023-10-11 PROCEDURE — 85025 COMPLETE CBC W/AUTO DIFF WBC: CPT | Performed by: INTERNAL MEDICINE

## 2023-10-11 PROCEDURE — 80053 COMPREHEN METABOLIC PANEL: CPT | Performed by: INTERNAL MEDICINE

## 2023-10-11 PROCEDURE — 82948 REAGENT STRIP/BLOOD GLUCOSE: CPT

## 2023-10-11 RX ORDER — HEPARIN SODIUM 5000 [USP'U]/ML
5000 INJECTION, SOLUTION INTRAVENOUS; SUBCUTANEOUS EVERY 8 HOURS SCHEDULED
Status: DISCONTINUED | OUTPATIENT
Start: 2023-10-11 | End: 2023-10-12

## 2023-10-11 RX ORDER — METOPROLOL TARTRATE 50 MG/1
100 TABLET, FILM COATED ORAL EVERY 12 HOURS SCHEDULED
Status: DISCONTINUED | OUTPATIENT
Start: 2023-10-11 | End: 2023-10-16 | Stop reason: HOSPADM

## 2023-10-11 RX ADMIN — METOPROLOL TARTRATE 75 MG: 50 TABLET, FILM COATED ORAL at 08:10

## 2023-10-11 RX ADMIN — BRIMONIDINE TARTRATE 1 DROP: 2 SOLUTION/ DROPS OPHTHALMIC at 08:13

## 2023-10-11 RX ADMIN — PRAVASTATIN SODIUM 20 MG: 20 TABLET ORAL at 16:59

## 2023-10-11 RX ADMIN — APIXABAN 10 MG: 5 TABLET, FILM COATED ORAL at 08:10

## 2023-10-11 RX ADMIN — AMIODARONE HYDROCHLORIDE 200 MG: 200 TABLET ORAL at 08:10

## 2023-10-11 RX ADMIN — HEPARIN SODIUM 5000 UNITS: 5000 INJECTION INTRAVENOUS; SUBCUTANEOUS at 16:59

## 2023-10-11 RX ADMIN — FOLIC ACID 1 MG: 1 TABLET ORAL at 08:10

## 2023-10-11 RX ADMIN — INSULIN LISPRO 5 UNITS: 100 INJECTION, SOLUTION INTRAVENOUS; SUBCUTANEOUS at 08:09

## 2023-10-11 RX ADMIN — AMIODARONE HYDROCHLORIDE 200 MG: 200 TABLET ORAL at 13:24

## 2023-10-11 RX ADMIN — INSULIN GLARGINE 4 UNITS: 100 INJECTION, SOLUTION SUBCUTANEOUS at 22:20

## 2023-10-11 RX ADMIN — AMIODARONE HYDROCHLORIDE 200 MG: 200 TABLET ORAL at 17:00

## 2023-10-11 RX ADMIN — METOPROLOL TARTRATE 100 MG: 50 TABLET, FILM COATED ORAL at 22:20

## 2023-10-11 RX ADMIN — TRAVOPROST 1 DROP: 0.04 SOLUTION OPHTHALMIC at 23:16

## 2023-10-11 RX ADMIN — PREDNISONE 40 MG: 20 TABLET ORAL at 08:10

## 2023-10-11 RX ADMIN — INSULIN LISPRO 3 UNITS: 100 INJECTION, SOLUTION INTRAVENOUS; SUBCUTANEOUS at 17:01

## 2023-10-11 RX ADMIN — BRIMONIDINE TARTRATE 1 DROP: 2 SOLUTION/ DROPS OPHTHALMIC at 17:04

## 2023-10-11 RX ADMIN — TIMOLOL MALEATE 1 DROP: 5 SOLUTION/ DROPS OPHTHALMIC at 08:11

## 2023-10-11 RX ADMIN — INSULIN LISPRO 1 UNITS: 100 INJECTION, SOLUTION INTRAVENOUS; SUBCUTANEOUS at 23:16

## 2023-10-11 RX ADMIN — FINASTERIDE 5 MG: 5 TABLET, FILM COATED ORAL at 08:10

## 2023-10-11 RX ADMIN — HEPARIN SODIUM 5000 UNITS: 5000 INJECTION INTRAVENOUS; SUBCUTANEOUS at 22:21

## 2023-10-11 RX ADMIN — INSULIN LISPRO 5 UNITS: 100 INJECTION, SOLUTION INTRAVENOUS; SUBCUTANEOUS at 17:00

## 2023-10-11 NOTE — ASSESSMENT & PLAN NOTE
Continue with IV solumedrol, tapered to daily from BID on 10/6  Still with some wheeze, but improving  Was on IV solumedrol daily  Prednisone taper

## 2023-10-11 NOTE — ASSESSMENT & PLAN NOTE
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, cM1a) diagnosed on 9/15 s/p 5 cycles of XRT and had been on osimerinib   He had rescan on 8/8 that unfortunately showed increased size of mass  He was recently restarted on carboplastin + pemetrexed; last had cycle about 2 weeks ago  Unfortunately on CT:  "Luminal narrowing in the region of the bifurcation of the bronchus intermedius, which is new since August 2023, possibly secondary to enlarging hilar lymphadenopathy or increasing postradiation fibrosis"  "Airspace consolidations in the right middle and lower lobes, new since August 2023, likely at least in part representing atelectasis, likely secondary to the aforementioned partial bronchial occlusion."  "Significant progression of metastatic disease throughout the left lung, with new enlarged nodules/masses"  Trevor Castro is noted high association with pemetrexed and VTE as well as progressive disease on CT scan

## 2023-10-11 NOTE — ASSESSMENT & PLAN NOTE
Noted to have bilateral pleural effusions  Small left sided, Moderate to large right sided with loculation  Consulted IR for thoracentesis, 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  Given EF 35%, could be transudative as well  Status post thoracentesis on 10/5 with 2 liter fluid removal   CXR does show some re accumulation  If symptoms worsening, discuss with IR to see if repeat thoracentesis is indicated versus placement of Pleurx catheter  Discussed with family and patient-wish to discuss amongst themselves to make a final decision while HILARY resolves

## 2023-10-11 NOTE — PLAN OF CARE
Problem: PAIN - ADULT  Goal: Verbalizes/displays adequate comfort level or baseline comfort level  Description: Interventions:  - Encourage patient to monitor pain and request assistance  - Assess pain using appropriate pain scale  - Administer analgesics based on type and severity of pain and evaluate response  - Implement non-pharmacological measures as appropriate and evaluate response  - Consider cultural and social influences on pain and pain management  - Notify physician/advanced practitioner if interventions unsuccessful or patient reports new pain  Outcome: Progressing     Problem: Prexisting or High Potential for Compromised Skin Integrity  Goal: Skin integrity is maintained or improved  Description: INTERVENTIONS:  - Identify patients at risk for skin breakdown  - Assess and monitor skin integrity  - Assess and monitor nutrition and hydration status  - Monitor labs   - Assess for incontinence   - Turn and reposition patient  - Assist with mobility/ambulation  - Relieve pressure over bony prominences  - Avoid friction and shearing  - Provide appropriate hygiene as needed including keeping skin clean and dry  - Evaluate need for skin moisturizer/barrier cream  - Collaborate with interdisciplinary team   - Patient/family teaching  - Consider wound care consult   Outcome: Progressing     Problem: Nutrition/Hydration-ADULT  Goal: Nutrient/Hydration intake appropriate for improving, restoring or maintaining nutritional needs  Description: Monitor and assess patient's nutrition/hydration status for malnutrition. Collaborate with interdisciplinary team and initiate plan and interventions as ordered. Monitor patient's weight and dietary intake as ordered or per policy. Utilize nutrition screening tool and intervene as necessary. Determine patient's food preferences and provide high-protein, high-caloric foods as appropriate.      INTERVENTIONS:  - Monitor oral intake, urinary output, labs, and treatment plans  - Assess nutrition and hydration status and recommend course of action  - Evaluate amount of meals eaten  - Assist patient with eating if necessary   - Allow adequate time for meals  - Recommend/ encourage appropriate diets, oral nutritional supplements, and vitamin/mineral supplements  - Order, calculate, and assess calorie counts as needed  - Recommend, monitor, and adjust tube feedings and TPN/PPN based on assessed needs  - Assess need for intravenous fluids  - Provide specific nutrition/hydration education as appropriate  - Include patient/family/caregiver in decisions related to nutrition  Outcome: Progressing

## 2023-10-11 NOTE — PROGRESS NOTES
Rcvd request from providers office requesting assistance for patient with OOP cost of Eliquis medication. Completed [employer] free drug application forwarded to provider for signature. Will contact patient to discuss assistance for Eliquis, required documents and timeline for processing. Call placed to patient, spoke with Abdi Chino (spouse) we discussed free drug option for Eliquis, they are familiar with the process, as we worked together in the past for [employer]. Abdi Magana will work on required information, she will submit documents via fax, email.    Once all documentation is received application will be submitted    10-17  Follow up call to patient regarding eliquis free drug application, no response LM for return call    10-18  Patient documents received application faxed to [facility]

## 2023-10-11 NOTE — ASSESSMENT & PLAN NOTE
Lab Results   Component Value Date    HGBA1C 5.1 07/07/2023       Recent Labs     10/10/23  1119 10/10/23  1559 10/10/23  2058 10/11/23  0731   POCGLU 112 114 304* 133         Blood Sugar Average: Last 72 hrs:  (P) [id number]   Plan:  Hold home regimen  Diet Consistent CHO Level 2 (5 CHO servings/75g CHO per meal)  Insulin regimen  Humalog 5 units TID AC  Lantus 4 units HS  Glucose checks and Insulin correction ACHS  Goal -180 while admitted, adjusting insulin regimen as appropriate  Monitor for hypoglycemia and treat per protocol

## 2023-10-11 NOTE — ASSESSMENT & PLAN NOTE
Pulse: (!) 120    Present on admission  New onset afib with RVR  Unclear etiology, EF is 35% with global hypokinesis   Could be secondary to Afib vs ischemic cardiomyopathy   Goals of care discussion are ongoing, palliative care consulted and has signed off; may need to reconsult  He has been transitioned off IV amiodarone to PO meds  HR remains elevated despite escalation of lopressor-increase further to 100 mg;  After discussion with cardiology and family, patient is agreeable to being discharged when pleural effusion is stable with current heart rates given patient is not a candidate for cardioversion and ischemia evaluation  Family will follow with palliative care and if patient decompensates rapidly, he will be transitioned to hospice either by coming back to the hospital or palliative care to facilitate

## 2023-10-11 NOTE — ASSESSMENT & PLAN NOTE
Diffuse metastatic disease, refer to CT imaging reports  For now continue with stabilization of HR   Once stabilized, further discuss goals of care  Goals of care discussion completed with patient's wife and daughters; to be ongoing throughout admission  As of right now, DNR/DNI level 3 (not pursuing hospice)  Guarded/poor prognosis

## 2023-10-11 NOTE — PROGRESS NOTES
[address]  Progress Note  Name: Jaime Odell  MRN: [de-identified]  Unit/Bed#: -02 I Date of Admission: 10/3/2023   Date of Service: 10/11/2023 I Hospital Day: 8    Assessment/Plan   * Multiple subsegmental pulmonary emboli without acute cor pulmonale [facility])  Assessment & Plan  Present on admission  With associated right lower extremity DVT  Likely provoked by malignancy and new onset Afib with RVR  CT imaging showing diffuse metastatic disease  Findings discussed with patient and his wife and extended family  Eliquis price check > $500 - discuss with home/onc for eliquis discount  Now transitioned to eliquis and provide good RX coupons to facilitate medication as an outpatient; but conversley may be able to pursue coumadin vs lovenox as an outpatient; price check pending  Consult Heme onc - input appreciated regarding adequate affordable medicine on discharge    COPD with acute exacerbation ([address])  Assessment & Plan  Continue with IV solumedrol, tapered to daily from BID on 10/6  Still with some wheeze, but improving  Was on IV solumedrol daily  Prednisone taper    Abnormal CT of the abdomen  Assessment & Plan  Diffuse metastatic disease, refer to CT imaging reports  For now continue with stabilization of HR   Once stabilized, further discuss goals of care  Goals of care discussion completed with patient's wife and daughters; to be ongoing throughout admission  As of right now, DNR/DNI level 3 (not pursuing hospice)  Guarded/poor prognosis    HILARY (acute kidney injury) [facility])  Assessment & Plan  Present on admission    Recent Labs     10/09/23  0504 10/10/23  0937 10/11/23  0601   CREATININE 1.93* 1.75* 1.67*   EGFR 32 36 38     Estimated Creatinine Clearance: 37 mL/min (A) (by C-G formula based on SCr of 1.67 mg/dL (H)).     Baseline 1.0-1.1  Unclear etiology, possibly volume overload from pleural effusion vs hypoperfusion in setting of Afib/CHF  Patient had been receiving fluids with amiodarone and heparin drips but creatinine has not improved  CXR show some mild vascular congestion and lower extremity edema - repeat CXR tomorrow AM  Creatinine improved   Monitor lytes, weight, I/O    Atrial fibrillation with RVR (HCC)  Assessment & Plan  Pulse: (!) 120    Present on admission  New onset afib with RVR  Unclear etiology, EF is 35% with global hypokinesis   Could be secondary to Afib vs ischemic cardiomyopathy   Goals of care discussion are ongoing, palliative care consulted and has signed off; may need to reconsult  He has been transitioned off IV amiodarone to PO meds  HR remains elevated despite escalation of lopressor-increase further to 100 mg;  After discussion with cardiology and family, patient is agreeable to being discharged when pleural effusion is stable with current heart rates given patient is not a candidate for cardioversion and ischemia evaluation  Family will follow with palliative care and if patient decompensates rapidly, he will be transitioned to hospice either by coming back to the hospital or palliative care to facilitate     Pleural effusion  Assessment & Plan  Noted to have bilateral pleural effusions  Small left sided, Moderate to large right sided with loculation  Consulted IR for thoracentesis, 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  Given EF 35%, could be transudative as well  Status post thoracentesis on 10/5 with 2 liter fluid removal   CXR does show some re accumulation  If symptoms worsening, discuss with IR to see if repeat thoracentesis is indicated versus placement of Pleurx catheter  Discussed with family and patient-wish to discuss amongst themselves to make a final decision while HILARY resolves    Malignant neoplasm of upper lobe of right lung [facility])  Assessment & Plan  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, cM1a) diagnosed on 9/15 s/p 5 cycles of XRT and had been on osimerinib   He had rescan on 8/8 that unfortunately showed increased size of mass  He was recently restarted on carboplastin + pemetrexed; last had cycle about 2 weeks ago  Unfortunately on CT:  "Luminal narrowing in the region of the bifurcation of the bronchus intermedius, which is new since August 2023, possibly secondary to enlarging hilar lymphadenopathy or increasing postradiation fibrosis"  "Airspace consolidations in the right middle and lower lobes, new since August 2023, likely at least in part representing atelectasis, likely secondary to the aforementioned partial bronchial occlusion."  "Significant progression of metastatic disease throughout the left lung, with new enlarged nodules/masses"   There is noted high association with pemetrexed and VTE as well as progressive disease on CT scan    Anemia  Assessment & Plan  Recent Labs     10/09/23  0504 10/10/23  0937 10/11/23  0601   HGB 10.2* 11.3* 10.9*         Hemoglobin with downtrend since arrival but now stabilized  Does not report any active signs of bleeding  Likely multifactorial in setting of iron deficiency and chronic disease  Given metastatic burden, could have small occult bleed/oozing  Stable for continuation of anticoagulation, transitioned to eliquis     Controlled type 2 diabetes mellitus without complication, without long-term current use of insulin [facility])  Assessment & Plan  Lab Results   Component Value Date    HGBA1C 5.1 07/07/2023       Recent Labs     10/10/23  1119 10/10/23  1559 10/10/23  2058 10/11/23  0731   POCGLU 112 114 304* 133         Blood Sugar Average: Last 72 hrs:  (P) [id number]   Plan:  Hold home regimen  Diet Consistent CHO Level 2 (5 CHO servings/75g CHO per meal)  Insulin regimen  Humalog 5 units TID AC  Lantus 4 units HS  Glucose checks and Insulin correction ACHS  Goal -180 while admitted, adjusting insulin regimen as appropriate  Monitor for hypoglycemia and treat per protocol               VTE Pharmacologic Prophylaxis:   High Risk (Score >/= 5) - Pharmacological DVT Prophylaxis Ordered: apixaban (Eliquis). Sequential Compression Devices Ordered. Patient Centered Rounds: I performed bedside rounds with nursing staff today. Discussions with Specialists or Other Care Team Provider:  IP CONSULT TO CARDIOLOGY  IP CONSULT TO PALLIATIVE CARE  IP CONSULT TO HEMATOLOGY      Education and Discussions with Family / Patient: patient, Daughter, son-in-law    Total Time Spent on Date of Encounter in care of patient: 30+ mins. This time was spent on one or more of the following: performing physical exam; counseling and coordination of care; obtaining or reviewing history; documenting in the medical record; reviewing/ordering tests, medications or procedures; communicating with other healthcare professionals and discussing with patient's family/caregivers. Current Length of Stay: 8 day(s)  Current Patient Status: Inpatient   Certification Statement: The patient will continue to require additional inpatient hospital stay due to plan as noted above  Discharge Plan: Anticipate discharge in 48-72 hrs to home with home services. Code Status: Level 3 - DNAR and DNI    Subjective:   Continues to complain of mild shortness of breath without any presence of cough or productive sputum. Discussed overall plan with patient and daughter along with son-in-law at bedside. All questions were answered. Wish to discuss further with other family members to determine whether to pursue Pleurx catheter versus repeat thoracentesis. Objective:     Vitals:   Temp (24hrs), Av.1 °F (36.7 °C), Min:97.7 °F (36.5 °C), Max:98.5 °F (36.9 °C)    Temp:  [97.7 °F (36.5 °C)-98.5 °F (36.9 °C)] 98.4 °F (36.9 °C)  HR:  [112-125] 120  Resp:  [16-17] 16  BP: (137-142)/() 137/93  SpO2:  [94 %-95 %] 94 %  Body mass index is 25.2 kg/m². Input and Output Summary (last 24 hours):      Intake/Output Summary (Last 24 hours) at 10/11/2023 5189  Last data filed at 10/11/2023 1300  Gross per 24 hour   Intake 530 ml   Output --   Net 530 ml       Physical Exam:   Physical Exam  Vitals and nursing note reviewed. Constitutional:       General: He is not in acute distress. Appearance: Normal appearance. He is not toxic-appearing. HENT:      Ears:      Comments: Hard of hearing  Cardiovascular:      Rate and Rhythm: Tachycardia present. Rhythm irregular. Heart sounds: Normal heart sounds. No murmur heard. Pulmonary:      Effort: Pulmonary effort is normal. No respiratory distress. Breath sounds: Wheezing and rales present. Abdominal:      General: Abdomen is flat. There is no distension. Palpations: Abdomen is soft. Tenderness: There is no abdominal tenderness. Musculoskeletal:      Right lower leg: Edema present. Neurological:      General: No focal deficit present. Mental Status: He is alert and oriented to person, place, and time. Mental status is at baseline. Motor: No weakness. Psychiatric:         Mood and Affect: Mood normal.         Thought Content:  Thought content normal.         Judgment: Judgment normal.          Additional Data:     Labs:  Results from last 7 days   Lab Units 10/11/23  0601 10/10/23  0937   WBC Thousand/uL 11.42* 9.93   HEMOGLOBIN g/dL 10.9* 11.3*   HEMATOCRIT % 33.1* 34.3*   PLATELETS Thousands/uL 159 159   BANDS PCT %  --  1   NEUTROS PCT % 81*  --    LYMPHS PCT % 5*  --    LYMPHO PCT %  --  10*   MONOS PCT % 12  --    MONO PCT %  --  10   EOS PCT % 0 0     Results from last 7 days   Lab Units 10/11/23  0601   SODIUM mmol/L 141   POTASSIUM mmol/L 4.2   CHLORIDE mmol/L 110*   CO2 mmol/L 25   BUN mg/dL 51*   CREATININE mg/dL 1.67*   ANION GAP mmol/L 6   CALCIUM mg/dL 8.4   ALBUMIN g/dL 3.1*   TOTAL BILIRUBIN mg/dL 0.33   ALK PHOS U/L 82   ALT U/L 31   AST U/L 14   GLUCOSE RANDOM mg/dL 171*         Results from last 7 days   Lab Units 10/11/23  1607 10/11/23  1109 10/11/23  0731 10/10/23  2058 10/10/23  1559 10/10/23  1119 10/10/23  0736 10/09/23  2056 10/09/23  1617 10/09/23  1110 10/09/23  0752 10/08/23  2128   POC GLUCOSE mg/dl 231* 71 133 304* 114 112 76 219* 135 228* 110 198*               Lines/Drains:  Invasive Devices       Peripheral Intravenous Line  Duration             Peripheral IV 10/08/23 Right Antecubital 3 days                          Imaging: Reviewed radiology reports from this admission including: chest xray  XR chest portable ICU    Result Date: 10/7/2023  Impression: Moderate right and small left effusion with no pneumothorax. Bilateral lung nodules corresponding with the CT. Workstation performed: [id number]     IR IN-Patient Thoracentesis    Result Date: 10/6/2023  Impression: Impression: Ultrasound-guided thoracentesis yielding 1960 mL clear, yellow right pleural fluid. Signed, performed, and dictated by RADHA Ortiz under the supervision of Dr. Kandace Alfaro. Workstation performed: [id number]     Echo complete w/ contrast if indicated    Addendum Date: 10/4/2023      Left Ventricle: Left ventricular cavity size is normal. Wall thickness is mildly increased. The left ventricular ejection fraction is 35%. When compared to previous echocardiogram from 2022, left ventricular ejection fraction is significantly reduced. There is severe global hypokinesis. Unable to assess diastolic function due to atrial fibrillation. Tachycardia limits interpretation. Right Ventricle: Right ventricular cavity size is mildly dilated. Systolic function is mildly reduced. Left Atrium: The atrium is moderately dilated. Right Atrium: The atrium is mildly dilated. Aortic Valve: There is mild to moderate regurgitation. Mitral Valve: There is mild annular calcification. There is moderate regurgitation. Tricuspid Valve: There is mild regurgitation. Estimated RVSP 35 mm Hg. Aorta: The aortic root is mildly dilated. IVC/SVC: The inferior vena cava is dilated. Pericardium: There is a small pericardial effusion.      PE Study with CT Abdomen and Pelvis with contrast    Result Date: 10/3/2023  Impression: CTA CHEST: 1) Acute pulmonary emboli in the left distal pulmonary artery extending into the left lower lobar, and some of the segmental and subsegmental pulmonary arteries with additional segmental and subsegmental pulmonary emboli in the lingula. This was discussed with Dr. Marvin Greene via HIPAA compliant secure electronic messaging on 10/3/2023 at 6:35 PM with confirmation of receipt. 2) Cardiomegaly. Although the RV/LV ratio is normal, reflux of IV contrast into the dilated IVC and hepatic veins may suggest right heart failure. 3) Main pulmonary artery dilated up to 3.7 cm, suggestive of pulmonary arterial hypertension. 4) Moderate to large right pleural effusion with likely loculation at the apex, and small to moderate left pleural effusion, new since August 2023. No evidence of empyema. 5) Luminal narrowing in the region of the bifurcation of the bronchus intermedius, which is new since August 2023, possibly secondary to enlarging hilar lymphadenopathy or increasing postradiation fibrosis. 6) Airspace consolidations in the right middle and lower lobes, new since August 2023, likely at least in part representing atelectasis, likely secondary to the aforementioned partial bronchial occlusion. Some of the previously seen right middle and lower lobe nodules/opacities are obscured. 7) Right upper lobe mass overall stable to mildly enlarged since August 2023. 8) Significant progression of metastatic disease throughout the left lung, with new enlarged nodules/masses, as detailed above. 9) Overall similar mediastinal lymphadenopathy compared to August 2023. Right hilum remains prominent, however discrete lymph nodes difficult to measure due to the phase of contrast. CT ABDOMEN/PELVIS: 10) Mild fat stranding adjacent to head of the pancreas and the osbaldo hepatis/gallbladder. Recommend correlation with lipase level to exclude pancreatitis.  No findings to suggest acute cholecystitis. No organized collections. 11) Mild circumferential wall thickening of the urinary bladder. Recommend correlation with urinalysis to exclude cystitis. 12) No other acute abdominal or pelvic pathology. 13) Additional findings as above. Workstation performed: [id number]     XR chest 1 view portable    Result Date: 10/3/2023  Impression: Moderate right and small left pleural effusion with right base atelectasis, new since August 2023. Right base pneumonia not excluded in the appropriate clinical setting. Workstation performed: [id number]       XR chest portable    Result Date: 10/9/2023  Impression Right pleural effusion is slightly increased in size; the left pleural effusion is not significantly changed. Resident: Margarita Moy, the attending radiologist, have reviewed the images and agree with the final report above. Workstation performed: [id number]        Results for orders placed during the hospital encounter of 10/03/23    Echo complete w/ contrast if indicated    Interpretation Summary    Left Ventricle: Left ventricular cavity size is normal. Wall thickness is mildly increased. The left ventricular ejection fraction is 35%. When compared to previous echocardiogram from 2022, left ventricular ejection fraction is significantly reduced. There is severe global hypokinesis. Unable to assess diastolic function due to atrial fibrillation. Tachycardia limits interpretation. Right Ventricle: Right ventricular cavity size is mildly dilated. Systolic function is mildly reduced. Left Atrium: The atrium is moderately dilated. Right Atrium: The atrium is mildly dilated. Aortic Valve: There is mild to moderate regurgitation. Mitral Valve: There is mild annular calcification. There is moderate regurgitation. Tricuspid Valve: There is mild regurgitation. Estimated RVSP 35 mm Hg. Aorta: The aortic root is mildly dilated. IVC/SVC: The inferior vena cava is dilated.     Pericardium: There is a small pericardial effusion. Recent Cultures (last 7 days):   Results from last 7 days   Lab Units 10/05/23  1243   GRAM STAIN RESULT  Rare Mononuclear Cells  No Polys or Bacteria seen   BODY FLUID CULTURE, STERILE  No growth       Last 24 Hours Medication List:   Current Facility-Administered Medications   Medication Dose Route Frequency Provider Last Rate    amiodarone  200 mg Oral TID With Meals Valentin Eid PA-C      brimonidine tartrate  1 drop Left Eye BID Valentin Eid PA-C      finasteride  5 mg Oral Daily Valentin Eid PA-C      folic acid  1 mg Oral Daily Sergio Shipley PA-C      heparin (porcine)  5,000 Units Subcutaneous Q8H [address] [facility] taj      insulin glargine  4 Units Subcutaneous HS Sergio Shipley PA-C      insulin lispro  1-6 Units Subcutaneous TID AC Sergio Shipley PA-C      insulin lispro  1-6 Units Subcutaneous HS Sergio Shipley PA-C      insulin lispro  5 Units Subcutaneous TID With Meals Sergio Shipley PA-C      metoprolol tartrate  100 mg Oral Q12H [address] [facility] DO Agusto      pravastatin  20 mg Oral Daily With ZAHRAA Jones      [START ON 10/12/2023] predniSONE  30 mg Oral Daily [facility]      timolol  1 drop Both Eyes Daily Sergio Shipley PA-C      travoprost  1 drop Both Eyes HS Valentin Eid PA-C          Today, Patient Was Seen By: Sarah Choi DO    **Please Note: This note may have been constructed using a voice recognition system. **

## 2023-10-11 NOTE — PLAN OF CARE
Problem: PAIN - ADULT  Goal: Verbalizes/displays adequate comfort level or baseline comfort level  Description: Interventions:  - Encourage patient to monitor pain and request assistance  - Assess pain using appropriate pain scale  - Administer analgesics based on type and severity of pain and evaluate response  - Implement non-pharmacological measures as appropriate and evaluate response  - Consider cultural and social influences on pain and pain management  - Notify physician/advanced practitioner if interventions unsuccessful or patient reports new pain  Outcome: Progressing     Problem: INFECTION - ADULT  Goal: Absence or prevention of progression during hospitalization  Description: INTERVENTIONS:  - Assess and monitor for signs and symptoms of infection  - Monitor lab/diagnostic results  - Monitor all insertion sites, i.e. indwelling lines, tubes, and drains  - [place] appropriate cooling/warming therapies per order  - Administer medications as ordered  - Instruct and encourage patient and family to use good hand hygiene technique  - Identify and instruct in appropriate isolation precautions for identified infection/condition  Outcome: Progressing  Goal: Absence of fever/infection during neutropenic period  Description: INTERVENTIONS:  - Monitor WBC    Outcome: Progressing     Problem: SAFETY ADULT  Goal: Patient will remain free of falls  Description: INTERVENTIONS:  - Educate patient/family on patient safety including physical limitations  - Instruct patient to call for assistance with activity   - Consult OT/PT to assist with strengthening/mobility   - Keep Call bell within reach  - Keep bed low and locked with side rails adjusted as appropriate  - Keep care items and personal belongings within reach  - Initiate and maintain comfort rounds  - Make Fall Risk Sign visible to staff  - toileted on demand as patient is continent.   Outcome: Progressing     Problem: DISCHARGE PLANNING  Goal: Discharge to home or other facility with appropriate resources  Description: INTERVENTIONS:  - Identify barriers to discharge w/patient and caregiver  - Arrange for needed discharge resources and transportation as appropriate  - Identify discharge learning needs (meds, wound care, etc.)  - Arrange for interpretive services to assist at discharge as needed  - Refer to Case Management Department for coordinating discharge planning if the patient needs post-hospital services based on physician/advanced practitioner order or complex needs related to functional status, cognitive ability, or social support system  Outcome: Progressing     Problem: Knowledge Deficit  Goal: Patient/family/caregiver demonstrates understanding of disease process, treatment plan, medications, and discharge instructions  Description: Complete learning assessment and assess knowledge base. Interventions:  - Provide teaching at level of understanding  - Provide teaching via preferred learning methods  Outcome: Progressing     Problem: Nutrition/Hydration-ADULT  Goal: Nutrient/Hydration intake appropriate for improving, restoring or maintaining nutritional needs  Description: Monitor and assess patient's nutrition/hydration status for malnutrition. Collaborate with interdisciplinary team and initiate plan and interventions as ordered. Monitor patient's weight and dietary intake as ordered or per policy. Utilize nutrition screening tool and intervene as necessary. Determine patient's food preferences and provide high-protein, high-caloric foods as appropriate.      INTERVENTIONS:  - Monitor oral intake, urinary output, labs, and treatment plans  - Assess nutrition and hydration status and recommend course of action  - Evaluate amount of meals eaten  - Assist patient with eating if necessary   - Allow adequate time for meals  - Recommend/ encourage appropriate diets, oral nutritional supplements, and vitamin/mineral supplements  - Order, calculate, and assess calorie counts as needed  - Recommend, monitor, and adjust tube feedings and TPN/PPN based on assessed needs  - Assess need for intravenous fluids  - Provide specific nutrition/hydration education as appropriate  - Include patient/family/caregiver in decisions related to nutrition  Outcome: Progressing

## 2023-10-11 NOTE — ASSESSMENT & PLAN NOTE
Recent Labs     10/09/23  0504 10/10/23  0937 10/11/23  0601   HGB 10.2* 11.3* 10.9*         Hemoglobin with downtrend since arrival but now stabilized  Does not report any active signs of bleeding  Likely multifactorial in setting of iron deficiency and chronic disease  Given metastatic burden, could have small occult bleed/oozing  Stable for continuation of anticoagulation, transitioned to eliquis

## 2023-10-11 NOTE — ASSESSMENT & PLAN NOTE
Present on admission    Recent Labs     10/09/23  0504 10/10/23  0937 10/11/23  0601   CREATININE 1.93* 1.75* 1.67*   EGFR 32 36 38     Estimated Creatinine Clearance: 37 mL/min (A) (by C-G formula based on SCr of 1.67 mg/dL (H)).     Baseline 1.0-1.1  Unclear etiology, possibly volume overload from pleural effusion vs hypoperfusion in setting of Afib/CHF  Patient had been receiving fluids with amiodarone and heparin drips but creatinine has not improved  CXR show some mild vascular congestion and lower extremity edema - repeat CXR tomorrow AM  Creatinine improved   Monitor lytes, weight, I/O

## 2023-10-11 NOTE — ASSESSMENT & PLAN NOTE
Present on admission  With associated right lower extremity DVT  Likely provoked by malignancy and new onset Afib with RVR  CT imaging showing diffuse metastatic disease  Findings discussed with patient and his wife and extended family  Eliquis price check > $500 - discuss with home/onc for eliquis discount  Now transitioned to eliquis and provide good RX coupons to facilitate medication as an outpatient; but felyley may be able to pursue coumadin vs lovenox as an outpatient; price check pending  Consult Heme onc - input appreciated regarding adequate affordable medicine on discharge

## 2023-10-12 ENCOUNTER — APPOINTMENT (INPATIENT)
Dept: RADIOLOGY | Facility: HOSPITAL | Age: 78
DRG: 175 | End: 2023-10-12
Payer: MEDICARE

## 2023-10-12 LAB
ALBUMIN SERPL BCP-MCNC: 3.3 G/DL (ref 3.5–5)
ALP SERPL-CCNC: 70 U/L (ref 34–104)
ALT SERPL W P-5'-P-CCNC: 31 U/L (ref 7–52)
ANION GAP SERPL CALCULATED.3IONS-SCNC: 6 MMOL/L
AST SERPL W P-5'-P-CCNC: 14 U/L (ref 13–39)
BASOPHILS # BLD AUTO: 0.01 THOUSANDS/ÂΜL (ref 0–0.1)
BASOPHILS NFR BLD AUTO: 0 % (ref 0–1)
BILIRUB SERPL-MCNC: 0.47 MG/DL (ref 0.2–1)
BUN SERPL-MCNC: 49 MG/DL (ref 5–25)
CALCIUM ALBUM COR SERPL-MCNC: 9.4 MG/DL (ref 8.3–10.1)
CALCIUM SERPL-MCNC: 8.8 MG/DL (ref 8.4–10.2)
CHLORIDE SERPL-SCNC: 108 MMOL/L (ref 96–108)
CO2 SERPL-SCNC: 25 MMOL/L (ref 21–32)
CREAT SERPL-MCNC: 1.67 MG/DL (ref 0.6–1.3)
EOSINOPHIL # BLD AUTO: 0.01 THOUSAND/ÂΜL (ref 0–0.61)
EOSINOPHIL NFR BLD AUTO: 0 % (ref 0–6)
ERYTHROCYTE [DISTWIDTH] IN BLOOD BY AUTOMATED COUNT: 16.3 % (ref 11.6–15.1)
GFR SERPL CREATININE-BSD FRML MDRD: 38 ML/MIN/1.73SQ M
GLUCOSE SERPL-MCNC: 112 MG/DL (ref 65–140)
GLUCOSE SERPL-MCNC: 120 MG/DL (ref 65–140)
GLUCOSE SERPL-MCNC: 169 MG/DL (ref 65–140)
GLUCOSE SERPL-MCNC: 264 MG/DL (ref 65–140)
GLUCOSE SERPL-MCNC: 94 MG/DL (ref 65–140)
HCT VFR BLD AUTO: 38 % (ref 36.5–49.3)
HGB BLD-MCNC: 11.9 G/DL (ref 12–17)
IMM GRANULOCYTES # BLD AUTO: 0.16 THOUSAND/UL (ref 0–0.2)
IMM GRANULOCYTES NFR BLD AUTO: 2 % (ref 0–2)
LYMPHOCYTES # BLD AUTO: 0.62 THOUSANDS/ÂΜL (ref 0.6–4.47)
LYMPHOCYTES NFR BLD AUTO: 6 % (ref 14–44)
MAGNESIUM SERPL-MCNC: 2.9 MG/DL (ref 1.9–2.7)
MCH RBC QN AUTO: 32.4 PG (ref 26.8–34.3)
MCHC RBC AUTO-ENTMCNC: 31.3 G/DL (ref 31.4–37.4)
MCV RBC AUTO: 104 FL (ref 82–98)
MONOCYTES # BLD AUTO: 1.15 THOUSAND/ÂΜL (ref 0.17–1.22)
MONOCYTES NFR BLD AUTO: 11 % (ref 4–12)
NEUTROPHILS # BLD AUTO: 8.34 THOUSANDS/ÂΜL (ref 1.85–7.62)
NEUTS SEG NFR BLD AUTO: 81 % (ref 43–75)
NRBC BLD AUTO-RTO: 0 /100 WBCS
PLATELET # BLD AUTO: 165 THOUSANDS/UL (ref 149–390)
PMV BLD AUTO: 12.3 FL (ref 8.9–12.7)
POTASSIUM SERPL-SCNC: 4.8 MMOL/L (ref 3.5–5.3)
PROT SERPL-MCNC: 6 G/DL (ref 6.4–8.4)
RBC # BLD AUTO: 3.67 MILLION/UL (ref 3.88–5.62)
SODIUM SERPL-SCNC: 139 MMOL/L (ref 135–147)
WBC # BLD AUTO: 10.29 THOUSAND/UL (ref 4.31–10.16)

## 2023-10-12 PROCEDURE — 80053 COMPREHEN METABOLIC PANEL: CPT | Performed by: INTERNAL MEDICINE

## 2023-10-12 PROCEDURE — 85025 COMPLETE CBC W/AUTO DIFF WBC: CPT | Performed by: INTERNAL MEDICINE

## 2023-10-12 PROCEDURE — 71045 X-RAY EXAM CHEST 1 VIEW: CPT

## 2023-10-12 PROCEDURE — 82948 REAGENT STRIP/BLOOD GLUCOSE: CPT

## 2023-10-12 PROCEDURE — 99233 SBSQ HOSP IP/OBS HIGH 50: CPT | Performed by: INTERNAL MEDICINE

## 2023-10-12 PROCEDURE — 99497 ADVNCD CARE PLAN 30 MIN: CPT | Performed by: INTERNAL MEDICINE

## 2023-10-12 PROCEDURE — 83735 ASSAY OF MAGNESIUM: CPT | Performed by: INTERNAL MEDICINE

## 2023-10-12 RX ORDER — ENOXAPARIN SODIUM 100 MG/ML
40 INJECTION SUBCUTANEOUS
Status: DISCONTINUED | OUTPATIENT
Start: 2023-10-12 | End: 2023-10-16

## 2023-10-12 RX ADMIN — PREDNISONE 30 MG: 20 TABLET ORAL at 09:15

## 2023-10-12 RX ADMIN — INSULIN LISPRO 5 UNITS: 100 INJECTION, SOLUTION INTRAVENOUS; SUBCUTANEOUS at 09:17

## 2023-10-12 RX ADMIN — METOPROLOL TARTRATE 100 MG: 50 TABLET, FILM COATED ORAL at 22:47

## 2023-10-12 RX ADMIN — BRIMONIDINE TARTRATE 1 DROP: 2 SOLUTION/ DROPS OPHTHALMIC at 09:18

## 2023-10-12 RX ADMIN — AMIODARONE HYDROCHLORIDE 200 MG: 200 TABLET ORAL at 13:29

## 2023-10-12 RX ADMIN — AMIODARONE HYDROCHLORIDE 200 MG: 200 TABLET ORAL at 17:50

## 2023-10-12 RX ADMIN — BRIMONIDINE TARTRATE 1 DROP: 2 SOLUTION/ DROPS OPHTHALMIC at 17:51

## 2023-10-12 RX ADMIN — AMIODARONE HYDROCHLORIDE 200 MG: 200 TABLET ORAL at 09:19

## 2023-10-12 RX ADMIN — FOLIC ACID 1 MG: 1 TABLET ORAL at 09:15

## 2023-10-12 RX ADMIN — FINASTERIDE 5 MG: 5 TABLET, FILM COATED ORAL at 09:15

## 2023-10-12 RX ADMIN — INSULIN LISPRO 1 UNITS: 100 INJECTION, SOLUTION INTRAVENOUS; SUBCUTANEOUS at 17:51

## 2023-10-12 RX ADMIN — ENOXAPARIN SODIUM 40 MG: 40 INJECTION SUBCUTANEOUS at 13:34

## 2023-10-12 RX ADMIN — TIMOLOL MALEATE 1 DROP: 5 SOLUTION/ DROPS OPHTHALMIC at 09:16

## 2023-10-12 RX ADMIN — METOPROLOL TARTRATE 100 MG: 50 TABLET, FILM COATED ORAL at 09:15

## 2023-10-12 RX ADMIN — INSULIN LISPRO 3 UNITS: 100 INJECTION, SOLUTION INTRAVENOUS; SUBCUTANEOUS at 23:41

## 2023-10-12 RX ADMIN — INSULIN GLARGINE 4 UNITS: 100 INJECTION, SOLUTION SUBCUTANEOUS at 22:47

## 2023-10-12 RX ADMIN — INSULIN LISPRO 5 UNITS: 100 INJECTION, SOLUTION INTRAVENOUS; SUBCUTANEOUS at 17:51

## 2023-10-12 RX ADMIN — TRAVOPROST 1 DROP: 0.04 SOLUTION OPHTHALMIC at 23:42

## 2023-10-12 RX ADMIN — PRAVASTATIN SODIUM 20 MG: 20 TABLET ORAL at 17:50

## 2023-10-12 RX ADMIN — HEPARIN SODIUM 5000 UNITS: 5000 INJECTION INTRAVENOUS; SUBCUTANEOUS at 05:21

## 2023-10-12 NOTE — TELEMEDICINE
e-Consult (IPC)  - Interventional Radiology  Morenita Hobson 69 y.o. male MRN: [de-identified]  Unit/Bed#: -02 Encounter: [id number]          Interventional Radiology has been consulted to evaluate Morenita Hobson    We were consulted by Internal Medicine concerning this patient with recurrent pleural effusion. Inpatient Consult to IR  Consult performed by: [facility], CRNP  Consult ordered by: Karyna Hanna DO        10/12/23    Assessment/Recommendation:   Morenita Hobson, 68y male with a pmh of  T2DM, RUL malignant neoplasm, A-fib and PE on Eliquis, HILARY, COPD, HTN and Anemia. Patient currently with PE and A-fib was on Heparin gtt now transitioned to Eliquis. Patient is stage Jessica right upper lobe lung cancer. Right pleural effusion, s/p thora on 10/5 for 1960 ml. Previous thora was one year ago. Effusion is re-accumulating. Patient not currently on Hospice and is waiting to see if symptoms worsen and patient decompensates before palliative care. Internal medicine requesting Pleurx catheter placement for recurrent right pleural effusion. Patient will be going home with home health. Would recommend waiting on pleurx placement. Can plan for a repeat thoracentesis tomorrow to see if can completely drain effusion. Recommend waiting to see how long it takes for effusion to re-accumulate prior to placing a pleurx. Patient can always return as an outpatient if pleurx catheter is necessary at a later date. Will plan for thoracentesis tomorrow. 21-30 minutes, >50% of the total time devoted to medical consultative verbal/EMR discussion between providers. Written report will be generated in the EMR. Thank you for allowing Interventional Radiology to participate in the care of Morenita Hobson. Please don't hesitate to call or TigerText us with any questions.      [facility] Rick Mcclure

## 2023-10-12 NOTE — ASSESSMENT & PLAN NOTE
Continue with IV solumedrol, tapered to daily  Still with some wheezing present; unclear if 2/2 copd vs malignancy  Prednisone taper continued

## 2023-10-12 NOTE — PROGRESS NOTES
[address]  Progress Note  Name: Kyra Freedman  MRN: [de-identified]  Unit/Bed#: -02 I Date of Admission: 10/3/2023   Date of Service: 10/12/2023 I Hospital Day: 9    Assessment/Plan   * Multiple subsegmental pulmonary emboli without acute cor pulmonale (HCC)  Assessment & Plan  Present on admission  With associated right lower extremity DVT  Likely provoked by malignancy and new onset Afib with RVR  CT imaging showing diffuse metastatic disease  Findings discussed with patient and his wife and extended family  Eliquis price check > $500 -patient can follow-up with outpatient hematology oncology for further Eliquis discount program  Transition to Lovenox at this time which does not need to be held prior to Pleurx catheter placement; after extensive discussion with interventional radiology, overall recommendation is to pursue another thoracentesis at this time and if there is concern for reaccumulation, have patient be scheduled for outpatient procedure for repeat thoracentesis  Patient follow-up with hematology oncology  Discuss further with palliative team regarding [address] [place]    COPD with acute exacerbation ([address])  Assessment & Plan  Continue with IV solumedrol, tapered to daily  Still with some wheezing present; unclear if 2/2 copd vs malignancy  Prednisone taper continued    Abnormal CT of the abdomen  Assessment & Plan  Diffuse metastatic disease, refer to CT imaging reports  For now continue with stabilization of HR   Once stabilized, further discuss goals of care  Goals of care discussion completed with patient's wife and daughters; to be ongoing throughout admission  As of right now, DNR/DNI level 3 (not pursuing hospice as of now)  Guarded/poor prognosis    HILARY (acute kidney injury) [facility])  Assessment & Plan  Present on admission    Recent Labs     10/10/23  0937 10/11/23  0601 10/12/23  0643   CREATININE 1.75* 1.67* 1.67*   EGFR 36 38 38       Estimated Creatinine Clearance: 37 mL/min (A) (by C-G formula based on SCr of 1.67 mg/dL (H)).     Baseline 1.0-1.1  Unclear etiology, possibly volume overload from pleural effusion vs hypoperfusion in setting of Afib/CHF  Patient had been receiving fluids with amiodarone and heparin drips but creatinine has not improved  CXR show some mild vascular congestion and lower extremity edema - repeat thora tomorrow to determine if fluid reaccumulates prior to placing pleurx catheter  Creatinine plateaued   Monitor lytes, weight, I/O    Atrial fibrillation with RVR ([facility])  Assessment & Plan  Pulse: 103    Present on admission  New onset afib with RVR  Unclear etiology, EF is 35% with global hypokinesis   Could be secondary to Afib vs ischemic cardiomyopathy   Goals of care discussion are ongoing, palliative care consulted and has signed off; may need to reconsult  He has been transitioned off IV amiodarone to PO meds  HR remains elevated despite escalation of lopressor- Continue at 100 mg BID  After discussion with cardiology and family, patient is agreeable to being discharged when pleural effusion is stable with current heart rates given patient is not a candidate for cardioversion and ischemia evaluation  Family will follow with palliative care and if patient decompensates rapidly, he will be transitioned to hospice either by coming back to the hospital or palliative care to facilitate     Pleural effusion  Assessment & Plan  Noted to have bilateral pleural effusions  Small left sided, Moderate to large right sided with loculation  Consulted IR for thoracentesis, 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  Given EF 35%, could be transudative as well  Status post thoracentesis on 10/5 with 2 liter fluid removal   CXR does show some re accumulation  If symptoms worsening, discuss with IR to see if repeat thoracentesis is indicated versus placement of Pleurx catheter  Discussed with family and patient-wish to discuss amongst themselves to make a final decision while HILARY resolves    Malignant neoplasm of upper lobe of right lung [facility])  Assessment & Plan  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, cM1a) diagnosed on 9/15 s/p 5 cycles of XRT and had been on osimerinib   He had rescan on 8/8 that unfortunately showed increased size of mass  He was recently restarted on carboplastin + pemetrexed; last had cycle about 2 weeks ago  Unfortunately on CT:  "Luminal narrowing in the region of the bifurcation of the bronchus intermedius, which is new since August 2023, possibly secondary to enlarging hilar lymphadenopathy or increasing postradiation fibrosis"  "Airspace consolidations in the right middle and lower lobes, new since August 2023, likely at least in part representing atelectasis, likely secondary to the aforementioned partial bronchial occlusion."  "Significant progression of metastatic disease throughout the left lung, with new enlarged nodules/masses"   There is noted high association with pemetrexed and VTE as well as progressive disease on CT scan    Controlled type 2 diabetes mellitus without complication, without long-term current use of insulin [facility])  Assessment & Plan  Lab Results   Component Value Date    HGBA1C 5.1 07/07/2023       Recent Labs     10/11/23  1607 10/11/23  2039 10/12/23  0748 10/12/23  1111   POCGLU 231* 186* 112 120         Blood Sugar Average: Last 72 hrs:  (P) [id number]   Plan:  Hold home regimen  Diet Consistent CHO Level 2 (5 CHO servings/75g CHO per meal)  Insulin regimen  Humalog 5 units TID AC  Lantus 4 units HS  Glucose checks and Insulin correction ACHS  Goal -180 while admitted, adjusting insulin regimen as appropriate  Monitor for hypoglycemia and treat per protocol               VTE Pharmacologic Prophylaxis:   High Risk (Score >/= 5) - Pharmacological DVT Prophylaxis Ordered: enoxaparin (Lovenox). Sequential Compression Devices Ordered. Patient Centered Rounds: I performed bedside rounds with nursing staff today.   Discussions with Specialists or Other Care Team Provider:  IP CONSULT TO CARDIOLOGY  IP CONSULT TO PALLIATIVE CARE  IP CONSULT TO HEMATOLOGY  INPATIENT CONSULT TO IR      Education and Discussions with Family / Patient: patient    Total Time Spent on Date of Encounter in care of patient: 30+ mins. This time was spent on one or more of the following: performing physical exam; counseling and coordination of care; obtaining or reviewing history; documenting in the medical record; reviewing/ordering tests, medications or procedures; communicating with other healthcare professionals and discussing with patient's family/caregivers. Current Length of Stay: 9 day(s)  Current Patient Status: Inpatient   Certification Statement: The patient will continue to require additional inpatient hospital stay due to plan as noted above  Discharge Plan: Anticipate discharge in 24-48 hrs to home with home services. Code Status: Level 3 - DNAR and DNI    Subjective:   Continues to complain of mild shortness of breath with intermittent cough with no productive sputum. Discussed overall plan regarding Pleurx catheter versus thoracentesis and plan for thoracentesis to be done followed by rechecking chest x-ray to assess for reaccumulation of fluid. Objective:     Vitals:   Temp (24hrs), Av.6 °F (36.4 °C), Min:97.3 °F (36.3 °C), Max:97.8 °F (36.6 °C)    Temp:  [97.3 °F (36.3 °C)-97.8 °F (36.6 °C)] 97.3 °F (36.3 °C)  HR:  [103-117] 108  Resp:  [16] 16  BP: (137-138)/(94-98) 138/97  SpO2:  [94 %-98 %] 95 %  Body mass index is 25.85 kg/m². Input and Output Summary (last 24 hours): Intake/Output Summary (Last 24 hours) at 10/12/2023 1604  Last data filed at 10/11/2023 1700  Gross per 24 hour   Intake 240 ml   Output --   Net 240 ml       Physical Exam:   Physical Exam  Vitals and nursing note reviewed. Constitutional:       General: He is not in acute distress. Appearance: Normal appearance. He is not toxic-appearing.    HENT: Ears:      Comments: Hard of hearing  Cardiovascular:      Rate and Rhythm: Tachycardia present. Rhythm irregular. Heart sounds: Normal heart sounds. No murmur heard. Pulmonary:      Effort: Pulmonary effort is normal. No respiratory distress. Breath sounds: Wheezing and rales present. Abdominal:      General: Abdomen is flat. There is no distension. Palpations: Abdomen is soft. Tenderness: There is no abdominal tenderness. Musculoskeletal:      Right lower leg: Edema present. Neurological:      General: No focal deficit present. Mental Status: He is alert and oriented to person, place, and time. Mental status is at baseline. Motor: No weakness. Psychiatric:         Mood and Affect: Mood normal.         Thought Content: Thought content normal.         Judgment: Judgment normal.          Additional Data:     Labs:  Results from last 7 days   Lab Units 10/12/23  0643 10/11/23  0601 10/10/23  0937   WBC Thousand/uL 10.29*   < > 9.93   HEMOGLOBIN g/dL 11.9*   < > 11.3*   HEMATOCRIT % 38.0   < > 34.3*   PLATELETS Thousands/uL 165   < > 159   BANDS PCT %  --   --  1   NEUTROS PCT % 81*   < >  --    LYMPHS PCT % 6*   < >  --    LYMPHO PCT %  --   --  10*   MONOS PCT % 11   < >  --    MONO PCT %  --   --  10   EOS PCT % 0   < > 0    < > = values in this interval not displayed.      Results from last 7 days   Lab Units 10/12/23  0643   SODIUM mmol/L 139   POTASSIUM mmol/L 4.8   CHLORIDE mmol/L 108   CO2 mmol/L 25   BUN mg/dL 49*   CREATININE mg/dL 1.67*   ANION GAP mmol/L 6   CALCIUM mg/dL 8.8   ALBUMIN g/dL 3.3*   TOTAL BILIRUBIN mg/dL 0.47   ALK PHOS U/L 70   ALT U/L 31   AST U/L 14   GLUCOSE RANDOM mg/dL 94         Results from last 7 days   Lab Units 10/12/23  1543 10/12/23  1111 10/12/23  0748 10/11/23  2039 10/11/23  1607 10/11/23  1109 10/11/23  0731 10/10/23  2058 10/10/23  1559 10/10/23  1119 10/10/23  0736 10/09/23  2056   POC GLUCOSE mg/dl 169* 120 112 186* 231* 71 133 304* 114 112 76 219*               Lines/Drains:  Invasive Devices       Peripheral Intravenous Line  Duration             Peripheral IV 10/08/23 Right Antecubital 4 days                          Imaging: Reviewed radiology reports from this admission including: chest xray  XR chest portable    Result Date: 10/12/2023  Impression: Right greater than left pleural effusions which are stable. Workstation performed: [id number]     XR chest portable    Result Date: 10/9/2023  Impression: Right pleural effusion is slightly increased in size; the left pleural effusion is not significantly changed. Resident: Neisha Nelson, the attending radiologist, have reviewed the images and agree with the final report above. Workstation performed: [id number]       XR chest portable    Result Date: 10/12/2023  Impression Right greater than left pleural effusions which are stable. Workstation performed: [id number]        Results for orders placed during the hospital encounter of 10/03/23    Echo complete w/ contrast if indicated    Interpretation Summary    Left Ventricle: Left ventricular cavity size is normal. Wall thickness is mildly increased. The left ventricular ejection fraction is 35%. When compared to previous echocardiogram from 2022, left ventricular ejection fraction is significantly reduced. There is severe global hypokinesis. Unable to assess diastolic function due to atrial fibrillation. Tachycardia limits interpretation. Right Ventricle: Right ventricular cavity size is mildly dilated. Systolic function is mildly reduced. Left Atrium: The atrium is moderately dilated. Right Atrium: The atrium is mildly dilated. Aortic Valve: There is mild to moderate regurgitation. Mitral Valve: There is mild annular calcification. There is moderate regurgitation. Tricuspid Valve: There is mild regurgitation. Estimated RVSP 35 mm Hg. Aorta: The aortic root is mildly dilated.     IVC/SVC: The inferior vena cava is dilated. Pericardium: There is a small pericardial effusion. Recent Cultures (last 7 days):         Last 24 Hours Medication List:   Current Facility-Administered Medications   Medication Dose Route Frequency Provider Last Rate    amiodarone  200 mg Oral TID With Meals Karol Sigala PA-C      brimonidine tartrate  1 drop Left Eye BID Karol Sigala PA-C      enoxaparin  40 mg Subcutaneous Q24H [address] [facility] DO Agusto      finasteride  5 mg Oral Daily Karol Sigala PA-C      folic acid  1 mg Oral Daily Sergio Shipley PA-C      insulin glargine  4 Units Subcutaneous HS Sergio Shipley PA-C      insulin lispro  1-6 Units Subcutaneous TID AC Sergio Shipley PA-C      insulin lispro  1-6 Units Subcutaneous HS Sergio Shipley PA-C      insulin lispro  5 Units Subcutaneous TID With Meals Sergio Shipley PA-C      metoprolol tartrate  100 mg Oral Q12H [address] [facility] DO Agusto      pravastatin  20 mg Oral Daily With ZAHRAA Jones      predniSONE  30 mg Oral Daily [facility] DO Agusto      timolol  1 drop Both Eyes Daily Sergio Shipley PA-C      travoprost  1 drop Both Eyes HS Karol Sigala PA-C          Today, Patient Was Seen By: Billy Shukla DO    **Please Note: This note may have been constructed using a voice recognition system. **

## 2023-10-12 NOTE — CASE MANAGEMENT
Case Management Progress Note    Patient name Luigi Fredreick  Location /-19 MRN [id number]  : 1945 Date 10/12/2023       LOS (days): 9  Geometric Mean LOS (GMLOS) (days): 4.00  Days to GMLOS:-5        OBJECTIVE:        Current admission status: Inpatient  Preferred Pharmacy:   [address] [facility] [address] [address] [address] [place] [facility] [address]  Phone: [phone] Fax: [phone] [facility],[address], [address] [facility] [address] [facility] [address] [facility] [address] [facility] [address]  Phone: [phone] Fax: [phone]    [facility] [address]  Phone: [phone] Fax: [phone]    Primary Care Provider: Dona Segovia PA-C    Primary Insurance: MEDICARE  Secondary Insurance: Joycelyn Mcduffie    PROGRESS NOTE:    CM reviewed case in [facility] interdisciplinary rounds. Cm did a Lovenox price check as requested by [facility]  Lovenox 30 day supply cost top patient - $251.57  SLIM updated. Plan- CM will continue to follow and finalize d/c planning  updates to [facility] and any other identified needs by team , patient and family.

## 2023-10-12 NOTE — ASSESSMENT & PLAN NOTE
Present on admission  With associated right lower extremity DVT  Likely provoked by malignancy and new onset Afib with RVR  CT imaging showing diffuse metastatic disease  Findings discussed with patient and his wife and extended family  Eliquis price check > $500 -patient can follow-up with outpatient hematology oncology for further Eliquis discount program  Transition to Lovenox at this time which does not need to be held prior to Pleurx catheter placement; after extensive discussion with interventional radiology, overall recommendation is to pursue another thoracentesis at this time and if there is concern for reaccumulation, have patient be scheduled for outpatient procedure for repeat thoracentesis  Patient follow-up with hematology oncology  Discuss further with palliative team regarding [address] [facility]

## 2023-10-12 NOTE — ASSESSMENT & PLAN NOTE
Diffuse metastatic disease, refer to CT imaging reports  For now continue with stabilization of HR   Once stabilized, further discuss goals of care  Goals of care discussion completed with patient's wife and daughters; to be ongoing throughout admission  As of right now, DNR/DNI level 3 (not pursuing hospice as of now)  Guarded/poor prognosis

## 2023-10-12 NOTE — ASSESSMENT & PLAN NOTE
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, cM1a) diagnosed on 9/15 s/p 5 cycles of XRT and had been on osimerinib   He had rescan on 8/8 that unfortunately showed increased size of mass  He was recently restarted on carboplastin + pemetrexed; last had cycle about 2 weeks ago  Unfortunately on CT:  "Luminal narrowing in the region of the bifurcation of the bronchus intermedius, which is new since August 2023, possibly secondary to enlarging hilar lymphadenopathy or increasing postradiation fibrosis"  "Airspace consolidations in the right middle and lower lobes, new since August 2023, likely at least in part representing atelectasis, likely secondary to the aforementioned partial bronchial occlusion."  "Significant progression of metastatic disease throughout the left lung, with new enlarged nodules/masses"  Taurus Brown is noted high association with pemetrexed and VTE as well as progressive disease on CT scan

## 2023-10-12 NOTE — ACP (ADVANCE CARE PLANNING)
Advanced Care Planning Progress Note    Serious Illness Conversation    1. What is your understanding now of where you are with your illness? Prognostic Understanding: underestimates prognosis  Patient remains more optimistic and is inquiring about pursuing chemotherapy on discharge with outpatient heme onc     2. How much information about what is likely to be ahead with your illness would you like to have? Information: patient wants to be fully informed  Also discuss with family (wife specifically)     3. What did you (clinician) communicate to the patient? Prognostic Communication: Time - I wish we were not in this situation, but I am worried that time may be as short as months to a year (express as a range, e.g. days to weeks, weeks to months, months to a year). Weeks     4. If your health situation worsens, what are your most important goals? Goals: be physically comfortable, have my medical decisions respected, be at home  Does not wish to pursue hospice as of now but may consider it if progression of disease     5. What are the biggest fears and worries about the future and your health? Fears/Worries: pain     6. What abilities are so critical to your life that you cannot imagine living without them? Unacceptable Function: being chronically confused or not being myself, being in pain or very uncomfortable, being unable to interact with others, being unconscious, not being myself, being in chronic severe pain     7. What gives you strength as you think about the future with your illness? 8. If you become sicker, how much are you willing to go through for the possibility of gaining more time? Be in the hospital: Yes Have a feeding tube: No   Be in the ICU: No Live in a nursing home: No   Be on a ventilator: No Be uncomfortable: No   Be on dialysis: No Undergo aggressive test and/or procedures: Yes   9. How much does your proxy and family know about your priorities and wishes?   Discussion Discussion: extensive discussion with family about goals and wishes  Discuss with wife        How does this plan sound to you? I will do everything I can to help you through this. Patient verbalized understanding of the plan, Patient wants to finish this discussion at a later time    Pursue pleurx vs thoracentesis while inpatient;  Code status remains Level 3     I have spent 33 minutes speaking with my patient on advanced care planning today or during this visit     Advanced directives         [facility] [address], [place]

## 2023-10-12 NOTE — RESPIRATORY THERAPY NOTE
RT Protocol Note  Kp Barragan 69 y.o. male MRN: [de-identified]  Unit/Bed#: -02 Encounter: [id number]    Assessment    Principal Problem:    Multiple subsegmental pulmonary emboli without acute cor pulmonale (HCC)  Active Problems:    Controlled type 2 diabetes mellitus without complication, without long-term current use of insulin (HCC)    Anemia    Malignant neoplasm of upper lobe of right lung (HCC)    Pleural effusion    Atrial fibrillation with RVR (HCC)    HILARY (acute kidney injury) ([address])    Abnormal CT of the abdomen    COPD with acute exacerbation (HCC)      Home Pulmonary Medications:  none       Past Medical History:   Diagnosis Date    Diabetes mellitus ([address])     Hyperlipidemia     Hypertension     Lung cancer ([address])     Prostate cancer ([address]) 2005    S/P prostate ca-2005 had radiation tx. Social History     Socioeconomic History    Marital status: /Civil Union     Spouse name: Not on file    Number of children: Not on file    Years of education: Not on file    Highest education level: Not on file   Occupational History    Not on file   Tobacco Use    Smoking status: Never    Smokeless tobacco: Never   Vaping Use    Vaping Use: Never used   Substance and Sexual Activity    Alcohol use: Yes     Comment: Occasional    Drug use: Never    Sexual activity: Not on file   Other Topics Concern    Not on file   Social History Narrative    Not on file     Social Determinants of Health     Financial Resource Strain: Low Risk  (7/25/2023)    Overall Financial Resource Strain (CARDIA)     Difficulty of Paying Living Expenses: Not very hard   Food Insecurity: No Food Insecurity (10/4/2023)    Hunger Vital Sign     Worried About Running Out of Food in the Last Year: Never true     Ran Out of Food in the Last Year: Never true   Transportation Needs: No Transportation Needs (10/4/2023)    PRAPARE - Transportation     Lack of Transportation (Medical): No     Lack of Transportation (Non-Medical):  No   Physical Activity: Not on file   Stress: Not on file   Social Connections: Not on file   Intimate Partner Violence: Not on file   Housing Stability: Unknown (10/4/2023)    Housing Stability Vital Sign     Unable to Pay for Housing in the Last Year: No     Number of Places Lived in the Last Year: 1     Unstable Housing in the Last Year: Not on file       Subjective         Objective    Physical Exam:   Assessment Type: Assess only  General Appearance: Alert, Awake  Respiratory Pattern: Normal  Chest Assessment: Chest expansion symmetrical  Bilateral Breath Sounds: Diminished, Coarse  L Breath Sounds: Expiratory wheezes  O2 Device: RA    Vitals:  Blood pressure 137/94, pulse (!) 117, temperature 97.8 °F (36.6 °C), resp. rate 16, height 5' 9" (1.753 m), weight 77.4 kg (170 lb 10.2 oz), SpO2 94 %. Imaging and other studies: I have personally reviewed pertinent reports. O2 Device: RA     Plan    Respiratory Plan: Discontinue Protocol        Resp Comments: patient admitted with plural effusion possible talk of a pluerex catheter also has adenocarcinoma and SOB. Patient denies any history of COPD or the use of any pulmonary medications at home. No idication for txs at this time.  Discontinue protocol

## 2023-10-12 NOTE — ASSESSMENT & PLAN NOTE
Present on admission    Recent Labs     10/10/23  0937 10/11/23  0601 10/12/23  0643   CREATININE 1.75* 1.67* 1.67*   EGFR 36 38 38       Estimated Creatinine Clearance: 37 mL/min (A) (by C-G formula based on SCr of 1.67 mg/dL (H)).     Baseline 1.0-1.1  Unclear etiology, possibly volume overload from pleural effusion vs hypoperfusion in setting of Afib/CHF  Patient had been receiving fluids with amiodarone and heparin drips but creatinine has not improved  CXR show some mild vascular congestion and lower extremity edema - repeat thora tomorrow to determine if fluid reaccumulates prior to placing pleurx catheter  Creatinine plateaued   Monitor lytes, weight, I/O

## 2023-10-12 NOTE — ASSESSMENT & PLAN NOTE
Lab Results   Component Value Date    HGBA1C 5.1 07/07/2023       Recent Labs     10/11/23  1607 10/11/23  2039 10/12/23  0748 10/12/23  1111   POCGLU 231* 186* 112 120         Blood Sugar Average: Last 72 hrs:  (P) 153.3146697139274493   Plan:  Hold home regimen  Diet Consistent CHO Level 2 (5 CHO servings/75g CHO per meal)  Insulin regimen  Humalog 5 units TID AC  Lantus 4 units HS  Glucose checks and Insulin correction ACHS  Goal -180 while admitted, adjusting insulin regimen as appropriate  Monitor for hypoglycemia and treat per protocol

## 2023-10-12 NOTE — ASSESSMENT & PLAN NOTE
Pulse: 103    Present on admission  New onset afib with RVR  Unclear etiology, EF is 35% with global hypokinesis   Could be secondary to Afib vs ischemic cardiomyopathy   Goals of care discussion are ongoing, palliative care consulted and has signed off; may need to reconsult  He has been transitioned off IV amiodarone to PO meds  HR remains elevated despite escalation of lopressor- Continue at 100 mg BID  After discussion with cardiology and family, patient is agreeable to being discharged when pleural effusion is stable with current heart rates given patient is not a candidate for cardioversion and ischemia evaluation  Family will follow with palliative care and if patient decompensates rapidly, he will be transitioned to hospice either by coming back to the hospital or palliative care to facilitate

## 2023-10-13 ENCOUNTER — APPOINTMENT (OUTPATIENT)
Dept: NON INVASIVE DIAGNOSTICS | Facility: HOSPITAL | Age: 78
DRG: 175 | End: 2023-10-13
Payer: MEDICARE

## 2023-10-13 PROBLEM — E87.5 HYPERKALEMIA: Status: ACTIVE | Noted: 2023-10-13

## 2023-10-13 LAB
ALBUMIN SERPL BCP-MCNC: 3.4 G/DL (ref 3.5–5)
ALP SERPL-CCNC: 73 U/L (ref 34–104)
ALT SERPL W P-5'-P-CCNC: 42 U/L (ref 7–52)
ANION GAP SERPL CALCULATED.3IONS-SCNC: 4 MMOL/L
AST SERPL W P-5'-P-CCNC: 20 U/L (ref 13–39)
BASOPHILS # BLD AUTO: 0.02 THOUSANDS/ÂΜL (ref 0–0.1)
BASOPHILS NFR BLD AUTO: 0 % (ref 0–1)
BILIRUB SERPL-MCNC: 0.43 MG/DL (ref 0.2–1)
BUN SERPL-MCNC: 53 MG/DL (ref 5–25)
CALCIUM ALBUM COR SERPL-MCNC: 9.4 MG/DL (ref 8.3–10.1)
CALCIUM SERPL-MCNC: 8.9 MG/DL (ref 8.4–10.2)
CHLORIDE SERPL-SCNC: 106 MMOL/L (ref 96–108)
CO2 SERPL-SCNC: 28 MMOL/L (ref 21–32)
CREAT SERPL-MCNC: 1.85 MG/DL (ref 0.6–1.3)
EOSINOPHIL # BLD AUTO: 0 THOUSAND/ÂΜL (ref 0–0.61)
EOSINOPHIL NFR BLD AUTO: 0 % (ref 0–6)
ERYTHROCYTE [DISTWIDTH] IN BLOOD BY AUTOMATED COUNT: 16.5 % (ref 11.6–15.1)
GFR SERPL CREATININE-BSD FRML MDRD: 34 ML/MIN/1.73SQ M
GLUCOSE SERPL-MCNC: 134 MG/DL (ref 65–140)
GLUCOSE SERPL-MCNC: 170 MG/DL (ref 65–140)
GLUCOSE SERPL-MCNC: 170 MG/DL (ref 65–140)
GLUCOSE SERPL-MCNC: 204 MG/DL (ref 65–140)
GLUCOSE SERPL-MCNC: 208 MG/DL (ref 65–140)
GLUCOSE SERPL-MCNC: 82 MG/DL (ref 65–140)
HCT VFR BLD AUTO: 37.7 % (ref 36.5–49.3)
HGB BLD-MCNC: 12.1 G/DL (ref 12–17)
IMM GRANULOCYTES # BLD AUTO: 0.21 THOUSAND/UL (ref 0–0.2)
IMM GRANULOCYTES NFR BLD AUTO: 2 % (ref 0–2)
INR PPP: 1.3 (ref 0.84–1.19)
LYMPHOCYTES # BLD AUTO: 0.32 THOUSANDS/ÂΜL (ref 0.6–4.47)
LYMPHOCYTES NFR BLD AUTO: 3 % (ref 14–44)
MAGNESIUM SERPL-MCNC: 2.9 MG/DL (ref 1.9–2.7)
MCH RBC QN AUTO: 32.5 PG (ref 26.8–34.3)
MCHC RBC AUTO-ENTMCNC: 32.1 G/DL (ref 31.4–37.4)
MCV RBC AUTO: 101 FL (ref 82–98)
MONOCYTES # BLD AUTO: 0.64 THOUSAND/ÂΜL (ref 0.17–1.22)
MONOCYTES NFR BLD AUTO: 6 % (ref 4–12)
NEUTROPHILS # BLD AUTO: 10.46 THOUSANDS/ÂΜL (ref 1.85–7.62)
NEUTS SEG NFR BLD AUTO: 89 % (ref 43–75)
NRBC BLD AUTO-RTO: 0 /100 WBCS
PLATELET # BLD AUTO: 173 THOUSANDS/UL (ref 149–390)
PMV BLD AUTO: 12.2 FL (ref 8.9–12.7)
POTASSIUM SERPL-SCNC: 4.4 MMOL/L (ref 3.5–5.3)
POTASSIUM SERPL-SCNC: 6 MMOL/L (ref 3.5–5.3)
PROT SERPL-MCNC: 6 G/DL (ref 6.4–8.4)
PROTHROMBIN TIME: 16.9 SECONDS (ref 11.6–14.5)
RBC # BLD AUTO: 3.72 MILLION/UL (ref 3.88–5.62)
SODIUM SERPL-SCNC: 138 MMOL/L (ref 135–147)
WBC # BLD AUTO: 11.65 THOUSAND/UL (ref 4.31–10.16)

## 2023-10-13 PROCEDURE — 88112 CYTOPATH CELL ENHANCE TECH: CPT | Performed by: PATHOLOGY

## 2023-10-13 PROCEDURE — 88342 IMHCHEM/IMCYTCHM 1ST ANTB: CPT | Performed by: PATHOLOGY

## 2023-10-13 PROCEDURE — 84132 ASSAY OF SERUM POTASSIUM: CPT | Performed by: INTERNAL MEDICINE

## 2023-10-13 PROCEDURE — 88341 IMHCHEM/IMCYTCHM EA ADD ANTB: CPT | Performed by: PATHOLOGY

## 2023-10-13 PROCEDURE — 85610 PROTHROMBIN TIME: CPT | Performed by: INTERNAL MEDICINE

## 2023-10-13 PROCEDURE — 32555 ASPIRATE PLEURA W/ IMAGING: CPT

## 2023-10-13 PROCEDURE — 88305 TISSUE EXAM BY PATHOLOGIST: CPT | Performed by: PATHOLOGY

## 2023-10-13 PROCEDURE — 85025 COMPLETE CBC W/AUTO DIFF WBC: CPT | Performed by: INTERNAL MEDICINE

## 2023-10-13 PROCEDURE — 99233 SBSQ HOSP IP/OBS HIGH 50: CPT | Performed by: INTERNAL MEDICINE

## 2023-10-13 PROCEDURE — 94640 AIRWAY INHALATION TREATMENT: CPT

## 2023-10-13 PROCEDURE — 80053 COMPREHEN METABOLIC PANEL: CPT | Performed by: INTERNAL MEDICINE

## 2023-10-13 PROCEDURE — 83735 ASSAY OF MAGNESIUM: CPT | Performed by: INTERNAL MEDICINE

## 2023-10-13 PROCEDURE — 82948 REAGENT STRIP/BLOOD GLUCOSE: CPT

## 2023-10-13 RX ORDER — LIDOCAINE WITH 8.4% SOD BICARB 0.9%(10ML)
SYRINGE (ML) INJECTION AS NEEDED
Status: COMPLETED | OUTPATIENT
Start: 2023-10-13 | End: 2023-10-13

## 2023-10-13 RX ORDER — DEXTROSE MONOHYDRATE 25 G/50ML
25 INJECTION, SOLUTION INTRAVENOUS ONCE
Status: COMPLETED | OUTPATIENT
Start: 2023-10-13 | End: 2023-10-13

## 2023-10-13 RX ORDER — FUROSEMIDE 10 MG/ML
40 INJECTION INTRAMUSCULAR; INTRAVENOUS ONCE
Status: COMPLETED | OUTPATIENT
Start: 2023-10-13 | End: 2023-10-13

## 2023-10-13 RX ORDER — WARFARIN SODIUM 5 MG/1
5 TABLET ORAL
Status: DISCONTINUED | OUTPATIENT
Start: 2023-10-13 | End: 2023-10-14

## 2023-10-13 RX ORDER — CALCIUM GLUCONATE 20 MG/ML
1 INJECTION, SOLUTION INTRAVENOUS ONCE
Status: COMPLETED | OUTPATIENT
Start: 2023-10-13 | End: 2023-10-13

## 2023-10-13 RX ORDER — SODIUM POLYSTYRENE SULFONATE 4.1 MEQ/G
15 POWDER, FOR SUSPENSION ORAL; RECTAL ONCE
Status: COMPLETED | OUTPATIENT
Start: 2023-10-13 | End: 2023-10-13

## 2023-10-13 RX ORDER — SODIUM CHLORIDE FOR INHALATION 0.9 %
VIAL, NEBULIZER (ML) INHALATION
Status: COMPLETED
Start: 2023-10-13 | End: 2023-10-13

## 2023-10-13 RX ADMIN — INSULIN LISPRO 1 UNITS: 100 INJECTION, SOLUTION INTRAVENOUS; SUBCUTANEOUS at 21:36

## 2023-10-13 RX ADMIN — FUROSEMIDE 40 MG: 10 INJECTION, SOLUTION INTRAMUSCULAR; INTRAVENOUS at 19:31

## 2023-10-13 RX ADMIN — INSULIN GLARGINE 4 UNITS: 100 INJECTION, SOLUTION SUBCUTANEOUS at 21:36

## 2023-10-13 RX ADMIN — Medication 10 ML: at 13:56

## 2023-10-13 RX ADMIN — METOPROLOL TARTRATE 100 MG: 50 TABLET, FILM COATED ORAL at 21:36

## 2023-10-13 RX ADMIN — INSULIN LISPRO 5 UNITS: 100 INJECTION, SOLUTION INTRAVENOUS; SUBCUTANEOUS at 17:22

## 2023-10-13 RX ADMIN — AMIODARONE HYDROCHLORIDE 200 MG: 200 TABLET ORAL at 13:27

## 2023-10-13 RX ADMIN — METOPROLOL TARTRATE 100 MG: 50 TABLET, FILM COATED ORAL at 08:44

## 2023-10-13 RX ADMIN — DEXTROSE MONOHYDRATE 25 ML: 25 INJECTION, SOLUTION INTRAVENOUS at 19:32

## 2023-10-13 RX ADMIN — INSULIN LISPRO 1 UNITS: 100 INJECTION, SOLUTION INTRAVENOUS; SUBCUTANEOUS at 17:22

## 2023-10-13 RX ADMIN — CALCIUM GLUCONATE 1 G: 20 INJECTION, SOLUTION INTRAVENOUS at 19:32

## 2023-10-13 RX ADMIN — ENOXAPARIN SODIUM 40 MG: 40 INJECTION SUBCUTANEOUS at 08:39

## 2023-10-13 RX ADMIN — TRAVOPROST 1 DROP: 0.04 SOLUTION OPHTHALMIC at 21:36

## 2023-10-13 RX ADMIN — INSULIN HUMAN 10 UNITS: 100 INJECTION, SOLUTION PARENTERAL at 19:31

## 2023-10-13 RX ADMIN — PREDNISONE 30 MG: 20 TABLET ORAL at 08:39

## 2023-10-13 RX ADMIN — AMIODARONE HYDROCHLORIDE 200 MG: 200 TABLET ORAL at 17:22

## 2023-10-13 RX ADMIN — BRIMONIDINE TARTRATE 1 DROP: 2 SOLUTION/ DROPS OPHTHALMIC at 17:22

## 2023-10-13 RX ADMIN — PRAVASTATIN SODIUM 20 MG: 20 TABLET ORAL at 17:22

## 2023-10-13 RX ADMIN — TIMOLOL MALEATE 1 DROP: 5 SOLUTION/ DROPS OPHTHALMIC at 08:40

## 2023-10-13 RX ADMIN — AMIODARONE HYDROCHLORIDE 200 MG: 200 TABLET ORAL at 08:40

## 2023-10-13 RX ADMIN — Medication 6 ML: at 19:12

## 2023-10-13 RX ADMIN — SODIUM POLYSTYRENE SULFONATE 15 G: 1 POWDER ORAL; RECTAL at 19:32

## 2023-10-13 RX ADMIN — ALBUTEROL SULFATE 10 MG: 2.5 SOLUTION RESPIRATORY (INHALATION) at 19:12

## 2023-10-13 RX ADMIN — WARFARIN SODIUM 5 MG: 5 TABLET ORAL at 19:20

## 2023-10-13 RX ADMIN — FOLIC ACID 1 MG: 1 TABLET ORAL at 08:39

## 2023-10-13 RX ADMIN — BRIMONIDINE TARTRATE 1 DROP: 2 SOLUTION/ DROPS OPHTHALMIC at 08:40

## 2023-10-13 RX ADMIN — FINASTERIDE 5 MG: 5 TABLET, FILM COATED ORAL at 08:40

## 2023-10-13 NOTE — ASSESSMENT & PLAN NOTE
Recent Labs     10/10/23  0937 10/11/23  0601 10/12/23  0643   HGB 11.3* 10.9* 11.9*       Hemoglobin with downtrend since arrival but now stabilized  Does not report any active signs of bleeding  Likely multifactorial in setting of iron deficiency and chronic disease  Given metastatic burden, could have small occult bleed/oozing  Stable for continuation of anticoagulation, transitioned to eliquis

## 2023-10-13 NOTE — ASSESSMENT & PLAN NOTE
Present on admission    Recent Labs     10/10/23  0937 10/11/23  0601 10/12/23  0643   CREATININE 1.75* 1.67* 1.67*   EGFR 36 38 38     Estimated Creatinine Clearance: 37 mL/min (A) (by C-G formula based on SCr of 1.67 mg/dL (H)).     Baseline 1.0-1.1  Unclear etiology, possibly volume overload from pleural effusion vs hypoperfusion in setting of Afib/CHF  Patient had been receiving fluids with amiodarone and heparin drips but creatinine has not improved  CXR show some mild vascular congestion and lower extremity edema - repeat thora today  If fluid reaccumulates consider pleurx catheter per IR  Creatinine plateaued   Monitor lytes, weight, I/O

## 2023-10-13 NOTE — ASSESSMENT & PLAN NOTE
Lab Results   Component Value Date    HGBA1C 5.1 07/07/2023       Recent Labs     10/12/23  1111 10/12/23  1543 10/12/23  2044 10/13/23  0729   POCGLU 120 169* 264* 82         Blood Sugar Average: Last 72 hrs:  (P) 599.7958651509322032   Plan:  Hold home regimen  Diet Consistent CHO Level 2 (5 CHO servings/75g CHO per meal)  Insulin regimen  Humalog 5 units TID AC  Lantus 4 units HS  Glucose checks and Insulin correction ACHS  Goal -180 while admitted, adjusting insulin regimen as appropriate  Monitor for hypoglycemia and treat per protocol

## 2023-10-13 NOTE — PLAN OF CARE
Problem: INFECTION - ADULT  Goal: Absence or prevention of progression during hospitalization  Description: INTERVENTIONS:  - Assess and monitor for signs and symptoms of infection  - Monitor lab/diagnostic results  - Monitor all insertion sites, i.e. indwelling lines, tubes, and drains  - [place] appropriate cooling/warming therapies per order  - Administer medications as ordered  - Instruct and encourage patient and family to use good hand hygiene technique  - Identify and instruct in appropriate isolation precautions for identified infection/condition  Outcome: Progressing  Goal: Absence of fever/infection during neutropenic period  Description: INTERVENTIONS:  - Monitor WBC    Outcome: Progressing     Problem: DISCHARGE PLANNING  Goal: Discharge to home or other facility with appropriate resources  Description: INTERVENTIONS:  - Identify barriers to discharge w/patient and caregiver  - Arrange for needed discharge resources and transportation as appropriate  - Identify discharge learning needs (meds, wound care, etc.)  - Arrange for interpretive services to assist at discharge as needed  - Refer to Case Management Department for coordinating discharge planning if the patient needs post-hospital services based on physician/advanced practitioner order or complex needs related to functional status, cognitive ability, or social support system  Outcome: Progressing     Problem: Knowledge Deficit  Goal: Patient/family/caregiver demonstrates understanding of disease process, treatment plan, medications, and discharge instructions  Description: Complete learning assessment and assess knowledge base.   Interventions:  - Provide teaching at level of understanding  - Provide teaching via preferred learning methods  Outcome: Progressing

## 2023-10-13 NOTE — ASSESSMENT & PLAN NOTE
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

## 2023-10-13 NOTE — PLAN OF CARE
Problem: PAIN - ADULT  Goal: Verbalizes/displays adequate comfort level or baseline comfort level  Description: Interventions:  - Encourage patient to monitor pain and request assistance  - Assess pain using appropriate pain scale  - Administer analgesics based on type and severity of pain and evaluate response  - Implement non-pharmacological measures as appropriate and evaluate response  - Consider cultural and social influences on pain and pain management  - Notify physician/advanced practitioner if interventions unsuccessful or patient reports new pain  Outcome: Progressing     Problem: SAFETY ADULT  Goal: Patient will remain free of falls  Description: INTERVENTIONS:  - Educate patient/family on patient safety including physical limitations  - Instruct patient to call for assistance with activity   - Consult OT/PT to assist with strengthening/mobility   - Keep Call bell within reach  - Keep bed low and locked with side rails adjusted as appropriate  - Keep care items and personal belongings within reach  - Initiate and maintain comfort rounds  - Make Fall Risk Sign visible to staff  - toileted on demand as patient is continent.   Outcome: Progressing

## 2023-10-13 NOTE — BRIEF OP NOTE (RAD/CATH)
IR THORACENTESIS Procedure Note    PATIENT NAME: Ananya Rossi  : 1945  MRN: [id number]    Pre-op Diagnosis:   1. Atrial fibrillation with rapid ventricular response ([address])    2. Dyspnea, unspecified type    3. Pulmonary emboli ([address])    4. Pleural effusion    5. 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 Multiple lung nodules on CT    7. Multiple subsegmental pulmonary emboli without acute cor pulmonale (HCC)      Post-op Diagnosis:   1. Atrial fibrillation with rapid ventricular response ([address])    2. Dyspnea, unspecified type    3. Pulmonary emboli ([address])    4. Pleural effusion    5. 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 Multiple lung nodules on CT    7. Multiple subsegmental pulmonary emboli without acute cor pulmonale [facility])        Surgeon:   Haily Mann  Assistants:     No qualified resident was available, Resident is only observing    Estimated Blood Loss: minimal  Findings: large right pleural effusion. 2000 ml clear yellow pleural fluid.     Specimens: cytology    Complications:  none immediate    Anesthesia: local    Haily Mackenzie     Date: 10/13/2023  Time: 3:18 PM

## 2023-10-13 NOTE — PROGRESS NOTES
[address]  Progress Note  Name: Yue Molina  MRN: [de-identified]  Unit/Bed#: -02 I Date of Admission: 10/3/2023   Date of Service: 10/13/2023 I Hospital Day: 10    Assessment/Plan   * Multiple subsegmental pulmonary emboli without acute cor pulmonale [facility])  Assessment & Plan  Present on admission  With associated right lower extremity DVT  Likely provoked by malignancy and new onset Afib with RVR  CT imaging showing diffuse metastatic disease  Findings discussed with patient and his wife and extended family  Eliquis price check > $500/month -patient can follow-up with outpatient hematology oncology for further Eliquis discount program  Lovenox noted to be $251/month  Lovenox bridge to Warfarin at this time which does not need to be held prior to Pleurx catheter placement; after extensive discussion with interventional radiology, overall recommendation is to pursue another thoracentesis at this time and if there is concern for reaccumulation, have patient be scheduled for outpatient procedure for repeat thoracentesis  Patient follow-up with hematology oncology  Discuss further with palliative team regarding [address] [place]    COPD with acute exacerbation ([address])  Assessment & Plan  Continue with IV solumedrol, tapered to daily  Still with some wheezing present; unclear if 2/2 copd vs malignancy  Prednisone taper continued    Abnormal CT of the abdomen  Assessment & Plan  Diffuse metastatic disease, refer to CT imaging reports  For now continue with stabilization of HR   Once stabilized, further discuss goals of care  Goals of care discussion completed with patient's wife and daughters; to be ongoing throughout admission  As of right now, DNR/DNI level 3 (not pursuing hospice as of now)  Guarded/poor prognosis    HILARY (acute kidney injury) [facility])  Assessment & Plan  Present on admission    Recent Labs     10/10/23  0937 10/11/23  0601 10/12/23  0643   CREATININE 1.75* 1.67* 1.67*   EGFR 36 38 38 Estimated Creatinine Clearance: 37 mL/min (A) (by C-G formula based on SCr of 1.67 mg/dL (H)).     Baseline 1.0-1.1  Unclear etiology, possibly volume overload from pleural effusion vs hypoperfusion in setting of Afib/CHF  Patient had been receiving fluids with amiodarone and heparin drips but creatinine has not improved  CXR show some mild vascular congestion and lower extremity edema - repeat thora today  If fluid reaccumulates consider pleurx catheter per IR  Creatinine plateaued   Monitor lytes, weight, I/O    Atrial fibrillation with RVR (HCC)  Assessment & Plan  Pulse: (!) 114    Present on admission  New onset afib with RVR  Unclear etiology, EF is 35% with global hypokinesis   Could be secondary to Afib vs ischemic cardiomyopathy   Goals of care discussion are ongoing, palliative care consulted and has signed off; may need to reconsult  He has been transitioned off IV amiodarone to PO meds  HR remains elevated despite escalation of lopressor- Continue at 100 mg BID  After discussion with cardiology and family, patient is agreeable to being discharged when pleural effusion is stable with current heart rates given patient is not a candidate for cardioversion and ischemia evaluation  Family will follow with palliative care and if patient decompensates rapidly, he will be transitioned to hospice either by coming back to the hospital or palliative care to facilitate     Pleural effusion  Assessment & Plan  Noted to have bilateral pleural effusions  Small left sided, Moderate to large right sided with loculation  Consulted IR for thoracentesis, 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  Given EF 35%, could be transudative as well  Status post thoracentesis on 10/5 with 2 liter fluid removal   CXR does show some re accumulation  If symptoms worsening, discuss with IR to see if repeat thoracentesis is indicated versus placement of Pleurx catheter  At this time IR pursuing Thora first and then may consider pleurx if reaccumulation occurs    Malignant neoplasm of upper lobe of right lung [facility])  Assessment & Plan  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, cM1a) diagnosed on 9/15 s/p 5 cycles of XRT and had been on osimerinib   He had rescan on 8/8 that unfortunately showed increased size of mass  He was recently restarted on carboplastin + pemetrexed; last had cycle about 2 weeks ago  Unfortunately on CT:  "Luminal narrowing in the region of the bifurcation of the bronchus intermedius, which is new since August 2023, possibly secondary to enlarging hilar lymphadenopathy or increasing postradiation fibrosis"  "Airspace consolidations in the right middle and lower lobes, new since August 2023, likely at least in part representing atelectasis, likely secondary to the aforementioned partial bronchial occlusion."  "Significant progression of metastatic disease throughout the left lung, with new enlarged nodules/masses"   There is noted high association with pemetrexed and VTE as well as progressive disease on CT scan    Anemia  Assessment & Plan  Recent Labs     10/10/23  0937 10/11/23  0601 10/12/23  0643   HGB 11.3* 10.9* 11.9*       Hemoglobin with downtrend since arrival but now stabilized  Does not report any active signs of bleeding  Likely multifactorial in setting of iron deficiency and chronic disease  Given metastatic burden, could have small occult bleed/oozing  Stable for continuation of anticoagulation, transitioned to eliquis     Controlled type 2 diabetes mellitus without complication, without long-term current use of insulin [facility])  Assessment & Plan  Lab Results   Component Value Date    HGBA1C 5.1 07/07/2023       Recent Labs     10/12/23  1111 10/12/23  1543 10/12/23  2044 10/13/23  0729   POCGLU 120 169* 264* 82         Blood Sugar Average: Last 72 hrs:  (P) 404.4749163516016450   Plan:  Hold home regimen  Diet Consistent CHO Level 2 (5 CHO servings/75g CHO per meal)  Insulin regimen  Humalog 5 units TID AC  Lantus 4 units HS  Glucose checks and Insulin correction ACHS  Goal -180 while admitted, adjusting insulin regimen as appropriate  Monitor for hypoglycemia and treat per protocol             VTE Pharmacologic Prophylaxis:   High Risk (Score >/= 5) - Pharmacological DVT Prophylaxis Ordered: warfarin and lovenox. Sequential Compression Devices Ordered. Patient Centered Rounds: I performed bedside rounds with nursing staff today. Discussions with Specialists or Other Care Team Provider:  IP CONSULT TO CARDIOLOGY  IP CONSULT TO PALLIATIVE CARE  IP CONSULT TO HEMATOLOGY  INPATIENT CONSULT TO IR      Education and Discussions with Family / Patient: patient    Total Time Spent on Date of Encounter in care of patient: 30+ mins. This time was spent on one or more of the following: performing physical exam; counseling and coordination of care; obtaining or reviewing history; documenting in the medical record; reviewing/ordering tests, medications or procedures; communicating with other healthcare professionals and discussing with patient's family/caregivers. Current Length of Stay: 10 day(s)  Current Patient Status: Inpatient   Certification Statement: The patient will continue to require additional inpatient hospital stay due to plan as noted above  Discharge Plan: Anticipate discharge in 48-72 hrs to home with home services. Code Status: Level 3 - DNAR and DNI    Subjective:   Offers no new complaints at this time. No acute events reported overnight. Understanding of plan to switch to warfarin and also to pursue thoracentesis at this time. All questions answered. Objective:     Vitals:   Temp (24hrs), Av.8 °F (36.6 °C), Min:97.5 °F (36.4 °C), Max:98.1 °F (36.7 °C)    Temp:  [97.5 °F (36.4 °C)-98.1 °F (36.7 °C)] 97.5 °F (36.4 °C)  HR:  [] 114  Resp:  [16] 16  BP: (136-139)/(86-97) 136/86  SpO2:  [95 %-97 %] 97 %  Body mass index is 25.78 kg/m². Input and Output Summary (last 24 hours):      Intake/Output Summary (Last 24 hours) at 10/13/2023 0800  Last data filed at 10/12/2023 1823  Gross per 24 hour   Intake --   Output 400 ml   Net -400 ml       Physical Exam:   Physical Exam  Vitals and nursing note reviewed. Constitutional:       General: He is not in acute distress. Appearance: Normal appearance. He is not toxic-appearing. HENT:      Ears:      Comments: Hard of hearing  Cardiovascular:      Rate and Rhythm: Tachycardia present. Rhythm irregular. Heart sounds: Normal heart sounds. No murmur heard. Pulmonary:      Effort: Pulmonary effort is normal. No respiratory distress. Breath sounds: Wheezing and rales present. Abdominal:      General: Abdomen is flat. There is no distension. Palpations: Abdomen is soft. Tenderness: There is no abdominal tenderness. Musculoskeletal:      Right lower leg: Edema present. Neurological:      General: No focal deficit present. Mental Status: He is alert and oriented to person, place, and time. Mental status is at baseline. Motor: No weakness. Psychiatric:         Mood and Affect: Mood normal.         Thought Content: Thought content normal.         Judgment: Judgment normal.          Additional Data:     Labs:  Results from last 7 days   Lab Units 10/12/23  0643 10/11/23  0601 10/10/23  0937   WBC Thousand/uL 10.29*   < > 9.93   HEMOGLOBIN g/dL 11.9*   < > 11.3*   HEMATOCRIT % 38.0   < > 34.3*   PLATELETS Thousands/uL 165   < > 159   BANDS PCT %  --   --  1   NEUTROS PCT % 81*   < >  --    LYMPHS PCT % 6*   < >  --    LYMPHO PCT %  --   --  10*   MONOS PCT % 11   < >  --    MONO PCT %  --   --  10   EOS PCT % 0   < > 0    < > = values in this interval not displayed.      Results from last 7 days   Lab Units 10/12/23  0643   SODIUM mmol/L 139   POTASSIUM mmol/L 4.8   CHLORIDE mmol/L 108   CO2 mmol/L 25   BUN mg/dL 49*   CREATININE mg/dL 1.67*   ANION GAP mmol/L 6   CALCIUM mg/dL 8.8   ALBUMIN g/dL 3.3*   TOTAL BILIRUBIN mg/dL 0.47 ALK PHOS U/L 70   ALT U/L 31   AST U/L 14   GLUCOSE RANDOM mg/dL 94         Results from last 7 days   Lab Units 10/13/23  0729 10/12/23  2044 10/12/23  1543 10/12/23  1111 10/12/23  0748 10/11/23  2039 10/11/23  1607 10/11/23  1109 10/11/23  0731 10/10/23  2058 10/10/23  1559 10/10/23  1119   POC GLUCOSE mg/dl 82 264* 169* 120 112 186* 231* 71 133 304* 114 112               Lines/Drains:  Invasive Devices       Peripheral Intravenous Line  Duration             Peripheral IV 10/08/23 Right Antecubital 4 days                          Imaging: Reviewed radiology reports from this admission including: chest xray  XR chest portable    Result Date: 10/12/2023  Impression: Right greater than left pleural effusions which are stable. Workstation performed: [id number]     XR chest portable    Result Date: 10/9/2023  Impression: Right pleural effusion is slightly increased in size; the left pleural effusion is not significantly changed. Resident: Connie Ignacio, the attending radiologist, have reviewed the images and agree with the final report above. Workstation performed: [id number]       XR chest portable    Result Date: 10/12/2023  Impression Right greater than left pleural effusions which are stable. Workstation performed: [id number]        Results for orders placed during the hospital encounter of 10/03/23    Echo complete w/ contrast if indicated    Interpretation Summary    Left Ventricle: Left ventricular cavity size is normal. Wall thickness is mildly increased. The left ventricular ejection fraction is 35%. When compared to previous echocardiogram from 2022, left ventricular ejection fraction is significantly reduced. There is severe global hypokinesis. Unable to assess diastolic function due to atrial fibrillation. Tachycardia limits interpretation. Right Ventricle: Right ventricular cavity size is mildly dilated. Systolic function is mildly reduced. Left Atrium: The atrium is moderately dilated. Right Atrium: The atrium is mildly dilated. Aortic Valve: There is mild to moderate regurgitation. Mitral Valve: There is mild annular calcification. There is moderate regurgitation. Tricuspid Valve: There is mild regurgitation. Estimated RVSP 35 mm Hg. Aorta: The aortic root is mildly dilated. IVC/SVC: The inferior vena cava is dilated. Pericardium: There is a small pericardial effusion. Recent Cultures (last 7 days):         Last 24 Hours Medication List:   Current Facility-Administered Medications   Medication Dose Route Frequency Provider Last Rate    amiodarone  200 mg Oral TID With Meals Thor Gutiérrez PA-C      brimonidine tartrate  1 drop Left Eye BID Thor Gutiérrez PA-C      enoxaparin  40 mg Subcutaneous Q24H [facility] Luís Liz DO      finasteride  5 mg Oral Daily Thor Gutiérrez PA-C      folic acid  1 mg Oral Daily Sergio Shipley PA-C      insulin glargine  4 Units Subcutaneous HS Sergio Shipley PA-C      insulin lispro  1-6 Units Subcutaneous TID AC Sergio Shipley PA-C      insulin lispro  1-6 Units Subcutaneous HS Sergio Shipley PA-C      insulin lispro  5 Units Subcutaneous TID With Meals Sergio Shipley PA-C      metoprolol tartrate  100 mg Oral Q12H [facility] Luís Liz DO      pravastatin  20 mg Oral Daily With ZAHRAA Jones      predniSONE  30 mg Oral Daily Luís Liz DO      timolol  1 drop Both Eyes Daily Sergio Shipley PA-C      travoprost  1 drop Both Eyes HS Sergio Shipley PA-C      warfarin  5 mg Oral Daily (warfarin) Luís Liz DO          Today, Patient Was Seen By: Lizabeth Encarnacion DO    **Please Note: This note may have been constructed using a voice recognition system. **

## 2023-10-13 NOTE — ASSESSMENT & PLAN NOTE
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, cM1a) diagnosed on 9/15 s/p 5 cycles of XRT and had been on osimerinib   He had rescan on 8/8 that unfortunately showed increased size of mass  He was recently restarted on carboplastin + pemetrexed; last had cycle about 2 weeks ago  Unfortunately on CT:  "Luminal narrowing in the region of the bifurcation of the bronchus intermedius, which is new since August 2023, possibly secondary to enlarging hilar lymphadenopathy or increasing postradiation fibrosis"  "Airspace consolidations in the right middle and lower lobes, new since August 2023, likely at least in part representing atelectasis, likely secondary to the aforementioned partial bronchial occlusion."  "Significant progression of metastatic disease throughout the left lung, with new enlarged nodules/masses"  Canelo Jo is noted high association with pemetrexed and VTE as well as progressive disease on CT scan

## 2023-10-13 NOTE — ASSESSMENT & PLAN NOTE
Present on admission  With associated right lower extremity DVT  Likely provoked by malignancy and new onset Afib with RVR  CT imaging showing diffuse metastatic disease  Findings discussed with patient and his wife and extended family  Eliquis price check > $500/month -patient can follow-up with outpatient hematology oncology for further Eliquis discount program  Lovenox noted to be $251/month  Lovenox bridge to Warfarin at this time which does not need to be held prior to Pleurx catheter placement; after extensive discussion with interventional radiology, overall recommendation is to pursue another thoracentesis at this time and if there is concern for reaccumulation, have patient be scheduled for outpatient procedure for repeat thoracentesis  Patient follow-up with hematology oncology  Discuss further with palliative team regarding [address] [facility]

## 2023-10-13 NOTE — ASSESSMENT & PLAN NOTE
Pulse: (!) 114    Present on admission  New onset afib with RVR  Unclear etiology, EF is 35% with global hypokinesis   Could be secondary to Afib vs ischemic cardiomyopathy   Goals of care discussion are ongoing, palliative care consulted and has signed off; may need to reconsult  He has been transitioned off IV amiodarone to PO meds  HR remains elevated despite escalation of lopressor- Continue at 100 mg BID  After discussion with cardiology and family, patient is agreeable to being discharged when pleural effusion is stable with current heart rates given patient is not a candidate for cardioversion and ischemia evaluation  Family will follow with palliative care and if patient decompensates rapidly, he will be transitioned to hospice either by coming back to the hospital or palliative care to facilitate

## 2023-10-14 PROBLEM — E87.5 HYPERKALEMIA: Status: RESOLVED | Noted: 2023-10-13 | Resolved: 2023-10-14

## 2023-10-14 LAB
ALBUMIN SERPL BCP-MCNC: 3.1 G/DL (ref 3.5–5)
ALP SERPL-CCNC: 62 U/L (ref 34–104)
ALT SERPL W P-5'-P-CCNC: 37 U/L (ref 7–52)
ANION GAP SERPL CALCULATED.3IONS-SCNC: 7 MMOL/L
AST SERPL W P-5'-P-CCNC: 14 U/L (ref 13–39)
BASOPHILS # BLD AUTO: 0.01 THOUSANDS/ÂΜL (ref 0–0.1)
BASOPHILS NFR BLD AUTO: 0 % (ref 0–1)
BILIRUB SERPL-MCNC: 0.52 MG/DL (ref 0.2–1)
BUN SERPL-MCNC: 48 MG/DL (ref 5–25)
CALCIUM ALBUM COR SERPL-MCNC: 9.2 MG/DL (ref 8.3–10.1)
CALCIUM SERPL-MCNC: 8.5 MG/DL (ref 8.4–10.2)
CHLORIDE SERPL-SCNC: 107 MMOL/L (ref 96–108)
CO2 SERPL-SCNC: 26 MMOL/L (ref 21–32)
CREAT SERPL-MCNC: 1.7 MG/DL (ref 0.6–1.3)
EOSINOPHIL # BLD AUTO: 0.02 THOUSAND/ÂΜL (ref 0–0.61)
EOSINOPHIL NFR BLD AUTO: 0 % (ref 0–6)
ERYTHROCYTE [DISTWIDTH] IN BLOOD BY AUTOMATED COUNT: 16.4 % (ref 11.6–15.1)
GFR SERPL CREATININE-BSD FRML MDRD: 38 ML/MIN/1.73SQ M
GLUCOSE SERPL-MCNC: 106 MG/DL (ref 65–140)
GLUCOSE SERPL-MCNC: 156 MG/DL (ref 65–140)
GLUCOSE SERPL-MCNC: 158 MG/DL (ref 65–140)
GLUCOSE SERPL-MCNC: 75 MG/DL (ref 65–140)
GLUCOSE SERPL-MCNC: 86 MG/DL (ref 65–140)
HCT VFR BLD AUTO: 34.3 % (ref 36.5–49.3)
HGB BLD-MCNC: 11.6 G/DL (ref 12–17)
IMM GRANULOCYTES # BLD AUTO: 0.14 THOUSAND/UL (ref 0–0.2)
IMM GRANULOCYTES NFR BLD AUTO: 1 % (ref 0–2)
INR PPP: 1.28 (ref 0.84–1.19)
LYMPHOCYTES # BLD AUTO: 0.69 THOUSANDS/ÂΜL (ref 0.6–4.47)
LYMPHOCYTES NFR BLD AUTO: 6 % (ref 14–44)
MAGNESIUM SERPL-MCNC: 2.6 MG/DL (ref 1.9–2.7)
MCH RBC QN AUTO: 33.2 PG (ref 26.8–34.3)
MCHC RBC AUTO-ENTMCNC: 33.8 G/DL (ref 31.4–37.4)
MCV RBC AUTO: 98 FL (ref 82–98)
MONOCYTES # BLD AUTO: 1.23 THOUSAND/ÂΜL (ref 0.17–1.22)
MONOCYTES NFR BLD AUTO: 11 % (ref 4–12)
NEUTROPHILS # BLD AUTO: 8.87 THOUSANDS/ÂΜL (ref 1.85–7.62)
NEUTS SEG NFR BLD AUTO: 82 % (ref 43–75)
NRBC BLD AUTO-RTO: 0 /100 WBCS
PLATELET # BLD AUTO: 145 THOUSANDS/UL (ref 149–390)
PMV BLD AUTO: 12.2 FL (ref 8.9–12.7)
POTASSIUM SERPL-SCNC: 3.8 MMOL/L (ref 3.5–5.3)
PROT SERPL-MCNC: 5.5 G/DL (ref 6.4–8.4)
PROTHROMBIN TIME: 16.7 SECONDS (ref 11.6–14.5)
RBC # BLD AUTO: 3.49 MILLION/UL (ref 3.88–5.62)
SODIUM SERPL-SCNC: 140 MMOL/L (ref 135–147)
WBC # BLD AUTO: 10.96 THOUSAND/UL (ref 4.31–10.16)

## 2023-10-14 PROCEDURE — 82948 REAGENT STRIP/BLOOD GLUCOSE: CPT

## 2023-10-14 PROCEDURE — 80053 COMPREHEN METABOLIC PANEL: CPT | Performed by: INTERNAL MEDICINE

## 2023-10-14 PROCEDURE — 99232 SBSQ HOSP IP/OBS MODERATE 35: CPT | Performed by: FAMILY MEDICINE

## 2023-10-14 PROCEDURE — 83735 ASSAY OF MAGNESIUM: CPT | Performed by: INTERNAL MEDICINE

## 2023-10-14 PROCEDURE — 85610 PROTHROMBIN TIME: CPT | Performed by: INTERNAL MEDICINE

## 2023-10-14 PROCEDURE — 85025 COMPLETE CBC W/AUTO DIFF WBC: CPT | Performed by: INTERNAL MEDICINE

## 2023-10-14 RX ORDER — WARFARIN SODIUM 7.5 MG/1
7.5 TABLET ORAL
Status: DISCONTINUED | OUTPATIENT
Start: 2023-10-14 | End: 2023-10-15

## 2023-10-14 RX ADMIN — METOPROLOL TARTRATE 100 MG: 50 TABLET, FILM COATED ORAL at 08:49

## 2023-10-14 RX ADMIN — BRIMONIDINE TARTRATE 1 DROP: 2 SOLUTION/ DROPS OPHTHALMIC at 08:47

## 2023-10-14 RX ADMIN — PREDNISONE 30 MG: 20 TABLET ORAL at 08:46

## 2023-10-14 RX ADMIN — FINASTERIDE 5 MG: 5 TABLET, FILM COATED ORAL at 08:47

## 2023-10-14 RX ADMIN — PRAVASTATIN SODIUM 20 MG: 20 TABLET ORAL at 16:59

## 2023-10-14 RX ADMIN — INSULIN LISPRO 5 UNITS: 100 INJECTION, SOLUTION INTRAVENOUS; SUBCUTANEOUS at 16:58

## 2023-10-14 RX ADMIN — AMIODARONE HYDROCHLORIDE 200 MG: 200 TABLET ORAL at 16:59

## 2023-10-14 RX ADMIN — AMIODARONE HYDROCHLORIDE 200 MG: 200 TABLET ORAL at 08:46

## 2023-10-14 RX ADMIN — INSULIN LISPRO 1 UNITS: 100 INJECTION, SOLUTION INTRAVENOUS; SUBCUTANEOUS at 16:58

## 2023-10-14 RX ADMIN — AMIODARONE HYDROCHLORIDE 200 MG: 200 TABLET ORAL at 12:39

## 2023-10-14 RX ADMIN — INSULIN GLARGINE 4 UNITS: 100 INJECTION, SOLUTION SUBCUTANEOUS at 21:34

## 2023-10-14 RX ADMIN — TRAVOPROST 1 DROP: 0.04 SOLUTION OPHTHALMIC at 21:42

## 2023-10-14 RX ADMIN — WARFARIN SODIUM 7.5 MG: 7.5 TABLET ORAL at 16:59

## 2023-10-14 RX ADMIN — TIMOLOL MALEATE 1 DROP: 5 SOLUTION/ DROPS OPHTHALMIC at 08:47

## 2023-10-14 RX ADMIN — ENOXAPARIN SODIUM 40 MG: 40 INJECTION SUBCUTANEOUS at 08:46

## 2023-10-14 RX ADMIN — BRIMONIDINE TARTRATE 1 DROP: 2 SOLUTION/ DROPS OPHTHALMIC at 21:42

## 2023-10-14 RX ADMIN — FOLIC ACID 1 MG: 1 TABLET ORAL at 08:46

## 2023-10-14 RX ADMIN — INSULIN LISPRO 1 UNITS: 100 INJECTION, SOLUTION INTRAVENOUS; SUBCUTANEOUS at 21:33

## 2023-10-14 RX ADMIN — METOPROLOL TARTRATE 100 MG: 50 TABLET, FILM COATED ORAL at 21:34

## 2023-10-14 NOTE — ASSESSMENT & PLAN NOTE
Present on admission  With associated right lower extremity DVT  Likely provoked by malignancy and new onset Afib with RVR  Eliquis price check > $500/month -patient can follow-up with outpatient hematology oncology for further Eliquis discount program  Lovenox noted to be $251/month    - c/w warfarin bridging, INR 1.28 this am  - continue follow-up with hematology oncology  - Discuss further with palliative team regarding [address] [facility]

## 2023-10-14 NOTE — ASSESSMENT & PLAN NOTE
Diffuse metastatic disease, refer to CT imaging reports  Goals of care discussion completed with patient's wife and daughters; to be ongoing throughout admission  As of right now, DNR/DNI level 3 (not pursuing hospice as of now)  Guarded/poor prognosis

## 2023-10-14 NOTE — ASSESSMENT & PLAN NOTE
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

## 2023-10-14 NOTE — ASSESSMENT & PLAN NOTE
Pulse: (!) 120    Present on admission  New onset afib with RVR  Unclear etiology, EF is 35% with global hypokinesis   Could be secondary to Afib vs ischemic cardiomyopathy   Goals of care discussion are ongoing, palliative care consulted and has signed off now.    He has been transitioned off IV amiodarone to PO metoprolol  HR remains elevated despite escalation of lopressor- Continue at 100 mg BID  After discussion with cardiology and family, patient is agreeable to being discharged when pleural effusion is stable with current heart rates given patient is not a candidate for cardioversion and ischemia evaluation  Family will follow with palliative care and if patient decompensates rapidly, he will be transitioned to hospice either by coming back to the hospital or palliative care to facilitate

## 2023-10-14 NOTE — ASSESSMENT & PLAN NOTE
Lab Results   Component Value Date    HGBA1C 5.1 07/07/2023       Recent Labs     10/13/23  1555 10/13/23  1901 10/13/23  2135 10/14/23  0700   POCGLU 170* 208* 170* 75         Blood Sugar Average: Last 72 hrs:  (P) [id number]   Plan:  Hold home regimen  Diet Consistent CHO Level 2 (5 CHO servings/75g CHO per meal)  Insulin regimen  Humalog 5 units TID AC  Lantus 4 units HS  Glucose checks and Insulin correction ACHS  Monitor for hypoglycemia and treat per protocol

## 2023-10-14 NOTE — ASSESSMENT & PLAN NOTE
Present on admission    Recent Labs     10/12/23  0643 10/13/23  1518 10/14/23  0623   CREATININE 1.67* 1.85* 1.70*   EGFR 38 34 38       Estimated Creatinine Clearance: 36.4 mL/min (A) (by C-G formula based on SCr of 1.7 mg/dL (H)).     Baseline 1.0-1.1  Unclear etiology, possibly volume overload from pleural effusion vs hypoperfusion in setting of Afib/CHF  S/p multiple episodes of thoracentesis this admission  Creatinine plateaued around 1.7 currently  Monitor closely

## 2023-10-14 NOTE — ASSESSMENT & PLAN NOTE
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, cM1a) diagnosed on 9/15 s/p 5 cycles of XRT and had been on osimerinib   He had rescan on 8/8 that unfortunately showed increased size of mass  He was recently restarted on carboplastin + pemetrexed; last had cycle about 2 weeks ago  Unfortunately on CT:  "Luminal narrowing in the region of the bifurcation of the bronchus intermedius, which is new since August 2023, possibly secondary to enlarging hilar lymphadenopathy or increasing postradiation fibrosis"  "Airspace consolidations in the right middle and lower lobes, new since August 2023, likely at least in part representing atelectasis, likely secondary to the aforementioned partial bronchial occlusion."  "Significant progression of metastatic disease throughout the left lung, with new enlarged nodules/masses"    - DNR/DNI  - OP fu with oncology

## 2023-10-14 NOTE — PROGRESS NOTES
[address]  Progress Note  Name: Dallas Ceballos  MRN: [de-identified]  Unit/Bed#: -02 I Date of Admission: 10/3/2023   Date of Service: 10/14/2023 I Hospital Day: 11    Assessment/Plan   * Multiple subsegmental pulmonary emboli without acute cor pulmonale (HCC)  Assessment & Plan  Present on admission  With associated right lower extremity DVT  Likely provoked by malignancy and new onset Afib with RVR  Eliquis price check > $500/month -patient can follow-up with outpatient hematology oncology for further Eliquis discount program  Lovenox noted to be $251/month    - c/w warfarin bridging, INR 1.28 this am  - continue follow-up with hematology oncology  - Discuss further with palliative team regarding [address] [place]    Atrial fibrillation with RVR ([address])  Assessment & Plan  Pulse: (!) 120    Present on admission  New onset afib with RVR  Unclear etiology, EF is 35% with global hypokinesis   Could be secondary to Afib vs ischemic cardiomyopathy   Goals of care discussion are ongoing, palliative care consulted and has signed off now.    He has been transitioned off IV amiodarone to PO metoprolol  HR remains elevated despite escalation of lopressor- Continue at 100 mg BID  After discussion with cardiology and family, patient is agreeable to being discharged when pleural effusion is stable with current heart rates given patient is not a candidate for cardioversion and ischemia evaluation  Family will follow with palliative care and if patient decompensates rapidly, he will be transitioned to hospice either by coming back to the hospital or palliative care to facilitate     Pleural effusion  Assessment & Plan  Noted to have bilateral pleural effusions  Small left sided, Moderate to large right sided with loculation  Consulted IR for thoracentesis, 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  Given EF 35%, could be transudative as well  If symptoms worsening, discuss with IR to see if repeat thoracentesis is indicated versus placement of Pleurx catheter      Malignant neoplasm of upper lobe of right lung [facility])  Assessment & Plan  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, cM1a) diagnosed on 9/15 s/p 5 cycles of XRT and had been on osimerinib   He had rescan on 8/8 that unfortunately showed increased size of mass  He was recently restarted on carboplastin + pemetrexed; last had cycle about 2 weeks ago  Unfortunately on CT:  "Luminal narrowing in the region of the bifurcation of the bronchus intermedius, which is new since August 2023, possibly secondary to enlarging hilar lymphadenopathy or increasing postradiation fibrosis"  "Airspace consolidations in the right middle and lower lobes, new since August 2023, likely at least in part representing atelectasis, likely secondary to the aforementioned partial bronchial occlusion."  "Significant progression of metastatic disease throughout the left lung, with new enlarged nodules/masses"    - DNR/DNI  - OP fu with oncology    COPD with acute exacerbation [facility])  Assessment & Plan  +with some wheezing  unclear if 2/2 copd vs malignancy  Prednisone taper continued    HILARY (acute kidney injury) [facility])  Assessment & Plan  Present on admission    Recent Labs     10/12/23  0643 10/13/23  1518 10/14/23  0623   CREATININE 1.67* 1.85* 1.70*   EGFR 38 34 38       Estimated Creatinine Clearance: 36.4 mL/min (A) (by C-G formula based on SCr of 1.7 mg/dL (H)).     Baseline 1.0-1.1  Unclear etiology, possibly volume overload from pleural effusion vs hypoperfusion in setting of Afib/CHF  S/p multiple episodes of thoracentesis this admission  Creatinine plateaued around 1.7 currently  Monitor closely    Anemia  Assessment & Plan  Recent Labs     10/12/23  0643 10/13/23  1518 10/14/23  0623   HGB 11.9* 12.1 11.6*       Hemoglobin with downtrend since arrival but now stabilized, 11.6 this am  Does not report any active signs of bleeding  Likely multifactorial in setting of malignancy, iron deficiency and chronic disease  On coumadin, monitor closely    Abnormal CT of the abdomen  Assessment & Plan  Diffuse metastatic disease, refer to CT imaging reports  Goals of care discussion completed with patient's wife and daughters; to be ongoing throughout admission  As of right now, DNR/DNI level 3 (not pursuing hospice as of now)  Guarded/poor prognosis    Controlled type 2 diabetes mellitus without complication, without long-term current use of insulin [facility])  Assessment & Plan  Lab Results   Component Value Date    HGBA1C 5.1 2023       Recent Labs     10/13/23  1555 10/13/23  1901 10/13/23  2135 10/14/23  0700   POCGLU 170* 208* 170* 75         Blood Sugar Average: Last 72 hrs:  (P) [id number]   Plan:  Hold home regimen  Diet Consistent CHO Level 2 (5 CHO servings/75g CHO per meal)  Insulin regimen  Humalog 5 units TID AC  Lantus 4 units HS  Glucose checks and Insulin correction ACHS  Monitor for hypoglycemia and treat per protocol           VTE Pharmacologic Prophylaxis:   Pharmacologic: Warfarin (Coumadin) / lovenox  Mechanical VTE Prophylaxis in Place: No    Patient Centered Rounds: I have performed bedside rounds with nursing staff today. Education and Discussions with Family / Patient: patient    Time Spent for Care: 30 minutes. More than 50% of total time spent on counseling and coordination of care as described above. Current Length of Stay: 11 day(s)    Current Patient Status: Inpatient   Certification Statement: The patient will continue to require additional inpatient hospital stay due to bridging warfarin    Discharge Plan: ?2 days     Code Status: Level 3 - DNAR and DNI      Subjective:   Reports feeling much improved this AM.  On room air. Denies any shortness of breath/chest pain  No other new concerns.     Objective:     Vitals:   Temp (24hrs), Av.8 °F (36.6 °C), Min:97.4 °F (36.3 °C), Max:98.1 °F (36.7 °C)    Temp:  [97.4 °F (36.3 °C)-98.1 °F (36.7 °C)] 97.4 °F (36.3 °C)  HR: [100-121] 120  Resp:  [20] 20  BP: (124-135)/(76-94) 126/82  SpO2:  [95 %-100 %] 95 %  Body mass index is 25.72 kg/m². Input and Output Summary (last 24 hours): Intake/Output Summary (Last 24 hours) at 10/14/2023 1129  Last data filed at 10/14/2023 0703  Gross per 24 hour   Intake 480 ml   Output 2000 ml   Net -1520 ml       Physical Exam:     Physical Exam  Constitutional:       General: He is not in acute distress. Appearance: He is normal weight. He is not toxic-appearing. HENT:      Mouth/Throat:      Mouth: Mucous membranes are moist.      Pharynx: Oropharynx is clear. Cardiovascular:      Rate and Rhythm: Regular rhythm. Tachycardia present. Pulses: Normal pulses. Pulmonary:      Effort: Pulmonary effort is normal. No respiratory distress. Breath sounds: Normal breath sounds. Abdominal:      General: Abdomen is flat. Bowel sounds are normal.      Palpations: Abdomen is soft. Musculoskeletal:      Right lower leg: No edema. Left lower leg: No edema. Neurological:      General: No focal deficit present. Mental Status: He is alert and oriented to person, place, and time. Additional Data:     Labs:    Results from last 7 days   Lab Units 10/14/23  0623 10/11/23  0601 10/10/23  0937   WBC Thousand/uL 10.96*   < > 9.93   HEMOGLOBIN g/dL 11.6*   < > 11.3*   HEMATOCRIT % 34.3*   < > 34.3*   PLATELETS Thousands/uL 145*   < > 159   BANDS PCT %  --   --  1   NEUTROS PCT % 82*   < >  --    LYMPHS PCT % 6*   < >  --    LYMPHO PCT %  --   --  10*   MONOS PCT % 11   < >  --    MONO PCT %  --   --  10   EOS PCT % 0   < > 0    < > = values in this interval not displayed.      Results from last 7 days   Lab Units 10/14/23  0623   SODIUM mmol/L 140   POTASSIUM mmol/L 3.8   CHLORIDE mmol/L 107   CO2 mmol/L 26   BUN mg/dL 48*   CREATININE mg/dL 1.70*   ANION GAP mmol/L 7   CALCIUM mg/dL 8.5   ALBUMIN g/dL 3.1*   TOTAL BILIRUBIN mg/dL 0.52   ALK PHOS U/L 62   ALT U/L 37   AST U/L 14   GLUCOSE RANDOM mg/dL 86     Results from last 7 days   Lab Units 10/14/23  0623   INR  1.28*     Results from last 7 days   Lab Units 10/14/23  0700 10/13/23  2135 10/13/23  1901 10/13/23  1555 10/13/23  1100 10/13/23  0729 10/12/23  2044 10/12/23  1543 10/12/23  1111 10/12/23  0748 10/11/23  2039 10/11/23  1607   POC GLUCOSE mg/dl 75 170* 208* 170* 134 82 264* 169* 120 112 186* 231*                   * I Have Reviewed All Lab Data Listed Above. * Additional Pertinent Lab Tests Reviewed: All Labs Within Last 24 Hours Reviewed      Recent Cultures (last 7 days):           Last 24 Hours Medication List:   Current Facility-Administered Medications   Medication Dose Route Frequency Provider Last Rate    amiodarone  200 mg Oral TID With Meals Emmanuel Tejeda PA-C      brimonidine tartrate  1 drop Left Eye BID Emmanuel Tejeda PA-C      enoxaparin  40 mg Subcutaneous Q24H [facility] Luís DO Agusto      finasteride  5 mg Oral Daily Emmanuel Tejeda PA-C      folic acid  1 mg Oral Daily Sergio Shipley PA-C      insulin glargine  4 Units Subcutaneous HS Sergio Shipley PA-C      insulin lispro  1-6 Units Subcutaneous TID AC Sergio Shipley PA-C      insulin lispro  1-6 Units Subcutaneous HS Sergio Shipley PA-C      insulin lispro  5 Units Subcutaneous TID With Meals Sergio Shipley PA-C      metoprolol tartrate  100 mg Oral Q12H [facility] Luís DO Agusto      pravastatin  20 mg Oral Daily With ZAHRAA Jones      predniSONE  30 mg Oral Daily Luís DO Agusto      timolol  1 drop Both Eyes Daily Sergio Shipley PA-C      travoprost  1 drop Both Eyes HS Sergio Shipley PA-C      warfarin  7.5 mg Oral Daily (warfarin) Juan Venegas MD          Today, Patient Was Seen By: Loretta Blakely M.D.     ** Please Note: Dictation voice to text software may have been used in the creation of this document.  **

## 2023-10-14 NOTE — ASSESSMENT & PLAN NOTE
Recent Labs     10/12/23  0643 10/13/23  1518 10/14/23  0623   HGB 11.9* 12.1 11.6*       Hemoglobin with downtrend since arrival but now stabilized, 11.6 this am  Does not report any active signs of bleeding  Likely multifactorial in setting of malignancy, iron deficiency and chronic disease  On coumadin, monitor closely

## 2023-10-14 NOTE — PLAN OF CARE
Problem: PAIN - ADULT  Goal: Verbalizes/displays adequate comfort level or baseline comfort level  Description: Interventions:  - Encourage patient to monitor pain and request assistance  - Assess pain using appropriate pain scale  - Administer analgesics based on type and severity of pain and evaluate response  - Implement non-pharmacological measures as appropriate and evaluate response  - Consider cultural and social influences on pain and pain management  - Notify physician/advanced practitioner if interventions unsuccessful or patient reports new pain  Outcome: Progressing     Problem: INFECTION - ADULT  Goal: Absence or prevention of progression during hospitalization  Description: INTERVENTIONS:  - Assess and monitor for signs and symptoms of infection  - Monitor lab/diagnostic results  - Monitor all insertion sites, i.e. indwelling lines, tubes, and drains  - [place] appropriate cooling/warming therapies per order  - Administer medications as ordered  - Instruct and encourage patient and family to use good hand hygiene technique  - Identify and instruct in appropriate isolation precautions for identified infection/condition  Outcome: Progressing  Goal: Absence of fever/infection during neutropenic period  Description: INTERVENTIONS:  - Monitor WBC    Outcome: Progressing     Problem: SAFETY ADULT  Goal: Patient will remain free of falls  Description: INTERVENTIONS:  - Educate patient/family on patient safety including physical limitations  - Instruct patient to call for assistance with activity   - Consult OT/PT to assist with strengthening/mobility   - Keep Call bell within reach  - Keep bed low and locked with side rails adjusted as appropriate  - Keep care items and personal belongings within reach  - Initiate and maintain comfort rounds  - Make Fall Risk Sign visible to staff  - toileted on demand as patient is continent.   Outcome: Progressing     Problem: DISCHARGE PLANNING  Goal: Discharge to home or other facility with appropriate resources  Description: INTERVENTIONS:  - Identify barriers to discharge w/patient and caregiver  - Arrange for needed discharge resources and transportation as appropriate  - Identify discharge learning needs (meds, wound care, etc.)  - Arrange for interpretive services to assist at discharge as needed  - Refer to Case Management Department for coordinating discharge planning if the patient needs post-hospital services based on physician/advanced practitioner order or complex needs related to functional status, cognitive ability, or social support system  Outcome: Progressing     Problem: Knowledge Deficit  Goal: Patient/family/caregiver demonstrates understanding of disease process, treatment plan, medications, and discharge instructions  Description: Complete learning assessment and assess knowledge base. Interventions:  - Provide teaching at level of understanding  - Provide teaching via preferred learning methods  Outcome: Progressing     Problem: Nutrition/Hydration-ADULT  Goal: Nutrient/Hydration intake appropriate for improving, restoring or maintaining nutritional needs  Description: Monitor and assess patient's nutrition/hydration status for malnutrition. Collaborate with interdisciplinary team and initiate plan and interventions as ordered. Monitor patient's weight and dietary intake as ordered or per policy. Utilize nutrition screening tool and intervene as necessary. Determine patient's food preferences and provide high-protein, high-caloric foods as appropriate.      INTERVENTIONS:  - Monitor oral intake, urinary output, labs, and treatment plans  - Assess nutrition and hydration status and recommend course of action  - Evaluate amount of meals eaten  - Assist patient with eating if necessary   - Allow adequate time for meals  - Recommend/ encourage appropriate diets, oral nutritional supplements, and vitamin/mineral supplements  - Order, calculate, and assess calorie counts as needed  - Recommend, monitor, and adjust tube feedings and TPN/PPN based on assessed needs  - Assess need for intravenous fluids  - Provide specific nutrition/hydration education as appropriate  - Include patient/family/caregiver in decisions related to nutrition  Outcome: Progressing     Problem: Prexisting or High Potential for Compromised Skin Integrity  Goal: Skin integrity is maintained or improved  Description: INTERVENTIONS:  - Identify patients at risk for skin breakdown  - Assess and monitor skin integrity  - Assess and monitor nutrition and hydration status  - Monitor labs   - Assess for incontinence   - Turn and reposition patient  - Assist with mobility/ambulation  - Relieve pressure over bony prominences  - Avoid friction and shearing  - Provide appropriate hygiene as needed including keeping skin clean and dry  - Evaluate need for skin moisturizer/barrier cream  - Collaborate with interdisciplinary team   - Patient/family teaching  - Consider wound care consult   Outcome: Progressing

## 2023-10-15 LAB
ALBUMIN SERPL BCP-MCNC: 3.2 G/DL (ref 3.5–5)
ALP SERPL-CCNC: 71 U/L (ref 34–104)
ALT SERPL W P-5'-P-CCNC: 34 U/L (ref 7–52)
ANION GAP SERPL CALCULATED.3IONS-SCNC: 6 MMOL/L
AST SERPL W P-5'-P-CCNC: 12 U/L (ref 13–39)
BASOPHILS # BLD AUTO: 0.01 THOUSANDS/ÂΜL (ref 0–0.1)
BASOPHILS NFR BLD AUTO: 0 % (ref 0–1)
BILIRUB SERPL-MCNC: 0.52 MG/DL (ref 0.2–1)
BUN SERPL-MCNC: 48 MG/DL (ref 5–25)
CALCIUM ALBUM COR SERPL-MCNC: 9.4 MG/DL (ref 8.3–10.1)
CALCIUM SERPL-MCNC: 8.8 MG/DL (ref 8.4–10.2)
CHLORIDE SERPL-SCNC: 106 MMOL/L (ref 96–108)
CO2 SERPL-SCNC: 29 MMOL/L (ref 21–32)
CREAT SERPL-MCNC: 1.84 MG/DL (ref 0.6–1.3)
EOSINOPHIL # BLD AUTO: 0.01 THOUSAND/ÂΜL (ref 0–0.61)
EOSINOPHIL NFR BLD AUTO: 0 % (ref 0–6)
ERYTHROCYTE [DISTWIDTH] IN BLOOD BY AUTOMATED COUNT: 16.6 % (ref 11.6–15.1)
GFR SERPL CREATININE-BSD FRML MDRD: 34 ML/MIN/1.73SQ M
GLUCOSE SERPL-MCNC: 125 MG/DL (ref 65–140)
GLUCOSE SERPL-MCNC: 222 MG/DL (ref 65–140)
GLUCOSE SERPL-MCNC: 222 MG/DL (ref 65–140)
GLUCOSE SERPL-MCNC: 85 MG/DL (ref 65–140)
GLUCOSE SERPL-MCNC: 97 MG/DL (ref 65–140)
HCT VFR BLD AUTO: 37.7 % (ref 36.5–49.3)
HGB BLD-MCNC: 12.2 G/DL (ref 12–17)
IMM GRANULOCYTES # BLD AUTO: 0.16 THOUSAND/UL (ref 0–0.2)
IMM GRANULOCYTES NFR BLD AUTO: 2 % (ref 0–2)
INR PPP: 1.35 (ref 0.84–1.19)
LYMPHOCYTES # BLD AUTO: 0.65 THOUSANDS/ÂΜL (ref 0.6–4.47)
LYMPHOCYTES NFR BLD AUTO: 6 % (ref 14–44)
MCH RBC QN AUTO: 32.3 PG (ref 26.8–34.3)
MCHC RBC AUTO-ENTMCNC: 32.4 G/DL (ref 31.4–37.4)
MCV RBC AUTO: 100 FL (ref 82–98)
MONOCYTES # BLD AUTO: 1.07 THOUSAND/ÂΜL (ref 0.17–1.22)
MONOCYTES NFR BLD AUTO: 10 % (ref 4–12)
NEUTROPHILS # BLD AUTO: 8.98 THOUSANDS/ÂΜL (ref 1.85–7.62)
NEUTS SEG NFR BLD AUTO: 82 % (ref 43–75)
NRBC BLD AUTO-RTO: 0 /100 WBCS
PLATELET # BLD AUTO: 159 THOUSANDS/UL (ref 149–390)
PMV BLD AUTO: 11.7 FL (ref 8.9–12.7)
POTASSIUM SERPL-SCNC: 4.6 MMOL/L (ref 3.5–5.3)
PROT SERPL-MCNC: 5.7 G/DL (ref 6.4–8.4)
PROTHROMBIN TIME: 17.4 SECONDS (ref 11.6–14.5)
RBC # BLD AUTO: 3.78 MILLION/UL (ref 3.88–5.62)
SODIUM SERPL-SCNC: 141 MMOL/L (ref 135–147)
WBC # BLD AUTO: 10.88 THOUSAND/UL (ref 4.31–10.16)

## 2023-10-15 PROCEDURE — 82948 REAGENT STRIP/BLOOD GLUCOSE: CPT

## 2023-10-15 PROCEDURE — 85025 COMPLETE CBC W/AUTO DIFF WBC: CPT | Performed by: FAMILY MEDICINE

## 2023-10-15 PROCEDURE — 99232 SBSQ HOSP IP/OBS MODERATE 35: CPT | Performed by: FAMILY MEDICINE

## 2023-10-15 PROCEDURE — 85610 PROTHROMBIN TIME: CPT | Performed by: INTERNAL MEDICINE

## 2023-10-15 PROCEDURE — 80053 COMPREHEN METABOLIC PANEL: CPT | Performed by: FAMILY MEDICINE

## 2023-10-15 RX ORDER — PREDNISONE 20 MG/1
20 TABLET ORAL DAILY
Status: DISCONTINUED | OUTPATIENT
Start: 2023-10-15 | End: 2023-10-16 | Stop reason: HOSPADM

## 2023-10-15 RX ORDER — WARFARIN SODIUM 5 MG/1
10 TABLET ORAL
Status: DISCONTINUED | OUTPATIENT
Start: 2023-10-15 | End: 2023-10-16 | Stop reason: HOSPADM

## 2023-10-15 RX ADMIN — TIMOLOL MALEATE 1 DROP: 5 SOLUTION/ DROPS OPHTHALMIC at 08:20

## 2023-10-15 RX ADMIN — WARFARIN SODIUM 10 MG: 5 TABLET ORAL at 17:18

## 2023-10-15 RX ADMIN — BRIMONIDINE TARTRATE 1 DROP: 2 SOLUTION/ DROPS OPHTHALMIC at 17:20

## 2023-10-15 RX ADMIN — FINASTERIDE 5 MG: 5 TABLET, FILM COATED ORAL at 08:18

## 2023-10-15 RX ADMIN — FOLIC ACID 1 MG: 1 TABLET ORAL at 08:18

## 2023-10-15 RX ADMIN — METOPROLOL TARTRATE 100 MG: 50 TABLET, FILM COATED ORAL at 22:33

## 2023-10-15 RX ADMIN — INSULIN GLARGINE 4 UNITS: 100 INJECTION, SOLUTION SUBCUTANEOUS at 22:33

## 2023-10-15 RX ADMIN — PREDNISONE 20 MG: 20 TABLET ORAL at 08:18

## 2023-10-15 RX ADMIN — INSULIN LISPRO 2 UNITS: 100 INJECTION, SOLUTION INTRAVENOUS; SUBCUTANEOUS at 17:20

## 2023-10-15 RX ADMIN — METOPROLOL TARTRATE 100 MG: 50 TABLET, FILM COATED ORAL at 08:18

## 2023-10-15 RX ADMIN — AMIODARONE HYDROCHLORIDE 200 MG: 200 TABLET ORAL at 08:18

## 2023-10-15 RX ADMIN — INSULIN LISPRO 2 UNITS: 100 INJECTION, SOLUTION INTRAVENOUS; SUBCUTANEOUS at 22:35

## 2023-10-15 RX ADMIN — ENOXAPARIN SODIUM 40 MG: 40 INJECTION SUBCUTANEOUS at 08:18

## 2023-10-15 RX ADMIN — INSULIN LISPRO 5 UNITS: 100 INJECTION, SOLUTION INTRAVENOUS; SUBCUTANEOUS at 17:19

## 2023-10-15 RX ADMIN — TRAVOPROST 1 DROP: 0.04 SOLUTION OPHTHALMIC at 22:35

## 2023-10-15 RX ADMIN — PRAVASTATIN SODIUM 20 MG: 20 TABLET ORAL at 17:18

## 2023-10-15 RX ADMIN — AMIODARONE HYDROCHLORIDE 200 MG: 200 TABLET ORAL at 17:18

## 2023-10-15 RX ADMIN — AMIODARONE HYDROCHLORIDE 200 MG: 200 TABLET ORAL at 12:02

## 2023-10-15 RX ADMIN — BRIMONIDINE TARTRATE 1 DROP: 2 SOLUTION/ DROPS OPHTHALMIC at 08:20

## 2023-10-15 NOTE — ASSESSMENT & PLAN NOTE
PMDP checked for appropriateness and prescription refilled.     Present on admission  With associated right lower extremity DVT  Likely provoked by malignancy and new onset Afib with RVR  Eliquis price check > $500/month -patient can follow-up with outpatient hematology oncology for further Eliquis discount program  Lovenox noted to be $251/month    - c/w warfarin bridging, INR 1.35 this am  - continue follow-up with hematology oncology  - Discuss further with palliative team regarding [address] [facility]

## 2023-10-15 NOTE — PROGRESS NOTES
[address]  Progress Note  Name: Sushila Kaur  MRN: [de-identified]  Unit/Bed#: -02 I Date of Admission: 10/3/2023   Date of Service: 10/15/2023 I Hospital Day: 12    Assessment/Plan   * Multiple subsegmental pulmonary emboli without acute cor pulmonale (HCC)  Assessment & Plan  Present on admission  With associated right lower extremity DVT  Likely provoked by malignancy and new onset Afib with RVR  Eliquis price check > $500/month -patient can follow-up with outpatient hematology oncology for further Eliquis discount program  Lovenox noted to be $251/month    - c/w warfarin bridging, INR 1.35 this am  - continue follow-up with hematology oncology  - Discuss further with palliative team regarding [address] [place]    Atrial fibrillation with RVR ([address])  Assessment & Plan  Pulse: 103    Present on admission  New onset afib with RVR  Unclear etiology, EF is 35% with global hypokinesis   Could be secondary to Afib vs ischemic cardiomyopathy   Goals of care discussion are ongoing, palliative care consulted and has signed off now.    He has been transitioned off IV amiodarone to PO metoprolol  HR remains elevated despite escalation of lopressor- Continue at 100 mg BID  After discussion with cardiology and family, patient is agreeable to being discharged when pleural effusion is stable with current heart rates given patient is not a candidate for cardioversion and ischemia evaluation  Family will follow with palliative care and if patient decompensates rapidly, he will be transitioned to hospice either by coming back to the hospital or palliative care to facilitate     Pleural effusion  Assessment & Plan  Noted to have bilateral pleural effusions; s/p 2 sessions of thoracentesis this hospitalization  Consulted IR for thoracentesis,   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)  Assessment & Plan  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, cM1a) diagnosed on 9/15 s/p 5 cycles of XRT and had been on osimerinib   He had rescan on 8/8 that unfortunately showed increased size of mass  He was recently restarted on carboplastin + pemetrexed; last had cycle about 2 weeks ago  Unfortunately on CT:  "Luminal narrowing in the region of the bifurcation of the bronchus intermedius, which is new since August 2023, possibly secondary to enlarging hilar lymphadenopathy or increasing postradiation fibrosis"  "Airspace consolidations in the right middle and lower lobes, new since August 2023, likely at least in part representing atelectasis, likely secondary to the aforementioned partial bronchial occlusion."  "Significant progression of metastatic disease throughout the left lung, with new enlarged nodules/masses"    - DNR/DNI  - OP fu with oncology    COPD with acute exacerbation [facility])  Assessment & Plan  +with some wheezing  unclear if 2/2 copd vs malignancy  Prednisone taper continued    HILARY (acute kidney injury) [facility])  Assessment & Plan  Present on admission    Recent Labs     10/13/23  1518 10/14/23  0623 10/15/23  0603   CREATININE 1.85* 1.70* 1.84*   EGFR 34 38 34       Estimated Creatinine Clearance: 33.6 mL/min (A) (by C-G formula based on SCr of 1.84 mg/dL (H)).     Baseline 1.0-1.1  Unclear etiology, possibly volume overload from pleural effusion vs hypoperfusion in setting of Afib/CHF  S/p multiple episodes of thoracentesis this admission  Creatinine plateaued around 1.8 currently  Monitor closely    Anemia  Assessment & Plan  Recent Labs     10/13/23  1518 10/14/23  0623 10/15/23  0603   HGB 12.1 11.6* 12.2       Hemoglobin stabilized, 12.2 this am  Does not report any active signs of bleeding  Likely multifactorial in setting of malignancy, iron deficiency and chronic disease  On coumadin, monitor closely    Abnormal CT of the abdomen  Assessment & Plan  Diffuse metastatic disease, refer to CT imaging reports  Goals of care discussion completed with patient's wife and daughters; to be ongoing throughout admission  As of right now, DNR/DNI level 3 (not pursuing hospice as of now)  Guarded/poor prognosis    Controlled type 2 diabetes mellitus without complication, without long-term current use of insulin [facility])  Assessment & Plan  Lab Results   Component Value Date    HGBA1C 5.1 2023       Recent Labs     10/14/23  1557 10/14/23  2103 10/15/23  0742 10/15/23  1044   POCGLU 158* 156* 85 125         Blood Sugar Average: Last 72 hrs:  (P) 143.0521399006298531   Plan:  Hold home regimen  Diet Consistent CHO Level 2 (5 CHO servings/75g CHO per meal)  Insulin regimen  Humalog 5 units TID AC  Lantus 4 units HS  Glucose checks and Insulin correction ACHS  Monitor for hypoglycemia and treat per protocol         VTE Pharmacologic Prophylaxis:   Pharmacologic: Warfarin (Coumadin)  Mechanical VTE Prophylaxis in Place: Yes    Patient Centered Rounds: I have performed bedside rounds with nursing staff today. Education and Discussions with Family / Patient: patient    Time Spent for Care: 30 minutes. More than 50% of total time spent on counseling and coordination of care as described above. Current Length of Stay: 12 day(s)    Current Patient Status: Inpatient   Certification Statement: The patient will continue to require additional inpatient hospital stay due to needs therapeutic inr    Discharge Plan: once inr therapeutic    Code Status: Level 3 - DNAR and DNI      Subjective:   No new concerns  Denies any shortness of breath    Objective:     Vitals:   Temp (24hrs), Av °F (36.7 °C), Min:97.6 °F (36.4 °C), Max:98.5 °F (36.9 °C)    Temp:  [97.6 °F (36.4 °C)-98.5 °F (36.9 °C)] 98.3 °F (36.8 °C)  HR:  [103-122] 103  Resp:  [21] 21  BP: (126-145)/(85-94) 145/94  SpO2:  [95 %-98 %] 95 %  Body mass index is 25.72 kg/m².      Input and Output Summary (last 24 hours):     No intake or output data in the 24 hours ending 10/15/23 1152    Physical Exam:     Physical Exam  Constitutional:       General: He is not in acute distress. Appearance: He is normal weight. He is not toxic-appearing. HENT:      Mouth/Throat:      Mouth: Mucous membranes are moist.      Pharynx: Oropharynx is clear. Cardiovascular:      Rate and Rhythm: Tachycardia present. Rhythm irregular. Pulses: Normal pulses. Pulmonary:      Effort: Pulmonary effort is normal.      Breath sounds: Wheezing present. Abdominal:      General: Abdomen is flat. Bowel sounds are normal.      Palpations: Abdomen is soft. Musculoskeletal:      Right lower leg: No edema. Left lower leg: No edema. Neurological:      Mental Status: He is alert and oriented to person, place, and time. Additional Data:     Labs:    Results from last 7 days   Lab Units 10/15/23  0603 10/11/23  0601 10/10/23  0937   WBC Thousand/uL 10.88*   < > 9.93   HEMOGLOBIN g/dL 12.2   < > 11.3*   HEMATOCRIT % 37.7   < > 34.3*   PLATELETS Thousands/uL 159   < > 159   BANDS PCT %  --   --  1   NEUTROS PCT % 82*   < >  --    LYMPHS PCT % 6*   < >  --    LYMPHO PCT %  --   --  10*   MONOS PCT % 10   < >  --    MONO PCT %  --   --  10   EOS PCT % 0   < > 0    < > = values in this interval not displayed.      Results from last 7 days   Lab Units 10/15/23  0603   SODIUM mmol/L 141   POTASSIUM mmol/L 4.6   CHLORIDE mmol/L 106   CO2 mmol/L 29   BUN mg/dL 48*   CREATININE mg/dL 1.84*   ANION GAP mmol/L 6   CALCIUM mg/dL 8.8   ALBUMIN g/dL 3.2*   TOTAL BILIRUBIN mg/dL 0.52   ALK PHOS U/L 71   ALT U/L 34   AST U/L 12*   GLUCOSE RANDOM mg/dL 97     Results from last 7 days   Lab Units 10/15/23  0603   INR  1.35*     Results from last 7 days   Lab Units 10/15/23  1044 10/15/23  0742 10/14/23  2103 10/14/23  1557 10/14/23  1134 10/14/23  0700 10/13/23  2135 10/13/23  1901 10/13/23  1555 10/13/23  1100 10/13/23  0729 10/12/23  2044   POC GLUCOSE mg/dl 125 85 156* 158* 106 75 170* 208* 170* 134 82 264*                 * I Have Reviewed All Lab Data Listed Above. * Additional Pertinent Lab Tests Reviewed: All Labs Within Last 24 Hours Reviewed      Recent Cultures (last 7 days):           Last 24 Hours Medication List:   Current Facility-Administered Medications   Medication Dose Route Frequency Provider Last Rate    amiodarone  200 mg Oral TID With Meals Rosalina Zamarripa PA-C      brimonidine tartrate  1 drop Left Eye BID [place] ZAHRAA Zamarripa      enoxaparin  40 mg Subcutaneous Q24H [address] [facility], DO      finasteride  5 mg Oral Daily [place] ZAHRAA Zamarripa      folic acid  1 mg Oral Daily Sergio Shipley PA-C      insulin glargine  4 Units Subcutaneous HS Sergio Shilpey PA-C      insulin lispro  1-6 Units Subcutaneous TID AC Sergio Shipley PA-C      insulin lispro  1-6 Units Subcutaneous HS Sergio Shipley PA-C      insulin lispro  5 Units Subcutaneous TID With Meals Sergio Shipley PA-C      metoprolol tartrate  100 mg Oral Q12H [address] [facility] ParamesGreen Laneyoel, DO      pravastatin  20 mg Oral Daily With ZAHRAA Jones      predniSONE  20 mg Oral Daily Marciano Lorenzo MD      timolol  1 drop Both Eyes Daily Sergio Shipley PA-C      travoprost  1 drop Both Eyes HS Sergio Shipley PA-C      warfarin  7.5 mg Oral Daily (warfarin) Crystal Owusu MD          Today, Patient Was Seen By: Radha Galvin M.D.     ** Please Note: Dictation voice to text software may have been used in the creation of this document.  **

## 2023-10-15 NOTE — ASSESSMENT & PLAN NOTE
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

## 2023-10-15 NOTE — ASSESSMENT & PLAN NOTE
Lab Results   Component Value Date    HGBA1C 5.1 07/07/2023       Recent Labs     10/14/23  1557 10/14/23  2103 10/15/23  0742 10/15/23  1044   POCGLU 158* 156* 85 125         Blood Sugar Average: Last 72 hrs:  (P) 243.1198674742230657   Plan:  Hold home regimen  Diet Consistent CHO Level 2 (5 CHO servings/75g CHO per meal)  Insulin regimen  Humalog 5 units TID AC  Lantus 4 units HS  Glucose checks and Insulin correction ACHS  Monitor for hypoglycemia and treat per protocol

## 2023-10-15 NOTE — ASSESSMENT & PLAN NOTE
Pulse: 103    Present on admission  New onset afib with RVR  Unclear etiology, EF is 35% with global hypokinesis   Could be secondary to Afib vs ischemic cardiomyopathy   Goals of care discussion are ongoing, palliative care consulted and has signed off now.    He has been transitioned off IV amiodarone to PO metoprolol  HR remains elevated despite escalation of lopressor- Continue at 100 mg BID  After discussion with cardiology and family, patient is agreeable to being discharged when pleural effusion is stable with current heart rates given patient is not a candidate for cardioversion and ischemia evaluation  Family will follow with palliative care and if patient decompensates rapidly, he will be transitioned to hospice either by coming back to the hospital or palliative care to facilitate

## 2023-10-15 NOTE — ASSESSMENT & PLAN NOTE
Present on admission    Recent Labs     10/13/23  1518 10/14/23  0623 10/15/23  0603   CREATININE 1.85* 1.70* 1.84*   EGFR 34 38 34       Estimated Creatinine Clearance: 33.6 mL/min (A) (by C-G formula based on SCr of 1.84 mg/dL (H)).     Baseline 1.0-1.1  Unclear etiology, possibly volume overload from pleural effusion vs hypoperfusion in setting of Afib/CHF  S/p multiple episodes of thoracentesis this admission  Creatinine plateaued around 1.8 currently  Monitor closely

## 2023-10-15 NOTE — ASSESSMENT & PLAN NOTE
Recent Labs     10/13/23  1518 10/14/23  0623 10/15/23  0603   HGB 12.1 11.6* 12.2       Hemoglobin stabilized, 12.2 this am  Does not report any active signs of bleeding  Likely multifactorial in setting of malignancy, iron deficiency and chronic disease  On coumadin, monitor closely

## 2023-10-16 ENCOUNTER — PATIENT OUTREACH (OUTPATIENT)
Dept: FAMILY MEDICINE CLINIC | Facility: CLINIC | Age: 78
End: 2023-10-16

## 2023-10-16 VITALS
SYSTOLIC BLOOD PRESSURE: 130 MMHG | DIASTOLIC BLOOD PRESSURE: 91 MMHG | RESPIRATION RATE: 21 BRPM | HEIGHT: 69 IN | HEART RATE: 98 BPM | TEMPERATURE: 97.9 F | BODY MASS INDEX: 25.6 KG/M2 | WEIGHT: 172.84 LBS | OXYGEN SATURATION: 96 %

## 2023-10-16 DIAGNOSIS — Z71.89 COMPLEX CARE COORDINATION: Primary | ICD-10-CM

## 2023-10-16 DIAGNOSIS — Z78.9 NEEDS ASSISTANCE WITH COMMUNITY RESOURCES: Primary | ICD-10-CM

## 2023-10-16 LAB
ANION GAP SERPL CALCULATED.3IONS-SCNC: 5 MMOL/L
BASOPHILS # BLD AUTO: 0.01 THOUSANDS/ÂΜL (ref 0–0.1)
BASOPHILS NFR BLD AUTO: 0 % (ref 0–1)
BUN SERPL-MCNC: 50 MG/DL (ref 5–25)
CALCIUM SERPL-MCNC: 8.4 MG/DL (ref 8.4–10.2)
CHLORIDE SERPL-SCNC: 105 MMOL/L (ref 96–108)
CO2 SERPL-SCNC: 27 MMOL/L (ref 21–32)
CREAT SERPL-MCNC: 1.68 MG/DL (ref 0.6–1.3)
EOSINOPHIL # BLD AUTO: 0.02 THOUSAND/ÂΜL (ref 0–0.61)
EOSINOPHIL NFR BLD AUTO: 0 % (ref 0–6)
ERYTHROCYTE [DISTWIDTH] IN BLOOD BY AUTOMATED COUNT: 16.5 % (ref 11.6–15.1)
GFR SERPL CREATININE-BSD FRML MDRD: 38 ML/MIN/1.73SQ M
GLUCOSE SERPL-MCNC: 129 MG/DL (ref 65–140)
GLUCOSE SERPL-MCNC: 73 MG/DL (ref 65–140)
GLUCOSE SERPL-MCNC: 84 MG/DL (ref 65–140)
HCT VFR BLD AUTO: 34 % (ref 36.5–49.3)
HGB BLD-MCNC: 11 G/DL (ref 12–17)
IMM GRANULOCYTES # BLD AUTO: 0.12 THOUSAND/UL (ref 0–0.2)
IMM GRANULOCYTES NFR BLD AUTO: 1 % (ref 0–2)
INR PPP: 2.19 (ref 0.84–1.19)
LYMPHOCYTES # BLD AUTO: 0.72 THOUSANDS/ÂΜL (ref 0.6–4.47)
LYMPHOCYTES NFR BLD AUTO: 6 % (ref 14–44)
MCH RBC QN AUTO: 32.1 PG (ref 26.8–34.3)
MCHC RBC AUTO-ENTMCNC: 32.4 G/DL (ref 31.4–37.4)
MCV RBC AUTO: 99 FL (ref 82–98)
MONOCYTES # BLD AUTO: 1.15 THOUSAND/ÂΜL (ref 0.17–1.22)
MONOCYTES NFR BLD AUTO: 10 % (ref 4–12)
NEUTROPHILS # BLD AUTO: 9.65 THOUSANDS/ÂΜL (ref 1.85–7.62)
NEUTS SEG NFR BLD AUTO: 83 % (ref 43–75)
NRBC BLD AUTO-RTO: 0 /100 WBCS
PLATELET # BLD AUTO: 153 THOUSANDS/UL (ref 149–390)
PMV BLD AUTO: 12.1 FL (ref 8.9–12.7)
POTASSIUM SERPL-SCNC: 4.2 MMOL/L (ref 3.5–5.3)
PROTHROMBIN TIME: 25.3 SECONDS (ref 11.6–14.5)
RBC # BLD AUTO: 3.43 MILLION/UL (ref 3.88–5.62)
SODIUM SERPL-SCNC: 137 MMOL/L (ref 135–147)
WBC # BLD AUTO: 11.67 THOUSAND/UL (ref 4.31–10.16)

## 2023-10-16 PROCEDURE — 82948 REAGENT STRIP/BLOOD GLUCOSE: CPT

## 2023-10-16 PROCEDURE — 80048 BASIC METABOLIC PNL TOTAL CA: CPT | Performed by: FAMILY MEDICINE

## 2023-10-16 PROCEDURE — 85025 COMPLETE CBC W/AUTO DIFF WBC: CPT | Performed by: FAMILY MEDICINE

## 2023-10-16 PROCEDURE — 99239 HOSP IP/OBS DSCHRG MGMT >30: CPT | Performed by: FAMILY MEDICINE

## 2023-10-16 PROCEDURE — 85610 PROTHROMBIN TIME: CPT | Performed by: INTERNAL MEDICINE

## 2023-10-16 PROCEDURE — 99232 SBSQ HOSP IP/OBS MODERATE 35: CPT | Performed by: NURSE PRACTITIONER

## 2023-10-16 RX ORDER — AMIODARONE HYDROCHLORIDE 200 MG/1
200 TABLET ORAL
Qty: 90 TABLET | Refills: 0 | Status: SHIPPED | OUTPATIENT
Start: 2023-10-16 | End: 2023-11-15

## 2023-10-16 RX ORDER — PREDNISONE 20 MG/1
TABLET ORAL
Qty: 3 TABLET | Refills: 0 | Status: SHIPPED | OUTPATIENT
Start: 2023-10-17 | End: 2023-10-21

## 2023-10-16 RX ORDER — METOPROLOL TARTRATE 100 MG/1
100 TABLET ORAL EVERY 12 HOURS SCHEDULED
Qty: 60 TABLET | Refills: 0 | Status: SHIPPED | OUTPATIENT
Start: 2023-10-16 | End: 2023-11-15

## 2023-10-16 RX ORDER — WARFARIN SODIUM 5 MG/1
5 TABLET ORAL
Qty: 15 TABLET | Refills: 0 | Status: SHIPPED | OUTPATIENT
Start: 2023-10-16 | End: 2023-10-31

## 2023-10-16 RX ADMIN — FOLIC ACID 1 MG: 1 TABLET ORAL at 08:17

## 2023-10-16 RX ADMIN — TIMOLOL MALEATE 1 DROP: 5 SOLUTION/ DROPS OPHTHALMIC at 08:18

## 2023-10-16 RX ADMIN — METOPROLOL TARTRATE 100 MG: 50 TABLET, FILM COATED ORAL at 08:17

## 2023-10-16 RX ADMIN — AMIODARONE HYDROCHLORIDE 200 MG: 200 TABLET ORAL at 08:17

## 2023-10-16 RX ADMIN — FINASTERIDE 5 MG: 5 TABLET, FILM COATED ORAL at 08:17

## 2023-10-16 RX ADMIN — BRIMONIDINE TARTRATE 1 DROP: 2 SOLUTION/ DROPS OPHTHALMIC at 08:18

## 2023-10-16 RX ADMIN — PREDNISONE 20 MG: 20 TABLET ORAL at 08:20

## 2023-10-16 NOTE — PLAN OF CARE
Problem: PAIN - ADULT  Goal: Verbalizes/displays adequate comfort level or baseline comfort level  Description: Interventions:  - Encourage patient to monitor pain and request assistance  - Assess pain using appropriate pain scale  - Administer analgesics based on type and severity of pain and evaluate response  - Implement non-pharmacological measures as appropriate and evaluate response  - Consider cultural and social influences on pain and pain management  - Notify physician/advanced practitioner if interventions unsuccessful or patient reports new pain  Outcome: Progressing     Problem: INFECTION - ADULT  Goal: Absence or prevention of progression during hospitalization  Description: INTERVENTIONS:  - Assess and monitor for signs and symptoms of infection  - Monitor lab/diagnostic results  - Monitor all insertion sites, i.e. indwelling lines, tubes, and drains  - [place] appropriate cooling/warming therapies per order  - Administer medications as ordered  - Instruct and encourage patient and family to use good hand hygiene technique  - Identify and instruct in appropriate isolation precautions for identified infection/condition  Outcome: Progressing  Goal: Absence of fever/infection during neutropenic period  Description: INTERVENTIONS:  - Monitor WBC    Outcome: Progressing     Problem: Knowledge Deficit  Goal: Patient/family/caregiver demonstrates understanding of disease process, treatment plan, medications, and discharge instructions  Description: Complete learning assessment and assess knowledge base. Interventions:  - Provide teaching at level of understanding  - Provide teaching via preferred learning methods  Outcome: Progressing     Problem: Knowledge Deficit  Goal: Patient/family/caregiver demonstrates understanding of disease process, treatment plan, medications, and discharge instructions  Description: Complete learning assessment and assess knowledge base.   Interventions:  - Provide teaching at level of understanding  - Provide teaching via preferred learning methods  Outcome: Progressing     Problem: Nutrition/Hydration-ADULT  Goal: Nutrient/Hydration intake appropriate for improving, restoring or maintaining nutritional needs  Description: Monitor and assess patient's nutrition/hydration status for malnutrition. Collaborate with interdisciplinary team and initiate plan and interventions as ordered. Monitor patient's weight and dietary intake as ordered or per policy. Utilize nutrition screening tool and intervene as necessary. Determine patient's food preferences and provide high-protein, high-caloric foods as appropriate.      INTERVENTIONS:  - Monitor oral intake, urinary output, labs, and treatment plans  - Assess nutrition and hydration status and recommend course of action  - Evaluate amount of meals eaten  - Assist patient with eating if necessary   - Allow adequate time for meals  - Recommend/ encourage appropriate diets, oral nutritional supplements, and vitamin/mineral supplements  - Order, calculate, and assess calorie counts as needed  - Recommend, monitor, and adjust tube feedings and TPN/PPN based on assessed needs  - Assess need for intravenous fluids  - Provide specific nutrition/hydration education as appropriate  - Include patient/family/caregiver in decisions related to nutrition  Outcome: Progressing     Problem: Prexisting or High Potential for Compromised Skin Integrity  Goal: Skin integrity is maintained or improved  Description: INTERVENTIONS:  - Identify patients at risk for skin breakdown  - Assess and monitor skin integrity  - Assess and monitor nutrition and hydration status  - Monitor labs   - Assess for incontinence   - Turn and reposition patient  - Assist with mobility/ambulation  - Relieve pressure over bony prominences  - Avoid friction and shearing  - Provide appropriate hygiene as needed including keeping skin clean and dry  - Evaluate need for skin moisturizer/barrier cream  - Collaborate with interdisciplinary team   - Patient/family teaching  - Consider wound care consult   Outcome: Progressing

## 2023-10-16 NOTE — ASSESSMENT & PLAN NOTE
+with some wheezing  unclear if 2/2 copd vs malignancy  Prednisone taper continued on DC.  Complete at home

## 2023-10-16 NOTE — PROGRESS NOTES
Progress Note - Palliative & Supportive Care  Tani Macdonald  69 y.o.  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 Goals:  Level 3 code status  Disease focused care. Concerns introduced today include: Will continue discussions regarding [facility] as patient's clinical presentation evolves. Encouraged follow up with Palliative Medicine on an outpatient basis after discharge for continued symptom management. Our office will contact patient to schedule a hospital follow up. 3.  Social support:  Supportive listening provided  Normalized experience of patient/family  Provided anxiety containment  Provided anticipatory guidance      4. Follow up  Palliative Care will continue to follow outpatient and goals of care discussions will be ongoing. 5. Care Coordination  Reviewed case with Saskia HASTINGS      24 Hour History  Chart reviewed before visit. Patient was seen in his room with his daughter Jennifer Lorenzana present. He is  awake and alert, no complaints offered. He states he is happy he is going home today. Denies pain, chest pain, shortness of breath or cough. No nausea or vomiting. Review of Systems   Constitutional:  Negative for fatigue. Respiratory:  Negative for cough and shortness of breath. Cardiovascular:  Negative for chest pain. Gastrointestinal:  Negative for constipation, diarrhea, nausea and vomiting. Neurological:  Negative for weakness.          Medications    Current Facility-Administered Medications:     amiodarone tablet 200 mg, 200 mg, Oral, TID With Meals, Olga Spear PA-C, 200 mg at 10/16/23 0817    brimonidine tartrate 0.2 % ophthalmic solution 1 drop, 1 drop, Left Eye, BID, Olga Spear PA-C, 1 drop at 10/16/23 0818    finasteride (PROSCAR) tablet 5 mg, 5 mg, Oral, Daily, Olga Spear PA-C, 5 mg at 60/07/96 2708    folic acid (FOLVITE) tablet 1 mg, 1 mg, Oral, Daily, Olga Spear PA-C, 1 mg at 10/16/23 0817    insulin glargine (LANTUS) subcutaneous injection 4 Units 0.04 mL, 4 Units, Subcutaneous, HS, Olga Spear PA-C, 4 Units at 10/15/23 2233    insulin lispro (HumaLOG) 100 units/mL subcutaneous injection 1-6 Units, 1-6 Units, Subcutaneous, TID AC, 2 Units at 10/15/23 1720 **AND** Fingerstick Glucose (POCT), , , TID AC, Sergio Shipley PA-C    insulin lispro (HumaLOG) 100 units/mL subcutaneous injection 1-6 Units, 1-6 Units, Subcutaneous, HS, Olga Spear PA-C, 2 Units at 10/15/23 2235    insulin lispro (HumaLOG) 100 units/mL subcutaneous injection 5 Units, 5 Units, Subcutaneous, TID With Meals, Olga Spear PA-C, 5 Units at 10/15/23 1719    metoprolol tartrate (LOPRESSOR) tablet 100 mg, 100 mg, Oral, Q12H [facility], Luís Liz DO, 100 mg at 10/16/23 0817    pravastatin (PRAVACHOL) tablet 20 mg, 20 mg, Oral, Daily With Dinner, Olga Spear PA-C, 20 mg at 10/15/23 1718    predniSONE tablet 20 mg, 20 mg, Oral, Daily, Tobias Fiore MD, 20 mg at 10/16/23 0820    timolol (TIMOPTIC) 0.5 % ophthalmic solution 1 drop, 1 drop, Both Eyes, Daily, Sergio Shipley PA-C, 1 drop at 10/16/23 0818    travoprost (TRAVATAN-Z) 0.004 % ophthalmic solution 1 drop, 1 drop, Both Eyes, HS, Sergio Shipley PA-C, 1 drop at 10/15/23 2235    warfarin (COUMADIN) tablet 10 mg, 10 mg, Oral, Daily (warfarin), Tobias Fiore MD, 10 mg at 10/15/23 1718    Objective  /91   Pulse 98   Temp 97.9 °F (36.6 °C)   Resp 21   Ht 5' 9" (1.753 m)   Wt 78.4 kg (172 lb 13.5 oz)   SpO2 96%   BMI 25.52 kg/m²   Physical Exam  Constitutional:       General: He is not in acute distress. Appearance: He is not ill-appearing. Cardiovascular:      Rate and Rhythm: Tachycardia present. Pulmonary:      Effort: Pulmonary effort is normal.   Skin:     General: Skin is warm and dry. Neurological:      Mental Status: He is alert and oriented to person, place, and time. Psychiatric:         Mood and Affect: Mood normal.           Lab Results: CBC:   Lab Results   Component Value Date    WBC 11.67 (H) 10/16/2023    HGB 11.0 (L) 10/16/2023    HCT 34.0 (L) 10/16/2023    MCV 99 (H) 10/16/2023     10/16/2023    RBC 3.43 (L) 10/16/2023    MCH 32.1 10/16/2023    MCHC 32.4 10/16/2023    RDW 16.5 (H) 10/16/2023    MPV 12.1 10/16/2023    NRBC 0 10/16/2023   , CMP:   Lab Results   Component Value Date    SODIUM 137 10/16/2023    K 4.2 10/16/2023     10/16/2023    CO2 27 10/16/2023    BUN 50 (H) 10/16/2023    CREATININE 1.68 (H) 10/16/2023    CALCIUM 8.4 10/16/2023    EGFR 38 10/16/2023     Imaging Studies: I have personally reviewed pertinent reports. IR thoracentesis    Result Date: 10/13/2023  Narrative: Ultrasound-guided thoracentesis Clinical History: Symptomatic pleural effusion. Technique: The patient was brought to the interventional radiology area and placed in the sitting position on the side of a stretcher. The Right chest was then examined with ultrasound and the skin was marked. The skin was then prepped, and draped in usual sterile fashion. Lidocaine was administered to the skin. Under direct ultrasound guidance, a 5 [place] Yueh multisidehole catheter was advanced into the pleural space. 2000 mL clear, yellow right pleural fluid was aspirated. A specimen was sent for cytology. The patient tolerated the procedure well and suffered no complications.      Impression: Impression: Ultrasound-guided thoracentesis yielding 2000 mL of clear, yellow right pleural fluid. Signed, performed, and dictated by RADHA Collins under the supervision of Dr. Renee Sahu. Workstation performed: [id number]     XR chest portable    Result Date: 10/12/2023  Narrative: CHEST INDICATION:   assess pleural effusion. COMPARISON: 10/09/2023 EXAM PERFORMED/VIEWS:  XR CHEST PORTABLE 1 image FINDINGS: Cardiomediastinal silhouette appears enlarged. Right larger than left pleural effusions. There is known metastatic disease to the lungs. The similar to the most recent study. Osseous structures appear within normal limits for patient age. Impression: Right greater than left pleural effusions which are stable. Workstation performed: [id number]     XR chest portable    Result Date: 10/9/2023  Narrative: CHEST INDICATION:   evaluate for reaccumulation of effusion. COMPARISON: Chest radiograph 10/7/2023. EXAM PERFORMED/VIEWS:  XR CHEST PORTABLE FINDINGS: Unchanged mild cardiomegaly. At least moderate sized right pleural effusion is slightly increased since October 7, 2023. Small-to-moderate size left pleural effusion is not significantly changed. No pneumothorax. Unchanged right upper lobe pulmonary mass. Osseous structures appear within normal limits for patient age. Impression: Right pleural effusion is slightly increased in size; the left pleural effusion is not significantly changed. Resident: Heidy Benoit, the attending radiologist, have reviewed the images and agree with the final report above. Workstation performed: [id number]     XR chest portable ICU    Result Date: 10/7/2023  Narrative: CHEST INDICATION:   shortness of breath. COMPARISON: CXR and chest CT 10/03/2023. EXAM PERFORMED/VIEWS:  XR CHEST PORTABLE ICU. FINDINGS: Mild cardiomegaly. Bilateral nodules, largest in the right upper lobe. Decrease in right effusion after thoracentesis. Right base atelectasis. Persistent small left effusion. No pneumothorax.  Upper abdomen normal. Bones normal for age. Impression: Moderate right and small left effusion with no pneumothorax. Bilateral lung nodules corresponding with the CT. Workstation performed: [id number]     IR IN-Patient Thoracentesis    Result Date: 10/6/2023  Narrative: Ultrasound-guided thoracentesis Clinical History: Pulmonary emboli, atrial fibrillation with rapid ventricular response, symptomatic pleural effusion. Technique: The patient was brought to the interventional radiology area and placed in the sitting position on the side of a stretcher. The Right chest was then examined with ultrasound and the skin was marked. The skin was then prepped, and draped in usual sterile fashion. Lidocaine was administered to the skin. Under direct ultrasound guidance, a 5 [place] Yueh multisidehole catheter was advanced into the pleural space. 1960 mL clear, yellow right pleural fluid was aspirated. Multiple specimens were sent to the lab. The patient tolerated the procedure well and suffered no complications. Impression: Impression: Ultrasound-guided thoracentesis yielding 1960 mL clear, yellow right pleural fluid. Signed, performed, and dictated by RADHA Jarvis under the supervision of Dr. Negro Ford. Workstation performed: [id number]     Echo complete w/ contrast if indicated    Addendum Date: 10/4/2023 Addendum:     Left Ventricle: Left ventricular cavity size is normal. Wall thickness is mildly increased. The left ventricular ejection fraction is 35%. When compared to previous echocardiogram from 2022, left ventricular ejection fraction is significantly reduced. There is severe global hypokinesis. Unable to assess diastolic function due to atrial fibrillation. Tachycardia limits interpretation. Right Ventricle: Right ventricular cavity size is mildly dilated. Systolic function is mildly reduced. Left Atrium: The atrium is moderately dilated. Right Atrium: The atrium is mildly dilated. Aortic Valve:  There is mild to moderate regurgitation. Mitral Valve: There is mild annular calcification. There is moderate regurgitation. Tricuspid Valve: There is mild regurgitation. Estimated RVSP 35 mm Hg. Aorta: The aortic root is mildly dilated. IVC/SVC: The inferior vena cava is dilated. Pericardium: There is a small pericardial effusion. Result Date: 10/4/2023  Narrative:   Left Ventricle: Left ventricular cavity size is normal. Wall thickness is mildly increased. The left ventricular ejection fraction is 35%. When compared to previous echocardiogram from 2022, left ventricular ejection fraction is significantly reduced. There is severe global hypokinesis. Unable to assess diastolic function due to atrial fibrillation. Tachycardia limits interpretation. Right Ventricle: Right ventricular cavity size is mildly dilated. Systolic function is mildly reduced. Left Atrium: The atrium is moderately dilated. Right Atrium: The atrium is mildly dilated. Aortic Valve: There is mild to moderate regurgitation. Mitral Valve: There is mild annular calcification. There is moderate regurgitation. Tricuspid Valve: There is mild regurgitation. Estimated RVSP 35 mm Hg. IVC/SVC: The inferior vena cava is dilated. Pericardium: There is a small pericardial effusion. PE Study with CT Abdomen and Pelvis with contrast    Result Date: 10/3/2023  Narrative: CT PULMONARY ANGIOGRAM OF THE CHEST AND CT ABDOMEN AND PELVIS WITH INTRAVENOUS CONTRAST INDICATION:   dyspnea.  As per review of electronic medical record, patient with history of prostate cancer in 2005 status post radiation therapy, stage IV adenocarcinoma of the lung initially diagnosed in September 2022 status post SBRT on Osimertinib and just started chemotherapy, bilateral pulmonary emboli on Xarelto COMPARISON: CT of the chest, abdomen and pelvis from 8/8/2023, 4/11/2023, and 9/12/2022 PET/CT from 9/30/2022, CT of the abdomen and pelvis from 6/5/2006, and abdominal MRI from 8/21/2013. TECHNIQUE:  CT examination of the chest, abdomen and pelvis was performed. Thin section CT angiographic technique was used in the chest in order to evaluate for pulmonary embolus and coronal 3D MIP postprocessing was performed on the acquisition scanner. Multiplanar 2D reformatted images were created from the source data. This examination, like all CT scans performed in the [facility], was performed utilizing techniques to minimize radiation dose exposure, including the use of iterative reconstruction and automated exposure control. Radiation dose length product (DLP) for this visit:  1028 mGy-cm IV Contrast:  100 mL of iohexol (OMNIPAQUE) Enteric Contrast:  Enteric contrast was not administered. FINDINGS: CHEST PULMONARY ARTERIAL TREE: Acute pulmonary emboli are seen in the distal left pulmonary artery extending into the left lower lobar and some of the segmental and subsegmental pulmonary arteries. Segmental and subsegmental pulmonary emboli are seen in the lingula. Main pulmonary artery dilated up to 3.7 cm, suggestive of pulmonary arterial hypertension. BRONCHOPULMONARY: There is luminal narrowing in the region of the bifurcation of the bronchus intermedius, which is new since August 2023. This may be secondary to enlarging hilar lymphadenopathy. Otherwise clear central airways. There is a partially enhancing airspace consolidation in the right lower lobe and the inferior portion of the right middle lobe, new since 8/8/2023 likely in part representing atelectasis. Some of the previously seen nodules and opacities in the right middle and lower lobes are obscured. The spiculated mass in the right upper lobe medially measures 3.2 x 3.6 x 2.6 cm (CC x AP x transverse) (series 601 image 72 and series 3 image 90), compared to 3.8 x 3.9 x 2.3 cm in August 2023. There has been interval development of nodules and opacities in the left lung.  For instance some representative nodules/opacities are as follows: - 4 mm left upper lobe nodule (series 3 image 103), new. - 8 mm left lower lobe nodule (series 3 image 160) previously 3 mm. -9 mm right lower lobe nodule medially (series 3 image 169), previously 3 mm. - 1.2 cm left lower lobe nodule abutting the major fissure (series 3 image 192), previously 5 mm. - 2 left lower lobe nodules measuring 1.2 cm and 1.1 cm (series 3 image 219), new. -2.9 x 1.7 cm mass in the left lower lobe laterally (series 3 image 220), previously 6 mm. -7 mm lingular nodule (series 3 image 212), new. PLEURA: Interval development of a small to moderate left pleural effusion and moderate to large right pleural effusion since August 2023. There is likely some loculation of the right apex. No pleural enhancement or air in the pleural space to suggest empyema. No pneumothorax. HEART/GREAT VESSELS: The heart is mildly enlarged. No pericardial effusion. Although the RV/LV ratio is normal, there is reflux of IV contrast into the dilated IVC and hepatic veins, which may indicate right heart failure. Normal caliber thoracic aorta. MEDIASTINUM/LYMPH NODES: Several enlarged mediastinal lymph nodes are overall similar to August 2023, measuring 1.9 cm in the subcarinal region and 1.3 cm in the left lower paratracheal region. Evaluation for hilar lymphadenopathy is limited by the phase of contrast. The right hilum remains prominent, however discrete lymph nodes are difficult to measure. No convincing left hilar lymphadenopathy. No axillary lymphadenopathy. CHEST WALL AND LOWER NECK: Mild bilateral gynecomastia. ABDOMEN LIVER/BILIARY TREE: The liver is enlarged, measuring 18.6 cm in greatest craniocaudad dimension. Stable liver morphology with relative atrophy of the left hepatic lobe. No focal hepatic lesions. No biliary ductal dilation. GALLBLADDER: A gallstone is seen within the gallbladder. No pericholecystic inflammatory change. SPLEEN: Unremarkable.  PANCREAS: There is mild fat stranding adjacent to the head of the pancreas and the gallbladder. The rest of the pancreas is within normal limits. No dilation of the main pancreatic duct. ADRENAL GLANDS:  Unremarkable. KIDNEYS/URETERS: The left kidney is rotated and displaced into the pelvis. 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 Symmetric renal enhancement. No intrarenal or ureteral calculi. No hydronephrosis. There is a simple cyst in the right kidney. Several subcentimeter hypodense lesions are seen in both kidneys which cannot be accurately assessed without IV contrast, however statistically likely represent additional cysts. STOMACH AND BOWEL:  Evaluation of the gastrointestinal tract is somewhat limited by underdistention and lack of oral contrast. No bowel obstruction or convincing inflammation. APPENDIX: Normal appendix. ABDOMINOPELVIC CAVITY:  No pneumoperitoneum. Small amount of free fluid in the pelvis. No evidence of abscess. LYMPH NODES: No abdominal or pelvic lymphadenopathy. VESSELS: Normal caliber aorta. Patent major branch vessels. Scattered atherosclerotic calcifications in the abdominal aorta and its major branches. PELVIS REPRODUCTIVE ORGANS: Normal prostate. URINARY BLADDER: Mild circumferential wall thickening of the urinary bladder. ABDOMINAL WALL/INGUINAL REGIONS: No ventral abdominal wall hernia. MUSCULOSKELETAL:  No focal aggressive osseous lesions. Several subcentimeter densely sclerotic lesions are seen in the iliac bones, the sacrum, and the left 6th rib. These are stable since September 2022, possibly representing bone islands. Degenerative changes of the spine. Impression: CTA CHEST: 1) Acute pulmonary emboli in the left distal pulmonary artery extending into the left lower lobar, and some of the segmental and subsegmental pulmonary arteries with additional segmental and subsegmental pulmonary emboli in the lingula.  This was discussed with Dr. Gloria Hernández via HIPAA compliant secure electronic messaging on 10/3/2023 at 6:35 PM with confirmation of receipt. 2) Cardiomegaly. Although the RV/LV ratio is normal, reflux of IV contrast into the dilated IVC and hepatic veins may suggest right heart failure. 3) Main pulmonary artery dilated up to 3.7 cm, suggestive of pulmonary arterial hypertension. 4) Moderate to large right pleural effusion with likely loculation at the apex, and small to moderate left pleural effusion, new since August 2023. No evidence of empyema. 5) Luminal narrowing in the region of the bifurcation of the bronchus intermedius, which is new since August 2023, possibly secondary to enlarging hilar lymphadenopathy or increasing postradiation fibrosis. 6) Airspace consolidations in the right middle and lower lobes, new since August 2023, likely at least in part representing atelectasis, likely secondary to the aforementioned partial bronchial occlusion. Some of the previously seen right middle and lower lobe nodules/opacities are obscured. 7) Right upper lobe mass overall stable to mildly enlarged since August 2023. 8) Significant progression of metastatic disease throughout the left lung, with new enlarged nodules/masses, as detailed above. 9) Overall similar mediastinal lymphadenopathy compared to August 2023. Right hilum remains prominent, however discrete lymph nodes difficult to measure due to the phase of contrast. CT ABDOMEN/PELVIS: 10) Mild fat stranding adjacent to head of the pancreas and the osbaldo hepatis/gallbladder. Recommend correlation with lipase level to exclude pancreatitis. No findings to suggest acute cholecystitis. No organized collections. 11) Mild circumferential wall thickening of the urinary bladder. Recommend correlation with urinalysis to exclude cystitis. 12) No other acute abdominal or pelvic pathology. 13) Additional findings as above.  Workstation performed: [id number]     VAS lower limb venous duplex study, unilateral/limited    Result Date: 10/3/2023  Narrative: [facility] REPORT CLINICAL: Indications: Patient presents with right lower extremity edema x one month. Operative History: no cardiovascular surgeries Risk Factors The patient has history of HTN, Diabetes (NIDDM (oral meds)) and Hyperlipidemia. FINDINGS:  Right          Impression              Thrombus           FV Prox        Occlusive Subsegmental  Acute - Occlusive  FV Mid         Occlusive Subsegmental  Acute - Occlusive  FV Dist        Occlusive Subsegmental  Acute - Occlusive  PFV            Occlusive Subsegmental  Acute - Occlusive  Popliteal      Occlusive Subsegmental  Acute - Occlusive  PostTibial     Occlusive Subsegmental  Acute - Occlusive  Peroneal       Occlusive Subsegmental  Acute - Occlusive  Gastrocnemius  Occlusive Subsegmental  Acute - Occlusive     CONCLUSION: Impression: RIGHT LOWER LIMB Evidence suggestive of Acute occlusive deep vein thrombosis noted in the Profunda, proximal-distal femoral, popliteal, gastrocnemius, posterior tibial, and peroneal veins. No evidence of superficial thrombophlebitis noted. Popliteal and posterior tibial arterial Doppler waveform's are triphasic. LEFT LOWER LIMB LIMITED Evaluation shows no evidence of thrombus in the common femoral vein. Doppler evaluation shows a normal response to augmentation maneuvers. Tech Note: There is an echogenic structure located in the Right inguinal region measuring approximately 1.7 x 0.8 x 3.6 cm suggestive of enlarged lymphatic channels. Technical findings were given to [facility] on the floor. SIGNATURE: Electronically Signed by: Natalya Abrams on 2023-10-03 05:17:43 PM    XR chest 1 view portable    Result Date: 10/3/2023  Narrative: CHEST INDICATION:   sob. COMPARISON: Chest CT 8/8/2023, CXR 9/10/2022 and 10/14/2022. EXAM PERFORMED/VIEWS:  XR CHEST PORTABLE. FINDINGS: Mild cardiomegaly. Moderate right and small left pleural effusion with right base atelectasis, new since August 2023.  Redemonstration of right upper lobe nodule. No pneumothorax. Upper abdomen normal. Bones normal for age. Impression: Moderate right and small left pleural effusion with right base atelectasis, new since August 2023. Right base pneumonia not excluded in the appropriate clinical setting. Workstation performed: [id number]      EKG, Pathology, and Other Studies: I have personally reviewed pertinent reports. Counseling / Coordination of Care  Total floor / unit time spent today 30 minutes. Greater than 50% of total time was spent with the patient and / or family counseling and / or coordinating of care. A description of the counseling / coordination of care: Chart reviewed,  provided medical updates, determined goals of care, discussed palliative care and symptom management, provided anticipatory guidance, determined competency and POA/HCA, determined social/family support, provided psychosocial support. Selam Castro, TAL, CRNP  Palliative & Supportive Care    Portions of this document may have been created using dictation software and as such some "sound alike" terms may have been generated by the system. Do not hesitate to contact me with any questions or clarifications.

## 2023-10-16 NOTE — CASE MANAGEMENT
Case Management Discharge Planning Note    Patient name Trisha Olmos  Location /-81 MRN [id number]  : 1945 Date 10/16/2023       Current Admission Date: 10/3/2023  Current Admission Diagnosis:Multiple subsegmental pulmonary emboli without acute cor pulmonale [facility])   Patient Active Problem List    Diagnosis Date Noted    COPD with acute exacerbation ([address]) 10/05/2023    Atrial fibrillation with RVR ([address]) 10/03/2023    HILARY (acute kidney injury) ([address]) 10/03/2023    Abnormal CT of the abdomen 10/03/2023    Platelets decreased ([address]) 2023    Multiple lung nodules on CT 10/06/2022    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 ([address]) 10/06/2022    Pleural effusion 10/06/2022    Malignant neoplasm of upper lobe of right lung [facility])     Multiple subsegmental pulmonary emboli without acute cor pulmonale ([address]) 09/10/2022    Pulmonary mass 09/10/2022    Anemia 09/10/2022    Non-recurrent bilateral inguinal hernia without obstruction or gangrene 2021    Status post right tibial-talar ankle fusion 2020    BPH associated with nocturia 2019    Arthritis of right acromioclavicular joint 03/15/2019    Primary osteoarthritis of right shoulder 2019    Chronic left shoulder pain 2019    Left rotator cuff tear arthropathy 2019    Rotator cuff injury, right, initial encounter 2019    History of colon polyps 2019    Hyperparathyroidism ([address]) 2018    Hypokalemia 10/22/2018    Hypocalcemia 10/22/2018    Glaucoma 10/11/2018    Controlled type 2 diabetes mellitus without complication, without long-term current use of insulin ([address]) 2016    Inguinal hernia 2016    Benign essential HTN 2016    Hypercholesterolemia 2016      LOS (days): 13  Geometric Mean LOS (GMLOS) (days): 4.00  Days to GMLOS:-8.6     OBJECTIVE:  Risk of Unplanned Readmission Score: 28.87         Current admission status: Inpatient   Preferred Pharmacy: [facility]/pharmacy #[id number] TAMIA [address] [address] [address] [address] [address] [facility] [address]  Phone: [phone] Fax: 12 [address], [address] [facility] 1 [address] [facility] [address]  Phone: [phone] Fax: [phone]    [facility] [address]  Phone: [phone] Fax: [phone]    Primary Care Provider: Zohra Klein PA-C    Primary Insurance: MEDICARE  Secondary Insurance: Gilda Jacome    DISCHARGE DETAILS:    Other Referral/Resources/Interventions Provided:  Interventions: PCP, Outpatient   Referral Comments: CM sent an in basket message to patient's PCP office requesting a [facility] appointment due to patient's yellow URR score of 29. CM also sent a referral to the OP CM team for the same reason.

## 2023-10-16 NOTE — ASSESSMENT & PLAN NOTE
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 Patient educated regarding the same.

## 2023-10-16 NOTE — DISCHARGE INSTR - AVS FIRST PAGE
Pls fu with your oncologist in 1 week  Pls fu with your pcp in 1 week  Pls get INR level checked on 10/18/23 (goal 2-3).  Pls call pcp with results to further adjust your coumadin dosing  Pls call pcp or come to ER if worsening shortness of breath

## 2023-10-16 NOTE — ASSESSMENT & PLAN NOTE
Lab Results   Component Value Date    HGBA1C 5.1 07/07/2023       Recent Labs     10/15/23  1531 10/15/23  2050 10/16/23  0723 10/16/23  0913   POCGLU 222* 222* 73 129         Blood Sugar Average: Last 72 hrs:  (P) 141   Plan:  Resume metformin on DC

## 2023-10-16 NOTE — ASSESSMENT & PLAN NOTE
Present on admission    Recent Labs     10/14/23  0623 10/15/23  0603 10/16/23  0449   CREATININE 1.70* 1.84* 1.68*   EGFR 38 34 38       Estimated Creatinine Clearance: 36.8 mL/min (A) (by C-G formula based on SCr of 1.68 mg/dL (H)).     Baseline 1.0-1.1  Unclear etiology, possibly volume overload from pleural effusion vs hypoperfusion in setting of Afib/CHF  S/p multiple episodes of thoracentesis this admission  Creatinine plateaued around 1.6 currently

## 2023-10-16 NOTE — ASSESSMENT & PLAN NOTE
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, cM1a) diagnosed on 9/15 s/p 5 cycles of XRT and had been on osimerinib   He had rescan on 8/8 that unfortunately showed increased size of mass  He was recently restarted on carboplastin + pemetrexed; last had cycle about 2 weeks ago  Unfortunately on CT:  "Luminal narrowing in the region of the bifurcation of the bronchus intermedius, which is new since August 2023, possibly secondary to enlarging hilar lymphadenopathy or increasing postradiation fibrosis"  "Airspace consolidations in the right middle and lower lobes, new since August 2023, likely at least in part representing atelectasis, likely secondary to the aforementioned partial bronchial occlusion."  "Significant progression of metastatic disease throughout the left lung, with new enlarged nodules/masses"    - DNR/DNI  - continue follow-up with oncology  - Discuss further with palliative team regarding [address] [employer] [place]

## 2023-10-16 NOTE — ASSESSMENT & PLAN NOTE
Present on admission  With associated right lower extremity DVT  Likely provoked by malignancy and new onset Afib with RVR  Eliquis price check > $500/month -patient can follow-up with outpatient hematology oncology for further Eliquis discount program  Lovenox noted to be $251/month    - c/w warfarin bridging, INR 2.19 this am , OP INR ordered for 10/18, fu with pcp

## 2023-10-16 NOTE — ASSESSMENT & PLAN NOTE
Pulse: 98    Present on admission  New onset afib with RVR  Unclear etiology, EF is 35% with global hypokinesis   Could be secondary to Afib vs ischemic cardiomyopathy   He has been transitioned off IV amiodarone to PO amiodarone, per cards will taper him down as an OP  HR remains elevated despite escalation of lopressor- Continue at 100 mg BID  After discussion with cardiology and family, patient is agreeable to being discharged when pleural effusion is stable with current heart rates given patient is not a candidate for cardioversion and ischemia evaluation  Family will follow with palliative care and if patient decompensates rapidly, he will be transitioned to hospice either by coming back to the hospital or palliative care to facilitate

## 2023-10-16 NOTE — ASSESSMENT & PLAN NOTE
Recent Labs     10/14/23  0623 10/15/23  0603 10/16/23  0449   HGB 11.6* 12.2 11.0*       Hemoglobin stabilized, 11 this am  Does not report any active signs of bleeding  Likely multifactorial in setting of malignancy, iron deficiency and chronic disease  On coumadin, monitor closely

## 2023-10-16 NOTE — DISCHARGE SUMMARY
[address]  Discharge- Ananya Lev 1945, 69 y.o. male MRN: [id number]  Unit/Bed#: -02 Encounter: [id number]  Primary Care Provider: John Odom PA-C   Date and time admitted to hospital: 10/3/2023  2:10 PM    * Multiple subsegmental pulmonary emboli without acute cor pulmonale [facility])  Assessment & Plan  Present on admission  With associated right lower extremity DVT  Likely provoked by malignancy and new onset Afib with RVR  Eliquis price check > $500/month -patient can follow-up with outpatient hematology oncology for further Eliquis discount program  Lovenox noted to be $251/month    - c/w warfarin bridging, INR 2.19 this am , OP INR ordered for 10/18, fu with pcp     Atrial fibrillation with RVR (HCC)  Assessment & Plan  Pulse: 98    Present on admission  New onset afib with RVR  Unclear etiology, EF is 35% with global hypokinesis   Could be secondary to Afib vs ischemic cardiomyopathy   He has been transitioned off IV amiodarone to PO amiodarone, per cards will taper him down as an OP  HR remains elevated despite escalation of lopressor- Continue at 100 mg BID  After discussion with cardiology and family, patient is agreeable to being discharged when pleural effusion is stable with current heart rates given patient is not a candidate for cardioversion and ischemia evaluation  Family will follow with palliative care and if patient decompensates rapidly, he will be transitioned to hospice either by coming back to the hospital or palliative care to facilitate     Pleural effusion  Assessment & Plan  Noted to have bilateral pleural effusions; s/p 2 sessions of thoracentesis by IR this hospitalization  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 however Given EF 35%, could be transudative as well  For now hold off on Pleurex catheter per IR,  continue with outpatient as needed thoracentesis based on symptoms of shortness of breath. Patient educated regarding the same.       Malignant neoplasm of upper lobe of right lung 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, cM1a) diagnosed on 9/15 s/p 5 cycles of XRT and had been on osimerinib   He had rescan on 8/8 that unfortunately showed increased size of mass  He was recently restarted on carboplastin + pemetrexed; last had cycle about 2 weeks ago  Unfortunately on CT:  "Luminal narrowing in the region of the bifurcation of the bronchus intermedius, which is new since August 2023, possibly secondary to enlarging hilar lymphadenopathy or increasing postradiation fibrosis"  "Airspace consolidations in the right middle and lower lobes, new since August 2023, likely at least in part representing atelectasis, likely secondary to the aforementioned partial bronchial occlusion."  "Significant progression of metastatic disease throughout the left lung, with new enlarged nodules/masses"    - DNR/DNI  - continue follow-up with oncology  - Discuss further with palliative team regarding [address]    COPD with acute exacerbation [facility])  Assessment & Plan  +with some wheezing  unclear if 2/2 copd vs malignancy  Prednisone taper continued on DC. Complete at home    HILARY (acute kidney injury) [facility])  Assessment & Plan  Present on admission    Recent Labs     10/14/23  0623 10/15/23  0603 10/16/23  0449   CREATININE 1.70* 1.84* 1.68*   EGFR 38 34 38       Estimated Creatinine Clearance: 36.8 mL/min (A) (by C-G formula based on SCr of 1.68 mg/dL (H)).     Baseline 1.0-1.1  Unclear etiology, possibly volume overload from pleural effusion vs hypoperfusion in setting of Afib/CHF  S/p multiple episodes of thoracentesis this admission  Creatinine plateaued around 1.6 currently      Anemia  Assessment & Plan  Recent Labs     10/14/23  0623 10/15/23  0603 10/16/23  0449   HGB 11.6* 12.2 11.0*       Hemoglobin stabilized, 11 this am  Does not report any active signs of bleeding  Likely multifactorial in setting of malignancy, iron deficiency and chronic disease  On coumadin, monitor closely    Abnormal CT of the abdomen  Assessment & Plan  Diffuse metastatic disease, refer to CT imaging reports  Goals of care discussion completed with patient's wife and daughters; to be ongoing throughout admission  As of right now, DNR/DNI level 3 (not pursuing hospice as of now)  Guarded/poor prognosis    Controlled type 2 diabetes mellitus without complication, without long-term current use of insulin [facility])  Assessment & Plan  Lab Results   Component Value Date    HGBA1C 5.1 07/07/2023       Recent Labs     10/15/23  1531 10/15/23  2050 10/16/23  0723 10/16/23  0913   POCGLU 222* 222* 73 129         Blood Sugar Average: Last 72 hrs:  (P) 141   Plan:  Resume metformin on DC    Discharging Physician / Practitioner: Geraldine Mitchell MD  PCP: Heath Vargas PA-C  Admission Date:   Admission Orders (From admission, onward)       Ordered        10/03/23 17[address] [facility]  Once                          Discharge Date: 10/16/23    Disposition:      home    Reason for Admission: Increasing shortness of breath    Discharge Diagnoses:     Please see assessment and plan section above for further details regarding discharge diagnoses. Medical Problems       Resolved Problems  Date Reviewed: 10/13/2023            Resolved    Hyperkalemia 10/14/2023     Resolved by  Geraldine Mitchell MD          Consultations During Hospital Stay:  Pulmonology  Cardiology  Palliative care    Procedures Performed:   S/p thoracentesis    Medication Adjustments and Discharge Medications:  Summary of Medication Adjustments made as a result of this hospitalization: see med rec  Medication Dosing Tapers - Please refer to Discharge Medication List for details on any medication dosing tapers (if applicable to patient). Medications being temporarily held (include recommended restart time): see med rec  Discharge Medication List: See after visit summary for reconciled discharge medications.        Diet Recommendations at Discharge:  Diet -        Diet Orders   (From admission, onward)                 Start     Ordered    10/04/23 1000  Diet Cardiovascular; Cardiac  Diet effective now        References:    Adult Nutrition Support Algorithm    RD Therapeutic Diet Order Protocol   Question Answer Comment   Diet Type Cardiovascular    Cardiac Cardiac    RD to adjust diet per protocol? Yes        10/04/23 1001                      Hospital Course:     Chelsie Robison is a 69 y.o. male patient who originally presented to the hospital on 10/3/2023 with underlying history of CLL on chemo/radiation, history of PE, diabetes, hypertension, history of prostate cancer s/p radiation in 2005 who had his last session of chemotherapy 2 weeks prior to admission presenting with progressive shortness of breath with exertion and right lower extremity swelling. He was noted to be new onset A-fib with RVR and had a CT PE study noting PE in his left distal pulmonary artery extending into his left lower lobar and segmental/subsegmental pulmonary artery with additional subsegmental PE in the lingula. Also noted to have a positive Duplay for DVT in right lower extremity. Multiple PE, patient now on warfarin. INR 2.19 this morning. Outpatient INR ordered for 10/18/2023. Follow-up with PCP  A-fib with RVR, with heart rate in the  range. Patient not a candidate for cardioversion and ischemic evaluation. For now keep patient on oral amiodarone/oral Lopressor on discharge. Patient noted to have bilateral pleural effusions requiring 2 sessions of thoracentesis with removal of 2 L fluid each time by IR during this hospitalization. Likely exudative malignant effusion however given EF 35% can be transudative as well. Per IR, hold off on Pleurx catheter and continue with outpatient as needed thoracentesis based on symptoms for shortness of breath right now. Patient educated regarding the same.   Patient with right lung malignant neoplasm, he is a DNR/DNI, continue outpatient follow-up with oncology and palliative care team.  Above plan of care discussed in detail with patient he verbalizes understanding of plan and is in agreement. DC home. Condition at Discharge: fair     Discharge Day Visit / Exam:     Vitals: Blood Pressure: 130/91 (10/16/23 0725)  Pulse: 98 (10/16/23 0725)  Temperature: 97.9 °F (36.6 °C) (10/16/23 0725)  Temp Source: Oral (10/14/23 1850)  Respirations: 21 (10/14/23 1850)  Height: 5' 9" (175.3 cm) (10/09/23 0128)  Weight - Scale: 78.4 kg (172 lb 13.5 oz) (10/16/23 0600)  SpO2: 96 % (10/16/23 0725)  Exam:   Physical Exam  Constitutional:       General: He is not in acute distress. Appearance: He is normal weight. He is not toxic-appearing. HENT:      Mouth/Throat:      Mouth: Mucous membranes are moist.      Pharynx: Oropharynx is clear. Cardiovascular:      Rate and Rhythm: Normal rate. Rhythm irregular. Pulses: Normal pulses. Pulmonary:      Effort: Pulmonary effort is normal. No respiratory distress. Breath sounds: Normal breath sounds. Abdominal:      General: Abdomen is flat. Bowel sounds are normal.      Palpations: Abdomen is soft. Musculoskeletal:      Right lower leg: No edema. Left lower leg: No edema. Neurological:      General: No focal deficit present. Mental Status: He is alert and oriented to person, place, and time. Goals of Care Discussions:  Code Status at Discharge: Level 3 - DNAR and DNI    Discharge instructions/Information to patient and family:   See after visit summary section titled Discharge Instructions for information provided to patient and family. Discharge Statement:  I spent 40 minutes discharging the patient. This time was spent on the day of discharge. I had direct contact with the patient on the day of discharge.  Greater than 50% of the total time was spent examining patient, answering all patient questions, arranging and discussing plan of care with patient as well as directly providing post-discharge instructions. Additional time then spent on discharge activities.     ** Please Note: This note has been constructed using a voice recognition system **

## 2023-10-16 NOTE — CASE MANAGEMENT
Case Management Discharge Planning Note    Patient name Vanesa Mosley  Location /-97 MRN [id number]  : 1945 Date 10/16/2023       Current Admission Date: 10/3/2023  Current Admission Diagnosis:Multiple subsegmental pulmonary emboli without acute cor pulmonale [facility])   Patient Active Problem List    Diagnosis Date Noted    COPD with acute exacerbation ([address]) 10/05/2023    Atrial fibrillation with RVR ([address]) 10/03/2023    HILARY (acute kidney injury) ([address]) 10/03/2023    Abnormal CT of the abdomen 10/03/2023    Platelets decreased ([address]) 2023    Multiple lung nodules on CT 10/06/2022    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 ([address]) 10/06/2022    Pleural effusion 10/06/2022    Malignant neoplasm of upper lobe of right lung [facility])     Multiple subsegmental pulmonary emboli without acute cor pulmonale ([address]) 09/10/2022    Pulmonary mass 09/10/2022    Anemia 09/10/2022    Non-recurrent bilateral inguinal hernia without obstruction or gangrene 2021    Status post right tibial-talar ankle fusion 2020    BPH associated with nocturia 2019    Arthritis of right acromioclavicular joint 03/15/2019    Primary osteoarthritis of right shoulder 2019    Chronic left shoulder pain 2019    Left rotator cuff tear arthropathy 2019    Rotator cuff injury, right, initial encounter 2019    History of colon polyps 2019    Hyperparathyroidism ([address]) 2018    Hypokalemia 10/22/2018    Hypocalcemia 10/22/2018    Glaucoma 10/11/2018    Controlled type 2 diabetes mellitus without complication, without long-term current use of insulin ([address]) 2016    Inguinal hernia 2016    Benign essential HTN 2016    Hypercholesterolemia 2016      LOS (days): 13  Geometric Mean LOS (GMLOS) (days): 4.00  Days to GMLOS:-8.6     OBJECTIVE:  Risk of Unplanned Readmission Score: 28.87         Current admission status: Inpatient   Preferred Pharmacy: [id number] TAMIA [place]neros - [address] [address] [address] [address] [address] [facility] [address]  Phone: [phone] Fax: 12 [address], [address] [facility] 1 [facility] [address] [place] [address] [address] [facility] [address]  Phone: [phone] Fax: [phone]    [facility] [place] [facility] [address]  Phone: [phone] Fax: [phone]    Primary Care Provider: Robert Blood PA-C    Primary Insurance: MEDICARE  Secondary Insurance: Bam Panda    DISCHARGE DETAILS:       Other Referral/Resources/Interventions Provided:  Referral Comments:  IMM explained and signed                                             IMM Given (Date):: 10/16/23  IMM Given to[de-identified] Patient

## 2023-10-17 ENCOUNTER — HOME CARE VISIT (OUTPATIENT)
Dept: HOME HEALTH SERVICES | Facility: HOME HEALTHCARE | Age: 78
End: 2023-10-17

## 2023-10-17 ENCOUNTER — TRANSITIONAL CARE MANAGEMENT (OUTPATIENT)
Dept: FAMILY MEDICINE CLINIC | Facility: CLINIC | Age: 78
End: 2023-10-17

## 2023-10-17 ENCOUNTER — PATIENT OUTREACH (OUTPATIENT)
Dept: FAMILY MEDICINE CLINIC | Facility: CLINIC | Age: 78
End: 2023-10-17

## 2023-10-17 ENCOUNTER — TELEPHONE (OUTPATIENT)
Dept: HEMATOLOGY ONCOLOGY | Facility: CLINIC | Age: 78
End: 2023-10-17

## 2023-10-17 ENCOUNTER — OFFICE VISIT (OUTPATIENT)
Dept: HEMATOLOGY ONCOLOGY | Facility: CLINIC | Age: 78
End: 2023-10-17
Payer: MEDICARE

## 2023-10-17 VITALS
HEART RATE: 85 BPM | RESPIRATION RATE: 16 BRPM | DIASTOLIC BLOOD PRESSURE: 58 MMHG | SYSTOLIC BLOOD PRESSURE: 112 MMHG | HEIGHT: 69 IN | TEMPERATURE: 97.3 F | WEIGHT: 178 LBS | OXYGEN SATURATION: 97 % | BODY MASS INDEX: 26.36 KG/M2

## 2023-10-17 DIAGNOSIS — I48.91 ATRIAL FIBRILLATION, UNSPECIFIED TYPE (HCC): ICD-10-CM

## 2023-10-17 DIAGNOSIS — J44.9 CHRONIC OBSTRUCTIVE PULMONARY DISEASE, UNSPECIFIED COPD TYPE (HCC): ICD-10-CM

## 2023-10-17 DIAGNOSIS — C34.11 MALIGNANT NEOPLASM OF UPPER LOBE OF RIGHT LUNG (HCC): Primary | ICD-10-CM

## 2023-10-17 PROCEDURE — 99215 OFFICE O/P EST HI 40 MIN: CPT | Performed by: INTERNAL MEDICINE

## 2023-10-17 NOTE — PROGRESS NOTES
Shiv KRAUS  [facility] HEMATOLOGY ONCOLOGY SPECIALISTS [facility] [address] [facility]  1945      PRIMARY HEMATOLOGIC/ONCOLOGIC DIAGNOSIS:  1. Stage IV adenocarcinoma of the lung. 9/15/2022 Caris: 4 muts/Mb, MSI-stable, PDL1 +1 CPS, EGFR exon 19  2. Hx Bilateral PE 2022, thought to be provoked, due to [place]. Was placed on Xarelto. Could not afford Xarelto and swtched himself to ASA 81mg. Acute pulmonary emboli in the left distal pulmonary artery extending into the left lower lobar, and some of the segmental and subsegmental pulmonary arteries with additional segmental and subsegmental pulmonary emboli in the lingula -- date of diagnosis 10/3/23. On Coumadin. 3. Hx of prostate cancer dx 2005. S/p RT. PATHOLOGY:   Case Report   Surgical Pathology Report                         Case: [id number]                                    Authorizing Provider:  Rizwan Nicholson DO           Collected:           09/15/2022 1302               Ordering Location:     [facility]      Received:            09/15/2022 [address] [place] [address]                                                               Pathologist:           Payal Peacock MD                                                           Specimen:    Lymph Node, right supraclavicular                                                          Final Diagnosis   A. Lymph node, right supraclavicular biopsy:  -  Metastatic adenocarcinoma of lung origin. -  Immunohistochemical stains performed with appropriate controls show the tumor to be positive for TTF1 and napsin and negative for CK5/6 and p40, supporting the diagnosis.        STAGING: Cancer Staging   Malignant neoplasm of upper lobe of right lung [facility])  Staging form: Lung, AJCC 8th Edition  - Clinical stage from 9/15/2022: Stage BETTE (cT4, cN3, cM1a) - Signed by Jatin Mendoza MD on 10/10/2022  Stage prefix: Initial diagnosis         Oncology History   Malignant neoplasm of upper lobe of right lung ([address])   9/15/2022 -  Cancer Staged    Staging form: Lung, AJCC 8th Edition  - Clinical stage from 9/15/2022: Stage BETTE (cT4, cN3, cM1a) - Signed by Mckenzie Baeza MD on 10/10/2022  Stage prefix: Initial diagnosis       9/15/2022 Biopsy    IR lung biopsy    A. Lymph node, right supraclavicular biopsy:  -  Metastatic adenocarcinoma of lung origin. -  Immunohistochemical stains performed with appropriate controls show the tumor to be positive for TTF1 and napsin and negative for CK5/6 and p40, supporting the diagnosis     10/5/2022 Initial Diagnosis    Malignant neoplasm of upper lobe of right lung ([address])     5/31/2023 - 6/9/2023 Radiation    Treatments:  Course: C1 SBRT RLL  Plan ID Energy Fractions Dose per Fraction (cGy) Dose Correction (cGy) Total Dose Delivered (cGy) Elapsed Days   BH SBRT RLL 6X-FFF 5 / 5 700 0 3,500 9    Treatment Dates:  5/31/2023 - 6/9/2023.      9/19/2023 -  Chemotherapy    cyanocobalamin, 1,000 mcg, Intramuscular, Once, 1 of 3 cycles  Administration: 1,000 mcg (9/11/2023)  alteplase (CATHFLO), 2 mg, Intracatheter, Every 1 Minute as needed, 1 of 6 cycles  fosaprepitant (EMEND) IVPB, 150 mg, Intravenous, Once, 1 of 6 cycles  Administration: 150 mg (9/19/2023)  CARBOplatin (PARAPLATIN) IVPB (GOG AUC DOSING), 380.5 mg, Intravenous, Once, 1 of 6 cycles  Administration: 380.5 mg (9/19/2023)  pemetrexed (ALIMTA) chemo infusion, 500 mg/m2 = 920 mg, Intravenous, Once, 1 of 6 cycles  Administration: 920 mg (9/19/2023)       INTERIM HISTORY:  Yovany Cardenas is a 68 yo male who presents for evaluation and management of lung cancer. The patient initiated Carboplatin + Pemetrexed --C1D1 administered 9/19/23. Reported Grade 1 diarrhea. He presented to the hospital on 10/3/23 w/ progressive SOB and RLE swelling. He was found to have PE and RLE DVT. Anticoagulation was initiated. He was found to be in A. Fib w/ RVR.  He underwent thoracentesis for b/l pleural effusions. He was in HILARY thought to be due to volume overload. He was also treated w/ steroid taper for COPD exacerbation. He reports that he is back to baseline in terms of activity. PAST MEDICAL,PAST SURGICAL, FAMILY AND SOCIAL HISTORY:    Patient Active Problem List   Diagnosis    Benign essential HTN    Controlled type 2 diabetes mellitus without complication, without long-term current use of insulin (HCC)    Hypercholesterolemia    Glaucoma    Inguinal hernia    Hypokalemia    Hypocalcemia    Hyperparathyroidism ([address])    History of colon polyps    Primary osteoarthritis of right shoulder    Chronic left shoulder pain    Left rotator cuff tear arthropathy    Rotator cuff injury, right, initial encounter    Arthritis of right acromioclavicular joint    BPH associated with nocturia    Status post right tibial-talar ankle fusion    Non-recurrent bilateral inguinal hernia without obstruction or gangrene    Multiple subsegmental pulmonary emboli without acute cor pulmonale (HCC)    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      Past Surgical History:   Procedure Laterality Date    COLONOSCOPY      IR BIOPSY LYMPH NODE  9/15/2022    IR THORACENTESIS  9/13/2022    IR THORACENTESIS  10/5/2023    IR THORACENTESIS  10/13/2023    MO ARTHRODESIS ANKLE OPEN Right 7/10/2020    Procedure: ARTHRODESIS/ TIBIAL-TALAR ANKLE FUSION;  Surgeon: Trevin Omalley MD; Location: [facility];  Service: Orthopedics    MT LAPAROSCOPY SURG RPR INITIAL INGUINAL HERNIA Bilateral 12/16/2021    Procedure: LAPAROSCOPIC REPAIR HERNIA INGUINAL WITH MESH;  Surgeon: Fiordaliza Wallace MD;  Location: [facility];  Service: General     Family History   Problem Relation Age of Onset    Heart attack Mother      Social History     Socioeconomic History    Marital status: /Civil Union     Spouse name: Not on file    Number of children: Not on file    Years of education: Not on file    Highest education level: Not on file   Occupational History    Not on file   Tobacco Use    Smoking status: Never    Smokeless tobacco: Never   Vaping Use    Vaping Use: Never used   Substance and Sexual Activity    Alcohol use: Yes     Comment: Occasional    Drug use: Never    Sexual activity: Not on file   Other Topics Concern    Not on file   Social History Narrative    Not on file     Social Determinants of Health     Financial Resource Strain: Low Risk  (7/25/2023)    Overall Financial Resource Strain (CARDIA)     Difficulty of Paying Living Expenses: Not very hard   Food Insecurity: No Food Insecurity (10/4/2023)    Hunger Vital Sign     Worried About Running Out of Food in the Last Year: Never true     Ran Out of Food in the Last Year: Never true   Transportation Needs: No Transportation Needs (10/4/2023)    PRAPARE - Transportation     Lack of Transportation (Medical): No     Lack of Transportation (Non-Medical):  No   Physical Activity: Not on file   Stress: Not on file   Social Connections: Not on file   Intimate Partner Violence: Not on file   Housing Stability: Unknown (10/4/2023)    Housing Stability Vital Sign     Unable to Pay for Housing in the Last Year: No     Number of Places Lived in the Last Year: 1     Unstable Housing in the Last Year: Not on file       Current Outpatient Medications:     Accu-Chek FastClix Lancets MISC, Use daily E11.9  Check  fbs  daily, Disp: 100 each, Rfl: 3    amiodarone 200 mg tablet, Take 1 tablet (200 mg total) by mouth 3 (three) times a day with meals, Disp: 90 tablet, Rfl: 0    brimonidine tartrate 0.2 % ophthalmic solution, Administer 1 drop into the left eye 2 (two) times a day, Disp: , Rfl:     dexamethasone (DECADRON) 4 mg tablet, Take 1 tablet (4 mg total) by mouth 2 (two) times a day with meals Take the day before treatment, the day of treatment and the day after treatment. , Disp: 6 tablet, Rfl: 6    finasteride (PROSCAR) 5 mg tablet, TAKE 1 TABLET (5 MG TOTAL) BY MOUTH DAILY. , Disp: 90 tablet, Rfl: 1    glucose blood (Accu-Chek Jackie Plus) test strip, Use as instructed, Disp: 100 strip, Rfl: 1    metFORMIN (GLUCOPHAGE-XR) 500 mg 24 hr tablet, TAKE 1 TABLET BY MOUTH EVERY DAY, Disp: 90 tablet, Rfl: 0    metoprolol tartrate (LOPRESSOR) 100 mg tablet, Take 1 tablet (100 mg total) by mouth every 12 (twelve) hours, Disp: 60 tablet, Rfl: 0    ondansetron (ZOFRAN) 8 mg tablet, Take 1 tablet (8 mg total) by mouth every 8 (eight) hours as needed for nausea or vomiting, Disp: 20 tablet, Rfl: 0    Osimertinib Mesylate 80 MG TABS, Take 80 mg by mouth in the morning, Disp: , Rfl:     predniSONE 20 mg tablet, Take 1 tablet (20 mg total) by mouth daily for 2 days, THEN 0.5 tablets (10 mg total) daily for 2 days. Do not start before October 17, 2023., Disp: 3 tablet, Rfl: 0    simvastatin (ZOCOR) 10 mg tablet, TAKE 1 TABLET BY MOUTH EVERY DAY, Disp: 90 tablet, Rfl: 1    timolol (TIMOPTIC-XE) 0.5 % ophthalmic gel-forming, 1 drop daily, Disp: , Rfl:     warfarin (COUMADIN) 5 mg tablet, Take 1 tablet (5 mg total) by mouth daily for 15 days, Disp: 15 tablet, Rfl: 0    folic acid (KP Folic Acid) 1 mg tablet, Take 1 tablet (1 mg total) by mouth daily, Disp: 30 tablet, Rfl: 6    travoprost (TRAVATAN-Z) 0.004 % ophthalmic solution, 1 drop daily at bedtime (Patient not taking: Reported on 4/24/2023), Disp: , Rfl:   No current facility-administered medications for this visit.   No Known Allergies  Vitals:    10/17/23 0943   Resp: 16       ROS:  CONSTITUTIONAL:  No fever. No chills. No dizziness. No weakness. EYES:  No pain, erythema, or discharge. No blurring of vision. ENT:  No sore throat, URI symptoms. No epistaxis. No tinnitus. CARDIOVASCULAR:  No chest pain. No palpitations. No lower extremity edema. RESPIRATORY:  No shortness of breath, cough, pain with respiration, pleuritic chest pain. No hemoptysis. No dyspnea. No paroxysmal nocturnal dyspnea. GASTROINTESTINAL:  Normal appetite. No nausea, vomiting, diarrhea. No pain. No bloating. No melena. GENITOURINARY:  No frequency, urgency, nocturia. No hematuria or dysuria. MUSCULOSKELETAL:  No arthralgias or myalgias. INTEGUMENTARY:  No swelling. No bruising. No contusions. No abrasions. No lymphangitis. NEUROLOGIC:  No headache. No neck pain. No numbness or tingling of the extremities. No weakness. PSYCHIATRIC:  No confusion. ENDOCRINE:  No fatigue. No weakness. No history of thyroid, diabetes or adrenal problems. HEMATOLOGICAL:  No bleeding. No petechiae. No bruising.     PHYSICAL EXAM:  ECOG 2  GENERAL: AAO x 3  HEENT: AT,NC  CVS: S1S2 RRR  LUNGS: b/l wheezing  ABD: NT,ND, +BS  EXTR: no edema  NEURO: CN II-XII grossly intact    LABS:  I have reviewed pertinent labs:  CBC:   Lab Results   Component Value Date    WBC 11.67 (H) 10/16/2023    RBC 3.43 (L) 10/16/2023    HGB 11.0 (L) 10/16/2023    HCT 34.0 (L) 10/16/2023    MCV 99 (H) 10/16/2023     10/16/2023    MCH 32.1 10/16/2023    MCHC 32.4 10/16/2023    RDW 16.5 (H) 10/16/2023    MPV 12.1 10/16/2023    NEUTROABS 9.65 (H) 10/16/2023     CMP:   Lab Results   Component Value Date    SODIUM 137 10/16/2023    K 4.2 10/16/2023     10/16/2023    CO2 27 10/16/2023    AGAP 5 10/16/2023    BUN 50 (H) 10/16/2023    CREATININE 1.68 (H) 10/16/2023    GLUC 84 10/16/2023    GLUF 114 (H) 07/07/2023    CALCIUM 8.4 10/16/2023    AST 12 (L) 10/15/2023    ALT 34 10/15/2023    ALKPHOS 71 10/15/2023    TP 5.7 (L) 10/15/2023    ALB 3.2 (L) 10/15/2023    TBILI 0.52 10/15/2023    EGFR 38 10/16/2023     Liver Enzymes:   Lab Results   Component Value Date    AST 12 (L) 10/15/2023    ALT 34 10/15/2023    ALKPHOS 71 10/15/2023    TP 5.7 (L) 10/15/2023    ALB 3.2 (L) 10/15/2023    TBILI 0.52 10/15/2023    BILIDIR 0.21 (H) 09/14/2022     Vitamin B12 No results found for: "GIMBQBZT07"  Iron Study   Lab Results   Component Value Date    FERRITIN 579 (H) 09/10/2022    CONCFE 23 09/10/2022    TIBC 117 (L) 09/10/2022    IRON 27 (L) 09/10/2022     Folate No results found for: "FOLATE"  Magnesium   Lab Results   Component Value Date    MG 2.6 10/14/2023     Phosphorus   Lab Results   Component Value Date    PHOS 3.4 10/05/2023     Coagulation Panel   Lab Results   Component Value Date    DDIMER >20.00 (H) 10/03/2023    PROTIME 25.3 (H) 10/16/2023    INR 2.19 (H) 10/16/2023    PTT 91 (H) 10/07/2023       IMAGING:  CT chest abdomen pelvis w contrast    Result Date: 8/15/2023  Narrative: CT CHEST, ABDOMEN AND PELVIS WITH IV CONTRAST INDICATION:   C34.11: Malignant neoplasm of upper lobe, right bronchus or lung. COMPARISON: CT chest abdomen pelvis 4/11/2023. TECHNIQUE: CT examination of the chest, abdomen and pelvis was performed. Multiplanar 2D reformatted images were created from the source data. This examination, like all CT scans performed in the [facility], was performed utilizing techniques to minimize radiation dose exposure, including the use of iterative reconstruction and automated exposure control. Radiation dose length product (DLP) for this visit:  648 mGy-cm IV Contrast:  100 mL of iohexol (OMNIPAQUE) Enteric Contrast: Enteric contrast was not administered. FINDINGS: CHEST LUNGS: Interval enlargement of spiculated right upper lobe mass due to enlarging rounded nodular component at the caudal aspect of the mass.  Based on remeasuring the mass measures 3.8 x 2.8 x 2.3 cm, previously 3.0 x 2.8 x 1.8 cm using similar technique. The enlarging caudal component can be best demonstrated by comparing 3/85 of the current exam to 3/80 of the 4/11/2023 exam. Continued enlargement of fissural-based right lower lobe nodule with a now more spiculated appearance measuring 2.1 x 1.1 cm (3/188). Multiple new nodules are present bilaterally including a new fissural-based nodule in the right middle lobe measuring 2.1 x 1.4 cm (3/198). Representative additional smaller new nodules as follows: 1.3 cm juxtapleural right lower lobe nodule (3/212), multiple small nodules at the right base (3/211), 0.7 cm medial juxtapleural left lower lobe nodule (3/203), 0.5 cm perifissural left lower lobe nodule (3/174), 0.8 cm left lower lobe nodule (3/209). PLEURA: Trace right pleural effusion and right pleural thickening. HEART/GREAT VESSELS: Heart is unremarkable for patient's age. No thoracic aortic aneurysm. Coronary artery and aortic calcifications. MEDIASTINUM AND FAUSTO: New mediastinal and right hilar lymphadenopathy. For example large subcarinal kamilla conglomerate now measures 2.4 cm in the short axis, previously subcentimeter. Enlargement of a now 1.4 cm left lower paratracheal lymph node (2/50) previously 0.5 cm. CHEST WALL AND LOWER NECK:  Unremarkable. ABDOMEN LIVER/BILIARY TREE: Stable sub-5 mm hypoattenuating focus in the caudate, too small to further characterize. GALLBLADDER:  There are gallstone(s) within the gallbladder, without pericholecystic inflammatory changes. SPLEEN:  Unremarkable. PANCREAS:  Unremarkable. ADRENAL GLANDS:  Unremarkable. KIDNEYS/URETERS: Ectopic pelvic left kidney. No hydronephrosis. Right upper pole simple renal cysts. Subcentimeter hypoattenuating lesions lesions which are too small to further characterize but stable and likely to also reflect cysts. STOMACH AND BOWEL:  Unremarkable. APPENDIX:  No findings to suggest appendicitis. ABDOMINOPELVIC CAVITY:  No ascites. No pneumoperitoneum.   No lymphadenopathy. VESSELS:  Unremarkable for patient's age. PELVIS REPRODUCTIVE ORGANS:  Unremarkable for patient's age. URINARY BLADDER: Diffuse bladder wall thickening. ABDOMINAL WALL/INGUINAL REGIONS: Tiny fat-containing umbilical hernia. OSSEOUS STRUCTURES:  No acute fracture or destructive osseous lesion. Stable bone islands. Impression: 1. Findings concerning for worsening metastatic disease. 2.  Enlargement of dominant right upper lobe pulmonary mass, specifically of a nodular caudal component. 3.  Multiple new and enlarging bilateral pulmonary nodules. 4.  New mediastinal and right hilar lymphadenopathy. 5.  Chronic bladder wall thickening may be correlated with symptoms and/or urinalysis for evidence of cystitis. The study was marked in EPIC for significant notification. Workstation performed: [id number]     I reviewed the above laboratory and imaging data. ASSESSMENT/PLAN:  1. Stage IV adenocarcinoma of the lung. 9/15/2022 Caris: 4 muts/Mb, MSI-stable, PDL1 +1 CPS, EGFR exon 19. S/p Osimertinib. CT 8/8/23 c/w disease progression--interval enlargement of spiculated right upper lobe mass due to enlarging rounded nodular component at the caudal aspect of the mass. The mass measures 3.8 x 2.8 x 2.3 cm, previously 3.0 x 2.8 x 1.8 cm. Continued enlargement of fissural-based right lower lobe nodule with a now more spiculated appearance measuring 2.1 x 1.1 cm. Multiple new nodules present bilaterally including a new fissural-based nodule in the right middle lobe measuring 2.1 x 1.4 cm. Additional smaller new nodules : 1.3 cm juxtapleural right lower lobe nodule, multiple small nodules at the right base, 0.7 cm medial juxtapleural left lower lobe nodule, 0.5 cm perifissural left lower lobe nodule, 0.8 cm left lower lobe nodule. Initiated Carboplatin + Pemetrexed. C1D1 administered 9/19/23. Reported Grade 1 diarrhea.  Admitted to the hospital on 10/3/23 w/ PE and RLE DVT, A. Fib w/ RVR, b/l pleural effusions, HILARY thought to be due to volume overload. He was also treated w/ steroid taper for COPD exacerbation. CT TAP dated 10/3/23 c/w disease progression-- luminal narrowing in the region of the bifurcation of the bronchus intermedius, new since August 2023, possibly secondary to enlarging hilar lymphadenopathy or increasing postradiation fibrosis; right upper lobe mass overall stable to mildly enlarged since August 2023; significant progression of metastatic disease throughout the left lung, with new enlarged nodules/masses; overall similar mediastinal lymphadenopathy compared to August 2023. Plan to administer C2D1 next week if his renal function improves. Will dose reduce. Current CrCl 42. Pemetrexed will be held if CrCl is less than 45. May need to switch to carboplatin/abraxane if renal function does not improve. Will check for drug interactions since his medications were recently adjusted in the hospital.   2. Hx Bilateral PE 2022, thought to be provoked, due to [place]. Was placed on Xarelto. Could not afford Xarelto and swtched himself to ASA 81mg. Acute pulmonary emboli in the left distal pulmonary artery extending into the left lower lobar, and some of the segmental and subsegmental pulmonary arteries with additional segmental and subsegmental pulmonary emboli in the lingula -- date of diagnosis 10/3/23. On Coumadin. 3. Hx of prostate cancer dx 2005. S/p RT.

## 2023-10-17 NOTE — PROGRESS NOTES
Received referral from [facility]. Chart reviewed. Pt to see Heme/Onc this am.  Scheduled for infusion tomorrow. Telephone call to outpatient CM DREW, warm hand off provided. She will review and outreach as needed. Removed self from care team at this time.

## 2023-10-18 ENCOUNTER — TELEPHONE (OUTPATIENT)
Dept: HEMATOLOGY ONCOLOGY | Facility: CLINIC | Age: 78
End: 2023-10-18

## 2023-10-18 ENCOUNTER — LAB REQUISITION (OUTPATIENT)
Dept: LAB | Facility: HOSPITAL | Age: 78
End: 2023-10-18
Payer: MEDICARE

## 2023-10-18 ENCOUNTER — TELEPHONE (OUTPATIENT)
Dept: NUTRITION | Facility: CLINIC | Age: 78
End: 2023-10-18

## 2023-10-18 ENCOUNTER — RA CDI HCC (OUTPATIENT)
Dept: OTHER | Facility: HOSPITAL | Age: 78
End: 2023-10-18

## 2023-10-18 ENCOUNTER — HOME CARE VISIT (OUTPATIENT)
Dept: HOME HEALTH SERVICES | Facility: HOME HEALTHCARE | Age: 78
End: 2023-10-18
Payer: MEDICARE

## 2023-10-18 ENCOUNTER — PATIENT OUTREACH (OUTPATIENT)
Dept: CASE MANAGEMENT | Facility: HOSPITAL | Age: 78
End: 2023-10-18

## 2023-10-18 VITALS
OXYGEN SATURATION: 99 % | DIASTOLIC BLOOD PRESSURE: 78 MMHG | HEIGHT: 69 IN | SYSTOLIC BLOOD PRESSURE: 122 MMHG | BODY MASS INDEX: 26.36 KG/M2 | RESPIRATION RATE: 18 BRPM | WEIGHT: 178 LBS | HEART RATE: 93 BPM | TEMPERATURE: 97.2 F

## 2023-10-18 DIAGNOSIS — I48.91 UNSPECIFIED ATRIAL FIBRILLATION (HCC): ICD-10-CM

## 2023-10-18 LAB
INR PPP: 2.44 (ref 0.84–1.19)
PROTHROMBIN TIME: 27.4 SECONDS (ref 11.6–14.5)

## 2023-10-18 PROCEDURE — 88342 IMHCHEM/IMCYTCHM 1ST ANTB: CPT | Performed by: PATHOLOGY

## 2023-10-18 PROCEDURE — 400013 VN SOC

## 2023-10-18 PROCEDURE — 10330081 VN NO-PAY CLAIM PROCEDURE

## 2023-10-18 PROCEDURE — 88341 IMHCHEM/IMCYTCHM EA ADD ANTB: CPT | Performed by: PATHOLOGY

## 2023-10-18 PROCEDURE — 88305 TISSUE EXAM BY PATHOLOGIST: CPT | Performed by: PATHOLOGY

## 2023-10-18 PROCEDURE — 88112 CYTOPATH CELL ENHANCE TECH: CPT | Performed by: PATHOLOGY

## 2023-10-18 PROCEDURE — G0299 HHS/HOSPICE OF RN EA 15 MIN: HCPCS

## 2023-10-18 PROCEDURE — 85610 PROTHROMBIN TIME: CPT | Performed by: FAMILY MEDICINE

## 2023-10-18 NOTE — PROGRESS NOTES
MSW received pt's referral from [facility], this pt was previously outreached by MSW Hussein Nielsen last year and had no identified needs at that time. I will see pt in the infusion center next week to introduce myself and assess for any needs, and provide contact information for any future needs. Chart review completed today.

## 2023-10-18 NOTE — TELEPHONE ENCOUNTER
Sonya Rene to r/s oncology nutrition consultation that was cancelled on 10/9 due to hospitalization. Napoleon Gomez is agreeable to rescheduling nutrition apt to next Thursday 10/26 during his infusion.

## 2023-10-18 NOTE — PROGRESS NOTES
[address] [facility] coding opportunities       Chart reviewed, no opportunity found: CHART REVIEWED, NO OPPORTUNITY FOUND        Patients Insurance     Medicare Insurance: Medicare

## 2023-10-18 NOTE — TELEPHONE ENCOUNTER
Dr. Lane Barth wants patient to re-start his Osimertinib 80mg once daily. Called patient to relay message. He reports he has about 6 month supply at home and will re-start it. He has no other questions or concerns at this moment.

## 2023-10-19 NOTE — CASE COMMUNICATION
[place] has admitted your patient to [facility] service with the following disciplines:      SN  This report is informational only, no responses is needed  Primary focus of home health care- Pulmonary   Patient stated goals of care- improve fatigue and take medications as  prescribed. Anticipated visit pattern and next visit date- Next Sn visit for 10/23 1w1 2w2 1w3  See medication list - meds in home differ from AVS  Signific ant clinical findings- Pt does not have dexamethasone, ondansetron, Osimertinib Mesylate in home. During visit Nicolas Bee MD's office called and states pt should starting taking Tagrisso 80 mg tab daily. Potential barriers to goal achievement- can become SOB with moderate distances and while performing ADL's. Thank you for allowing us to participate in the care of your patient.       [facility] Colon RN

## 2023-10-23 ENCOUNTER — TELEPHONE (OUTPATIENT)
Age: 78
End: 2023-10-23

## 2023-10-23 ENCOUNTER — HOME CARE VISIT (OUTPATIENT)
Dept: HOME HEALTH SERVICES | Facility: HOME HEALTHCARE | Age: 78
End: 2023-10-23
Payer: MEDICARE

## 2023-10-23 ENCOUNTER — OFFICE VISIT (OUTPATIENT)
Dept: FAMILY MEDICINE CLINIC | Facility: CLINIC | Age: 78
End: 2023-10-23
Payer: MEDICARE

## 2023-10-23 ENCOUNTER — APPOINTMENT (OUTPATIENT)
Dept: RADIOLOGY | Facility: MEDICAL CENTER | Age: 78
End: 2023-10-23
Payer: MEDICARE

## 2023-10-23 ENCOUNTER — APPOINTMENT (OUTPATIENT)
Dept: LAB | Facility: MEDICAL CENTER | Age: 78
End: 2023-10-23
Payer: MEDICARE

## 2023-10-23 VITALS
OXYGEN SATURATION: 97 % | DIASTOLIC BLOOD PRESSURE: 80 MMHG | RESPIRATION RATE: 17 BRPM | SYSTOLIC BLOOD PRESSURE: 136 MMHG | TEMPERATURE: 96.1 F | HEART RATE: 132 BPM

## 2023-10-23 VITALS
DIASTOLIC BLOOD PRESSURE: 78 MMHG | WEIGHT: 179.2 LBS | HEART RATE: 117 BPM | SYSTOLIC BLOOD PRESSURE: 122 MMHG | TEMPERATURE: 98 F | OXYGEN SATURATION: 95 % | BODY MASS INDEX: 26.54 KG/M2 | HEIGHT: 69 IN

## 2023-10-23 DIAGNOSIS — I48.91 ATRIAL FIBRILLATION WITH RVR (HCC): ICD-10-CM

## 2023-10-23 DIAGNOSIS — L03.115 CELLULITIS OF LEG, RIGHT: ICD-10-CM

## 2023-10-23 DIAGNOSIS — E11.9 CONTROLLED TYPE 2 DIABETES MELLITUS WITHOUT COMPLICATION, WITHOUT LONG-TERM CURRENT USE OF INSULIN (HCC): ICD-10-CM

## 2023-10-23 DIAGNOSIS — J90 PLEURAL EFFUSION: ICD-10-CM

## 2023-10-23 DIAGNOSIS — R21 RASH, SKIN: ICD-10-CM

## 2023-10-23 DIAGNOSIS — E11.9 TYPE 2 DIABETES MELLITUS WITHOUT COMPLICATION, WITHOUT LONG-TERM CURRENT USE OF INSULIN (HCC): Primary | ICD-10-CM

## 2023-10-23 DIAGNOSIS — I26.94 MULTIPLE SUBSEGMENTAL PULMONARY EMBOLI WITHOUT ACUTE COR PULMONALE (HCC): ICD-10-CM

## 2023-10-23 DIAGNOSIS — C34.11 MALIGNANT NEOPLASM OF UPPER LOBE OF RIGHT LUNG (HCC): ICD-10-CM

## 2023-10-23 PROBLEM — L02.416 CELLULITIS AND ABSCESS OF LEFT LOWER EXTREMITY: Status: ACTIVE | Noted: 2023-10-23

## 2023-10-23 PROBLEM — L03.116 CELLULITIS AND ABSCESS OF LEFT LOWER EXTREMITY: Status: ACTIVE | Noted: 2023-10-23

## 2023-10-23 LAB
BASOPHILS # BLD AUTO: 0.02 THOUSANDS/ÂΜL (ref 0–0.1)
BASOPHILS NFR BLD AUTO: 0 % (ref 0–1)
EOSINOPHIL # BLD AUTO: 0.07 THOUSAND/ÂΜL (ref 0–0.61)
EOSINOPHIL NFR BLD AUTO: 1 % (ref 0–6)
ERYTHROCYTE [DISTWIDTH] IN BLOOD BY AUTOMATED COUNT: 17.8 % (ref 11.6–15.1)
HCT VFR BLD AUTO: 40.3 % (ref 36.5–49.3)
HGB BLD-MCNC: 12.4 G/DL (ref 12–17)
IMM GRANULOCYTES # BLD AUTO: 0.07 THOUSAND/UL (ref 0–0.2)
IMM GRANULOCYTES NFR BLD AUTO: 1 % (ref 0–2)
INR PPP: 4.19 (ref 0.84–1.19)
LYMPHOCYTES # BLD AUTO: 0.43 THOUSANDS/ÂΜL (ref 0.6–4.47)
LYMPHOCYTES NFR BLD AUTO: 4 % (ref 14–44)
MCH RBC QN AUTO: 32 PG (ref 26.8–34.3)
MCHC RBC AUTO-ENTMCNC: 30.8 G/DL (ref 31.4–37.4)
MCV RBC AUTO: 104 FL (ref 82–98)
MONOCYTES # BLD AUTO: 0.86 THOUSAND/ÂΜL (ref 0.17–1.22)
MONOCYTES NFR BLD AUTO: 7 % (ref 4–12)
NEUTROPHILS # BLD AUTO: 10.26 THOUSANDS/ÂΜL (ref 1.85–7.62)
NEUTS SEG NFR BLD AUTO: 87 % (ref 43–75)
NRBC BLD AUTO-RTO: 0 /100 WBCS
PLATELET # BLD AUTO: 161 THOUSANDS/UL (ref 149–390)
PMV BLD AUTO: 13.5 FL (ref 8.9–12.7)
PROTHROMBIN TIME: 39.4 SECONDS (ref 11.6–14.5)
RBC # BLD AUTO: 3.87 MILLION/UL (ref 3.88–5.62)
SL AMB POCT HEMOGLOBIN AIC: 6 (ref ?–6.5)
WBC # BLD AUTO: 11.71 THOUSAND/UL (ref 4.31–10.16)

## 2023-10-23 PROCEDURE — 36415 COLL VENOUS BLD VENIPUNCTURE: CPT

## 2023-10-23 PROCEDURE — 85025 COMPLETE CBC W/AUTO DIFF WBC: CPT

## 2023-10-23 PROCEDURE — 99496 TRANSJ CARE MGMT HIGH F2F 7D: CPT | Performed by: INTERNAL MEDICINE

## 2023-10-23 PROCEDURE — G0299 HHS/HOSPICE OF RN EA 15 MIN: HCPCS

## 2023-10-23 PROCEDURE — 85610 PROTHROMBIN TIME: CPT

## 2023-10-23 PROCEDURE — 71046 X-RAY EXAM CHEST 2 VIEWS: CPT

## 2023-10-23 PROCEDURE — 83036 HEMOGLOBIN GLYCOSYLATED A1C: CPT | Performed by: INTERNAL MEDICINE

## 2023-10-23 RX ORDER — CEPHALEXIN 500 MG/1
500 CAPSULE ORAL 3 TIMES DAILY
Qty: 30 CAPSULE | Refills: 0 | Status: SHIPPED | OUTPATIENT
Start: 2023-10-23 | End: 2023-11-03

## 2023-10-23 NOTE — TELEPHONE ENCOUNTER
Gumaro Mclean from [facility] called to inform patient has swelling of both feet, shortness of breathe, heart sounds irregular at times and pulse is 100-130.  Scheduled patient to see Dr Scarlet Sotelo 10/23/2023 at 2:30 pm

## 2023-10-23 NOTE — TELEPHONE ENCOUNTER
Patient daughter is asking if patient should take abx tonight or skip tonight and start tomorrow along with skipping coumadin. I tiger texted Mike Manning to ask waiting on response . Sending this message to office. Daughter was not clear on instructionsAnn Lai

## 2023-10-23 NOTE — ASSESSMENT & PLAN NOTE
His new problem is extensive red discoloration of both lower extremities below the knee. Which looks consistent with cellulitis but is not warm to the touch and he is not running a fever.   We will give him Keflex but I did order a CBC with his INR

## 2023-10-23 NOTE — ASSESSMENT & PLAN NOTE
He is still on is in atrial fibrillation of moderate control. And is on Coumadin and has not had an INR since he has been home so we recheck today.

## 2023-10-23 NOTE — ASSESSMENT & PLAN NOTE
Lab Results   Component Value Date    HGBA1C 5.1 07/07/2023   Despite all his health problems he does have controlled diabetes

## 2023-10-23 NOTE — ASSESSMENT & PLAN NOTE
He has stage IV lung cancer which does not appear to be responding to treatment and was sent home with palliative care

## 2023-10-23 NOTE — PROGRESS NOTES
Name: Juan David Connolly      : 1945      MRN: [id number]  Encounter Provider: Dieter Gunter MD  Encounter Date: 10/23/2023   Encounter department: [facility]     1. Type 2 diabetes mellitus without complication, without long-term current use of insulin ([facility])  -     POCT hemoglobin A1c    2. Atrial fibrillation with RVR ([address])  Assessment & Plan:  He is still on is in atrial fibrillation of moderate control. And is on Coumadin and has not had an INR since he has been home so we recheck today. Orders:  -     Protime-INR; Future    3. Controlled type 2 diabetes mellitus without complication, without long-term current use of insulin [facility])  Assessment & Plan:    Lab Results   Component Value Date    HGBA1C 5.1 2023   Despite all his health problems he does have controlled diabetes      4. Malignant neoplasm of upper lobe of right lung [facility])  Assessment & Plan:  He has stage IV lung cancer which does not appear to be responding to treatment and was sent home with palliative care      5. Multiple subsegmental pulmonary emboli without acute cor pulmonale (HCC)  Assessment & Plan:  Fortunately he has had multiple pulmonary emboli during his stay      6. Cellulitis of leg, right  Assessment & Plan:  His new problem is extensive red discoloration of both lower extremities below the knee. Which looks consistent with cellulitis but is not warm to the touch and he is not running a fever. We will give him Keflex but I did order a CBC with his INR    Orders:  -     CBC and differential; Future    7. Rash, skin  -     cephalexin (KEFLEX) 500 mg capsule; Take 1 capsule (500 mg total) by mouth 3 (three) times a day for 10 days    8. Pleural effusion  Assessment & Plan:  During his stay he did have pleural effusions drained of several liters  of fluid    Orders:  -     XR chest pa & lateral; Future; Expected date: 10/23/2023      BMI Counseling: Body mass index is 26.46 kg/m².  Follow-up plan was not completed due to patient receiving palliative care. Subjective      Patient was discharged from the hospital on the 17th after being admitted and found to have bilateral pleural effusions lung masses atrial fibrillation pulmonary emboli. He was seen by all the appropriate specialists and sent home with palliative care. Since home he has had increasing edema of both lower extremities along with what appears to be a cellulitis of both lower extremities although the patient has no fever. He does have palliative care but as of now family is taking terms staying with him with some gaps in time. Review of Systems   Constitutional:  Positive for fatigue. Negative for chills and fever. HENT:  Negative for ear pain and sore throat. Eyes:  Negative for pain and visual disturbance. Respiratory:  Positive for shortness of breath. Negative for cough. Cardiovascular:  Positive for palpitations and leg swelling. Negative for chest pain. Gastrointestinal:  Negative for abdominal pain and vomiting. Genitourinary:  Negative for dysuria and hematuria. Musculoskeletal:  Positive for gait problem. Negative for arthralgias and back pain. Skin:  Negative for color change and rash. Allergic/Immunologic: Positive for immunocompromised state. Neurological:  Positive for weakness. Negative for seizures and syncope. Hematological:  Bruises/bleeds easily. All other systems reviewed and are negative. Current Outpatient Medications on File Prior to Visit   Medication Sig    Accu-Chek FastClix Lancets MISC Use daily E11.9  Check  fbs  daily    amiodarone 200 mg tablet Take 1 tablet (200 mg total) by mouth 3 (three) times a day with meals    brimonidine tartrate 0.2 % ophthalmic solution Administer 1 drop into the left eye 2 (two) times a day    finasteride (PROSCAR) 5 mg tablet TAKE 1 TABLET (5 MG TOTAL) BY MOUTH DAILY.  (Patient taking differently: Take 5 mg by mouth daily)    folic acid ( Folic Acid) 1 mg tablet Take 1 tablet (1 mg total) by mouth daily    glucose blood (Accu-Chek Jackie Plus) test strip Use as instructed    metFORMIN (GLUCOPHAGE-XR) 500 mg 24 hr tablet TAKE 1 TABLET BY MOUTH EVERY DAY    metoprolol tartrate (LOPRESSOR) 100 mg tablet Take 1 tablet (100 mg total) by mouth every 12 (twelve) hours    ondansetron (ZOFRAN) 8 mg tablet Take 1 tablet (8 mg total) by mouth every 8 (eight) hours as needed for nausea or vomiting    Osimertinib Mesylate 80 MG TABS Take 80 mg by mouth in the morning    simvastatin (ZOCOR) 10 mg tablet TAKE 1 TABLET BY MOUTH EVERY DAY    timolol (TIMOPTIC-XE) 0.5 % ophthalmic gel-forming 1 drop daily    warfarin (COUMADIN) 5 mg tablet Take 1 tablet (5 mg total) by mouth daily for 15 days    dexamethasone (DECADRON) 4 mg tablet Take 1 tablet (4 mg total) by mouth 2 (two) times a day with meals Take the day before treatment, the day of treatment and the day after treatment. (Patient not taking: Reported on 10/23/2023)    travoprost (TRAVATAN-Z) 0.004 % ophthalmic solution 1 drop daily at bedtime (Patient not taking: Reported on 4/24/2023)       Objective     /78 (BP Location: Left arm, Patient Position: Sitting, Cuff Size: Standard)   Pulse (!) 117   Temp 98 °F (36.7 °C) (Temporal)   Ht 5' 9" (1.753 m)   Wt 81.3 kg (179 lb 3.2 oz)   SpO2 95%   BMI 26.46 kg/m²     Physical Exam  Constitutional:       General: He is not in acute distress. Appearance: He is well-developed. He is ill-appearing. HENT:      Head: Normocephalic. Right Ear: External ear normal.      Left Ear: External ear normal.      Nose: Nose normal.      Mouth/Throat:      Mouth: Mucous membranes are moist.      Pharynx: No oropharyngeal exudate. Eyes:      Pupils: Pupils are equal, round, and reactive to light. Neck:      Thyroid: No thyromegaly. Vascular: No JVD. Cardiovascular:      Rate and Rhythm: Tachycardia present. Rhythm irregular.       Heart sounds: Normal heart sounds. No murmur heard. No gallop. Pulmonary:      Effort: Pulmonary effort is normal. No respiratory distress. Breath sounds: Normal breath sounds. No wheezing or rales. Abdominal:      General: Bowel sounds are normal. There is no distension. Palpations: Abdomen is soft. There is no hepatomegaly or mass. Tenderness: There is no abdominal tenderness. Musculoskeletal:         General: No tenderness. Normal range of motion. Cervical back: Normal range of motion and neck supple. Right lower leg: Edema present. Left lower leg: Edema present. Comments: 2+/4 bilaterally with right greater than left a little   Lymphadenopathy:      Cervical: No cervical adenopathy. Skin:     Findings: Erythema (Marked erythema from knees to ankle but cool to touch bilaterally) present. No rash. Neurological:      General: No focal deficit present. Mental Status: He is alert and oriented to person, place, and time. Cranial Nerves: No cranial nerve deficit. Motor: Weakness present. Coordination: Coordination normal.   Psychiatric:         Mood and Affect: Mood normal.         Behavior: Behavior normal.         Thought Content:  Thought content normal.         Judgment: Judgment normal.       Landen Oleary MD

## 2023-10-24 ENCOUNTER — TELEPHONE (OUTPATIENT)
Age: 78
End: 2023-10-24

## 2023-10-24 ENCOUNTER — ANTICOAG VISIT (OUTPATIENT)
Dept: FAMILY MEDICINE CLINIC | Facility: CLINIC | Age: 78
End: 2023-10-24

## 2023-10-24 DIAGNOSIS — I26.94 MULTIPLE SUBSEGMENTAL PULMONARY EMBOLI WITHOUT ACUTE COR PULMONALE (HCC): ICD-10-CM

## 2023-10-24 DIAGNOSIS — I48.91 ATRIAL FIBRILLATION WITH RVR (HCC): Primary | ICD-10-CM

## 2023-10-24 RX ORDER — WARFARIN SODIUM 4 MG/1
4 TABLET ORAL
Qty: 30 TABLET | Refills: 3 | Status: SHIPPED | OUTPATIENT
Start: 2023-10-24

## 2023-10-24 NOTE — PROGRESS NOTES
Assessment & Plan     1. Type 2 diabetes mellitus without complication, without long-term current use of insulin (HCC)  -     POCT hemoglobin A1c    2. Atrial fibrillation with RVR ([address])  Assessment & Plan:  He is still on is in atrial fibrillation of moderate control. And is on Coumadin and has not had an INR since he has been home so we recheck today. Orders:  -     Protime-INR; Future    3. Controlled type 2 diabetes mellitus without complication, without long-term current use of insulin [facility])  Assessment & Plan:    Lab Results   Component Value Date    HGBA1C 5.1 07/07/2023   Despite all his health problems he does have controlled diabetes      4. Malignant neoplasm of upper lobe of right lung [facility])  Assessment & Plan:  He has stage IV lung cancer which does not appear to be responding to treatment and was sent home with palliative care      5. Multiple subsegmental pulmonary emboli without acute cor pulmonale (HCC)  Assessment & Plan:  Fortunately he has had multiple pulmonary emboli during his stay      6. Cellulitis of leg, right  Assessment & Plan:  His new problem is extensive red discoloration of both lower extremities below the knee. Which looks consistent with cellulitis but is not warm to the touch and he is not running a fever. We will give him Keflex but I did order a CBC with his INR    Orders:  -     CBC and differential; Future    7. Rash, skin  -     cephalexin (KEFLEX) 500 mg capsule; Take 1 capsule (500 mg total) by mouth 3 (three) times a day for 10 days    8. Pleural effusion  Assessment & Plan:  During his stay he did have pleural effusions drained of several liters  of fluid    Orders:  -     XR chest pa & lateral; Future; Expected date: 10/23/2023         Subjective     Transitional Care Management Review:   Morenita Hobson is a 69 y.o. male here for TCM follow up.      During the TCM phone call patient stated:  TCM Call       Date and time call was made  10/17/2023 12:01 [facility] care reviewed  Records reviewed    Patient was hospitialized at  [facility]    Date of Admission  10/03/23    Date of discharge  10/16/23    Diagnosis  Multiple subsegmental pulmonary emboli without acute cor pulmonale (HCC)    Disposition  Home    Were the patients medications reviewed and updated  Yes    Current Symptoms  None          TCM Call       Post hospital issues  None    Should patient be enrolled in anticoag monitoring? No    Scheduled for follow up?   Yes    Not clinically warranted  Following with orthopedics    Did you obtain your prescribed medications  Yes    Do you need help managing your prescriptions or medications  No    Is transportation to your appointment needed  No    I have advised the patient to call PCP with any new or worsening symptoms  [facility]    Are you recieving any outpatient services  No    Are you recieving home care services  No    Are you using any community resources  No    Current waiver services  No    Have you fallen in the last 12 months  Yes    Interperter language line needed  No    Counseling  Patient    Counseling topics  Importance of RX compliance          HPI  Review of Systems    Objective     /78 (BP Location: Left arm, Patient Position: Sitting, Cuff Size: Standard)   Pulse (!) 117   Temp 98 °F (36.7 °C) (Temporal)   Ht 5' 9" (1.753 m)   Wt 81.3 kg (179 lb 3.2 oz)   SpO2 95%   BMI 26.46 kg/m²      Physical Exam  Medications have been reviewed by provider in current encounter    Judd Mccray MD

## 2023-10-24 NOTE — TELEPHONE ENCOUNTER
Spoke with patient, patient would like to speak to someone in regards to his medication the Coumadin and the Cephalexin. Please advise.

## 2023-10-24 NOTE — TELEPHONE ENCOUNTER
Pt called asking if he can still take his Warfarin and take the cephalexin (KEFLEX) 500 mg capsule  that was prescribed.   Please advise

## 2023-10-24 NOTE — PROGRESS NOTES
Spoke   with patient  and  wife. Pt/inr   high. Hold  coumadin  today. Restart  at 4 mg  tomorrow.   Pt/inr in   1  week

## 2023-10-25 ENCOUNTER — APPOINTMENT (OUTPATIENT)
Dept: LAB | Facility: MEDICAL CENTER | Age: 78
End: 2023-10-25
Payer: MEDICARE

## 2023-10-25 ENCOUNTER — HOME CARE VISIT (OUTPATIENT)
Dept: HOME HEALTH SERVICES | Facility: HOME HEALTHCARE | Age: 78
End: 2023-10-25
Payer: MEDICARE

## 2023-10-25 ENCOUNTER — TELEPHONE (OUTPATIENT)
Dept: HEMATOLOGY ONCOLOGY | Facility: CLINIC | Age: 78
End: 2023-10-25

## 2023-10-25 ENCOUNTER — TELEPHONE (OUTPATIENT)
Age: 78
End: 2023-10-25

## 2023-10-25 DIAGNOSIS — C34.11 MALIGNANT NEOPLASM OF UPPER LOBE OF RIGHT LUNG (HCC): ICD-10-CM

## 2023-10-25 LAB
ALBUMIN SERPL BCP-MCNC: 3.2 G/DL (ref 3.5–5)
ALP SERPL-CCNC: 91 U/L (ref 34–104)
ALT SERPL W P-5'-P-CCNC: 39 U/L (ref 7–52)
ANION GAP SERPL CALCULATED.3IONS-SCNC: 10 MMOL/L
AST SERPL W P-5'-P-CCNC: 24 U/L (ref 13–39)
BASOPHILS # BLD AUTO: 0.02 THOUSANDS/ÂΜL (ref 0–0.1)
BASOPHILS NFR BLD AUTO: 0 % (ref 0–1)
BILIRUB SERPL-MCNC: 0.48 MG/DL (ref 0.2–1)
BUN SERPL-MCNC: 53 MG/DL (ref 5–25)
CALCIUM ALBUM COR SERPL-MCNC: 8.6 MG/DL (ref 8.3–10.1)
CALCIUM SERPL-MCNC: 8 MG/DL (ref 8.4–10.2)
CHLORIDE SERPL-SCNC: 110 MMOL/L (ref 96–108)
CO2 SERPL-SCNC: 22 MMOL/L (ref 21–32)
CREAT SERPL-MCNC: 1.81 MG/DL (ref 0.6–1.3)
EOSINOPHIL # BLD AUTO: 0.11 THOUSAND/ÂΜL (ref 0–0.61)
EOSINOPHIL NFR BLD AUTO: 1 % (ref 0–6)
ERYTHROCYTE [DISTWIDTH] IN BLOOD BY AUTOMATED COUNT: 18 % (ref 11.6–15.1)
GFR SERPL CREATININE-BSD FRML MDRD: 35 ML/MIN/1.73SQ M
GLUCOSE SERPL-MCNC: 144 MG/DL (ref 65–140)
HCT VFR BLD AUTO: 39.8 % (ref 36.5–49.3)
HGB BLD-MCNC: 12.3 G/DL (ref 12–17)
IMM GRANULOCYTES # BLD AUTO: 0.05 THOUSAND/UL (ref 0–0.2)
IMM GRANULOCYTES NFR BLD AUTO: 1 % (ref 0–2)
LYMPHOCYTES # BLD AUTO: 0.3 THOUSANDS/ÂΜL (ref 0.6–4.47)
LYMPHOCYTES NFR BLD AUTO: 3 % (ref 14–44)
MCH RBC QN AUTO: 32.5 PG (ref 26.8–34.3)
MCHC RBC AUTO-ENTMCNC: 30.9 G/DL (ref 31.4–37.4)
MCV RBC AUTO: 105 FL (ref 82–98)
MONOCYTES # BLD AUTO: 0.57 THOUSAND/ÂΜL (ref 0.17–1.22)
MONOCYTES NFR BLD AUTO: 6 % (ref 4–12)
NEUTROPHILS # BLD AUTO: 7.94 THOUSANDS/ÂΜL (ref 1.85–7.62)
NEUTS SEG NFR BLD AUTO: 89 % (ref 43–75)
NRBC BLD AUTO-RTO: 0 /100 WBCS
PLATELET # BLD AUTO: 128 THOUSANDS/UL (ref 149–390)
PMV BLD AUTO: 13.6 FL (ref 8.9–12.7)
POTASSIUM SERPL-SCNC: 4.4 MMOL/L (ref 3.5–5.3)
PROT SERPL-MCNC: 5.4 G/DL (ref 6.4–8.4)
RBC # BLD AUTO: 3.78 MILLION/UL (ref 3.88–5.62)
SODIUM SERPL-SCNC: 142 MMOL/L (ref 135–147)
WBC # BLD AUTO: 8.99 THOUSAND/UL (ref 4.31–10.16)

## 2023-10-25 PROCEDURE — 85025 COMPLETE CBC W/AUTO DIFF WBC: CPT

## 2023-10-25 PROCEDURE — 36415 COLL VENOUS BLD VENIPUNCTURE: CPT

## 2023-10-25 PROCEDURE — 80053 COMPREHEN METABOLIC PANEL: CPT

## 2023-10-25 NOTE — TELEPHONE ENCOUNTER
Attempted to call patient to instruct him to complete labs prior to treatment tomorrow, no answer and no option to leave voicemail, as it states voicemail is full.

## 2023-10-25 NOTE — TELEPHONE ENCOUNTER
Patient wife calling stating if they can expedite xray reading  because patient is having a little SOB. Patient wife informed to call squad or take patient to ER if SOB gets worse.     Dylan Silver

## 2023-10-26 ENCOUNTER — TELEPHONE (OUTPATIENT)
Dept: HEMATOLOGY ONCOLOGY | Facility: CLINIC | Age: 78
End: 2023-10-26

## 2023-10-26 ENCOUNTER — TELEPHONE (OUTPATIENT)
Dept: NUTRITION | Facility: CLINIC | Age: 78
End: 2023-10-26

## 2023-10-26 ENCOUNTER — TELEPHONE (OUTPATIENT)
Dept: PALLIATIVE MEDICINE | Facility: CLINIC | Age: 78
End: 2023-10-26

## 2023-10-26 ENCOUNTER — HOSPITAL ENCOUNTER (OUTPATIENT)
Dept: INFUSION CENTER | Facility: CLINIC | Age: 78
Discharge: HOME | End: 2023-10-26

## 2023-10-26 DIAGNOSIS — C34.11 MALIGNANT NEOPLASM OF UPPER LOBE OF RIGHT LUNG (HCC): ICD-10-CM

## 2023-10-26 NOTE — TELEPHONE ENCOUNTER
Attempt to call patient with results. Mailbox is full. Unable to leave message. Please see provider result note for chest xray if patient or spouse call again.

## 2023-10-26 NOTE — TELEPHONE ENCOUNTER
Spoke to patient, he is aware of 11/2 infusion.  Once orders are placed and consent is signed we will schedule remaining cycles on Tuesdays mornings per patients request.

## 2023-10-26 NOTE — TELEPHONE ENCOUNTER
PT spouse called in stated if she can get some kind of referral or help with a hospital bed because he can not get up the steps any more. Would like a call back with any information if possible.

## 2023-10-26 NOTE — TELEPHONE ENCOUNTER
Dr. Bartolo Gomez reports to add patient on schedule next week to see patient regarding changing treatment and signing new consent. Patient reports Tuesday works best at [facility]. I let him know we would add him on for then. He verbalized understanding and has no other questions or concerns at this moment.

## 2023-10-26 NOTE — PROGRESS NOTES
Patient came to clinic today, Nayely Watts RN came up to speak with him. No treatment today as creatinine is elevated. Dr Alvino Dominique wants to have patient come to office next week for a change in treatment. Patient not treated today and walked out of office in stable condition.

## 2023-10-26 NOTE — TELEPHONE ENCOUNTER
Kristel Bernstein was scheduled for oncology nutrition consultation during infusion today. Per chart review, infusion schedule has changed. Will r/s nutrition consult to coordinate with infusion schedule as discussed with pt.

## 2023-10-26 NOTE — TELEPHONE ENCOUNTER
Fluid in his chest is getting worse he probably will need to have it drained again if he is short of breath.  probably will need to see pulmonary but I highly doubt that he will get in with them in a timely manner so he may have to go to the ER

## 2023-10-27 ENCOUNTER — HOME CARE VISIT (OUTPATIENT)
Dept: HOME HEALTH SERVICES | Facility: HOME HEALTHCARE | Age: 78
End: 2023-10-27
Payer: MEDICARE

## 2023-10-27 ENCOUNTER — APPOINTMENT (EMERGENCY)
Dept: RADIOLOGY | Facility: HOSPITAL | Age: 78
End: 2023-10-27
Payer: MEDICARE

## 2023-10-27 ENCOUNTER — HOSPITAL ENCOUNTER (INPATIENT)
Facility: HOSPITAL | Age: 78
LOS: 7 days | Discharge: HOME HEALTH | End: 2023-11-03
Attending: EMERGENCY MEDICINE | Admitting: STUDENT IN AN ORGANIZED HEALTH CARE EDUCATION/TRAINING PROGRAM
Payer: MEDICARE

## 2023-10-27 ENCOUNTER — TELEPHONE (OUTPATIENT)
Dept: HEMATOLOGY ONCOLOGY | Facility: CLINIC | Age: 78
End: 2023-10-27

## 2023-10-27 ENCOUNTER — TELEPHONE (OUTPATIENT)
Dept: HOME HEALTH SERVICES | Facility: HOME HEALTHCARE | Age: 78
End: 2023-10-27

## 2023-10-27 DIAGNOSIS — C34.90 LUNG CANCER (HCC): ICD-10-CM

## 2023-10-27 DIAGNOSIS — J90 PLEURAL EFFUSION: Primary | ICD-10-CM

## 2023-10-27 DIAGNOSIS — J96.01 ACUTE RESPIRATORY FAILURE WITH HYPOXIA (HCC): ICD-10-CM

## 2023-10-27 DIAGNOSIS — R06.00 DYSPNEA: ICD-10-CM

## 2023-10-27 PROBLEM — L03.119 CELLULITIS OF EXTREMITY: Status: ACTIVE | Noted: 2023-10-27

## 2023-10-27 LAB
ABO GROUP BLD: NORMAL
ALBUMIN SERPL BCP-MCNC: 3.3 G/DL (ref 3.5–5)
ALP SERPL-CCNC: 101 U/L (ref 34–104)
ALT SERPL W P-5'-P-CCNC: 40 U/L (ref 7–52)
ANION GAP SERPL CALCULATED.3IONS-SCNC: 9 MMOL/L
AST SERPL W P-5'-P-CCNC: 42 U/L (ref 13–39)
BASOPHILS # BLD AUTO: 0.02 THOUSANDS/ÂΜL (ref 0–0.1)
BASOPHILS NFR BLD AUTO: 0 % (ref 0–1)
BILIRUB SERPL-MCNC: 0.56 MG/DL (ref 0.2–1)
BLD GP AB SCN SERPL QL: NEGATIVE
BNP SERPL-MCNC: 625 PG/ML (ref 0–100)
BUN SERPL-MCNC: 50 MG/DL (ref 5–25)
CALCIUM ALBUM COR SERPL-MCNC: 8.6 MG/DL (ref 8.3–10.1)
CALCIUM SERPL-MCNC: 8 MG/DL (ref 8.4–10.2)
CARDIAC TROPONIN I PNL SERPL HS: 8 NG/L
CHLORIDE SERPL-SCNC: 107 MMOL/L (ref 96–108)
CO2 SERPL-SCNC: 19 MMOL/L (ref 21–32)
CREAT SERPL-MCNC: 1.91 MG/DL (ref 0.6–1.3)
EOSINOPHIL # BLD AUTO: 0.2 THOUSAND/ÂΜL (ref 0–0.61)
EOSINOPHIL NFR BLD AUTO: 2 % (ref 0–6)
ERYTHROCYTE [DISTWIDTH] IN BLOOD BY AUTOMATED COUNT: 17.8 % (ref 11.6–15.1)
FLUAV RNA RESP QL NAA+PROBE: NEGATIVE
FLUBV RNA RESP QL NAA+PROBE: NEGATIVE
GFR SERPL CREATININE-BSD FRML MDRD: 33 ML/MIN/1.73SQ M
GLUCOSE SERPL-MCNC: 115 MG/DL (ref 65–140)
HCT VFR BLD AUTO: 40.3 % (ref 36.5–49.3)
HGB BLD-MCNC: 13 G/DL (ref 12–17)
IMM GRANULOCYTES # BLD AUTO: 0.05 THOUSAND/UL (ref 0–0.2)
IMM GRANULOCYTES NFR BLD AUTO: 1 % (ref 0–2)
LYMPHOCYTES # BLD AUTO: 0.38 THOUSANDS/ÂΜL (ref 0.6–4.47)
LYMPHOCYTES NFR BLD AUTO: 4 % (ref 14–44)
MCH RBC QN AUTO: 33.2 PG (ref 26.8–34.3)
MCHC RBC AUTO-ENTMCNC: 32.3 G/DL (ref 31.4–37.4)
MCV RBC AUTO: 103 FL (ref 82–98)
MONOCYTES # BLD AUTO: 0.57 THOUSAND/ÂΜL (ref 0.17–1.22)
MONOCYTES NFR BLD AUTO: 6 % (ref 4–12)
NEUTROPHILS # BLD AUTO: 8.91 THOUSANDS/ÂΜL (ref 1.85–7.62)
NEUTS SEG NFR BLD AUTO: 87 % (ref 43–75)
NRBC BLD AUTO-RTO: 0 /100 WBCS
PLATELET # BLD AUTO: 124 THOUSANDS/UL (ref 149–390)
PMV BLD AUTO: 12.2 FL (ref 8.9–12.7)
POTASSIUM SERPL-SCNC: 4.8 MMOL/L (ref 3.5–5.3)
PROT SERPL-MCNC: 6.5 G/DL (ref 6.4–8.4)
RBC # BLD AUTO: 3.92 MILLION/UL (ref 3.88–5.62)
RH BLD: POSITIVE
RSV RNA RESP QL NAA+PROBE: NEGATIVE
SARS-COV-2 RNA RESP QL NAA+PROBE: NEGATIVE
SODIUM SERPL-SCNC: 135 MMOL/L (ref 135–147)
SPECIMEN EXPIRATION DATE: NORMAL
WBC # BLD AUTO: 10.13 THOUSAND/UL (ref 4.31–10.16)

## 2023-10-27 PROCEDURE — 93005 ELECTROCARDIOGRAM TRACING: CPT

## 2023-10-27 PROCEDURE — 84484 ASSAY OF TROPONIN QUANT: CPT | Performed by: EMERGENCY MEDICINE

## 2023-10-27 PROCEDURE — 36415 COLL VENOUS BLD VENIPUNCTURE: CPT

## 2023-10-27 PROCEDURE — 80053 COMPREHEN METABOLIC PANEL: CPT | Performed by: EMERGENCY MEDICINE

## 2023-10-27 PROCEDURE — 99285 EMERGENCY DEPT VISIT HI MDM: CPT | Performed by: EMERGENCY MEDICINE

## 2023-10-27 PROCEDURE — 86850 RBC ANTIBODY SCREEN: CPT | Performed by: EMERGENCY MEDICINE

## 2023-10-27 PROCEDURE — 99223 1ST HOSP IP/OBS HIGH 75: CPT | Performed by: INTERNAL MEDICINE

## 2023-10-27 PROCEDURE — 0241U HB NFCT DS VIR RESP RNA 4 TRGT: CPT | Performed by: PHYSICIAN ASSISTANT

## 2023-10-27 PROCEDURE — 86900 BLOOD TYPING SEROLOGIC ABO: CPT | Performed by: EMERGENCY MEDICINE

## 2023-10-27 PROCEDURE — 83880 ASSAY OF NATRIURETIC PEPTIDE: CPT | Performed by: EMERGENCY MEDICINE

## 2023-10-27 PROCEDURE — 99285 EMERGENCY DEPT VISIT HI MDM: CPT

## 2023-10-27 PROCEDURE — 71045 X-RAY EXAM CHEST 1 VIEW: CPT

## 2023-10-27 PROCEDURE — 85025 COMPLETE CBC W/AUTO DIFF WBC: CPT | Performed by: EMERGENCY MEDICINE

## 2023-10-27 PROCEDURE — 86901 BLOOD TYPING SEROLOGIC RH(D): CPT | Performed by: EMERGENCY MEDICINE

## 2023-10-27 RX ORDER — FUROSEMIDE 10 MG/ML
40 INJECTION INTRAMUSCULAR; INTRAVENOUS ONCE
Status: COMPLETED | OUTPATIENT
Start: 2023-10-27 | End: 2023-10-27

## 2023-10-27 RX ORDER — CEPHALEXIN 500 MG/1
500 CAPSULE ORAL 3 TIMES DAILY
Status: DISCONTINUED | OUTPATIENT
Start: 2023-10-27 | End: 2023-10-28

## 2023-10-27 RX ORDER — METOPROLOL TARTRATE 50 MG/1
100 TABLET, FILM COATED ORAL EVERY 12 HOURS SCHEDULED
Status: DISCONTINUED | OUTPATIENT
Start: 2023-10-27 | End: 2023-11-03 | Stop reason: HOSPADM

## 2023-10-27 RX ORDER — PRAVASTATIN SODIUM 20 MG
20 TABLET ORAL
Status: DISCONTINUED | OUTPATIENT
Start: 2023-10-28 | End: 2023-11-03 | Stop reason: HOSPADM

## 2023-10-27 RX ORDER — ACETAMINOPHEN 325 MG/1
650 TABLET ORAL EVERY 6 HOURS PRN
Status: DISCONTINUED | OUTPATIENT
Start: 2023-10-27 | End: 2023-11-03 | Stop reason: HOSPADM

## 2023-10-27 RX ORDER — TIMOLOL MALEATE 5 MG/ML
1 SOLUTION/ DROPS OPHTHALMIC DAILY
Status: DISCONTINUED | OUTPATIENT
Start: 2023-10-28 | End: 2023-11-03 | Stop reason: HOSPADM

## 2023-10-27 RX ORDER — FINASTERIDE 5 MG/1
5 TABLET, FILM COATED ORAL DAILY
Status: DISCONTINUED | OUTPATIENT
Start: 2023-10-28 | End: 2023-11-03 | Stop reason: HOSPADM

## 2023-10-27 RX ORDER — BRIMONIDINE TARTRATE 2 MG/ML
1 SOLUTION/ DROPS OPHTHALMIC 2 TIMES DAILY
Status: DISCONTINUED | OUTPATIENT
Start: 2023-10-28 | End: 2023-11-03 | Stop reason: HOSPADM

## 2023-10-27 RX ORDER — AMIODARONE HYDROCHLORIDE 200 MG/1
200 TABLET ORAL
Status: DISCONTINUED | OUTPATIENT
Start: 2023-10-28 | End: 2023-11-03 | Stop reason: HOSPADM

## 2023-10-27 RX ORDER — WARFARIN SODIUM 4 MG/1
4 TABLET ORAL
Status: DISCONTINUED | OUTPATIENT
Start: 2023-10-28 | End: 2023-10-28

## 2023-10-27 RX ADMIN — FUROSEMIDE 40 MG: 10 INJECTION, SOLUTION INTRAMUSCULAR; INTRAVENOUS at 23:02

## 2023-10-27 RX ADMIN — CEPHALEXIN 500 MG: 500 CAPSULE ORAL at 23:03

## 2023-10-27 RX ADMIN — METOPROLOL TARTRATE 100 MG: 50 TABLET, FILM COATED ORAL at 23:02

## 2023-10-27 NOTE — TELEPHONE ENCOUNTER
Called patient to cancel nursing visit today. Patient is agreeable to next visit on 10/30/23. Patient indicated shortness of breath and that he had an Xray done that indicated he had fluid in lungs. Call forwarded to [address] [place] [address] for further evaluation.

## 2023-10-27 NOTE — TELEPHONE ENCOUNTER
Patient Call    Who are you speaking with? Patient    If it is not the patient, are they listed on an active communication consent form? N/A   What is the reason for this call? Patient calling in regards to symptoms he's experiencing. Patient is having shortness of breath due to fluid in his lungs. Patient would like a call back to discuss symptoms. I advised patient he could go to the ER in regards to the symptoms he's experiencing as well. Does this require a call back? Yes   If a call back is required, please list best call back number [phone]   If a call back is required, advise that a message will be forwarded to their care team and someone will return their call as soon as possible. Did you relay this information to the patient?  Yes

## 2023-10-27 NOTE — TELEPHONE ENCOUNTER
Returned phone call to patient. Patient reports he has been experiencing shortness of breath for the last three days that doesn't seem to get better at rest.  Patient reports he has had fluid on his lungs before and believes that is happening now. I instructed him to go to the ER for evaluation. Patient verbalized understanding and has no other questions or concerns at this moment.

## 2023-10-28 PROBLEM — R79.1 SUPRATHERAPEUTIC INR: Status: ACTIVE | Noted: 2023-10-28

## 2023-10-28 LAB
ALBUMIN SERPL BCP-MCNC: 3.3 G/DL (ref 3.5–5)
ALP SERPL-CCNC: 84 U/L (ref 34–104)
ALT SERPL W P-5'-P-CCNC: 37 U/L (ref 7–52)
ANION GAP SERPL CALCULATED.3IONS-SCNC: 7 MMOL/L
AST SERPL W P-5'-P-CCNC: 21 U/L (ref 13–39)
BASOPHILS # BLD AUTO: 0.03 THOUSANDS/ÂΜL (ref 0–0.1)
BASOPHILS NFR BLD AUTO: 0 % (ref 0–1)
BILIRUB DIRECT SERPL-MCNC: 0.07 MG/DL (ref 0–0.2)
BILIRUB SERPL-MCNC: 0.52 MG/DL (ref 0.2–1)
BUN SERPL-MCNC: 49 MG/DL (ref 5–25)
CALCIUM SERPL-MCNC: 8.1 MG/DL (ref 8.4–10.2)
CHLORIDE SERPL-SCNC: 107 MMOL/L (ref 96–108)
CO2 SERPL-SCNC: 26 MMOL/L (ref 21–32)
CREAT SERPL-MCNC: 1.81 MG/DL (ref 0.6–1.3)
EOSINOPHIL # BLD AUTO: 0.13 THOUSAND/ÂΜL (ref 0–0.61)
EOSINOPHIL NFR BLD AUTO: 2 % (ref 0–6)
ERYTHROCYTE [DISTWIDTH] IN BLOOD BY AUTOMATED COUNT: 17.8 % (ref 11.6–15.1)
GFR SERPL CREATININE-BSD FRML MDRD: 35 ML/MIN/1.73SQ M
GLUCOSE SERPL-MCNC: 94 MG/DL (ref 65–140)
HCT VFR BLD AUTO: 38.1 % (ref 36.5–49.3)
HGB BLD-MCNC: 12.2 G/DL (ref 12–17)
IMM GRANULOCYTES # BLD AUTO: 0.04 THOUSAND/UL (ref 0–0.2)
IMM GRANULOCYTES NFR BLD AUTO: 1 % (ref 0–2)
INR PPP: 5.43 (ref 0.84–1.19)
LYMPHOCYTES # BLD AUTO: 0.41 THOUSANDS/ÂΜL (ref 0.6–4.47)
LYMPHOCYTES NFR BLD AUTO: 5 % (ref 14–44)
MCH RBC QN AUTO: 32.5 PG (ref 26.8–34.3)
MCHC RBC AUTO-ENTMCNC: 32 G/DL (ref 31.4–37.4)
MCV RBC AUTO: 102 FL (ref 82–98)
MONOCYTES # BLD AUTO: 0.51 THOUSAND/ÂΜL (ref 0.17–1.22)
MONOCYTES NFR BLD AUTO: 6 % (ref 4–12)
NEUTROPHILS # BLD AUTO: 6.85 THOUSANDS/ÂΜL (ref 1.85–7.62)
NEUTS SEG NFR BLD AUTO: 86 % (ref 43–75)
NRBC BLD AUTO-RTO: 0 /100 WBCS
PLATELET # BLD AUTO: 105 THOUSANDS/UL (ref 149–390)
PMV BLD AUTO: 12.2 FL (ref 8.9–12.7)
POTASSIUM SERPL-SCNC: 4 MMOL/L (ref 3.5–5.3)
PROT SERPL-MCNC: 5.6 G/DL (ref 6.4–8.4)
PROTHROMBIN TIME: 50.7 SECONDS (ref 11.6–14.5)
RBC # BLD AUTO: 3.75 MILLION/UL (ref 3.88–5.62)
SODIUM SERPL-SCNC: 140 MMOL/L (ref 135–147)
WBC # BLD AUTO: 7.97 THOUSAND/UL (ref 4.31–10.16)

## 2023-10-28 PROCEDURE — 80048 BASIC METABOLIC PNL TOTAL CA: CPT | Performed by: INTERNAL MEDICINE

## 2023-10-28 PROCEDURE — 85025 COMPLETE CBC W/AUTO DIFF WBC: CPT | Performed by: INTERNAL MEDICINE

## 2023-10-28 PROCEDURE — 80076 HEPATIC FUNCTION PANEL: CPT | Performed by: INTERNAL MEDICINE

## 2023-10-28 PROCEDURE — 99232 SBSQ HOSP IP/OBS MODERATE 35: CPT | Performed by: NURSE PRACTITIONER

## 2023-10-28 PROCEDURE — 85610 PROTHROMBIN TIME: CPT | Performed by: INTERNAL MEDICINE

## 2023-10-28 RX ORDER — FUROSEMIDE 10 MG/ML
40 INJECTION INTRAMUSCULAR; INTRAVENOUS
Status: DISCONTINUED | OUTPATIENT
Start: 2023-10-28 | End: 2023-10-29

## 2023-10-28 RX ADMIN — AMIODARONE HYDROCHLORIDE 200 MG: 200 TABLET ORAL at 11:54

## 2023-10-28 RX ADMIN — TIMOLOL MALEATE 1 DROP: 5 SOLUTION/ DROPS OPHTHALMIC at 10:00

## 2023-10-28 RX ADMIN — PRAVASTATIN SODIUM 20 MG: 20 TABLET ORAL at 15:47

## 2023-10-28 RX ADMIN — BRIMONIDINE TARTRATE 1 DROP: 2 SOLUTION/ DROPS OPHTHALMIC at 18:42

## 2023-10-28 RX ADMIN — AMIODARONE HYDROCHLORIDE 200 MG: 200 TABLET ORAL at 15:48

## 2023-10-28 RX ADMIN — METOPROLOL TARTRATE 100 MG: 50 TABLET, FILM COATED ORAL at 10:00

## 2023-10-28 RX ADMIN — CEPHALEXIN 500 MG: 500 CAPSULE ORAL at 10:00

## 2023-10-28 RX ADMIN — BRIMONIDINE TARTRATE 1 DROP: 2 SOLUTION/ DROPS OPHTHALMIC at 10:00

## 2023-10-28 RX ADMIN — AMIODARONE HYDROCHLORIDE 200 MG: 200 TABLET ORAL at 10:00

## 2023-10-28 RX ADMIN — FUROSEMIDE 40 MG: 10 INJECTION, SOLUTION INTRAMUSCULAR; INTRAVENOUS at 15:47

## 2023-10-28 RX ADMIN — FINASTERIDE 5 MG: 5 TABLET, FILM COATED ORAL at 10:00

## 2023-10-28 RX ADMIN — METOPROLOL TARTRATE 100 MG: 50 TABLET, FILM COATED ORAL at 20:45

## 2023-10-28 NOTE — ASSESSMENT & PLAN NOTE
Recently diagnosed with associated right lower extremity DVT  Currently on Coumadin  INR currently pending  We will continue

## 2023-10-28 NOTE — H&P
[address]  H&P  Name: Carlos Conte 69 y.o. 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 Case was discussed at that time recommended to hold off Pleurx catheter continue as needed outpatient thoracentesis  Presented with worsening shortness of breath, tachypnea, tachycardia likely in the setting of recurrent pleural effusions  Consult IR for thoracentesis  May benefit from Pleurx catheter versus continued repeat thoracentesis every 2 weeks    Cellulitis of extremity  Assessment & Plan  Noted to have questionable lower extremity cellulitis bilaterally and was initated on keflex  Patient does have notable erythema on exam but not warm to touch   Wbc wnl  Will continue with keflex for now    HILARY (acute kidney injury) [facility])  Assessment & Plan  Previous baseline noted to be approximately 1.1 in July on recent admission creatinine noted to be approximately 1.6-1.8 and remained stable creatinine today noted to be 1.9  Possibly in the setting of volume overload  Kidney function might be patient's new true baseline  Will provide a dose of IV Lasix  Repeat BMP in am    Atrial fibrillation ([address])  Assessment & Plan  Currently rate controlled  Continue amiodarone and Lopressor  Continue Coumadin    Malignant neoplasm of upper lobe of right lung [facility])  Assessment & Plan  Patient with history of malignant metastatic stage IV lung cancer status post radiation and chemotherapy  Remains treatment focused  Continue outpatient follow-up with oncology    Multiple subsegmental pulmonary emboli without acute cor pulmonale ([address])  Assessment & Plan   Recently diagnosed with associated right lower extremity DVT  Currently on Coumadin  INR currently pending  We will continue    BPH associated with nocturia  Assessment & Plan   Continue  Proscar    Glaucoma  Assessment & Plan  Continue home eyedrops    Controlled type 2 diabetes mellitus without complication, without long-term current use of insulin ([facility])  Assessment & Plan    Lab Results   Component Value Date    HGBA1C 6.0 10/23/2023       No results for input(s): "POCGLU" in the last 72 hours. Blood Sugar Average: Last 72 hrs:    Blood glucose currently controlled  Hold home metformin  Sliding scale             VTE Prophylaxis: Warfarin (Coumadin)  / sequential compression device   Code Status: dnr/dni  POLST: There is no POLST form on file for this patient (pre-hospital)  Discussion with family: patient    Anticipated Length of Stay:  Patient will be admitted on an Inpatient basis with an anticipated length of stay of  > 2 midnights. Justification for Hospital Stay: Pleural effusion    Total Time for Visit, including Counseling / Coordination of Care: 60 minutes. Greater than 50% of this total time spent on direct patient counseling and coordination of care. Chief Complaint:   Shortness of breath    History of Present Illness:    Gordy Cabot is a 69 y.o. male with history of atrial fibrillation, PE on Coumadin, stage IV lung cancer on chemotherapy status post radiation initially presented with shortness of breath. Patient reports worsening shortness of breath over the past few days that acutely worsened in the past day. Otherwise denies any acute complaints. Denies chest pain, fevers, chills, cough, abdominal pain, nausea, vomiting or any other complaints this time. Review of Systems:    Review of Systems   Constitutional:  Negative for appetite change, chills, diaphoresis, fatigue, fever and unexpected weight change. HENT:  Negative for congestion, rhinorrhea and sore throat. Eyes:  Negative for photophobia and visual disturbance. Respiratory:  Positive for shortness of breath. Negative for cough and wheezing. Cardiovascular:  Negative for chest pain, palpitations and leg swelling. Gastrointestinal:  Negative for abdominal pain, anal bleeding, blood in stool, constipation, diarrhea, nausea and vomiting. Genitourinary:  Negative for decreased urine volume, difficulty urinating, dysuria, flank pain, frequency, hematuria and urgency. Musculoskeletal:  Negative for arthralgias, back pain, joint swelling and myalgias. Skin:  Negative for color change and rash. Neurological:  Negative for dizziness, seizures, facial asymmetry, speech difficulty, numbness and headaches. Psychiatric/Behavioral:  Negative for agitation, confusion and decreased concentration. The patient is not nervous/anxious. Past Medical and Surgical History:     Past Medical History:   Diagnosis Date    Diabetes mellitus ([address] [facility])     Hyperlipidemia     Hypertension     Lung cancer ([address] [facility])     Prostate cancer ([facility] [address] [facility]) 2005    S/P prostate ca-2005 had radiation tx. Past Surgical History:   Procedure Laterality Date    COLONOSCOPY      IR BIOPSY LYMPH NODE  9/15/2022    IR THORACENTESIS  9/13/2022    IR THORACENTESIS  10/5/2023    IR THORACENTESIS  10/13/2023    NE ARTHRODESIS ANKLE OPEN Right 7/10/2020    Procedure: ARTHRODESIS/ TIBIAL-TALAR ANKLE FUSION;  Surgeon: Tony Castro MD;  Location: [facility];  Service: Orthopedics    NE LAPAROSCOPY SURG RPR INITIAL INGUINAL HERNIA Bilateral 12/16/2021    Procedure: LAPAROSCOPIC REPAIR HERNIA INGUINAL WITH MESH;  Surgeon: Shravan Sargent MD;  Location: [facility];  Service: General       Meds/Allergies:    Prior to Admission medications    Medication Sig Start Date End Date Taking?  Authorizing Provider   Accu-Chek FastClix Lancets MISC Use daily E11.9  Check  fbs  daily 4/24/23   Dona Segovia PA-C   amiodarone 200 mg tablet Take 1 tablet (200 mg total) by mouth 3 (three) times a day with meals 10/16/23 11/15/23  Julieth Bermudez MD   brimonidine tartrate 0.2 % ophthalmic solution Administer 1 drop into the left eye 2 (two) times a day    Historical Provider, MD   cephalexin (KEFLEX) 500 mg capsule Take 1 capsule (500 mg total) by mouth 3 (three) times a day for 10 days 10/23/23 11/2/23  Carolina Melchor MD   dexamethasone (DECADRON) 4 mg tablet Take 1 tablet (4 mg total) by mouth 2 (two) times a day with meals Take the day before treatment, the day of treatment and the day after treatment. Patient not taking: Reported on 10/23/2023 9/11/23   Jerman Odell MD   finasteride (PROSCAR) 5 mg tablet TAKE 1 TABLET (5 MG TOTAL) BY MOUTH DAILY.   Patient taking differently: Take 5 mg by mouth daily 4/19/23   Danielle Aguilera PA-C   folic acid (KP Folic Acid) 1 mg tablet Take 1 tablet (1 mg total) by mouth daily 9/11/23 10/23/23  Jerman Odell MD   glucose blood (Accu-Chek Jackie Plus) test strip Use as instructed 4/24/23   Danielle Aguilera PA-C   metFORMIN (GLUCOPHAGE-XR) 500 mg 24 hr tablet TAKE 1 TABLET BY MOUTH EVERY DAY 9/27/23   Danielle Aguilera PA-C   metoprolol tartrate (LOPRESSOR) 100 mg tablet Take 1 tablet (100 mg total) by mouth every 12 (twelve) hours 10/16/23 11/15/23  Julieth Bermudez MD   ondansetron (ZOFRAN) 8 mg tablet Take 1 tablet (8 mg total) by mouth every 8 (eight) hours as needed for nausea or vomiting 9/19/23   Jerman Odell MD   Osimertinib Mesylate 80 MG TABS Take 80 mg by mouth in the morning    Historical Provider, MD   simvastatin (ZOCOR) 10 mg tablet TAKE 1 TABLET BY MOUTH EVERY DAY 6/12/23   Danielle Aguilera PA-C   timolol (TIMOPTIC-XE) 0.5 % ophthalmic gel-forming 1 drop daily    Historical Provider, MD   travoprost (TRAVATAN-Z) 0.004 % ophthalmic solution 1 drop daily at bedtime  Patient not taking: Reported on 4/24/2023    Historical Provider, MD   warfarin (Coumadin) 4 mg tablet Take 1 tablet (4 mg total) by mouth daily 10/24/23   Danielle Aguilera PA-C     I have reviewed home medications with patient personally. Allergies: No Known Allergies    Social History:     Marital Status: /Civil Union     Patient Pre-hospital Living Situation: home  Patient Pre-hospital Level of Mobility: w assistance  Patient Pre-hospital Diet Restrictions: dm  Substance Use History:   Social History     Substance and Sexual Activity   Alcohol Use Yes    Comment: Occasional     Social History     Tobacco Use   Smoking Status Never   Smokeless Tobacco Never     Social History     Substance and Sexual Activity   Drug Use Never       Family History:    Family History   Problem Relation Age of Onset    Heart attack Mother        Physical Exam:     Vitals:   Blood Pressure: 131/91 (10/27/23 2100)  Pulse: (!) 115 (10/27/23 2100)  Temperature: 97.8 °F (36.6 °C) (10/27/23 2036)  Temp Source: Oral (10/27/23 2036)  Respirations: 18 (10/27/23 2100)  SpO2: 98 % (10/27/23 2100)    Physical Exam  Constitutional:       General: He is not in acute distress. Appearance: He is well-developed. He is not diaphoretic. HENT:      Head: Normocephalic and atraumatic. Nose: Nose normal.      Mouth/Throat:      Pharynx: No oropharyngeal exudate. Eyes:      General: No scleral icterus. Conjunctiva/sclera: Conjunctivae normal.   Cardiovascular:      Rate and Rhythm: Normal rate and regular rhythm. Heart sounds: Normal heart sounds. No murmur heard. No friction rub. No gallop. Pulmonary:      Effort: Pulmonary effort is normal. No respiratory distress. Breath sounds: Normal breath sounds. No wheezing or rales. Chest:      Chest wall: No tenderness. Abdominal:      General: Bowel sounds are normal. There is no distension. Palpations: Abdomen is soft. Tenderness: There is no abdominal tenderness. There is no guarding. Musculoskeletal:         General: No tenderness or deformity. Normal range of motion. Cervical back: Normal range of motion and neck supple.    Skin:     General: Skin is warm and dry. Findings: No erythema. Neurological:      Mental Status: He is alert. Mental status is at baseline. Additional Data:     Lab Results: I have personally reviewed pertinent reports. Results from last 7 days   Lab Units 10/27/23  1919   WBC Thousand/uL 10.13   HEMOGLOBIN g/dL 13.0   HEMATOCRIT % 40.3   PLATELETS Thousands/uL 124*   NEUTROS PCT % 87*   LYMPHS PCT % 4*   MONOS PCT % 6   EOS PCT % 2     Results from last 7 days   Lab Units 10/27/23  1919   SODIUM mmol/L 135   POTASSIUM mmol/L 4.8   CHLORIDE mmol/L 107   CO2 mmol/L 19*   BUN mg/dL 50*   CREATININE mg/dL 1.91*   ANION GAP mmol/L 9   CALCIUM mg/dL 8.0*   ALBUMIN g/dL 3.3*   TOTAL BILIRUBIN mg/dL 0.56   ALK PHOS U/L 101   ALT U/L 40   AST U/L 42*   GLUCOSE RANDOM mg/dL 115     Results from last 7 days   Lab Units 10/23/23  1529   INR  4.19*         Results from last 7 days   Lab Units 10/23/23  1651   HEMOGLOBIN A1C  6.0           Imaging: I have personally reviewed pertinent reports. XR chest 1 view portable   ED Interpretation by Donavon Peña MD (10/27 1945)   Right pleural effusion      IR IN-Patient Thoracentesis    (Results Pending)       EKG, Pathology, and Other Studies Reviewed on Admission:   EKG: reviewed    Allscripts / Epic Records Reviewed: Yes     ** Please Note: This note has been constructed using a voice recognition system.  **

## 2023-10-28 NOTE — ASSESSMENT & PLAN NOTE
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

## 2023-10-28 NOTE — ASSESSMENT & PLAN NOTE
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

## 2023-10-28 NOTE — ASSESSMENT & PLAN NOTE
Patient with history of malignant metastatic stage IV lung cancer status post radiation and chemotherapy  Remains treatment focused  Continue outpatient follow-up with oncology

## 2023-10-28 NOTE — PROGRESS NOTES
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 Case was discussed at that time recommended to hold off Pleurx catheter continue as needed outpatient thoracentesis  Presented with worsening shortness of breath, tachypnea, tachycardia likely in the setting of recurrent pleural effusions  Consult IR for thoracentesis  May benefit from Pleurx catheter versus continued repeat thoracentesis every 2 weeks  Appreciate IR recs     Multiple subsegmental pulmonary emboli without acute cor pulmonale ([address])  Assessment & Plan   Recently diagnosed with associated right lower extremity DVT  Currently on Coumadin  INR 5.43 hold further coumadin dose  No active signs of bleeding  Check INR in am. If supratherapeutic may need FFP with heparin gtt for thoracentesis likely on Monday   Continue to monitor INR  INR in am    Atrial fibrillation ([address])  Assessment & Plan  Currently rate controlled  Continue amiodarone and Lopressor  Hold Coumadin    Cellulitis of extremity  Assessment & Plan  Noted to have questionable lower extremity cellulitis bilaterally and was initated on keflex out patient  With edema could be vasculitis  Will order ace wrap to BLE and elevation  Wbc wnl  Discontinue keflex and monitor off antibiotics.      HILARY (acute kidney injury) ([address])  Assessment & Plan  Previous baseline noted to be approximately 1.1 in July on recent admission creatinine noted to be approximately 1.6-1.8 and remained stable creatinine on admission noted to be 1.9  Possibly in the setting of volume overload  Kidney function might be patient's new true baseline  Creatinine improved to 1.81  Patient still with SOB will continue IV lasix 40 bid for now  Repeat BMP in am    Malignant neoplasm of upper lobe of right lung [facility])  Assessment & Plan  Patient with history of malignant metastatic stage IV lung cancer status post radiation and chemotherapy  Remains treatment focused  Continue outpatient follow-up with oncology    BPH associated with nocturia  Assessment & Plan   Continue  Proscar    Glaucoma  Assessment & Plan  Continue home eyedrops    Controlled type 2 diabetes mellitus without complication, without long-term current use of insulin ([facility])  Assessment & Plan    Lab Results   Component Value Date    HGBA1C 6.0 10/23/2023       No results for input(s): "POCGLU" in the last 72 hours. Blood Sugar Average: Last 72 hrs:    Blood glucose currently controlled  Hold home metformin  Continue Sliding scale and blood glucose monitoring                  VTE Pharmacologic Prophylaxis: VTE Score: 6 High Risk (Score >/= 5) - Pharmacological DVT Prophylaxis Contraindicated. Sequential Compression Devices Ordered. Patient Centered Rounds: I performed bedside rounds with nursing staff today. Discussions with Specialists or Other Care Team Provider: none    Education and Discussions with Family / Patient: Updated  (wife and daughter) at bedside. Total Time Spent on Date of Encounter in care of patient: 35 mins. This time was spent on one or more of the following: performing physical exam; counseling and coordination of care; obtaining or reviewing history; documenting in the medical record; reviewing/ordering tests, medications or procedures; communicating with other healthcare professionals and discussing with patient's family/caregivers.     Current Length of Stay: 1 day(s)  Current Patient Status: Inpatient   Certification Statement: The patient will continue to require additional inpatient hospital stay due to pleural effusions shortness of breath volume overload need for thoracentesis and IV diuretics   Discharge Plan: Anticipate discharge in 48-72 hrs to home. Code Status: Level 3 - DNAR and DNI    Subjective:   Patient reporting ongoing shortness of breath. Legs red painful persist even though been on antibiotics. Plan discussed with patient and family questions answered at this time. Objective:     Vitals:   Temp (24hrs), Av.6 °F (36.4 °C), Min:97.1 °F (36.2 °C), Max:98.2 °F (36.8 °C)    Temp:  [97.1 °F (36.2 °C)-98.2 °F (36.8 °C)] 97.5 °F (36.4 °C)  HR:  [104-129] 120  Resp:  [18-28] 20  BP: (131-147)/() 135/99  SpO2:  [96 %-99 %] 97 %  Body mass index is 27.15 kg/m². Input and Output Summary (last 24 hours): Intake/Output Summary (Last 24 hours) at 10/28/2023 1329  Last data filed at 10/28/2023 0441  Gross per 24 hour   Intake 300 ml   Output 1375 ml   Net -1075 ml       Physical Exam:   Physical Exam  Vitals and nursing note reviewed. Constitutional:       General: He is not in acute distress. Appearance: He is well-developed. HENT:      Head: Normocephalic and atraumatic. Eyes:      Conjunctiva/sclera: Conjunctivae normal.   Cardiovascular:      Rate and Rhythm: Normal rate and regular rhythm. Heart sounds: No murmur heard. Pulmonary:      Effort: Pulmonary effort is normal. No respiratory distress. Breath sounds: Examination of the right-upper field reveals decreased breath sounds. Examination of the right-middle field reveals decreased breath sounds. Examination of the right-lower field reveals decreased breath sounds. Decreased breath sounds present. Abdominal:      Palpations: Abdomen is soft. Tenderness: There is no abdominal tenderness. Musculoskeletal:         General: No swelling. Cervical back: Neck supple. Right lower leg: Edema present. Left lower leg: Edema present. Skin:     General: Skin is warm and dry. Capillary Refill: Capillary refill takes less than 2 seconds.       Findings: Erythema present. Neurological:      Mental Status: He is alert and oriented to person, place, and time. Psychiatric:         Mood and Affect: Mood normal.        Additional Data:     Labs:  Results from last 7 days   Lab Units 10/28/23  0446   WBC Thousand/uL 7.97   HEMOGLOBIN g/dL 12.2   HEMATOCRIT % 38.1   PLATELETS Thousands/uL 105*   NEUTROS PCT % 86*   LYMPHS PCT % 5*   MONOS PCT % 6   EOS PCT % 2     Results from last 7 days   Lab Units 10/28/23  0446   SODIUM mmol/L 140   POTASSIUM mmol/L 4.0   CHLORIDE mmol/L 107   CO2 mmol/L 26   BUN mg/dL 49*   CREATININE mg/dL 1.81*   ANION GAP mmol/L 7   CALCIUM mg/dL 8.1*   ALBUMIN g/dL 3.3*   TOTAL BILIRUBIN mg/dL 0.52   ALK PHOS U/L 84   ALT U/L 37   AST U/L 21   GLUCOSE RANDOM mg/dL 94     Results from last 7 days   Lab Units 10/28/23  0446   INR  5.43*         Results from last 7 days   Lab Units 10/23/23  1651   HEMOGLOBIN A1C  6.0           Lines/Drains:  Invasive Devices       Peripheral Intravenous Line  Duration             Peripheral IV 10/27/23 Left Hand <1 day                          Imaging: Reviewed radiology reports from this admission including: chest xray    Recent Cultures (last 7 days):         Last 24 Hours Medication List:   Current Facility-Administered Medications   Medication Dose Route Frequency Provider Last Rate    acetaminophen  650 mg Oral Q6H PRN Hugh Salazar MD      amiodarone  200 mg Oral TID With Meals Hugh Salazar MD      brimonidine tartrate  1 drop Left Eye BID Hugh Salazar MD      finasteride  5 mg Oral Daily Hugh Salazar MD      furosemide  40 mg Intravenous BID (diuretic) RADHA Kent      metoprolol tartrate  100 mg Oral Q12H [address] Hugh Salazar MD      pravastatin  20 mg Oral Daily With Darion Mclean MD      timolol  1 drop Both Eyes Daily Donal Rome MD          Today, Patient Was Seen By: RADHA Kent    **Please Note: This note may have been constructed using a voice recognition system. **

## 2023-10-28 NOTE — ASSESSMENT & PLAN NOTE
Previous baseline noted to be approximately 1.1 in July on recent admission creatinine noted to be approximately 1.6-1.8 and remained stable creatinine on admission noted to be 1.9  Possibly in the setting of volume overload  Kidney function might be patient's new true baseline  Creatinine improved to 1.81  Patient still with SOB will continue IV lasix 40 bid for now  Repeat BMP in am

## 2023-10-28 NOTE — PLAN OF CARE
Problem: INFECTION - ADULT  Goal: Absence or prevention of progression during hospitalization  Description: INTERVENTIONS:  - Assess and monitor for signs and symptoms of infection  - Monitor lab/diagnostic results  - Monitor all insertion sites, i.e. indwelling lines, tubes, and drains  - Monitor endotracheal if appropriate and nasal secretions for changes in amount and color  - [place] appropriate cooling/warming therapies per order  - Administer medications as ordered  - Instruct and encourage patient and family to use good hand hygiene technique  - Identify and instruct in appropriate isolation precautions for identified infection/condition  Outcome: Progressing

## 2023-10-28 NOTE — ED NOTES
Attempted stick for remaining bloodwork x2 unsuccessfully. Tech to attempt at this time.       Damian Roblero, ORTEGA  10/27/23 2112

## 2023-10-28 NOTE — ASSESSMENT & PLAN NOTE
Noted to have questionable lower extremity cellulitis bilaterally and was initated on keflex out patient  With edema could be vasculitis  Will order ace wrap to BLE and elevation  Wbc wnl  Discontinue keflex and monitor off antibiotics.

## 2023-10-28 NOTE — ASSESSMENT & PLAN NOTE
Lab Results   Component Value Date    HGBA1C 6.0 10/23/2023       No results for input(s): "POCGLU" in the last 72 hours.     Blood Sugar Average: Last 72 hrs:    Blood glucose currently controlled  Hold home metformin  Continue Sliding scale and blood glucose monitoring

## 2023-10-28 NOTE — ASSESSMENT & PLAN NOTE
Lab Results   Component Value Date    HGBA1C 6.0 10/23/2023       No results for input(s): "POCGLU" in the last 72 hours.     Blood Sugar Average: Last 72 hrs:    Blood glucose currently controlled  Hold home metformin  Sliding scale

## 2023-10-28 NOTE — ASSESSMENT & PLAN NOTE
Recently diagnosed with associated right lower extremity DVT  Currently on Coumadin  INR 5.43 hold further coumadin dose  No active signs of bleeding  Check INR in am. If supratherapeutic may need FFP with heparin gtt for thoracentesis likely on Monday   Continue to monitor INR  INR in am

## 2023-10-28 NOTE — ASSESSMENT & PLAN NOTE
Previous baseline noted to be approximately 1.1 in July on recent admission creatinine noted to be approximately 1.6-1.8 and remained stable creatinine today noted to be 1.9  Possibly in the setting of volume overload  Kidney function might be patient's new true baseline  Will provide a dose of IV Lasix  Repeat BMP in am

## 2023-10-29 ENCOUNTER — HOME CARE VISIT (OUTPATIENT)
Dept: HOME HEALTH SERVICES | Facility: HOME HEALTHCARE | Age: 78
End: 2023-10-29
Payer: MEDICARE

## 2023-10-29 LAB
ANION GAP SERPL CALCULATED.3IONS-SCNC: 7 MMOL/L
APTT PPP: 49 SECONDS (ref 23–37)
BUN SERPL-MCNC: 53 MG/DL (ref 5–25)
CALCIUM SERPL-MCNC: 8.1 MG/DL (ref 8.4–10.2)
CHLORIDE SERPL-SCNC: 108 MMOL/L (ref 96–108)
CO2 SERPL-SCNC: 26 MMOL/L (ref 21–32)
CREAT SERPL-MCNC: 1.92 MG/DL (ref 0.6–1.3)
ERYTHROCYTE [DISTWIDTH] IN BLOOD BY AUTOMATED COUNT: 17.8 % (ref 11.6–15.1)
GFR SERPL CREATININE-BSD FRML MDRD: 32 ML/MIN/1.73SQ M
GLUCOSE SERPL-MCNC: 127 MG/DL (ref 65–140)
GLUCOSE SERPL-MCNC: 142 MG/DL (ref 65–140)
GLUCOSE SERPL-MCNC: 156 MG/DL (ref 65–140)
GLUCOSE SERPL-MCNC: 205 MG/DL (ref 65–140)
HCT VFR BLD AUTO: 38.2 % (ref 36.5–49.3)
HGB BLD-MCNC: 12.3 G/DL (ref 12–17)
INR PPP: 2.59 (ref 0.84–1.19)
INR PPP: 5.93 (ref 0.84–1.19)
MCH RBC QN AUTO: 33 PG (ref 26.8–34.3)
MCHC RBC AUTO-ENTMCNC: 32.2 G/DL (ref 31.4–37.4)
MCV RBC AUTO: 102 FL (ref 82–98)
PLATELET # BLD AUTO: 104 THOUSANDS/UL (ref 149–390)
PMV BLD AUTO: 11.9 FL (ref 8.9–12.7)
POTASSIUM SERPL-SCNC: 4.1 MMOL/L (ref 3.5–5.3)
PROTHROMBIN TIME: 28.7 SECONDS (ref 11.6–14.5)
PROTHROMBIN TIME: 54.3 SECONDS (ref 11.6–14.5)
RBC # BLD AUTO: 3.73 MILLION/UL (ref 3.88–5.62)
SODIUM SERPL-SCNC: 141 MMOL/L (ref 135–147)
WBC # BLD AUTO: 7.57 THOUSAND/UL (ref 4.31–10.16)

## 2023-10-29 PROCEDURE — 85730 THROMBOPLASTIN TIME PARTIAL: CPT | Performed by: INTERNAL MEDICINE

## 2023-10-29 PROCEDURE — 85027 COMPLETE CBC AUTOMATED: CPT | Performed by: NURSE PRACTITIONER

## 2023-10-29 PROCEDURE — P9017 PLASMA 1 DONOR FRZ W/IN 8 HR: HCPCS

## 2023-10-29 PROCEDURE — 82948 REAGENT STRIP/BLOOD GLUCOSE: CPT

## 2023-10-29 PROCEDURE — 80048 BASIC METABOLIC PNL TOTAL CA: CPT | Performed by: NURSE PRACTITIONER

## 2023-10-29 PROCEDURE — 99232 SBSQ HOSP IP/OBS MODERATE 35: CPT | Performed by: NURSE PRACTITIONER

## 2023-10-29 PROCEDURE — 30233L1 TRANSFUSION OF NONAUTOLOGOUS FRESH PLASMA INTO PERIPHERAL VEIN, PERCUTANEOUS APPROACH: ICD-10-PCS | Performed by: INTERNAL MEDICINE

## 2023-10-29 PROCEDURE — 85610 PROTHROMBIN TIME: CPT | Performed by: NURSE PRACTITIONER

## 2023-10-29 RX ORDER — FUROSEMIDE 10 MG/ML
40 INJECTION INTRAMUSCULAR; INTRAVENOUS ONCE
Status: COMPLETED | OUTPATIENT
Start: 2023-10-29 | End: 2023-10-29

## 2023-10-29 RX ORDER — FUROSEMIDE 10 MG/ML
40 INJECTION INTRAMUSCULAR; INTRAVENOUS DAILY
Status: DISCONTINUED | OUTPATIENT
Start: 2023-10-29 | End: 2023-10-31

## 2023-10-29 RX ORDER — INSULIN LISPRO 100 [IU]/ML
1-5 INJECTION, SOLUTION INTRAVENOUS; SUBCUTANEOUS
Status: DISCONTINUED | OUTPATIENT
Start: 2023-10-29 | End: 2023-11-03 | Stop reason: HOSPADM

## 2023-10-29 RX ORDER — FUROSEMIDE 10 MG/ML
20 INJECTION INTRAMUSCULAR; INTRAVENOUS ONCE
Status: DISCONTINUED | OUTPATIENT
Start: 2023-10-29 | End: 2023-10-29

## 2023-10-29 RX ORDER — INSULIN LISPRO 100 [IU]/ML
1-5 INJECTION, SOLUTION INTRAVENOUS; SUBCUTANEOUS
Status: DISCONTINUED | OUTPATIENT
Start: 2023-10-29 | End: 2023-10-29

## 2023-10-29 RX ADMIN — FINASTERIDE 5 MG: 5 TABLET, FILM COATED ORAL at 09:18

## 2023-10-29 RX ADMIN — BRIMONIDINE TARTRATE 1 DROP: 2 SOLUTION/ DROPS OPHTHALMIC at 18:35

## 2023-10-29 RX ADMIN — AMIODARONE HYDROCHLORIDE 200 MG: 200 TABLET ORAL at 18:35

## 2023-10-29 RX ADMIN — PRAVASTATIN SODIUM 20 MG: 20 TABLET ORAL at 18:35

## 2023-10-29 RX ADMIN — METOPROLOL TARTRATE 100 MG: 50 TABLET, FILM COATED ORAL at 21:48

## 2023-10-29 RX ADMIN — INSULIN LISPRO 1 UNITS: 100 INJECTION, SOLUTION INTRAVENOUS; SUBCUTANEOUS at 18:34

## 2023-10-29 RX ADMIN — AMIODARONE HYDROCHLORIDE 200 MG: 200 TABLET ORAL at 09:16

## 2023-10-29 RX ADMIN — TIMOLOL MALEATE 1 DROP: 5 SOLUTION/ DROPS OPHTHALMIC at 09:16

## 2023-10-29 RX ADMIN — AMIODARONE HYDROCHLORIDE 200 MG: 200 TABLET ORAL at 13:22

## 2023-10-29 RX ADMIN — FUROSEMIDE 40 MG: 10 INJECTION, SOLUTION INTRAMUSCULAR; INTRAVENOUS at 09:16

## 2023-10-29 RX ADMIN — METOPROLOL TARTRATE 100 MG: 50 TABLET, FILM COATED ORAL at 09:16

## 2023-10-29 RX ADMIN — FUROSEMIDE 40 MG: 10 INJECTION, SOLUTION INTRAMUSCULAR; INTRAVENOUS at 22:12

## 2023-10-29 RX ADMIN — BRIMONIDINE TARTRATE 1 DROP: 2 SOLUTION/ DROPS OPHTHALMIC at 09:16

## 2023-10-29 NOTE — ASSESSMENT & PLAN NOTE
Recently diagnosed with associated right lower extremity DVT  Currently on Coumadin  INR 5.43 > 5.93 hold further coumadin doses  No active signs of bleeding  See plan under INR  Continue to monitor INR  INR in am

## 2023-10-29 NOTE — ASSESSMENT & PLAN NOTE
Previous baseline noted to be approximately 1.1 in July on recent admission creatinine noted to be approximately 1.6-1.8 and remained stable creatinine on admission noted to be 1.9  Possibly in the setting of volume overload  Kidney function might be patient's new true baseline  Creatinine 1.9 today   Decrease lasix IV to 40 mg daily  Repeat BMP in am

## 2023-10-29 NOTE — ASSESSMENT & PLAN NOTE
Noted to have questionable lower extremity cellulitis bilaterally and was initated on keflex out patient  With edema could be vasculitis  Continue ace wrap to BLE and elevation  Edema and erythema improved with 24 hours of elevation and ace wraps  Wbc wnl  Discontinued keflex and monitor off antibiotics.

## 2023-10-29 NOTE — ASSESSMENT & PLAN NOTE
INR now 5.93  Given need for thoracentesis will give a dose of FFP now repeat INR  If below 2 will start heparin gtt   Then repeat INR tomorrow may require second dose of FFP

## 2023-10-29 NOTE — ASSESSMENT & PLAN NOTE
Lab Results   Component Value Date    HGBA1C 6.0 10/23/2023       No results for input(s): "POCGLU" in the last 72 hours.     Blood Sugar Average: Last 72 hrs:    ?Blood glucose currently controlled  Hold home metformin  Continue Sliding scale and blood glucose monitoring

## 2023-10-29 NOTE — ASSESSMENT & PLAN NOTE
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    Consult IR for thoracentesis  May benefit from Pleurx catheter versus continued repeat thoracentesis every 2 weeks  Appreciate IR recs

## 2023-10-29 NOTE — PROGRESS NOTES
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 Consult IR for thoracentesis  May benefit from Pleurx catheter versus continued repeat thoracentesis every 2 weeks  Appreciate IR recs     Multiple subsegmental pulmonary emboli without acute cor pulmonale (HCC)  Assessment & Plan   Recently diagnosed with associated right lower extremity DVT  Currently on Coumadin  INR 5.43 > 5.93 hold further coumadin doses  No active signs of bleeding  See plan under INR  Continue to monitor INR  INR in am    Atrial fibrillation (HCC)  Assessment & Plan  Currently rate controlled  Continue amiodarone and Lopressor  Hold Coumadin    Supratherapeutic INR  Assessment & Plan  INR now 5.93  Given need for thoracentesis will give a dose of FFP now repeat INR  If below 2 will start heparin gtt   Then repeat INR tomorrow may require second dose of FFP     Cellulitis of extremity  Assessment & Plan  Noted to have questionable lower extremity cellulitis bilaterally and was initated on keflex out patient  With edema could be vasculitis  Continue ace wrap to BLE and elevation  Edema and erythema improved with 24 hours of elevation and ace wraps  Wbc wnl  Discontinued keflex and monitor off antibiotics.      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 [facility])  Assessment & Plan   Patient with history of malignant metastatic    Malignant neoplasm of upper lobe of right lung [facility])  Assessment & Plan  Patient with history of malignant metastatic stage IV lung cancer status post radiation and chemotherapy  Remains treatment focused  Continue outpatient follow-up with oncology    BPH associated with nocturia  Assessment & Plan   Continue  Proscar    Glaucoma  Assessment & Plan  Continue home eyedrops    Controlled type 2 diabetes mellitus without complication, without long-term current use of insulin (HCC)  Assessment & Plan    Lab Results   Component Value Date    HGBA1C 6.0 10/23/2023       No results for input(s): "POCGLU" in the last 72 hours. Blood Sugar Average: Last 72 hrs:    Blood glucose currently controlled  Hold home metformin  Continue Sliding scale and blood glucose monitoring                  VTE Pharmacologic Prophylaxis: VTE Score: 6 High Risk (Score >/= 5) - Pharmacological DVT Prophylaxis Contraindicated. Sequential Compression Devices Ordered. Patient Centered Rounds: I performed bedside rounds with nursing staff today. Discussions with Specialists or Other Care Team Provider: none    Education and Discussions with Family / Patient: Updated  (daughter) at bedside. Total Time Spent on Date of Encounter in care of patient: 25 mins.  This time was spent on one or more of the following: performing physical exam; counseling and coordination of care; obtaining or reviewing history; documenting in the medical record; reviewing/ordering tests, medications or procedures; communicating with other healthcare professionals and discussing with patient's family/caregivers. Current Length of Stay: 2 day(s)  Current Patient Status: Inpatient   Certification Statement: The patient will continue to require additional inpatient hospital stay due to pleural effusions with need for IR intervention  Discharge Plan: Anticipate discharge in 24-48 hrs to home. Code Status: Level 3 - DNAR and DNI    Subjective:   Patient reports breathing feels better today but remains difficult at times. Patient reports legs look and feel better with ace wraps. Discussion about INR and need for possible thoracentesis explained patient agreeable and signed consent for FFP. Objective:     Vitals:   Temp (24hrs), Av.4 °F (36.3 °C), Min:97.2 °F (36.2 °C), Max:97.5 °F (36.4 °C)    Temp:  [97.2 °F (36.2 °C)-97.5 °F (36.4 °C)] 97.5 °F (36.4 °C)  HR:  [] 106  Resp:  [18] 18  BP: (104-141)/(76-90) 141/90  SpO2:  [94 %-98 %] 96 %  Body mass index is 28 kg/m². Input and Output Summary (last 24 hours): Intake/Output Summary (Last 24 hours) at 10/29/2023 1310  Last data filed at 10/29/2023 1159  Gross per 24 hour   Intake 680 ml   Output 1475 ml   Net -795 ml       Physical Exam:   Physical Exam  Vitals and nursing note reviewed. Constitutional:       General: He is not in acute distress. Appearance: He is well-developed. HENT:      Head: Normocephalic and atraumatic. Eyes:      Conjunctiva/sclera: Conjunctivae normal.   Cardiovascular:      Rate and Rhythm: Normal rate and regular rhythm. Heart sounds: No murmur heard. Pulmonary:      Effort: Pulmonary effort is normal. No respiratory distress. Breath sounds: Examination of the right-upper field reveals decreased breath sounds. Examination of the right-middle field reveals decreased breath sounds. Examination of the right-lower field reveals decreased breath sounds.  Decreased breath sounds present. Abdominal:      Palpations: Abdomen is soft. Tenderness: There is no abdominal tenderness. Musculoskeletal:         General: No swelling. Cervical back: Neck supple. Right lower leg: Edema present. Left lower leg: Edema present. Skin:     General: Skin is warm and dry. Capillary Refill: Capillary refill takes less than 2 seconds. Findings: Erythema present. Neurological:      Mental Status: He is alert and oriented to person, place, and time. Psychiatric:         Mood and Affect: Mood normal.          Additional Data:     Labs:  Results from last 7 days   Lab Units 10/29/23  0502 10/28/23  0446   WBC Thousand/uL 7.57 7.97   HEMOGLOBIN g/dL 12.3 12.2   HEMATOCRIT % 38.2 38.1   PLATELETS Thousands/uL 104* 105*   NEUTROS PCT %  --  86*   LYMPHS PCT %  --  5*   MONOS PCT %  --  6   EOS PCT %  --  2     Results from last 7 days   Lab Units 10/29/23  0502 10/28/23  0446   SODIUM mmol/L 141 140   POTASSIUM mmol/L 4.1 4.0   CHLORIDE mmol/L 108 107   CO2 mmol/L 26 26   BUN mg/dL 53* 49*   CREATININE mg/dL 1.92* 1.81*   ANION GAP mmol/L 7 7   CALCIUM mg/dL 8.1* 8.1*   ALBUMIN g/dL  --  3.3*   TOTAL BILIRUBIN mg/dL  --  0.52   ALK PHOS U/L  --  84   ALT U/L  --  37   AST U/L  --  21   GLUCOSE RANDOM mg/dL 156* 94     Results from last 7 days   Lab Units 10/29/23  0502   INR  5.93*     Results from last 7 days   Lab Units 10/29/23  1130   POC GLUCOSE mg/dl 127     Results from last 7 days   Lab Units 10/23/23  1651   HEMOGLOBIN A1C  6.0           Lines/Drains:  Invasive Devices       Peripheral Intravenous Line  Duration             Peripheral IV 10/29/23 Right Antecubital <1 day                          Imaging: No pertinent imaging reviewed.     Recent Cultures (last 7 days):         Last 24 Hours Medication List:   Current Facility-Administered Medications   Medication Dose Route Frequency Provider Last Rate    acetaminophen  650 mg Oral Q6H PRN Annalisa Márquez MD amiodarone  200 mg Oral TID With Meals Hugh Salazar MD      brimonidine tartrate  1 drop Left Eye BID Hugh Salazar MD      finasteride  5 mg Oral Daily Hugh Salazar MD      furosemide  40 mg Intravenous Daily Margo Lombard, CRNP      insulin lispro  1-5 Units Subcutaneous HS Margo Lombard, CRNP      insulin lispro  1-5 Units Subcutaneous TID AC RADHA Morelos      metoprolol tartrate  100 mg Oral Q12H [facility] & half-way Hugh Salazar MD      pravastatin  20 mg Oral Daily With Nas Monroy MD      timolol  1 drop Both Eyes Daily Diana Fisher MD          Today, Patient Was Seen By: Margo Lombard, CRNP    **Please Note: This note may have been constructed using a voice recognition system. **

## 2023-10-30 ENCOUNTER — APPOINTMENT (OUTPATIENT)
Dept: NON INVASIVE DIAGNOSTICS | Facility: HOSPITAL | Age: 78
End: 2023-10-30
Attending: INTERNAL MEDICINE
Payer: MEDICARE

## 2023-10-30 DIAGNOSIS — C34.11 MALIGNANT NEOPLASM OF UPPER LOBE OF RIGHT LUNG (HCC): Primary | ICD-10-CM

## 2023-10-30 LAB
25(OH)D3 SERPL-MCNC: 34 NG/ML (ref 30–100)
ABO GROUP BLD BPU: NORMAL
ANION GAP SERPL CALCULATED.3IONS-SCNC: 6 MMOL/L
APPEARANCE FLD: CLEAR
BPU ID: NORMAL
BUN SERPL-MCNC: 49 MG/DL (ref 5–25)
CALCIUM SERPL-MCNC: 8.3 MG/DL (ref 8.4–10.2)
CHLORIDE SERPL-SCNC: 104 MMOL/L (ref 96–108)
CO2 SERPL-SCNC: 31 MMOL/L (ref 21–32)
COLOR FLD: NORMAL
CREAT SERPL-MCNC: 1.75 MG/DL (ref 0.6–1.3)
ERYTHROCYTE [DISTWIDTH] IN BLOOD BY AUTOMATED COUNT: 17.6 % (ref 11.6–15.1)
GFR SERPL CREATININE-BSD FRML MDRD: 36 ML/MIN/1.73SQ M
GLUCOSE FLD-MCNC: 165 MG/DL
GLUCOSE SERPL-MCNC: 126 MG/DL (ref 65–140)
GLUCOSE SERPL-MCNC: 129 MG/DL (ref 65–140)
GLUCOSE SERPL-MCNC: 130 MG/DL (ref 65–140)
GLUCOSE SERPL-MCNC: 149 MG/DL (ref 65–140)
GLUCOSE SERPL-MCNC: 154 MG/DL (ref 65–140)
HCT VFR BLD AUTO: 39.3 % (ref 36.5–49.3)
HGB BLD-MCNC: 12.5 G/DL (ref 12–17)
INR PPP: 2.67 (ref 0.84–1.19)
LDH FLD L TO P-CCNC: 191 U/L
MCH RBC QN AUTO: 32.7 PG (ref 26.8–34.3)
MCHC RBC AUTO-ENTMCNC: 31.8 G/DL (ref 31.4–37.4)
MCV RBC AUTO: 103 FL (ref 82–98)
PH BODY FLUID: 7.8
PLATELET # BLD AUTO: 113 THOUSANDS/UL (ref 149–390)
PMV BLD AUTO: 11.8 FL (ref 8.9–12.7)
POTASSIUM SERPL-SCNC: 3.9 MMOL/L (ref 3.5–5.3)
PROT FLD-MCNC: <3 G/DL
PROTHROMBIN TIME: 29.4 SECONDS (ref 11.6–14.5)
PTH-INTACT SERPL-MCNC: 136.6 PG/ML (ref 12–88)
RBC # BLD AUTO: 3.82 MILLION/UL (ref 3.88–5.62)
SITE: NORMAL
SODIUM SERPL-SCNC: 141 MMOL/L (ref 135–147)
UNIT DISPENSE STATUS: NORMAL
UNIT PRODUCT CODE: NORMAL
UNIT PRODUCT VOLUME: 328 ML
UNIT RH: NORMAL
WBC # BLD AUTO: 8.86 THOUSAND/UL (ref 4.31–10.16)
WBC # FLD MANUAL: 218 /UL

## 2023-10-30 PROCEDURE — 87205 SMEAR GRAM STAIN: CPT | Performed by: INTERNAL MEDICINE

## 2023-10-30 PROCEDURE — 82948 REAGENT STRIP/BLOOD GLUCOSE: CPT

## 2023-10-30 PROCEDURE — 88305 TISSUE EXAM BY PATHOLOGIST: CPT | Performed by: PATHOLOGY

## 2023-10-30 PROCEDURE — 85610 PROTHROMBIN TIME: CPT | Performed by: NURSE PRACTITIONER

## 2023-10-30 PROCEDURE — 32555 ASPIRATE PLEURA W/ IMAGING: CPT

## 2023-10-30 PROCEDURE — 80048 BASIC METABOLIC PNL TOTAL CA: CPT | Performed by: NURSE PRACTITIONER

## 2023-10-30 PROCEDURE — 94761 N-INVAS EAR/PLS OXIMETRY MLT: CPT

## 2023-10-30 PROCEDURE — 89051 BODY FLUID CELL COUNT: CPT | Performed by: INTERNAL MEDICINE

## 2023-10-30 PROCEDURE — 82945 GLUCOSE OTHER FLUID: CPT | Performed by: INTERNAL MEDICINE

## 2023-10-30 PROCEDURE — 99232 SBSQ HOSP IP/OBS MODERATE 35: CPT | Performed by: NURSE PRACTITIONER

## 2023-10-30 PROCEDURE — 83986 ASSAY PH BODY FLUID NOS: CPT | Performed by: INTERNAL MEDICINE

## 2023-10-30 PROCEDURE — 87070 CULTURE OTHR SPECIMN AEROBIC: CPT | Performed by: INTERNAL MEDICINE

## 2023-10-30 PROCEDURE — 84157 ASSAY OF PROTEIN OTHER: CPT | Performed by: INTERNAL MEDICINE

## 2023-10-30 PROCEDURE — 83970 ASSAY OF PARATHORMONE: CPT | Performed by: NURSE PRACTITIONER

## 2023-10-30 PROCEDURE — 83615 LACTATE (LD) (LDH) ENZYME: CPT | Performed by: INTERNAL MEDICINE

## 2023-10-30 PROCEDURE — 88112 CYTOPATH CELL ENHANCE TECH: CPT | Performed by: PATHOLOGY

## 2023-10-30 PROCEDURE — 88342 IMHCHEM/IMCYTCHM 1ST ANTB: CPT | Performed by: PATHOLOGY

## 2023-10-30 PROCEDURE — 0W993ZZ DRAINAGE OF RIGHT PLEURAL CAVITY, PERCUTANEOUS APPROACH: ICD-10-PCS | Performed by: RADIOLOGY

## 2023-10-30 PROCEDURE — 88341 IMHCHEM/IMCYTCHM EA ADD ANTB: CPT | Performed by: PATHOLOGY

## 2023-10-30 PROCEDURE — 85027 COMPLETE CBC AUTOMATED: CPT | Performed by: NURSE PRACTITIONER

## 2023-10-30 PROCEDURE — 32555 ASPIRATE PLEURA W/ IMAGING: CPT | Performed by: RADIOLOGY

## 2023-10-30 PROCEDURE — 82306 VITAMIN D 25 HYDROXY: CPT | Performed by: NURSE PRACTITIONER

## 2023-10-30 RX ORDER — LIDOCAINE HYDROCHLORIDE 10 MG/ML
INJECTION, SOLUTION EPIDURAL; INFILTRATION; INTRACAUDAL; PERINEURAL AS NEEDED
Status: COMPLETED | OUTPATIENT
Start: 2023-10-30 | End: 2023-10-30

## 2023-10-30 RX ADMIN — FUROSEMIDE 40 MG: 10 INJECTION, SOLUTION INTRAMUSCULAR; INTRAVENOUS at 09:57

## 2023-10-30 RX ADMIN — AMIODARONE HYDROCHLORIDE 200 MG: 200 TABLET ORAL at 18:37

## 2023-10-30 RX ADMIN — INSULIN LISPRO 1 UNITS: 100 INJECTION, SOLUTION INTRAVENOUS; SUBCUTANEOUS at 18:38

## 2023-10-30 RX ADMIN — LIDOCAINE HYDROCHLORIDE 10 ML: 10 INJECTION, SOLUTION EPIDURAL; INFILTRATION; INTRACAUDAL; PERINEURAL at 11:58

## 2023-10-30 RX ADMIN — BRIMONIDINE TARTRATE 1 DROP: 2 SOLUTION/ DROPS OPHTHALMIC at 18:39

## 2023-10-30 RX ADMIN — AMIODARONE HYDROCHLORIDE 200 MG: 200 TABLET ORAL at 09:39

## 2023-10-30 RX ADMIN — METOPROLOL TARTRATE 100 MG: 50 TABLET, FILM COATED ORAL at 09:36

## 2023-10-30 RX ADMIN — FINASTERIDE 5 MG: 5 TABLET, FILM COATED ORAL at 09:36

## 2023-10-30 RX ADMIN — TIMOLOL MALEATE 1 DROP: 5 SOLUTION/ DROPS OPHTHALMIC at 09:37

## 2023-10-30 RX ADMIN — METOPROLOL TARTRATE 100 MG: 50 TABLET, FILM COATED ORAL at 21:42

## 2023-10-30 RX ADMIN — AMIODARONE HYDROCHLORIDE 200 MG: 200 TABLET ORAL at 14:01

## 2023-10-30 RX ADMIN — BRIMONIDINE TARTRATE 1 DROP: 2 SOLUTION/ DROPS OPHTHALMIC at 09:36

## 2023-10-30 RX ADMIN — PRAVASTATIN SODIUM 20 MG: 20 TABLET ORAL at 18:37

## 2023-10-30 NOTE — CASE MANAGEMENT
Case Management Assessment & Discharge Planning Note    Patient name Marlon Pierreocent  Location [address] [facility] [place] 230/-01 MRN [id number]  : 1945 Date 10/30/2023       Current Admission Date: 10/27/2023  Current Admission Diagnosis:Pleural effusion   Patient Active Problem List    Diagnosis Date Noted    Supratherapeutic INR 10/28/2023    Cellulitis of extremity 10/27/2023    Cellulitis and abscess of left lower extremity 10/23/2023    Cellulitis of leg, right 10/23/2023    COPD with acute exacerbation ([address]) 10/05/2023    Atrial fibrillation ([address]) 10/03/2023    HILARY (acute kidney injury) ([address]) 10/03/2023    Abnormal CT of the abdomen 10/03/2023    Platelets decreased ([address]) 2023    Multiple lung nodules on CT 10/06/2022    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 ([address]) 10/06/2022    Pleural effusion 10/06/2022    Malignant neoplasm of upper lobe of right lung [facility])     Multiple subsegmental pulmonary emboli without acute cor pulmonale ([address]) 09/10/2022    Pulmonary mass 09/10/2022    Anemia 09/10/2022    Non-recurrent bilateral inguinal hernia without obstruction or gangrene 2021    Status post right tibial-talar ankle fusion 2020    BPH associated with nocturia 2019    Arthritis of right acromioclavicular joint 03/15/2019    Primary osteoarthritis of right shoulder 2019    Chronic left shoulder pain 2019    Left rotator cuff tear arthropathy 2019    Rotator cuff injury, right, initial encounter 2019    History of colon polyps 2019    Hyperparathyroidism ([address]) 2018    Hypokalemia 10/22/2018    Hypocalcemia 10/22/2018    Glaucoma 10/11/2018    Controlled type 2 diabetes mellitus without complication, without long-term current use of insulin ([address]) 2016    Inguinal hernia 2016    Benign essential HTN 2016    Hypercholesterolemia 2016      LOS (days): 3  Geometric Mean LOS (GMLOS) (days): 4.90  Days to [facility] OBJECTIVE:  PATIENT READMITTED TO HOSPITAL  Risk of Unplanned Readmission Score: 30.58         Current admission status: Inpatient       Preferred Pharmacy:   [address] [facility] [address] [facility]  [address] [address] [place] [facility] [address]  Phone: [phone] Fax: 12 [address], [address] [facility] [address] [facility] [address] [facility] [address]  Phone: [phone] Fax: [phone]    [facility]   [facility] [address]  Phone: [phone] Fax: [phone]    Primary Care Provider: Danielle Aguilera PA-C    Primary Insurance: MEDICARE  Secondary Insurance: [facility]:  Active Health Care Proxies       [facility], [address] [facility] 22 Representative - Spouse   Primary Phone: [phone] [facility]  Home Phone: [phone]                 Advance Directives  Does patient have a [address]?: No  Was patient offered paperwork?: Yes (Family declined.)  Does patient currently have a Health Care decision maker?: Yes, please see Health Care Proxy section  Does patient have Advance Directives?: No  Was patient offered paperwork?: Yes (Family declined.)  Primary Contact: Patient's Wife Meg Feldman)    Readmission Root Cause  30 Day Readmission: Yes  Who directed you to return to the hospital?: Family  Did you understand whom to contact if you had questions or problems?: Yes  Did you get your prescriptions before you left the hospital?: Refused to provide information  Were you able to get your prescriptions filled when you left the hospital?: Refused to provide information  Did you take your medications as prescribed?: Refused to provide information  Were you able to get to your follow-up appointments?: Refused to provide information  Patient was readmitted due to: chest discomfort and shortness of breath  Action Plan: return to patient's daughter's home with DME, home care, and private hire caregivers    Patient Information  Admitted from[de-identified] Home  Mental Status: Alert  During Assessment patient was accompanied by: Daughter  Assessment information provided by[de-identified] Daughter  Primary Caregiver: Self  Support Systems: Self, Spouse/significant other, [address] of Residence: [facility] [address] [facility] do you live in?: [place], [address] [facility] entry access options. Select all that apply.: Stairs  Number of steps to enter home. : 1  Do the steps have railings?: No  Type of Current Residence: 2 story home  Upon entering residence, is there a bedroom on the main floor (no further steps)?: No  A bedroom is located on the following floor levels of residence (select all that apply):: 2nd Floor  Upon entering residence, is there a bathroom on the main floor (no further steps)?: No  Indicate which floors of current residence have a bathroom (select all the apply):: 2nd Floor  Number of steps to 2nd floor from main floor: One Flight (12)  In the last 12 months, was there a time when you were not able to pay the mortgage or rent on time?: No  In the last 12 months, how many places have you lived?: 1  In the last 12 months, was there a time when you did not have a steady place to sleep or slept in a shelter (including now)?: No  Homeless/housing insecurity resource given?: N/A  Living Arrangements: Lives w/ Spouse/significant other  Is patient a ?: Yes  Is patient active with [facility])?: No    Activities of Daily Living Prior to Admission  Functional Status: Independent  Completes ADLs independently?: Yes  Ambulates independently?: Yes  Does patient use assisted devices?: No  Does patient currently own DME?: Yes  What DME does the patient currently own?: Straight Cane, Walker, Bedside Commode  Does patient have a history of Outpatient Therapy (PT/OT)?: No  Does the patient have a history of Short-Term Rehab?: No  Does patient have a history of HHC?: No  Does patient currently have [facility]?: No    Patient Information Continued  Income Source: Pension/halfway  Does patient have prescription coverage?: Yes  Within the past 12 months, you worried that your food would run out before you got the money to buy more.: Never true  Within the past 12 months, the food you bought just didn't last and you didn't have money to get more.: Never true  Food insecurity resource given?: N/A  Does patient receive dialysis treatments?: No  Does patient have a history of substance abuse?: No  Does patient have a history of Mental Health Diagnosis?: No    PHQ 2/9 Screening   Reviewed PHQ 2/9 Depression Screening Score?: No    Means of Transportation  Means of Transport to Appts[de-identified] Family transport  In the past 12 months, has lack of transportation kept you from medical appointments or from getting medications?: No  In the past 12 months, has lack of transportation kept you from meetings, work, or from getting things needed for daily living?: No  Was application for public transport provided?: N/A    DISCHARGE DETAILS:    Discharge planning discussed with[de-identified] Patient's Daughter  Freedom of Choice: Yes  Comments - Freedom of Choice: Patient asleep at bedside and unable to wake. CM discussed freedom of choice as it pertains to discharge planning. Patient's daughter reported that patient will be discharging to her home located at [facility]. She requested home care, [facility] referral, a hospital bed, and information for a private hire caregiver. Patient's wife will provide transportation at discharge. CM contacted family/caregiver?: Yes  Were Treatment Team discharge recommendations reviewed with patient/caregiver?: Yes  Did patient/caregiver verbalize understanding of patient care needs?: Yes  Were patient/caregiver advised of the risks associated with not following Treatment Team discharge recommendations?: Yes    Contacts  Patient Contacts: Ghulam Grady  Relationship to Patient[de-identified] Family  Contact Method:  In Person  Reason/Outcome: Continuity of Care, Discharge [facility]         Is the patient interested in [facility] at discharge?: Yes  [facility] requested[de-identified] Physical Therapy, Occupational Therapy, [address] Agency Name[de-identified] Other ([place] referral sent.)  [facility] Provider[de-identified] PCP  Home Health Services Needed[de-identified] Evaluate Functional Status and Safety, Gait/ADL Training, Strengthening/Theraputic Exercises to Improve Function, Diabetes Management  Homebound Criteria Met[de-identified] Requires the Assistance of Another Person for Safe Ambulation or to Leave the Home, Uses an Assist Device (i.e. cane, walker, etc)  Supporting Clincal Findings[de-identified] Fatigues Easliy in [employer], Limited Endurance    DME Referral Provided  Referral made for DME?: Yes  DME referral completed for the following items[de-identified] Hospital Bed  DME Supplier Name[de-identified] [employer]    Other Referral/Resources/Interventions Provided:  Interventions: HHC, HHA, DME  Referral Comments: CM sent a blanket referral to [facility] agencies to determine who can accept patient at his daughter's home in [place] or consequences. CM also sent an email to [facility] requesting a HHA evaluation. Referral sent to [address] [place] [address] requesting a hospital bed.     Would you like to participate in our [address] service program?  : No - Declined    Treatment Team Recommendation: Home with [address]  Discharge Destination Plan[de-identified] Home with [address] [place] [address] at Discharge : Family

## 2023-10-30 NOTE — ASSESSMENT & PLAN NOTE
HR fluctuating suspect in setting of pleural effusion   Continue amiodarone and Lopressor with parameters   Hold Coumadin can likely resume tonight

## 2023-10-30 NOTE — ASSESSMENT & PLAN NOTE
Lab Results   Component Value Date    HGBA1C 6.0 10/23/2023       Recent Labs     10/29/23  1130 10/29/23  1553 10/29/23  2123   POCGLU 127 205* 142*       Blood Sugar Average: Last 72 hrs:    (P) 158Blood glucose currently controlled  Hold home metformin  Continue Sliding scale and blood glucose monitoring

## 2023-10-30 NOTE — ED PROVIDER NOTES
History  Chief Complaint   Patient presents with    Shortness of Breath     Chest discomfort and sob. Pt states he often needs fluid drained from his lungs. Last drained 2 weeks ago. Sob worse with exertion. Also currently being treated for cellulitis bilat lower ext       Shortness of Breath      Prior to Admission Medications   Prescriptions Last Dose Informant Patient Reported? Taking? Accu-Chek FastClix Lancets MISC 10/28/2023 Self No Yes   Sig: Use daily E11.9  Check  fbs  daily   Osimertinib Mesylate 80 MG TABS 10/28/2023 Self Yes Yes   Sig: Take 80 mg by mouth in the morning   amiodarone 200 mg tablet 10/28/2023  No Yes   Sig: Take 1 tablet (200 mg total) by mouth 3 (three) times a day with meals   brimonidine tartrate 0.2 % ophthalmic solution 10/28/2023 Self Yes Yes   Sig: Administer 1 drop into the left eye 2 (two) times a day   cephalexin (KEFLEX) 500 mg capsule 10/28/2023  No Yes   Sig: Take 1 capsule (500 mg total) by mouth 3 (three) times a day for 10 days   dexamethasone (DECADRON) 4 mg tablet Not Taking  No No   Sig: Take 1 tablet (4 mg total) by mouth 2 (two) times a day with meals Take the day before treatment, the day of treatment and the day after treatment. Patient not taking: Reported on 10/23/2023   finasteride (PROSCAR) 5 mg tablet 10/27/2023 Self No Yes   Sig: TAKE 1 TABLET (5 MG TOTAL) BY MOUTH DAILY.    Patient taking differently: Take 5 mg by mouth daily   folic acid ( Folic Acid) 1 mg tablet   No No   Sig: Take 1 tablet (1 mg total) by mouth daily   glucose blood (Accu-Chek Jackie Plus) test strip 10/28/2023 Self No Yes   Sig: Use as instructed   metFORMIN (GLUCOPHAGE-XR) 500 mg 24 hr tablet   No No   Sig: TAKE 1 TABLET BY MOUTH EVERY DAY   metoprolol tartrate (LOPRESSOR) 100 mg tablet   No No   Sig: Take 1 tablet (100 mg total) by mouth every 12 (twelve) hours   ondansetron (ZOFRAN) 8 mg tablet Past Month  No Yes   Sig: Take 1 tablet (8 mg total) by mouth every 8 (eight) hours as needed for nausea or vomiting   simvastatin (ZOCOR) 10 mg tablet 10/28/2023 Self No Yes   Sig: TAKE 1 TABLET BY MOUTH EVERY DAY   timolol (TIMOPTIC-XE) 0.5 % ophthalmic gel-forming 10/28/2023 Self Yes Yes   Si drop daily   travoprost (TRAVATAN-Z) 0.004 % ophthalmic solution Not Taking Self Yes No   Si drop daily at bedtime   Patient not taking: Reported on 2023   warfarin (Coumadin) 4 mg tablet Not Taking  No No   Sig: Take 1 tablet (4 mg total) by mouth daily   Patient not taking: Reported on 10/28/2023      Facility-Administered Medications: None       Past Medical History:   Diagnosis Date    Diabetes mellitus ([facility])     Hyperlipidemia     Hypertension     Lung cancer ([facility])     Prostate cancer ([facility])     S/P prostate ca-2005 had radiation tx. Past Surgical History:   Procedure Laterality Date    COLONOSCOPY      IR BIOPSY LYMPH NODE  9/15/2022    IR THORACENTESIS  2022    IR THORACENTESIS  10/5/2023    IR THORACENTESIS  10/13/2023    RI ARTHRODESIS ANKLE OPEN Right 7/10/2020    Procedure: ARTHRODESIS/ TIBIAL-TALAR ANKLE FUSION;  Surgeon: Marshal Chadwick MD;  Location: [facility];  Service: Orthopedics    RI LAPAROSCOPY SURG RPR INITIAL INGUINAL HERNIA Bilateral 2021    Procedure: LAPAROSCOPIC REPAIR HERNIA INGUINAL WITH MESH;  Surgeon: Iantha Bamberger, MD;  Location: [facility];  Service: General       Family History   Problem Relation Age of Onset    Heart attack Mother      I have reviewed and agree with the history as documented. E-Cigarette/Vaping    E-Cigarette Use Never User      E-Cigarette/Vaping Substances     Social History     Tobacco Use    Smoking status: Never    Smokeless tobacco: Never   Vaping Use    Vaping Use: Never used   Substance Use Topics    Alcohol use: Yes     Comment: Occasional    Drug use: Never       Review of Systems   Respiratory:  Positive for shortness of breath.         Physical Exam  Physical Exam  Constitutional:       Appearance: He is ill-appearing. HENT:      Head: Normocephalic and atraumatic. Eyes:      Extraocular Movements: Extraocular movements intact. Cardiovascular:      Rate and Rhythm: Tachycardia present. Rhythm irregular. Pulmonary:      Effort: Tachypnea and accessory muscle usage present. Breath sounds: Examination of the right-middle field reveals decreased breath sounds. Examination of the right-lower field reveals decreased breath sounds. Decreased breath sounds present. Abdominal:      General: There is distension. Tenderness: There is no abdominal tenderness. Musculoskeletal:      Right lower leg: Edema (to mid thigh) present. Left lower leg: Edema (to knee) present. Skin:     Coloration: Skin is pale. Neurological:      General: No focal deficit present. Mental Status: He is alert and oriented to person, place, and time. Cranial Nerves: No cranial nerve deficit. Motor: No weakness.    Psychiatric:         Mood and Affect: Mood normal.         Vital Signs  ED Triage Vitals   Temperature Pulse Respirations Blood Pressure SpO2   10/27/23 1853 10/27/23 1853 10/27/23 1853 10/27/23 1900 10/27/23 1853   97.8 °F (36.6 °C) (!) 123 (!) 28 147/98 96 %      Temp Source Heart Rate Source Patient Position - Orthostatic VS BP Location FiO2 (%)   10/27/23 1853 10/27/23 1900 10/27/23 1900 10/27/23 1900 --   Temporal Monitor Sitting Left arm       Pain Score       10/27/23 2257       No Pain           Vitals:    10/30/23 0105 10/30/23 0956 10/30/23 1144 10/30/23 1206   BP: 129/87 127/82 111/76 110/73   Pulse: (!) 107 (!) 113 94 88   Patient Position - Orthostatic VS: Lying            Visual Acuity  Visual Acuity      Flowsheet Row Most Recent Value   L Pupil Size (mm) 3   R Pupil Size (mm) 3   L Pupil Shape Round   R Pupil Shape Round            ED Medications  Medications   acetaminophen (TYLENOL) tablet 650 mg (has no administration in time range)   amiodarone tablet 200 mg (200 mg Oral Given 10/30/23 0939)   finasteride (PROSCAR) tablet 5 mg (5 mg Oral Given 10/30/23 0936)   brimonidine tartrate 0.2 % ophthalmic solution 1 drop (1 drop Left Eye Given 10/30/23 0936)   metoprolol tartrate (LOPRESSOR) tablet 100 mg (100 mg Oral Given 10/30/23 0936)   pravastatin (PRAVACHOL) tablet 20 mg (20 mg Oral Given 10/29/23 1835)   timolol (TIMOPTIC) 0.5 % ophthalmic solution 1 drop (1 drop Both Eyes Given 10/30/23 0937)   furosemide (LASIX) injection 40 mg (40 mg Intravenous Given 10/30/23 0957)   insulin lispro (HumaLOG) 100 units/mL subcutaneous injection 1-5 Units ( Subcutaneous Not Given 10/29/23 2140)   insulin lispro (HumaLOG) 100 units/mL subcutaneous injection 1-5 Units ( Subcutaneous Not Given 10/30/23 0936)   furosemide (LASIX) injection 40 mg (40 mg Intravenous Given 10/27/23 2302)   furosemide (LASIX) injection 40 mg (40 mg Intravenous Given 10/29/23 2212)   lidocaine (PF) (XYLOCAINE-MPF) 1 % injection (10 mL Infiltration Given 10/30/23 1158)       Diagnostic Studies  Results Reviewed       Procedure Component Value Units Date/Time    Basic metabolic panel [[id number]]  (Abnormal) Collected: 10/28/23 0446    Lab Status: Final result Specimen: Blood from Arm, Right Updated: 10/28/23 0526     Sodium 140 mmol/L      Potassium 4.0 mmol/L      Chloride 107 mmol/L      CO2 26 mmol/L      ANION GAP 7 mmol/L      BUN 49 mg/dL      Creatinine 1.81 mg/dL      Glucose 94 mg/dL      Calcium 8.1 mg/dL      eGFR 35 ml/min/1.73sq m     Narrative:      [place] guidelines for Chronic Kidney Disease (CKD):     Stage 1 with normal or high GFR (GFR > 90 mL/min/1.73 square meters)    Stage 2 Mild CKD (GFR = 60-89 mL/min/1.73 square meters)    Stage 3A Moderate CKD (GFR = 45-59 mL/min/1.73 square meters)    Stage 3B Moderate CKD (GFR = 30-44 mL/min/1.73 square meters)    Stage 4 Severe CKD (GFR = 15-29 mL/min/1.73 square meters)    Stage 5 End Stage CKD (GFR <15 mL/min/1.73 square meters)  Note: GFR calculation is accurate only with a steady state creatinine    Hepatic function panel [[id number]]  (Abnormal) Collected: 10/28/23 0446    Lab Status: Final result Specimen: Blood from Arm, Right Updated: 10/28/23 0526     Total Bilirubin 0.52 mg/dL      Bilirubin, Direct 0.07 mg/dL      Alkaline Phosphatase 84 U/L      AST 21 U/L      ALT 37 U/L      Total Protein 5.6 g/dL      Albumin 3.3 g/dL     Protime-INR [[id number]]  (Abnormal) Collected: 10/28/23 0446    Lab Status: Final result Specimen: Blood from Arm, Right Updated: 10/28/23 0524     Protime 50.7 seconds      INR 5.43    CBC and differential [[id number]]  (Abnormal) Collected: 10/28/23 0446    Lab Status: Final result Specimen: Blood from Arm, Right Updated: 10/28/23 0507     WBC 7.97 Thousand/uL      RBC 3.75 Million/uL      Hemoglobin 12.2 g/dL      Hematocrit 38.1 %       fL      MCH 32.5 pg      MCHC 32.0 g/dL      RDW 17.8 %      MPV 12.2 fL      Platelets 587 Thousands/uL      nRBC 0 /100 WBCs      Neutrophils Relative 86 %      Immat GRANS % 1 %      Lymphocytes Relative 5 %      Monocytes Relative 6 %      Eosinophils Relative 2 %      Basophils Relative 0 %      Neutrophils Absolute 6.85 Thousands/µL      Immature Grans Absolute 0.04 Thousand/uL      Lymphocytes Absolute 0.41 Thousands/µL      Monocytes Absolute 0.51 Thousand/µL      Eosinophils Absolute 0.13 Thousand/µL      Basophils Absolute 0.03 Thousands/µL     COVID/FLU/RSV [[id number]]  (Normal) Collected: 10/27/23 2218    Lab Status: Final result Specimen: Nares from Nose Updated: 10/27/23 2305     SARS-CoV-2 Negative     INFLUENZA A PCR Negative     INFLUENZA B PCR Negative     RSV PCR Negative    Narrative:      FOR PEDIATRIC PATIENTS - copy/paste COVID Guidelines URL to browser: [email or web address]    SARS-CoV-2 assay is a Nucleic Acid Amplification assay intended for the  qualitative detection of nucleic acid from SARS-CoV-2 in nasopharyngeal  swabs. Results are for the presumptive identification of SARS-CoV-2 RNA. Positive results are indicative of infection with SARS-CoV-2, the virus  causing COVID-19, but do not rule out bacterial infection or co-infection  with other viruses. Laboratories within the [facility] and its  territories are required to report all positive results to the appropriate  public health authorities. Negative results do not preclude SARS-CoV-2  infection and should not be used as the sole basis for treatment or other  patient management decisions. Negative results must be combined with  clinical observations, patient history, and epidemiological information. This test has not been FDA cleared or approved. This test has been authorized by FDA under an Emergency Use Authorization  (EUA). This test is only authorized for the duration of time the  declaration that circumstances exist justifying the authorization of the  emergency use of an in vitro diagnostic tests for detection of SARS-CoV-2  virus and/or diagnosis of COVID-19 infection under section 564(b)(1) of  the Act, 21 U. S.C. 148YXP-0(F)(8), unless the authorization is terminated  or revoked sooner. The test has been validated but independent review by FDA  and CLIA is pending. Test performed using [employer] Gene[employer]pert: This RT-PCR assay targets N2,  a region unique to SARS-CoV-2. A conserved region in the E-gene was chosen  for pan-Sarbecovirus detection which includes SARS-CoV-2. According to CMS-2020-01-R, this platform meets the definition of high-throughput technology.     B-Type Natriuretic Peptide(BNP) [[id number]]  (Abnormal) Collected: 10/27/23 1919    Lab Status: Final result Specimen: Blood from Hand, Left Updated: 10/27/23 2143      pg/mL     Comprehensive metabolic panel [[id number]]  (Abnormal) Collected: 10/27/23 1919    Lab Status: Final result Specimen: Blood from Hand, Left Updated: 10/27/23 2039     Sodium 135 mmol/L Potassium 4.8 mmol/L      Chloride 107 mmol/L      CO2 19 mmol/L      ANION GAP 9 mmol/L      BUN 50 mg/dL      Creatinine 1.91 mg/dL      Glucose 115 mg/dL      Calcium 8.0 mg/dL      Corrected Calcium 8.6 mg/dL      AST 42 U/L      ALT 40 U/L      Alkaline Phosphatase 101 U/L      Total Protein 6.5 g/dL      Albumin 3.3 g/dL      Total Bilirubin 0.56 mg/dL      eGFR 33 ml/min/1.73sq m     Narrative:      National Kidney Disease Foundation guidelines for Chronic Kidney Disease (CKD):     Stage 1 with normal or high GFR (GFR > 90 mL/min/1.73 square meters)    Stage 2 Mild CKD (GFR = 60-89 mL/min/1.73 square meters)    Stage 3A Moderate CKD (GFR = 45-59 mL/min/1.73 square meters)    Stage 3B Moderate CKD (GFR = 30-44 mL/min/1.73 square meters)    Stage 4 Severe CKD (GFR = 15-29 mL/min/1.73 square meters)    Stage 5 End Stage CKD (GFR <15 mL/min/1.73 square meters)  Note: GFR calculation is accurate only with a steady state creatinine    HS Troponin 0hr (reflex protocol) [[id number]]  (Normal) Collected: 10/27/23 1919    Lab Status: Final result Specimen: Blood from Hand, Left Updated: 10/27/23 2000     hs TnI 0hr 8 ng/L     CBC and differential [[id number]]  (Abnormal) Collected: 10/27/23 1919    Lab Status: Final result Specimen: Blood from Hand, Left Updated: 10/27/23 1932     WBC 10.13 Thousand/uL      RBC 3.92 Million/uL      Hemoglobin 13.0 g/dL      Hematocrit 40.3 %       fL      MCH 33.2 pg      MCHC 32.3 g/dL      RDW 17.8 %      MPV 12.2 fL      Platelets 507 Thousands/uL      nRBC 0 /100 WBCs      Neutrophils Relative 87 %      Immat GRANS % 1 %      Lymphocytes Relative 4 %      Monocytes Relative 6 %      Eosinophils Relative 2 %      Basophils Relative 0 %      Neutrophils Absolute 8.91 Thousands/µL      Immature Grans Absolute 0.05 Thousand/uL      Lymphocytes Absolute 0.38 Thousands/µL      Monocytes Absolute 0.57 Thousand/µL      Eosinophils Absolute 0.20 Thousand/µL      Basophils Absolute 0.02 Thousands/µL                    XR chest 1 view portable   ED Interpretation by Carolyn Samson MD (10/27 1945)   Right pleural effusion      Final Result by Maxime Hunter MD (10/28 1348)      Mild pulmonary edema with redemonstration of large right and small left effusion with right greater than left bibasilar atelectasis. Redemonstration of right upper lobe mass and left lung nodules. Workstation performed: [id number]         IR IN-Patient Thoracentesis    (Results Pending)              Procedures  ECG 12 Lead Documentation Only    Date/Time: 10/27/2023 11:03 PM    Performed by: Carolyn Samson MD  Authorized by: Carolyn Samson MD             ED Course                                             Medical Decision Making  50-year-old male with history of lung cancer recently here with new A-fib and pleural effusions is coming back in again with shortness of breath. He is short of breath with even speaking. Cannot even take few steps with out getting winded. He is short of breath as he was when he was admitted a few weeks ago. Feels like he did when he needed to have fluid drained out of his lungs. He is needed to have fluid drained out of his lungs twice. He was told that he would have that scheduled every few weeks or we will get a catheter put in that they could drain at home, but somehow when they left it was never scheduled for another drainage time or the catheter to drain at home was never placed and in the last few days he is just gotten increasingly short of breath that even just sitting he is short of breath and he cannot speak more than a few words without getting winded. No fever no vomiting. Patient has had increased leg swelling again. Patient does look visibly dyspneic. Is using accessory muscles. Speaks in short sentences and gets visibly short of breath. States he does feel better with some oxygen.   6 states he feels like he cannot take a full deep breath like when he had fluid in his lungs last time. We will evaluate patient for dyspnea, patient has history of A-fib, lung cancer and pleural effusions and also PEs. Dyspnea clearly can be multifactorial could be 1 of these or could be numerous. We will have to evaluate for potential infectious etiology as well. We will get blood work as well to also consider anemia or other metabolic causes. Patient works dyspneic with accessory muscle usage even while on oxygen so will need to be admitted to the hospital.  We will get EKGs with serial troponins BnPs chest x-ray is, if pleural effusion may need to see a IR to have it drained. Potential labs for infectious etiology    Amount and/or Complexity of Data Reviewed  Labs: ordered. Radiology: ordered and independent interpretation performed. ECG/medicine tests: ordered and independent interpretation performed. Risk  Decision regarding hospitalization.              Disposition  Final diagnoses:   Pleural effusion   Dyspnea   Lung cancer ([address])     Time reflects when diagnosis was documented in both MDM as applicable and the Disposition within this note       Time User Action Codes Description Comment    10/27/2023  9:51 PM [place], [facility]     10/27/2023  9:51 PM [address] [R06.00] Dyspnea     10/27/2023 10:00 PM [address] [C34.90] Lung cancer [facility])           ED Disposition       ED Disposition   Admit    Condition   Stable    Date/Time   Fri Oct 27, 2023  9:51 PM    Comment                  Follow-up Information    None         Current Discharge Medication List        CONTINUE these medications which have NOT CHANGED    Details   Accu-Chek FastClix Lancets MISC Use daily E11.9  Check  fbs  daily  Qty: 100 each, Refills: 3    Associated Diagnoses: Controlled type 2 diabetes mellitus without complication, without long-term current use of insulin (HCC)      amiodarone 200 mg tablet Take 1 tablet (200 mg total) by mouth 3 (three) times a day with meals  Qty: 90 tablet, Refills: 0    Associated Diagnoses: Atrial fibrillation with rapid ventricular response (HCC)      brimonidine tartrate 0.2 % ophthalmic solution Administer 1 drop into the left eye 2 (two) times a day      cephalexin (KEFLEX) 500 mg capsule Take 1 capsule (500 mg total) by mouth 3 (three) times a day for 10 days  Qty: 30 capsule, Refills: 0    Associated Diagnoses: Rash, skin      finasteride (PROSCAR) 5 mg tablet TAKE 1 TABLET (5 MG TOTAL) BY MOUTH DAILY. Qty: 90 tablet, Refills: 1    Associated Diagnoses: Benign essential HTN; BPH associated with nocturia      glucose blood (Accu-Chek Jackie Plus) test strip Use as instructed  Qty: 100 strip, Refills: 1    Associated Diagnoses: Controlled type 2 diabetes mellitus without complication, without long-term current use of insulin (HCC)      ondansetron (ZOFRAN) 8 mg tablet Take 1 tablet (8 mg total) by mouth every 8 (eight) hours as needed for nausea or vomiting  Qty: 20 tablet, Refills: 0    Associated Diagnoses: Malignant neoplasm of upper lobe of right lung (HCC)      Osimertinib Mesylate 80 MG TABS Take 80 mg by mouth in the morning      simvastatin (ZOCOR) 10 mg tablet TAKE 1 TABLET BY MOUTH EVERY DAY  Qty: 90 tablet, Refills: 1    Associated Diagnoses: Mixed hyperlipidemia      timolol (TIMOPTIC-XE) 0.5 % ophthalmic gel-forming 1 drop daily      dexamethasone (DECADRON) 4 mg tablet Take 1 tablet (4 mg total) by mouth 2 (two) times a day with meals Take the day before treatment, the day of treatment and the day after treatment.   Qty: 6 tablet, Refills: 6    Associated Diagnoses: Malignant neoplasm of upper lobe of right lung (HCC)      folic acid (KP Folic Acid) 1 mg tablet Take 1 tablet (1 mg total) by mouth daily  Qty: 30 tablet, Refills: 6    Associated Diagnoses: Malignant neoplasm of upper lobe of right lung (HCC)      metFORMIN (GLUCOPHAGE-XR) 500 mg 24 hr tablet TAKE 1 TABLET BY MOUTH EVERY DAY  Qty: 90 tablet, Refills: 0    Associated Diagnoses: Type 2 diabetes mellitus without complication, without long-term current use of insulin (HCC)      metoprolol tartrate (LOPRESSOR) 100 mg tablet Take 1 tablet (100 mg total) by mouth every 12 (twelve) hours  Qty: 60 tablet, Refills: 0    Associated Diagnoses: Atrial fibrillation with rapid ventricular response (HCC)      travoprost (TRAVATAN-Z) 0.004 % ophthalmic solution 1 drop daily at bedtime      warfarin (Coumadin) 4 mg tablet Take 1 tablet (4 mg total) by mouth daily  Qty: 30 tablet, Refills: 3    Associated Diagnoses: Atrial fibrillation with RVR ([address]); Multiple subsegmental pulmonary emboli without acute cor pulmonale (HCC)             No discharge procedures on file.     PDMP Review         Value Time User    PDMP Reviewed  Yes 10/6/2023  8:06 AM [place]aredes, [address] [place] [address]            ED Provider  Electronically Signed by             Mary Novak MD  10/30/23 6878

## 2023-10-30 NOTE — PLAN OF CARE
Problem: INFECTION - ADULT  Goal: Absence or prevention of progression during hospitalization  Description: INTERVENTIONS:  - Assess and monitor for signs and symptoms of infection  - Monitor lab/diagnostic results  - Monitor all insertion sites, i.e. indwelling lines, tubes, and drains  - Monitor endotracheal if appropriate and nasal secretions for changes in amount and color  - [place] appropriate cooling/warming therapies per order  - Administer medications as ordered  - Instruct and encourage patient and family to use good hand hygiene technique  - Identify and instruct in appropriate isolation precautions for identified infection/condition  10/29/2023 2325 by Anitra German RN  Outcome: Progressing  10/29/2023 2325 by Anitra German RN  Outcome: Progressing

## 2023-10-30 NOTE — PLAN OF CARE
Problem: PAIN - ADULT  Goal: Verbalizes/displays adequate comfort level or baseline comfort level  Description: Interventions:  - Encourage patient to monitor pain and request assistance  - Assess pain using appropriate pain scale  - Administer analgesics based on type and severity of pain and evaluate response  - Implement non-pharmacological measures as appropriate and evaluate response  - Consider cultural and social influences on pain and pain management  - Notify physician/advanced practitioner if interventions unsuccessful or patient reports new pain  Outcome: Progressing     Problem: INFECTION - ADULT  Goal: Absence or prevention of progression during hospitalization  Description: INTERVENTIONS:  - Assess and monitor for signs and symptoms of infection  - Monitor lab/diagnostic results  - Monitor all insertion sites, i.e. indwelling lines, tubes, and drains  - Monitor endotracheal if appropriate and nasal secretions for changes in amount and color  - [place] appropriate cooling/warming therapies per order  - Administer medications as ordered  - Instruct and encourage patient and family to use good hand hygiene technique  - Identify and instruct in appropriate isolation precautions for identified infection/condition  Outcome: Progressing  Goal: Absence of fever/infection during neutropenic period  Description: INTERVENTIONS:  - Monitor WBC    Outcome: Progressing     Problem: SAFETY ADULT  Goal: Patient will remain free of falls  Description: INTERVENTIONS:  - Educate patient/family on patient safety including physical limitations  - Instruct patient to call for assistance with activity   - Consult OT/PT to assist with strengthening/mobility   - Keep Call bell within reach  - Keep bed low and locked with side rails adjusted as appropriate  - Keep care items and personal belongings within reach  - Initiate and maintain comfort rounds  - Make Fall Risk Sign visible to staff  - Offer Toileting every 2 Hours, in advance of need  - Initiate/Maintain bed /chair alarm  - Obtain necessary fall risk management equipment:   - Apply yellow socks and bracelet for high fall risk patients  - Consider moving patient to room near nurses station  Outcome: Progressing  Goal: Maintain or return to baseline ADL function  Description: INTERVENTIONS:  -  Assess patient's ability to carry out ADLs; assess patient's baseline for ADL function and identify physical deficits which impact ability to perform ADLs (bathing, care of mouth/teeth, toileting, grooming, dressing, etc.)  - Assess/evaluate cause of self-care deficits   - Assess range of motion  - Assess patient's mobility; develop plan if impaired  - Assess patient's need for assistive devices and provide as appropriate  - Encourage maximum independence but intervene and supervise when necessary  - Involve family in performance of ADLs  - Assess for home care needs following discharge   - Consider OT consult to assist with ADL evaluation and planning for discharge  - Provide patient education as appropriate  Outcome: Progressing  Goal: Maintains/Returns to pre admission functional level  Description: INTERVENTIONS:  - Perform BMAT or MOVE assessment daily.   - Set and communicate daily mobility goal to care team and patient/family/caregiver. - Collaborate with rehabilitation services on mobility goals if consulted  - Perform Range of Motion 3 times a day. - Reposition patient every 2 hours.   - Dangle patient 3 times a day  - Stand patient 3 times a day  - Ambulate patient 3 times a day  - Out of bed to chair 3 times a day   - Out of bed for meals 3 times a day  - Out of bed for toileting  - Record patient progress and toleration of activity level   Outcome: Progressing     Problem: DISCHARGE PLANNING  Goal: Discharge to home or other facility with appropriate resources  Description: INTERVENTIONS:  - Identify barriers to discharge w/patient and caregiver  - Arrange for needed discharge resources and transportation as appropriate  - Identify discharge learning needs (meds, wound care, etc.)  - Arrange for interpretive services to assist at discharge as needed  - Refer to Case Management Department for coordinating discharge planning if the patient needs post-hospital services based on physician/advanced practitioner order or complex needs related to functional status, cognitive ability, or social support system  Outcome: Progressing     Problem: Knowledge Deficit  Goal: Patient/family/caregiver demonstrates understanding of disease process, treatment plan, medications, and discharge instructions  Description: Complete learning assessment and assess knowledge base.   Interventions:  - Provide teaching at level of understanding  - Provide teaching via preferred learning methods  Outcome: Progressing

## 2023-10-30 NOTE — QUICK NOTE
Per RN pt c/o increased WOB, VSS, spo2 95% 3L.  Wheezing and sounds wet per RN    Despite mild increase in Cr today, Due to increased WOB will give a one time dose IV lasix 40 mg now and monitor renal function

## 2023-10-30 NOTE — ASSESSMENT & PLAN NOTE
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    Consult IR for thoracentesis to be completed today   May benefit from Pleurx catheter versus continued repeat thoracentesis every 2 weeks  Patient on oxygen will have nursing ambulate patient post thoracentesis if desat will do home oxygen evaluation   Appreciate IR recs

## 2023-10-30 NOTE — ASSESSMENT & PLAN NOTE
Previous baseline noted to be approximately 1.1 in July on recent admission creatinine noted to be approximately 1.6-1.8 and remained stable creatinine on admission noted to be 1.9  Possibly in the setting of volume overload  Kidney function might be patient's new true baseline  Creatinine 1.9 10/29  Decrease lasix IV to 40 mg daily  Repeat BMP pending

## 2023-10-30 NOTE — SEDATION DOCUMENTATION
2000 mls clear yellow fluid drained right thoracentesis, patient tolerated well.  Labs sent as ordered

## 2023-10-30 NOTE — NURSING NOTE
Ambulatory Home O2 evaluation completed Pt was only able to maintain an Oxgen level of 74% during ambulation without supplemental oxygen.   Starr Lanier   1:13 PM  10/30/23

## 2023-10-30 NOTE — PROGRESS NOTES
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    Consult IR for thoracentesis to be completed today   May benefit from Pleurx catheter versus continued repeat thoracentesis every 2 weeks  Patient on oxygen will have nursing ambulate patient post thoracentesis if desat will do home oxygen evaluation   Appreciate IR recs     Supratherapeutic INR  Assessment & Plan  INR now 5.93  Given need for thoracentesis   Patient status post one dose FFP INR improved 2.57 > 2.67  Overall improved   Continue to monitor      Multiple subsegmental pulmonary emboli without acute cor pulmonale (HCC)  Assessment & Plan   Recently diagnosed with associated right lower extremity DVT  Currently on Coumadin  INR 5.43 > 5.93 hold further coumadin doses  No active signs of bleeding  See plan under INR  Continue to monitor INR  INR in am    Atrial fibrillation (HCC)  Assessment & Plan  HR fluctuating suspect in setting of pleural effusion   Continue amiodarone and Lopressor with parameters   Hold Coumadin can likely resume tonight     Cellulitis of extremity  Assessment & Plan  Noted to have questionable lower extremity cellulitis bilaterally and was initated on keflex out patient  With edema could be vasculitis  Continue ace wrap to BLE and elevation  Edema and erythema improving continue ace wraps and elevation  Wbc wnl  Discontinued keflex and monitor off antibiotics. 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 [facility])  Assessment & Plan   Patient with history of malignant metastatic    Malignant neoplasm of upper lobe of right lung [facility])  Assessment & Plan  Patient with history of malignant metastatic stage IV lung cancer status post radiation and chemotherapy  Remains treatment focused  Continue outpatient follow-up with oncology    BPH associated with nocturia  Assessment & Plan   Continue  Proscar    Glaucoma  Assessment & Plan  Continue home eyedrops    Controlled type 2 diabetes mellitus without complication, without long-term current use of insulin [facility])  Assessment & Plan    Lab Results   Component Value Date    HGBA1C 6.0 10/23/2023       Recent Labs     10/29/23  1130 10/29/23  1553 10/29/23  2123   POCGLU 127 205* 142*       Blood Sugar Average: Last 72 hrs:    (P) 158Blood glucose currently controlled  Hold home metformin  Continue Sliding scale and blood glucose monitoring                  VTE Pharmacologic Prophylaxis: VTE Score: 6 High Risk (Score >/= 5) - Pharmacological DVT Prophylaxis Contraindicated. Sequential Compression Devices Ordered. Patient Centered Rounds: I performed bedside rounds with nursing staff today. Discussions with Specialists or Other Care Team Provider: none    Education and Discussions with Family / Patient:  none at bedside at this time .      Total Time Spent on Date of Encounter in care of patient: 28 mins. This time was spent on one or more of the following: performing physical exam; counseling and coordination of care; obtaining or reviewing history; documenting in the medical record; reviewing/ordering tests, medications or procedures; communicating with other healthcare professionals and discussing with patient's family/caregivers. Current Length of Stay: 3 day(s)  Current Patient Status: Inpatient   Certification Statement: The patient will continue to require additional inpatient hospital stay due to pleural effusion with need for thoracentesis   Discharge Plan:  pending progress later today vs tomorrow     Code Status: Level 3 - DNAR and DNI    Subjective:   Patient reporting shortness of breath slightly worse today. Plan discussed and remains agreeable     Objective:     Vitals:   Temp (24hrs), Av.4 °F (36.3 °C), Min:97.2 °F (36.2 °C), Max:97.5 °F (36.4 °C)    Temp:  [97.2 °F (36.2 °C)-97.5 °F (36.4 °C)] 97.5 °F (36.4 °C)  HR:  [] 107  Resp:  [20] 20  BP: (129-146)/(65-97) 129/87  SpO2:  [93 %-98 %] 96 %  Body mass index is 28 kg/m². Input and Output Summary (last 24 hours): Intake/Output Summary (Last 24 hours) at 10/30/2023 0757  Last data filed at 10/30/2023 0600  Gross per 24 hour   Intake 1351.1 ml   Output 1625 ml   Net -273.9 ml       Physical Exam:   Physical Exam  Vitals and nursing note reviewed. Constitutional:       General: He is not in acute distress. Appearance: He is well-developed. HENT:      Head: Normocephalic and atraumatic. Eyes:      Conjunctiva/sclera: Conjunctivae normal.   Cardiovascular:      Rate and Rhythm: Regular rhythm. Tachycardia present. Heart sounds: No murmur heard. Pulmonary:      Effort: Accessory muscle usage present. No respiratory distress. Breath sounds: Decreased air movement present. Examination of the right-upper field reveals decreased breath sounds.  Examination of the right-middle field reveals decreased breath sounds. Examination of the right-lower field reveals decreased breath sounds. Decreased breath sounds present. Abdominal:      Palpations: Abdomen is soft. Tenderness: There is no abdominal tenderness. Musculoskeletal:         General: No swelling. Cervical back: Neck supple. Skin:     General: Skin is warm and dry. Capillary Refill: Capillary refill takes less than 2 seconds. Neurological:      Mental Status: He is alert and oriented to person, place, and time. Psychiatric:         Mood and Affect: Mood normal.          Additional Data:     Labs:  Results from last 7 days   Lab Units 10/29/23  0502 10/28/23  0446   WBC Thousand/uL 7.57 7.97   HEMOGLOBIN g/dL 12.3 12.2   HEMATOCRIT % 38.2 38.1   PLATELETS Thousands/uL 104* 105*   NEUTROS PCT %  --  86*   LYMPHS PCT %  --  5*   MONOS PCT %  --  6   EOS PCT %  --  2     Results from last 7 days   Lab Units 10/29/23  0502 10/28/23  0446   SODIUM mmol/L 141 140   POTASSIUM mmol/L 4.1 4.0   CHLORIDE mmol/L 108 107   CO2 mmol/L 26 26   BUN mg/dL 53* 49*   CREATININE mg/dL 1.92* 1.81*   ANION GAP mmol/L 7 7   CALCIUM mg/dL 8.1* 8.1*   ALBUMIN g/dL  --  3.3*   TOTAL BILIRUBIN mg/dL  --  0.52   ALK PHOS U/L  --  84   ALT U/L  --  37   AST U/L  --  21   GLUCOSE RANDOM mg/dL 156* 94     Results from last 7 days   Lab Units 10/30/23  0624   INR  2.67*     Results from last 7 days   Lab Units 10/30/23  0738 10/29/23  2123 10/29/23  1553 10/29/23  1130   POC GLUCOSE mg/dl 129 142* 205* 127     Results from last 7 days   Lab Units 10/23/23  1651   HEMOGLOBIN A1C  6.0           Lines/Drains:  Invasive Devices       Peripheral Intravenous Line  Duration             Peripheral IV 10/29/23 Right Antecubital <1 day                          Imaging: No pertinent imaging reviewed.     Recent Cultures (last 7 days):         Last 24 Hours Medication List:   Current Facility-Administered Medications   Medication Dose Route Frequency Provider Last Rate acetaminophen  650 mg Oral Q6H PRN Hugh Salazar MD      amiodarone  200 mg Oral TID With Meals Hugh Salazar MD      brimonidine tartrate  1 drop Left Eye BID Hugh Salazar MD      finasteride  5 mg Oral Daily Hugh Salazar MD      furosemide  40 mg Intravenous Daily RADHA Elam      insulin lispro  1-5 Units Subcutaneous HS RADHA Elam      insulin lispro  1-5 Units Subcutaneous TID AC RADHA Morelos      metoprolol tartrate  100 mg Oral Q12H [address] Hugh Salazar MD      pravastatin  20 mg Oral Daily With Sidney Osorio MD      timolol  1 drop Both Eyes Daily Wetzel Favre, MD          Today, Patient Was Seen By: RADHA Elam    **Please Note: This note may have been constructed using a voice recognition system. **

## 2023-10-30 NOTE — ASSESSMENT & PLAN NOTE
Noted to have questionable lower extremity cellulitis bilaterally and was initated on keflex out patient  With edema could be vasculitis  Continue ace wrap to BLE and elevation  Edema and erythema improving continue ace wraps and elevation  Wbc wnl  Discontinued keflex and monitor off antibiotics.

## 2023-10-30 NOTE — ASSESSMENT & PLAN NOTE
INR now 5.93  Given need for thoracentesis   Patient status post one dose FFP INR improved 2.57 > 2.67  Overall improved   Continue to monitor

## 2023-10-30 NOTE — BRIEF OP NOTE (RAD/CATH)
INTERVENTIONAL RADIOLOGY PROCEDURE NOTE    Date: 10/30/2023    Procedure: IR THORACENTESIS     Preoperative diagnosis:   1. Pleural effusion    2. Dyspnea    3. Lung cancer ([address])         Postoperative diagnosis: Same. Surgeon: Evangelina Galvez MD     Assistant: None. No qualified resident was available. Blood loss: trace    Specimens: None    Findings: Right thoracentesis performed. Large volume clear yellow fluid removed. Discussed possible Asept catheter placement with the patient, who would prefer to have interval thoracentesis. I recommend weekly since he is becoming symptomatic in less than 2 weeks, and getting admitted to the hospital.    Complications: None immediate.     Anesthesia: local

## 2023-10-30 NOTE — PLAN OF CARE
Problem: INFECTION - ADULT  Goal: Absence or prevention of progression during hospitalization  Description: INTERVENTIONS:  - Assess and monitor for signs and symptoms of infection  - Monitor lab/diagnostic results  - Monitor all insertion sites, i.e. indwelling lines, tubes, and drains  - Monitor endotracheal if appropriate and nasal secretions for changes in amount and color  - [place] appropriate cooling/warming therapies per order  - Administer medications as ordered  - Instruct and encourage patient and family to use good hand hygiene technique  - Identify and instruct in appropriate isolation precautions for identified infection/condition  Outcome: Progressing  Goal: Absence of fever/infection during neutropenic period  Description: INTERVENTIONS:  - Monitor WBC    Outcome: Progressing

## 2023-10-30 NOTE — RESPIRATORY THERAPY NOTE
Home Oxygen Qualifying Test     Patient name: Gordy Cabot        : 1945   Date of Test:  2023  Diagnosis:    Home Oxygen Test:    **Medicare Guidelines require item(s) 1-5 on all ambulatory patients or 1 and 2 on non-ambulatory patients. 1. Baseline SPO2 on Room Air at rest 90 %   If <= 88% on Room Air add O2 via NC to obtain SpO2 >=88%. If LPM needed, document LPM  needed to reach =>88%    SPO2 during exertion on Room Air 77  %  During exertion monitor SPO2. If SPO2 increases >=89%, do not add supplemental oxygen    SPO2 on Oxygen at Rest 92 % at 2 LPM    SPO2 during exertion on Oxygen 90 % at 2 LPM    Test performed during exertion activity. [x]  Supplemental Home Oxygen is indicated. []  Client does not qualify for home oxygen. Respiratory Additional Notes  pt sat dropped into the 70's while ambulating on RA.   Pt will require 2L home O2    Lian Angel RT

## 2023-10-31 ENCOUNTER — APPOINTMENT (INPATIENT)
Dept: RADIOLOGY | Facility: HOSPITAL | Age: 78
End: 2023-10-31
Payer: MEDICARE

## 2023-10-31 ENCOUNTER — TELEPHONE (OUTPATIENT)
Dept: HEMATOLOGY ONCOLOGY | Facility: CLINIC | Age: 78
End: 2023-10-31

## 2023-10-31 ENCOUNTER — DOCUMENTATION (OUTPATIENT)
Dept: HEMATOLOGY ONCOLOGY | Facility: CLINIC | Age: 78
End: 2023-10-31

## 2023-10-31 DIAGNOSIS — C34.91 ADENOCARCINOMA OF RIGHT LUNG (HCC): ICD-10-CM

## 2023-10-31 DIAGNOSIS — C34.11 MALIGNANT NEOPLASM OF UPPER LOBE OF RIGHT LUNG (HCC): Primary | ICD-10-CM

## 2023-10-31 LAB
ANION GAP SERPL CALCULATED.3IONS-SCNC: 5 MMOL/L
BUN SERPL-MCNC: 48 MG/DL (ref 5–25)
CALCIUM SERPL-MCNC: 8.3 MG/DL (ref 8.4–10.2)
CHLORIDE SERPL-SCNC: 107 MMOL/L (ref 96–108)
CO2 SERPL-SCNC: 30 MMOL/L (ref 21–32)
CREAT SERPL-MCNC: 1.68 MG/DL (ref 0.6–1.3)
ERYTHROCYTE [DISTWIDTH] IN BLOOD BY AUTOMATED COUNT: 17.7 % (ref 11.6–15.1)
GFR SERPL CREATININE-BSD FRML MDRD: 38 ML/MIN/1.73SQ M
GLUCOSE SERPL-MCNC: 103 MG/DL (ref 65–140)
GLUCOSE SERPL-MCNC: 115 MG/DL (ref 65–140)
GLUCOSE SERPL-MCNC: 116 MG/DL (ref 65–140)
GLUCOSE SERPL-MCNC: 117 MG/DL (ref 65–140)
GLUCOSE SERPL-MCNC: 178 MG/DL (ref 65–140)
HCT VFR BLD AUTO: 40.5 % (ref 36.5–49.3)
HGB BLD-MCNC: 13 G/DL (ref 12–17)
INR PPP: 2.72 (ref 0.84–1.19)
MCH RBC QN AUTO: 32.7 PG (ref 26.8–34.3)
MCHC RBC AUTO-ENTMCNC: 32.1 G/DL (ref 31.4–37.4)
MCV RBC AUTO: 102 FL (ref 82–98)
PLATELET # BLD AUTO: 99 THOUSANDS/UL (ref 149–390)
PMV BLD AUTO: 11.5 FL (ref 8.9–12.7)
POTASSIUM SERPL-SCNC: 3.8 MMOL/L (ref 3.5–5.3)
PROTHROMBIN TIME: 29.8 SECONDS (ref 11.6–14.5)
RBC # BLD AUTO: 3.98 MILLION/UL (ref 3.88–5.62)
SODIUM SERPL-SCNC: 142 MMOL/L (ref 135–147)
WBC # BLD AUTO: 8.32 THOUSAND/UL (ref 4.31–10.16)

## 2023-10-31 PROCEDURE — 71045 X-RAY EXAM CHEST 1 VIEW: CPT

## 2023-10-31 PROCEDURE — 99232 SBSQ HOSP IP/OBS MODERATE 35: CPT | Performed by: NURSE PRACTITIONER

## 2023-10-31 PROCEDURE — 82948 REAGENT STRIP/BLOOD GLUCOSE: CPT

## 2023-10-31 PROCEDURE — 80048 BASIC METABOLIC PNL TOTAL CA: CPT | Performed by: NURSE PRACTITIONER

## 2023-10-31 PROCEDURE — 85027 COMPLETE CBC AUTOMATED: CPT | Performed by: NURSE PRACTITIONER

## 2023-10-31 PROCEDURE — 85610 PROTHROMBIN TIME: CPT | Performed by: NURSE PRACTITIONER

## 2023-10-31 RX ORDER — FUROSEMIDE 10 MG/ML
40 INJECTION INTRAMUSCULAR; INTRAVENOUS
Status: DISCONTINUED | OUTPATIENT
Start: 2023-11-01 | End: 2023-11-03 | Stop reason: HOSPADM

## 2023-10-31 RX ADMIN — BRIMONIDINE TARTRATE 1 DROP: 2 SOLUTION/ DROPS OPHTHALMIC at 08:33

## 2023-10-31 RX ADMIN — FINASTERIDE 5 MG: 5 TABLET, FILM COATED ORAL at 08:32

## 2023-10-31 RX ADMIN — AMIODARONE HYDROCHLORIDE 200 MG: 200 TABLET ORAL at 16:53

## 2023-10-31 RX ADMIN — BRIMONIDINE TARTRATE 1 DROP: 2 SOLUTION/ DROPS OPHTHALMIC at 17:25

## 2023-10-31 RX ADMIN — METOPROLOL TARTRATE 100 MG: 50 TABLET, FILM COATED ORAL at 08:32

## 2023-10-31 RX ADMIN — PRAVASTATIN SODIUM 20 MG: 20 TABLET ORAL at 16:53

## 2023-10-31 RX ADMIN — INSULIN LISPRO 1 UNITS: 100 INJECTION, SOLUTION INTRAVENOUS; SUBCUTANEOUS at 16:54

## 2023-10-31 RX ADMIN — AMIODARONE HYDROCHLORIDE 200 MG: 200 TABLET ORAL at 12:42

## 2023-10-31 RX ADMIN — METOPROLOL TARTRATE 100 MG: 50 TABLET, FILM COATED ORAL at 21:51

## 2023-10-31 RX ADMIN — TIMOLOL MALEATE 1 DROP: 5 SOLUTION/ DROPS OPHTHALMIC at 08:33

## 2023-10-31 RX ADMIN — AMIODARONE HYDROCHLORIDE 200 MG: 200 TABLET ORAL at 08:32

## 2023-10-31 NOTE — ASSESSMENT & PLAN NOTE
HR fluctuating suspect in setting of pleural effusion   HR elevated on monitoring and exam  RN at bedside going to given BB and amio  Will check EKG 30 minutes post to assess for afib RVR  Continue amiodarone and Lopressor with parameters   Hold Coumadin can likely resume tonight

## 2023-10-31 NOTE — PLAN OF CARE
Problem: PAIN - ADULT  Goal: Verbalizes/displays adequate comfort level or baseline comfort level  Description: Interventions:  - Encourage patient to monitor pain and request assistance  - Assess pain using appropriate pain scale  - Administer analgesics based on type and severity of pain and evaluate response  - Implement non-pharmacological measures as appropriate and evaluate response  - Consider cultural and social influences on pain and pain management  - Notify physician/advanced practitioner if interventions unsuccessful or patient reports new pain  Outcome: Progressing     Problem: INFECTION - ADULT  Goal: Absence or prevention of progression during hospitalization  Description: INTERVENTIONS:  - Assess and monitor for signs and symptoms of infection  - Monitor lab/diagnostic results  - Monitor all insertion sites, i.e. indwelling lines, tubes, and drains  - Monitor endotracheal if appropriate and nasal secretions for changes in amount and color  - [place] appropriate cooling/warming therapies per order  - Administer medications as ordered  - Instruct and encourage patient and family to use good hand hygiene technique  - Identify and instruct in appropriate isolation precautions for identified infection/condition  Outcome: Progressing  Goal: Absence of fever/infection during neutropenic period  Description: INTERVENTIONS:  - Monitor WBC    Outcome: Progressing     Problem: SAFETY ADULT  Goal: Patient will remain free of falls  Description: INTERVENTIONS:  - Educate patient/family on patient safety including physical limitations  - Instruct patient to call for assistance with activity   - Consult OT/PT to assist with strengthening/mobility   - Keep Call bell within reach  - Keep bed low and locked with side rails adjusted as appropriate  - Keep care items and personal belongings within reach  - Initiate and maintain comfort rounds  - Make Fall Risk Sign visible to staff  - Offer Toileting every  Hours, in advance of need  - Initiate/Maintain alarm  - Obtain necessary fall risk management equipment:   - Apply yellow socks and bracelet for high fall risk patients  - Consider moving patient to room near nurses station  Outcome: Progressing  Goal: Maintain or return to baseline ADL function  Description: INTERVENTIONS:  -  Assess patient's ability to carry out ADLs; assess patient's baseline for ADL function and identify physical deficits which impact ability to perform ADLs (bathing, care of mouth/teeth, toileting, grooming, dressing, etc.)  - Assess/evaluate cause of self-care deficits   - Assess range of motion  - Assess patient's mobility; develop plan if impaired  - Assess patient's need for assistive devices and provide as appropriate  - Encourage maximum independence but intervene and supervise when necessary  - Involve family in performance of ADLs  - Assess for home care needs following discharge   - Consider OT consult to assist with ADL evaluation and planning for discharge  - Provide patient education as appropriate  Outcome: Progressing  Goal: Maintains/Returns to pre admission functional level  Description: INTERVENTIONS:  - Perform BMAT or MOVE assessment daily.   - Set and communicate daily mobility goal to care team and patient/family/caregiver. - Collaborate with rehabilitation services on mobility goals if consulted  - Perform Range of Motion  times a day. - Reposition patient every  hours.   - Dangle patient  times a day  - Stand patient  times a day  - Ambulate patient  times a day  - Out of bed to chair  times a day   - Out of bed for meal times a day  - Out of bed for toileting  - Record patient progress and toleration of activity level   Outcome: Progressing     Problem: DISCHARGE PLANNING  Goal: Discharge to home or other facility with appropriate resources  Description: INTERVENTIONS:  - Identify barriers to discharge w/patient and caregiver  - Arrange for needed discharge resources and transportation as appropriate  - Identify discharge learning needs (meds, wound care, etc.)  - Arrange for interpretive services to assist at discharge as needed  - Refer to Case Management Department for coordinating discharge planning if the patient needs post-hospital services based on physician/advanced practitioner order or complex needs related to functional status, cognitive ability, or social support system  Outcome: Progressing     Problem: Knowledge Deficit  Goal: Patient/family/caregiver demonstrates understanding of disease process, treatment plan, medications, and discharge instructions  Description: Complete learning assessment and assess knowledge base.   Interventions:  - Provide teaching at level of understanding  - Provide teaching via preferred learning methods  Outcome: Progressing

## 2023-10-31 NOTE — ASSESSMENT & PLAN NOTE
Recently diagnosed with associated right lower extremity DVT  Currently on Coumadin  INR 5.43 > 5.93   Status post FFP INR at goal 2.72  Will resume at lower dose of 2 mg daily  Follow up INR per PCP out patient

## 2023-10-31 NOTE — ASSESSMENT & PLAN NOTE
Previous baseline noted to be approximately 1.1 in July on recent admission creatinine noted to be approximately 1.6-1.8 and remained stable creatinine on admission noted to be 1.9  Possibly in the setting of volume overload  Kidney function might be patient's new true baseline  Out patient monitoring

## 2023-10-31 NOTE — ASSESSMENT & PLAN NOTE
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

## 2023-10-31 NOTE — CASE MANAGEMENT
Case Management Discharge Planning Note    Patient name Ananya Rossi  Location [address] [facility] [place] 230/-01 MRN [id number]  : 1945 Date 10/31/2023       Current Admission Date: 10/27/2023  Current Admission Diagnosis:Pleural effusion   Patient Active Problem List    Diagnosis Date Noted    Supratherapeutic INR 10/28/2023    Cellulitis of extremity 10/27/2023    Cellulitis and abscess of left lower extremity 10/23/2023    Cellulitis of leg, right 10/23/2023    COPD with acute exacerbation ([address]) 10/05/2023    Atrial fibrillation ([address]) 10/03/2023    HILARY (acute kidney injury) ([address]) 10/03/2023    Abnormal CT of the abdomen 10/03/2023    Platelets decreased ([address]) 2023    Multiple lung nodules on CT 10/06/2022    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 ([address]) 10/06/2022    Pleural effusion 10/06/2022    Malignant neoplasm of upper lobe of right lung [facility])     Multiple subsegmental pulmonary emboli without acute cor pulmonale ([address]) 09/10/2022    Pulmonary mass 09/10/2022    Anemia 09/10/2022    Non-recurrent bilateral inguinal hernia without obstruction or gangrene 2021    Status post right tibial-talar ankle fusion 2020    BPH associated with nocturia 2019    Arthritis of right acromioclavicular joint 03/15/2019    Primary osteoarthritis of right shoulder 2019    Chronic left shoulder pain 2019    Left rotator cuff tear arthropathy 2019    Rotator cuff injury, right, initial encounter 2019    History of colon polyps 2019    Hyperparathyroidism ([address]) 2018    Hypokalemia 10/22/2018    Hypocalcemia 10/22/2018    Glaucoma 10/11/2018    Controlled type 2 diabetes mellitus without complication, without long-term current use of insulin ([address]) 2016    Inguinal hernia 2016    Benign essential HTN 2016    Hypercholesterolemia 2016      LOS (days): 4  Geometric Mean LOS (GMLOS) (days): 4.90  Days to GMLOS:1.4     OBJECTIVE:  Risk of Unplanned Readmission Score: 30.36         Current admission status: Inpatient   Preferred Pharmacy:   [address], [place] - [address]  [address] [address] [address] [facility] [address]  Phone: [phone] Fax: 12 [address], [address] [facility] K[place]ST[place] [address] [facility] [address] [facility] [address]  Phone: [phone] Fax: [phone]    [facility]   [facility] [address]  Phone: [phone] Fax: [phone]    Primary Care Provider: Estephanie Anderson PA-C    Primary Insurance: MEDICARE  Secondary Insurance: James Garcia DETAILS:    Requested [address] [facility]         Is the patient interested in [facility] at discharge?: Yes  [address] [facility] requested[de-identified] Occupational Therapy, Nursing, Physical Therapy, [address] Agency Name[de-identified] [address] [facility] External Referral Reason (only applicable if external HHA name selected): Services not provided in network or near patient location  [facility] Provider[de-identified] PCP  Home Health Services Needed[de-identified] Diabetes Management, Evaluate Functional Status and Safety, Gait/ADL Training, Oxygen Via Nasal Cannula, Strengthening/Theraputic Exercises to Improve Function  Oxygen LPM Ordered (if applicable based on home health services needed):: 2 LPM  Homebound Criteria Met[de-identified] Requires the Assistance of Another Person for Safe Ambulation or to Leave the Home, Uses an Assist Device (i.e. cane, walker, etc)  Supporting Clincal Findings[de-identified] Fatigues Easliy in Short Distances, Dyspnea with Exertion, Limited Endurance, Requires Oxygen    DME Referral Provided  Referral made for DME?: Yes  DME referral completed for the following items[de-identified] Hospital Bed, Home Oxygen concentrator  DME Supplier Name[de-identified] [employer]    Other Referral/Resources/Interventions Provided:  Interventions: HHC, DME  Referral Comments: CM reviewed [place] and found that only [facility] is able to accept patient for home care at this time. CM reserved and updated patient's AVS to reflect the same. CM then reviewed [place] and found that the hospital bed has been approved, but they were unable to make contact with the family to schedule delivery. Family to call [facility]ron directly at [phone] to arrange delivery. CM then sent a referral to Rupal Barron requesting home O2 setup. Response pending. Would you like to participate in our [address] service program?  : No - Declined    Treatment Team Recommendation: Home with [facility]  Discharge Destination Plan[de-identified] Home with [address] [facility] at Discharge : Family    Additional Comments: CM attemped to call patient's wife and daughter, but neither answered. Wife's voicemail box is full. CM left a message for patient's daughter Cary Hall requesting a call back.

## 2023-10-31 NOTE — PROGRESS NOTES
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 Case was discussed at that time recommended to hold off Pleurx catheter continue as needed outpatient thoracentesis  Presented with worsening shortness of breath, tachypnea, tachycardia likely in the setting of recurrent pleural effusions  Discontinue IV lasix   S/p thoracentesis for 2L fluid   May benefit from Pleurx catheter versus continued repeat thoracentesis every 2 weeks patient to be seen by oncology today will send message  Home oxygen evaluation--patient qualifies case management to set up     Supratherapeutic INR  Assessment & Plan  INR now 5.93  Given need for thoracentesis   Patient status post one dose FFP INR improved 2.57 > 2.67>2.72  Overall improved   Continue to monitor      Multiple subsegmental pulmonary emboli without acute cor pulmonale (HCC)  Assessment & Plan   Recently diagnosed with associated right lower extremity DVT  Currently on Coumadin  INR 5.43 > 5.93   Status post FFP INR at goal 2.72  Will resume at lower dose of 2 mg daily  Follow up INR per PCP out patient     Atrial fibrillation ([address])  Assessment & Plan  HR fluctuating suspect in setting of pleural effusion   HR elevated on monitoring and exam  RN at bedside going to given BB and amio  Will check EKG 30 minutes post to assess for afib RVR  Continue amiodarone and Lopressor with parameters   Hold Coumadin can likely resume tonight     Cellulitis of extremity  Assessment & Plan  Noted to have questionable lower extremity cellulitis bilaterally and was initated on keflex out patient  With edema could be vasculitis  Continue ace wrap to BLE and elevation  Edema and erythema improving continue ace wraps and elevation  Wbc wnl  Discontinued keflex and monitor off antibiotics. 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 [facility])  Assessment & Plan   Patient with history of malignant metastatic    Malignant neoplasm of upper lobe of right lung [facility])  Assessment & Plan  Patient with history of malignant metastatic stage IV lung cancer status post radiation and chemotherapy  Remains treatment focused  Continue outpatient follow-up with oncology    BPH associated with nocturia  Assessment & Plan   Continue  Proscar    Glaucoma  Assessment & Plan  Continue home eyedrops    Controlled type 2 diabetes mellitus without complication, without long-term current use of insulin [facility])  Assessment & Plan    Lab Results   Component Value Date    HGBA1C 6.0 10/23/2023       Recent Labs     10/30/23  0738 10/30/23  1124 10/30/23  1634 10/30/23  2059   POCGLU 129 149* 154* 130         Blood Sugar Average: Last 72 hrs:    (P) 148Blood glucose currently controlled  Hold home metformin  Continue Sliding scale and blood glucose monitoring                  VTE Pharmacologic Prophylaxis: VTE Score: 6 High Risk (Score >/= 5) - Pharmacological DVT Prophylaxis Contraindicated. Sequential Compression Devices Ordered. Patient Centered Rounds: I performed bedside rounds with nursing staff today.   Discussions with Specialists or Other Care Team Provider: case management     Education and Discussions with Family / Patient:  none at bedside will try to reround and update pending progress. Total Time Spent on Date of Encounter in care of patient: 35 mins. This time was spent on one or more of the following: performing physical exam; counseling and coordination of care; obtaining or reviewing history; documenting in the medical record; reviewing/ordering tests, medications or procedures; communicating with other healthcare professionals and discussing with patient's family/caregivers. Current Length of Stay: 4 day(s)  Current Patient Status: Inpatient   Certification Statement: The patient will continue to require additional inpatient hospital stay due to tachycardia post thoracentesis assess for afib rvr  Discharge Plan:  later today vs tomorrow     Code Status: Level 3 - DNAR and DNI    Subjective:   Patient reports feeling more tired when he ambulates even with the fluid removed. Patient reporting more congestion with coughing. Denies fever, reports improvement of breathing just more tired. Objective:     Vitals:   Temp (24hrs), Av.9 °F (36.6 °C), Min:97.5 °F (36.4 °C), Max:98.3 °F (36.8 °C)    Temp:  [97.5 °F (36.4 °C)-98.3 °F (36.8 °C)] 97.9 °F (36.6 °C)  HR:  [] 113  Resp:  [18] 18  BP: (110-134)/(73-87) 132/87  SpO2:  [74 %-100 %] 97 %  Body mass index is 27.05 kg/m². Input and Output Summary (last 24 hours): Intake/Output Summary (Last 24 hours) at 10/31/2023 0849  Last data filed at 10/31/2023 0800  Gross per 24 hour   Intake --   Output 500 ml   Net -500 ml       Physical Exam:   Physical Exam  Vitals and nursing note reviewed. Constitutional:       General: He is not in acute distress. Appearance: He is well-developed. HENT:      Head: Normocephalic and atraumatic. Eyes:      Conjunctiva/sclera: Conjunctivae normal.   Cardiovascular:      Rate and Rhythm: Tachycardia present. Rhythm irregular. Heart sounds: No murmur heard. Pulmonary:      Effort: Pulmonary effort is normal. No respiratory distress.       Breath sounds: Normal breath sounds. Abdominal:      Palpations: Abdomen is soft. Tenderness: There is no abdominal tenderness. Musculoskeletal:         General: No swelling. Cervical back: Neck supple. Skin:     General: Skin is warm and dry. Capillary Refill: Capillary refill takes less than 2 seconds. Neurological:      Mental Status: He is alert. Psychiatric:         Mood and Affect: Mood normal.         Additional Data:     Labs:  Results from last 7 days   Lab Units 10/30/23  0804 10/29/23  0502 10/28/23  0446   WBC Thousand/uL 8.86   < > 7.97   HEMOGLOBIN g/dL 12.5   < > 12.2   HEMATOCRIT % 39.3   < > 38.1   PLATELETS Thousands/uL 113*   < > 105*   NEUTROS PCT %  --   --  86*   LYMPHS PCT %  --   --  5*   MONOS PCT %  --   --  6   EOS PCT %  --   --  2    < > = values in this interval not displayed. Results from last 7 days   Lab Units 10/30/23  0804 10/29/23  0502 10/28/23  0446   SODIUM mmol/L 141   < > 140   POTASSIUM mmol/L 3.9   < > 4.0   CHLORIDE mmol/L 104   < > 107   CO2 mmol/L 31   < > 26   BUN mg/dL 49*   < > 49*   CREATININE mg/dL 1.75*   < > 1.81*   ANION GAP mmol/L 6   < > 7   CALCIUM mg/dL 8.3*   < > 8.1*   ALBUMIN g/dL  --   --  3.3*   TOTAL BILIRUBIN mg/dL  --   --  0.52   ALK PHOS U/L  --   --  84   ALT U/L  --   --  37   AST U/L  --   --  21   GLUCOSE RANDOM mg/dL 126   < > 94    < > = values in this interval not displayed. Results from last 7 days   Lab Units 10/31/23  0506   INR  2.72*     Results from last 7 days   Lab Units 10/31/23  0753 10/30/23  2059 10/30/23  1634 10/30/23  1124 10/30/23  0738 10/29/23  2123 10/29/23  1553 10/29/23  1130   POC GLUCOSE mg/dl 117 130 154* 149* 129 142* 205* 127               Lines/Drains:  Invasive Devices       Peripheral Intravenous Line  Duration             Peripheral IV 10/29/23 Right Antecubital 1 day                          Imaging: No pertinent imaging reviewed.     Recent Cultures (last 7 days):   Results from last 7 days   Lab Units 10/30/23  1205   GRAM STAIN RESULT  No Polys or Bacteria seen       Last 24 Hours Medication List:   Current Facility-Administered Medications   Medication Dose Route Frequency Provider Last Rate    acetaminophen  650 mg Oral Q6H PRN Hugh Salazar MD      amiodarone  200 mg Oral TID With Meals Hugh Salazar MD      brimonidine tartrate  1 drop Left Eye BID Hugh Salazar MD      finasteride  5 mg Oral Daily Hugh Salazar MD      insulin lispro  1-5 Units Subcutaneous HS RADHA Johnston      insulin lispro  1-5 Units Subcutaneous TID AC RADHA Morelos      metoprolol tartrate  100 mg Oral Q12H [facility] Hugh Salazar MD      pravastatin  20 mg Oral Daily With Aidan Day MD      timolol  1 drop Both Eyes Daily Samira Henriquez MD          Today, Patient Was Seen By: RADHA Johnston    **Please Note: This note may have been constructed using a voice recognition system. **

## 2023-10-31 NOTE — TELEPHONE ENCOUNTER
Called patient to let him know that secondary to his hospitalization and not knowing when he will be discharged, his treatment is going to be pushed out and that someone will call him with a follow-up appointment to sign new consent for treatment. Patient verbalized understanding and has no other questions or concerns at this moment.

## 2023-10-31 NOTE — PLAN OF CARE
Problem: PAIN - ADULT  Goal: Verbalizes/displays adequate comfort level or baseline comfort level  Description: Interventions:  - Encourage patient to monitor pain and request assistance  - Assess pain using appropriate pain scale  - Administer analgesics based on type and severity of pain and evaluate response  - Implement non-pharmacological measures as appropriate and evaluate response  - Consider cultural and social influences on pain and pain management  - Notify physician/advanced practitioner if interventions unsuccessful or patient reports new pain  Outcome: Progressing     Problem: INFECTION - ADULT  Goal: Absence or prevention of progression during hospitalization  Description: INTERVENTIONS:  - Assess and monitor for signs and symptoms of infection  - Monitor lab/diagnostic results  - Monitor all insertion sites, i.e. indwelling lines, tubes, and drains  - Monitor endotracheal if appropriate and nasal secretions for changes in amount and color  - Rembert appropriate cooling/warming therapies per order  - Administer medications as ordered  - Instruct and encourage patient and family to use good hand hygiene technique  - Identify and instruct in appropriate isolation precautions for identified infection/condition  Outcome: Progressing  Goal: Absence of fever/infection during neutropenic period  Description: INTERVENTIONS:  - Monitor WBC    Outcome: Progressing     Problem: SAFETY ADULT  Goal: Patient will remain free of falls  Description: INTERVENTIONS:  - Educate patient/family on patient safety including physical limitations  - Instruct patient to call for assistance with activity   - Consult OT/PT to assist with strengthening/mobility   - Keep Call bell within reach  - Keep bed low and locked with side rails adjusted as appropriate  - Keep care items and personal belongings within reach  - Initiate and maintain comfort rounds  - Make Fall Risk Sign visible to staff  - Offer Toileting every  Hours, in advance of need  - Initiate/Maintain alarm  - Obtain necessary fall risk management equipment:   - Apply yellow socks and bracelet for high fall risk patients  - Consider moving patient to room near nurses station  Outcome: Progressing  Goal: Maintain or return to baseline ADL function  Description: INTERVENTIONS:  -  Assess patient's ability to carry out ADLs; assess patient's baseline for ADL function and identify physical deficits which impact ability to perform ADLs (bathing, care of mouth/teeth, toileting, grooming, dressing, etc.)  - Assess/evaluate cause of self-care deficits   - Assess range of motion  - Assess patient's mobility; develop plan if impaired  - Assess patient's need for assistive devices and provide as appropriate  - Encourage maximum independence but intervene and supervise when necessary  - Involve family in performance of ADLs  - Assess for home care needs following discharge   - Consider OT consult to assist with ADL evaluation and planning for discharge  - Provide patient education as appropriate  Outcome: Progressing  Goal: Maintains/Returns to pre admission functional level  Description: INTERVENTIONS:  - Perform BMAT or MOVE assessment daily.   - Set and communicate daily mobility goal to care team and patient/family/caregiver. - Collaborate with rehabilitation services on mobility goals if consulted  - Perform Range of Motion  times a day. - Reposition patient every  hours.   - Dangle patient  times a day  - Stand patient  times a day  - Ambulate patient  times a day  - Out of bed to chair  times a day   - Out of bed for meal times a day  - Out of bed for toileting  - Record patient progress and toleration of activity level   Outcome: Progressing     Problem: DISCHARGE PLANNING  Goal: Discharge to home or other facility with appropriate resources  Description: INTERVENTIONS:  - Identify barriers to discharge w/patient and caregiver  - Arrange for needed discharge resources and transportation as appropriate  - Identify discharge learning needs (meds, wound care, etc.)  - Arrange for interpretive services to assist at discharge as needed  - Refer to Case Management Department for coordinating discharge planning if the patient needs post-hospital services based on physician/advanced practitioner order or complex needs related to functional status, cognitive ability, or social support system  Outcome: Progressing     Problem: Knowledge Deficit  Goal: Patient/family/caregiver demonstrates understanding of disease process, treatment plan, medications, and discharge instructions  Description: Complete learning assessment and assess knowledge base.   Interventions:  - Provide teaching at level of understanding  - Provide teaching via preferred learning methods  Outcome: Progressing

## 2023-10-31 NOTE — PROGRESS NOTES
Patient: Tiana Wu [birth year]  RX: [id number]      Patient has been approved for the free drug program through [address] 10-31-23  Thru 12-31-24    Please continue to send prescription to [facility] [place]. Please forward standalone script to me  Please contact patient to inform of approval for 2024 11-1-23  Dr. Cristy Dexter now reports that patient will not need the [place] right now and so she denied my script.     Notified [place] patient has been discontinued

## 2023-10-31 NOTE — TELEPHONE ENCOUNTER
Called patient to make him aware of his appointment on Monday at 4pm. He verbalized understanding and has no other questions or concerns at this moment.

## 2023-10-31 NOTE — ASSESSMENT & PLAN NOTE
Lab Results   Component Value Date    HGBA1C 6.0 10/23/2023       Recent Labs     10/30/23  0738 10/30/23  1124 10/30/23  1634 10/30/23  2059   POCGLU 129 149* 154* 130         Blood Sugar Average: Last 72 hrs:    (P) 148Blood glucose currently controlled  Hold home metformin  Continue Sliding scale and blood glucose monitoring

## 2023-10-31 NOTE — ASSESSMENT & PLAN NOTE
INR now 5.93  Given need for thoracentesis   Patient status post one dose FFP INR improved 2.57 > 2.67>2.72  Overall improved   Continue to monitor

## 2023-10-31 NOTE — PLAN OF CARE
Problem: PAIN - ADULT  Goal: Verbalizes/displays adequate comfort level or baseline comfort level  Description: Interventions:  - Encourage patient to monitor pain and request assistance  - Assess pain using appropriate pain scale  - Administer analgesics based on type and severity of pain and evaluate response  - Implement non-pharmacological measures as appropriate and evaluate response  - Consider cultural and social influences on pain and pain management  - Notify physician/advanced practitioner if interventions unsuccessful or patient reports new pain  Outcome: Progressing     Problem: INFECTION - ADULT  Goal: Absence or prevention of progression during hospitalization  Description: INTERVENTIONS:  - Assess and monitor for signs and symptoms of infection  - Monitor lab/diagnostic results  - Monitor all insertion sites, i.e. indwelling lines, tubes, and drains  - Monitor endotracheal if appropriate and nasal secretions for changes in amount and color  - [place] appropriate cooling/warming therapies per order  - Administer medications as ordered  - Instruct and encourage patient and family to use good hand hygiene technique  - Identify and instruct in appropriate isolation precautions for identified infection/condition  Outcome: Progressing  Goal: Absence of fever/infection during neutropenic period  Description: INTERVENTIONS:  - Monitor WBC    Outcome: Progressing     Problem: SAFETY ADULT  Goal: Patient will remain free of falls  Description: INTERVENTIONS:  - Educate patient/family on patient safety including physical limitations  - Instruct patient to call for assistance with activity   - Consult OT/PT to assist with strengthening/mobility   - Keep Call bell within reach  - Keep bed low and locked with side rails adjusted as appropriate  - Keep care items and personal belongings within reach  - Initiate and maintain comfort rounds  - Make Fall Risk Sign visible to staff  - Offer Toileting every 2 Hours, in advance of need  - Initiate/Maintain bed and chair alarm  - Obtain necessary fall risk management equipment:   - Apply yellow socks and bracelet for high fall risk patients  - Consider moving patient to room near nurses station  Outcome: Progressing  Goal: Maintain or return to baseline ADL function  Description: INTERVENTIONS:  -  Assess patient's ability to carry out ADLs; assess patient's baseline for ADL function and identify physical deficits which impact ability to perform ADLs (bathing, care of mouth/teeth, toileting, grooming, dressing, etc.)  - Assess/evaluate cause of self-care deficits   - Assess range of motion  - Assess patient's mobility; develop plan if impaired  - Assess patient's need for assistive devices and provide as appropriate  - Encourage maximum independence but intervene and supervise when necessary  - Involve family in performance of ADLs  - Assess for home care needs following discharge   - Consider OT consult to assist with ADL evaluation and planning for discharge  - Provide patient education as appropriate  Outcome: Progressing  Goal: Maintains/Returns to pre admission functional level  Description: INTERVENTIONS:  - Perform BMAT or MOVE assessment daily.   - Set and communicate daily mobility goal to care team and patient/family/caregiver. - Collaborate with rehabilitation services on mobility goals if consulted  - Perform Range of Motion 3 times a day. - Reposition patient every 2 hours.   - Dangle patient 3 times a day  - Stand patient 3 times a day  - Ambulate patient 3 times a day  - Out of bed to chair 3 times a day   - Out of bed for meals 3 times a day  - Out of bed for toileting  - Record patient progress and toleration of activity level   Outcome: Progressing     Problem: DISCHARGE PLANNING  Goal: Discharge to home or other facility with appropriate resources  Description: INTERVENTIONS:  - Identify barriers to discharge w/patient and caregiver  - Arrange for needed discharge resources and transportation as appropriate  - Identify discharge learning needs (meds, wound care, etc.)  - Arrange for interpretive services to assist at discharge as needed  - Refer to Case Management Department for coordinating discharge planning if the patient needs post-hospital services based on physician/advanced practitioner order or complex needs related to functional status, cognitive ability, or social support system  Outcome: Progressing     Problem: Knowledge Deficit  Goal: Patient/family/caregiver demonstrates understanding of disease process, treatment plan, medications, and discharge instructions  Description: Complete learning assessment and assess knowledge base.   Interventions:  - Provide teaching at level of understanding  - Provide teaching via preferred learning methods  Outcome: Progressing

## 2023-11-01 ENCOUNTER — APPOINTMENT (INPATIENT)
Dept: RADIOLOGY | Facility: HOSPITAL | Age: 78
End: 2023-11-01
Payer: MEDICARE

## 2023-11-01 LAB
ANION GAP SERPL CALCULATED.3IONS-SCNC: 5 MMOL/L
ATRIAL RATE: 136 BPM
BUN SERPL-MCNC: 48 MG/DL (ref 5–25)
CALCIUM SERPL-MCNC: 8.3 MG/DL (ref 8.4–10.2)
CHLORIDE SERPL-SCNC: 108 MMOL/L (ref 96–108)
CO2 SERPL-SCNC: 28 MMOL/L (ref 21–32)
CREAT SERPL-MCNC: 1.48 MG/DL (ref 0.6–1.3)
DME PARACHUTE DELIVERY DATE ACTUAL: NORMAL
DME PARACHUTE DELIVERY DATE EXPECTED: NORMAL
DME PARACHUTE DELIVERY DATE REQUESTED: NORMAL
DME PARACHUTE ITEM DESCRIPTION: NORMAL
DME PARACHUTE ORDER STATUS: NORMAL
DME PARACHUTE SUPPLIER NAME: NORMAL
DME PARACHUTE SUPPLIER PHONE: NORMAL
ERYTHROCYTE [DISTWIDTH] IN BLOOD BY AUTOMATED COUNT: 17.4 % (ref 11.6–15.1)
GFR SERPL CREATININE-BSD FRML MDRD: 44 ML/MIN/1.73SQ M
GLUCOSE SERPL-MCNC: 118 MG/DL (ref 65–140)
GLUCOSE SERPL-MCNC: 120 MG/DL (ref 65–140)
GLUCOSE SERPL-MCNC: 134 MG/DL (ref 65–140)
GLUCOSE SERPL-MCNC: 138 MG/DL (ref 65–140)
GLUCOSE SERPL-MCNC: 177 MG/DL (ref 65–140)
HCT VFR BLD AUTO: 39.1 % (ref 36.5–49.3)
HGB BLD-MCNC: 12.4 G/DL (ref 12–17)
INR PPP: 2.14 (ref 0.84–1.19)
MCH RBC QN AUTO: 32.5 PG (ref 26.8–34.3)
MCHC RBC AUTO-ENTMCNC: 31.7 G/DL (ref 31.4–37.4)
MCV RBC AUTO: 102 FL (ref 82–98)
PLATELET # BLD AUTO: 118 THOUSANDS/UL (ref 149–390)
PMV BLD AUTO: 12.2 FL (ref 8.9–12.7)
POTASSIUM SERPL-SCNC: 4.3 MMOL/L (ref 3.5–5.3)
PROTHROMBIN TIME: 24.8 SECONDS (ref 11.6–14.5)
QRS AXIS: 92 DEGREES
QRSD INTERVAL: 98 MS
QT INTERVAL: 360 MS
QTC INTERVAL: 504 MS
RBC # BLD AUTO: 3.82 MILLION/UL (ref 3.88–5.62)
SODIUM SERPL-SCNC: 141 MMOL/L (ref 135–147)
T WAVE AXIS: 269 DEGREES
VENTRICULAR RATE: 118 BPM
WBC # BLD AUTO: 8.87 THOUSAND/UL (ref 4.31–10.16)

## 2023-11-01 PROCEDURE — 85027 COMPLETE CBC AUTOMATED: CPT | Performed by: NURSE PRACTITIONER

## 2023-11-01 PROCEDURE — 71045 X-RAY EXAM CHEST 1 VIEW: CPT

## 2023-11-01 PROCEDURE — 99232 SBSQ HOSP IP/OBS MODERATE 35: CPT | Performed by: INTERNAL MEDICINE

## 2023-11-01 PROCEDURE — 82948 REAGENT STRIP/BLOOD GLUCOSE: CPT

## 2023-11-01 PROCEDURE — 80048 BASIC METABOLIC PNL TOTAL CA: CPT | Performed by: NURSE PRACTITIONER

## 2023-11-01 PROCEDURE — 85610 PROTHROMBIN TIME: CPT | Performed by: NURSE PRACTITIONER

## 2023-11-01 PROCEDURE — 93010 ELECTROCARDIOGRAM REPORT: CPT | Performed by: INTERNAL MEDICINE

## 2023-11-01 RX ORDER — WARFARIN SODIUM 4 MG/1
4 TABLET ORAL
Status: DISCONTINUED | OUTPATIENT
Start: 2023-11-01 | End: 2023-11-03 | Stop reason: HOSPADM

## 2023-11-01 RX ADMIN — METOPROLOL TARTRATE 100 MG: 50 TABLET, FILM COATED ORAL at 08:57

## 2023-11-01 RX ADMIN — METOPROLOL TARTRATE 100 MG: 50 TABLET, FILM COATED ORAL at 21:59

## 2023-11-01 RX ADMIN — INSULIN LISPRO 1 UNITS: 100 INJECTION, SOLUTION INTRAVENOUS; SUBCUTANEOUS at 21:58

## 2023-11-01 RX ADMIN — AMIODARONE HYDROCHLORIDE 200 MG: 200 TABLET ORAL at 17:21

## 2023-11-01 RX ADMIN — PRAVASTATIN SODIUM 20 MG: 20 TABLET ORAL at 17:21

## 2023-11-01 RX ADMIN — TIMOLOL MALEATE 1 DROP: 5 SOLUTION/ DROPS OPHTHALMIC at 08:52

## 2023-11-01 RX ADMIN — FUROSEMIDE 40 MG: 10 INJECTION, SOLUTION INTRAMUSCULAR; INTRAVENOUS at 08:45

## 2023-11-01 RX ADMIN — AMIODARONE HYDROCHLORIDE 200 MG: 200 TABLET ORAL at 08:57

## 2023-11-01 RX ADMIN — BRIMONIDINE TARTRATE 1 DROP: 2 SOLUTION/ DROPS OPHTHALMIC at 17:19

## 2023-11-01 RX ADMIN — FINASTERIDE 5 MG: 5 TABLET, FILM COATED ORAL at 08:57

## 2023-11-01 RX ADMIN — WARFARIN SODIUM 4 MG: 4 TABLET ORAL at 17:21

## 2023-11-01 RX ADMIN — FUROSEMIDE 40 MG: 10 INJECTION, SOLUTION INTRAMUSCULAR; INTRAVENOUS at 17:14

## 2023-11-01 RX ADMIN — AMIODARONE HYDROCHLORIDE 200 MG: 200 TABLET ORAL at 12:53

## 2023-11-01 RX ADMIN — BRIMONIDINE TARTRATE 1 DROP: 2 SOLUTION/ DROPS OPHTHALMIC at 08:57

## 2023-11-01 NOTE — DISCHARGE INSTR - OTHER ORDERS
Skin care Plan:  1-Sacral wounds- Cleanse  with soap and water, pat dry. Apply Triad paste over open wound beds and cover with Allevyn foam. Chapo with T for treatment. Change every other day and as needed for soilage/displacement. 2-Turn/reposition every 2 hours for pressure re-distribution on skin . 3-Elevate heels to offload pressure  4-Moisturize skin daily with skin nourishing cream  5-Waffle cushion in chair when out of bed.   6-Hydraguard to bilateral buttock and heels 2 times a day

## 2023-11-01 NOTE — ASSESSMENT & PLAN NOTE
Recently diagnosed with PE with associated right lower extremity DVT  We will resume Coumadin  INR currently therapeutic

## 2023-11-01 NOTE — PLAN OF CARE
Problem: PAIN - ADULT  Goal: Verbalizes/displays adequate comfort level or baseline comfort level  Description: Interventions:  - Encourage patient to monitor pain and request assistance  - Assess pain using appropriate pain scale  - Administer analgesics based on type and severity of pain and evaluate response  - Implement non-pharmacological measures as appropriate and evaluate response  - Consider cultural and social influences on pain and pain management  - Notify physician/advanced practitioner if interventions unsuccessful or patient reports new pain  Outcome: Progressing     Problem: INFECTION - ADULT  Goal: Absence or prevention of progression during hospitalization  Description: INTERVENTIONS:  - Assess and monitor for signs and symptoms of infection  - Monitor lab/diagnostic results  - Monitor all insertion sites, i.e. indwelling lines, tubes, and drains  - Monitor endotracheal if appropriate and nasal secretions for changes in amount and color  - [place] appropriate cooling/warming therapies per order  - Administer medications as ordered  - Instruct and encourage patient and family to use good hand hygiene technique  - Identify and instruct in appropriate isolation precautions for identified infection/condition  Outcome: Progressing  Goal: Absence of fever/infection during neutropenic period  Description: INTERVENTIONS:  - Monitor WBC    Outcome: Progressing     Problem: SAFETY ADULT  Goal: Patient will remain free of falls  Description: INTERVENTIONS:  - Educate patient/family on patient safety including physical limitations  - Instruct patient to call for assistance with activity   - Consult OT/PT to assist with strengthening/mobility   - Keep Call bell within reach  - Keep bed low and locked with side rails adjusted as appropriate  - Keep care items and personal belongings within reach  - Initiate and maintain comfort rounds  - Make Fall Risk Sign visible to staff  - Offer Toileting every 2 Hours, in advance of need  - Initiate/Maintain bed alarm  - Obtain necessary fall risk management equipment  - Apply yellow socks and bracelet for high fall risk patients  - Consider moving patient to room near nurses station  Outcome: Progressing  Goal: Maintain or return to baseline ADL function  Description: INTERVENTIONS:  -  Assess patient's ability to carry out ADLs; assess patient's baseline for ADL function and identify physical deficits which impact ability to perform ADLs (bathing, care of mouth/teeth, toileting, grooming, dressing, etc.)  - Assess/evaluate cause of self-care deficits   - Assess range of motion  - Assess patient's mobility; develop plan if impaired  - Assess patient's need for assistive devices and provide as appropriate  - Encourage maximum independence but intervene and supervise when necessary  - Involve family in performance of ADLs  - Assess for home care needs following discharge   - Consider OT consult to assist with ADL evaluation and planning for discharge  - Provide patient education as appropriate  Outcome: Progressing  Goal: Maintains/Returns to pre admission functional level  Description: INTERVENTIONS:  - Perform BMAT or MOVE assessment daily.   - Set and communicate daily mobility goal to care team and patient/family/caregiver. - Collaborate with rehabilitation services on mobility goals if consulted  - Perform Range of Motion 3 times a day. - Reposition patient every 2 hours.   - Dangle patient 3 times a day  - Stand patient 3 times a day  - Ambulate patient 3 times a day  - Out of bed to chair 3 times a day   - Out of bed for meals 3 times a day  - Out of bed for toileting  - Record patient progress and toleration of activity level   Outcome: Progressing     Problem: DISCHARGE PLANNING  Goal: Discharge to home or other facility with appropriate resources  Description: INTERVENTIONS:  - Identify barriers to discharge w/patient and caregiver  - Arrange for needed discharge resources and transportation as appropriate  - Identify discharge learning needs (meds, wound care, etc.)  - Arrange for interpretive services to assist at discharge as needed  - Refer to Case Management Department for coordinating discharge planning if the patient needs post-hospital services based on physician/advanced practitioner order or complex needs related to functional status, cognitive ability, or social support system  Outcome: Progressing     Problem: Knowledge Deficit  Goal: Patient/family/caregiver demonstrates understanding of disease process, treatment plan, medications, and discharge instructions  Description: Complete learning assessment and assess knowledge base.   Interventions:  - Provide teaching at level of understanding  - Provide teaching via preferred learning methods  Outcome: Progressing

## 2023-11-01 NOTE — ASSESSMENT & PLAN NOTE
Patient with history of malignant metastatic stage IV lung cancer status post radiation and chemo  Continue outpatient follow-up with oncology

## 2023-11-01 NOTE — WOUND OSTOMY CARE
Progress Note - Wound   Gearldean Moat 69 y.o. male MRN: [de-identified]  Unit/Bed#: [facility] Encounter: [id number]      Assessment:   Patient admitted due to pleural effusion. History of diabetes, HLD, HTN, cancer, BPH. Wound care consulted for buttock wound. Patient agreeable to assessment, alert and oriented x4, continent of bowel and bladder, standby assist to stand for assessment and walk to chair, is OOB to chair with EHOB waffle cushion in place. Primary RN made aware of assessment. 1. Bilateral sacrum- Suspect etiology to be friction related, the wounds are irregular in shape, scattered, grouped in a linear line on either side of the medial aspect of the sacrum, approx. 20% yellow adhered tissue and 80% blanchable pink partial thickness tissue, with scant amount of serous drainage noted. Katja-wounds are dry, intact, blanchable pink and hyperpigmented skin. 2. Bilateral buttock, hips, elbows, and heels- skin is dry, intact, blanchable. 3. Bilateral lower extremities noted with edema/swelling and has ACE wraps applied for compression, skin is dry, intact, with small adhered brown accepts of scaly skin, no open aspects, and no drainage noted. Educated patient on importance of frequent offloading of pressure via turning, repositioning, and weight-shifting. Verbalized understanding of plan of care. No induration, fluctuance, odor, warmth, redness, or purulence noted to the above noted wound. New dressing applied. Patient tolerated well, reports tenderness to the wound. Primary nurse aware of plan of care. See flow sheets for more detailed assessment findings. Will follow along. Skin care Plan:  1-Sacral wounds- Cleanse  with soap and water, pat dry. Apply Triad paste over open wound beds and cover with Allevyn foam. Chapo with T for treatment. Change every other day and PRN for soilage/displacement. 2-Turn/reposition q2h for pressure re-distribution on skin .   3-Elevate heels to offload pressure  4-Moisturize skin daily with skin nourishing cream  5-Ehob cushion in chair when out of bed. 6-Hydraguard to bilateral buttock and heels BID and PRN. Wound 11/01/23 Sacrum Bilateral (Active)   Wound Image   11/01/23 0907   Wound Description Pink;Yellow 11/01/23 0907   Katja-wound Assessment Pink; Hyperpigmented 11/01/23 0907   Wound Length (cm) 3.2 cm 11/01/23 0907   Wound Width (cm) 2.4 cm 11/01/23 0907   Wound Depth (cm) 0.2 cm 11/01/23 0907   Wound Surface Area (cm^2) 7.68 cm^2 11/01/23 0907   Wound Volume (cm^3) 1.536 cm^3 11/01/23 0907   Calculated Wound Volume (cm^3) 1.54 cm^3 11/01/23 0907   Tunneling 0 cm 11/01/23 0907   Undermining 0 11/01/23 0907   Drainage Amount Scant 11/01/23 0907   Drainage Description Serous 11/01/23 0907   Non-staged Wound Description Partial thickness 11/01/23 0907   Treatments Cleansed;Site care 11/01/23 0907   Dressing Foam, Silicon (eg. Allevyn, etc); triad paste 11/01/23 0907   Wound packed? No 11/01/23 0907   Dressing Changed Changed 11/01/23 0907   Patient Tolerance Tolerated well 11/01/23 0907   Dressing Status Clean;Dry; Intact 11/01/23 8744     Call or tigertext with any questions  Wound Care will continue to follow    Dayron ORLLEN RN CWON  Wound and Ostomy care

## 2023-11-01 NOTE — ASSESSMENT & PLAN NOTE
Heart rate currently controlled  We will continue Lopressor and amiodarone  INR currently therapeutic  We will resume Coumadin

## 2023-11-01 NOTE — ASSESSMENT & PLAN NOTE
Was recently discharged approximately 2 weeks ago was noted to have bilateral pleural effusions requiring multiple thoracentesis likely in setting of cancer  Discussion was had regarding Pleurx catheter versus continued outpatient thoracentesis and patient opting for continued serial thoracentesis  Last underwent thoracentesis on 10/30  This morning was noted to have worsening shortness of breath and required 5 L nasal cannula  We will repeat chest x-ray  Pending x-ray may need repeat thoracentesis again

## 2023-11-01 NOTE — PROGRESS NOTES
[address]  Progress Note  Name: Cyn Cannon  MRN: [de-identified]  Unit/Bed#: -01 I Date of Admission: 10/27/2023   Date of Service: 11/1/2023 I Hospital Day: 5    Assessment/Plan   * Pleural effusion  Assessment & Plan    Was recently discharged approximately 2 weeks ago was noted to have bilateral pleural effusions requiring multiple thoracentesis likely in setting of cancer  Discussion was had regarding Pleurx catheter versus continued outpatient thoracentesis and patient opting for continued serial thoracentesis  Last underwent thoracentesis on 10/30  This morning was noted to have worsening shortness of breath and required 5 L nasal cannula  We will repeat chest x-ray  Pending x-ray may need repeat thoracentesis again    Cellulitis of extremity  Assessment & Plan  Likely venous stasis dermatitis  Keflex has since been discontinued  Monitor off antibiotics      HILARY (acute kidney injury) ([address])  Assessment & Plan    Baseline creatinine approximate 1.1 in July  Creatinine arrival noted to be approximately 1.9  Likely in the setting of volume overload  Kidney function continues to improve on IV Lasix 40 twice daily  We will continue for now  Monitor kidney function    Atrial fibrillation ([address])  Assessment & Plan  Heart rate currently controlled  We will continue Lopressor and amiodarone  INR currently therapeutic  We will resume Coumadin    Malignant neoplasm of upper lobe of right lung [facility])  Assessment & Plan  Patient with history of malignant metastatic stage IV lung cancer status post radiation and chemo  Continue outpatient follow-up with oncology      Multiple subsegmental pulmonary emboli without acute cor pulmonale ([address])  Assessment & Plan    Recently diagnosed with PE with associated right lower extremity DVT  We will resume Coumadin  INR currently therapeutic    BPH associated with nocturia  Assessment & Plan  Continue Proscar    Controlled type 2 diabetes mellitus without complication, without long-term current use of insulin ([address])  Assessment & Plan  Hold home metformin  Continue sliding scale insulin  Blood glucose currently controlled           VTE Pharmacologic Prophylaxis:   Pharmacologic: Warfarin (Coumadin)  Mechanical VTE Prophylaxis in Place: Yes    Patient Centered Rounds: I have performed bedside rounds with nursing staff today. Discussions with Specialists or Other Care Team Provider: cm, nursing    Education and Discussions with Family / Patient: pt    Time Spent for Care: 30 minutes. More than 50% of total time spent on counseling and coordination of care as described above. Current Length of Stay: 5 day(s)    Current Patient Status: Inpatient   Certification Statement: The patient will continue to require additional inpatient hospital stay due to see below    Discharge Plan: still requiring further evaluation of acute respiratory failure. Anticipate approx 24-48 hrs    Code Status: Level 3 - DNAR and DNI      Subjective:   Reports worsening shortness of breath this morning particularly with exertion. Otherwise denies any acute complaints. Denies fevers, chills, chest pain, palpitations or any other complaints    Objective:     Vitals:   Temp (24hrs), Av.7 °F (36.5 °C), Min:97.4 °F (36.3 °C), Max:98 °F (36.7 °C)    Temp:  [97.4 °F (36.3 °C)-98 °F (36.7 °C)] 97.4 °F (36.3 °C)  HR:  [] 87  Resp:  [20] 20  BP: (121-132)/(79-86) 132/85  SpO2:  [96 %-99 %] 97 %  Body mass index is 27.05 kg/m². Input and Output Summary (last 24 hours): Intake/Output Summary (Last 24 hours) at 2023 1036  Last data filed at 10/31/2023 1300  Gross per 24 hour   Intake 300 ml   Output --   Net 300 ml       Physical Exam:     Physical Exam  Constitutional:       General: He is not in acute distress. Appearance: He is well-developed. He is not diaphoretic. HENT:      Head: Normocephalic and atraumatic.       Nose: Nose normal.      Mouth/Throat:      Pharynx: No oropharyngeal exudate. Eyes:      General: No scleral icterus. Conjunctiva/sclera: Conjunctivae normal.   Cardiovascular:      Rate and Rhythm: Normal rate and regular rhythm. Heart sounds: Normal heart sounds. No murmur heard. No friction rub. No gallop. Pulmonary:      Effort: Pulmonary effort is normal. No respiratory distress. Breath sounds: Normal breath sounds. No wheezing or rales. Chest:      Chest wall: No tenderness. Abdominal:      General: Bowel sounds are normal. There is no distension. Palpations: Abdomen is soft. Tenderness: There is no abdominal tenderness. There is no guarding. Musculoskeletal:         General: No tenderness or deformity. Normal range of motion. Cervical back: Normal range of motion and neck supple. Skin:     General: Skin is warm and dry. Findings: No erythema. Neurological:      Mental Status: He is alert. Mental status is at baseline. Additional Data:      Labs:    Results from last 7 days   Lab Units 11/01/23  0616 10/29/23  0502 10/28/23  0446   WBC Thousand/uL 8.87   < > 7.97   HEMOGLOBIN g/dL 12.4   < > 12.2   HEMATOCRIT % 39.1   < > 38.1   PLATELETS Thousands/uL 118*   < > 105*   NEUTROS PCT %  --   --  86*   LYMPHS PCT %  --   --  5*   MONOS PCT %  --   --  6   EOS PCT %  --   --  2    < > = values in this interval not displayed. Results from last 7 days   Lab Units 11/01/23  0616 10/29/23  0502 10/28/23  0446   SODIUM mmol/L 141   < > 140   POTASSIUM mmol/L 4.3   < > 4.0   CHLORIDE mmol/L 108   < > 107   CO2 mmol/L 28   < > 26   BUN mg/dL 48*   < > 49*   CREATININE mg/dL 1.48*   < > 1.81*   ANION GAP mmol/L 5   < > 7   CALCIUM mg/dL 8.3*   < > 8.1*   ALBUMIN g/dL  --   --  3.3*   TOTAL BILIRUBIN mg/dL  --   --  0.52   ALK PHOS U/L  --   --  84   ALT U/L  --   --  37   AST U/L  --   --  21   GLUCOSE RANDOM mg/dL 138   < > 94    < > = values in this interval not displayed.      Results from last 7 days   Lab Units 11/01/23  0616   INR  2.14*     Results from last 7 days   Lab Units 11/01/23  0734 10/31/23  2037 10/31/23  1607 10/31/23  1133 10/31/23  0753 10/30/23  2059 10/30/23  1634 10/30/23  1124 10/30/23  0738 10/29/23  2123 10/29/23  1553 10/29/23  1130   POC GLUCOSE mg/dl 120 103 178* 116 117 130 154* 149* 129 142* 205* 127                   * I Have Reviewed All Lab Data Listed Above. * Additional Pertinent Lab Tests Reviewed: All Labs Within Last 24 Hours Reviewed    Imaging:    Imaging Reports Reviewed Today Include: na  Imaging Personally Reviewed by Myself Includes:  na    Recent Cultures (last 7 days):     Results from last 7 days   Lab Units 10/30/23  1205   GRAM STAIN RESULT  No Polys or Bacteria seen   BODY FLUID CULTURE, STERILE  No growth       Last 24 Hours Medication List:   Current Facility-Administered Medications   Medication Dose Route Frequency Provider Last Rate    acetaminophen  650 mg Oral Q6H PRN Hugh Salazar MD      amiodarone  200 mg Oral TID With Meals Hugh Salazar MD      brimonidine tartrate  1 drop Left Eye BID Hugh Salazar MD      finasteride  5 mg Oral Daily Hugh Salazar MD      furosemide  40 mg Intravenous BID (diuretic) Manpreet Clonts, CRNP      insulin lispro  1-5 Units Subcutaneous HS Manpreet Clonts, CRNP      insulin lispro  1-5 Units Subcutaneous TID AC Dianelys Lizarraga, CRDAYAN      metoprolol tartrate  100 mg Oral Q12H [address] Hugh Salazar MD      pravastatin  20 mg Oral Daily With Unique Capps MD      timolol  1 drop Both Eyes Daily Jarad Mcmullen MD      warfarin  4 mg Oral Daily (warfarin) Jarad Mcmullen MD          Today, Patient Was Seen By: Jarad Mcmullen MD    ** Please Note: Dictation voice to text software may have been used in the creation of this document.  **

## 2023-11-01 NOTE — ASSESSMENT & PLAN NOTE
Baseline creatinine approximate 1.1 in July  Creatinine arrival noted to be approximately 1.9  Likely in the setting of volume overload  Kidney function continues to improve on IV Lasix 40 twice daily  We will continue for now  Monitor kidney function

## 2023-11-02 PROBLEM — J96.01 ACUTE RESPIRATORY FAILURE WITH HYPOXIA (HCC): Status: ACTIVE | Noted: 2023-11-02

## 2023-11-02 LAB
ANION GAP SERPL CALCULATED.3IONS-SCNC: 5 MMOL/L
BACTERIA SPEC BFLD CULT: NO GROWTH
BASOPHILS # BLD AUTO: 0.04 THOUSANDS/ÂΜL (ref 0–0.1)
BASOPHILS NFR BLD AUTO: 1 % (ref 0–1)
BUN SERPL-MCNC: 50 MG/DL (ref 5–25)
CALCIUM SERPL-MCNC: 7.8 MG/DL (ref 8.4–10.2)
CHLORIDE SERPL-SCNC: 108 MMOL/L (ref 96–108)
CO2 SERPL-SCNC: 28 MMOL/L (ref 21–32)
CREAT SERPL-MCNC: 1.45 MG/DL (ref 0.6–1.3)
EOSINOPHIL # BLD AUTO: 0.29 THOUSAND/ÂΜL (ref 0–0.61)
EOSINOPHIL NFR BLD AUTO: 4 % (ref 0–6)
ERYTHROCYTE [DISTWIDTH] IN BLOOD BY AUTOMATED COUNT: 17.3 % (ref 11.6–15.1)
GFR SERPL CREATININE-BSD FRML MDRD: 46 ML/MIN/1.73SQ M
GLUCOSE SERPL-MCNC: 112 MG/DL (ref 65–140)
GLUCOSE SERPL-MCNC: 123 MG/DL (ref 65–140)
GLUCOSE SERPL-MCNC: 137 MG/DL (ref 65–140)
GLUCOSE SERPL-MCNC: 185 MG/DL (ref 65–140)
GLUCOSE SERPL-MCNC: 222 MG/DL (ref 65–140)
GRAM STN SPEC: NORMAL
HCT VFR BLD AUTO: 38.3 % (ref 36.5–49.3)
HGB BLD-MCNC: 12 G/DL (ref 12–17)
IMM GRANULOCYTES # BLD AUTO: 0.05 THOUSAND/UL (ref 0–0.2)
IMM GRANULOCYTES NFR BLD AUTO: 1 % (ref 0–2)
INR PPP: 2.21 (ref 0.84–1.19)
LYMPHOCYTES # BLD AUTO: 0.49 THOUSANDS/ÂΜL (ref 0.6–4.47)
LYMPHOCYTES NFR BLD AUTO: 6 % (ref 14–44)
MCH RBC QN AUTO: 32.3 PG (ref 26.8–34.3)
MCHC RBC AUTO-ENTMCNC: 31.3 G/DL (ref 31.4–37.4)
MCV RBC AUTO: 103 FL (ref 82–98)
MONOCYTES # BLD AUTO: 0.74 THOUSAND/ÂΜL (ref 0.17–1.22)
MONOCYTES NFR BLD AUTO: 9 % (ref 4–12)
NEUTROPHILS # BLD AUTO: 6.63 THOUSANDS/ÂΜL (ref 1.85–7.62)
NEUTS SEG NFR BLD AUTO: 79 % (ref 43–75)
NRBC BLD AUTO-RTO: 0 /100 WBCS
PLATELET # BLD AUTO: 103 THOUSANDS/UL (ref 149–390)
PMV BLD AUTO: 12.1 FL (ref 8.9–12.7)
POTASSIUM SERPL-SCNC: 4.1 MMOL/L (ref 3.5–5.3)
PROTHROMBIN TIME: 25.4 SECONDS (ref 11.6–14.5)
RBC # BLD AUTO: 3.71 MILLION/UL (ref 3.88–5.62)
SODIUM SERPL-SCNC: 141 MMOL/L (ref 135–147)
WBC # BLD AUTO: 8.24 THOUSAND/UL (ref 4.31–10.16)

## 2023-11-02 PROCEDURE — 88112 CYTOPATH CELL ENHANCE TECH: CPT | Performed by: PATHOLOGY

## 2023-11-02 PROCEDURE — 80048 BASIC METABOLIC PNL TOTAL CA: CPT | Performed by: INTERNAL MEDICINE

## 2023-11-02 PROCEDURE — 88341 IMHCHEM/IMCYTCHM EA ADD ANTB: CPT | Performed by: PATHOLOGY

## 2023-11-02 PROCEDURE — 85025 COMPLETE CBC W/AUTO DIFF WBC: CPT | Performed by: INTERNAL MEDICINE

## 2023-11-02 PROCEDURE — 88305 TISSUE EXAM BY PATHOLOGIST: CPT | Performed by: PATHOLOGY

## 2023-11-02 PROCEDURE — 82948 REAGENT STRIP/BLOOD GLUCOSE: CPT

## 2023-11-02 PROCEDURE — 88342 IMHCHEM/IMCYTCHM 1ST ANTB: CPT | Performed by: PATHOLOGY

## 2023-11-02 PROCEDURE — 99232 SBSQ HOSP IP/OBS MODERATE 35: CPT | Performed by: INTERNAL MEDICINE

## 2023-11-02 PROCEDURE — 85610 PROTHROMBIN TIME: CPT | Performed by: INTERNAL MEDICINE

## 2023-11-02 RX ADMIN — PRAVASTATIN SODIUM 20 MG: 20 TABLET ORAL at 17:56

## 2023-11-02 RX ADMIN — TIMOLOL MALEATE 1 DROP: 5 SOLUTION/ DROPS OPHTHALMIC at 09:01

## 2023-11-02 RX ADMIN — METOPROLOL TARTRATE 100 MG: 50 TABLET, FILM COATED ORAL at 08:55

## 2023-11-02 RX ADMIN — BRIMONIDINE TARTRATE 1 DROP: 2 SOLUTION/ DROPS OPHTHALMIC at 17:56

## 2023-11-02 RX ADMIN — FUROSEMIDE 40 MG: 10 INJECTION, SOLUTION INTRAMUSCULAR; INTRAVENOUS at 17:56

## 2023-11-02 RX ADMIN — BRIMONIDINE TARTRATE 1 DROP: 2 SOLUTION/ DROPS OPHTHALMIC at 08:55

## 2023-11-02 RX ADMIN — FUROSEMIDE 40 MG: 10 INJECTION, SOLUTION INTRAMUSCULAR; INTRAVENOUS at 08:55

## 2023-11-02 RX ADMIN — INSULIN LISPRO 1 UNITS: 100 INJECTION, SOLUTION INTRAVENOUS; SUBCUTANEOUS at 23:19

## 2023-11-02 RX ADMIN — METOPROLOL TARTRATE 100 MG: 50 TABLET, FILM COATED ORAL at 23:19

## 2023-11-02 RX ADMIN — WARFARIN SODIUM 4 MG: 4 TABLET ORAL at 17:56

## 2023-11-02 RX ADMIN — INSULIN LISPRO 2 UNITS: 100 INJECTION, SOLUTION INTRAVENOUS; SUBCUTANEOUS at 17:57

## 2023-11-02 RX ADMIN — FINASTERIDE 5 MG: 5 TABLET, FILM COATED ORAL at 08:55

## 2023-11-02 RX ADMIN — AMIODARONE HYDROCHLORIDE 200 MG: 200 TABLET ORAL at 08:55

## 2023-11-02 RX ADMIN — AMIODARONE HYDROCHLORIDE 200 MG: 200 TABLET ORAL at 17:56

## 2023-11-02 RX ADMIN — AMIODARONE HYDROCHLORIDE 200 MG: 200 TABLET ORAL at 12:44

## 2023-11-02 NOTE — PROGRESS NOTES
[address]  Progress Note  Name: Rafael Bernstein  MRN: [de-identified]  Unit/Bed#: -01 I Date of Admission: 10/27/2023   Date of Service: 11/2/2023 I Hospital Day: 6    Assessment/Plan   * Pleural effusion  Assessment & Plan    Was recently discharged approximately 3 weeks at that time was noted to have bilateral pleural effusions requiring multiple thoracentesis likely in setting of malignancy  Discussion has been had regarding Pleurx catheter versus continued serial outpatient thoracentesis and patient opting to continue with serial thoracentesis  Last underwent thoracentesis on 10/30  Recent chest x-ray on 11/1 did reveal a moderate effusion  Will monitor for 1 more day if breathing remains stable can likely be discharged tomorrow    Acute respiratory failure with hypoxia ([address])  Assessment & Plan  Noted to have shortness of breath and hypoxia  In the setting of pleural effusions and cancer  Currently saturating adequately on 3 L nasal cannula  Will need oxygen on discharge    Cellulitis of extremity  Assessment & Plan  Suspect venous stasis dermatitis and no active cellulitis   Keflex has since been discontinued  Monitor off antibiotics      HILARY (acute kidney injury) ([address])  Assessment & Plan    Baseline creatinine approximately 1.1  Noted to have creatinine approximate 1.9 on arrival  Likely in setting of volume overload  Kidney function continues to improve with IV Lasix 40 twice daily   continue IV Lasix for now  Possible transition to oral diuretics tomorrow    Atrial fibrillation ([address])  Assessment & Plan  Heart rate currently controlled   Continue Lopressor and amiodarone  INR currently therapeutic  Continue monitoring  Continue Coumadin    Malignant neoplasm of upper lobe of right lung [facility])  Assessment & Plan  History of stage IV lung cancer status post radiation and chemo  Continue outpatient follow-up with oncology        Multiple subsegmental pulmonary emboli without acute cor pulmonale ([address])  Assessment & Plan    Recently diagnosed with PE with associated right lower extremity DVT  Continue Coumadin      BPH associated with nocturia  Assessment & Plan  Continue Proscar    Controlled type 2 diabetes mellitus without complication, without long-term current use of insulin (HCC)  Assessment & Plan  Hold metformin  Continue sliding scale insulin  Blood glucose currently controlled           VTE Pharmacologic Prophylaxis:   Pharmacologic: Warfarin (Coumadin)  Mechanical VTE Prophylaxis in Place: Yes    Patient Centered Rounds: I have performed bedside rounds with nursing staff today. Discussions with Specialists or Other Care Team Provider: cm, nursing    Education and Discussions with Family / Patient: pt, wife via phone    Time Spent for Care: 30 minutes. More than 50% of total time spent on counseling and coordination of care as described above. Current Length of Stay: 6 day(s)    Current Patient Status: Inpatient   Certification Statement: The patient will continue to require additional inpatient hospital stay due to see below    Discharge Plan: Still requiring IV diuretics and further monitoring of respiratory status. Anticipate possible discharge tomorrow if remains stable and respiratory status continues to improve    Code Status: Level 3 - DNAR and DNI      Subjective:   Denies fevers, chills, cough, shortness of breath. Reports breathing improved from yesterday    Objective:     Vitals:   Temp (24hrs), Av.2 °F (36.8 °C), Min:98.2 °F (36.8 °C), Max:98.2 °F (36.8 °C)    Temp:  [98.2 °F (36.8 °C)] 98.2 °F (36.8 °C)  HR:  [] 101  Resp:  [20] 20  BP: ()/(61-86) 117/81  SpO2:  [82 %-99 %] 92 %  Body mass index is 27.48 kg/m². Input and Output Summary (last 24 hours):        Intake/Output Summary (Last 24 hours) at 2023 1019  Last data filed at 2023 0902  Gross per 24 hour   Intake --   Output 980 ml   Net -980 ml       Physical Exam:     Physical Exam  Constitutional:       General: He is not in acute distress. Appearance: He is well-developed. He is not diaphoretic. HENT:      Head: Normocephalic and atraumatic. Nose: Nose normal.      Mouth/Throat:      Pharynx: No oropharyngeal exudate. Eyes:      General: No scleral icterus. Conjunctiva/sclera: Conjunctivae normal.   Cardiovascular:      Rate and Rhythm: Normal rate and regular rhythm. Heart sounds: Normal heart sounds. No murmur heard. No friction rub. No gallop. Pulmonary:      Effort: Pulmonary effort is normal. No respiratory distress. Breath sounds: Normal breath sounds. No wheezing or rales. Chest:      Chest wall: No tenderness. Abdominal:      General: Bowel sounds are normal. There is no distension. Palpations: Abdomen is soft. Tenderness: There is no abdominal tenderness. There is no guarding. Musculoskeletal:         General: No tenderness or deformity. Normal range of motion. Cervical back: Normal range of motion and neck supple. Skin:     General: Skin is warm and dry. Findings: No erythema. Neurological:      Mental Status: He is alert. Mental status is at baseline.            Additional Data:     Labs:    Results from last 7 days   Lab Units 11/02/23  0536   WBC Thousand/uL 8.24   HEMOGLOBIN g/dL 12.0   HEMATOCRIT % 38.3   PLATELETS Thousands/uL 103*   NEUTROS PCT % 79*   LYMPHS PCT % 6*   MONOS PCT % 9   EOS PCT % 4     Results from last 7 days   Lab Units 11/02/23  0639 10/29/23  0502 10/28/23  0446   SODIUM mmol/L 141   < > 140   POTASSIUM mmol/L 4.1   < > 4.0   CHLORIDE mmol/L 108   < > 107   CO2 mmol/L 28   < > 26   BUN mg/dL 50*   < > 49*   CREATININE mg/dL 1.45*   < > 1.81*   ANION GAP mmol/L 5   < > 7   CALCIUM mg/dL 7.8*   < > 8.1*   ALBUMIN g/dL  --   --  3.3*   TOTAL BILIRUBIN mg/dL  --   --  0.52   ALK PHOS U/L  --   --  84   ALT U/L  --   --  37   AST U/L  --   --  21   GLUCOSE RANDOM mg/dL 123   < > 94    < > = values in this interval not displayed. Results from last 7 days   Lab Units 11/02/23  0639   INR  2.21*     Results from last 7 days   Lab Units 11/02/23  0730 11/01/23  2044 11/01/23  1730 11/01/23  1114 11/01/23  0734 10/31/23  2037 10/31/23  1607 10/31/23  1133 10/31/23  0753 10/30/23  2059 10/30/23  1634 10/30/23  1124   POC GLUCOSE mg/dl 112 177* 134 118 120 103 178* 116 117 130 154* 149*                   * I Have Reviewed All Lab Data Listed Above. * Additional Pertinent Lab Tests Reviewed: All Labs Within Last 24 Hours Reviewed    Imaging:    Imaging Reports Reviewed Today Include: na  Imaging Personally Reviewed by Myself Includes:  na    Recent Cultures (last 7 days):     Results from last 7 days   Lab Units 10/30/23  1205   GRAM STAIN RESULT  No Polys or Bacteria seen   BODY FLUID CULTURE, STERILE  No growth       Last 24 Hours Medication List:   Current Facility-Administered Medications   Medication Dose Route Frequency Provider Last Rate    acetaminophen  650 mg Oral Q6H PRN Hugh Salazar MD      amiodarone  200 mg Oral TID With Meals Hugh Salazar MD      brimonidine tartrate  1 drop Left Eye BID Hugh Salazar MD      finasteride  5 mg Oral Daily Hugh Salazar MD      furosemide  40 mg Intravenous BID (diuretic) RADHA Boone      insulin lispro  1-5 Units Subcutaneous HS RADHA Boone      insulin lispro  1-5 Units Subcutaneous TID AC RADHA Morelos      metoprolol tartrate  100 mg Oral Q12H [facility] Hugh Salazar MD      pravastatin  20 mg Oral Daily With Patricia Baker MD      timolol  1 drop Both Eyes Daily Annalisa Márquez MD      warfarin  4 mg Oral Daily (warfarin) Annalisa Márquez MD          Today, Patient Was Seen By: Annalisa Márquez MD    ** Please Note: Dictation voice to text software may have been used in the creation of this document.  **

## 2023-11-02 NOTE — ASSESSMENT & PLAN NOTE
Noted to have shortness of breath and hypoxia  In the setting of pleural effusions and cancer  Currently saturating adequately on 3 L nasal cannula  Will need oxygen on discharge

## 2023-11-02 NOTE — ASSESSMENT & PLAN NOTE
Was recently discharged approximately 3 weeks at that time was noted to have bilateral pleural effusions requiring multiple thoracentesis likely in setting of malignancy  Discussion has been had regarding Pleurx catheter versus continued serial outpatient thoracentesis and patient opting to continue with serial thoracentesis  Last underwent thoracentesis on 10/30  Recent chest x-ray on 11/1 did reveal a moderate effusion  Will monitor for 1 more day if breathing remains stable can likely be discharged tomorrow

## 2023-11-02 NOTE — ASSESSMENT & PLAN NOTE
Heart rate currently controlled   Continue Lopressor and amiodarone  INR currently therapeutic  Continue monitoring  Continue Coumadin

## 2023-11-02 NOTE — PLAN OF CARE
Problem: PAIN - ADULT  Goal: Verbalizes/displays adequate comfort level or baseline comfort level  Description: Interventions:  - Encourage patient to monitor pain and request assistance  - Assess pain using appropriate pain scale  - Administer analgesics based on type and severity of pain and evaluate response  - Implement non-pharmacological measures as appropriate and evaluate response  - Consider cultural and social influences on pain and pain management  - Notify physician/advanced practitioner if interventions unsuccessful or patient reports new pain  Outcome: Progressing     Problem: INFECTION - ADULT  Goal: Absence or prevention of progression during hospitalization  Description: INTERVENTIONS:  - Assess and monitor for signs and symptoms of infection  - Monitor lab/diagnostic results  - Monitor all insertion sites, i.e. indwelling lines, tubes, and drains  - Monitor endotracheal if appropriate and nasal secretions for changes in amount and color  - [place] appropriate cooling/warming therapies per order  - Administer medications as ordered  - Instruct and encourage patient and family to use good hand hygiene technique  - Identify and instruct in appropriate isolation precautions for identified infection/condition  Outcome: Progressing  Goal: Absence of fever/infection during neutropenic period  Description: INTERVENTIONS:  - Monitor WBC    Outcome: Progressing     Problem: SAFETY ADULT  Goal: Patient will remain free of falls  Description: INTERVENTIONS:  - Educate patient/family on patient safety including physical limitations  - Instruct patient to call for assistance with activity   - Consult OT/PT to assist with strengthening/mobility   - Keep Call bell within reach  - Keep bed low and locked with side rails adjusted as appropriate  - Keep care items and personal belongings within reach  - Initiate and maintain comfort rounds  - Make Fall Risk Sign visible to staff  - Offer Toileting every  Hours, in advance of need  - Initiate/Maintainalarm  - Obtain necessary fall risk management equipment  - Apply yellow socks and bracelet for high fall risk patients  - Consider moving patient to room near nurses station  Outcome: Progressing  Goal: Maintain or return to baseline ADL function  Description: INTERVENTIONS:  -  Assess patient's ability to carry out ADLs; assess patient's baseline for ADL function and identify physical deficits which impact ability to perform ADLs (bathing, care of mouth/teeth, toileting, grooming, dressing, etc.)  - Assess/evaluate cause of self-care deficits   - Assess range of motion  - Assess patient's mobility; develop plan if impaired  - Assess patient's need for assistive devices and provide as appropriate  - Encourage maximum independence but intervene and supervise when necessary  - Involve family in performance of ADLs  - Assess for home care needs following discharge   - Consider OT consult to assist with ADL evaluation and planning for discharge  - Provide patient education as appropriate  Outcome: Progressing  Goal: Maintains/Returns to pre admission functional level  Description: INTERVENTIONS:  - Perform BMAT or MOVE assessment daily.   - Set and communicate daily mobility goal to care team and patient/family/caregiver. - Collaborate with rehabilitation services on mobility goals if consulted  - Perform Range of Motion times a day. - Reposition patient every hours.   - Dangle patient  times a day  - Stand patient times a day  - Ambulate patient times a day  - Out of bed to chair  times a day   - Out of bed for meals times a day  - Out of bed for toileting  - Record patient progress and toleration of activity level   Outcome: Progressing     Problem: DISCHARGE PLANNING  Goal: Discharge to home or other facility with appropriate resources  Description: INTERVENTIONS:  - Identify barriers to discharge w/patient and caregiver  - Arrange for needed discharge resources and transportation as appropriate  - Identify discharge learning needs (meds, wound care, etc.)  - Arrange for interpretive services to assist at discharge as needed  - Refer to Case Management Department for coordinating discharge planning if the patient needs post-hospital services based on physician/advanced practitioner order or complex needs related to functional status, cognitive ability, or social support system  Outcome: Progressing     Problem: Knowledge Deficit  Goal: Patient/family/caregiver demonstrates understanding of disease process, treatment plan, medications, and discharge instructions  Description: Complete learning assessment and assess knowledge base.   Interventions:  - Provide teaching at level of understanding  - Provide teaching via preferred learning methods  Outcome: Progressing

## 2023-11-02 NOTE — ASSESSMENT & PLAN NOTE
History of stage IV lung cancer status post radiation and chemo  Continue outpatient follow-up with oncology

## 2023-11-02 NOTE — ASSESSMENT & PLAN NOTE
Suspect venous stasis dermatitis and no active cellulitis   Keflex has since been discontinued  Monitor off antibiotics

## 2023-11-02 NOTE — NURSING NOTE
Pulse oximetry while ambulating     At 1545, Found patient on 3 L of oxygen sating at 95%. Ambulated patient in room and hallway on room air approximately 16 ft, patient oxygen saturation range between 81% to 89% while ambulating. No complain for SOB or pain while walking. Patient placed back on 3 L oxygen sating at 96%.

## 2023-11-02 NOTE — PLAN OF CARE
Patient notified that Alison will contact him next week. Routing encounter to Alison.    Problem: PAIN - ADULT  Goal: Verbalizes/displays adequate comfort level or baseline comfort level  Description: Interventions:  - Encourage patient to monitor pain and request assistance  - Assess pain using appropriate pain scale  - Administer analgesics based on type and severity of pain and evaluate response  - Implement non-pharmacological measures as appropriate and evaluate response  - Consider cultural and social influences on pain and pain management  - Notify physician/advanced practitioner if interventions unsuccessful or patient reports new pain  Outcome: Progressing     Problem: INFECTION - ADULT  Goal: Absence or prevention of progression during hospitalization  Description: INTERVENTIONS:  - Assess and monitor for signs and symptoms of infection  - Monitor lab/diagnostic results  - Monitor all insertion sites, i.e. indwelling lines, tubes, and drains  - Monitor endotracheal if appropriate and nasal secretions for changes in amount and color  - [place] appropriate cooling/warming therapies per order  - Administer medications as ordered  - Instruct and encourage patient and family to use good hand hygiene technique  - Identify and instruct in appropriate isolation precautions for identified infection/condition  Outcome: Progressing  Goal: Absence of fever/infection during neutropenic period  Description: INTERVENTIONS:  - Monitor WBC    Outcome: Progressing     Problem: SAFETY ADULT  Goal: Patient will remain free of falls  Description: INTERVENTIONS:  - Educate patient/family on patient safety including physical limitations  - Instruct patient to call for assistance with activity   - Consult OT/PT to assist with strengthening/mobility   - Keep Call bell within reach  - Keep bed low and locked with side rails adjusted as appropriate  - Keep care items and personal belongings within reach  - Initiate and maintain comfort rounds  - Make Fall Risk Sign visible to staff  - Offer Toileting every  Hours, in advance of need  - Initiate/Maintain alarm  - Obtain necessary fall risk management equipment:   - Apply yellow socks and bracelet for high fall risk patients  - Consider moving patient to room near nurses station  Outcome: Progressing  Goal: Maintain or return to baseline ADL function  Description: INTERVENTIONS:  -  Assess patient's ability to carry out ADLs; assess patient's baseline for ADL function and identify physical deficits which impact ability to perform ADLs (bathing, care of mouth/teeth, toileting, grooming, dressing, etc.)  - Assess/evaluate cause of self-care deficits   - Assess range of motion  - Assess patient's mobility; develop plan if impaired  - Assess patient's need for assistive devices and provide as appropriate  - Encourage maximum independence but intervene and supervise when necessary  - Involve family in performance of ADLs  - Assess for home care needs following discharge   - Consider OT consult to assist with ADL evaluation and planning for discharge  - Provide patient education as appropriate  Outcome: Progressing  Goal: Maintains/Returns to pre admission functional level  Description: INTERVENTIONS:  - Perform BMAT or MOVE assessment daily.   - Set and communicate daily mobility goal to care team and patient/family/caregiver. - Collaborate with rehabilitation services on mobility goals if consulted  - Perform Range of Motion  times a day. - Reposition patient every  hours.   - Dangle patient  times a day  - Stand patient  times a day  - Ambulate patient  times a day  - Out of bed to chair  times a day   - Out of bed for meals times a day  - Out of bed for toileting  - Record patient progress and toleration of activity level   Outcome: Progressing     Problem: DISCHARGE PLANNING  Goal: Discharge to home or other facility with appropriate resources  Description: INTERVENTIONS:  - Identify barriers to discharge w/patient and caregiver  - Arrange for needed discharge resources and transportation as appropriate  - Identify discharge learning needs (meds, wound care, etc.)  - Arrange for interpretive services to assist at discharge as needed  - Refer to Case Management Department for coordinating discharge planning if the patient needs post-hospital services based on physician/advanced practitioner order or complex needs related to functional status, cognitive ability, or social support system  Outcome: Progressing     Problem: Knowledge Deficit  Goal: Patient/family/caregiver demonstrates understanding of disease process, treatment plan, medications, and discharge instructions  Description: Complete learning assessment and assess knowledge base.   Interventions:  - Provide teaching at level of understanding  - Provide teaching via preferred learning methods  Outcome: Progressing

## 2023-11-02 NOTE — ASSESSMENT & PLAN NOTE
Baseline creatinine approximately 1.1  Noted to have creatinine approximate 1.9 on arrival  Likely in setting of volume overload  Kidney function continues to improve with IV Lasix 40 twice daily   continue IV Lasix for now  Possible transition to oral diuretics tomorrow

## 2023-11-03 ENCOUNTER — TRANSITIONAL CARE MANAGEMENT (OUTPATIENT)
Dept: FAMILY MEDICINE CLINIC | Facility: CLINIC | Age: 78
End: 2023-11-03

## 2023-11-03 ENCOUNTER — HOME CARE VISIT (OUTPATIENT)
Dept: HOME HEALTH SERVICES | Facility: HOME HEALTHCARE | Age: 78
End: 2023-11-03
Payer: MEDICARE

## 2023-11-03 VITALS
HEART RATE: 113 BPM | OXYGEN SATURATION: 91 % | TEMPERATURE: 97.5 F | HEIGHT: 69 IN | RESPIRATION RATE: 18 BRPM | DIASTOLIC BLOOD PRESSURE: 80 MMHG | SYSTOLIC BLOOD PRESSURE: 118 MMHG | WEIGHT: 187.17 LBS | BODY MASS INDEX: 27.72 KG/M2

## 2023-11-03 LAB
ANION GAP SERPL CALCULATED.3IONS-SCNC: 5 MMOL/L
BASOPHILS # BLD AUTO: 0.03 THOUSANDS/ÂΜL (ref 0–0.1)
BASOPHILS NFR BLD AUTO: 1 % (ref 0–1)
BUN SERPL-MCNC: 48 MG/DL (ref 5–25)
CALCIUM SERPL-MCNC: 7.9 MG/DL (ref 8.4–10.2)
CHLORIDE SERPL-SCNC: 109 MMOL/L (ref 96–108)
CO2 SERPL-SCNC: 30 MMOL/L (ref 21–32)
CREAT SERPL-MCNC: 1.44 MG/DL (ref 0.6–1.3)
DME PARACHUTE DELIVERY DATE EXPECTED: NORMAL
DME PARACHUTE DELIVERY DATE REQUESTED: NORMAL
DME PARACHUTE ITEM DESCRIPTION: NORMAL
DME PARACHUTE ORDER STATUS: NORMAL
DME PARACHUTE SUPPLIER NAME: NORMAL
DME PARACHUTE SUPPLIER PHONE: NORMAL
EOSINOPHIL # BLD AUTO: 0.3 THOUSAND/ÂΜL (ref 0–0.61)
EOSINOPHIL NFR BLD AUTO: 5 % (ref 0–6)
ERYTHROCYTE [DISTWIDTH] IN BLOOD BY AUTOMATED COUNT: 17.2 % (ref 11.6–15.1)
GFR SERPL CREATININE-BSD FRML MDRD: 46 ML/MIN/1.73SQ M
GLUCOSE SERPL-MCNC: 106 MG/DL (ref 65–140)
GLUCOSE SERPL-MCNC: 128 MG/DL (ref 65–140)
GLUCOSE SERPL-MCNC: 150 MG/DL (ref 65–140)
GLUCOSE SERPL-MCNC: 214 MG/DL (ref 65–140)
HCT VFR BLD AUTO: 33.7 % (ref 36.5–49.3)
HGB BLD-MCNC: 10.9 G/DL (ref 12–17)
IMM GRANULOCYTES # BLD AUTO: 0.06 THOUSAND/UL (ref 0–0.2)
IMM GRANULOCYTES NFR BLD AUTO: 1 % (ref 0–2)
INR PPP: 2.44 (ref 0.84–1.19)
LYMPHOCYTES # BLD AUTO: 0.42 THOUSANDS/ÂΜL (ref 0.6–4.47)
LYMPHOCYTES NFR BLD AUTO: 37 %
LYMPHOCYTES NFR BLD AUTO: 6 % (ref 14–44)
MCH RBC QN AUTO: 32.3 PG (ref 26.8–34.3)
MCHC RBC AUTO-ENTMCNC: 32.3 G/DL (ref 31.4–37.4)
MCV RBC AUTO: 100 FL (ref 82–98)
MONO+MESO NFR FLD MANUAL: 2 %
MONOCYTES # BLD AUTO: 0.6 THOUSAND/ÂΜL (ref 0.17–1.22)
MONOCYTES NFR BLD AUTO: 15 %
MONOCYTES NFR BLD AUTO: 9 % (ref 4–12)
NEUTROPHILS # BLD AUTO: 5.11 THOUSANDS/ÂΜL (ref 1.85–7.62)
NEUTS BAND NFR FLD MANUAL: 1 %
NEUTS SEG NFR BLD AUTO: 41 %
NEUTS SEG NFR BLD AUTO: 78 % (ref 43–75)
NRBC BLD AUTO-RTO: 0 /100 WBCS
PATHOLOGY REVIEW: YES
PLATELET # BLD AUTO: 106 THOUSANDS/UL (ref 149–390)
PMV BLD AUTO: 12 FL (ref 8.9–12.7)
POTASSIUM SERPL-SCNC: 3.5 MMOL/L (ref 3.5–5.3)
PROTHROMBIN TIME: 27.4 SECONDS (ref 11.6–14.5)
RBC # BLD AUTO: 3.37 MILLION/UL (ref 3.88–5.62)
SODIUM SERPL-SCNC: 144 MMOL/L (ref 135–147)
TOTAL CELLS COUNTED SPEC: 100
WBC # BLD AUTO: 6.52 THOUSAND/UL (ref 4.31–10.16)
WBC OTHER NFR FLD MANUAL: 4 %

## 2023-11-03 PROCEDURE — 94761 N-INVAS EAR/PLS OXIMETRY MLT: CPT

## 2023-11-03 PROCEDURE — 85610 PROTHROMBIN TIME: CPT | Performed by: INTERNAL MEDICINE

## 2023-11-03 PROCEDURE — 80048 BASIC METABOLIC PNL TOTAL CA: CPT | Performed by: INTERNAL MEDICINE

## 2023-11-03 PROCEDURE — 82948 REAGENT STRIP/BLOOD GLUCOSE: CPT

## 2023-11-03 PROCEDURE — 99239 HOSP IP/OBS DSCHRG MGMT >30: CPT | Performed by: INTERNAL MEDICINE

## 2023-11-03 PROCEDURE — 85025 COMPLETE CBC W/AUTO DIFF WBC: CPT | Performed by: INTERNAL MEDICINE

## 2023-11-03 RX ORDER — FUROSEMIDE 40 MG/1
40 TABLET ORAL 2 TIMES DAILY
Qty: 60 TABLET | Refills: 0 | Status: SHIPPED | OUTPATIENT
Start: 2023-11-03 | End: 2023-11-04 | Stop reason: SDUPTHER

## 2023-11-03 RX ADMIN — WARFARIN SODIUM 4 MG: 4 TABLET ORAL at 17:46

## 2023-11-03 RX ADMIN — FUROSEMIDE 40 MG: 10 INJECTION, SOLUTION INTRAMUSCULAR; INTRAVENOUS at 17:46

## 2023-11-03 RX ADMIN — TIMOLOL MALEATE 1 DROP: 5 SOLUTION/ DROPS OPHTHALMIC at 08:39

## 2023-11-03 RX ADMIN — INSULIN LISPRO 2 UNITS: 100 INJECTION, SOLUTION INTRAVENOUS; SUBCUTANEOUS at 12:12

## 2023-11-03 RX ADMIN — PRAVASTATIN SODIUM 20 MG: 20 TABLET ORAL at 17:46

## 2023-11-03 RX ADMIN — FUROSEMIDE 40 MG: 10 INJECTION, SOLUTION INTRAMUSCULAR; INTRAVENOUS at 08:41

## 2023-11-03 RX ADMIN — BRIMONIDINE TARTRATE 1 DROP: 2 SOLUTION/ DROPS OPHTHALMIC at 17:51

## 2023-11-03 RX ADMIN — INSULIN LISPRO 1 UNITS: 100 INJECTION, SOLUTION INTRAVENOUS; SUBCUTANEOUS at 17:47

## 2023-11-03 RX ADMIN — BRIMONIDINE TARTRATE 1 DROP: 2 SOLUTION/ DROPS OPHTHALMIC at 08:39

## 2023-11-03 RX ADMIN — METOPROLOL TARTRATE 100 MG: 50 TABLET, FILM COATED ORAL at 08:40

## 2023-11-03 RX ADMIN — AMIODARONE HYDROCHLORIDE 200 MG: 200 TABLET ORAL at 08:40

## 2023-11-03 RX ADMIN — AMIODARONE HYDROCHLORIDE 200 MG: 200 TABLET ORAL at 12:12

## 2023-11-03 RX ADMIN — FINASTERIDE 5 MG: 5 TABLET, FILM COATED ORAL at 08:40

## 2023-11-03 RX ADMIN — AMIODARONE HYDROCHLORIDE 200 MG: 200 TABLET ORAL at 17:46

## 2023-11-03 NOTE — PLAN OF CARE
Problem: PAIN - ADULT  Goal: Verbalizes/displays adequate comfort level or baseline comfort level  Description: Interventions:  - Encourage patient to monitor pain and request assistance  - Assess pain using appropriate pain scale  - Administer analgesics based on type and severity of pain and evaluate response  - Implement non-pharmacological measures as appropriate and evaluate response  - Consider cultural and social influences on pain and pain management  - Notify physician/advanced practitioner if interventions unsuccessful or patient reports new pain  Outcome: Progressing     Problem: INFECTION - ADULT  Goal: Absence or prevention of progression during hospitalization  Description: INTERVENTIONS:  - Assess and monitor for signs and symptoms of infection  - Monitor lab/diagnostic results  - Monitor all insertion sites, i.e. indwelling lines, tubes, and drains  - Monitor endotracheal if appropriate and nasal secretions for changes in amount and color  - [place] appropriate cooling/warming therapies per order  - Administer medications as ordered  - Instruct and encourage patient and family to use good hand hygiene technique  - Identify and instruct in appropriate isolation precautions for identified infection/condition  Outcome: Progressing  Goal: Absence of fever/infection during neutropenic period  Description: INTERVENTIONS:  - Monitor WBC    Outcome: Progressing     Problem: SAFETY ADULT  Goal: Patient will remain free of falls  Description: INTERVENTIONS:  - Educate patient/family on patient safety including physical limitations  - Instruct patient to call for assistance with activity   - Consult OT/PT to assist with strengthening/mobility   - Keep Call bell within reach  - Keep bed low and locked with side rails adjusted as appropriate  - Keep care items and personal belongings within reach  - Initiate and maintain comfort rounds  - Make Fall Risk Sign visible to staff  - Offer Toileting every  Hours, in advance of need  - Initiate/Maintain alarm  - Obtain necessary fall risk management equipment:   - Apply yellow socks and bracelet for high fall risk patients  - Consider moving patient to room near nurses station  Outcome: Progressing  Goal: Maintain or return to baseline ADL function  Description: INTERVENTIONS:  -  Assess patient's ability to carry out ADLs; assess patient's baseline for ADL function and identify physical deficits which impact ability to perform ADLs (bathing, care of mouth/teeth, toileting, grooming, dressing, etc.)  - Assess/evaluate cause of self-care deficits   - Assess range of motion  - Assess patient's mobility; develop plan if impaired  - Assess patient's need for assistive devices and provide as appropriate  - Encourage maximum independence but intervene and supervise when necessary  - Involve family in performance of ADLs  - Assess for home care needs following discharge   - Consider OT consult to assist with ADL evaluation and planning for discharge  - Provide patient education as appropriate  Outcome: Progressing  Goal: Maintains/Returns to pre admission functional level  Description: INTERVENTIONS:  - Perform BMAT or MOVE assessment daily.   - Set and communicate daily mobility goal to care team and patient/family/caregiver. - Collaborate with rehabilitation services on mobility goals if consulted  - Perform Range of Motion  times a day. - Reposition patient every  hours.   - Dangle patient  times a day  - Stand patient  times a day  - Ambulate patient  times a day  - Out of bed to chair  times a day   - Out of bed for jesus times a day  - Out of bed for toileting  - Record patient progress and toleration of activity level   Outcome: Progressing     Problem: DISCHARGE PLANNING  Goal: Discharge to home or other facility with appropriate resources  Description: INTERVENTIONS:  - Identify barriers to discharge w/patient and caregiver  - Arrange for needed discharge resources and transportation as appropriate  - Identify discharge learning needs (meds, wound care, etc.)  - Arrange for interpretive services to assist at discharge as needed  - Refer to Case Management Department for coordinating discharge planning if the patient needs post-hospital services based on physician/advanced practitioner order or complex needs related to functional status, cognitive ability, or social support system  Outcome: Progressing     Problem: Knowledge Deficit  Goal: Patient/family/caregiver demonstrates understanding of disease process, treatment plan, medications, and discharge instructions  Description: Complete learning assessment and assess knowledge base.   Interventions:  - Provide teaching at level of understanding  - Provide teaching via preferred learning methods  Outcome: Progressing

## 2023-11-03 NOTE — PLAN OF CARE
Problem: PAIN - ADULT  Goal: Verbalizes/displays adequate comfort level or baseline comfort level  Description: Interventions:  - Encourage patient to monitor pain and request assistance  - Assess pain using appropriate pain scale  - Administer analgesics based on type and severity of pain and evaluate response  - Implement non-pharmacological measures as appropriate and evaluate response  - Consider cultural and social influences on pain and pain management  - Notify physician/advanced practitioner if interventions unsuccessful or patient reports new pain  Outcome: Progressing     Problem: INFECTION - ADULT  Goal: Absence or prevention of progression during hospitalization  Description: INTERVENTIONS:  - Assess and monitor for signs and symptoms of infection  - Monitor lab/diagnostic results  - Monitor all insertion sites, i.e. indwelling lines, tubes, and drains  - Monitor endotracheal if appropriate and nasal secretions for changes in amount and color  - Dupree appropriate cooling/warming therapies per order  - Administer medications as ordered  - Instruct and encourage patient and family to use good hand hygiene technique  - Identify and instruct in appropriate isolation precautions for identified infection/condition  Outcome: Progressing  Goal: Absence of fever/infection during neutropenic period  Description: INTERVENTIONS:  - Monitor WBC    Outcome: Progressing     Problem: SAFETY ADULT  Goal: Patient will remain free of falls  Description: INTERVENTIONS:  - Educate patient/family on patient safety including physical limitations  - Instruct patient to call for assistance with activity   - Consult OT/PT to assist with strengthening/mobility   - Keep Call bell within reach  - Keep bed low and locked with side rails adjusted as appropriate  - Keep care items and personal belongings within reach  - Initiate and maintain comfort rounds  - Make Fall Risk Sign visible to staff  - Initiate/Maintain bed alarm  - Obtain necessary fall risk management equipment  - Apply yellow socks and bracelet for high fall risk patients  - Consider moving patient to room near nurses station  Outcome: Progressing  Goal: Maintain or return to baseline ADL function  Description: INTERVENTIONS:  -  Assess patient's ability to carry out ADLs; assess patient's baseline for ADL function and identify physical deficits which impact ability to perform ADLs (bathing, care of mouth/teeth, toileting, grooming, dressing, etc.)  - Assess/evaluate cause of self-care deficits   - Assess range of motion  - Assess patient's mobility; develop plan if impaired  - Assess patient's need for assistive devices and provide as appropriate  - Encourage maximum independence but intervene and supervise when necessary  - Involve family in performance of ADLs  - Assess for home care needs following discharge   - Consider OT consult to assist with ADL evaluation and planning for discharge  - Provide patient education as appropriate  Outcome: Progressing  Goal: Maintains/Returns to pre admission functional level  Description: INTERVENTIONS:  - Perform BMAT or MOVE assessment daily.   - Set and communicate daily mobility goal to care team and patient/family/caregiver. - Collaborate with rehabilitation services on mobility goals if consulted  - Perform Range of Motion 3 times a day.   - Dangle patient 3 times a day  - Stand patient 3 times a day  - Ambulate patient 3 times a day  - Out of bed to chair 3 times a day   - Out of bed for meals 3 times a day  - Out of bed for toileting  - Record patient progress and toleration of activity level   Outcome: Progressing     Problem: DISCHARGE PLANNING  Goal: Discharge to home or other facility with appropriate resources  Description: INTERVENTIONS:  - Identify barriers to discharge w/patient and caregiver  - Arrange for needed discharge resources and transportation as appropriate  - Identify discharge learning needs (meds, wound care, etc.)  - Arrange for interpretive services to assist at discharge as needed  - Refer to Case Management Department for coordinating discharge planning if the patient needs post-hospital services based on physician/advanced practitioner order or complex needs related to functional status, cognitive ability, or social support system  Outcome: Progressing     Problem: Knowledge Deficit  Goal: Patient/family/caregiver demonstrates understanding of disease process, treatment plan, medications, and discharge instructions  Description: Complete learning assessment and assess knowledge base.   Interventions:  - Provide teaching at level of understanding  - Provide teaching via preferred learning methods  Outcome: Progressing

## 2023-11-03 NOTE — ASSESSMENT & PLAN NOTE
Was recently discharged approximately 3 weeks at that time was noted to have bilateral pleural effusions requiring multiple thoracentesis likely in setting of malignancy  Discussion has been had regarding Pleurx catheter versus continued serial outpatient thoracentesis and patient opting to continue with serial thoracentesis  Last underwent thoracentesis on 10/30  Recent chest x-ray on 11/1 did reveal a moderate effusion  Patient will need weekly thoracentesis, he wants this done at [address] [place] [address] [address]

## 2023-11-03 NOTE — RESPIRATORY THERAPY NOTE
Home Oxygen Qualifying Test     Patient name: Mark Wells        : 1945   Date of Test:  November 3, 2023  Diagnosis:    Home Oxygen Test:    **Medicare Guidelines require item(s) 1-5 on all ambulatory patients or 1 and 2 on non-ambulatory patients. 1. Baseline SPO2 on Room Air at rest 90 %   If <= 88% on Room Air add O2 via NC to obtain SpO2 >=88%. If LPM needed, document LPM  needed to reach =>88%    SPO2 during exertion on Room Air 85  %  During exertion monitor SPO2. If SPO2 increases >=89%, do not add supplemental oxygen    SPO2 on Oxygen at Rest 95 % at 3 LPM    SPO2 during exertion on Oxygen 96 % at 3 LPM    Test performed during exertion activity. [x]  Supplemental Home Oxygen is indicated. []  Client does not qualify for home oxygen.     Respiratory Additional Notes  pt sat dropped to 85% while ambulating on RA  pt will require home O2    Celina Centeno, RT

## 2023-11-03 NOTE — CASE MANAGEMENT
Case Management Discharge Planning Note    Patient name Dedrick Tidwell  Location [address] [facility] [place] 230/-01 MRN [id number]  : 1945 Date 11/3/2023       Current Admission Date: 10/27/2023  Current Admission Diagnosis:Pleural effusion   Patient Active Problem List    Diagnosis Date Noted    Acute respiratory failure with hypoxia ([address]) 2023    Supratherapeutic INR 10/28/2023    Cellulitis of extremity 10/27/2023    Cellulitis and abscess of left lower extremity 10/23/2023    Cellulitis of leg, right 10/23/2023    COPD with acute exacerbation ([address]) 10/05/2023    Atrial fibrillation ([address]) 10/03/2023    HILARY (acute kidney injury) ([address]) 10/03/2023    Abnormal CT of the abdomen 10/03/2023    Platelets decreased ([address]) 2023    Multiple lung nodules on CT 10/06/2022    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 ([address]) 10/06/2022    Pleural effusion 10/06/2022    Malignant neoplasm of upper lobe of right lung [facility])     Multiple subsegmental pulmonary emboli without acute cor pulmonale ([address]) 09/10/2022    Pulmonary mass 09/10/2022    Anemia 09/10/2022    Non-recurrent bilateral inguinal hernia without obstruction or gangrene 2021    Status post right tibial-talar ankle fusion 2020    BPH associated with nocturia 2019    Arthritis of right acromioclavicular joint 03/15/2019    Primary osteoarthritis of right shoulder 2019    Chronic left shoulder pain 2019    Left rotator cuff tear arthropathy 2019    Rotator cuff injury, right, initial encounter 2019    History of colon polyps 2019    Hyperparathyroidism ([address]) 2018    Hypokalemia 10/22/2018    Hypocalcemia 10/22/2018    Glaucoma 10/11/2018    Controlled type 2 diabetes mellitus without complication, without long-term current use of insulin ([address]) 2016    Inguinal hernia 2016    Benign essential HTN 2016    Hypercholesterolemia 2016      LOS (days): 7  Geometric Mean LOS (GMLOS) (days): 4.90  Days to GMLOS:-1.7     OBJECTIVE:  Risk of Unplanned Readmission Score: 34.22         Current admission status: Inpatient   Preferred Pharmacy:   [address] [place] [address] [address] [place] [address] [address] [address] [address] [place] [facility] [address]  Phone: [phone] Fax: 12 [address], [address] [facility] 1 [facility] 3690 [facility] [address] [place] [address]  Phone: [phone] Fax: [phone]    [facility] Medic. [facility] [address]  Phone: [phone] Fax: [phone]    Primary Care Provider: Jesus Johnson PA-C    Primary Insurance: MEDICARE  Secondary Insurance: Maribel Light    DISCHARGE DETAILS:    DME Referral Provided  Referral made for DME?: Yes  DME referral completed for the following items[de-identified] Home Oxygen concentrator  DME Supplier Name[de-identified] [employer]    Other Referral/Resources/Interventions Provided:  Interventions: DME  Referral Comments: VENKATA sent a referral to Rupal Barron requesting home O2.

## 2023-11-03 NOTE — ASSESSMENT & PLAN NOTE
Noted to have shortness of breath and hypoxia  In the setting of pleural effusions and cancer  Currently saturating adequately on 3 L nasal cannula  Will need oxygen on discharge.   Ordered

## 2023-11-03 NOTE — CASE MANAGEMENT
Case Management Discharge Planning Note    Patient name Marshal Echevarria  Location [address] [facility] [place] 230/-01 MRN [id number]  : 1945 Date 11/3/2023       Current Admission Date: 10/27/2023  Current Admission Diagnosis:Pleural effusion   Patient Active Problem List    Diagnosis Date Noted    Acute respiratory failure with hypoxia ([address]) 2023    Supratherapeutic INR 10/28/2023    Cellulitis of extremity 10/27/2023    Cellulitis and abscess of left lower extremity 10/23/2023    Cellulitis of leg, right 10/23/2023    COPD with acute exacerbation ([address]) 10/05/2023    Atrial fibrillation ([address]) 10/03/2023    HILARY (acute kidney injury) ([address]) 10/03/2023    Abnormal CT of the abdomen 10/03/2023    Platelets decreased ([address]) 2023    Multiple lung nodules on CT 10/06/2022    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)     Multiple subsegmental pulmonary emboli without acute cor pulmonale ([address]) 09/10/2022    Pulmonary mass 09/10/2022    Anemia 09/10/2022    Non-recurrent bilateral inguinal hernia without obstruction or gangrene 2021    Status post right tibial-talar ankle fusion 2020    BPH associated with nocturia 2019    Arthritis of right acromioclavicular joint 03/15/2019    Primary osteoarthritis of right shoulder 2019    Chronic left shoulder pain 2019    Left rotator cuff tear arthropathy 2019    Rotator cuff injury, right, initial encounter 2019    History of colon polyps 2019    Hyperparathyroidism ([address]) 2018    Hypokalemia 10/22/2018    Hypocalcemia 10/22/2018    Glaucoma 10/11/2018    Controlled type 2 diabetes mellitus without complication, without long-term current use of insulin ([address]) 2016    Inguinal hernia 2016    Benign essential HTN 2016    Hypercholesterolemia 2016      LOS (days): 7  Geometric Mean LOS (GMLOS) (days): 4.90  Days to GMLOS:-1.7     OBJECTIVE:  Risk of Unplanned Readmission Score: 34.22         Current admission status: Inpatient   Preferred Pharmacy:   [address] [place] [address] [address] [facility].  [address] [address] [address] [place] [facility] [address]  Phone: [phone] Fax: 12 [address], [address] [facility] [address] 3690 [facility] [address] [place] [address]  Phone: [phone] Fax: [phone]    [facility] Principal [facility] Medico. [facility] [address]  Phone: [phone] Fax: [phone]    Primary Care Provider: Eren Saxena PA-C    Primary Insurance: MEDICARE  Secondary Insurance: Zenia Kingsley    DISCHARGE DETAILS:    Discharge planning discussed with[de-identified] Patient's Wife  Freedom of Choice: Yes  Comments - Freedom of Choice: CM called patient's wife to confirm DC plan. CM reported that patient is cleared for discharge, and his wife confirmed that she will provide transportation later today around 6:00 PM. She confirmed that the hospital bed has been delivered, but the home O2 was not. CM reported that a new home O2 eval will need to be done today due to the original one being outside of the 48 hour time frame. CM also reported that [facility] has accepted and will contact patient vs. family to arrange services. CM also informed patient's wife that she will need to call IR to schedule thorancentesis OP and request the [facility]. Patient's wife agreeable to do so.   CM contacted family/caregiver?: Yes  Were Treatment Team discharge recommendations reviewed with patient/caregiver?: Yes  Did patient/caregiver verbalize understanding of patient care needs?: Yes  Were patient/caregiver advised of the risks associated with not following Treatment Team discharge recommendations?: Yes    Contacts  Patient Contacts: Abdi Magana  Relationship to Patient[de-identified] Family  Contact Method: Phone  Phone Number: [phone]  Reason/Outcome: Continuity of Care, Discharge [facility]         Is the patient interested in [address] at discharge?: Yes  [facility] requested[de-identified] Physical Therapy, Occupational Therapy, Nursing  Home Health Agency Name[de-identified] [facility] External Referral Reason (only applicable if external HHA name selected): Services not provided in network or near patient location  [facility] Provider[de-identified] PCP  Home Health Services Needed[de-identified] Evaluate Functional Status and Safety, Diabetes Management, Gait/ADL Training, Strengthening/Theraputic Exercises to Improve Function, Oxygen Via Nasal Cannula  Oxygen LPM Ordered (if applicable based on home health services needed):: 2 LPM  Homebound Criteria Met[de-identified] Requires the Assistance of Another Person for Safe Ambulation or to Leave the Home, Uses an Assist Device (i.e. cane, walker, etc)  Supporting Clincal Findings[de-identified] Requires Oxygen, Limited Endurance, Dyspnea with Exertion, Fatigues Easliy in Short Distances    DME Referral Provided  Referral made for DME?: No (New home O2 eval pending. Will need to resend one respiratory's note is available.)    Other Referral/Resources/Interventions Provided:  Interventions: Oncology SW, PCP, Other (Specify) (IR)  Referral Comments: VENKATA sent an in basket message to POWER Casanova SW with oncology, providing her with a brief update on patient's hospitalization and discharge plan. CM also sent an in basket message to patient's PCP requesting a TCM appointment due to patient's yellow URR score. CM then called [phone] (IR scheduling) and spoke to [facility]. [facility] reported that patient can call the same number to schedule his thoracentesis and can just pick the [address] as long as there are standing orders for OP thoracentesis. SLIM confirmed that there is.  VENKATA also asked SLIM for a new home O2 eval.    Would you like to participate in our [address] [facility] service program?  : No - Declined    Treatment Team Recommendation: Home with Home Health Care  Discharge Destination Plan[de-identified] Home with [address] at Discharge : Family     IMM Given (Date):: 11/03/23  IMM Given to[de-identified] Family (CM reviewed IMM with patient's wife via phone. She reported understanding of these rights and denied any questions or concerns at this time.  IMM placed in medical records.)

## 2023-11-03 NOTE — ASSESSMENT & PLAN NOTE
Baseline creatinine approximately 1.1  Noted to have creatinine approximate 1.9 on arrival  Likely in setting of volume overload  Kidney function continues to improve with IV Lasix 40 twice daily   Discharging on oral Lasix with follow-up BMP in a week

## 2023-11-03 NOTE — ASSESSMENT & PLAN NOTE
History of stage IV lung cancer status post radiation and chemo  Continue outpatient follow-up with oncology.

## 2023-11-03 NOTE — CASE MANAGEMENT
Case Management Discharge Planning Note    Patient name Collin Akers  Location [address] [facility] 230/-01 MRN [id number]  : 1945 Date 11/3/2023       Current Admission Date: 10/27/2023  Current Admission Diagnosis:Pleural effusion   Patient Active Problem List    Diagnosis Date Noted    Acute respiratory failure with hypoxia ([address]) 2023    Supratherapeutic INR 10/28/2023    Cellulitis of extremity 10/27/2023    Cellulitis and abscess of left lower extremity 10/23/2023    Cellulitis of leg, right 10/23/2023    COPD with acute exacerbation ([address]) 10/05/2023    Atrial fibrillation ([address]) 10/03/2023    HILARY (acute kidney injury) ([address]) 10/03/2023    Abnormal CT of the abdomen 10/03/2023    Platelets decreased ([address]) 2023    Multiple lung nodules on CT 10/06/2022    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 ([address]) 10/06/2022    Pleural effusion 10/06/2022    Malignant neoplasm of upper lobe of right lung [facility])     Multiple subsegmental pulmonary emboli without acute cor pulmonale ([address]) 09/10/2022    Pulmonary mass 09/10/2022    Anemia 09/10/2022    Non-recurrent bilateral inguinal hernia without obstruction or gangrene 2021    Status post right tibial-talar ankle fusion 2020    BPH associated with nocturia 2019    Arthritis of right acromioclavicular joint 03/15/2019    Primary osteoarthritis of right shoulder 2019    Chronic left shoulder pain 2019    Left rotator cuff tear arthropathy 2019    Rotator cuff injury, right, initial encounter 2019    History of colon polyps 2019    Hyperparathyroidism ([address]) 2018    Hypokalemia 10/22/2018    Hypocalcemia 10/22/2018    Glaucoma 10/11/2018    Controlled type 2 diabetes mellitus without complication, without long-term current use of insulin ([address]) 2016    Inguinal hernia 2016    Benign essential HTN 2016    Hypercholesterolemia 2016      LOS (days): 7  Geometric Mean LOS (GMLOS) (days): 4.90  Days to GMLOS:-1.9     OBJECTIVE:  Risk of Unplanned Readmission Score: 34.57         Current admission status: Inpatient   Preferred Pharmacy:   [address] [place] [address] [address] [place] [address] [address] [address] [address] [place] [facility] [address]  Phone: [phone] Fax: 12 [address], [address] [facility] [address] [place] [address]  Phone: [phone] Fax: [phone]    [facility] Medico. [place] [address]  Phone: [phone] Fax: [phone]    Primary Care Provider: Rosalva Prado PA-C    Primary Insurance: MEDICARE  Secondary Insurance: Lynn Méndez    DISCHARGE DETAILS:                                          Other Referral/Resources/Interventions Provided:  Referral Comments: Pt O2 approved per [place]. [employer] 5/ [id number] delivered to bedside. Pt signed delivery ticket and copy provided to him. Original placed in bin for p/u by [employer].

## 2023-11-03 NOTE — ASSESSMENT & PLAN NOTE
Suspect venous stasis dermatitis and no active cellulitis   Keflex has since been discontinued  Monitor off antibiotics.

## 2023-11-03 NOTE — DISCHARGE SUMMARY
[address]  Discharge- Trisha Olmos 1945, 69 y.o. male MRN: [id number]  Unit/Bed#: -Saida Encounter: [id number]  Primary Care Provider: Ailyn Patel PA-C   Date and time admitted to hospital: 10/27/2023  6:56 PM      Admitting Provider:  Albert Chowdhury MD  Discharge Provider:  Morris Curiel MD  Admission Date: 10/27/2023       Discharge Date: 11/03/23   LOS: 7  Primary Care Physician at Discharge: Rk Jalloh [phone]    HOSPITAL COURSE:  Trisha Olmos is a 69 y.o. male who presented with acute hypoxic respiratory failure with shortness of breath secondary to pleural effusion and cancer. Patient improved with oxygen 3 L, thoracentesis and Lasix. Patient did have HILARY which also was stable. Patient is being discharged on oral Lasix and told to follow-up outpatient with his oncology team and primary care physician team.  Patient wants to go to his daughter's house in [facility] and wants to get thoracentesis done there. Arrangements made for the same. Case management involved    REASON FOR ADMISSION/ ADMISSION DIAGNOSES    Acute hypoxic respiratory failure secondary to recurrent pleural effusion    DISCHARGE DIAGNOSES  Acute respiratory failure with hypoxia (HCC)  Assessment & Plan  Noted to have shortness of breath and hypoxia  In the setting of pleural effusions and cancer  Currently saturating adequately on 3 L nasal cannula  Will need oxygen on discharge. Ordered    Cellulitis of extremity  Assessment & Plan  Suspect venous stasis dermatitis and no active cellulitis   Keflex has since been discontinued  Monitor off antibiotics.       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 Malignant neoplasm of upper lobe of right lung [facility])  Assessment & Plan  History of stage IV lung cancer status post radiation and chemo  Continue outpatient follow-up with oncology. Multiple subsegmental pulmonary emboli without acute cor pulmonale (HCC)  Assessment & Plan  Recently diagnosed with PE with associated right lower extremity DVT  Continue Coumadin      BPH associated with nocturia  Assessment & Plan  Continue Proscar. Glaucoma  Assessment & Plan  Continue home eyedrops. Controlled type 2 diabetes mellitus without complication, without long-term current use of insulin [facility])  Assessment & Plan  Resume home medications at discharge  Blood glucose currently controlled    * Pleural effusion  Assessment & Plan  Was recently discharged approximately 3 weeks at that time was noted to have bilateral pleural effusions requiring multiple thoracentesis likely in setting of malignancy  Discussion has been had regarding Pleurx catheter versus continued serial outpatient thoracentesis and patient opting to continue with serial thoracentesis  Last underwent thoracentesis on 10/30  Recent chest x-ray on 11/1 did reveal a moderate effusion  Patient will need weekly thoracentesis, he wants this done at [address]   None    PROCEDURES PERFORMED  * No surgery found *    RADIOLOGY RESULTS  XR chest portable    Result Date: 11/1/2023  Impression: Moderate congestive changes with right effusion and masslike opacity. Workstation performed: [id number]     XR chest portable    Result Date: 11/1/2023  Impression: Mild congestive changes. Known right lung mass. Small effusions bilaterally. Workstation performed: [id number]     IR IN-Patient Thoracentesis    Result Date: 10/30/2023  Impression: Impression: 1. Successful ultrasound-guided thoracentesis yielding 2000 mL clear yellow pleural fluid. Workstation performed: [id number]     XR chest 1 view portable    Result Date: 10/28/2023  Impression: Mild pulmonary edema with redemonstration of large right and small left effusion with right greater than left bibasilar atelectasis. Redemonstration of right upper lobe mass and left lung nodules.  Workstation performed: [id number]       LABS  Results from last 7 days   Lab Units 11/03/23  0511 11/02/23  0639 11/02/23  0536 11/01/23  0616 10/31/23  1125 10/31/23  0506 10/30/23  0804 10/30/23  0624 10/29/23  1804 10/29/23  0502 10/28/23  0446 10/27/23  1919   WBC Thousand/uL 6.52  --  8.24 8.87 8.32  --  8.86  --   --  7.57 7.97 10.13   HEMOGLOBIN g/dL 10.9*  --  12.0 12.4 13.0  --  12.5  --   --  12.3 12.2 13.0   HEMATOCRIT % 33.7*  --  38.3 39.1 40.5  --  39.3  --   --  38.2 38.1 40.3   MCV fL 100*  --  103* 102* 102*  --  103*  --   --  102* 102* 103*   PLATELETS Thousands/uL 106*  --  103* 118* 99*  --  113*  --   --  104* 105* 124*   INR  2.44* 2.21*  --  2.14*  --  2.72*  --  2.67* 2.59* 5.93* 5.43*  --      Results from last 7 days   Lab Units 11/03/23  0511 11/02/23  0639 11/01/23  0616 10/31/23  1125 10/30/23  0804 10/29/23  0502 10/28/23  0446 10/27/23  1919   SODIUM mmol/L 144 141 141 142 141 141 140 135   POTASSIUM mmol/L 3.5 4.1 4.3 3.8 3.9 4.1 4.0 4.8   CHLORIDE mmol/L 109* 108 108 107 104 108 107 107   CO2 mmol/L 30 28 28 30 31 26 26 19*   BUN mg/dL 48* 50* 48* 48* 49* 53* 49* 50*   CREATININE mg/dL 1.44* 1.45* 1.48* 1.68* 1.75* 1.92* 1.81* 1.91*   CALCIUM mg/dL 7.9* 7.8* 8.3* 8.3* 8.3* 8.1* 8.1* 8.0*   ALBUMIN g/dL  --   --   --   --   --   --  3.3* 3.3*   TOTAL BILIRUBIN mg/dL  --   --   --   --   --   --  0.52 0.56   ALK PHOS U/L  --   --   --   --   --   --  84 101   ALT U/L  --   --   --   -- --   --  37 40   AST U/L  --   --   --   --   --   --  21 42*   EGFR ml/min/1.73sq m 46 46 44 38 36 32 35 33   GLUCOSE RANDOM mg/dL 128 123 138 115 126 156* 94 115                  Results from last 7 days   Lab Units 11/03/23  1606 11/03/23  1119 11/03/23  0727 11/02/23  2050 11/02/23  1638 11/02/23  1103 11/02/23  0730 11/01/23  2044 11/01/23  1730 11/01/23  1114   POC GLUCOSE mg/dl 150* 214* 106 185* 222* 137 112 177* 134 118                       Cultures:         Invalid input(s): "Uniquepayam Acey"        Results from last 7 days   Lab Units 10/30/23  1205 10/27/23  2218   GRAM STAIN RESULT  No Polys or Bacteria seen  --    BODY FLUID CULTURE, STERILE  No growth  --    INFLUENZA A PCR   --  Negative       PHYSICAL EXAM:  Vitals:   Blood Pressure: 118/80 (11/03/23 1500)  Pulse: (!) 113 (11/03/23 1500)  Temperature: 97.5 °F (36.4 °C) (11/03/23 1500)  Temp Source: Oral (11/02/23 1500)  Respirations: 18 (11/03/23 1500)  Height: 5' 9" (175.3 cm) (10/27/23 2302)  Weight - Scale: 84.9 kg (187 lb 2.7 oz) (11/03/23 0600)  SpO2: 91 % (11/03/23 1500)    General appearance: alert, fatigued, appears stated age, and cooperative  HEENT - atraumatic and normocephalic  Neck- supple  Skin - no fresh rash  Extremities no fresh focal deformities  Cardiovascular- S1-S2 heard  Respiratory- bilateral air entry present, no crackles or rhonchi  Skin - no fresh rash  Abdomen - normal bowel sounds present, no rebound tenderness  CNS- No fresh focal deficits  Psych- no acute psychosis     Planned Re-admission: No  Discharge Disposition: Home with [address]      Test Results Pending at Discharge:   Incidental findings: Pleural effusion secondary to malignancy    Medications   Summary of Medication Adjustments made as a result of this hospitalization: Oral Lasix and weekly thoracentesis recommendations  Medication Dosing Tapers - Please refer to Discharge Medication List for details on any medication dosing tapers (if applicable to patient). Discharge Medication List: See after visit summary for reconciled discharge medications. Diet restrictions: Healthy diet       Diet Orders   (From admission, onward)                 Start     Ordered    10/27/23 2253  Diet Regular; Regular House  Diet effective now        References:    Adult Nutrition Support Algorithm    RD Therapeutic Diet Order Protocol   Question Answer Comment   Diet Type Regular    Regular Regular House    RD to adjust diet per protocol? Yes        10/27/23 2252                  Activity restrictions: No strenuous activity  Discharge Condition: stable    Outpatient Follow-Up and Discharge Instructions  See after visit summary section titled Discharge Instructions for information provided to patient and family. Code Status: Level 3 - DNAR and DNI  Discharge Statement   I spent 35 minutes discharging the patient. This time was spent on the day of discharge. Greater than 50% of total time was spent with the patient and / or family counseling and / or coordination of care. Laurita Barrett MD  [facility]    ** Please Note: This note has been constructed using a voice recognition system.  **

## 2023-11-04 ENCOUNTER — NURSE TRIAGE (OUTPATIENT)
Dept: OTHER | Facility: OTHER | Age: 78
End: 2023-11-04

## 2023-11-04 DIAGNOSIS — I48.91 ATRIAL FIBRILLATION WITH RVR (HCC): ICD-10-CM

## 2023-11-04 DIAGNOSIS — I26.94 MULTIPLE SUBSEGMENTAL PULMONARY EMBOLI WITHOUT ACUTE COR PULMONALE (HCC): ICD-10-CM

## 2023-11-04 RX ORDER — FUROSEMIDE 40 MG/1
40 TABLET ORAL 2 TIMES DAILY
Qty: 60 TABLET | Refills: 0 | Status: SHIPPED | OUTPATIENT
Start: 2023-11-04 | End: 2023-12-04

## 2023-11-04 NOTE — TELEPHONE ENCOUNTER
I called this Home Health RN back. She did figure out where the Coumadin script went and does not need the On Call  paged out.

## 2023-11-04 NOTE — TELEPHONE ENCOUNTER
Regarding: Warfarin refill  ----- Message from Rudy Martell sent at 11/4/2023  3:35 PM EDT -----  Kia Yañez stated, "The Pt was suppose to have a prescription for Warfarin after he was discharged and I am unable to fill this medication. Please have a nurse call me."     Name warfarin (Coumadin)  Dose/Frequency 4 mg/Take 1 tablet (4 mg total) by mouth daily  Quantity 30 tablet  Verified pharmacy   [ x]  Verified ordering Provider   [ x]  Does patient have enough for the next 3 days?  Yes [ ] No [x ]

## 2023-11-04 NOTE — QUICK NOTE
Patient family requested the Lasix to be sent to the [facility] in [facility] as opposed to the [facility] in [facility].   Appropriate changes made on the discharge and patient's daughter can  the prescription at [facility] in [facility] this morning

## 2023-11-06 ENCOUNTER — TELEPHONE (OUTPATIENT)
Dept: HEMATOLOGY ONCOLOGY | Facility: CLINIC | Age: 78
End: 2023-11-06

## 2023-11-06 ENCOUNTER — OFFICE VISIT (OUTPATIENT)
Dept: HEMATOLOGY ONCOLOGY | Facility: CLINIC | Age: 78
End: 2023-11-06
Payer: MEDICARE

## 2023-11-06 VITALS
HEIGHT: 69 IN | BODY MASS INDEX: 27.64 KG/M2 | RESPIRATION RATE: 18 BRPM | SYSTOLIC BLOOD PRESSURE: 140 MMHG | TEMPERATURE: 97.6 F | DIASTOLIC BLOOD PRESSURE: 86 MMHG | HEART RATE: 108 BPM | OXYGEN SATURATION: 96 %

## 2023-11-06 DIAGNOSIS — C34.91 ADENOCARCINOMA OF RIGHT LUNG (HCC): ICD-10-CM

## 2023-11-06 DIAGNOSIS — C34.11 MALIGNANT NEOPLASM OF UPPER LOBE OF RIGHT LUNG (HCC): Primary | ICD-10-CM

## 2023-11-06 PROCEDURE — 99215 OFFICE O/P EST HI 40 MIN: CPT | Performed by: INTERNAL MEDICINE

## 2023-11-06 RX ORDER — WARFARIN SODIUM 4 MG/1
4 TABLET ORAL DAILY
Qty: 90 TABLET | Refills: 1 | Status: ON HOLD | OUTPATIENT
Start: 2023-11-06 | End: 2023-11-17

## 2023-11-06 RX ORDER — AZITHROMYCIN 250 MG/1
TABLET, FILM COATED ORAL
Qty: 6 TABLET | Refills: 0 | Status: ON HOLD | OUTPATIENT
Start: 2023-11-06 | End: 2023-11-10

## 2023-11-06 NOTE — TELEPHONE ENCOUNTER
Call completed to let patient know that labs also needed be done as per care team, patient expressed understanding

## 2023-11-06 NOTE — TELEPHONE ENCOUNTER
Patient Call    Who are you speaking with? Spouse    If it is not the patient, are they listed on an active communication consent form? Yes   What is the reason for this call? She called in wanted to change [facility] to [place]. Advised next available is 1/3 for a f/u in [place]. She hung up   Does this require a call back? N/A   If a call back is required, please list best call back number N/a   If a call back is required, advise that a message will be forwarded to their care team and someone will return their call as soon as possible. Did you relay this information to the patient?  N/A

## 2023-11-06 NOTE — PROGRESS NOTES
Kalyn KRAUS  [facility] HEMATOLOGY ONCOLOGY SPECIALISTS [facility] [address]  1945      PRIMARY HEMATOLOGIC/ONCOLOGIC DIAGNOSIS:  1. Stage IV adenocarcinoma of the lung. 9/15/2022 Caris: 4 muts/Mb, MSI-stable, PDL1 +1 CPS, EGFR exon 19  2. Hx Bilateral PE 2022, thought to be provoked, due to [place]. Was placed on Xarelto. Could not afford Xarelto and swtched himself to ASA 81mg. Acute pulmonary emboli in the left distal pulmonary artery extending into the left lower lobar, and some of the segmental and subsegmental pulmonary arteries with additional segmental and subsegmental pulmonary emboli in the lingula -- date of diagnosis 10/3/23. On Coumadin. 3. Hx of prostate cancer dx 2005. S/p RT. PATHOLOGY:   Case Report   Surgical Pathology Report                         Case: [id number]                                    Authorizing Provider:  Jo Villanueva DO           Collected:           09/15/2022 1302               Ordering Location:     [facility]      Received:            09/15/2022 [address] [place] [address]                                                               Pathologist:           Saloni Renee MD                                                           Specimen:    Lymph Node, right supraclavicular                                                          Final Diagnosis   A. Lymph node, right supraclavicular biopsy:  -  Metastatic adenocarcinoma of lung origin. -  Immunohistochemical stains performed with appropriate controls show the tumor to be positive for TTF1 and napsin and negative for CK5/6 and p40, supporting the diagnosis.        STAGING: Cancer Staging   Malignant neoplasm of upper lobe of right lung [facility])  Staging form: Lung, AJCC 8th Edition  - Clinical stage from 9/15/2022: Stage BETTE (cT4, cN3, cM1a) - Signed by Julianne Talbot MD on 10/10/2022  Stage prefix: Initial diagnosis         Oncology History   Malignant neoplasm of upper lobe of right lung ([address])   9/15/2022 -  Cancer Staged    Staging form: Lung, AJCC 8th Edition  - Clinical stage from 9/15/2022: Stage BETTE (cT4, cN3, cM1a) - Signed by Billie Rosenthal MD on 10/10/2022  Stage prefix: Initial diagnosis       9/15/2022 Biopsy    IR lung biopsy    A. Lymph node, right supraclavicular biopsy:  -  Metastatic adenocarcinoma of lung origin. -  Immunohistochemical stains performed with appropriate controls show the tumor to be positive for TTF1 and napsin and negative for CK5/6 and p40, supporting the diagnosis     10/5/2022 Initial Diagnosis    Malignant neoplasm of upper lobe of right lung ([address])     5/31/2023 - 6/9/2023 Radiation    Treatments:  Course: C1 SBRT RLL  Plan ID Energy Fractions Dose per Fraction (cGy) Dose Correction (cGy) Total Dose Delivered (cGy) Elapsed Days   BH SBRT RLL 6X-FFF 5 / 5 700 0 3,500 9    Treatment Dates:  5/31/2023 - 6/9/2023.      9/19/2023 -  Chemotherapy    cyanocobalamin, 1,000 mcg, Intramuscular, Once, 1 of 3 cycles  Administration: 1,000 mcg (9/11/2023)  alteplase (CATHFLO), 2 mg, Intracatheter, Every 1 Minute as needed, 1 of 6 cycles  fosaprepitant (EMEND) IVPB, 150 mg, Intravenous, Once, 1 of 6 cycles  Administration: 150 mg (9/19/2023)  CARBOplatin (PARAPLATIN) IVPB (GOG AUC DOSING), 380.5 mg, Intravenous, Once, 1 of 6 cycles  Administration: 380.5 mg (9/19/2023)  pemetrexed (ALIMTA) chemo infusion, 500 mg/m2 = 920 mg, Intravenous, Once, 1 of 6 cycles  Administration: 920 mg (9/19/2023)       INTERIM HISTORY:  Marlon Roblero is a 70 yo male who presents for evaluation and management of lung cancer. Osimertinib last taken prior to chemotherapy initiation. The patient initiated Carboplatin + Pemetrexed --C1D1 administered 9/19/23. Reported Grade 1 diarrhea. He presented to the hospital on 10/3/23 w/ progressive SOB and RLE swelling. He was found to have PE and RLE DVT.  Anticoagulation was initiated. He was found to be in A. Fib w/ RVR. He underwent thoracentesis for b/l pleural effusions. He was in HILARY thought to be due to volume overload. He was also treated w/ steroid taper for COPD exacerbation. The patient was admitted again on 10/27/23 with hypoxic respiratory failure secondary to pleural effusion. Improved s/p thoracentesis and lasix. He was found to be in HILARY due to fluid overload. ECOG 3 post discharge. PAST MEDICAL,PAST SURGICAL, FAMILY AND SOCIAL HISTORY:    Patient Active Problem List   Diagnosis    Benign essential HTN    Controlled type 2 diabetes mellitus without complication, without long-term current use of insulin (HCC)    Hypercholesterolemia    Glaucoma    Inguinal hernia    Hypokalemia    Hypocalcemia    Hyperparathyroidism ([address])    History of colon polyps    Primary osteoarthritis of right shoulder    Chronic left shoulder pain    Left rotator cuff tear arthropathy    Rotator cuff injury, right, initial encounter    Arthritis of right acromioclavicular joint    BPH associated with nocturia    Status post right tibial-talar ankle fusion    Non-recurrent bilateral inguinal hernia without obstruction or gangrene    Multiple subsegmental pulmonary emboli without acute cor pulmonale (HCC)    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      Past Surgical History:   Procedure Laterality Date    COLONOSCOPY      IR BIOPSY LYMPH NODE  9/15/2022    IR THORACENTESIS  9/13/2022    IR THORACENTESIS  10/5/2023    IR THORACENTESIS  10/13/2023    ND ARTHRODESIS ANKLE OPEN Right 7/10/2020    Procedure: ARTHRODESIS/ TIBIAL-TALAR ANKLE FUSION;  Surgeon: Marshal Chadwick MD;  Location: [facility];  Service: Orthopedics    ND LAPAROSCOPY SURG RPR INITIAL INGUINAL HERNIA Bilateral 12/16/2021    Procedure: LAPAROSCOPIC REPAIR HERNIA INGUINAL WITH MESH;  Surgeon: Iantha Bamberger, MD;  Location: [facility];  Service: General     Family History   Problem Relation Age of Onset    Heart attack Mother      Social History     Socioeconomic History    Marital status: /Civil Union     Spouse name: Not on file    Number of children: Not on file    Years of education: Not on file    Highest education level: Not on file   Occupational History    Not on file   Tobacco Use    Smoking status: Never    Smokeless tobacco: Never   Vaping Use    Vaping Use: Never used   Substance and Sexual Activity    Alcohol use: Yes     Comment: Occasional    Drug use: Never    Sexual activity: Not on file   Other Topics Concern    Not on file   Social History Narrative    Not on file     Social Determinants of Health     Financial Resource Strain: Low Risk  (7/25/2023)    Overall Financial Resource Strain (CARDIA)     Difficulty of Paying Living Expenses: Not very hard   Food Insecurity: No Food Insecurity (10/4/2023)    Hunger Vital Sign     Worried About Running Out of Food in the Last Year: Never true     Ran Out of Food in the Last Year: Never true   Transportation Needs: No Transportation Needs (10/4/2023)    PRAPARE - Transportation     Lack of Transportation (Medical): No     Lack of Transportation (Non-Medical):  No   Physical Activity: Not on file   Stress: Not on file   Social Connections: Not on file   Intimate Partner Violence: Not on file   Housing Stability: Unknown (10/4/2023)    Housing Stability Vital Sign     Unable to Pay for Housing in the Last Year: No     Number of Places Lived in the Last Year: 1     Unstable Housing in the Last Year: Not on file       Current Outpatient Medications:     Accu-Chek FastClix Lancets MISC, Use daily E11.9  Check  fbs  daily, Disp: 100 each, Rfl: 3    amiodarone 200 mg tablet, Take 1 tablet (200 mg total) by mouth 3 (three) times a day with meals, Disp: 90 tablet, Rfl: 0    brimonidine tartrate 0.2 % ophthalmic solution, Administer 1 drop into the left eye 2 (two) times a day, Disp: , Rfl:     dexamethasone (DECADRON) 4 mg tablet, Take 1 tablet (4 mg total) by mouth 2 (two) times a day with meals Take the day before treatment, the day of treatment and the day after treatment. , Disp: 6 tablet, Rfl: 6    finasteride (PROSCAR) 5 mg tablet, TAKE 1 TABLET (5 MG TOTAL) BY MOUTH DAILY. , Disp: 90 tablet, Rfl: 1    glucose blood (Accu-Chek Jackie Plus) test strip, Use as instructed, Disp: 100 strip, Rfl: 1    metFORMIN (GLUCOPHAGE-XR) 500 mg 24 hr tablet, TAKE 1 TABLET BY MOUTH EVERY DAY, Disp: 90 tablet, Rfl: 0    metoprolol tartrate (LOPRESSOR) 100 mg tablet, Take 1 tablet (100 mg total) by mouth every 12 (twelve) hours, Disp: 60 tablet, Rfl: 0    ondansetron (ZOFRAN) 8 mg tablet, Take 1 tablet (8 mg total) by mouth every 8 (eight) hours as needed for nausea or vomiting, Disp: 20 tablet, Rfl: 0    Osimertinib Mesylate 80 MG TABS, Take 80 mg by mouth in the morning, Disp: , Rfl:     predniSONE 20 mg tablet, Take 1 tablet (20 mg total) by mouth daily for 2 days, THEN 0.5 tablets (10 mg total) daily for 2 days.  Do not start before October 17, 2023., Disp: 3 tablet, Rfl: 0    simvastatin (ZOCOR) 10 mg tablet, TAKE 1 TABLET BY MOUTH EVERY DAY, Disp: 90 tablet, Rfl: 1    timolol (TIMOPTIC-XE) 0.5 % ophthalmic gel-forming, 1 drop daily, Disp: , Rfl:     warfarin (COUMADIN) 5 mg tablet, Take 1 tablet (5 mg total) by mouth daily for 15 days, Disp: 15 tablet, Rfl: 0    folic acid ( Folic Acid) 1 mg tablet, Take 1 tablet (1 mg total) by mouth daily, Disp: 30 tablet, Rfl: 6 travoprost (TRAVATAN-Z) 0.004 % ophthalmic solution, 1 drop daily at bedtime (Patient not taking: Reported on 4/24/2023), Disp: , Rfl:   No current facility-administered medications for this visit. No Known Allergies  Vitals:    10/17/23 0943   Resp: 16       ROS:  CONSTITUTIONAL:  No fever. No chills. No dizziness. No weakness. EYES:  No pain, erythema, or discharge. No blurring of vision. ENT:  No sore throat, URI symptoms. No epistaxis. No tinnitus. CARDIOVASCULAR:  No chest pain. No palpitations. No lower extremity edema. RESPIRATORY:  No shortness of breath, cough, pain with respiration, pleuritic chest pain. No hemoptysis. No dyspnea. No paroxysmal nocturnal dyspnea. GASTROINTESTINAL:  Normal appetite. No nausea, vomiting, diarrhea. No pain. No bloating. No melena. GENITOURINARY:  No frequency, urgency, nocturia. No hematuria or dysuria. MUSCULOSKELETAL:  No arthralgias or myalgias. INTEGUMENTARY:  No swelling. No bruising. No contusions. No abrasions. No lymphangitis. NEUROLOGIC:  No headache. No neck pain. No numbness or tingling of the extremities. No weakness. PSYCHIATRIC:  No confusion. ENDOCRINE:  No fatigue. No weakness. No history of thyroid, diabetes or adrenal problems. HEMATOLOGICAL:  No bleeding. No petechiae. No bruising.     PHYSICAL EXAM:  ECOG 2  GENERAL: AAO x 3  HEENT: AT,NC  CVS: S1S2 RRR  LUNGS: b/l wheezing  ABD: NT,ND, +BS  EXTR: no edema  NEURO: CN II-XII grossly intact    LABS:  I have reviewed pertinent labs:  CBC:   Lab Results   Component Value Date    WBC 11.67 (H) 10/16/2023    RBC 3.43 (L) 10/16/2023    HGB 11.0 (L) 10/16/2023    HCT 34.0 (L) 10/16/2023    MCV 99 (H) 10/16/2023     10/16/2023    MCH 32.1 10/16/2023    MCHC 32.4 10/16/2023    RDW 16.5 (H) 10/16/2023    MPV 12.1 10/16/2023    NEUTROABS 9.65 (H) 10/16/2023     CMP:   Lab Results   Component Value Date    SODIUM 137 10/16/2023    K 4.2 10/16/2023     10/16/2023    CO2 27 10/16/2023    AGAP 5 10/16/2023    BUN 50 (H) 10/16/2023    CREATININE 1.68 (H) 10/16/2023    GLUC 84 10/16/2023    GLUF 114 (H) 07/07/2023    CALCIUM 8.4 10/16/2023    AST 12 (L) 10/15/2023    ALT 34 10/15/2023    ALKPHOS 71 10/15/2023    TP 5.7 (L) 10/15/2023    ALB 3.2 (L) 10/15/2023    TBILI 0.52 10/15/2023    EGFR 38 10/16/2023     Liver Enzymes:   Lab Results   Component Value Date    AST 12 (L) 10/15/2023    ALT 34 10/15/2023    ALKPHOS 71 10/15/2023    TP 5.7 (L) 10/15/2023    ALB 3.2 (L) 10/15/2023    TBILI 0.52 10/15/2023    BILIDIR 0.21 (H) 09/14/2022     Vitamin B12 No results found for: "HNUSJANR11"  Iron Study   Lab Results   Component Value Date    FERRITIN 579 (H) 09/10/2022    CONCFE 23 09/10/2022    TIBC 117 (L) 09/10/2022    IRON 27 (L) 09/10/2022     Folate No results found for: "FOLATE"  Magnesium   Lab Results   Component Value Date    MG 2.6 10/14/2023     Phosphorus   Lab Results   Component Value Date    PHOS 3.4 10/05/2023     Coagulation Panel   Lab Results   Component Value Date    DDIMER >20.00 (H) 10/03/2023    PROTIME 25.3 (H) 10/16/2023    INR 2.19 (H) 10/16/2023    PTT 91 (H) 10/07/2023       IMAGING:  CT chest abdomen pelvis w contrast    Result Date: 8/15/2023  Narrative: CT CHEST, ABDOMEN AND PELVIS WITH IV CONTRAST INDICATION:   C34.11: Malignant neoplasm of upper lobe, right bronchus or lung. COMPARISON: CT chest abdomen pelvis 4/11/2023. TECHNIQUE: CT examination of the chest, abdomen and pelvis was performed. Multiplanar 2D reformatted images were created from the source data. This examination, like all CT scans performed in the [facility], was performed utilizing techniques to minimize radiation dose exposure, including the use of iterative reconstruction and automated exposure control. Radiation dose length product (DLP) for this visit:  648 mGy-cm IV Contrast:  100 mL of iohexol (OMNIPAQUE) Enteric Contrast: Enteric contrast was not administered.  FINDINGS: CHEST LUNGS: Interval enlargement of spiculated right upper lobe mass due to enlarging rounded nodular component at the caudal aspect of the mass. Based on remeasuring the mass measures 3.8 x 2.8 x 2.3 cm, previously 3.0 x 2.8 x 1.8 cm using similar technique. The enlarging caudal component can be best demonstrated by comparing 3/85 of the current exam to 3/80 of the 4/11/2023 exam. Continued enlargement of fissural-based right lower lobe nodule with a now more spiculated appearance measuring 2.1 x 1.1 cm (3/188). Multiple new nodules are present bilaterally including a new fissural-based nodule in the right middle lobe measuring 2.1 x 1.4 cm (3/198). Representative additional smaller new nodules as follows: 1.3 cm juxtapleural right lower lobe nodule (3/212), multiple small nodules at the right base (3/211), 0.7 cm medial juxtapleural left lower lobe nodule (3/203), 0.5 cm perifissural left lower lobe nodule (3/174), 0.8 cm left lower lobe nodule (3/209). PLEURA: Trace right pleural effusion and right pleural thickening. HEART/GREAT VESSELS: Heart is unremarkable for patient's age. No thoracic aortic aneurysm. Coronary artery and aortic calcifications. MEDIASTINUM AND FAUSTO: New mediastinal and right hilar lymphadenopathy. For example large subcarinal kamilla conglomerate now measures 2.4 cm in the short axis, previously subcentimeter. Enlargement of a now 1.4 cm left lower paratracheal lymph node (2/50) previously 0.5 cm. CHEST WALL AND LOWER NECK:  Unremarkable. ABDOMEN LIVER/BILIARY TREE: Stable sub-5 mm hypoattenuating focus in the caudate, too small to further characterize. GALLBLADDER:  There are gallstone(s) within the gallbladder, without pericholecystic inflammatory changes. SPLEEN:  Unremarkable. PANCREAS:  Unremarkable. ADRENAL GLANDS:  Unremarkable. KIDNEYS/URETERS: Ectopic pelvic left kidney. No hydronephrosis. Right upper pole simple renal cysts.  Subcentimeter hypoattenuating lesions lesions which are too small to further characterize but stable and likely to also reflect cysts. STOMACH AND BOWEL:  Unremarkable. APPENDIX:  No findings to suggest appendicitis. ABDOMINOPELVIC CAVITY:  No ascites. No pneumoperitoneum. No lymphadenopathy. VESSELS:  Unremarkable for patient's age. PELVIS REPRODUCTIVE ORGANS:  Unremarkable for patient's age. URINARY BLADDER: Diffuse bladder wall thickening. ABDOMINAL WALL/INGUINAL REGIONS: Tiny fat-containing umbilical hernia. OSSEOUS STRUCTURES:  No acute fracture or destructive osseous lesion. Stable bone islands. Impression: 1. Findings concerning for worsening metastatic disease. 2.  Enlargement of dominant right upper lobe pulmonary mass, specifically of a nodular caudal component. 3.  Multiple new and enlarging bilateral pulmonary nodules. 4.  New mediastinal and right hilar lymphadenopathy. 5.  Chronic bladder wall thickening may be correlated with symptoms and/or urinalysis for evidence of cystitis. The study was marked in EPIC for significant notification. Workstation performed: [id number]     I reviewed the above laboratory and imaging data. ASSESSMENT/PLAN:  1. Stage IV adenocarcinoma of the lung. 9/15/2022 Caris: 4 muts/Mb, MSI-stable, PDL1 +1 CPS, EGFR exon 19. S/p Osimertinib. CT 8/8/23 c/w disease progression--interval enlargement of spiculated right upper lobe mass due to enlarging rounded nodular component at the caudal aspect of the mass. The mass measures 3.8 x 2.8 x 2.3 cm, previously 3.0 x 2.8 x 1.8 cm. Continued enlargement of fissural-based right lower lobe nodule with a now more spiculated appearance measuring 2.1 x 1.1 cm. Multiple new nodules present bilaterally including a new fissural-based nodule in the right middle lobe measuring 2.1 x 1.4 cm.  Additional smaller new nodules : 1.3 cm juxtapleural right lower lobe nodule, multiple small nodules at the right base, 0.7 cm medial juxtapleural left lower lobe nodule, 0.5 cm perifissural left lower lobe nodule, 0.8 cm left lower lobe nodule. Initiated Carboplatin + Pemetrexed. C1D1 administered 9/19/23. Reported Grade 1 diarrhea. Admitted to the hospital on 10/3/23 w/ PE and RLE DVT, A. Fib w/ RVR, b/l pleural effusions, HILARY thought to be due to volume overload. He was also treated w/ steroid taper for COPD exacerbation. CT TAP dated 10/3/23 c/w disease progression-- luminal narrowing in the region of the bifurcation of the bronchus intermedius, new since August 2023, possibly secondary to enlarging hilar lymphadenopathy or increasing postradiation fibrosis; right upper lobe mass overall stable to mildly enlarged since August 2023; significant progression of metastatic disease throughout the left lung, with new enlarged nodules/masses; overall similar mediastinal lymphadenopathy compared to August 2023. He would like to continue treatment. Plan changed to Pembrolizumab+ Abraxane+Carboplatin. Pembrolizumab will not be given with Cycle 1.    21 day cycle for 4 cycles  Pembrolizumab 200mg IV over 30min on Day1  Abraxane 100mg/m2 IV over 30min on Days 1, 8 and 15  Carboplatin AUC 5 IV over 30min on Day 1    This will be followed by maintenance Pembrolizumab 200mg IV over 30min on Day 1 Q21 days until disease progression/ unacceptable toxicity or up to 24m of treatment has been completed. Possible adverse effects discussed. Additional literature provided. 2. Hx Bilateral PE 2022, thought to be provoked, due to [place]. Was placed on Xarelto. Could not afford Xarelto and swtched himself to ASA 81mg. Acute pulmonary emboli in the left distal pulmonary artery extending into the left lower lobar, and some of the segmental and subsegmental pulmonary arteries with additional segmental and subsegmental pulmonary emboli in the lingula -- date of diagnosis 10/3/23. On Coumadin. 3. Hx of prostate cancer dx 2005. S/p RT. 4. Cough productive of yellow sputum. Z-pack prescribed.

## 2023-11-06 NOTE — PATIENT INSTRUCTIONS
[employer] for additional drug information, tools, and databases. Copyright 0014-9671 [employer] All rights reserved. Contributor Disclosures    (For additional information see "Atezolizumab: Drug information" and see "Atezolizumab: Pediatric drug information")    You must carefully read the "Consumer Information Use and Disclaimer" below in order to understand and correctly use this information. Brand Names: [place] Terrazasentriq  Brand Names: Oly Valdez  What is this drug used for? It is used to treat cancer. What do I need to tell my doctor BEFORE I take this drug? If you are allergic to this drug; any part of this drug; or any other drugs, foods, or substances. Tell your doctor about the allergy and what signs you had. If you are pregnant or may be pregnant. This drug may cause harm to an unborn baby. A pregnancy test will be done before you start this drug to show that you are NOT pregnant. If you may become pregnant, you must use birth control while taking this drug and for some time after the last dose. Ask your doctor how long to use birth control. If you get pregnant, call your doctor right away. If you are breast-feeding. Do not breast-feed while you take this drug and for 5 months after you stop taking it. This drug may interact with other drugs or health problems. Tell your doctor and pharmacist about all of your drugs (prescription or OTC, natural products, vitamins) and health problems. You must check to make sure that it is safe for you to take this drug with all of your drugs and health problems. Do not start, stop, or change the dose of any drug without checking with your doctor. What are some things I need to know or do while I take this drug? Tell all of your health care providers that you take this drug. This includes your doctors, nurses, pharmacists, and dentists. This drug may cause very bad side effects. Sometimes these have been life-threatening or deadly. These may happen in the lungs, bowels, liver, kidney, pituitary gland, thyroid gland, or other parts of the body. If you have questions, talk with the doctor. Tell the doctor right away if you have chest pain; coughing up blood; fever; confusion; weakness on 1 side of the body, trouble speaking or breathing, change in balance, drooping on one side of the face, or blurred eyesight; or severe stomach or back pain. You may have more of a chance of getting an infection. Wash hands often. Stay away from people with infections, colds, or flu. Some infections have been very bad and even deadly. Call your doctor right away if you have any signs of infection like fever, chills, flu-like signs, very bad sore throat, ear or sinus pain, cough, more sputum or change in color of sputum, pain with passing urine, mouth sores, or a wound that will not heal.   Some people have had side effects during the infusion. Sometimes, these could be severe or life-threatening. Tell your doctor if you have back or neck pain, chills, shaking, dizziness, passing out, fever, flushing, itching, rash, shortness of breath, swelling of the face, or wheezing. It is common to have diarrhea with this drug. However, a bowel problem (colitis) with diarrhea has happened with this drug. This may lead to tears or holes in the bowels and may be life-threatening. Call your doctor right away if you have bloody stools; dark, tarry, or sticky stools; diarrhea; or severe stomach pain. Call your doctor right away if you have signs of liver problems like dark urine, tiredness, decreased appetite, upset stomach or stomach pain, light-colored stools, throwing up, or yellow skin or eyes. Call your doctor right away if you have signs of thyroid, pituitary, or adrenal gland problems.  Some signs may be change in mood or the way you act, change in weight, constipation, deeper voice, dizziness, fainting, feeling cold, feeling very tired, hair loss, headache that lasts or is very bad, or lowered interest in sex. Eye problems have happened with this drug. Some types of eye problems may need to be treated right away to lower the chance of long-lasting eyesight loss. Call your doctor right away if you have any changes in eyesight, eye pain, or very bad eye irritation. If you are having or have had a stem cell transplant with stem cells from someone else (allogeneic), talk with your doctor. Some problems with these types of stem cell transplants have happened in people who have had this drug. These problems can be very bad and can lead to death. This drug may cause fertility problems. This may affect being able to have children. Talk with the doctor. What are some side effects that I need to call my doctor about right away? WARNING/CAUTION: Even though it may be rare, some people may have very bad and sometimes deadly side effects when taking a drug. Tell your doctor or get medical help right away if you have any of the following signs or symptoms that may be related to a very bad side effect:   Signs of an allergic reaction, like rash; hives; itching; red, swollen, blistered, or peeling skin with or without fever; wheezing; tightness in the chest or throat; trouble breathing, swallowing, or talking; unusual hoarseness; or swelling of the mouth, face, lips, tongue, or throat. Signs of lung or breathing problems like shortness of breath or other trouble breathing, cough, or fever. Signs of a pancreas problem (pancreatitis) like very bad stomach pain, very bad back pain, or very bad upset stomach or throwing up. Signs of nervous system problems like change in balance, change in mood or behavior, feeling confused or sleepy, fever, memory problems, severe muscle weakness, numbness or tingling in the arms or legs, seizures, stiff neck, or bright lights bother your eyes.    Signs of electrolyte problems like mood changes; confusion; muscle pain, cramps, or spasms; weakness; shakiness; change in balance; an abnormal heartbeat; seizures; loss of appetite; or severe upset stomach or throwing up. Signs of heart problems like chest pain; fast, slow, or abnormal heartbeat; or shortness of breath, a big weight gain, or swelling in the arms or legs. Signs of kidney problems like unable to pass urine, change in how much urine is passed, blood in the urine, or a big weight gain. Signs of a very bad skin reaction (Pringle-Jose syndrome/toxic epidermal necrolysis) like red, swollen, blistered, or peeling skin (with or without fever); red or irritated eyes; or sores in the mouth, throat, nose, or eyes. Signs of a urinary tract infection (UTI) like blood in the urine, burning or pain when passing urine, feeling the need to pass urine often or right away, fever, lower stomach pain, or pelvic pain. Signs of high blood pressure like very bad headache or dizziness, passing out, or change in eyesight. Any unexplained bruising or bleeding. What are some other side effects of this drug? All drugs may cause side effects. However, many people have no side effects or only have minor side effects. Call your doctor or get medical help if any of these side effects or any other side effects bother you or do not go away:   Feeling tired or weak. Constipation, stomach pain, upset stomach, throwing up, or decreased appetite. Back, bone, joint, muscle, or neck pain. Headache. These are not all of the side effects that may occur. If you have questions about side effects, call your doctor. Call your doctor for medical advice about side effects. You may report side effects to your national health agency. How is this drug best taken? Use this drug as ordered by your doctor. Read all information given to you. Follow all instructions closely. It is given as an infusion into a vein over a period of time. Have blood work checked as you have been told by the doctor.  Talk with the doctor. High blood sugar has happened with this drug. This includes diabetes that is new or worse. Check your blood sugar as you have been told by your doctor. Tell your doctor if you have signs of high blood sugar like confusion, feeling sleepy, unusual thirst or hunger, passing urine more often, flushing, fast breathing, or breath that smells like fruit. What do I do if I miss a dose? Call your doctor to find out what to do. How do I store and/or throw out this drug? If you need to store this drug at home, talk with your doctor, nurse, or pharmacist about how to store it. General drug facts   If your symptoms or health problems do not get better or if they become worse, call your doctor. Do not share your drugs with others and do not take anyone else's drugs. Keep all drugs in a safe place. Keep all drugs out of the reach of children and pets. Throw away unused or  drugs. Do not flush down a toilet or pour down a drain unless you are told to do so. Check with your pharmacist if you have questions about the best way to throw out drugs. There may be drug take-back programs in your area. Some drugs may have another patient information leaflet. If you have any questions about this drug, please talk with your doctor, nurse, pharmacist, or other health care provider. If you think there has been an overdose, call your poison control center or get medical care right away. Be ready to tell or show what was taken, how much, and when it happened. [employer] for additional drug information, tools, and databases. Copyright 3071-0954 [employer] All rights reserved. Contributor Disclosures    (For additional information see "Nanoparticle albumin bound paclitaxel (nabpaclitaxel): Drug information")    You must carefully read the "Consumer Information Use and Disclaimer" below in order to understand and correctly use this information.     Brand Names: [place]  Abraxane  Brand Names: Mavalarieanastasiia  Abraxane  Warning   Low white blood cell counts have happened with this drug. This may lead to a higher chance of getting an infection. Rarely, some infections have been deadly. If you have questions, talk with the doctor. If you have a low white blood cell count, talk with your doctor. This drug must not be used in certain people with low white blood cell counts. Have your blood work and other lab tests checked as you have been told by your doctor. What is this drug used for? It is used to treat cancer. What do I need to tell my doctor BEFORE I take this drug? If you are allergic to this drug; any part of this drug; or any other drugs, foods, or substances. Tell your doctor about the allergy and what signs you had. If you have liver disease or raised liver enzymes. If you are breast-feeding. Do not breast-feed while you take this drug and for 2 weeks after your last dose. This is not a list of all drugs or health problems that interact with this drug. Tell your doctor and pharmacist about all of your drugs (prescription or OTC, natural products, vitamins) and health problems. You must check to make sure that it is safe for you to take this drug with all of your drugs and health problems. Do not start, stop, or change the dose of any drug without checking with your doctor. What are some things I need to know or do while I take this drug? Tell all of your health care providers that you take this drug. This includes your doctors, nurses, pharmacists, and dentists. This drug may lower the ability of the bone marrow to make blood cells that the body needs. If blood cell counts get very low, this can lead to bleeding problems, infections, or anemia. If you have questions, talk with the doctor. You may have more chance of getting an infection. Wash hands often. Stay away from people with infections, colds, or flu. You may bleed more easily. Be careful and avoid injury.  Use a soft toothbrush and an electric razor. Talk with your doctor before getting any vaccines. Use of some vaccines with this drug may either raise the chance of an infection or make the vaccine not work as well. This drug has albumin (part of the blood) in it and may have viruses that may cause disease. This drug is screened, tested, and treated to lower the chance that it carries an infection. If you have questions, talk with the doctor. Lung problems have happened in people using this drug with gemcitabine. Sometimes, this has been deadly. Call your doctor right away if you have signs of lung problems like shortness of breath or other trouble breathing, cough that is new or worse, or fever. This drug may cause tissue damage if the drug leaks from the vein. Tell your nurse if you have any redness, burning, pain, swelling, blisters, skin sores, or leaking of fluid where the drug is going into your body. If you are 72 or older, use this drug with care. You could have more side effects. This drug may affect fertility. Fertility problems may lead to not being able to get pregnant or father a child. If you plan to get pregnant or father a child, talk with your doctor before taking this drug. This drug may cause harm to an unborn baby. A pregnancy test will be done before you start this drug to show that you are NOT pregnant. If you or your sex partner may become pregnant, you must use birth control while taking this drug and for some time after the last dose. Ask your doctor how long to use birth control. If you or your sex partner gets pregnant, call your doctor right away. What are some side effects that I need to call my doctor about right away? WARNING/CAUTION: Even though it may be rare, some people may have very bad and sometimes deadly side effects when taking a drug.  Tell your doctor or get medical help right away if you have any of the following signs or symptoms that may be related to a very bad side effect:   Signs of an allergic reaction, like rash; hives; itching; red, swollen, blistered, or peeling skin with or without fever; wheezing; tightness in the chest or throat; trouble breathing, swallowing, or talking; unusual hoarseness; or swelling of the mouth, face, lips, tongue, or throat. Rarely, some allergic reactions have been deadly. Signs of infection like fever, chills, very bad sore throat, ear or sinus pain, cough, more sputum or change in color of sputum, pain with passing urine, mouth sores, or wound that will not heal.   Signs of bleeding like throwing up or coughing up blood; vomit that looks like coffee grounds; blood in the urine; black, red, or tarry stools; bleeding from the gums; abnormal vaginal bleeding; bruises without a cause or that get bigger; or bleeding you cannot stop. Signs of fluid and electrolyte problems like mood changes, confusion, muscle pain or weakness, fast or abnormal heartbeat, severe dizziness or passing out, increased thirst, seizures, feeling very tired or weak, decreased appetite, unable to pass urine or change in the amount of urine produced, dry mouth, dry eyes, or severe upset stomach or throwing up. Signs of liver problems like dark urine, tiredness, decreased appetite, upset stomach or stomach pain, light-colored stools, throwing up, or yellow skin or eyes. Very bad dizziness or passing out. Swelling. Chest pain or pressure. Fast, slow, or abnormal heartbeat. Flushing. Burning, numbness, tingling, pain, or weakness in the hands or feet. Nosebleed. Change in eyesight. Depression. What are some other side effects of this drug? All drugs may cause side effects. However, many people have no side effects or only have minor side effects. Call your doctor or get medical help if any of these side effects or any other side effects bother you or do not go away:   Hair loss. Feeling tired or weak. Mouth irritation or mouth sores.    Muscle or joint pain. Change in taste. Headache. Diarrhea, throwing up, upset stomach, and decreased appetite are common with this drug. If these happen, talk with your doctor about ways to lower these side effects. Call your doctor right away if any of these effects bother you, do not get better, or are severe. These are not all of the side effects that may occur. If you have questions about side effects, call your doctor. Call your doctor for medical advice about side effects. You may report side effects to your national health agency. How is this drug best taken? Use this drug as ordered by your doctor. Read all information given to you. Follow all instructions closely. It is given as an infusion into a vein over a period of time. Other drugs may be given before this drug to help avoid side effects. What do I do if I miss a dose? Call your doctor to find out what to do. How do I store and/or throw out this drug? If you need to store this drug at home, talk with your doctor, nurse, or pharmacist about how to store it. General drug facts   If your symptoms or health problems do not get better or if they become worse, call your doctor. Do not share your drugs with others and do not take anyone else's drugs. Keep all drugs in a safe place. Keep all drugs out of the reach of children and pets. Throw away unused or  drugs. Do not flush down a toilet or pour down a drain unless you are told to do so. Check with your pharmacist if you have questions about the best way to throw out drugs. There may be drug take-back programs in your area. Some drugs may have another patient information leaflet. If you have any questions about this drug, please talk with your doctor, nurse, pharmacist, or other health care provider. If you think there has been an overdose, call your poison control center or get medical care right away. Be ready to tell or show what was taken, how much, and when it happened.

## 2023-11-07 ENCOUNTER — HOSPITAL ENCOUNTER (OUTPATIENT)
Dept: INFUSION CENTER | Facility: HOSPITAL | Age: 78
Discharge: HOME | End: 2023-11-07

## 2023-11-07 ENCOUNTER — PATIENT OUTREACH (OUTPATIENT)
Dept: CASE MANAGEMENT | Facility: HOSPITAL | Age: 78
End: 2023-11-07

## 2023-11-07 LAB

## 2023-11-07 NOTE — PROGRESS NOTES
Call out to pt this morning to check in since being d/c home, pt has been admitted twice in the last month. He tells me that he is doing well at home and is feeling well. His caregiver Sindhu Sy was also on the phone and shared that his home O2 will be delivered later today, they had a scheduling issue preventing delivery until now. Pt has future infusions set up at [facility], which he is aware of, and will f/u with Dr. Eduardo Crawley at [address] [place] [address] next week as well. He denies any needs or concerns at this time. Sindhu Sy took down my name and direct number should they need anything moving forward. MSW will remain available to assist as needed in the future.

## 2023-11-08 ENCOUNTER — TELEPHONE (OUTPATIENT)
Age: 78
End: 2023-11-08

## 2023-11-08 ENCOUNTER — TELEPHONE (OUTPATIENT)
Dept: HEMATOLOGY ONCOLOGY | Facility: CLINIC | Age: 78
End: 2023-11-08

## 2023-11-08 DIAGNOSIS — C34.11 MALIGNANT NEOPLASM OF UPPER LOBE OF RIGHT LUNG (HCC): Primary | ICD-10-CM

## 2023-11-08 NOTE — TELEPHONE ENCOUNTER
Shirley Avalos from [facility] called to say the pt had a weight gain since yesterday. Pt is currently 198 lbs and was 192lb yesterday. Pt denies any other sxs, SOB, etc. Please advise either Shirley Avalos or the pt.

## 2023-11-08 NOTE — TELEPHONE ENCOUNTER
Patient needs to be scheduled for tx per the staff message sent. Unsure of patient's preferred location.

## 2023-11-08 NOTE — TELEPHONE ENCOUNTER
Patient's daughter called, patient was unsure of dose of Lasix for tonight. I did read her Margaret's response of 60 mg Lasix later today. Daughter understood and told father.

## 2023-11-08 NOTE — TELEPHONE ENCOUNTER
Spoke  with patient. He  is  to  have  his  thoracentesis  tomorrow. Wght  gain  of  6 lbs. No  sob except  when  lies  flat. On lasix 40   bid. Take 60 mg  lasix later today.

## 2023-11-09 ENCOUNTER — HOSPITAL ENCOUNTER (OUTPATIENT)
Dept: INTERVENTIONAL RADIOLOGY/VASCULAR | Facility: HOSPITAL | Age: 78
Discharge: HOME | End: 2023-11-09
Attending: INTERNAL MEDICINE | Admitting: RADIOLOGY
Payer: MEDICARE

## 2023-11-09 ENCOUNTER — HOME CARE VISIT (OUTPATIENT)
Dept: HOME HEALTH SERVICES | Facility: HOME HEALTHCARE | Age: 78
End: 2023-11-09
Payer: MEDICARE

## 2023-11-09 VITALS
DIASTOLIC BLOOD PRESSURE: 70 MMHG | SYSTOLIC BLOOD PRESSURE: 111 MMHG | RESPIRATION RATE: 18 BRPM | HEART RATE: 98 BPM | OXYGEN SATURATION: 96 %

## 2023-11-09 DIAGNOSIS — C34.90 LUNG CANCER (HCC): ICD-10-CM

## 2023-11-09 DIAGNOSIS — J90 PLEURAL EFFUSION: ICD-10-CM

## 2023-11-09 DIAGNOSIS — J96.01 ACUTE RESPIRATORY FAILURE WITH HYPOXIA (HCC): ICD-10-CM

## 2023-11-09 PROCEDURE — 32555 ASPIRATE PLEURA W/ IMAGING: CPT

## 2023-11-09 PROCEDURE — 32555 ASPIRATE PLEURA W/ IMAGING: CPT | Performed by: RADIOLOGY

## 2023-11-09 RX ORDER — LIDOCAINE HYDROCHLORIDE 10 MG/ML
INJECTION, SOLUTION EPIDURAL; INFILTRATION; INTRACAUDAL; PERINEURAL AS NEEDED
Status: COMPLETED | OUTPATIENT
Start: 2023-11-09 | End: 2023-11-09

## 2023-11-09 RX ADMIN — LIDOCAINE HYDROCHLORIDE 20 ML: 10 INJECTION, SOLUTION EPIDURAL; INFILTRATION; INTRACAUDAL; PERINEURAL at 11:32

## 2023-11-09 NOTE — SEDATION DOCUMENTATION
Patient had a right sided thoracentesis performed by Dr. Linsey Cheng without complications. A total of 2,600 ml of clear yellow fluid was removed, no labs needed. Patient offers no complaints at this time.

## 2023-11-09 NOTE — DISCHARGE INSTRUCTIONS
[address] [facility]  Interventional Radiology  [phone]         Thoracentesis   WHAT YOU NEED TO KNOW:   A thoracentesis is a procedure to remove extra fluid or air from between your lungs and your inner chest wall. Air or fluid buildup may make it hard for you to breathe. A thoracentesis allows your lungs to expand fully so you can breathe more easily. DISCHARGE INSTRUCTIONS:     Small amount of shoulder pain and bloody sputum is normal after a Thoracentesis. Rest:  Rest when you feel it is needed. Slowly start to do more each day. Return to your daily activities as directed. Resume your normal diet. Small sips of flat soda will help mild nausea. Do not smoke: If you smoke, it is never too late to quit. Ask for information about how to stop smoking if you need help. Contact Interventional Radiology at [phone] [facility] PATIENTS: Contact Interventional Radiology at [phone]) Deandre Wiseman PATIENTS: Contact Interventional Radiology at [phone]) if:   You have a fever. Your puncture site is red, warm, swollen, or draining pus. You have questions or concerns about your procedure, medicine, or care. Seek care immediately or call 911 if:   Severe chest pain with inspiration and shortness of breath    Large amounts of blood in your sputum    Follow up with your healthcare provider as directed.

## 2023-11-10 ENCOUNTER — APPOINTMENT (OUTPATIENT)
Age: 78
End: 2023-11-10
Payer: MEDICARE

## 2023-11-10 ENCOUNTER — TELEPHONE (OUTPATIENT)
Dept: HEMATOLOGY ONCOLOGY | Facility: CLINIC | Age: 78
End: 2023-11-10

## 2023-11-10 ENCOUNTER — TELEPHONE (OUTPATIENT)
Age: 78
End: 2023-11-10

## 2023-11-10 ENCOUNTER — RA CDI HCC (OUTPATIENT)
Dept: OTHER | Facility: HOSPITAL | Age: 78
End: 2023-11-10

## 2023-11-10 DIAGNOSIS — C34.11 MALIGNANT NEOPLASM OF UPPER LOBE OF RIGHT LUNG (HCC): ICD-10-CM

## 2023-11-10 LAB
ALBUMIN SERPL BCP-MCNC: 2.8 G/DL (ref 3.5–5)
ALP SERPL-CCNC: 77 U/L (ref 34–104)
ALT SERPL W P-5'-P-CCNC: 42 U/L (ref 7–52)
ANION GAP SERPL CALCULATED.3IONS-SCNC: 10 MMOL/L
AST SERPL W P-5'-P-CCNC: 37 U/L (ref 13–39)
BASOPHILS # BLD AUTO: 0.04 THOUSANDS/ÂΜL (ref 0–0.1)
BASOPHILS NFR BLD AUTO: 1 % (ref 0–1)
BILIRUB SERPL-MCNC: 0.57 MG/DL (ref 0.2–1)
BUN SERPL-MCNC: 50 MG/DL (ref 5–25)
CALCIUM ALBUM COR SERPL-MCNC: 8.9 MG/DL (ref 8.3–10.1)
CALCIUM SERPL-MCNC: 7.9 MG/DL (ref 8.4–10.2)
CHLORIDE SERPL-SCNC: 106 MMOL/L (ref 96–108)
CO2 SERPL-SCNC: 28 MMOL/L (ref 21–32)
CREAT SERPL-MCNC: 1.96 MG/DL (ref 0.6–1.3)
EOSINOPHIL # BLD AUTO: 0.29 THOUSAND/ÂΜL (ref 0–0.61)
EOSINOPHIL NFR BLD AUTO: 3 % (ref 0–6)
ERYTHROCYTE [DISTWIDTH] IN BLOOD BY AUTOMATED COUNT: 17.2 % (ref 11.6–15.1)
GFR SERPL CREATININE-BSD FRML MDRD: 32 ML/MIN/1.73SQ M
GLUCOSE P FAST SERPL-MCNC: 113 MG/DL (ref 65–99)
HCT VFR BLD AUTO: 37.5 % (ref 36.5–49.3)
HGB BLD-MCNC: 12 G/DL (ref 12–17)
IMM GRANULOCYTES # BLD AUTO: 0.07 THOUSAND/UL (ref 0–0.2)
IMM GRANULOCYTES NFR BLD AUTO: 1 % (ref 0–2)
LYMPHOCYTES # BLD AUTO: 0.53 THOUSANDS/ÂΜL (ref 0.6–4.47)
LYMPHOCYTES NFR BLD AUTO: 6 % (ref 14–44)
MCH RBC QN AUTO: 33.2 PG (ref 26.8–34.3)
MCHC RBC AUTO-ENTMCNC: 32 G/DL (ref 31.4–37.4)
MCV RBC AUTO: 104 FL (ref 82–98)
MONOCYTES # BLD AUTO: 0.86 THOUSAND/ÂΜL (ref 0.17–1.22)
MONOCYTES NFR BLD AUTO: 10 % (ref 4–12)
NEUTROPHILS # BLD AUTO: 7.08 THOUSANDS/ÂΜL (ref 1.85–7.62)
NEUTS SEG NFR BLD AUTO: 79 % (ref 43–75)
NRBC BLD AUTO-RTO: 0 /100 WBCS
PLATELET # BLD AUTO: 144 THOUSANDS/UL (ref 149–390)
PMV BLD AUTO: 12.8 FL (ref 8.9–12.7)
POTASSIUM SERPL-SCNC: 3.8 MMOL/L (ref 3.5–5.3)
PROT SERPL-MCNC: 5 G/DL (ref 6.4–8.4)
RBC # BLD AUTO: 3.61 MILLION/UL (ref 3.88–5.62)
SODIUM SERPL-SCNC: 144 MMOL/L (ref 135–147)
WBC # BLD AUTO: 8.87 THOUSAND/UL (ref 4.31–10.16)

## 2023-11-10 PROCEDURE — 36415 COLL VENOUS BLD VENIPUNCTURE: CPT

## 2023-11-10 PROCEDURE — 80053 COMPREHEN METABOLIC PANEL: CPT

## 2023-11-10 PROCEDURE — 85025 COMPLETE CBC W/AUTO DIFF WBC: CPT

## 2023-11-10 NOTE — TELEPHONE ENCOUNTER
11/10 - spoke to Oral Bel - pt is living with his daughter in [place] - told her we only service that area on Tuesdays - this will not work with his treatment plan - she will relay the message to the patients family

## 2023-11-10 NOTE — TELEPHONE ENCOUNTER
Patient Call    Who are you speaking with? Physician Office    If it is not the patient, are they listed on an active communication consent form? N/A   What is the reason for this call? [facility] calling to see about setting up mobile labs for the patient    Does this require a call back? Yes   If a call back is required, please list best call back number [phone]   If a call back is required, advise that a message will be forwarded to their care team and someone will return their call as soon as possible. Did you relay this information to the patient?  Yes

## 2023-11-10 NOTE — TELEPHONE ENCOUNTER
Althea from [address] called to say the pt fell yesterday and he has a skin tear on his left elbow. They would like an order for zero foam and bandages. Please advise.

## 2023-11-11 ENCOUNTER — APPOINTMENT (EMERGENCY)
Dept: CT IMAGING | Facility: HOSPITAL | Age: 78
DRG: 180 | End: 2023-11-11
Payer: MEDICARE

## 2023-11-11 ENCOUNTER — NURSE TRIAGE (OUTPATIENT)
Dept: OTHER | Facility: OTHER | Age: 78
End: 2023-11-11

## 2023-11-11 ENCOUNTER — HOSPITAL ENCOUNTER (INPATIENT)
Facility: HOSPITAL | Age: 78
LOS: 11 days | Discharge: HOME HEALTH | DRG: 180 | End: 2023-11-22
Attending: EMERGENCY MEDICINE | Admitting: STUDENT IN AN ORGANIZED HEALTH CARE EDUCATION/TRAINING PROGRAM
Payer: MEDICARE

## 2023-11-11 DIAGNOSIS — Z96.89 CHEST TUBE IN PLACE: ICD-10-CM

## 2023-11-11 DIAGNOSIS — J18.9 PNEUMONIA: ICD-10-CM

## 2023-11-11 DIAGNOSIS — J90 PLEURAL EFFUSION: ICD-10-CM

## 2023-11-11 DIAGNOSIS — C34.11 MALIGNANT NEOPLASM OF UPPER LOBE OF RIGHT LUNG (HCC): ICD-10-CM

## 2023-11-11 DIAGNOSIS — I82.5Z1 CHRONIC DEEP VEIN THROMBOSIS (DVT) OF DISTAL VEIN OF RIGHT LOWER EXTREMITY (HCC): ICD-10-CM

## 2023-11-11 DIAGNOSIS — J90 CHRONIC PLEURAL EFFUSION: Primary | ICD-10-CM

## 2023-11-11 DIAGNOSIS — I48.91 ATRIAL FIBRILLATION, UNSPECIFIED TYPE (HCC): ICD-10-CM

## 2023-11-11 DIAGNOSIS — Z86.711 HISTORY OF PULMONARY EMBOLUS (PE): ICD-10-CM

## 2023-11-11 DIAGNOSIS — I50.23 ACUTE ON CHRONIC HFREF (HEART FAILURE WITH REDUCED EJECTION FRACTION) (HCC): ICD-10-CM

## 2023-11-11 DIAGNOSIS — J96.21 ACUTE ON CHRONIC RESPIRATORY FAILURE WITH HYPOXIA (HCC): ICD-10-CM

## 2023-11-11 PROBLEM — C61 PROSTATE CA (HCC): Status: ACTIVE | Noted: 2023-11-11

## 2023-11-11 PROBLEM — J96.01 ACUTE RESPIRATORY FAILURE WITH HYPOXIA (HCC): Status: RESOLVED | Noted: 2023-11-02 | Resolved: 2023-11-11

## 2023-11-11 PROBLEM — I82.401 DEEP VEIN THROMBOSIS (DVT) OF RIGHT LOWER EXTREMITY (HCC): Status: ACTIVE | Noted: 2023-11-11

## 2023-11-11 LAB
2HR DELTA HS TROPONIN: 3 NG/L
ALBUMIN SERPL BCP-MCNC: 3 G/DL (ref 3.5–5)
ALP SERPL-CCNC: 102 U/L (ref 34–104)
ALT SERPL W P-5'-P-CCNC: 48 U/L (ref 7–52)
ANION GAP SERPL CALCULATED.3IONS-SCNC: 12 MMOL/L
APTT PPP: 50 SECONDS (ref 23–37)
AST SERPL W P-5'-P-CCNC: 39 U/L (ref 13–39)
BASOPHILS # BLD AUTO: 0.02 THOUSANDS/ÂΜL (ref 0–0.1)
BASOPHILS NFR BLD AUTO: 0 % (ref 0–1)
BILIRUB SERPL-MCNC: 0.49 MG/DL (ref 0.2–1)
BUN SERPL-MCNC: 48 MG/DL (ref 5–25)
CALCIUM ALBUM COR SERPL-MCNC: 8.9 MG/DL (ref 8.3–10.1)
CALCIUM SERPL-MCNC: 8.1 MG/DL (ref 8.4–10.2)
CARDIAC TROPONIN I PNL SERPL HS: 11 NG/L
CARDIAC TROPONIN I PNL SERPL HS: 8 NG/L
CHLORIDE SERPL-SCNC: 105 MMOL/L (ref 96–108)
CO2 SERPL-SCNC: 25 MMOL/L (ref 21–32)
CREAT SERPL-MCNC: 1.86 MG/DL (ref 0.6–1.3)
D DIMER PPP FEU-MCNC: 1.23 UG/ML FEU
EOSINOPHIL # BLD AUTO: 0.43 THOUSAND/ÂΜL (ref 0–0.61)
EOSINOPHIL NFR BLD AUTO: 5 % (ref 0–6)
ERYTHROCYTE [DISTWIDTH] IN BLOOD BY AUTOMATED COUNT: 16.9 % (ref 11.6–15.1)
FLUAV RNA RESP QL NAA+PROBE: NEGATIVE
FLUBV RNA RESP QL NAA+PROBE: NEGATIVE
GFR SERPL CREATININE-BSD FRML MDRD: 34 ML/MIN/1.73SQ M
GLUCOSE SERPL-MCNC: 139 MG/DL (ref 65–140)
HCT VFR BLD AUTO: 40.7 % (ref 36.5–49.3)
HGB BLD-MCNC: 12.5 G/DL (ref 12–17)
IMM GRANULOCYTES # BLD AUTO: 0.07 THOUSAND/UL (ref 0–0.2)
IMM GRANULOCYTES NFR BLD AUTO: 1 % (ref 0–2)
INR PPP: 3.88 (ref 0.84–1.19)
LYMPHOCYTES # BLD AUTO: 0.51 THOUSANDS/ÂΜL (ref 0.6–4.47)
LYMPHOCYTES NFR BLD AUTO: 5 % (ref 14–44)
MCH RBC QN AUTO: 32.8 PG (ref 26.8–34.3)
MCHC RBC AUTO-ENTMCNC: 30.7 G/DL (ref 31.4–37.4)
MCV RBC AUTO: 107 FL (ref 82–98)
MONOCYTES # BLD AUTO: 0.69 THOUSAND/ÂΜL (ref 0.17–1.22)
MONOCYTES NFR BLD AUTO: 7 % (ref 4–12)
NEUTROPHILS # BLD AUTO: 7.73 THOUSANDS/ÂΜL (ref 1.85–7.62)
NEUTS SEG NFR BLD AUTO: 82 % (ref 43–75)
NRBC BLD AUTO-RTO: 0 /100 WBCS
PLATELET # BLD AUTO: 155 THOUSANDS/UL (ref 149–390)
PMV BLD AUTO: 11.8 FL (ref 8.9–12.7)
POTASSIUM SERPL-SCNC: 4 MMOL/L (ref 3.5–5.3)
PROT SERPL-MCNC: 6 G/DL (ref 6.4–8.4)
PROTHROMBIN TIME: 38 SECONDS (ref 11.6–14.5)
RBC # BLD AUTO: 3.81 MILLION/UL (ref 3.88–5.62)
RSV RNA RESP QL NAA+PROBE: NEGATIVE
SARS-COV-2 RNA RESP QL NAA+PROBE: NEGATIVE
SODIUM SERPL-SCNC: 142 MMOL/L (ref 135–147)
TSH SERPL DL<=0.05 MIU/L-ACNC: 9.34 UIU/ML (ref 0.45–4.5)
WBC # BLD AUTO: 9.45 THOUSAND/UL (ref 4.31–10.16)

## 2023-11-11 PROCEDURE — 80053 COMPREHEN METABOLIC PANEL: CPT | Performed by: EMERGENCY MEDICINE

## 2023-11-11 PROCEDURE — 83605 ASSAY OF LACTIC ACID: CPT

## 2023-11-11 PROCEDURE — 85730 THROMBOPLASTIN TIME PARTIAL: CPT | Performed by: EMERGENCY MEDICINE

## 2023-11-11 PROCEDURE — 71250 CT THORAX DX C-: CPT

## 2023-11-11 PROCEDURE — 85379 FIBRIN DEGRADATION QUANT: CPT | Performed by: EMERGENCY MEDICINE

## 2023-11-11 PROCEDURE — 84484 ASSAY OF TROPONIN QUANT: CPT | Performed by: EMERGENCY MEDICINE

## 2023-11-11 PROCEDURE — 99291 CRITICAL CARE FIRST HOUR: CPT

## 2023-11-11 PROCEDURE — 84145 PROCALCITONIN (PCT): CPT | Performed by: EMERGENCY MEDICINE

## 2023-11-11 PROCEDURE — 36415 COLL VENOUS BLD VENIPUNCTURE: CPT | Performed by: EMERGENCY MEDICINE

## 2023-11-11 PROCEDURE — 93005 ELECTROCARDIOGRAM TRACING: CPT

## 2023-11-11 PROCEDURE — 84443 ASSAY THYROID STIM HORMONE: CPT | Performed by: EMERGENCY MEDICINE

## 2023-11-11 PROCEDURE — 85025 COMPLETE CBC W/AUTO DIFF WBC: CPT | Performed by: EMERGENCY MEDICINE

## 2023-11-11 PROCEDURE — 85610 PROTHROMBIN TIME: CPT | Performed by: EMERGENCY MEDICINE

## 2023-11-11 PROCEDURE — 99285 EMERGENCY DEPT VISIT HI MDM: CPT

## 2023-11-11 PROCEDURE — 84484 ASSAY OF TROPONIN QUANT: CPT

## 2023-11-11 PROCEDURE — 0241U HB NFCT DS VIR RESP RNA 4 TRGT: CPT | Performed by: EMERGENCY MEDICINE

## 2023-11-11 PROCEDURE — 87040 BLOOD CULTURE FOR BACTERIA: CPT | Performed by: EMERGENCY MEDICINE

## 2023-11-11 RX ORDER — PRAVASTATIN SODIUM 20 MG
10 TABLET ORAL
Status: DISCONTINUED | OUTPATIENT
Start: 2023-11-12 | End: 2023-11-12

## 2023-11-11 RX ORDER — FINASTERIDE 5 MG/1
5 TABLET, FILM COATED ORAL DAILY
Status: DISCONTINUED | OUTPATIENT
Start: 2023-11-12 | End: 2023-11-22 | Stop reason: HOSPADM

## 2023-11-11 RX ORDER — SODIUM CHLORIDE 9 MG/ML
3 INJECTION INTRAVENOUS
Status: DISCONTINUED | OUTPATIENT
Start: 2023-11-11 | End: 2023-11-13

## 2023-11-11 RX ORDER — CEFEPIME HYDROCHLORIDE 2 G/50ML
2000 INJECTION, SOLUTION INTRAVENOUS ONCE
Status: COMPLETED | OUTPATIENT
Start: 2023-11-11 | End: 2023-11-12

## 2023-11-11 RX ORDER — PHYTONADIONE 5 MG/1
10 TABLET ORAL ONCE
Status: COMPLETED | OUTPATIENT
Start: 2023-11-11 | End: 2023-11-11

## 2023-11-11 RX ORDER — CEFEPIME HYDROCHLORIDE 1 G/50ML
1000 INJECTION, SOLUTION INTRAVENOUS EVERY 12 HOURS
Status: DISCONTINUED | OUTPATIENT
Start: 2023-11-12 | End: 2023-11-12

## 2023-11-11 RX ORDER — CHLORHEXIDINE GLUCONATE ORAL RINSE 1.2 MG/ML
15 SOLUTION DENTAL EVERY 12 HOURS SCHEDULED
Status: DISCONTINUED | OUTPATIENT
Start: 2023-11-11 | End: 2023-11-22 | Stop reason: HOSPADM

## 2023-11-11 RX ORDER — AMIODARONE HYDROCHLORIDE 100 MG/1
200 TABLET ORAL
Status: DISCONTINUED | OUTPATIENT
Start: 2023-11-12 | End: 2023-11-12

## 2023-11-11 RX ORDER — AMIODARONE HYDROCHLORIDE 100 MG/1
200 TABLET ORAL
Status: DISCONTINUED | OUTPATIENT
Start: 2023-11-12 | End: 2023-11-11

## 2023-11-11 RX ORDER — FOLIC ACID 1 MG/1
1 TABLET ORAL DAILY
Status: DISCONTINUED | OUTPATIENT
Start: 2023-11-12 | End: 2023-11-22 | Stop reason: HOSPADM

## 2023-11-11 RX ORDER — METOPROLOL TARTRATE 1 MG/ML
5 INJECTION, SOLUTION INTRAVENOUS ONCE
Status: COMPLETED | OUTPATIENT
Start: 2023-11-12 | End: 2023-11-11

## 2023-11-11 RX ADMIN — PHYTONADIONE 10 MG: 5 TABLET ORAL at 22:40

## 2023-11-11 RX ADMIN — CEFEPIME HYDROCHLORIDE 2000 MG: 2 INJECTION, SOLUTION INTRAVENOUS at 22:30

## 2023-11-11 RX ADMIN — Medication 1500 UNITS: at 23:18

## 2023-11-11 RX ADMIN — METOROPROLOL TARTRATE 5 MG: 5 INJECTION, SOLUTION INTRAVENOUS at 23:55

## 2023-11-11 NOTE — TELEPHONE ENCOUNTER
Patient's daughter calling in with concerns about the patient "filling up with fluid" after a Thoracentesis he had on Thursday. On call provider paged and given best contact information. Reason for Disposition  • Affirmative: Did you page the on call provider?     Protocols used: JOHN NO TRIAGE REQUIRED

## 2023-11-11 NOTE — TELEPHONE ENCOUNTER
Regarding: Fluid Build Up  ----- Message from Shiraz Gomez sent at 11/11/2023  5:07 PM EST -----  " My dad had a thoracentesis done on Thursday and he already feels like he is filling back up with fluid, I want to know is there is something we can do for him within the next 24 hours"

## 2023-11-12 PROBLEM — J96.21 ACUTE ON CHRONIC RESPIRATORY FAILURE WITH HYPOXIA (HCC): Status: ACTIVE | Noted: 2023-11-02

## 2023-11-12 LAB
4HR DELTA HS TROPONIN: 1 NG/L
ALBUMIN SERPL BCP-MCNC: 3 G/DL (ref 3.5–5)
ALP SERPL-CCNC: 85 U/L (ref 34–104)
ALT SERPL W P-5'-P-CCNC: 42 U/L (ref 7–52)
ANION GAP SERPL CALCULATED.3IONS-SCNC: 7 MMOL/L
ANION GAP SERPL CALCULATED.3IONS-SCNC: 7 MMOL/L
APTT PPP: 32 SECONDS (ref 23–37)
APTT PPP: 36 SECONDS (ref 23–37)
APTT PPP: 37 SECONDS (ref 23–37)
AST SERPL W P-5'-P-CCNC: 32 U/L (ref 13–39)
BASOPHILS # BLD AUTO: 0.05 THOUSANDS/ÂΜL (ref 0–0.1)
BASOPHILS NFR BLD AUTO: 1 % (ref 0–1)
BILIRUB SERPL-MCNC: 0.59 MG/DL (ref 0.2–1)
BNP SERPL-MCNC: 733 PG/ML (ref 0–100)
BUN SERPL-MCNC: 45 MG/DL (ref 5–25)
BUN SERPL-MCNC: 46 MG/DL (ref 5–25)
CA-I BLD-SCNC: 1.07 MMOL/L (ref 1.12–1.32)
CA-I BLD-SCNC: 1.2 MMOL/L (ref 1.12–1.32)
CALCIUM ALBUM COR SERPL-MCNC: 9.3 MG/DL (ref 8.3–10.1)
CALCIUM SERPL-MCNC: 8.3 MG/DL (ref 8.4–10.2)
CALCIUM SERPL-MCNC: 8.5 MG/DL (ref 8.4–10.2)
CARDIAC TROPONIN I PNL SERPL HS: 9 NG/L
CHLORIDE SERPL-SCNC: 103 MMOL/L (ref 96–108)
CHLORIDE SERPL-SCNC: 105 MMOL/L (ref 96–108)
CO2 SERPL-SCNC: 29 MMOL/L (ref 21–32)
CO2 SERPL-SCNC: 31 MMOL/L (ref 21–32)
CREAT SERPL-MCNC: 1.73 MG/DL (ref 0.6–1.3)
CREAT SERPL-MCNC: 1.85 MG/DL (ref 0.6–1.3)
EOSINOPHIL # BLD AUTO: 0.32 THOUSAND/ÂΜL (ref 0–0.61)
EOSINOPHIL NFR BLD AUTO: 4 % (ref 0–6)
ERYTHROCYTE [DISTWIDTH] IN BLOOD BY AUTOMATED COUNT: 17.1 % (ref 11.6–15.1)
GFR SERPL CREATININE-BSD FRML MDRD: 34 ML/MIN/1.73SQ M
GFR SERPL CREATININE-BSD FRML MDRD: 37 ML/MIN/1.73SQ M
GLUCOSE SERPL-MCNC: 109 MG/DL (ref 65–140)
GLUCOSE SERPL-MCNC: 112 MG/DL (ref 65–140)
GLUCOSE SERPL-MCNC: 124 MG/DL (ref 65–140)
GLUCOSE SERPL-MCNC: 164 MG/DL (ref 65–140)
GLUCOSE SERPL-MCNC: 213 MG/DL (ref 65–140)
GLUCOSE SERPL-MCNC: 89 MG/DL (ref 65–140)
HCT VFR BLD AUTO: 38.8 % (ref 36.5–49.3)
HGB BLD-MCNC: 11.8 G/DL (ref 12–17)
IMM GRANULOCYTES # BLD AUTO: 0.08 THOUSAND/UL (ref 0–0.2)
IMM GRANULOCYTES NFR BLD AUTO: 1 % (ref 0–2)
INR PPP: 1.56 (ref 0.84–1.19)
LACTATE SERPL-SCNC: 1.4 MMOL/L (ref 0.5–2)
LYMPHOCYTES # BLD AUTO: 0.5 THOUSANDS/ÂΜL (ref 0.6–4.47)
LYMPHOCYTES NFR BLD AUTO: 6 % (ref 14–44)
MAGNESIUM SERPL-MCNC: 2.2 MG/DL (ref 1.9–2.7)
MAGNESIUM SERPL-MCNC: 2.4 MG/DL (ref 1.9–2.7)
MCH RBC QN AUTO: 32.3 PG (ref 26.8–34.3)
MCHC RBC AUTO-ENTMCNC: 30.4 G/DL (ref 31.4–37.4)
MCV RBC AUTO: 106 FL (ref 82–98)
MONOCYTES # BLD AUTO: 0.63 THOUSAND/ÂΜL (ref 0.17–1.22)
MONOCYTES NFR BLD AUTO: 8 % (ref 4–12)
NEUTROPHILS # BLD AUTO: 6.66 THOUSANDS/ÂΜL (ref 1.85–7.62)
NEUTS SEG NFR BLD AUTO: 80 % (ref 43–75)
NRBC BLD AUTO-RTO: 0 /100 WBCS
PHOSPHATE SERPL-MCNC: 2.8 MG/DL (ref 2.3–4.1)
PHOSPHATE SERPL-MCNC: 3.2 MG/DL (ref 2.3–4.1)
PLATELET # BLD AUTO: 141 THOUSANDS/UL (ref 149–390)
PMV BLD AUTO: 11.1 FL (ref 8.9–12.7)
POTASSIUM SERPL-SCNC: 3.7 MMOL/L (ref 3.5–5.3)
POTASSIUM SERPL-SCNC: 3.9 MMOL/L (ref 3.5–5.3)
PROCALCITONIN SERPL-MCNC: 0.15 NG/ML
PROT SERPL-MCNC: 5.8 G/DL (ref 6.4–8.4)
PROTHROMBIN TIME: 18.8 SECONDS (ref 11.6–14.5)
RBC # BLD AUTO: 3.65 MILLION/UL (ref 3.88–5.62)
SODIUM SERPL-SCNC: 139 MMOL/L (ref 135–147)
SODIUM SERPL-SCNC: 143 MMOL/L (ref 135–147)
WBC # BLD AUTO: 8.24 THOUSAND/UL (ref 4.31–10.16)

## 2023-11-12 PROCEDURE — 85025 COMPLETE CBC W/AUTO DIFF WBC: CPT

## 2023-11-12 PROCEDURE — 85730 THROMBOPLASTIN TIME PARTIAL: CPT | Performed by: NURSE PRACTITIONER

## 2023-11-12 PROCEDURE — 85730 THROMBOPLASTIN TIME PARTIAL: CPT

## 2023-11-12 PROCEDURE — 82948 REAGENT STRIP/BLOOD GLUCOSE: CPT

## 2023-11-12 PROCEDURE — 85730 THROMBOPLASTIN TIME PARTIAL: CPT | Performed by: STUDENT IN AN ORGANIZED HEALTH CARE EDUCATION/TRAINING PROGRAM

## 2023-11-12 PROCEDURE — 99223 1ST HOSP IP/OBS HIGH 75: CPT | Performed by: INTERNAL MEDICINE

## 2023-11-12 PROCEDURE — 83735 ASSAY OF MAGNESIUM: CPT | Performed by: NURSE PRACTITIONER

## 2023-11-12 PROCEDURE — 36000 PLACE NEEDLE IN VEIN: CPT | Performed by: NURSE PRACTITIONER

## 2023-11-12 PROCEDURE — 93005 ELECTROCARDIOGRAM TRACING: CPT

## 2023-11-12 PROCEDURE — 80048 BASIC METABOLIC PNL TOTAL CA: CPT | Performed by: NURSE PRACTITIONER

## 2023-11-12 PROCEDURE — 82330 ASSAY OF CALCIUM: CPT | Performed by: NURSE PRACTITIONER

## 2023-11-12 PROCEDURE — 80053 COMPREHEN METABOLIC PANEL: CPT

## 2023-11-12 PROCEDURE — 84100 ASSAY OF PHOSPHORUS: CPT

## 2023-11-12 PROCEDURE — 83880 ASSAY OF NATRIURETIC PEPTIDE: CPT | Performed by: NURSE PRACTITIONER

## 2023-11-12 PROCEDURE — NC001 PR NO CHARGE

## 2023-11-12 PROCEDURE — 85610 PROTHROMBIN TIME: CPT

## 2023-11-12 PROCEDURE — 84100 ASSAY OF PHOSPHORUS: CPT | Performed by: NURSE PRACTITIONER

## 2023-11-12 PROCEDURE — 83735 ASSAY OF MAGNESIUM: CPT

## 2023-11-12 PROCEDURE — 99291 CRITICAL CARE FIRST HOUR: CPT | Performed by: STUDENT IN AN ORGANIZED HEALTH CARE EDUCATION/TRAINING PROGRAM

## 2023-11-12 PROCEDURE — 82330 ASSAY OF CALCIUM: CPT

## 2023-11-12 RX ORDER — AMIODARONE HYDROCHLORIDE 100 MG/1
200 TABLET ORAL
Status: DISCONTINUED | OUTPATIENT
Start: 2023-11-12 | End: 2023-11-12

## 2023-11-12 RX ORDER — CALCIUM GLUCONATE 20 MG/ML
1 INJECTION, SOLUTION INTRAVENOUS ONCE
Status: COMPLETED | OUTPATIENT
Start: 2023-11-12 | End: 2023-11-12

## 2023-11-12 RX ORDER — BUMETANIDE 0.25 MG/ML
1 INJECTION INTRAMUSCULAR; INTRAVENOUS ONCE
Status: COMPLETED | OUTPATIENT
Start: 2023-11-12 | End: 2023-11-12

## 2023-11-12 RX ORDER — HEPARIN SODIUM 1000 [USP'U]/ML
2000 INJECTION, SOLUTION INTRAVENOUS; SUBCUTANEOUS EVERY 6 HOURS PRN
Status: DISCONTINUED | OUTPATIENT
Start: 2023-11-12 | End: 2023-11-15

## 2023-11-12 RX ORDER — INSULIN LISPRO 100 [IU]/ML
1-6 INJECTION, SOLUTION INTRAVENOUS; SUBCUTANEOUS
Status: DISCONTINUED | OUTPATIENT
Start: 2023-11-12 | End: 2023-11-22 | Stop reason: HOSPADM

## 2023-11-12 RX ORDER — PRAVASTATIN SODIUM 20 MG
20 TABLET ORAL
Status: DISCONTINUED | OUTPATIENT
Start: 2023-11-12 | End: 2023-11-22 | Stop reason: HOSPADM

## 2023-11-12 RX ORDER — METOPROLOL TARTRATE 1 MG/ML
5 INJECTION, SOLUTION INTRAVENOUS ONCE
Status: COMPLETED | OUTPATIENT
Start: 2023-11-12 | End: 2023-11-12

## 2023-11-12 RX ORDER — HEPARIN SODIUM 10000 [USP'U]/100ML
3-20 INJECTION, SOLUTION INTRAVENOUS
Status: DISCONTINUED | OUTPATIENT
Start: 2023-11-12 | End: 2023-11-15

## 2023-11-12 RX ORDER — POTASSIUM CHLORIDE 20 MEQ/1
40 TABLET, EXTENDED RELEASE ORAL ONCE
Status: COMPLETED | OUTPATIENT
Start: 2023-11-12 | End: 2023-11-12

## 2023-11-12 RX ORDER — CALCIUM GLUCONATE 20 MG/ML
2 INJECTION, SOLUTION INTRAVENOUS ONCE
Status: COMPLETED | OUTPATIENT
Start: 2023-11-12 | End: 2023-11-12

## 2023-11-12 RX ORDER — AMIODARONE HYDROCHLORIDE 100 MG/1
200 TABLET ORAL
Status: DISCONTINUED | OUTPATIENT
Start: 2023-11-13 | End: 2023-11-15

## 2023-11-12 RX ORDER — FUROSEMIDE 10 MG/ML
80 INJECTION INTRAMUSCULAR; INTRAVENOUS ONCE
Status: COMPLETED | OUTPATIENT
Start: 2023-11-12 | End: 2023-11-12

## 2023-11-12 RX ORDER — METOPROLOL TARTRATE 1 MG/ML
2.5 INJECTION, SOLUTION INTRAVENOUS ONCE
Status: DISCONTINUED | OUTPATIENT
Start: 2023-11-12 | End: 2023-11-12

## 2023-11-12 RX ORDER — INSULIN LISPRO 100 [IU]/ML
1-6 INJECTION, SOLUTION INTRAVENOUS; SUBCUTANEOUS
Status: DISCONTINUED | OUTPATIENT
Start: 2023-11-12 | End: 2023-11-12

## 2023-11-12 RX ORDER — AMOXICILLIN 250 MG
2 CAPSULE ORAL
Status: DISCONTINUED | OUTPATIENT
Start: 2023-11-12 | End: 2023-11-22 | Stop reason: HOSPADM

## 2023-11-12 RX ORDER — HEPARIN SODIUM 1000 [USP'U]/ML
4000 INJECTION, SOLUTION INTRAVENOUS; SUBCUTANEOUS EVERY 6 HOURS PRN
Status: DISCONTINUED | OUTPATIENT
Start: 2023-11-12 | End: 2023-11-15

## 2023-11-12 RX ADMIN — METOPROLOL TARTRATE 25 MG: 25 TABLET, FILM COATED ORAL at 21:28

## 2023-11-12 RX ADMIN — BUMETANIDE 1 MG: 0.25 INJECTION INTRAMUSCULAR; INTRAVENOUS at 23:12

## 2023-11-12 RX ADMIN — INSULIN LISPRO 2 UNITS: 100 INJECTION, SOLUTION INTRAVENOUS; SUBCUTANEOUS at 13:04

## 2023-11-12 RX ADMIN — HEPARIN SODIUM 11.1 UNITS/KG/HR: 10000 INJECTION, SOLUTION INTRAVENOUS at 15:12

## 2023-11-12 RX ADMIN — INSULIN LISPRO 1 UNITS: 100 INJECTION, SOLUTION INTRAVENOUS; SUBCUTANEOUS at 21:28

## 2023-11-12 RX ADMIN — CHLORHEXIDINE GLUCONATE 15 ML: 1.2 RINSE ORAL at 21:28

## 2023-11-12 RX ADMIN — POTASSIUM CHLORIDE 40 MEQ: 1500 TABLET, EXTENDED RELEASE ORAL at 13:03

## 2023-11-12 RX ADMIN — CEFEPIME HYDROCHLORIDE 1000 MG: 1 INJECTION, SOLUTION INTRAVENOUS at 09:51

## 2023-11-12 RX ADMIN — SENNOSIDES AND DOCUSATE SODIUM 2 TABLET: 8.6; 5 TABLET ORAL at 21:28

## 2023-11-12 RX ADMIN — CALCIUM GLUCONATE 2 G: 20 INJECTION, SOLUTION INTRAVENOUS at 07:58

## 2023-11-12 RX ADMIN — AMIODARONE HYDROCHLORIDE 200 MG: 100 TABLET ORAL at 06:10

## 2023-11-12 RX ADMIN — METOROPROLOL TARTRATE 5 MG: 5 INJECTION, SOLUTION INTRAVENOUS at 09:50

## 2023-11-12 RX ADMIN — CHLORHEXIDINE GLUCONATE 15 ML: 1.2 RINSE ORAL at 08:01

## 2023-11-12 RX ADMIN — PRAVASTATIN SODIUM 20 MG: 20 TABLET ORAL at 16:28

## 2023-11-12 RX ADMIN — CALCIUM GLUCONATE 1 G: 20 INJECTION, SOLUTION INTRAVENOUS at 15:22

## 2023-11-12 RX ADMIN — FINASTERIDE 5 MG: 5 TABLET, FILM COATED ORAL at 08:01

## 2023-11-12 RX ADMIN — METOROPROLOL TARTRATE 5 MG: 5 INJECTION, SOLUTION INTRAVENOUS at 05:19

## 2023-11-12 RX ADMIN — FUROSEMIDE 80 MG: 10 INJECTION, SOLUTION INTRAMUSCULAR; INTRAVENOUS at 15:22

## 2023-11-12 RX ADMIN — FOLIC ACID 1 MG: 1 TABLET ORAL at 08:01

## 2023-11-12 NOTE — ASSESSMENT & PLAN NOTE
Pt diagnosed with Stage 4 Lung Ca in 9/2022  Appears last chemo treatment was 9/2023 as pt has had hospitalizations since then   Follows with heme/onc outpatient

## 2023-11-12 NOTE — ASSESSMENT & PLAN NOTE
-Would reinitiate oral anticoagulation with IV bridged with heparin therapy once cleared by critical care team

## 2023-11-12 NOTE — ASSESSMENT & PLAN NOTE
Lab Results   Component Value Date    HGBA1C 6.0 10/23/2023       No results for input(s): "POCGLU" in the last 72 hours. Blood Sugar Average: Last 72 hrs:  ?   Home medication regimen is metformin    Plan:  Hold home medication while inpatient  SSI  AC/HS accuchecks  Hypoglycemic protocol

## 2023-11-12 NOTE — CONSULTS
[address]  Consult  Name: Felix Chadwick 69 y.o. male I MRN: [de-identified]  Unit/Bed#: ICU 05-01 I Date of Admission: 11/11/2023   Date of Service: 11/12/2023 I Hospital Day: 1    Inpatient consult to Cardiology  Consult performed by: Anderson Ramsey DO  Consult ordered by: Sybil Spurling, CRNP          Assessment/Plan   Deep vein thrombosis (DVT) of right lower extremity [facility])  Assessment & Plan  -Seen on vascular study 10/03/2023  -Plan of care per critical care team    History of pulmonary embolus (PE)  Assessment & Plan  -Would reinitiate oral anticoagulation with IV bridged with heparin therapy once cleared by critical care team    Acute on chronic respiratory failure with hypoxia ([address])  Assessment & Plan  -Plan for thoracentesis per critical care team and monitor response  -If blood pressure stable and able to tolerate rate control strategy would also initiate IV diuretic regimen at that time with 0.5mg  IV Bumex one-time dose given patient's poor albumin level and monitor for response with up titration as patient tolerates      HILARY (acute kidney injury) ([address])  Assessment & Plan  -Continue to monitor renal function electrolytes  -If renal function does not improve with diuretics would recommend nephrology evaluation    Atrial fibrillation ([address])  Assessment & Plan  -Rates currently in 110-130s beat per minute range  -Recommend initiation of low-dose diltiazem infusion and if patient's blood pressure able to tolerate uptitrate however if blood pressure unable to tolerate can trial either digoxin loading with 250 mcg IV every 6 hours x4 doses with maintenance therapy of 62.5 mcg daily thereafter for reinitiation of low-dose amiodarone infusion with reinitiation of anticoagulation for rate control attempt  -We will need to attempt better rate control strategy for patient as this may be contributing factor to underlying myopathy  -Reinitiate anticoagulation as soon as possible once cleared by critical care team postthoracentesis    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 Other summary comments:   -Would recommend reinitiation of anticoagulation as soon as possible particularly with bridge therapy with IV heparin in the setting of patient's renal dysfunction and recent DVT PE history and need for anticoagulation with A-fib as well  -Can trial low-dose IV diltiazem infusion however if patient's blood pressure does not tolerate can also try loading dose of digoxin with 250 mcg IV every 6 hours x4 doses with initiation of 62.5 mcg daily thereafter.  -If unable to rate control with these medications can trial amiodarone infusion at that time however with patient's lung issues may not necessarily be best long-term regimen however will need better rate control strategy as this can also be contributing to patient's myopathy  -Would also recommend diuretic regimen postthoracentesis be reinitiated considering patient's significant bilateral lower extremity edema  -Patient remains guarded status at this time and will need to monitor.       HPI: Claudean Casey is a 69y.o. year old male with adenocarcinoma of the lung history of chemoradiation therapy along with prostate cancer history s/p radiation in 2005, hypertension, hyperlipidemia, recent diagnosis of DVT/PE, anemia, CKD, diabetes mellitus and recent diagnosis of atrial fibrillation with rapid ventricular response newly discovered in October 2023 with newly discovered cardiomyopathy at that time when hospitalized at [address] [place] [address] who has been undergoing repeat thoracentesis for recurrent pleural effusions        EKG:   Currently atrial fibrillation on telemetry    MOST  RECENT CARDIAC IMAGING:   -Transthoracic echocardiogram 10//2023 showing left ventricular systolic function moderately reduced estimated LVEF 35% with a mildly dilated right ventricle with mildly reduced right ventricular systolic function, moderately dilated left atrium with mildly dilated right atrium, mild to moderate aortic regurgitation, moderate mitral regurgitation and mild tricuspid regurgitation with small pericardial effusion and an IVC that was dilated and was on oral anticoagulation with Coumadin in the outpatient setting along with amiodarone therapy initiated at last hospitalization and presenting symptoms having Worsening shortness of breath. Per patient who is hard of hearing without hearing aids in place and his daughter at bedside he has been having worsening orthopnea and lower extremity edema had recurrence and was due for his thoracentesis later this week but as symptoms had acutely worsened and was recommended for ER evaluation. He was admitted to ICU service with adjustment of medical therapy and cardiology was requested for atrial fibrillation with rapid ventricular response.  -Currently at bedside patient notes orthopnea and shortness of breath but denies any chest pain, palpitations, lightness or dizziness or loss of consciousness. He notes chronic lower extremity edema which has been present and does have oral diuretics ordered at home however notes his symptoms are worse right before his scheduled thoracentesis. Review of Systems:   Review of Systems   Constitutional:  Positive for fatigue. Negative for chills, diaphoresis and fever. HENT:  Negative for trouble swallowing and voice change. Eyes:  Negative for pain and redness. Respiratory:  Positive for shortness of breath. Negative for wheezing. Cardiovascular:  Positive for leg swelling. Negative for chest pain and palpitations. Gastrointestinal:  Negative for abdominal pain, constipation, diarrhea, nausea and vomiting. Genitourinary:  Negative for dysuria. Musculoskeletal:  Positive for arthralgias.  Negative for neck pain and neck stiffness. Skin:  Negative for rash. Neurological:  Negative for dizziness, syncope, light-headedness and headaches. Psychiatric/Behavioral:  Negative for agitation and confusion. All other systems reviewed and are negative. Historical Information   Past Medical History:   Diagnosis Date    Diabetes mellitus ([address] [facility])     Hyperlipidemia     Hypertension     Lung cancer ([address])     Prostate cancer ([address] [facility]) 2005    S/P prostate ca-2005 had radiation tx.      Past Surgical History:   Procedure Laterality Date    COLONOSCOPY      IR BIOPSY LYMPH NODE  9/15/2022    IR THORACENTESIS  9/13/2022    IR THORACENTESIS  10/5/2023    IR THORACENTESIS  10/13/2023    IR THORACENTESIS  10/30/2023    IR THORACENTESIS  11/9/2023    SD ARTHRODESIS ANKLE OPEN Right 7/10/2020    Procedure: ARTHRODESIS/ TIBIAL-TALAR ANKLE FUSION;  Surgeon: Torres Ernandez MD;  Location: [facility];  Service: Orthopedics    SD LAPAROSCOPY SURG RPR INITIAL INGUINAL HERNIA Bilateral 12/16/2021    Procedure: LAPAROSCOPIC REPAIR HERNIA INGUINAL WITH MESH;  Surgeon: Maria M Langston MD;  Location: [facility];  Service: General     Social History     Substance and Sexual Activity   Alcohol Use Yes    Comment: Occasional     Social History     Substance and Sexual Activity   Drug Use Never     Social History     Tobacco Use   Smoking Status Never   Smokeless Tobacco Never       Family History:   Family History   Problem Relation Age of Onset    Heart attack Mother         Meds/Allergies   all current active meds have been reviewed  Medications Prior to Admission   Medication    amiodarone 200 mg tablet    brimonidine tartrate 0.2 % ophthalmic solution    furosemide (LASIX) 40 mg tablet    metFORMIN (GLUCOPHAGE-XR) 500 mg 24 hr tablet    metoprolol tartrate (LOPRESSOR) 100 mg tablet    Osimertinib Mesylate 80 MG TABS    simvastatin (ZOCOR) 10 mg tablet    timolol (TIMOPTIC-XE) 0.5 % ophthalmic gel-forming    warfarin (COUMADIN) 4 mg tablet    Accu-Chek FastClix Lancets MISC    [] azithromycin (ZITHROMAX) 250 mg tablet    finasteride (PROSCAR) 5 mg tablet    folic acid (KP Folic Acid) 1 mg tablet    glucose blood (Accu-Chek Jackie Plus) test strip    ondansetron (ZOFRAN) 8 mg tablet       No Known Allergies    Objective   Vitals: Blood pressure 137/68, pulse (!) 129, temperature 97.5 °F (36.4 °C), temperature source Temporal, resp. rate 16, height 5' 9" (1.753 m), weight 90.3 kg (199 lb 1.2 oz), SpO2 97 %. , Body mass index is 29.4 kg/m².,   Orthostatic Blood Pressures      Flowsheet Row Most Recent Value   Blood Pressure 137/68 filed at 2023 1000   Patient Position - Orthostatic VS Sitting filed at 2023 0900            Systolic (87KTE), KQE:613 , Min:106 , TNY:422     Diastolic (87QTB), GOU:52, Min:56, Max:85    Physical Exam:  Physical Exam  Vitals reviewed. Constitutional:       General: He is not in acute distress. Appearance: He is not diaphoretic. HENT:      Head: Normocephalic and atraumatic. Comments: Nasal cannula oxygen in place  Eyes:      General:         Right eye: No discharge. Left eye: No discharge. Neck:      Comments: Trachea midline, JVD present  Cardiovascular:      Heart sounds: Murmur (SRAVANI) heard. Comments: Irregularly irregular rate and rhythm  Pulmonary:      Effort: No respiratory distress. Breath sounds: No wheezing. Comments: Decreased breath sounds bilaterally worse in right lower lung field  Chest:      Chest wall: No tenderness. Abdominal:      General: Bowel sounds are normal.      Palpations: Abdomen is soft. Tenderness: There is no abdominal tenderness. There is no rebound. Musculoskeletal:      Right lower leg: Edema (weeping) present. Left lower leg: Edema (3-4+) present. Skin:     General: Skin is warm and dry. Neurological:      Mental Status: He is alert.       Comments: Awake, alert, able to answer questions appropriately, hard of hearing   Psychiatric:         Mood and Affect: Mood normal.         Behavior: Behavior normal.        Lab Results:     Troponins:    Results from last 7 days   Lab Units 11/11/23  2335 11/11/23  2212 11/11/23 2012   HS TNI 0HR ng/L  --   --  8   HS TNI 2HR ng/L  --  11  --    HSTNI D2 ng/L  --  3  --    HS TNI 4HR ng/L 9  --   --    HSTNI D4 ng/L 1  --   --      BNP:   Results from last 6 Months   Lab Units 10/27/23  1919 10/03/23  1556   BNP pg/mL 625* 662*       CBC :   Results from last 7 days   Lab Units 11/12/23  0530 11/11/23 2012   WBC Thousand/uL 8.24 9.45   HEMOGLOBIN g/dL 11.8* 12.5   HEMATOCRIT % 38.8 40.7   MCV fL 106* 107*   PLATELETS Thousands/uL 141* 155     TSH:     CMP:   Results from last 7 days   Lab Units 11/12/23  0530 11/11/23 2012   POTASSIUM mmol/L 3.7 4.0   CHLORIDE mmol/L 105 105   CO2 mmol/L 31 25   BUN mg/dL 46* 48*   CREATININE mg/dL 1.85* 1.86*   AST U/L 32 39   ALT U/L 42 48   EGFR ml/min/1.73sq m 34 34     Lipid Profile:     Coags:   Results from last 7 days   Lab Units 11/12/23  0530 11/11/23 2012   INR  1.56* 3.88*

## 2023-11-12 NOTE — ED NOTES
Attempted multiple times to get new ordered blood work by myself and a tech; Unable to receive lactic without tourniquet;       Joyce Goff, ORTEGA  11/11/23 9620

## 2023-11-12 NOTE — PROGRESS NOTES
[address]  Progress Note  Name: Palmira Verma  MRN: [de-identified]  Unit/Bed#: ICU 05-01 I Date of Admission: 11/11/2023   Date of Service: 11/12/2023 I Hospital Day: 1    Assessment/Plan   * Pleural effusion  Assessment & Plan  Pt presented to the ED with c/o increased sob since yesterday. Pt was on his home O2 of 3 L NC sating 93%. Pt admitted to ICU as SD1 for thoracentesis tomorrow (11/12). Pt had a recent R sided thoracentesis as an outpatient on 11/9 where they removed 2,600 ml of clear, yellow fluid. Recent hospitalization at [facility] at the end of October this year. He was noted to have pleural effusions during that admission in the setting of malignancy. He was given the choice of Pleurx catheter placement or weekly thoracentesis for which he opted to undergo weekly thoracentesis. (11/11) CT Chest - Moderate pleural effusions; R > L    Plan:  Continue supplemental O2 for SpO2 > 88%  [place] and 10 mg of PO Vitamin K for Coumadin reversal tonight for thoracentesis tomorrow        Acute on chronic respiratory failure with hypoxia (HCC)  Assessment & Plan  Pt wears 3L NC baseline at home  Pt requiring up to 6L NC on admission    Plan:  Wean supplemental O2 to baseline O2 requirements as tolerted    Prostate CA [facility])  Assessment & Plan  Pt with hx of prostate Ca s/p radiation therapy in 2005    Deep vein thrombosis (DVT) of right lower extremity ([address])  Assessment & Plan  Diagnosed with RLE DVT 10/3/23  Pt on Coumadin    Plan:  Hold home Coumadin for now in setting of planned thoracentesis 11/12    History of pulmonary embolus (PE)  Assessment & Plan  Pt found to have a PE on 10/3/23 during hospitalization. 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 [facility])  Assessment & Plan  See plan as noted above    Malignant neoplasm of upper lobe of right lung [facility])  Assessment & Plan  Pt diagnosed with Stage 4 Lung Ca in 9/2022  Appears last chemo treatment was 9/2023 as pt has had hospitalizations since then   Follows with heme/onc outpatient        BPH associated with nocturia  Assessment & Plan  Pt takes Proscar as outpatient    Plan:  Continue home medication    Hypercholesterolemia  Assessment & Plan  Pt takes Simvastatin 10 mg daily    Plan:  Continue home statin    Controlled type 2 diabetes mellitus without complication, without long-term current use of insulin (HCC)  Assessment & Plan  Lab Results   Component Value Date    HGBA1C 6.0 10/23/2023       No results for input(s): "POCGLU" in the last 72 hours. Blood Sugar Average: Last 72 hrs:    Home medication regimen is metformin    Plan:  Hold home medication while inpatient  SSI  AC/HS accuchecks  Hypoglycemic protocol      Benign essential HTN  Assessment & Plan  Pt takes Lopressor 100 mg bid outpatient    Plan: Will hold for now in setting of HILARY  Pt currently normotensive             ICU Core Measures     A: Assess, Prevent, and Manage Pain Has pain been assessed? Yes  Need for changes to pain regimen? No   B: Both SAT/SAT  N/A   C: Choice of Sedation RASS Goal: 0 Alert and Calm or N/A patient not on sedation  Need for changes to sedation or analgesia regimen? NA   D: Delirium CAM-ICU: Negative   E: Early Mobility  Plan for early mobility? Yes   F: Family Engagement Plan for family engagement today?  Yes       Antibiotic Review: Patient on appropriate coverage based on culture data. Prophylaxis:  VTE VTE covered by:    None       Stress Ulcer  not ordered       Subjective   HPI/24hr events: Pt noted to be afib with RVR; 5 mg IV Lopressor given with improvement in HR. Review of Systems   Constitutional:  Positive for fatigue. Negative for fever. Respiratory:  Negative for cough and chest tightness. Cardiovascular:  Positive for leg swelling. Negative for chest pain. Gastrointestinal:  Negative for diarrhea, nausea and vomiting. Neurological:  Negative for dizziness and headaches. Psychiatric/Behavioral:  Negative for agitation and confusion. Objective                            Vitals I/O      Most Recent Min/Max in 24hrs   Temp (!) 97.1 °F (36.2 °C) Temp  Min: 97.1 °F (36.2 °C)  Max: 97.4 °F (36.3 °C)   Pulse (!) 122 Pulse  Min: 115  Max: 122   Resp (!) 24 Resp  Min: 22  Max: 24   /56 BP  Min: 106/56  Max: 113/79   O2 Sat 96 % SpO2  Min: 93 %  Max: 96 %      Intake/Output Summary (Last 24 hours) at 11/12/2023 0321  Last data filed at 11/12/2023 0259  Gross per 24 hour   Intake --   Output 200 ml   Net -200 ml         Diet Regular; Regular House     Invasive Monitoring Physical exam      Physical Exam  Eyes:      Pupils: Pupils are equal, round, and reactive to light. Skin:     General: Skin is warm and dry. HENT:      Head: Normocephalic. Mouth/Throat:      Mouth: Mucous membranes are moist.   Cardiovascular:      Pulses:           Radial pulses are 2+ on the right side and 2+ on the left side. Dorsalis pedis pulses are detected w/ Doppler on the right side and detected w/ Doppler on the left side. Heart sounds: Normal heart sounds. Comments: A-fib  Musculoskeletal:         General: Swelling present. Normal range of motion. Abdominal:      General: Bowel sounds are normal.      Palpations: Abdomen is soft. Constitutional:       General: He is awake. Interventions: He is intubated.    Pulmonary: Effort: He is intubated. Breath sounds: Decreased breath sounds present. Psychiatric:         Behavior: Behavior is cooperative. Neurological:      General: No focal deficit present. Mental Status: He is alert and oriented to person, place and time. He is CAM ICU negative and not agitated. Motor: Strength full and intact in all extremities. Vitals and nursing note reviewed. Diagnostic Studies      EKG: a-fib  Imaging:  I have personally reviewed pertinent reports. and I have personally reviewed pertinent films in PACS     Medications:  Scheduled PRN   amiodarone, 200 mg, TID With Meals  cefepime, 1,000 mg, Q12H  chlorhexidine, 15 mL, Q12H SALVATORE  finasteride, 5 mg, Daily  folic acid, 1 mg, Daily  insulin lispro, 1-6 Units, TID AC  insulin lispro, 1-6 Units, HS  pravastatin, 10 mg, Daily With Dinner  sodium chloride, 1 mL, Once      sodium chloride (PF), 3 mL, Q1H PRN       Continuous          Labs:    CBC    Recent Labs     11/10/23  1012 11/11/23 2012   WBC 8.87 9.45   HGB 12.0 12.5   HCT 37.5 40.7   * 155     BMP    Recent Labs     11/10/23  1012 11/11/23 2012   SODIUM 144 142   K 3.8 4.0    105   CO2 28 25   AGAP 10 12   BUN 50* 48*   CREATININE 1.96* 1.86*   CALCIUM 7.9* 8.1*       Coags    Recent Labs     11/11/23 2012   INR 3.88*   PTT 50*        Additional Electrolytes  No recent results       Blood Gas    No recent results  No recent results LFTs  Recent Labs     11/10/23  1012 11/11/23 2012   ALT 42 48   AST 37 39   ALKPHOS 77 102   ALB 2.8* 3.0*   TBILI 0.57 0.49       Infectious  Recent Labs     11/11/23  2335   PROCALCITONI 0.15     Glucose  Recent Labs     11/11/23  2012   GLUC 139               Critical Care Time Delivered : Upon my evaluation, this patient had a high probability of imminent or life-threatening deterioration due to pleural effusion, which required my direct attention, intervention, and personal management.   I have personally provided 21 minutes of critical care time, exclusive of procedures, teaching, family meetings, and any prior time recorded by providers other than myself.      RADHA Doyle

## 2023-11-12 NOTE — PROCEDURES
US Guided Peripheral IV    Date/Time: 11/12/2023 2:30 PM    Performed by: RADHA Brooke  Authorized by: RADHA Brooke    Patient location:  ICU  Performed by: Attending  Indications:     Indications: difficulty obtaining IV access    Procedure details:     Patient evaluated for contraindications to access (i.e. fistula, thrombosis, etc): Yes      Standard clean technique used for ultrasound access: Yes      Location:  Right arm    Catheter size:  22 gauge    Number of attempts:  1    Successful placement: yes    Post-procedure details:     Post-procedure:  Dressing applied    Assessment: free fluid flow and no signs of infiltration      Post-procedure complications: none      Patient tolerance of procedure:   Tolerated well, no immediate complications

## 2023-11-12 NOTE — ASSESSMENT & PLAN NOTE
Pt presented to the ED with c/o increased sob since yesterday. Pt was on his home O2 of 3 L NC sating 93%. Pt admitted to ICU as SD1 for thoracentesis tomorrow (11/12). Pt had a recent R sided thoracentesis as an outpatient on 11/9 where they removed 2,600 ml of clear, yellow fluid. Recent hospitalization at [facility] at the end of October this year. He was noted to have pleural effusions during that admission in the setting of malignancy. He was given the choice of Pleurx catheter placement or weekly thoracentesis for which he opted to undergo weekly thoracentesis.    (11/11) CT Chest - Moderate pleural effusions; R > L    Plan:  Continue supplemental O2 for SpO2 > 88%  PCC and 10 mg of PO Vitamin K for Coumadin reversal tonight for thoracentesis tomorrow

## 2023-11-12 NOTE — H&P
[id number] [facility]  H&P  Name: Shann Gowers 69 y.o. male I MRN: [de-identified]  Unit/Bed#: ICU 05-01 I Date of Admission: 11/11/2023   Date of Service: 11/12/2023 I Hospital Day: 1      Assessment/Plan   * Pleural effusion  Assessment & Plan  Pt presented to the ED with c/o increased sob since yesterday. Pt was on his home O2 of 3 L NC sating 93%. Pt admitted to ICU as SD1 for thoracentesis tomorrow (11/12). Pt had a recent R sided thoracentesis as an outpatient on 11/9 where they removed 2,600 ml of clear, yellow fluid. Recent hospitalization at [facility] at the end of October this year. He was noted to have pleural effusions during that admission in the setting of malignancy. He was given the choice of Pleurx catheter placement or weekly thoracentesis for which he opted to undergo weekly thoracentesis. (11/11) CT Chest - Moderate pleural effusions; R > L    Plan:  Continue supplemental O2 for SpO2 > 88%  [place] and 10 mg of PO Vitamin K for Coumadin reversal tonight for thoracentesis tomorrow        Acute on chronic respiratory failure with hypoxia (HCC)  Assessment & Plan  Pt wears 3L NC baseline at home  Pt requiring up to 6L NC on admission    Plan:  Wean supplemental O2 to baseline O2 requirements as tolerted    Prostate CA [facility])  Assessment & Plan  Pt with hx of prostate Ca s/p radiation therapy in 2005    Deep vein thrombosis (DVT) of right lower extremity ([address])  Assessment & Plan  Diagnosed with RLE DVT 10/3/23  Pt on Coumadin    Plan:  Hold home Coumadin for now in setting of planned thoracentesis 11/12    History of pulmonary embolus (PE)  Assessment & Plan  Pt found to have a PE on 10/3/23 during hospitalization. 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 [facility])  Assessment & Plan  See plan as noted above    Malignant neoplasm of upper lobe of right lung [facility])  Assessment & Plan  Pt diagnosed with Stage 4 Lung Ca in 9/2022  Appears last chemo treatment was 9/2023 as pt has had hospitalizations since then   Follows with heme/onc outpatient        BPH associated with nocturia  Assessment & Plan  Pt takes Proscar as outpatient    Plan:  Continue home medication    Hypercholesterolemia  Assessment & Plan  Pt takes Simvastatin 10 mg daily    Plan:  Continue home statin    Controlled type 2 diabetes mellitus without complication, without long-term current use of insulin (HCC)  Assessment & Plan  Lab Results   Component Value Date    HGBA1C 6.0 10/23/2023       No results for input(s): "POCGLU" in the last 72 hours. Blood Sugar Average: Last 72 hrs:    Home medication regimen is metformin    Plan:  Hold home medication while inpatient  SSI  AC/HS accuchecks  Hypoglycemic protocol      Benign essential HTN  Assessment & Plan  Pt takes Lopressor 100 mg bid outpatient    Plan: Will hold for now in setting of HILARY  Pt currently normotensive           History of Present Illness     HPI: Felix Chadwick is a 69 y.o. with a pmhx of R lung Ca with mets, a-fib, PE, RLE DVT on Coumadin, recurrent pleural effusions, BPH, HLD, HTN, DM2, who presents with increased sob since yesterday. CT chest showed moderate pleural effusions R>L. Pt remains on his baseline home O2 of 3L NC. Pt started on Cefepime for PNA in the ED.  Plan to give [facility] and Vitamin K for Coumadin reversal. Pt admitted to the ICU for SD level of care for planned thoracentesis tomorrow. History obtained from chart review and the patient. Review of Systems   Constitutional:  Negative for fever. Respiratory:  Positive for shortness of breath. Negative for cough. Cardiovascular:  Positive for leg swelling. Negative for chest pain. Gastrointestinal:  Negative for diarrhea, nausea and vomiting. Skin:  Positive for wound. Neurological:  Negative for dizziness and headaches. Psychiatric/Behavioral:  Negative for agitation and confusion. Historical Information   Past Medical History:  No date: Diabetes mellitus ([address] [facility])  No date: Hyperlipidemia  No date: Hypertension  No date: Lung cancer [facility])  2005: Prostate cancer ([address] [facility])      Comment:  S/P prostate ca-2005 had radiation tx.  Past Surgical History:  No date: COLONOSCOPY  9/15/2022: IR BIOPSY LYMPH NODE  9/13/2022: IR THORACENTESIS  10/5/2023: IR THORACENTESIS  10/13/2023: IR THORACENTESIS  10/30/2023: IR THORACENTESIS  11/9/2023: IR THORACENTESIS  7/10/2020: MA ARTHRODESIS ANKLE OPEN; Right      Comment:  Procedure: ARTHRODESIS/ TIBIAL-TALAR ANKLE FUSION;                 Surgeon: Torres Ernandez MD;  Location: [facility];                 Service: Orthopedics  12/16/2021: MA LAPAROSCOPY SURG RPR INITIAL INGUINAL HERNIA; Bilateral      Comment:  Procedure: [address] [place] [address];  Surgeon: Maria M Langston MD;  Location: [facility]                OR;  Service: General   Current Outpatient Medications   Medication Instructions    Accu-Chek FastClix Lancets MISC Does not apply, Daily, E11.9  Check  fbs  daily    amiodarone 200 mg, Oral, 3 times daily with meals    brimonidine tartrate 0.2 % ophthalmic solution 1 drop, Left Eye, 2 times daily    finasteride (PROSCAR) 5 mg, Oral, Daily    folic acid ( FOLIC ACID) 1 mg, Oral, Daily    furosemide (LASIX) 40 mg, Oral, 2 times daily    glucose blood (Accu-Chek Jackie Plus) test strip Use as instructed    metFORMIN (GLUCOPHAGE-XR) 500 mg 24 hr tablet TAKE 1 TABLET BY MOUTH EVERY DAY    metoprolol tartrate (LOPRESSOR) 100 mg, Oral, Every 12 hours scheduled    ondansetron (ZOFRAN) 8 mg, Oral, Every 8 hours PRN    Osimertinib Mesylate 80 mg, Oral, Daily    simvastatin (ZOCOR) 10 mg tablet TAKE 1 TABLET BY MOUTH EVERY DAY    timolol (TIMOPTIC-XE) 0.5 % ophthalmic gel-forming 1 drop, Daily    warfarin (COUMADIN) 4 mg, Oral, Daily    No Known Allergies   Social History     Tobacco Use    Smoking status: Never    Smokeless tobacco: Never   Vaping Use    Vaping Use: Never used   Substance Use Topics    Alcohol use: Yes     Comment: Occasional    Drug use: Never    Family History   Problem Relation Age of Onset    Heart attack Mother           Objective                            Vitals I/O      Most Recent Min/Max in 24hrs   Temp (!) 97.1 °F (36.2 °C) Temp  Min: 97.1 °F (36.2 °C)  Max: 97.4 °F (36.3 °C)   Pulse (!) 122 Pulse  Min: 115  Max: 122   Resp (!) 24 Resp  Min: 22  Max: 24   /56 BP  Min: 106/56  Max: 113/79   O2 Sat 96 % SpO2  Min: 93 %  Max: 96 %      Intake/Output Summary (Last 24 hours) at 11/12/2023 0321  Last data filed at 11/12/2023 0259  Gross per 24 hour   Intake --   Output 200 ml   Net -200 ml         Diet Regular; Regular House     Invasive Monitoring Physical exam      Physical Exam  Eyes:      Pupils: Pupils are equal, round, and reactive to light. Skin:     General: Skin is warm and dry. Capillary Refill: Capillary refill takes 2 to 3 seconds. Comments: B/l LE erythema   HENT:      Head: Normocephalic. Right Ear: Decreased hearing noted. Left Ear: Decreased hearing noted. Mouth/Throat:      Mouth: Mucous membranes are moist.   Cardiovascular:      Pulses:           Radial pulses are 2+ on the right side and 2+ on the left side. Dorsalis pedis pulses are detected w/ Doppler on the right side and detected w/ Doppler on the left side. Heart sounds: Normal heart sounds.       Comments: A-fib  Musculoskeletal:         General: Normal range of motion. Abdominal:      General: Bowel sounds are normal.      Palpations: Abdomen is soft. Constitutional:       General: He is awake. He is not in acute distress. Interventions: Nasal cannula in place. Pulmonary:      Breath sounds: Decreased breath sounds present. Psychiatric:         Behavior: Behavior is cooperative. Neurological:      General: No focal deficit present. Mental Status: He is alert and oriented to person, place and time. He is CAM ICU negative and not agitated. Motor: Strength full and intact in all extremities. Vitals and nursing note reviewed. Diagnostic Studies      EKG: a-fib  Imaging:  I have personally reviewed pertinent reports.    and I have personally reviewed pertinent films in PACS     Medications:  Scheduled PRN   amiodarone, 200 mg, TID With Meals  cefepime, 1,000 mg, Q12H  chlorhexidine, 15 mL, Q12H [place]  finasteride, 5 mg, Daily  folic acid, 1 mg, Daily  insulin lispro, 1-6 Units, TID AC  insulin lispro, 1-6 Units, HS  pravastatin, 10 mg, Daily With Dinner  sodium chloride, 1 mL, Once      sodium chloride (PF), 3 mL, Q1H PRN       Continuous          Labs:    CBC    Recent Labs     11/10/23  1012 11/11/23 2012   WBC 8.87 9.45   HGB 12.0 12.5   HCT 37.5 40.7   * 155     BMP    Recent Labs     11/10/23  1012 11/11/23 2012   SODIUM 144 142   K 3.8 4.0    105   CO2 28 25   AGAP 10 12   BUN 50* 48*   CREATININE 1.96* 1.86*   CALCIUM 7.9* 8.1*       Coags    Recent Labs     11/11/23 2012   INR 3.88*   PTT 50*        Additional Electrolytes  No recent results       Blood Gas    No recent results  No recent results LFTs  Recent Labs     11/10/23  1012 11/11/23 2012   ALT 42 48   AST 37 39   ALKPHOS 77 102   ALB 2.8* 3.0*   TBILI 0.57 0.49       Infectious  Recent Labs     11/11/23  2335   PROCALCITONI 0.15     Glucose  Recent Labs     11/11/23 2012   GLUC 139             Critical Care Time Delivered : Upon my evaluation, this patient had a high probability of imminent or life-threatening deterioration due to pleural effusion, which required my direct attention, intervention, and personal management. I have personally provided 48 minutes of critical care time, exclusive of procedures, teaching, family meetings, and any prior time recorded by providers other than myself.    Anticipated Length of Stay is > 1044 RADHA Domingo

## 2023-11-12 NOTE — ASSESSMENT & PLAN NOTE
Diagnosed with RLE DVT 10/3/23  Pt on Coumadin    Plan:  Hold home Coumadin for now in setting of planned thoracentesis 11/12

## 2023-11-12 NOTE — PROCEDURES
US Guided Peripheral IV    Date/Time: 11/12/2023 2:29 PM    Performed by: RADHA Barrientos  Authorized by: RADHA Barrientos    Patient location:  ICU  Performed by:  NP/PA  Indications:     Indications: difficulty obtaining IV access      Image availability:  Not saved  Procedure details:     Patient evaluated for contraindications to access (i.e. fistula, thrombosis, etc): Yes      Standard clean technique used for ultrasound access: Yes      Location:  Right arm    Catheter size:  18 gauge    Number of attempts:  1    Successful placement: yes    Post-procedure details:     Post-procedure:  Dressing applied    Assessment: free fluid flow and no signs of infiltration      Post-procedure complications: none      Patient tolerance of procedure:   Tolerated well, no immediate complications

## 2023-11-12 NOTE — ASSESSMENT & PLAN NOTE
Baseline Cr noted to be around 1.0 prior to hospitalizations in October 2023  Currently Cr is 1.8 on admission    Plan:  Trend renal indicies  Avoid hypotension and nephrotoxic agents  Strict I/O

## 2023-11-12 NOTE — ASSESSMENT & PLAN NOTE
Pt takes Lopressor 100 mg bid outpatient    Plan:   Will hold for now in setting of HILARY  Pt currently normotensive

## 2023-11-12 NOTE — ASSESSMENT & PLAN NOTE
-Would recommend oncology evaluation to determine overall prognosis and if poor would recommend goals of care discussion.

## 2023-11-12 NOTE — ASSESSMENT & PLAN NOTE
-Plan for thoracentesis per critical care team and monitor response  -If blood pressure stable and able to tolerate rate control strategy would also initiate IV diuretic regimen at that time with 0.5mg  IV Bumex one-time dose given patient's poor albumin level and monitor for response with up titration as patient tolerates Ambulatory

## 2023-11-12 NOTE — ASSESSMENT & PLAN NOTE
Pt on Amiodarone 200 mg tid and Coumadin    Plan:   Will continue home Amiodarone  Hold Coumadin in setting of thoracentesis 11/12  Monitor telemetry

## 2023-11-12 NOTE — ASSESSMENT & PLAN NOTE
Pt wears 3L NC baseline at home  Pt requiring up to 6L NC on admission    Plan:  Wean supplemental O2 to baseline O2 requirements as tolerted

## 2023-11-12 NOTE — ASSESSMENT & PLAN NOTE
-Continue to monitor renal function electrolytes  -If renal function does not improve with diuretics would recommend nephrology evaluation

## 2023-11-12 NOTE — ASSESSMENT & PLAN NOTE
Pt presented to the ED with c/o increased sob since yesterday. Pt was on his home O2 of 3 L NC sating 93%. Pt admitted to ICU as SD1 for thoracentesis tomorrow (11/12). Pt had a recent R sided thoracentesis as an outpatient on 11/9 where they removed 2,600 ml of clear, yellow fluid. Recent hospitalization at [facility] at the end of October this year. He was noted to have pleural effusions during that admission in the setting of malignancy. He was given the choice of Pleurx catheter placement or weekly thoracentesis for which he opted to undergo weekly thoracentesis.    (11/11) CT Chest - Moderate pleural effusions; R > L    Plan:  Continue supplemental O2 for SpO2 > 88%  [facility] and 10 mg of PO Vitamin K for Coumadin reversal tonight for thoracentesis tomorrow

## 2023-11-12 NOTE — ASSESSMENT & PLAN NOTE
-Rates currently in 110-130s beat per minute range  -Recommend initiation of low-dose diltiazem infusion and if patient's blood pressure able to tolerate uptitrate however if blood pressure unable to tolerate can trial either digoxin loading with 250 mcg IV every 6 hours x4 doses with maintenance therapy of 62.5 mcg daily thereafter for reinitiation of low-dose amiodarone infusion with reinitiation of anticoagulation for rate control attempt  -We will need to attempt better rate control strategy for patient as this may be contributing factor to underlying myopathy  -Reinitiate anticoagulation as soon as possible once cleared by critical care team postthoracentesis

## 2023-11-12 NOTE — ASSESSMENT & PLAN NOTE
Lab Results   Component Value Date    HGBA1C 6.0 10/23/2023       No results for input(s): "POCGLU" in the last 72 hours.     Blood Sugar Average: Last 72 hrs:    Home medication regimen is metformin    Plan:  Hold home medication while inpatient  SSI  AC/HS accuchecks  Hypoglycemic protocol

## 2023-11-12 NOTE — ED PROVIDER NOTES
History  Chief Complaint   Patient presents with    Shortness of Breath     SOB since yesterday. Pt had a thoracentesis a few days ago. Pt is chronically on 3LPM O2 at home     Patient has stage IV adenocarcinoma of the lung which is the primary cause of his effusions. Had previously been evaluated for Pleurx catheter. However, due to infection this was delayed. Patient has thoracentesis scheduled for Monday. He thinks that he would not be able to wait that long due to the severity of his dyspnea. Denies any fevers or chills. Notes baseline level of lower extremity edema. Does report some orthopnea. Cough is nonproductive. No fevers or chills. No sick contacts. Some history limited by patient's difficulty with hearing. Prior to Admission Medications   Prescriptions Last Dose Informant Patient Reported? Taking? Accu-Chek FastClix Lancets MISC  Self No No   Sig: Use daily E11.9  Check  fbs  daily   Osimertinib Mesylate 80 MG TABS 11/10/2023 Self Yes Yes   Sig: Take 80 mg by mouth in the morning   amiodarone 200 mg tablet 11/10/2023 Self No Yes   Sig: Take 1 tablet (200 mg total) by mouth 3 (three) times a day with meals   azithromycin (ZITHROMAX) 250 mg tablet   No No   Sig: Take 2 tablets today then 1 tablet daily x 4 days   brimonidine tartrate 0.2 % ophthalmic solution 11/10/2023 Self Yes Yes   Sig: Administer 1 drop into the left eye 2 (two) times a day   finasteride (PROSCAR) 5 mg tablet Unknown Self No No   Sig: TAKE 1 TABLET (5 MG TOTAL) BY MOUTH DAILY.    Patient taking differently: Take 5 mg by mouth daily   folic acid (KP Folic Acid) 1 mg tablet   No No   Sig: Take 1 tablet (1 mg total) by mouth daily   furosemide (LASIX) 40 mg tablet 11/10/2023 Self No Yes   Sig: Take 1 tablet (40 mg total) by mouth 2 (two) times a day   glucose blood (Accu-Chek Jackie Plus) test strip  Self No No   Sig: Use as instructed   metFORMIN (GLUCOPHAGE-XR) 500 mg 24 hr tablet 11/10/2023 Self No Yes   Sig: TAKE 1 TABLET BY MOUTH EVERY DAY   metoprolol tartrate (LOPRESSOR) 100 mg tablet 11/10/2023 Self No Yes   Sig: Take 1 tablet (100 mg total) by mouth every 12 (twelve) hours   ondansetron (ZOFRAN) 8 mg tablet Unknown Self No No   Sig: Take 1 tablet (8 mg total) by mouth every 8 (eight) hours as needed for nausea or vomiting   simvastatin (ZOCOR) 10 mg tablet 11/10/2023 Self No Yes   Sig: TAKE 1 TABLET BY MOUTH EVERY DAY   timolol (TIMOPTIC-XE) 0.5 % ophthalmic gel-forming 11/10/2023 Self Yes Yes   Si drop daily   warfarin (COUMADIN) 4 mg tablet 11/10/2023 Self No Yes   Sig: TAKE 1 TABLET BY MOUTH EVERY DAY      Facility-Administered Medications: None       Past Medical History:   Diagnosis Date    Diabetes mellitus ([address] [facility])     Hyperlipidemia     Hypertension     Lung cancer ([address] [facility])     Prostate cancer ([address] [facility])     S/P prostate ca-2005 had radiation tx. Past Surgical History:   Procedure Laterality Date    COLONOSCOPY      IR BIOPSY LYMPH NODE  9/15/2022    IR THORACENTESIS  2022    IR THORACENTESIS  10/5/2023    IR THORACENTESIS  10/13/2023    IR THORACENTESIS  10/30/2023    IR THORACENTESIS  2023    MD ARTHRODESIS ANKLE OPEN Right 7/10/2020    Procedure: ARTHRODESIS/ TIBIAL-TALAR ANKLE FUSION;  Surgeon: Trevin Omalley MD;  [facility];  Service: Orthopedics    MD LAPAROSCOPY SURG RPR INITIAL INGUINAL HERNIA Bilateral 2021    Procedure: LAPAROSCOPIC REPAIR HERNIA INGUINAL WITH MESH;  Surgeon: Mckenzie Butler MD;  Location: [facility];  Service: General       Family History   Problem Relation Age of Onset    Heart attack Mother      I have reviewed and agree with the history as documented.     E-Cigarette/Vaping    E-Cigarette Use Never User      E-Cigarette/Vaping Substances     Social History     Tobacco Use    Smoking status: Never    Smokeless tobacco: Never   Vaping Use    Vaping Use: Never used   Substance Use Topics    Alcohol use: Yes     Comment: Occasional    Drug use: Never Review of Systems   Constitutional:  Positive for fatigue. Negative for chills and fever. Eyes:  Negative for visual disturbance. Respiratory:  Positive for cough and shortness of breath. Cardiovascular:  Negative for chest pain. Gastrointestinal:  Negative for abdominal distention and abdominal pain. Endocrine: Negative for polyuria. Genitourinary:  Negative for decreased urine volume and dysuria. Neurological:  Negative for syncope. Physical Exam  Physical Exam  Constitutional:       General: He is in acute distress (mild respiratory distress, improved with nasal canula O2). Appearance: He is well-developed. He is not toxic-appearing or diaphoretic. HENT:      Head: Normocephalic and atraumatic. Eyes:      Conjunctiva/sclera: Conjunctivae normal.      Pupils: Pupils are equal, round, and reactive to light. Cardiovascular:      Rate and Rhythm: Regular rhythm. Tachycardia present. Heart sounds: Normal heart sounds. Pulmonary:      Effort: Tachypnea and accessory muscle usage present. Breath sounds: Decreased breath sounds present. Abdominal:      General: Bowel sounds are normal.      Palpations: Abdomen is soft. Musculoskeletal:         General: Normal range of motion. Cervical back: Normal range of motion and neck supple. Right lower leg: No tenderness. Edema present. Left lower leg: No tenderness. Edema present. Skin:     General: Skin is warm and dry. Neurological:      Mental Status: He is alert and oriented to person, place, and time. Psychiatric:         Behavior: Behavior normal.         Thought Content:  Thought content normal.         Judgment: Judgment normal.         Vital Signs  ED Triage Vitals [11/11/23 1943]   Temperature Pulse Respirations Blood Pressure SpO2   (!) 97.4 °F (36.3 °C) (!) 115 22 113/79 93 %      Temp Source Heart Rate Source Patient Position - Orthostatic VS BP Location FiO2 (%)   Oral Monitor Sitting Left arm --      Pain Score       No Pain           Vitals:    11/12/23 0800 11/12/23 0900 11/12/23 1000 11/12/23 1100   BP: (!) 182/81 132/61 137/68    Pulse: (!) 128 (!) 129 (!) 129    Patient Position - Orthostatic VS:  Sitting  Lying         Visual Acuity  Visual Acuity      Flowsheet Row Most Recent Value   L Pupil Size (mm) 3   R Pupil Size (mm) 3   L Pupil Shape Round   R Pupil Shape Round            ED Medications  Medications   sodium chloride (PF) 0.9 % injection 3 mL (has no administration in time range)   finasteride (PROSCAR) tablet 5 mg (5 mg Oral Given 12/67/43 1991)   folic acid (FOLVITE) tablet 1 mg (1 mg Oral Given 11/12/23 0801)   chlorhexidine (PERIDEX) 0.12 % oral rinse 15 mL (15 mL Mouth/Throat Given 11/12/23 0801)   insulin lispro (HumaLOG) 100 units/mL subcutaneous injection 1-6 Units ( Subcutaneous Not Given 11/12/23 0144)   amiodarone tablet 200 mg (has no administration in time range)   heparin (porcine) 25,000 units in 0.45% NaCl 250 mL infusion (premix) (11.1 Units/kg/hr × 90 kg (Order-Specific) Intravenous New Bag 11/12/23 1512)   heparin (porcine) injection 4,000 Units (has no administration in time range)   heparin (porcine) injection 2,000 Units (has no administration in time range)   calcium gluconate 1 g in sodium chloride 0.9% 50 mL (premix) (1 g Intravenous New Bag 11/12/23 1522)   pravastatin (PRAVACHOL) tablet 20 mg (has no administration in time range)   senna-docusate sodium (SENOKOT S) 8.6-50 mg per tablet 2 tablet (has no administration in time range)   insulin lispro (HumaLOG) 100 units/mL subcutaneous injection 1-6 Units (2 Units Subcutaneous Given 11/12/23 1304)   cefepime (MAXIPIME) IVPB (premix in dextrose) 2,000 mg 50 mL (0 mg Intravenous Stopped 11/12/23 0036)   prothrombin complex concentrate (human) (Kcentra) 1,500 Units (1,500 Units Intravenous Given 11/11/23 2318)   phytonadione (MEPHYTON) tablet 10 mg (10 mg Oral Given 11/11/23 4626)   metoprolol (LOPRESSOR) injection 5 mg (5 mg Intravenous Given 11/11/23 7165)   metoprolol (LOPRESSOR) injection 5 mg (5 mg Intravenous Given 11/12/23 0519)   calcium gluconate 2 g in sodium chloride 0.9% 100 mL (premix) (2 g Intravenous New Bag 11/12/23 0758)   metoprolol (LOPRESSOR) injection 5 mg (5 mg Intravenous Given 11/12/23 0950)   furosemide (LASIX) injection 80 mg (80 mg Intravenous Given 11/12/23 1522)   potassium chloride (K-DUR,KLOR-CON) CR tablet 40 mEq (40 mEq Oral Given 11/12/23 1303)       Diagnostic Studies  Results Reviewed       Procedure Component Value Units Date/Time    Procalcitonin [[id number]]  (Normal) Collected: 11/11/23 2335    Lab Status: Final result Specimen: Blood from Arm, Left Updated: 11/12/23 0010     Procalcitonin 0.15 ng/ml     HS Troponin I 4hr [[id number]]  (Normal) Collected: 11/11/23 2335    Lab Status: Final result Specimen: Blood from Arm, Left Updated: 11/12/23 0008     hs TnI 4hr 9 ng/L      Delta 4hr hsTnI 1 ng/L     Lactic acid [[id number]]  (Normal) Collected: 11/11/23 2335    Lab Status: Final result Specimen: Blood from Arm, Left Updated: 11/12/23 0003     LACTIC ACID 1.4 mmol/L     Narrative:      Result may be elevated if tourniquet was used during collection. HS Troponin I 2hr [[id number]]  (Normal) Collected: 11/11/23 2212    Lab Status: Final result Specimen: Blood from Arm, Left Updated: 11/11/23 2245     hs TnI 2hr 11 ng/L      Delta 2hr hsTnI 3 ng/L     Blood culture #2 [[id number]] Collected: 11/11/23 2212    Lab Status: In process Specimen: Blood from Arm, Right Updated: 11/11/23 2217    Blood culture #1 [[id number]] Collected: 11/11/23 2212    Lab Status:  In process Specimen: Blood from Arm, Left Updated: 11/11/23 2217    Protime-INR [[id number]]  (Abnormal) Collected: 11/11/23 2012    Lab Status: Final result Specimen: Blood from Arm, Left Updated: 11/11/23 2127     Protime 38.0 seconds      INR 3.88    APTT [[id number]]  (Abnormal) Collected: 11/11/23 2012    Lab Status: Final result Specimen: Blood from Arm, Left Updated: 11/11/23 2127     PTT 50 seconds     FLU/RSV/COVID - if FLU/RSV clinically relevant [[id number]]  (Normal) Collected: 11/11/23 2012    Lab Status: Final result Specimen: Nares from Nose Updated: 11/11/23 2103     SARS-CoV-2 Negative     INFLUENZA A PCR Negative     INFLUENZA B PCR Negative     RSV PCR Negative    Narrative:      FOR PEDIATRIC PATIENTS - copy/paste COVID Guidelines URL to browser: [email or web address]    SARS-CoV-2 assay is a Nucleic Acid Amplification assay intended for the  qualitative detection of nucleic acid from SARS-CoV-2 in nasopharyngeal  swabs. Results are for the presumptive identification of SARS-CoV-2 RNA. Positive results are indicative of infection with SARS-CoV-2, the virus  causing COVID-19, but do not rule out bacterial infection or co-infection  with other viruses. Laboratories within the [facility] and its  territories are required to report all positive results to the appropriate  public health authorities. Negative results do not preclude SARS-CoV-2  infection and should not be used as the sole basis for treatment or other  patient management decisions. Negative results must be combined with  clinical observations, patient history, and epidemiological information. This test has not been FDA cleared or approved. This test has been authorized by FDA under an Emergency Use Authorization  (EUA). This test is only authorized for the duration of time the  declaration that circumstances exist justifying the authorization of the  emergency use of an in vitro diagnostic tests for detection of SARS-CoV-2  virus and/or diagnosis of COVID-19 infection under section 564(b)(1) of  the Act, 21 U. S.C. 083MCJ-6(B)(5), unless the authorization is terminated  or revoked sooner. The test has been validated but independent review by FDA  and CLIA is pending.     Test performed using [employer] GeneXpert: This RT-PCR assay targets N2,  a region unique to SARS-CoV-2. A conserved region in the E-gene was chosen  for pan-Sarbecovirus detection which includes SARS-CoV-2. According to CMS-2020-01-R, this platform meets the definition of high-throughput technology.     TSH, 3rd generation [[id number]]  (Abnormal) Collected: 11/11/23 2012    Lab Status: Final result Specimen: Blood from Arm, Left Updated: 11/11/23 2055     TSH 3RD GENERATON 9.338 uIU/mL     HS Troponin 0hr (reflex protocol) [[id number]]  (Normal) Collected: 11/11/23 2012    Lab Status: Final result Specimen: Blood from Arm, Left Updated: 11/11/23 2045     hs TnI 0hr 8 ng/L     Comprehensive metabolic panel [[id number]]  (Abnormal) Collected: 11/11/23 2012    Lab Status: Final result Specimen: Blood from Arm, Left Updated: 11/11/23 2045     Sodium 142 mmol/L      Potassium 4.0 mmol/L      Chloride 105 mmol/L      CO2 25 mmol/L      ANION GAP 12 mmol/L      BUN 48 mg/dL      Creatinine 1.86 mg/dL      Glucose 139 mg/dL      Calcium 8.1 mg/dL      Corrected Calcium 8.9 mg/dL      AST 39 U/L      ALT 48 U/L      Alkaline Phosphatase 102 U/L      Total Protein 6.0 g/dL      Albumin 3.0 g/dL      Total Bilirubin 0.49 mg/dL      eGFR 34 ml/min/1.73sq m     Narrative:      [facility] guidelines for Chronic Kidney Disease (CKD):     Stage 1 with normal or high GFR (GFR > 90 mL/min/1.73 square meters)    Stage 2 Mild CKD (GFR = 60-89 mL/min/1.73 square meters)    Stage 3A Moderate CKD (GFR = 45-59 mL/min/1.73 square meters)    Stage 3B Moderate CKD (GFR = 30-44 mL/min/1.73 square meters)    Stage 4 Severe CKD (GFR = 15-29 mL/min/1.73 square meters)    Stage 5 End Stage CKD (GFR <15 mL/min/1.73 square meters)  Note: GFR calculation is accurate only with a steady state creatinine    D-dimer, quantitative [[id number]]  (Abnormal) Collected: 11/11/23 2012    Lab Status: Final result Specimen: Blood from Arm, Left Updated: 11/11/23 2041 D-Dimer, Quant 1.23 ug/ml FEU     Narrative: In the evaluation for possible pulmonary embolism, in the appropriate (Well's Score of 4 or less) patient, the age adjusted d-dimer cutoff for this patient can be calculated as:    Age x 0.01 (in ug/mL) for Age-adjusted D-dimer exclusion threshold for a patient over 50 years. CBC and differential [[id number]]  (Abnormal) Collected: 11/11/23 2012    Lab Status: Final result Specimen: Blood from Arm, Left Updated: 11/11/23 2020     WBC 9.45 Thousand/uL      RBC 3.81 Million/uL      Hemoglobin 12.5 g/dL      Hematocrit 40.7 %       fL      MCH 32.8 pg      MCHC 30.7 g/dL      RDW 16.9 %      MPV 11.8 fL      Platelets 608 Thousands/uL      nRBC 0 /100 WBCs      Neutrophils Relative 82 %      Immat GRANS % 1 %      Lymphocytes Relative 5 %      Monocytes Relative 7 %      Eosinophils Relative 5 %      Basophils Relative 0 %      Neutrophils Absolute 7.73 Thousands/µL      Immature Grans Absolute 0.07 Thousand/uL      Lymphocytes Absolute 0.51 Thousands/µL      Monocytes Absolute 0.69 Thousand/µL      Eosinophils Absolute 0.43 Thousand/µL      Basophils Absolute 0.02 Thousands/µL                    CT chest without contrast   Final Result by Priscilla Mccallum MD (11/11 2132)      1. Left upper lobe pneumonia   2. Moderate pleural effusions   3. Complete right lower lobe atelectasis   4.   Stable appearance of right upper lobe mass, left lower lobe nodules, and mediastinal lymphadenopathy               Workstation performed: [id number]                    Procedures  CriticalCare Time    Date/Time: 11/12/2023 3:29 PM    Performed by: Veena Perez MD  Authorized by: Veena Perez MD    Critical care provider statement:     Critical care time (minutes):  30    Critical care time was exclusive of:  Separately billable procedures and treating other patients and teaching time    Critical care was necessary to treat or prevent imminent or life-threatening deterioration of the following conditions:  Respiratory failure and sepsis    Critical care was time spent personally by me on the following activities:  Blood draw for specimens, obtaining history from patient or surrogate, development of treatment plan with patient or surrogate, evaluation of patient's response to treatment, examination of patient, interpretation of cardiac output measurements, ordering and performing treatments and interventions, ordering and review of laboratory studies, ordering and review of radiographic studies, re-evaluation of patient's condition, review of old charts and discussions with consultants    I assumed direction of critical care for this patient from another provider in my specialty: no             ED Course  ED Course as of 11/12/23 1529   Sat Nov 11, 2023 2145 [facility]-Critical Care-ICU Night Attending ([facility]))  [facility]  I'm not at [facility] right now and instead of delaying care I would transfer to [facility]  9:14 PM  20 days left                               SBIRT 22yo+      [employer] Most Recent Value   Initial Alcohol Screen: US AUDIT-C     1. How often do you have a drink containing alcohol? 0 Filed at: 11/11/2023 1947   2. How many drinks containing alcohol do you have on a typical day you are drinking? 0 Filed at: 11/11/2023 1947   3a. Male UNDER 65: How often do you have five or more drinks on one occasion? 0 Filed at: 11/11/2023 1947   3b. FEMALE Any Age, or MALE 65+: How often do you have 4 or more drinks on one occassion? 0 Filed at: 11/11/2023 1947   Audit-C Score 0 Filed at: 11/11/2023 1947   KEYSHA: How many times in the past year have you. .. Used an illegal drug or used a prescription medication for non-medical reasons? Never Filed at: 11/11/2023 1947                      Medical Decision Making  Any 10year-old male with pneumonia and pleural effusion. Initially thought that all of patient's symptoms were due to large pleural effusion. CT which was slightly delayed and formal read also demonstrated some pneumonia. Patient started on sepsis protocol. Fluids held due to patient's obvious overloaded status and large effusion. Was hesitant to treat the overload status aggressively, especially in the setting of patient's infection. Blood pressure and perfusion were stable while in the ED. Mild tachycardia thought likely primarily due to patient's pleural effusion and dyspnea. Patient's increased work of breathing and tachypnea responded well to nasal cannula. I initially contacted our critical care team who felt it was best to get the patient somewhere else. After talking to multiple providers at multiple campuses it became clear that transferring the patient would cause significant delay to any thoracentesis. Recontacted critical care team at our facility who stated they would be able to do a thoracentesis tomorrow and excepted the patient to the stepdown service. Patient stable at the time of admission and states understanding and agreement with the plan. Problems Addressed:  Pleural effusion: chronic illness or injury with exacerbation, progression, or side effects of treatment that poses a threat to life or bodily functions  Pneumonia: acute illness or injury    Amount and/or Complexity of Data Reviewed  Independent Historian: spouse  External Data Reviewed: notes. Labs: ordered. Radiology: ordered. ECG/medicine tests: ordered and independent interpretation performed. Risk  Prescription drug management. Decision regarding hospitalization.              Disposition  Final diagnoses:   Pleural effusion   Pneumonia     Time reflects when diagnosis was documented in both MDM as applicable and the Disposition within this note       Time User Action Codes Description Comment    11/11/2023  9:55 PM Fly Murphy Add [J90] Pleural effusion     11/11/2023  9:55 PM Fly Murphy Add [J18.9] Pneumonia     11/12/2023  6:34 AM Sebastian Saucedo [facility] [U46.61] Atrial fibrillation, unspecified type [facility])           ED Disposition       ED Disposition   Admit    Condition   Stable    Date/Time   Sat Nov 11, 2023 4910    Comment   Case was discussed with herar and the patient's admission status was agreed to be Admission Status: inpatient status to the service of Dr. Leora Cano . Follow-up Information    None         Current Discharge Medication List        CONTINUE these medications which have NOT CHANGED    Details   amiodarone 200 mg tablet Take 1 tablet (200 mg total) by mouth 3 (three) times a day with meals  Qty: 90 tablet, Refills: 0    Associated Diagnoses: Atrial fibrillation with rapid ventricular response (HCC)      brimonidine tartrate 0.2 % ophthalmic solution Administer 1 drop into the left eye 2 (two) times a day      furosemide (LASIX) 40 mg tablet Take 1 tablet (40 mg total) by mouth 2 (two) times a day  Qty: 60 tablet, Refills: 0    Associated Diagnoses: Pleural effusion; Lung cancer ([address]); Acute respiratory failure with hypoxia (HCC)      metFORMIN (GLUCOPHAGE-XR) 500 mg 24 hr tablet TAKE 1 TABLET BY MOUTH EVERY DAY  Qty: 90 tablet, Refills: 0    Associated Diagnoses: Type 2 diabetes mellitus without complication, without long-term current use of insulin (HCC)      metoprolol tartrate (LOPRESSOR) 100 mg tablet Take 1 tablet (100 mg total) by mouth every 12 (twelve) hours  Qty: 60 tablet, Refills: 0    Associated Diagnoses: Atrial fibrillation with rapid ventricular response (HCC)      Osimertinib Mesylate 80 MG TABS Take 80 mg by mouth in the morning      simvastatin (ZOCOR) 10 mg tablet TAKE 1 TABLET BY MOUTH EVERY DAY  Qty: 90 tablet, Refills: 1    Associated Diagnoses: Mixed hyperlipidemia      timolol (TIMOPTIC-XE) 0.5 % ophthalmic gel-forming 1 drop daily      warfarin (COUMADIN) 4 mg tablet TAKE 1 TABLET BY MOUTH EVERY DAY  Qty: 90 tablet, Refills: 1    Associated Diagnoses: Atrial fibrillation with RVR ([address]);  Multiple subsegmental pulmonary emboli without acute cor pulmonale (HCC)      Accu-Chek FastClix Lancets MISC Use daily E11.9  Check  fbs  daily  Qty: 100 each, Refills: 3    Associated Diagnoses: Controlled type 2 diabetes mellitus without complication, without long-term current use of insulin (HCC)      finasteride (PROSCAR) 5 mg tablet TAKE 1 TABLET (5 MG TOTAL) BY MOUTH DAILY. Qty: 90 tablet, Refills: 1    Associated Diagnoses: Benign essential HTN; BPH associated with nocturia      folic acid (KP Folic Acid) 1 mg tablet Take 1 tablet (1 mg total) by mouth daily  Qty: 30 tablet, Refills: 6    Associated Diagnoses: Malignant neoplasm of upper lobe of right lung (HCC)      glucose blood (Accu-Chek Jackie Plus) test strip Use as instructed  Qty: 100 strip, Refills: 1    Associated Diagnoses: Controlled type 2 diabetes mellitus without complication, without long-term current use of insulin (HCC)      ondansetron (ZOFRAN) 8 mg tablet Take 1 tablet (8 mg total) by mouth every 8 (eight) hours as needed for nausea or vomiting  Qty: 20 tablet, Refills: 0    Associated Diagnoses: Malignant neoplasm of upper lobe of right lung (HCC)           STOP taking these medications       azithromycin (ZITHROMAX) 250 mg tablet Comments:   Reason for Stopping:               No discharge procedures on file.     PDMP Review         Value Time User    PDMP Reviewed  Yes 11/3/2023 12:02 PM Francina Dance, MD            ED Provider  Electronically Signed by             Kenna Hernández MD  11/12/23 0531

## 2023-11-12 NOTE — ASSESSMENT & PLAN NOTE
Pt found to have a PE on 10/3/23 during hospitalization. Pt was started on Coumadin    Plan:   Will hold Coumadin for now in setting of thoracentesis planned for 11/12

## 2023-11-12 NOTE — ASSESSMENT & PLAN NOTE
Baseline Cr noted to be around 1.0 prior to hospitalizations in October 2023  Currently Cr is 1.8 on admission    Plan:  Trend renal indicies  Avoid hypotension and nephrotoxic agents  Strict I/O no cold intolerance/no heat intolerance

## 2023-11-13 ENCOUNTER — APPOINTMENT (INPATIENT)
Dept: INTERVENTIONAL RADIOLOGY/VASCULAR | Facility: HOSPITAL | Age: 78
DRG: 180 | End: 2023-11-13
Payer: MEDICARE

## 2023-11-13 PROBLEM — I50.23 ACUTE ON CHRONIC HFREF (HEART FAILURE WITH REDUCED EJECTION FRACTION) (HCC): Status: ACTIVE | Noted: 2023-11-13

## 2023-11-13 LAB
ALBUMIN SERPL BCP-MCNC: 2.5 G/DL (ref 3.5–5)
ALP SERPL-CCNC: 69 U/L (ref 34–104)
ALT SERPL W P-5'-P-CCNC: 32 U/L (ref 7–52)
ANION GAP SERPL CALCULATED.3IONS-SCNC: 7 MMOL/L
ANION GAP SERPL CALCULATED.3IONS-SCNC: 7 MMOL/L
APTT PPP: 28 SECONDS (ref 23–37)
APTT PPP: 33 SECONDS (ref 23–37)
AST SERPL W P-5'-P-CCNC: 23 U/L (ref 13–39)
BILIRUB SERPL-MCNC: 0.53 MG/DL (ref 0.2–1)
BUN SERPL-MCNC: 41 MG/DL (ref 5–25)
BUN SERPL-MCNC: 44 MG/DL (ref 5–25)
CA-I BLD-SCNC: 1.1 MMOL/L (ref 1.12–1.32)
CA-I BLD-SCNC: 1.11 MMOL/L (ref 1.12–1.32)
CALCIUM ALBUM COR SERPL-MCNC: 9.1 MG/DL (ref 8.3–10.1)
CALCIUM SERPL-MCNC: 7.9 MG/DL (ref 8.4–10.2)
CALCIUM SERPL-MCNC: 8 MG/DL (ref 8.4–10.2)
CHLORIDE SERPL-SCNC: 106 MMOL/L (ref 96–108)
CHLORIDE SERPL-SCNC: 107 MMOL/L (ref 96–108)
CO2 SERPL-SCNC: 30 MMOL/L (ref 21–32)
CO2 SERPL-SCNC: 31 MMOL/L (ref 21–32)
CREAT SERPL-MCNC: 1.57 MG/DL (ref 0.6–1.3)
CREAT SERPL-MCNC: 1.69 MG/DL (ref 0.6–1.3)
ERYTHROCYTE [DISTWIDTH] IN BLOOD BY AUTOMATED COUNT: 17.1 % (ref 11.6–15.1)
GFR SERPL CREATININE-BSD FRML MDRD: 38 ML/MIN/1.73SQ M
GFR SERPL CREATININE-BSD FRML MDRD: 41 ML/MIN/1.73SQ M
GLUCOSE SERPL-MCNC: 111 MG/DL (ref 65–140)
GLUCOSE SERPL-MCNC: 117 MG/DL (ref 65–140)
GLUCOSE SERPL-MCNC: 148 MG/DL (ref 65–140)
GLUCOSE SERPL-MCNC: 166 MG/DL (ref 65–140)
GLUCOSE SERPL-MCNC: 96 MG/DL (ref 65–140)
HCT VFR BLD AUTO: 33.8 % (ref 36.5–49.3)
HGB BLD-MCNC: 10.3 G/DL (ref 12–17)
INR PPP: 1.25 (ref 0.84–1.19)
MAGNESIUM SERPL-MCNC: 2.1 MG/DL (ref 1.9–2.7)
MAGNESIUM SERPL-MCNC: 2.2 MG/DL (ref 1.9–2.7)
MCH RBC QN AUTO: 31.9 PG (ref 26.8–34.3)
MCHC RBC AUTO-ENTMCNC: 30.5 G/DL (ref 31.4–37.4)
MCV RBC AUTO: 105 FL (ref 82–98)
PHOSPHATE SERPL-MCNC: 2.7 MG/DL (ref 2.3–4.1)
PHOSPHATE SERPL-MCNC: 2.8 MG/DL (ref 2.3–4.1)
PLATELET # BLD AUTO: 135 THOUSANDS/UL (ref 149–390)
PMV BLD AUTO: 11 FL (ref 8.9–12.7)
POTASSIUM SERPL-SCNC: 3.5 MMOL/L (ref 3.5–5.3)
POTASSIUM SERPL-SCNC: 3.8 MMOL/L (ref 3.5–5.3)
PROCALCITONIN SERPL-MCNC: 0.16 NG/ML
PROT SERPL-MCNC: 4.7 G/DL (ref 6.4–8.4)
PROTHROMBIN TIME: 15.9 SECONDS (ref 11.6–14.5)
RBC # BLD AUTO: 3.23 MILLION/UL (ref 3.88–5.62)
SODIUM SERPL-SCNC: 144 MMOL/L (ref 135–147)
SODIUM SERPL-SCNC: 144 MMOL/L (ref 135–147)
WBC # BLD AUTO: 7.63 THOUSAND/UL (ref 4.31–10.16)

## 2023-11-13 PROCEDURE — 80053 COMPREHEN METABOLIC PANEL: CPT | Performed by: NURSE PRACTITIONER

## 2023-11-13 PROCEDURE — 32555 ASPIRATE PLEURA W/ IMAGING: CPT | Performed by: RADIOLOGY

## 2023-11-13 PROCEDURE — 75820 VEIN X-RAY ARM/LEG: CPT

## 2023-11-13 PROCEDURE — 84145 PROCALCITONIN (PCT): CPT | Performed by: NURSE PRACTITIONER

## 2023-11-13 PROCEDURE — C1769 GUIDE WIRE: HCPCS

## 2023-11-13 PROCEDURE — 99233 SBSQ HOSP IP/OBS HIGH 50: CPT | Performed by: INTERNAL MEDICINE

## 2023-11-13 PROCEDURE — 36573 INSJ PICC RS&I 5 YR+: CPT

## 2023-11-13 PROCEDURE — 83735 ASSAY OF MAGNESIUM: CPT | Performed by: NURSE PRACTITIONER

## 2023-11-13 PROCEDURE — C1751 CATH, INF, PER/CENT/MIDLINE: HCPCS

## 2023-11-13 PROCEDURE — 85610 PROTHROMBIN TIME: CPT | Performed by: NURSE PRACTITIONER

## 2023-11-13 PROCEDURE — 02HV33Z INSERTION OF INFUSION DEVICE INTO SUPERIOR VENA CAVA, PERCUTANEOUS APPROACH: ICD-10-PCS | Performed by: RADIOLOGY

## 2023-11-13 PROCEDURE — 36573 INSJ PICC RS&I 5 YR+: CPT | Performed by: RADIOLOGY

## 2023-11-13 PROCEDURE — 82330 ASSAY OF CALCIUM: CPT | Performed by: NURSE PRACTITIONER

## 2023-11-13 PROCEDURE — 32555 ASPIRATE PLEURA W/ IMAGING: CPT

## 2023-11-13 PROCEDURE — 84100 ASSAY OF PHOSPHORUS: CPT | Performed by: NURSE PRACTITIONER

## 2023-11-13 PROCEDURE — 82948 REAGENT STRIP/BLOOD GLUCOSE: CPT

## 2023-11-13 PROCEDURE — NC001 PR NO CHARGE: Performed by: RADIOLOGY

## 2023-11-13 PROCEDURE — 80048 BASIC METABOLIC PNL TOTAL CA: CPT | Performed by: NURSE PRACTITIONER

## 2023-11-13 PROCEDURE — 85027 COMPLETE CBC AUTOMATED: CPT | Performed by: NURSE PRACTITIONER

## 2023-11-13 PROCEDURE — 0W993ZZ DRAINAGE OF RIGHT PLEURAL CAVITY, PERCUTANEOUS APPROACH: ICD-10-PCS | Performed by: RADIOLOGY

## 2023-11-13 PROCEDURE — 85730 THROMBOPLASTIN TIME PARTIAL: CPT | Performed by: NURSE PRACTITIONER

## 2023-11-13 RX ORDER — LIDOCAINE HYDROCHLORIDE 10 MG/ML
INJECTION, SOLUTION EPIDURAL; INFILTRATION; INTRACAUDAL; PERINEURAL AS NEEDED
Status: COMPLETED | OUTPATIENT
Start: 2023-11-13 | End: 2023-11-13

## 2023-11-13 RX ORDER — METOPROLOL TARTRATE 1 MG/ML
5 INJECTION, SOLUTION INTRAVENOUS ONCE
Status: COMPLETED | OUTPATIENT
Start: 2023-11-13 | End: 2023-11-13

## 2023-11-13 RX ORDER — METOPROLOL TARTRATE 50 MG/1
50 TABLET, FILM COATED ORAL EVERY 12 HOURS SCHEDULED
Status: DISCONTINUED | OUTPATIENT
Start: 2023-11-13 | End: 2023-11-13

## 2023-11-13 RX ORDER — BUMETANIDE 0.25 MG/ML
1 INJECTION INTRAMUSCULAR; INTRAVENOUS ONCE
Status: COMPLETED | OUTPATIENT
Start: 2023-11-13 | End: 2023-11-13

## 2023-11-13 RX ORDER — POLYETHYLENE GLYCOL 3350 17 G/17G
17 POWDER, FOR SOLUTION ORAL DAILY PRN
Status: DISCONTINUED | OUTPATIENT
Start: 2023-11-13 | End: 2023-11-22 | Stop reason: HOSPADM

## 2023-11-13 RX ORDER — ACETAMINOPHEN 325 MG/1
650 TABLET ORAL EVERY 6 HOURS PRN
Status: DISCONTINUED | OUTPATIENT
Start: 2023-11-13 | End: 2023-11-22 | Stop reason: HOSPADM

## 2023-11-13 RX ORDER — BUMETANIDE 0.25 MG/ML
1 INJECTION INTRAMUSCULAR; INTRAVENOUS 2 TIMES DAILY
Status: DISCONTINUED | OUTPATIENT
Start: 2023-11-13 | End: 2023-11-21

## 2023-11-13 RX ORDER — POTASSIUM CHLORIDE 20 MEQ/1
20 TABLET, EXTENDED RELEASE ORAL ONCE
Status: COMPLETED | OUTPATIENT
Start: 2023-11-13 | End: 2023-11-13

## 2023-11-13 RX ORDER — POTASSIUM CHLORIDE 14.9 MG/ML
20 INJECTION INTRAVENOUS ONCE
Status: COMPLETED | OUTPATIENT
Start: 2023-11-13 | End: 2023-11-14

## 2023-11-13 RX ADMIN — AMIODARONE HYDROCHLORIDE 200 MG: 100 TABLET ORAL at 07:58

## 2023-11-13 RX ADMIN — IOHEXOL 20 ML: 300 INJECTION, SOLUTION INTRAVENOUS at 16:30

## 2023-11-13 RX ADMIN — METOPROLOL TARTRATE 25 MG: 25 TABLET, FILM COATED ORAL at 11:33

## 2023-11-13 RX ADMIN — LIDOCAINE HYDROCHLORIDE 10 ML: 10 INJECTION, SOLUTION EPIDURAL; INFILTRATION; INTRACAUDAL; PERINEURAL at 16:19

## 2023-11-13 RX ADMIN — BUMETANIDE 1 MG: 0.25 INJECTION INTRAMUSCULAR; INTRAVENOUS at 19:39

## 2023-11-13 RX ADMIN — METOROPROLOL TARTRATE 5 MG: 5 INJECTION, SOLUTION INTRAVENOUS at 19:39

## 2023-11-13 RX ADMIN — FINASTERIDE 5 MG: 5 TABLET, FILM COATED ORAL at 08:02

## 2023-11-13 RX ADMIN — POTASSIUM CHLORIDE 20 MEQ: 14.9 INJECTION, SOLUTION INTRAVENOUS at 07:55

## 2023-11-13 RX ADMIN — CHLORHEXIDINE GLUCONATE 15 ML: 1.2 RINSE ORAL at 08:02

## 2023-11-13 RX ADMIN — POTASSIUM CHLORIDE 20 MEQ: 1500 TABLET, EXTENDED RELEASE ORAL at 19:39

## 2023-11-13 RX ADMIN — BUMETANIDE 1 MG: 0.25 INJECTION INTRAMUSCULAR; INTRAVENOUS at 11:35

## 2023-11-13 RX ADMIN — METOPROLOL TARTRATE 25 MG: 25 TABLET, FILM COATED ORAL at 08:02

## 2023-11-13 RX ADMIN — CHLORHEXIDINE GLUCONATE 15 ML: 1.2 RINSE ORAL at 21:15

## 2023-11-13 RX ADMIN — PRAVASTATIN SODIUM 20 MG: 20 TABLET ORAL at 18:19

## 2023-11-13 RX ADMIN — FOLIC ACID 1 MG: 1 TABLET ORAL at 08:02

## 2023-11-13 RX ADMIN — LIDOCAINE HYDROCHLORIDE 10 ML: 10 INJECTION, SOLUTION EPIDURAL; INFILTRATION; INTRACAUDAL; PERINEURAL at 15:41

## 2023-11-13 RX ADMIN — METOPROLOL TARTRATE 75 MG: 50 TABLET, FILM COATED ORAL at 21:15

## 2023-11-13 NOTE — CONSULTS
Interventional Radiology  Consultation 11/13/2023     Inpatient Consult to IR  Consult performed by: Emma Hager MD  Consult ordered by: RADHA Clemens   1945   [de-identified]      Assessment/Plan:  Plan a discussion with the patient regarding Pleurx    There currently is a decent sized effusion on the right. Unfortunately, there is only aerated right upper lobe. The bronchus to the middle and lower lobes is occluded. So evacuating the effusion will not generate more vital capacity. In addition, the majority of the thrombus burden from his PE was on the right side. So removing the fluid from the right will probably not improve his oxygenation much. There is a decent chance the lung is trapped and will not reexpand. We may end up with a chronic pneumothorax on the right. At this point, he is probably surviving off his left lung. For the first time since presentation the left-sided effusion has a reasonable volume. I think the best thing we can do for his oxygenation is The left-sided effusion. This will increase his vital capacity. I am happy to speak with the patient about placing a right-sided tube. I do think we need to talk about chronic ex-vacuo pneumothorax, before we start.     Medical Problems       Problem List       * (Principal) Pleural effusion    Benign essential HTN    Controlled type 2 diabetes mellitus without complication, without long-term current use of insulin ([address])    Overview Signed 6/19/2018  3:54 PM by Fariba Garnett PA-C     Transitioned From: Diabetes         Lab Results   Component Value Date    HGBA1C 6.0 10/23/2023       Recent Labs     11/12/23  1621 11/12/23  2124 11/13/23  0711 11/13/23  1051   POCGLU 89 164* 111 96       Blood Sugar Average: Last 72 hrs:  (P) [id number]        Hypercholesterolemia    Glaucoma    Inguinal hernia    Hypokalemia    Hypocalcemia    Hyperparathyroidism ([address])    History of colon polyps    Primary osteoarthritis of right shoulder    Chronic left shoulder pain    Left rotator cuff tear arthropathy    Rotator cuff injury, right, initial encounter    Arthritis of right acromioclavicular joint    BPH associated with nocturia    Status post right tibial-talar ankle fusion    Non-recurrent bilateral inguinal hernia without obstruction or gangrene    Multiple subsegmental pulmonary emboli without acute cor pulmonale (HCC)    Overview Signed 10/10/2023  8:37 AM by Ana Herrera DO     PULMONARY ARTERIAL TREE: Acute pulmonary emboli are seen in the distal left pulmonary artery extending into the left lower lobar and some of the segmental and subsegmental pulmonary arteries. Segmental and subsegmental pulmonary emboli are seen in the   lingula. 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)    Platelets decreased (HCC)    Atrial fibrillation (HCC)    HILARY (acute kidney injury) ([address])    Abnormal CT of the abdomen    COPD with acute exacerbation (HCC)    Cellulitis and abscess of left lower extremity    Cellulitis of leg, right    Cellulitis of extremity    Supratherapeutic INR    Acute on chronic respiratory failure with hypoxia (HCC)    History of pulmonary embolus (PE)    Deep vein thrombosis (DVT) of right lower extremity (HCC)    Prostate CA (HCC)    Acute on chronic HFrEF (heart failure with reduced ejection fraction) (HCC)    Wt Readings from Last 3 Encounters:   11/13/23 90.5 kg (199 lb 8.3 oz)   11/03/23 84.9 kg (187 lb 2.7 oz)   10/23/23 81.3 kg (179 lb 3.2 oz)                         Subjective:     Patient ID: Luigi Frederick is a 69 y.o. male. History of Present Illness  9/10/2022 diagnosed with right-sided pleural effusion, pulmonary embolus, and right-sided mass.   9/13, 400 cc removed via right thoracentesis  9/30 effusion had returned, and appeared similar  10/14, no effusion seen  1/10/2023 small effusion  4/11/23 trace effusion  8/8 no effusion  10/3/2023 showed significant volume loss on the right for the first time. The effusion was back to its original volume. 10/5 thoracentesis with removal of 1960 cc  10/7, small residual versus recurrent effusion  10/13 another thoracentesis for 2000. He was an inpatient at this time and on IV fluid  10/27 some fluid was back  10/30, 2000 removed on the right  10/31 the lower lobe is almost completely collapsed  11/9 thoracentesis for 2600  11/11 right middle and right lower lobar drowned, with no aeration. Right bhonchus is occluded. The right-sided effusion is back to its original volume. Left-sided effusion is still small    Review of Systems      Past Medical History:   Diagnosis Date    Diabetes mellitus ([address])     Hyperlipidemia     Hypertension     Lung cancer ([address])     Prostate cancer ([address]) 2005    S/P prostate ca-2005 had radiation tx.         Past Surgical History:   Procedure Laterality Date    COLONOSCOPY      IR BIOPSY LYMPH NODE  9/15/2022    IR THORACENTESIS  9/13/2022    IR THORACENTESIS  10/5/2023    IR THORACENTESIS  10/13/2023    IR THORACENTESIS  10/30/2023    IR THORACENTESIS  11/9/2023    [place] ARTHRODESIS ANKLE OPEN Right 7/10/2020    Procedure: ARTHRODESIS/ TIBIAL-TALAR ANKLE FUSION;  Surgeon: April Brooke MD;  Location: [facility];  Service: Orthopedics    [place] LAPAROSCOPY SURG RPR INITIAL INGUINAL HERNIA Bilateral 12/16/2021    Procedure: LAPAROSCOPIC REPAIR HERNIA INGUINAL WITH MESH;  Surgeon: Roslyn Brooke MD;  Location: [facility];  Service: General        Social History     Tobacco Use   Smoking Status Never   Smokeless Tobacco Never        Social History     Substance and Sexual Activity   Alcohol Use Yes    Comment: Occasional        Social History     Substance and Sexual Activity   Drug Use Never        No Known Allergies    Current Facility-Administered Medications Medication Dose Route Frequency Provider Last Rate Last Admin    amiodarone tablet 200 mg  200 mg Oral Daily With Breakfast RADHA Simon   200 mg at 11/13/23 0758    chlorhexidine (PERIDEX) 0.12 % oral rinse 15 mL  15 mL Mouth/Throat Q12H [facility] BacilioRADHA Huang   15 mL at 11/13/23 0802    finasteride (PROSCAR) tablet 5 mg  5 mg Oral Daily [facility] RADHA Hernandez   5 mg at 00/47/05 5090    folic acid (FOLVITE) tablet 1 mg  1 mg Oral Daily BacilioRADHA Mayes   1 mg at 11/13/23 0802    heparin (porcine) 25,000 units in 0.45% NaCl 250 mL infusion (premix)  3-20 Units/kg/hr (Order-Specific) Intravenous Titrated RADHA Madera   Stopped at 11/13/23 0435    heparin (porcine) injection 2,000 Units  2,000 Units Intravenous Q6H PRN RADHA Simon        heparin (porcine) injection 4,000 Units  4,000 Units Intravenous Q6H PRN RADHA Simon        insulin lispro (HumaLOG) 100 units/mL subcutaneous injection 1-6 Units  1-6 Units Subcutaneous HS RADHA Tejeda   1 Units at 11/12/23 2128    insulin lispro (HumaLOG) 100 units/mL subcutaneous injection 1-6 Units  1-6 Units Subcutaneous TID AC RADHA Simon   2 Units at 11/12/23 1304    metoprolol tartrate (LOPRESSOR) tablet 50 mg  50 mg Oral Q12H [facility] RADHA Simon        polyethylene glycol (MIRALAX) packet 17 g  17 g Oral Daily PRN RADHA Simon        pravastatin (PRAVACHOL) tablet 20 mg  20 mg Oral Daily With AutoZoRADHA pastor   20 mg at 11/12/23 1628    senna-docusate sodium (SENOKOT S) 8.6-50 mg per tablet 2 tablet  2 tablet Oral HS RADHA Simon   2 tablet at 11/12/23 2128          Objective:    Vitals:    11/13/23 0700 11/13/23 0800 11/13/23 0900 11/13/23 1052   BP: 126/79 125/86 137/84 129/73   BP Location: Right arm   Right arm   Pulse: (!) 118 (!) 131 (!) 130    Resp: 19 21 20    Temp: (!) 97.3 °F (36.3 °C)   97.7 °F (36.5 °C)   TempSrc: Tympanic   Tympanic   SpO2: 94% 96% 97%    Weight:       Height:            Physical Exam      Lab Results   Component Value Date     (H) 11/12/2023      Lab Results   Component Value Date    WBC 7.63 11/13/2023    HGB 10.3 (L) 11/13/2023    HCT 33.8 (L) 11/13/2023     (H) 11/13/2023     (L) 11/13/2023     Lab Results   Component Value Date    INR 1.25 (H) 11/13/2023    INR 1.56 (H) 11/12/2023    INR 3.88 (H) 11/11/2023    PROTIME 15.9 (H) 11/13/2023    PROTIME 18.8 (H) 11/12/2023    PROTIME 38.0 (H) 11/11/2023     Lab Results   Component Value Date    PTT 33 11/13/2023         I have personally reviewed pertinent imaging and laboratory results. Code Status: Level 3 - DNAR and DNI  Advance Directive and Living Will: Yes    Power of :    POLST:      IR has been consulted to evaluate the patient, determine the appropriate procedure, and whether or not a procedure can and should be performed. Thank you for allowing me to participate in the care of Carlos Conte. Please don't hesitate to call, text, email, or TigerText with any questions. This text is generated with voice recognition software. There may be translation, syntax,  or grammatical errors. If you have any questions, please contact the dictating provider.

## 2023-11-13 NOTE — ASSESSMENT & PLAN NOTE
Pt wears 3L NC baseline at home  Pt requiring up to 6L NC on admission    Plan:  Wean supplemental O2 to baseline O2 requirements as tolerated  Pt given 80 mg IV Lasix 11/12 with minimal response  1 mg of IV Bumex given 11/12

## 2023-11-13 NOTE — PROCEDURES
Interventional Radiology Procedure Note    PATIENT NAME: Marshal Echevarria  : 1945  MRN: [id number]     Pre-op Diagnosis:   1. Pleural effusion    2. Pneumonia    3. Atrial fibrillation, unspecified type ([address])      2. Occlusion of bronchus intermedius. Bronchus appears to be surrounded by tumor on CT 5 weeks ago. It was attenuated but patent at that time. Bronchus is now occluded. Original request was for a Pleurx on the right. If I placed a Pleurx on the right, the right middle and lower lobes would not be aerated, and would not expand. I did not think this would help, and would place him at risk for ex vacuo pneumothorax. Post-op Diagnosis:   1. Pleural effusion    2. Pneumonia    3. Atrial fibrillation, unspecified type ([address])      2. Same    Procedure: Thoracentesis on the left    Surgeon:   Bita Nair MD  Assistants:     No qualified resident was available, Resident is only observing    Estimated Blood Loss: Minimal    Findings: Left-sided pleural effusion is quite large. 1400 cc removed. Breathing much improved.     Specimens: None    Complications:  None     Anesthesia: local    Bita Nair MD     Date: 2023  Time: 4:50 PM  I Procedures

## 2023-11-13 NOTE — ASSESSMENT & PLAN NOTE
Lab Results   Component Value Date    HGBA1C 6.0 10/23/2023       Recent Labs     11/12/23  0711 11/12/23  1116 11/12/23  1621 11/12/23 2124   POCGLU 109 213* 89 164*       Blood Sugar Average: Last 72 hrs:  (P) 143.75  Home medication regimen is metformin    Plan:  Hold home medication while inpatient  SSI  AC/HS accuchecks  Hypoglycemic protocol

## 2023-11-13 NOTE — DISCHARGE INSTRUCTIONS
How to Care for Your Chest or Abdominal Catheter                                    Post Asept Catheter Placement                 WHAT YOU NEED TO KNOW:                 A chest catheter helps remove fluid from your chest . This may decrease symptoms such as shortness of breath, pain,. One end of the catheter sits inside your  chest. The other end sits outside of your body. Your healthcare provider will show you or your caregiver how to drain fluid through the catheter. Your supplies will be shipped to your home. DISCHARGE INSTRUCTIONS:   How often you should drain fluid:   After the initial insertion drain every other day x 3 days then for comfort of SOB) Resume your normal diet. Small sips of flat soda will help with nausea. Resume taking your medications. You may have some initial  discomfort at the insertion site. You can take your normal pain medication. Joshua Recinos and Ramu  Patients Contact Interventional Radiology at [phone] PATIENTS: Contact Interventional Radiology at [phone])       [place] PATIENTS: Contact Interventional Radiology at [phone]) if any of the following occur:                              Contact your healthcare provider if:   Your symptoms, such as pain or shortness of breath, do not get better after you drain fluid. You have redness, pain, or pus where the catheter is located. You have a fever. Persistent nausea or vomiting. You cannot drain fluid. You see a change in the color of fluid that you drain. You see fluid leaking from around your catheter. You drain foul-smelling fluid or bloody fluid from your catheter. You see a hole or tear in your catheter and need to use the emergency clip. You have questions or concerns about your condition or care.     How often you should drain fluid:  Your healthcare provider will tell you how often to drain fluid. He may tell you to follow a schedule, such as draining once a day or once every other day. He may instead tell you to drain fluid when you have symptoms such as SOB pain. Before you drain fluid from your catheter:   Wash your hands. Remove all rings. Use soap and water or an alcohol-based hand rub. This will help prevent an infection. Gather your supplies. Get a drainage kit and place it on a firm, clean surface. Drainage kits contain either a 500 mL or 1000 mL bottle and a procedure kit. Your healthcare provider will tell you which bottle to use. Gently remove the old bandage. Do not pull on the drain when you remove the bandage. Throw the bandage away. Wash your hands a second time. Use soap and water or an alcohol-based hand rub. Open the drainage kit and remove the procedure kit. Remove the bandage and place it on your clean surface. Leave the bottle with drainage tubing in the package. Remove the procedure kit and place it on your clean surface with the folded side facing up. Carefully unfold the corners of the procedure kit. Do not touch anything inside of the procedure kit. Everything inside of the procedure kit is sterile. Prepare the drainage tubing and bottle. Uncoil the drainage tubing that is connected to the bottle. Do not touch the end of the drainage tubing. Do not remove the cover from the end of the drainage tubing. Lay the drainage tubing on the procedure kit. Put on gloves. Both gloves fit either hand.  each glove up by the folded part and put them on. Do not touch any part of the outside of the gloves with your bare hand. Do not let them touch anything that is not sterile. Open the smaller packages inside of the procedure kit. Open the package that contains the new catheter cap. Let the cap gently fall onto the procedure kit. Tear open the alcohol pads, but do not remove them from their pouches.      Squeeze or roll the clamp closed on the drainage tubing. If the clamp rolls, roll it towards the fifi    Clean the end of the catheter. Use 1 alcohol pad from the procedure kit. Wipe around the end of the catheter in circles. Do not put anything into the end of the catheter to clean it. This could damage the catheter. How to drain fluid from your catheter:   Connect the end of your catheter to the drainage tubing. Remove the cover from the end of the drainage tubing. Hold the end of your catheter in one hand and the drainage tubing in your other hand. Insert the drainage tubing into your catheter until you hear or feel a click. Remove the lock clip on the bottle. Twist and pull gently to remove     Open the clamp on the drainage tubing. This will start the flow of fluid. Drain as much fluid as directed by your doctor. To make the fluid drain slower, roll the clamp toward the bottle or gently squeeze the clamp. To make the fluid drain faster, slide the clamp away from the bottle or slowly release the clamp. It is normal to feel pain or cough when fluid is drained. To decrease pain or coughing, slow down the flow of fluid. You can also close the clamp and take a break. If your symptoms do not get better when you stop draining, do not continue to drain fluid. Change the bottle when it is full. If you need to use a second bottle, close the clamp on the drainage tubing. This will stop fluid from draining. Hold the end of your catheter. Pull out the end of the drainage tubing from your catheter. Do not let the end of your catheter touch anything. Remove the cap on the end of the new drainage tubing. Insert the drainage tubing into your catheter. Remove the support clip on the bottle. Push down on the bottle plunger. Open the clamp on the drainage tubing. Continue to remove as much fluid as directed. Close the clamp on the drainage tubing to stop fluid from draining. Check that the clamp is completely closed.      Pull out the end of the drainage tubing from your catheter. Do not let the end of your catheter touch anything. Clean the end of your catheter with a new alcohol pad. This will help prevent infection. After you drain fluid from your catheter:   Clean the skin around your catheter with a new alcohol pad. Start closest to where the catheter is inserted in your skin. Move the alcohol pad in circles away from your catheter. Place the foam pad around your catheter. The foam pad should have a small cut in it. This cut lets you fit the foam pad around your catheter. Loop the end of the catheter. Place the looped catheter on top of the foam pad. Hold it on top of the foam pad. Place the gauze from your procedure kit over the catheter. Make sure the catheter is completely covered by the gauze. Remove your gloves. This will make it easier to handle the clear sticky bandage. Place the sticky bandage over the gauze. There are 3 layers you need to remove from the bandage. Remove the larger white layer from the bandage. Place the bandage over your gauze so the gauze is in the center of the bandage. Hold one corner of the bandage and remove the larger clear layer of the bandage. Remove the last white layer of the bandage. Press gently on all corners of the bandage to help it stick to your skin. Write down the amount of fluid you drained. There are measurements on the side of the bottle. Also write down the color of the fluid, the date, and the time. Bring this record with you to your follow-up visits. Other important information:   If your catheter comes out, cover the opening in your skin with sterile gauze and a bandage. Use the sterile gauze and bandage from your drainage kit. Do not take a bath or swim in hot tubs or pools. This can cause an infection. You can shower or take a sponge bath. Make sure your bandage covers your catheter before you bathe.  The edges of the bandage should make contact with your skin on all sides. This will prevent water from getting into your drain. If your bandage gets wet, remove the bandage. Clean your skin around the catheter and replace the bandage as directed. Follow up with your healthcare provider as directed:  Write down your questions so you remember to ask them during your visits. © 2017 [address] [facility] is for End User's use only and may not be sold, redistributed or otherwise used for commercial purposes. All illustrations and images included in CareNotes® are the copyrighted property of [employer] or Ankit Moser. The above information is an  only. It is not intended as medical advice for individual conditions or treatments. Talk to your doctor, nurse or pharmacist before following any medical regimen to see if it is safe and effective for you. [address]  Interventional Radiology  [phone]         PICC (Peripherally Inserted Central Catheter)   AMBULATORY CARE:   What you need to know about a peripherally inserted central catheter (PICC):  A PICC is a catheter (small tube) used to give treatments and to take blood. The catheter is inserted into an arm vein. These veins are called peripheral veins. The catheter is guided through the peripheral vein into a central vein near your heart. Why you may need a PICC:   You need to have your blood drawn often. You will be given medicines often, or you need medicines that must work quickly. Your central venous pressure needs to be monitored. You need IV medicines after you leave the hospital. The PICC allows you to get your medicines at home. You cannot eat or drink anything by mouth. What you need to know about how a PICC is placed: You will get local anesthesia to numb the area. The catheter will be put into a vein. It will be guided up until the tip is in the vena cava. This is an area close to your heart. Ultrasound or x-ray pictures will be used to make sure the catheter is in the correct position. The other end of the catheter will stay outside your body. The catheter will be flushed with liquid. Heparin may be used to flush the line to prevent blood clots. Medicine will be placed at the insertion site (the place where it goes into your skin). The medicine helps prevent an infection at the site. The catheter will be secured to your skin with a dressing. The dressing holds it in place, keeps it clean, and helps prevent infection. The dressing will be clear so you can check the insertion site for signs of infection. Another x-ray helps make sure the catheter is in the right place and is ready for you to use. Healthcare providers will watch for problems during the PICC placement. You could have bleeding when the PICC is inserted. An infection could develop at the insertion site. An infection that enters your bloodstream can cause serious illness. Rarely, your lung could get pierced when the PICC is inserted. This can cause a collapsed lung. What healthcare providers will teach you about the PICC:   Supplies you need to keep on hand to use, care for, and flush the PICC    How to use the PICC, and when to keep it clamped    How and when to flush and care for the PICC    Problems that may develop, such as a hole in the catheter, and what to do to fix the problems    How to bathe and do your daily activities with a PICC in place    How to prevent infections    Signs and symptoms of an infection to watch for and what to do if an infection develops    Call your local emergency number (911 in the [address]) for any of the following: You feel pain in your arm, neck, shoulder, or chest.    You cough up blood. Seek care immediately if:   The catheter site turns cold, changes color, or you cannot feel it. You see blood on your dressing and the amount is increasing. The veins in your neck or chest bulge.     Call your doctor if:   You see signs of infection, such as redness, swelling, or pus, or you have a fever. The catheter site is red, warm, painful, or oozing fluid. You see blisters on the skin near the catheter site. You cannot flush your catheter, or you feel pain when you flush your catheter. You see that the catheter is getting shorter, or it falls out. Put pressure on the site with a clean towel before you call your doctor. You see a hole or a crack in your catheter. Clamp your catheter above the damage before you call your doctor. You have questions or concerns about your catheter. Medicines:   NSAIDs  help decrease swelling and pain or fever. This medicine is available with or without a doctor's order. NSAIDs can cause stomach bleeding or kidney problems in certain people. If you take blood thinner medicine, always ask your healthcare provider if NSAIDs are safe for you. Always read the medicine label and follow directions. Take your medicine as directed. Contact your healthcare provider if you think your medicine is not helping or if you have side effects. Tell your provider if you are allergic to any medicine. Keep a list of the medicines, vitamins, and herbs you take. Include the amounts, and when and why you take them. Bring the list or the pill bottles to follow-up visits. Carry your medicine list with you in case of an emergency. Self-care:   Plan to rest when you get home. You should be able to go back to your normal activities the next day. Your healthcare provider will tell you which activities are okay for you. Apply a warm compress as directed. The area where the catheter was inserted may feel sore. A warm compress can help to decrease pain and swelling in your arm. Wet a small towel with warm water. Wring out the extra water. Wrap the cloth in plastic, and put it on the area. Use the compress 4 times a day, for 10 minutes each time.  Prop your arm up on pillows when you are sitting or lying down. This will decrease swelling. Follow instructions on how to care for your insertion site. Your provider will tell you when it is okay to shower or bathe. This is usually after about 1 week. You will need to keep the area covered so it stays dry when you bathe. Prevent an infection:  The area around your catheter may get infected, or you may get an infection in your bloodstream. A bloodstream infection is called a central line-associated bloodstream infection (CLABSI). A CLABSI is caused by bacteria getting into your bloodstream through your catheter. This can lead to severe illness. The following are ways you can help prevent an infection:  Wash your hands often. Use soap or an alcohol-based hand rub to clean your hands. Clean your hands before and after you touch the catheter or the catheter site. Remind anyone who cares for your catheter to wash his or her hands. Limit contact with the catheter. Do not touch or handle your catheter unless you need to care for it. Do not pull, push on, or move the catheter when you clean your skin or change the dressing. Wear clean medical gloves when you touch your catheter or change dressings. Clean your skin as directed. Clean the skin around your catheter every day and before you change your dressing. Ask your healthcare provider what to use to clean your skin. Check your skin every day for signs of infection, such as pain, redness, swelling, and oozing. Contact your healthcare provider if you see these signs. Change the dressing as directed. Keep a sterile dressing over the catheter site. Change the dressing as directed or when it is loose, wet, dirty, or falls off. Clean the skin under the dressing with the solution your healthcare provider suggests. Let the area dry before you put on the new dressing. Do not  blow on your skin to help it dry. Keep the area dry.   Wrap your arm with plastic and seal it with medical tape before you bathe. Take showers instead of baths. Do not swim or soak in a hot tub. Follow up with your doctor as directed:  Write down your questions so you remember to ask them during your visits. © Copyright [facility] 2023 Information is for End User's use only and may not be sold, redistributed or otherwise used for commercial purposes. The above information is an  only. It is not intended as medical advice for individual conditions or treatments. Talk to your doctor, nurse or pharmacist before following any medical regimen to see if it is safe and effective for you. Peripherally Inserted Central Catheter     WHAT YOU NEED TO KNOW:   A PICC is an IV placed into a large blood vessel near your heart. It is usually inserted through a blood vessel in your arm. Your PICC may have multiple ports. Ports are tubes where you can inject medicine. A PICC can stay in place for several weeks or months. You may need a PICC to get nutrition, medicine, or fluids. Blood samples can be removed from your PICC and sent to the lab for tests. DISCHARGE INSTRUCTIONS:    [facility],    Contact Interventional Radiology at [phone] PATIENTS: Contact Interventional Radiology at [phone]   [facility] PATIENTS: Contact Interventional Radiology at [phone] if:  Blood soaks through your bandage. Your arm or leg feels warm, tender, and painful. It may look swollen and red. You have trouble moving your arm. Your catheter falls out. You have a fever or swelling, redness, pain, or pus where the catheter was inserted. Persistent nausea or vomiting. You cannot flush your catheter, or you feel pain when you flush your catheter. You see a hole or crack in the tubing of your catheter. You see fluid leaking from the insertion site. You run out of supplies to care for your catheter.     You have questions or concerns about your condition or care.            Thoracentesis   WHAT YOU NEED TO KNOW:   A thoracentesis is a procedure to remove extra fluid or air from between your lungs and your inner chest wall. Air or fluid buildup may make it hard for you to breathe. A thoracentesis allows your lungs to expand fully so you can breathe more easily. DISCHARGE INSTRUCTIONS:     Small amount of shoulder pain and bloody sputum is normal after a Thoracentesis. Rest:  Rest when you feel it is needed. Slowly start to do more each day. Return to your daily activities as directed. Resume your normal diet. Small sips of flat soda will help mild nausea. Do not smoke: If you smoke, it is never too late to quit. Ask for information about how to stop smoking if you need help. Contact Interventional Radiology at [phone] [place] PATIENTS: Contact Interventional Radiology at [phone]) Karyn Worthy PATIENTS: Contact Interventional Radiology at [phone]) if:   You have a fever. Your puncture site is red, warm, swollen, or draining pus. You have questions or concerns about your procedure, medicine, or care. Seek care immediately or call 911 if:   Severe chest pain with inspiration and shortness of breath    Large amounts of blood in your sputum    Follow up with your healthcare provider as directed.

## 2023-11-13 NOTE — CONSULTS
Interventional Radiology  Consultation 11/13/2023     Inpatient Consult to IR  Consult performed by: Nancy Her MD  Consult ordered by: RADHA Harden   1945   [de-identified]      Assessment/Plan:  Assessment is difficult IV access as an inpatient. Plan would be to offer midline if current access fails. Happy to set him up as an outpatient, for port placement. Would not perform port placement as an inpatient. There is at least 1 study, in Journal of vascular and interventional radiology, which shows that inpatient port placement, with immediate access for use, has 16 times the rate of infection as outpatient port placement. According to the [place] appropriateness guide for intravenous catheters (annals of internal medicine) appropriate venous access for this inpatient would be a midline. It would be good to get him a port before he starts his his pembrolizumab, Abraxane, and carboplatin weekly regimen. If this is to begin immediately, we can happily offer a PICC line until port placement can be arranged.     Medical Problems       Problem List       * (Principal) Pleural effusion    Benign essential HTN    Controlled type 2 diabetes mellitus without complication, without long-term current use of insulin ([address])    Overview Signed 6/19/2018  3:54 PM by Mike Jones PA-C     Transitioned From: Diabetes         Lab Results   Component Value Date    HGBA1C 6.0 10/23/2023       Recent Labs     11/12/23  1621 11/12/23  2124 11/13/23  0711 11/13/23  1051   POCGLU 89 164* 111 96       Blood Sugar Average: Last 72 hrs:  (P) 751.7062267043196199        Hypercholesterolemia    Glaucoma    Inguinal hernia    Hypokalemia    Hypocalcemia    Hyperparathyroidism (HCC)    History of colon polyps    Primary osteoarthritis of right shoulder    Chronic left shoulder pain    Left rotator cuff tear arthropathy    Rotator cuff injury, right, initial encounter    Arthritis of right acromioclavicular joint    BPH associated with nocturia    Status post right tibial-talar ankle fusion    Non-recurrent bilateral inguinal hernia without obstruction or gangrene    Multiple subsegmental pulmonary emboli without acute cor pulmonale (HCC)    Overview Signed 10/10/2023  8:37 AM by Mansoor Craig DO     PULMONARY ARTERIAL TREE: Acute pulmonary emboli are seen in the distal left pulmonary artery extending into the left lower lobar and some of the segmental and subsegmental pulmonary arteries. Segmental and subsegmental pulmonary emboli are seen in the   lingula. 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)    Platelets decreased (HCC)    Atrial fibrillation (HCC)    HILARY (acute kidney injury) ([address])    Abnormal CT of the abdomen    COPD with acute exacerbation (HCC)    Cellulitis and abscess of left lower extremity    Cellulitis of leg, right    Cellulitis of extremity    Supratherapeutic INR    Acute on chronic respiratory failure with hypoxia (HCC)    History of pulmonary embolus (PE)    Deep vein thrombosis (DVT) of right lower extremity (HCC)    Prostate CA (HCC)    Acute on chronic HFrEF (heart failure with reduced ejection fraction) (HCC)    Wt Readings from Last 3 Encounters:   11/13/23 90.5 kg (199 lb 8.3 oz)   11/03/23 84.9 kg (187 lb 2.7 oz)   10/23/23 81.3 kg (179 lb 3.2 oz)                         Subjective:     Patient ID: Ananya Rossi is a 69 y.o. male. History of Present Illness  Stage IV lung cancer. Admitted after single treatment. Difficulty with IV access as an inpatient. Review of Systems      Past Medical History:   Diagnosis Date    Diabetes mellitus ([address])     Hyperlipidemia     Hypertension     Lung cancer ([address])     Prostate cancer ([address]) 2005    S/P prostate ca-2005 had radiation tx.         Past Surgical History:   Procedure Laterality Date    COLONOSCOPY      IR BIOPSY LYMPH NODE  9/15/2022    IR THORACENTESIS  9/13/2022    IR THORACENTESIS  10/5/2023    IR THORACENTESIS  10/13/2023    IR THORACENTESIS  10/30/2023    IR THORACENTESIS  11/9/2023    ME ARTHRODESIS ANKLE OPEN Right 7/10/2020    Procedure: ARTHRODESIS/ TIBIAL-TALAR ANKLE FUSION;  Surgeon: Griselda Pinks, MD;  Location: [facility];  Service: Orthopedics    ME LAPAROSCOPY SURG RPR INITIAL INGUINAL HERNIA Bilateral 12/16/2021    Procedure: LAPAROSCOPIC REPAIR HERNIA INGUINAL WITH MESH;  Surgeon: Giselle Choi MD;  Location: [facility];  Service: General        Social History     Tobacco Use   Smoking Status Never   Smokeless Tobacco Never        Social History     Substance and Sexual Activity   Alcohol Use Yes    Comment: Occasional        Social History     Substance and Sexual Activity   Drug Use Never        No Known Allergies    Current Facility-Administered Medications   Medication Dose Route Frequency Provider Last Rate Last Admin    amiodarone tablet 200 mg  200 mg Oral Daily With Breakfast RADHA Simon   200 mg at 11/13/23 0758    chlorhexidine (PERIDEX) 0.12 % oral rinse 15 mL  15 mL Mouth/Throat Q12H [facility] ARDHA Mtz   15 mL at 11/13/23 0802    finasteride (PROSCAR) tablet 5 mg  5 mg Oral Daily RADHA Mtz   5 mg at 98/12/99 1411    folic acid (FOLVITE) tablet 1 mg  1 mg Oral Daily RADHA Mtz   1 mg at 11/13/23 0802    heparin (porcine) 25,000 units in 0.45% NaCl 250 mL infusion (premix)  3-20 Units/kg/hr (Order-Specific) Intravenous Titrated RADHA Henderson   Stopped at 11/13/23 0435    heparin (porcine) injection 2,000 Units  2,000 Units Intravenous Q6H PRN RADHA Simon        heparin (porcine) injection 4,000 Units  4,000 Units Intravenous Q6H PRN RADHA Simno        insulin lispro (HumaLOG) 100 units/mL subcutaneous injection 1-6 Units  1-6 Units Subcutaneous HS RADHA Mtz   1 Units at 11/12/23 2128    insulin lispro (HumaLOG) 100 units/mL subcutaneous injection 1-6 Units  1-6 Units Subcutaneous TID AC RADHA Simon   2 Units at 11/12/23 1304    metoprolol tartrate (LOPRESSOR) tablet 50 mg  50 mg Oral Q12H [facility] RADHA Simon        polyethylene glycol (MIRALAX) packet 17 g  17 g Oral Daily PRN RADHA Simon        pravastatin (PRAVACHOL) tablet 20 mg  20 mg Oral Daily With [employer]   20 mg at 11/12/23 1628    senna-docusate sodium (SENOKOT S) 8.6-50 mg per tablet 2 tablet  2 tablet Oral HS RADHA Simon   2 tablet at 11/12/23 2128          Objective:    Vitals:    11/13/23 0700 11/13/23 0800 11/13/23 0900 11/13/23 1052   BP: 126/79 125/86 137/84 129/73   BP Location: Right arm   Right arm   Pulse: (!) 118 (!) 131 (!) 130    Resp: 19 21 20    Temp: (!) 97.3 °F (36.3 °C)   97.7 °F (36.5 °C)   TempSrc: Tympanic   Tympanic   SpO2: 94% 96% 97%    Weight:       Height:            Physical Exam      Lab Results   Component Value Date     (H) 11/12/2023      Lab Results   Component Value Date    WBC 7.63 11/13/2023    HGB 10.3 (L) 11/13/2023    HCT 33.8 (L) 11/13/2023     (H) 11/13/2023     (L) 11/13/2023     Lab Results   Component Value Date    INR 1.25 (H) 11/13/2023    INR 1.56 (H) 11/12/2023    INR 3.88 (H) 11/11/2023    PROTIME 15.9 (H) 11/13/2023    PROTIME 18.8 (H) 11/12/2023    PROTIME 38.0 (H) 11/11/2023     Lab Results   Component Value Date    PTT 33 11/13/2023         I have personally reviewed pertinent imaging and laboratory results. Code Status: Level 3 - DNAR and DNI  Advance Directive and Living Will: Yes    Power of :    POLST:      IR has been consulted to evaluate the patient, determine the appropriate procedure, and whether or not a procedure can and should be performed. Thank you for allowing me to participate in the care of Amarilys Kenny. Please don't hesitate to call, text, email, or TigerText with any questions. This text is generated with voice recognition software. There may be translation, syntax,  or grammatical errors. If you have any questions, please contact the dictating provider.

## 2023-11-13 NOTE — ASSESSMENT & PLAN NOTE
-Likely mixed picture in the setting of recurrent pleural effusions, volume overload and lung cancer  -Plan for IR Pleurx catheter placement today per critical care recommendations  -Would continue with IV Bumex as patient still appears volume overloaded pending results and blood pressure after thoracentesis  -Continue plan of care per critical care team

## 2023-11-13 NOTE — PROCEDURES
US Guided Peripheral IV    Date/Time: 11/12/2023 8:55 PM    Performed by: RADHA Carlos  Authorized by: José Luis De La Cruz, [address] [place] [address]    Patient location:  ICU  Performed by:  NP/PA  Other Assisting Provider: No    Indications:     Indications: difficulty obtaining IV access      Image availability:  Not saved  Procedure details:     Patient evaluated for contraindications to access (i.e. fistula, thrombosis, etc): Yes      Standard clean technique used for ultrasound access: Yes      Location:  Left forearm    Catheter size:  20 gauge    Number of attempts:  2    Successful placement: yes    Post-procedure details:     Post-procedure:  Dressing applied    Assessment: free fluid flow and no signs of infiltration      Post-procedure complications: none      Patient tolerance of procedure:   Tolerated well, no immediate complications

## 2023-11-13 NOTE — PROGRESS NOTES
[id number] [facility]  Progress Note  Name: Cyn Cannon  MRN: [de-identified]  Unit/Bed#: ICU 05-01 I Date of Admission: 11/11/2023   Date of Service: 11/13/2023 I Hospital Day: 2    Assessment/Plan   * Pleural effusion  Assessment & Plan  Pt presented to the ED with c/o increased sob since yesterday. Pt was on his home O2 of 3 L NC sating 93%. Pt admitted to ICU as SD1 for thoracentesis tomorrow (11/12). Pt had a recent R sided thoracentesis as an outpatient on 11/9 where they removed 2,600 ml of clear, yellow fluid. Recent hospitalization at [facility] at the end of October this year. He was noted to have pleural effusions during that admission in the setting of malignancy. He was given the choice of Pleurx catheter placement or weekly thoracentesis for which he opted to undergo weekly thoracentesis. (11/11) CT Chest - Moderate pleural effusions; R > L    Plan:  Continue supplemental O2 for SpO2 > 88%  [facility] and 10 mg of PO Vitamin K for Coumadin reversal given 11/11   Pt to undergo pleurex catheter placement 11/13 with IR      Acute on chronic respiratory failure with hypoxia (HCC)  Assessment & Plan  Pt wears 3L NC baseline at home  Pt requiring up to 6L NC on admission    Plan:  Wean supplemental O2 to baseline O2 requirements as tolerated  Pt given 80 mg IV Lasix 11/12 with minimal response  1 mg of IV Bumex given 11/12     Prostate CA [facility])  Assessment & Plan  Pt with hx of prostate Ca s/p radiation therapy in 2005    Deep vein thrombosis (DVT) of right lower extremity ([address])  Assessment & Plan  Diagnosed with RLE DVT 10/3/23  Pt on Coumadin    Plan:  Hold home Coumadin for now in setting of planned thoracentesis 11/12  Heparin gtt started      History of pulmonary embolus (PE)  Assessment & Plan  Pt found to have a PE on 10/3/23 during hospitalization. Pt was started on Coumadin    Plan:   Will hold Coumadin for now in setting of thoracentesis planned for 11/12  Heparin gtt started    HILARY (acute kidney injury) [facility])  Assessment & Plan  Baseline Cr noted to be around 1.0 prior to hospitalizations in October 2023  Currently Cr is 1.8 on admission    Plan:  Trend renal indicies  Avoid hypotension and nephrotoxic agents  Strict I/O    Atrial fibrillation (HCC)  Assessment & Plan  Pt on Amiodarone 200 mg tid and Coumadin    Plan: Will start Amiodarone 200 mg daily  Heparin gtt for [facility]; will hold at 0400 11/13 for IR   Hold Coumadin in setting of thoracentesis 11/12  Monitor telemetry    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 Pt states he has only received one chemo treatment thus far. Follows with heme/onc outpatient        BPH associated with nocturia  Assessment & Plan  Pt takes Proscar as outpatient    Plan:  Continue home medication    Hypercholesterolemia  Assessment & Plan  Pt takes Simvastatin 10 mg daily    Plan:  Continue home statin    Controlled type 2 diabetes mellitus without complication, without long-term current use of insulin [facility])  Assessment & Plan  Lab Results   Component Value Date    HGBA1C 6.0 10/23/2023       Recent Labs     11/12/23  0711 11/12/23  1116 11/12/23  1621 11/12/23  2124   POCGLU 109 213* 89 164*       Blood Sugar Average: Last 72 hrs:  (P) 143.75  Home medication regimen is metformin    Plan:  Hold home medication while inpatient  SSI  AC/HS accuchecks  Hypoglycemic protocol      Benign essential HTN  Assessment & Plan  Pt takes Lopressor 100 mg bid outpatient    Plan: Will give Lopressor 25 mg bid for now  Pt currently normotensive             ICU Core Measures     A: Assess, Prevent, and Manage Pain Has pain been assessed? Yes  Need for changes to pain regimen?  No   B: Both SAT/SAT  N/A   C: Choice of Sedation RASS Goal: 0 Alert and Calm or N/A patient not on sedation  Need for changes to sedation or analgesia regimen? NA   D: Delirium CAM-ICU: Negative   E: Early Mobility  Plan for early mobility? Yes   F: Family Engagement Plan for family engagement today? Yes         Prophylaxis:  VTE VTE covered by:  heparin (porcine), Intravenous, 11.1 Units/kg/hr at 11/13/23 0008  heparin (porcine), Intravenous  heparin (porcine), Intravenous       Stress Ulcer  not ordered       Subjective   HPI/24hr events: Pt remains on Heparin gtt; stopped at 0400 for IR intervention for pleurx catheter and port-a-cath placement. Pt received 80 mg IV Lasix x 1 dose for overload with minimal response. Pt received an additional 1 mg of Bumex IV with adequate response. Review of Systems   Constitutional:  Negative for fatigue. Respiratory:  Positive for shortness of breath. Negative for cough and chest tightness. Cardiovascular:  Positive for leg swelling. Negative for chest pain. Gastrointestinal:  Negative for diarrhea, nausea and vomiting. Neurological:  Negative for dizziness and headaches. Psychiatric/Behavioral:  Negative for agitation and confusion. All other systems reviewed and are negative. Objective                            Vitals I/O      Most Recent Min/Max in 24hrs   Temp 98 °F (36.7 °C) Temp  Min: 97.4 °F (36.3 °C)  Max: 99.1 °F (37.3 °C)   Pulse (!) 148 Pulse  Min: 109  Max: 148   Resp (!) 24 Resp  Min: 16  Max: 41   /69 BP  Min: 116/69  Max: 182/81   O2 Sat 94 % SpO2  Min: 93 %  Max: 99 %      Intake/Output Summary (Last 24 hours) at 11/13/2023 0042  Last data filed at 11/13/2023 0000  Gross per 24 hour   Intake 574.67 ml   Output 1200 ml   Net -625.33 ml         Diet NPO     Invasive Monitoring Physical exam     Physical Exam  Eyes:      Pupils: Pupils are equal, round, and reactive to light. Skin:     General: Skin is warm and dry.       Capillary Refill: Capillary refill takes less than 2 seconds. Findings: Wound present. HENT:      Head: Normocephalic. Mouth/Throat:      Mouth: Mucous membranes are moist.   Cardiovascular:      Rate and Rhythm: Rhythm irregular. Pulses:           Radial pulses are 2+ on the right side and 2+ on the left side. Dorsalis pedis pulses are detected w/ Doppler on the right side and detected w/ Doppler on the left side. Heart sounds: Normal heart sounds. Musculoskeletal:         General: Normal range of motion. Right lower leg: Edema present. Left lower leg: Edema present. Abdominal:      General: Bowel sounds are normal.      Palpations: Abdomen is soft. Constitutional:       General: He is awake. Appearance: He is ill-appearing. Interventions: Nasal cannula in place. Pulmonary:      Effort: No respiratory distress. Breath sounds: Decreased breath sounds present. Psychiatric:         Behavior: Behavior is cooperative. Neurological:      General: No focal deficit present. Mental Status: He is alert and oriented to person, place and time. He is CAM ICU negative and not agitated. Motor: Strength full and intact in all extremities. Vitals and nursing note reviewed. Diagnostic Studies      EKG: a-fib  Imaging: I have personally reviewed pertinent reports.    and I have personally reviewed pertinent films in PACS     Medications:  Scheduled PRN   amiodarone, 200 mg, Daily With Breakfast  chlorhexidine, 15 mL, Q12H SALVATORE  finasteride, 5 mg, Daily  folic acid, 1 mg, Daily  insulin lispro, 1-6 Units, HS  insulin lispro, 1-6 Units, TID AC  metoprolol tartrate, 25 mg, Q12H SALVATORE  pravastatin, 20 mg, Daily With Dinner  senna-docusate sodium, 2 tablet, HS      heparin (porcine), 2,000 Units, Q6H PRN  heparin (porcine), 4,000 Units, Q6H PRN  sodium chloride (PF), 3 mL, Q1H PRN       Continuous    heparin (porcine), 3-20 Units/kg/hr (Order-Specific), Last Rate: 11.1 Units/kg/hr (11/13/23 0008) Labs:    CBC    Recent Labs     11/11/23 2012 11/12/23  0530   WBC 9.45 8.24   HGB 12.5 11.8*   HCT 40.7 38.8    141*     BMP    Recent Labs     11/12/23  0530 11/12/23  2315   SODIUM 143 139   K 3.7 3.9    103   CO2 31 29   AGAP 7 7   BUN 46* 45*   CREATININE 1.85* 1.73*   CALCIUM 8.5 8.3*       Coags    Recent Labs     11/11/23 2012 11/12/23  0530 11/12/23  1508 11/12/23  2315   INR 3.88* 1.56*  --   --    PTT 50* 37 36 32        Additional Electrolytes  Recent Labs     11/12/23  0530 11/12/23  1623 11/12/23  2315   MG 2.4  --  2.2   PHOS 3.2  --  2.8   CAIONIZED 1.07* 1.20  --           Blood Gas    No recent results  No recent results LFTs  Recent Labs     11/11/23 2012 11/12/23  0530   ALT 48 42   AST 39 32   ALKPHOS 102 85   ALB 3.0* 3.0*   TBILI 0.49 0.59       Infectious  Recent Labs     11/11/23  2335   PROCALCITONI 0.15     Glucose  Recent Labs     11/11/23 2012 11/12/23  0530 11/12/23  2315   GLUC 139 112 124               Critical Care Time Delivered : Upon my evaluation, this patient had a high probability of imminent or life-threatening deterioration due to pleural effusion, which required my direct attention, intervention, and personal management. I have personally provided 28 minutes of critical care time, exclusive of procedures, teaching, family meetings, and any prior time recorded by providers other than myself.      RADHA Nunez

## 2023-11-13 NOTE — ASSESSMENT & PLAN NOTE
Pt diagnosed with Stage 4 Lung Ca in 9/2022  Appears last chemo treatment was 9/2023 as pt has had hospitalizations since then. Pt states he has only received one chemo treatment thus far.   Follows with heme/onc outpatient

## 2023-11-13 NOTE — ASSESSMENT & PLAN NOTE
-Uptitrate therapy as patient's blood pressure tolerates however will need to be careful with diuretic regimen and potential for fluid shift after thoracentesis.

## 2023-11-13 NOTE — PROCEDURES
PICC Line Insertion    Date/Time: 11/13/2023 4:45 PM    Performed by: Florina Osgood, MD  Authorized by: Florina Osgood, MD    Patient location:  IR  Consent:     Consent obtained:  Written    Consent given by:  Patient    Risks discussed:  Arterial puncture, bleeding and infection    Alternatives discussed:  No treatment and delayed treatment  Universal protocol:     Procedure explained and questions answered to patient or proxy's satisfaction: yes      Relevant documents present and verified: yes      Test results available and properly labeled: yes      Radiology Images displayed and confirmed. If images not available, report reviewed: yes      Required blood products, implants, devices, and special equipment available: yes      Site/side marked: yes      Immediately prior to procedure, a time out was called: yes      Patient identity confirmed:  Verbally with patient and arm band  Pre-procedure details:     Hand hygiene: Hand hygiene performed prior to insertion      Sterile barrier technique: All elements of maximal sterile technique followed      Skin preparation:  2% chlorhexidine    Skin preparation agent: Skin preparation agent completely dried prior to procedure    Indications:     PICC line indications: no peripheral vascular access and other (comment)    Anesthesia (see MAR for exact dosages): Anesthesia method:  Local infiltration    Local anesthetic:  Lidocaine 1% w/o epi  Procedure details:     Location:  Basilic and brachial    Vessel type: vein      Laterality:  Right    Site selection rationale:  Right arm swollen. Left basilic not identified.   Duplicated left brachial.    Approach: percutaneous technique used      Patient position:  Flat    Procedural supplies:  Single lumen    Catheter size:  4.5 Fr    Landmarks identified: yes      Ultrasound guidance: yes      Ultrasound image availability:  Images available in PACS    Sterile ultrasound techniques: Sterile gel and sterile probe covers were used      Number of attempts:  1    Successful placement: yes      Total catheter length (cm):  55    Catheter out on skin (cm):  5    Max flow rate:  5    Arm circumference:  Please see PACS  Post-procedure details:     Post-procedure:  Dressing applied and securement device placed    Assessment:  Blood return through all ports and placement verified by x-ray    Post-procedure complications: none      Patient tolerance of procedure: Tolerated well, no immediate complications  Comments:      Right arm swollen. Question if left IV was working. Plan was left arm midline. Midline was placed. Would not inject easily, or aspirate. I injected contrast, there is a kink in the axillary vein right where the tip of the midline was. I placed a PICC in order to get past the abnormality of the axillary vein. This can be used for blood draw, or infusion. He can be discharged with this in place, to get his chemo.

## 2023-11-13 NOTE — QUICK NOTE
I updated patient and daughter at bedside regarding plan of care and overall patient status. All questions/concerns were answered and addressed.

## 2023-11-13 NOTE — CASE MANAGEMENT
Case Management Assessment & Discharge Planning Note    Patient name Misty Esteves  Location ICU 05/ICU - MRN [id number]  : 1945 Date 2023       Current Admission Date: 2023  Current Admission Diagnosis:Pleural effusion   Patient Active Problem List    Diagnosis Date Noted    Acute on chronic HFrEF (heart failure with reduced ejection fraction) ([address]) 2023    History of pulmonary embolus (PE) 2023    Deep vein thrombosis (DVT) of right lower extremity ([address]) 2023    Prostate CA ([address]) 2023    Acute on chronic respiratory failure with hypoxia ([address]) 2023    Supratherapeutic INR 10/28/2023    Cellulitis of extremity 10/27/2023    Cellulitis and abscess of left lower extremity 10/23/2023    Cellulitis of leg, right 10/23/2023    COPD with acute exacerbation ([address]) 10/05/2023    Atrial fibrillation ([address]) 10/03/2023    HILARY (acute kidney injury) ([address]) 10/03/2023    Abnormal CT of the abdomen 10/03/2023    Platelets decreased ([address]) 2023    Multiple lung nodules on CT 10/06/2022    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 ([address]) 10/06/2022    Pleural effusion 10/06/2022    Malignant neoplasm of upper lobe of right lung ([address])     Multiple subsegmental pulmonary emboli without acute cor pulmonale ([address]) 09/10/2022    Pulmonary mass 09/10/2022    Anemia 09/10/2022    Non-recurrent bilateral inguinal hernia without obstruction or gangrene 2021    Status post right tibial-talar ankle fusion 2020    BPH associated with nocturia 2019    Arthritis of right acromioclavicular joint 03/15/2019    Primary osteoarthritis of right shoulder 2019    Chronic left shoulder pain 2019    Left rotator cuff tear arthropathy 2019    Rotator cuff injury, right, initial encounter 2019    History of colon polyps 2019    Hyperparathyroidism ([address]) 2018    Hypokalemia 10/22/2018    Hypocalcemia 10/22/2018    Glaucoma 10/11/2018    Controlled type 2 diabetes mellitus without complication, without long-term current use of insulin ([address]) 07/25/2016    Inguinal hernia 02/08/2016    Benign essential HTN 01/12/2016    Hypercholesterolemia 01/12/2016      LOS (days): 2  Geometric Mean LOS (GMLOS) (days): 4.90  Days to GMLOS:3.2     OBJECTIVE:  PATIENT READMITTED TO HOSPITAL  Risk of Unplanned Readmission Score: 43.13     Current admission status: Inpatient    Preferred Pharmacy:   [address] [facility] [place] [address] [address] [facility] [address] [address]  Phone: [phone] Fax: [facility], [address]   [address] [place] [address]  Phone: [phone] Fax: [address] [facility] #[id number] - Evelyn Cogan, [address] [place] [id number]  Phone: [phone] Fax: [phone]    Primary Care Provider: Robert Blood PA-C    Primary Insurance: MEDICARE  Secondary Insurance: Bam Lopez    ASSESSMENT:  [address] [facility], [address] [facility] 22 Representative - Spouse   Primary Phone: [phone] [facility])  Home Phone: [phone]                 Advance Directives  Does patient have a [address]?: No  Was patient offered paperwork?: Yes (Declined)  Does patient currently have a Health Care decision maker?: Yes, please see Health Care Proxy section  Does patient have Advance Directives?: No  Was patient offered paperwork?: Yes (Declined)  Primary Contact: Kaitlyn Otto spouse    Readmission Root Cause  30 Day Readmission: Yes  Who directed you to return to the hospital?: Self  Did you understand whom to contact if you had questions or problems?: No  Did you get your prescriptions before you left the hospital?: No  Reason[de-identified] Declined service  Were you able to get your prescriptions filled when you left the hospital?: Yes  Did you take your medications as prescribed?: Yes  Were you able to get to your follow-up appointments?: Yes  During previous admission, was a post-acute recommendation made?: Yes  What post-acute resources were offered?: [facility]  Patient was readmitted due to: [address]: IR consult    Patient Information  Admitted from[de-identified] Home  Mental Status: Alert  During Assessment patient was accompanied by: Not accompanied during assessment  Assessment information provided by[de-identified] Patient  Primary Caregiver: Self  Support Systems: Spouse/significant other  [place] of Residence: [address] do you live in?: [address] entry access options.  Select all that apply.: Stairs  Number of steps to enter home.: 1  Type of Current Residence: 2 story home  Upon entering residence, is there a bedroom on the main floor (no further steps)?: No  A bedroom is located on the following floor levels of residence (select all that apply):: 2nd Floor  Upon entering residence, is there a bathroom on the main floor (no further steps)?: No  Indicate which floors of current residence have a bathroom (select all the apply):: 2nd Floor  Number of steps to 2nd floor from main floor: One Flight  In the last 12 months, was there a time when you were not able to pay the mortgage or rent on time?: No  In the last 12 months, how many places have you lived?: 1  In the last 12 months, was there a time when you did not have a steady place to sleep or slept in a shelter (including now)?: No  Homeless/housing insecurity resource given?: N/A  Living Arrangements: Lives w/ Spouse/significant other  Is patient a ?: Yes  Is patient active with [facility])?: No    Activities of Daily Living Prior to Admission  Functional Status: Independent  Completes ADLs independently?: Yes  Ambulates independently?: Yes  Does patient use assisted devices?: Yes  Assisted Devices (DME) used: Walker, [address] Bed, Home Oxygen concentrator, Portable Oxygen tanks, Bedside Commode  DME Company Name (respiratory supplies):  [employer]  O2 Rate(s): 2L  Does patient currently own DME?: Yes  What DME does the patient currently own?: Lan Dillard, Walker, Portable Oxygen tanks, Home Oxygen concentrator, Hospital Bed, Bedside Commode  Does patient have a history of Outpatient Therapy (PT/OT)?: No  Does the patient have a history of Short-Term Rehab?: No  Does patient have a history of HHC?: Yes  Does patient currently have [facility]?: Yes ([facility])    Current Home Health Care  Type of Current Home Care Services: Nurse visit, [address][de-identified] Other (please enter name in comment) ([facility])  [facility] Provider[de-identified] PCP    Patient Information Continued  Income Source: Pension/group home  Does patient have prescription coverage?: Yes  Within the past 12 months, you worried that your food would run out before you got the money to buy more.: Never true  Within the past 12 months, the food you bought just didn't last and you didn't have money to get more.: Never true  Food insecurity resource given?: N/A  Does patient receive dialysis treatments?: No  Does patient have a history of substance abuse?: No  Does patient have a history of Mental Health Diagnosis?: No    PHQ 2/9 Screening   Reviewed PHQ 2/9 Depression Screening Score?: No    Means of Transportation  Means of Transport to Appts[de-identified] Family transport  In the past 12 months, has lack of transportation kept you from medical appointments or from getting medications?: No  In the past 12 months, has lack of transportation kept you from meetings, work, or from getting things needed for daily living?: No  Was application for public transport provided?: N/A    DISCHARGE DETAILS:    Discharge planning discussed with[de-identified] Pt  Freedom of Choice: Yes  Comments - Freedom of Choice: Pt wants to resume services with [address], referral made for same in [address]    Contacts  Patient Contacts: Mason Check  Relationship to Patient[de-identified] Family  Contact Method: Phone  Phone Number: [phone]  Reason/Outcome: Emergency [address]         Is the patient interested in [address] at discharge?: Yes  [facility] requested[de-identified] Nursing, Physical [address] Name[de-identified] [facility] External Referral Reason (only applicable if external HHA name selected): Patient has established relationship with provider  [facility] Provider[de-identified] PCP  Home Health Services Needed[de-identified] Strengthening/Theraputic Exercises to Improve Function, Oxygen Via Nasal Cannula, Evaluate Functional Status and Safety  Homebound Criteria Met[de-identified] Requires the Assistance of Another Person for Safe Ambulation or to Leave the Home, Uses an Assist Device (i.e. cane, walker, etc)  Supporting Clincal Findings[de-identified] Requires Oxygen    DME Referral Provided  Referral made for DME?: No  Would you like to participate in our [facility] service program?  : No - Declined    Treatment Team Recommendation: Home with [address] [facility]  Discharge Destination Plan[de-identified] Home with [address] [facility] at Discharge : Family    A referral was made  via [email or web address] to resume services with [facility]. Pt is staying with his daughter, provided the address via [email or web address].

## 2023-11-13 NOTE — ASSESSMENT & PLAN NOTE
Pt takes Lopressor 100 mg bid outpatient    Plan:   Will give Lopressor 25 mg bid for now  Pt currently normotensive

## 2023-11-13 NOTE — ASSESSMENT & PLAN NOTE
Pt found to have a PE on 10/3/23 during hospitalization. Pt was started on Coumadin    Plan:   Will hold Coumadin for now in setting of thoracentesis planned for 11/12  Heparin gtt started

## 2023-11-13 NOTE — PROCEDURES
US Guided Peripheral IV    Date/Time: 11/12/2023 11:13 PM    Performed by: RADHA Colindres  Authorized by: Joceline Cardenas, [address] [place] [address]    Patient location:  ICU  Performed by:  NP/PA  Other Assisting Provider: No    Indications:     Indications: difficulty obtaining IV access    Procedure details:     Patient evaluated for contraindications to access (i.e. fistula, thrombosis, etc): Yes      Standard clean technique used for ultrasound access: Yes      Location:  Left arm    Catheter size:  20 gauge    Number of attempts:  3    Successful placement: yes    Post-procedure details:     Post-procedure:  Dressing applied    Assessment: free fluid flow and no signs of infiltration      Post-procedure complications: none      Patient tolerance of procedure:   Tolerated well, no immediate complications

## 2023-11-13 NOTE — ASSESSMENT & PLAN NOTE
-Would recommend oncology evaluation to determine overall prognosis and therefore would recommend goals of care discussion at that time

## 2023-11-13 NOTE — ASSESSMENT & PLAN NOTE
Wt Readings from Last 3 Encounters:   11/13/23 90.5 kg (199 lb 8.3 oz)   11/03/23 84.9 kg (187 lb 2.7 oz)   10/23/23 81.3 kg (179 lb 3.2 oz)     -Possibly tachyarrhythmia mediated cardiomyopathy however cannot completely exclude underlying CAD or direct cardiotoxicity from prior chemotherapeutic agents  -Transthoracic echocardiogram 10/4/2023 showing significantly reduced left ventricular systolic function estimated LVEF 35% with mildly reduced right ventricular systolic function, mild to moderate aortic regurgitation, moderate mitral regurgitation with dilated left and right atria and small pericardial effusion.  -Patient still appears significantly volume overloaded on physical exam would recommend scheduled IV diuretic regimen with Bumex 1 mg twice daily if blood pressure able to tolerate after thoracentesis today  -Would also recommend up titration in rate control strategy for patient and please see atrial fibrillation for full documentation however some of the tacky arrhythmia may be compensatory due to significant volume overload as well.

## 2023-11-13 NOTE — ASSESSMENT & PLAN NOTE
Pt presented to the ED with c/o increased sob since yesterday. Pt was on his home O2 of 3 L NC sating 93%. Pt admitted to ICU as SD1 for thoracentesis tomorrow (11/12). Pt had a recent R sided thoracentesis as an outpatient on 11/9 where they removed 2,600 ml of clear, yellow fluid. Recent hospitalization at [facility] at the end of October this year. He was noted to have pleural effusions during that admission in the setting of malignancy. He was given the choice of Pleurx catheter placement or weekly thoracentesis for which he opted to undergo weekly thoracentesis.    (11/11) CT Chest - Moderate pleural effusions; R > L    Plan:  Continue supplemental O2 for SpO2 > 88%  [place] and 10 mg of PO Vitamin K for Coumadin reversal given 11/11   Pt to undergo pleurex catheter placement 11/13 with IR

## 2023-11-13 NOTE — ASSESSMENT & PLAN NOTE
-Creatinine slowly improving  -We will continue with IV diuresis with Bumex therapy  -If renal function does not continue to improve with diuretics would recommend nephrology evaluation

## 2023-11-13 NOTE — ASSESSMENT & PLAN NOTE
Diagnosed with RLE DVT 10/3/23  Pt on Coumadin    Plan:  Hold home Coumadin for now in setting of planned thoracentesis 11/12  Heparin gtt started

## 2023-11-13 NOTE — PLAN OF CARE
Problem: PAIN - ADULT  Goal: Verbalizes/displays adequate comfort level or baseline comfort level  Description: Interventions:  - Encourage patient to monitor pain and request assistance  - Assess pain using appropriate pain scale  - Administer analgesics based on type and severity of pain and evaluate response  - Implement non-pharmacological measures as appropriate and evaluate response  - Consider cultural and social influences on pain and pain management  - Notify physician/advanced practitioner if interventions unsuccessful or patient reports new pain  Outcome: Progressing     Problem: INFECTION - ADULT  Goal: Absence or prevention of progression during hospitalization  Description: INTERVENTIONS:  - Assess and monitor for signs and symptoms of infection  - Monitor lab/diagnostic results  - Monitor all insertion sites, i.e. indwelling lines, tubes, and drains  - Monitor endotracheal if appropriate and nasal secretions for changes in amount and color  - [place] appropriate cooling/warming therapies per order  - Administer medications as ordered  - Instruct and encourage patient and family to use good hand hygiene technique  - Identify and instruct in appropriate isolation precautions for identified infection/condition  Outcome: Progressing     Problem: SAFETY ADULT  Goal: Patient will remain free of falls  Description: INTERVENTIONS:  - Educate patient/family on patient safety including physical limitations  - Instruct patient to call for assistance with activity   - Consult OT/PT to assist with strengthening/mobility   - Keep Call bell within reach  - Keep bed low and locked with side rails adjusted as appropriate  - Keep care items and personal belongings within reach  - Initiate and maintain comfort rounds  - Make Fall Risk Sign visible to staff  - Offer Toileting every 2 Hours, in advance of need  - Initiate/Maintain bed alarm  - Obtain necessary fall risk management equipment:  - Apply yellow socks and bracelet for high fall risk patients  - Consider moving patient to room near nurses station  Outcome: Progressing     Problem: CARDIOVASCULAR - ADULT  Goal: Maintains optimal cardiac output and hemodynamic stability  Description: INTERVENTIONS:  - Monitor I/O, vital signs and rhythm  - Monitor for S/S and trends of decreased cardiac output  - Administer and titrate ordered vasoactive medications to optimize hemodynamic stability  - Assess quality of pulses, skin color and temperature  - Assess for signs of decreased coronary artery perfusion  - Instruct patient to report change in severity of symptoms  Outcome: Progressing     Problem: RESPIRATORY - ADULT  Goal: Achieves optimal ventilation and oxygenation  Description: INTERVENTIONS:  - Assess for changes in respiratory status  - Assess for changes in mentation and behavior  - Position to facilitate oxygenation and minimize respiratory effort  - Oxygen administered by appropriate delivery if ordered  - Initiate smoking cessation education as indicated  - Encourage broncho-pulmonary hygiene including cough, deep breathe, Incentive Spirometry  - Assess the need for suctioning and aspirate as needed  - Assess and instruct to report SOB or any respiratory difficulty  - Respiratory Therapy support as indicated  Outcome: Progressing     Problem: GENITOURINARY - ADULT  Goal: Absence of urinary retention  Description: INTERVENTIONS:  - Assess patient’s ability to void and empty bladder  - Monitor I/O  - Bladder scan as needed  - Discuss with physician/AP medications to alleviate retention as needed  - Discuss catheterization for long term situations as appropriate  Outcome: Progressing

## 2023-11-13 NOTE — PROGRESS NOTES
[address]  Progress Note  Name: Muna Nguyễn  MRN: [de-identified]  Unit/Bed#: ICU 05-01 I Date of Admission: 11/11/2023   Date of Service: 11/13/2023 I Hospital Day: 2    Assessment/Plan   Acute on chronic HFrEF (heart failure with reduced ejection fraction) ([facility])  Assessment & Plan  Wt Readings from Last 3 Encounters:   11/13/23 90.5 kg (199 lb 8.3 oz)   11/03/23 84.9 kg (187 lb 2.7 oz)   10/23/23 81.3 kg (179 lb 3.2 oz)     -Possibly tachyarrhythmia mediated cardiomyopathy however cannot completely exclude underlying CAD or direct cardiotoxicity from prior chemotherapeutic agents  -Transthoracic echocardiogram 10/4/2023 showing significantly reduced left ventricular systolic function estimated LVEF 35% with mildly reduced right ventricular systolic function, mild to moderate aortic regurgitation, moderate mitral regurgitation with dilated left and right atria and small pericardial effusion.  -Patient still appears significantly volume overloaded on physical exam would recommend scheduled IV diuretic regimen with Bumex 1 mg twice daily if blood pressure able to tolerate after thoracentesis today  -Would also recommend up titration in rate control strategy for patient and please see atrial fibrillation for full documentation however some of the tacky arrhythmia may be compensatory due to significant volume overload as well.     Deep vein thrombosis (DVT) of right lower extremity [facility])  Assessment & Plan  -Seen on vascular study 10/03/2023  -Plan of care per critical care team    Acute on chronic respiratory failure with hypoxia [facility])  Assessment & Plan  -Likely mixed picture in the setting of recurrent pleural effusions, volume overload and lung cancer  -Plan for IR Pleurx catheter placement today per critical care recommendations  -Would continue with IV Bumex as patient still appears volume overloaded pending results and blood pressure after thoracentesis  -Continue plan of care per critical care team      HILARY (acute kidney injury) ([address])  Assessment & Plan  -Creatinine slowly improving  -We will continue with IV diuresis with Bumex therapy  -If renal function does not continue to improve with diuretics would recommend nephrology evaluation      Atrial fibrillation [facility])  Assessment & Plan  -Rates still elevated and 120s-130s beat per minute range  -Can trial metoprolol therapy however would recommend a low-dose diltiazem infusion if patient's blood pressure allows to allow for up titration and oral regimen if possible until patient becomes more euvolemic and may tolerate oral regimen better at that time.  -If blood pressure does not allow can trial either digoxin loading at 250 mcg IV every 6 hours x4 doses with maintenance therapy thereafter of 62.5 mcg daily for low-dose amiodarone infusion  -Heparin therapy currently on hold for thoracentesis today however would restart as soon as possible given history of DVT and PE and atrial fibrillation with eventual bridge to warfarin once stabilized. -Continue to monitor patient on telemetry at this time  -Continue to monitor renal function electrolytes with aggressive repletion to goal potassium 4.0, magnesium 2.0. 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 Subjective:   Patient seen and examined. Per patient denies any chest pain, palpitations, lightness or dizziness, loss consciousness and notes shortness of breath is improving with diuretic regimen.   He does still have some orthopnea but does note he is scheduled for his thoracentesis and was due for this.      Summary comments:  -Would increase diuretic regimen to Bumex 1 mg IV twice daily if blood pressure allows postthoracentesis and would uptitrate AV kamilla blocking agents. If patient without improvement with oral regimen would recommend initiation of either low-dose diltiazem infusion or digoxin load as listed above  -Continue to monitor renal function electrolytes with repletion to goal potassium 4.0, magnesium 2.0.  -Continue to monitor patient closely at this time.  -Would recommend oncology to review patient's case and have discussion of prognosis as if 4 would recommend goals of care discussion for patient. Telemetry/ECG/Cardiac testing:   -Atrial fibrillation heart rates ranging 120s-130s beat per minute range    Vitals: Blood pressure 137/84, pulse (!) 130, temperature (!) 97.3 °F (36.3 °C), temperature source Tympanic, resp. rate 20, height 5' 9" (1.753 m), weight 90.5 kg (199 lb 8.3 oz), SpO2 97 %.,   Orthostatic Blood Pressures      Flowsheet Row Most Recent Value   Blood Pressure 137/84 filed at 11/13/2023 0900   Patient Position - Orthostatic VS Lying filed at 11/13/2023 0700        ,   Weight (last 2 days)       Date/Time Weight    11/13/23 0600 90.5 (199.52)    11/12/23 0542 90.3 (199.08)    11/11/23 2247 90.3 (199.08)    11/11/23 1943 86.6 (191)            Physical Exam:  Physical Exam  Vitals reviewed. Constitutional:       General: He is not in acute distress. Appearance: Normal appearance. He is not diaphoretic. HENT:      Head: Normocephalic and atraumatic. Comments: Nasal cannula oxygen in place  Eyes:      General:         Right eye: No discharge. Left eye: No discharge. Neck:      Comments: Trachea midline, JVD present  Cardiovascular:      Heart sounds: Murmur (SRAVANI) heard. Comments: Tachycardic, irregularly irregular rate and rhythm  Pulmonary:      Effort: No respiratory distress. Breath sounds: No wheezing.       Comments: Decreased breath sounds bilaterally worse in the right lower and middle lung field  Chest:      Chest wall: No tenderness. Abdominal:      General: Bowel sounds are normal.      Palpations: Abdomen is soft. Tenderness: There is no abdominal tenderness. There is no rebound. Musculoskeletal:      Right lower leg: Edema (3+) present. Left lower leg: Edema (3+) present. Skin:     General: Skin is warm and dry. Neurological:      Mental Status: He is alert.       Comments: Awake, alert, able to answer questions appropriately, hard of hearing   Psychiatric:         Mood and Affect: Mood normal.         Behavior: Behavior normal.        Medications:      Current Facility-Administered Medications:     amiodarone tablet 200 mg, 200 mg, Oral, Daily With Breakfast, RADHA Simon, 200 mg at 11/13/23 0758    chlorhexidine (PERIDEX) 0.12 % oral rinse 15 mL, 15 mL, Mouth/Throat, Q12H [address], Wanda N Venancio, CRNP, 15 mL at 11/13/23 0802    finasteride (PROSCAR) tablet 5 mg, 5 mg, Oral, Daily, Earnstine Alt, CRNP, 5 mg at 77/44/13 2503    folic acid (FOLVITE) tablet 1 mg, 1 mg, Oral, Daily, Earnstine Alt, CRNP, 1 mg at 11/13/23 0802    heparin (porcine) 25,000 units in 0.45% NaCl 250 mL infusion (premix), 3-20 Units/kg/hr (Order-Specific), Intravenous, Titrated, RADHA Simon, Stopped at 11/13/23 0435    heparin (porcine) injection 2,000 Units, 2,000 Units, Intravenous, Q6H PRN, RADHA Simon    heparin (porcine) injection 4,000 Units, 4,000 Units, Intravenous, Q6H PRN, RADHA Simon    insulin lispro (HumaLOG) 100 units/mL subcutaneous injection 1-6 Units, 1-6 Units, Subcutaneous, HS, Earnstine Alt CRNP, 1 Units at 11/12/23 2128    insulin lispro (HumaLOG) 100 units/mL subcutaneous injection 1-6 Units, 1-6 Units, Subcutaneous, TID AC, 2 Units at 11/12/23 1304 **AND** Fingerstick Glucose (POCT), , , 4x Daily AC and at bedtime, RADHA Simon    metoprolol tartrate (LOPRESSOR) tablet 25 mg, 25 mg, Oral, Q12H [facility], RADHA Simon, 25 mg at 11/13/23 0802    potassium chloride 20 mEq IVPB (premix), 20 mEq, Intravenous, Once, GERALD BrasherNP, Last Rate: 50 mL/hr at 11/13/23 0755, 20 mEq at 11/13/23 0755    pravastatin (PRAVACHOL) tablet 20 mg, 20 mg, Oral, Daily With Dinner, RADHA Massey, 20 mg at 11/12/23 1628    senna-docusate sodium (SENOKOT S) 8.6-50 mg per tablet 2 tablet, 2 tablet, Oral, HS, RADHA Simon, 2 tablet at 11/12/23 2128    Insert peripheral IV, , , Once **AND** sodium chloride (PF) 0.9 % injection 3 mL, 3 mL, Intravenous, Q1H PRN, GERALD BrasherNP     Labs & Results:    Troponins:    Results from last 7 days   Lab Units 11/11/23  2335 11/11/23 2212 11/11/23 2012   HS TNI 0HR ng/L  --   --  8   HS TNI 2HR ng/L  --  11  --    HSTNI D2 ng/L  --  3  --    HS TNI 4HR ng/L 9  --   --    HSTNI D4 ng/L 1  --   --         BNP:   Results from last 6 Months   Lab Units 11/12/23  2315 10/27/23  1919   BNP pg/mL 733* 625*     CBC with diff:   Results from last 7 days   Lab Units 11/13/23  0505 11/12/23  0530   WBC Thousand/uL 7.63 8.24   HEMOGLOBIN g/dL 10.3* 11.8*   HEMATOCRIT % 33.8* 38.8   MCV fL 105* 106*   PLATELETS Thousands/uL 135* 141*     TSH:     CMP:   Results from last 7 days   Lab Units 11/13/23  0505 11/12/23  2315 11/12/23  0530   POTASSIUM mmol/L 3.5 3.9 3.7   CHLORIDE mmol/L 107 103 105   CO2 mmol/L 30 29 31   BUN mg/dL 44* 45* 46*   CREATININE mg/dL 1.69* 1.73* 1.85*   AST U/L 23  --  32   ALT U/L 32  --  42   EGFR ml/min/1.73sq m 38 37 34     Lipid Profile:     Coags:   Results from last 7 days   Lab Units 11/13/23  0505 11/12/23  0530 11/11/23 2012   INR  1.25* 1.56* 3.88*

## 2023-11-13 NOTE — ASSESSMENT & PLAN NOTE
-Rates still elevated and 120s-130s beat per minute range  -Can trial metoprolol therapy however would recommend a low-dose diltiazem infusion if patient's blood pressure allows to allow for up titration and oral regimen if possible until patient becomes more euvolemic and may tolerate oral regimen better at that time.  -If blood pressure does not allow can trial either digoxin loading at 250 mcg IV every 6 hours x4 doses with maintenance therapy thereafter of 62.5 mcg daily for low-dose amiodarone infusion  -Heparin therapy currently on hold for thoracentesis today however would restart as soon as possible given history of DVT and PE and atrial fibrillation with eventual bridge to warfarin once stabilized. -Continue to monitor patient on telemetry at this time  -Continue to monitor renal function electrolytes with aggressive repletion to goal potassium 4.0, magnesium 2.0.

## 2023-11-14 ENCOUNTER — APPOINTMENT (INPATIENT)
Dept: RADIOLOGY | Facility: HOSPITAL | Age: 78
DRG: 180 | End: 2023-11-14
Payer: MEDICARE

## 2023-11-14 ENCOUNTER — HOSPITAL ENCOUNTER (OUTPATIENT)
Dept: INFUSION CENTER | Facility: HOSPITAL | Age: 78
Discharge: HOME | End: 2023-11-14

## 2023-11-14 ENCOUNTER — APPOINTMENT (INPATIENT)
Dept: INTERVENTIONAL RADIOLOGY/VASCULAR | Facility: HOSPITAL | Age: 78
DRG: 180 | End: 2023-11-14
Payer: MEDICARE

## 2023-11-14 LAB
ALBUMIN SERPL BCP-MCNC: 2.4 G/DL (ref 3.5–5)
ALP SERPL-CCNC: 70 U/L (ref 34–104)
ALT SERPL W P-5'-P-CCNC: 27 U/L (ref 7–52)
ANION GAP SERPL CALCULATED.3IONS-SCNC: 6 MMOL/L
APTT PPP: 38 SECONDS (ref 23–37)
APTT PPP: 38 SECONDS (ref 23–37)
AST SERPL W P-5'-P-CCNC: 20 U/L (ref 13–39)
ATRIAL RATE: 107 BPM
ATRIAL RATE: 138 BPM
ATRIAL RATE: 258 BPM
ATRIAL RATE: 93 BPM
BILIRUB SERPL-MCNC: 0.45 MG/DL (ref 0.2–1)
BUN SERPL-MCNC: 47 MG/DL (ref 5–25)
CA-I BLD-SCNC: 1.1 MMOL/L (ref 1.12–1.32)
CALCIUM ALBUM COR SERPL-MCNC: 9.1 MG/DL (ref 8.3–10.1)
CALCIUM SERPL-MCNC: 7.8 MG/DL (ref 8.4–10.2)
CHLORIDE SERPL-SCNC: 106 MMOL/L (ref 96–108)
CO2 SERPL-SCNC: 31 MMOL/L (ref 21–32)
CREAT SERPL-MCNC: 1.7 MG/DL (ref 0.6–1.3)
ERYTHROCYTE [DISTWIDTH] IN BLOOD BY AUTOMATED COUNT: 17.2 % (ref 11.6–15.1)
GFR SERPL CREATININE-BSD FRML MDRD: 38 ML/MIN/1.73SQ M
GLUCOSE SERPL-MCNC: 114 MG/DL (ref 65–140)
GLUCOSE SERPL-MCNC: 134 MG/DL (ref 65–140)
GLUCOSE SERPL-MCNC: 148 MG/DL (ref 65–140)
GLUCOSE SERPL-MCNC: 153 MG/DL (ref 65–140)
GLUCOSE SERPL-MCNC: 173 MG/DL (ref 65–140)
HCT VFR BLD AUTO: 32.8 % (ref 36.5–49.3)
HGB BLD-MCNC: 10.1 G/DL (ref 12–17)
INR PPP: 1.25 (ref 0.84–1.19)
MAGNESIUM SERPL-MCNC: 2.1 MG/DL (ref 1.9–2.7)
MCH RBC QN AUTO: 32.6 PG (ref 26.8–34.3)
MCHC RBC AUTO-ENTMCNC: 30.8 G/DL (ref 31.4–37.4)
MCV RBC AUTO: 106 FL (ref 82–98)
PHOSPHATE SERPL-MCNC: 2.6 MG/DL (ref 2.3–4.1)
PLATELET # BLD AUTO: 123 THOUSANDS/UL (ref 149–390)
PMV BLD AUTO: 11.5 FL (ref 8.9–12.7)
POTASSIUM SERPL-SCNC: 3.7 MMOL/L (ref 3.5–5.3)
PROT SERPL-MCNC: 4.7 G/DL (ref 6.4–8.4)
PROTHROMBIN TIME: 15.9 SECONDS (ref 11.6–14.5)
QRS AXIS: 102 DEGREES
QRS AXIS: 111 DEGREES
QRS AXIS: 117 DEGREES
QRS AXIS: 95 DEGREES
QRSD INTERVAL: 100 MS
QRSD INTERVAL: 102 MS
QRSD INTERVAL: 94 MS
QRSD INTERVAL: 98 MS
QT INTERVAL: 358 MS
QT INTERVAL: 378 MS
QT INTERVAL: 382 MS
QT INTERVAL: 402 MS
QTC INTERVAL: 518 MS
QTC INTERVAL: 522 MS
QTC INTERVAL: 542 MS
QTC INTERVAL: 563 MS
RBC # BLD AUTO: 3.1 MILLION/UL (ref 3.88–5.62)
SODIUM SERPL-SCNC: 143 MMOL/L (ref 135–147)
T WAVE AXIS: -32 DEGREES
T WAVE AXIS: -39 DEGREES
T WAVE AXIS: 39 DEGREES
T WAVE AXIS: 75 DEGREES
VENTRICULAR RATE: 115 BPM
VENTRICULAR RATE: 118 BPM
VENTRICULAR RATE: 121 BPM
VENTRICULAR RATE: 126 BPM
WBC # BLD AUTO: 7.72 THOUSAND/UL (ref 4.31–10.16)

## 2023-11-14 PROCEDURE — 80053 COMPREHEN METABOLIC PANEL: CPT | Performed by: NURSE PRACTITIONER

## 2023-11-14 PROCEDURE — 82948 REAGENT STRIP/BLOOD GLUCOSE: CPT

## 2023-11-14 PROCEDURE — 0W9930Z DRAINAGE OF RIGHT PLEURAL CAVITY WITH DRAINAGE DEVICE, PERCUTANEOUS APPROACH: ICD-10-PCS | Performed by: RADIOLOGY

## 2023-11-14 PROCEDURE — 85027 COMPLETE CBC AUTOMATED: CPT | Performed by: NURSE PRACTITIONER

## 2023-11-14 PROCEDURE — 99222 1ST HOSP IP/OBS MODERATE 55: CPT | Performed by: PHYSICIAN ASSISTANT

## 2023-11-14 PROCEDURE — 99233 SBSQ HOSP IP/OBS HIGH 50: CPT | Performed by: INTERNAL MEDICINE

## 2023-11-14 PROCEDURE — C1769 GUIDE WIRE: HCPCS

## 2023-11-14 PROCEDURE — 85730 THROMBOPLASTIN TIME PARTIAL: CPT | Performed by: INTERNAL MEDICINE

## 2023-11-14 PROCEDURE — 83735 ASSAY OF MAGNESIUM: CPT | Performed by: NURSE PRACTITIONER

## 2023-11-14 PROCEDURE — 85610 PROTHROMBIN TIME: CPT | Performed by: NURSE PRACTITIONER

## 2023-11-14 PROCEDURE — 32557 INSERT CATH PLEURA W/ IMAGE: CPT | Performed by: PHYSICIAN ASSISTANT

## 2023-11-14 PROCEDURE — 84100 ASSAY OF PHOSPHORUS: CPT | Performed by: NURSE PRACTITIONER

## 2023-11-14 PROCEDURE — 99233 SBSQ HOSP IP/OBS HIGH 50: CPT | Performed by: NURSE PRACTITIONER

## 2023-11-14 PROCEDURE — C1729 CATH, DRAINAGE: HCPCS

## 2023-11-14 PROCEDURE — 93010 ELECTROCARDIOGRAM REPORT: CPT | Performed by: INTERNAL MEDICINE

## 2023-11-14 PROCEDURE — A7041 WATER SEAL DRAIN CONTAINER: HCPCS

## 2023-11-14 PROCEDURE — 71045 X-RAY EXAM CHEST 1 VIEW: CPT

## 2023-11-14 PROCEDURE — 32557 INSERT CATH PLEURA W/ IMAGE: CPT

## 2023-11-14 PROCEDURE — 82330 ASSAY OF CALCIUM: CPT | Performed by: NURSE PRACTITIONER

## 2023-11-14 PROCEDURE — 85730 THROMBOPLASTIN TIME PARTIAL: CPT | Performed by: NURSE PRACTITIONER

## 2023-11-14 RX ORDER — DILTIAZEM HYDROCHLORIDE 5 MG/ML
10 INJECTION INTRAVENOUS ONCE
Status: COMPLETED | OUTPATIENT
Start: 2023-11-14 | End: 2023-11-14

## 2023-11-14 RX ORDER — LIDOCAINE HYDROCHLORIDE 10 MG/ML
INJECTION, SOLUTION EPIDURAL; INFILTRATION; INTRACAUDAL; PERINEURAL AS NEEDED
Status: COMPLETED | OUTPATIENT
Start: 2023-11-14 | End: 2023-11-14

## 2023-11-14 RX ORDER — METOPROLOL TARTRATE 50 MG/1
100 TABLET, FILM COATED ORAL EVERY 12 HOURS SCHEDULED
Status: DISCONTINUED | OUTPATIENT
Start: 2023-11-14 | End: 2023-11-15

## 2023-11-14 RX ADMIN — DILTIAZEM HYDROCHLORIDE 10 MG: 5 INJECTION INTRAVENOUS at 20:01

## 2023-11-14 RX ADMIN — INSULIN LISPRO 1 UNITS: 100 INJECTION, SOLUTION INTRAVENOUS; SUBCUTANEOUS at 21:22

## 2023-11-14 RX ADMIN — PRAVASTATIN SODIUM 20 MG: 20 TABLET ORAL at 17:02

## 2023-11-14 RX ADMIN — FINASTERIDE 5 MG: 5 TABLET, FILM COATED ORAL at 09:04

## 2023-11-14 RX ADMIN — HEPARIN SODIUM 4000 UNITS: 1000 INJECTION INTRAVENOUS; SUBCUTANEOUS at 23:44

## 2023-11-14 RX ADMIN — BUMETANIDE 1 MG: 0.25 INJECTION INTRAMUSCULAR; INTRAVENOUS at 17:02

## 2023-11-14 RX ADMIN — FOLIC ACID 1 MG: 1 TABLET ORAL at 09:04

## 2023-11-14 RX ADMIN — METOPROLOL TARTRATE 100 MG: 50 TABLET, FILM COATED ORAL at 20:02

## 2023-11-14 RX ADMIN — AMIODARONE HYDROCHLORIDE 200 MG: 100 TABLET ORAL at 08:09

## 2023-11-14 RX ADMIN — CHLORHEXIDINE GLUCONATE 15 ML: 1.2 RINSE ORAL at 09:04

## 2023-11-14 RX ADMIN — BUMETANIDE 1 MG: 0.25 INJECTION INTRAMUSCULAR; INTRAVENOUS at 09:05

## 2023-11-14 RX ADMIN — HEPARIN SODIUM 15.1 UNITS/KG/HR: 10000 INJECTION, SOLUTION INTRAVENOUS at 17:07

## 2023-11-14 RX ADMIN — METOPROLOL TARTRATE 100 MG: 50 TABLET, FILM COATED ORAL at 09:03

## 2023-11-14 RX ADMIN — HEPARIN SODIUM 11.1 UNITS/KG/HR: 10000 INJECTION, SOLUTION INTRAVENOUS at 00:24

## 2023-11-14 RX ADMIN — SENNOSIDES AND DOCUSATE SODIUM 2 TABLET: 8.6; 5 TABLET ORAL at 21:21

## 2023-11-14 RX ADMIN — LIDOCAINE HYDROCHLORIDE 10 ML: 10 INJECTION, SOLUTION EPIDURAL; INFILTRATION; INTRACAUDAL; PERINEURAL at 15:17

## 2023-11-14 RX ADMIN — CHLORHEXIDINE GLUCONATE 15 ML: 1.2 RINSE ORAL at 20:02

## 2023-11-14 NOTE — ASSESSMENT & PLAN NOTE
Rates still elevated and 120s-130s beat per minute range  Metoprolol dose increased by Critical Care team this AM-monitor on 100 mg bid  Continue amiodarone 200 mg daily  If additional rate control is needed, consider addition of diltiazem   Continue heparin infusion with transition to Coumadin

## 2023-11-14 NOTE — PLAN OF CARE
Problem: PAIN - ADULT  Goal: Verbalizes/displays adequate comfort level or baseline comfort level  Description: Interventions:  - Encourage patient to monitor pain and request assistance  - Assess pain using appropriate pain scale  - Administer analgesics based on type and severity of pain and evaluate response  - Implement non-pharmacological measures as appropriate and evaluate response  - Consider cultural and social influences on pain and pain management  - Notify physician/advanced practitioner if interventions unsuccessful or patient reports new pain  Outcome: Progressing     Problem: INFECTION - ADULT  Goal: Absence or prevention of progression during hospitalization  Description: INTERVENTIONS:  - Assess and monitor for signs and symptoms of infection  - Monitor lab/diagnostic results  - Monitor all insertion sites, i.e. indwelling lines, tubes, and drains  - Monitor endotracheal if appropriate and nasal secretions for changes in amount and color  - [place] appropriate cooling/warming therapies per order  - Administer medications as ordered  - Instruct and encourage patient and family to use good hand hygiene technique  - Identify and instruct in appropriate isolation precautions for identified infection/condition  Outcome: Progressing  Goal: Absence of fever/infection during neutropenic period  Description: INTERVENTIONS:  - Monitor WBC    Outcome: Progressing     Problem: SAFETY ADULT  Goal: Patient will remain free of falls  Description: INTERVENTIONS:  - Educate patient/family on patient safety including physical limitations  - Instruct patient to call for assistance with activity   - Consult OT/PT to assist with strengthening/mobility   - Keep Call bell within reach  - Keep bed low and locked with side rails adjusted as appropriate  - Keep care items and personal belongings within reach  - Initiate and maintain comfort rounds  - Make Fall Risk Sign visible to staff  - Offer Toileting every Hours, in advance of need  - Initiate/Maintain alarm  - Obtain necessary fall risk management equipment:   - Apply yellow socks and bracelet for high fall risk patients  - Consider moving patient to room near nurses station  Outcome: Progressing  Goal: Maintain or return to baseline ADL function  Description: INTERVENTIONS:  -  Assess patient's ability to carry out ADLs; assess patient's baseline for ADL function and identify physical deficits which impact ability to perform ADLs (bathing, care of mouth/teeth, toileting, grooming, dressing, etc.)  - Assess/evaluate cause of self-care deficits   - Assess range of motion  - Assess patient's mobility; develop plan if impaired  - Assess patient's need for assistive devices and provide as appropriate  - Encourage maximum independence but intervene and supervise when necessary  - Involve family in performance of ADLs  - Assess for home care needs following discharge   - Consider OT consult to assist with ADL evaluation and planning for discharge  - Provide patient education as appropriate  Outcome: Progressing  Goal: Maintains/Returns to pre admission functional level  Description: INTERVENTIONS:  - Perform BMAT or MOVE assessment daily.   - Set and communicate daily mobility goal to care team and patient/family/caregiver. - Collaborate with rehabilitation services on mobility goals if consulted  - Perform Range of Motion  times a day. - Reposition patient every  hours.   - Dangle patient  times a day  - Stand patient  times a day  - Ambulate patient  times a day  - Out of bed to chair  times a day   - Out of bed for meals  times a day  - Out of bed for toileting  - Record patient progress and toleration of activity level   Outcome: Progressing     Problem: DISCHARGE PLANNING  Goal: Discharge to home or other facility with appropriate resources  Description: INTERVENTIONS:  - Identify barriers to discharge w/patient and caregiver  - Arrange for needed discharge resources and transportation as appropriate  - Identify discharge learning needs (meds, wound care, etc.)  - Arrange for interpretive services to assist at discharge as needed  - Refer to Case Management Department for coordinating discharge planning if the patient needs post-hospital services based on physician/advanced practitioner order or complex needs related to functional status, cognitive ability, or social support system  Outcome: Progressing     Problem: Knowledge Deficit  Goal: Patient/family/caregiver demonstrates understanding of disease process, treatment plan, medications, and discharge instructions  Description: Complete learning assessment and assess knowledge base. Interventions:  - Provide teaching at level of understanding  - Provide teaching via preferred learning methods  Outcome: Progressing     Problem: MOBILITY - ADULT  Goal: Maintain or return to baseline ADL function  Description: INTERVENTIONS:  -  Assess patient's ability to carry out ADLs; assess patient's baseline for ADL function and identify physical deficits which impact ability to perform ADLs (bathing, care of mouth/teeth, toileting, grooming, dressing, etc.)  - Assess/evaluate cause of self-care deficits   - Assess range of motion  - Assess patient's mobility; develop plan if impaired  - Assess patient's need for assistive devices and provide as appropriate  - Encourage maximum independence but intervene and supervise when necessary  - Involve family in performance of ADLs  - Assess for home care needs following discharge   - Consider OT consult to assist with ADL evaluation and planning for discharge  - Provide patient education as appropriate  Outcome: Progressing  Goal: Maintains/Returns to pre admission functional level  Description: INTERVENTIONS:  - Perform BMAT or MOVE assessment daily.   - Set and communicate daily mobility goal to care team and patient/family/caregiver.    - Collaborate with rehabilitation services on mobility goals if consulted  - Perform Range of Motion  times a day. - Reposition patient every  hours. - Dangle patient  times a day  - Stand patient  times a day  - Ambulate patient  times a day  - Out of bed to chair  times a day   - Out of bed for meals  times a day  - Out of bed for toileting  - Record patient progress and toleration of activity level   Outcome: Progressing     Problem: NEUROSENSORY - ADULT  Goal: Achieves stable or improved neurological status  Description: INTERVENTIONS  - Monitor and report changes in neurological status  - Monitor vital signs such as temperature, blood pressure, glucose, and any other labs ordered   - Initiate measures to prevent increased intracranial pressure  - Monitor for seizure activity and implement precautions if appropriate      Outcome: Progressing  Goal: Remains free of injury related to seizures activity  Description: INTERVENTIONS  - Maintain airway, patient safety  and administer oxygen as ordered  - Monitor patient for seizure activity, document and report duration and description of seizure to physician/advanced practitioner  - If seizure occurs,  ensure patient safety during seizure  - Reorient patient post seizure  - Seizure pads on all 4 side rails  - Instruct patient/family to notify RN of any seizure activity including if an aura is experienced  - Instruct patient/family to call for assistance with activity based on nursing assessment  - Administer anti-seizure medications if ordered    Outcome: Progressing  Goal: Achieves maximal functionality and self care  Description: INTERVENTIONS  - Monitor swallowing and airway patency with patient fatigue and changes in neurological status  - Encourage and assist patient to increase activity and self care.    - Encourage visually impaired, hearing impaired and aphasic patients to use assistive/communication devices  Outcome: Progressing     Problem: CARDIOVASCULAR - ADULT  Goal: Maintains optimal cardiac output and hemodynamic stability  Description: INTERVENTIONS:  - Monitor I/O, vital signs and rhythm  - Monitor for S/S and trends of decreased cardiac output  - Administer and titrate ordered vasoactive medications to optimize hemodynamic stability  - Assess quality of pulses, skin color and temperature  - Assess for signs of decreased coronary artery perfusion  - Instruct patient to report change in severity of symptoms  Outcome: Progressing  Goal: Absence of cardiac dysrhythmias or at baseline rhythm  Description: INTERVENTIONS:  - Continuous cardiac monitoring, vital signs, obtain 12 lead EKG if ordered  - Administer antiarrhythmic and heart rate control medications as ordered  - Monitor electrolytes and administer replacement therapy as ordered  Outcome: Progressing     Problem: RESPIRATORY - ADULT  Goal: Achieves optimal ventilation and oxygenation  Description: INTERVENTIONS:  - Assess for changes in respiratory status  - Assess for changes in mentation and behavior  - Position to facilitate oxygenation and minimize respiratory effort  - Oxygen administered by appropriate delivery if ordered  - Initiate smoking cessation education as indicated  - Encourage broncho-pulmonary hygiene including cough, deep breathe, Incentive Spirometry  - Assess the need for suctioning and aspirate as needed  - Assess and instruct to report SOB or any respiratory difficulty  - Respiratory Therapy support as indicated  Outcome: Progressing     Problem: GASTROINTESTINAL - ADULT  Goal: Minimal or absence of nausea and/or vomiting  Description: INTERVENTIONS:  - Administer IV fluids if ordered to ensure adequate hydration  - Maintain NPO status until nausea and vomiting are resolved  - Nasogastric tube if ordered  - Administer ordered antiemetic medications as needed  - Provide nonpharmacologic comfort measures as appropriate  - Advance diet as tolerated, if ordered  - Consider nutrition services referral to assist patient with adequate nutrition and appropriate food choices  Outcome: Progressing  Goal: Maintains or returns to baseline bowel function  Description: INTERVENTIONS:  - Assess bowel function  - Encourage oral fluids to ensure adequate hydration  - Administer IV fluids if ordered to ensure adequate hydration  - Administer ordered medications as needed  - Encourage mobilization and activity  - Consider nutritional services referral to assist patient with adequate nutrition and appropriate food choices  Outcome: Progressing  Goal: Maintains adequate nutritional intake  Description: INTERVENTIONS:  - Monitor percentage of each meal consumed  - Identify factors contributing to decreased intake, treat as appropriate  - Assist with meals as needed  - Monitor I&O, weight, and lab values if indicated  - Obtain nutrition services referral as needed  Outcome: Progressing  Goal: Establish and maintain optimal ostomy function  Description: INTERVENTIONS:  - Assess bowel function  - Encourage oral fluids to ensure adequate hydration  - Administer IV fluids if ordered to ensure adequate hydration   - Administer ordered medications as needed  - Encourage mobilization and activity  - Nutrition services referral to assist patient with appropriate food choices  - Assess stoma site  - Consider wound care consult   Outcome: Progressing  Goal: Oral mucous membranes remain intact  Description: INTERVENTIONS  - Assess oral mucosa and hygiene practices  - Implement preventative oral hygiene regimen  - Implement oral medicated treatments as ordered  - Initiate Nutrition services referral as needed  Outcome: Progressing     Problem: GENITOURINARY - ADULT  Goal: Maintains or returns to baseline urinary function  Description: INTERVENTIONS:  - Assess urinary function  - Encourage oral fluids to ensure adequate hydration if ordered  - Administer IV fluids as ordered to ensure adequate hydration  - Administer ordered medications as needed  - Offer frequent toileting  - Follow urinary retention protocol if ordered  Outcome: Progressing  Goal: Absence of urinary retention  Description: INTERVENTIONS:  - Assess patient’s ability to void and empty bladder  - Monitor I/O  - Bladder scan as needed  - Discuss with physician/AP medications to alleviate retention as needed  - Discuss catheterization for long term situations as appropriate  Outcome: Progressing     Problem: METABOLIC, FLUID AND ELECTROLYTES - ADULT  Goal: Electrolytes maintained within normal limits  Description: INTERVENTIONS:  - Monitor labs and assess patient for signs and symptoms of electrolyte imbalances  - Administer electrolyte replacement as ordered  - Monitor response to electrolyte replacements, including repeat lab results as appropriate  - Instruct patient on fluid and nutrition as appropriate  Outcome: Progressing  Goal: Fluid balance maintained  Description: INTERVENTIONS:  - Monitor labs   - Monitor I/O and WT  - Instruct patient on fluid and nutrition as appropriate  - Assess for signs & symptoms of volume excess or deficit  Outcome: Progressing  Goal: Glucose maintained within target range  Description: INTERVENTIONS:  - Monitor Blood Glucose as ordered  - Assess for signs and symptoms of hyperglycemia and hypoglycemia  - Administer ordered medications to maintain glucose within target range  - Assess nutritional intake and initiate nutrition service referral as needed  Outcome: Progressing     Problem: SKIN/TISSUE INTEGRITY - ADULT  Goal: Skin Integrity remains intact(Skin Breakdown Prevention)  Description: Assess:  -Perform [place] assessment every   -Clean and moisturize skin every   -Inspect skin when repositioning, toileting, and assisting with ADLS  -Assess under medical devices such as  every   -Assess extremities for adequate circulation and sensation     Bed Management:  -Have minimal linens on bed & keep smooth, unwrinkled  -Change linens as needed when moist or perspiring  -Avoid sitting or lying in one position for more than  hours while in bed  -Keep HOB at degrees     Toileting:  -Offer bedside commode  -Assess for incontinence every   -Use incontinent care products after each incontinent episode such as     Activity:  -Mobilize patient  times a day  -Encourage activity and walks on unit  -Encourage or provide ROM exercises   -Turn and reposition patient every  Hours  -Use appropriate equipment to lift or move patient in bed  -Instruct/ Assist with weight shifting every  when out of bed in chair  -Consider limitation of chair time  hour intervals    Skin Care:  -Avoid use of baby powder, tape, friction and shearing, hot water or constrictive clothing  -Relieve pressure over bony prominences using   -Do not massage red bony areas    Next Steps:  -Teach patient strategies to minimize risks such as    -Consider consults to  interdisciplinary teams such as  Outcome: Progressing     Problem: HEMATOLOGIC - ADULT  Goal: Maintains hematologic stability  Description: INTERVENTIONS  - Assess for signs and symptoms of bleeding or hemorrhage  - Monitor labs  - Administer supportive blood products/factors as ordered and appropriate  Outcome: Progressing     Problem: MUSCULOSKELETAL - ADULT  Goal: Maintain or return mobility to safest level of function  Description: INTERVENTIONS:  - Assess patient's ability to carry out ADLs; assess patient's baseline for ADL function and identify physical deficits which impact ability to perform ADLs (bathing, care of mouth/teeth, toileting, grooming, dressing, etc.)  - Assess/evaluate cause of self-care deficits   - Assess range of motion  - Assess patient's mobility  - Assess patient's need for assistive devices and provide as appropriate  - Encourage maximum independence but intervene and supervise when necessary  - Involve family in performance of ADLs  - Assess for home care needs following discharge   - Consider OT consult to assist with ADL evaluation and planning for discharge  - Provide patient education as appropriate  Outcome: Progressing  Goal: Maintain proper alignment of affected body part  Description: INTERVENTIONS:  - Support, maintain and protect limb and body alignment  - Provide patient/ family with appropriate education  Outcome: Progressing

## 2023-11-14 NOTE — ASSESSMENT & PLAN NOTE
Lab Results   Component Value Date    HGBA1C 6.0 10/23/2023       Recent Labs     11/12/23 2124 11/13/23  0711 11/13/23  1051 11/13/23 2120   POCGLU 164* 111 96 148*         Blood Sugar Average: Last 72 hrs:  (P) [id number]  Continue ACHS glucose checks and SSI  Goal glucose 140-180

## 2023-11-14 NOTE — ASSESSMENT & PLAN NOTE
Wt Readings from Last 3 Encounters:   11/14/23 93.5 kg (206 lb 2.1 oz)   11/03/23 84.9 kg (187 lb 2.7 oz)   10/23/23 81.3 kg (179 lb 3.2 oz)     -Possibly tachyarrhythmia mediated cardiomyopathy however cannot completely exclude underlying CAD or direct cardiotoxicity from prior chemotherapeutic agents  Pt still appears significantly volume overloaded   Continue IV diuretics

## 2023-11-14 NOTE — BRIEF OP NOTE (RAD/CATH)
IR CHEST TUBE PLACEMENT Procedure Note    PATIENT NAME: Marlon Roblero  : 1945  MRN: [id number]    Pre-op Diagnosis:   1. Pleural effusion    2. Pneumonia    3. Atrial fibrillation, unspecified type ([address])    4. Malignant neoplasm of upper lobe of right lung (HCC)      Post-op Diagnosis:   1. Pleural effusion    2. Pneumonia    3. Atrial fibrillation, unspecified type ([address])    4.  Malignant neoplasm of upper lobe of right lung [facility])        Provider:   Marta Tracy PA-C    Estimated Blood Loss: 3cc    Findings: R midaxillary chest tube placement, 10fr with amplatz wire    Specimens: none    Complications:  none immediate    Anesthesia: local    Marta Tracy PA-C     Date: 2023  Time: 3:51 PM

## 2023-11-14 NOTE — ASSESSMENT & PLAN NOTE
Currently not rate controlled with   Increase BB dosing to Metoprolol 75mg BID, will continue to uptitrate as HD allow   Continue amiodarone 200mg daily  Consider PRN metoprolol 5mg for sustained HR>130  Continue heparin for [facility] given potential procedure today with eventual bridge back to coumadin   Cardiology following, appreciate recommendations

## 2023-11-14 NOTE — QUICK NOTE
Previously consulted during hospital admission. Asked to review case with attending for possible pleurx catheter placement for R pleural effusion. There is concern that the patient is at high risk for ex vacuo pneumothorax due to the patients lung disease. Case was reviewed by Dr. Velia Novak of IR, also discussed with Dr. Decker Delay    IR can offer chest tube placement to drain the effusion and to evaluate for lung expansion to better assess for future pleurx catheter placement.  Currently recommend against directly placing pleurx catheter    Large Right Pleural Effusion 2/2 Lung CA  - tentative plan for placement of R chest tube this afternoon, pending further discussion  - will evaluate for lung re-expansion after chest tube placement  - if lung re-expansion successful, can consider pleurx catheter placement in approx 1 week after chest tube has been D/C'd  - please contact IR with any further questions or concerns

## 2023-11-14 NOTE — PROGRESS NOTES
[address]  Progress Note  Name: Chidi Moulton  MRN: [de-identified]  Unit/Bed#: ICU 05-01 I Date of Admission: 11/11/2023   Date of Service: 11/14/2023 I Hospital Day: 3    Assessment/Plan   Acute on chronic HFrEF (heart failure with reduced ejection fraction) (HCC)  Assessment & Plan  Wt Readings from Last 3 Encounters:   11/14/23 93.5 kg (206 lb 2.1 oz)   11/03/23 84.9 kg (187 lb 2.7 oz)   10/23/23 81.3 kg (179 lb 3.2 oz)     -Possibly tachyarrhythmia mediated cardiomyopathy however cannot completely exclude underlying CAD or direct cardiotoxicity from prior chemotherapeutic agents  Pt still appears significantly volume overloaded   Continue IV diuretics    HILARY (acute kidney injury) ([address])  Assessment & Plan  Monitor renal function with IV diuretics    Atrial fibrillation (HCC)  Assessment & Plan  Rates still elevated and 120s-130s beat per minute range  Metoprolol dose increased by Critical Care team this AM-monitor on 100 mg bid  Continue amiodarone 200 mg daily  If additional rate control is needed, consider addition of diltiazem   Continue heparin infusion with transition to Coumadin      Benign essential HTN  Assessment & Plan  BP stable    * Acute on chronic respiratory failure with hypoxia (HCC)  Assessment & Plan  -Likely mixed picture in the setting of recurrent pleural effusions, volume overload and lung cancer  Reports improvement in breathing following thoracentesis yesterday, but still with increased O2 requirements. Continue IV Bumex            Outpatient Cardiologist: none      Subjective:   Patient seen and examined. No significant events overnight. Reports overall improvement in breathing    Summary comments:  Improvement in breathing following L-sided thoracentesis yesterday with removal of 1.4 L. Still with increased O2 requirements and significant edema, consistent with volume overload. Continue IV diuretics with careful monitoring of renal function and electrolytes. Recommend low sodium/fluid restricted diet, daily weights, and I/O monitoring. Remains tachycardic with rates ranging in the 120's-130's. Metoprolol increased to 100 mg bid by critical care. If rates remain elevated, consider addition of diltiazem. Remains on heparin infusion for prior DVT/PE and Afib. Can transition back to warfarin if no further procedures planned. Telemetry/ECG/Cardiac testing:   Echo 10/4/2023  EF 35%, severe global hypokinesis  Biatrial enlargement  Mild-mod AI, mod MR, mild TR  Dilated IVC  Small pericardial effusion    Vitals: Blood pressure 122/78, pulse (!) 120, temperature 98.1 °F (36.7 °C), temperature source Temporal, resp. rate 21, height 5' 9" (1.753 m), weight 93.5 kg (206 lb 2.1 oz), SpO2 97 %.,   Orthostatic Blood Pressures      Flowsheet Row Most Recent Value   Blood Pressure 122/78 filed at 11/14/2023 0900   Patient Position - Orthostatic VS Lying filed at 11/14/2023 0700        ,   Weight (last 2 days)       Date/Time Weight    11/14/23 0600 93.5 (206.13)    11/13/23 0600 90.5 (199.52)    11/12/23 0542 90.3 (199.08)            Physical Exam:    General:  Normal appearance in no distress. Eyes:  Anicteric. Oral mucosa:  Moist.  Neck:  No JVD. Carotid upstrokes are brisk without bruits. No masses. Chest:  decreased on the right  Cardiac:  tachycardic, irregularly irregular. No palpable PMI. Normal S1 and S2. No murmur gallop or rub. Abdomen:  Soft and nontender. No palpable organomegaly or aortic enlargement. Extremities:  +3 pitting edema bilat LE, +2 pitting edema bilat arms  Neuro:  Grossly symmetric. Psych:  Alert and oriented x3.       Medications:      Current Facility-Administered Medications:     acetaminophen (TYLENOL) tablet 650 mg, 650 mg, Oral, Q6H PRN, RADHA Simon    amiodarone tablet 200 mg, 200 mg, Oral, Daily With Breakfast, RADHA Simon, 200 mg at 11/14/23 0809    bumetanide (BUMEX) injection 1 mg, 1 mg, Intravenous, BID, RADHA Marshall, 1 mg at 11/14/23 0905    chlorhexidine (PERIDEX) 0.12 % oral rinse 15 mL, 15 mL, Mouth/Throat, Q12H [address], Jessica Dixon, CRNP, 15 mL at 11/14/23 0904    finasteride (PROSCAR) tablet 5 mg, 5 mg, Oral, Daily, Earnstine Alt, CRNP, 5 mg at 39/01/48 1745    folic acid (FOLVITE) tablet 1 mg, 1 mg, Oral, Daily, Earnstine Alt, CRNP, 1 mg at 11/14/23 0904    heparin (porcine) 25,000 units in 0.45% NaCl 250 mL infusion (premix), 3-20 Units/kg/hr (Order-Specific), Intravenous, Titrated, RADHA Simon, Stopped at 11/14/23 1020    heparin (porcine) injection 2,000 Units, 2,000 Units, Intravenous, Q6H PRN, RADHA Simon    heparin (porcine) injection 4,000 Units, 4,000 Units, Intravenous, Q6H PRN, RADHA Simon    insulin lispro (HumaLOG) 100 units/mL subcutaneous injection 1-6 Units, 1-6 Units, Subcutaneous, HS, RADHA Mcdonald, 1 Units at 11/12/23 2128    insulin lispro (HumaLOG) 100 units/mL subcutaneous injection 1-6 Units, 1-6 Units, Subcutaneous, TID AC, 2 Units at 11/12/23 1304 **AND** Fingerstick Glucose (POCT), , , 4x Daily AC and at bedtime, RADHA Simon    metoprolol tartrate (LOPRESSOR) tablet 100 mg, 100 mg, Oral, Q12H [address], RADHA Marshall, 100 mg at 11/14/23 0903    polyethylene glycol (MIRALAX) packet 17 g, 17 g, Oral, Daily PRN, RADHA Simon    pravastatin (PRAVACHOL) tablet 20 mg, 20 mg, Oral, Daily With Dinner, [employer], RADHA, 20 mg at 11/13/23 1819    senna-docusate sodium (SENOKOT S) 8.6-50 mg per tablet 2 tablet, 2 tablet, Oral, HS, RADHA Simon, 2 tablet at 11/12/23 2128     Labs & Results:    Troponins:    Results from last 7 days   Lab Units 11/11/23  2335 11/11/23  2212 11/11/23 2012   HS TNI 0HR ng/L  --   --  8   HS TNI 2HR ng/L  --  11  --    HSTNI D2 ng/L  --  3  --    HS TNI 4HR ng/L 9  --   --    HSTNI D4 ng/L 1  --   --         BNP:   Results from last 6 Months   Lab Units 11/12/23  2315 10/27/23  1919   BNP pg/mL 733* 625*     CBC with diff:   Results from last 7 days   Lab Units 11/14/23  0630 11/13/23  0505   WBC Thousand/uL 7.72 7.63   HEMOGLOBIN g/dL 10.1* 10.3*   HEMATOCRIT % 32.8* 33.8*   MCV fL 106* 105*   PLATELETS Thousands/uL 123* 135*     TSH:     CMP:   Results from last 7 days   Lab Units 11/14/23  0630 11/13/23  1710 11/13/23  0505   POTASSIUM mmol/L 3.7 3.8 3.5   CHLORIDE mmol/L 106 106 107   CO2 mmol/L 31 31 30   BUN mg/dL 47* 41* 44*   CREATININE mg/dL 1.70* 1.57* 1.69*   AST U/L 20  --  23   ALT U/L 27  --  32   EGFR ml/min/1.73sq m 38 41 38     Lipid Profile:     Coags:   Results from last 7 days   Lab Units 11/14/23  0630 11/13/23  0505 11/12/23  0530   INR  1.25* 1.25* 1.56*

## 2023-11-14 NOTE — ASSESSMENT & PLAN NOTE
Likely multifactorial in the setting of b/l pleural effusion and acute CHF exacerbation   At baseline, requires 3L NC   S/p L thoracentesis 11/13 with 1.4L removed  Continue supplemental O2 via nasal cannula, titrate for SpO2>88  Continue diuresis with 1mg Bumex BID   Encourage cough, deep breathing, IS, ambulation

## 2023-11-14 NOTE — ASSESSMENT & PLAN NOTE
Unclear etiology, possibly cardiorenal given CHF exacerbation  Continue bumex 1mg BID  Monitor I/O and renal indices

## 2023-11-14 NOTE — ASSESSMENT & PLAN NOTE
Noted on CT, R>L b/l pleural effusions   S/p L thoracentesis with IR on 11/9 as OP   S/p IR thoracentesis of L effusion 11/13 with 1.4L removed   Potential plan for R thoracentesis vs pleurex catheter placement today   Follow up on fluid studies  Monitor respiratory status closely

## 2023-11-14 NOTE — PROGRESS NOTES
Pastoral Care Progress Note    2023  Patient: Laura Michele : 1945  Admission Date & Time: 2023  MRN: [id number] [facility]: [id number]    [facility] initiated support visit to pt and family. Pt received CH upright in bed, 3 daughters present bedside. Pt and family shared regarding their hopes for a quick recover so that pt may enjoy thanksgiving with the entire family. Daughters shared their support of their father's needs and indicated the pt is well supported. [facility] provided empathetic listening and support, encouraged family to request her if she can be of service. Chaplaincy Interventions Utilized:   Empowerment: Encouraged assertiveness    Exploration: Explored relational needs & resources    Relationship Building: Listened empathically      Chaplaincy Outcomes Achieved:   Identified meaningful connections      Spiritual Coping Strategies Utilized:   Connectedness       23 1400   Clinical Encounter Type   Visited With Patient and family together  (3 daughters)   Routine Visit Introduction

## 2023-11-14 NOTE — PROGRESS NOTES
[address] [facility]  Progress Note  Name: Dinah Cerna  MRN: [de-identified]  Unit/Bed#: ICU 05-01 I Date of Admission: 11/11/2023   Date of Service: 11/14/2023 I Hospital Day: 3    Assessment/Plan   * Acute on chronic respiratory failure with hypoxia [facility])  Assessment & Plan  Likely multifactorial in the setting of b/l pleural effusion and acute CHF exacerbation   At baseline, requires 3L NC   S/p L thoracentesis 11/13 with 1.4L removed  Continue supplemental O2 via nasal cannula, titrate for SpO2>88  Continue diuresis with 1mg Bumex BID   Encourage cough, deep breathing, IS, ambulation     Pleural effusion  Assessment & Plan  Noted on CT, R>L b/l pleural effusions   S/p L thoracentesis with IR on 11/9 as OP   S/p IR thoracentesis of L effusion 11/13 with 1.4L removed   Potential plan for R thoracentesis vs pleurex catheter placement today   Follow up on fluid studies  Monitor respiratory status closely       Acute on chronic HFrEF (heart failure with reduced ejection fraction) ([facility])  Assessment & Plan  Wt Readings from Last 3 Encounters:   11/13/23 90.5 kg (199 lb 8.3 oz)   11/03/23 84.9 kg (187 lb 2.7 oz)   10/23/23 81.3 kg (179 lb 3.2 oz)       Last ECHO with LVEF 35 %  Continue Bumex 1mg BID, goal -500 to 1L over 24 hours   Continue home BB  Monitor I/O and daily weights   Cardiology consulted, appreciate recommendations         Atrial fibrillation ([address])  Assessment & Plan  Currently not rate controlled with   Increase BB dosing to Metoprolol 75mg BID, will continue to uptitrate as HD allow   Continue amiodarone 200mg daily  Consider PRN metoprolol 5mg for sustained HR>130  Continue heparin for [facility] given potential procedure today with eventual bridge back to coumadin   Cardiology following, appreciate recommendations     HILARY (acute kidney injury) ([address])  Assessment & Plan  Unclear etiology, possibly cardiorenal given CHF exacerbation  Continue bumex 1mg BID  Monitor I/O and renal indices Prostate CA [facility])  Assessment & Plan  Pt with hx of prostate Ca s/p radiation therapy in 2005    Deep vein thrombosis (DVT) of right lower extremity (HCC)  Assessment & Plan  Continue heparin gtt for now with eventual bridge back to coumadin       History of pulmonary embolus (PE)  Assessment & Plan  Continue heparin gtt for now with eventual bridge back to coumadin     Malignant neoplasm of upper lobe of right lung [facility])  Assessment & Plan  Pt diagnosed with Stage 4 Lung Ca in 9/2022  S/p XRT completed June 2023   Currently undergoing chemo, last 9/19/23   OP follow up with heme/onc         BPH associated with nocturia  Assessment & Plan  Continue home proscar    Hypercholesterolemia  Assessment & Plan  Continue home statin    Controlled type 2 diabetes mellitus without complication, without long-term current use of insulin [facility])  Assessment & Plan  Lab Results   Component Value Date    HGBA1C 6.0 10/23/2023       Recent Labs     11/12/23  2124 11/13/23  0711 11/13/23  1051 11/13/23 2120   POCGLU 164* 111 96 148*         Blood Sugar Average: Last 72 hrs:  (P) [id number]  Continue ACHS glucose checks and SSI  Goal glucose 140-180       Benign essential HTN  Assessment & Plan  Continue home BB             ICU Core Measures     A: Assess, Prevent, and Manage Pain Has pain been assessed? Yes  Need for changes to pain regimen? No   B: Both SAT/SAT  N/A   C: Choice of Sedation RASS Goal: 0 Alert and Calm or N/A patient not on sedation  Need for changes to sedation or analgesia regimen? NA   D: Delirium CAM-ICU: Negative   E: Early Mobility  Plan for early mobility? Yes   F: Family Engagement Plan for family engagement today? Yes         Prophylaxis:  VTE VTE covered by:  heparin (porcine), Intravenous, Stopped at 11/13/23 0435  heparin (porcine), Intravenous  heparin (porcine), Intravenous       Stress Ulcer  not ordered       Subjective   HPI/24hr events: Underwent L thoracentesis with 1.4 L removed. Remains on 6L NC. No acute events. Review of Systems   Constitutional: Negative. HENT: Negative. Eyes: Negative. Respiratory: Negative. Cardiovascular: Negative. Gastrointestinal: Negative. Endocrine: Negative. Genitourinary: Negative. Musculoskeletal: Negative. Skin: Negative. Allergic/Immunologic: Negative. Neurological: Negative. Hematological: Negative. Psychiatric/Behavioral: Negative. Objective                            Vitals I/O      Most Recent Min/Max in 24hrs   Temp 98.6 °F (37 °C) Temp  Min: 97.3 °F (36.3 °C)  Max: 98.6 °F (37 °C)   Pulse (!) 130 Pulse  Min: 116  Max: 136   Resp 19 Resp  Min: 17  Max: 26   BP 98/62 BP  Min: 95/72  Max: 153/70   O2 Sat 97 % SpO2  Min: 94 %  Max: 98 %      Intake/Output Summary (Last 24 hours) at 11/14/2023 0011  Last data filed at 11/13/2023 1901  Gross per 24 hour   Intake 44.5 ml   Output 2975 ml   Net -2930.5 ml         Diet NPO     Invasive Monitoring Physical exam    Physical Exam  Eyes:      Pupils: Pupils are equal, round, and reactive to light. Skin:     General: Skin is warm and dry. Coloration: Skin is pale. HENT:      Head: Normocephalic and atraumatic. Mouth/Throat:      Mouth: Mucous membranes are moist.   Cardiovascular:      Rate and Rhythm: Tachycardia present. Rhythm irregular. Pulses: Normal pulses. Musculoskeletal:         General: No swelling. Normal range of motion. Right lower leg: No edema. Left lower leg: No edema. Abdominal:      General: Bowel sounds are normal. There is no distension. Palpations: Abdomen is soft. Tenderness: There is no abdominal tenderness. Constitutional:       General: He is not in acute distress. Appearance: He is well-developed. He is malnourished. He is ill-appearing. Pulmonary:      Effort: Pulmonary effort is normal. No accessory muscle usage, respiratory distress or accessory muscle usage. Breath sounds:  No wheezing, rhonchi or rales. Comments: Diminished   Neurological:      General: No focal deficit present. Mental Status: He is alert and oriented to person, place and time. Mental status is at baseline. Cranial Nerves: No facial asymmetry. Sensory: No sensory deficit. Motor: Strength full and intact in all extremities. Diagnostic Studies      EKG: AF rate 132  Imaging:  I have personally reviewed pertinent reports. and I have personally reviewed pertinent films in PACS     Medications:  Scheduled PRN   amiodarone, 200 mg, Daily With Breakfast  bumetanide, 1 mg, BID  chlorhexidine, 15 mL, Q12H SALVATORE  finasteride, 5 mg, Daily  folic acid, 1 mg, Daily  insulin lispro, 1-6 Units, HS  insulin lispro, 1-6 Units, TID AC  metoprolol tartrate, 75 mg, Q12H SALVATORE  pravastatin, 20 mg, Daily With Dinner  senna-docusate sodium, 2 tablet, HS      acetaminophen, 650 mg, Q6H PRN  heparin (porcine), 2,000 Units, Q6H PRN  heparin (porcine), 4,000 Units, Q6H PRN  polyethylene glycol, 17 g, Daily PRN       Continuous    heparin (porcine), 3-20 Units/kg/hr (Order-Specific), Last Rate: Stopped (11/13/23 0435)         Labs:    CBC    Recent Labs     11/12/23  0530 11/13/23  0505   WBC 8.24 7.63   HGB 11.8* 10.3*   HCT 38.8 33.8*   * 135*     BMP    Recent Labs     11/13/23  0505 11/13/23  1710   SODIUM 144 144   K 3.5 3.8    106   CO2 30 31   AGAP 7 7   BUN 44* 41*   CREATININE 1.69* 1.57*   CALCIUM 7.9* 8.0*       Coags    Recent Labs     11/12/23  0530 11/12/23  1508 11/13/23  0505 11/13/23  1710   INR 1.56*  --  1.25*  --    PTT 37   < > 33 28    < > = values in this interval not displayed.         Additional Electrolytes  Recent Labs     11/13/23  0505 11/13/23  1710   MG 2.2 2.1   PHOS 2.8 2.7   CAIONIZED 1.10* 1.11*          Blood Gas    No recent results  No recent results LFTs  Recent Labs     11/12/23  0530 11/13/23  0505   ALT 42 32   AST 32 23   ALKPHOS 85 69   ALB 3.0* 2.5*   TBILI 0. 59 0.53       Infectious  Recent Labs     11/13/23  0505   PROCALCITONI 0.16     Glucose  Recent Labs     11/12/23  0530 11/12/23  2315 11/13/23  0505 11/13/23  1710   GLUC 112 124 117 166*                   RADHA Carlson

## 2023-11-14 NOTE — QUICK NOTE
Patient and patient's daughter updated at bedside regarding plan of care and overall patient status. All questions/concerns were answered and addressed.

## 2023-11-14 NOTE — ASSESSMENT & PLAN NOTE
-Likely mixed picture in the setting of recurrent pleural effusions, volume overload and lung cancer  Reports improvement in breathing following thoracentesis yesterday, but still with increased O2 requirements.   Continue IV Bumex

## 2023-11-14 NOTE — PROGRESS NOTES
1930: Pt's daughter Erasmo Warren at bedside. She was updated by Simone Patel and Olga Hilliard. Pt given one-time dose of PO K+ and IV lopressor (-140's). 2000: Pt bathed after daughter left. Pt was assisted OOB to recliner to change linens on bed. Pt assisted back to bed & covered with warm blankets and blanket that his daughter brought from home. 0015: Pt assisted to sit at side of bed to void. Pt dyspneic on exertion, settles with rest. HR remains mostly in 130's in a-fib. Heparin gtt restarted at 11.1 u/kg/hr. Pt now NPO after midnight for possible pleurex catheter placement today 11/14.    0630: L SL PICC flushed with 20 ml nss and heparin gtt placed on hold for approx 10 min before morning blood work obtained. Blood work taken to lab.     4887: PTT 38. Discussed bolus/rate changes per protocol that should occur with Travis Pickens. Since pt is possibly having IR procedure today, I was instructed to hold off on giving bolus and just increase gtt rate for now. Heparin gtt increased to 15.1 u/kg/hr. Next PTT due at 1255.

## 2023-11-14 NOTE — ASSESSMENT & PLAN NOTE
Pt diagnosed with Stage 4 Lung Ca in 9/2022  S/p XRT completed June 2023   Currently undergoing chemo, last 9/19/23   OP follow up with heme/onc

## 2023-11-14 NOTE — CONSULTS
Consultation - Palliative and Supportive Care   Morenita Hobson 69 y.o. 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 Symptoms well controlled. No pain. 2.   Goals  Level 3 code status. Disease focused care. DNR/DNI limits placed. Living will scanned in chart and specifies limits - would not want dialysis, artificial nutrition, ventilation. QoL is important to him and currently he feels it is good. His goal is to restart his chemotherapy once discharged from the hospital  He was previously following [address] team outpatient in [place], and would be interested in follow up at our [facility] location going forward. He will call for an appointment. 3.  Social support  Patient is supported by spouse and daughters. Supportive listening provided  Normalized experience of patient/family  Provided anxiety containment  Provided anticipatory guidance    4. Follow up  Palliative Medicine will sign off today as goals of care are clear and symptoms are well managed at this time. Please reach out to on-call provider via 142Tyler Brown if questions or concerns arise.               I have reviewed the patient's controlled substance dispensing history in the Prescription Drug Monitoring Program in compliance with the [facility] regulations before prescribing any controlled substances. Decisional apparatus:  Patient is competent on exam today. If competency is lost, patient's substitute decision maker would default to spouse by [place] Act 169. Advance Directive/Living Will: Yes, scanned in chart  POLST: No  POA Forms: Yes, see living will. We appreciate the invitation to be involved in this patient's care. We will continue to follow throughout this hospitalization. Please do not hesitate to reach our on call provider through our clinic answering service at [phone] should you have acute symptom control concerns. Xin Martinez PA-C  Palliative and Supportive Care  Clinic/Answering Service: [phone]  You can find me on Sqwiggle! Identification:  Inpatient consult to Palliative Care  Consult performed by: Xin Martinez PA-C  Consult ordered by: RADHA Clark        Physician Requesting Consult:Raj Martin*  Reason for Consult / Principal Problem: GOC counseling and SM secondary to Lung ca  Date of admission:11/11/2023  Length of stay:3    History of Present Illness    Anderson Durán is a 69 y.o. male who presents with a palliative diagnosis of stage IV adenocarcinoma of the lung, hx of PE, chronic respiratory failure. He was brought into the ED at [facility] on 11/11/2023 secondary to shortness of breath. He recently had thoracentesis. Pt is on 3L O2 at home continuously. He has had recent recurrent hospitalizations due to respiratory failure over the past 2 months and has not been able to continue his chemotherapy. He was previously seen by [facility] team outpatient in [place]. Patient has a Living will scanned into the chart which specifies that he would not want any life-sustaining interventions such as CPR, intubation, mechanical ventilation, artificial nutrition, dialysis.  He values his QoL, being able to communicate and maintain mental capacity. He would not want to live in a vegetative state. He recently moved from his home in with family in [place] and has ATC care at home. His goal is to improve so that he can resume his anticancer therapies as soon as possible. He reports no symptoms besides occasional shortness of breath. He feels his symptoms are well controlled and his goals are clear. He is interested in continued palliative care support and was offered follow up at our [facility] office upon discharge. Patient will call to schedule appointment as needed. Review of Systems   Constitutional:  Negative for activity change, appetite change, fatigue and unexpected weight change. HENT:  Negative for mouth sores, trouble swallowing and voice change. Eyes: Negative. Respiratory:  Negative for shortness of breath. Cardiovascular:  Negative for chest pain. Gastrointestinal:  Negative for abdominal pain, constipation, diarrhea, nausea and vomiting. Genitourinary: Negative. Musculoskeletal:  Negative for arthralgias, back pain, gait problem and myalgias. Skin:  Negative for color change, pallor and wound. Neurological:  Negative for dizziness, weakness, light-headedness and headaches. Hematological: Negative. Psychiatric/Behavioral:  Negative for confusion and sleep disturbance. The patient is not nervous/anxious. Past Medical History:   Diagnosis Date    Diabetes mellitus ([address] [facility])     Hyperlipidemia     Hypertension     Lung cancer ([address] [facility])     Prostate cancer ([address] [facility]) 2005    S/P prostate ca-2005 had radiation tx.      Past Surgical History:   Procedure Laterality Date    COLONOSCOPY      IR BIOPSY LYMPH NODE  9/15/2022    IR THORACENTESIS  9/13/2022    IR THORACENTESIS  10/5/2023    IR THORACENTESIS  10/13/2023    IR THORACENTESIS  10/30/2023    IR THORACENTESIS  11/9/2023    RI ARTHRODESIS ANKLE OPEN Right 7/10/2020    Procedure: ARTHRODESIS/ TIBIAL-TALAR ANKLE FUSION;  Surgeon: Torres Ernandez MD;  Location: [facility];  Service: Orthopedics    NV LAPAROSCOPY SURG RPR INITIAL INGUINAL HERNIA Bilateral 12/16/2021    Procedure: LAPAROSCOPIC REPAIR HERNIA INGUINAL WITH MESH;  Surgeon: Maria M Langston MD;  Location: [facility];  Service: General     Social History     Socioeconomic History    Marital status: /Civil Union     Spouse name: Not on file    Number of children: Not on file    Years of education: Not on file    Highest education level: Not on file   Occupational History    Not on file   Tobacco Use    Smoking status: Never    Smokeless tobacco: Never   Vaping Use    Vaping Use: Never used   Substance and Sexual Activity    Alcohol use: Yes     Comment: Occasional    Drug use: Never    Sexual activity: Not Currently   Other Topics Concern    Not on file   Social History Narrative    Not on file     Social Determinants of Health     Financial Resource Strain: Low Risk  (7/25/2023)    Overall Financial Resource Strain (CARDIA)     Difficulty of Paying Living Expenses: Not very hard   Food Insecurity: No Food Insecurity (11/13/2023)    Hunger Vital Sign     Worried About Running Out of Food in the Last Year: Never true     Ran Out of Food in the Last Year: Never true   Transportation Needs: No Transportation Needs (11/13/2023)    PRAPARE - Transportation     Lack of Transportation (Medical): No     Lack of Transportation (Non-Medical):  No   Physical Activity: Not on file   Stress: Not on file   Social Connections: Not on file   Intimate Partner Violence: Not on file   Housing Stability: Low Risk  (11/13/2023)    Housing Stability Vital Sign     Unable to Pay for Housing in the Last Year: No     Number of Places Lived in the Last Year: 1     Unstable Housing in the Last Year: No     Family History   Problem Relation Age of Onset    Heart attack Mother        Medications:  all current active meds have been reviewed, current meds:   Current Facility-Administered Medications   Medication Dose Route Frequency    acetaminophen (TYLENOL) tablet 650 mg  650 mg Oral Q6H PRN    amiodarone tablet 200 mg  200 mg Oral Daily With Breakfast    bumetanide (BUMEX) injection 1 mg  1 mg Intravenous BID    chlorhexidine (PERIDEX) 0.12 % oral rinse 15 mL  15 mL Mouth/Throat Q12H SALVATORE    finasteride (PROSCAR) tablet 5 mg  5 mg Oral Daily    folic acid (FOLVITE) tablet 1 mg  1 mg Oral Daily    heparin (porcine) 25,000 units in 0.45% NaCl 250 mL infusion (premix)  3-20 Units/kg/hr (Order-Specific) Intravenous Titrated    heparin (porcine) injection 2,000 Units  2,000 Units Intravenous Q6H PRN    heparin (porcine) injection 4,000 Units  4,000 Units Intravenous Q6H PRN    insulin lispro (HumaLOG) 100 units/mL subcutaneous injection 1-6 Units  1-6 Units Subcutaneous HS    insulin lispro (HumaLOG) 100 units/mL subcutaneous injection 1-6 Units  1-6 Units Subcutaneous TID AC    metoprolol tartrate (LOPRESSOR) tablet 100 mg  100 mg Oral Q12H SALVATORE    polyethylene glycol (MIRALAX) packet 17 g  17 g Oral Daily PRN    pravastatin (PRAVACHOL) tablet 20 mg  20 mg Oral Daily With Dinner    senna-docusate sodium (SENOKOT S) 8.6-50 mg per tablet 2 tablet  2 tablet Oral HS   , and PTA meds:   Prior to Admission Medications   Prescriptions Last Dose Informant Patient Reported? Taking?    Accu-Chek FastClix Lancets MISC  Self No No   Sig: Use daily E11.9  Check  fbs  daily   Osimertinib Mesylate 80 MG TABS 11/10/2023 Self Yes Yes   Sig: Take 80 mg by mouth in the morning   amiodarone 200 mg tablet 11/10/2023 Self No Yes   Sig: Take 1 tablet (200 mg total) by mouth 3 (three) times a day with meals   azithromycin (ZITHROMAX) 250 mg tablet   No No   Sig: Take 2 tablets today then 1 tablet daily x 4 days   brimonidine tartrate 0.2 % ophthalmic solution 11/10/2023 Self Yes Yes   Sig: Administer 1 drop into the left eye 2 (two) times a day   finasteride (PROSCAR) 5 mg tablet Unknown Self No No   Sig: TAKE 1 TABLET (5 MG TOTAL) BY MOUTH DAILY. Patient taking differently: Take 5 mg by mouth daily   folic acid ( Folic Acid) 1 mg tablet   No No   Sig: Take 1 tablet (1 mg total) by mouth daily   furosemide (LASIX) 40 mg tablet 11/10/2023 Self No Yes   Sig: Take 1 tablet (40 mg total) by mouth 2 (two) times a day   glucose blood (Accu-Chek Jackie Plus) test strip  Self No No   Sig: Use as instructed   metFORMIN (GLUCOPHAGE-XR) 500 mg 24 hr tablet 11/10/2023 Self No Yes   Sig: TAKE 1 TABLET BY MOUTH EVERY DAY   metoprolol tartrate (LOPRESSOR) 100 mg tablet 11/10/2023 Self No Yes   Sig: Take 1 tablet (100 mg total) by mouth every 12 (twelve) hours   ondansetron (ZOFRAN) 8 mg tablet Unknown Self No No   Sig: Take 1 tablet (8 mg total) by mouth every 8 (eight) hours as needed for nausea or vomiting   simvastatin (ZOCOR) 10 mg tablet 11/10/2023 Self No Yes   Sig: TAKE 1 TABLET BY MOUTH EVERY DAY   timolol (TIMOPTIC-XE) 0.5 % ophthalmic gel-forming 11/10/2023 Self Yes Yes   Si drop daily   warfarin (COUMADIN) 4 mg tablet 11/10/2023 Self No Yes   Sig: TAKE 1 TABLET BY MOUTH EVERY DAY      Facility-Administered Medications: None       No Known Allergies    Objective:  /79 (BP Location: Right arm)   Pulse (!) 123   Temp 98.1 °F (36.7 °C) (Temporal)   Resp (!) 25   Ht 5' 9" (1.753 m)   Wt 93.5 kg (206 lb 2.1 oz)   SpO2 98%   BMI 30.44 kg/m²     Physical Exam  Nursing note reviewed. Constitutional:       General: He is not in acute distress. Appearance: He is cachectic. HENT:      Head: Normocephalic and atraumatic. Right Ear: External ear normal.      Left Ear: External ear normal.      Nose: Nose normal.      Mouth/Throat:      Pharynx: Oropharynx is clear. Eyes:      Extraocular Movements: Extraocular movements intact. Cardiovascular:      Rate and Rhythm: Tachycardia present. Pulmonary:      Effort: Pulmonary effort is normal.   Abdominal:      General: Abdomen is flat.    Musculoskeletal:         General: No deformity. Skin:     Findings: No rash. Neurological:      Mental Status: He is alert and oriented to person, place, and time. Psychiatric:         Mood and Affect: Mood normal.         Behavior: Behavior normal.         Lab Results: I have personally reviewed pertinent labs. , CBC:   Lab Results   Component Value Date    WBC 7.72 11/14/2023    HGB 10.1 (L) 11/14/2023    HCT 32.8 (L) 11/14/2023     (H) 11/14/2023     (L) 11/14/2023    RBC 3.10 (L) 11/14/2023    MCH 32.6 11/14/2023    MCHC 30.8 (L) 11/14/2023    RDW 17.2 (H) 11/14/2023    MPV 11.5 11/14/2023   , CMP:   Lab Results   Component Value Date    SODIUM 143 11/14/2023    K 3.7 11/14/2023     11/14/2023    CO2 31 11/14/2023    BUN 47 (H) 11/14/2023    CREATININE 1.70 (H) 11/14/2023    CALCIUM 7.8 (L) 11/14/2023    AST 20 11/14/2023    ALT 27 11/14/2023    ALKPHOS 70 11/14/2023    EGFR 38 11/14/2023     Imaging Studies: I have personally reviewed pertinent reports. CT chest without contrast    Result Date: 11/11/2023  Narrative: CT CHEST WITHOUT IV CONTRAST INDICATION:   ro effusion. COMPARISON: CT 10/3/2023, 1/10/2023 TECHNIQUE: CT examination of the chest was performed without intravenous contrast. Multiplanar 2D reformatted images were created from the source data. This examination, like all CT scans performed in the [facility], was performed utilizing techniques to minimize radiation dose exposure, including the use of iterative reconstruction and automated exposure control.  Radiation dose length product (DLP) for this visit:  384 mGy-cm FINDINGS: LUNGS: Complete right lower lobe atelectasis Right upper lobe mass measures 2.7 x 3.8 cm, unchanged from the prior study when measured in the same fashion Numerous left lower lobe nodules measure as large as 2.9 cm series 5/105, Left upper lobe airspace opacities, new from prior PLEURA: Moderate pleural effusions, greater on the right HEART/GREAT VESSELS: Cardiomegaly. No thoracic aortic aneurysm. MEDIASTINUM AND FAUSTO: 1.4 cm left paratracheal lymph node, stable 1.7 cm subcarinal lymph node , likely stable CHEST WALL AND LOWER NECK:  Unremarkable. VISUALIZED STRUCTURES IN THE UPPER ABDOMEN:  Unremarkable. OSSEOUS STRUCTURES:  No acute fracture or destructive osseous lesion. Impression: 1. Left upper lobe pneumonia 2. Moderate pleural effusions 3. Complete right lower lobe atelectasis 4. Stable appearance of right upper lobe mass, left lower lobe nodules, and mediastinal lymphadenopathy Workstation performed: [id number]     IR thoracentesis    Result Date: 11/9/2023  Narrative: Ultrasound-guided thoracentesis Clinical History: Right pleural effusion and shortness of breath. Technique: The patient was brought to the interventional radiology area and placed in the sitting position on the side of a stretcher. The right chest was then examined with ultrasound and the skin was marked. The skin was then prepped, and draped in usual sterile fashion. Lidocaine was administered to the skin and a small skin incision was made. Under direct ultrasound guidance, a 5 [place] multi-sidehole catheter was advanced into the pleural space. 2600  cc of clear right pleural fluid was aspirated. . The patient tolerated the procedure well and suffered no complications. Impression: Impression: 1. Successful ultrasound-guided thoracentesis yielding 2600 cc of clear right pleural fluid. Workstation performed: [id number]     XR chest portable    Result Date: 11/1/2023  Narrative: CHEST INDICATION:   Hypoxia, shortness of breath. COMPARISON: Compared with 10/31/2023 EXAM PERFORMED/VIEWS:  XR CHEST PORTABLE FINDINGS: Cardiac silhouette is obscured by right lower lung opacity obscuring hemidiaphragm and costophrenic angle. Prominent vascular markings. Patchy masslike opacity in the right suprahilar region unchanged. Left lung nodules less well appreciated.  Bilateral glenohumeral degenerative change. Impression: Moderate congestive changes with right effusion and masslike opacity. Workstation performed: [id number]     XR chest portable    Result Date: 11/1/2023  Narrative: CHEST INDICATION:   tachypnea, rales, coughing, accessory muscle use status post thoracentesis. COMPARISON: Compared with 10/27/2023 EXAM PERFORMED/VIEWS:  XR CHEST PORTABLE FINDINGS: Moderate cardiomegaly. Right hemidiaphragm is obscured by effusion and cardiac silhouette. Mild prominent vascular markings. Right suprahilar opacity unchanged. Blunted costophrenic angles. Poorly appreciated left lower lobe nodule seen on CT. Osseous structures appear within normal limits for patient age. Impression: Mild congestive changes. Known right lung mass. Small effusions bilaterally. Workstation performed: [id number]     IR IN-Patient Thoracentesis    Result Date: 10/30/2023  Narrative: Ultrasound-guided thoracentesis Clinical History: Patient with a history of right lung cancer with malignant pleural effusion. The patient has been requiring a large volume thoracentesis at intervals less than 2 weeks. Discussed with the patient the possibility of an Asept catheter placement, but the patient opts for weekly thoracentesis at this point. Technique: The patient was brought to the interventional radiology area and placed in the sitting position on the side of a stretcher. The right chest was then examined with ultrasound and the skin was marked. The skin was then prepped, and draped in usual sterile fashion. Lidocaine was administered to the skin and a small skin incision was made. Under direct ultrasound guidance, a 5 [place] Yueh multisidehole catheter was advanced into the pleural space. 2000 mL clear yellow pleural fluid was aspirated. The patient tolerated the procedure well and suffered no complications. Impression: Impression: 1. Successful ultrasound-guided thoracentesis yielding 2000 mL clear yellow pleural fluid. Workstation performed: [id number]     XR chest 1 view portable    Result Date: 10/28/2023  Narrative: CHEST INDICATION:   SOB. COMPARISON: CXR 10/23/2023 and chest CT 10/03/2023. EXAM PERFORMED/VIEWS:  XR CHEST PORTABLE. FINDINGS: Mild cardiomegaly. Mild pulmonary edema. Redemonstration of large right and small left effusion with bibasilar atelectasis. Redemonstration of right upper lobe mass and left lung nodules. No pneumothorax. Upper abdomen normal. Bones normal for age. Impression: Mild pulmonary edema with redemonstration of large right and small left effusion with right greater than left bibasilar atelectasis. Redemonstration of right upper lobe mass and left lung nodules. Workstation performed: [id number]     XR chest pa & lateral    Result Date: 10/25/2023  Narrative: CHEST INDICATION:   J90: Pleural effusion, not elsewhere classified. Shortness of breath, pitting edema COMPARISON: 10/12/2023 EXAM PERFORMED/VIEWS: PA and lateral views of the chest. The frontal view was performed utilizing dual energy radiographic technique. FINDINGS: The cardiac silhouette is without change from the prior study. There is a moderate right-sided pleural effusion which has increased in size since the prior study. There is a small left effusion, approximately the same size. No pneumothorax. Partially visualized right lung opacity noted as on prior cross-sectional imaging studies. Osseous structures appear within normal limits for patient age. Impression: Moderate right effusion, increased in size since previous examination. Workstation performed: [id number]        EKG, Pathology, and Other Studies: I have personally reviewed pertinent reports. Counseling / Coordination of Care  Total floor / unit time spent today 30 minutes. Greater than 50% of total time was spent with the patient and / or family counseling and / or coordination of care.  A description of the counseling / coordination of care: Reviewed chart, provided medical updates, determined goals of care, discussed palliative care and symptom management,  provided anticipatory guidance, determined competency and POA/HCA, determined social/family support, provided psychosocial support. Reviewed with ICU team,  RN. Portions of this document may have been created using dictation software and as such some "sound alike" terms may have been generated by the system. Do not hesitate to contact me with any questions or clarifications.

## 2023-11-14 NOTE — ASSESSMENT & PLAN NOTE
Wt Readings from Last 3 Encounters:   11/13/23 90.5 kg (199 lb 8.3 oz)   11/03/23 84.9 kg (187 lb 2.7 oz)   10/23/23 81.3 kg (179 lb 3.2 oz)       Last ECHO with LVEF 35 %  Continue Bumex 1mg BID, goal -500 to 1L over 24 hours   Continue home BB  Monitor I/O and daily weights   Cardiology consulted, appreciate recommendations

## 2023-11-14 NOTE — PLAN OF CARE
Problem: INFECTION - ADULT  Goal: Absence or prevention of progression during hospitalization  Description: INTERVENTIONS:  - Assess and monitor for signs and symptoms of infection  - Monitor lab/diagnostic results  - Monitor all insertion sites, i.e. indwelling lines, tubes, and drains  - Monitor endotracheal if appropriate and nasal secretions for changes in amount and color  - [place] appropriate cooling/warming therapies per order  - Administer medications as ordered  - Instruct and encourage patient and family to use good hand hygiene technique  - Identify and instruct in appropriate isolation precautions for identified infection/condition  Outcome: Progressing     Problem: SAFETY ADULT  Goal: Patient will remain free of falls  Description: INTERVENTIONS:  - Educate patient/family on patient safety including physical limitations  - Instruct patient to call for assistance with activity   - Consult OT/PT to assist with strengthening/mobility   - Keep Call bell within reach  - Keep bed low and locked with side rails adjusted as appropriate  - Keep care items and personal belongings within reach  - Initiate and maintain comfort rounds  - Make Fall Risk Sign visible to staff  - Offer Toileting every 2 Hours, in advance of need  - Initiate/Maintain bed alarm  - Apply yellow socks and bracelet for high fall risk patients  - Consider moving patient to room near nurses station  Outcome: Progressing     Problem: SAFETY ADULT  Goal: Maintain or return to baseline ADL function  Description: INTERVENTIONS:  -  Assess patient's ability to carry out ADLs; assess patient's baseline for ADL function and identify physical deficits which impact ability to perform ADLs (bathing, care of mouth/teeth, toileting, grooming, dressing, etc.)  - Assess/evaluate cause of self-care deficits   - Assess range of motion  - Assess patient's mobility; develop plan if impaired  - Assess patient's need for assistive devices and provide as appropriate  - Encourage maximum independence but intervene and supervise when necessary  - Involve family in performance of ADLs  - Assess for home care needs following discharge   - Consider OT consult to assist with ADL evaluation and planning for discharge  - Provide patient education as appropriate  Outcome: Progressing     Problem: CARDIOVASCULAR - ADULT  Goal: Maintains optimal cardiac output and hemodynamic stability  Description: INTERVENTIONS:  - Monitor I/O, vital signs and rhythm  - Monitor for S/S and trends of decreased cardiac output  - Administer and titrate ordered vasoactive medications to optimize hemodynamic stability  - Assess quality of pulses, skin color and temperature  - Assess for signs of decreased coronary artery perfusion  - Instruct patient to report change in severity of symptoms  Outcome: Progressing

## 2023-11-15 ENCOUNTER — APPOINTMENT (INPATIENT)
Dept: RADIOLOGY | Facility: HOSPITAL | Age: 78
DRG: 180 | End: 2023-11-15
Payer: MEDICARE

## 2023-11-15 ENCOUNTER — APPOINTMENT (INPATIENT)
Dept: CT IMAGING | Facility: HOSPITAL | Age: 78
DRG: 180 | End: 2023-11-15
Payer: MEDICARE

## 2023-11-15 ENCOUNTER — PREP FOR PROCEDURE (OUTPATIENT)
Dept: VASCULAR SURGERY | Facility: HOSPITAL | Age: 78
End: 2023-11-15

## 2023-11-15 DIAGNOSIS — C34.91 PRIMARY MALIGNANT NEOPLASM OF RIGHT LUNG METASTATIC TO OTHER SITE (HCC): Primary | ICD-10-CM

## 2023-11-15 LAB
ANION GAP SERPL CALCULATED.3IONS-SCNC: 6 MMOL/L
APTT PPP: >210 SECONDS (ref 23–37)
BUN SERPL-MCNC: 39 MG/DL (ref 5–25)
CALCIUM SERPL-MCNC: 7.6 MG/DL (ref 8.4–10.2)
CHLORIDE SERPL-SCNC: 106 MMOL/L (ref 96–108)
CO2 SERPL-SCNC: 31 MMOL/L (ref 21–32)
CREAT SERPL-MCNC: 1.5 MG/DL (ref 0.6–1.3)
ERYTHROCYTE [DISTWIDTH] IN BLOOD BY AUTOMATED COUNT: 17.2 % (ref 11.6–15.1)
GFR SERPL CREATININE-BSD FRML MDRD: 44 ML/MIN/1.73SQ M
GLUCOSE SERPL-MCNC: 130 MG/DL (ref 65–140)
GLUCOSE SERPL-MCNC: 132 MG/DL (ref 65–140)
GLUCOSE SERPL-MCNC: 160 MG/DL (ref 65–140)
GLUCOSE SERPL-MCNC: 172 MG/DL (ref 65–140)
GLUCOSE SERPL-MCNC: 176 MG/DL (ref 65–140)
HCT VFR BLD AUTO: 32.2 % (ref 36.5–49.3)
HGB BLD-MCNC: 9.6 G/DL (ref 12–17)
INR PPP: 1.49 (ref 0.84–1.19)
MAGNESIUM SERPL-MCNC: 2.2 MG/DL (ref 1.9–2.7)
MCH RBC QN AUTO: 31.4 PG (ref 26.8–34.3)
MCHC RBC AUTO-ENTMCNC: 29.8 G/DL (ref 31.4–37.4)
MCV RBC AUTO: 105 FL (ref 82–98)
PHOSPHATE SERPL-MCNC: 2.3 MG/DL (ref 2.3–4.1)
PLATELET # BLD AUTO: 123 THOUSANDS/UL (ref 149–390)
PMV BLD AUTO: 10.9 FL (ref 8.9–12.7)
POTASSIUM SERPL-SCNC: 3.4 MMOL/L (ref 3.5–5.3)
PROCALCITONIN SERPL-MCNC: 0.16 NG/ML
PROTHROMBIN TIME: 18 SECONDS (ref 11.6–14.5)
RBC # BLD AUTO: 3.06 MILLION/UL (ref 3.88–5.62)
SODIUM SERPL-SCNC: 143 MMOL/L (ref 135–147)
WBC # BLD AUTO: 7.61 THOUSAND/UL (ref 4.31–10.16)

## 2023-11-15 PROCEDURE — 99232 SBSQ HOSP IP/OBS MODERATE 35: CPT | Performed by: NURSE PRACTITIONER

## 2023-11-15 PROCEDURE — 82948 REAGENT STRIP/BLOOD GLUCOSE: CPT

## 2023-11-15 PROCEDURE — NC001 PR NO CHARGE: Performed by: INTERNAL MEDICINE

## 2023-11-15 PROCEDURE — 85610 PROTHROMBIN TIME: CPT | Performed by: NURSE PRACTITIONER

## 2023-11-15 PROCEDURE — G1004 CDSM NDSC: HCPCS

## 2023-11-15 PROCEDURE — 71250 CT THORAX DX C-: CPT

## 2023-11-15 PROCEDURE — 71045 X-RAY EXAM CHEST 1 VIEW: CPT

## 2023-11-15 PROCEDURE — 84145 PROCALCITONIN (PCT): CPT | Performed by: NURSE PRACTITIONER

## 2023-11-15 PROCEDURE — 80048 BASIC METABOLIC PNL TOTAL CA: CPT | Performed by: NURSE PRACTITIONER

## 2023-11-15 PROCEDURE — 99232 SBSQ HOSP IP/OBS MODERATE 35: CPT | Performed by: INTERNAL MEDICINE

## 2023-11-15 PROCEDURE — 85730 THROMBOPLASTIN TIME PARTIAL: CPT | Performed by: PHYSICIAN ASSISTANT

## 2023-11-15 PROCEDURE — 85027 COMPLETE CBC AUTOMATED: CPT | Performed by: NURSE PRACTITIONER

## 2023-11-15 PROCEDURE — 83735 ASSAY OF MAGNESIUM: CPT | Performed by: NURSE PRACTITIONER

## 2023-11-15 PROCEDURE — 84100 ASSAY OF PHOSPHORUS: CPT | Performed by: NURSE PRACTITIONER

## 2023-11-15 RX ORDER — DILTIAZEM HYDROCHLORIDE 5 MG/ML
10 INJECTION INTRAVENOUS ONCE
Status: COMPLETED | OUTPATIENT
Start: 2023-11-15 | End: 2023-11-15

## 2023-11-15 RX ORDER — ENOXAPARIN SODIUM 100 MG/ML
1 INJECTION SUBCUTANEOUS EVERY 12 HOURS SCHEDULED
Status: DISCONTINUED | OUTPATIENT
Start: 2023-11-15 | End: 2023-11-22 | Stop reason: HOSPADM

## 2023-11-15 RX ORDER — METOPROLOL TARTRATE 50 MG/1
100 TABLET, FILM COATED ORAL EVERY 12 HOURS SCHEDULED
Status: DISCONTINUED | OUTPATIENT
Start: 2023-11-15 | End: 2023-11-17

## 2023-11-15 RX ORDER — POTASSIUM CHLORIDE 20 MEQ/1
40 TABLET, EXTENDED RELEASE ORAL EVERY 4 HOURS
Status: COMPLETED | OUTPATIENT
Start: 2023-11-15 | End: 2023-11-15

## 2023-11-15 RX ADMIN — CHLORHEXIDINE GLUCONATE 15 ML: 1.2 RINSE ORAL at 08:01

## 2023-11-15 RX ADMIN — FOLIC ACID 1 MG: 1 TABLET ORAL at 08:00

## 2023-11-15 RX ADMIN — SENNOSIDES AND DOCUSATE SODIUM 2 TABLET: 8.6; 5 TABLET ORAL at 22:13

## 2023-11-15 RX ADMIN — INSULIN LISPRO 1 UNITS: 100 INJECTION, SOLUTION INTRAVENOUS; SUBCUTANEOUS at 11:16

## 2023-11-15 RX ADMIN — FINASTERIDE 5 MG: 5 TABLET, FILM COATED ORAL at 08:00

## 2023-11-15 RX ADMIN — INSULIN LISPRO 1 UNITS: 100 INJECTION, SOLUTION INTRAVENOUS; SUBCUTANEOUS at 22:13

## 2023-11-15 RX ADMIN — INSULIN LISPRO 1 UNITS: 100 INJECTION, SOLUTION INTRAVENOUS; SUBCUTANEOUS at 16:16

## 2023-11-15 RX ADMIN — POTASSIUM CHLORIDE 40 MEQ: 1500 TABLET, EXTENDED RELEASE ORAL at 13:39

## 2023-11-15 RX ADMIN — METOPROLOL TARTRATE 100 MG: 50 TABLET, FILM COATED ORAL at 20:42

## 2023-11-15 RX ADMIN — AMIODARONE HYDROCHLORIDE 150 MG: 50 INJECTION, SOLUTION INTRAVENOUS at 09:09

## 2023-11-15 RX ADMIN — POTASSIUM CHLORIDE 40 MEQ: 1500 TABLET, EXTENDED RELEASE ORAL at 09:08

## 2023-11-15 RX ADMIN — ENOXAPARIN SODIUM 90 MG: 100 INJECTION SUBCUTANEOUS at 20:42

## 2023-11-15 RX ADMIN — PRAVASTATIN SODIUM 20 MG: 20 TABLET ORAL at 16:16

## 2023-11-15 RX ADMIN — DILTIAZEM HYDROCHLORIDE 10 MG: 5 INJECTION INTRAVENOUS at 20:08

## 2023-11-15 RX ADMIN — AMIODARONE HYDROCHLORIDE 1 MG/MIN: 50 INJECTION, SOLUTION INTRAVENOUS at 09:25

## 2023-11-15 RX ADMIN — BUMETANIDE 1 MG: 0.25 INJECTION INTRAMUSCULAR; INTRAVENOUS at 17:21

## 2023-11-15 RX ADMIN — AMIODARONE HYDROCHLORIDE 200 MG: 100 TABLET ORAL at 07:58

## 2023-11-15 RX ADMIN — ENOXAPARIN SODIUM 90 MG: 100 INJECTION SUBCUTANEOUS at 09:08

## 2023-11-15 NOTE — PROGRESS NOTES
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 however cannot completely exclude underlying CAD or direct cardiotoxicity from prior chemotherapeutic agents  Pt still appears significantly volume overloaded   Continue IV diuretics    HILARY (acute kidney injury) ([address])  Assessment & Plan  Monitor renal function with IV diuretics    Atrial fibrillation (HCC)  Assessment & Plan  Rates still elevated-120's-130's despite increase in metoprolol dose and Cardizem bolus  Amiodarone bolus and drip initiated by medical team  Continue heparin infusion with transition to Coumadin      Benign essential HTN  Assessment & Plan  BP stable    * Acute on chronic respiratory failure with hypoxia (HCC)  Assessment & Plan  -Likely mixed picture in the setting of recurrent pleural effusions, volume overload and lung cancer  Reports improvement in breathing following thoracentesis Monday. R-sdied chest tube placed yesterday, allowing for reduction in supplemental O2  Continue IV Bumex for diuresis           Outpatient Cardiologist: none      Subjective:   Patient seen and examined. No significant events overnight. Had R sided chest tube placed yesterday with improvement in breathing and O2 requirements    Summary comments:  Heart rates refractory to increased metoprolol and diltiazem bolus. Amiodarone bolus and drip initiated by medical team.      Still volume overloaded-continue IV diuretics.   Some edema is likely related to third spacing due to poor nutritional status/hypoalbuminemia Telemetry/ECG/Cardiac testing:   Echo 10/4/2023  EF 35%, severe global hypokinesis  Biatrial enlargement  Mild-mod AI, mod MR, mild TR  Dilated IVC  Small pericardial effusion    Vitals: Blood pressure 101/61, pulse (!) 124, temperature (!) 97.3 °F (36.3 °C), temperature source Tympanic, resp. rate 18, height 5' 9" (1.753 m), weight 93.6 kg (206 lb 5.6 oz), SpO2 96 %.,   Orthostatic Blood Pressures      Flowsheet Row Most Recent Value   Blood Pressure 101/61 filed at 11/15/2023 3362   Patient Position - Orthostatic VS Lying filed at 11/15/2023 0700        ,   Weight (last 2 days)       Date/Time Weight    11/15/23 0550 93.6 (206.35)    11/14/23 0600 93.5 (206.13)    11/13/23 0600 90.5 (199.52)            Physical Exam:    General:  Normal appearance in no distress. Eyes:  Anicteric. Oral mucosa:  Moist.  Neck:  No JVD. Carotid upstrokes are brisk without bruits. No masses. Chest:  Improved aeration post R chest tube placement  Cardiac:  irregularly irregular, tachycardic. No palpable PMI. Normal S1 and S2. No murmur gallop or rub. Abdomen:  Soft and nontender. No palpable organomegaly or aortic enlargement. Extremities:  generalized edema, +3 bilat LE, +1-+2 bilat UE  Neuro:  Grossly symmetric. Psych:  Alert and oriented x3.       Medications:      Current Facility-Administered Medications:     acetaminophen (TYLENOL) tablet 650 mg, 650 mg, Oral, Q6H PRN, RADHA Simon    amiodarone (CORDARONE) 900 mg in dextrose 5 % 500 mL infusion, 1 mg/min, Intravenous, Continuous, Last Rate: 33.3 mL/hr at 11/15/23 0925, 1 mg/min at 11/15/23 0925 **FOLLOWED BY** amiodarone (CORDARONE) 900 mg in dextrose 5 % 500 mL infusion, 0.5 mg/min, Intravenous, Continuous, Sandra Castañeda MD    bumetanide (BUMEX) injection 1 mg, 1 mg, Intravenous, BID, RADHA Marshall, 1 mg at 11/14/23 1702    chlorhexidine (PERIDEX) 0.12 % oral rinse 15 mL, 15 mL, Mouth/Throat, Q12H [facility], RADHA Scanlon, 15 mL at 11/15/23 0801    enoxaparin (LOVENOX) subcutaneous injection 90 mg, 1 mg/kg, Subcutaneous, Q12H [address], Sherwood Soulier, MD, 90 mg at 11/15/23 0908    finasteride (PROSCAR) tablet 5 mg, 5 mg, Oral, Daily, RADHA Scanlon, 5 mg at 64/84/55 7315    folic acid (FOLVITE) tablet 1 mg, 1 mg, Oral, Daily, RADHA Scanlon, 1 mg at 11/15/23 0800    insulin lispro (HumaLOG) 100 units/mL subcutaneous injection 1-6 Units, 1-6 Units, Subcutaneous, HS, RADHA Zaman, 1 Units at 11/14/23 2122    insulin lispro (HumaLOG) 100 units/mL subcutaneous injection 1-6 Units, 1-6 Units, Subcutaneous, TID AC, 2 Units at 11/12/23 1304 **AND** Fingerstick Glucose (POCT), , , 4x Daily AC and at bedtime, RADHA Simon    metoprolol tartrate (LOPRESSOR) tablet 100 mg, 100 mg, Oral, Q12H [address], Klarissa Gaxiola, RADHA, 100 mg at 11/14/23 2002    polyethylene glycol (MIRALAX) packet 17 g, 17 g, Oral, Daily PRN, RADHA Simon    potassium chloride (K-DUR,KLOR-CON) CR tablet 40 mEq, 40 mEq, Oral, Q4H, Sherwood Soulier, MD, 40 mEq at 11/15/23 0908    pravastatin (PRAVACHOL) tablet 20 mg, 20 mg, Oral, Daily With Dinner, [employer], RADHA, 20 mg at 11/14/23 1702    senna-docusate sodium (SENOKOT S) 8.6-50 mg per tablet 2 tablet, 2 tablet, Oral, HS, RADHA Siomn, 2 tablet at 11/14/23 2121     Labs & Results:    Troponins:    Results from last 7 days   Lab Units 11/11/23  2335 11/11/23  2212 11/11/23 2012   HS TNI 0HR ng/L  --   --  8   HS TNI 2HR ng/L  --  11  --    HSTNI D2 ng/L  --  3  --    HS TNI 4HR ng/L 9  --   --    HSTNI D4 ng/L 1  --   --         BNP:   Results from last 6 Months   Lab Units 11/12/23  2315 10/27/23  1919   BNP pg/mL 733* 625*     CBC with diff:   Results from last 7 days   Lab Units 11/15/23  0518 11/14/23  0630   WBC Thousand/uL 7.61 7.72   HEMOGLOBIN g/dL 9.6* 10.1*   HEMATOCRIT % 32.2* 32.8*   MCV fL 105* 106*   PLATELETS Thousands/uL 123* 123*     TSH:     CMP: Results from last 7 days   Lab Units 11/15/23  0518 11/14/23  0630 11/13/23  1710 11/13/23  0505   POTASSIUM mmol/L 3.4* 3.7   < > 3.5   CHLORIDE mmol/L 106 106   < > 107   CO2 mmol/L 31 31   < > 30   BUN mg/dL 39* 47*   < > 44*   CREATININE mg/dL 1.50* 1.70*   < > 1.69*   AST U/L  --  20  --  23   ALT U/L  --  27  --  32   EGFR ml/min/1.73sq m 44 38   < > 38    < > = values in this interval not displayed.      Lipid Profile:     Coags:   Results from last 7 days   Lab Units 11/15/23  0518 11/14/23  0630 11/13/23  0505   INR  1.49* 1.25* 1.25*

## 2023-11-15 NOTE — ASSESSMENT & PLAN NOTE
Wt Readings from Last 3 Encounters:   11/15/23 93.6 kg (206 lb 5.6 oz)   11/03/23 84.9 kg (187 lb 2.7 oz)   10/23/23 81.3 kg (179 lb 3.2 oz)     -Possibly tachyarrhythmia mediated cardiomyopathy however cannot completely exclude underlying CAD or direct cardiotoxicity from prior chemotherapeutic agents  Pt still appears significantly volume overloaded   Continue IV diuretics

## 2023-11-15 NOTE — PLAN OF CARE
Problem: PAIN - ADULT  Goal: Verbalizes/displays adequate comfort level or baseline comfort level  Description: Interventions:  - Encourage patient to monitor pain and request assistance  - Assess pain using appropriate pain scale  - Administer analgesics based on type and severity of pain and evaluate response  - Implement non-pharmacological measures as appropriate and evaluate response  - Consider cultural and social influences on pain and pain management  - Notify physician/advanced practitioner if interventions unsuccessful or patient reports new pain  Outcome: Progressing     Problem: INFECTION - ADULT  Goal: Absence or prevention of progression during hospitalization  Description: INTERVENTIONS:  - Assess and monitor for signs and symptoms of infection  - Monitor lab/diagnostic results  - Monitor all insertion sites, i.e. indwelling lines, tubes, and drains  - Monitor endotracheal if appropriate and nasal secretions for changes in amount and color  - [place] appropriate cooling/warming therapies per order  - Administer medications as ordered  - Instruct and encourage patient and family to use good hand hygiene technique  - Identify and instruct in appropriate isolation precautions for identified infection/condition  Outcome: Progressing  Goal: Absence of fever/infection during neutropenic period  Description: INTERVENTIONS:  - Monitor WBC    Outcome: Progressing     Problem: SAFETY ADULT  Goal: Patient will remain free of falls  Description: INTERVENTIONS:  - Educate patient/family on patient safety including physical limitations  - Instruct patient to call for assistance with activity   - Consult OT/PT to assist with strengthening/mobility   - Keep Call bell within reach  - Keep bed low and locked with side rails adjusted as appropriate  - Keep care items and personal belongings within reach  - Initiate and maintain comfort rounds  - Make Fall Risk Sign visible to staff  - Offer Toileting every Hours, in advance of need  - Initiate/Maintain alarm  - Obtain necessary fall risk management equipment:   - Apply yellow socks and bracelet for high fall risk patients  - Consider moving patient to room near nurses station  Outcome: Progressing  Goal: Maintain or return to baseline ADL function  Description: INTERVENTIONS:  -  Assess patient's ability to carry out ADLs; assess patient's baseline for ADL function and identify physical deficits which impact ability to perform ADLs (bathing, care of mouth/teeth, toileting, grooming, dressing, etc.)  - Assess/evaluate cause of self-care deficits   - Assess range of motion  - Assess patient's mobility; develop plan if impaired  - Assess patient's need for assistive devices and provide as appropriate  - Encourage maximum independence but intervene and supervise when necessary  - Involve family in performance of ADLs  - Assess for home care needs following discharge   - Consider OT consult to assist with ADL evaluation and planning for discharge  - Provide patient education as appropriate  Outcome: Progressing  Goal: Maintains/Returns to pre admission functional level  Description: INTERVENTIONS:  - Perform BMAT or MOVE assessment daily.   - Set and communicate daily mobility goal to care team and patient/family/caregiver. - Collaborate with rehabilitation services on mobility goals if consulted  - Perform Range of Motion  times a day. - Reposition patient every  hours.   - Dangle patient  times a day  - Stand patient  times a day  - Ambulate patient  times a day  - Out of bed to chair  times a day   - Out of bed for meals  times a day  - Out of bed for toileting  - Record patient progress and toleration of activity level   Outcome: Progressing     Problem: DISCHARGE PLANNING  Goal: Discharge to home or other facility with appropriate resources  Description: INTERVENTIONS:  - Identify barriers to discharge w/patient and caregiver  - Arrange for needed discharge resources and transportation as appropriate  - Identify discharge learning needs (meds, wound care, etc.)  - Arrange for interpretive services to assist at discharge as needed  - Refer to Case Management Department for coordinating discharge planning if the patient needs post-hospital services based on physician/advanced practitioner order or complex needs related to functional status, cognitive ability, or social support system  Outcome: Progressing     Problem: Knowledge Deficit  Goal: Patient/family/caregiver demonstrates understanding of disease process, treatment plan, medications, and discharge instructions  Description: Complete learning assessment and assess knowledge base. Interventions:  - Provide teaching at level of understanding  - Provide teaching via preferred learning methods  Outcome: Progressing     Problem: MOBILITY - ADULT  Goal: Maintain or return to baseline ADL function  Description: INTERVENTIONS:  -  Assess patient's ability to carry out ADLs; assess patient's baseline for ADL function and identify physical deficits which impact ability to perform ADLs (bathing, care of mouth/teeth, toileting, grooming, dressing, etc.)  - Assess/evaluate cause of self-care deficits   - Assess range of motion  - Assess patient's mobility; develop plan if impaired  - Assess patient's need for assistive devices and provide as appropriate  - Encourage maximum independence but intervene and supervise when necessary  - Involve family in performance of ADLs  - Assess for home care needs following discharge   - Consider OT consult to assist with ADL evaluation and planning for discharge  - Provide patient education as appropriate  Outcome: Progressing  Goal: Maintains/Returns to pre admission functional level  Description: INTERVENTIONS:  - Perform BMAT or MOVE assessment daily.   - Set and communicate daily mobility goal to care team and patient/family/caregiver.    - Collaborate with rehabilitation services on mobility goals if consulted  - Perform Range of Motion  times a day. - Reposition patient every  hours. - Dangle patient  times a day  - Stand patient  times a day  - Ambulate patient  times a day  - Out of bed to chair  times a day   - Out of bed for meals  times a day  - Out of bed for toileting  - Record patient progress and toleration of activity level   Outcome: Progressing     Problem: NEUROSENSORY - ADULT  Goal: Achieves stable or improved neurological status  Description: INTERVENTIONS  - Monitor and report changes in neurological status  - Monitor vital signs such as temperature, blood pressure, glucose, and any other labs ordered   - Initiate measures to prevent increased intracranial pressure  - Monitor for seizure activity and implement precautions if appropriate      Outcome: Progressing  Goal: Remains free of injury related to seizures activity  Description: INTERVENTIONS  - Maintain airway, patient safety  and administer oxygen as ordered  - Monitor patient for seizure activity, document and report duration and description of seizure to physician/advanced practitioner  - If seizure occurs,  ensure patient safety during seizure  - Reorient patient post seizure  - Seizure pads on all 4 side rails  - Instruct patient/family to notify RN of any seizure activity including if an aura is experienced  - Instruct patient/family to call for assistance with activity based on nursing assessment  - Administer anti-seizure medications if ordered    Outcome: Progressing  Goal: Achieves maximal functionality and self care  Description: INTERVENTIONS  - Monitor swallowing and airway patency with patient fatigue and changes in neurological status  - Encourage and assist patient to increase activity and self care.    - Encourage visually impaired, hearing impaired and aphasic patients to use assistive/communication devices  Outcome: Progressing     Problem: CARDIOVASCULAR - ADULT  Goal: Maintains optimal cardiac output and hemodynamic stability  Description: INTERVENTIONS:  - Monitor I/O, vital signs and rhythm  - Monitor for S/S and trends of decreased cardiac output  - Administer and titrate ordered vasoactive medications to optimize hemodynamic stability  - Assess quality of pulses, skin color and temperature  - Assess for signs of decreased coronary artery perfusion  - Instruct patient to report change in severity of symptoms  Outcome: Progressing  Goal: Absence of cardiac dysrhythmias or at baseline rhythm  Description: INTERVENTIONS:  - Continuous cardiac monitoring, vital signs, obtain 12 lead EKG if ordered  - Administer antiarrhythmic and heart rate control medications as ordered  - Monitor electrolytes and administer replacement therapy as ordered  Outcome: Progressing     Problem: GASTROINTESTINAL - ADULT  Goal: Minimal or absence of nausea and/or vomiting  Description: INTERVENTIONS:  - Administer IV fluids if ordered to ensure adequate hydration  - Maintain NPO status until nausea and vomiting are resolved  - Nasogastric tube if ordered  - Administer ordered antiemetic medications as needed  - Provide nonpharmacologic comfort measures as appropriate  - Advance diet as tolerated, if ordered  - Consider nutrition services referral to assist patient with adequate nutrition and appropriate food choices  Outcome: Progressing  Goal: Maintains or returns to baseline bowel function  Description: INTERVENTIONS:  - Assess bowel function  - Encourage oral fluids to ensure adequate hydration  - Administer IV fluids if ordered to ensure adequate hydration  - Administer ordered medications as needed  - Encourage mobilization and activity  - Consider nutritional services referral to assist patient with adequate nutrition and appropriate food choices  Outcome: Progressing  Goal: Maintains adequate nutritional intake  Description: INTERVENTIONS:  - Monitor percentage of each meal consumed  - Identify factors contributing to decreased intake, treat as appropriate  - Assist with meals as needed  - Monitor I&O, weight, and lab values if indicated  - Obtain nutrition services referral as needed  Outcome: Progressing  Goal: Establish and maintain optimal ostomy function  Description: INTERVENTIONS:  - Assess bowel function  - Encourage oral fluids to ensure adequate hydration  - Administer IV fluids if ordered to ensure adequate hydration   - Administer ordered medications as needed  - Encourage mobilization and activity  - Nutrition services referral to assist patient with appropriate food choices  - Assess stoma site  - Consider wound care consult   Outcome: Progressing  Goal: Oral mucous membranes remain intact  Description: INTERVENTIONS  - Assess oral mucosa and hygiene practices  - Implement preventative oral hygiene regimen  - Implement oral medicated treatments as ordered  - Initiate Nutrition services referral as needed  Outcome: Progressing     Problem: GENITOURINARY - ADULT  Goal: Maintains or returns to baseline urinary function  Description: INTERVENTIONS:  - Assess urinary function  - Encourage oral fluids to ensure adequate hydration if ordered  - Administer IV fluids as ordered to ensure adequate hydration  - Administer ordered medications as needed  - Offer frequent toileting  - Follow urinary retention protocol if ordered  Outcome: Progressing  Goal: Absence of urinary retention  Description: INTERVENTIONS:  - Assess patient’s ability to void and empty bladder  - Monitor I/O  - Bladder scan as needed  - Discuss with physician/AP medications to alleviate retention as needed  - Discuss catheterization for long term situations as appropriate  Outcome: Progressing  Goal: Urinary catheter remains patent  Description: INTERVENTIONS:  - Assess patency of urinary catheter  - If patient has a chronic guerra, consider changing catheter if non-functioning  - Follow guidelines for intermittent irrigation of non-functioning urinary catheter  Outcome: Progressing     Problem: METABOLIC, FLUID AND ELECTROLYTES - ADULT  Goal: Electrolytes maintained within normal limits  Description: INTERVENTIONS:  - Monitor labs and assess patient for signs and symptoms of electrolyte imbalances  - Administer electrolyte replacement as ordered  - Monitor response to electrolyte replacements, including repeat lab results as appropriate  - Instruct patient on fluid and nutrition as appropriate  Outcome: Progressing  Goal: Fluid balance maintained  Description: INTERVENTIONS:  - Monitor labs   - Monitor I/O and WT  - Instruct patient on fluid and nutrition as appropriate  - Assess for signs & symptoms of volume excess or deficit  Outcome: Progressing  Goal: Glucose maintained within target range  Description: INTERVENTIONS:  - Monitor Blood Glucose as ordered  - Assess for signs and symptoms of hyperglycemia and hypoglycemia  - Administer ordered medications to maintain glucose within target range  - Assess nutritional intake and initiate nutrition service referral as needed  Outcome: Progressing     Problem: SKIN/TISSUE INTEGRITY - ADULT  Goal: Skin Integrity remains intact(Skin Breakdown Prevention)  Description: Assess:  -Perform [place] assessment every   -Clean and moisturize skin every   -Inspect skin when repositioning, toileting, and assisting with ADLS  -Assess under medical devices such as  every   -Assess extremities for adequate circulation and sensation     Bed Management:  -Have minimal linens on bed & keep smooth, unwrinkled  -Change linens as needed when moist or perspiring  -Avoid sitting or lying in one position for more than  hours while in bed  -Keep HOB at degrees     Toileting:  -Offer bedside commode  -Assess for incontinence every   -Use incontinent care products after each incontinent episode such as     Activity:  -Mobilize patient  times a day  -Encourage activity and walks on unit  -Encourage or provide ROM exercises   -Turn and reposition patient every Hours  -Use appropriate equipment to lift or move patient in bed  -Instruct/ Assist with weight shifting every when out of bed in chair  -Consider limitation of chair time  hour intervals    Skin Care:  -Avoid use of baby powder, tape, friction and shearing, hot water or constrictive clothing  -Relieve pressure over bony prominences using   -Do not massage red bony areas    Next Steps:  -Teach patient strategies to minimize risks such as    -Consider consults to  interdisciplinary teams such as   Outcome: Progressing  Goal: Incision(s), wounds(s) or drain site(s) healing without S/S of infection  Description: INTERVENTIONS  - Assess and document dressing, incision, wound bed, drain sites and surrounding tissue  - Provide patient and family education  - Perform skin care/dressing changes every   Outcome: Progressing  Goal: Pressure injury heals and does not worsen  Description: Interventions:  - Implement low air loss mattress or specialty surface (Criteria met)  - Apply silicone foam dressing  - Instruct/assist with weight shifting every  minutes when in chair   - Limit chair time to  hour intervals  - Use special pressure reducing interventions such as  when in chair   - Apply fecal or urinary incontinence containment device   - Perform passive or active ROM every   - Turn and reposition patient & offload bony prominences every  hours   - Utilize friction reducing device or surface for transfers   - Consider consults to  interdisciplinary teams such as   - Use incontinent care products after each incontinent episode such as  - Consider nutrition services referral as needed  Outcome: Progressing     Problem: HEMATOLOGIC - ADULT  Goal: Maintains hematologic stability  Description: INTERVENTIONS  - Assess for signs and symptoms of bleeding or hemorrhage  - Monitor labs  - Administer supportive blood products/factors as ordered and appropriate  Outcome: Progressing     Problem: MUSCULOSKELETAL - ADULT  Goal: Maintain or return mobility to safest level of function  Description: INTERVENTIONS:  - Assess patient's ability to carry out ADLs; assess patient's baseline for ADL function and identify physical deficits which impact ability to perform ADLs (bathing, care of mouth/teeth, toileting, grooming, dressing, etc.)  - Assess/evaluate cause of self-care deficits   - Assess range of motion  - Assess patient's mobility  - Assess patient's need for assistive devices and provide as appropriate  - Encourage maximum independence but intervene and supervise when necessary  - Involve family in performance of ADLs  - Assess for home care needs following discharge   - Consider OT consult to assist with ADL evaluation and planning for discharge  - Provide patient education as appropriate  Outcome: Progressing  Goal: Maintain proper alignment of affected body part  Description: INTERVENTIONS:  - Support, maintain and protect limb and body alignment  - Provide patient/ family with appropriate education  Outcome: Progressing     Problem: Prexisting or High Potential for Compromised Skin Integrity  Goal: Skin integrity is maintained or improved  Description: INTERVENTIONS:  - Identify patients at risk for skin breakdown  - Assess and monitor skin integrity  - Assess and monitor nutrition and hydration status  - Monitor labs   - Assess for incontinence   - Turn and reposition patient  - Assist with mobility/ambulation  - Relieve pressure over bony prominences  - Avoid friction and shearing  - Provide appropriate hygiene as needed including keeping skin clean and dry  - Evaluate need for skin moisturizer/barrier cream  - Collaborate with interdisciplinary team   - Patient/family teaching  - Consider wound care consult   Outcome: Progressing

## 2023-11-15 NOTE — ASSESSMENT & PLAN NOTE
-Likely mixed picture in the setting of recurrent pleural effusions, volume overload and lung cancer  Reports improvement in breathing following thoracentesis Monday.   R-sdied chest tube placed yesterday, allowing for reduction in supplemental O2  Continue IV Bumex for diuresis

## 2023-11-15 NOTE — ASSESSMENT & PLAN NOTE
Rates still elevated-120's-130's despite increase in metoprolol dose and Cardizem bolus  Amiodarone bolus and drip initiated by medical team  Continue heparin infusion with transition to Coumadin

## 2023-11-15 NOTE — WOUND OSTOMY CARE
Consult Note - Wound   Amarilys Kenny 69 y.o. male MRN: [de-identified]  Unit/Bed#: ICU 05-01 Encounter: [id number]      History and Present Illness:  Patient is 69year old male admitted to [facility] with acute on chronic respiratory failure with hypoxia. 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 Wound care consulted for wounds. Patient seen recently at the [facility] by wound care nurse. Assessment Findings:   Patient in bed in ICU on med-surg bed. He is awake, alert and oriented. Family at bedside. Patient is not incontinent, is independent with meals, can sit on the side of the bed, and is an assist with hygiene care. 1- POA-right pretibial region partial thickness wound bed, beefy red with granulation tissue, scant serosanguineous drainage when cleansed. Lower leg edema. No erythema, odor or induration noted  2- POA-left foot, area of mild erythema to the dorsal forefoot, no drainage or open area  3- POA-left elbow wound bed, possibly pressure related. Wound bed adherent yellow slough. Pink epithelial tissue to the edges  4- Partial thickness skin tear to the left anterior arm, 100% skin flap intact, area of beefy red to the wound bed. Small open area medially to the skin tear, unknown etiology, included with measurement of skin tear. 5- Anterior right forefoot area of beefy red tissue with small pustule. Edema to lower leg and foot  6- POA-sacrum and upper buttocks wounds. Scattered wound beds, two of the distal wound beds have intact yellow eschar, proximal wound beds on the left upper region by the sacrum 100% moist yellow slough with light purple hyperpigmented periwound. Periwound blanchable. Mixed etiology of friction and pressure. Noted beefy red erosion to the sacrum.      Skin and Wound Care Plan:   1- Apply skin nourishing cream to the skin daily  2- Elevate heels off of bed with 2 pillows to offload  3- Use EHOB offloading cushion on chair when OOB  4- Turn and reposition patient Q2 hours  5- Allevyn life heel foams to bilateral heels, joseph with P, date, and peel back daily for skin assessment, change every 3 days or with soilage or dislodgement. 6- Cleanse sacrum and upper buttocks with Remedy foaming cleanser, pat dry. Apply Triad paste to the wound beds and cover with Allevyn life silicone bordered foam dressing, joseph with T, date, change every other day and PRN  7- Cleanse wounds to the left posterior arm and right anterior lower leg with NSS, pat dry. Apply Triad paste to the wound beds, cover with Allevyn life silicone bordered foam dressing, joseph with T, date, change every other day and PRN    Wounds:  Wound 10/30/23 Catheter entry/exit site Back Mid;Right (Active)       Wound 11/01/23 Sacrum Bilateral (Active)   Wound Image   11/15/23 1347   Wound Description Fragile; Yellow;Slough;Eschar 11/15/23 1347   Pressure Injury Stage U 11/15/23 1347   Katja-wound Assessment Fragile;Scaly 11/15/23 1347   Wound Length (cm) 3.1 cm 11/15/23 1347   Wound Width (cm) 4.1 cm 11/15/23 1347   Wound Surface Area (cm^2) 12.71 cm^2 11/15/23 1347   Drainage Amount None 11/15/23 1347   Treatments Cleansed 11/15/23 1347   Dressing Foam, Silicon (eg. Allevyn, etc); Other (Comment) 11/15/23 1347   Dressing Changed Changed 11/15/23 1347   Patient Tolerance Tolerated well 11/15/23 1347   Dressing Status Clean;Dry; Intact 11/15/23 1200       Wound 11/11/23 Pretibial Right (Active)   Wound Image    11/15/23 1333   Wound Description Beefy red;Granulation tissue 11/15/23 1333   Katja-wound Assessment Edema 11/15/23 1333   Wound Length (cm) 1 cm 11/15/23 1333   Wound Width (cm) 3.5 cm 11/15/23 1333   Wound Depth (cm) 0.1 cm 11/15/23 1333   Wound Surface Area (cm^2) 3.5 cm^2 11/15/23 1333   Wound Volume (cm^3) 0.35 cm^3 11/15/23 1333   Calculated Wound Volume (cm^3) 0.35 cm^3 11/15/23 1333   Drainage Amount Scant 11/15/23 1333   Drainage Description Yellow 11/15/23 1333   Non-staged Wound Description Partial thickness 11/15/23 1333   Treatments Cleansed 11/15/23 1333   Dressing Foam, Silicon (eg. Allevyn, etc); Other (Comment) 11/15/23 1333   Dressing Changed Changed 11/15/23 1333   Patient Tolerance Tolerated well 11/15/23 1333       Wound 11/11/23 Foot Anterior; Left (Active)   Wound Image   11/15/23 1335   Wound Description Intact; Other (Comment) 11/15/23 1335   Katja-wound Assessment Clean;Dry; Intact 11/15/23 1200   Drainage Amount None 11/15/23 1335   Treatments Cleansed;Site care 11/14/23 2000   Dressing Open to air 11/15/23 1335   Patient Tolerance Tolerated well 11/15/23 1335       Wound 11/11/23 Elbow Left;Posterior (Active)   Wound Image   11/15/23 1341   Wound Description Yellow;Slough 11/15/23 1341   Katja-wound Assessment Pink 11/15/23 1341   Wound Length (cm) 3.3 cm 11/15/23 1341   Wound Width (cm) 1 cm 11/15/23 1341   Wound Surface Area (cm^2) 3.3 cm^2 11/15/23 1341   Drainage Amount Scant 11/15/23 1341   Drainage Description Serous 11/15/23 1341   Dressing Foam, Silicon (eg. Allevyn, etc); Other (Comment) 11/15/23 1341   Dressing Changed Changed 11/15/23 1341   Patient Tolerance Tolerated well 11/15/23 1341   Dressing Status Clean;Dry; Intact 11/15/23 1200       Wound 11/13/23 Skin Tear Arm Anterior; Left (Active)   Wound Image   11/15/23 1338   Wound Description Beefy red 11/15/23 1338   Katja-wound Assessment [place] 11/15/23 1338   Wound Length (cm) 1.4 cm 11/15/23 1338   Wound Width (cm) 3.5 cm 11/15/23 1338   Wound Depth (cm) 0.1 cm 11/15/23 1338   Wound Surface Area (cm^2) 4.9 cm^2 11/15/23 1338   Wound Volume (cm^3) 0.49 cm^3 11/15/23 1338   Calculated Wound Volume (cm^3) 0.49 cm^3 11/15/23 1338   Treatments Cleansed 11/15/23 1338   Dressing Kaitlynn Bane gauze;Dry dressing;Gauze 11/15/23 1338   Dressing Changed Changed 11/15/23 1338   Patient Tolerance Tolerated well 11/15/23 1338   Dressing Status Clean;Dry; Intact 11/15/23 1200       Wound 11/15/23 Foot Anterior;Right (Active)   Wound Image   11/15/23 1336   Wound Description Beefy red;Edema 11/15/23 1336   Katja-wound Assessment Edema; Erythema;Fragile 11/15/23 1336   Wound Length (cm) 0.5 cm 11/15/23 1336   Wound Width (cm) 0.3 cm 11/15/23 1336   Wound Depth (cm) 0 cm 11/15/23 1336   Wound Surface Area (cm^2) 0.15 cm^2 11/15/23 1336   Wound Volume (cm^3) 0 cm^3 11/15/23 1336   Calculated Wound Volume (cm^3) 0 cm^3 11/15/23 1336   Drainage Amount None 11/15/23 1336   Dressing Open to air 11/15/23 1336   Patient Tolerance Tolerated well 11/15/23 1336     Reviewed plan of care with primary RN Shameka Garcia  Recommendations written as orders  Wound care team to follow weekly while admitted  Questions or concerns [address] [place] [address] Nurse    Flora Razo BSN, RN, Ivana Cooks

## 2023-11-15 NOTE — PLAN OF CARE
Problem: PAIN - ADULT  Goal: Verbalizes/displays adequate comfort level or baseline comfort level  Description: Interventions:  - Encourage patient to monitor pain and request assistance  - Assess pain using appropriate pain scale  - Administer analgesics based on type and severity of pain and evaluate response  - Implement non-pharmacological measures as appropriate and evaluate response  - Consider cultural and social influences on pain and pain management  - Notify physician/advanced practitioner if interventions unsuccessful or patient reports new pain  Outcome: Progressing     Problem: INFECTION - ADULT  Goal: Absence or prevention of progression during hospitalization  Description: INTERVENTIONS:  - Assess and monitor for signs and symptoms of infection  - Monitor lab/diagnostic results  - Monitor all insertion sites, i.e. indwelling lines, tubes, and drains  - Monitor endotracheal if appropriate and nasal secretions for changes in amount and color  - [place] appropriate cooling/warming therapies per order  - Administer medications as ordered  - Instruct and encourage patient and family to use good hand hygiene technique  - Identify and instruct in appropriate isolation precautions for identified infection/condition  Outcome: Progressing  Goal: Absence of fever/infection during neutropenic period  Description: INTERVENTIONS:  - Monitor WBC    Outcome: Progressing     Problem: SAFETY ADULT  Goal: Patient will remain free of falls  Description: INTERVENTIONS:  - Educate patient/family on patient safety including physical limitations  - Instruct patient to call for assistance with activity   - Consult OT/PT to assist with strengthening/mobility   - Keep Call bell within reach  - Keep bed low and locked with side rails adjusted as appropriate  - Keep care items and personal belongings within reach  - Initiate and maintain comfort rounds  - Make Fall Risk Sign visible to staff  - Apply yellow socks and bracelet for high fall risk patients  - Consider moving patient to room near nurses station  Outcome: Progressing  Goal: Maintain or return to baseline ADL function  Description: INTERVENTIONS:  -  Assess patient's ability to carry out ADLs; assess patient's baseline for ADL function and identify physical deficits which impact ability to perform ADLs (bathing, care of mouth/teeth, toileting, grooming, dressing, etc.)  - Assess/evaluate cause of self-care deficits   - Assess range of motion  - Assess patient's mobility; develop plan if impaired  - Assess patient's need for assistive devices and provide as appropriate  - Encourage maximum independence but intervene and supervise when necessary  - Involve family in performance of ADLs  - Assess for home care needs following discharge   - Consider OT consult to assist with ADL evaluation and planning for discharge  - Provide patient education as appropriate  Outcome: Progressing  Goal: Maintains/Returns to pre admission functional level  Description: INTERVENTIONS:  - Perform BMAT or MOVE assessment daily.   - Set and communicate daily mobility goal to care team and patient/family/caregiver.    - Collaborate with rehabilitation services on mobility goals if consulted  - Out of bed for toileting  - Record patient progress and toleration of activity level   Outcome: Progressing

## 2023-11-15 NOTE — NURSING NOTE
Pt due for IV Bumex. /72. Hold parameters for SBP > 110. Dr. Ivan Varela made aware. Verbal  order to give dose ordered at this time.

## 2023-11-15 NOTE — PROGRESS NOTES
[id number] [facility]  Progress Note  Name: Francisco Rogers  MRN: [de-identified]  Unit/Bed#: ICU 05-01 I Date of Admission: 11/11/2023   Date of Service: 11/15/2023 I Hospital Day: 4      Assessment & Plan:    * Acute on chronic respiratory failure with hypoxia [facility])  Assessment & Plan  Likely multifactorial in the setting of b/l pleural effusion and acute CHF exacerbation   At baseline, requires 3L NC -> currently down to baseline requirement  Encourage cough, deep breathing, IS, ambulation as tolerated     Pleural effusion  Assessment & Plan  Noted on CT, R>L b/l pleural effusions   S/p L thoracentesis with IR on 11/9 as OP   S/p IR thoracentesis of L effusion 11/13 with 1.4L removed   Underwent chest tube placement on 11/14 -> pulmonology to follow  Follow up on fluid studies  Monitor respiratory status closely      Acute on chronic HFrEF (heart failure with reduced ejection fraction) ([facility])  Assessment & Plan  Last ECHO with LVEF 35 % -> 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  On diuresis with Bumex - on beta-blockade with Lopressor  Monitor/replete serum potassium/magnesium deficiencies if present    Rapid atrial fibrillation ([address])  Assessment & Plan  Remains with intermittent RVR  Continue beta-blockade with Lopressor -> will transition oral Amiodarone to a continuous infusion due to intermittent hypotensive states  Anticoagulation transitioned to therapeutic Lovenox -> plan to bridge with Coumadin in the upcoming days once stabilized     HILARY (acute kidney injury) ([address])  Assessment & Plan  Unclear etiology, possibly cardiorenal given CHF exacerbation  Baseline creatinine of approximately 0.9-1.1 -> remains elevated at 1.5  Monitor renal function and urine output - limit/avoid nephrotoxins and hypotension as possible  Noted Bumex use     Deep vein thrombosis (DVT) of right lower extremity [facility])  Assessment & Plan  See plan for pulmonary embolus below  Continue anticoagulation     History of pulmonary embolus (PE)  Assessment & Plan  Transitioned to therapeutic Lovenox (due to need for an Amiodarone infusion and only a single lumen, hence, cannot use a heparin drip anymore) -> eventual plan to transition to Coumadin     Malignant neoplasm of upper lobe of right lung [facility])  Assessment & Plan  Pt diagnosed with Stage 4 Lung Ca in 9/2022  S/p XRT completed June 2023   Currently undergoing chemo, last 9/19/23   OP follow up with heme/onc      History of prostate cancer  Assessment & Plan  Continue Proscar  Status post prior radiation in 2005     Hypercholesterolemia  Assessment & Plan  Continue statin     Type 2 diabetes mellitus ([address])  Assessment & Plan        Lab Results   Component Value Date     HGBA1C 6.0 10/23/2023      Continue SSI coverage per Accu-Cheks  Carbohydrate restriction  Hypoglycemia protocol     Benign essential HTN  Assessment & Plan  Continue Lopressor (w/ holding parameters for hypotension)       DVT Prophylaxis: therapeutic Lovenox    Patient Centered Rounds:  I have performed bedside rounds and discussed plan of care with nursing today. Discussions with Specialists or Other Care Team Provider:  see above assessments if applicable    Education and Discussions with Family / Patient:  Patient, along with multiple for members, at bedside today    Time Spent for Care:  35 minutes. More than 50% of total time spent on counseling and coordination of care, on one or more of the following: performing physical exam; counseling and coordination of care, obtaining or reviewing history, documenting in the medical record, reviewing/ordering tests/medications/procedures, and communicating with other healthcare professionals. Current Length of Stay: 4 day(s)  Current Patient Status: Inpatient   Certification Statement:  Patient will continue to require additional hospital stay due to assessments as noted above.     Code Status: Level 3 - DNAR and DNI        Subjective:     Encountered earlier in the day. Remains weak/fatigued however, states his shortness of breath has improved currently. Objective:     Vitals:   Temp (24hrs), Av.8 °F (36.6 °C), Min:97.3 °F (36.3 °C), Max:98 °F (36.7 °C)    Temp:  [97.3 °F (36.3 °C)-98 °F (36.7 °C)] 98 °F (36.7 °C)  HR:  [119-138] 119  Resp:  [18-49] 19  BP: ()/(57-73) 104/72  SpO2:  [82 %-96 %] 96 %  Body mass index is 30.47 kg/m². Input and Output Summary (last 24 hours): Intake/Output Summary (Last 24 hours) at 11/15/2023 1711  Last data filed at 11/15/2023 1639  Gross per 24 hour   Intake 1956.09 ml   Output 3510 ml   Net -1553.91 ml       Physical Exam:     GENERAL Weak/fatigued   HEAD   Normocephalic - atraumatic   EYES   PERRL - EOMI    MOUTH   Mucosa moist   NECK   Supple - full range of motion   CARDIAC Irregularly irregular and tachycardic - S1/S2 positive   PULMONARY Mildly diminished at the bases - nonlabored respirations at rest on nasal cannula oxygenation   ABDOMEN   Soft - nontender/nondistended - active bowel sounds   MUSCULOSKELETAL   Motor strength/range of motion deconditioned - bilateral distal LE pitting edema with venous stasis changes   NEUROLOGIC   Alert/oriented at baseline   PSYCHIATRIC   Mood/affect stable         Additional Data:     Labs & Recent Cultures:    Results from last 7 days   Lab Units 11/15/23  0518 23  0505 23  0530   WBC Thousand/uL 7.61   < > 8.24   HEMOGLOBIN g/dL 9.6*   < > 11.8*   HEMATOCRIT % 32.2*   < > 38.8   PLATELETS Thousands/uL 123*   < > 141*   NEUTROS PCT %  --   --  80*   LYMPHS PCT %  --   --  6*   MONOS PCT %  --   --  8   EOS PCT %  --   --  4    < > = values in this interval not displayed.      Results from last 7 days   Lab Units 11/15/23  0518 23  0630   POTASSIUM mmol/L 3.4* 3.7   CHLORIDE mmol/L 106 106   CO2 mmol/L 31 31   BUN mg/dL 39* 47*   CREATININE mg/dL 1.50* 1.70*   CALCIUM mg/dL 7.6* 7.8*   ALK PHOS U/L  --  70   ALT U/L  --  27   AST U/L  --  20 Results from last 7 days   Lab Units 11/15/23  0518   INR  1.49*     Results from last 7 days   Lab Units 11/15/23  1604 11/15/23  1108 11/15/23  0714 11/14/23  2050 11/14/23  1702 11/14/23  1133 11/14/23  0708 11/13/23  2120 11/13/23  1051 11/13/23  0711 11/12/23  2124 11/12/23  1621   POC GLUCOSE mg/dl 176* 172* 132 173* 148* 114 134 148* 96 111 164* 89         Results from last 7 days   Lab Units 11/15/23  0518 11/13/23  0505 11/11/23  2335   LACTIC ACID mmol/L  --   --  1.4   PROCALCITONIN ng/ml 0.16 0.16 0.15         Results from last 7 days   Lab Units 11/11/23  2212   BLOOD CULTURE  No Growth at 72 hrs. No Growth at 72 hrs.          Lines/Drains:  Invasive Devices       Peripherally Inserted Central Catheter Line  Duration             PICC Line 11/13/23 Left Brachial 2 days              Drain  Duration             Chest Tube 1 Left Midaxillary;Pleural 10 Fr. 1 day                      Last 24 Hours Medication List:   Current Facility-Administered Medications   Medication Dose Route Frequency Provider Last Rate    acetaminophen  650 mg Oral Q6H PRN RADHA Smion      amiodarone (CORDARONE) 900 mg in dextrose 5 % 500 mL infusion  0.5 mg/min Intravenous Continuous Jazzmine Robles MD 0.5 mg/min (11/15/23 1520)    bumetanide  1 mg Intravenous BID RADHA Marshall      chlorhexidine  15 mL Mouth/Throat Q12H [facility] Gwendalyn Ready, RADHA      enoxaparin  1 mg/kg Subcutaneous Q12H [facility] Jazzmine Robles MD      finasteride  5 mg Oral Daily Gwendalyn Ready, RADHA      folic acid  1 mg Oral Daily Gwendalyn Ready, RADHA      insulin lispro  1-6 Units Subcutaneous HS Gwendalyn Ready, CRNP      insulin lispro  1-6 Units Subcutaneous TID AC RADHA Simon      metoprolol tartrate  100 mg Oral Q12H [facility] RADHA Marshall      polyethylene glycol  17 g Oral Daily PRN RADHA Simon      pravastatin  20 mg Oral Daily With [employer]      senna-docusate sodium  2 tablet Oral HS RADHA Simon                      ** Please Note: This note is constructed using a voice recognition dictation system. An occasional wrong word/phrase or “sound-a-like” substitution may have been picked up by dictation device due to the inherent limitations of voice recognition software. Read the chart carefully and recognize, using reasonable context, where substitutions may have occurred. **

## 2023-11-15 NOTE — DISCHARGE INSTR - OTHER ORDERS
Skin and Wound Care Plan:   1- Apply skin nourishing cream to the skin daily  2- Elevate heels off of bed with 2 pillows to offload  3- Use EHOB offloading cushion on chair when OOB  4- Turn and reposition patient Q2 hours  5- Apply 3M No Sting barrier film to the right heel wound, then apply Allevyn life heel foams to bilateral heels, joseph left with P, joseph right with T, date, and peel back daily for skin assessment, change every 3 days or with soilage or dislodgement. 6- Cleanse sacrum and upper buttocks with Remedy foaming cleanser, pat dry. Apply Triad paste to the wound beds and cover with Allevyn life silicone bordered foam dressing, joseph with T, date, change every other day and PRN  7- Cleanse wounds to the left posterior arm and right anterior lower leg with NSS, pat dry.  Apply Triad paste to the wound beds, cover with Allevyn life silicone bordered foam dressing, joseph with T, date, change every other day and PRN      Recommend [facility] with [facility] on discharge for bilateral lower leg edema and wounds to the sacrum and right lower leg

## 2023-11-15 NOTE — PROGRESS NOTES
Progress Note - Pulmonary   Natalyaldean Moat 69 y.o. 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- RUL lung mass   Congestive heart failure  Hx DVT/PE  A-fib W/RVR    Plan/discussion:  Initially recommended tunneled pleural catheter, underwent 10 [place] chest tube 11/14, removed 2.8 L of fluid at the time of insertion  Over the past 24 hours, at least 1 L of output from the right chest tube  Most likely malignancy/on CHF/hypervolemia  CT chest today showing small right pneumo ex vacuo/entrapped lung, with RLL mild atelectasis  Continue chest tube drainage    Given the high pleural fluid output, recommend tunneled pleural catheter placement, discuss with IR    Pulmonary will sign off, please do not hesitate to call with any questions    Subjective:   No overnight acute events. Still feeling better with the ongoing drainage, now back to baseline oxygen 2-3 LPM.    Vitals: Blood pressure 109/72, pulse (!) 132, temperature 98 °F (36.7 °C), temperature source Tympanic, resp. rate 18, height 5' 9" (1.753 m), weight 93.6 kg (206 lb 5.6 oz), SpO2 96 %. , Body mass index is 30.47 kg/m². Intake/Output Summary (Last 24 hours) at 11/15/2023 1803  Last data filed at 11/15/2023 1639  Gross per 24 hour   Intake 1956.09 ml   Output 3360 ml   Net -1403.91 ml       Physical Exam  Gen: not in acute distress, obese Body mass index is 30.47 kg/m².    HEENT:supple, no accessory muscle use, no JVD appreciated  Cardiac: Irregular tachycardia, no murmurs appreciated  Lungs: normal respiratory effort, + crackles posterior/basal, right chest tube in place  Abdomen: soft, nontender, normoactive bowel sounds  Extremities: 1+ edema below knees edema  Neuro: AAO X3, no focal motor deficit    Labs: I have personally reviewed pertinent lab results.         Amalia Tubbs MD

## 2023-11-16 ENCOUNTER — APPOINTMENT (INPATIENT)
Dept: INTERVENTIONAL RADIOLOGY/VASCULAR | Facility: HOSPITAL | Age: 78
DRG: 180 | End: 2023-11-16
Attending: RADIOLOGY
Payer: MEDICARE

## 2023-11-16 LAB
ANION GAP SERPL CALCULATED.3IONS-SCNC: 6 MMOL/L
BASOPHILS # BLD AUTO: 0.04 THOUSANDS/ÂΜL (ref 0–0.1)
BASOPHILS NFR BLD AUTO: 1 % (ref 0–1)
BUN SERPL-MCNC: 41 MG/DL (ref 5–25)
CALCIUM SERPL-MCNC: 7.6 MG/DL (ref 8.4–10.2)
CHLORIDE SERPL-SCNC: 105 MMOL/L (ref 96–108)
CO2 SERPL-SCNC: 30 MMOL/L (ref 21–32)
CREAT SERPL-MCNC: 1.53 MG/DL (ref 0.6–1.3)
EOSINOPHIL # BLD AUTO: 0.36 THOUSAND/ÂΜL (ref 0–0.61)
EOSINOPHIL NFR BLD AUTO: 4 % (ref 0–6)
ERYTHROCYTE [DISTWIDTH] IN BLOOD BY AUTOMATED COUNT: 17.2 % (ref 11.6–15.1)
GFR SERPL CREATININE-BSD FRML MDRD: 43 ML/MIN/1.73SQ M
GLUCOSE SERPL-MCNC: 103 MG/DL (ref 65–140)
GLUCOSE SERPL-MCNC: 117 MG/DL (ref 65–140)
GLUCOSE SERPL-MCNC: 121 MG/DL (ref 65–140)
GLUCOSE SERPL-MCNC: 87 MG/DL (ref 65–140)
HCT VFR BLD AUTO: 33.5 % (ref 36.5–49.3)
HGB BLD-MCNC: 10.3 G/DL (ref 12–17)
IMM GRANULOCYTES # BLD AUTO: 0.08 THOUSAND/UL (ref 0–0.2)
IMM GRANULOCYTES NFR BLD AUTO: 1 % (ref 0–2)
INR PPP: 1.34 (ref 0.84–1.19)
LYMPHOCYTES # BLD AUTO: 0.78 THOUSANDS/ÂΜL (ref 0.6–4.47)
LYMPHOCYTES NFR BLD AUTO: 9 % (ref 14–44)
MAGNESIUM SERPL-MCNC: 2.1 MG/DL (ref 1.9–2.7)
MCH RBC QN AUTO: 32.6 PG (ref 26.8–34.3)
MCHC RBC AUTO-ENTMCNC: 30.7 G/DL (ref 31.4–37.4)
MCV RBC AUTO: 106 FL (ref 82–98)
MONOCYTES # BLD AUTO: 0.64 THOUSAND/ÂΜL (ref 0.17–1.22)
MONOCYTES NFR BLD AUTO: 8 % (ref 4–12)
NEUTROPHILS # BLD AUTO: 6.59 THOUSANDS/ÂΜL (ref 1.85–7.62)
NEUTS SEG NFR BLD AUTO: 77 % (ref 43–75)
NRBC BLD AUTO-RTO: 0 /100 WBCS
PLATELET # BLD AUTO: 142 THOUSANDS/UL (ref 149–390)
PMV BLD AUTO: 11.3 FL (ref 8.9–12.7)
POTASSIUM SERPL-SCNC: 4.3 MMOL/L (ref 3.5–5.3)
PROTHROMBIN TIME: 16.7 SECONDS (ref 11.6–14.5)
RBC # BLD AUTO: 3.16 MILLION/UL (ref 3.88–5.62)
SODIUM SERPL-SCNC: 141 MMOL/L (ref 135–147)
WBC # BLD AUTO: 8.49 THOUSAND/UL (ref 4.31–10.16)

## 2023-11-16 PROCEDURE — 82948 REAGENT STRIP/BLOOD GLUCOSE: CPT

## 2023-11-16 PROCEDURE — 32550 INSERT PLEURAL CATH: CPT | Performed by: RADIOLOGY

## 2023-11-16 PROCEDURE — 99152 MOD SED SAME PHYS/QHP 5/>YRS: CPT

## 2023-11-16 PROCEDURE — 99152 MOD SED SAME PHYS/QHP 5/>YRS: CPT | Performed by: RADIOLOGY

## 2023-11-16 PROCEDURE — C1729 CATH, DRAINAGE: HCPCS

## 2023-11-16 PROCEDURE — 80048 BASIC METABOLIC PNL TOTAL CA: CPT | Performed by: INTERNAL MEDICINE

## 2023-11-16 PROCEDURE — 99232 SBSQ HOSP IP/OBS MODERATE 35: CPT | Performed by: NURSE PRACTITIONER

## 2023-11-16 PROCEDURE — 0W9930Z DRAINAGE OF RIGHT PLEURAL CAVITY WITH DRAINAGE DEVICE, PERCUTANEOUS APPROACH: ICD-10-PCS | Performed by: RADIOLOGY

## 2023-11-16 PROCEDURE — 83735 ASSAY OF MAGNESIUM: CPT | Performed by: INTERNAL MEDICINE

## 2023-11-16 PROCEDURE — 75989 ABSCESS DRAINAGE UNDER X-RAY: CPT | Performed by: RADIOLOGY

## 2023-11-16 PROCEDURE — 85610 PROTHROMBIN TIME: CPT | Performed by: INTERNAL MEDICINE

## 2023-11-16 PROCEDURE — NC001 PR NO CHARGE: Performed by: RADIOLOGY

## 2023-11-16 PROCEDURE — 99153 MOD SED SAME PHYS/QHP EA: CPT

## 2023-11-16 PROCEDURE — 99232 SBSQ HOSP IP/OBS MODERATE 35: CPT | Performed by: INTERNAL MEDICINE

## 2023-11-16 PROCEDURE — 85025 COMPLETE CBC W/AUTO DIFF WBC: CPT | Performed by: INTERNAL MEDICINE

## 2023-11-16 PROCEDURE — 32550 INSERT PLEURAL CATH: CPT

## 2023-11-16 RX ORDER — MIDAZOLAM HYDROCHLORIDE 2 MG/2ML
INJECTION, SOLUTION INTRAMUSCULAR; INTRAVENOUS AS NEEDED
Status: DISCONTINUED | OUTPATIENT
Start: 2023-11-16 | End: 2023-11-22 | Stop reason: HOSPADM

## 2023-11-16 RX ORDER — FENTANYL CITRATE 50 UG/ML
INJECTION, SOLUTION INTRAMUSCULAR; INTRAVENOUS AS NEEDED
Status: DISCONTINUED | OUTPATIENT
Start: 2023-11-16 | End: 2023-11-22 | Stop reason: HOSPADM

## 2023-11-16 RX ORDER — LIDOCAINE HYDROCHLORIDE 10 MG/ML
INJECTION, SOLUTION EPIDURAL; INFILTRATION; INTRACAUDAL; PERINEURAL AS NEEDED
Status: DISCONTINUED | OUTPATIENT
Start: 2023-11-16 | End: 2023-11-22 | Stop reason: HOSPADM

## 2023-11-16 RX ORDER — DIGOXIN 0.25 MG/ML
125 INJECTION INTRAMUSCULAR; INTRAVENOUS EVERY 8 HOURS
Status: COMPLETED | OUTPATIENT
Start: 2023-11-16 | End: 2023-11-17

## 2023-11-16 RX ADMIN — FENTANYL CITRATE 50 MCG: 50 INJECTION, SOLUTION INTRAMUSCULAR; INTRAVENOUS at 15:26

## 2023-11-16 RX ADMIN — FENTANYL CITRATE 50 MCG: 50 INJECTION, SOLUTION INTRAMUSCULAR; INTRAVENOUS at 15:39

## 2023-11-16 RX ADMIN — MIDAZOLAM HYDROCHLORIDE 1 MG: 1 INJECTION, SOLUTION INTRAMUSCULAR; INTRAVENOUS at 15:39

## 2023-11-16 RX ADMIN — AMIODARONE HYDROCHLORIDE 0.5 MG/MIN: 50 INJECTION, SOLUTION INTRAVENOUS at 09:33

## 2023-11-16 RX ADMIN — LIDOCAINE HYDROCHLORIDE 10 ML: 10 INJECTION, SOLUTION EPIDURAL; INFILTRATION; INTRACAUDAL; PERINEURAL at 15:30

## 2023-11-16 RX ADMIN — METOPROLOL TARTRATE 100 MG: 50 TABLET, FILM COATED ORAL at 21:45

## 2023-11-16 RX ADMIN — DIGOXIN 125 MCG: 0.25 INJECTION INTRAMUSCULAR; INTRAVENOUS at 11:48

## 2023-11-16 RX ADMIN — MIDAZOLAM HYDROCHLORIDE 1 MG: 1 INJECTION, SOLUTION INTRAMUSCULAR; INTRAVENOUS at 16:06

## 2023-11-16 RX ADMIN — FENTANYL CITRATE 50 MCG: 50 INJECTION, SOLUTION INTRAMUSCULAR; INTRAVENOUS at 16:06

## 2023-11-16 RX ADMIN — CHLORHEXIDINE GLUCONATE 15 ML: 1.2 RINSE ORAL at 21:49

## 2023-11-16 RX ADMIN — MIDAZOLAM HYDROCHLORIDE 1 MG: 1 INJECTION, SOLUTION INTRAMUSCULAR; INTRAVENOUS at 15:26

## 2023-11-16 RX ADMIN — SENNOSIDES AND DOCUSATE SODIUM 2 TABLET: 8.6; 5 TABLET ORAL at 21:45

## 2023-11-16 RX ADMIN — DIGOXIN 125 MCG: 0.25 INJECTION INTRAMUSCULAR; INTRAVENOUS at 20:22

## 2023-11-16 RX ADMIN — ENOXAPARIN SODIUM 90 MG: 100 INJECTION SUBCUTANEOUS at 21:45

## 2023-11-16 RX ADMIN — LIDOCAINE HYDROCHLORIDE 10 ML: 10 INJECTION, SOLUTION EPIDURAL; INFILTRATION; INTRACAUDAL; PERINEURAL at 16:06

## 2023-11-16 RX ADMIN — CHLORHEXIDINE GLUCONATE 15 ML: 1.2 RINSE ORAL at 09:32

## 2023-11-16 NOTE — PROGRESS NOTES
Patient had good relief after left-sided thoracentesis. I specifically asked the patient today, if the tube placement on the right, or the thoracentesis on the left, gave more relief. He reported left side. Unfortunately, on the recent CT, the left-sided effusion has recurred to almost the same size. That is only 3 days ago. After long discussion with the family I think it is reasonable to place a Pleurx on the left. The right side is more complicated. He got a chest tube placed. There is some air in the superior segment of the right lower lobe. The majority of the lower lobe is still not aerated. On CT the bronchus is occluded. Even though there is some air in the superior segment, I doubt it is exchanging gas normally. Certainly, the rest of the lower lobe is not doing anything. So I think converting the chest tube to a Pleurx would probably not do much in terms of respiratory status. There is currently a small ex vacuo pneumothorax on the right. The right upper lobe is hyperexpanded. However, the right side is also putting out a great deal of fluid. We do not really know if the fluid will equilibrate, and take the place of the collapsed right lower lobe, or if it will eventually develop positive pressure, mass effect, or affect his right upper lobe. So a Pleurx on the right could be placed simply to keep the right-sided effusion from getting to the upper lobe. I think blindly connecting a right Pleurx to a 2 L vacuum bottle will probably cause another ex vacuo pneumothorax. We would have to manage the right side much more eloquently than the left. Including after the patient is sent home. So I think we should place a Pleurx on the left. But placing a Pleurx on the right will probably require more diligent management. We will have to educate the family on the significance of air bubbles in the tube. We will probably have to tap the right side less than the left.   Will probably need frequent chest x-ray follow-up until we have a routine for the right side.

## 2023-11-16 NOTE — PROGRESS NOTES
[address]  Progress Note  Name: Muna Nguyễn  MRN: [de-identified]  Unit/Bed#: ICU 05-01 I Date of Admission: 11/11/2023   Date of Service: 11/16/2023 I Hospital Day: 5    Assessment/Plan   Acute on chronic HFrEF (heart failure with reduced ejection fraction) (HCC)  Assessment & Plan  Wt Readings from Last 3 Encounters:   11/15/23 93.6 kg (206 lb 5.6 oz)   11/03/23 84.9 kg (187 lb 2.7 oz)   10/23/23 81.3 kg (179 lb 3.2 oz)     -Possibly tachyarrhythmia mediated cardiomyopathy however cannot completely exclude underlying CAD or direct cardiotoxicity from prior chemotherapeutic agents  Pt still appears significantly volume overloaded   Continue IV diuretics    Atrial fibrillation ([address])  Assessment & Plan  Rates still elevated-120's-130's despite Cardizem bolus, amiodarone  Amiodarone bolus and drip initiated by medical team  Add digoxin . 125 mg  Continue heparin infusion with transition to Coumadin      * Acute on chronic respiratory failure with hypoxia (HCC)  Assessment & Plan  -Likely mixed picture in the setting of recurrent pleural effusions, volume overload and lung cancer  Reports improvement in breathing following thoracentesis Monday. R-sdied chest tube placed Tuesday, allowing for reduction in supplemental O2  Continue IV Bumex for diuresis           Outpatient Cardiologist: none      Subjective:   Patient seen and examined. No significant events overnight. Breathing stable, no cardiac complaints. Summary comments:  Heart rates remain elevated despite amiodarone bolus/infusion and additional Cardizem bolus. May be compensatory given hypotension, though metoprolol dose held yesterday for hypotension. Will add low dose digoxin for improved rate control. Still volume overloaded, but not receiving all scheduled diuretic doses due to hypotension. Continue to use bumex as scheduled.       Telemetry/ECG/Cardiac testing:   Echo 10/4/2023  EF 35%, severe global hypokinesis  Biatrial enlargement  Mild-mod AI, mod MR, mild TR  Dilated IVC  Small pericardial effusion      Vitals: Blood pressure 103/69, pulse (!) 111, temperature 98.3 °F (36.8 °C), temperature source Temporal, resp. rate 22, height 5' 9" (1.753 m), weight 93.6 kg (206 lb 5.6 oz), SpO2 91 %.,   Orthostatic Blood Pressures      Flowsheet Row Most Recent Value   Blood Pressure 103/69 filed at 2023 1100   Patient Position - Orthostatic VS Lying filed at 2023 1100        ,   Weight (last 2 days)       Date/Time Weight    11/15/23 0550 93.6 (206.35)    23 0600 93.5 (206.13)            Physical Exam:    General:  Normal appearance in no distress. Eyes:  Anicteric. Oral mucosa:  Moist.  Neck:  No JVD. Carotid upstrokes are brisk without bruits. No masses. Chest:  Clear to auscultation   Cardiac:  irregularly irregular, tachycardic. No palpable PMI. Normal S1 and S2. No murmur gallop or rub. Abdomen:  Soft and nontender. No palpable organomegaly or aortic enlargement. Extremities: Generalized edema, +2 bilat LE, +1 bilat UE  Neuro:  Grossly symmetric. Psych:  Alert and oriented x3.       Medications:      Current Facility-Administered Medications:     acetaminophen (TYLENOL) tablet 650 mg, 650 mg, Oral, Q6H PRN, RADHA Agarwal    [] amiodarone (CORDARONE) 900 mg in dextrose 5 % 500 mL infusion, 1 mg/min, Intravenous, Continuous, Stopped at 11/15/23 1520 **FOLLOWED BY** amiodarone (CORDARONE) 900 mg in dextrose 5 % 500 mL infusion, 0.5 mg/min, Intravenous, Continuous, Almita Rios MD, Last Rate: 16.7 mL/hr at 23 0933, 0.5 mg/min at 23 0933    bumetanide (BUMEX) injection 1 mg, 1 mg, Intravenous, BID, RADHA Agarwal, 1 mg at 11/15/23 1721    chlorhexidine (PERIDEX) 0.12 % oral rinse 15 mL, 15 mL, Mouth/Throat, Q12H [facility], RADHA Agarwal, 15 mL at 23 0932    enoxaparin (LOVENOX) subcutaneous injection 90 mg, 1 mg/kg, Subcutaneous, Q12H [facility], Almita Rios MD, 90 mg at 11/15/23 2042    finasteride (PROSCAR) tablet 5 mg, 5 mg, Oral, Daily, Deng Score, CRNP, 5 mg at 03/74/68 9783    folic acid (FOLVITE) tablet 1 mg, 1 mg, Oral, Daily, Deng Score, CRNP, 1 mg at 11/15/23 0800    insulin lispro (HumaLOG) 100 units/mL subcutaneous injection 1-6 Units, 1-6 Units, Subcutaneous, HS, Deng Score, CRNP, 1 Units at 11/15/23 2213    insulin lispro (HumaLOG) 100 units/mL subcutaneous injection 1-6 Units, 1-6 Units, Subcutaneous, TID AC, 1 Units at 11/15/23 1616 **AND** Fingerstick Glucose (POCT), , , 4x Daily AC and at bedtime, Deng Score, CRNP    metoprolol tartrate (LOPRESSOR) tablet 100 mg, 100 mg, Oral, Q12H [facility], [facility] SHAJI Gaxiola, CRNP, 100 mg at 11/15/23 2042    polyethylene glycol (MIRALAX) packet 17 g, 17 g, Oral, Daily PRN, Deng Score, CRNP    pravastatin (PRAVACHOL) tablet 20 mg, 20 mg, Oral, Daily With Dinner, Deng Score, CRNP, 20 mg at 11/15/23 1616    senna-docusate sodium (SENOKOT S) 8.6-50 mg per tablet 2 tablet, 2 tablet, Oral, HS, Deng Score, CRNP, 2 tablet at 11/15/23 2213     Labs & Results:    Troponins:    Results from last 7 days   Lab Units 11/11/23  2335 11/11/23 2212 11/11/23 2012   HS TNI 0HR ng/L  --   --  8   HS TNI 2HR ng/L  --  11  --    HSTNI D2 ng/L  --  3  --    HS TNI 4HR ng/L 9  --   --    HSTNI D4 ng/L 1  --   --         BNP:   Results from last 6 Months   Lab Units 11/12/23  2315 10/27/23  1919   BNP pg/mL 733* 625*     CBC with diff:   Results from last 7 days   Lab Units 11/16/23  0522 11/15/23  0518   WBC Thousand/uL 8.49 7.61   HEMOGLOBIN g/dL 10.3* 9.6*   HEMATOCRIT % 33.5* 32.2*   MCV fL 106* 105*   PLATELETS Thousands/uL 142* 123*     TSH:     CMP:   Results from last 7 days   Lab Units 11/16/23  0522 11/15/23  0518 11/14/23  0630 11/13/23  1710 11/13/23  0505   POTASSIUM mmol/L 4.3 3.4* 3.7   < > 3.5   CHLORIDE mmol/L 105 106 106   < > 107   CO2 mmol/L 30 31 31   < > 30   BUN mg/dL 41* 39* 47*   < > 44*   CREATININE mg/dL 1.53* 1.50* 1.70*   < > 1.69*   AST U/L  --   --  20  --  23   ALT U/L  --   --  27  --  32   EGFR ml/min/1.73sq m 43 44 38   < > 38    < > = values in this interval not displayed.      Lipid Profile:     Coags:   Results from last 7 days   Lab Units 11/16/23  0522 11/15/23  0518 11/14/23  0630   INR  1.34* 1.49* 1.25*

## 2023-11-16 NOTE — PROCEDURES
Interventional Radiology Procedure Note    PATIENT NAME: Tani Macdonald  : 1945  MRN: [id number]     Pre-op Diagnosis:   1. Pleural effusion    2. Pneumonia    3. Atrial fibrillation, unspecified type ([address])    4. Malignant neoplasm of upper lobe of right lung ([address])    5. Chest tube in place      2. Trapped lung on the right, but rapid accumulation of fluid. The left lung had trace effusion in the past, but now it is larger. Tapping that seems to give him some relief. It reaccumulated in just 3 days. Post-op Diagnosis:   1. Pleural effusion    2. Pneumonia    3. Atrial fibrillation, unspecified type ([address])    4. Malignant neoplasm of upper lobe of right lung ([address])    5. Chest tube in place      2. Same    Procedure: Left Pleurx placement, [place]  Right Pleurx placement, conversion of existing chest tube    Surgeon:   Clare Montoya MD  Assistants:     No qualified resident was available, Resident is only observing    Estimated Blood Loss: Minimal     Findings: Reexpansion of left lung. There was not much fluid in the right chest cavity, because the chest tube has been in. The real issue now is how to we get the fluid out of the right side, without over draining it and giving him a recurrent ex vacuo pneumothorax. I think we will deal with that as an outpatient. I will get a chest x-ray for the morning.     Specimens: None     Complications:  None     Anesthesia: conscious sedation and local    Clare Montoya MD     Date: 2023  Time: 6:16 PM

## 2023-11-16 NOTE — PLAN OF CARE
Problem: PAIN - ADULT  Goal: Verbalizes/displays adequate comfort level or baseline comfort level  Description: Interventions:  - Encourage patient to monitor pain and request assistance  - Assess pain using appropriate pain scale  - Administer analgesics based on type and severity of pain and evaluate response  - Implement non-pharmacological measures as appropriate and evaluate response  - Consider cultural and social influences on pain and pain management  - Notify physician/advanced practitioner if interventions unsuccessful or patient reports new pain  Outcome: Progressing     Problem: INFECTION - ADULT  Goal: Absence or prevention of progression during hospitalization  Description: INTERVENTIONS:  - Assess and monitor for signs and symptoms of infection  - Monitor lab/diagnostic results  - Monitor all insertion sites, i.e. indwelling lines, tubes, and drains  - Monitor endotracheal if appropriate and nasal secretions for changes in amount and color  - [place] appropriate cooling/warming therapies per order  - Administer medications as ordered  - Instruct and encourage patient and family to use good hand hygiene technique  - Identify and instruct in appropriate isolation precautions for identified infection/condition  Outcome: Progressing     Problem: SAFETY ADULT  Goal: Patient will remain free of falls  Description: INTERVENTIONS:  - Educate patient/family on patient safety including physical limitations  - Instruct patient to call for assistance with activity   - Consult OT/PT to assist with strengthening/mobility   - Keep Call bell within reach  - Keep bed low and locked with side rails adjusted as appropriate  - Keep care items and personal belongings within reach  - Initiate and maintain comfort rounds  - Make Fall Risk Sign visible to staff  - Offer Toileting every 2 Hours, in advance of need  - Initiate/Maintain bed alarm  - Obtain necessary fall risk management equipment:   - Apply yellow socks and bracelet for high fall risk patients  - Consider moving patient to room near nurses station  Outcome: Progressing  Goal: Maintain or return to baseline ADL function  Description: INTERVENTIONS:  -  Assess patient's ability to carry out ADLs; assess patient's baseline for ADL function and identify physical deficits which impact ability to perform ADLs (bathing, care of mouth/teeth, toileting, grooming, dressing, etc.)  - Assess/evaluate cause of self-care deficits   - Assess range of motion  - Assess patient's mobility; develop plan if impaired  - Assess patient's need for assistive devices and provide as appropriate  - Encourage maximum independence but intervene and supervise when necessary  - Involve family in performance of ADLs  - Assess for home care needs following discharge   - Consider OT consult to assist with ADL evaluation and planning for discharge  - Provide patient education as appropriate  Outcome: Progressing  Goal: Maintains/Returns to pre admission functional level  Description: INTERVENTIONS:  - Perform BMAT or MOVE assessment daily.   - Set and communicate daily mobility goal to care team and patient/family/caregiver. - Collaborate with rehabilitation services on mobility goals if consulted  - Perform Range of Motion 3 times a day. - Reposition patient every 2 hours.   - Dangle patient 3 times a day  - Stand patient 3 times a day  - Ambulate patient 3 times a day  - Out of bed for meals   - Out of bed for toileting  - Record patient progress and toleration of activity level   Outcome: Progressing     Problem: DISCHARGE PLANNING  Goal: Discharge to home or other facility with appropriate resources  Description: INTERVENTIONS:  - Identify barriers to discharge w/patient and caregiver  - Arrange for needed discharge resources and transportation as appropriate  - Identify discharge learning needs (meds, wound care, etc.)  - Arrange for interpretive services to assist at discharge as needed  - Refer to Case Management Department for coordinating discharge planning if the patient needs post-hospital services based on physician/advanced practitioner order or complex needs related to functional status, cognitive ability, or social support system  Outcome: Progressing     Problem: Knowledge Deficit  Goal: Patient/family/caregiver demonstrates understanding of disease process, treatment plan, medications, and discharge instructions  Description: Complete learning assessment and assess knowledge base. Interventions:  - Provide teaching at level of understanding  - Provide teaching via preferred learning methods  Outcome: Progressing     Problem: MOBILITY - ADULT  Goal: Maintain or return to baseline ADL function  Description: INTERVENTIONS:  -  Assess patient's ability to carry out ADLs; assess patient's baseline for ADL function and identify physical deficits which impact ability to perform ADLs (bathing, care of mouth/teeth, toileting, grooming, dressing, etc.)  - Assess/evaluate cause of self-care deficits   - Assess range of motion  - Assess patient's mobility; develop plan if impaired  - Assess patient's need for assistive devices and provide as appropriate  - Encourage maximum independence but intervene and supervise when necessary  - Involve family in performance of ADLs  - Assess for home care needs following discharge   - Consider OT consult to assist with ADL evaluation and planning for discharge  - Provide patient education as appropriate  Outcome: Progressing  Goal: Maintains/Returns to pre admission functional level  Description: INTERVENTIONS:  - Perform BMAT or MOVE assessment daily.   - Set and communicate daily mobility goal to care team and patient/family/caregiver. - Collaborate with rehabilitation services on mobility goals if consulted  - Perform Range of Motion 3 times a day. - Reposition patient every 2 hours.   - Dangle patient 3 times a day  - Stand patient 3 times a day  - Ambulate patient 3 times a day  - Out of bed for meals   - Out of bed for toileting  - Record patient progress and toleration of activity level   Outcome: Progressing     Problem: NEUROSENSORY - ADULT  Goal: Achieves stable or improved neurological status  Description: INTERVENTIONS  - Monitor and report changes in neurological status  - Monitor vital signs such as temperature, blood pressure, glucose, and any other labs ordered   - Initiate measures to prevent increased intracranial pressure  - Monitor for seizure activity and implement precautions if appropriate      Outcome: Progressing  Goal: Remains free of injury related to seizures activity  Description: INTERVENTIONS  - Maintain airway, patient safety  and administer oxygen as ordered  - Monitor patient for seizure activity, document and report duration and description of seizure to physician/advanced practitioner  - If seizure occurs,  ensure patient safety during seizure  - Reorient patient post seizure  - Seizure pads on all 4 side rails  - Instruct patient/family to notify RN of any seizure activity including if an aura is experienced  - Instruct patient/family to call for assistance with activity based on nursing assessment  - Administer anti-seizure medications if ordered    Outcome: Progressing  Goal: Achieves maximal functionality and self care  Description: INTERVENTIONS  - Monitor swallowing and airway patency with patient fatigue and changes in neurological status  - Encourage and assist patient to increase activity and self care.    - Encourage visually impaired, hearing impaired and aphasic patients to use assistive/communication devices  Outcome: Progressing     Problem: CARDIOVASCULAR - ADULT  Goal: Maintains optimal cardiac output and hemodynamic stability  Description: INTERVENTIONS:  - Monitor I/O, vital signs and rhythm  - Monitor for S/S and trends of decreased cardiac output  - Administer and titrate ordered vasoactive medications to optimize hemodynamic stability  - Assess quality of pulses, skin color and temperature  - Assess for signs of decreased coronary artery perfusion  - Instruct patient to report change in severity of symptoms  Outcome: Progressing  Goal: Absence of cardiac dysrhythmias or at baseline rhythm  Description: INTERVENTIONS:  - Continuous cardiac monitoring, vital signs, obtain 12 lead EKG if ordered  - Administer antiarrhythmic and heart rate control medications as ordered  - Monitor electrolytes and administer replacement therapy as ordered  Outcome: Progressing     Problem: RESPIRATORY - ADULT  Goal: Achieves optimal ventilation and oxygenation  Description: INTERVENTIONS:  - Assess for changes in respiratory status  - Assess for changes in mentation and behavior  - Position to facilitate oxygenation and minimize respiratory effort  - Oxygen administered by appropriate delivery if ordered  - Initiate smoking cessation education as indicated  - Encourage broncho-pulmonary hygiene including cough, deep breathe, Incentive Spirometry  - Assess the need for suctioning and aspirate as needed  - Assess and instruct to report SOB or any respiratory difficulty  - Respiratory Therapy support as indicated  Outcome: Progressing     Problem: GASTROINTESTINAL - ADULT  Goal: Minimal or absence of nausea and/or vomiting  Description: INTERVENTIONS:  - Administer IV fluids if ordered to ensure adequate hydration  - Maintain NPO status until nausea and vomiting are resolved  - Nasogastric tube if ordered  - Administer ordered antiemetic medications as needed  - Provide nonpharmacologic comfort measures as appropriate  - Advance diet as tolerated, if ordered  - Consider nutrition services referral to assist patient with adequate nutrition and appropriate food choices  Outcome: Progressing  Goal: Maintains or returns to baseline bowel function  Description: INTERVENTIONS:  - Assess bowel function  - Encourage oral fluids to ensure adequate hydration  - Administer IV fluids if ordered to ensure adequate hydration  - Administer ordered medications as needed  - Encourage mobilization and activity  - Consider nutritional services referral to assist patient with adequate nutrition and appropriate food choices  Outcome: Progressing  Goal: Maintains adequate nutritional intake  Description: INTERVENTIONS:  - Monitor percentage of each meal consumed  - Identify factors contributing to decreased intake, treat as appropriate  - Assist with meals as needed  - Monitor I&O, weight, and lab values if indicated  - Obtain nutrition services referral as needed  Outcome: Progressing  Goal: Establish and maintain optimal ostomy function  Description: INTERVENTIONS:  - Assess bowel function  - Encourage oral fluids to ensure adequate hydration  - Administer IV fluids if ordered to ensure adequate hydration   - Administer ordered medications as needed  - Encourage mobilization and activity  - Nutrition services referral to assist patient with appropriate food choices  - Assess stoma site  - Consider wound care consult   Outcome: Progressing  Goal: Oral mucous membranes remain intact  Description: INTERVENTIONS  - Assess oral mucosa and hygiene practices  - Implement preventative oral hygiene regimen  - Implement oral medicated treatments as ordered  - Initiate Nutrition services referral as needed  Outcome: Progressing     Problem: GENITOURINARY - ADULT  Goal: Maintains or returns to baseline urinary function  Description: INTERVENTIONS:  - Assess urinary function  - Encourage oral fluids to ensure adequate hydration if ordered  - Administer IV fluids as ordered to ensure adequate hydration  - Administer ordered medications as needed  - Offer frequent toileting  - Follow urinary retention protocol if ordered  Outcome: Progressing  Goal: Absence of urinary retention  Description: INTERVENTIONS:  - Assess patient’s ability to void and empty bladder  - Monitor I/O  - Bladder scan as needed  - Discuss with physician/AP medications to alleviate retention as needed  - Discuss catheterization for long term situations as appropriate  Outcome: Progressing     Problem: METABOLIC, FLUID AND ELECTROLYTES - ADULT  Goal: Electrolytes maintained within normal limits  Description: INTERVENTIONS:  - Monitor labs and assess patient for signs and symptoms of electrolyte imbalances  - Administer electrolyte replacement as ordered  - Monitor response to electrolyte replacements, including repeat lab results as appropriate  - Instruct patient on fluid and nutrition as appropriate  Outcome: Progressing  Goal: Fluid balance maintained  Description: INTERVENTIONS:  - Monitor labs   - Monitor I/O and WT  - Instruct patient on fluid and nutrition as appropriate  - Assess for signs & symptoms of volume excess or deficit  Outcome: Progressing  Goal: Glucose maintained within target range  Description: INTERVENTIONS:  - Monitor Blood Glucose as ordered  - Assess for signs and symptoms of hyperglycemia and hypoglycemia  - Administer ordered medications to maintain glucose within target range  - Assess nutritional intake and initiate nutrition service referral as needed  Outcome: Progressing     Problem: SKIN/TISSUE INTEGRITY - ADULT  Goal: Skin Integrity remains intact(Skin Breakdown Prevention)  Description: Assess:  -Perform Albert assessment every shift  -Clean and moisturize skin every shift  -Inspect skin when repositioning, toileting, and assisting with ADLS  -Assess under medical devices such as allevyn every shift  -Assess extremities for adequate circulation and sensation     Bed Management:  -Have minimal linens on bed & keep smooth, unwrinkled  -Change linens as needed when moist or perspiring  -Avoid sitting or lying in one position for more than 2 hours while in bed  -Keep HOB at 30 degrees     Toileting:  -Offer bedside commode  -Assess for incontinence   -Use incontinent care products after each incontinent episode    Activity:  -Mobilize patient 3 times a day  -Encourage activity and walks on unit  -Encourage or provide ROM exercises   -Turn and reposition patient every 2 Hours  -Use appropriate equipment to lift or move patient in bed  -Instruct/ Assist with weight shifting   -Consider limitation of chair time     Skin Care:  -Avoid use of baby powder, tape, friction and shearing, hot water or constrictive clothing  -Relieve pressure over bony prominences using allevyn  -Do not massage red bony areas  Outcome: Progressing  Goal: Incision(s), wounds(s) or drain site(s) healing without S/S of infection  Description: INTERVENTIONS  - Assess and document dressing, incision, wound bed, drain sites and surrounding tissue  - Provide patient and family education  - Perform skin care/dressing changes every shift  Outcome: Progressing  Goal: Pressure injury heals and does not worsen  Description: Interventions:  - Implement low air loss mattress or specialty surface (Criteria met)  - Apply silicone foam dressing  - Instruct/assist with weight shifting every 60 minutes when in chair   - Limit chair time   - Use special pressure reducing interventions such as waffle cushion when in chair   - Apply fecal or urinary incontinence containment device   - Perform passive or active ROM every shift  - Turn and reposition patient & offload bony prominences every 2 hours   - Utilize friction reducing device or surface for transfers   - Use incontinent care products after each incontinent episode   - Consider nutrition services referral as needed  Outcome: Progressing     Problem: HEMATOLOGIC - ADULT  Goal: Maintains hematologic stability  Description: INTERVENTIONS  - Assess for signs and symptoms of bleeding or hemorrhage  - Monitor labs  - Administer supportive blood products/factors as ordered and appropriate  Outcome: Progressing     Problem: MUSCULOSKELETAL - ADULT  Goal: Maintain or return mobility to safest level of function  Description: INTERVENTIONS:  - Assess patient's ability to carry out ADLs; assess patient's baseline for ADL function and identify physical deficits which impact ability to perform ADLs (bathing, care of mouth/teeth, toileting, grooming, dressing, etc.)  - Assess/evaluate cause of self-care deficits   - Assess range of motion  - Assess patient's mobility  - Assess patient's need for assistive devices and provide as appropriate  - Encourage maximum independence but intervene and supervise when necessary  - Involve family in performance of ADLs  - Assess for home care needs following discharge   - Consider OT consult to assist with ADL evaluation and planning for discharge  - Provide patient education as appropriate  Outcome: Progressing  Goal: Maintain proper alignment of affected body part  Description: INTERVENTIONS:  - Support, maintain and protect limb and body alignment  - Provide patient/ family with appropriate education  Outcome: Progressing     Problem: Prexisting or High Potential for Compromised Skin Integrity  Goal: Skin integrity is maintained or improved  Description: INTERVENTIONS:  - Identify patients at risk for skin breakdown  - Assess and monitor skin integrity  - Assess and monitor nutrition and hydration status  - Monitor labs   - Assess for incontinence   - Turn and reposition patient  - Assist with mobility/ambulation  - Relieve pressure over bony prominences  - Avoid friction and shearing  - Provide appropriate hygiene as needed including keeping skin clean and dry  - Evaluate need for skin moisturizer/barrier cream  - Collaborate with interdisciplinary team   - Patient/family teaching  - Consider wound care consult   Outcome: Progressing

## 2023-11-16 NOTE — CASE MANAGEMENT
Case Management Discharge Planning Note    Patient name Marshal Echevarria  Location ICU 05/ICU 05- MRN [id number]  : 1945 Date 2023       Current Admission Date: 2023  Current Admission Diagnosis:Acute on chronic respiratory failure with hypoxia [facility])   Patient Active Problem List    Diagnosis Date Noted    Acute on chronic HFrEF (heart failure with reduced ejection fraction) ([address]) 2023    History of pulmonary embolus (PE) 2023    Deep vein thrombosis (DVT) of right lower extremity ([address]) 2023    Prostate CA ([address]) 2023    Acute on chronic respiratory failure with hypoxia ([address]) 2023    Supratherapeutic INR 10/28/2023    Cellulitis of extremity 10/27/2023    Cellulitis and abscess of left lower extremity 10/23/2023    Cellulitis of leg, right 10/23/2023    COPD with acute exacerbation ([address]) 10/05/2023    Atrial fibrillation ([address]) 10/03/2023    HILARY (acute kidney injury) ([address]) 10/03/2023    Abnormal CT of the abdomen 10/03/2023    Platelets decreased ([address]) 2023    Multiple lung nodules on CT 10/06/2022    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 ([address]) 10/06/2022    Pleural effusion 10/06/2022    Malignant neoplasm of upper lobe of right lung ([address])     Multiple subsegmental pulmonary emboli without acute cor pulmonale ([address]) 09/10/2022    Pulmonary mass 09/10/2022    Anemia 09/10/2022    Non-recurrent bilateral inguinal hernia without obstruction or gangrene 2021    Status post right tibial-talar ankle fusion 2020    BPH associated with nocturia 2019    Arthritis of right acromioclavicular joint 03/15/2019    Primary osteoarthritis of right shoulder 2019    Chronic left shoulder pain 2019    Left rotator cuff tear arthropathy 2019    Rotator cuff injury, right, initial encounter 2019    History of colon polyps 2019    Hyperparathyroidism ([address]) 2018    Hypokalemia 10/22/2018    Hypocalcemia 10/22/2018    Glaucoma 10/11/2018    Controlled type 2 diabetes mellitus without complication, without long-term current use of insulin ([address]) 07/25/2016    Inguinal hernia 02/08/2016    Benign essential HTN 01/12/2016    Hypercholesterolemia 01/12/2016      LOS (days): 5  Geometric Mean LOS (GMLOS) (days): 4.90  Days to GMLOS:0.3     OBJECTIVE:  Risk of Unplanned Readmission Score: 43.12      Current admission status: Inpatient   Preferred Pharmacy:   [address] [facility], [address] - [address] [facility] [address] [address]  Phone: [phone] Fax: [address] [place] [address], [address] [facility] [address] [place] [address]  Phone: [phone] Fax: [address] [place] [address] #[id number] - [facility], [address] [address]  Phone: [phone] Fax: [phone]    Primary Care Provider: Dona Segovia PA-C    Primary Insurance: MEDICARE  Secondary Insurance: Sherlyn Hidalgo DETAILS:  As per SLIM the pt will be going home with a PlSurvela Vac System. Will need a script from IR to order same. Notified [facility] of same.

## 2023-11-16 NOTE — ASSESSMENT & PLAN NOTE
-Likely mixed picture in the setting of recurrent pleural effusions, volume overload and lung cancer  Reports improvement in breathing following thoracentesis Monday.   R-sdied chest tube placed Tuesday, allowing for reduction in supplemental O2  Continue IV Bumex for diuresis

## 2023-11-16 NOTE — NURSING NOTE
Pt returned to room from IR procedure. Vitals stable on 3 L nasal cannula. See flow sheets. Pt sleepy but responds to physical stimuli. Pleurex sites clean and dry. No bleeding noted.

## 2023-11-16 NOTE — PROGRESS NOTES
[address]  Progress Note  Name: Zach Berg  MRN: [de-identified]  Unit/Bed#: ICU 05-01 I Date of Admission: 11/11/2023   Date of Service: 11/16/2023 I Hospital Day: 5      Assessment & Plan:    * Acute on chronic respiratory failure with hypoxia [facility])  Assessment & Plan  Likely multifactorial in the setting of b/l pleural effusion and acute CHF exacerbation   At baseline, requires 3L NC -> currently down to baseline requirement  Encourage cough, deep breathing, IS, ambulation as tolerated     Pleural effusion  Assessment & Plan  Noted on CT, R>L b/l pleural effusions   S/p L thoracentesis with IR on 11/9 as OP   S/p IR thoracentesis of L effusion 11/13 with 1.4L removed   CT of chest on 11/15 noted improvement in size of right pleural effusion, also with a small right pneumothorax  Underwent chest tube placement on 11/14 -> pulmonology input appreciated with recommendation of conversion of chest tube into Pleurx catheter, possibly on both sides, for longer-term management which has been discussed with IR - await IR evaluation and recommendations  Monitor respiratory status closely      Acute on chronic HFrEF (heart failure with reduced ejection fraction) ([address])  Assessment & Plan  Last ECHO with LVEF 35 % -> 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  On diuresis with Bumex - on beta-blockade with Lopressor  Monitor/replete serum potassium/magnesium deficiencies if present    Rapid atrial fibrillation ([address])  Assessment & Plan  Remains with intermittent RVR  Continue beta-blockade with Lopressor -> transitioned oral Amiodarone to a continuous infusion due to intermittent hypotensive states -> appreciate cardiology input with addition of a trial of Digoxin today  Anticoagulation transitioned to therapeutic Lovenox -> plan to bridge with Coumadin in the upcoming days once other acute issues stabilized     HILARY (acute kidney injury) [facility])  Assessment & Plan  Unclear etiology, possibly cardiorenal given CHF exacerbation  Baseline creatinine of approximately 0.9-1.1 -> remains elevated at 1.53 from a 1.96 peak  Monitor renal function and urine output - limit/avoid nephrotoxins and hypotension as possible  Noted Bumex use     Deep vein thrombosis (DVT) of right lower extremity ([address])  Assessment & Plan  See plan for pulmonary embolus below  Continue anticoagulation     History of pulmonary embolus (PE)  Assessment & Plan  Transitioned to therapeutic Lovenox (due to need for an Amiodarone infusion and only a single lumen, hence, cannot use a heparin drip anymore) -> eventual plan to transition to Coumadin     Malignant neoplasm of upper lobe of right lung [facility])  Assessment & Plan  Pt diagnosed with Stage 4 Lung Ca in 9/2022  S/p XRT completed June 2023   Currently undergoing chemo, last 9/19/23   OP follow up with heme/onc      History of prostate cancer  Assessment & Plan  Continue Proscar  Status post prior radiation in 2005     Hypercholesterolemia  Assessment & Plan  Continue statin     Type 2 diabetes mellitus ([address])  Assessment & Plan        Lab Results   Component Value Date     HGBA1C 6.0 10/23/2023      Continue SSI coverage per Accu-Cheks  Carbohydrate restriction  Hypoglycemia protocol     Benign essential HTN  Assessment & Plan  Continue Lopressor (w/ holding parameters for hypotension)       DVT Prophylaxis: therapeutic Lovenox    Patient Centered Rounds:  I have performed bedside rounds and discussed plan of care with nursing today. Discussions with Specialists or Other Care Team Provider:  see above assessments if applicable    Education and Discussions with Family / Patient:  Patient, along with multiple for members, at bedside today    Time Spent for Care:  35 minutes.  More than 50% of total time spent on counseling and coordination of care, on one or more of the following: performing physical exam; counseling and coordination of care, obtaining or reviewing history, documenting in the medical record, reviewing/ordering tests/medications/procedures, and communicating with other healthcare professionals. Current Length of Stay: 5 day(s)  Current Patient Status: Inpatient   Certification Statement:  Patient will continue to require additional hospital stay due to assessments as noted above. Code Status: Level 3 - DNAR and DNI        Subjective:     Seen and examined earlier today. Reports generalized improvement in shortness of breath after prior thoracenteses. Remains weak/fatigued. Objective:     Vitals:   Temp (24hrs), Av.4 °F (36.9 °C), Min:98 °F (36.7 °C), Max:98.9 °F (37.2 °C)    Temp:  [98 °F (36.7 °C)-98.9 °F (37.2 °C)] 98.3 °F (36.8 °C)  HR:  [110-139] 125  Resp:  [18-36] 22  BP: ()/(51-80) 115/80  SpO2:  [91 %-96 %] 94 %  Body mass index is 30.47 kg/m². Input and Output Summary (last 24 hours):        Intake/Output Summary (Last 24 hours) at 2023 1508  Last data filed at 2023 1300  Gross per 24 hour   Intake 1054.97 ml   Output 3150 ml   Net -2095.03 ml         Physical Exam:     GENERAL Remains weak/fatigued   HEAD   Normocephalic - atraumatic   EYES   PERRL - EOMI    MOUTH   Mucosa moist   NECK   Supple - full range of motion   CARDIAC Irregularly irregular and tachycardic - S1/S2 positive   PULMONARY Mildly diminished at the bases - nonlabored respirations at rest on nasal cannula oxygenation - right-sided chest tube in place   ABDOMEN   Soft - nontender/nondistended - active bowel sounds   MUSCULOSKELETAL   Motor strength/range of motion deconditioned - bilateral distal LE pitting edema with venous stasis changes   NEUROLOGIC   Alert/oriented at baseline   PSYCHIATRIC   Mood/affect stable         Additional Data:     Labs & Recent Cultures:    Results from last 7 days   Lab Units 23  0522   WBC Thousand/uL 8.49   HEMOGLOBIN g/dL 10.3*   HEMATOCRIT % 33.5*   PLATELETS Thousands/uL 142*   NEUTROS PCT % 77*   LYMPHS PCT % 9*   MONOS PCT % 8   EOS PCT % 4       Results from last 7 days   Lab Units 11/16/23  0522 11/15/23  0518 11/14/23  0630   POTASSIUM mmol/L 4.3   < > 3.7   CHLORIDE mmol/L 105   < > 106   CO2 mmol/L 30   < > 31   BUN mg/dL 41*   < > 47*   CREATININE mg/dL 1.53*   < > 1.70*   CALCIUM mg/dL 7.6*   < > 7.8*   ALK PHOS U/L  --   --  70   ALT U/L  --   --  27   AST U/L  --   --  20    < > = values in this interval not displayed. Results from last 7 days   Lab Units 11/16/23  0522   INR  1.34*       Results from last 7 days   Lab Units 11/16/23  1106 11/16/23  0747 11/15/23  2205 11/15/23  1604 11/15/23  1108 11/15/23  0714 11/14/23  2050 11/14/23  1702 11/14/23  1133 11/14/23  0708 11/13/23  2120 11/13/23  1051   POC GLUCOSE mg/dl 103 117 160* 176* 172* 132 173* 148* 114 134 148* 96           Results from last 7 days   Lab Units 11/15/23  0518 11/13/23  0505 11/11/23  2335   LACTIC ACID mmol/L  --   --  1.4   PROCALCITONIN ng/ml 0.16 0.16 0.15           Results from last 7 days   Lab Units 11/11/23  2212   BLOOD CULTURE  No Growth at 72 hrs. No Growth at 72 hrs.            Lines/Drains:  Invasive Devices       Peripherally Inserted Central Catheter Line  Duration             PICC Line 11/13/23 Left Brachial 2 days              Drain  Duration             Chest Tube 1 Left Midaxillary;Pleural 10 Fr. 1 day                      Last 24 Hours Medication List:   Current Facility-Administered Medications   Medication Dose Route Frequency Provider Last Rate    acetaminophen  650 mg Oral Q6H PRN Deng Score, CRNP      amiodarone (CORDARONE) 900 mg in dextrose 5 % 500 mL infusion  0.5 mg/min Intravenous Continuous Randall Richards MD 0.5 mg/min (11/16/23 0933)    bumetanide  1 mg Intravenous BID Deng Score, CRNP      chlorhexidine  15 mL Mouth/Throat Q12H [facility] RADHA Velazquez      digoxin  125 mcg Intravenous Q8H RADHA Buckley      enoxaparin  1 mg/kg Subcutaneous Q12H [facility] Randall Richards MD      finasteride  5 mg Oral Daily RADHA Lovelace      folic acid  1 mg Oral Daily RADHA Lovelace      insulin lispro  1-6 Units Subcutaneous HS RADHA Lovelace      insulin lispro  1-6 Units Subcutaneous TID AC RADHA Lovelace      metoprolol tartrate  100 mg Oral Q12H [facility] RADHA Marshall      polyethylene glycol  17 g Oral Daily PRN RADHA Lovelace      pravastatin  20 mg Oral Daily With Dinner RADHA Lovelace      senna-docusate sodium  2 tablet Oral HS RADHA Lovelace                      ** Please Note: This note is constructed using a voice recognition dictation system. An occasional wrong word/phrase or “sound-a-like” substitution may have been picked up by dictation device due to the inherent limitations of voice recognition software. Read the chart carefully and recognize, using reasonable context, where substitutions may have occurred. **

## 2023-11-16 NOTE — PLAN OF CARE
Problem: CARDIOVASCULAR - ADULT  Goal: Maintains optimal cardiac output and hemodynamic stability  Description: INTERVENTIONS:  - Monitor I/O, vital signs and rhythm  - Monitor for S/S and trends of decreased cardiac output  - Administer and titrate ordered vasoactive medications to optimize hemodynamic stability  - Assess quality of pulses, skin color and temperature  - Assess for signs of decreased coronary artery perfusion  - Instruct patient to report change in severity of symptoms  11/16/2023 0316 by Daisy Fraga RN  Outcome: Progressing  11/16/2023 0312 by Daisy Fraga RN  Outcome: Progressing  Goal: Absence of cardiac dysrhythmias or at baseline rhythm  Description: INTERVENTIONS:  - Continuous cardiac monitoring, vital signs, obtain 12 lead EKG if ordered  - Administer antiarrhythmic and heart rate control medications as ordered  - Monitor electrolytes and administer replacement therapy as ordered  11/16/2023 0316 by Daisy Fraga RN  Outcome: Progressing  Note: Maintained on Amiodarone drip @ 0.5 mg/min. CM showing Atrial fib with RVR ,rates in the 140-150's @ 2000PM.Diltiazem 10 mg IVP given as ordered by IFTIKHAR Akhtar. Denies chest pain,palpitations or any SOB. Maintained on O2 3L NC. Will continue to monitor.

## 2023-11-16 NOTE — ASSESSMENT & PLAN NOTE
Rates still elevated-120's-130's despite Cardizem bolus, amiodarone  Amiodarone bolus and drip initiated by medical team  Add digoxin . 125 mg  Continue heparin infusion with transition to Coumadin

## 2023-11-17 ENCOUNTER — APPOINTMENT (INPATIENT)
Dept: RADIOLOGY | Facility: HOSPITAL | Age: 78
DRG: 180 | End: 2023-11-17
Payer: MEDICARE

## 2023-11-17 LAB
ANION GAP SERPL CALCULATED.3IONS-SCNC: 4 MMOL/L
BACTERIA BLD CULT: NORMAL
BACTERIA BLD CULT: NORMAL
BASOPHILS # BLD AUTO: 0.04 THOUSANDS/ÂΜL (ref 0–0.1)
BASOPHILS NFR BLD AUTO: 1 % (ref 0–1)
BUN SERPL-MCNC: 37 MG/DL (ref 5–25)
CALCIUM SERPL-MCNC: 7.7 MG/DL (ref 8.4–10.2)
CHLORIDE SERPL-SCNC: 105 MMOL/L (ref 96–108)
CO2 SERPL-SCNC: 30 MMOL/L (ref 21–32)
CREAT SERPL-MCNC: 1.32 MG/DL (ref 0.6–1.3)
EOSINOPHIL # BLD AUTO: 0.29 THOUSAND/ÂΜL (ref 0–0.61)
EOSINOPHIL NFR BLD AUTO: 4 % (ref 0–6)
ERYTHROCYTE [DISTWIDTH] IN BLOOD BY AUTOMATED COUNT: 17.1 % (ref 11.6–15.1)
GFR SERPL CREATININE-BSD FRML MDRD: 51 ML/MIN/1.73SQ M
GLUCOSE SERPL-MCNC: 117 MG/DL (ref 65–140)
GLUCOSE SERPL-MCNC: 150 MG/DL (ref 65–140)
GLUCOSE SERPL-MCNC: 152 MG/DL (ref 65–140)
GLUCOSE SERPL-MCNC: 78 MG/DL (ref 65–140)
GLUCOSE SERPL-MCNC: 82 MG/DL (ref 65–140)
GLUCOSE SERPL-MCNC: 86 MG/DL (ref 65–140)
GLUCOSE SERPL-MCNC: 91 MG/DL (ref 65–140)
HCT VFR BLD AUTO: 33.8 % (ref 36.5–49.3)
HGB BLD-MCNC: 10.4 G/DL (ref 12–17)
IMM GRANULOCYTES # BLD AUTO: 0.06 THOUSAND/UL (ref 0–0.2)
IMM GRANULOCYTES NFR BLD AUTO: 1 % (ref 0–2)
INR PPP: 1.31 (ref 0.84–1.19)
LYMPHOCYTES # BLD AUTO: 0.47 THOUSANDS/ÂΜL (ref 0.6–4.47)
LYMPHOCYTES NFR BLD AUTO: 7 % (ref 14–44)
MAGNESIUM SERPL-MCNC: 2.1 MG/DL (ref 1.9–2.7)
MCH RBC QN AUTO: 32.6 PG (ref 26.8–34.3)
MCHC RBC AUTO-ENTMCNC: 30.8 G/DL (ref 31.4–37.4)
MCV RBC AUTO: 106 FL (ref 82–98)
MONOCYTES # BLD AUTO: 0.56 THOUSAND/ÂΜL (ref 0.17–1.22)
MONOCYTES NFR BLD AUTO: 8 % (ref 4–12)
NEUTROPHILS # BLD AUTO: 5.85 THOUSANDS/ÂΜL (ref 1.85–7.62)
NEUTS SEG NFR BLD AUTO: 79 % (ref 43–75)
NRBC BLD AUTO-RTO: 0 /100 WBCS
PLATELET # BLD AUTO: 129 THOUSANDS/UL (ref 149–390)
PMV BLD AUTO: 10.6 FL (ref 8.9–12.7)
POTASSIUM SERPL-SCNC: 4.2 MMOL/L (ref 3.5–5.3)
PROTHROMBIN TIME: 16.4 SECONDS (ref 11.6–14.5)
RBC # BLD AUTO: 3.19 MILLION/UL (ref 3.88–5.62)
SODIUM SERPL-SCNC: 139 MMOL/L (ref 135–147)
WBC # BLD AUTO: 7.27 THOUSAND/UL (ref 4.31–10.16)

## 2023-11-17 PROCEDURE — 80048 BASIC METABOLIC PNL TOTAL CA: CPT | Performed by: INTERNAL MEDICINE

## 2023-11-17 PROCEDURE — 85610 PROTHROMBIN TIME: CPT | Performed by: INTERNAL MEDICINE

## 2023-11-17 PROCEDURE — 97116 GAIT TRAINING THERAPY: CPT

## 2023-11-17 PROCEDURE — 97163 PT EVAL HIGH COMPLEX 45 MIN: CPT

## 2023-11-17 PROCEDURE — 97167 OT EVAL HIGH COMPLEX 60 MIN: CPT

## 2023-11-17 PROCEDURE — 85025 COMPLETE CBC W/AUTO DIFF WBC: CPT | Performed by: INTERNAL MEDICINE

## 2023-11-17 PROCEDURE — 82948 REAGENT STRIP/BLOOD GLUCOSE: CPT

## 2023-11-17 PROCEDURE — 83735 ASSAY OF MAGNESIUM: CPT | Performed by: INTERNAL MEDICINE

## 2023-11-17 PROCEDURE — 99232 SBSQ HOSP IP/OBS MODERATE 35: CPT | Performed by: INTERNAL MEDICINE

## 2023-11-17 PROCEDURE — 71045 X-RAY EXAM CHEST 1 VIEW: CPT

## 2023-11-17 RX ORDER — DIGOXIN 125 MCG
125 TABLET ORAL DAILY
Status: DISCONTINUED | OUTPATIENT
Start: 2023-11-17 | End: 2023-11-22 | Stop reason: HOSPADM

## 2023-11-17 RX ORDER — METOPROLOL TARTRATE 50 MG/1
50 TABLET, FILM COATED ORAL 4 TIMES DAILY
Status: DISCONTINUED | OUTPATIENT
Start: 2023-11-17 | End: 2023-11-22 | Stop reason: HOSPADM

## 2023-11-17 RX ADMIN — BUMETANIDE 1 MG: 0.25 INJECTION INTRAMUSCULAR; INTRAVENOUS at 18:09

## 2023-11-17 RX ADMIN — METOPROLOL TARTRATE 50 MG: 50 TABLET, FILM COATED ORAL at 18:09

## 2023-11-17 RX ADMIN — FINASTERIDE 5 MG: 5 TABLET, FILM COATED ORAL at 08:10

## 2023-11-17 RX ADMIN — PRAVASTATIN SODIUM 20 MG: 20 TABLET ORAL at 16:12

## 2023-11-17 RX ADMIN — CHLORHEXIDINE GLUCONATE 15 ML: 1.2 RINSE ORAL at 08:10

## 2023-11-17 RX ADMIN — INSULIN LISPRO 1 UNITS: 100 INJECTION, SOLUTION INTRAVENOUS; SUBCUTANEOUS at 16:13

## 2023-11-17 RX ADMIN — FOLIC ACID 1 MG: 1 TABLET ORAL at 08:10

## 2023-11-17 RX ADMIN — AMIODARONE HYDROCHLORIDE 0.5 MG/MIN: 50 INJECTION, SOLUTION INTRAVENOUS at 15:43

## 2023-11-17 RX ADMIN — DIGOXIN 125 MCG: 0.25 INJECTION INTRAMUSCULAR; INTRAVENOUS at 03:32

## 2023-11-17 RX ADMIN — METOPROLOL TARTRATE 100 MG: 50 TABLET, FILM COATED ORAL at 08:10

## 2023-11-17 RX ADMIN — INSULIN LISPRO 1 UNITS: 100 INJECTION, SOLUTION INTRAVENOUS; SUBCUTANEOUS at 21:50

## 2023-11-17 RX ADMIN — DIGOXIN 125 MCG: 125 TABLET ORAL at 12:12

## 2023-11-17 RX ADMIN — ENOXAPARIN SODIUM 90 MG: 100 INJECTION SUBCUTANEOUS at 21:30

## 2023-11-17 RX ADMIN — ENOXAPARIN SODIUM 90 MG: 100 INJECTION SUBCUTANEOUS at 08:10

## 2023-11-17 RX ADMIN — METOPROLOL TARTRATE 50 MG: 50 TABLET, FILM COATED ORAL at 21:30

## 2023-11-17 RX ADMIN — BUMETANIDE 1 MG: 0.25 INJECTION INTRAMUSCULAR; INTRAVENOUS at 08:10

## 2023-11-17 RX ADMIN — ACETAMINOPHEN 650 MG: 325 TABLET ORAL at 18:14

## 2023-11-17 RX ADMIN — METOPROLOL TARTRATE 50 MG: 50 TABLET, FILM COATED ORAL at 12:12

## 2023-11-17 RX ADMIN — CHLORHEXIDINE GLUCONATE 15 ML: 1.2 RINSE ORAL at 21:51

## 2023-11-17 NOTE — OCCUPATIONAL THERAPY NOTE
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        Past Surgical History:   Procedure Laterality Date    COLONOSCOPY      IR BIOPSY LYMPH NODE  9/15/2022    IR CHEST TUBE PLACEMENT  11/14/2023    IR MIDLINE PLACEMENT  11/13/2023    IR THORACENTESIS  9/13/2022    IR THORACENTESIS  10/5/2023    IR THORACENTESIS  10/13/2023    IR THORACENTESIS  10/30/2023    IR THORACENTESIS  11/9/2023    IR THORACENTESIS  11/13/2023    CT ARTHRODESIS ANKLE OPEN Right 7/10/2020    Procedure: ARTHRODESIS/ TIBIAL-TALAR ANKLE FUSION;  Surgeon: Sheba Soto MD;  Location: [facility];  Service: Orthopedics    CT LAPAROSCOPY SURG RPR INITIAL INGUINAL HERNIA Bilateral 12/16/2021    Procedure: LAPAROSCOPIC REPAIR HERNIA INGUINAL WITH MESH;  Surgeon: Brayton Mcburney, MD;  Location: [facility];  Service: General        11/17/23 1040   OT Last Visit   OT Visit Date 11/17/23   Note Type   Note type Evaluation   Pain Assessment   Pain Assessment Tool 0-10   Pain Score 5   Pain Location/Orientation Orientation: Bilateral;Location: Abdomen; Location: Chest   Pain Onset/Description Onset: Ongoing;Frequency: Constant/Continuous; Descriptor: Олег Moon   Effect of Pain on Daily Activities yes   Patient's Stated Pain Goal No pain   Hospital Pain Intervention(s) Repositioned; Ambulation/increased activity   Restrictions/Precautions   Weight Bearing Precautions Per Order No   Other Precautions Chair Alarm; Bed Alarm;Multiple lines;Telemetry;O2;Fall Risk;Pain;Hard of hearing  (L UE PICC, pocket talker utalized during session, 3L of NC Pt wears 2L at baseline)   Home Living   Type of [address] [place] [address] One level;Stairs to enter without rails; Performs ADLs on one level; Able to live on main level with bedroom/bathroom  Propeller style house, 1 [address] with no HR)   Bathroom Shower/Tub Walk-in shower   Bathroom Toilet Raised   Bathroom Equipment Grab bars in shower; Shower chair;Commode   [address]   Prior Function   Level of [place] Needs assistance with IADLS;Needs assistance with ADLs; Needs assistance with functional mobility  (nurse/PT/OT 3 times a week for about 2 weeks)   Lives With Daughter   Receives Help From Family;Home health   IADLs Family/Friend/Other provides transportation; Family/Friend/Other provides meals; Family/Friend/Other provides medication management   Falls in the last 6 months 1 to 4   ADL   UB Bathing Assistance 5  Supervision/Setup   LB Bathing Assistance 4  Minimal Assistance   UB Dressing Assistance 4  Minimal [place] 3  Moderate [address]  4  Minimal Assistance   Bed Mobility   Additional Comments Pt sitting EOB upon arrival and OOB in recliner upon conclusion   Transfers   Sit to Stand 4  Minimal assistance   Additional items Assist x 1; Armrests; Increased time required;Verbal cues   Stand to Sit 4  Minimal assistance   Additional items Assist x 1; Armrests; Increased time required;Verbal cues   Stand pivot   (CGA)   Additional items Assist x 1; Increased time required;Verbal cues  (RW)   Toilet transfer 4  Minimal assistance   Additional items Assist x 1; Armrests; Increased time required;Verbal cues; Commode   Functional Mobility   Functional Mobility   (CGA)   Additional Comments A x1   Additional items Rolling walker   Balance   Static Sitting Good   Dynamic Sitting Fair +   Static Standing Fair   Dynamic Standing Fair -   Ambulatory Fair -   Activity Tolerance   Activity Tolerance Patient limited by fatigue;Patient limited by pain   Nurse Made Aware ORTEGA [place]   RUE Assessment   RUE Assessment WFL   LUE Assessment   LUE Assessment WFL   Vision-Basic Assessment   Current Vision Wears glasses all the time   Cognition   Overall Cognitive Status Impaired   Arousal/Participation Alert; Cooperative   Attention Attends with cues to redirect   Orientation Level Oriented X4   Memory Decreased recall of recent events;Decreased recall of precautions   Following Commands Follows one step commands without difficulty   Assessment   Limitation Decreased ADL status; Decreased Safe judgement during ADL;Decreased endurance;Decreased self-care trans;Decreased high-level ADLs   Prognosis Good   Assessment Pt is a 69 y.o. male seen for OT evaluation s/p admit to [facility] on 11/11/2023 w/ Acute on chronic respiratory failure with hypoxia ([address] Comorbidities affecting pt's functional performance at time of assessment include:  HTN, DM II, Pleural effusion, A-fib, DVT, Prostate CA . Personal factors affecting pt at time of IE include:steps to enter environment and difficulty performing ADLS. Prior to admission, pt required A with ADLs. Upon evaluation: the following deficits impact occupational performance: decreased balance, decreased tolerance, and increased pain. Pt to benefit from continued skilled OT tx while in the hospital to address deficits as defined above and maximize level of functional independence w ADL's and functional mobility.  Occupational Performance areas to address include: bathing/shower, toilet hygiene, dressing, functional mobility, and clothing management. From OT standpoint, recommendation at time of d/c would be Level II (Moderate Resource Intensity and with family support. Goals   Patient Goals To feel better   Plan   Treatment Interventions ADL retraining;Functional transfer training; Endurance training;Patient/family training;Equipment evaluation/education; Compensatory technique education; Energy conservation; Activityengagement   Goal Expiration Date 11/27/23   OT Treatment Day 0   OT Frequency 3-5x/wk   Discharge Recommendation   Rehab Resource Intensity Level, OT II (Moderate Resource Intensity)   Additional Comments  Pt seen as a co-eval with PT due to the patient's co-morbidities, clinically unstable presentation, and present impairments which are a regression from the patient's baseline. Additional Comments 2 The patient's raw score on the AM-PAC Daily Activity Inpatient Short Form is 19. A raw score of greater than or equal to 19 suggests the patient may benefit from discharge to home. Please refer to the recommendation of the Occupational Therapist for safe discharge planning.    AM-PAC Daily Activity Inpatient   Lower Body Dressing 2   Bathing 3   Toileting 3   Upper Body Dressing 3   Grooming 4   Eating 4   Daily Activity Raw Score 19   Daily Activity Standardized Score (Calc for Raw Score >=11) 40.22   AM-PAC Applied Cognition Inpatient   Following a Speech/Presentation 4   Understanding Ordinary Conversation 4   Taking Medications 3   Remembering Where Things Are Placed or Put Away 3   Remembering List of 4-5 Errands 3   Taking Care of Complicated Tasks 3   Applied Cognition Raw Score 20   Applied Cognition Standardized Score 41.76     GOALS:    Pt will achieve the following within specified time frame: STG  Pt will achieve the following goals within 5 days    *ADL transfers with CGA for inc'd independence with ADLs/purposeful tasks    *UB ADL with (S) for inc'd independence with self cares    *LB ADL with Min (A) using AE prn for inc'd independence with self cares    *Toileting with CGA for clothing management and hygiene for return to PLOF with personal care    *Increase static stand balance to F+ and dyn stand balance to F for inc'd safety with standing purposeful tasks    *Increase stand tolerance x3 m for inc'd tolerance with standing purposeful tasks    *Participate in 10m UE therex to increase overall stamina/activity tolerance for purposeful tasks    *Bed mobility- (S) for inc'd independence to manage own comfort and initiate EOB & OOB purposeful tasks    Pt will achieve the following within specified time frame: LTG  Pt will achieve the following goals within 10 days    *ADL transfers with (S) for inc'd independence with ADLs/purposeful tasks    *UB ADL with (I) for inc'd independence with self cares    *LB ADL with CGA using AE prn for inc'd independence with self cares    *Toileting with (S) for clothing management and hygiene for return to PLOF with personal care    *Increase static stand balance to G- and dyn stand balance to F+ for inc'd safety with standing purposeful tasks    *Increase stand tolerance x5 m for inc'd tolerance with standing purposeful tasks    *Bed mobility- (I) for inc'd independence to manage own comfort and initiate EOB & OOB purposeful tasks      Grace Roberts, MS, OTR/L

## 2023-11-17 NOTE — PLAN OF CARE
Problem: PHYSICAL THERAPY ADULT  Goal: Performs mobility at highest level of function for planned discharge setting. See evaluation for individualized goals. Description: Treatment/Interventions: Functional transfer training, LE strengthening/ROM, Therapeutic exercise, Endurance training, Patient/family training, Equipment eval/education, Bed mobility, Gait training  Equipment Recommended: Amna Torres       See flowsheet documentation for full assessment, interventions and recommendations. Note: Prognosis: Good  Problem List: Decreased strength, Decreased endurance, Impaired balance, Decreased mobility, Decreased coordination, Impaired hearing, Pain  Assessment: Pt is a 69 y.o. male seen for PT evaluation s/p admit to [address] [place] [address] “” [place] w/ acute chronic respiratory failure with hypoxia. PTA, pt required assistance w/ all functional mobility, ADL's and IADL's and used walker assistive device for mobility. PT consulted to assess pt's functional mobility and d/c needs. At time of PT eval, pt found EOB with family present. Pt was agreeable to PT session and stated that his pain level was 5/10. Upon STS transfer pt was min assist x1 with RW. Pt stated he needed to use the bathroom and preformed stand pivot with CGA to bedside commode. Toilet transfer was min assist x1. Pt then ambulated to bedside chair with CGA before treatment session was started. Treatment session included ambulation of 120 ft in the hallway with CGA assist on 3L of NC. Pt showed no signs of SOB or a decrease in SPO2. MASIMO showed inaccurate readings throughout ambulation. Pt presented with decreased demetrio, scissoring giat pattern and ER of the RLE ambulation. Overall pt tolerated the session and evaluation well with no complaints. Pt does show some endurance deficits due to supplemental O2 but showed no LOB or instability with mobility.  Pt will continue to benefit from immediate acute skilled PT services in order to address the deficits listed above. Areas for improvement include: decreased endurance, decreased strength, and impaired balance. From PT/mobility standpoint, at this time recommendation is a moderate resource intensity, in order to maximize pt's functional independence and safety/consistency w/ mobility. Barriers to Discharge: None     Rehab Resource Intensity Level, PT: II (Moderate Resource Intensity)    See flowsheet documentation for full assessment.

## 2023-11-17 NOTE — PROGRESS NOTES
[id number] [facility]  Progress Note  Name: Terri Fulton  MRN: [de-identified]  Unit/Bed#: ICU 05-01 I Date of Admission: 11/11/2023   Date of Service: 11/17/2023 I Hospital Day: 6      Assessment & Plan:    * Acute on chronic respiratory failure with hypoxia [facility])  Assessment & Plan  Likely multifactorial in the setting of b/l pleural effusion and acute CHF exacerbation   At baseline, requires 3L NC -> currently down to baseline requirement  Encourage cough, deep breathing, IS, ambulation as tolerated  Await PT/OT evaluation     Pleural effusion  Assessment & Plan  Noted on CT, R>L b/l pleural effusions   S/p L thoracentesis with IR on 11/9 as OP   S/p IR thoracentesis of L effusion 11/13 with 1.4L removed   CT of chest on 11/15 noted improvement in size of right pleural effusion, also with a small right pneumothorax  Underwent chest tube placement on 11/14 -> pulmonology input appreciated with recommendation of conversion of chest tube into Pleurx catheter, now present on both sides as placed by IR on 11/16, for longer-term management   Monitor respiratory status closely      Acute on chronic HFrEF (heart failure with reduced ejection fraction) ([address])  Assessment & Plan  Last ECHO with LVEF 35 % -> 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  On diuresis with Bumex - on beta-blockade with Lopressor  Monitor/replete serum potassium/magnesium deficiencies if present    Rapid atrial fibrillation ([address])  Assessment & Plan  Remains with intermittent RVR  Continue beta-blockade with Lopressor -> transitioned oral Amiodarone to a continuous infusion due to intermittent hypotensive states -> appreciate cardiology input with addition of a trial of Digoxin yesterday with relatively improving heart rates today  Anticoagulation transitioned to therapeutic Lovenox -> plan to bridge with Coumadin in the upcoming days once other acute issues stabilized     HILARY (acute kidney injury) ([address])  Assessment & Plan  Unclear etiology, possibly cardiorenal given CHF exacerbation  Baseline creatinine of approximately 0.9-1.1 -> remains elevated proved today at 1.32 from 1.53 yesterday   Monitor renal function and urine output - limit/avoid nephrotoxins and hypotension as possible  Noted Bumex use     Deep vein thrombosis (DVT) of right lower extremity ([address])  Assessment & Plan  See plan for pulmonary embolus below  Continue anticoagulation     History of pulmonary embolus (PE)  Assessment & Plan  Transitioned to therapeutic Lovenox (due to need for an Amiodarone infusion and only a single lumen, hence, cannot use a heparin drip anymore) -> eventual plan to transition to Coumadin     Malignant neoplasm of upper lobe of right lung [facility])  Assessment & Plan  Pt diagnosed with Stage 4 Lung Ca in 9/2022  S/p XRT completed June 2023   Currently undergoing chemo, last 9/19/23   OP follow up with heme/onc      History of prostate cancer  Assessment & Plan  Continue Proscar  Status post prior radiation in 2005     Hypercholesterolemia  Assessment & Plan  Continue statin     Type 2 diabetes mellitus ([address])  Assessment & Plan        Lab Results   Component Value Date     HGBA1C 6.0 10/23/2023      Continue SSI coverage per Accu-Cheks  Carbohydrate restriction  Hypoglycemia protocol     Benign essential HTN  Assessment & Plan  Continue Lopressor (w/ holding parameters for hypotension)       DVT Prophylaxis: therapeutic Lovenox    Patient Centered Rounds:  I have performed bedside rounds and discussed plan of care with nursing today. Discussions with Specialists or Other Care Team Provider:  see above assessments if applicable    Education and Discussions with Family / Patient:  Patient, along with daughter, at bedside today    Time Spent for Care:  35 minutes.  More than 50% of total time spent on counseling and coordination of care, on one or more of the following: performing physical exam; counseling and coordination of care, obtaining or reviewing history, documenting in the medical record, reviewing/ordering tests/medications/procedures, and communicating with other healthcare professionals. Current Length of Stay: 6 day(s)  Current Patient Status: Inpatient   Certification Statement:  Patient will continue to require additional hospital stay due to assessments as noted above. Code Status: Level 3 - DNAR and DNI        Subjective:     Encountered earlier in the day. States his breathing has slightly improved after bilateral Pleurx catheters placed yesterday. Objective:     Vitals:   Temp (24hrs), Av °F (36.7 °C), Min:97.6 °F (36.4 °C), Max:98.6 °F (37 °C)    Temp:  [97.6 °F (36.4 °C)-98.6 °F (37 °C)] 97.6 °F (36.4 °C)  HR:  [106-148] 107  Resp:  [12-25] 22  BP: ()/(53-90) 117/58  SpO2:  [90 %-100 %] 94 %  Body mass index is 30.47 kg/m². Input and Output Summary (last 24 hours):        Intake/Output Summary (Last 24 hours) at 2023 1201  Last data filed at 2023 1100  Gross per 24 hour   Intake 1034.21 ml   Output 2150 ml   Net -1115.79 ml         Physical Exam:     GENERAL Waxing/waning weakness/fatigue   HEAD   Normocephalic - atraumatic   EYES   PERRL - EOMI    MOUTH   Mucosa moist   NECK   Supple - full range of motion   CARDIAC Irregularly irregular and tachycardic - S1/S2 positive   PULMONARY Mildly diminished at the bases more prominent on the right - nonlabored respirations at rest on nasal cannula oxygenation - bilateral Pleurx catheters in place   ABDOMEN   Soft - nontender/nondistended - active bowel sounds   MUSCULOSKELETAL   Motor strength/range of motion deconditioned - bilateral distal LE pitting edema with venous stasis changes   NEUROLOGIC   Alert/oriented at baseline   PSYCHIATRIC   Mood/affect stable         Additional Data:     Labs & Recent Cultures:    Results from last 7 days   Lab Units 23  0515   WBC Thousand/uL 7.27   HEMOGLOBIN g/dL 10.4*   HEMATOCRIT % 33.8*   PLATELETS Thousands/uL 129*   NEUTROS PCT % 79*   LYMPHS PCT % 7*   MONOS PCT % 8   EOS PCT % 4       Results from last 7 days   Lab Units 11/17/23  0515 11/15/23  0518 11/14/23  0630   POTASSIUM mmol/L 4.2   < > 3.7   CHLORIDE mmol/L 105   < > 106   CO2 mmol/L 30   < > 31   BUN mg/dL 37*   < > 47*   CREATININE mg/dL 1.32*   < > 1.70*   CALCIUM mg/dL 7.7*   < > 7.8*   ALK PHOS U/L  --   --  70   ALT U/L  --   --  27   AST U/L  --   --  20    < > = values in this interval not displayed. Results from last 7 days   Lab Units 11/17/23  0515   INR  1.31*       Results from last 7 days   Lab Units 11/17/23  1112 11/17/23  0725 11/17/23  0624 11/16/23  2143 11/16/23  1640 11/16/23  1106 11/16/23  0747 11/15/23  2205 11/15/23  1604 11/15/23  1108 11/15/23  0714 11/14/23  2050   POC GLUCOSE mg/dl 117 78 86 82 87 103 117 160* 176* 172* 132 173*           Results from last 7 days   Lab Units 11/15/23  0518 11/13/23  0505 11/11/23  2335   LACTIC ACID mmol/L  --   --  1.4   PROCALCITONIN ng/ml 0.16 0.16 0.15           Results from last 7 days   Lab Units 11/11/23  2212   BLOOD CULTURE  No Growth After 4 Days. No Growth After 4 Days.            Lines/Drains:  Invasive Devices       Peripherally Inserted Central Catheter Line  Duration             PICC Line 11/13/23 Left Brachial 3 days              Drain  Duration             Pleural Effusion Long-Term Catheter 15.5 Fr. <1 day    Pleural Effusion Long-Term Catheter 15.5 Fr. <1 day                      Last 24 Hours Medication List:   Current Facility-Administered Medications   Medication Dose Route Frequency Provider Last Rate    acetaminophen  650 mg Oral Q6H PRN RADHA Jin      amiodarone (CORDARONE) 900 mg in dextrose 5 % 500 mL infusion  0.5 mg/min Intravenous Continuous Fely Ferguson MD 0.5 mg/min (11/17/23 0401)    bumetanide  1 mg Intravenous BID RADHA Jin      chlorhexidine  15 mL Mouth/Throat Q12H [facility] RADHA Jin      digoxin  125 mcg Oral Daily Silvia Wood Bowen, DO      enoxaparin  1 mg/kg Subcutaneous Q12H [address] Marquise Wild MD      fentanyl citrate (PF)   Intravenous PRN Ivette Manning MD      finasteride  5 mg Oral Daily Anahi Less, CRNP      folic acid  1 mg Oral Daily Anahi Less, CRNP      insulin lispro  1-6 Units Subcutaneous HS Anahi Less, CRNP      insulin lispro  1-6 Units Subcutaneous TID [facility] Anahi Less, CRNP      lidocaine (PF)    PRN Ivette Manning MD      metoprolol tartrate  50 mg Oral 4x Daily Eliezer Blase, DO      midazolam    PRN Ivette Manning MD      polyethylene glycol  17 g Oral Daily PRN Anahi Less, CRNP      pravastatin  20 mg Oral Daily With Dinner Anahi Less, CRNP      senna-docusate sodium  2 tablet Oral HS Anahi Less, CRNP                      ** Please Note: This note is constructed using a voice recognition dictation system. An occasional wrong word/phrase or “sound-a-like” substitution may have been picked up by dictation device due to the inherent limitations of voice recognition software. Read the chart carefully and recognize, using reasonable context, where substitutions may have occurred. **

## 2023-11-17 NOTE — PLAN OF CARE
Problem: PAIN - ADULT  Goal: Verbalizes/displays adequate comfort level or baseline comfort level  Description: Interventions:  - Encourage patient to monitor pain and request assistance  - Assess pain using appropriate pain scale  - Administer analgesics based on type and severity of pain and evaluate response  - Implement non-pharmacological measures as appropriate and evaluate response  - Consider cultural and social influences on pain and pain management  - Notify physician/advanced practitioner if interventions unsuccessful or patient reports new pain  Outcome: Progressing     Problem: INFECTION - ADULT  Goal: Absence or prevention of progression during hospitalization  Description: INTERVENTIONS:  - Assess and monitor for signs and symptoms of infection  - Monitor lab/diagnostic results  - Monitor all insertion sites, i.e. indwelling lines, tubes, and drains  - Monitor endotracheal if appropriate and nasal secretions for changes in amount and color  - [place] appropriate cooling/warming therapies per order  - Administer medications as ordered  - Instruct and encourage patient and family to use good hand hygiene technique  - Identify and instruct in appropriate isolation precautions for identified infection/condition  Outcome: Progressing     Problem: SAFETY ADULT  Goal: Patient will remain free of falls  Description: INTERVENTIONS:  - Educate patient/family on patient safety including physical limitations  - Instruct patient to call for assistance with activity   - Consult OT/PT to assist with strengthening/mobility   - Keep Call bell within reach  - Keep bed low and locked with side rails adjusted as appropriate  - Keep care items and personal belongings within reach  - Initiate and maintain comfort rounds  - Make Fall Risk Sign visible to staff  - Offer Toileting every 2 Hours, in advance of need  - Initiate/Maintain bed alarm  - Obtain necessary fall risk management equipment:   - Apply yellow socks and bracelet for high fall risk patients  - Consider moving patient to room near nurses station  Outcome: Progressing  Goal: Maintain or return to baseline ADL function  Description: INTERVENTIONS:  -  Assess patient's ability to carry out ADLs; assess patient's baseline for ADL function and identify physical deficits which impact ability to perform ADLs (bathing, care of mouth/teeth, toileting, grooming, dressing, etc.)  - Assess/evaluate cause of self-care deficits   - Assess range of motion  - Assess patient's mobility; develop plan if impaired  - Assess patient's need for assistive devices and provide as appropriate  - Encourage maximum independence but intervene and supervise when necessary  - Involve family in performance of ADLs  - Assess for home care needs following discharge   - Consider OT consult to assist with ADL evaluation and planning for discharge  - Provide patient education as appropriate  Outcome: Progressing  Goal: Maintains/Returns to pre admission functional level  Description: INTERVENTIONS:  - Perform BMAT or MOVE assessment daily.   - Set and communicate daily mobility goal to care team and patient/family/caregiver. - Collaborate with rehabilitation services on mobility goals if consulted  - Perform Range of Motion 3 times a day. - Reposition patient every 2 hours.   - Dangle patient 3 times a day  - Out of bed for toileting  - Record patient progress and toleration of activity level   Outcome: Progressing     Problem: MOBILITY - ADULT  Goal: Maintain or return to baseline ADL function  Description: INTERVENTIONS:  -  Assess patient's ability to carry out ADLs; assess patient's baseline for ADL function and identify physical deficits which impact ability to perform ADLs (bathing, care of mouth/teeth, toileting, grooming, dressing, etc.)  - Assess/evaluate cause of self-care deficits   - Assess range of motion  - Assess patient's mobility; develop plan if impaired  - Assess patient's need for assistive devices and provide as appropriate  - Encourage maximum independence but intervene and supervise when necessary  - Involve family in performance of ADLs  - Assess for home care needs following discharge   - Consider OT consult to assist with ADL evaluation and planning for discharge  - Provide patient education as appropriate  Outcome: Progressing  Goal: Maintains/Returns to pre admission functional level  Description: INTERVENTIONS:  - Perform BMAT or MOVE assessment daily.   - Set and communicate daily mobility goal to care team and patient/family/caregiver. - Collaborate with rehabilitation services on mobility goals if consulted  - Perform Range of Motion 3 times a day. - Reposition patient every 2 hours.   - Dangle patient 3 times a day  - Stand patient 3 times a day  - Ambulate patient 3 times a day  - Out of bed for meals   - Out of bed for toileting  - Record patient progress and toleration of activity level   Outcome: Progressing     Problem: RESPIRATORY - ADULT  Goal: Achieves optimal ventilation and oxygenation  Description: INTERVENTIONS:  - Assess for changes in respiratory status  - Assess for changes in mentation and behavior  - Position to facilitate oxygenation and minimize respiratory effort  - Oxygen administered by appropriate delivery if ordered  - Initiate smoking cessation education as indicated  - Encourage broncho-pulmonary hygiene including cough, deep breathe, Incentive Spirometry  - Assess the need for suctioning and aspirate as needed  - Assess and instruct to report SOB or any respiratory difficulty  - Respiratory Therapy support as indicated  Outcome: Progressing     Problem: METABOLIC, FLUID AND ELECTROLYTES - ADULT  Goal: Electrolytes maintained within normal limits  Description: INTERVENTIONS:  - Monitor labs and assess patient for signs and symptoms of electrolyte imbalances  - Administer electrolyte replacement as ordered  - Monitor response to electrolyte replacements, including repeat lab results as appropriate  - Instruct patient on fluid and nutrition as appropriate  Outcome: Progressing  Goal: Fluid balance maintained  Description: INTERVENTIONS:  - Monitor labs   - Monitor I/O and WT  - Instruct patient on fluid and nutrition as appropriate  - Assess for signs & symptoms of volume excess or deficit  Outcome: Progressing  Goal: Glucose maintained within target range  Description: INTERVENTIONS:  - Monitor Blood Glucose as ordered  - Assess for signs and symptoms of hyperglycemia and hypoglycemia  - Administer ordered medications to maintain glucose within target range  - Assess nutritional intake and initiate nutrition service referral as needed  Outcome: Progressing     Problem: Prexisting or High Potential for Compromised Skin Integrity  Goal: Skin integrity is maintained or improved  Description: INTERVENTIONS:  - Identify patients at risk for skin breakdown  - Assess and monitor skin integrity  - Assess and monitor nutrition and hydration status  - Monitor labs   - Assess for incontinence   - Turn and reposition patient  - Assist with mobility/ambulation  - Relieve pressure over bony prominences  - Avoid friction and shearing  - Provide appropriate hygiene as needed including keeping skin clean and dry  - Evaluate need for skin moisturizer/barrier cream  - Collaborate with interdisciplinary team   - Patient/family teaching  - Consider wound care consult   Outcome: Progressing

## 2023-11-17 NOTE — PLAN OF CARE
Problem: PAIN - ADULT  Goal: Verbalizes/displays adequate comfort level or baseline comfort level  Description: Interventions:  - Encourage patient to monitor pain and request assistance  - Assess pain using appropriate pain scale  - Administer analgesics based on type and severity of pain and evaluate response  - Implement non-pharmacological measures as appropriate and evaluate response  - Consider cultural and social influences on pain and pain management  - Notify physician/advanced practitioner if interventions unsuccessful or patient reports new pain  Outcome: Progressing     Problem: INFECTION - ADULT  Goal: Absence or prevention of progression during hospitalization  Description: INTERVENTIONS:  - Assess and monitor for signs and symptoms of infection  - Monitor lab/diagnostic results  - Monitor all insertion sites, i.e. indwelling lines, tubes, and drains  - Monitor endotracheal if appropriate and nasal secretions for changes in amount and color  - Hiram appropriate cooling/warming therapies per order  - Administer medications as ordered  - Instruct and encourage patient and family to use good hand hygiene technique  - Identify and instruct in appropriate isolation precautions for identified infection/condition  Outcome: Progressing     Problem: SAFETY ADULT  Goal: Patient will remain free of falls  Description: INTERVENTIONS:  - Educate patient/family on patient safety including physical limitations  - Instruct patient to call for assistance with activity   - Consult OT/PT to assist with strengthening/mobility   - Keep Call bell within reach  - Keep bed low and locked with side rails adjusted as appropriate  - Keep care items and personal belongings within reach  - Initiate and maintain comfort rounds  - Make Fall Risk Sign visible to staff  - Offer Toileting every 2 Hours, in advance of need  - Initiate/Maintain bed alarm  - Obtain necessary fall risk management equipment:   - Apply yellow socks and bracelet for high fall risk patients  - Consider moving patient to room near nurses station  Outcome: Progressing  Goal: Maintain or return to baseline ADL function  Description: INTERVENTIONS:  -  Assess patient's ability to carry out ADLs; assess patient's baseline for ADL function and identify physical deficits which impact ability to perform ADLs (bathing, care of mouth/teeth, toileting, grooming, dressing, etc.)  - Assess/evaluate cause of self-care deficits   - Assess range of motion  - Assess patient's mobility; develop plan if impaired  - Assess patient's need for assistive devices and provide as appropriate  - Encourage maximum independence but intervene and supervise when necessary  - Involve family in performance of ADLs  - Assess for home care needs following discharge   - Consider OT consult to assist with ADL evaluation and planning for discharge  - Provide patient education as appropriate  Outcome: Progressing  Goal: Maintains/Returns to pre admission functional level  Description: INTERVENTIONS:  - Perform BMAT or MOVE assessment daily.   - Set and communicate daily mobility goal to care team and patient/family/caregiver. - Collaborate with rehabilitation services on mobility goals if consulted  - Perform Range of Motion 3 times a day. - Reposition patient every 2 hours.   - Dangle patient 3 times a day  - Stand patient 3 times a day  - Ambulate patient 3 times a day  - Out of bed for meals   - Out of bed for toileting  - Record patient progress and toleration of activity level   Outcome: Progressing     Problem: DISCHARGE PLANNING  Goal: Discharge to home or other facility with appropriate resources  Description: INTERVENTIONS:  - Identify barriers to discharge w/patient and caregiver  - Arrange for needed discharge resources and transportation as appropriate  - Identify discharge learning needs (meds, wound care, etc.)  - Arrange for interpretive services to assist at discharge as needed  - Refer to Case Management Department for coordinating discharge planning if the patient needs post-hospital services based on physician/advanced practitioner order or complex needs related to functional status, cognitive ability, or social support system  Outcome: Progressing     Problem: Knowledge Deficit  Goal: Patient/family/caregiver demonstrates understanding of disease process, treatment plan, medications, and discharge instructions  Description: Complete learning assessment and assess knowledge base. Interventions:  - Provide teaching at level of understanding  - Provide teaching via preferred learning methods  Outcome: Progressing     Problem: MOBILITY - ADULT  Goal: Maintain or return to baseline ADL function  Description: INTERVENTIONS:  -  Assess patient's ability to carry out ADLs; assess patient's baseline for ADL function and identify physical deficits which impact ability to perform ADLs (bathing, care of mouth/teeth, toileting, grooming, dressing, etc.)  - Assess/evaluate cause of self-care deficits   - Assess range of motion  - Assess patient's mobility; develop plan if impaired  - Assess patient's need for assistive devices and provide as appropriate  - Encourage maximum independence but intervene and supervise when necessary  - Involve family in performance of ADLs  - Assess for home care needs following discharge   - Consider OT consult to assist with ADL evaluation and planning for discharge  - Provide patient education as appropriate  Outcome: Progressing  Goal: Maintains/Returns to pre admission functional level  Description: INTERVENTIONS:  - Perform BMAT or MOVE assessment daily.   - Set and communicate daily mobility goal to care team and patient/family/caregiver. - Collaborate with rehabilitation services on mobility goals if consulted  - Perform Range of Motion 3 times a day. - Reposition patient every 2 hours.   - Dangle patient 3 times a day  - Stand patient 3 times a day  - Ambulate patient 3 times a day  - Out of bed for meals   - Out of bed for toileting  - Record patient progress and toleration of activity level   Outcome: Progressing     Problem: NEUROSENSORY - ADULT  Goal: Achieves stable or improved neurological status  Description: INTERVENTIONS  - Monitor and report changes in neurological status  - Monitor vital signs such as temperature, blood pressure, glucose, and any other labs ordered   - Initiate measures to prevent increased intracranial pressure  - Monitor for seizure activity and implement precautions if appropriate      Outcome: Progressing  Goal: Remains free of injury related to seizures activity  Description: INTERVENTIONS  - Maintain airway, patient safety  and administer oxygen as ordered  - Monitor patient for seizure activity, document and report duration and description of seizure to physician/advanced practitioner  - If seizure occurs,  ensure patient safety during seizure  - Reorient patient post seizure  - Seizure pads on all 4 side rails  - Instruct patient/family to notify RN of any seizure activity including if an aura is experienced  - Instruct patient/family to call for assistance with activity based on nursing assessment  - Administer anti-seizure medications if ordered    Outcome: Progressing  Goal: Achieves maximal functionality and self care  Description: INTERVENTIONS  - Monitor swallowing and airway patency with patient fatigue and changes in neurological status  - Encourage and assist patient to increase activity and self care.    - Encourage visually impaired, hearing impaired and aphasic patients to use assistive/communication devices  Outcome: Progressing     Problem: CARDIOVASCULAR - ADULT  Goal: Maintains optimal cardiac output and hemodynamic stability  Description: INTERVENTIONS:  - Monitor I/O, vital signs and rhythm  - Monitor for S/S and trends of decreased cardiac output  - Administer and titrate ordered vasoactive medications to optimize hemodynamic stability  - Assess quality of pulses, skin color and temperature  - Assess for signs of decreased coronary artery perfusion  - Instruct patient to report change in severity of symptoms  Outcome: Progressing  Goal: Absence of cardiac dysrhythmias or at baseline rhythm  Description: INTERVENTIONS:  - Continuous cardiac monitoring, vital signs, obtain 12 lead EKG if ordered  - Administer antiarrhythmic and heart rate control medications as ordered  - Monitor electrolytes and administer replacement therapy as ordered  Outcome: Progressing     Problem: GASTROINTESTINAL - ADULT  Goal: Minimal or absence of nausea and/or vomiting  Description: INTERVENTIONS:  - Administer IV fluids if ordered to ensure adequate hydration  - Maintain NPO status until nausea and vomiting are resolved  - Nasogastric tube if ordered  - Administer ordered antiemetic medications as needed  - Provide nonpharmacologic comfort measures as appropriate  - Advance diet as tolerated, if ordered  - Consider nutrition services referral to assist patient with adequate nutrition and appropriate food choices  Outcome: Progressing  Goal: Maintains or returns to baseline bowel function  Description: INTERVENTIONS:  - Assess bowel function  - Encourage oral fluids to ensure adequate hydration  - Administer IV fluids if ordered to ensure adequate hydration  - Administer ordered medications as needed  - Encourage mobilization and activity  - Consider nutritional services referral to assist patient with adequate nutrition and appropriate food choices  Outcome: Progressing  Goal: Maintains adequate nutritional intake  Description: INTERVENTIONS:  - Monitor percentage of each meal consumed  - Identify factors contributing to decreased intake, treat as appropriate  - Assist with meals as needed  - Monitor I&O, weight, and lab values if indicated  - Obtain nutrition services referral as needed  Outcome: Progressing  Goal: Establish and maintain optimal ostomy function  Description: INTERVENTIONS:  - Assess bowel function  - Encourage oral fluids to ensure adequate hydration  - Administer IV fluids if ordered to ensure adequate hydration   - Administer ordered medications as needed  - Encourage mobilization and activity  - Nutrition services referral to assist patient with appropriate food choices  - Assess stoma site  - Consider wound care consult   Outcome: Progressing  Goal: Oral mucous membranes remain intact  Description: INTERVENTIONS  - Assess oral mucosa and hygiene practices  - Implement preventative oral hygiene regimen  - Implement oral medicated treatments as ordered  - Initiate Nutrition services referral as needed  Outcome: Progressing     Problem: GENITOURINARY - ADULT  Goal: Maintains or returns to baseline urinary function  Description: INTERVENTIONS:  - Assess urinary function  - Encourage oral fluids to ensure adequate hydration if ordered  - Administer IV fluids as ordered to ensure adequate hydration  - Administer ordered medications as needed  - Offer frequent toileting  - Follow urinary retention protocol if ordered  Outcome: Progressing  Goal: Absence of urinary retention  Description: INTERVENTIONS:  - Assess patient’s ability to void and empty bladder  - Monitor I/O  - Bladder scan as needed  - Discuss with physician/AP medications to alleviate retention as needed  - Discuss catheterization for long term situations as appropriate  Outcome: Progressing     Problem: METABOLIC, FLUID AND ELECTROLYTES - ADULT  Goal: Electrolytes maintained within normal limits  Description: INTERVENTIONS:  - Monitor labs and assess patient for signs and symptoms of electrolyte imbalances  - Administer electrolyte replacement as ordered  - Monitor response to electrolyte replacements, including repeat lab results as appropriate  - Instruct patient on fluid and nutrition as appropriate  Outcome: Progressing  Goal: Fluid balance maintained  Description: INTERVENTIONS:  - Monitor labs   - Monitor I/O and WT  - Instruct patient on fluid and nutrition as appropriate  - Assess for signs & symptoms of volume excess or deficit  Outcome: Progressing  Goal: Glucose maintained within target range  Description: INTERVENTIONS:  - Monitor Blood Glucose as ordered  - Assess for signs and symptoms of hyperglycemia and hypoglycemia  - Administer ordered medications to maintain glucose within target range  - Assess nutritional intake and initiate nutrition service referral as needed  Outcome: Progressing     Problem: SKIN/TISSUE INTEGRITY - ADULT  Goal: Skin Integrity remains intact(Skin Breakdown Prevention)  Description: Assess:  -Perform Albert assessment every shift  -Clean and moisturize skin every shift  -Inspect skin when repositioning, toileting, and assisting with ADLS  -Assess under medical devices such as allevyn every shift  -Assess extremities for adequate circulation and sensation     Bed Management:  -Have minimal linens on bed & keep smooth, unwrinkled  -Change linens as needed when moist or perspiring  -Avoid sitting or lying in one position for more than 2 hours while in bed  -Keep HOB at 30 degrees     Toileting:  -Offer bedside commode  -Assess for incontinence   -Use incontinent care products after each incontinent episode    Activity:  -Mobilize patient 3 times a day  -Encourage activity and walks on unit  -Encourage or provide ROM exercises   -Turn and reposition patient every 2 Hours  -Use appropriate equipment to lift or move patient in bed  -Instruct/ Assist with weight shifting   -Consider limitation of chair time     Skin Care:  -Avoid use of baby powder, tape, friction and shearing, hot water or constrictive clothing  -Relieve pressure over bony prominences using allevyn  -Do not massage red bony areas  Outcome: Progressing  Goal: Incision(s), wounds(s) or drain site(s) healing without S/S of infection  Description: INTERVENTIONS  - Assess and document dressing, incision, wound bed, drain sites and surrounding tissue  - Provide patient and family education  - Perform skin care/dressing changes every shift  Outcome: Progressing  Goal: Pressure injury heals and does not worsen  Description: Interventions:  - Implement low air loss mattress or specialty surface (Criteria met)  - Apply silicone foam dressing  - Instruct/assist with weight shifting every 60 minutes when in chair   - Limit chair time   - Use special pressure reducing interventions such as waffle cushion when in chair   - Apply fecal or urinary incontinence containment device   - Perform passive or active ROM every shift  - Turn and reposition patient & offload bony prominences every 2 hours   - Utilize friction reducing device or surface for transfers   - Use incontinent care products after each incontinent episode   - Consider nutrition services referral as needed  Outcome: Progressing     Problem: HEMATOLOGIC - ADULT  Goal: Maintains hematologic stability  Description: INTERVENTIONS  - Assess for signs and symptoms of bleeding or hemorrhage  - Monitor labs  - Administer supportive blood products/factors as ordered and appropriate  Outcome: Progressing     Problem: MUSCULOSKELETAL - ADULT  Goal: Maintain or return mobility to safest level of function  Description: INTERVENTIONS:  - Assess patient's ability to carry out ADLs; assess patient's baseline for ADL function and identify physical deficits which impact ability to perform ADLs (bathing, care of mouth/teeth, toileting, grooming, dressing, etc.)  - Assess/evaluate cause of self-care deficits   - Assess range of motion  - Assess patient's mobility  - Assess patient's need for assistive devices and provide as appropriate  - Encourage maximum independence but intervene and supervise when necessary  - Involve family in performance of ADLs  - Assess for home care needs following discharge   - Consider OT consult to assist with ADL evaluation and planning for discharge  - Provide patient education as appropriate  Outcome: Progressing  Goal: Maintain proper alignment of affected body part  Description: INTERVENTIONS:  - Support, maintain and protect limb and body alignment  - Provide patient/ family with appropriate education  Outcome: Progressing     Problem: Prexisting or High Potential for Compromised Skin Integrity  Goal: Skin integrity is maintained or improved  Description: INTERVENTIONS:  - Identify patients at risk for skin breakdown  - Assess and monitor skin integrity  - Assess and monitor nutrition and hydration status  - Monitor labs   - Assess for incontinence   - Turn and reposition patient  - Assist with mobility/ambulation  - Relieve pressure over bony prominences  - Avoid friction and shearing  - Provide appropriate hygiene as needed including keeping skin clean and dry  - Evaluate need for skin moisturizer/barrier cream  - Collaborate with interdisciplinary team   - Patient/family teaching  - Consider wound care consult   Outcome: Progressing

## 2023-11-17 NOTE — PLAN OF CARE
Problem: OCCUPATIONAL THERAPY ADULT  Goal: Performs self-care activities at highest level of function for planned discharge setting. See evaluation for individualized goals. Description: Treatment Interventions: ADL retraining, Functional transfer training, Endurance training, Patient/family training, Equipment evaluation/education, Compensatory technique education, Energy conservation, Activityengagement    See flowsheet documentation for full assessment, interventions and recommendations. Note: Limitation: Decreased ADL status, Decreased Safe judgement during ADL, Decreased endurance, Decreased self-care trans, Decreased high-level ADLs  Prognosis: Good  Assessment: Pt is a 69 y.o. male seen for OT evaluation s/p admit to [facility] on 11/11/2023 w/ Acute on chronic respiratory failure with hypoxia ([address]). Comorbidities affecting pt's functional performance at time of assessment include:  HTN, DM II, Pleural effusion, A-fib, DVT, Prostate CA . Personal factors affecting pt at time of IE include:steps to enter environment and difficulty performing ADLS. Prior to admission, pt required A with ADLs. Upon evaluation: the following deficits impact occupational performance: decreased balance, decreased tolerance, and increased pain. Pt to benefit from continued skilled OT tx while in the hospital to address deficits as defined above and maximize level of functional independence w ADL's and functional mobility. Occupational Performance areas to address include: bathing/shower, toilet hygiene, dressing, functional mobility, and clothing management. From OT standpoint, recommendation at time of d/c would be Level II (Moderate Resource Intensity and with family support.      Rehab Resource Intensity Level, OT: II (Moderate Resource Intensity)     Grace Norman MS, OTR/L

## 2023-11-17 NOTE — PHYSICAL THERAPY NOTE
Physical Therapy Evaluation   Time in: 1040  Time out: 1101  Total evaluation time: 21 minutes    Patient's Name: Shann Gowers    Admitting Diagnosis  Shortness of breath [R06.02]  Pneumonia [J18.9]  Pleural effusion [J90]    Problem List  Patient Active Problem List   Diagnosis    Benign essential HTN    Controlled type 2 diabetes mellitus without complication, without long-term current use of insulin ([address])    Hypercholesterolemia    Glaucoma    Inguinal hernia    Hypokalemia    Hypocalcemia    Hyperparathyroidism ([address])    History of colon polyps    Primary osteoarthritis of right shoulder    Chronic left shoulder pain    Left rotator cuff tear arthropathy    Rotator cuff injury, right, initial encounter    Arthritis of right acromioclavicular joint    BPH associated with nocturia    Status post right tibial-talar ankle fusion    Non-recurrent bilateral inguinal hernia without obstruction or gangrene    Multiple subsegmental pulmonary emboli without acute cor pulmonale (HCC)    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)    Pleural effusion    Platelets decreased (HCC)    Atrial fibrillation ([address])    HILARY (acute kidney injury) ([address])    Abnormal CT of the abdomen    COPD with acute exacerbation (HCC)    Cellulitis and abscess of left lower extremity    Cellulitis of leg, right    Cellulitis of extremity    Supratherapeutic INR    Acute on chronic respiratory failure with hypoxia (HCC)    History of pulmonary embolus (PE)    Deep vein thrombosis (DVT) of right lower extremity (HCC)    Prostate CA ([address])    Acute on chronic HFrEF (heart failure with reduced ejection fraction) ([address])       Past Medical History  Past Medical History:   Diagnosis Date    Diabetes mellitus ([address])     Hyperlipidemia     Hypertension     Lung cancer ([address])     Prostate cancer ([address]) 2005    S/P prostate ca-2005 had radiation tx. Past Surgical History  Past Surgical History:   Procedure Laterality Date    COLONOSCOPY      IR BIOPSY LYMPH NODE  9/15/2022    IR CHEST TUBE PLACEMENT  11/14/2023    IR MIDLINE PLACEMENT  11/13/2023    IR THORACENTESIS  9/13/2022    IR THORACENTESIS  10/5/2023    IR THORACENTESIS  10/13/2023    IR THORACENTESIS  10/30/2023    IR THORACENTESIS  11/9/2023    IR THORACENTESIS  11/13/2023    OH ARTHRODESIS ANKLE OPEN Right 7/10/2020    Procedure: ARTHRODESIS/ TIBIAL-TALAR ANKLE FUSION;  Surgeon: Germaine Sears MD;  [facility];  Service: Orthopedics    OH LAPAROSCOPY SURG RPR INITIAL INGUINAL HERNIA Bilateral 12/16/2021    Procedure: LAPAROSCOPIC REPAIR HERNIA INGUINAL WITH MESH;  Surgeon: Malka Kovacs MD;  Location[facility];  Service: General       PT performed at least 2 patient identifiers during session: Name and wristband. 11/17/23 1101   PT Last Visit   PT Visit Date 11/17/23   Note Type   Note type Evaluation   Pain Assessment   Pain Assessment Tool 0-10   Pain Score 5   Pain Location/Orientation Orientation: Bilateral;Location: Abdomen; Location: Chest   Pain Onset/Description Onset: Ongoing; Descriptor: Sharp;Frequency: Constant/Continuous   Effect of Pain on Daily Activities yes   Patient's Stated Pain Goal No pain   Hospital Pain Intervention(s) Repositioned; Ambulation/increased activity; Emotional support   Restrictions/Precautions   Weight Bearing Precautions Per Order No   Other Precautions Chair Alarm; Bed Alarm;Multiple lines;Telemetry;O2;Fall Risk;Pain;Hard of hearing  (L UE PICC, pocket talker utalized during session, 3L of NC Pt wears 2L at baseline)   Home Living   Type of [address] [place] [address] Dr One level;Performs ADLs on one level; Able to live on main level with bedroom/bathroom;Stairs to enter without rails  XL Video style house, 1 [place] with no HR)   Bathroom Shower/Tub Walk-in shower   Bathroom Toilet Raised   Bathroom Equipment Grab bars in shower; Shower chair;Commode   [address]   Prior Function   Level of [place] Needs assistance with IADLS;Needs assistance with ADLs; Needs assistance with functional mobility  (nurse/PT/OT 3 times a week for about 2 weeks)   Lives With Daughter   Receives Help From Family;Home health   IADLs Family/Friend/Other provides transportation; Family/Friend/Other provides meals; Family/Friend/Other provides medication management   Falls in the last 6 months 1 to 4  (1 fall)   General   Additional Pertinent History OT Severino Friedman was present for co-evaluation due to medical complexity   Family/Caregiver Present Yes   Cognition   Overall Cognitive Status Impaired   Arousal/Participation Alert   Attention Attends with cues to redirect   Orientation Level Oriented X4   Memory Decreased recall of recent events;Decreased recall of precautions   Following Commands Follows one step commands without difficulty   RLE Assessment   RLE Assessment WFL   LLE Assessment   LLE Assessment WFL   Vision-Basic Assessment   Current Vision Wears glasses all the time   Bed Mobility   Additional Comments Pt sitting EOB upon arrival   Transfers   Sit to Stand 4  Minimal assistance   Additional items Assist x 1; Armrests; Increased time required;Verbal cues  (VC for push up from bed not RW)   Stand to Sit 4  Minimal assistance   Additional items Assist x 1; Armrests; Increased time required;Verbal cues   Stand pivot   (CGA)   Additional items Assist x 1; Increased time required;Verbal cues  (RW use)   Toilet transfer 4  Minimal assistance   Additional items Assist x 1; Increased time required;Armrests;Commode;Verbal cues   Ambulation/Elevation   Gait pattern Improper Weight shift; Antalgic;Decreased foot clearance;Narrow LEVON;Scissoring; Short stride  (decreased demetrio, ER of the RLE)   Gait Assistance   (CGA)   Additional items Assist x 1;Verbal cues   Assistive Device Rolling walker   Distance 4 ft from bed to bedside commode, 6 ft from commode to recliner   Stair Management Assistance Not tested   Ambulation/Elevation Additional Comments Pt ambulated on 3L NC with wheelchair follow with no noted changes in SPO2. MASIMO was unable to giv accurate reading when ambulating but pt showed not physical signs of low SPO2. Balance   Static Sitting Good   Dynamic Sitting Fair +   Static Standing Fair   Dynamic Standing Fair -   Ambulatory Fair -   Endurance Deficit   Endurance Deficit Yes   Activity Tolerance   Activity Tolerance Patient limited by fatigue;Patient limited by pain   Medical Staff Made Aware Yes, Medical Staff made aware   Nurse Made Aware Yes, RN Jason Walker made aware of session outcomes   Assessment   Prognosis Good   Problem List Decreased strength;Decreased endurance; Impaired balance;Decreased mobility; Decreased coordination; Impaired hearing;Pain   Assessment Pt is a 69 y.o. male seen for PT evaluation s/p admit to [address] [place] [address] “” [place] w/ acute chronic respiratory failure with hypoxia. PTA, pt required assistance w/ all functional mobility, ADL's and IADL's and used walker assistive device for mobility. PT consulted to assess pt's functional mobility and d/c needs. At time of PT eval, pt found EOB with family present. Pt was agreeable to PT session and stated that his pain level was 5/10. Upon STS transfer pt was min assist x1 with RW. Pt stated he needed to use the bathroom and preformed stand pivot with CGA to bedside commode. Toilet transfer was min assist x1. Pt then ambulated to bedside chair with CGA before treatment session was started. Treatment session included ambulation of 120 ft in the hallway with CGA assist on 3L of NC. Pt showed no signs of SOB or a decrease in SPO2. MASIMO showed inaccurate readings throughout ambulation. Pt presented with decreased demetrio, scissoring giat pattern and ER of the RLE ambulation. Overall pt tolerated the session and evaluation well with no complaints.  Pt does show some endurance deficits due to supplemental O2 but showed no LOB or instability with mobility. Pt will continue to benefit from immediate acute skilled PT services in order to address the deficits listed above. Areas for improvement include: decreased endurance, decreased strength, and impaired balance. From PT/mobility standpoint, at this time recommendation is a moderate resource intensity, in order to maximize pt's functional independence and safety/consistency w/ mobility. Barriers to Discharge None   Goals   Patient Goals to feel better   STG Expiration Date 11/27/23   Short Term Goal #1 In 7-10 days, the pt will tolerate HEP independently in order to increase functional activity tolerance. Pt will perform bed mobility independently in order to return to PLOF and get in/out of bed. Pt will perform transfers MOD I with the least restrictive device in order to safely maneuver his home. Pt will ambulate >50 ft MOD I with the least restrictive device in order to return to PLOF and increase functional activity endurance. Pt will increase balance score by 1/2 grade in order to decrease risk of falling. PT Treatment Day 0   Plan   Treatment/Interventions Functional transfer training;LE strengthening/ROM; Therapeutic exercise; Endurance training;Patient/family training;Equipment eval/education; Bed mobility;Gait training   PT Frequency 3-5x/wk   Discharge Recommendation   Rehab Resource Intensity Level, PT II (Moderate Resource Intensity)   Equipment Recommended Walker   AM-PAC Basic Mobility Inpatient   Turning in Flat Bed Without Bedrails 3   Lying on Back to Sitting on Edge of Flat Bed Without Bedrails 3   Moving Bed to Chair 3   Standing Up From Chair Using Arms 3   Walk in Room 3   Climb 3-5 Stairs With Railing 2   Basic Mobility Inpatient Raw Score 17   Basic Mobility Standardized Score 39.67   Highest Level Of Mobility   JH-HLM Goal 5: Stand one or more mins   JH-HLM Achieved 7: Walk 25 feet or more Additional Treatment Session   Start Time 1101   End Time 1115   Treatment Assessment After PT evaluation was completed, pt ambulated 120 ft with the RW from bedside chair, through the hallway and back into bedside chair with CGA assist. Pt showed no signs of SOB or a decrease in SPO2. MASIMO showed inaccurate readings throughout ambulation. Pt presented with decreased demetrio, scissoring giat pattern and ER of the RLE ambulation. Overall pt tolerated functional activity well with no complaints. Equipment Use RW   Additional Treatment Day 1   End of Consult   Patient Position at End of Consult Bedside chair; All needs within reach       [facility], SPT

## 2023-11-17 NOTE — CASE MANAGEMENT
Case Management Discharge Planning Note    Patient name Mark Wells  Location ICU 05/ICU  MRN [id number]  : 1945 Date 2023       Current Admission Date: 2023  Current Admission Diagnosis:Acute on chronic respiratory failure with hypoxia [facility])   Patient Active Problem List    Diagnosis Date Noted    Acute on chronic HFrEF (heart failure with reduced ejection fraction) ([address]) 2023    History of pulmonary embolus (PE) 2023    Deep vein thrombosis (DVT) of right lower extremity ([address]) 2023    Prostate CA ([address]) 2023    Acute on chronic respiratory failure with hypoxia ([address]) 2023    Supratherapeutic INR 10/28/2023    Cellulitis of extremity 10/27/2023    Cellulitis and abscess of left lower extremity 10/23/2023    Cellulitis of leg, right 10/23/2023    COPD with acute exacerbation ([address]) 10/05/2023    Atrial fibrillation ([address]) 10/03/2023    HILARY (acute kidney injury) ([address]) 10/03/2023    Abnormal CT of the abdomen 10/03/2023    Platelets decreased ([address]) 2023    Multiple lung nodules on CT 10/06/2022    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 ([address]) 10/06/2022    Pleural effusion 10/06/2022    Malignant neoplasm of upper lobe of right lung ([address])     Multiple subsegmental pulmonary emboli without acute cor pulmonale ([address]) 09/10/2022    Pulmonary mass 09/10/2022    Anemia 09/10/2022    Non-recurrent bilateral inguinal hernia without obstruction or gangrene 2021    Status post right tibial-talar ankle fusion 2020    BPH associated with nocturia 2019    Arthritis of right acromioclavicular joint 03/15/2019    Primary osteoarthritis of right shoulder 2019    Chronic left shoulder pain 2019    Left rotator cuff tear arthropathy 2019    Rotator cuff injury, right, initial encounter 2019    History of colon polyps 2019    Hyperparathyroidism ([address]) 2018    Hypokalemia 10/22/2018    Hypocalcemia 10/22/2018    Glaucoma 10/11/2018    Controlled type 2 diabetes mellitus without complication, without long-term current use of insulin ([address]) 07/25/2016    Inguinal hernia 02/08/2016    Benign essential HTN 01/12/2016    Hypercholesterolemia 01/12/2016      LOS (days): 6  Geometric Mean LOS (GMLOS) (days): 4.90  Days to GMLOS:-0.8     OBJECTIVE:  Risk of Unplanned Readmission Score: 43.93         Current admission status: Inpatient   Preferred Pharmacy:   [address],[facility], [address] - [address] [facility] [address] [address]  Phone: [phone] Fax: [address] [facility], [address] [facility] [address] [place] [address]  Phone: [phone] Fax: [phone]    [facility] [address] [facility], [address] [place] [address] [address] [address]  Phone: [phone] Fax: [phone]    Primary Care Provider: Dona Segovia PA-C    Primary Insurance: MEDICARE  Secondary Insurance: Joycelyn Mcduffie    DISCHARGE DETAILS:    Discharge planning discussed with[de-identified] patient & family at the bedside  Freedom of Choice: Yes  Comments - Freedom of Choice: pt will continue with [facility]-  cm sent rx to [address] [place] [address] for drain supplies  CM contacted family/caregiver?: Yes             Contacts  Patient Contacts: daughters  Relationship to Patient[de-identified] Family  Contact Method:  In Person  Reason/Outcome: Discharge Planning              Other Referral/Resources/Interventions Provided:  Interventions: [facility] AT [facility], Other (Specify)  Referral Comments: cm sent rx to [address] [place] [address] for Asept drainage supplies    Would you like to participate in our [facility] [employer] service program?  : No - Declined    Treatment Team Recommendation: Home with [address] (home with family & [facility] & outpt follow up- family [place] bring his portable oxygen)

## 2023-11-18 PROBLEM — Z85.46 HISTORY OF PROSTATE CANCER: Status: ACTIVE | Noted: 2023-11-11

## 2023-11-18 LAB
ANION GAP SERPL CALCULATED.3IONS-SCNC: 6 MMOL/L
BASOPHILS # BLD AUTO: 0.04 THOUSANDS/ÂΜL (ref 0–0.1)
BASOPHILS NFR BLD AUTO: 1 % (ref 0–1)
BUN SERPL-MCNC: 34 MG/DL (ref 5–25)
CALCIUM SERPL-MCNC: 7.4 MG/DL (ref 8.4–10.2)
CHLORIDE SERPL-SCNC: 105 MMOL/L (ref 96–108)
CO2 SERPL-SCNC: 29 MMOL/L (ref 21–32)
CREAT SERPL-MCNC: 1.35 MG/DL (ref 0.6–1.3)
EOSINOPHIL # BLD AUTO: 0.53 THOUSAND/ÂΜL (ref 0–0.61)
EOSINOPHIL NFR BLD AUTO: 8 % (ref 0–6)
ERYTHROCYTE [DISTWIDTH] IN BLOOD BY AUTOMATED COUNT: 16.7 % (ref 11.6–15.1)
GFR SERPL CREATININE-BSD FRML MDRD: 50 ML/MIN/1.73SQ M
GLUCOSE SERPL-MCNC: 121 MG/DL (ref 65–140)
GLUCOSE SERPL-MCNC: 124 MG/DL (ref 65–140)
GLUCOSE SERPL-MCNC: 134 MG/DL (ref 65–140)
GLUCOSE SERPL-MCNC: 152 MG/DL (ref 65–140)
GLUCOSE SERPL-MCNC: 176 MG/DL (ref 65–140)
HCT VFR BLD AUTO: 32.1 % (ref 36.5–49.3)
HGB BLD-MCNC: 10 G/DL (ref 12–17)
IMM GRANULOCYTES # BLD AUTO: 0.05 THOUSAND/UL (ref 0–0.2)
IMM GRANULOCYTES NFR BLD AUTO: 1 % (ref 0–2)
INR PPP: 1.26 (ref 0.84–1.19)
LYMPHOCYTES # BLD AUTO: 0.48 THOUSANDS/ÂΜL (ref 0.6–4.47)
LYMPHOCYTES NFR BLD AUTO: 7 % (ref 14–44)
MAGNESIUM SERPL-MCNC: 2.1 MG/DL (ref 1.9–2.7)
MCH RBC QN AUTO: 32.5 PG (ref 26.8–34.3)
MCHC RBC AUTO-ENTMCNC: 31.2 G/DL (ref 31.4–37.4)
MCV RBC AUTO: 104 FL (ref 82–98)
MONOCYTES # BLD AUTO: 0.55 THOUSAND/ÂΜL (ref 0.17–1.22)
MONOCYTES NFR BLD AUTO: 8 % (ref 4–12)
NEUTROPHILS # BLD AUTO: 5.23 THOUSANDS/ÂΜL (ref 1.85–7.62)
NEUTS SEG NFR BLD AUTO: 75 % (ref 43–75)
NRBC BLD AUTO-RTO: 0 /100 WBCS
PLATELET # BLD AUTO: 128 THOUSANDS/UL (ref 149–390)
PMV BLD AUTO: 10.5 FL (ref 8.9–12.7)
POTASSIUM SERPL-SCNC: 3.8 MMOL/L (ref 3.5–5.3)
PROTHROMBIN TIME: 15.9 SECONDS (ref 11.6–14.5)
RBC # BLD AUTO: 3.08 MILLION/UL (ref 3.88–5.62)
SODIUM SERPL-SCNC: 140 MMOL/L (ref 135–147)
WBC # BLD AUTO: 6.88 THOUSAND/UL (ref 4.31–10.16)

## 2023-11-18 PROCEDURE — 99232 SBSQ HOSP IP/OBS MODERATE 35: CPT | Performed by: INTERNAL MEDICINE

## 2023-11-18 PROCEDURE — 80048 BASIC METABOLIC PNL TOTAL CA: CPT | Performed by: INTERNAL MEDICINE

## 2023-11-18 PROCEDURE — 85025 COMPLETE CBC W/AUTO DIFF WBC: CPT | Performed by: INTERNAL MEDICINE

## 2023-11-18 PROCEDURE — 83735 ASSAY OF MAGNESIUM: CPT | Performed by: INTERNAL MEDICINE

## 2023-11-18 PROCEDURE — 85610 PROTHROMBIN TIME: CPT | Performed by: INTERNAL MEDICINE

## 2023-11-18 PROCEDURE — 82948 REAGENT STRIP/BLOOD GLUCOSE: CPT

## 2023-11-18 RX ORDER — WARFARIN SODIUM 5 MG/1
5 TABLET ORAL
Status: DISCONTINUED | OUTPATIENT
Start: 2023-11-18 | End: 2023-11-22 | Stop reason: HOSPADM

## 2023-11-18 RX ADMIN — BUMETANIDE 1 MG: 0.25 INJECTION INTRAMUSCULAR; INTRAVENOUS at 17:02

## 2023-11-18 RX ADMIN — FINASTERIDE 5 MG: 5 TABLET, FILM COATED ORAL at 09:20

## 2023-11-18 RX ADMIN — INSULIN LISPRO 1 UNITS: 100 INJECTION, SOLUTION INTRAVENOUS; SUBCUTANEOUS at 17:01

## 2023-11-18 RX ADMIN — BUMETANIDE 1 MG: 0.25 INJECTION INTRAMUSCULAR; INTRAVENOUS at 09:20

## 2023-11-18 RX ADMIN — INSULIN LISPRO 2 UNITS: 100 INJECTION, SOLUTION INTRAVENOUS; SUBCUTANEOUS at 12:17

## 2023-11-18 RX ADMIN — ENOXAPARIN SODIUM 90 MG: 100 INJECTION SUBCUTANEOUS at 09:20

## 2023-11-18 RX ADMIN — CHLORHEXIDINE GLUCONATE 15 ML: 1.2 RINSE ORAL at 09:20

## 2023-11-18 RX ADMIN — METOPROLOL TARTRATE 50 MG: 50 TABLET, FILM COATED ORAL at 17:02

## 2023-11-18 RX ADMIN — METOPROLOL TARTRATE 50 MG: 50 TABLET, FILM COATED ORAL at 09:19

## 2023-11-18 RX ADMIN — SENNOSIDES AND DOCUSATE SODIUM 2 TABLET: 8.6; 5 TABLET ORAL at 22:21

## 2023-11-18 RX ADMIN — METOPROLOL TARTRATE 50 MG: 50 TABLET, FILM COATED ORAL at 12:21

## 2023-11-18 RX ADMIN — METOPROLOL TARTRATE 50 MG: 50 TABLET, FILM COATED ORAL at 22:20

## 2023-11-18 RX ADMIN — WARFARIN SODIUM 5 MG: 5 TABLET ORAL at 17:02

## 2023-11-18 RX ADMIN — DIGOXIN 125 MCG: 125 TABLET ORAL at 09:19

## 2023-11-18 RX ADMIN — ENOXAPARIN SODIUM 90 MG: 100 INJECTION SUBCUTANEOUS at 20:37

## 2023-11-18 RX ADMIN — FOLIC ACID 1 MG: 1 TABLET ORAL at 09:19

## 2023-11-18 RX ADMIN — PRAVASTATIN SODIUM 20 MG: 20 TABLET ORAL at 17:02

## 2023-11-18 RX ADMIN — CHLORHEXIDINE GLUCONATE 15 ML: 1.2 RINSE ORAL at 20:37

## 2023-11-18 NOTE — CASE MANAGEMENT
Case Management Discharge Planning Note    Patient name Carlos Cristobal ICU 05/ICU 05- MRN [id number]  : 1945 Date 2023       Current Admission Date: 2023  Current Admission Diagnosis:Acute on chronic respiratory failure with hypoxia [facility])   Patient Active Problem List    Diagnosis Date Noted    Acute on chronic HFrEF (heart failure with reduced ejection fraction) ([address]) 2023    History of pulmonary embolus (PE) 2023    Deep vein thrombosis (DVT) of right lower extremity ([address]) 2023    History of prostate cancer 2023    Acute on chronic respiratory failure with hypoxia ([address]) 2023    Supratherapeutic INR 10/28/2023    Cellulitis of extremity 10/27/2023    Cellulitis and abscess of left lower extremity 10/23/2023    Cellulitis of leg, right 10/23/2023    COPD with acute exacerbation ([address]) 10/05/2023    Rapid atrial fibrillation ([address]) 10/03/2023    HILARY (acute kidney injury) ([address]) 10/03/2023    Abnormal CT of the abdomen 10/03/2023    Platelets decreased ([address]) 2023    Multiple lung nodules on CT 10/06/2022    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 ([address]) 10/06/2022    Pleural effusion 10/06/2022    Malignant neoplasm of upper lobe of right lung ([address])     Multiple subsegmental pulmonary emboli without acute cor pulmonale ([address]) 09/10/2022    Pulmonary mass 09/10/2022    Anemia 09/10/2022    Non-recurrent bilateral inguinal hernia without obstruction or gangrene 2021    Status post right tibial-talar ankle fusion 2020    BPH associated with nocturia 2019    Arthritis of right acromioclavicular joint 03/15/2019    Primary osteoarthritis of right shoulder 2019    Chronic left shoulder pain 2019    Left rotator cuff tear arthropathy 2019    Rotator cuff injury, right, initial encounter 2019    History of colon polyps 2019    Hyperparathyroidism ([address]) 2018    Hypokalemia 10/22/2018 Hypocalcemia 10/22/2018    Glaucoma 10/11/2018    Type 2 diabetes mellitus ([address]) 07/25/2016    Inguinal hernia 02/08/2016    Benign essential HTN 01/12/2016    Hypercholesterolemia 01/12/2016      LOS (days): 7  Geometric Mean LOS (GMLOS) (days): 4.90  Days to GMLOS:-1.9     OBJECTIVE:  Risk of Unplanned Readmission Score: 41.47         Current admission status: Inpatient   Preferred Pharmacy:   [address] [facility], [address] [place] [address] [address]  Phone: [phone] Fax: [address] [facility], [address] [facility]   [address] [address]  Phone: [phone] Fax: [facility] [facility] [address] [place] [address], [address] [place] [address]  [address]  Phone: [phone] Fax: [phone]    Primary Care Provider: Vanna Cobian PA-C    Primary Insurance: MEDICARE  Secondary Insurance: Erich Toth    DISCHARGE DETAILS:    Discharge planning discussed with[de-identified] patient and Hafsa Dearth at the bedside  Freedom of Choice: Yes  Comments - Freedom of Choice: pt will continue with [facility] - rx was snet to [facility] pharmacy for drian supplies  CM contacted family/caregiver?: Yes             Contacts  Patient Contacts: Kimmy Line  Relationship to Patient[de-identified] Family (daughter)  Contact Method: Phone  Phone Number: [phone]  Reason/Outcome: Discharge [facility]         Is the patient interested in [address] at discharge?: Yes         Other Referral/Resources/Interventions Provided:  Interventions: HHC, Other (Specify)  Referral Comments: rx sent to [facility] is aware he is coming home with a drain-  Hafsa Dearth wants to be called with the d/c plan - family needs education on the care of the drain- pt is not stable for d./c this weekend - he remains on IV bumex & an amiodarone drip    Would you like to participate in our [facility] service program?  : No - Declined    Treatment Team Recommendation: Home with [address] (home with family & [facility] & drain & outpt follow up- family)

## 2023-11-18 NOTE — ASSESSMENT & PLAN NOTE
Transitioned to therapeutic Lovenox (due to need for an Amiodarone infusion and only a single lumen, hence, cannot use a heparin drip anymore) -> initiated bridging w/ Coumadin

## 2023-11-18 NOTE — PLAN OF CARE
Problem: PAIN - ADULT  Goal: Verbalizes/displays adequate comfort level or baseline comfort level  Description: Interventions:  - Encourage patient to monitor pain and request assistance  - Assess pain using appropriate pain scale  - Administer analgesics based on type and severity of pain and evaluate response  - Implement non-pharmacological measures as appropriate and evaluate response  - Consider cultural and social influences on pain and pain management  - Notify physician/advanced practitioner if interventions unsuccessful or patient reports new pain  Outcome: Progressing     Problem: INFECTION - ADULT  Goal: Absence or prevention of progression during hospitalization  Description: INTERVENTIONS:  - Assess and monitor for signs and symptoms of infection  - Monitor lab/diagnostic results  - Monitor all insertion sites, i.e. indwelling lines, tubes, and drains  - Monitor endotracheal if appropriate and nasal secretions for changes in amount and color  - [place] appropriate cooling/warming therapies per order  - Administer medications as ordered  - Instruct and encourage patient and family to use good hand hygiene technique  - Identify and instruct in appropriate isolation precautions for identified infection/condition  Outcome: Progressing     Problem: SAFETY ADULT  Goal: Patient will remain free of falls  Description: INTERVENTIONS:  - Educate patient/family on patient safety including physical limitations  - Instruct patient to call for assistance with activity   - Consult OT/PT to assist with strengthening/mobility   - Keep Call bell within reach  - Keep bed low and locked with side rails adjusted as appropriate  - Keep care items and personal belongings within reach  - Initiate and maintain comfort rounds  - Make Fall Risk Sign visible to staff  - Offer Toileting every 2 Hours, in advance of need  - Initiate/Maintain bed alarm  - Obtain necessary fall risk management equipment:   - Apply yellow socks and bracelet for high fall risk patients  - Consider moving patient to room near nurses station  Outcome: Progressing  Goal: Maintain or return to baseline ADL function  Description: INTERVENTIONS:  -  Assess patient's ability to carry out ADLs; assess patient's baseline for ADL function and identify physical deficits which impact ability to perform ADLs (bathing, care of mouth/teeth, toileting, grooming, dressing, etc.)  - Assess/evaluate cause of self-care deficits   - Assess range of motion  - Assess patient's mobility; develop plan if impaired  - Assess patient's need for assistive devices and provide as appropriate  - Encourage maximum independence but intervene and supervise when necessary  - Involve family in performance of ADLs  - Assess for home care needs following discharge   - Consider OT consult to assist with ADL evaluation and planning for discharge  - Provide patient education as appropriate  Outcome: Progressing  Goal: Maintains/Returns to pre admission functional level  Description: INTERVENTIONS:  - Perform BMAT or MOVE assessment daily.   - Set and communicate daily mobility goal to care team and patient/family/caregiver. - Collaborate with rehabilitation services on mobility goals if consulted  - Perform Range of Motion 3 times a day. - Reposition patient every 2 hours.   - Dangle patient 3 times a day  - Stand patient 3 times a day  - Ambulate patient 3 times a day  - Out of bed for meals   - Out of bed for toileting  - Record patient progress and toleration of activity level   Outcome: Progressing     Problem: DISCHARGE PLANNING  Goal: Discharge to home or other facility with appropriate resources  Description: INTERVENTIONS:  - Identify barriers to discharge w/patient and caregiver  - Arrange for needed discharge resources and transportation as appropriate  - Identify discharge learning needs (meds, wound care, etc.)  - Arrange for interpretive services to assist at discharge as needed  - Refer to Case Management Department for coordinating discharge planning if the patient needs post-hospital services based on physician/advanced practitioner order or complex needs related to functional status, cognitive ability, or social support system  Outcome: Progressing     Problem: Knowledge Deficit  Goal: Patient/family/caregiver demonstrates understanding of disease process, treatment plan, medications, and discharge instructions  Description: Complete learning assessment and assess knowledge base. Interventions:  - Provide teaching at level of understanding  - Provide teaching via preferred learning methods  Outcome: Progressing     Problem: MOBILITY - ADULT  Goal: Maintain or return to baseline ADL function  Description: INTERVENTIONS:  -  Assess patient's ability to carry out ADLs; assess patient's baseline for ADL function and identify physical deficits which impact ability to perform ADLs (bathing, care of mouth/teeth, toileting, grooming, dressing, etc.)  - Assess/evaluate cause of self-care deficits   - Assess range of motion  - Assess patient's mobility; develop plan if impaired  - Assess patient's need for assistive devices and provide as appropriate  - Encourage maximum independence but intervene and supervise when necessary  - Involve family in performance of ADLs  - Assess for home care needs following discharge   - Consider OT consult to assist with ADL evaluation and planning for discharge  - Provide patient education as appropriate  Outcome: Progressing  Goal: Maintains/Returns to pre admission functional level  Description: INTERVENTIONS:  - Perform BMAT or MOVE assessment daily.   - Set and communicate daily mobility goal to care team and patient/family/caregiver. - Collaborate with rehabilitation services on mobility goals if consulted  - Perform Range of Motion 3 times a day. - Reposition patient every 2 hours.   - Dangle patient 3 times a day  - Stand patient 3 times a day  - Ambulate patient 3 times a day  - Out of bed for meals   - Out of bed for toileting  - Record patient progress and toleration of activity level   Outcome: Progressing     Problem: NEUROSENSORY - ADULT  Goal: Achieves stable or improved neurological status  Description: INTERVENTIONS  - Monitor and report changes in neurological status  - Monitor vital signs such as temperature, blood pressure, glucose, and any other labs ordered   - Initiate measures to prevent increased intracranial pressure  - Monitor for seizure activity and implement precautions if appropriate      Outcome: Progressing  Goal: Achieves maximal functionality and self care  Description: INTERVENTIONS  - Monitor swallowing and airway patency with patient fatigue and changes in neurological status  - Encourage and assist patient to increase activity and self care.    - Encourage visually impaired, hearing impaired and aphasic patients to use assistive/communication devices  Outcome: Progressing     Problem: CARDIOVASCULAR - ADULT  Goal: Maintains optimal cardiac output and hemodynamic stability  Description: INTERVENTIONS:  - Monitor I/O, vital signs and rhythm  - Monitor for S/S and trends of decreased cardiac output  - Administer and titrate ordered vasoactive medications to optimize hemodynamic stability  - Assess quality of pulses, skin color and temperature  - Assess for signs of decreased coronary artery perfusion  - Instruct patient to report change in severity of symptoms  Outcome: Progressing     Problem: RESPIRATORY - ADULT  Goal: Achieves optimal ventilation and oxygenation  Description: INTERVENTIONS:  - Assess for changes in respiratory status  - Assess for changes in mentation and behavior  - Position to facilitate oxygenation and minimize respiratory effort  - Oxygen administered by appropriate delivery if ordered  - Initiate smoking cessation education as indicated  - Encourage broncho-pulmonary hygiene including cough, deep breathe, Incentive Spirometry  - Assess the need for suctioning and aspirate as needed  - Assess and instruct to report SOB or any respiratory difficulty  - Respiratory Therapy support as indicated  Outcome: Progressing     Problem: GASTROINTESTINAL - ADULT  Goal: Minimal or absence of nausea and/or vomiting  Description: INTERVENTIONS:  - Administer IV fluids if ordered to ensure adequate hydration  - Maintain NPO status until nausea and vomiting are resolved  - Nasogastric tube if ordered  - Administer ordered antiemetic medications as needed  - Provide nonpharmacologic comfort measures as appropriate  - Advance diet as tolerated, if ordered  - Consider nutrition services referral to assist patient with adequate nutrition and appropriate food choices  Outcome: Progressing  Goal: Maintains or returns to baseline bowel function  Description: INTERVENTIONS:  - Assess bowel function  - Encourage oral fluids to ensure adequate hydration  - Administer IV fluids if ordered to ensure adequate hydration  - Administer ordered medications as needed  - Encourage mobilization and activity  - Consider nutritional services referral to assist patient with adequate nutrition and appropriate food choices  Outcome: Progressing  Goal: Maintains adequate nutritional intake  Description: INTERVENTIONS:  - Monitor percentage of each meal consumed  - Identify factors contributing to decreased intake, treat as appropriate  - Assist with meals as needed  - Monitor I&O, weight, and lab values if indicated  - Obtain nutrition services referral as needed  Outcome: Progressing  Goal: Oral mucous membranes remain intact  Description: INTERVENTIONS  - Assess oral mucosa and hygiene practices  - Implement preventative oral hygiene regimen  - Implement oral medicated treatments as ordered  - Initiate Nutrition services referral as needed  Outcome: Progressing     Problem: GENITOURINARY - ADULT  Goal: Maintains or returns to baseline urinary function  Description: INTERVENTIONS:  - Assess urinary function  - Encourage oral fluids to ensure adequate hydration if ordered  - Administer IV fluids as ordered to ensure adequate hydration  - Administer ordered medications as needed  - Offer frequent toileting  - Follow urinary retention protocol if ordered  Outcome: Progressing  Goal: Absence of urinary retention  Description: INTERVENTIONS:  - Assess patient’s ability to void and empty bladder  - Monitor I/O  - Bladder scan as needed  - Discuss with physician/AP medications to alleviate retention as needed  - Discuss catheterization for long term situations as appropriate  Outcome: Progressing     Problem: METABOLIC, FLUID AND ELECTROLYTES - ADULT  Goal: Electrolytes maintained within normal limits  Description: INTERVENTIONS:  - Monitor labs and assess patient for signs and symptoms of electrolyte imbalances  - Administer electrolyte replacement as ordered  - Monitor response to electrolyte replacements, including repeat lab results as appropriate  - Instruct patient on fluid and nutrition as appropriate  Outcome: Progressing  Goal: Fluid balance maintained  Description: INTERVENTIONS:  - Monitor labs   - Monitor I/O and WT  - Instruct patient on fluid and nutrition as appropriate  - Assess for signs & symptoms of volume excess or deficit  Outcome: Progressing  Goal: Glucose maintained within target range  Description: INTERVENTIONS:  - Monitor Blood Glucose as ordered  - Assess for signs and symptoms of hyperglycemia and hypoglycemia  - Administer ordered medications to maintain glucose within target range  - Assess nutritional intake and initiate nutrition service referral as needed  Outcome: Progressing     Problem: SKIN/TISSUE INTEGRITY - ADULT  Goal: Incision(s), wounds(s) or drain site(s) healing without S/S of infection  Description: INTERVENTIONS  - Assess and document dressing, incision, wound bed, drain sites and surrounding tissue  - Provide patient and family education  - Perform skin care/dressing changes every shift  Outcome: Progressing  Goal: Pressure injury heals and does not worsen  Description: Interventions:  - Implement low air loss mattress or specialty surface (Criteria met)  - Apply silicone foam dressing  - Instruct/assist with weight shifting every 60 minutes when in chair   - Limit chair time   - Use special pressure reducing interventions such as waffle cushion when in chair   - Apply fecal or urinary incontinence containment device   - Perform passive or active ROM every shift  - Turn and reposition patient & offload bony prominences every 2 hours   - Utilize friction reducing device or surface for transfers   - Use incontinent care products after each incontinent episode   - Consider nutrition services referral as needed  Outcome: Progressing     Problem: HEMATOLOGIC - ADULT  Goal: Maintains hematologic stability  Description: INTERVENTIONS  - Assess for signs and symptoms of bleeding or hemorrhage  - Monitor labs  - Administer supportive blood products/factors as ordered and appropriate  Outcome: Progressing     Problem: MUSCULOSKELETAL - ADULT  Goal: Maintain or return mobility to safest level of function  Description: INTERVENTIONS:  - Assess patient's ability to carry out ADLs; assess patient's baseline for ADL function and identify physical deficits which impact ability to perform ADLs (bathing, care of mouth/teeth, toileting, grooming, dressing, etc.)  - Assess/evaluate cause of self-care deficits   - Assess range of motion  - Assess patient's mobility  - Assess patient's need for assistive devices and provide as appropriate  - Encourage maximum independence but intervene and supervise when necessary  - Involve family in performance of ADLs  - Assess for home care needs following discharge   - Consider OT consult to assist with ADL evaluation and planning for discharge  - Provide patient education as appropriate  Outcome: Progressing  Goal: Maintain proper alignment of affected body part  Description: INTERVENTIONS:  - Support, maintain and protect limb and body alignment  - Provide patient/ family with appropriate education  Outcome: Progressing     Problem: Prexisting or High Potential for Compromised Skin Integrity  Goal: Skin integrity is maintained or improved  Description: INTERVENTIONS:  - Identify patients at risk for skin breakdown  - Assess and monitor skin integrity  - Assess and monitor nutrition and hydration status  - Monitor labs   - Assess for incontinence   - Turn and reposition patient  - Assist with mobility/ambulation  - Relieve pressure over bony prominences  - Avoid friction and shearing  - Provide appropriate hygiene as needed including keeping skin clean and dry  - Evaluate need for skin moisturizer/barrier cream  - Collaborate with interdisciplinary team   - Patient/family teaching  - Consider wound care consult   Outcome: Progressing

## 2023-11-18 NOTE — ASSESSMENT & PLAN NOTE
Remains with intermittent RVR  Continue beta-blockade with Lopressor -> transitioned oral Amiodarone to a continuous infusion due to intermittent hypotensive states -> appreciate cardiology input with addition of Digoxin on 11/16 with relatively improving heart rates over the last few days  Anticoagulation transitioned to therapeutic Lovenox -> initiated bridging with Coumadin last night for target INR of 2-3

## 2023-11-18 NOTE — ASSESSMENT & PLAN NOTE
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

## 2023-11-18 NOTE — ASSESSMENT & PLAN NOTE
Unclear etiology, possibly cardiorenal given CHF exacerbation  Baseline creatinine of approximately 0.9-1.1 -> remains elevated but improved today at 1.28 from a prior 1.96 peak   Monitor renal function and urine output - limit/avoid nephrotoxins and hypotension as possible  Noted Bumex use

## 2023-11-18 NOTE — ASSESSMENT & PLAN NOTE
Lab Results   Component Value Date    HGBA1C 6.0 10/23/2023     Continue SSI coverage per Accu-Cheks  Carbohydrate restriction  Hypoglycemia protocol

## 2023-11-18 NOTE — NURSING NOTE
Pt is alert and oriented with significant hearing deficit. Using headset but still having some difficulty. C/o pain to bilateral chest tube sites with left greater that right. Increased pain with deep breathing. Moves fair in bed with limitation due to profound generalized edema. Also noted to be having some visual defect. Reaching for items but not grasping where items are located. Heart tones clear with doppler pulses to pedals. Edema to right foot +4.  +3 edema to left foot and both legs up to abdominal panus. +2 to trunk and upper extremities. Lungs are decreased with poor air entry. Slight inspiratory wheeze noted throughout. Mild dyspnea on exertion. Abdomen is large with hypoactive bowel sounds. Refused softeners. Incontinent of small BM. Voiding small amounts of clear yellow. PICC dressing intact. Infusing.

## 2023-11-18 NOTE — ASSESSMENT & PLAN NOTE
Noted on CT, R>L b/l pleural effusions   S/p L thoracentesis with IR on 11/9 as OP   S/p IR thoracentesis of L effusion 11/13 with 1.4L removed   CT of chest on 11/15 noted improvement in size of right pleural effusion, also with a small right pneumothorax  Underwent chest tube placement on 11/14 -> pulmonology input appreciated with recommendation of conversion of chest tube into Pleurx catheter, now present on both sides as placed by IR on 11/16, for longer-term management   Monitor respiratory status closely

## 2023-11-18 NOTE — PLAN OF CARE
Problem: SAFETY ADULT  Goal: Patient will remain free of falls  Description: INTERVENTIONS:  - Educate patient/family on patient safety including physical limitations  - Instruct patient to call for assistance with activity   - Consult OT/PT to assist with strengthening/mobility   - Keep Call bell within reach  - Keep bed low and locked with side rails adjusted as appropriate  - Keep care items and personal belongings within reach  - Initiate and maintain comfort rounds  - Make Fall Risk Sign visible to staff  - Offer Toileting every 2 Hours, in advance of need  - Initiate/Maintain bed alarm  - Obtain necessary fall risk management equipment:   - Apply yellow socks and bracelet for high fall risk patients  - Consider moving patient to room near nurses station  Outcome: Progressing     Problem: CARDIOVASCULAR - ADULT  Goal: Maintains optimal cardiac output and hemodynamic stability  Description: INTERVENTIONS:  - Monitor I/O, vital signs and rhythm  - Monitor for S/S and trends of decreased cardiac output  - Administer and titrate ordered vasoactive medications to optimize hemodynamic stability  - Assess quality of pulses, skin color and temperature  - Assess for signs of decreased coronary artery perfusion  - Instruct patient to report change in severity of symptoms  Outcome: Progressing     Problem: GASTROINTESTINAL - ADULT  Goal: Maintains adequate nutritional intake  Description: INTERVENTIONS:  - Monitor percentage of each meal consumed  - Identify factors contributing to decreased intake, treat as appropriate  - Assist with meals as needed  - Monitor I&O, weight, and lab values if indicated  - Obtain nutrition services referral as needed  Outcome: Progressing

## 2023-11-18 NOTE — ASSESSMENT & PLAN NOTE
Likely multifactorial in the setting of b/l pleural effusion and acute CHF exacerbation   At baseline, requires 3L NC -> currently down to baseline requirement  Encourage cough, deep breathing, IS, ambulation as tolerated  Anticipate plan for discharge home with home health services in the upcoming days with continued improvement

## 2023-11-18 NOTE — PROGRESS NOTES
[address]  Progress Note  Name: Mary Ann Ruiz  MRN: [de-identified]  Unit/Bed#: ICU 05-01 I Date of Admission: 11/11/2023   Date of Service: 11/18/2023 I Hospital Day: 7      Assessment & Plan:    * Acute on chronic respiratory failure with hypoxia [facility])  Assessment & Plan  Likely multifactorial in the setting of b/l pleural effusion and acute CHF exacerbation   At baseline, requires 3L NC -> currently down to baseline requirement  Encourage cough, deep breathing, IS, ambulation as tolerated  Anticipate plan for discharge home with home health services in approximately 48 hours     Pleural effusion  Assessment & Plan  Noted on CT, R>L b/l pleural effusions   S/p L thoracentesis with IR on 11/9 as OP   S/p IR thoracentesis of L effusion 11/13 with 1.4L removed   CT of chest on 11/15 noted improvement in size of right pleural effusion, also with a small right pneumothorax  Underwent chest tube placement on 11/14 -> pulmonology input appreciated with recommendation of conversion of chest tube into Pleurx catheter, now present on both sides as placed by IR on 11/16, for longer-term management   Monitor respiratory status closely      Acute on chronic HFrEF (heart failure with reduced ejection fraction) ([address])  Assessment & Plan  Last ECHO with LVEF 35 % -> 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  On diuresis with Bumex - on beta-blockade with Lopressor - initiated on Digoxin for concurrent atrial fibrillation  Monitor/replete serum potassium/magnesium deficiencies if present    Rapid atrial fibrillation ([address])  Assessment & Plan  Remains with intermittent RVR  Continue beta-blockade with Lopressor -> transitioned oral Amiodarone to a continuous infusion due to intermittent hypotensive states -> appreciate cardiology input with addition of Digoxin on 11/16 with relatively improving heart rates over the last few days  Anticoagulation transitioned to therapeutic Lovenox -> will initiate bridging with Coumadin tonight for target INR of 2-3      HILARY (acute kidney injury) ([address])  Assessment & Plan  Unclear etiology, possibly cardiorenal given CHF exacerbation  Baseline creatinine of approximately 0.9-1.1 -> remains elevated but improved today at 1.35 from a prior 1.96 peak   Monitor renal function and urine output - limit/avoid nephrotoxins and hypotension as possible  Noted Bumex use     Deep vein thrombosis (DVT) of right lower extremity ([address])  Assessment & Plan  See plan for pulmonary embolus below  Continue anticoagulation     History of pulmonary embolus (PE)  Assessment & Plan  Transitioned to therapeutic Lovenox (due to need for an Amiodarone infusion and only a single lumen, hence, cannot use a heparin drip anymore) -> initiated bridging w/ Coumadin     Malignant neoplasm of upper lobe of right lung [facility])  Assessment & Plan  Pt diagnosed with Stage 4 Lung Ca in 9/2022  S/p XRT completed June 2023   Currently undergoing chemo, last 9/19/23   OP follow up with heme/onc      History of prostate cancer  Assessment & Plan  Continue Proscar for associated BPH  Status post prior radiation in 2005     Hypercholesterolemia  Assessment & Plan  Continue statin     Type 2 diabetes mellitus ([address])  Assessment & Plan        Lab Results   Component Value Date     HGBA1C 6.0 10/23/2023      Continue SSI coverage per Accu-Cheks  Carbohydrate restriction  Hypoglycemia protocol     Benign essential HTN  Assessment & Plan  Continue Lopressor (w/ holding parameters for hypotension)       DVT Prophylaxis:  therapeutic Lovenox w/ Coumadin bridging    Patient Centered Rounds:  I have performed bedside rounds and discussed plan of care with nursing today. Discussions with Specialists or Other Care Team Provider:  see above assessments if applicable    Education and Discussions with Family / Patient:  Patient at bedside - discussed in person w/ daughter, yesterday    Time Spent for Care:  35 minutes.  More than 50% of total time spent on counseling and coordination of care, on one or more of the following: performing physical exam; counseling and coordination of care, obtaining or reviewing history, documenting in the medical record, reviewing/ordering tests/medications/procedures, and communicating with other healthcare professionals. Current Length of Stay: 7 day(s)  Current Patient Status: Inpatient   Certification Statement:  Patient will continue to require additional hospital stay due to assessments as noted above. Code Status: Level 3 - DNAR and DNI        Subjective:     Encountered this morning. Reports improvement in breathing today. Less fatigued. Objective:     Vitals:   Temp (24hrs), Av °F (36.7 °C), Min:97.3 °F (36.3 °C), Max:99.2 °F (37.3 °C)    Temp:  [97.3 °F (36.3 °C)-99.2 °F (37.3 °C)] 97.3 °F (36.3 °C)  HR:  [] 98  Resp:  [15-24] 17  BP: ()/(52-79) 135/62  SpO2:  [91 %-97 %] 94 %  Body mass index is 26.63 kg/m². Input and Output Summary (last 24 hours):        Intake/Output Summary (Last 24 hours) at 2023 1116  Last data filed at 2023 0600  Gross per 24 hour   Intake 943.71 ml   Output 1275 ml   Net -331.29 ml         Physical Exam:     GENERAL Waxing/waning but improved weakness/fatigue   HEAD   Normocephalic - atraumatic   EYES   PERRL - EOMI    MOUTH   Mucosa moist   NECK   Supple - full range of motion   CARDIAC Irregularly irregular with improved/transient tachycardia - S1/S2 positive   PULMONARY Mildly diminished at the bases more prominent on the right - nonlabored respirations at rest on nasal cannula oxygenation - bilateral Pleurx catheters in place   ABDOMEN   Soft - nontender/nondistended - active bowel sounds   MUSCULOSKELETAL   Motor strength/range of motion deconditioned - bilateral distal LE pitting edema with venous stasis changes   NEUROLOGIC   Alert/oriented at baseline   PSYCHIATRIC   Mood/affect stable         Additional Data:     Labs & Recent Cultures:    Results from last 7 days   Lab Units 11/18/23  0547   WBC Thousand/uL 6.88   HEMOGLOBIN g/dL 10.0*   HEMATOCRIT % 32.1*   PLATELETS Thousands/uL 128*   NEUTROS PCT % 75   LYMPHS PCT % 7*   MONOS PCT % 8   EOS PCT % 8*       Results from last 7 days   Lab Units 11/18/23  0547 11/15/23  0518 11/14/23  0630   POTASSIUM mmol/L 3.8   < > 3.7   CHLORIDE mmol/L 105   < > 106   CO2 mmol/L 29   < > 31   BUN mg/dL 34*   < > 47*   CREATININE mg/dL 1.35*   < > 1.70*   CALCIUM mg/dL 7.4*   < > 7.8*   ALK PHOS U/L  --   --  70   ALT U/L  --   --  27   AST U/L  --   --  20    < > = values in this interval not displayed. Results from last 7 days   Lab Units 11/18/23  0547   INR  1.26*       Results from last 7 days   Lab Units 11/18/23  1109 11/18/23  0707 11/17/23  2150 11/17/23  1610 11/17/23  1112 11/17/23  0725 11/17/23  0624 11/16/23  2143 11/16/23  1640 11/16/23  1106 11/16/23  0747 11/15/23  2205   POC GLUCOSE mg/dl 176* 121 150* 152* 117 78 86 82 87 103 117 160*           Results from last 7 days   Lab Units 11/15/23  0518 11/13/23  0505 11/11/23  2335   LACTIC ACID mmol/L  --   --  1.4   PROCALCITONIN ng/ml 0.16 0.16 0.15           Results from last 7 days   Lab Units 11/11/23  2212   BLOOD CULTURE  No Growth After 5 Days. No Growth After 5 Days.            Lines/Drains:  Invasive Devices       Peripherally Inserted Central Catheter Line  Duration             PICC Line 11/13/23 Left Brachial 4 days              Drain  Duration             Pleural Effusion Long-Term Catheter 15.5 Fr. 1 day    Pleural Effusion Long-Term Catheter 15.5 Fr. 1 day                      Last 24 Hours Medication List:   Current Facility-Administered Medications   Medication Dose Route Frequency Provider Last Rate    acetaminophen  650 mg Oral Q6H PRN RADHA Clark      amiodarone (CORDARONE) 900 mg in dextrose 5 % 500 mL infusion  0.5 mg/min Intravenous Continuous Hernán Holman MD 0.5 mg/min (11/17/23 6208)    bumetanide  1 mg Intravenous BID RADHA Lovelace      chlorhexidine  15 mL Mouth/Throat Q12H [address] RADHA Lovelace      digoxin  125 mcg Oral Daily Peg Skiff, DO      enoxaparin  1 mg/kg Subcutaneous Q12H [address] Sandra Castañeda MD      fentanyl citrate (PF)   Intravenous PRN Clare Montoya MD      finasteride  5 mg Oral Daily RADHA Lovelace      folic acid  1 mg Oral Daily RADHA Lovelace      insulin lispro  1-6 Units Subcutaneous HS RADHA Lovelace      insulin lispro  1-6 Units Subcutaneous TID [facility] RADHA Lovelace      lidocaine (PF)    PRN Clare Montoya MD      metoprolol tartrate  50 mg Oral 4x Daily Peg Skiff, DO      midazolam    PRN Clare Montoya MD      polyethylene glycol  17 g Oral Daily PRN RADHA Lovelace      pravastatin  20 mg Oral Daily With Dinner RADHA Lovelace      senna-docusate sodium  2 tablet Oral HS RADHA Lovelace      warfarin  5 mg Oral Daily (warfarin) Sandra Castañeda MD                      ** Please Note: This note is constructed using a voice recognition dictation system. An occasional wrong word/phrase or “sound-a-like” substitution may have been picked up by dictation device due to the inherent limitations of voice recognition software. Read the chart carefully and recognize, using reasonable context, where substitutions may have occurred. **   As above

## 2023-11-19 LAB
ANION GAP SERPL CALCULATED.3IONS-SCNC: 6 MMOL/L
BASOPHILS # BLD AUTO: 0.03 THOUSANDS/ÂΜL (ref 0–0.1)
BASOPHILS NFR BLD AUTO: 0 % (ref 0–1)
BUN SERPL-MCNC: 32 MG/DL (ref 5–25)
CALCIUM SERPL-MCNC: 7.4 MG/DL (ref 8.4–10.2)
CHLORIDE SERPL-SCNC: 104 MMOL/L (ref 96–108)
CO2 SERPL-SCNC: 30 MMOL/L (ref 21–32)
CREAT SERPL-MCNC: 1.28 MG/DL (ref 0.6–1.3)
EOSINOPHIL # BLD AUTO: 0.53 THOUSAND/ÂΜL (ref 0–0.61)
EOSINOPHIL NFR BLD AUTO: 7 % (ref 0–6)
ERYTHROCYTE [DISTWIDTH] IN BLOOD BY AUTOMATED COUNT: 16.3 % (ref 11.6–15.1)
GFR SERPL CREATININE-BSD FRML MDRD: 53 ML/MIN/1.73SQ M
GLUCOSE SERPL-MCNC: 100 MG/DL (ref 65–140)
GLUCOSE SERPL-MCNC: 128 MG/DL (ref 65–140)
GLUCOSE SERPL-MCNC: 140 MG/DL (ref 65–140)
GLUCOSE SERPL-MCNC: 143 MG/DL (ref 65–140)
GLUCOSE SERPL-MCNC: 156 MG/DL (ref 65–140)
HCT VFR BLD AUTO: 31.2 % (ref 36.5–49.3)
HGB BLD-MCNC: 9.8 G/DL (ref 12–17)
IMM GRANULOCYTES # BLD AUTO: 0.09 THOUSAND/UL (ref 0–0.2)
IMM GRANULOCYTES NFR BLD AUTO: 1 % (ref 0–2)
INR PPP: 1.23 (ref 0.84–1.19)
LYMPHOCYTES # BLD AUTO: 0.51 THOUSANDS/ÂΜL (ref 0.6–4.47)
LYMPHOCYTES NFR BLD AUTO: 7 % (ref 14–44)
MAGNESIUM SERPL-MCNC: 2 MG/DL (ref 1.9–2.7)
MCH RBC QN AUTO: 32.5 PG (ref 26.8–34.3)
MCHC RBC AUTO-ENTMCNC: 31.4 G/DL (ref 31.4–37.4)
MCV RBC AUTO: 103 FL (ref 82–98)
MONOCYTES # BLD AUTO: 0.58 THOUSAND/ÂΜL (ref 0.17–1.22)
MONOCYTES NFR BLD AUTO: 8 % (ref 4–12)
NEUTROPHILS # BLD AUTO: 5.48 THOUSANDS/ÂΜL (ref 1.85–7.62)
NEUTS SEG NFR BLD AUTO: 77 % (ref 43–75)
NRBC BLD AUTO-RTO: 0 /100 WBCS
PLATELET # BLD AUTO: 132 THOUSANDS/UL (ref 149–390)
PMV BLD AUTO: 10.7 FL (ref 8.9–12.7)
POTASSIUM SERPL-SCNC: 3.6 MMOL/L (ref 3.5–5.3)
PROTHROMBIN TIME: 15.6 SECONDS (ref 11.6–14.5)
RBC # BLD AUTO: 3.02 MILLION/UL (ref 3.88–5.62)
SODIUM SERPL-SCNC: 140 MMOL/L (ref 135–147)
WBC # BLD AUTO: 7.22 THOUSAND/UL (ref 4.31–10.16)

## 2023-11-19 PROCEDURE — 85025 COMPLETE CBC W/AUTO DIFF WBC: CPT | Performed by: INTERNAL MEDICINE

## 2023-11-19 PROCEDURE — 82948 REAGENT STRIP/BLOOD GLUCOSE: CPT

## 2023-11-19 PROCEDURE — 80048 BASIC METABOLIC PNL TOTAL CA: CPT | Performed by: INTERNAL MEDICINE

## 2023-11-19 PROCEDURE — 83735 ASSAY OF MAGNESIUM: CPT | Performed by: INTERNAL MEDICINE

## 2023-11-19 PROCEDURE — 85610 PROTHROMBIN TIME: CPT | Performed by: INTERNAL MEDICINE

## 2023-11-19 PROCEDURE — 99232 SBSQ HOSP IP/OBS MODERATE 35: CPT | Performed by: INTERNAL MEDICINE

## 2023-11-19 PROCEDURE — 0W993ZZ DRAINAGE OF RIGHT PLEURAL CAVITY, PERCUTANEOUS APPROACH: ICD-10-PCS | Performed by: INTERNAL MEDICINE

## 2023-11-19 RX ORDER — POTASSIUM CHLORIDE 20 MEQ/1
40 TABLET, EXTENDED RELEASE ORAL ONCE
Status: COMPLETED | OUTPATIENT
Start: 2023-11-19 | End: 2023-11-19

## 2023-11-19 RX ADMIN — METOPROLOL TARTRATE 50 MG: 50 TABLET, FILM COATED ORAL at 08:19

## 2023-11-19 RX ADMIN — POTASSIUM CHLORIDE 40 MEQ: 1500 TABLET, EXTENDED RELEASE ORAL at 08:20

## 2023-11-19 RX ADMIN — METOPROLOL TARTRATE 50 MG: 50 TABLET, FILM COATED ORAL at 12:28

## 2023-11-19 RX ADMIN — PRAVASTATIN SODIUM 20 MG: 20 TABLET ORAL at 17:10

## 2023-11-19 RX ADMIN — BUMETANIDE 1 MG: 0.25 INJECTION INTRAMUSCULAR; INTRAVENOUS at 17:10

## 2023-11-19 RX ADMIN — AMIODARONE HYDROCHLORIDE 0.5 MG/MIN: 50 INJECTION, SOLUTION INTRAVENOUS at 01:38

## 2023-11-19 RX ADMIN — DIGOXIN 125 MCG: 125 TABLET ORAL at 08:20

## 2023-11-19 RX ADMIN — WARFARIN SODIUM 5 MG: 5 TABLET ORAL at 17:10

## 2023-11-19 RX ADMIN — BUMETANIDE 1 MG: 0.25 INJECTION INTRAMUSCULAR; INTRAVENOUS at 08:20

## 2023-11-19 RX ADMIN — METOPROLOL TARTRATE 50 MG: 50 TABLET, FILM COATED ORAL at 21:08

## 2023-11-19 RX ADMIN — INSULIN LISPRO 1 UNITS: 100 INJECTION, SOLUTION INTRAVENOUS; SUBCUTANEOUS at 21:09

## 2023-11-19 RX ADMIN — CHLORHEXIDINE GLUCONATE 15 ML: 1.2 RINSE ORAL at 21:08

## 2023-11-19 RX ADMIN — CHLORHEXIDINE GLUCONATE 15 ML: 1.2 RINSE ORAL at 08:20

## 2023-11-19 RX ADMIN — FOLIC ACID 1 MG: 1 TABLET ORAL at 08:20

## 2023-11-19 RX ADMIN — METOPROLOL TARTRATE 50 MG: 50 TABLET, FILM COATED ORAL at 17:10

## 2023-11-19 RX ADMIN — FINASTERIDE 5 MG: 5 TABLET, FILM COATED ORAL at 08:20

## 2023-11-19 RX ADMIN — ENOXAPARIN SODIUM 90 MG: 100 INJECTION SUBCUTANEOUS at 08:20

## 2023-11-19 RX ADMIN — ENOXAPARIN SODIUM 90 MG: 100 INJECTION SUBCUTANEOUS at 21:08

## 2023-11-19 NOTE — NURSING NOTE
RN performed a drain of the L lung with the pleuovac in place. Using sterile techique, the vac container was applied using the tubing. Only 2 cc's estimated was removed. The fluid was serous with scant streaks of blood noted. The patient tolerated the procedure well.

## 2023-11-19 NOTE — PROGRESS NOTES
[address]  Progress Note  Name: Gerry Islas  MRN: [de-identified]  Unit/Bed#: ICU 05-01 I Date of Admission: 11/11/2023   Date of Service: 11/19/2023 I Hospital Day: 8      Assessment & Plan:    * Acute on chronic respiratory failure with hypoxia [facility])  Assessment & Plan  Likely multifactorial in the setting of b/l pleural effusion and acute CHF exacerbation   At baseline, requires 3L NC -> currently down to baseline requirement  Encourage cough, deep breathing, IS, ambulation as tolerated  Anticipate plan for discharge home with home health services in the upcoming days with continued improvement    Pleural effusion  Assessment & Plan  Noted on CT, R>L b/l pleural effusions   S/p L thoracentesis with IR on 11/9 as OP   S/p IR thoracentesis of L effusion 11/13 with 1.4L removed   CT of chest on 11/15 noted improvement in size of right pleural effusion, also with a small right pneumothorax  Underwent chest tube placement on 11/14 -> pulmonology input appreciated with recommendation of conversion of chest tube into Pleurx catheter, now present on both sides as placed by IR on 11/16, for longer-term management   Monitor respiratory status closely     Acute on chronic HFrEF (heart failure with reduced ejection fraction) ([address])  Assessment & Plan  Last ECHO with LVEF 35 % -> 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  On diuresis with Bumex - on beta-blockade with Lopressor - initiated on Digoxin for concurrent atrial fibrillation  Monitor/replete serum potassium/magnesium deficiencies if present    Rapid atrial fibrillation ([address])  Assessment & Plan  Remains with intermittent RVR  Continue beta-blockade with Lopressor -> transitioned oral Amiodarone to a continuous infusion due to intermittent hypotensive states -> appreciate cardiology input with addition of Digoxin on 11/16 with relatively improving heart rates over the last few days  Anticoagulation transitioned to therapeutic Lovenox -> initiated bridging with Coumadin last night for target INR of 2-3     HILARY (acute kidney injury) ([address])  Assessment & Plan  Unclear etiology, possibly cardiorenal given CHF exacerbation  Baseline creatinine of approximately 0.9-1.1 -> remains elevated but improved today at 1.28 from a prior 1.96 peak   Monitor renal function and urine output - limit/avoid nephrotoxins and hypotension as possible  Noted Bumex use    Deep vein thrombosis (DVT) of right lower extremity ([address])  Assessment & Plan  See plan for pulmonary embolus below  Continue anticoagulation    History of pulmonary embolus (PE)  Assessment & Plan  Transitioned to therapeutic Lovenox (due to need for an Amiodarone infusion and only a single lumen, hence, cannot use a heparin drip anymore) -> initiated bridging w/ Coumadin    Malignant neoplasm of upper lobe of right lung [facility])  Assessment & Plan  Pt diagnosed with Stage 4 Lung Ca in 9/2022  S/p XRT completed June 2023   Currently undergoing chemo, last 9/19/23   OP follow up with heme/onc     History of prostate cancer  Assessment & Plan  Continue Proscar for associated BPH   Status post prior radiation in 2005    Hypercholesterolemia  Assessment & Plan  Continue statin    Type 2 diabetes mellitus ([address])  Assessment & Plan  Lab Results   Component Value Date    HGBA1C 6.0 10/23/2023     Continue SSI coverage per Accu-Cheks  Carbohydrate restriction  Hypoglycemia protocol    Benign essential HTN  Assessment & Plan  Continue Lopressor (w/ holding parameters for hypotension)      DVT Prophylaxis:  therapeutic Lovenox with Coumadin bridge    Patient Centered Rounds:  I have performed bedside rounds and discussed plan of care with nursing today. Discussions with Specialists or Other Care Team Provider:  see above assessments if applicable    Education and Discussions with Family / Patient:  Patient at bedside - discussed with daughter, previously    Time Spent for Care:  35 minutes.  More than 50% of total time spent on counseling and coordination of care, on one or more of the following: performing physical exam; counseling and coordination of care, obtaining or reviewing history, documenting in the medical record, reviewing/ordering tests/medications/procedures, and communicating with other healthcare professionals. Current Length of Stay: 8 day(s)  Current Patient Status: Inpatient   Certification Statement:  Patient will continue to require additional hospital stay due to assessments as noted above. Code Status: Level 3 - DNAR and DNI        Subjective:     Seen and examined earlier today. Sitting upright in a chair stating his breathing has improved over the last few days. Weakness/fatigue is also starting to improve. Overall, he remains in pleasant and hopeful spirits. Objective:     Vitals:   Temp (24hrs), Av.2 °F (36.8 °C), Min:97.3 °F (36.3 °C), Max:99 °F (37.2 °C)    Temp:  [97.3 °F (36.3 °C)-99 °F (37.2 °C)] 97.4 °F (36.3 °C)  HR:  [] 106  Resp:  [9-26] 18  BP: ()/(50-82) 120/67  SpO2:  [80 %-97 %] 94 %  Body mass index is 26.63 kg/m². Input and Output Summary (last 24 hours):        Intake/Output Summary (Last 24 hours) at 2023 0918  Last data filed at 2023 0800  Gross per 24 hour   Intake 1652.2 ml   Output 505 ml   Net 1147.2 ml       Physical Exam:     GENERAL Waxing/waning but improved weakness/fatigue   HEAD   Normocephalic - atraumatic   EYES   PERRL - EOMI    MOUTH   Mucosa moist   NECK   Supple - full range of motion   CARDIAC Irregularly irregular with improved/transient tachycardia - S1/S2 positive     PULMONARY Mildly diminished but improving at the bases more prominent on the right - nonlabored respirations at rest on nasal cannula oxygenation - bilateral Pleurx catheters in place   ABDOMEN   Soft - nontender/nondistended - active bowel sounds   MUSCULOSKELETAL   Motor strength/range of motion deconditioned - bilateral distal LE pitting edema with venous stasis changes   NEUROLOGIC   Alert/oriented at baseline   PSYCHIATRIC   Mood/affect stable             Additional Data:     Labs & Recent Cultures:    Results from last 7 days   Lab Units 11/19/23  0532   WBC Thousand/uL 7.22   HEMOGLOBIN g/dL 9.8*   HEMATOCRIT % 31.2*   PLATELETS Thousands/uL 132*   NEUTROS PCT % 77*   LYMPHS PCT % 7*   MONOS PCT % 8   EOS PCT % 7*     Results from last 7 days   Lab Units 11/19/23  0532 11/15/23  0518 11/14/23  0630   POTASSIUM mmol/L 3.6   < > 3.7   CHLORIDE mmol/L 104   < > 106   CO2 mmol/L 30   < > 31   BUN mg/dL 32*   < > 47*   CREATININE mg/dL 1.28   < > 1.70*   CALCIUM mg/dL 7.4*   < > 7.8*   ALK PHOS U/L  --   --  70   ALT U/L  --   --  27   AST U/L  --   --  20    < > = values in this interval not displayed.      Results from last 7 days   Lab Units 11/19/23  0532   INR  1.23*     Results from last 7 days   Lab Units 11/19/23  0723 11/18/23  2116 11/18/23  1613 11/18/23  1109 11/18/23  0707 11/17/23  2150 11/17/23  1610 11/17/23  1112 11/17/23  0725 11/17/23  0624 11/16/23  2143 11/16/23  1640   POC GLUCOSE mg/dl 100 124 152* 176* 121 150* 152* 117 78 86 82 87         Results from last 7 days   Lab Units 11/15/23  0518 11/13/23  0505   PROCALCITONIN ng/ml 0.16 0.16                 Lines/Drains:  Invasive Devices       Peripherally Inserted Central Catheter Line  Duration             PICC Line 11/13/23 Left Brachial 5 days              Drain  Duration             Pleural Effusion Long-Term Catheter 15.5 Fr. 2 days    Pleural Effusion Long-Term Catheter 15.5 Fr. 2 days                      Last 24 Hours Medication List:   Current Facility-Administered Medications   Medication Dose Route Frequency Provider Last Rate    acetaminophen  650 mg Oral Q6H PRN RADHA Agarwal      amiodarone (CORDARONE) 900 mg in dextrose 5 % 500 mL infusion  0.5 mg/min Intravenous Continuous Almita Rios MD 0.5 mg/min (11/19/23 0138)    bumetanide  1 mg Intravenous BID Stephanie Lozano CRNP      chlorhexidine  15 mL Mouth/Throat Q12H [address] [facility], CRNP      digoxin  125 mcg Oral Daily Sheelaye Phenes, DO      enoxaparin  1 mg/kg Subcutaneous Q12H [address] Monica Eastman MD      fentanyl citrate (PF)   Intravenous PRN Rachel Vera MD      finasteride  5 mg Oral Daily [facility], CRNP      folic acid  1 mg Oral Daily [facility], CRNP      insulin lispro  1-6 Units Subcutaneous HS [facility], CRNP      insulin lispro  1-6 Units Subcutaneous TID [facility], CRNP      lidocaine (PF)    PRN Rachel Vera MD      metoprolol tartrate  50 mg Oral 4x Daily Tammy Howard, DO      midazolam    PRN Rachel Vera MD      polyethylene glycol  17 g Oral Daily PRN [facility], CRNP      pravastatin  20 mg Oral Daily With Dinner [facility], CRNP      senna-docusate sodium  2 tablet Oral [facility], CRNP      warfarin  5 mg Oral Daily (warfarin) Monica Eastman MD                      ** Please Note: This note is constructed using a voice recognition dictation system. An occasional wrong word/phrase or “sound-a-like” substitution may have been picked up by dictation device due to the inherent limitations of voice recognition software. Read the chart carefully and recognize, using reasonable context, where substitutions may have occurred. **

## 2023-11-19 NOTE — PLAN OF CARE
Problem: PAIN - ADULT  Goal: Verbalizes/displays adequate comfort level or baseline comfort level  Description: Interventions:  - Encourage patient to monitor pain and request assistance  - Assess pain using appropriate pain scale  - Administer analgesics based on type and severity of pain and evaluate response  - Implement non-pharmacological measures as appropriate and evaluate response  - Consider cultural and social influences on pain and pain management  - Notify physician/advanced practitioner if interventions unsuccessful or patient reports new pain  Outcome: Progressing     Problem: INFECTION - ADULT  Goal: Absence or prevention of progression during hospitalization  Description: INTERVENTIONS:  - Assess and monitor for signs and symptoms of infection  - Monitor lab/diagnostic results  - Monitor all insertion sites, i.e. indwelling lines, tubes, and drains  - Monitor endotracheal if appropriate and nasal secretions for changes in amount and color  - [place] appropriate cooling/warming therapies per order  - Administer medications as ordered  - Instruct and encourage patient and family to use good hand hygiene technique  - Identify and instruct in appropriate isolation precautions for identified infection/condition  Outcome: Progressing     Problem: SAFETY ADULT  Goal: Patient will remain free of falls  Description: INTERVENTIONS:  - Educate patient/family on patient safety including physical limitations  - Instruct patient to call for assistance with activity   - Consult OT/PT to assist with strengthening/mobility   - Keep Call bell within reach  - Keep bed low and locked with side rails adjusted as appropriate  - Keep care items and personal belongings within reach  - Initiate and maintain comfort rounds  - Make Fall Risk Sign visible to staff  - Offer Toileting every 2 Hours, in advance of need  - Initiate/Maintain bed alarm  - Obtain necessary fall risk management equipment:   - Apply yellow socks and bracelet for high fall risk patients  - Consider moving patient to room near nurses station  Outcome: Progressing  Goal: Maintain or return to baseline ADL function  Description: INTERVENTIONS:  -  Assess patient's ability to carry out ADLs; assess patient's baseline for ADL function and identify physical deficits which impact ability to perform ADLs (bathing, care of mouth/teeth, toileting, grooming, dressing, etc.)  - Assess/evaluate cause of self-care deficits   - Assess range of motion  - Assess patient's mobility; develop plan if impaired  - Assess patient's need for assistive devices and provide as appropriate  - Encourage maximum independence but intervene and supervise when necessary  - Involve family in performance of ADLs  - Assess for home care needs following discharge   - Consider OT consult to assist with ADL evaluation and planning for discharge  - Provide patient education as appropriate  Outcome: Progressing  Goal: Maintains/Returns to pre admission functional level  Description: INTERVENTIONS:  - Perform BMAT or MOVE assessment daily.   - Set and communicate daily mobility goal to care team and patient/family/caregiver. - Collaborate with rehabilitation services on mobility goals if consulted  - Perform Range of Motion 3 times a day. - Reposition patient every 2 hours.   - Dangle patient 3 times a day  - Stand patient 3 times a day  - Ambulate patient 3 times a day  - Out of bed for meals   - Out of bed for toileting  - Record patient progress and toleration of activity level   Outcome: Progressing     Problem: DISCHARGE PLANNING  Goal: Discharge to home or other facility with appropriate resources  Description: INTERVENTIONS:  - Identify barriers to discharge w/patient and caregiver  - Arrange for needed discharge resources and transportation as appropriate  - Identify discharge learning needs (meds, wound care, etc.)  - Arrange for interpretive services to assist at discharge as needed  - Refer to Case Management Department for coordinating discharge planning if the patient needs post-hospital services based on physician/advanced practitioner order or complex needs related to functional status, cognitive ability, or social support system  Outcome: Progressing     Problem: Knowledge Deficit  Goal: Patient/family/caregiver demonstrates understanding of disease process, treatment plan, medications, and discharge instructions  Description: Complete learning assessment and assess knowledge base. Interventions:  - Provide teaching at level of understanding  - Provide teaching via preferred learning methods  Outcome: Progressing     Problem: MOBILITY - ADULT  Goal: Maintain or return to baseline ADL function  Description: INTERVENTIONS:  -  Assess patient's ability to carry out ADLs; assess patient's baseline for ADL function and identify physical deficits which impact ability to perform ADLs (bathing, care of mouth/teeth, toileting, grooming, dressing, etc.)  - Assess/evaluate cause of self-care deficits   - Assess range of motion  - Assess patient's mobility; develop plan if impaired  - Assess patient's need for assistive devices and provide as appropriate  - Encourage maximum independence but intervene and supervise when necessary  - Involve family in performance of ADLs  - Assess for home care needs following discharge   - Consider OT consult to assist with ADL evaluation and planning for discharge  - Provide patient education as appropriate  Outcome: Progressing  Goal: Maintains/Returns to pre admission functional level  Description: INTERVENTIONS:  - Perform BMAT or MOVE assessment daily.   - Set and communicate daily mobility goal to care team and patient/family/caregiver. - Collaborate with rehabilitation services on mobility goals if consulted  - Perform Range of Motion 3 times a day. - Reposition patient every 2 hours.   - Dangle patient 3 times a day  - Stand patient 3 times a day  - Ambulate patient 3 times a day  - Out of bed for meals   - Out of bed for toileting  - Record patient progress and toleration of activity level   Outcome: Progressing     Problem: NEUROSENSORY - ADULT  Goal: Achieves stable or improved neurological status  Description: INTERVENTIONS  - Monitor and report changes in neurological status  - Monitor vital signs such as temperature, blood pressure, glucose, and any other labs ordered   - Initiate measures to prevent increased intracranial pressure  - Monitor for seizure activity and implement precautions if appropriate      Outcome: Progressing  Goal: Achieves maximal functionality and self care  Description: INTERVENTIONS  - Monitor swallowing and airway patency with patient fatigue and changes in neurological status  - Encourage and assist patient to increase activity and self care.    - Encourage visually impaired, hearing impaired and aphasic patients to use assistive/communication devices  Outcome: Progressing     Problem: CARDIOVASCULAR - ADULT  Goal: Maintains optimal cardiac output and hemodynamic stability  Description: INTERVENTIONS:  - Monitor I/O, vital signs and rhythm  - Monitor for S/S and trends of decreased cardiac output  - Administer and titrate ordered vasoactive medications to optimize hemodynamic stability  - Assess quality of pulses, skin color and temperature  - Assess for signs of decreased coronary artery perfusion  - Instruct patient to report change in severity of symptoms  Outcome: Progressing  Goal: Absence of cardiac dysrhythmias or at baseline rhythm  Description: INTERVENTIONS:  - Continuous cardiac monitoring, vital signs, obtain 12 lead EKG if ordered  - Administer antiarrhythmic and heart rate control medications as ordered  - Monitor electrolytes and administer replacement therapy as ordered  Outcome: Progressing     Problem: RESPIRATORY - ADULT  Goal: Achieves optimal ventilation and oxygenation  Description: INTERVENTIONS:  - Assess for changes in respiratory status  - Assess for changes in mentation and behavior  - Position to facilitate oxygenation and minimize respiratory effort  - Oxygen administered by appropriate delivery if ordered  - Initiate smoking cessation education as indicated  - Encourage broncho-pulmonary hygiene including cough, deep breathe, Incentive Spirometry  - Assess the need for suctioning and aspirate as needed  - Assess and instruct to report SOB or any respiratory difficulty  - Respiratory Therapy support as indicated  Outcome: Progressing     Problem: GASTROINTESTINAL - ADULT  Goal: Minimal or absence of nausea and/or vomiting  Description: INTERVENTIONS:  - Administer IV fluids if ordered to ensure adequate hydration  - Maintain NPO status until nausea and vomiting are resolved  - Nasogastric tube if ordered  - Administer ordered antiemetic medications as needed  - Provide nonpharmacologic comfort measures as appropriate  - Advance diet as tolerated, if ordered  - Consider nutrition services referral to assist patient with adequate nutrition and appropriate food choices  Outcome: Progressing  Goal: Maintains or returns to baseline bowel function  Description: INTERVENTIONS:  - Assess bowel function  - Encourage oral fluids to ensure adequate hydration  - Administer IV fluids if ordered to ensure adequate hydration  - Administer ordered medications as needed  - Encourage mobilization and activity  - Consider nutritional services referral to assist patient with adequate nutrition and appropriate food choices  Outcome: Progressing  Goal: Maintains adequate nutritional intake  Description: INTERVENTIONS:  - Monitor percentage of each meal consumed  - Identify factors contributing to decreased intake, treat as appropriate  - Assist with meals as needed  - Monitor I&O, weight, and lab values if indicated  - Obtain nutrition services referral as needed  Outcome: Progressing  Goal: Oral mucous membranes remain intact  Description: INTERVENTIONS  - Assess oral mucosa and hygiene practices  - Implement preventative oral hygiene regimen  - Implement oral medicated treatments as ordered  - Initiate Nutrition services referral as needed  Outcome: Progressing     Problem: GENITOURINARY - ADULT  Goal: Maintains or returns to baseline urinary function  Description: INTERVENTIONS:  - Assess urinary function  - Encourage oral fluids to ensure adequate hydration if ordered  - Administer IV fluids as ordered to ensure adequate hydration  - Administer ordered medications as needed  - Offer frequent toileting  - Follow urinary retention protocol if ordered  Outcome: Progressing  Goal: Absence of urinary retention  Description: INTERVENTIONS:  - Assess patient’s ability to void and empty bladder  - Monitor I/O  - Bladder scan as needed  - Discuss with physician/AP medications to alleviate retention as needed  - Discuss catheterization for long term situations as appropriate  Outcome: Progressing     Problem: METABOLIC, FLUID AND ELECTROLYTES - ADULT  Goal: Electrolytes maintained within normal limits  Description: INTERVENTIONS:  - Monitor labs and assess patient for signs and symptoms of electrolyte imbalances  - Administer electrolyte replacement as ordered  - Monitor response to electrolyte replacements, including repeat lab results as appropriate  - Instruct patient on fluid and nutrition as appropriate  Outcome: Progressing  Goal: Fluid balance maintained  Description: INTERVENTIONS:  - Monitor labs   - Monitor I/O and WT  - Instruct patient on fluid and nutrition as appropriate  - Assess for signs & symptoms of volume excess or deficit  Outcome: Progressing  Goal: Glucose maintained within target range  Description: INTERVENTIONS:  - Monitor Blood Glucose as ordered  - Assess for signs and symptoms of hyperglycemia and hypoglycemia  - Administer ordered medications to maintain glucose within target range  - Assess nutritional intake and initiate nutrition service referral as needed  Outcome: Progressing     Problem: SKIN/TISSUE INTEGRITY - ADULT  Goal: Skin Integrity remains intact(Skin Breakdown Prevention)  Description: Assess:  -Perform [place] assessment every shift  -Clean and moisturize skin every shift  -Inspect skin when repositioning, toileting, and assisting with ADLS  -Assess under medical devices such as allevyn every shift  -Assess extremities for adequate circulation and sensation     Bed Management:  -Have minimal linens on bed & keep smooth, unwrinkled  -Change linens as needed when moist or perspiring  -Avoid sitting or lying in one position for more than 2 hours while in bed  -Keep HOB at 30 degrees     Toileting:  -Offer bedside commode  -Assess for incontinence   -Use incontinent care products after each incontinent episode    Activity:  -Mobilize patient 3 times a day  -Encourage activity and walks on unit  -Encourage or provide ROM exercises   -Turn and reposition patient every 2 Hours  -Use appropriate equipment to lift or move patient in bed  -Instruct/ Assist with weight shifting   -Consider limitation of chair time     Skin Care:  -Avoid use of baby powder, tape, friction and shearing, hot water or constrictive clothing  -Relieve pressure over bony prominences using allevyn  -Do not massage red bony areas  Outcome: Progressing  Goal: Incision(s), wounds(s) or drain site(s) healing without S/S of infection  Description: INTERVENTIONS  - Assess and document dressing, incision, wound bed, drain sites and surrounding tissue  - Provide patient and family education  - Perform skin care/dressing changes every shift  Outcome: Progressing  Goal: Pressure injury heals and does not worsen  Description: Interventions:  - Implement low air loss mattress or specialty surface (Criteria met)  - Apply silicone foam dressing  - Instruct/assist with weight shifting every 60 minutes when in chair   - Limit chair time   - Use special pressure reducing interventions such as waffle cushion when in chair   - Apply fecal or urinary incontinence containment device   - Perform passive or active ROM every shift  - Turn and reposition patient & offload bony prominences every 2 hours   - Utilize friction reducing device or surface for transfers   - Use incontinent care products after each incontinent episode   - Consider nutrition services referral as needed  Outcome: Progressing     Problem: HEMATOLOGIC - ADULT  Goal: Maintains hematologic stability  Description: INTERVENTIONS  - Assess for signs and symptoms of bleeding or hemorrhage  - Monitor labs  - Administer supportive blood products/factors as ordered and appropriate  Outcome: Progressing     Problem: MUSCULOSKELETAL - ADULT  Goal: Maintain or return mobility to safest level of function  Description: INTERVENTIONS:  - Assess patient's ability to carry out ADLs; assess patient's baseline for ADL function and identify physical deficits which impact ability to perform ADLs (bathing, care of mouth/teeth, toileting, grooming, dressing, etc.)  - Assess/evaluate cause of self-care deficits   - Assess range of motion  - Assess patient's mobility  - Assess patient's need for assistive devices and provide as appropriate  - Encourage maximum independence but intervene and supervise when necessary  - Involve family in performance of ADLs  - Assess for home care needs following discharge   - Consider OT consult to assist with ADL evaluation and planning for discharge  - Provide patient education as appropriate  Outcome: Progressing  Goal: Maintain proper alignment of affected body part  Description: INTERVENTIONS:  - Support, maintain and protect limb and body alignment  - Provide patient/ family with appropriate education  Outcome: Progressing     Problem: Prexisting or High Potential for Compromised Skin Integrity  Goal: Skin integrity is maintained or improved  Description: INTERVENTIONS:  - Identify patients at risk for skin breakdown  - Assess and monitor skin integrity  - Assess and monitor nutrition and hydration status  - Monitor labs   - Assess for incontinence   - Turn and reposition patient  - Assist with mobility/ambulation  - Relieve pressure over bony prominences  - Avoid friction and shearing  - Provide appropriate hygiene as needed including keeping skin clean and dry  - Evaluate need for skin moisturizer/barrier cream  - Collaborate with interdisciplinary team   - Patient/family teaching  - Consider wound care consult   Outcome: Progressing

## 2023-11-20 ENCOUNTER — APPOINTMENT (INPATIENT)
Dept: INTERVENTIONAL RADIOLOGY/VASCULAR | Facility: HOSPITAL | Age: 78
DRG: 180 | End: 2023-11-20
Attending: RADIOLOGY
Payer: MEDICARE

## 2023-11-20 ENCOUNTER — APPOINTMENT (INPATIENT)
Dept: RADIOLOGY | Facility: HOSPITAL | Age: 78
DRG: 180 | End: 2023-11-20
Payer: MEDICARE

## 2023-11-20 LAB
ANION GAP SERPL CALCULATED.3IONS-SCNC: 6 MMOL/L
BASOPHILS # BLD AUTO: 0.03 THOUSANDS/ÂΜL (ref 0–0.1)
BASOPHILS NFR BLD AUTO: 0 % (ref 0–1)
BUN SERPL-MCNC: 25 MG/DL (ref 5–25)
CALCIUM SERPL-MCNC: 7.4 MG/DL (ref 8.4–10.2)
CHLORIDE SERPL-SCNC: 105 MMOL/L (ref 96–108)
CO2 SERPL-SCNC: 32 MMOL/L (ref 21–32)
CREAT SERPL-MCNC: 1.2 MG/DL (ref 0.6–1.3)
DIGOXIN SERPL-MCNC: 1.1 NG/ML (ref 0.8–2)
EOSINOPHIL # BLD AUTO: 0.57 THOUSAND/ÂΜL (ref 0–0.61)
EOSINOPHIL NFR BLD AUTO: 8 % (ref 0–6)
ERYTHROCYTE [DISTWIDTH] IN BLOOD BY AUTOMATED COUNT: 16.3 % (ref 11.6–15.1)
GFR SERPL CREATININE-BSD FRML MDRD: 57 ML/MIN/1.73SQ M
GLUCOSE SERPL-MCNC: 116 MG/DL (ref 65–140)
GLUCOSE SERPL-MCNC: 120 MG/DL (ref 65–140)
GLUCOSE SERPL-MCNC: 147 MG/DL (ref 65–140)
GLUCOSE SERPL-MCNC: 81 MG/DL (ref 65–140)
GLUCOSE SERPL-MCNC: 95 MG/DL (ref 65–140)
HCT VFR BLD AUTO: 33.5 % (ref 36.5–49.3)
HGB BLD-MCNC: 10.3 G/DL (ref 12–17)
IMM GRANULOCYTES # BLD AUTO: 0.04 THOUSAND/UL (ref 0–0.2)
IMM GRANULOCYTES NFR BLD AUTO: 1 % (ref 0–2)
INR PPP: 1.21 (ref 0.84–1.19)
LYMPHOCYTES # BLD AUTO: 0.67 THOUSANDS/ÂΜL (ref 0.6–4.47)
LYMPHOCYTES NFR BLD AUTO: 9 % (ref 14–44)
MAGNESIUM SERPL-MCNC: 2.1 MG/DL (ref 1.9–2.7)
MCH RBC QN AUTO: 32.2 PG (ref 26.8–34.3)
MCHC RBC AUTO-ENTMCNC: 30.7 G/DL (ref 31.4–37.4)
MCV RBC AUTO: 105 FL (ref 82–98)
MONOCYTES # BLD AUTO: 0.61 THOUSAND/ÂΜL (ref 0.17–1.22)
MONOCYTES NFR BLD AUTO: 9 % (ref 4–12)
NEUTROPHILS # BLD AUTO: 5.21 THOUSANDS/ÂΜL (ref 1.85–7.62)
NEUTS SEG NFR BLD AUTO: 73 % (ref 43–75)
NRBC BLD AUTO-RTO: 0 /100 WBCS
PLATELET # BLD AUTO: 158 THOUSANDS/UL (ref 149–390)
PMV BLD AUTO: 10.6 FL (ref 8.9–12.7)
POTASSIUM SERPL-SCNC: 4 MMOL/L (ref 3.5–5.3)
PROTHROMBIN TIME: 15.4 SECONDS (ref 11.6–14.5)
RBC # BLD AUTO: 3.2 MILLION/UL (ref 3.88–5.62)
SODIUM SERPL-SCNC: 143 MMOL/L (ref 135–147)
WBC # BLD AUTO: 7.13 THOUSAND/UL (ref 4.31–10.16)

## 2023-11-20 PROCEDURE — 85610 PROTHROMBIN TIME: CPT | Performed by: INTERNAL MEDICINE

## 2023-11-20 PROCEDURE — 99232 SBSQ HOSP IP/OBS MODERATE 35: CPT | Performed by: NURSE PRACTITIONER

## 2023-11-20 PROCEDURE — 32554 ASPIRATE PLEURA W/O IMAGING: CPT

## 2023-11-20 PROCEDURE — 71045 X-RAY EXAM CHEST 1 VIEW: CPT

## 2023-11-20 PROCEDURE — 80162 ASSAY OF DIGOXIN TOTAL: CPT | Performed by: INTERNAL MEDICINE

## 2023-11-20 PROCEDURE — 32554 ASPIRATE PLEURA W/O IMAGING: CPT | Performed by: RADIOLOGY

## 2023-11-20 PROCEDURE — 83735 ASSAY OF MAGNESIUM: CPT | Performed by: INTERNAL MEDICINE

## 2023-11-20 PROCEDURE — 85025 COMPLETE CBC W/AUTO DIFF WBC: CPT | Performed by: INTERNAL MEDICINE

## 2023-11-20 PROCEDURE — 71046 X-RAY EXAM CHEST 2 VIEWS: CPT

## 2023-11-20 PROCEDURE — 0W993ZZ DRAINAGE OF RIGHT PLEURAL CAVITY, PERCUTANEOUS APPROACH: ICD-10-PCS | Performed by: RADIOLOGY

## 2023-11-20 PROCEDURE — 82948 REAGENT STRIP/BLOOD GLUCOSE: CPT

## 2023-11-20 PROCEDURE — 80048 BASIC METABOLIC PNL TOTAL CA: CPT | Performed by: INTERNAL MEDICINE

## 2023-11-20 RX ADMIN — METOPROLOL TARTRATE 50 MG: 50 TABLET, FILM COATED ORAL at 21:05

## 2023-11-20 RX ADMIN — WARFARIN SODIUM 5 MG: 5 TABLET ORAL at 17:02

## 2023-11-20 RX ADMIN — METOPROLOL TARTRATE 50 MG: 50 TABLET, FILM COATED ORAL at 08:06

## 2023-11-20 RX ADMIN — METOPROLOL TARTRATE 50 MG: 50 TABLET, FILM COATED ORAL at 17:03

## 2023-11-20 RX ADMIN — BUMETANIDE 1 MG: 0.25 INJECTION INTRAMUSCULAR; INTRAVENOUS at 08:05

## 2023-11-20 RX ADMIN — FINASTERIDE 5 MG: 5 TABLET, FILM COATED ORAL at 08:06

## 2023-11-20 RX ADMIN — PRAVASTATIN SODIUM 20 MG: 20 TABLET ORAL at 17:02

## 2023-11-20 RX ADMIN — SENNOSIDES AND DOCUSATE SODIUM 2 TABLET: 8.6; 5 TABLET ORAL at 21:05

## 2023-11-20 RX ADMIN — ENOXAPARIN SODIUM 90 MG: 100 INJECTION SUBCUTANEOUS at 08:06

## 2023-11-20 RX ADMIN — CHLORHEXIDINE GLUCONATE 15 ML: 1.2 RINSE ORAL at 08:06

## 2023-11-20 RX ADMIN — ENOXAPARIN SODIUM 90 MG: 100 INJECTION SUBCUTANEOUS at 21:05

## 2023-11-20 RX ADMIN — FOLIC ACID 1 MG: 1 TABLET ORAL at 08:06

## 2023-11-20 RX ADMIN — CHLORHEXIDINE GLUCONATE 15 ML: 1.2 RINSE ORAL at 21:05

## 2023-11-20 RX ADMIN — DILTIAZEM HYDROCHLORIDE 30 MG: 30 TABLET ORAL at 10:51

## 2023-11-20 RX ADMIN — DIGOXIN 125 MCG: 125 TABLET ORAL at 08:05

## 2023-11-20 RX ADMIN — DILTIAZEM HYDROCHLORIDE 30 MG: 30 TABLET ORAL at 21:05

## 2023-11-20 RX ADMIN — BUMETANIDE 1 MG: 0.25 INJECTION INTRAMUSCULAR; INTRAVENOUS at 17:04

## 2023-11-20 RX ADMIN — METOPROLOL TARTRATE 50 MG: 50 TABLET, FILM COATED ORAL at 12:36

## 2023-11-20 NOTE — ASSESSMENT & PLAN NOTE
Remains with intermittent RVR but HR improved in 90s-100s. Cardiology input appreciated   The patient required Amiodarone infusion; discontinued (11/19)   Continue with digoxin and metoprolol 50 mg every 6 hours. Diltiazem 30 mg every 8 hours is added to his regimen per cardiology. Continue anticoagulation with warfarin. Currently being bridged with therapeutic Lovenox.

## 2023-11-20 NOTE — ASSESSMENT & PLAN NOTE
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

## 2023-11-20 NOTE — ASSESSMENT & PLAN NOTE
Likely multifactorial in the setting of b/l pleural effusion and acute CHF exacerbation   At baseline, requires 3L NC -> currently down to baseline requirement  Encourage cough, deep breathing, IS, ambulation as tolerated  Acute failure resolved

## 2023-11-20 NOTE — PROGRESS NOTES
[address]  Progress Note  Name: Gerry Islas  MRN: [de-identified]  Unit/Bed#: ICU 05-01 I Date of Admission: 11/11/2023   Date of Service: 11/20/2023 I Hospital Day: 9    Assessment/Plan   * Acute on chronic respiratory failure with hypoxia [facility])  Assessment & Plan  Likely multifactorial in the setting of b/l pleural effusion and acute CHF exacerbation   At baseline, requires 3L NC -> currently down to baseline requirement  Encourage cough, deep breathing, IS, ambulation as tolerated  Acute failure resolved     Pleural effusion  Assessment & Plan  Secondary to stage IV lung adenocarcinoma/chemotherapy.   Trend has been having worsening shortness of breath and recurrent right malignant pleural effusion  CT chest (11/11) pleural effusion greater on the right  S/p L thoracentesis with IR on 11/9 (-2.6L) as OP, 11/13 (-1.4L)  Underwent R chest tube placement on 11/14   CT chest (11/15) noted improvement in size of right pleural effusion, also with a small right pneumothorax  Pulmonology and IR input appreciated   11/16- underwent L Pleurx placement and R Pleurx placement, conversion of existing chest tube by IR   Clinically is improving   Patient and family teaching regarding Pleurx catheter care   Follow up with repeat CXR today   Continue with diuretics: currently on bumex  1mg BID       Acute on chronic HFrEF (heart failure with reduced ejection fraction) ([address])  Assessment & Plan  Last ECHO with LVEF 35 % -> 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 HILARY (acute kidney injury) ([address])  Assessment & Plan  Unclear etiology, possibly cardiorenal given CHF exacerbation  Baseline creatinine of approximately 0.9-1.1mg/dL   Cr is improving  Monitor renal function and urine output - limit/avoid nephrotoxins and hypotension as possible      Rapid atrial fibrillation ([address])  Assessment & Plan  Remains with intermittent RVR but HR improved in 90s-100s. Cardiology input appreciated   The patient required Amiodarone infusion; discontinued (11/19)   Continue with digoxin and metoprolol 50 mg every 6 hours. Diltiazem 30 mg every 8 hours is added to his regimen per cardiology. Continue anticoagulation with warfarin. Currently being bridged with therapeutic Lovenox. Malignant neoplasm of upper lobe of right lung [facility])  Assessment & Plan  Pt diagnosed with Stage 4 Lung Ca in 9/2022  S/p XRT completed June 2023   Currently undergoing chemo, last 9/19/23   OP follow up with heme/onc      Type 2 diabetes mellitus without complication, without long-term current use of insulin ([address])  Assessment & Plan  Lab Results   Component Value Date    HGBA1C 6.0 10/23/2023     Hold oral medications from home   Continue SSI coverage per Accu-Cheks  Carbohydrate restriction  Hypoglycemia protocol     Benign essential HTN  Assessment & Plan  BP has been well-controlled   Continue Lopressor (w/ holding parameters for hypotension)                VTE Pharmacologic Prophylaxis: VTE Score: 10 High Risk (Score >/= 5) - Pharmacological DVT Prophylaxis Ordered: warfarin (Coumadin). Sequential Compression Devices Ordered. Mobility:   Basic Mobility Inpatient Raw Score: 15  -HLM Goal: 4: Move to chair/commode  -HLM Achieved: 3: Sit at edge of bed  [facility] Goal achieved. Continue to encourage appropriate mobility.     Patient Centered Rounds: I performed bedside rounds with nursing staff today.   Discussions with Specialists or Other Care Team Provider: IR, cardiology     Education and Discussions with Family / Patient: Updated  (wife) at bedside. Total Time Spent on Date of Encounter in care of patient: 36 mins. This time was spent on one or more of the following: performing physical exam; counseling and coordination of care; obtaining or reviewing history; documenting in the medical record; reviewing/ordering tests, medications or procedures; communicating with other healthcare professionals and discussing with patient's family/caregivers. Current Length of Stay: 9 day(s)  Current Patient Status: Inpatient   Certification Statement: The patient will continue to require additional inpatient hospital stay due to acute respiratory failure with recurrent pleural effusion  Discharge Plan: Anticipate discharge in 24-48 hrs to home with home services. Code Status: Level 3 - DNAR and DNI    Subjective:   Feeling much better. Denies any chest pain, dyspnea, n/v or abdominal pain. Has been ambulating to the BR. Left Pleurx draining <2ml per RN. Await for IR to drain right Pleurx. Objective:     Vitals:   Temp (24hrs), Av.7 °F (36.5 °C), Min:97.6 °F (36.4 °C), Max:97.9 °F (36.6 °C)    Temp:  [97.6 °F (36.4 °C)-97.9 °F (36.6 °C)] 97.6 °F (36.4 °C)  HR:  [101-111] 111  Resp:  [15-27] 17  BP: (117-140)/(73-77) 140/73  SpO2:  [94 %-98 %] 94 %  Body mass index is 26.83 kg/m². Input and Output Summary (last 24 hours): Intake/Output Summary (Last 24 hours) at 2023 1050  Last data filed at 2023 0900  Gross per 24 hour   Intake 592.48 ml   Output 1925 ml   Net -1332.52 ml       Physical Exam:   Physical Exam  Vitals and nursing note reviewed. Constitutional:       General: He is not in acute distress. Appearance: Normal appearance. Comments: Frail      HENT:      Head: Normocephalic and atraumatic.       Right Ear: External ear normal.      Left Ear: External ear normal.      Nose: Nose normal.      Mouth/Throat:      Mouth: Mucous membranes are moist.      Pharynx: Oropharynx is clear. Eyes:      General:         Right eye: No discharge. Left eye: No discharge. Extraocular Movements: Extraocular movements intact. Pupils: Pupils are equal, round, and reactive to light. Cardiovascular:      Rate and Rhythm: Tachycardia present. Rhythm irregular. Pulses: Normal pulses. Heart sounds: Normal heart sounds. No murmur heard. Pulmonary:      Effort: Pulmonary effort is normal. No respiratory distress. Breath sounds: No wheezing or rales. Comments: Decreased    Abdominal:      General: Bowel sounds are normal. There is no distension. Palpations: Abdomen is soft. There is no mass. Tenderness: There is no abdominal tenderness. Musculoskeletal:         General: Swelling present. No tenderness or deformity. Normal range of motion. Cervical back: Normal range of motion and neck supple. No rigidity. Skin:     General: Skin is warm and dry. Capillary Refill: Capillary refill takes less than 2 seconds. Coloration: Skin is not pale. Findings: No erythema. Neurological:      General: No focal deficit present. Mental Status: He is alert and oriented to person, place, and time. Mental status is at baseline. Psychiatric:         Mood and Affect: Mood normal.         Behavior: Behavior normal.         Thought Content:  Thought content normal.         Judgment: Judgment normal.         Additional Data:     Labs:  Results from last 7 days   Lab Units 11/20/23  0521   WBC Thousand/uL 7.13   HEMOGLOBIN g/dL 10.3*   HEMATOCRIT % 33.5*   PLATELETS Thousands/uL 158   NEUTROS PCT % 73   LYMPHS PCT % 9*   MONOS PCT % 9   EOS PCT % 8*     Results from last 7 days   Lab Units 11/20/23  0521 11/15/23  0518 11/14/23  0630   SODIUM mmol/L 143   < > 143   POTASSIUM mmol/L 4.0   < > 3.7   CHLORIDE mmol/L 105   < > 106   CO2 mmol/L 32   < > 31   BUN mg/dL 25   < > 47*   CREATININE mg/dL 1.20   < > 1.70*   ANION GAP mmol/L 6   < > 6   CALCIUM mg/dL 7.4*   < > 7.8*   ALBUMIN g/dL  --   --  2.4*   TOTAL BILIRUBIN mg/dL  --   --  0.45   ALK PHOS U/L  --   --  70   ALT U/L  --   --  27   AST U/L  --   --  20   GLUCOSE RANDOM mg/dL 95   < > 153*    < > = values in this interval not displayed. Results from last 7 days   Lab Units 11/20/23  0521   INR  1.21*     Results from last 7 days   Lab Units 11/20/23  0717 11/19/23  2049 11/19/23  1607 11/19/23  1116 11/19/23  0723 11/18/23  2116 11/18/23  1613 11/18/23  1109 11/18/23  0707 11/17/23  2150 11/17/23  1610 11/17/23  1112   POC GLUCOSE mg/dl 81 156* 140 143* 100 124 152* 176* 121 150* 152* 117         Results from last 7 days   Lab Units 11/15/23  0518   PROCALCITONIN ng/ml 0.16       Lines/Drains:  Invasive Devices       Peripherally Inserted Central Catheter Line  Duration             PICC Line 11/13/23 Left Brachial 6 days              Drain  Duration             Pleural Effusion Long-Term Catheter 15.5 Fr. 3 days    Pleural Effusion Long-Term Catheter 15.5 Fr. 3 days                    Central Line: PICC  Goal for removal: Will discontinue when hemodynamically stable. Telemetry:  Telemetry Orders (From admission, onward)               24 Hour Telemetry Monitoring  Continuous x 24 Hours (Telem)        Question:  Reason for 24 Hour Telemetry  Answer:  Arrhythmias requiring acute medical intervention / PPM or ICD malfunction                     Telemetry Reviewed: Atrial fibrillation.  HR averaging 90-110s  Indication for Continued Telemetry Use: Arrthymias requiring medical therapy             Imaging: Reviewed radiology reports from this admission including: chest xray and chest CT scan    Recent Cultures (last 7 days):         Last 24 Hours Medication List:   Current Facility-Administered Medications   Medication Dose Route Frequency Provider Last Rate acetaminophen  650 mg Oral Q6H PRN Jeani Sandifer, CRNP      amiodarone (CORDARONE) 900 mg in dextrose 5 % 500 mL infusion  0.5 mg/min Intravenous Continuous Edgar Dawson MD Stopped (11/19/23 7225)    bumetanide  1 mg Intravenous BID Jeani Sandifer, CRNP      chlorhexidine  15 mL Mouth/Throat Q12H [address] Jeani Sandifer, CRNP      digoxin  125 mcg Oral Daily Filiberto Wiseman,       diltiazem  30 mg Oral [facility] RADHA Buckley      enoxaparin  1 mg/kg Subcutaneous Q12H [address] Edgar Dawson MD      fentanyl citrate (PF)   Intravenous PRN Almita Jones MD      finasteride  5 mg Oral Daily Jeani Sandifer, CRNP      folic acid  1 mg Oral Daily Jeani Sandifer, CRNP      insulin lispro  1-6 Units Subcutaneous HS Jeani Sandifer, CRNP      insulin lispro  1-6 Units Subcutaneous TID AC Jeani Sandifer, CRNP      lidocaine (PF)    PRN Almita Jones MD      metoprolol tartrate  50 mg Oral 4x Daily Julio Frias DO      midazolam    PRN Almita Jones MD      polyethylene glycol  17 g Oral Daily PRN Jeani Sandifer, CRNP      pravastatin  20 mg Oral Daily With Dinner Jeani Sandifer, CRNP      senna-docusate sodium  2 tablet Oral HS Jeani Sandifer, CRNP      warfarin  5 mg Oral Daily (warfarin) Edgar Dawson MD          Today, Patient Was Seen By: RADHA Preciado    **Please Note: This note may have been constructed using a voice recognition system. **

## 2023-11-20 NOTE — ASSESSMENT & PLAN NOTE
Likely mixed picture in the setting of recurrent pleural effusions, volume overload and lung cancer  Reports improvement in breathing following thoracentesis and now bilat pleurx catheters.   Continue IV Bumex for diuresis

## 2023-11-20 NOTE — PLAN OF CARE
Problem: PAIN - ADULT  Goal: Verbalizes/displays adequate comfort level or baseline comfort level  Description: Interventions:  - Encourage patient to monitor pain and request assistance  - Assess pain using appropriate pain scale  - Administer analgesics based on type and severity of pain and evaluate response  - Implement non-pharmacological measures as appropriate and evaluate response  - Consider cultural and social influences on pain and pain management  - Notify physician/advanced practitioner if interventions unsuccessful or patient reports new pain  Outcome: Progressing     Problem: INFECTION - ADULT  Goal: Absence or prevention of progression during hospitalization  Description: INTERVENTIONS:  - Assess and monitor for signs and symptoms of infection  - Monitor lab/diagnostic results  - Monitor all insertion sites, i.e. indwelling lines, tubes, and drains  - Monitor endotracheal if appropriate and nasal secretions for changes in amount and color  - [place] appropriate cooling/warming therapies per order  - Administer medications as ordered  - Instruct and encourage patient and family to use good hand hygiene technique  - Identify and instruct in appropriate isolation precautions for identified infection/condition  Outcome: Progressing     Problem: SAFETY ADULT  Goal: Patient will remain free of falls  Description: INTERVENTIONS:  - Educate patient/family on patient safety including physical limitations  - Instruct patient to call for assistance with activity   - Consult OT/PT to assist with strengthening/mobility   - Keep Call bell within reach  - Keep bed low and locked with side rails adjusted as appropriate  - Keep care items and personal belongings within reach  - Initiate and maintain comfort rounds  - Make Fall Risk Sign visible to staff  - Offer Toileting every 2 Hours, in advance of need  - Initiate/Maintain bed alarm  - Obtain necessary fall risk management equipment:   - Apply yellow socks and bracelet for high fall risk patients  - Consider moving patient to room near nurses station  Outcome: Progressing  Goal: Maintain or return to baseline ADL function  Description: INTERVENTIONS:  -  Assess patient's ability to carry out ADLs; assess patient's baseline for ADL function and identify physical deficits which impact ability to perform ADLs (bathing, care of mouth/teeth, toileting, grooming, dressing, etc.)  - Assess/evaluate cause of self-care deficits   - Assess range of motion  - Assess patient's mobility; develop plan if impaired  - Assess patient's need for assistive devices and provide as appropriate  - Encourage maximum independence but intervene and supervise when necessary  - Involve family in performance of ADLs  - Assess for home care needs following discharge   - Consider OT consult to assist with ADL evaluation and planning for discharge  - Provide patient education as appropriate  Outcome: Progressing  Goal: Maintains/Returns to pre admission functional level  Description: INTERVENTIONS:  - Perform BMAT or MOVE assessment daily.   - Set and communicate daily mobility goal to care team and patient/family/caregiver. - Collaborate with rehabilitation services on mobility goals if consulted  - Perform Range of Motion 3 times a day. - Reposition patient every 2 hours.   - Dangle patient 3 times a day  - Stand patient 3 times a day  - Ambulate patient 3 times a day  - Out of bed for meals   - Out of bed for toileting  - Record patient progress and toleration of activity level   Outcome: Progressing     Problem: DISCHARGE PLANNING  Goal: Discharge to home or other facility with appropriate resources  Description: INTERVENTIONS:  - Identify barriers to discharge w/patient and caregiver  - Arrange for needed discharge resources and transportation as appropriate  - Identify discharge learning needs (meds, wound care, etc.)  - Arrange for interpretive services to assist at discharge as needed  - Refer to Case Management Department for coordinating discharge planning if the patient needs post-hospital services based on physician/advanced practitioner order or complex needs related to functional status, cognitive ability, or social support system  Outcome: Progressing     Problem: Knowledge Deficit  Goal: Patient/family/caregiver demonstrates understanding of disease process, treatment plan, medications, and discharge instructions  Description: Complete learning assessment and assess knowledge base. Interventions:  - Provide teaching at level of understanding  - Provide teaching via preferred learning methods  Outcome: Progressing     Problem: MOBILITY - ADULT  Goal: Maintain or return to baseline ADL function  Description: INTERVENTIONS:  -  Assess patient's ability to carry out ADLs; assess patient's baseline for ADL function and identify physical deficits which impact ability to perform ADLs (bathing, care of mouth/teeth, toileting, grooming, dressing, etc.)  - Assess/evaluate cause of self-care deficits   - Assess range of motion  - Assess patient's mobility; develop plan if impaired  - Assess patient's need for assistive devices and provide as appropriate  - Encourage maximum independence but intervene and supervise when necessary  - Involve family in performance of ADLs  - Assess for home care needs following discharge   - Consider OT consult to assist with ADL evaluation and planning for discharge  - Provide patient education as appropriate  Outcome: Progressing  Goal: Maintains/Returns to pre admission functional level  Description: INTERVENTIONS:  - Perform BMAT or MOVE assessment daily.   - Set and communicate daily mobility goal to care team and patient/family/caregiver. - Collaborate with rehabilitation services on mobility goals if consulted  - Perform Range of Motion 3 times a day. - Reposition patient every 2 hours.   - Dangle patient 3 times a day  - Stand patient 3 times a day  - Ambulate patient 3 times a day  - Out of bed for meals   - Out of bed for toileting  - Record patient progress and toleration of activity level   Outcome: Progressing     Problem: NEUROSENSORY - ADULT  Goal: Achieves stable or improved neurological status  Description: INTERVENTIONS  - Monitor and report changes in neurological status  - Monitor vital signs such as temperature, blood pressure, glucose, and any other labs ordered   - Initiate measures to prevent increased intracranial pressure  - Monitor for seizure activity and implement precautions if appropriate      Outcome: Progressing  Goal: Achieves maximal functionality and self care  Description: INTERVENTIONS  - Monitor swallowing and airway patency with patient fatigue and changes in neurological status  - Encourage and assist patient to increase activity and self care.    - Encourage visually impaired, hearing impaired and aphasic patients to use assistive/communication devices  Outcome: Progressing     Problem: CARDIOVASCULAR - ADULT  Goal: Maintains optimal cardiac output and hemodynamic stability  Description: INTERVENTIONS:  - Monitor I/O, vital signs and rhythm  - Monitor for S/S and trends of decreased cardiac output  - Administer and titrate ordered vasoactive medications to optimize hemodynamic stability  - Assess quality of pulses, skin color and temperature  - Assess for signs of decreased coronary artery perfusion  - Instruct patient to report change in severity of symptoms  Outcome: Progressing  Goal: Absence of cardiac dysrhythmias or at baseline rhythm  Description: INTERVENTIONS:  - Continuous cardiac monitoring, vital signs, obtain 12 lead EKG if ordered  - Administer antiarrhythmic and heart rate control medications as ordered  - Monitor electrolytes and administer replacement therapy as ordered  Outcome: Progressing     Problem: RESPIRATORY - ADULT  Goal: Achieves optimal ventilation and oxygenation  Description: INTERVENTIONS:  - Assess for changes in respiratory status  - Assess for changes in mentation and behavior  - Position to facilitate oxygenation and minimize respiratory effort  - Oxygen administered by appropriate delivery if ordered  - Initiate smoking cessation education as indicated  - Encourage broncho-pulmonary hygiene including cough, deep breathe, Incentive Spirometry  - Assess the need for suctioning and aspirate as needed  - Assess and instruct to report SOB or any respiratory difficulty  - Respiratory Therapy support as indicated  Outcome: Progressing     Problem: GASTROINTESTINAL - ADULT  Goal: Minimal or absence of nausea and/or vomiting  Description: INTERVENTIONS:  - Administer IV fluids if ordered to ensure adequate hydration  - Maintain NPO status until nausea and vomiting are resolved  - Nasogastric tube if ordered  - Administer ordered antiemetic medications as needed  - Provide nonpharmacologic comfort measures as appropriate  - Advance diet as tolerated, if ordered  - Consider nutrition services referral to assist patient with adequate nutrition and appropriate food choices  Outcome: Progressing  Goal: Maintains or returns to baseline bowel function  Description: INTERVENTIONS:  - Assess bowel function  - Encourage oral fluids to ensure adequate hydration  - Administer IV fluids if ordered to ensure adequate hydration  - Administer ordered medications as needed  - Encourage mobilization and activity  - Consider nutritional services referral to assist patient with adequate nutrition and appropriate food choices  Outcome: Progressing  Goal: Maintains adequate nutritional intake  Description: INTERVENTIONS:  - Monitor percentage of each meal consumed  - Identify factors contributing to decreased intake, treat as appropriate  - Assist with meals as needed  - Monitor I&O, weight, and lab values if indicated  - Obtain nutrition services referral as needed  Outcome: Progressing  Goal: Oral mucous membranes remain intact  Description: INTERVENTIONS  - Assess oral mucosa and hygiene practices  - Implement preventative oral hygiene regimen  - Implement oral medicated treatments as ordered  - Initiate Nutrition services referral as needed  Outcome: Progressing     Problem: GENITOURINARY - ADULT  Goal: Maintains or returns to baseline urinary function  Description: INTERVENTIONS:  - Assess urinary function  - Encourage oral fluids to ensure adequate hydration if ordered  - Administer IV fluids as ordered to ensure adequate hydration  - Administer ordered medications as needed  - Offer frequent toileting  - Follow urinary retention protocol if ordered  Outcome: Progressing  Goal: Absence of urinary retention  Description: INTERVENTIONS:  - Assess patient’s ability to void and empty bladder  - Monitor I/O  - Bladder scan as needed  - Discuss with physician/AP medications to alleviate retention as needed  - Discuss catheterization for long term situations as appropriate  Outcome: Progressing     Problem: METABOLIC, FLUID AND ELECTROLYTES - ADULT  Goal: Electrolytes maintained within normal limits  Description: INTERVENTIONS:  - Monitor labs and assess patient for signs and symptoms of electrolyte imbalances  - Administer electrolyte replacement as ordered  - Monitor response to electrolyte replacements, including repeat lab results as appropriate  - Instruct patient on fluid and nutrition as appropriate  Outcome: Progressing  Goal: Fluid balance maintained  Description: INTERVENTIONS:  - Monitor labs   - Monitor I/O and WT  - Instruct patient on fluid and nutrition as appropriate  - Assess for signs & symptoms of volume excess or deficit  Outcome: Progressing  Goal: Glucose maintained within target range  Description: INTERVENTIONS:  - Monitor Blood Glucose as ordered  - Assess for signs and symptoms of hyperglycemia and hypoglycemia  - Administer ordered medications to maintain glucose within target range  - Assess nutritional intake and initiate nutrition service referral as needed  Outcome: Progressing     Problem: SKIN/TISSUE INTEGRITY - ADULT  Goal: Skin Integrity remains intact(Skin Breakdown Prevention)  Description: Assess:  -Perform Albert assessment every shift  -Clean and moisturize skin every shift  -Inspect skin when repositioning, toileting, and assisting with ADLS  -Assess under medical devices such as allevyn every shift  -Assess extremities for adequate circulation and sensation     Bed Management:  -Have minimal linens on bed & keep smooth, unwrinkled  -Change linens as needed when moist or perspiring  -Avoid sitting or lying in one position for more than 2 hours while in bed  -Keep HOB at 30 degrees     Toileting:  -Offer bedside commode  -Assess for incontinence   -Use incontinent care products after each incontinent episode    Activity:  -Mobilize patient 3 times a day  -Encourage activity and walks on unit  -Encourage or provide ROM exercises   -Turn and reposition patient every 2 Hours  -Use appropriate equipment to lift or move patient in bed  -Instruct/ Assist with weight shifting   -Consider limitation of chair time     Skin Care:  -Avoid use of baby powder, tape, friction and shearing, hot water or constrictive clothing  -Relieve pressure over bony prominences using allevyn  -Do not massage red bony areas  Outcome: Progressing  Goal: Incision(s), wounds(s) or drain site(s) healing without S/S of infection  Description: INTERVENTIONS  - Assess and document dressing, incision, wound bed, drain sites and surrounding tissue  - Provide patient and family education  - Perform skin care/dressing changes every shift  Outcome: Progressing  Goal: Pressure injury heals and does not worsen  Description: Interventions:  - Implement low air loss mattress or specialty surface (Criteria met)  - Apply silicone foam dressing  - Instruct/assist with weight shifting every 60 minutes when in chair   - Limit chair time   - Use special pressure reducing interventions such as waffle cushion when in chair   - Apply fecal or urinary incontinence containment device   - Perform passive or active ROM every shift  - Turn and reposition patient & offload bony prominences every 2 hours   - Utilize friction reducing device or surface for transfers   - Use incontinent care products after each incontinent episode   - Consider nutrition services referral as needed  Outcome: Progressing     Problem: HEMATOLOGIC - ADULT  Goal: Maintains hematologic stability  Description: INTERVENTIONS  - Assess for signs and symptoms of bleeding or hemorrhage  - Monitor labs  - Administer supportive blood products/factors as ordered and appropriate  Outcome: Progressing     Problem: MUSCULOSKELETAL - ADULT  Goal: Maintain or return mobility to safest level of function  Description: INTERVENTIONS:  - Assess patient's ability to carry out ADLs; assess patient's baseline for ADL function and identify physical deficits which impact ability to perform ADLs (bathing, care of mouth/teeth, toileting, grooming, dressing, etc.)  - Assess/evaluate cause of self-care deficits   - Assess range of motion  - Assess patient's mobility  - Assess patient's need for assistive devices and provide as appropriate  - Encourage maximum independence but intervene and supervise when necessary  - Involve family in performance of ADLs  - Assess for home care needs following discharge   - Consider OT consult to assist with ADL evaluation and planning for discharge  - Provide patient education as appropriate  Outcome: Progressing  Goal: Maintain proper alignment of affected body part  Description: INTERVENTIONS:  - Support, maintain and protect limb and body alignment  - Provide patient/ family with appropriate education  Outcome: Progressing     Problem: Prexisting or High Potential for Compromised Skin Integrity  Goal: Skin integrity is maintained or improved  Description: INTERVENTIONS:  - Identify patients at risk for skin breakdown  - Assess and monitor skin integrity  - Assess and monitor nutrition and hydration status  - Monitor labs   - Assess for incontinence   - Turn and reposition patient  - Assist with mobility/ambulation  - Relieve pressure over bony prominences  - Avoid friction and shearing  - Provide appropriate hygiene as needed including keeping skin clean and dry  - Evaluate need for skin moisturizer/barrier cream  - Collaborate with interdisciplinary team   - Patient/family teaching  - Consider wound care consult   Outcome: Progressing

## 2023-11-20 NOTE — CASE MANAGEMENT
Case Management Discharge Planning Note    Patient name Laura Michele  Location ICU 05/ICU - MRN [id number]  : 1945 Date 2023       Current Admission Date: 2023  Current Admission Diagnosis:Acute on chronic respiratory failure with hypoxia [facility])   Patient Active Problem List    Diagnosis Date Noted    Acute on chronic HFrEF (heart failure with reduced ejection fraction) ([address]) 2023    History of pulmonary embolus (PE) 2023    Deep vein thrombosis (DVT) of right lower extremity ([address]) 2023    History of prostate cancer 2023    Acute on chronic respiratory failure with hypoxia ([address]) 2023    Supratherapeutic INR 10/28/2023    Cellulitis of extremity 10/27/2023    Cellulitis and abscess of left lower extremity 10/23/2023    Cellulitis of leg, right 10/23/2023    COPD with acute exacerbation ([address]) 10/05/2023    Rapid atrial fibrillation ([address]) 10/03/2023    HILARY (acute kidney injury) ([address]) 10/03/2023    Abnormal CT of the abdomen 10/03/2023    Platelets decreased ([address]) 2023    Multiple lung nodules on CT 10/06/2022    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 ([address]) 10/06/2022    Pleural effusion 10/06/2022    Malignant neoplasm of upper lobe of right lung ([address])     Multiple subsegmental pulmonary emboli without acute cor pulmonale ([address]) 09/10/2022    Pulmonary mass 09/10/2022    Anemia 09/10/2022    Non-recurrent bilateral inguinal hernia without obstruction or gangrene 2021    Status post right tibial-talar ankle fusion 2020    BPH associated with nocturia 2019    Arthritis of right acromioclavicular joint 03/15/2019    Primary osteoarthritis of right shoulder 2019    Chronic left shoulder pain 2019    Left rotator cuff tear arthropathy 2019    Rotator cuff injury, right, initial encounter 2019    History of colon polyps 2019    Hyperparathyroidism ([address]) 2018    Hypokalemia 10/22/2018 Hypocalcemia 10/22/2018    Glaucoma 10/11/2018    Type 2 diabetes mellitus without complication, without long-term current use of insulin ([address]) 07/25/2016    Inguinal hernia 02/08/2016    Benign essential HTN 01/12/2016    Hypercholesterolemia 01/12/2016      LOS (days): 9  Geometric Mean LOS (GMLOS) (days): 4.90  Days to GMLOS:-3.7     OBJECTIVE:  Risk of Unplanned Readmission Score: 37.26     Current admission status: Inpatient   Preferred Pharmacy:   [address] [facility] [address] [address]  Phone: [phone] Fax: 45 [facility], [address] [facility] [address] [facility] [place] [address]  Phone: [phone] Fax: [phone] [id number] - [place] Fleischer, [address] [place] [id number]  Phone: [phone] Fax: [phone]    Primary Care Provider: Betty Trevizo PA-C    Primary Insurance: MEDICARE  Secondary Insurance: [place] DETAILS:   TC to [facility], spoke to JUAN A. As per JUAN A the order for PleurX system has been filled, will be shipped to the daughters home nad they will get it tomorrow. Spoke to the dtr Lady Chavez who states she diud speak to the pharmacy and did get a demonstration from the IR RN. Notified [facility] via [address] the pt may be DC within 24/48 Hr.     IMM Given (Date):: 11/20/23  IMM Given to[de-identified] Patient (Reviewed the IMM with the ptm who is in agreement with same)

## 2023-11-20 NOTE — PROGRESS NOTES
[id number] [facility] [place]  Progress Note  Name: Lissy Burciaga  MRN: [de-identified]  Unit/Bed#: ICU 05-01 I Date of Admission: 11/11/2023   Date of Service: 11/20/2023 I Hospital Day: 9    Assessment/Plan   Rapid atrial fibrillation [facility])  Assessment & Plan  Rates improved to the high 90's low 100's. Unless rates persistently elevated above 120, would not further treat. Expect that this will improve as condition improves. Amiodarone discontinued yesterday. Continue digoxin, metoprolol 50 mg Q 6 hours. Add diltiazem 30 mg Q 8 hours  Continue warfarin    Benign essential HTN  Assessment & Plan  BP stable    * Acute on chronic respiratory failure with hypoxia (HCC)  Assessment & Plan  Likely mixed picture in the setting of recurrent pleural effusions, volume overload and lung cancer  Reports improvement in breathing following thoracentesis and now bilat pleurx catheters. Continue IV Bumex for diuresis           Outpatient Cardiologist: none      Subjective:   Patient seen and examined. No significant events overnight. OOB in chair. Denies CP, SOB    Summary comments:  Heart rates improved would allow for heart rates in the low 100's. Would not further treat unless heart rates persistently 120's. Will add Cardizem 30 mg Q 8 mg. Continue IV diuretics. Continue other cardiac medications. Cardiology will sign off. We will remain available if needed. Telemetry/ECG/Cardiac testing:   Echo 10/4/2023  EF 35%, severe global hypokinesis  Biatrial enlargement  Mild-mod AI, mod MR, mild TR  Dilated IVC  Small pericardial effusion    Vitals: Blood pressure 140/73, pulse (!) 111, temperature 97.6 °F (36.4 °C), temperature source Tympanic, resp.  rate 17, height 5' 9" (1.753 m), weight 82.4 kg (181 lb 10.5 oz), SpO2 94 %.,   Orthostatic Blood Pressures      Flowsheet Row Most Recent Value   Blood Pressure 140/73 filed at 11/20/2023 0805   Patient Position - Orthostatic VS Lying filed at 11/20/2023 0700 ,   Weight (last 2 days)       Date/Time Weight    23 0600 82.4 (181.66)    23 0600 81.8 (180.34)            Physical Exam:    General:  Normal appearance in no distress. Eyes:  Anicteric. Oral mucosa:  Moist.  Neck:  No JVD. Carotid upstrokes are brisk without bruits. No masses. Chest:  Clear/slightly decreased  Cardiac:  irregularly irregular. No palpable PMI. Normal S1 and S2. No murmur gallop or rub. Abdomen:  Soft and nontender. No palpable organomegaly or aortic enlargement. Extremities:  +3 pitting edema bilat LE, to the thighs  Neuro:  Grossly symmetric. Psych:  Alert and oriented x3.       Medications:      Current Facility-Administered Medications:     acetaminophen (TYLENOL) tablet 650 mg, 650 mg, Oral, Q6H PRN, Larwance Gallus, CRNP, 650 mg at 23 1814    [] amiodarone (CORDARONE) 900 mg in dextrose 5 % 500 mL infusion, 1 mg/min, Intravenous, Continuous, Stopped at 11/15/23 1520 **FOLLOWED BY** amiodarone (CORDARONE) 900 mg in dextrose 5 % 500 mL infusion, 0.5 mg/min, Intravenous, Continuous, Josh Dawkins MD, Held at 23 1739    bumetanide (BUMEX) injection 1 mg, 1 mg, Intravenous, BID, Gisel Molinaus, CRNP, 1 mg at 23 0805    chlorhexidine (PERIDEX) 0.12 % oral rinse 15 mL, 15 mL, Mouth/Throat, Q12H [facility], Gisel Molinaus, CRNP, 15 mL at 23 0806    digoxin (LANOXIN) tablet 125 mcg, 125 mcg, Oral, Daily, Filiberto Wiseman DO, 125 mcg at 23 0805    enoxaparin (LOVENOX) subcutaneous injection 90 mg, 1 mg/kg, Subcutaneous, Q12H [facility], Josh Dawkins MD, 90 mg at 23 0806    fentanyl citrate (PF) 100 MCG/2ML, , Intravenous, PRN, Cat Alatorre MD, 50 mcg at 23 1606    finasteride (PROSCAR) tablet 5 mg, 5 mg, Oral, Daily, Larwance Gallus, CRNP, 5 mg at  3606    folic acid (FOLVITE) tablet 1 mg, 1 mg, Oral, Daily, Larwance Gallus, CRNP, 1 mg at 23 0806    insulin lispro (HumaLOG) 100 units/mL subcutaneous injection 1-6 Units, 1-6 Units, Subcutaneous, HS, RADHA Pettit, 1 Units at 11/19/23 2109    insulin lispro (HumaLOG) 100 units/mL subcutaneous injection 1-6 Units, 1-6 Units, Subcutaneous, TID AC, 1 Units at 11/18/23 1701 **AND** Fingerstick Glucose (POCT), , , 4x Daily AC and at bedtime, RADHA Pettit    lidocaine (PF) (XYLOCAINE-MPF) 1 % injection, , , PRN, Aleksandra Hernandez MD, 10 mL at 11/16/23 1606    metoprolol tartrate (LOPRESSOR) tablet 50 mg, 50 mg, Oral, 4x Daily, Nura Porras DO, 50 mg at 11/20/23 2044    midazolam (VERSED) injection, , , PRN, Aleksandra Hernandez MD, 1 mg at 11/16/23 1606    polyethylene glycol (MIRALAX) packet 17 g, 17 g, Oral, Daily PRN, RADHA Pettit    pravastatin (PRAVACHOL) tablet 20 mg, 20 mg, Oral, Daily With Dinner, RADHA Pettit, 20 mg at 11/19/23 1710    senna-docusate sodium (SENOKOT S) 8.6-50 mg per tablet 2 tablet, 2 tablet, Oral, HS, RADHA Pettit, 2 tablet at 11/18/23 2221    warfarin (COUMADIN) tablet 5 mg, 5 mg, Oral, Daily (warfarin), Ej Black MD, 5 mg at 11/19/23 1710     Labs & Results:    Troponins:         BNP:   Results from last 6 Months   Lab Units 11/12/23  2315 10/27/23  1919   BNP pg/mL 733* 625*     CBC with diff:   Results from last 7 days   Lab Units 11/20/23  0521 11/19/23  0532   WBC Thousand/uL 7.13 7.22   HEMOGLOBIN g/dL 10.3* 9.8*   HEMATOCRIT % 33.5* 31.2*   MCV fL 105* 103*   PLATELETS Thousands/uL 158 132*     TSH:     CMP:   Results from last 7 days   Lab Units 11/20/23  0521 11/19/23  0532 11/15/23  0518 11/14/23  0630   POTASSIUM mmol/L 4.0 3.6   < > 3.7   CHLORIDE mmol/L 105 104   < > 106   CO2 mmol/L 32 30   < > 31   BUN mg/dL 25 32*   < > 47*   CREATININE mg/dL 1.20 1.28   < > 1.70*   AST U/L  --   --   --  20   ALT U/L  --   --   --  27   EGFR ml/min/1.73sq m 57 53   < > 38    < > = values in this interval not displayed.      Lipid Profile:     Coags:   Results from last 7 days   Lab Units 11/20/23  0521 11/19/23  0532 11/18/23  0547   INR  1.21* 1.23* 1.26*

## 2023-11-20 NOTE — ASSESSMENT & PLAN NOTE
Rates improved to the high 90's low 100's. Unless rates persistently elevated above 120, would not further treat. Expect that this will improve as condition improves. Amiodarone discontinued yesterday. Continue digoxin, metoprolol 50 mg Q 6 hours.   Add diltiazem 30 mg Q 8 hours  Continue warfarin

## 2023-11-20 NOTE — ASSESSMENT & PLAN NOTE
Secondary to stage IV lung adenocarcinoma/chemotherapy.   Trend has been having worsening shortness of breath and recurrent right malignant pleural effusion  CT chest (11/11) pleural effusion greater on the right  S/p L thoracentesis with IR on 11/9 (-2.6L) as OP, 11/13 (-1.4L)  Underwent R chest tube placement on 11/14   CT chest (11/15) noted improvement in size of right pleural effusion, also with a small right pneumothorax  Pulmonology and IR input appreciated   11/16- underwent L Pleurx placement and R Pleurx placement, conversion of existing chest tube by IR   Clinically is improving   Patient and family teaching regarding Pleurx catheter care   Follow up with repeat CXR today   Continue with diuretics: currently on bumex  1mg BID

## 2023-11-20 NOTE — ASSESSMENT & PLAN NOTE
Lab Results   Component Value Date    HGBA1C 6.0 10/23/2023     Hold oral medications from home   Continue SSI coverage per Accu-Cheks  Carbohydrate restriction  Hypoglycemia protocol

## 2023-11-20 NOTE — SEDATION DOCUMENTATION
Right sided thoracentesis complete via Plurex catheter. 1000ml of clear yellow fluid removed. Patient offering no complaints.

## 2023-11-21 ENCOUNTER — HOSPITAL ENCOUNTER (OUTPATIENT)
Dept: INFUSION CENTER | Facility: HOSPITAL | Age: 78
Discharge: HOME | End: 2023-11-21

## 2023-11-21 LAB
ANION GAP SERPL CALCULATED.3IONS-SCNC: 6 MMOL/L
BUN SERPL-MCNC: 25 MG/DL (ref 5–25)
CALCIUM SERPL-MCNC: 7.4 MG/DL (ref 8.4–10.2)
CHLORIDE SERPL-SCNC: 105 MMOL/L (ref 96–108)
CO2 SERPL-SCNC: 30 MMOL/L (ref 21–32)
CREAT SERPL-MCNC: 1.12 MG/DL (ref 0.6–1.3)
GFR SERPL CREATININE-BSD FRML MDRD: 63 ML/MIN/1.73SQ M
GLUCOSE SERPL-MCNC: 108 MG/DL (ref 65–140)
GLUCOSE SERPL-MCNC: 131 MG/DL (ref 65–140)
GLUCOSE SERPL-MCNC: 161 MG/DL (ref 65–140)
GLUCOSE SERPL-MCNC: 216 MG/DL (ref 65–140)
GLUCOSE SERPL-MCNC: 97 MG/DL (ref 65–140)
INR PPP: 1.34 (ref 0.84–1.19)
POTASSIUM SERPL-SCNC: 3.7 MMOL/L (ref 3.5–5.3)
PROTHROMBIN TIME: 16.7 SECONDS (ref 11.6–14.5)
SODIUM SERPL-SCNC: 141 MMOL/L (ref 135–147)

## 2023-11-21 PROCEDURE — 80048 BASIC METABOLIC PNL TOTAL CA: CPT | Performed by: NURSE PRACTITIONER

## 2023-11-21 PROCEDURE — 85610 PROTHROMBIN TIME: CPT | Performed by: INTERNAL MEDICINE

## 2023-11-21 PROCEDURE — 82948 REAGENT STRIP/BLOOD GLUCOSE: CPT

## 2023-11-21 PROCEDURE — 99232 SBSQ HOSP IP/OBS MODERATE 35: CPT | Performed by: NURSE PRACTITIONER

## 2023-11-21 RX ORDER — FUROSEMIDE 40 MG/1
40 TABLET ORAL
Status: DISCONTINUED | OUTPATIENT
Start: 2023-11-21 | End: 2023-11-22 | Stop reason: HOSPADM

## 2023-11-21 RX ADMIN — WARFARIN SODIUM 5 MG: 5 TABLET ORAL at 17:36

## 2023-11-21 RX ADMIN — METOPROLOL TARTRATE 50 MG: 50 TABLET, FILM COATED ORAL at 12:10

## 2023-11-21 RX ADMIN — CHLORHEXIDINE GLUCONATE 15 ML: 1.2 RINSE ORAL at 08:19

## 2023-11-21 RX ADMIN — SENNOSIDES AND DOCUSATE SODIUM 2 TABLET: 8.6; 5 TABLET ORAL at 21:38

## 2023-11-21 RX ADMIN — INSULIN LISPRO 2 UNITS: 100 INJECTION, SOLUTION INTRAVENOUS; SUBCUTANEOUS at 11:11

## 2023-11-21 RX ADMIN — METOPROLOL TARTRATE 50 MG: 50 TABLET, FILM COATED ORAL at 08:19

## 2023-11-21 RX ADMIN — METOPROLOL TARTRATE 50 MG: 50 TABLET, FILM COATED ORAL at 17:35

## 2023-11-21 RX ADMIN — FUROSEMIDE 40 MG: 40 TABLET ORAL at 16:07

## 2023-11-21 RX ADMIN — CHLORHEXIDINE GLUCONATE 15 ML: 1.2 RINSE ORAL at 21:38

## 2023-11-21 RX ADMIN — FINASTERIDE 5 MG: 5 TABLET, FILM COATED ORAL at 08:19

## 2023-11-21 RX ADMIN — DIGOXIN 125 MCG: 125 TABLET ORAL at 08:19

## 2023-11-21 RX ADMIN — INSULIN LISPRO 1 UNITS: 100 INJECTION, SOLUTION INTRAVENOUS; SUBCUTANEOUS at 21:47

## 2023-11-21 RX ADMIN — ENOXAPARIN SODIUM 90 MG: 100 INJECTION SUBCUTANEOUS at 08:19

## 2023-11-21 RX ADMIN — PRAVASTATIN SODIUM 20 MG: 20 TABLET ORAL at 16:07

## 2023-11-21 RX ADMIN — DILTIAZEM HYDROCHLORIDE 30 MG: 30 TABLET ORAL at 21:38

## 2023-11-21 RX ADMIN — METOPROLOL TARTRATE 50 MG: 50 TABLET, FILM COATED ORAL at 21:40

## 2023-11-21 RX ADMIN — FOLIC ACID 1 MG: 1 TABLET ORAL at 08:19

## 2023-11-21 RX ADMIN — FUROSEMIDE 40 MG: 40 TABLET ORAL at 08:19

## 2023-11-21 RX ADMIN — DILTIAZEM HYDROCHLORIDE 30 MG: 30 TABLET ORAL at 05:48

## 2023-11-21 RX ADMIN — ENOXAPARIN SODIUM 90 MG: 100 INJECTION SUBCUTANEOUS at 21:38

## 2023-11-21 NOTE — PLAN OF CARE
Problem: PAIN - ADULT  Goal: Verbalizes/displays adequate comfort level or baseline comfort level  Description: Interventions:  - Encourage patient to monitor pain and request assistance  - Assess pain using appropriate pain scale  - Administer analgesics based on type and severity of pain and evaluate response  - Implement non-pharmacological measures as appropriate and evaluate response  - Consider cultural and social influences on pain and pain management  - Notify physician/advanced practitioner if interventions unsuccessful or patient reports new pain  Outcome: Progressing     Problem: INFECTION - ADULT  Goal: Absence or prevention of progression during hospitalization  Description: INTERVENTIONS:  - Assess and monitor for signs and symptoms of infection  - Monitor lab/diagnostic results  - Monitor all insertion sites, i.e. indwelling lines, tubes, and drains  - Monitor endotracheal if appropriate and nasal secretions for changes in amount and color  - Seale appropriate cooling/warming therapies per order  - Administer medications as ordered  - Instruct and encourage patient and family to use good hand hygiene technique  - Identify and instruct in appropriate isolation precautions for identified infection/condition  Outcome: Progressing     Problem: SAFETY ADULT  Goal: Patient will remain free of falls  Description: INTERVENTIONS:  - Educate patient/family on patient safety including physical limitations  - Instruct patient to call for assistance with activity   - Consult OT/PT to assist with strengthening/mobility   - Keep Call bell within reach  - Keep bed low and locked with side rails adjusted as appropriate  - Keep care items and personal belongings within reach  - Initiate and maintain comfort rounds  - Make Fall Risk Sign visible to staff  - Offer Toileting every 2 Hours, in advance of need  - Initiate/Maintain bed alarm  - Obtain necessary fall risk management equipment:   - Apply yellow socks and bracelet for high fall risk patients  - Consider moving patient to room near nurses station  Outcome: Progressing  Goal: Maintain or return to baseline ADL function  Description: INTERVENTIONS:  -  Assess patient's ability to carry out ADLs; assess patient's baseline for ADL function and identify physical deficits which impact ability to perform ADLs (bathing, care of mouth/teeth, toileting, grooming, dressing, etc.)  - Assess/evaluate cause of self-care deficits   - Assess range of motion  - Assess patient's mobility; develop plan if impaired  - Assess patient's need for assistive devices and provide as appropriate  - Encourage maximum independence but intervene and supervise when necessary  - Involve family in performance of ADLs  - Assess for home care needs following discharge   - Consider OT consult to assist with ADL evaluation and planning for discharge  - Provide patient education as appropriate  Outcome: Progressing  Goal: Maintains/Returns to pre admission functional level  Description: INTERVENTIONS:  - Perform BMAT or MOVE assessment daily.   - Set and communicate daily mobility goal to care team and patient/family/caregiver. - Collaborate with rehabilitation services on mobility goals if consulted  - Perform Range of Motion 3 times a day. - Reposition patient every 2 hours.   - Dangle patient 3 times a day  - Stand patient 3 times a day  - Ambulate patient 3 times a day  - Out of bed for meals   - Out of bed for toileting  - Record patient progress and toleration of activity level   Outcome: Progressing     Problem: DISCHARGE PLANNING  Goal: Discharge to home or other facility with appropriate resources  Description: INTERVENTIONS:  - Identify barriers to discharge w/patient and caregiver  - Arrange for needed discharge resources and transportation as appropriate  - Identify discharge learning needs (meds, wound care, etc.)  - Arrange for interpretive services to assist at discharge as needed  - Refer to Case Management Department for coordinating discharge planning if the patient needs post-hospital services based on physician/advanced practitioner order or complex needs related to functional status, cognitive ability, or social support system  Outcome: Progressing     Problem: Knowledge Deficit  Goal: Patient/family/caregiver demonstrates understanding of disease process, treatment plan, medications, and discharge instructions  Description: Complete learning assessment and assess knowledge base. Interventions:  - Provide teaching at level of understanding  - Provide teaching via preferred learning methods  Outcome: Progressing     Problem: MOBILITY - ADULT  Goal: Maintain or return to baseline ADL function  Description: INTERVENTIONS:  -  Assess patient's ability to carry out ADLs; assess patient's baseline for ADL function and identify physical deficits which impact ability to perform ADLs (bathing, care of mouth/teeth, toileting, grooming, dressing, etc.)  - Assess/evaluate cause of self-care deficits   - Assess range of motion  - Assess patient's mobility; develop plan if impaired  - Assess patient's need for assistive devices and provide as appropriate  - Encourage maximum independence but intervene and supervise when necessary  - Involve family in performance of ADLs  - Assess for home care needs following discharge   - Consider OT consult to assist with ADL evaluation and planning for discharge  - Provide patient education as appropriate  Outcome: Progressing  Goal: Maintains/Returns to pre admission functional level  Description: INTERVENTIONS:  - Perform BMAT or MOVE assessment daily.   - Set and communicate daily mobility goal to care team and patient/family/caregiver. - Collaborate with rehabilitation services on mobility goals if consulted  - Perform Range of Motion 3 times a day. - Reposition patient every 2 hours.   - Dangle patient 3 times a day  - Stand patient 3 times a day  - Ambulate patient 3 times a day  - Out of bed for meals   - Out of bed for toileting  - Record patient progress and toleration of activity level   Outcome: Progressing     Problem: NEUROSENSORY - ADULT  Goal: Achieves stable or improved neurological status  Description: INTERVENTIONS  - Monitor and report changes in neurological status  - Monitor vital signs such as temperature, blood pressure, glucose, and any other labs ordered   - Initiate measures to prevent increased intracranial pressure  - Monitor for seizure activity and implement precautions if appropriate      Outcome: Progressing  Goal: Achieves maximal functionality and self care  Description: INTERVENTIONS  - Monitor swallowing and airway patency with patient fatigue and changes in neurological status  - Encourage and assist patient to increase activity and self care.    - Encourage visually impaired, hearing impaired and aphasic patients to use assistive/communication devices  Outcome: Progressing     Problem: CARDIOVASCULAR - ADULT  Goal: Maintains optimal cardiac output and hemodynamic stability  Description: INTERVENTIONS:  - Monitor I/O, vital signs and rhythm  - Monitor for S/S and trends of decreased cardiac output  - Administer and titrate ordered vasoactive medications to optimize hemodynamic stability  - Assess quality of pulses, skin color and temperature  - Assess for signs of decreased coronary artery perfusion  - Instruct patient to report change in severity of symptoms  Outcome: Progressing  Goal: Absence of cardiac dysrhythmias or at baseline rhythm  Description: INTERVENTIONS:  - Continuous cardiac monitoring, vital signs, obtain 12 lead EKG if ordered  - Administer antiarrhythmic and heart rate control medications as ordered  - Monitor electrolytes and administer replacement therapy as ordered  Outcome: Progressing     Problem: RESPIRATORY - ADULT  Goal: Achieves optimal ventilation and oxygenation  Description: INTERVENTIONS:  - Assess for changes in respiratory status  - Assess for changes in mentation and behavior  - Position to facilitate oxygenation and minimize respiratory effort  - Oxygen administered by appropriate delivery if ordered  - Initiate smoking cessation education as indicated  - Encourage broncho-pulmonary hygiene including cough, deep breathe, Incentive Spirometry  - Assess the need for suctioning and aspirate as needed  - Assess and instruct to report SOB or any respiratory difficulty  - Respiratory Therapy support as indicated  Outcome: Progressing     Problem: GASTROINTESTINAL - ADULT  Goal: Minimal or absence of nausea and/or vomiting  Description: INTERVENTIONS:  - Administer IV fluids if ordered to ensure adequate hydration  - Maintain NPO status until nausea and vomiting are resolved  - Nasogastric tube if ordered  - Administer ordered antiemetic medications as needed  - Provide nonpharmacologic comfort measures as appropriate  - Advance diet as tolerated, if ordered  - Consider nutrition services referral to assist patient with adequate nutrition and appropriate food choices  Outcome: Progressing  Goal: Maintains or returns to baseline bowel function  Description: INTERVENTIONS:  - Assess bowel function  - Encourage oral fluids to ensure adequate hydration  - Administer IV fluids if ordered to ensure adequate hydration  - Administer ordered medications as needed  - Encourage mobilization and activity  - Consider nutritional services referral to assist patient with adequate nutrition and appropriate food choices  Outcome: Progressing  Goal: Maintains adequate nutritional intake  Description: INTERVENTIONS:  - Monitor percentage of each meal consumed  - Identify factors contributing to decreased intake, treat as appropriate  - Assist with meals as needed  - Monitor I&O, weight, and lab values if indicated  - Obtain nutrition services referral as needed  Outcome: Progressing  Goal: Oral mucous membranes remain intact  Description: INTERVENTIONS  - Assess oral mucosa and hygiene practices  - Implement preventative oral hygiene regimen  - Implement oral medicated treatments as ordered  - Initiate Nutrition services referral as needed  Outcome: Progressing     Problem: GENITOURINARY - ADULT  Goal: Maintains or returns to baseline urinary function  Description: INTERVENTIONS:  - Assess urinary function  - Encourage oral fluids to ensure adequate hydration if ordered  - Administer IV fluids as ordered to ensure adequate hydration  - Administer ordered medications as needed  - Offer frequent toileting  - Follow urinary retention protocol if ordered  Outcome: Progressing  Goal: Absence of urinary retention  Description: INTERVENTIONS:  - Assess patient’s ability to void and empty bladder  - Monitor I/O  - Bladder scan as needed  - Discuss with physician/AP medications to alleviate retention as needed  - Discuss catheterization for long term situations as appropriate  Outcome: Progressing     Problem: METABOLIC, FLUID AND ELECTROLYTES - ADULT  Goal: Electrolytes maintained within normal limits  Description: INTERVENTIONS:  - Monitor labs and assess patient for signs and symptoms of electrolyte imbalances  - Administer electrolyte replacement as ordered  - Monitor response to electrolyte replacements, including repeat lab results as appropriate  - Instruct patient on fluid and nutrition as appropriate  Outcome: Progressing  Goal: Fluid balance maintained  Description: INTERVENTIONS:  - Monitor labs   - Monitor I/O and WT  - Instruct patient on fluid and nutrition as appropriate  - Assess for signs & symptoms of volume excess or deficit  Outcome: Progressing  Goal: Glucose maintained within target range  Description: INTERVENTIONS:  - Monitor Blood Glucose as ordered  - Assess for signs and symptoms of hyperglycemia and hypoglycemia  - Administer ordered medications to maintain glucose within target range  - Assess nutritional intake and initiate nutrition service referral as needed  Outcome: Progressing     Problem: SKIN/TISSUE INTEGRITY - ADULT  Goal: Skin Integrity remains intact(Skin Breakdown Prevention)  Description: Assess:  -Perform Albert assessment every shift  -Clean and moisturize skin every shift  -Inspect skin when repositioning, toileting, and assisting with ADLS  -Assess under medical devices such as allevyn every shift  -Assess extremities for adequate circulation and sensation     Bed Management:  -Have minimal linens on bed & keep smooth, unwrinkled  -Change linens as needed when moist or perspiring  -Avoid sitting or lying in one position for more than 2 hours while in bed  -Keep HOB at 30 degrees     Toileting:  -Offer bedside commode  -Assess for incontinence   -Use incontinent care products after each incontinent episode    Activity:  -Mobilize patient 3 times a day  -Encourage activity and walks on unit  -Encourage or provide ROM exercises   -Turn and reposition patient every 2 Hours  -Use appropriate equipment to lift or move patient in bed  -Instruct/ Assist with weight shifting   -Consider limitation of chair time     Skin Care:  -Avoid use of baby powder, tape, friction and shearing, hot water or constrictive clothing  -Relieve pressure over bony prominences using allevyn  -Do not massage red bony areas  Outcome: Progressing  Goal: Incision(s), wounds(s) or drain site(s) healing without S/S of infection  Description: INTERVENTIONS  - Assess and document dressing, incision, wound bed, drain sites and surrounding tissue  - Provide patient and family education  - Perform skin care/dressing changes every shift  Outcome: Progressing  Goal: Pressure injury heals and does not worsen  Description: Interventions:  - Implement low air loss mattress or specialty surface (Criteria met)  - Apply silicone foam dressing  - Instruct/assist with weight shifting every 60 minutes when in chair   - Limit chair time   - Use special pressure reducing interventions such as waffle cushion when in chair   - Apply fecal or urinary incontinence containment device   - Perform passive or active ROM every shift  - Turn and reposition patient & offload bony prominences every 2 hours   - Utilize friction reducing device or surface for transfers   - Use incontinent care products after each incontinent episode   - Consider nutrition services referral as needed  Outcome: Progressing     Problem: HEMATOLOGIC - ADULT  Goal: Maintains hematologic stability  Description: INTERVENTIONS  - Assess for signs and symptoms of bleeding or hemorrhage  - Monitor labs  - Administer supportive blood products/factors as ordered and appropriate  Outcome: Progressing     Problem: MUSCULOSKELETAL - ADULT  Goal: Maintain or return mobility to safest level of function  Description: INTERVENTIONS:  - Assess patient's ability to carry out ADLs; assess patient's baseline for ADL function and identify physical deficits which impact ability to perform ADLs (bathing, care of mouth/teeth, toileting, grooming, dressing, etc.)  - Assess/evaluate cause of self-care deficits   - Assess range of motion  - Assess patient's mobility  - Assess patient's need for assistive devices and provide as appropriate  - Encourage maximum independence but intervene and supervise when necessary  - Involve family in performance of ADLs  - Assess for home care needs following discharge   - Consider OT consult to assist with ADL evaluation and planning for discharge  - Provide patient education as appropriate  Outcome: Progressing  Goal: Maintain proper alignment of affected body part  Description: INTERVENTIONS:  - Support, maintain and protect limb and body alignment  - Provide patient/ family with appropriate education  Outcome: Progressing     Problem: Prexisting or High Potential for Compromised Skin Integrity  Goal: Skin integrity is maintained or improved  Description: INTERVENTIONS:  - Identify patients at risk for skin breakdown  - Assess and monitor skin integrity  - Assess and monitor nutrition and hydration status  - Monitor labs   - Assess for incontinence   - Turn and reposition patient  - Assist with mobility/ambulation  - Relieve pressure over bony prominences  - Avoid friction and shearing  - Provide appropriate hygiene as needed including keeping skin clean and dry  - Evaluate need for skin moisturizer/barrier cream  - Collaborate with interdisciplinary team   - Patient/family teaching  - Consider wound care consult   Outcome: Progressing

## 2023-11-21 NOTE — CASE MANAGEMENT
Case Management Discharge Planning Note    Patient name Ted Mendosa  Location ICU 05/ICU - MRN [id number]  : 1945 Date 2023       Current Admission Date: 2023  Current Admission Diagnosis:Acute on chronic respiratory failure with hypoxia [facility])   Patient Active Problem List    Diagnosis Date Noted    Acute on chronic HFrEF (heart failure with reduced ejection fraction) ([address]) 2023    History of pulmonary embolus (PE) 2023    Deep vein thrombosis (DVT) of right lower extremity ([address]) 2023    History of prostate cancer 2023    Acute on chronic respiratory failure with hypoxia ([address]) 2023    Supratherapeutic INR 10/28/2023    Cellulitis of extremity 10/27/2023    Cellulitis and abscess of left lower extremity 10/23/2023    Cellulitis of leg, right 10/23/2023    COPD with acute exacerbation ([address]) 10/05/2023    Rapid atrial fibrillation ([address]) 10/03/2023    HILARY (acute kidney injury) ([address]) 10/03/2023    Abnormal CT of the abdomen 10/03/2023    Platelets decreased ([address]) 2023    Multiple lung nodules on CT 10/06/2022    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 ([address]) 10/06/2022    Pleural effusion 10/06/2022    Malignant neoplasm of upper lobe of right lung ([address])     Multiple subsegmental pulmonary emboli without acute cor pulmonale ([address]) 09/10/2022    Pulmonary mass 09/10/2022    Anemia 09/10/2022    Non-recurrent bilateral inguinal hernia without obstruction or gangrene 2021    Status post right tibial-talar ankle fusion 2020    BPH associated with nocturia 2019    Arthritis of right acromioclavicular joint 03/15/2019    Primary osteoarthritis of right shoulder 2019    Chronic left shoulder pain 2019    Left rotator cuff tear arthropathy 2019    Rotator cuff injury, right, initial encounter 2019    History of colon polyps 2019    Hyperparathyroidism ([address]) 2018    Hypokalemia 10/22/2018 Hypocalcemia 10/22/2018    Glaucoma 10/11/2018    Type 2 diabetes mellitus without complication, without long-term current use of insulin ([address]) 07/25/2016    Inguinal hernia 02/08/2016    Benign essential HTN 01/12/2016    Hypercholesterolemia 01/12/2016      LOS (days): 10  Geometric Mean LOS (GMLOS) (days): 4.90  Days to GMLOS:-4.7     OBJECTIVE:  Risk of Unplanned Readmission Score: 37.37         Current admission status: Inpatient   Preferred Pharmacy:   [address] [facility] [address] [address]  Phone: [phone] Fax: [facility], [address] [facility] [address] [place] [address]  Phone: [phone] Fax: [address] [place] [address] [id number] [place] [id number] [address]  Phone: [phone] Fax: [phone]    Primary Care Provider: Lito Sanford PA-C    Primary Insurance: MEDICARE  Secondary Insurance: Jaylin Ayon DETAILS:\  As per SLIM the pt may be able to be DC to home tomorrow. Notified [facility] via [address] of same. TC to pt daughter Jenny Lee at [phone] to determine if supplies were delivered today. Left a VM and requested a call back. Another dtr at bedside, and pt made aware of DC tomorrow.

## 2023-11-21 NOTE — ASSESSMENT & PLAN NOTE
Remains with intermittent RVR but HR improved in 90s-100s. Cardiology input appreciated   The patient required Amiodarone infusion; discontinued (11/19)   Continue with digoxin and metoprolol 50 mg every 6 hours. Diltiazem 30 mg every 8 hours is added to his regimen per cardiology. Continue anticoagulation with warfarin. Currently being bridged with therapeutic Lovenox. Patient to be discharged on this given INR of 1.37. Patient instructed to take a 1 time dose of 10 mg of coumadin tonight then resume 5 mg of coumadin nightly. Cardiology to management coumadin as an outpatient. Patient instructed to have INR checked on Saturday 11/25.  Cardiology to set up outpatient appointment

## 2023-11-21 NOTE — ASSESSMENT & PLAN NOTE
Secondary to stage IV lung adenocarcinoma/chemotherapy. Trend has been having worsening shortness of breath and recurrent right malignant pleural effusion  CT chest (11/11) pleural effusion greater on the right  S/p L thoracentesis with IR on 11/9 (-2.6L) as OP, 11/13 (-1.4L)  Underwent R chest tube placement on 11/14   CT chest (11/15) noted improvement in size of right pleural effusion, also with a small right pneumothorax  Pulmonology and IR input appreciated   11/16- underwent L Pleurx placement and R Pleurx placement, conversion of existing chest tube by IR   11/21- right Pleurx -1L done by IR   IR recommends to drain 1L from the right Pleurx every 3 days. The first day will be Friday 11/24. He will need a CXR after drainage. Left Pleurx should be drain every 3 days to completion. If any issues/concerns to call the hospital and ask for Interventional radiology.    Patient and family teaching regarding Pleurx catheter care   Continue with diuretics: transitioned from bumex IV to furosemide 40 mg BID (home dose)   Outpatient follow-up with Pulmonology and PCP

## 2023-11-21 NOTE — ASSESSMENT & PLAN NOTE
Unclear etiology, possibly cardiorenal given CHF exacerbation  Baseline creatinine of approximately 0.9-1.1mg/dL   Creatinine back to baseline

## 2023-11-21 NOTE — WOUND OSTOMY CARE
Progress Note - Wound   Wendy Lang 69 y.o. male MRN: [de-identified]  Unit/Bed#: ICU 05-01 Encounter: [id number]      Assessment:   Wound care weekly follow up. Patient OOB in the recliner, heels elevated, cushion on seat, Allevyn foam on sacrum. He is awake, alert and oriented. Family at bedside. Patient is not incontinent, is independent with meals and is an assist for hygiene care. P-500 low air loss therapy surface ordered today. Findings  1- POA-right pretibial region partial thickness wound bed, beefy red with granulation tissue, scant serosanguineous drainage when cleansed. Lower leg edema. No erythema, odor or induration noted   2- POA-left foot, area of mild erythema to the dorsal forefoot, no drainage or open area   3- POA-left elbow wound bed, possibly pressure related. Wound bed pink to the proximal wound, distal wound yellow adherent slough. Distal wound was not included in measurement of last assessment, wound was present. Pink epithelial tissue to the edges   4- Left anterior arm skin tear resolved  5- Anterior right forefoot area of beefy red tissue, no open areas or drainage  6-  POA-sacrum and upper buttocks wounds. Scattered wound beds, proximal wound beds on the left upper region by the sacrum 100% moist yellow slough with light purple hyperpigmented and purple non-blanchable tissue. Yellow eschar noted to the right mid buttock and left inner buttock. Periwound blanchable. Mixed etiology of friction and pressure. Noted beefy red erosion to the sacrum. Blistered purple skin to the left upper buttock in the sacral region. Appears now to be an evolving deep tissue injury. This area has declined since admission. 7- HA-stage 2 pressure injury to the right heel. Intact serous filled blister, epidermal separation, beefy red periwound, slow to perez. Right lower and foot edema    Primary RN and CC aware of hospital acquired stage 2 of right heel and decline in wound to the sacrum.     Skin and Wound Care Plan:   1- Apply skin nourishing cream to the skin daily  2- Elevate heels off of bed with 2 pillows to offload  3- Use EHOB offloading cushion on chair when OOB  4- Turn and reposition patient Q2 hours  5- Apply 3M No Sting barrier film to the right heel wound, then apply Allevyn life heel foams to bilateral heels, joseph left with P, joseph right with T, date, and peel back daily for skin assessment, change every 3 days or with soilage or dislodgement. 6- Cleanse sacrum and upper buttocks with Remedy foaming cleanser, pat dry. Apply Triad paste to the wound beds and cover with Allevyn life silicone bordered foam dressing, joseph with T, date, change every other day and PRN  7- Cleanse wounds to the left posterior arm and right anterior lower leg with NSS, pat dry. Apply Triad paste to the wound beds, cover with Allevyn life silicone bordered foam dressing, joseph with T, date, change every other day and PRN    Wound:  Wound 11/01/23 Sacrum Bilateral (Active)   Wound Image    11/20/23 1037   Wound Description Beefy red;Yellow;Slough 11/20/23 1037   Pressure Injury Stage U 11/20/23 1037   Katja-wound Assessment Erythema;Fragile 11/20/23 1037   Wound Length (cm) 6 cm 11/20/23 1037   Wound Width (cm) 6 cm 11/20/23 1037   Wound Surface Area (cm^2) 36 cm^2 11/20/23 1037   Drainage Amount Scant 11/20/23 1037   Drainage Description Yellow 11/20/23 1037   Treatments Cleansed 11/20/23 1037   Dressing Foam, Silicon (eg. Allevyn, etc); Other (Comment) 11/20/23 1037   Dressing Changed Changed 11/20/23 1037   Patient Tolerance Tolerated well 11/20/23 1037   Dressing Status Clean;Dry; Intact 11/20/23 2000       Wound 11/11/23 Pretibial Right (Active)   Wound Image   11/20/23 1026   Wound Description Beefy red;Pink 11/20/23 1026   Katja-wound Assessment Other (Comment) 11/20/23 1026   Wound Length (cm) 0.5 cm 11/20/23 1026   Wound Width (cm) 1.8 cm 11/20/23 1026   Wound Depth (cm) 0.1 cm 11/20/23 1026   Wound Surface Area (cm^2) 0.9 cm^2 11/20/23 1026   Wound Volume (cm^3) 0.09 cm^3 11/20/23 1026   Calculated Wound Volume (cm^3) 0.09 cm^3 11/20/23 1026   Change in Wound Size % 74.29 11/20/23 1026   Drainage Amount Scant 11/20/23 1026   Drainage Description Yellow 11/20/23 1026   Non-staged Wound Description Partial thickness 11/20/23 1026   Treatments Cleansed 11/20/23 1026   Dressing Foam, Silicon (eg. Allevyn, etc); Other (Comment) 11/20/23 1026   Dressing Changed Changed 11/20/23 1026   Patient Tolerance Tolerated well 11/20/23 1026   Dressing Status Clean;Dry; Intact 11/20/23 2000       Wound 11/11/23 Foot Anterior; Left (Active)   Wound Image   11/20/23 1029   Wound Description Intact; Pink 11/20/23 1029   Katja-wound Assessment Edema 11/20/23 1029   Wound Length (cm) 0 cm 11/20/23 1029   Wound Width (cm) 0 cm 11/20/23 1029   Wound Depth (cm) 0 cm 11/20/23 1029   Wound Surface Area (cm^2) 0 cm^2 11/20/23 1029   Wound Volume (cm^3) 0 cm^3 11/20/23 1029   Calculated Wound Volume (cm^3) 0 cm^3 11/20/23 1029   Drainage Amount None 11/20/23 1029   Treatments Cleansed;Site care;Elevated 11/19/23 0600   Dressing Foam 11/19/23 0600   Dressing Changed Reinforced 11/19/23 0600   Patient Tolerance Tolerated well 11/20/23 1029       Wound 11/11/23 Elbow Left;Posterior (Active)   Wound Image   11/20/23 1031   Wound Description Beefy red;Yellow 11/20/23 1031   Katja-wound Assessment Intact 11/20/23 1031   Wound Length (cm) 6.2 cm 11/20/23 1031   Wound Width (cm) 1.2 cm 11/20/23 1031   Wound Depth (cm) 0.1 cm 11/20/23 1031   Wound Surface Area (cm^2) 7.44 cm^2 11/20/23 1031   Wound Volume (cm^3) 0.744 cm^3 11/20/23 1031   Calculated Wound Volume (cm^3) 0.74 cm^3 11/20/23 1031   Drainage Amount Scant 11/20/23 1031   Drainage Description Yellow 11/20/23 1031   Treatments Cleansed 11/20/23 1031   Dressing Foam, Silicon (eg. Allevyn, etc); Other (Comment) 11/20/23 1031   Dressing Changed Changed 11/20/23 1031   Patient Tolerance Tolerated well 11/20/23 1031 Dressing Status Clean;Dry; Intact 11/20/23 2000       Wound 11/13/23 Skin Tear Arm Anterior; Left (Active)   Wound Image   11/20/23 1040   Wound Description Dry; Intact 11/20/23 1040   Katja-wound Assessment Pink 11/19/23 0600   Wound Length (cm) 1.4 cm 11/15/23 1338   Wound Width (cm) 3.5 cm 11/15/23 1338   Wound Depth (cm) 0.1 cm 11/15/23 1338   Wound Surface Area (cm^2) 4.9 cm^2 11/15/23 1338   Wound Volume (cm^3) 0.49 cm^3 11/15/23 1338   Calculated Wound Volume (cm^3) 0.49 cm^3 11/15/23 1338   Treatments Cleansed;Site care 11/19/23 0600   Dressing Xeroform;Dry dressing 11/19/23 0600   Dressing Changed Reinforced 11/19/23 0600   Patient Tolerance Tolerated well 11/19/23 0600   Dressing Status Clean;Dry; Intact 11/20/23 2000       Wound 11/15/23 Foot Anterior;Right (Active)   Wound Image   11/20/23 1025   Wound Description Beefy red;Edema;Dry 11/20/23 1040   Katja-wound Assessment Edema;Pink 11/19/23 0600   Wound Length (cm) 0.5 cm 11/15/23 1336   Wound Width (cm) 0.3 cm 11/15/23 1336   Wound Depth (cm) 0 cm 11/15/23 1336   Wound Surface Area (cm^2) 0.15 cm^2 11/15/23 1336   Wound Volume (cm^3) 0 cm^3 11/15/23 1336   Calculated Wound Volume (cm^3) 0 cm^3 11/15/23 1336   Drainage Amount None 11/20/23 1040   Treatments Cleansed;Site care 11/19/23 0600   Dressing Open to air 11/19/23 0600   Patient Tolerance Tolerated well 11/20/23 1040   Dressing Status Clean;Dry 11/20/23 2000       Wound 11/20/23 Pressure Injury Heel Right (Active)   Wound Image   11/20/23 1029   Wound Description Beefy red;Fragile; Non-blanchable erythema 11/20/23 1029   Pressure Injury Stage 2 11/20/23 1029   Katja-wound Assessment Erythema 11/20/23 1029   Wound Length (cm) 2.5 cm 11/20/23 1029   Wound Width (cm) 2 cm 11/20/23 1029   Wound Depth (cm) 0 cm 11/20/23 1029   Wound Surface Area (cm^2) 5 cm^2 11/20/23 1029   Wound Volume (cm^3) 0 cm^3 11/20/23 1029   Calculated Wound Volume (cm^3) 0 cm^3 11/20/23 1029   Drainage Amount None 11/20/23 1029 Treatments Other (Comment) 11/20/23 1029   Dressing Foam, Silicon (eg. Allevyn, etc) 11/20/23 1029   Dressing Changed New 11/20/23 1029   Patient Tolerance Tolerated well 11/20/23 1029   Dressing Status Clean;Dry; Intact 11/20/23 2000     Reviewed plan of care with primary RN [facility]  Recommendations written as orders  Wound care team to follow weekly while admitted  Questions or concerns [facility] Nurse    Adelina ROLLEN, RN, [facility] midline lower back pain

## 2023-11-21 NOTE — ASSESSMENT & PLAN NOTE
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

## 2023-11-21 NOTE — PROGRESS NOTES
[address]  Progress Note  Name: Gerry Islas  MRN: [de-identified]  Unit/Bed#: ICU 05-01 I Date of Admission: 11/11/2023   Date of Service: 11/21/2023 I Hospital Day: 10    Assessment/Plan   * Acute on chronic respiratory failure with hypoxia [facility])  Assessment & Plan  Likely multifactorial in the setting of b/l pleural effusion and acute CHF exacerbation   At baseline, requires 3L NC -> currently down to baseline requirement  Encourage cough, deep breathing, IS, ambulation as tolerated  Acute failure resolved      Pleural effusion  Assessment & Plan  Secondary to stage IV lung adenocarcinoma/chemotherapy. Trend has been having worsening shortness of breath and recurrent right malignant pleural effusion  CT chest (11/11) pleural effusion greater on the right  S/p L thoracentesis with IR on 11/9 (-2.6L) as OP, 11/13 (-1.4L)  Underwent R chest tube placement on 11/14   CT chest (11/15) noted improvement in size of right pleural effusion, also with a small right pneumothorax  Pulmonology and IR input appreciated   11/16- underwent L Pleurx placement and R Pleurx placement, conversion of existing chest tube by IR   11/21- right Pleurx -1L done by IR   IR recommends to drain 1L from the right Pleurx every 3 days. The first day will be Friday 11/24. He will need a CXR after drainage. Left Pleurx should be drain every 3 days to completion. If any issues/concerns to call the hospital and ask for Interventional radiology.    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 HILARY (acute kidney injury) ([address])  Assessment & Plan  Unclear etiology, possibly cardiorenal given CHF exacerbation  Baseline creatinine of approximately 0.9-1.1mg/dL   Cr is improving   Monitor renal function and urine output - limit/avoid nephrotoxins and hypotension as possible      Rapid atrial fibrillation ([address])  Assessment & Plan  Remains with intermittent RVR but HR improved in 90s-100s. Cardiology input appreciated   The patient required Amiodarone infusion; discontinued (11/19)   Continue with digoxin and metoprolol 50 mg every 6 hours. Diltiazem 30 mg every 8 hours is added to his regimen per cardiology. Continue anticoagulation with warfarin. Currently being bridged with therapeutic Lovenox.         Malignant neoplasm of upper lobe of right lung [facility])  Assessment & Plan  Pt diagnosed with Stage 4 Lung Ca in 9/2022  S/p XRT completed June 2023   Currently undergoing chemo, last 9/19/23   OP follow up with heme/onc       Type 2 diabetes mellitus without complication, without long-term current use of insulin ([address])  Assessment & Plan  Lab Results   Component Value Date    HGBA1C 6.0 10/23/2023     Hold oral medications from home   Continue SSI coverage per Accu-Cheks  Carbohydrate restriction  Hypoglycemia protocol      Benign essential HTN  Assessment & Plan  BP has been well-controlled   Continue Lopressor (w/ holding parameters for hypotension)                 VTE Pharmacologic Prophylaxis: VTE Score: 10 High Risk (Score >/= 5) - Pharmacological DVT Prophylaxis Ordered: warfarin (Coumadin). Sequential Compression Devices Ordered. Mobility:   Basic Mobility Inpatient Raw Score: 18  JH-HLM Goal: 6: Walk 10 steps or more  JH-HLM Achieved: 4: Move to chair/commode  HLM Goal achieved. Continue to encourage appropriate mobility. Patient Centered Rounds: I performed bedside rounds with nursing staff today. Discussions with Specialists or Other Care Team Provider: IR, cardiology     Education and Discussions with Family / Patient: Updated  (daughter) at bedside. Total Time Spent on Date of Encounter in care of patient: 34 mins. This time was spent on one or more of the following: performing physical exam; counseling and coordination of care; obtaining or reviewing history; documenting in the medical record; reviewing/ordering tests, medications or procedures; communicating with other healthcare professionals and discussing with patient's family/caregivers. Current Length of Stay: 10 day(s)  Current Patient Status: Inpatient   Certification Statement: The patient will continue to require additional inpatient hospital stay due to acute respiratory failure with recurrent pleural effusion  Discharge Plan: Anticipate discharge in 24-48 hrs to home with home services. Code Status: Level 3 - DNAR and DNI    Subjective:   Feeling well and would like to go home. Denies any dyspnea on exertion. Objective:     Vitals:   Temp (24hrs), Av.4 °F (36.3 °C), Min:97.1 °F (36.2 °C), Max:97.6 °F (36.4 °C)    Temp:  [97.1 °F (36.2 °C)-97.6 °F (36.4 °C)] 97.6 °F (36.4 °C)  HR:  [] 108  Resp:  [19-44] 20  BP: ()/(54-70) 99/60  SpO2:  [89 %-97 %] 95 %  Body mass index is 26.34 kg/m². Input and Output Summary (last 24 hours): Intake/Output Summary (Last 24 hours) at 2023 1352  Last data filed at 2023 1200  Gross per 24 hour   Intake 778 ml   Output 1075 ml   Net -297 ml       Physical Exam:   Physical Exam  Vitals and nursing note reviewed. Constitutional:       General: He is not in acute distress. Appearance: Normal appearance. Comments: Frail      HENT:      Head: Normocephalic and atraumatic. Right Ear: External ear normal.      Left Ear: External ear normal.      Nose: Nose normal.      Mouth/Throat:      Mouth: Mucous membranes are moist.      Pharynx: Oropharynx is clear. Eyes:      General:         Right eye: No discharge. Left eye: No discharge. Extraocular Movements: Extraocular movements intact. Pupils: Pupils are equal, round, and reactive to light. Cardiovascular:      Rate and Rhythm: Tachycardia present. Rhythm irregular. Pulses: Normal pulses. Heart sounds: Normal heart sounds. No murmur heard. Pulmonary:      Effort: Pulmonary effort is normal. No respiratory distress. Breath sounds: No wheezing or rales. Comments: Decreased in bases L>R. Abdominal:      General: Bowel sounds are normal. There is no distension. Palpations: Abdomen is soft. There is no mass. Tenderness: There is no abdominal tenderness. Musculoskeletal:         General: Swelling present. No tenderness or deformity. Normal range of motion. Cervical back: Normal range of motion and neck supple. No rigidity. Skin:     General: Skin is warm and dry. Capillary Refill: Capillary refill takes less than 2 seconds. Coloration: Skin is not pale. Findings: No erythema. Neurological:      General: No focal deficit present. Mental Status: He is alert and oriented to person, place, and time. Mental status is at baseline. Psychiatric:         Mood and Affect: Mood normal.         Behavior: Behavior normal.         Thought Content:  Thought content normal.         Judgment: Judgment normal.         Additional Data:     Labs:  Results from last 7 days   Lab Units 11/20/23  0521   WBC Thousand/uL 7.13   HEMOGLOBIN g/dL 10.3*   HEMATOCRIT % 33.5*   PLATELETS Thousands/uL 158   NEUTROS PCT % 73   LYMPHS PCT % 9*   MONOS PCT % 9   EOS PCT % 8*     Results from last 7 days   Lab Units 11/21/23  0442   SODIUM mmol/L 141   POTASSIUM mmol/L 3.7   CHLORIDE mmol/L 105   CO2 mmol/L 30   BUN mg/dL 25   CREATININE mg/dL 1.12   ANION GAP mmol/L 6   CALCIUM mg/dL 7.4*   GLUCOSE RANDOM mg/dL 108     Results from last 7 days   Lab Units 11/21/23  0442   INR  1.34*     Results from last 7 days   Lab Units 11/21/23  1107 11/21/23  0720 11/20/23  2049 11/20/23  1609 11/20/23  1050 11/20/23  0717 11/19/23  2049 11/19/23  1607 11/19/23  1116 11/19/23  0723 11/18/23  2116 11/18/23  1613   POC GLUCOSE mg/dl 216* 97 147* 116 120 81 156* 140 143* 100 124 152*         Results from last 7 days   Lab Units 11/15/23  0518   PROCALCITONIN ng/ml 0.16       Lines/Drains:  Invasive Devices       Drain  Duration             Pleural Effusion Long-Term Catheter 15.5 Fr. 4 days    Pleural Effusion Long-Term Catheter 15.5 Fr. 4 days                    Central Line: PICC  Goal for removal: Will discontinue when hemodynamically stable. Telemetry:  Telemetry Orders (From admission, onward)               24 Hour Telemetry Monitoring  Continuous x 24 Hours (Telem)        Question:  Reason for 24 Hour Telemetry  Answer:  Arrhythmias requiring acute medical intervention / PPM or ICD malfunction                     Telemetry Reviewed: Atrial fibrillation.  HR averaging 90-110s  Indication for Continued Telemetry Use: Arrthymias requiring medical therapy             Imaging: Reviewed radiology reports from this admission including: chest xray and chest CT scan    Recent Cultures (last 7 days):         Last 24 Hours Medication List:   Current Facility-Administered Medications   Medication Dose Route Frequency Provider Last Rate    acetaminophen  650 mg Oral Q6H PRN RADHA Hamlin      chlorhexidine  15 mL Mouth/Throat Q12H [address] RADHA Hamlin      digoxin  125 mcg Oral Daily Filiberto Wiseman DO      diltiazem  30 mg Oral Q8H [address] Omaira Landis, CRNP      enoxaparin  1 mg/kg Subcutaneous Q12H [address] Randall Richards MD      fentanyl citrate (PF)   Intravenous PRN Emma Hager MD      finasteride  5 mg Oral Daily Deng Score, CRNP      folic acid  1 mg Oral Daily Deng Score, CRNP      furosemide  40 mg Oral BID (diuretic) Sara Number, RADHA      insulin lispro  1-6 Units Subcutaneous HS Deng Score, CRNP      insulin lispro  1-6 Units Subcutaneous TID AC Deng Score, CRNP      lidocaine (PF)    PRN Emma Hager MD      metoprolol tartrate  50 mg Oral 4x Daily Daphne Emberus, DO      midazolam    PRN Emma Hager MD      polyethylene glycol  17 g Oral Daily PRN Deng Score, CRNP      pravastatin  20 mg Oral Daily With Dinner Deng Score, CRNP      senna-docusate sodium  2 tablet Oral HS Deng Score, CRNP      warfarin  5 mg Oral Daily (warfarin) Randall Richards MD          Today, Patient Was Seen By: RADHA Hartmann    **Please Note: This note may have been constructed using a voice recognition system. **

## 2023-11-21 NOTE — DISCHARGE INSTR - AVS FIRST PAGE
IR recommends to drain 1L from the right Pleurx every 3 days. The first day will be Friday 11/24. He will need a CXR after drainage. Left Pleurx should be drain every 3 days to completion. If any issues/concerns to call the hospital [phone] and ask for Interventional radiology.

## 2023-11-21 NOTE — PLAN OF CARE
Problem: PAIN - ADULT  Goal: Verbalizes/displays adequate comfort level or baseline comfort level  Description: Interventions:  - Encourage patient to monitor pain and request assistance  - Assess pain using appropriate pain scale  - Administer analgesics based on type and severity of pain and evaluate response  - Implement non-pharmacological measures as appropriate and evaluate response  - Consider cultural and social influences on pain and pain management  - Notify physician/advanced practitioner if interventions unsuccessful or patient reports new pain  Outcome: Progressing     Problem: INFECTION - ADULT  Goal: Absence or prevention of progression during hospitalization  Description: INTERVENTIONS:  - Assess and monitor for signs and symptoms of infection  - Monitor lab/diagnostic results  - Monitor all insertion sites, i.e. indwelling lines, tubes, and drains  - Monitor endotracheal if appropriate and nasal secretions for changes in amount and color  - [place] appropriate cooling/warming therapies per order  - Administer medications as ordered  - Instruct and encourage patient and family to use good hand hygiene technique  - Identify and instruct in appropriate isolation precautions for identified infection/condition  Outcome: Progressing     Problem: SAFETY ADULT  Goal: Patient will remain free of falls  Description: INTERVENTIONS:  - Educate patient/family on patient safety including physical limitations  - Instruct patient to call for assistance with activity   - Consult OT/PT to assist with strengthening/mobility   - Keep Call bell within reach  - Keep bed low and locked with side rails adjusted as appropriate  - Keep care items and personal belongings within reach  - Initiate and maintain comfort rounds  - Make Fall Risk Sign visible to staff  - Offer Toileting every 2 Hours, in advance of need  - Initiate/Maintain bed alarm  - Obtain necessary fall risk management equipment:   - Apply yellow socks and bracelet for high fall risk patients  - Consider moving patient to room near nurses station  Outcome: Progressing  Goal: Maintain or return to baseline ADL function  Description: INTERVENTIONS:  -  Assess patient's ability to carry out ADLs; assess patient's baseline for ADL function and identify physical deficits which impact ability to perform ADLs (bathing, care of mouth/teeth, toileting, grooming, dressing, etc.)  - Assess/evaluate cause of self-care deficits   - Assess range of motion  - Assess patient's mobility; develop plan if impaired  - Assess patient's need for assistive devices and provide as appropriate  - Encourage maximum independence but intervene and supervise when necessary  - Involve family in performance of ADLs  - Assess for home care needs following discharge   - Consider OT consult to assist with ADL evaluation and planning for discharge  - Provide patient education as appropriate  Outcome: Progressing  Goal: Maintains/Returns to pre admission functional level  Description: INTERVENTIONS:  - Perform BMAT or MOVE assessment daily.   - Set and communicate daily mobility goal to care team and patient/family/caregiver. - Collaborate with rehabilitation services on mobility goals if consulted  - Perform Range of Motion 3 times a day. - Reposition patient every 2 hours.   - Dangle patient 3 times a day  - Stand patient 3 times a day  - Ambulate patient 3 times a day  - Out of bed for meals   - Out of bed for toileting  - Record patient progress and toleration of activity level   Outcome: Progressing     Problem: DISCHARGE PLANNING  Goal: Discharge to home or other facility with appropriate resources  Description: INTERVENTIONS:  - Identify barriers to discharge w/patient and caregiver  - Arrange for needed discharge resources and transportation as appropriate  - Identify discharge learning needs (meds, wound care, etc.)  - Arrange for interpretive services to assist at discharge as needed  - Refer to Case Management Department for coordinating discharge planning if the patient needs post-hospital services based on physician/advanced practitioner order or complex needs related to functional status, cognitive ability, or social support system  Outcome: Progressing

## 2023-11-22 ENCOUNTER — ANTICOAG VISIT (OUTPATIENT)
Dept: CARDIOLOGY CLINIC | Facility: CLINIC | Age: 78
End: 2023-11-22

## 2023-11-22 VITALS
SYSTOLIC BLOOD PRESSURE: 128 MMHG | BODY MASS INDEX: 26.38 KG/M2 | HEART RATE: 87 BPM | OXYGEN SATURATION: 96 % | HEIGHT: 69 IN | RESPIRATION RATE: 15 BRPM | TEMPERATURE: 97.8 F | WEIGHT: 178.13 LBS | DIASTOLIC BLOOD PRESSURE: 63 MMHG

## 2023-11-22 DIAGNOSIS — I48.91 RAPID ATRIAL FIBRILLATION (HCC): Primary | ICD-10-CM

## 2023-11-22 LAB
GLUCOSE SERPL-MCNC: 111 MG/DL (ref 65–140)
GLUCOSE SERPL-MCNC: 114 MG/DL (ref 65–140)
INR PPP: 1.37 (ref 0.84–1.19)
PROTHROMBIN TIME: 17.1 SECONDS (ref 11.6–14.5)

## 2023-11-22 PROCEDURE — 82948 REAGENT STRIP/BLOOD GLUCOSE: CPT

## 2023-11-22 PROCEDURE — 97530 THERAPEUTIC ACTIVITIES: CPT

## 2023-11-22 PROCEDURE — 85610 PROTHROMBIN TIME: CPT | Performed by: INTERNAL MEDICINE

## 2023-11-22 PROCEDURE — 99238 HOSP IP/OBS DSCHRG MGMT 30/<: CPT | Performed by: PHYSICIAN ASSISTANT

## 2023-11-22 PROCEDURE — 97535 SELF CARE MNGMENT TRAINING: CPT

## 2023-11-22 RX ORDER — ENOXAPARIN SODIUM 100 MG/ML
1 INJECTION SUBCUTANEOUS EVERY 12 HOURS
Qty: 6.4 ML | Refills: 0 | Status: SHIPPED | OUTPATIENT
Start: 2023-11-22 | End: 2023-11-30

## 2023-11-22 RX ORDER — METOPROLOL TARTRATE 50 MG/1
50 TABLET, FILM COATED ORAL 4 TIMES DAILY
Qty: 120 TABLET | Refills: 0 | Status: SHIPPED | OUTPATIENT
Start: 2023-11-22 | End: 2023-12-22

## 2023-11-22 RX ORDER — DIGOXIN 125 MCG
125 TABLET ORAL DAILY
Qty: 30 TABLET | Refills: 0 | Status: SHIPPED | OUTPATIENT
Start: 2023-11-23 | End: 2023-12-23

## 2023-11-22 RX ORDER — WARFARIN SODIUM 5 MG/1
5 TABLET ORAL
Qty: 30 TABLET | Refills: 0 | Status: SHIPPED | OUTPATIENT
Start: 2023-11-22

## 2023-11-22 RX ADMIN — DILTIAZEM HYDROCHLORIDE 30 MG: 30 TABLET ORAL at 05:15

## 2023-11-22 RX ADMIN — DILTIAZEM HYDROCHLORIDE 30 MG: 30 TABLET ORAL at 15:12

## 2023-11-22 RX ADMIN — DIGOXIN 125 MCG: 125 TABLET ORAL at 09:15

## 2023-11-22 RX ADMIN — CHLORHEXIDINE GLUCONATE 15 ML: 1.2 RINSE ORAL at 09:15

## 2023-11-22 RX ADMIN — METOPROLOL TARTRATE 50 MG: 50 TABLET, FILM COATED ORAL at 12:42

## 2023-11-22 RX ADMIN — METOPROLOL TARTRATE 50 MG: 50 TABLET, FILM COATED ORAL at 09:15

## 2023-11-22 RX ADMIN — FOLIC ACID 1 MG: 1 TABLET ORAL at 09:15

## 2023-11-22 RX ADMIN — ENOXAPARIN SODIUM 90 MG: 100 INJECTION SUBCUTANEOUS at 09:15

## 2023-11-22 RX ADMIN — FUROSEMIDE 40 MG: 40 TABLET ORAL at 09:15

## 2023-11-22 RX ADMIN — FINASTERIDE 5 MG: 5 TABLET, FILM COATED ORAL at 09:15

## 2023-11-22 NOTE — PATIENT INSTRUCTIONS
Pt being discharged from the hospital today. He was previously on coumadin for DVT/PE but developed afib during hospitalization. [facility]. Pt will be discharged on coumadin/lovenox bridge per [facility] and was instructed to take 10 mg today and 5 mg daily thereafter. Advised that pt have INR on Sat.

## 2023-11-22 NOTE — DISCHARGE SUMMARY
[address]  Discharge- Tiana Wu 1945, 69 y.o. male MRN: [id number]  Unit/Bed#: ICU 05-01 Encounter: [id number]  Primary Care Provider: Shirin Sanchez PA-C   Date and time admitted to hospital: 11/11/2023  7:36 PM    * Acute on chronic respiratory failure with hypoxia [facility])  Assessment & Plan  Likely multifactorial in the setting of b/l pleural effusion and acute CHF exacerbation   At baseline, requires 3L NC -> currently down to baseline requirement  Encourage cough, deep breathing, IS, ambulation as tolerated  Acute failure resolved      Pleural effusion  Assessment & Plan  Secondary to stage IV lung adenocarcinoma/chemotherapy. Trend has been having worsening shortness of breath and recurrent right malignant pleural effusion  CT chest (11/11) pleural effusion greater on the right  S/p L thoracentesis with IR on 11/9 (-2.6L) as OP, 11/13 (-1.4L)  Underwent R chest tube placement on 11/14   CT chest (11/15) noted improvement in size of right pleural effusion, also with a small right pneumothorax  Pulmonology and IR input appreciated   11/16- underwent L Pleurx placement and R Pleurx placement, conversion of existing chest tube by IR   11/21- right Pleurx -1L done by IR   IR recommends to drain 1L from the right Pleurx every 3 days. The first day will be Friday 11/24. He will need a CXR after drainage. Left Pleurx should be drain every 3 days to completion. If any issues/concerns to call the hospital and ask for Interventional radiology.    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 HILARY (acute kidney injury) ([address])  Assessment & Plan  Unclear etiology, possibly cardiorenal given CHF exacerbation  Baseline creatinine of approximately 0.9-1.1mg/dL   Creatinine back to baseline       Rapid atrial fibrillation [facility])  Assessment & Plan  Remains with intermittent RVR but HR improved in 90s-100s. Cardiology input appreciated   The patient required Amiodarone infusion; discontinued (11/19)   Continue with digoxin and metoprolol 50 mg every 6 hours. Diltiazem 30 mg every 8 hours is added to his regimen per cardiology. Continue anticoagulation with warfarin. Currently being bridged with therapeutic Lovenox. Patient to be discharged on this given INR of 1.37. Patient instructed to take a 1 time dose of 10 mg of coumadin tonight then resume 5 mg of coumadin nightly. Cardiology to management coumadin as an outpatient. Patient instructed to have INR checked on Saturday 11/25.  Cardiology to set up outpatient appointment      Malignant neoplasm of upper lobe of right lung [facility])  Assessment & Plan  Pt diagnosed with Stage 4 Lung Ca in 9/2022  S/p XRT completed June 2023   Currently undergoing chemo, last 9/19/23   OP follow up with heme/onc       Hypercholesterolemia  Assessment & Plan  Continue statin    Type 2 diabetes mellitus without complication, without long-term current use of insulin [facility])  Assessment & Plan  Lab Results   Component Value Date    HGBA1C 6.0 10/23/2023     Resume oral medications from home     Benign essential HTN  Assessment & Plan  BP has been well-controlled   Continue Lopressor         Medical Problems       Resolved Problems  Date Reviewed: 11/22/2023   None       Discharging Physician / Practitioner: Jose Alfredo King PA-C  PCP: Bety Butts PA-C  Admission Date:   Admission Orders (From admission, onward)       Ordered        11/11/23 2156  INPATIENT ADMISSION  Once                          Discharge Date: 11/22/23    Consultations During Hospital Stay:  Cardiology  Pulmonology   IR  Palliative Care    Procedures Performed:   Right chest tube  Right and left pleurx catheter placement    Significant Findings / Test Results:   IR pleural effusion long-term catheter placement    Result Date: 11/20/2023  Impression: Technically successful placement of tunneled pleural catheter. This is the end of the clinically relevant portion of the report. Subsequent information is for compliance, documentation, and coding purposes. In the process of informed consent, information was communicated, verbally, to the patient regarding the procedure. The patient was informed of; the name of the procedure, nature of the procedure, nature of the condition, risks, benefits, alternatives, and potential complications, as well as the consequences of not having the procedure. All the patient's questions were answered. Informed consent was signed. Quoted risks included infection, leak of fluid, and tube failure. Automated exposure control was utilized, and there was minimize, in accordance with the application of the ALARA technique. chlorhexidine and alcohol was used for cleansing and sterile preparation of the skin. This was allowed to dry prior to the application of sterile draping. Timeout was performed, with all team members present, and in agreement. Confirmation of patient, procedure, laterally, allergies, and all needed equipment was performed. The patient was examined, and is in satisfactory condition for planned moderate sedation.  Mallampati score: 2 Intravenous conscious sedation was provided. There was continuous monitoring of blood pressure, heart rate, respiratory rate, and oxygen saturation by an independent, trained observer. Conscious sedation time: 60 minutes Versed dose: 3 milligrams Fentanyl dose: 150 micrograms After the procedure, the patient was recovered, and return to their baseline status, and was deemed fit for discharge from IR. Fluoroscopy time: 120 seconds Radiation dose: 26 MilliGray PROCEDURE: Tunneled pleural catheter placement Preprocedure diagnosis: Please see "history ", above Postprocedure diagnosis: Same Antibiotics: None Specimens: None Estimated blood loss: Less than 5 mL Anesthesia: Local and monitored intravenous conscious sedation Ultrasound was used to evaluate the pleural fluid as a potential access site. Static and real-time images of needle entry were obtained, and archived. Workstation performed: [id number]     XR chest 1 view PA portable in 1 hour    Result Date: 11/19/2023  Impression: 2 right pleural drainage catheters with no pneumothorax. Right base opacity which could be due to atelectasis/pneumonia. Cannot determine presence/size of potential right effusion. Persistent pulmonary edema, right lung mass, and small left effusion. Workstation performed: [id number]     IR chest tube placement    Result Date: 11/15/2023  Impression: Successful placement of a 10 [place] catheter into the right pleural space at the bedside. The catheter was connected to an At[facility] on wall suction. PERFORMED BY: Ayanna Gomez PA-C SUPERVISING PHYSICIAN: Dr. Nataliya Santamaria Workstation performed: [id number]     XR chest portable ICU    Result Date: 11/15/2023  Impression: Right chest tube in place without significant change. Right-sided hydropneumothorax as discussed. Left pleural effusion.  Bilateral patchy infiltrates Recommend continued follow-up Workstation performed: [id number]     CT chest wo contrast    Result Date: 11/15/2023  Impression: 1. Status post placement of right pleural catheter, the curled portion located within the posterior right costophrenic angle adjacent to the right hemidiaphragm 2. Small right pneumothorax as above 3. Significantly diminished size of right pleural effusion 4. Previous findings in the lungs including right lung mass, pulmonary nodules, right basilar airspace disease, left upper lobe groundglass opacities. Mediastinal adenopathy again noted Workstation performed: [id number]     XR chest portable    Result Date: 11/15/2023  Impression: Decreased right pleural effusion status post chest tube placement without pneumothorax. Small bilateral pleural effusions, persistent mild interstitial edema and probable right lower lobe atelectasis. Known right suprahilar mass, similar. Small left lung nodules, better appreciated on recent CT. Workstation performed: [id number]        Incidental Findings:   none     Test Results Pending at Discharge (will require follow up):   none     Outpatient Tests Requested:  INR on 11/25/23- patient to follow-up with Cardiology regarding results     Complications:  none    Reason for Admission: acute on chronic respiratory failure, bilateral pleural effusions    Hospital Course:   Wendy Lang is a 69 y.o. male patient who originally presented to the hospital on 11/11/2023 due to worsening shortness of breath. Please see H&P by RADHA Hauser dated 11/11/2023 for complete details of presentation. CT chest showed moderate pleural effusion with right greater than left. The patient was admitted to ICU. He received vitamin K for supra therapeutic INR on admission. The patient underwent a left thoracentesis by IR on 11/13/23. The patient then underwent right chest tube placement and monitored for lung reexpansion before placing asept catheter. The patient was noted to be in afib with RVR and cardiology started the patient on Cardizem infusion.   He was started on heparin gtt and bumex IV. He was resumed on amiodarone. He was then started on an amiodarone gtt on 11/14 given uncontrolled HR. She was then transitioned to po amiodarone and digoxin was started. HR improved. Heparin gtt stopped and patient started on Lovenox. He was then restarted on his coumadin on 11/18. On 11/20 IR placed a right sided pleurax catheter and chest tube removed. On 11/21 IV bumex discontinued and patient restarted on home po lasix dose. The patient's breathing had greatly improved and he was on his baseline O2 of 3 L. His HR remained controlled. His INR remained sub therapeutic at 1.37. He was discharged home on Lovenox to continue the bridge for coumadin. He will follow-up with cardiology to manage his coumadin. He was also instructed to follow-up with pulmonology and PCP. This is a brief discharge summary, please see above notes as well as notes through hospitalization for a complete details of his hospital course. Please see above list of diagnoses and related plan for additional information. Condition at Discharge: good    Discharge Day Visit / Exam:   Subjective:    Vitals: Blood Pressure: 128/63 (11/22/23 1512)  Pulse: 87 (11/22/23 0915)  Temperature: 97.8 °F (36.6 °C) (11/21/23 1930)  Temp Source: Tympanic (11/21/23 1930)  Respirations: 15 (11/21/23 1930)  Height: 5' 9" (175.3 cm) (11/15/23 1038)  Weight - Scale: 80.8 kg (178 lb 2.1 oz) (11/22/23 0515)  SpO2: 96 % (11/21/23 2140)  Exam:   Physical Exam  Vitals and nursing note reviewed. Constitutional:       Appearance: Normal appearance. HENT:      Head: Normocephalic and atraumatic. Cardiovascular:      Rate and Rhythm: Normal rate and regular rhythm. Pulses: Normal pulses. Heart sounds: Normal heart sounds. Pulmonary:      Effort: Pulmonary effort is normal.      Breath sounds: Normal breath sounds. Abdominal:      General: There is no distension. Palpations: Abdomen is soft. Tenderness:  There is no abdominal tenderness. Skin:     General: Skin is warm and dry. Neurological:      General: No focal deficit present. Mental Status: He is alert. Psychiatric:         Mood and Affect: Mood normal.         Behavior: Behavior normal.         Thought Content: Thought content normal.         Judgment: Judgment normal.          Discussion with Family: Updated  (daughter) at bedside. Discharge instructions/Information to patient and family:   See after visit summary for information provided to patient and family. Provisions for Follow-Up Care:  See after visit summary for information related to follow-up care and any pertinent home health orders. Mobility at time of Discharge:   Basic Mobility Inpatient Raw Score: 18  JH-HLM Goal: 6: Walk 10 steps or more  JH-HLM Achieved: 6: Walk 10 steps or more  HLM Goal achieved. Continue to encourage appropriate mobility. Disposition:   Home with VNA Services (Reminder: Complete face to face encounter)    Planned Readmission: no     Discharge Statement:  I spent 25 minutes discharging the patient. This time was spent on the day of discharge. I had direct contact with the patient on the day of discharge. Greater than 50% of the total time was spent examining patient, answering all patient questions, arranging and discussing plan of care with patient as well as directly providing post-discharge instructions. Additional time then spent on discharge activities. Discharge Medications:  See after visit summary for reconciled discharge medications provided to patient and/or family.       **Please Note: This note may have been constructed using a voice recognition system**

## 2023-11-22 NOTE — OCCUPATIONAL THERAPY NOTE
11/22/23 1125   OT Last Visit   OT Visit Date 11/22/23   Note Type   Note Type Treatment   Pain Assessment   Pain Assessment Tool 0-10   Pain Score No Pain   Restrictions/Precautions   Weight Bearing Precautions Per Order No   Other Precautions Multiple lines;Telemetry;O2;Fall Risk;Hard of hearing   Lifestyle   Reciprocal Relationships spoke of daughter taking good, attentive care of him at home PRN   Intrinsic Gratification reading, and [id number]   ADL   Where Assessed Edge of bed   Grooming Comments *patient had brushed teeth earlier this morning   UB Bathing Assistance 5  Supervision/Setup   UB Bathing Deficit Setup;Verbal cueing;Supervision/safety; Increased time to complete   LB Bathing Assistance 4  Minimal Assistance   LB Bathing Deficit Setup;Supervision/safety; Increased time to complete;Right lower leg including foot; Left lower leg including foot   LB Dressing Comments *patient reports that he has not been wearing socks at home due to the swelling of his feet  (has been slipping into bedroom slippers which have no back)   Toileting Assistance  4  Minimal Assistance   Toileting Deficit Setup;Verbal cueing;Supervison/safety; Increased time to complete;Grab bar use   Toileting Comments *patient has over-the-toilet (raised) commode at home   Bed Mobility   Sit to Supine 6  Modified independent   Additional items HOB elevated; Bedrails; Increased time required   Transfers   Sit to Stand 4  Minimal assistance   Additional items Assist x 1; Armrests; Increased time required;Verbal cues   Stand to Sit 6  Modified independent   Additional items Armrests; Increased time required   Toilet transfer 4  Minimal assistance   Additional items Assist x 1; Armrests; Increased time required;Standard toilet; Other;Verbal cues  (grab bar)   Functional Mobility   Additional Comments A x 1   Additional items Rolling walker   Therapeutic Excerise-Strength   UE Strength   (briefly explained use of UE exercise at home, to benefit strength and activity tolerance > patient reports having been using a 5 pound weight at home ("I can use that, 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 )   Coordination   Gross Motor WFL   Dexterity appears [facility]   Subjective   Subjective patient voiced appreciation of services   Cognition   Overall Cognitive Status WFL   Arousal/Participation Alert; Cooperative   Attention Within functional limits   Orientation Level Oriented X4   Following Commands Follows one step commands without difficulty   Activity Tolerance   Activity Tolerance Patient tolerated treatment well   Medical Staff Made Aware Nurse Lelia Foote aware of session   Assessment   Assessment Patient participated in Skilled OT session this date with interventions consisting of ADL re training with the use of correct body mechnaics, safety awareness and fall prevention techniques,  therapeutic activities to: increase activity tolerance, and increase dynamic sit/ stand balance during functional activity  . Patient agreeable to OT treatment session, upon arrival patient was found seated in bed With call bell activated / asking to use bathroom toilet. In comparison to previous session, patient with improvements in self-care accomplishment and activity tolerance. Patient requiring occasional rest periods, occasional safety reminders, and self-care assistance as noted in flow sheet/AM-PAC. Patient continues to be functioning below baseline level, occupational performance remains limited secondary to factors listed above and increased risk for falls and injury. Patient to benefit from continued Occupational Therapy treatment while in the hospital to address deficits as defined above and maximize level of functional independence with ADLs and functional mobility. Plan   Treatment Interventions ADL retraining;Functional transfer training;UE strengthening/ROM; Patient/family training; Compensatory technique education; Activityengagement   Goal Expiration Date 11/27/23   OT Treatment Day 1   Discharge Recommendation   Additional Comments 2 The patient's raw score on the AM-PAC Daily Activity Inpatient Short Form is 20. A raw score of greater than or equal to 19 suggests the patient may benefit from discharge to home. Please refer to the recommendation of the Occupational Therapist for safe discharge planning.    AM-PAC Daily Activity Inpatient   Lower Body Dressing 3   Bathing 3   Toileting 3   Upper Body Dressing 3   Grooming 4   Eating 4   Daily Activity Raw Score 20   Daily Activity Standardized Score (Calc for Raw Score >=11) 42.03   AM-[facility] Applied Cognition Inpatient   Following a Speech/Presentation 4   Understanding Ordinary Conversation 4   Taking Medications 3   Remembering Where Things Are Placed or Put Away 3   Remembering List of 4-5 Errands 3   Taking Care of Complicated Tasks 3   Applied Cognition Raw Score 20   Applied Cognition Standardized Score 41.76       JULISSA Olguin/SHAJI

## 2023-11-22 NOTE — CASE MANAGEMENT
Case Management Discharge Planning Note    Patient name Yovany Cardenas  Location ICU 05/ICU 05- MRN [id number]  : 1945 Date 2023       Current Admission Date: 2023  Current Admission Diagnosis:Acute on chronic respiratory failure with hypoxia [facility])   Patient Active Problem List    Diagnosis Date Noted    Acute on chronic HFrEF (heart failure with reduced ejection fraction) ([address]) 2023    History of pulmonary embolus (PE) 2023    Deep vein thrombosis (DVT) of right lower extremity ([address]) 2023    History of prostate cancer 2023    Acute on chronic respiratory failure with hypoxia ([address]) 2023    Supratherapeutic INR 10/28/2023    Cellulitis of extremity 10/27/2023    Cellulitis and abscess of left lower extremity 10/23/2023    Cellulitis of leg, right 10/23/2023    COPD with acute exacerbation ([address]) 10/05/2023    Rapid atrial fibrillation ([address]) 10/03/2023    HILARY (acute kidney injury) ([address]) 10/03/2023    Abnormal CT of the abdomen 10/03/2023    Platelets decreased ([address]) 2023    Multiple lung nodules on CT 10/06/2022    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 ([address]) 10/06/2022    Pleural effusion 10/06/2022    Malignant neoplasm of upper lobe of right lung ([address])     Multiple subsegmental pulmonary emboli without acute cor pulmonale ([address]) 09/10/2022    Pulmonary mass 09/10/2022    Anemia 09/10/2022    Non-recurrent bilateral inguinal hernia without obstruction or gangrene 2021    Status post right tibial-talar ankle fusion 2020    BPH associated with nocturia 2019    Arthritis of right acromioclavicular joint 03/15/2019    Primary osteoarthritis of right shoulder 2019    Chronic left shoulder pain 2019    Left rotator cuff tear arthropathy 2019    Rotator cuff injury, right, initial encounter 2019    History of colon polyps 2019    Hyperparathyroidism ([address]) 2018    Hypokalemia 10/22/2018 Hypocalcemia 10/22/2018    Glaucoma 10/11/2018    Type 2 diabetes mellitus without complication, without long-term current use of insulin ([address]) 07/25/2016    Inguinal hernia 02/08/2016    Benign essential HTN 01/12/2016    Hypercholesterolemia 01/12/2016      LOS (days): 11  Geometric Mean LOS (GMLOS) (days): 4.90  Days to GMLOS:-5.9     OBJECTIVE:  Risk of Unplanned Readmission Score: 39.34         Current admission status: Inpatient   Preferred Pharmacy:   [address] [facility] [address] [address]  Phone: [phone] Fax: 45 [facility], [address] [facility] [address] [place] [address]  Phone: [phone] Fax: [address] [facility] [address] [place] [address], [address] [address]  Phone: [phone] Fax: [phone]    Primary Care Provider: Geoff Campos PA-C    Primary Insurance: MEDICARE  Secondary Insurance: Sarathginny Whyte    DISCHARGE DETAILS:    Discharge planning discussed with[de-identified] patietn and daughter's at the bedside  Freedom of Choice: Yes  Comments - Freedom of Choice: pt will continue with [facility]- rx was sent to [address]  CM contacted family/caregiver?: Yes  Were Treatment Team discharge recommendations reviewed with patient/caregiver?: Yes  Did patient/caregiver verbalize understanding of patient care needs?: Yes       Contacts  Patient Contacts: daughters  Relationship to Patient[de-identified] Family (daughters)  Contact Method:  In Person  Reason/Outcome: Discharge [facility]         Is the patient interested in [address] at discharge?: Yes         Other Referral/Resources/Interventions Provided:  Interventions: HHC, Other (Specify), Prescription Price Check  Referral Comments: drainage supplies were delivered to the pt's home as per daughter- hhc aware of manoj d/c and avs was faxed to [phone]    Would you like to participate in our [facility] service program?  : No - Declined    Treatment Team Recommendation: Home with [address] (home with family & [facility] & drain & outpt follow up- family)  Discharge Destination Plan[de-identified] Home with [address] (home with familky & [facility] & drain & outpt follwo up)

## 2023-11-22 NOTE — PLAN OF CARE
Problem: OCCUPATIONAL THERAPY ADULT  Goal: Performs self-care activities at highest level of function for planned discharge setting. See evaluation for individualized goals. Description: Treatment Interventions: ADL retraining, Functional transfer training, Endurance training, Patient/family training, Equipment evaluation/education, Compensatory technique education, Energy conservation, Activityengagement    See flowsheet documentation for full assessment, interventions and recommendations. Outcome: Progressing  Note: Limitation: Decreased ADL status, Decreased Safe judgement during ADL, Decreased endurance, Decreased self-care trans, Decreased high-level ADLs  Prognosis: Good  Assessment: Patient participated in Skilled OT session this date with interventions consisting of ADL re training with the use of correct body mechnaics, safety awareness and fall prevention techniques,  therapeutic activities to: increase activity tolerance, and increase dynamic sit/ stand balance during functional activity  . Patient agreeable to OT treatment session, upon arrival patient was found seated in bed With call bell activated / asking to use bathroom toilet. In comparison to previous session, patient with improvements in self-care accomplishment and activity tolerance. Patient requiring occasional rest periods, occasional safety reminders, and self-care assistance as noted in flow sheet/AM-PAC. Patient continues to be functioning below baseline level, occupational performance remains limited secondary to factors listed above and increased risk for falls and injury. Patient to benefit from continued Occupational Therapy treatment while in the hospital to address deficits as defined above and maximize level of functional independence with ADLs and functional mobility.      Rehab Resource Intensity Level, OT: II (Moderate Resource Intensity)     BAY Moreno

## 2023-11-22 NOTE — NURSING NOTE
Pt with no IV access. Multiple attempts made. Provider made aware, OK for pt to remain without it pending D/C.

## 2023-11-22 NOTE — PLAN OF CARE
Problem: PAIN - ADULT  Goal: Verbalizes/displays adequate comfort level or baseline comfort level  Description: Interventions:  - Encourage patient to monitor pain and request assistance  - Assess pain using appropriate pain scale  - Administer analgesics based on type and severity of pain and evaluate response  - Implement non-pharmacological measures as appropriate and evaluate response  - Consider cultural and social influences on pain and pain management  - Notify physician/advanced practitioner if interventions unsuccessful or patient reports new pain  Outcome: Progressing     Problem: INFECTION - ADULT  Goal: Absence or prevention of progression during hospitalization  Description: INTERVENTIONS:  - Assess and monitor for signs and symptoms of infection  - Monitor lab/diagnostic results  - Monitor all insertion sites, i.e. indwelling lines, tubes, and drains  - Monitor endotracheal if appropriate and nasal secretions for changes in amount and color  - [place] appropriate cooling/warming therapies per order  - Administer medications as ordered  - Instruct and encourage patient and family to use good hand hygiene technique  - Identify and instruct in appropriate isolation precautions for identified infection/condition  Outcome: Progressing     Problem: SAFETY ADULT  Goal: Patient will remain free of falls  Description: INTERVENTIONS:  - Educate patient/family on patient safety including physical limitations  - Instruct patient to call for assistance with activity   - Consult OT/PT to assist with strengthening/mobility   - Keep Call bell within reach  - Keep bed low and locked with side rails adjusted as appropriate  - Keep care items and personal belongings within reach  - Initiate and maintain comfort rounds  - Make Fall Risk Sign visible to staff  - Offer Toileting every 2 Hours, in advance of need  - Initiate/Maintain bed alarm  - Obtain necessary fall risk management equipment:   - Apply yellow socks and bracelet for high fall risk patients  - Consider moving patient to room near nurses station  Outcome: Progressing  Goal: Maintain or return to baseline ADL function  Description: INTERVENTIONS:  -  Assess patient's ability to carry out ADLs; assess patient's baseline for ADL function and identify physical deficits which impact ability to perform ADLs (bathing, care of mouth/teeth, toileting, grooming, dressing, etc.)  - Assess/evaluate cause of self-care deficits   - Assess range of motion  - Assess patient's mobility; develop plan if impaired  - Assess patient's need for assistive devices and provide as appropriate  - Encourage maximum independence but intervene and supervise when necessary  - Involve family in performance of ADLs  - Assess for home care needs following discharge   - Consider OT consult to assist with ADL evaluation and planning for discharge  - Provide patient education as appropriate  Outcome: Progressing  Goal: Maintains/Returns to pre admission functional level  Description: INTERVENTIONS:  - Perform BMAT or MOVE assessment daily.   - Set and communicate daily mobility goal to care team and patient/family/caregiver. - Collaborate with rehabilitation services on mobility goals if consulted  - Perform Range of Motion 3 times a day. - Reposition patient every 2 hours.   - Dangle patient 3 times a day  - Stand patient 3 times a day  - Ambulate patient 3 times a day  - Out of bed for meals   - Out of bed for toileting  - Record patient progress and toleration of activity level   Outcome: Progressing     Problem: DISCHARGE PLANNING  Goal: Discharge to home or other facility with appropriate resources  Description: INTERVENTIONS:  - Identify barriers to discharge w/patient and caregiver  - Arrange for needed discharge resources and transportation as appropriate  - Identify discharge learning needs (meds, wound care, etc.)  - Arrange for interpretive services to assist at discharge as needed  - Refer to Case Management Department for coordinating discharge planning if the patient needs post-hospital services based on physician/advanced practitioner order or complex needs related to functional status, cognitive ability, or social support system  Outcome: Progressing     Problem: MOBILITY - ADULT  Goal: Maintain or return to baseline ADL function  Description: INTERVENTIONS:  -  Assess patient's ability to carry out ADLs; assess patient's baseline for ADL function and identify physical deficits which impact ability to perform ADLs (bathing, care of mouth/teeth, toileting, grooming, dressing, etc.)  - Assess/evaluate cause of self-care deficits   - Assess range of motion  - Assess patient's mobility; develop plan if impaired  - Assess patient's need for assistive devices and provide as appropriate  - Encourage maximum independence but intervene and supervise when necessary  - Involve family in performance of ADLs  - Assess for home care needs following discharge   - Consider OT consult to assist with ADL evaluation and planning for discharge  - Provide patient education as appropriate  Outcome: Progressing  Goal: Maintains/Returns to pre admission functional level  Description: INTERVENTIONS:  - Perform BMAT or MOVE assessment daily.   - Set and communicate daily mobility goal to care team and patient/family/caregiver. - Collaborate with rehabilitation services on mobility goals if consulted  - Perform Range of Motion 3 times a day. - Reposition patient every 2 hours.   - Dangle patient 3 times a day  - Stand patient 3 times a day  - Ambulate patient 3 times a day  - Out of bed for meals   - Out of bed for toileting  - Record patient progress and toleration of activity level   Outcome: Progressing     Problem: CARDIOVASCULAR - ADULT  Goal: Maintains optimal cardiac output and hemodynamic stability  Description: INTERVENTIONS:  - Monitor I/O, vital signs and rhythm  - Monitor for S/S and trends of decreased cardiac output  - Administer and titrate ordered vasoactive medications to optimize hemodynamic stability  - Assess quality of pulses, skin color and temperature  - Assess for signs of decreased coronary artery perfusion  - Instruct patient to report change in severity of symptoms  Outcome: Progressing  Goal: Absence of cardiac dysrhythmias or at baseline rhythm  Description: INTERVENTIONS:  - Continuous cardiac monitoring, vital signs, obtain 12 lead EKG if ordered  - Administer antiarrhythmic and heart rate control medications as ordered  - Monitor electrolytes and administer replacement therapy as ordered  Outcome: Progressing     Problem: RESPIRATORY - ADULT  Goal: Achieves optimal ventilation and oxygenation  Description: INTERVENTIONS:  - Assess for changes in respiratory status  - Assess for changes in mentation and behavior  - Position to facilitate oxygenation and minimize respiratory effort  - Oxygen administered by appropriate delivery if ordered  - Initiate smoking cessation education as indicated  - Encourage broncho-pulmonary hygiene including cough, deep breathe, Incentive Spirometry  - Assess the need for suctioning and aspirate as needed  - Assess and instruct to report SOB or any respiratory difficulty  - Respiratory Therapy support as indicated  Outcome: Progressing     Problem: METABOLIC, FLUID AND ELECTROLYTES - ADULT  Goal: Electrolytes maintained within normal limits  Description: INTERVENTIONS:  - Monitor labs and assess patient for signs and symptoms of electrolyte imbalances  - Administer electrolyte replacement as ordered  - Monitor response to electrolyte replacements, including repeat lab results as appropriate  - Instruct patient on fluid and nutrition as appropriate  Outcome: Progressing  Goal: Fluid balance maintained  Description: INTERVENTIONS:  - Monitor labs   - Monitor I/O and WT  - Instruct patient on fluid and nutrition as appropriate  - Assess for signs & symptoms of volume excess or deficit  Outcome: Progressing  Goal: Glucose maintained within target range  Description: INTERVENTIONS:  - Monitor Blood Glucose as ordered  - Assess for signs and symptoms of hyperglycemia and hypoglycemia  - Administer ordered medications to maintain glucose within target range  - Assess nutritional intake and initiate nutrition service referral as needed  Outcome: Progressing     Problem: SKIN/TISSUE INTEGRITY - ADULT  Goal: Skin Integrity remains intact(Skin Breakdown Prevention)  Description: Assess:  -Perform Albert assessment every shift  -Clean and moisturize skin every shift  -Inspect skin when repositioning, toileting, and assisting with ADLS  -Assess under medical devices such as allevyn every shift  -Assess extremities for adequate circulation and sensation     Bed Management:  -Have minimal linens on bed & keep smooth, unwrinkled  -Change linens as needed when moist or perspiring  -Avoid sitting or lying in one position for more than 2 hours while in bed  -Keep HOB at 30 degrees     Toileting:  -Offer bedside commode  -Assess for incontinence   -Use incontinent care products after each incontinent episode    Activity:  -Mobilize patient 3 times a day  -Encourage activity and walks on unit  -Encourage or provide ROM exercises   -Turn and reposition patient every 2 Hours  -Use appropriate equipment to lift or move patient in bed  -Instruct/ Assist with weight shifting   -Consider limitation of chair time     Skin Care:  -Avoid use of baby powder, tape, friction and shearing, hot water or constrictive clothing  -Relieve pressure over bony prominences using allevyn  -Do not massage red bony areas  Outcome: Progressing  Goal: Incision(s), wounds(s) or drain site(s) healing without S/S of infection  Description: INTERVENTIONS  - Assess and document dressing, incision, wound bed, drain sites and surrounding tissue  - Provide patient and family education  - Perform skin care/dressing changes every shift  Outcome: Progressing  Goal: Pressure injury heals and does not worsen  Description: Interventions:  - Implement low air loss mattress or specialty surface (Criteria met)  - Apply silicone foam dressing  - Instruct/assist with weight shifting every 60 minutes when in chair   - Limit chair time   - Use special pressure reducing interventions such as waffle cushion when in chair   - Apply fecal or urinary incontinence containment device   - Perform passive or active ROM every shift  - Turn and reposition patient & offload bony prominences every 2 hours   - Utilize friction reducing device or surface for transfers   - Use incontinent care products after each incontinent episode   - Consider nutrition services referral as needed  Outcome: Progressing     Problem: HEMATOLOGIC - ADULT  Goal: Maintains hematologic stability  Description: INTERVENTIONS  - Assess for signs and symptoms of bleeding or hemorrhage  - Monitor labs  - Administer supportive blood products/factors as ordered and appropriate  Outcome: Progressing     Problem: MUSCULOSKELETAL - ADULT  Goal: Maintain or return mobility to safest level of function  Description: INTERVENTIONS:  - Assess patient's ability to carry out ADLs; assess patient's baseline for ADL function and identify physical deficits which impact ability to perform ADLs (bathing, care of mouth/teeth, toileting, grooming, dressing, etc.)  - Assess/evaluate cause of self-care deficits   - Assess range of motion  - Assess patient's mobility  - Assess patient's need for assistive devices and provide as appropriate  - Encourage maximum independence but intervene and supervise when necessary  - Involve family in performance of ADLs  - Assess for home care needs following discharge   - Consider OT consult to assist with ADL evaluation and planning for discharge  - Provide patient education as appropriate  Outcome: Progressing  Goal: Maintain proper alignment of affected body part  Description: INTERVENTIONS:  - Support, maintain and protect limb and body alignment  - Provide patient/ family with appropriate education  Outcome: Progressing     Problem: Prexisting or High Potential for Compromised Skin Integrity  Goal: Skin integrity is maintained or improved  Description: INTERVENTIONS:  - Identify patients at risk for skin breakdown  - Assess and monitor skin integrity  - Assess and monitor nutrition and hydration status  - Monitor labs   - Assess for incontinence   - Turn and reposition patient  - Assist with mobility/ambulation  - Relieve pressure over bony prominences  - Avoid friction and shearing  - Provide appropriate hygiene as needed including keeping skin clean and dry  - Evaluate need for skin moisturizer/barrier cream  - Collaborate with interdisciplinary team   - Patient/family teaching  - Consider wound care consult   Outcome: Progressing

## 2023-11-22 NOTE — CASE MANAGEMENT
Case Management Progress Note    Patient name Anderson Durán  Location ICU 05/ICU  MRN [id number]  : 1945 Date 2023       LOS (days): 11  Geometric Mean LOS (GMLOS) (days): 4.90  Days to GMLOS:-5.8        OBJECTIVE:        Current admission status: Inpatient  Preferred Pharmacy:   [address], [address] [place] [facility] [place] [address] [address]  Phone: [phone] Fax: [address], [address]   [address] [place] [address]  Phone: [phone] Fax: [address], [address] [address]  Phone: [phone] Fax: [phone]    Primary Care Provider: Betty Trevizo PA-C    Primary Insurance: MEDICARE  Secondary Insurance: [employer]ET[employer]    PROGRESS NOTE:  Pt needs to go home with lovenox- pt needs to be bridged until his inr is at the level needed on coumadin- pharmacy was called- they have lovenox in stock and the cost is $22.97- pt is able to afford- rn will teach how to administer

## 2023-11-24 ENCOUNTER — TELEPHONE (OUTPATIENT)
Age: 78
End: 2023-11-24

## 2023-11-24 ENCOUNTER — HOSPITAL ENCOUNTER (OUTPATIENT)
Dept: RADIOLOGY | Facility: HOSPITAL | Age: 78
End: 2023-11-24
Payer: MEDICARE

## 2023-11-24 ENCOUNTER — TRANSITIONAL CARE MANAGEMENT (OUTPATIENT)
Dept: FAMILY MEDICINE CLINIC | Facility: CLINIC | Age: 78
End: 2023-11-24

## 2023-11-24 DIAGNOSIS — J90 PLEURAL EFFUSION: ICD-10-CM

## 2023-11-24 PROCEDURE — 71046 X-RAY EXAM CHEST 2 VIEWS: CPT

## 2023-11-24 NOTE — QUICK NOTE
I received a call from Radiology regarding Glen's chest x-ray that was done today 11/24/23. It was reported that he had worsening right pleural effusion and a small apical pneumothorax. I reached out to pulmonology on call Dr. Rupinder Alford who also reviewed the CXR. He felt the small apical pneumothorax seen was likely the same one that has been there since early November. I call the patient and spoke to him as well as his daughter. They both reported no shortness of breath. I informed them of the results of the x-ray and my discussion with Dr. Rupinder Alford. I instructed him to return to the nearest ED if he became short of breath. I also instructed them to call IR on Monday to discuss if any additional CXR's or intervention was required for the right pleural effusion. Both the patient and his daughter verbalized understanding.

## 2023-11-25 ENCOUNTER — APPOINTMENT (OUTPATIENT)
Dept: URGENT CARE | Facility: CLINIC | Age: 78
End: 2023-11-25
Payer: MEDICARE

## 2023-11-25 ENCOUNTER — APPOINTMENT (OUTPATIENT)
Dept: LAB | Facility: CLINIC | Age: 78
End: 2023-11-25
Payer: MEDICARE

## 2023-11-25 DIAGNOSIS — E21.3 HYPERPARATHYROIDISM (HCC): ICD-10-CM

## 2023-11-25 DIAGNOSIS — E78.00 HYPERCHOLESTEROLEMIA: ICD-10-CM

## 2023-11-25 DIAGNOSIS — D69.6 PLATELETS DECREASED (HCC): ICD-10-CM

## 2023-11-25 DIAGNOSIS — C34.11 MALIGNANT NEOPLASM OF UPPER LOBE OF RIGHT LUNG (HCC): ICD-10-CM

## 2023-11-25 DIAGNOSIS — E11.9 CONTROLLED TYPE 2 DIABETES MELLITUS WITHOUT COMPLICATION, WITHOUT LONG-TERM CURRENT USE OF INSULIN (HCC): ICD-10-CM

## 2023-11-25 DIAGNOSIS — I10 BENIGN ESSENTIAL HTN: ICD-10-CM

## 2023-11-25 LAB
ALBUMIN SERPL BCP-MCNC: 2.3 G/DL (ref 3.5–5)
ALP SERPL-CCNC: 91 U/L (ref 34–104)
ALT SERPL W P-5'-P-CCNC: 53 U/L (ref 7–52)
AMYLASE SERPL-CCNC: 54 IU/L (ref 29–103)
ANION GAP SERPL CALCULATED.3IONS-SCNC: 3 MMOL/L
AST SERPL W P-5'-P-CCNC: 41 U/L (ref 13–39)
BASOPHILS # BLD AUTO: 0.04 THOUSANDS/ÂΜL (ref 0–0.1)
BASOPHILS NFR BLD AUTO: 1 % (ref 0–1)
BILIRUB SERPL-MCNC: 0.4 MG/DL (ref 0.2–1)
BUN SERPL-MCNC: 26 MG/DL (ref 5–25)
CALCIUM ALBUM COR SERPL-MCNC: 9.3 MG/DL (ref 8.3–10.1)
CALCIUM SERPL-MCNC: 7.9 MG/DL (ref 8.4–10.2)
CHLORIDE SERPL-SCNC: 103 MMOL/L (ref 96–108)
CHOLEST SERPL-MCNC: 147 MG/DL
CO2 SERPL-SCNC: 35 MMOL/L (ref 21–32)
CREAT SERPL-MCNC: 1.25 MG/DL (ref 0.6–1.3)
EOSINOPHIL # BLD AUTO: 0.53 THOUSAND/ÂΜL (ref 0–0.61)
EOSINOPHIL NFR BLD AUTO: 8 % (ref 0–6)
ERYTHROCYTE [DISTWIDTH] IN BLOOD BY AUTOMATED COUNT: 15.8 % (ref 11.6–15.1)
GFR SERPL CREATININE-BSD FRML MDRD: 55 ML/MIN/1.73SQ M
GLUCOSE P FAST SERPL-MCNC: 112 MG/DL (ref 65–99)
HCT VFR BLD AUTO: 33.1 % (ref 36.5–49.3)
HDLC SERPL-MCNC: 38 MG/DL
HGB BLD-MCNC: 10.3 G/DL (ref 12–17)
IMM GRANULOCYTES # BLD AUTO: 0.04 THOUSAND/UL (ref 0–0.2)
IMM GRANULOCYTES NFR BLD AUTO: 1 % (ref 0–2)
LDH SERPL-CCNC: 535 U/L (ref 140–271)
LDLC SERPL CALC-MCNC: 84 MG/DL (ref 0–100)
LIPASE SERPL-CCNC: 23 U/L (ref 11–82)
LYMPHOCYTES # BLD AUTO: 0.73 THOUSANDS/ÂΜL (ref 0.6–4.47)
LYMPHOCYTES NFR BLD AUTO: 11 % (ref 14–44)
MCH RBC QN AUTO: 32 PG (ref 26.8–34.3)
MCHC RBC AUTO-ENTMCNC: 31.1 G/DL (ref 31.4–37.4)
MCV RBC AUTO: 103 FL (ref 82–98)
MONOCYTES # BLD AUTO: 0.59 THOUSAND/ÂΜL (ref 0.17–1.22)
MONOCYTES NFR BLD AUTO: 9 % (ref 4–12)
NEUTROPHILS # BLD AUTO: 4.46 THOUSANDS/ÂΜL (ref 1.85–7.62)
NEUTS SEG NFR BLD AUTO: 70 % (ref 43–75)
NRBC BLD AUTO-RTO: 0 /100 WBCS
PLATELET # BLD AUTO: 200 THOUSANDS/UL (ref 149–390)
PMV BLD AUTO: 12.3 FL (ref 8.9–12.7)
POTASSIUM SERPL-SCNC: 4 MMOL/L (ref 3.5–5.3)
PROT SERPL-MCNC: 4.9 G/DL (ref 6.4–8.4)
PTH-INTACT SERPL-MCNC: 37.2 PG/ML (ref 12–88)
RBC # BLD AUTO: 3.22 MILLION/UL (ref 3.88–5.62)
SODIUM SERPL-SCNC: 141 MMOL/L (ref 135–147)
T3FREE SERPL-MCNC: 2.19 PG/ML (ref 2.5–3.9)
T4 FREE SERPL-MCNC: 0.62 NG/DL (ref 0.61–1.12)
TRIGL SERPL-MCNC: 124 MG/DL
TSH SERPL DL<=0.05 MIU/L-ACNC: 14.39 UIU/ML (ref 0.45–4.5)
WBC # BLD AUTO: 6.39 THOUSAND/UL (ref 4.31–10.16)

## 2023-11-25 PROCEDURE — 84439 ASSAY OF FREE THYROXINE: CPT

## 2023-11-25 PROCEDURE — 84481 FREE ASSAY (FT-3): CPT

## 2023-11-25 PROCEDURE — 85025 COMPLETE CBC W/AUTO DIFF WBC: CPT

## 2023-11-25 PROCEDURE — 80053 COMPREHEN METABOLIC PANEL: CPT

## 2023-11-25 PROCEDURE — 83690 ASSAY OF LIPASE: CPT

## 2023-11-25 PROCEDURE — 82150 ASSAY OF AMYLASE: CPT

## 2023-11-25 PROCEDURE — 83615 LACTATE (LD) (LDH) ENZYME: CPT

## 2023-11-25 PROCEDURE — 83970 ASSAY OF PARATHORMONE: CPT

## 2023-11-25 PROCEDURE — 83036 HEMOGLOBIN GLYCOSYLATED A1C: CPT

## 2023-11-25 PROCEDURE — 80061 LIPID PANEL: CPT

## 2023-11-25 PROCEDURE — 84443 ASSAY THYROID STIM HORMONE: CPT

## 2023-11-26 LAB
EST. AVERAGE GLUCOSE BLD GHB EST-MCNC: 131 MG/DL
HBA1C MFR BLD: 6.2 %

## 2023-11-27 ENCOUNTER — TELEPHONE (OUTPATIENT)
Age: 78
End: 2023-11-27

## 2023-11-27 ENCOUNTER — ANTICOAG VISIT (OUTPATIENT)
Dept: FAMILY MEDICINE CLINIC | Facility: CLINIC | Age: 78
End: 2023-11-27

## 2023-11-27 ENCOUNTER — TELEPHONE (OUTPATIENT)
Dept: HEMATOLOGY ONCOLOGY | Facility: CLINIC | Age: 78
End: 2023-11-27

## 2023-11-27 NOTE — TELEPHONE ENCOUNTER
Received a message from infusion nurse that patient and family have reached out to family doctor inquiring if patient should continue tx or not secondary to him "going downhill" and in and out of the hospital.      Called and spoke to wife and daughter that patient resides with to let them know we will call infusion center to cancel his tx for tomorrow and patient will f/u with Francisco Toscano on Wednesday to discuss if patient wants to continue with tx or stop all together. Family was appreciative of the phone call and has no other questions or concerns at this moment.

## 2023-11-27 NOTE — TELEPHONE ENCOUNTER
Yesika Mott (wife) would like a call back ASAP regarding patient condition has gone down hill since his last chemo and would like thoughts on stopping chemo or should chemo be continued.  Can be reached at [phone] is due for next chemo treatment 11/28/2023

## 2023-11-27 NOTE — TELEPHONE ENCOUNTER
Patient's wife is calling back, she would like to hear from PCP regarding chemotherapy. She states if she does not answer to please call daughter Sarayelizabeth Wallace at [phone].

## 2023-11-27 NOTE — TELEPHONE ENCOUNTER
Spoke  with pt's  wife. Patient  is  torn  as  wether  to  continue  with  chemotherapy  at  this  time. Having  a  lot  of  side  effects. On  oxygen. Very  fatigued and  weak. Paresthesias  extremities. Discussed  home  hospice  vs  out  patient  palliative  care. Also  to reach out  to  his  oncology social  woker. Reach  out  to  hematology  as well.

## 2023-11-28 ENCOUNTER — TELEPHONE (OUTPATIENT)
Age: 78
End: 2023-11-28

## 2023-11-28 ENCOUNTER — HOSPITAL ENCOUNTER (OUTPATIENT)
Dept: INFUSION CENTER | Facility: HOSPITAL | Age: 78
Discharge: HOME | End: 2023-11-28

## 2023-11-28 NOTE — TELEPHONE ENCOUNTER
Valeri Vasquez from [facility] [place] called to confirm Dr. Elza Dolan will sign off on home care physical therapy for Teresa Varela. He also noted, the patients weight is fluctuating, and was asking at what weight does Dr. Elza Dolan want to be notified at. Please advise, thank you.

## 2023-11-29 ENCOUNTER — TELEPHONE (OUTPATIENT)
Age: 78
End: 2023-11-29

## 2023-11-29 ENCOUNTER — OFFICE VISIT (OUTPATIENT)
Dept: HEMATOLOGY ONCOLOGY | Facility: CLINIC | Age: 78
End: 2023-11-29
Payer: MEDICARE

## 2023-11-29 ENCOUNTER — TELEPHONE (OUTPATIENT)
Dept: HEMATOLOGY ONCOLOGY | Facility: CLINIC | Age: 78
End: 2023-11-29

## 2023-11-29 ENCOUNTER — HOSPITAL ENCOUNTER (OUTPATIENT)
Dept: MRI IMAGING | Facility: HOSPITAL | Age: 78
Discharge: HOME | End: 2023-11-29
Payer: MEDICARE

## 2023-11-29 VITALS
HEART RATE: 84 BPM | BODY MASS INDEX: 27.4 KG/M2 | WEIGHT: 185 LBS | HEIGHT: 69 IN | DIASTOLIC BLOOD PRESSURE: 60 MMHG | OXYGEN SATURATION: 96 % | RESPIRATION RATE: 16 BRPM | SYSTOLIC BLOOD PRESSURE: 114 MMHG | TEMPERATURE: 98 F

## 2023-11-29 DIAGNOSIS — I87.1 COMPRESSION OF VEIN: ICD-10-CM

## 2023-11-29 DIAGNOSIS — C34.11 MALIGNANT NEOPLASM OF UPPER LOBE OF RIGHT LUNG (HCC): Primary | ICD-10-CM

## 2023-11-29 DIAGNOSIS — M79.89 LEFT UPPER EXTREMITY SWELLING: ICD-10-CM

## 2023-11-29 DIAGNOSIS — C34.11 MALIGNANT NEOPLASM OF UPPER LOBE OF RIGHT LUNG (HCC): ICD-10-CM

## 2023-11-29 PROBLEM — R79.1 SUPRATHERAPEUTIC INR: Status: RESOLVED | Noted: 2023-10-28 | Resolved: 2023-11-29

## 2023-11-29 PROCEDURE — 99214 OFFICE O/P EST MOD 30 MIN: CPT | Performed by: INTERNAL MEDICINE

## 2023-11-29 PROCEDURE — G1004 CDSM NDSC: HCPCS

## 2023-11-29 PROCEDURE — A9585 GADOBUTROL INJECTION: HCPCS | Performed by: INTERNAL MEDICINE

## 2023-11-29 PROCEDURE — 70553 MRI BRAIN STEM W/O & W/DYE: CPT

## 2023-11-29 RX ORDER — DEXAMETHASONE 2 MG/1
2 TABLET ORAL 2 TIMES DAILY WITH MEALS
Qty: 60 TABLET | Refills: 0 | Status: SHIPPED | OUTPATIENT
Start: 2023-11-29 | End: 2023-11-30 | Stop reason: SDUPTHER

## 2023-11-29 RX ORDER — GADOBUTROL 604.72 MG/ML
8 INJECTION INTRAVENOUS
Status: COMPLETED | OUTPATIENT
Start: 2023-11-29 | End: 2023-11-29

## 2023-11-29 RX ADMIN — GADOBUTROL 8 ML: 604.72 INJECTION INTRAVENOUS at 15:23

## 2023-11-29 NOTE — TELEPHONE ENCOUNTER
Spoke  with  home  therapist.  Base  weight  will be  178  lbs.   To  call  if  10  pound  jojo  gain

## 2023-11-29 NOTE — TELEPHONE ENCOUNTER
Spoke  with pt's  wife. Keep  oncology  f/u  appt  for  today. To  discuss continuing  treatment.    Keep  virtual  f/u  appt  for tomorrow  with  me

## 2023-11-29 NOTE — PROGRESS NOTES
Luis Eduardo KRAUS  [facility] HEMATOLOGY ONCOLOGY SPECIALISTS [facility]  [facility]  1945      PRIMARY HEMATOLOGIC/ONCOLOGIC DIAGNOSIS:  1. Stage IV adenocarcinoma of the lung. 9/15/2022 Caris: 4 muts/Mb, MSI-stable, PDL1 +1 CPS, EGFR exon 19  2. Hx Bilateral PE 2022, thought to be provoked, due to [place]. Was placed on Xarelto. Could not afford Xarelto and swtched himself to ASA 81mg. Acute pulmonary emboli in the left distal pulmonary artery extending into the left lower lobar, and some of the segmental and subsegmental pulmonary arteries with additional segmental and subsegmental pulmonary emboli in the lingula -- date of diagnosis 10/3/23. On Coumadin. 3. Hx of prostate cancer dx 2005. S/p RT. PATHOLOGY:   Case Report   Surgical Pathology Report                         Case: [id number]                                    Authorizing Provider:  Kaylen Alexandra DO           Collected:           09/15/2022 1302               Ordering Location:     [facility]      Received:            09/15/2022 [address] [place] [address]                                                               Pathologist:           Reid Cortez MD                                                           Specimen:    Lymph Node, right supraclavicular                                                          Final Diagnosis   A. Lymph node, right supraclavicular biopsy:  -  Metastatic adenocarcinoma of lung origin. -  Immunohistochemical stains performed with appropriate controls show the tumor to be positive for TTF1 and napsin and negative for CK5/6 and p40, supporting the diagnosis.        STAGING: Cancer Staging   Malignant neoplasm of upper lobe of right lung [facility])  Staging form: Lung, AJCC 8th Edition  - Clinical stage from 9/15/2022: Stage BETTE (cT4, cN3, cM1a) - Signed by Jason Briceno MD on 10/10/2022  Stage prefix: Initial diagnosis         Oncology History   Malignant neoplasm of upper lobe of right lung ([address])   9/15/2022 -  Cancer Staged    Staging form: Lung, AJCC 8th Edition  - Clinical stage from 9/15/2022: Stage BETTE (cT4, cN3, cM1a) - Signed by Sheree Sparks MD on 10/10/2022  Stage prefix: Initial diagnosis       9/15/2022 Biopsy    IR lung biopsy    A. Lymph node, right supraclavicular biopsy:  -  Metastatic adenocarcinoma of lung origin. -  Immunohistochemical stains performed with appropriate controls show the tumor to be positive for TTF1 and napsin and negative for CK5/6 and p40, supporting the diagnosis     10/5/2022 Initial Diagnosis    Malignant neoplasm of upper lobe of right lung ([address])     5/31/2023 - 6/9/2023 Radiation    Treatments:  Course: C1 SBRT RLL  Plan ID Energy Fractions Dose per Fraction (cGy) Dose Correction (cGy) Total Dose Delivered (cGy) Elapsed Days   BH SBRT RLL 6X-FFF 5 / 5 700 0 3,500 9    Treatment Dates:  5/31/2023 - 6/9/2023.      9/19/2023 -  Chemotherapy    cyanocobalamin, 1,000 mcg, Intramuscular, Once, 1 of 3 cycles  Administration: 1,000 mcg (9/11/2023)  alteplase (CATHFLO), 2 mg, Intracatheter, Every 1 Minute as needed, 1 of 6 cycles  fosaprepitant (EMEND) IVPB, 150 mg, Intravenous, Once, 1 of 6 cycles  Administration: 150 mg (9/19/2023)  CARBOplatin (PARAPLATIN) IVPB (GOG AUC DOSING), 380.5 mg, Intravenous, Once, 1 of 6 cycles  Administration: 380.5 mg (9/19/2023)  pemetrexed (ALIMTA) chemo infusion, 500 mg/m2 = 920 mg, Intravenous, Once, 1 of 6 cycles  Administration: 920 mg (9/19/2023)       INTERIM HISTORY:  Anderson Durán is a 70 yo male who presents for evaluation and management of lung cancer. Osimertinib last taken prior to chemotherapy initiation. The patient initiated Carboplatin + Pemetrexed --C1D1 administered 9/19/23. Reported Grade 1 diarrhea. He presented to the hospital on 10/3/23 w/ progressive SOB and RLE swelling. He was found to have PE and RLE DVT.  Anticoagulation was initiated. He was found to be in A. Fib w/ RVR. He underwent thoracentesis for b/l pleural effusions. He was in HILARY thought to be due to volume overload. He was also treated w/ steroid taper for COPD exacerbation. The patient was admitted again on 10/27/23 with hypoxic respiratory failure secondary to pleural effusion. Improved s/p thoracentesis and lasix. He was found to be in HILARY due to fluid overload. ECOG 3 post discharge. INTERIM HISTORY:  The patient presents for a follow-up after hospital admission w/ acute on chronic respiratory failure which was likely multifactorial in the setting of b/l pleural effusion and acute CHF exacerbation. Currently on 2L O2. Underwent L Pleurx placement 11/16/23. On 11/21 he underwent right Pleurx. Reports not having good spatial depth perception especially when trying to grab the handle of the walker with the right hand-- keeps missing it. Discussed prognosis. He has decided to discontinue further treatments and has an appointment with palliative care.       PAST MEDICAL,PAST SURGICAL, FAMILY AND SOCIAL HISTORY:    Patient Active Problem List   Diagnosis    Benign essential HTN    Controlled type 2 diabetes mellitus without complication, without long-term current use of insulin (HCC)    Hypercholesterolemia    Glaucoma    Inguinal hernia    Hypokalemia    Hypocalcemia    Hyperparathyroidism ([address])    History of colon polyps    Primary osteoarthritis of right shoulder    Chronic left shoulder pain    Left rotator cuff tear arthropathy    Rotator cuff injury, right, initial encounter    Arthritis of right acromioclavicular joint    BPH associated with nocturia    Status post right tibial-talar ankle fusion    Non-recurrent bilateral inguinal hernia without obstruction or gangrene    Multiple subsegmental pulmonary emboli without acute cor pulmonale (HCC)    Pulmonary mass    Anemia    Malignant neoplasm of upper lobe of right lung (HCC)    Multiple lung nodules on CT 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      Past Surgical History:   Procedure Laterality Date    COLONOSCOPY      IR BIOPSY LYMPH NODE  9/15/2022    IR THORACENTESIS  9/13/2022    IR THORACENTESIS  10/5/2023    IR THORACENTESIS  10/13/2023    ID ARTHRODESIS ANKLE OPEN Right 7/10/2020    Procedure: ARTHRODESIS/ TIBIAL-TALAR ANKLE FUSION;  Surgeon: Tonio Urbina MD;  Location: [facility];  Service: Orthopedics    ID LAPAROSCOPY SURG RPR INITIAL INGUINAL HERNIA Bilateral 12/16/2021    Procedure: LAPAROSCOPIC REPAIR HERNIA INGUINAL WITH MESH;  Surgeon: Jeannene Bloch, MD;  Location: [facility];  Service: General     Family History   Problem Relation Age of Onset    Heart attack Mother      Social History     Socioeconomic History    Marital status: /Civil Union     Spouse name: Not on file    Number of children: Not on file    Years of education: Not on file    Highest education level: Not on file   Occupational History    Not on file   Tobacco Use    Smoking status: Never    Smokeless tobacco: Never   Vaping Use    Vaping Use: Never used   Substance and Sexual Activity    Alcohol use: Yes     Comment: Occasional    Drug use: Never    Sexual activity: Not on file   Other Topics Concern    Not on file   Social History Narrative    Not on file     Social Determinants of Health     Financial Resource Strain: Low Risk  (7/25/2023)    Overall Financial Resource Strain (CARDIA)     Difficulty of Paying Living Expenses: Not very hard   Food Insecurity: No Food Insecurity (10/4/2023)    Hunger Vital Sign     Worried About [place] in the Last Year: Never true     [address] in the Last Year: Never true   Transportation Needs: No Transportation Needs (10/4/2023)    PRAPARE - Transportation     Lack of Transportation (Medical): No     Lack of Transportation (Non-Medical): No   Physical Activity: Not on file   Stress: Not on file   Social Connections: Not on file   Intimate Partner Violence: Not on file   Housing Stability: Unknown (10/4/2023)    Housing Stability Vital Sign     Unable to Pay for Housing in the Last Year: No     Number of Places Lived in the Last Year: 1     Unstable Housing in the Last Year: Not on file       Current Outpatient Medications:     Accu-Chek FastClix Lancets MISC, Use daily E11.9  Check  fbs  daily, Disp: 100 each, Rfl: 3    amiodarone 200 mg tablet, Take 1 tablet (200 mg total) by mouth 3 (three) times a day with meals, Disp: 90 tablet, Rfl: 0    brimonidine tartrate 0.2 % ophthalmic solution, Administer 1 drop into the left eye 2 (two) times a day, Disp: , Rfl:     dexamethasone (DECADRON) 4 mg tablet, Take 1 tablet (4 mg total) by mouth 2 (two) times a day with meals Take the day before treatment, the day of treatment and the day after treatment. , Disp: 6 tablet, Rfl: 6    finasteride (PROSCAR) 5 mg tablet, TAKE 1 TABLET (5 MG TOTAL) BY MOUTH DAILY. , Disp: 90 tablet, Rfl: 1    glucose blood (Accu-Chek Jackie Plus) test strip, Use as instructed, Disp: 100 strip, Rfl: 1    metFORMIN (GLUCOPHAGE-XR) 500 mg 24 hr tablet, TAKE 1 TABLET BY MOUTH EVERY DAY, Disp: 90 tablet, Rfl: 0    metoprolol tartrate (LOPRESSOR) 100 mg tablet, Take 1 tablet (100 mg total) by mouth every 12 (twelve) hours, Disp: 60 tablet, Rfl: 0    ondansetron (ZOFRAN) 8 mg tablet, Take 1 tablet (8 mg total) by mouth every 8 (eight) hours as needed for nausea or vomiting, Disp: 20 tablet, Rfl: 0    Osimertinib Mesylate 80 MG TABS, Take 80 mg by mouth in the morning, Disp: , Rfl:     predniSONE 20 mg tablet, Take 1 tablet (20 mg total) by mouth daily for 2 days, THEN 0.5 tablets (10 mg total) daily for 2 days. Do not start before October 17, 2023., Disp: 3 tablet, Rfl: 0    simvastatin (ZOCOR) 10 mg tablet, TAKE 1 TABLET BY MOUTH EVERY DAY, Disp: 90 tablet, Rfl: 1    timolol (TIMOPTIC-XE) 0.5 % ophthalmic gel-forming, 1 drop daily, Disp: , Rfl:     warfarin (COUMADIN) 5 mg tablet, Take 1 tablet (5 mg total) by mouth daily for 15 days, Disp: 15 tablet, Rfl: 0    folic acid (KP Folic Acid) 1 mg tablet, Take 1 tablet (1 mg total) by mouth daily, Disp: 30 tablet, Rfl: 6    travoprost (TRAVATAN-Z) 0.004 % ophthalmic solution, 1 drop daily at bedtime (Patient not taking: Reported on 4/24/2023), Disp: , Rfl:   No current facility-administered medications for this visit. No Known Allergies  Vitals:    10/17/23 0943   Resp: 16       ROS:  CONSTITUTIONAL:  No fever. No chills. No dizziness. No weakness. EYES:  No pain, erythema, or discharge. No blurring of vision. ENT:  No sore throat, URI symptoms. No epistaxis. No tinnitus. CARDIOVASCULAR:  No chest pain. No palpitations. No lower extremity edema. RESPIRATORY:  No shortness of breath, cough, pain with respiration, pleuritic chest pain. No hemoptysis. No dyspnea. No paroxysmal nocturnal dyspnea. GASTROINTESTINAL:  Normal appetite. No nausea, vomiting, diarrhea. No pain. No bloating. No melena. GENITOURINARY:  No frequency, urgency, nocturia. No hematuria or dysuria. MUSCULOSKELETAL:  No arthralgias or myalgias. INTEGUMENTARY:  No swelling. No bruising. No contusions. No abrasions. No lymphangitis. NEUROLOGIC:  No headache. No neck pain. No numbness or tingling of the extremities. No weakness. PSYCHIATRIC:  No confusion. ENDOCRINE:  No fatigue. No weakness. No history of thyroid, diabetes or adrenal problems. HEMATOLOGICAL:  No bleeding. No petechiae. No bruising.     PHYSICAL EXAM:  ECOG 2  GENERAL: AAO x 3  HEENT: AT,NC  CVS: S1S2 RRR  LUNGS: b/l wheezing  ABD: NT,ND, +BS  EXTR: no edema  NEURO: CN II-XII grossly intact    LABS:  I have reviewed pertinent labs:  CBC:   Lab Results   Component Value Date    WBC 11.67 (H) 10/16/2023    RBC 3.43 (L) 10/16/2023    HGB 11.0 (L) 10/16/2023    HCT 34.0 (L) 10/16/2023    MCV 99 (H) 10/16/2023     10/16/2023    MCH 32.1 10/16/2023    MCHC 32.4 10/16/2023    RDW 16.5 (H) 10/16/2023    MPV 12.1 10/16/2023    NEUTROABS 9.65 (H) 10/16/2023     CMP:   Lab Results   Component Value Date    SODIUM 137 10/16/2023    K 4.2 10/16/2023     10/16/2023    CO2 27 10/16/2023    AGAP 5 10/16/2023    BUN 50 (H) 10/16/2023    CREATININE 1.68 (H) 10/16/2023    GLUC 84 10/16/2023    GLUF 114 (H) 07/07/2023    CALCIUM 8.4 10/16/2023    AST 12 (L) 10/15/2023    ALT 34 10/15/2023    ALKPHOS 71 10/15/2023    TP 5.7 (L) 10/15/2023    ALB 3.2 (L) 10/15/2023    TBILI 0.52 10/15/2023    EGFR 38 10/16/2023     Liver Enzymes:   Lab Results   Component Value Date    AST 12 (L) 10/15/2023    ALT 34 10/15/2023    ALKPHOS 71 10/15/2023    TP 5.7 (L) 10/15/2023    ALB 3.2 (L) 10/15/2023    TBILI 0.52 10/15/2023    BILIDIR 0.21 (H) 09/14/2022     Vitamin B12 No results found for: "MGRDRYPC60"  Iron Study   Lab Results   Component Value Date    FERRITIN 579 (H) 09/10/2022    CONCFE 23 09/10/2022    TIBC 117 (L) 09/10/2022    IRON 27 (L) 09/10/2022     Folate No results found for: "FOLATE"  Magnesium   Lab Results   Component Value Date    MG 2.6 10/14/2023     Phosphorus   Lab Results   Component Value Date    PHOS 3.4 10/05/2023     Coagulation Panel   Lab Results   Component Value Date    DDIMER >20.00 (H) 10/03/2023    PROTIME 25.3 (H) 10/16/2023    INR 2.19 (H) 10/16/2023    PTT 91 (H) 10/07/2023       IMAGING:  CT chest abdomen pelvis w contrast    Result Date: 8/15/2023  Narrative: CT CHEST, ABDOMEN AND PELVIS WITH IV CONTRAST INDICATION:   C34.11: Malignant neoplasm of upper lobe, right bronchus or lung.  COMPARISON: CT chest abdomen pelvis 4/11/2023. TECHNIQUE: CT examination of the chest, abdomen and pelvis was performed. Multiplanar 2D reformatted images were created from the source data. This examination, like all CT scans performed in the [facility], was performed utilizing techniques to minimize radiation dose exposure, including the use of iterative reconstruction and automated exposure control. Radiation dose length product (DLP) for this visit:  648 mGy-cm IV Contrast:  100 mL of iohexol (OMNIPAQUE) Enteric Contrast: Enteric contrast was not administered. FINDINGS: CHEST LUNGS: Interval enlargement of spiculated right upper lobe mass due to enlarging rounded nodular component at the caudal aspect of the mass. Based on remeasuring the mass measures 3.8 x 2.8 x 2.3 cm, previously 3.0 x 2.8 x 1.8 cm using similar technique. The enlarging caudal component can be best demonstrated by comparing 3/85 of the current exam to 3/80 of the 4/11/2023 exam. Continued enlargement of fissural-based right lower lobe nodule with a now more spiculated appearance measuring 2.1 x 1.1 cm (3/188). Multiple new nodules are present bilaterally including a new fissural-based nodule in the right middle lobe measuring 2.1 x 1.4 cm (3/198). Representative additional smaller new nodules as follows: 1.3 cm juxtapleural right lower lobe nodule (3/212), multiple small nodules at the right base (3/211), 0.7 cm medial juxtapleural left lower lobe nodule (3/203), 0.5 cm perifissural left lower lobe nodule (3/174), 0.8 cm left lower lobe nodule (3/209). PLEURA: Trace right pleural effusion and right pleural thickening. HEART/GREAT VESSELS: Heart is unremarkable for patient's age. No thoracic aortic aneurysm. Coronary artery and aortic calcifications. MEDIASTINUM AND FAUSTO: New mediastinal and right hilar lymphadenopathy. For example large subcarinal kamilla conglomerate now measures 2.4 cm in the short axis, previously subcentimeter.  Enlargement of a now 1.4 cm left lower paratracheal lymph node (2/50) previously 0.5 cm. CHEST WALL AND LOWER NECK:  Unremarkable. ABDOMEN LIVER/BILIARY TREE: Stable sub-5 mm hypoattenuating focus in the caudate, too small to further characterize. GALLBLADDER:  There are gallstone(s) within the gallbladder, without pericholecystic inflammatory changes. SPLEEN:  Unremarkable. PANCREAS:  Unremarkable. ADRENAL GLANDS:  Unremarkable. KIDNEYS/URETERS: Ectopic pelvic left kidney. No hydronephrosis. Right upper pole simple renal cysts. Subcentimeter hypoattenuating lesions lesions which are too small to further characterize but stable and likely to also reflect cysts. STOMACH AND BOWEL:  Unremarkable. APPENDIX:  No findings to suggest appendicitis. ABDOMINOPELVIC CAVITY:  No ascites. No pneumoperitoneum. No lymphadenopathy. VESSELS:  Unremarkable for patient's age. PELVIS REPRODUCTIVE ORGANS:  Unremarkable for patient's age. URINARY BLADDER: Diffuse bladder wall thickening. ABDOMINAL WALL/INGUINAL REGIONS: Tiny fat-containing umbilical hernia. OSSEOUS STRUCTURES:  No acute fracture or destructive osseous lesion. Stable bone islands. Impression: 1. Findings concerning for worsening metastatic disease. 2.  Enlargement of dominant right upper lobe pulmonary mass, specifically of a nodular caudal component. 3.  Multiple new and enlarging bilateral pulmonary nodules. 4.  New mediastinal and right hilar lymphadenopathy. 5.  Chronic bladder wall thickening may be correlated with symptoms and/or urinalysis for evidence of cystitis. The study was marked in EPIC for significant notification. Workstation performed: [id number]     I reviewed the above laboratory and imaging data. ASSESSMENT/PLAN:  1. Stage IV adenocarcinoma of the lung. 9/15/2022 Caris: 4 muts/Mb, MSI-stable, PDL1 +1 CPS, EGFR exon 19. S/p Osimertinib.    CT 8/8/23 c/w disease progression--interval enlargement of spiculated right upper lobe mass due to enlarging rounded nodular component at the caudal aspect of the mass. The mass measures 3.8 x 2.8 x 2.3 cm, previously 3.0 x 2.8 x 1.8 cm. Continued enlargement of fissural-based right lower lobe nodule with a now more spiculated appearance measuring 2.1 x 1.1 cm. Multiple new nodules present bilaterally including a new fissural-based nodule in the right middle lobe measuring 2.1 x 1.4 cm. Additional smaller new nodules : 1.3 cm juxtapleural right lower lobe nodule, multiple small nodules at the right base, 0.7 cm medial juxtapleural left lower lobe nodule, 0.5 cm perifissural left lower lobe nodule, 0.8 cm left lower lobe nodule. Initiated Carboplatin + Pemetrexed. C1D1 administered 9/19/23. Reported Grade 1 diarrhea. Admitted to the hospital on 10/3/23 w/ PE and RLE DVT, A. Fib w/ RVR, b/l pleural effusions, HILARY thought to be due to volume overload. He was also treated w/ steroid taper for COPD exacerbation. CT TAP dated 10/3/23 c/w disease progression-- luminal narrowing in the region of the bifurcation of the bronchus intermedius, new since August 2023, possibly secondary to enlarging hilar lymphadenopathy or increasing postradiation fibrosis; right upper lobe mass overall stable to mildly enlarged since August 2023; significant progression of metastatic disease throughout the left lung, with new enlarged nodules/masses; overall similar mediastinal lymphadenopathy compared to August 2023. In the interim the patient had several hospital admissions. ECOG 3. Due to neurologic symptoms( right arm weakness w/ change in depth perception) will obtain MRI brain to r/o metastatic disease to the brain. He has decided not to undergo further treatment. Has an appointment with palliative care. 2. Hx Bilateral PE 2022, thought to be provoked, due to [place]. Was placed on Xarelto. Could not afford Xarelto and swtched himself to ASA 81mg.  Acute pulmonary emboli in the left distal pulmonary artery extending into the left lower lobar, and some of the segmental and subsegmental pulmonary arteries with additional segmental and subsegmental pulmonary emboli in the lingula -- date of diagnosis 10/3/23. On Coumadin. 3. Hx of prostate cancer dx 2005. S/p RT.

## 2023-11-29 NOTE — TELEPHONE ENCOUNTER
The patients wife called ---the patient cancelled his chemo   ---the patients wife said when she spoke with Zac Kaye she was told to cancel another appointment   she does not remember which one   ---please advise thank you

## 2023-11-29 NOTE — TELEPHONE ENCOUNTER
----- Message from Byron Obrien MD sent at 11/29/2023  3:29 PM EST -----    He has brain mets. Starting decadron 2mg PO BID. Family will take him to ED if his symptoms worsen. He elected not to do systemic treatment. They have palliative care appointment coming up.

## 2023-11-29 NOTE — TELEPHONE ENCOUNTER
Tiger text received from reading room regarding MRI results.   Dr. Nikolas Burrows called and spoke with radiologist.

## 2023-11-30 ENCOUNTER — TELEPHONE (OUTPATIENT)
Dept: HEMATOLOGY ONCOLOGY | Facility: CLINIC | Age: 78
End: 2023-11-30

## 2023-11-30 ENCOUNTER — TELEMEDICINE (OUTPATIENT)
Dept: FAMILY MEDICINE CLINIC | Facility: CLINIC | Age: 78
End: 2023-11-30
Payer: MEDICARE

## 2023-11-30 DIAGNOSIS — E11.9 TYPE 2 DIABETES MELLITUS WITHOUT COMPLICATION, WITHOUT LONG-TERM CURRENT USE OF INSULIN (HCC): ICD-10-CM

## 2023-11-30 DIAGNOSIS — I82.5Z1 CHRONIC DEEP VEIN THROMBOSIS (DVT) OF DISTAL VEIN OF RIGHT LOWER EXTREMITY (HCC): ICD-10-CM

## 2023-11-30 DIAGNOSIS — I50.23 ACUTE ON CHRONIC HFREF (HEART FAILURE WITH REDUCED EJECTION FRACTION) (HCC): ICD-10-CM

## 2023-11-30 DIAGNOSIS — I26.94 MULTIPLE SUBSEGMENTAL PULMONARY EMBOLI WITHOUT ACUTE COR PULMONALE (HCC): ICD-10-CM

## 2023-11-30 DIAGNOSIS — C34.11 MALIGNANT NEOPLASM OF UPPER LOBE OF RIGHT LUNG (HCC): ICD-10-CM

## 2023-11-30 DIAGNOSIS — I48.91 RAPID ATRIAL FIBRILLATION (HCC): ICD-10-CM

## 2023-11-30 DIAGNOSIS — N17.9 AKI (ACUTE KIDNEY INJURY) (HCC): ICD-10-CM

## 2023-11-30 DIAGNOSIS — E83.51 HYPOCALCEMIA: ICD-10-CM

## 2023-11-30 DIAGNOSIS — J90 PLEURAL EFFUSION: ICD-10-CM

## 2023-11-30 DIAGNOSIS — E03.9 HYPOTHYROIDISM, UNSPECIFIED TYPE: ICD-10-CM

## 2023-11-30 DIAGNOSIS — J96.21 ACUTE ON CHRONIC RESPIRATORY FAILURE WITH HYPOXIA (HCC): Primary | ICD-10-CM

## 2023-11-30 PROCEDURE — 99495 TRANSJ CARE MGMT MOD F2F 14D: CPT | Performed by: PHYSICIAN ASSISTANT

## 2023-11-30 RX ORDER — LEVOTHYROXINE SODIUM 0.03 MG/1
25 TABLET ORAL
Qty: 90 TABLET | Refills: 0 | Status: SHIPPED | OUTPATIENT
Start: 2023-11-30

## 2023-11-30 RX ORDER — DEXAMETHASONE 2 MG/1
2 TABLET ORAL 2 TIMES DAILY WITH MEALS
Qty: 60 TABLET | Refills: 0 | Status: SHIPPED | OUTPATIENT
Start: 2023-11-30 | End: 2023-12-30

## 2023-11-30 NOTE — TELEPHONE ENCOUNTER
Received return call from Erlinda Harris. Per Erlinda Harris, patient does not want to have chemotherapy at this time. Port appointment cancelled at this time. No additional questions at this time.

## 2023-11-30 NOTE — TELEPHONE ENCOUNTER
Patient Call    Who are you speaking with? Child    If it is not the patient, are they listed on an active communication consent form? Yes   What is the reason for this call? Pt doesn't think a port is necessary   Does this require a call back? Yes   If a call back is required, please list [facility] call back number [phone]    If a call back is required, advise that a message will be forwarded to their care team and someone will return their call as soon as possible. Did you relay this information to the patient?  Yes

## 2023-11-30 NOTE — TELEPHONE ENCOUNTER
Patient Call    Who are you speaking with? Child    If it is not the patient, are they listed on an active communication consent form? Yes   What is the reason for this call? She asked if the Decadron could be sent to [facility] in [place] on [address]   Does this require a call back? Yes   If a call back is required, please list best call back number [phone]    If a call back is required, advise that a message will be forwarded to their care team and someone will return their call as soon as possible. Did you relay this information to the patient?  Yes

## 2023-11-30 NOTE — PROGRESS NOTES
Virtual TCM Visit:    Verification of patient location:    Patient is located at Home in the following state in which I hold an active license [place]    Assessment/Plan:        Problem List Items Addressed This Visit          Endocrine    Type 2 diabetes mellitus without complication, without long-term current use of insulin ([address])       Respiratory    Malignant neoplasm of upper lobe of right lung ([address])    Acute on chronic respiratory failure with hypoxia (HCC) - Primary    Pleural effusion       Cardiovascular and Mediastinum    Multiple subsegmental pulmonary emboli without acute cor pulmonale (HCC)    Deep vein thrombosis (DVT) of right lower extremity (HCC)    Relevant Orders    CBC and differential    Rapid atrial fibrillation (HCC)    Relevant Orders    Protime-INR    Acute on chronic HFrEF (heart failure with reduced ejection fraction) ([address])       Genitourinary    HILARY (acute kidney injury) ([address])       Other    Hypocalcemia    Relevant Orders    Basic metabolic panel     Other Visit Diagnoses       Hypothyroidism, unspecified type        Start levothyroxine 25 mcg in the a.m. Relevant Medications    levothyroxine (Euthyrox) 25 mcg tablet    Other Relevant Orders    TSH, 3rd generation with Free T4 reflex             Reason for visit is tcm    Encounter provider Sayra Paredes PA-C     Provider located at [address] [place] [address] [facility] [address] [phone]    Recent Visits  No visits were found meeting these conditions. Showing recent visits within past 7 days and meeting all other requirements  Today's Visits  Date Type Provider Dept   11/30/23 Telemedicine [facility]AHRAA Qureshi Pg   Showing today's visits and meeting all other requirements  Future Appointments  No visits were found meeting these conditions.   Showing future appointments within next 150 days and meeting all other requirements       After connecting through [employer], the patient was identified by name and date of birth. Andrea Potter was informed that this is a telemedicine visit and that the visit is being conducted through the [employer]. He agrees to proceed. .  My office door was closed. No one else was in the room. He acknowledged consent and understanding of privacy and security of the video platform. The patient has agreed to participate and understands they can discontinue the visit at any time. Patient is aware this is a billable service. Transitional Care Management Review:  Andrea Potter is a 69 y.o. male here for TCM follow up. During the TCM phone call patient stated:    TCM Call       Date and time call was made  11/24/2023  9:00 AM    Hospital care reviewed  Records reviewed    Patient was hospitialized at  [facility]    Date of Admission  11/11/23    Date of discharge  11/22/23    Diagnosis  Acute on chronic respiratory failure with hypoxia (HCC)    Disposition  Home    Were the patients medications reviewed and updated  Yes    Current Symptoms  None          TCM Call       Post hospital issues  None    Should patient be enrolled in anticoag monitoring? No    Scheduled for follow up?   Yes    Not clinically warranted  Following with orthopedics    Patients specialists  Cardiologist    Did you obtain your prescribed medications  Yes    Do you need help managing your prescriptions or medications  No    Is transportation to your appointment needed  No    I have advised the patient to call PCP with any new or worsening symptoms  [address]    Are you recieving any outpatient services  Yes    What type of services  PT OT    Are you recieving home care services  Yes    Types of home care services  Nurse visit    Are you using any community resources  No    Current waiver services  No    Have you fallen in the last 12 months  Yes    How many times  1    Interperter language line needed  No    Counseling Patient    Counseling topics  Importance of RX compliance          Subjective:     Patient ID: Anderson Durán is a 69 y.o. male. Patient seen being via virtual visit for transition of care management. Hospitalized November 11 through November 22. Has 6 drains to drain the pleural effusions. Visiting nurse coming in. Medications were adjusted. He is on Lasix 40 mg twice daily, metoprolol 50 mg 4 times daily, digoxin, amiodarone, diltiazem 30 mg 3 times daily. And monitoring his PT/INR. Mary Ann Reinlelia Acute kidney injury has resolved. Reviewed other labs which showed an elevated TSH at 14. Starting TSH 25 mcg. Order repeat CBC BMP. He will need PT/INR weekly for Coumadin. We will continue 5 mg a day for now is in today for the video was his daughter Michelle Douglas and his wife patient went to oncologist yesterday. Daughter states patient does not know this yet. He was started on Decadron. Daughter states she would like home hospice. She would like to wait to her father request this. We will continue treatment now. Patient states his appetite is good and he has no pain. He has no weakness. No recent falls. Does get daily weights today's weight is 181. Patient's daughter Michelle Douglas seems to have good control over patient's care. Family will be meeting this weekend to discuss daughter states that she will initiate home hospice when her father requests this. Instructed daughter that she may reach out to me at any time to initiate home hospice hospice      Review of Systems   Constitutional:  Negative for appetite change. Respiratory:  Positive for shortness of breath. Negative for stridor. Cardiovascular:  Negative for chest pain. Gastrointestinal:  Negative for constipation and diarrhea. Genitourinary:  Negative for difficulty urinating. Neurological:  Negative for dizziness and headaches. Psychiatric/Behavioral:  Negative for sleep disturbance. Objective:     There were no vitals filed for this visit.    Physical Exam  Constitutional:       General: He is not in acute distress. Appearance: He is ill-appearing. HENT:      Head: Normocephalic and atraumatic. Right Ear: External ear normal.      Left Ear: External ear normal.   Eyes:      Conjunctiva/sclera: Conjunctivae normal.   Pulmonary:      Effort: Pulmonary effort is normal.      Comments: On  oxygen  Neurological:      Mental Status: He is alert. Medications have been reviewed by provider in current encounter    I spent 16 minutes with the patient during this visit. Robert Blood PA-C      VIRTUAL VISIT DISCLAIMER    Abrammoe Echevarria verbally agrees to participate in [facility]. Pt is aware that [facility] could be limited without vital signs or the ability to perform a full hands-on physical exam. Marshal Echevarria understands he or the provider may request at any time to terminate the video visit and request the patient to seek care or treatment in person.

## 2023-12-01 ENCOUNTER — APPOINTMENT (EMERGENCY)
Dept: CT IMAGING | Facility: HOSPITAL | Age: 78
End: 2023-12-01
Payer: MEDICARE

## 2023-12-01 ENCOUNTER — APPOINTMENT (EMERGENCY)
Dept: RADIOLOGY | Facility: HOSPITAL | Age: 78
End: 2023-12-01
Payer: MEDICARE

## 2023-12-01 ENCOUNTER — TELEPHONE (OUTPATIENT)
Dept: CARDIOLOGY CLINIC | Facility: CLINIC | Age: 78
End: 2023-12-01

## 2023-12-01 ENCOUNTER — HOSPITAL ENCOUNTER (EMERGENCY)
Facility: HOSPITAL | Age: 78
Discharge: HOME | End: 2023-12-01
Attending: EMERGENCY MEDICINE
Payer: MEDICARE

## 2023-12-01 VITALS
SYSTOLIC BLOOD PRESSURE: 171 MMHG | RESPIRATION RATE: 18 BRPM | HEART RATE: 76 BPM | OXYGEN SATURATION: 96 % | DIASTOLIC BLOOD PRESSURE: 77 MMHG | TEMPERATURE: 97.5 F

## 2023-12-01 DIAGNOSIS — S00.01XA ABRASION OF SCALP, INITIAL ENCOUNTER: Primary | ICD-10-CM
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
## 2023-12-01 LAB
INR PPP: 2.8 (ref 0.84–1.19)
PROTHROMBIN TIME: 29.4 SECONDS (ref 11.6–14.5)

## 2023-12-01 PROCEDURE — 72125 CT NECK SPINE W/O DYE: CPT

## 2023-12-01 PROCEDURE — 99285 EMERGENCY DEPT VISIT HI MDM: CPT | Performed by: EMERGENCY MEDICINE

## 2023-12-01 PROCEDURE — 85610 PROTHROMBIN TIME: CPT | Performed by: EMERGENCY MEDICINE

## 2023-12-01 PROCEDURE — 36415 COLL VENOUS BLD VENIPUNCTURE: CPT | Performed by: EMERGENCY MEDICINE

## 2023-12-01 PROCEDURE — 99285 EMERGENCY DEPT VISIT HI MDM: CPT

## 2023-12-01 PROCEDURE — 71045 X-RAY EXAM CHEST 1 VIEW: CPT

## 2023-12-01 PROCEDURE — 70450 CT HEAD/BRAIN W/O DYE: CPT

## 2023-12-01 PROCEDURE — 90715 TDAP VACCINE 7 YRS/> IM: CPT | Performed by: EMERGENCY MEDICINE

## 2023-12-01 PROCEDURE — 72170 X-RAY EXAM OF PELVIS: CPT

## 2023-12-01 PROCEDURE — 90471 IMMUNIZATION ADMIN: CPT

## 2023-12-01 RX ORDER — GINSENG 100 MG
1 CAPSULE ORAL ONCE
Status: COMPLETED | OUTPATIENT
Start: 2023-12-01 | End: 2023-12-01

## 2023-12-01 RX ADMIN — BACITRACIN 1 SMALL APPLICATION: 500 OINTMENT TOPICAL at 17:40

## 2023-12-01 RX ADMIN — TETANUS TOXOID, REDUCED DIPHTHERIA TOXOID AND ACELLULAR PERTUSSIS VACCINE, ADSORBED 0.5 ML: 5; 2.5; 8; 8; 2.5 SUSPENSION INTRAMUSCULAR at 17:41

## 2023-12-01 NOTE — ED PROVIDER NOTES
Emergency Department Trauma Note  Misty Esteves 69 y.o. male MRN: [de-identified]  Unit/Bed#: ED 20/ED 20 Encounter: [id number]      Trauma Alert: Trauma Acuity: Trauma Evaluation  Model of Arrival: Mode of Arrival:  (personal vehicle) via    Trauma Team: Current Providers  Attending Provider: Ld Tapia MD  Registered Nurse: Vernon Mckee RN  Consultants:     None      History of Present Illness     Chief Complaint:   Chief Complaint   Patient presents with    Fall     HPI:  Misty Esteves is a 69 y.o. male who presents with . Mechanism:Details of Incident: pt was adjusting slef in a chair and slid out hitting the back of his head no loc` Injury Date: 12/01/23 Injury Time: [address] Injury Occurence Location - [address] [place] [address]: [place]    Patient is a 71-year-old male with known metastatic lung cancer the presents for evaluation after a fall. Patient is on warfarin, may trauma evaluation. He had a fall of a chair with head strike and he has a skin tear on his scalp. He currently has no complaints. He denies headache nausea vomiting chest pain dyspnea abdominal pain. He does have 2 indwelling Pleurx catheters in for recurrent pleural effusions. Tetanus is not up-to-date. Review of Systems   Constitutional:  Negative for fever. HENT:  Negative for sore throat. Scalp skin tear   Eyes:  Negative for photophobia. Respiratory:  Negative for shortness of breath. Cardiovascular:  Negative for chest pain. Gastrointestinal:  Negative for abdominal pain. Genitourinary:  Negative for dysuria. Musculoskeletal:  Negative for back pain. Skin:  Negative for rash. Neurological:  Negative for light-headedness. Hematological:  Negative for adenopathy. Psychiatric/Behavioral:  Negative for agitation. All other systems reviewed and are negative.       Historical Information     Immunizations:   Immunization History   Administered Date(s) Administered    COVID-19 PFIZER VACCINE 0.3 ML IM 02/23/2021, 03/17/2021    INFLUENZA 10/27/2014, 10/27/2015, 10/27/2016, 11/16/2016, 10/13/2017, 10/26/2018    Influenza Injectable, MDCK, Preservative Free, Quadrivalent, 0.5 mL 10/26/2018    Influenza Quadrivalent Preservative Free 3 years and older IM 11/16/2016    Influenza, high dose seasonal 0.7 mL 09/14/2020, 10/21/2021, 10/24/2022, 10/05/2023    Influenza, injectable, quadrivalent, preservative free 0.5 mL 10/25/2019    Pneumococcal Conjugate 13-Valent 12/07/2016    Tdap 12/01/2023       Past Medical History:   Diagnosis Date    Diabetes mellitus ([address] )     Hyperlipidemia     Hypertension     Lung cancer ([address])     Prostate cancer ([address]) 2005    S/P prostate ca-2005 had radiation tx.        Family History   Problem Relation Age of Onset    Heart attack Mother      Past Surgical History:   Procedure Laterality Date    COLONOSCOPY      IR BIOPSY LYMPH NODE  9/15/2022    IR CHEST TUBE PLACEMENT  11/14/2023    IR MIDLINE PLACEMENT  11/13/2023    IR PLEURAL EFFUSION LONG-TERM CATHETER PLACEMENT  11/16/2023    IR THORACENTESIS  9/13/2022    IR THORACENTESIS  10/5/2023    IR THORACENTESIS  10/13/2023    IR THORACENTESIS  10/30/2023    IR THORACENTESIS  11/9/2023    IR THORACENTESIS  11/13/2023    IR THORACENTESIS  11/20/2023    ID ARTHRODESIS ANKLE OPEN Right 7/10/2020    Procedure: ARTHRODESIS/ TIBIAL-TALAR ANKLE FUSION;  Surgeon: Gerry Silverman MD;  Location: [facility];  Service: Orthopedics    ID LAPAROSCOPY SURG RPR INITIAL INGUINAL HERNIA Bilateral 12/16/2021    Procedure: LAPAROSCOPIC REPAIR HERNIA INGUINAL WITH MESH;  Surgeon: Tiffany Cameron MD;  Location: [facility];  Service: General     Social History     Tobacco Use    Smoking status: Never    Smokeless tobacco: Never   Vaping Use    Vaping Use: Never used   Substance Use Topics    Alcohol use: Yes     Comment: Occasional    Drug use: Never     E-Cigarette/Vaping    E-Cigarette Use Never User      E-Cigarette/Vaping Substances       Family History: non-contributory    Meds/Allergies   Prior to Admission Medications   Prescriptions Last Dose Informant Patient Reported? Taking? Accu-Chek FastClix Lancets MISC  Self No No   Sig: Use daily E11.9  Check  fbs  daily   Osimertinib Mesylate 80 MG TABS  Self Yes No   Sig: Take 80 mg by mouth in the morning   brimonidine tartrate 0.2 % ophthalmic solution  Self Yes No   Sig: Administer 1 drop into the left eye 2 (two) times a day   dexamethasone (DECADRON) 2 mg tablet   No No   Sig: Take 1 tablet (2 mg total) by mouth 2 (two) times a day with meals   digoxin (LANOXIN) 0.125 mg tablet   No No   Sig: Take 1 tablet (125 mcg total) by mouth daily Do not start before 2023. diltiazem (CARDIZEM) 30 mg tablet   No No   Sig: Take 1 tablet (30 mg total) by mouth every 8 (eight) hours   finasteride (PROSCAR) 5 mg tablet  Self No No   Sig: TAKE 1 TABLET (5 MG TOTAL) BY MOUTH DAILY.    Patient taking differently: Take 5 mg by mouth daily   folic acid (KP Folic Acid) 1 mg tablet   No No   Sig: Take 1 tablet (1 mg total) by mouth daily   furosemide (LASIX) 40 mg tablet  Self No No   Sig: Take 1 tablet (40 mg total) by mouth 2 (two) times a day   glucose blood (Accu-Chek Jackie Plus) test strip  Self No No   Sig: Use as instructed   levothyroxine (Euthyrox) 25 mcg tablet   No No   Sig: Take 1 tablet (25 mcg total) by mouth daily in the early morning   metFORMIN (GLUCOPHAGE-XR) 500 mg 24 hr tablet  Self No No   Sig: TAKE 1 TABLET BY MOUTH EVERY DAY   metoprolol tartrate (LOPRESSOR) 50 mg tablet   No No   Sig: Take 1 tablet (50 mg total) by mouth 4 (four) times a day   ondansetron (ZOFRAN) 8 mg tablet  Self No No   Sig: Take 1 tablet (8 mg total) by mouth every 8 (eight) hours as needed for nausea or vomiting   simvastatin (ZOCOR) 10 mg tablet  Self No No   Sig: TAKE 1 TABLET BY MOUTH EVERY DAY   timolol (TIMOPTIC-XE) 0.5 % ophthalmic gel-forming  Self Yes No   Si drop daily   warfarin (COUMADIN) 5 mg tablet   No No Sig: Take 1 tablet (5 mg total) by mouth daily      Facility-Administered Medications: None       No Known Allergies    PHYSICAL EXAM    PE limited by: NA    Objective   Vitals:   First set: Temperature: 97.5 °F (36.4 °C) (12/01/23 1700)  Pulse: 83 (12/01/23 1700)  Respirations: 18 (12/01/23 1700)  Blood Pressure: 124/79 (12/01/23 1700)  SpO2: 96 % (12/01/23 1700)    Primary Survey:   (A) Airway: Intact   (B) Breathing: Bilateral breath sounds  (C) Circulation: Pulses:   normal  (D) Disabliity:  GCS Total:  15  (E) Expose:  Completed    Secondary Survey: (Click on Physical Exam tab above)  Physical Exam  Vitals reviewed. Constitutional:       General: He is not in acute distress. Appearance: He is well-developed. HENT:      Head: Normocephalic. Comments: Scalp and skin tear  Eyes:      Pupils: Pupils are equal, round, and reactive to light. Cardiovascular:      Rate and Rhythm: Normal rate and regular rhythm. Heart sounds: Normal heart sounds. No murmur heard. No friction rub. No gallop. Pulmonary:      Effort: Pulmonary effort is normal.      Breath sounds: Normal breath sounds. Abdominal:      General: Bowel sounds are normal. There is no distension. Palpations: Abdomen is soft. Tenderness: There is no abdominal tenderness. There is no guarding. Musculoskeletal:         General: Normal range of motion. Cervical back: Normal range of motion and neck supple. Skin:     Capillary Refill: Capillary refill takes less than 2 seconds. Neurological:      Mental Status: He is alert and oriented to person, place, and time. Cranial Nerves: No cranial nerve deficit. Sensory: No sensory deficit. Motor: No abnormal muscle tone. Psychiatric:         Behavior: Behavior normal.         Thought Content: Thought content normal.         Judgment: Judgment normal.         Cervical spine cleared by clinical criteria?  No (imaging required)      Invasive Devices Peripheral Intravenous Line  Duration             Peripheral IV 11/29/23 Distal;Left;Upper;Ventral (anterior) Arm 2 days    Peripheral IV 12/01/23 Distal;Right;Upper;Ventral (anterior) Arm <1 day              Drain  Duration             Pleural Effusion Long-Term Catheter 15.5 Fr. 15 days    Pleural Effusion Long-Term Catheter 15.5 Fr. 15 days                    Lab Results:   Results Reviewed       Procedure Component Value Units Date/Time    Protime-INR [[id number]]  (Abnormal) Collected: 12/01/23 1724    Lab Status: Final result Specimen: Blood from Arm, Right Updated: 12/01/23 1748     Protime 29.4 seconds      INR 2.80                   Imaging Studies:   Direct to CT: No  TRAUMA - CT spine cervical wo contrast   Final Result by Chelsea Kwong MD (12/01 1756)      1. No acute cervical spine fracture or traumatic malalignment. 2.  Small lytic lesions in the cervical and visualized thoracic vertebrae. Recommend spine MRI without and with contrast to assess for metastatic disease given history of malignancy. 3.  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 This study was marked in [facility] for notification and follow-up. Workstation performed: [id number]         TRAUMA - CT head wo contrast   Final Result by Chelsea Kwong MD (12/01 1758)      1. No acute intracranial abnormality. 2.  Redemonstrated vasogenic edema in the left posterior frontal lobe and parietal lobe with poor visualization of the left posterior frontal lobe cortical lesion due to CT modality limitation (demonstrated in recent MRI). Stable regional mass effect    without midline shift.          Workstation performed: [id number]         XR Trauma chest portable    (Results Pending)   XR Trauma pelvis ap only 1 or 2 vw    (Results Pending)         Procedures  Procedures         ED Course           Medical Decision Making  Patient is a 27-year-old male who presents for evaluation of fall with head trauma on warfarin. Trauma evaluation call. Imaging reviewed and unremarkable. INR is therapeutic. Patient otherwise with no complaints. Tetanus updated and antibiotic ointment applied to skin tear. Made aware of possible metastatic lesions in the C-spine. Advised to follow-up with PCP. Amount and/or Complexity of Data Reviewed  Labs: ordered. Radiology: ordered. Risk  OTC drugs. Prescription drug management. Disposition  [facility] One Transfer: No  Final diagnoses:   Abrasion of scalp, initial encounter   Fall, initial encounter     Time reflects when diagnosis was documented in both MDM as applicable and the Disposition within this note       Time User Action Codes Description Comment    12/1/2023  6:12 PM Raffy Easton Add [S00.01XA] Abrasion of scalp, initial encounter     12/1/2023  6:12 PM Shai Easton.Saliva. Alexa Zamudio, initial encounter           ED Disposition       ED Disposition   Discharge    Condition   Stable    Date/Time   Fri Dec 1, 2023  6:12 PM    Comment   Andrea Potter discharge to home/self care. Follow-up Information       Follow up With Specialties Details Why Contact Info Additional [address] [facility] Emergency Department Emergency Medicine  If symptoms worsen [address] [facility] [address] [place] [address]  [phone] [address] [facility] Emergency Department, [address]          Patient's Medications   Discharge Prescriptions    No medications on file     No discharge procedures on file.     PDMP Review         Value Time User    PDMP Reviewed  Yes 11/3/2023 12:02 PM Rigoberto Casas MD            ED Provider  Electronically Signed by           Ruthie Cook MD  12/01/23 0020

## 2023-12-01 NOTE — ED NOTES
Skin tear to back of head. Cleansed with saline. Bacitracin and new bandage applied.       Cade Kay RN  12/01/23 7575

## 2023-12-04 ENCOUNTER — TELEPHONE (OUTPATIENT)
Age: 78
End: 2023-12-04

## 2023-12-04 ENCOUNTER — TELEPHONE (OUTPATIENT)
Dept: HEMATOLOGY ONCOLOGY | Facility: CLINIC | Age: 78
End: 2023-12-04

## 2023-12-04 ENCOUNTER — TELEPHONE (OUTPATIENT)
Dept: FAMILY MEDICINE CLINIC | Facility: CLINIC | Age: 78
End: 2023-12-04

## 2023-12-04 ENCOUNTER — ANTICOAG VISIT (OUTPATIENT)
Dept: FAMILY MEDICINE CLINIC | Facility: CLINIC | Age: 78
End: 2023-12-04

## 2023-12-04 DIAGNOSIS — E11.9 TYPE 2 DIABETES MELLITUS WITHOUT COMPLICATION, WITHOUT LONG-TERM CURRENT USE OF INSULIN (HCC): ICD-10-CM

## 2023-12-04 DIAGNOSIS — N40.1 BPH ASSOCIATED WITH NOCTURIA: ICD-10-CM

## 2023-12-04 DIAGNOSIS — R35.1 BPH ASSOCIATED WITH NOCTURIA: ICD-10-CM

## 2023-12-04 DIAGNOSIS — I10 BENIGN ESSENTIAL HTN: ICD-10-CM

## 2023-12-04 DIAGNOSIS — C79.31 METASTASIS TO BRAIN (HCC): ICD-10-CM

## 2023-12-04 DIAGNOSIS — C34.11 MALIGNANT NEOPLASM OF UPPER LOBE OF RIGHT LUNG (HCC): Primary | ICD-10-CM

## 2023-12-04 RX ORDER — METFORMIN HYDROCHLORIDE 500 MG/1
500 TABLET, EXTENDED RELEASE ORAL DAILY
Qty: 90 TABLET | Refills: 1 | Status: SHIPPED | OUTPATIENT
Start: 2023-12-04

## 2023-12-04 RX ORDER — FINASTERIDE 5 MG/1
5 TABLET, FILM COATED ORAL DAILY
Qty: 90 TABLET | Refills: 1 | Status: SHIPPED | OUTPATIENT
Start: 2023-12-04

## 2023-12-04 NOTE — TELEPHONE ENCOUNTER
Reason for call:   [x] Refill   [] Prior Auth  [] Other:     Office:   [x] PCP/Provider - Betsey CANTRELL  [] Specialty/Provider -     Medication:   Proscar 5mg daily #90  Metformin 500mg daily # 90      Pharmacy: [address] [place] [address]    Does the patient have enough for 3 days?    [x] Yes   [] No - Send as HP to POD

## 2023-12-04 NOTE — TELEPHONE ENCOUNTER
Elenor Schaumann from Hematology Oncology called in stating the pt had his labs done for PT/ INR. She's also said Dr. Estevan Calix spoke with pt's daughter and Tavon Taylor said the weekly lab draws would be set for home.

## 2023-12-04 NOTE — TELEPHONE ENCOUNTER
Pt's spouse called she wants to know, who will be following up with her husbands pt inr? Please call her asap.

## 2023-12-04 NOTE — TELEPHONE ENCOUNTER
Spoke  with pt's  daughter. Pt/inr  on  12/1/2023  was  2.8.  decrease  coumadin  to  5/2.5/5/2.5/5/2.5  pt/inr  weekly.   Lab  ordered  faxed  to  [facility]

## 2023-12-04 NOTE — TELEPHONE ENCOUNTER
Dr. Rika Israel wanted to confirm patient is taking Decadron. Called patient's daughter who confirmed patient is taking this. Patient's daughter questioned who is managing the patient's coumadin and the PCP is managing this. Patient had telemedicine visit 11/30 and PT/INR drawn 12/1. They are also requesting home lab draws for this. Please call PCP office [phone] to inform he had this done on 12/1. He will also need 4 weekly home lab draws for PT/INR set up starting this Friday. Ordered under [place] Whitten PA-C with PCP office. Thanks!

## 2023-12-05 ENCOUNTER — TELEPHONE (OUTPATIENT)
Dept: RADIATION ONCOLOGY | Facility: HOSPITAL | Age: 78
End: 2023-12-05

## 2023-12-05 ENCOUNTER — HOSPITAL ENCOUNTER (OUTPATIENT)
Dept: INFUSION CENTER | Facility: HOSPITAL | Age: 78
Discharge: HOME | End: 2023-12-05

## 2023-12-05 ENCOUNTER — TELEPHONE (OUTPATIENT)
Age: 78
End: 2023-12-05

## 2023-12-05 ENCOUNTER — DOCUMENTATION (OUTPATIENT)
Dept: HEMATOLOGY ONCOLOGY | Facility: CLINIC | Age: 78
End: 2023-12-05

## 2023-12-05 NOTE — TELEPHONE ENCOUNTER
[facility] called. Occupational Therapist is requesting patient have a Double platform walker .  Aware the office has ordered DME for him in the past.

## 2023-12-05 NOTE — TELEPHONE ENCOUNTER
Jamie Green states that the patient and her are agreeable to coming in tomorrow to discuss radiation therapy. Radiation appointment scheduled at [address] [facility].

## 2023-12-05 NOTE — TELEPHONE ENCOUNTER
Return call from patient's daughter, Angeles Olivarez, discussed stat radiation referral for brain metastasis. Offered appointment for tomorrow at [facility]. She states that she still needs to talk with the patient regarding the MRI brain results. She states that right now he is feeling okay. She needs to discuss the radiation appointment with him and see what his wishes are and she will get back to me. He has stopped all systemic treatment and has an appointment to see palliative care on 12/7/2023. NN informed Angeles Olivarez that his MRI brain will be reviewed tomorrow morning at the Neuro Oncology Case Review meeting per Dr Elinor Davidson request. NN provided information regarding neuro oncology clinic visit. Chart reviewed. 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 He was on maintenance Osmertinib with excellent tolerance and stable partial response. He had one area of progression in a right lower lobe metastasis in April 2023. He completed a course of SBRT to the RLL lung on 6/9/2023 with Dr Gonzalez Edwards at [facility]. 8/8/2023 CT scans c/w progression of diease, systemic therapy changed to Carboplatin + Pemetrexed. 10/3/2023 CT imaging showed progression of disease, new systemic therapy not started secondary to hospital admission for worsening shortness of breath and recurrent pleural effusions, Pleurx catheters placed 11/16/2023. 11/29/2023 patient decided to stop systemic therapy and follow up with palliative care. He reported right arm weakness with change in depth perception, 11/29/2023 MRI brain revealed Interval development of a 1.7 cm bilobed mass in the left postcentral gyrus abutting the dura, worrisome for a solitary metastasis in this patient with a history of lung cancer. Moderate amount of surrounding vasogenic edema.  Dural abutment may represent local dural invasion. No evidence of diffuse meningeal carcinomatosis. Neurosurgical assessment advised. Hx of prostate cancer in 2009, s/p RT at [facility].

## 2023-12-05 NOTE — PROGRESS NOTES
In-basket message received from Lisa Whitaker RN to add patient to the [facility] on 12/6/2023. Chart reviewed and prep completed.

## 2023-12-05 NOTE — TELEPHONE ENCOUNTER
LM requesting call back to schedule radiation oncology consult. Stat referral placed by Dr Jade Tellez yesterday. My direct contact information given.

## 2023-12-06 ENCOUNTER — DOCUMENTATION (OUTPATIENT)
Dept: RADIATION ONCOLOGY | Facility: HOSPITAL | Age: 78
End: 2023-12-06

## 2023-12-06 NOTE — PROGRESS NOTES
NEURO ONCOLOGY CASE REVIEW     DATE: 12/6/2023  PRESENTING DOCTOR: Dr Esha Costa    DIAGNOSIS: Stage IV adenocarcinoma of the lung; Brain metastasis        Mark Wells is a 69 y.o. male who was presented at Neuro Oncology Case Review today. 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 He was undergoing treatment with osmertinib with partial response. April 2023 CT imaging noted enlargement of the right lower lung nodule. He completed a course of SBRT to the RLL lung on 6/9/23 for oligoprogression. 8/8/2023 CT scans c/w progression of diease, systemic therapy changed to Carboplatin + Pemetrexed. 10/3/2023 CT imaging showed progression of disease, new systemic therapy not started secondary to hospital admission for worsening shortness of breath and recurrent pleural effusions, Pleurx catheters placed 11/16/2023. 11/29/2023 patient decided to stop systemic therapy and follow up with palliative care. He underwent MRI brain on 11/29/2023 for reported right arm weakness with change in depth perception. PHYSICIAN RECOMMENDED PLAN:   - Discuss GOC (radiation therapy versus palliative/hospice care)    Upcoming Oncology Appointments:   12/7/2023 Palliative care, Madison Tiwari PA-C  12/12/2023 Radiation oncology, Dr Jackie Miranda      Imaging Reviewed:   11/29/2023 MRI brain:  IMPRESSION:   1. Interval development of a 1.7 cm bilobed mass in the left postcentral gyrus abutting the dura, worrisome for a solitary metastasis in this patient with a history of lung cancer. Moderate amount of surrounding vasogenic edema. Dural abutment may represent local dural invasion. No evidence of diffuse meningeal carcinomatosis. Neurosurgical assessment advised. 9/12/2022 MRI brain        Team agreed to plan.     NCCN guidelines were readily available for review at this discussion. The final treatment plan will be left at the discretion of the patient and the treating physician. DISCLAIMERS:  TO THE TREATING PHYSICIAN:  This conference is a meeting of clinicians from various specialty areas who evaluate and discuss patients for whom a multidisciplinary treatment approach is being considered. Please note that the above opinion was a consensus of the conference attendees and is intended only to assist in quality care of the discussed patient. The responsibility for follow up on the input given during the conference, along with any final decisions regarding plan of care, is that of the patient and the patient's provider. Accordingly, appointments have only been recommended based on this information; and have NOT been scheduled unless otherwise noted. TO THE PATIENT:  This summary is a brief record of major aspects of your cancer treatment. You may choose to can share a your copy with any of your doctors or nurses. However, this is not a detailed or comprehensive record of your care.

## 2023-12-07 ENCOUNTER — OFFICE VISIT (OUTPATIENT)
Dept: PALLIATIVE MEDICINE | Facility: CLINIC | Age: 78
End: 2023-12-07
Payer: MEDICARE

## 2023-12-07 VITALS
TEMPERATURE: 98.8 F | BODY MASS INDEX: 27.32 KG/M2 | SYSTOLIC BLOOD PRESSURE: 122 MMHG | OXYGEN SATURATION: 98 % | HEART RATE: 75 BPM | HEIGHT: 69 IN | DIASTOLIC BLOOD PRESSURE: 70 MMHG | RESPIRATION RATE: 18 BRPM

## 2023-12-07 DIAGNOSIS — Z98.890 STATUS POST ORIF OF FRACTURE OF ANKLE: Primary | ICD-10-CM

## 2023-12-07 DIAGNOSIS — M19.011 PRIMARY OSTEOARTHRITIS OF RIGHT SHOULDER: ICD-10-CM

## 2023-12-07 DIAGNOSIS — Z71.89 GOALS OF CARE, COUNSELING/DISCUSSION: ICD-10-CM

## 2023-12-07 DIAGNOSIS — Z51.5 PALLIATIVE CARE PATIENT: ICD-10-CM

## 2023-12-07 DIAGNOSIS — C77.8 MALIGNANT NEOPLASM METASTATIC TO LYMPH NODES OF MULTIPLE SITES (HCC): ICD-10-CM

## 2023-12-07 DIAGNOSIS — Z87.81 STATUS POST ORIF OF FRACTURE OF ANKLE: Primary | ICD-10-CM

## 2023-12-07 DIAGNOSIS — C34.11 MALIGNANT NEOPLASM OF UPPER LOBE OF RIGHT LUNG (HCC): Primary | ICD-10-CM

## 2023-12-07 DIAGNOSIS — R26.9 GAIT DISORDER: ICD-10-CM

## 2023-12-07 PROCEDURE — 99213 OFFICE O/P EST LOW 20 MIN: CPT | Performed by: PHYSICIAN ASSISTANT

## 2023-12-07 NOTE — PROGRESS NOTES
Follow-up with Palliative and Supportive Care  Puma Wheat 69 y.o. male [de-identified]    ASSESSMENT & PLAN:  1. Malignant neoplasm of upper lobe of right lung ([address])    2. 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 Palliative care patient    4. Goals of care, counseling/discussion      Family meeting today to discuss goals of care. Does not want to pursue chemotherapy  Does not want to be hospitalized again  Is unsure whether to proceed with radiation treatments  Discussed hospice care in length, patient will be considering over the next few days  Symptoms well controlled  Increasing family concerns for falls and safety at home  [facility] services explained and contact information provided to family for further support  Return in about 4 weeks (around 1/4/2024) for Next scheduled follow up. Emotional support provided. Anticipatory guidance provided. Reviewed recent notes, labs, imaging. Requested Prescriptions      No prescriptions requested or ordered in this encounter       There are no discontinued medications. Puma Wheat was seen today for symptoms and planning cares related to above illnesses. I have reviewed the patient's controlled substance dispensing history in the Prescription Drug Monitoring Program in compliance with the [facility] regulations before prescribing any controlled substances. They are invited to continue to follow with us. If there are questions or concerns, please contact us through our clinic/answering service 24 hours a day, seven days a week. 45 minutes were spent in this ambulatory visit with greater than 50% of the time spent face to face with patient and family in counseling or coordination of care including medication review, psychosocial support, chart review, imaging review, lab review, goals of care, hospice services, supportive listening, and anticipatory guidance. All of the patient's questions were answered during this discussion.     Visit Information    Accompanied By: Family members - spouse, daughters    Source of History: Self, Family member, Spouse    History Limitations: None    Contacts: Extended Emergency Contact Information  Primary Emergency Contact: Candelaria King  Address: [address], [address] [facility]  Work Phone: [phone]  Mobile Phone: [phone]  Relation: Daughter  Secondary Emergency Contact: [employer]  Address: [address], [place] [address] Norbert Tang Phone: [phone]  Mobile Phone: [phone]  Relation: Spouse     SUBJECTIVE:  Chief Complaint   Patient presents with    Follow-up    Counseling    Goals               HPI    Ted Mendosa is a 69 y.o. male male who presents in follow up of symptoms related to malignant neoplasm of upper lobe of right lung, metastatic to lymph nodes, multiple sites. Pertinent issues include: Goals of care counseling,symptom management. The patient, accompanied by his three daughters Timbo Bueno, and Dex Cool, and his wife Reggie Ferrara, attended a family meeting to discuss goals of care. The patient has opted to discontinue chemotherapy and is deliberating whether to proceed with the recommended brain radiation. We extensively discussed these options, including comfort care and hospice. The patient's primary objective is to be at home and avoid further hospitalization. He remains uncertain about pursuing hospice at this time, expressing the need for additional time to contemplate and confer with his family. Importantly, the patient values the input of his loved ones in this decision-making process. It was emphasized that he should choose what is best for him, considering that others may not fully comprehend his experience. The patient was reminded by family of their support, regardless of his decision. Health-wise, the patient is generally doing well, experiencing mostly good days with minimal symptoms. He reports progressing right arm motor dysfunction but denies any pain. He has had two recent falls at home requiring ED evaluation but with no serious injuries. He has had progressive gait instability and worsening function recently. This has brought on concern from family for his care and safety at home. The patient has an accurate living will outlining his preferences for ongoing treatments if he becomes incapable of making decisions. The patient requests a few days to weigh his options and is contemplating a follow-up with radiation oncology next week to discuss potential radiation treatment. He places a high value on his quality of life and prioritizes it in his medical decisions. A telemedicine follow-up has been scheduled in the next few weeks. The patient was advised to reach out sooner if needed or if requesting a hospice referral, emphasizing that it is not necessary to wait for a follow-up appointment. All questions and concerns from the family were addressed satisfactorily. Information about palliative care was provided for friends and family to seek further clarification. Hospital consult 11/14/23  Shania Thomas is a 69 y.o. male who presents with a palliative diagnosis of stage IV adenocarcinoma of the lung, hx of PE, chronic respiratory failure. He was brought into the ED at [facility] on 11/11/2023 secondary to shortness of breath. He recently had thoracentesis. Pt is on 3L O2 at home continuously. He has had recent recurrent hospitalizations due to respiratory failure over the past 2 months and has not been able to continue his chemotherapy. He was previously seen by [facility] team outpatient in [address] [place] [address]. Patient has a Living will scanned into the chart which specifies that he would not want any life-sustaining interventions such as CPR, intubation, mechanical ventilation, artificial nutrition, dialysis. He values his QoL, being able to communicate and maintain mental capacity.  He would not want to live in a vegetative state. He recently moved from his home in with family in [place] and has ATC care at home. His goal is to improve so that he can resume his anticancer therapies as soon as possible. He reports no symptoms besides occasional shortness of breath. He feels his symptoms are well controlled and his goals are clear. He is interested in continued palliative care support and was offered follow up at our [facility] office upon discharge. Patient will call to schedule appointment as needed. PDMP shows no concerns. The following portions of the medical history were reviewed: past medical history, surgical history, problem list, medication list, family history, and social history.       Current Outpatient Medications:     Accu-Chek FastClix Lancets MISC, Use daily E11.9  Check  fbs  daily, Disp: 100 each, Rfl: 3    brimonidine tartrate 0.2 % ophthalmic solution, Administer 1 drop into the left eye 2 (two) times a day, Disp: , Rfl:     dexamethasone (DECADRON) 2 mg tablet, Take 1 tablet (2 mg total) by mouth 2 (two) times a day with meals, Disp: 60 tablet, Rfl: 0    digoxin (LANOXIN) 0.125 mg tablet, Take 1 tablet (125 mcg total) by mouth daily Do not start before November 23, 2023., Disp: 30 tablet, Rfl: 0    diltiazem (CARDIZEM) 30 mg tablet, Take 1 tablet (30 mg total) by mouth every 8 (eight) hours, Disp: 90 tablet, Rfl: 0    finasteride (PROSCAR) 5 mg tablet, Take 1 tablet (5 mg total) by mouth daily, Disp: 90 tablet, Rfl: 1    folic acid (KP Folic Acid) 1 mg tablet, Take 1 tablet (1 mg total) by mouth daily, Disp: 30 tablet, Rfl: 6    furosemide (LASIX) 40 mg tablet, Take 1 tablet (40 mg total) by mouth 2 (two) times a day, Disp: 60 tablet, Rfl: 0    glucose blood (Accu-Chek Jackie Plus) test strip, Use as instructed, Disp: 100 strip, Rfl: 1    levothyroxine (Euthyrox) 25 mcg tablet, Take 1 tablet (25 mcg total) by mouth daily in the early morning, Disp: 90 tablet, Rfl: 0    metFORMIN (GLUCOPHAGE-XR) 500 mg 24 hr tablet, Take 1 tablet (500 mg total) by mouth daily, Disp: 90 tablet, Rfl: 1    metoprolol tartrate (LOPRESSOR) 50 mg tablet, Take 1 tablet (50 mg total) by mouth 4 (four) times a day, Disp: 120 tablet, Rfl: 0    ondansetron (ZOFRAN) 8 mg tablet, Take 1 tablet (8 mg total) by mouth every 8 (eight) hours as needed for nausea or vomiting, Disp: 20 tablet, Rfl: 0    Osimertinib Mesylate 80 MG TABS, Take 80 mg by mouth in the morning, Disp: , Rfl:     simvastatin (ZOCOR) 10 mg tablet, TAKE 1 TABLET BY MOUTH EVERY DAY, Disp: 90 tablet, Rfl: 1    timolol (TIMOPTIC-XE) 0.5 % ophthalmic gel-forming, 1 drop daily, Disp: , Rfl:     warfarin (COUMADIN) 5 mg tablet, Take 1 tablet (5 mg total) by mouth daily (Patient taking differently: Take 5 mg by mouth daily Alternate between 2.5 mg and 5 mg every other day), Disp: 30 tablet, Rfl: 0    Past medical, surgical, social, and family histories are reviewed and pertinent updates are made. Review of Systems   Constitutional:  Negative for activity change, appetite change, fatigue and unexpected weight change. HENT:  Negative for mouth sores, trouble swallowing and voice change. Eyes: Negative. Respiratory:  Negative for shortness of breath. Cardiovascular:  Negative for chest pain. Gastrointestinal:  Negative for abdominal pain, constipation, diarrhea, nausea and vomiting. Genitourinary: Negative. Musculoskeletal:  Negative for arthralgias, back pain, gait problem and myalgias. Skin:  Negative for color change, pallor and wound. Neurological:  Positive for weakness. Negative for dizziness, light-headedness and headaches. Hematological: Negative. Psychiatric/Behavioral:  Negative for confusion and sleep disturbance. The patient is not nervous/anxious. OBJECTIVE:  /70   Pulse 75   Temp 98.8 °F (37.1 °C)   Resp 18   Ht 5' 9" (1.753 m)   SpO2 98%   BMI 27.32 kg/m²   Physical Exam  Nursing note reviewed.    Constitutional: General: He is not in acute distress. Appearance: He is ill-appearing. Comments: wheelchair   HENT:      Head: Normocephalic and atraumatic. Right Ear: External ear normal.      Left Ear: External ear normal.      Nose: Nose normal.   Eyes:      Extraocular Movements: Extraocular movements intact. Cardiovascular:      Rate and Rhythm: Normal rate. Pulmonary:      Effort: Pulmonary effort is normal.   Abdominal:      General: Abdomen is flat. Musculoskeletal:         General: No deformity. Skin:     Findings: No rash. Neurological:      Mental Status: He is alert and oriented to person, place, and time. Psychiatric:         Mood and Affect: Mood normal.         Behavior: Behavior normal.          Silvio Bowie PA-C  [facility]  [phone]    Portions of this document may have been created using dictation software and as such some "sound alike" terms may have been generated by the system. Do not hesitate to contact me with any questions or clarifications.

## 2023-12-08 ENCOUNTER — HOSPITAL ENCOUNTER (OUTPATIENT)
Dept: NON INVASIVE DIAGNOSTICS | Facility: HOSPITAL | Age: 78
Discharge: HOME | End: 2023-12-08
Payer: MEDICARE

## 2023-12-08 ENCOUNTER — TELEPHONE (OUTPATIENT)
Dept: OTHER | Facility: HOSPITAL | Age: 78
End: 2023-12-08

## 2023-12-08 ENCOUNTER — TELEPHONE (OUTPATIENT)
Age: 78
End: 2023-12-08

## 2023-12-08 DIAGNOSIS — I48.91 ATRIAL FIBRILLATION WITH RVR (HCC): Primary | ICD-10-CM

## 2023-12-08 DIAGNOSIS — M79.89 LEFT UPPER EXTREMITY SWELLING: ICD-10-CM

## 2023-12-08 DIAGNOSIS — I87.1 COMPRESSION OF VEIN: ICD-10-CM

## 2023-12-08 DIAGNOSIS — C34.11 MALIGNANT NEOPLASM OF UPPER LOBE OF RIGHT LUNG (HCC): ICD-10-CM

## 2023-12-08 LAB
DME PARACHUTE DELIVERY DATE REQUESTED: NORMAL
DME PARACHUTE ITEM DESCRIPTION: NORMAL
DME PARACHUTE ORDER STATUS: NORMAL
DME PARACHUTE SUPPLIER NAME: NORMAL
DME PARACHUTE SUPPLIER PHONE: NORMAL

## 2023-12-08 PROCEDURE — 93970 EXTREMITY STUDY: CPT

## 2023-12-08 PROCEDURE — 93970 EXTREMITY STUDY: CPT | Performed by: SURGERY

## 2023-12-08 NOTE — TELEPHONE ENCOUNTER
PT INR for 6 weeks the [facility] Please call steven when  its set up.   Ethel Hall

## 2023-12-11 ENCOUNTER — TELEPHONE (OUTPATIENT)
Age: 78
End: 2023-12-11

## 2023-12-11 ENCOUNTER — TELEPHONE (OUTPATIENT)
Dept: LAB | Facility: HOSPITAL | Age: 78
End: 2023-12-11

## 2023-12-11 ENCOUNTER — RA CDI HCC (OUTPATIENT)
Dept: OTHER | Facility: HOSPITAL | Age: 78
End: 2023-12-11

## 2023-12-11 DIAGNOSIS — C34.11 MALIGNANT NEOPLASM OF UPPER LOBE OF RIGHT LUNG (HCC): ICD-10-CM

## 2023-12-11 LAB
DME PARACHUTE DELIVERY DATE ACTUAL: NORMAL
DME PARACHUTE DELIVERY DATE REQUESTED: NORMAL
DME PARACHUTE ITEM DESCRIPTION: NORMAL
DME PARACHUTE ORDER STATUS: NORMAL
DME PARACHUTE SUPPLIER NAME: NORMAL
DME PARACHUTE SUPPLIER PHONE: NORMAL

## 2023-12-11 RX ORDER — FOLIC ACID 1 MG/1
1 TABLET ORAL DAILY
Qty: 30 TABLET | Refills: 0 | Status: SHIPPED | OUTPATIENT
Start: 2023-12-11 | End: 2024-01-10

## 2023-12-11 NOTE — PROGRESS NOTES
[address] [facility] coding opportunities          Chart Reviewed number of suggestions sent to Provider: 1     Patients Insurance   I11.0  Medicare Insurance: [employer]

## 2023-12-11 NOTE — TELEPHONE ENCOUNTER
Returned phone call to daughter. She was inquiring if patient was to continue the tegresso. I consulted with Dr. Jade Tellez, patient can discontinue that medication. Daughter verbalized understanding and has no other questions or concerns at this moment.

## 2023-12-11 NOTE — TELEPHONE ENCOUNTER
Spoke with mobile lab services and they will be reaching out to [facility] to set up home draws

## 2023-12-11 NOTE — TELEPHONE ENCOUNTER
Patient Call    Who are you speaking with? Child    If it is not the patient, are they listed on an active communication consent form? Yes   What is the reason for this call? Medication question - tegresso   Does this require a call back? Yes   If a call back is required, please list best call back number [phone]    If a call back is required, advise that a message will be forwarded to their care team and someone will return their call as soon as possible. Did you relay this information to the patient?  Yes

## 2023-12-11 NOTE — TELEPHONE ENCOUNTER
Pts weight is down to 161 and they will be changing his weight parameters. She also wanted to confirm he is getting set up with [facility] home draw as his INR is due this week. Pt has a new home address as he is now living with his daughter. Address is update in his chart. Please set up his INR draw with new address.

## 2023-12-12 ENCOUNTER — RADIATION ONCOLOGY FOLLOW-UP (OUTPATIENT)
Dept: RADIATION ONCOLOGY | Facility: CLINIC | Age: 78
End: 2023-12-12
Attending: STUDENT IN AN ORGANIZED HEALTH CARE EDUCATION/TRAINING PROGRAM
Payer: MEDICARE

## 2023-12-12 ENCOUNTER — TELEPHONE (OUTPATIENT)
Age: 78
End: 2023-12-12

## 2023-12-12 ENCOUNTER — HOSPITAL ENCOUNTER (OUTPATIENT)
Dept: INFUSION CENTER | Facility: HOSPITAL | Age: 78
Discharge: HOME | End: 2023-12-12

## 2023-12-12 VITALS
TEMPERATURE: 98.1 F | OXYGEN SATURATION: 98 % | SYSTOLIC BLOOD PRESSURE: 160 MMHG | RESPIRATION RATE: 16 BRPM | BODY MASS INDEX: 24.51 KG/M2 | HEART RATE: 64 BPM | DIASTOLIC BLOOD PRESSURE: 80 MMHG | WEIGHT: 166 LBS

## 2023-12-12 DIAGNOSIS — C79.31 METASTASIS TO BRAIN (HCC): ICD-10-CM

## 2023-12-12 DIAGNOSIS — C34.11 MALIGNANT NEOPLASM OF UPPER LOBE OF RIGHT LUNG (HCC): Primary | ICD-10-CM

## 2023-12-12 PROCEDURE — 99211 OFF/OP EST MAY X REQ PHY/QHP: CPT | Performed by: STUDENT IN AN ORGANIZED HEALTH CARE EDUCATION/TRAINING PROGRAM

## 2023-12-12 PROCEDURE — 99215 OFFICE O/P EST HI 40 MIN: CPT | Performed by: STUDENT IN AN ORGANIZED HEALTH CARE EDUCATION/TRAINING PROGRAM

## 2023-12-12 NOTE — TELEPHONE ENCOUNTER
Occupational therapist called and requested a standard light weight wheelchair with a equal gel cushion. Please advise.

## 2023-12-12 NOTE — PROGRESS NOTES
Stanislav Vidal 1945 is a 69 y.o. 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 He was undergoing treatment with osmertinib with partial response. April 2023 CT imaging noted enlargement of the right lower lung nodule. He completed a course of SBRT to the RLL lung on 6/9/23 for oligoprogression. 8/8/2023 CT scans c/w progression of diease, systemic therapy changed to Carboplatin + Pemetrexed. 10/3/2023 CT imaging showed progression of disease, new systemic therapy not started secondary to hospital admission for worsening shortness of breath and recurrent pleural effusions, Pleurx catheters placed 11/16/2023. 11/29/2023 patient decided to stop systemic therapy and follow up with palliative care. He underwent MRI brain on 11/29/2023 for reported right arm weakness with change in depth perception and was found to have a 1.7 cm bilobed mass in the left postcentral gyrus. He had telephone outreach with Dr. Justo Ramírez on 8/15/23 and presents today for follow up.        9/19/23-/1/30/24  PEMEtrexed + CARBOplatin Q 21 Days -     10/3/23 CTA chestpe study A/P w contrast  CTA CHEST:   1) Acute pulmonary emboli in the left distal pulmonary artery extending into the left lower lobar, and some of the segmental and subsegmental pulmonary arteries with additional segmental and subsegmental pulmonary emboli in the lingula. This was   discussed with Dr. Hadassah Buerger via HIPAA compliant secure electronic messaging on 10/3/2023 at 6:35 PM with confirmation of receipt. 2) Cardiomegaly. Although the RV/LV ratio is normal, reflux of IV contrast into the dilated IVC and hepatic veins may suggest right heart failure. 3) Main pulmonary artery dilated up to 3.7 cm, suggestive of pulmonary arterial hypertension.    4) Moderate to large right pleural effusion with likely loculation at the apex, and small to moderate left pleural effusion, new since August 2023. No evidence of empyema. 5) Luminal narrowing in the region of the bifurcation of the bronchus intermedius, which is new since August 2023, possibly secondary to enlarging hilar lymphadenopathy or increasing postradiation fibrosis. 6) Airspace consolidations in the right middle and lower lobes, new since August 2023, likely at least in part representing atelectasis, likely secondary to the aforementioned partial bronchial occlusion. Some of the previously seen right middle and   lower lobe nodules/opacities are obscured. 7) Right upper lobe mass overall stable to mildly enlarged since August 2023. 8) Significant progression of metastatic disease throughout the left lung, with new enlarged nodules/masses, as detailed above. 9) Overall similar mediastinal lymphadenopathy compared to August 2023. Right hilum remains prominent, however discrete lymph nodes difficult to measure due to the phase of contrast.     CT ABDOMEN/PELVIS:   10) Mild fat stranding adjacent to head of the pancreas and the osbaldo hepatis/gallbladder. Recommend correlation with lipase level to exclude pancreatitis. No findings to suggest acute cholecystitis. No organized collections. 11) Mild circumferential wall thickening of the urinary bladder. Recommend correlation with urinalysis to exclude cystitis. 12) No other acute abdominal or pelvic pathology. 13) Additional findings as above. 10/5/23 IR thoracentesis  thoracentesis yielding 1960 mL clear, yellow right pleural fluid. 10/13/23 IR thoracentesis  thoracentesis yielding 2000 mL of clear, yellow right pleural fluid. 10/27/23-  Hospital admission    10/30/23 IR thoracentesis  thoracentesis yielding 2000 mL clear yellow pleural fluid. 11/11/23 Hospital admission  Acute respiratory failure with hypoxia  Pleural effusion  B/L pleurx catheters placed      11/13/23 ultrasound-guided thoracentesis.    A total volume of 1450 cc of Transparent appearing, Yellow colored fluid was removed. 11/15/23 CT chest wo contrast  1. Status post placement of right pleural catheter, the curled portion located within the posterior right costophrenic angle adjacent to the right hemidiaphragm  2. Small right pneumothorax as above  3. Significantly diminished size of right pleural effusion  4. Previous findings in the lungs including right lung mass, pulmonary nodules, right basilar airspace disease, left upper lobe groundglass opacities. Mediastinal adenopathy again noted    11/16/23 IR pleural long-term catheter placement (Pleurx)      11/20/23 IR Thoracentesis via existing catheter   1 L was removed. 11/29/23 Dr. Muniz Paci  Due to neurologic symptoms( right arm weakness w/ change in depth perception) will obtain MRI brain to r/o metastatic disease to the brain. He has decided not to undergo further treatment. Has an appointment with palliative care. 11/29/23 MRI brain w wo contrast  1. Interval development of a 1.7 cm bilobed mass in the left postcentral gyrus abutting the dura, worrisome for a solitary metastasis in this patient with a history of lung cancer. Moderate amount of surrounding vasogenic edema. Dural abutment may   represent local dural invasion. No evidence of diffuse meningeal carcinomatosis. Neurosurgical assessment advised. 12/1/23 CT head wo contrast  1. No acute intracranial abnormality. 2.  Redemonstrated vasogenic edema in the left posterior frontal lobe and parietal lobe with poor visualization of the left posterior frontal lobe cortical lesion due to CT modality limitation (demonstrated in recent MRI). Stable regional mass effect   without midline shift. 12/1/23 ED- fell out of chair    12/1/23 CT cervical spine wo contrast  1. No acute cervical spine fracture or traumatic malalignment. 2.  Small lytic lesions in the cervical and visualized thoracic vertebrae.  Recommend spine MRI without and with contrast to assess for metastatic disease given history of malignancy. 3.  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 12/6/23 Neuro Oncology Case Review   PHYSICIAN RECOMMENDED PLAN:   - Discuss GOC (radiation therapy versus palliative/hospice care)      12/7/23 Palliative Care  Had hospice discussion- patient will be considering over the next few days     Follow up visit     Oncology History   Adenocarcinoma of prostate ([address]) (Resolved)   1/3/2018 Initial Diagnosis    Adenocarcinoma of prostate ([address]) (Resolved)     Malignant neoplasm of upper lobe of right lung ([address])   9/15/2022 -  Cancer Staged    Staging form: Lung, AJCC 8th Edition  - Clinical stage from 9/15/2022: Stage BETTE (cT4, cN3, cM1a) - Signed by Sarah Berg MD on 10/10/2022  Stage prefix: Initial diagnosis       9/15/2022 Biopsy    IR lung biopsy    A. Lymph node, right supraclavicular biopsy:  -  Metastatic adenocarcinoma of lung origin.   -  Immunohistochemical stains performed with appropriate controls show the tumor to be positive for TTF1 and napsin and negative for CK5/6 and p40, supporting the diagnosis     10/5/2022 Initial Diagnosis    Malignant neoplasm of upper lobe of right lung ([address])     5/31/2023 - 6/9/2023 Radiation    Treatments:  Course: C1 SBRT RLL  Plan ID Energy Fractions Dose per Fraction (cGy) Dose Correction (cGy) Total Dose Delivered (cGy) Elapsed Days   BH SBRT RLL 6X-FFF 5 / 5 700 0 3,500 9    Treatment Dates:  5/31/2023 - 6/9/2023.      9/19/2023 - 9/19/2023 Chemotherapy    cyanocobalamin, 1,000 mcg, Intramuscular, Once, 1 of 3 cycles  Administration: 1,000 mcg (9/11/2023)  alteplase (CATHFLO), 2 mg, Intracatheter, Every 1 Minute as needed, 1 of 6 cycles  fosaprepitant (EMEND) IVPB, 150 mg, Intravenous, Once, 1 of 6 cycles  Administration: 150 mg (9/19/2023)  CARBOplatin (PARAPLATIN) IVPB (GOG AUC DOSING), 380.5 mg, Intravenous, Once, 1 of 6 cycles  Administration: 380.5 mg (9/19/2023)  pemetrexed (ALIMTA) chemo infusion, 500 mg/m2 = 920 mg, Intravenous, Once, 1 of 6 cycles  Administration: 920 mg (9/19/2023)     11/28/2023 - 11/28/2023 Chemotherapy    alteplase (CATHFLO), 2 mg, Intracatheter, Every 1 Minute as needed, 0 of 4 cycles  fosaprepitant (EMEND) IVPB, 150 mg, Intravenous, Once, 0 of 4 cycles  paclitaxel protein-bound (ABRAXANE) IVPB, 100 mg/m2 = 201 mg, Intravenous, Once, 0 of 4 cycles  CARBOplatin (PARAPLATIN) IVPB (GOG AUC DOSING), 357 mg, Intravenous, Once, 0 of 4 cycles  pembrolizumab (KEYTRUDA) IVPB, 200 mg, Intravenous, Once, 0 of 3 cycles         Review of Systems:  Review of Systems   Constitutional: Negative. HENT:  Positive for hearing loss. Eyes:  Positive for visual disturbance (left eye foggy). Wears glasses   Respiratory:  Positive for cough and shortness of breath. O2 at 2 L via NC   Cardiovascular: Negative. Gastrointestinal: Negative. Endocrine: Negative. Genitourinary:  Positive for frequency. Nocturia x 2-3   Musculoskeletal:  Positive for gait problem (walks with a walker). Skin:  Positive for wound (scalp and sacral decub). Allergic/Immunologic: Negative. Neurological:  Positive for tremors (right hand) and speech difficulty (word finding). Negative for dizziness, seizures, light-headedness, numbness and headaches. Hematological:  Bruises/bleeds easily. Psychiatric/Behavioral: Negative.          Clinical Trial: no          Teaching: SIM, Side effects    Health Maintenance   Topic Date Due    Hepatitis C Screening  Never done    Falls: Plan of Care  Never done    Pneumococcal Vaccine: 65+ Years (2 - PPSV23 if available, else PCV20) 02/01/2017    COVID-19 Vaccine (3 - Pfizer risk series) 04/14/2021    BMI: Followup Plan  04/21/2023    HEMOGLOBIN A1C  02/25/2024    DM Eye Exam  04/29/2024    Fall Risk  07/25/2024    Medicare Annual Wellness Visit (AWV)  07/25/2024    Diabetic Foot Exam  07/25/2024    Depression Screening  08/15/2024    BMI: Adult  11/29/2024    Colorectal Cancer Screening  07/07/2026    Influenza Vaccine  Completed    HIB Vaccine  Aged Out    IPV Vaccine  Aged Out    Hepatitis A Vaccine  Aged Out    Meningococcal ACWY Vaccine  Aged Out    HPV Vaccine  Aged Out     Patient Active Problem List   Diagnosis    Benign essential HTN    Type 2 diabetes mellitus without complication, without long-term current use of insulin ([address])    Hypercholesterolemia    Glaucoma    Inguinal hernia    Hypokalemia    Hypocalcemia    Hyperparathyroidism ([address])    History of colon polyps    Primary osteoarthritis of right shoulder    Chronic left shoulder pain    Left rotator cuff tear arthropathy    Rotator cuff injury, right, initial encounter    Arthritis of right acromioclavicular joint    BPH associated with nocturia    Status post right tibial-talar ankle fusion    Non-recurrent bilateral inguinal hernia without obstruction or gangrene    Multiple subsegmental pulmonary emboli without acute cor pulmonale (HCC)    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)    Pleural effusion    Platelets decreased (HCC)    Rapid atrial fibrillation (HCC)    HILARY (acute kidney injury) ([address])    Abnormal CT of the abdomen    COPD with acute exacerbation (HCC)    Cellulitis and abscess of left lower extremity    Cellulitis of leg, right    Cellulitis of extremity    Acute on chronic respiratory failure with hypoxia (HCC)    History of pulmonary embolus (PE)    Deep vein thrombosis (DVT) of right lower extremity (HCC)    History of prostate cancer    Acute on chronic HFrEF (heart failure with reduced ejection fraction) ([address])     Past Medical History:   Diagnosis Date    Diabetes mellitus ([address])     Hyperlipidemia     Hypertension     Lung cancer ([address])     Prostate cancer ([address]) 2005    S/P prostate ca-2005 had radiation tx.      Past Surgical History:   Procedure Laterality Date    COLONOSCOPY      IR BIOPSY LYMPH NODE  9/15/2022    IR CHEST TUBE PLACEMENT  11/14/2023    IR MIDLINE PLACEMENT  11/13/2023    IR PLEURAL EFFUSION LONG-TERM CATHETER PLACEMENT  11/16/2023    IR THORACENTESIS  9/13/2022    IR THORACENTESIS  10/5/2023    IR THORACENTESIS  10/13/2023    IR THORACENTESIS  10/30/2023    IR THORACENTESIS  11/9/2023    IR THORACENTESIS  11/13/2023    IR THORACENTESIS  11/20/2023    OR ARTHRODESIS ANKLE OPEN Right 7/10/2020    Procedure: ARTHRODESIS/ TIBIAL-TALAR ANKLE FUSION;  Surgeon: Edilson Dobbs MD;  Location: [facility];  Service: Orthopedics    OR LAPAROSCOPY SURG RPR INITIAL INGUINAL HERNIA Bilateral 12/16/2021    Procedure: LAPAROSCOPIC REPAIR HERNIA INGUINAL WITH MESH;  Surgeon: Nancy Garcia MD;  Location: [facility];  Service: General     Family History   Problem Relation Age of Onset    Heart attack Mother      Social History     Socioeconomic History    Marital status: /Civil Union     Spouse name: Not on file    Number of children: Not on file    Years of education: Not on file    Highest education level: Not on file   Occupational History    Not on file   Tobacco Use    Smoking status: Never    Smokeless tobacco: Never   Vaping Use    Vaping Use: Never used   Substance and Sexual Activity    Alcohol use: Yes     Comment: Occasional    Drug use: Never    Sexual activity: Not Currently   Other Topics Concern    Not on file   Social History Narrative    Not on file     Social Determinants of Health     Financial Resource Strain: Low Risk  (7/25/2023)    Overall Financial Resource Strain (CARDIA)     Difficulty of Paying Living Expenses: Not very hard   Food Insecurity: No Food Insecurity (11/13/2023)    Hunger Vital Sign     Worried About Running Out of Food in the Last Year: Never true     Ran Out of Food in the Last Year: Never true   Transportation Needs: No Transportation Needs (11/13/2023)    PRAPARE - Transportation     Lack of Transportation (Medical): No     Lack of Transportation (Non-Medical):  No   Physical Activity: Not on file   Stress: Not on file   Social Connections: Not on file   Intimate Partner Violence: Not on file   Housing Stability: Low Risk  (11/13/2023)    Housing Stability Vital Sign     Unable to Pay for Housing in the Last Year: No     Number of Places Lived in the Last Year: 1     Unstable Housing in the Last Year: No       Current Outpatient Medications:     Accu-Chek FastClix Lancets MISC, Use daily E11.9  Check  fbs  daily, Disp: 100 each, Rfl: 3    brimonidine tartrate 0.2 % ophthalmic solution, Administer 1 drop into the left eye 2 (two) times a day, Disp: , Rfl:     dexamethasone (DECADRON) 2 mg tablet, Take 1 tablet (2 mg total) by mouth 2 (two) times a day with meals, Disp: 60 tablet, Rfl: 0    digoxin (LANOXIN) 0.125 mg tablet, Take 1 tablet (125 mcg total) by mouth daily Do not start before November 23, 2023., Disp: 30 tablet, Rfl: 0    diltiazem (CARDIZEM) 30 mg tablet, Take 1 tablet (30 mg total) by mouth every 8 (eight) hours, Disp: 90 tablet, Rfl: 0    finasteride (PROSCAR) 5 mg tablet, Take 1 tablet (5 mg total) by mouth daily, Disp: 90 tablet, Rfl: 1    folic acid (KP Folic Acid) 1 mg tablet, Take 1 tablet (1 mg total) by mouth daily, Disp: 30 tablet, Rfl: 0    furosemide (LASIX) 40 mg tablet, Take 1 tablet (40 mg total) by mouth 2 (two) times a day, Disp: 60 tablet, Rfl: 0    glucose blood (Accu-Chek Jackie Plus) test strip, Use as instructed, Disp: 100 strip, Rfl: 1    levothyroxine (Euthyrox) 25 mcg tablet, Take 1 tablet (25 mcg total) by mouth daily in the early morning, Disp: 90 tablet, Rfl: 0    metFORMIN (GLUCOPHAGE-XR) 500 mg 24 hr tablet, Take 1 tablet (500 mg total) by mouth daily, Disp: 90 tablet, Rfl: 1    metoprolol tartrate (LOPRESSOR) 50 mg tablet, Take 1 tablet (50 mg total) by mouth 4 (four) times a day, Disp: 120 tablet, Rfl: 0    ondansetron (ZOFRAN) 8 mg tablet, Take 1 tablet (8 mg total) by mouth every 8 (eight) hours as needed for nausea or vomiting, Disp: 20 tablet, Rfl: 0    Osimertinib Mesylate 80 MG TABS, Take 80 mg by mouth in the morning, Disp: , Rfl:     simvastatin (ZOCOR) 10 mg tablet, TAKE 1 TABLET BY MOUTH EVERY DAY, Disp: 90 tablet, Rfl: 1    timolol (TIMOPTIC-XE) 0.5 % ophthalmic gel-forming, 1 drop daily, Disp: , Rfl:     warfarin (COUMADIN) 5 mg tablet, Take 1 tablet (5 mg total) by mouth daily (Patient taking differently: Take 5 mg by mouth daily Alternate between 2.5 mg and 5 mg every other day), Disp: 30 tablet, Rfl: 0  No Known Allergies  There were no vitals filed for this visit.

## 2023-12-13 DIAGNOSIS — E78.2 MIXED HYPERLIPIDEMIA: ICD-10-CM

## 2023-12-13 PROCEDURE — 77263 THER RADIOLOGY TX PLNG CPLX: CPT | Performed by: STUDENT IN AN ORGANIZED HEALTH CARE EDUCATION/TRAINING PROGRAM

## 2023-12-13 RX ORDER — SIMVASTATIN 10 MG
TABLET ORAL
Qty: 90 TABLET | Refills: 1 | Status: SHIPPED | OUTPATIENT
Start: 2023-12-13

## 2023-12-13 NOTE — PROGRESS NOTES
Follow-up - Radiation Oncology   Anderson Durán 1945 69 y.o. male [id number]      History of Present Illness   Cancer Staging   Malignant neoplasm of upper lobe of right lung [facility])  Staging form: Lung, AJCC 8th Edition  - Clinical stage from 9/15/2022: Stage BETTE (cT4, cN3, cM1a) - Signed by Sheree Sparks MD on 10/10/2022  Stage prefix: Initial diagnosis    Mr. Anderson Durán is a 69year old man with Stage IV adenocarcinoma, EGFR+ of the lung (RUL) known to our department following SBRT  to a RLL site of oligoprogression (completed 6/9/23). He was previously treated with Osimertinib followed by carboplatin/pemetrexed. Interval History:  The patient was last seen in follow-up on 8/15/23, at that time doing well overall with plan for continued medical oncology follow-up. Since then has has continued to follow with medical oncology. He underwent an initial cycle of carboplatin/pemetrexed (9/19/23), which was complicated by a PE and RLE DVT as well as HILARY and pleural effusions. Due to poor tolerance the patient elected to discontinue chemotherapy. He thereafter developed right hand weakness and change in depth perception. MRI Brain (11/29/23) demonstrated interval development of a 1.7 cm bilobed mass in the left postcentral gyrus abutting the dura, worrisome for a solitary metastasis in this patient with a history of lung cancer; moderate amount of surrounding vasogenic edema. The patient met with palliative care and would like to consider continued RT at this time but is not planning on chemotherapy. Currently he has ongoing right hand/arm weakness, which has improved somewhat with steroids. He also had some arm swelling.      Historical Information   Oncology History   Adenocarcinoma of prostate ([address]) (Resolved)   1/3/2018 Initial Diagnosis    Adenocarcinoma of prostate ([address]) (Resolved)     Malignant neoplasm of upper lobe of right lung ([address])   9/15/2022 -  Cancer Staged    Staging form: [facility] 8th Edition  - Clinical stage from 9/15/2022: Stage BETTE (cT4, cN3, cM1a) - Signed by Sheree Sparks MD on 10/10/2022  Stage prefix: Initial diagnosis       9/15/2022 Biopsy    IR lung biopsy    A. Lymph node, right supraclavicular biopsy:  -  Metastatic adenocarcinoma of lung origin.   -  Immunohistochemical stains performed with appropriate controls show the tumor to be positive for TTF1 and napsin and negative for CK5/6 and p40, supporting the diagnosis     10/5/2022 Initial Diagnosis    Malignant neoplasm of upper lobe of right lung ([address])     5/31/2023 - 6/9/2023 Radiation    Treatments:  Course: C1 SBRT RLL  Plan ID Energy Fractions Dose per Fraction (cGy) Dose Correction (cGy) Total Dose Delivered (cGy) Elapsed Days   BH SBRT RLL 6X-FFF 5 / 5 700 0 3,500 9    Treatment Dates:  5/31/2023 - 6/9/2023.      9/19/2023 - 9/19/2023 Chemotherapy    cyanocobalamin, 1,000 mcg, Intramuscular, Once, 1 of 3 cycles  Administration: 1,000 mcg (9/11/2023)  alteplase (CATHFLO), 2 mg, Intracatheter, Every 1 Minute as needed, 1 of 6 cycles  fosaprepitant (EMEND) IVPB, 150 mg, Intravenous, Once, 1 of 6 cycles  Administration: 150 mg (9/19/2023)  CARBOplatin (PARAPLATIN) IVPB (GOG AUC DOSING), 380.5 mg, Intravenous, Once, 1 of 6 cycles  Administration: 380.5 mg (9/19/2023)  pemetrexed (ALIMTA) chemo infusion, 500 mg/m2 = 920 mg, Intravenous, Once, 1 of 6 cycles  Administration: 920 mg (9/19/2023)     11/28/2023 - 11/28/2023 Chemotherapy    alteplase (CATHFLO), 2 mg, Intracatheter, Every 1 Minute as needed, 0 of 4 cycles  fosaprepitant (EMEND) IVPB, 150 mg, Intravenous, Once, 0 of 4 cycles  paclitaxel protein-bound (ABRAXANE) IVPB, 100 mg/m2 = 201 mg, Intravenous, Once, 0 of 4 cycles  CARBOplatin (PARAPLATIN) IVPB (GOG AUC DOSING), 357 mg, Intravenous, Once, 0 of 4 cycles  pembrolizumab (KEYTRUDA) IVPB, 200 mg, Intravenous, Once, 0 of 3 cycles         Past Medical History:   Diagnosis Date   • Diabetes mellitus ([address])    • Hyperlipidemia    • Hypertension    • Lung cancer [facility])    • Prostate cancer ([address]) 2005    S/P prostate ca-2005 had radiation tx.      Past Surgical History:   Procedure Laterality Date   • COLONOSCOPY     • IR BIOPSY LYMPH NODE  9/15/2022   • IR CHEST TUBE PLACEMENT  11/14/2023   • IR MIDLINE PLACEMENT  11/13/2023   • IR PLEURAL EFFUSION LONG-TERM CATHETER PLACEMENT  11/16/2023   • IR THORACENTESIS  9/13/2022   • IR THORACENTESIS  10/5/2023   • IR THORACENTESIS  10/13/2023   • IR THORACENTESIS  10/30/2023   • IR THORACENTESIS  11/9/2023   • IR THORACENTESIS  11/13/2023   • IR THORACENTESIS  11/20/2023   • AK ARTHRODESIS ANKLE OPEN Right 7/10/2020    Procedure: ARTHRODESIS/ TIBIAL-TALAR ANKLE FUSION;  Surgeon: Jodi Watson MD;  [facility];  Service: Orthopedics   • AK LAPAROSCOPY SURG RPR INITIAL INGUINAL HERNIA Bilateral 12/16/2021    Procedure: LAPAROSCOPIC REPAIR HERNIA INGUINAL WITH MESH;  Surgeon: Blaine Gore MD;  [facility];  Service: General       Social History   Social History     Substance and Sexual Activity   Alcohol Use Yes    Comment: Occasional     Social History     Substance and Sexual Activity   Drug Use Never     Social History     Tobacco Use   Smoking Status Never   Smokeless Tobacco Never         Meds/Allergies     Current Outpatient Medications:   •  Accu-Chek FastClix Lancets MISC, Use daily E11.9  Check  fbs  daily, Disp: 100 each, Rfl: 3  •  brimonidine tartrate 0.2 % ophthalmic solution, Administer 1 drop into the left eye 2 (two) times a day, Disp: , Rfl:   •  dexamethasone (DECADRON) 2 mg tablet, Take 1 tablet (2 mg total) by mouth 2 (two) times a day with meals, Disp: 60 tablet, Rfl: 0  •  digoxin (LANOXIN) 0.125 mg tablet, Take 1 tablet (125 mcg total) by mouth daily Do not start before November 23, 2023., Disp: 30 tablet, Rfl: 0  •  diltiazem (CARDIZEM) 30 mg tablet, Take 1 tablet (30 mg total) by mouth every 8 (eight) hours, Disp: 90 tablet, Rfl: 0  • finasteride (PROSCAR) 5 mg tablet, Take 1 tablet (5 mg total) by mouth daily, Disp: 90 tablet, Rfl: 1  •  folic acid (KP Folic Acid) 1 mg tablet, Take 1 tablet (1 mg total) by mouth daily, Disp: 30 tablet, Rfl: 0  •  furosemide (LASIX) 40 mg tablet, Take 1 tablet (40 mg total) by mouth 2 (two) times a day, Disp: 60 tablet, Rfl: 0  •  glucose blood (Accu-Chek Jackie Plus) test strip, Use as instructed, Disp: 100 strip, Rfl: 1  •  levothyroxine (Euthyrox) 25 mcg tablet, Take 1 tablet (25 mcg total) by mouth daily in the early morning, Disp: 90 tablet, Rfl: 0  •  metFORMIN (GLUCOPHAGE-XR) 500 mg 24 hr tablet, Take 1 tablet (500 mg total) by mouth daily, Disp: 90 tablet, Rfl: 1  •  metoprolol tartrate (LOPRESSOR) 50 mg tablet, Take 1 tablet (50 mg total) by mouth 4 (four) times a day, Disp: 120 tablet, Rfl: 0  •  ondansetron (ZOFRAN) 8 mg tablet, Take 1 tablet (8 mg total) by mouth every 8 (eight) hours as needed for nausea or vomiting, Disp: 20 tablet, Rfl: 0  •  timolol (TIMOPTIC-XE) 0.5 % ophthalmic gel-forming, 1 drop daily, Disp: , Rfl:   •  warfarin (COUMADIN) 5 mg tablet, Take 1 tablet (5 mg total) by mouth daily (Patient taking differently: Take 5 mg by mouth daily Alternate between 2.5 mg and 5 mg every other day), Disp: 30 tablet, Rfl: 0  •  Osimertinib Mesylate 80 MG TABS, Take 80 mg by mouth in the morning (Patient not taking: Reported on 12/12/2023), Disp: , Rfl:   •  simvastatin (ZOCOR) 10 mg tablet, TAKE 1 TABLET BY MOUTH EVERY DAY, Disp: 90 tablet, Rfl: 1  No Known Allergies    Review of Systems   Constitutional: Negative. HENT:  Positive for hearing loss. Eyes:  Positive for visual disturbance (left eye foggy). Wears glasses   Respiratory:  Positive for cough and shortness of breath. O2 at 2 L via NC   Cardiovascular: Negative. Gastrointestinal: Negative. Endocrine: Negative. Genitourinary:  Positive for frequency.         Nocturia x 2-3   Musculoskeletal:  Positive for gait problem (walks with a walker). Skin:  Positive for wound (scalp and sacral decub). Allergic/Immunologic: Negative. Neurological:  Positive for tremors (right hand) and speech difficulty (word finding). Negative for dizziness, seizures, light-headedness, numbness and headaches. Hematological:  Bruises/bleeds easily. Psychiatric/Behavioral: Negative. OBJECTIVE:   /80   Pulse 64   Temp 98.1 °F (36.7 °C)   Resp 16   Wt 75.3 kg (166 lb)   SpO2 98%   BMI 24.51 kg/m²   Pain Assessment:  0  ECOG/Zubrod/WHO: 3 - Symptomatic, >50% confined to bed    Physical Exam   Frail appearing. NAD on NC. Ambulates with walker. Decreased /hand strength on right sided. Assessment/Plan:  Orders Placed This Encounter   Procedures   • Radiation Simulation Treatment      We reviewed the patient MRI brain was consistent with a bilobed metastasis left frontal parietal lobe, with associated. We discussed that his current neurologic symptoms are likely consequence of his metastasis is both associated edema and that palliative radiation could help to improve his symptoms as well prevent further neurologic compromise. We discussed that given his current disease status and edema, he would not be a candidate for stereotactic radiation at the same time whole brain radiation is not likely required to substantial side effects including significant fatigue and neurocognitive decline. Based on this we discussed the potential advantage of fractionated partial brain radiation to his site of metastasis. We discussed potential side effects to include fatigue, hair loss, headaches. After consideration the patient would like to proceed with treatment and informed consent was signed. I will tentatively plan to treat his partial brain to a dose of 30 Gray in 10 fractions.      Wilma Boas, MD  12/13/2023,3:04 PM    Total time spent reviewing EMR, seeing patient in clinic visit, documenting visit, placing treatment orders, and communicating with other medical providers: 45 minutes. Portions of the record may have been created with voice recognition software. Occasional wrong word or "sound a like" substitutions may have occurred due to the inherent limitations of voice recognition software. Read the chart carefully and recognize, using context, where substitutions have occurred.

## 2023-12-14 PROBLEM — N17.9 AKI (ACUTE KIDNEY INJURY) (HCC): Status: RESOLVED | Noted: 2023-10-03 | Resolved: 2023-12-14

## 2023-12-14 PROBLEM — J96.21 ACUTE ON CHRONIC RESPIRATORY FAILURE WITH HYPOXIA (HCC): Status: RESOLVED | Noted: 2023-11-02 | Resolved: 2023-12-14

## 2023-12-15 ENCOUNTER — TELEMEDICINE (OUTPATIENT)
Dept: FAMILY MEDICINE CLINIC | Facility: CLINIC | Age: 78
End: 2023-12-15
Payer: MEDICARE

## 2023-12-15 DIAGNOSIS — I48.91 RAPID ATRIAL FIBRILLATION (HCC): ICD-10-CM

## 2023-12-15 DIAGNOSIS — Z99.81 REQUIRES OXYGEN THERAPY: ICD-10-CM

## 2023-12-15 DIAGNOSIS — D64.9 ANEMIA, UNSPECIFIED TYPE: ICD-10-CM

## 2023-12-15 DIAGNOSIS — E11.9 TYPE 2 DIABETES MELLITUS WITHOUT COMPLICATION, WITHOUT LONG-TERM CURRENT USE OF INSULIN (HCC): ICD-10-CM

## 2023-12-15 DIAGNOSIS — C77.8 MALIGNANT NEOPLASM METASTATIC TO LYMPH NODES OF MULTIPLE SITES (HCC): ICD-10-CM

## 2023-12-15 DIAGNOSIS — Z86.711 HISTORY OF PULMONARY EMBOLUS (PE): ICD-10-CM

## 2023-12-15 DIAGNOSIS — E78.00 HYPERCHOLESTEROLEMIA: ICD-10-CM

## 2023-12-15 DIAGNOSIS — R26.9 GAIT DIFFICULTY: ICD-10-CM

## 2023-12-15 DIAGNOSIS — E03.9 HYPOTHYROIDISM, UNSPECIFIED TYPE: Primary | ICD-10-CM

## 2023-12-15 DIAGNOSIS — C34.11 MALIGNANT NEOPLASM OF UPPER LOBE OF RIGHT LUNG (HCC): ICD-10-CM

## 2023-12-15 DIAGNOSIS — J90 PLEURAL EFFUSION: ICD-10-CM

## 2023-12-15 DIAGNOSIS — Z79.01 ANTICOAGULATED ON COUMADIN: ICD-10-CM

## 2023-12-15 DIAGNOSIS — R53.81 PHYSICAL DECONDITIONING: ICD-10-CM

## 2023-12-15 DIAGNOSIS — I10 BENIGN ESSENTIAL HTN: ICD-10-CM

## 2023-12-15 PROBLEM — L03.119 CELLULITIS OF EXTREMITY: Status: RESOLVED | Noted: 2023-10-27 | Resolved: 2023-12-15

## 2023-12-15 PROBLEM — L03.115 CELLULITIS OF LEG, RIGHT: Status: RESOLVED | Noted: 2023-10-23 | Resolved: 2023-12-15

## 2023-12-15 PROBLEM — L03.116 CELLULITIS AND ABSCESS OF LEFT LOWER EXTREMITY: Status: RESOLVED | Noted: 2023-10-23 | Resolved: 2023-12-15

## 2023-12-15 PROBLEM — E87.6 HYPOKALEMIA: Status: RESOLVED | Noted: 2018-10-22 | Resolved: 2023-12-15

## 2023-12-15 PROBLEM — L02.416 CELLULITIS AND ABSCESS OF LEFT LOWER EXTREMITY: Status: RESOLVED | Noted: 2023-10-23 | Resolved: 2023-12-15

## 2023-12-15 PROCEDURE — 99213 OFFICE O/P EST LOW 20 MIN: CPT | Performed by: PHYSICIAN ASSISTANT

## 2023-12-15 RX ORDER — METFORMIN HYDROCHLORIDE 500 MG/1
1000 TABLET, EXTENDED RELEASE ORAL DAILY
Qty: 180 TABLET | Refills: 0 | Status: SHIPPED | OUTPATIENT
Start: 2023-12-15

## 2023-12-15 NOTE — PROGRESS NOTES
Virtual Regular Visit    Verification of patient location:    Patient is located at Home in the following state in which I hold an active license [place]      Assessment/Plan:    Problem List Items Addressed This Visit          Endocrine    Type 2 diabetes mellitus without complication, without long-term current use of insulin (HCC)    Relevant Medications    metFORMIN (GLUCOPHAGE-XR) 500 mg 24 hr tablet       Respiratory    Malignant neoplasm of upper lobe of right lung (HCC)    Pleural effusion       Cardiovascular and Mediastinum    Benign essential HTN    Rapid atrial fibrillation (HCC)       Immune and Lymphatic    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 Gait difficulty        Physical deconditioning        Transfer chair    Anticoagulated on Coumadin        Continue Coumadin 5 mg alternating with 2.5 mg. PT/INR ASAP    Relevant Orders    Protime-INR    Requires oxygen therapy                  Falls Plan of Care: Recommended assistive device to help with gait and balance. Home safety evaluation by OT recommended. Seeing  pt and  ot        Reason for visit is   Chief Complaint   Patient presents with    Virtual Regular Visit        Encounter provider Fariba Garnett PA-C    Provider located at [address] [place] [address]  [phone]      Recent Visits  No visits were found meeting these conditions.   Showing recent visits within past 7 days and meeting all other requirements  Today's Visits  Date Type Provider Dept   12/15/23 Telemedicine Fariba Garnett PA-C  [facility]   Showing today's visits and meeting all other requirements  Future Appointments  No visits were found meeting these conditions. Showing future appointments within next 150 days and meeting all other requirements       The patient was identified by name and date of birth. Ivette Talamantes was informed that this is a telemedicine visit and that the visit is being conducted through the [facility]. He agrees to proceed. .  My office door was closed. The patient was notified the following individuals were present in the room Debra mcguire. He acknowledged consent and understanding of privacy and security of the video platform. The patient has agreed to participate and understands they can discontinue the visit at any time. Patient is aware this is a billable service. Subjective  Ivette Talamantes is a 69 y.o. male 2  week   f/u . Has been seen by video visit for 2-week follow-up. At his daughters living at home. He is receiving home physical therapy, Occupational Therapy and VNA services. She has lung cancer with mets. He was recently found to have metastasis to the brain. He was started on Decadron. Daughter Kinjal Vazquez states since he has been on steroids his blood sugar has elevated. Currently on metformin  mg in the morning. Increase metformin ER to 1000 mg in the morning. Continue to check blood sugar. Patient has difficulty breathing he has COPD congestive heart failure and bilateral pleural effusions. He has bilateral chest tubes which are draining regularly he is on Lasix 40 mg twice daily. A-fib and hypertension with a history of PE and DVT. He is on Coumadin. Currently on 5 mg alternating with 2.5 mg. Is overdue for PT/INR. Ordered this weekly several times for home VNA and mobile arm services. Order again and arrange set up. Is on Cardizem 30 every 8 hours, metoprolol ER 50 mg 4 times daily, digoxin as well. He is on home oxygen. He is ambulating in the house with a walker. States they need to have a transport chair for when goes out to his appointments.   Unable to ambulate long distances. Will order transfer chair. Past Medical History:   Diagnosis Date    Diabetes mellitus ([address])     Hyperlipidemia     Hypertension     Lung cancer ([address])     Prostate cancer ([facility] [address] [facility]) 2005    S/P prostate ca-2005 had radiation tx.        Past Surgical History:   Procedure Laterality Date    COLONOSCOPY      IR BIOPSY LYMPH NODE  9/15/2022    IR CHEST TUBE PLACEMENT  11/14/2023    IR MIDLINE PLACEMENT  11/13/2023    IR PLEURAL EFFUSION LONG-TERM CATHETER PLACEMENT  11/16/2023    IR THORACENTESIS  9/13/2022    IR THORACENTESIS  10/5/2023    IR THORACENTESIS  10/13/2023    IR THORACENTESIS  10/30/2023    IR THORACENTESIS  11/9/2023    IR THORACENTESIS  11/13/2023    IR THORACENTESIS  11/20/2023    MT ARTHRODESIS ANKLE OPEN Right 7/10/2020    Procedure: ARTHRODESIS/ TIBIAL-TALAR ANKLE FUSION;  Surgeon: Daphney Cisneros MD;  [facility];  Service: Orthopedics    MT LAPAROSCOPY SURG RPR INITIAL INGUINAL HERNIA Bilateral 12/16/2021    Procedure: LAPAROSCOPIC REPAIR HERNIA INGUINAL WITH MESH;  Surgeon: Deysi Siddiqi MD;  Location: [facility];  Service: General       Current Outpatient Medications   Medication Sig Dispense Refill    metFORMIN (GLUCOPHAGE-XR) 500 mg 24 hr tablet Take 2 tablets (1,000 mg total) by mouth daily 180 tablet 0    Accu-Chek FastClix Lancets MISC Use daily E11.9  Check  fbs  daily 100 each 3    brimonidine tartrate 0.2 % ophthalmic solution Administer 1 drop into the left eye 2 (two) times a day      dexamethasone (DECADRON) 2 mg tablet Take 1 tablet (2 mg total) by mouth 2 (two) times a day with meals 60 tablet 0    digoxin (LANOXIN) 0.125 mg tablet Take 1 tablet (125 mcg total) by mouth daily Do not start before November 23, 2023. 30 tablet 0    diltiazem (CARDIZEM) 30 mg tablet Take 1 tablet (30 mg total) by mouth every 8 (eight) hours 90 tablet 0    finasteride (PROSCAR) 5 mg tablet Take 1 tablet (5 mg total) by mouth daily 90 tablet 1    folic acid (KP Folic Acid) 1 mg tablet Take 1 tablet (1 mg total) by mouth daily 30 tablet 0    furosemide (LASIX) 40 mg tablet Take 1 tablet (40 mg total) by mouth 2 (two) times a day 60 tablet 0    glucose blood (Accu-Chek Jackie Plus) test strip Use as instructed 100 strip 1    levothyroxine (Euthyrox) 25 mcg tablet Take 1 tablet (25 mcg total) by mouth daily in the early morning 90 tablet 0    metoprolol tartrate (LOPRESSOR) 50 mg tablet Take 1 tablet (50 mg total) by mouth 4 (four) times a day 120 tablet 0    ondansetron (ZOFRAN) 8 mg tablet Take 1 tablet (8 mg total) by mouth every 8 (eight) hours as needed for nausea or vomiting 20 tablet 0    Osimertinib Mesylate 80 MG TABS Take 80 mg by mouth in the morning (Patient not taking: Reported on 12/12/2023)      simvastatin (ZOCOR) 10 mg tablet TAKE 1 TABLET BY MOUTH EVERY DAY 90 tablet 1    timolol (TIMOPTIC-XE) 0.5 % ophthalmic gel-forming 1 drop daily      warfarin (COUMADIN) 5 mg tablet Take 1 tablet (5 mg total) by mouth daily (Patient taking differently: Take 5 mg by mouth daily Alternate between 2.5 mg and 5 mg every other day) 30 tablet 0     No current facility-administered medications for this visit. No Known Allergies    Review of Systems   Constitutional:  Negative for activity change, appetite change and fever. Respiratory:  Negative for cough. Cardiovascular:  Positive for leg swelling. Negative for chest pain. Gastrointestinal:  Negative for constipation, diarrhea and vomiting. Genitourinary:  Negative for difficulty urinating. Musculoskeletal:  Positive for gait problem. Skin:  Negative for rash and wound. Neurological:  Negative for headaches. Psychiatric/Behavioral:  Negative for sleep disturbance. Video Exam    There were no vitals filed for this visit. Physical Exam  Constitutional:       General: He is not in acute distress. HENT:      Head: Normocephalic and atraumatic.       Right Ear: External ear normal.      Left Ear: External ear normal.   Eyes:      Conjunctiva/sclera: Conjunctivae normal.   Pulmonary:      Effort: Pulmonary effort is normal.      Comments: Patient breathing is normal.  He is on oxygen  Musculoskeletal:      Right lower leg: Edema present. Left lower leg: Edema present. Comments: Some edema bilateral lower legs. No open ulcerated areas. Neurological:      Mental Status: He is alert.           Visit Time  Total Visit Duration: 11

## 2023-12-17 LAB

## 2023-12-18 DIAGNOSIS — J90 PLEURAL EFFUSION: ICD-10-CM

## 2023-12-18 DIAGNOSIS — J96.01 ACUTE RESPIRATORY FAILURE WITH HYPOXIA (HCC): ICD-10-CM

## 2023-12-18 DIAGNOSIS — I48.91 ATRIAL FIBRILLATION, UNSPECIFIED TYPE (HCC): ICD-10-CM

## 2023-12-18 DIAGNOSIS — C34.90 LUNG CANCER (HCC): ICD-10-CM

## 2023-12-18 LAB

## 2023-12-18 NOTE — TELEPHONE ENCOUNTER
Althea from [facility] called to say the pts family would like to decrease his lasix 40mg BID. Pt has lost about 20lbs since 11/5/23. He is now about 161lb. Please advise Althea.

## 2023-12-18 NOTE — TELEPHONE ENCOUNTER
Reason for call:   [x] Refill   [] Prior Auth  [] Other:     Office:   [] PCP/Provider -   [x] Specialty/Provider -     LANOXIN  0.125 MG    LASIX  40 MG    LOPRESSOR  50 MG    WARFARIN  5 MG    Pharmacy: [facility]  [place] [place]    Does the patient have enough for 3 days?   [] Yes   [x] No - Send as HP to POD

## 2023-12-19 ENCOUNTER — TELEPHONE (OUTPATIENT)
Age: 78
End: 2023-12-19

## 2023-12-19 RX ORDER — METOPROLOL TARTRATE 50 MG/1
50 TABLET, FILM COATED ORAL 4 TIMES DAILY
Qty: 120 TABLET | Refills: 3 | Status: SHIPPED | OUTPATIENT
Start: 2023-12-19

## 2023-12-19 RX ORDER — WARFARIN SODIUM 5 MG/1
5 TABLET ORAL
Qty: 30 TABLET | Refills: 3 | Status: SHIPPED | OUTPATIENT
Start: 2023-12-19

## 2023-12-19 RX ORDER — FUROSEMIDE 40 MG/1
40 TABLET ORAL 2 TIMES DAILY
Qty: 60 TABLET | Refills: 3 | Status: SHIPPED | OUTPATIENT
Start: 2023-12-19

## 2023-12-19 RX ORDER — DIGOXIN 125 MCG
125 TABLET ORAL DAILY
Qty: 30 TABLET | Refills: 3 | Status: SHIPPED | OUTPATIENT
Start: 2023-12-19

## 2023-12-19 NOTE — TELEPHONE ENCOUNTER
Pt was calling in regards to the medication Metformin (Glucophage-XR) 500 mg pt stated that the prescription should've been sent the the [facility] on [address] [id number] but stated that it was sent to the other pharmacy that the pt has on file. I checked the script and it is showing up that it was indeed sent to the [facility], but pt wife stated that it was not. Please advise with the pt wife in regards to medication thank you.

## 2023-12-19 NOTE — TELEPHONE ENCOUNTER
Spoke with daughter. All prescriptions will go to [facility] pharmacy

## 2023-12-20 ENCOUNTER — RADIATION THERAPY TREATMENT (OUTPATIENT)
Dept: RADIATION ONCOLOGY | Facility: CLINIC | Age: 78
End: 2023-12-20
Payer: MEDICARE

## 2023-12-20 PROCEDURE — 77334 RADIATION TREATMENT AID(S): CPT | Performed by: INTERNAL MEDICINE

## 2023-12-21 ENCOUNTER — APPOINTMENT (OUTPATIENT)
Dept: LAB | Facility: HOSPITAL | Age: 78
End: 2023-12-21
Payer: MEDICARE

## 2023-12-21 DIAGNOSIS — C34.11 MALIGNANT NEOPLASM OF UPPER LOBE OF RIGHT LUNG (HCC): ICD-10-CM

## 2023-12-21 DIAGNOSIS — C34.90 LUNG CANCER (HCC): ICD-10-CM

## 2023-12-21 DIAGNOSIS — I82.5Z1 CHRONIC DEEP VEIN THROMBOSIS (DVT) OF DISTAL VEIN OF RIGHT LOWER EXTREMITY (HCC): ICD-10-CM

## 2023-12-21 DIAGNOSIS — J96.01 ACUTE RESPIRATORY FAILURE WITH HYPOXIA (HCC): ICD-10-CM

## 2023-12-21 DIAGNOSIS — E03.9 HYPOTHYROIDISM, UNSPECIFIED TYPE: ICD-10-CM

## 2023-12-21 DIAGNOSIS — J90 PLEURAL EFFUSION: ICD-10-CM

## 2023-12-21 DIAGNOSIS — C34.91 ADENOCARCINOMA OF RIGHT LUNG (HCC): ICD-10-CM

## 2023-12-21 DIAGNOSIS — Z86.711 HISTORY OF PULMONARY EMBOLUS (PE): ICD-10-CM

## 2023-12-21 LAB
ALBUMIN SERPL BCP-MCNC: 2.7 G/DL (ref 3.5–5)
ALP SERPL-CCNC: 97 U/L (ref 34–104)
ALT SERPL W P-5'-P-CCNC: 23 U/L (ref 7–52)
ANION GAP SERPL CALCULATED.3IONS-SCNC: 9 MMOL/L
AST SERPL W P-5'-P-CCNC: 18 U/L (ref 13–39)
BASOPHILS # BLD AUTO: 0.02 THOUSANDS/ÂΜL (ref 0–0.1)
BASOPHILS NFR BLD AUTO: 0 % (ref 0–1)
BILIRUB SERPL-MCNC: 0.33 MG/DL (ref 0.2–1)
BUN SERPL-MCNC: 34 MG/DL (ref 5–25)
CALCIUM ALBUM COR SERPL-MCNC: 9.5 MG/DL (ref 8.3–10.1)
CALCIUM SERPL-MCNC: 8.5 MG/DL (ref 8.4–10.2)
CHLORIDE SERPL-SCNC: 102 MMOL/L (ref 96–108)
CO2 SERPL-SCNC: 31 MMOL/L (ref 21–32)
CREAT SERPL-MCNC: 1.54 MG/DL (ref 0.6–1.3)
EOSINOPHIL # BLD AUTO: 0.06 THOUSAND/ÂΜL (ref 0–0.61)
EOSINOPHIL NFR BLD AUTO: 1 % (ref 0–6)
ERYTHROCYTE [DISTWIDTH] IN BLOOD BY AUTOMATED COUNT: 15.6 % (ref 11.6–15.1)
GFR SERPL CREATININE-BSD FRML MDRD: 42 ML/MIN/1.73SQ M
GLUCOSE P FAST SERPL-MCNC: 163 MG/DL (ref 65–99)
HCT VFR BLD AUTO: 36.3 % (ref 36.5–49.3)
HGB BLD-MCNC: 11.9 G/DL (ref 12–17)
IMM GRANULOCYTES # BLD AUTO: 0.12 THOUSAND/UL (ref 0–0.2)
IMM GRANULOCYTES NFR BLD AUTO: 1 % (ref 0–2)
INR PPP: 2.46 (ref 0.84–1.19)
LYMPHOCYTES # BLD AUTO: 0.83 THOUSANDS/ÂΜL (ref 0.6–4.47)
LYMPHOCYTES NFR BLD AUTO: 6 % (ref 14–44)
MCH RBC QN AUTO: 33 PG (ref 26.8–34.3)
MCHC RBC AUTO-ENTMCNC: 32.8 G/DL (ref 31.4–37.4)
MCV RBC AUTO: 101 FL (ref 82–98)
MONOCYTES # BLD AUTO: 0.72 THOUSAND/ÂΜL (ref 0.17–1.22)
MONOCYTES NFR BLD AUTO: 6 % (ref 4–12)
NEUTROPHILS # BLD AUTO: 11.36 THOUSANDS/ÂΜL (ref 1.85–7.62)
NEUTS SEG NFR BLD AUTO: 86 % (ref 43–75)
NRBC BLD AUTO-RTO: 0 /100 WBCS
PLATELET # BLD AUTO: 129 THOUSANDS/UL (ref 149–390)
PMV BLD AUTO: 13.2 FL (ref 8.9–12.7)
POTASSIUM SERPL-SCNC: 4.1 MMOL/L (ref 3.5–5.3)
PROT SERPL-MCNC: 4.9 G/DL (ref 6.4–8.4)
PROTHROMBIN TIME: 26.2 SECONDS (ref 11.6–14.5)
RBC # BLD AUTO: 3.61 MILLION/UL (ref 3.88–5.62)
SODIUM SERPL-SCNC: 142 MMOL/L (ref 135–147)
T4 FREE SERPL-MCNC: 0.76 NG/DL (ref 0.61–1.12)
TSH SERPL DL<=0.05 MIU/L-ACNC: 5.12 UIU/ML (ref 0.45–4.5)
WBC # BLD AUTO: 13.11 THOUSAND/UL (ref 4.31–10.16)

## 2023-12-21 PROCEDURE — 84439 ASSAY OF FREE THYROXINE: CPT

## 2023-12-21 PROCEDURE — 84443 ASSAY THYROID STIM HORMONE: CPT

## 2023-12-21 PROCEDURE — 85025 COMPLETE CBC W/AUTO DIFF WBC: CPT

## 2023-12-21 PROCEDURE — 80053 COMPREHEN METABOLIC PANEL: CPT

## 2023-12-21 NOTE — TELEPHONE ENCOUNTER
Visiting nurse called regarding lasix prescription, he has had a decreased weight loss of 30lb in last month. Patients family, daughter shweta would like to know if they can decrease his lasix to 1 time a day. Please advise daughter.

## 2023-12-22 ENCOUNTER — TELEPHONE (OUTPATIENT)
Dept: FAMILY MEDICINE CLINIC | Facility: CLINIC | Age: 78
End: 2023-12-22

## 2023-12-22 NOTE — TELEPHONE ENCOUNTER
Spoke   at  length  with  daughter.   Tsh  improved.  T 4  normal.  Continue  same  dose  of thyroid  meds.  Pt/ inr  in  range.  Continue  coumadin  at 5/2.5/5/2.5.  pt/inr  in2 weeks.  Will need  mobile  lab  appt.  Pt  still  having 1  liter  of  fluid  from  right lung  every 2  days.  Has  1  liter of  fluid  from  left  lung  every 3  days.  Advised  to  continue  lasix  40 mg  bid.

## 2023-12-27 ENCOUNTER — APPOINTMENT (OUTPATIENT)
Dept: RADIATION ONCOLOGY | Facility: CLINIC | Age: 78
End: 2023-12-27
Payer: MEDICARE

## 2023-12-27 ENCOUNTER — TELEPHONE (OUTPATIENT)
Dept: LAB | Facility: HOSPITAL | Age: 78
End: 2023-12-27

## 2023-12-27 DIAGNOSIS — I48.91 ATRIAL FIBRILLATION, UNSPECIFIED TYPE (HCC): Primary | ICD-10-CM

## 2023-12-27 DIAGNOSIS — Z79.01 ANTICOAGULATED ON COUMADIN: ICD-10-CM

## 2023-12-27 PROCEDURE — 77300 RADIATION THERAPY DOSE PLAN: CPT | Performed by: STUDENT IN AN ORGANIZED HEALTH CARE EDUCATION/TRAINING PROGRAM

## 2023-12-27 PROCEDURE — 77301 RADIOTHERAPY DOSE PLAN IMRT: CPT | Performed by: STUDENT IN AN ORGANIZED HEALTH CARE EDUCATION/TRAINING PROGRAM

## 2023-12-27 PROCEDURE — 77338 DESIGN MLC DEVICE FOR IMRT: CPT | Performed by: STUDENT IN AN ORGANIZED HEALTH CARE EDUCATION/TRAINING PROGRAM

## 2023-12-28 ENCOUNTER — TELEMEDICINE (OUTPATIENT)
Dept: PALLIATIVE MEDICINE | Facility: CLINIC | Age: 78
End: 2023-12-28
Payer: MEDICARE

## 2023-12-28 DIAGNOSIS — Z71.89 GOALS OF CARE, COUNSELING/DISCUSSION: ICD-10-CM

## 2023-12-28 DIAGNOSIS — C34.11 MALIGNANT NEOPLASM OF UPPER LOBE OF RIGHT LUNG (HCC): ICD-10-CM

## 2023-12-28 DIAGNOSIS — C77.8 MALIGNANT NEOPLASM METASTATIC TO LYMPH NODES OF MULTIPLE SITES (HCC): ICD-10-CM

## 2023-12-28 DIAGNOSIS — C34.11 MALIGNANT NEOPLASM OF UPPER LOBE OF RIGHT LUNG (HCC): Primary | ICD-10-CM

## 2023-12-28 DIAGNOSIS — Z51.5 PALLIATIVE CARE PATIENT: ICD-10-CM

## 2023-12-28 PROCEDURE — 77386 HB NTSTY MODUL RAD TX DLVR CPLX: CPT | Performed by: RADIOLOGY

## 2023-12-28 PROCEDURE — 77427 RADIATION TX MANAGEMENT X5: CPT | Performed by: RADIOLOGY

## 2023-12-28 PROCEDURE — 77014 CHG CT GUIDANCE RADIATION THERAPY FLDS PLACEMENT: CPT | Performed by: RADIOLOGY

## 2023-12-28 PROCEDURE — 99441 PR PHYS/QHP TELEPHONE EVALUATION 5-10 MIN: CPT | Performed by: PHYSICIAN ASSISTANT

## 2023-12-28 NOTE — TELEPHONE ENCOUNTER
Reason for call:   [x] Refill   [] Prior Auth  [] Other:     Office:   [] PCP/Provider -   [x] Specialty/Provider - Avinash Casas MD     Medication: Dexamethasone 2mg by mouth 2x day with meals    Quantity: 60    Pharmacy: [id number] - [place] - [address] [place] [address]     Does the patient have enough for 3 days?   [] Yes   [x] No - Send as HP to POD

## 2023-12-28 NOTE — PROGRESS NOTES
Outpatient Virtual Regular Visit - Follow-up with Palliative and Supportive Care  Glen Patino 78 y.o. male [id number]    Intake:    Reason for virtual visit is conflicting appointments - radiation.  The patient is unable to visit the clinic safely due to poor mobility, weakness.    After connecting through telephone, the patient was identified by name and date of birth. Glen Patino or their agent was informed that this was a telemedicine visit and that the visit is being conducted through Telephone.  My office door was closed. No one else was in the room.  They acknowledged consent and understanding of privacy and security of the video platform. The patient or agent has agreed to participate and understands they can discontinue the visit at any time.       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    There are no discontinued medications.      Glen Patino was seen today for symptoms and planning cares related to above illnesses.  I have reviewed the patient's controlled substance dispensing history in the Prescription Drug Monitoring Program in compliance with the [employer] regulations before prescribing any controlled substances.    They are invited to continue to follow with us.  If there are questions or concerns, please contact us through our clinic/answering service 24 hours a day, seven days a week.    Logan Yang PA-C  [facility]  [phone]        Visit Information    Accompanied By: Family member    Source of History:  Self, Family member    History Limitations: None      Chief Complaint  Chief Complaint   Patient presents with    Follow-up       Narrative and Interval History    Glen Patino is a 78 y.o. male male who presents in follow up of symptoms related to malignant neoplasm of upper lobe of right lung, metastatic to lymph nodes, multiple sites.  Pertinent issues include: Goals of care counseling,symptom management.     Since last visit patient has decided to pursue radiation. Our visit today was cut short due to conflicting appointments, he has radiation in [place]. Spoke with patient and his daughter Candelaria briefly on the phone.  Patient reports doing well and has no complaints or concerns today. He has been steadfast in his decision not to pursue chemotherapy. He has 10 treatments of radiation planned. Things at home are going well. They would like to schedule a follow up in January to review symptoms and goals of care.     Past Medical History:   Diagnosis Date    Diabetes mellitus (HCC)     Hyperlipidemia     Hypertension     Lung cancer (HCC)     Prostate cancer (HCC) 2005    S/P prostate ca-2005 had radiation tx.     Past Surgical History:   Procedure Laterality Date    COLONOSCOPY      IR BIOPSY LYMPH NODE  9/15/2022    IR CHEST TUBE PLACEMENT  11/14/2023    IR MIDLINE PLACEMENT  11/13/2023    IR PLEURAL EFFUSION LONG-TERM CATHETER PLACEMENT  11/16/2023    IR THORACENTESIS  9/13/2022    IR THORACENTESIS  10/5/2023    IR THORACENTESIS  10/13/2023    IR THORACENTESIS  10/30/2023    IR THORACENTESIS  11/9/2023    IR THORACENTESIS  11/13/2023    IR THORACENTESIS  11/20/2023    GA ARTHRODESIS ANKLE OPEN Right 7/10/2020    Procedure: ARTHRODESIS/ TIBIAL-TALAR ANKLE FUSION;  Surgeon: Kristin Davis MD;  Location: [facility];  Service: Orthopedics    GA LAPAROSCOPY SURG RPR INITIAL INGUINAL HERNIA Bilateral 12/16/2021    Procedure: LAPAROSCOPIC REPAIR HERNIA INGUINAL WITH MESH;  Surgeon: Glen Crockett MD;   Location: [facility];  Service: General     Current Outpatient Medications   Medication Sig Dispense Refill    Accu-Chek FastClix Lancets MISC Use daily E11.9  Check  fbs  daily 100 each 3    brimonidine tartrate 0.2 % ophthalmic solution Administer 1 drop into the left eye 2 (two) times a day      dexamethasone (DECADRON) 2 mg tablet Take 1 tablet (2 mg total) by mouth 2 (two) times a day with meals 60 tablet 0    digoxin (LANOXIN) 0.125 mg tablet Take 1 tablet (125 mcg total) by mouth daily 30 tablet 3    diltiazem (CARDIZEM) 30 mg tablet Take 1 tablet (30 mg total) by mouth every 8 (eight) hours 90 tablet 0    finasteride (PROSCAR) 5 mg tablet Take 1 tablet (5 mg total) by mouth daily 90 tablet 1    folic acid (KP Folic Acid) 1 mg tablet Take 1 tablet (1 mg total) by mouth daily 30 tablet 0    furosemide (LASIX) 40 mg tablet Take 1 tablet (40 mg total) by mouth 2 (two) times a day 60 tablet 3    glucose blood (Accu-Chek Jackie Plus) test strip Use as instructed 100 strip 1    levothyroxine (Euthyrox) 25 mcg tablet Take 1 tablet (25 mcg total) by mouth daily in the early morning 90 tablet 0    metFORMIN (GLUCOPHAGE-XR) 500 mg 24 hr tablet Take 2 tablets (1,000 mg total) by mouth daily 180 tablet 0    metoprolol tartrate (LOPRESSOR) 50 mg tablet Take 1 tablet (50 mg total) by mouth 4 (four) times a day 120 tablet 3    ondansetron (ZOFRAN) 8 mg tablet Take 1 tablet (8 mg total) by mouth every 8 (eight) hours as needed for nausea or vomiting 20 tablet 0    Osimertinib Mesylate 80 MG TABS Take 80 mg by mouth in the morning (Patient not taking: Reported on 12/12/2023)      simvastatin (ZOCOR) 10 mg tablet TAKE 1 TABLET BY MOUTH EVERY DAY 90 tablet 1    timolol (TIMOPTIC-XE) 0.5 % ophthalmic gel-forming 1 drop daily      warfarin (COUMADIN) 5 mg tablet Take 1 tablet (5 mg total) by mouth daily Alternate between 2.5 mg and 5 mg every other day 30 tablet 3     No current facility-administered medications for this visit.       No Known Allergies    Review of Systems   Constitutional:  Negative for activity change, appetite change, fatigue and unexpected weight change.   HENT:  Negative for mouth sores, trouble swallowing and voice change.    Eyes: Negative.    Respiratory:  Negative for shortness of breath.    Cardiovascular:  Negative for chest pain.   Gastrointestinal:  Negative for abdominal pain, constipation, diarrhea, nausea and vomiting.   Genitourinary: Negative.    Musculoskeletal:  Negative for arthralgias, back pain, gait problem and myalgias.   Skin:  Negative for color change, pallor and wound.   Neurological:  Negative for dizziness, weakness, light-headedness and headaches.   Hematological: Negative.    Psychiatric/Behavioral:  Negative for confusion and sleep disturbance. The patient is not nervous/anxious.          Video Exam  There were no vitals filed for this visit.  Physical Exam  Unable to perform. Telephone visit.     I spent 5+ minutes with the patient during this PHONE visit.  Cares and counseling included the following: discussion of symptom and disease management, goals of care counseling, follow-up requirements.  All of the patient's or agent's questions were answered during this discussion.  It was my intent to perform this visit via video technology but the patient was not able to do a video connection so the visit was completed via audio telephone only.     Encounter provider Logan Yang PA-C, located at:  [facility]ALL[facility] [address] [place] [address] [phone]       VIRTUAL VISIT DISCLAIMER    Glen Patino or their agent has consented to an online visit or consultation.  They understands that the online visit is based solely on information provided by the patient or agent, and that, in the absence of a face-to-face physical evaluation by the physician, the diagnosis they receive is both limited and provisional in terms of accuracy and  completeness.  This is not intended to replace a full medical face-to-face evaluation by the physician. Glen Patino or their agent understands and accepts these terms.  The patient or their agent was informed this is a billable service.

## 2023-12-29 PROCEDURE — 77014 CHG CT GUIDANCE RADIATION THERAPY FLDS PLACEMENT: CPT | Performed by: INTERNAL MEDICINE

## 2023-12-29 PROCEDURE — 77386 HB NTSTY MODUL RAD TX DLVR CPLX: CPT | Performed by: INTERNAL MEDICINE

## 2024-01-01 ENCOUNTER — APPOINTMENT (OUTPATIENT)
Dept: RADIATION ONCOLOGY | Facility: CLINIC | Age: 79
End: 2024-01-01
Attending: INTERNAL MEDICINE
Payer: MEDICARE

## 2024-01-01 ENCOUNTER — HOME CARE VISIT (OUTPATIENT)
Dept: HOME HOSPICE | Facility: HOSPICE | Age: 79
End: 2024-01-01
Payer: MEDICARE

## 2024-01-01 ENCOUNTER — HOME CARE VISIT (OUTPATIENT)
Dept: HOME HEALTH SERVICES | Facility: HOME HEALTHCARE | Age: 79
End: 2024-01-01
Payer: MEDICARE

## 2024-01-01 ENCOUNTER — APPOINTMENT (OUTPATIENT)
Dept: RADIATION ONCOLOGY | Facility: CLINIC | Age: 79
End: 2024-01-01
Payer: MEDICARE

## 2024-01-01 ENCOUNTER — HOSPICE ADMISSION (OUTPATIENT)
Dept: HOME HOSPICE | Facility: HOSPICE | Age: 79
End: 2024-01-01
Payer: MEDICARE

## 2024-01-01 ENCOUNTER — TELEPHONE (OUTPATIENT)
Dept: OTHER | Facility: OTHER | Age: 79
End: 2024-01-01

## 2024-01-01 DIAGNOSIS — C77.8 MALIGNANT NEOPLASM METASTATIC TO LYMPH NODES OF MULTIPLE SITES (HCC): Primary | ICD-10-CM

## 2024-01-01 PROCEDURE — 77336 RADIATION PHYSICS CONSULT: CPT | Performed by: RADIOLOGY

## 2024-01-01 PROCEDURE — 77386 HB NTSTY MODUL RAD TX DLVR CPLX: CPT | Performed by: RADIOLOGY

## 2024-01-01 PROCEDURE — 10330057 MEDICATION, GENERAL

## 2024-01-01 PROCEDURE — 77014 CHG CT GUIDANCE RADIATION THERAPY FLDS PLACEMENT: CPT | Performed by: INTERNAL MEDICINE

## 2024-01-01 PROCEDURE — G0155 HHCP-SVS OF CSW,EA 15 MIN: HCPCS

## 2024-01-01 PROCEDURE — 10330087 HSPC SERVICE INTENSITY ADD-ON

## 2024-01-01 PROCEDURE — G0299 HHS/HOSPICE OF RN EA 15 MIN: HCPCS

## 2024-01-01 PROCEDURE — 77386 HB NTSTY MODUL RAD TX DLVR CPLX: CPT | Performed by: INTERNAL MEDICINE

## 2024-01-01 PROCEDURE — 77014 CHG CT GUIDANCE RADIATION THERAPY FLDS PLACEMENT: CPT | Performed by: RADIOLOGY

## 2024-01-01 PROCEDURE — 10330064 BRIEF, WINGS CHOICE+ QUILTED LG (18/BG 4

## 2024-01-01 PROCEDURE — 10330064 UNDERPAD, REUSE 36X36 1DZ     BECKCL

## 2024-01-01 RX ORDER — DEXAMETHASONE 2 MG/1
2 TABLET ORAL 2 TIMES DAILY WITH MEALS
Qty: 60 TABLET | Refills: 0 | Status: SHIPPED | OUTPATIENT
Start: 2024-01-01

## 2024-01-01 NOTE — PROGRESS NOTES
He was told to have dexamethasone 2 mg p.o. twice daily however it was not sent to the pharmacy, we will do so today

## 2024-01-01 NOTE — TELEPHONE ENCOUNTER
Patient has been out of  Dexamethasone 2 mg since Friday. The Office was called but was not sent to Pharmacy.

## 2024-01-02 ENCOUNTER — APPOINTMENT (OUTPATIENT)
Dept: LAB | Facility: HOSPITAL | Age: 79
End: 2024-01-02
Payer: MEDICARE

## 2024-01-02 ENCOUNTER — TELEPHONE (OUTPATIENT)
Dept: FAMILY MEDICINE CLINIC | Facility: CLINIC | Age: 79
End: 2024-01-02

## 2024-01-02 DIAGNOSIS — I48.91 ATRIAL FIBRILLATION, UNSPECIFIED TYPE (HCC): ICD-10-CM

## 2024-01-02 NOTE — TELEPHONE ENCOUNTER
Candelaria called earlier today and thought it would be addressed by now, advised her I did not see any update in chart about call back requesting antibiotics and that I would get message to office.     Candelaria requesting call back from office- Dad is not feeling well and she would like antibiotics sent to [facility] in [place] on [facility] [facility] [address].

## 2024-01-02 NOTE — TELEPHONE ENCOUNTER
Reason for call:         [x] Refill   [] Prior Auth  [] Other:     Office:   [x] PCP/Provider - Margaret Mendoza  [] Specialty/Provider -     Medication:   Diltiazem 30 mg, 1q8,90    Pharmacy: [facility] [place]    Does the patient have enough for 3 days?   [x] Yes   [] No - Send as HP to POD

## 2024-01-02 NOTE — TELEPHONE ENCOUNTER
Spoke to Candelaria.  Strongly recommeded appt here, UC or ER based on his current condition.  She will see how he is when he wakes up and let us know if they need anything.

## 2024-01-03 ENCOUNTER — HOSPITAL ENCOUNTER (INPATIENT)
Facility: HOSPITAL | Age: 79
LOS: 1 days | Discharge: HOME | DRG: 871 | End: 2024-01-04
Attending: INTERNAL MEDICINE | Admitting: INTERNAL MEDICINE
Payer: MEDICARE

## 2024-01-03 ENCOUNTER — ANTICOAG VISIT (OUTPATIENT)
Dept: FAMILY MEDICINE CLINIC | Facility: CLINIC | Age: 79
End: 2024-01-03

## 2024-01-03 ENCOUNTER — TELEPHONE (OUTPATIENT)
Dept: FAMILY MEDICINE CLINIC | Facility: CLINIC | Age: 79
End: 2024-01-03

## 2024-01-03 ENCOUNTER — APPOINTMENT (EMERGENCY)
Dept: RADIOLOGY | Facility: HOSPITAL | Age: 79
DRG: 871 | End: 2024-01-03
Payer: MEDICARE

## 2024-01-03 DIAGNOSIS — J10.1 INFLUENZA A: ICD-10-CM

## 2024-01-03 DIAGNOSIS — R09.02 HYPOXIA: Primary | ICD-10-CM

## 2024-01-03 DIAGNOSIS — J18.9 SEPSIS DUE TO PNEUMONIA (HCC): ICD-10-CM

## 2024-01-03 DIAGNOSIS — A41.9 SEPSIS DUE TO PNEUMONIA (HCC): ICD-10-CM

## 2024-01-03 DIAGNOSIS — R79.89 ELEVATED PROCALCITONIN: ICD-10-CM

## 2024-01-03 DIAGNOSIS — E87.20 LACTIC ACIDOSIS: ICD-10-CM

## 2024-01-03 PROBLEM — I50.23 ACUTE ON CHRONIC HFREF (HEART FAILURE WITH REDUCED EJECTION FRACTION) (HCC): Status: RESOLVED | Noted: 2023-11-13 | Resolved: 2024-01-03

## 2024-01-03 LAB
2HR DELTA HS TROPONIN: -13 NG/L
ALBUMIN SERPL BCP-MCNC: 2.5 G/DL (ref 3.5–5)
ALP SERPL-CCNC: 76 U/L (ref 34–104)
ALT SERPL W P-5'-P-CCNC: 31 U/L (ref 7–52)
ANION GAP SERPL CALCULATED.3IONS-SCNC: 12 MMOL/L
APTT PPP: 66 SECONDS (ref 23–37)
AST SERPL W P-5'-P-CCNC: 30 U/L (ref 13–39)
BASOPHILS # BLD AUTO: 0.01 THOUSANDS/ÂΜL (ref 0–0.1)
BASOPHILS NFR BLD AUTO: 0 % (ref 0–1)
BILIRUB SERPL-MCNC: 0.92 MG/DL (ref 0.2–1)
BILIRUB UR QL STRIP: NEGATIVE
BUN SERPL-MCNC: 50 MG/DL (ref 5–25)
CALCIUM ALBUM COR SERPL-MCNC: 8.7 MG/DL (ref 8.3–10.1)
CALCIUM SERPL-MCNC: 7.5 MG/DL (ref 8.4–10.2)
CARDIAC TROPONIN I PNL SERPL HS: 29 NG/L
CARDIAC TROPONIN I PNL SERPL HS: 42 NG/L
CHLORIDE SERPL-SCNC: 98 MMOL/L (ref 96–108)
CLARITY UR: CLEAR
CO2 SERPL-SCNC: 28 MMOL/L (ref 21–32)
COLOR UR: YELLOW
CREAT SERPL-MCNC: 1.5 MG/DL (ref 0.6–1.3)
EOSINOPHIL # BLD AUTO: 0.02 THOUSAND/ÂΜL (ref 0–0.61)
EOSINOPHIL NFR BLD AUTO: 0 % (ref 0–6)
ERYTHROCYTE [DISTWIDTH] IN BLOOD BY AUTOMATED COUNT: 14.8 % (ref 11.6–15.1)
FLUAV RNA RESP QL NAA+PROBE: POSITIVE
FLUBV RNA RESP QL NAA+PROBE: NEGATIVE
GFR SERPL CREATININE-BSD FRML MDRD: 44 ML/MIN/1.73SQ M
GLUCOSE SERPL-MCNC: 135 MG/DL (ref 65–140)
GLUCOSE SERPL-MCNC: 163 MG/DL (ref 65–140)
GLUCOSE UR STRIP-MCNC: NEGATIVE MG/DL
HCT VFR BLD AUTO: 35.6 % (ref 36.5–49.3)
HGB BLD-MCNC: 11.3 G/DL (ref 12–17)
HGB UR QL STRIP.AUTO: NEGATIVE
IMM GRANULOCYTES # BLD AUTO: 0.08 THOUSAND/UL (ref 0–0.2)
IMM GRANULOCYTES NFR BLD AUTO: 1 % (ref 0–2)
INR PPP: 2.61 (ref 0.84–1.19)
KETONES UR STRIP-MCNC: NEGATIVE MG/DL
LACTATE SERPL-SCNC: 1.3 MMOL/L (ref 0.5–2)
LACTATE SERPL-SCNC: 2.1 MMOL/L (ref 0.5–2)
LEUKOCYTE ESTERASE UR QL STRIP: NEGATIVE
LYMPHOCYTES # BLD AUTO: 0.51 THOUSANDS/ÂΜL (ref 0.6–4.47)
LYMPHOCYTES NFR BLD AUTO: 6 % (ref 14–44)
MAGNESIUM SERPL-MCNC: 1.9 MG/DL (ref 1.9–2.7)
MCH RBC QN AUTO: 32.2 PG (ref 26.8–34.3)
MCHC RBC AUTO-ENTMCNC: 31.7 G/DL (ref 31.4–37.4)
MCV RBC AUTO: 101 FL (ref 82–98)
MONOCYTES # BLD AUTO: 0.46 THOUSAND/ÂΜL (ref 0.17–1.22)
MONOCYTES NFR BLD AUTO: 5 % (ref 4–12)
NEUTROPHILS # BLD AUTO: 7.97 THOUSANDS/ÂΜL (ref 1.85–7.62)
NEUTS SEG NFR BLD AUTO: 88 % (ref 43–75)
NITRITE UR QL STRIP: NEGATIVE
NRBC BLD AUTO-RTO: 0 /100 WBCS
PH UR STRIP.AUTO: 5.5 [PH]
PLATELET # BLD AUTO: 117 THOUSANDS/UL (ref 149–390)
PMV BLD AUTO: 10.6 FL (ref 8.9–12.7)
POTASSIUM SERPL-SCNC: 3.5 MMOL/L (ref 3.5–5.3)
PROCALCITONIN SERPL-MCNC: 0.88 NG/ML
PROT SERPL-MCNC: 5 G/DL (ref 6.4–8.4)
PROT UR STRIP-MCNC: NEGATIVE MG/DL
PROTHROMBIN TIME: 28 SECONDS (ref 11.6–14.5)
RBC # BLD AUTO: 3.51 MILLION/UL (ref 3.88–5.62)
RSV RNA RESP QL NAA+PROBE: NEGATIVE
SARS-COV-2 RNA RESP QL NAA+PROBE: NEGATIVE
SODIUM SERPL-SCNC: 138 MMOL/L (ref 135–147)
SP GR UR STRIP.AUTO: 1.02
UROBILINOGEN UR QL STRIP.AUTO: 1 E.U./DL
WBC # BLD AUTO: 9.05 THOUSAND/UL (ref 4.31–10.16)

## 2024-01-03 PROCEDURE — 80162 ASSAY OF DIGOXIN TOTAL: CPT | Performed by: PHYSICIAN ASSISTANT

## 2024-01-03 PROCEDURE — 85610 PROTHROMBIN TIME: CPT

## 2024-01-03 PROCEDURE — 83605 ASSAY OF LACTIC ACID: CPT

## 2024-01-03 PROCEDURE — 0241U HB NFCT DS VIR RESP RNA 4 TRGT: CPT | Performed by: INTERNAL MEDICINE

## 2024-01-03 PROCEDURE — 94760 N-INVAS EAR/PLS OXIMETRY 1: CPT

## 2024-01-03 PROCEDURE — 85025 COMPLETE CBC W/AUTO DIFF WBC: CPT | Performed by: INTERNAL MEDICINE

## 2024-01-03 PROCEDURE — 80053 COMPREHEN METABOLIC PANEL: CPT | Performed by: INTERNAL MEDICINE

## 2024-01-03 PROCEDURE — 36415 COLL VENOUS BLD VENIPUNCTURE: CPT

## 2024-01-03 PROCEDURE — 87040 BLOOD CULTURE FOR BACTERIA: CPT

## 2024-01-03 PROCEDURE — 83735 ASSAY OF MAGNESIUM: CPT

## 2024-01-03 PROCEDURE — 82948 REAGENT STRIP/BLOOD GLUCOSE: CPT

## 2024-01-03 PROCEDURE — 99285 EMERGENCY DEPT VISIT HI MDM: CPT

## 2024-01-03 PROCEDURE — 93005 ELECTROCARDIOGRAM TRACING: CPT

## 2024-01-03 PROCEDURE — 84145 PROCALCITONIN (PCT): CPT

## 2024-01-03 PROCEDURE — 94664 DEMO&/EVAL PT USE INHALER: CPT

## 2024-01-03 PROCEDURE — 85730 THROMBOPLASTIN TIME PARTIAL: CPT

## 2024-01-03 PROCEDURE — 81003 URINALYSIS AUTO W/O SCOPE: CPT

## 2024-01-03 PROCEDURE — 87449 NOS EACH ORGANISM AG IA: CPT | Performed by: PHYSICIAN ASSISTANT

## 2024-01-03 PROCEDURE — 84484 ASSAY OF TROPONIN QUANT: CPT | Performed by: INTERNAL MEDICINE

## 2024-01-03 PROCEDURE — 96360 HYDRATION IV INFUSION INIT: CPT

## 2024-01-03 PROCEDURE — 71045 X-RAY EXAM CHEST 1 VIEW: CPT

## 2024-01-03 RX ORDER — DIGOXIN 250 MCG
125 TABLET ORAL DAILY
Status: DISCONTINUED | OUTPATIENT
Start: 2024-01-04 | End: 2024-01-04 | Stop reason: HOSPADM

## 2024-01-03 RX ORDER — LEVOTHYROXINE SODIUM 0.03 MG/1
25 TABLET ORAL
Status: DISCONTINUED | OUTPATIENT
Start: 2024-01-04 | End: 2024-01-04 | Stop reason: HOSPADM

## 2024-01-03 RX ORDER — GUAIFENESIN/DEXTROMETHORPHAN 100-10MG/5
10 SYRUP ORAL EVERY 4 HOURS PRN
Status: DISCONTINUED | OUTPATIENT
Start: 2024-01-03 | End: 2024-01-04 | Stop reason: HOSPADM

## 2024-01-03 RX ORDER — INSULIN LISPRO 100 [IU]/ML
1-5 INJECTION, SOLUTION INTRAVENOUS; SUBCUTANEOUS
Status: DISCONTINUED | OUTPATIENT
Start: 2024-01-04 | End: 2024-01-04 | Stop reason: HOSPADM

## 2024-01-03 RX ORDER — TIMOLOL MALEATE 5 MG/ML
1 SOLUTION/ DROPS OPHTHALMIC DAILY
Status: DISCONTINUED | OUTPATIENT
Start: 2024-01-04 | End: 2024-01-04 | Stop reason: HOSPADM

## 2024-01-03 RX ORDER — ONDANSETRON 2 MG/ML
4 INJECTION INTRAMUSCULAR; INTRAVENOUS EVERY 6 HOURS PRN
Status: DISCONTINUED | OUTPATIENT
Start: 2024-01-03 | End: 2024-01-04 | Stop reason: HOSPADM

## 2024-01-03 RX ORDER — DEXAMETHASONE 2 MG/1
2 TABLET ORAL 2 TIMES DAILY WITH MEALS
Qty: 60 TABLET | Refills: 0 | Status: SHIPPED | OUTPATIENT
Start: 2024-01-03 | End: 2024-02-02

## 2024-01-03 RX ORDER — OSELTAMIVIR PHOSPHATE 30 MG/1
30 CAPSULE ORAL EVERY 12 HOURS SCHEDULED
Qty: 10 CAPSULE | Refills: 0 | Status: DISCONTINUED | OUTPATIENT
Start: 2024-01-04 | End: 2024-01-04 | Stop reason: HOSPADM

## 2024-01-03 RX ORDER — FUROSEMIDE 40 MG/1
40 TABLET ORAL 2 TIMES DAILY
Status: DISCONTINUED | OUTPATIENT
Start: 2024-01-03 | End: 2024-01-04 | Stop reason: HOSPADM

## 2024-01-03 RX ORDER — CEFTRIAXONE 2 G/50ML
2000 INJECTION, SOLUTION INTRAVENOUS ONCE
Status: COMPLETED | OUTPATIENT
Start: 2024-01-03 | End: 2024-01-03

## 2024-01-03 RX ORDER — BRIMONIDINE TARTRATE 2 MG/ML
1 SOLUTION/ DROPS OPHTHALMIC 2 TIMES DAILY
Status: DISCONTINUED | OUTPATIENT
Start: 2024-01-03 | End: 2024-01-04 | Stop reason: HOSPADM

## 2024-01-03 RX ORDER — ACETAMINOPHEN 325 MG/1
650 TABLET ORAL EVERY 6 HOURS PRN
Status: DISCONTINUED | OUTPATIENT
Start: 2024-01-03 | End: 2024-01-04 | Stop reason: HOSPADM

## 2024-01-03 RX ORDER — FINASTERIDE 5 MG/1
5 TABLET, FILM COATED ORAL DAILY
Status: DISCONTINUED | OUTPATIENT
Start: 2024-01-04 | End: 2024-01-04 | Stop reason: HOSPADM

## 2024-01-03 RX ORDER — OSELTAMIVIR PHOSPHATE 75 MG/1
75 CAPSULE ORAL ONCE
Status: COMPLETED | OUTPATIENT
Start: 2024-01-03 | End: 2024-01-03

## 2024-01-03 RX ORDER — METOPROLOL TARTRATE 50 MG/1
50 TABLET, FILM COATED ORAL 4 TIMES DAILY
Status: DISCONTINUED | OUTPATIENT
Start: 2024-01-03 | End: 2024-01-04 | Stop reason: HOSPADM

## 2024-01-03 RX ORDER — CEFTRIAXONE 1 G/50ML
1000 INJECTION, SOLUTION INTRAVENOUS EVERY 24 HOURS
Status: DISCONTINUED | OUTPATIENT
Start: 2024-01-04 | End: 2024-01-04 | Stop reason: HOSPADM

## 2024-01-03 RX ORDER — ALBUTEROL SULFATE 90 UG/1
2 AEROSOL, METERED RESPIRATORY (INHALATION) EVERY 4 HOURS PRN
Status: DISCONTINUED | OUTPATIENT
Start: 2024-01-03 | End: 2024-01-04 | Stop reason: HOSPADM

## 2024-01-03 RX ORDER — FOLIC ACID 1 MG/1
1 TABLET ORAL DAILY
Status: DISCONTINUED | OUTPATIENT
Start: 2024-01-04 | End: 2024-01-04 | Stop reason: HOSPADM

## 2024-01-03 RX ORDER — INSULIN LISPRO 100 [IU]/ML
1-5 INJECTION, SOLUTION INTRAVENOUS; SUBCUTANEOUS
Status: DISCONTINUED | OUTPATIENT
Start: 2024-01-03 | End: 2024-01-04 | Stop reason: HOSPADM

## 2024-01-03 RX ORDER — WARFARIN SODIUM 5 MG/1
5 TABLET ORAL
Status: DISCONTINUED | OUTPATIENT
Start: 2024-01-03 | End: 2024-01-04 | Stop reason: HOSPADM

## 2024-01-03 RX ORDER — DEXAMETHASONE 4 MG/1
2 TABLET ORAL 2 TIMES DAILY WITH MEALS
Status: DISCONTINUED | OUTPATIENT
Start: 2024-01-04 | End: 2024-01-04 | Stop reason: HOSPADM

## 2024-01-03 RX ORDER — PRAVASTATIN SODIUM 20 MG
20 TABLET ORAL
Status: DISCONTINUED | OUTPATIENT
Start: 2024-01-04 | End: 2024-01-04 | Stop reason: HOSPADM

## 2024-01-03 RX ORDER — ACETAMINOPHEN 325 MG/1
650 TABLET ORAL ONCE
Status: COMPLETED | OUTPATIENT
Start: 2024-01-03 | End: 2024-01-03

## 2024-01-03 RX ADMIN — OSELTAMIVIR PHOSPHATE 75 MG: 75 CAPSULE ORAL at 19:05

## 2024-01-03 RX ADMIN — FUROSEMIDE 40 MG: 40 TABLET ORAL at 23:54

## 2024-01-03 RX ADMIN — ACETAMINOPHEN 650 MG: 325 TABLET ORAL at 19:05

## 2024-01-03 RX ADMIN — SODIUM CHLORIDE 500 ML: 0.9 INJECTION, SOLUTION INTRAVENOUS at 17:59

## 2024-01-03 RX ADMIN — CEFTRIAXONE 2000 MG: 2 INJECTION, SOLUTION INTRAVENOUS at 20:15

## 2024-01-03 RX ADMIN — BRIMONIDINE TARTRATE 1 DROP: 2 SOLUTION/ DROPS OPHTHALMIC at 23:54

## 2024-01-03 RX ADMIN — METOPROLOL TARTRATE 50 MG: 50 TABLET, FILM COATED ORAL at 23:54

## 2024-01-03 NOTE — ED PROVIDER NOTES
"History  Chief Complaint   Patient presents with    Shortness of Breath     Tightness in lungs for 3 days, his daughter was sick and had the flu and he was around her.      Patient is a 78-year-old male arriving for evaluation of ongoing cough, chills.  Patient's daughter presenting stating that she's had a \"cough, and body aches\" that lasted three days. Patient started with similar symptoms two days ago. Daughter felt patient had increase work of breathing today. Patient reports shortness of breath but reports RIOS. Patient reports chills but no temperature taken. Patient reports, \"Thomson colored\" sputum. Patient's baseline O2 is 2L/min. Patient's daughter reports that baseline O2 saturation is 92%. Patient at rest is 90-92% on his 2L/min.         Prior to Admission Medications   Prescriptions Last Dose Informant Patient Reported? Taking?   Accu-Chek FastClix Lancets MISC  Self No No   Sig: Use daily E11.9  Check  fbs  daily   Osimertinib Mesylate 80 MG TABS  Self Yes No   Sig: Take 80 mg by mouth in the morning   Patient not taking: Reported on 12/12/2023   brimonidine tartrate 0.2 % ophthalmic solution  Self Yes No   Sig: Administer 1 drop into the left eye 2 (two) times a day   dexamethasone (DECADRON) 2 mg tablet   No No   Sig: Take 1 tablet (2 mg total) by mouth 2 (two) times a day with meals   dexamethasone (DECADRON) 2 mg tablet   No No   Sig: Take 1 tablet (2 mg total) by mouth 2 (two) times a day with meals   digoxin (LANOXIN) 0.125 mg tablet   No No   Sig: Take 1 tablet (125 mcg total) by mouth daily   diltiazem (CARDIZEM) 30 mg tablet   No No   Sig: Take 1 tablet (30 mg total) by mouth every 8 (eight) hours   finasteride (PROSCAR) 5 mg tablet   No No   Sig: Take 1 tablet (5 mg total) by mouth daily   folic acid (KP Folic Acid) 1 mg tablet   No No   Sig: Take 1 tablet (1 mg total) by mouth daily   furosemide (LASIX) 40 mg tablet   No No   Sig: Take 1 tablet (40 mg total) by mouth 2 (two) times a day "   glucose blood (Accu-Chek Jackie Plus) test strip  Self No No   Sig: Use as instructed   levothyroxine (Euthyrox) 25 mcg tablet   No No   Sig: Take 1 tablet (25 mcg total) by mouth daily in the early morning   metFORMIN (GLUCOPHAGE-XR) 500 mg 24 hr tablet   No No   Sig: Take 2 tablets (1,000 mg total) by mouth daily   metoprolol tartrate (LOPRESSOR) 50 mg tablet   No No   Sig: Take 1 tablet (50 mg total) by mouth 4 (four) times a day   ondansetron (ZOFRAN) 8 mg tablet  Self No No   Sig: Take 1 tablet (8 mg total) by mouth every 8 (eight) hours as needed for nausea or vomiting   simvastatin (ZOCOR) 10 mg tablet   No No   Sig: TAKE 1 TABLET BY MOUTH EVERY DAY   timolol (TIMOPTIC-XE) 0.5 % ophthalmic gel-forming  Self Yes No   Si drop daily   warfarin (COUMADIN) 5 mg tablet   No No   Sig: Take 1 tablet (5 mg total) by mouth daily Alternate between 2.5 mg and 5 mg every other day      Facility-Administered Medications: None       Past Medical History:   Diagnosis Date    Diabetes mellitus (HCC)     Hyperlipidemia     Hypertension     Lung cancer (HCC)     Prostate cancer (HCC)     S/P prostate ca-2005 had radiation tx.       Past Surgical History:   Procedure Laterality Date    COLONOSCOPY      IR BIOPSY LYMPH NODE  9/15/2022    IR CHEST TUBE PLACEMENT  2023    IR MIDLINE PLACEMENT  2023    IR PLEURAL EFFUSION LONG-TERM CATHETER PLACEMENT  2023    IR THORACENTESIS  2022    IR THORACENTESIS  10/5/2023    IR THORACENTESIS  10/13/2023    IR THORACENTESIS  10/30/2023    IR THORACENTESIS  2023    IR THORACENTESIS  2023    IR THORACENTESIS  2023    IL ARTHRODESIS ANKLE OPEN Right 7/10/2020    Procedure: ARTHRODESIS/ TIBIAL-TALAR ANKLE FUSION;  Surgeon: Kristin Davis MD;  Location: [facility];  Service: Orthopedics    IL LAPAROSCOPY SURG RPR INITIAL INGUINAL HERNIA Bilateral 2021    Procedure: LAPAROSCOPIC REPAIR HERNIA INGUINAL WITH MESH;  Surgeon: Glen Crockett  MD;  Location: [facility];  Service: General       Family History   Problem Relation Age of Onset    Heart attack Mother      I have reviewed and agree with the history as documented.    E-Cigarette/Vaping    E-Cigarette Use Never User      E-Cigarette/Vaping Substances     Social History     Tobacco Use    Smoking status: Never    Smokeless tobacco: Never   Vaping Use    Vaping status: Never Used   Substance Use Topics    Alcohol use: Yes     Comment: Occasional    Drug use: Never       Review of Systems    Physical Exam  Physical Exam    Vital Signs  ED Triage Vitals [01/03/24 1654]   Temperature Pulse Respirations Blood Pressure SpO2   100.5 °F (38.1 °C) 99 22 110/64 (!) 84 %      Temp Source Heart Rate Source Patient Position - Orthostatic VS BP Location FiO2 (%)   Tympanic Monitor Lying Right arm --      Pain Score       7           Vitals:    01/03/24 1654   BP: 110/64   Pulse: 99   Patient Position - Orthostatic VS: Lying         Visual Acuity      ED Medications  Medications - No data to display    Diagnostic Studies  Results Reviewed       Procedure Component Value Units Date/Time    FLU/RSV/COVID - if FLU/RSV clinically relevant [[id number]] Collected: 01/03/24 1704    Lab Status: In process Specimen: Nares from Nose Updated: 01/03/24 1707    HS Troponin 0hr (reflex protocol) [[id number]]     Lab Status: No result Specimen: Blood     CBC and differential [[id number]]     Lab Status: No result Specimen: Blood     Comprehensive metabolic panel [[id number]]     Lab Status: No result Specimen: Blood                    XR chest 1 view portable    (Results Pending)              Procedures  Procedures         ED Course  ED Course as of 01/03/24 1834   Wed Jan 03, 2024   1733 SpO2(!): 84 %  Per nursing staff this was on oxygen at baseline requirement.    1806 FLU/RSV/COVID - if FLU/RSV clinically relevant(!)   1819 Protime-INR(!)   1820 Comprehensive metabolic panel(!)  Creatinine similar to last reading from about 2  weeks ago, has been increasing over the past month. Patient has no electrolyte abnormality. LFT wnl.   1829 LACTIC ACID(!!): 2.1  500 mL NSS provided.    1830 Procalcitonin(!): 0.88  Elevated.                                SBIRT 22yo+      Flowsheet Row Most Recent Value   Initial Alcohol Screen: US AUDIT-C     1. How often do you have a drink containing alcohol? 0 Filed at: 01/03/2024 1701   2. How many drinks containing alcohol do you have on a typical day you are drinking?  0 Filed at: 01/03/2024 1701   3a. Male UNDER 65: How often do you have five or more drinks on one occasion? 0 Filed at: 01/03/2024 1701   Audit-C Score 0 Filed at: 01/03/2024 1701   KEYSHA: How many times in the past year have you...    Used an illegal drug or used a prescription medication for non-medical reasons? Never Filed at: 01/03/2024 1701                      Medical Decision Making  Amount and/or Complexity of Data Reviewed  Labs: ordered. Decision-making details documented in ED Course.  Radiology: ordered.    Risk  OTC drugs.  Prescription drug management.             Disposition  Final diagnoses:   None     ED Disposition       None          Follow-up Information    None         Patient's Medications   Discharge Prescriptions    No medications on file       No discharge procedures on file.    PDMP Review         Value Time User    PDMP Reviewed  Yes 12/7/2023  1:00 PM Logan Yang PA-C            ED Provider  Electronically Signed by           01/03/24 2204    Lab Status: Final result Specimen: Urine, Clean Catch Updated: 01/04/24 0907     Legionella Urinary Antigen Negative    Fingerstick Glucose (POCT) [[id number]]  (Normal) Collected: 01/04/24 0750    Lab Status: Final result Updated: 01/04/24 0805     POC Glucose 102 mg/dl     Procalcitonin, Next Day AM Collection [[id number]]  (Abnormal) Collected: 01/04/24 0516    Lab Status: Final result Specimen: Blood from Arm, Right Updated: 01/04/24 0610     Procalcitonin 0.88 ng/ml     Basic metabolic panel, AM Draw, Tomorrow [[id number]]  (Abnormal) Collected: 01/04/24 0516    Lab Status: Final result Specimen: Blood from Arm, Right Updated: 01/04/24 0604     Sodium 140 mmol/L      Potassium 3.5 mmol/L      Chloride 101 mmol/L      CO2 28 mmol/L      ANION GAP 11 mmol/L      BUN 46 mg/dL      Creatinine 1.24 mg/dL      Glucose 123 mg/dL      Calcium 7.1 mg/dL      eGFR 55 ml/min/1.73sq m     Narrative:      National Kidney Disease Foundation guidelines for Chronic Kidney Disease (CKD):     Stage 1 with normal or high GFR (GFR > 90 mL/min/1.73 square meters)    Stage 2 Mild CKD (GFR = 60-89 mL/min/1.73 square meters)    Stage 3A Moderate CKD (GFR = 45-59 mL/min/1.73 square meters)    Stage 3B Moderate CKD (GFR = 30-44 mL/min/1.73 square meters)    Stage 4 Severe CKD (GFR = 15-29 mL/min/1.73 square meters)    Stage 5 End Stage CKD (GFR <15 mL/min/1.73 square meters)  Note: GFR calculation is accurate only with a steady state creatinine    CBC and differential, AM Draw, Tomorrow [[id number]]  (Abnormal) Collected: 01/04/24 0516    Lab Status: Final result Specimen: Blood from Arm, Right Updated: 01/04/24 0543     WBC 9.00 Thousand/uL      RBC 3.43 Million/uL      Hemoglobin 11.3 g/dL      Hematocrit 34.6 %       fL      MCH 32.9 pg      MCHC 32.7 g/dL      RDW 14.6 %      MPV 10.8 fL      Platelets 104 Thousands/uL      nRBC 0 /100 WBCs      Neutrophils Relative 89 %      Immat GRANS % 1 %      Lymphocytes  Relative 6 %      Monocytes Relative 4 %      Eosinophils Relative 0 %      Basophils Relative 0 %      Neutrophils Absolute 7.89 Thousands/µL      Immature Grans Absolute 0.09 Thousand/uL      Lymphocytes Absolute 0.58 Thousands/µL      Monocytes Absolute 0.39 Thousand/µL      Eosinophils Absolute 0.04 Thousand/µL      Basophils Absolute 0.01 Thousands/µL     HS Troponin I 4hr [[id number]]  (Normal) Collected: 01/03/24 2343    Lab Status: Final result Specimen: Blood from Arm, Right Updated: 01/04/24 0027     hs TnI 4hr 30 ng/L      Delta 4hr hsTnI -12 ng/L     Digoxin level [[id number]]  (Normal) Collected: 01/03/24 2343    Lab Status: Final result Specimen: Blood from Arm, Right Updated: 01/04/24 0019     Digoxin Lvl 1.3 ng/mL     Fingerstick Glucose (POCT) [[id number]]  (Normal) Collected: 01/03/24 2353    Lab Status: Final result Updated: 01/03/24 2357     POC Glucose 135 mg/dl     UA w Reflex to Microscopic w Reflex to Culture [[id number]]  (Normal) Collected: 01/03/24 2204    Lab Status: Final result Specimen: Urine, Clean Catch Updated: 01/03/24 2214     Color, UA Yellow     Clarity, UA Clear     Specific Gravity, UA 1.020     pH, UA 5.5     Leukocytes, UA Negative     Nitrite, UA Negative     Protein, UA Negative mg/dl      Glucose, UA Negative mg/dl      Ketones, UA Negative mg/dl      Urobilinogen, UA 1.0 E.U./dl      Bilirubin, UA Negative     Occult Blood, UA Negative    Lactic acid 2 Hours [[id number]]  (Normal) Collected: 01/03/24 2047    Lab Status: Final result Specimen: Blood from Arm, Right Updated: 01/03/24 2108     LACTIC ACID 1.3 mmol/L     Narrative:      Result may be elevated if tourniquet was used during collection.    HS Troponin I 2hr [[id number]]  (Normal) Collected: 01/03/24 2022    Lab Status: Final result Specimen: Blood from Arm, Right Updated: 01/03/24 2102     hs TnI 2hr 29 ng/L      Delta 2hr hsTnI -13 ng/L     Procalcitonin [[id number]]  (Abnormal) Collected: 01/03/24 1748    Lab  Status: Final result Specimen: Blood from Arm, Right Updated: 01/03/24 1829     Procalcitonin 0.88 ng/ml     Lactic acid [[id number]]  (Abnormal) Collected: 01/03/24 1748    Lab Status: Final result Specimen: Blood from Arm, Right Updated: 01/03/24 1828     LACTIC ACID 2.1 mmol/L     Narrative:      Result may be elevated if tourniquet was used during collection.    HS Troponin 0hr (reflex protocol) [[id number]]  (Normal) Collected: 01/03/24 1751    Lab Status: Final result Specimen: Blood from Arm, Right Updated: 01/03/24 1826     hs TnI 0hr 42 ng/L     Comprehensive metabolic panel [[id number]]  (Abnormal) Collected: 01/03/24 1751    Lab Status: Final result Specimen: Blood from Arm, Right Updated: 01/03/24 1819     Sodium 138 mmol/L      Potassium 3.5 mmol/L      Chloride 98 mmol/L      CO2 28 mmol/L      ANION GAP 12 mmol/L      BUN 50 mg/dL      Creatinine 1.50 mg/dL      Glucose 163 mg/dL      Calcium 7.5 mg/dL      Corrected Calcium 8.7 mg/dL      AST 30 U/L      ALT 31 U/L      Alkaline Phosphatase 76 U/L      Total Protein 5.0 g/dL      Albumin 2.5 g/dL      Total Bilirubin 0.92 mg/dL      eGFR 44 ml/min/1.73sq m     Narrative:      National Kidney Disease Foundation guidelines for Chronic Kidney Disease (CKD):     Stage 1 with normal or high GFR (GFR > 90 mL/min/1.73 square meters)    Stage 2 Mild CKD (GFR = 60-89 mL/min/1.73 square meters)    Stage 3A Moderate CKD (GFR = 45-59 mL/min/1.73 square meters)    Stage 3B Moderate CKD (GFR = 30-44 mL/min/1.73 square meters)    Stage 4 Severe CKD (GFR = 15-29 mL/min/1.73 square meters)    Stage 5 End Stage CKD (GFR <15 mL/min/1.73 square meters)  Note: GFR calculation is accurate only with a steady state creatinine    Magnesium [[id number]]  (Normal) Collected: 01/03/24 1748    Lab Status: Final result Specimen: Blood from Arm, Right Updated: 01/03/24 1819     Magnesium 1.9 mg/dL     Protime-INR [[id number]]  (Abnormal) Collected: 01/03/24 1515    Lab Status: Final  result Specimen: Blood from Arm, Right Updated: 01/03/24 1818     Protime 28.0 seconds      INR 2.61    APTT [[id number]]  (Abnormal) Collected: 01/03/24 1748    Lab Status: Final result Specimen: Blood from Arm, Right Updated: 01/03/24 1818     PTT 66 seconds     CBC and differential [[id number]]  (Abnormal) Collected: 01/03/24 1751    Lab Status: Final result Specimen: Blood from Arm, Right Updated: 01/03/24 1758     WBC 9.05 Thousand/uL      RBC 3.51 Million/uL      Hemoglobin 11.3 g/dL      Hematocrit 35.6 %       fL      MCH 32.2 pg      MCHC 31.7 g/dL      RDW 14.8 %      MPV 10.6 fL      Platelets 117 Thousands/uL      nRBC 0 /100 WBCs      Neutrophils Relative 88 %      Immat GRANS % 1 %      Lymphocytes Relative 6 %      Monocytes Relative 5 %      Eosinophils Relative 0 %      Basophils Relative 0 %      Neutrophils Absolute 7.97 Thousands/µL      Immature Grans Absolute 0.08 Thousand/uL      Lymphocytes Absolute 0.51 Thousands/µL      Monocytes Absolute 0.46 Thousand/µL      Eosinophils Absolute 0.02 Thousand/µL      Basophils Absolute 0.01 Thousands/µL     FLU/RSV/COVID - if FLU/RSV clinically relevant [[id number]]  (Abnormal) Collected: 01/03/24 1704    Lab Status: Final result Specimen: Nares from Nose Updated: 01/03/24 1757     SARS-CoV-2 Negative     INFLUENZA A PCR Positive     INFLUENZA B PCR Negative     RSV PCR Negative    Narrative:      FOR PEDIATRIC PATIENTS - copy/paste COVID Guidelines URL to browser: [email or web address]    SARS-CoV-2 assay is a Nucleic Acid Amplification assay intended for the  qualitative detection of nucleic acid from SARS-CoV-2 in nasopharyngeal  swabs. Results are for the presumptive identification of SARS-CoV-2 RNA.    Positive results are indicative of infection with SARS-CoV-2, the virus  causing COVID-19, but do not rule out bacterial infection or co-infection  with other viruses. Laboratories within the  [place] and its  territories are required to report all positive results to the appropriate  public health authorities. Negative results do not preclude SARS-CoV-2  infection and should not be used as the sole basis for treatment or other  patient management decisions. Negative results must be combined with  clinical observations, patient history, and epidemiological information.  This test has not been FDA cleared or approved.    This test has been authorized by FDA under an Emergency Use Authorization  (EUA). This test is only authorized for the duration of time the  declaration that circumstances exist justifying the authorization of the  emergency use of an in vitro diagnostic tests for detection of SARS-CoV-2  virus and/or diagnosis of COVID-19 infection under section 564(b)(1) of  the Act, 21 U.S.C. 360bbb-3(b)(1), unless the authorization is terminated  or revoked sooner. The test has been validated but independent review by FDA  and CLIA is pending.    Test performed using [employer] GeneXpert: This RT-PCR assay targets N2,  a region unique to SARS-CoV-2. A conserved region in the E-gene was chosen  for pan-Sarbecovirus detection which includes SARS-CoV-2.    According to CMS-2020-01-R, this platform meets the definition of high-throughput technology.                   XR chest 1 view portable   Final Result by Pamela Rocha MD (01/03 1839)   Addendum (preliminary) 1 of 1 by Pamela Rocha MD (01/03 1839)   ADDENDUM:      Nodular opacity over the left base not visible with certainty on the prior    chest radiograph, question acute infectious/inflammatory process.      Final      Bilateral chest tubes with no pneumothorax.      Small right pleural effusion, decreased from 12/1/2023 with trace left effusion.      Right greater than left bibasilar atelectasis.      Redemonstration of multiple lung nodules.            Workstation performed: [id number]                    Procedures  Procedures          ED Course  ED Course as of 01/09/24 2224   Wed Jan 03, 2024   1733 SpO2(!): 84 %  Per nursing staff this was on oxygen at baseline requirement.    1806 FLU/RSV/COVID - if FLU/RSV clinically relevant(!)   1819 Protime-INR(!)   1820 Comprehensive metabolic panel(!)  Creatinine similar to last reading from about 2 weeks ago, has been increasing over the past month. Patient has no electrolyte abnormality. LFT wnl.   1829 LACTIC ACID(!!): 2.1  500 mL NSS provided.    1830 Procalcitonin(!): 0.88  Elevated.                                SBIRT 22yo+      Flowsheet Row Most Recent Value   Initial Alcohol Screen: US AUDIT-C     1. How often do you have a drink containing alcohol? 0 Filed at: 01/03/2024 1701   2. How many drinks containing alcohol do you have on a typical day you are drinking?  0 Filed at: 01/03/2024 1701   3a. Male UNDER 65: How often do you have five or more drinks on one occasion? 0 Filed at: 01/03/2024 1701   Audit-C Score 0 Filed at: 01/03/2024 1701   KEYSHA: How many times in the past year have you...    Used an illegal drug or used a prescription medication for non-medical reasons? Never Filed at: 01/03/2024 1701                      Medical Decision Making  DDx: pneumonia, flu, covid, electrolyte abnormality   Patient arriving with noted hypoxia at his baseline O2 requirement after ambulation per RN staff to triage room. Patient has increase in O2 requirement. Patient has no noted wheeze. Patient has indwelling catheter for drainage of recurrent pleural effusion. Daughter states patient has had increase work of breathing per her observation at home. Patient's daughter reports she was just sick with similar symptoms. Patient has no leukocytosis, anemia at baseline. Blood work as documented in course notes. Noted to have the flu and as he is within the first two days of symptoms, tamilfu provided. Gfr appreciated and dose adjustment provided with initial dose of 75 mg.   Chest xray discussed with  radiology, will cover for pneumonia as well. Did discuss nodule appearance noted with patient and his daughter. Patient noted to have flu which is likely responsible for his symptoms over the past two days. Due to increased O2 requirement, flu positive, lactic acidosis, and elevated procal patient admitted.   The 30ml/kg fluid bolus was not given to the patient despite hypotension and/or significantly elevated lactate of = 4 and/or presence of septic shock due to: Heart Failure. The patient will be administered 500 ml of crystalloid fluid instead. Orders for this have been placed in [facility]. The patient may receive additional colloid or crystalloid fluids thereafter based on clinical condition. Last EF 35% in October 2023.   Case discussed with REBECCA Mcginnis of [facility] and in agreement with admission.     Amount and/or Complexity of Data Reviewed  Labs: ordered. Decision-making details documented in ED Course.  Radiology: ordered.    Risk  OTC drugs.  Prescription drug management.  Decision regarding hospitalization.             Disposition  Final diagnoses:   Hypoxia   Influenza A   Lactic acidosis   Elevated procalcitonin     Time reflects when diagnosis was documented in both MDM as applicable and the Disposition within this note       Time User Action Codes Description Comment    1/3/2024  7:09 PM Mindy Matthew [R09.02] Hypoxia     1/3/2024  7:27 PM Mindy Matthew [J10.1] Influenza A     1/3/2024  7:28 PM Mindy Matthew [E87.20] Lactic acidosis     1/3/2024  7:28 PM Mindy Matthew [R79.89] Elevated procalcitonin     1/4/2024 12:28 PM Carol Cutler Add [J18.9,  A41.9] Sepsis due to pneumonia (HCC)           ED Disposition       ED Disposition   Admit    Condition   Stable    Date/Time   Wed Carson 3, 2024 1909    Comment   Case was discussed with ZAHRAA Mcginnis and the patient's admission status was agreed to be Admission Status: inpatient status to the service of Dr. Petit .                Follow-up Information       Follow up With Specialties Details Why Contact Info    Margaret Mendoza PA-C Family Medicine, Physician Assistant Follow up in 1 week(s)  [address] [place] [id number] [phone]              Discharge Medication List as of 1/4/2024 12:39 PM        START taking these medications    Details   cefdinir (OMNICEF) 300 mg capsule Take 1 capsule (300 mg total) by mouth every 12 (twelve) hours for 5 days, Starting Thu 1/4/2024, Until Tue 1/9/2024, Normal      oseltamivir (TAMIFLU) 30 MG capsule Take 1 capsule (30 mg total) by mouth every 12 (twelve) hours for 8 doses, Starting Thu 1/4/2024, Until Mon 1/8/2024, Normal           CONTINUE these medications which have NOT CHANGED    Details   Accu-Chek FastClix Lancets MISC Use daily E11.9  Check  fbs  daily, Starting Mon 4/24/2023, Normal      brimonidine tartrate 0.2 % ophthalmic solution Administer 1 drop into the left eye 2 (two) times a day, Historical Med      !! dexamethasone (DECADRON) 2 mg tablet Take 1 tablet (2 mg total) by mouth 2 (two) times a day with meals, Starting Wed 1/3/2024, Until Fri 2/2/2024, Normal      !! dexamethasone (DECADRON) 2 mg tablet Take 1 tablet (2 mg total) by mouth 2 (two) times a day with meals, Starting Mon 1/1/2024, Normal      digoxin (LANOXIN) 0.125 mg tablet Take 1 tablet (125 mcg total) by mouth daily, Starting Tue 12/19/2023, Normal      finasteride (PROSCAR) 5 mg tablet Take 1 tablet (5 mg total) by mouth daily, Starting Mon 12/4/2023, Normal      furosemide (LASIX) 40 mg tablet Take 1 tablet (40 mg total) by mouth 2 (two) times a day, Starting Tue 12/19/2023, Normal      glucose blood (Accu-Chek Jackie Plus) test strip Use as instructed, Normal      levothyroxine (Euthyrox) 25 mcg tablet Take 1 tablet (25 mcg total) by mouth daily in the early morning, Starting Thu 11/30/2023, Normal      metFORMIN (GLUCOPHAGE-XR) 500 mg 24 hr tablet Take 2 tablets (1,000 mg total) by mouth  daily, Starting Fri 12/15/2023, Normal      metoprolol tartrate (LOPRESSOR) 50 mg tablet Take 1 tablet (50 mg total) by mouth 4 (four) times a day, Starting Tue 12/19/2023, Normal      ondansetron (ZOFRAN) 8 mg tablet Take 1 tablet (8 mg total) by mouth every 8 (eight) hours as needed for nausea or vomiting, Starting Tue 9/19/2023, Normal      simvastatin (ZOCOR) 10 mg tablet TAKE 1 TABLET BY MOUTH EVERY DAY, Normal      timolol (TIMOPTIC-XE) 0.5 % ophthalmic gel-forming 1 drop daily, Historical Med      warfarin (COUMADIN) 5 mg tablet Take 1 tablet (5 mg total) by mouth daily Alternate between 2.5 mg and 5 mg every other day, Starting Tue 12/19/2023, Normal      diltiazem (CARDIZEM) 30 mg tablet Take 1 tablet (30 mg total) by mouth every 8 (eight) hours, Starting Wed 11/22/2023, Until Fri 12/22/2023, Normal      folic acid ( Folic Acid) 1 mg tablet Take 1 tablet (1 mg total) by mouth daily, Starting Mon 12/11/2023, Until Wed 1/10/2024, Normal       !! - Potential duplicate medications found. Please discuss with provider.        STOP taking these medications       Osimertinib Mesylate 80 MG TABS Comments:   Reason for Stopping:               Outpatient Discharge Orders   Activity as tolerated     Call provider for:  persistent nausea or vomiting     Call provider for:  severe uncontrolled pain     Call provider for:  difficulty breathing, headache or visual disturbances     Call provider for:  persistent dizziness or light-headedness       PDMP Review         Value Time User    PDMP Reviewed  Yes 12/7/2023  1:00 PM Logan Yang PA-C            ED Provider  Electronically Signed by             RADHA Quezada  01/09/24 [id number]

## 2024-01-03 NOTE — TELEPHONE ENCOUNTER
Patients wife called to check on the status of the dexamethasone refill and was not informed that it was sent to the pharmacy. I informed her it was taken care of and sent over. She acknowledged.

## 2024-01-03 NOTE — TELEPHONE ENCOUNTER
Spoke  with pt's  daughter  shweta.   Continue   coumadin  5  mg.   To  repeat   pt/inr  in  2  weeks.  Mobile  lab  servise.  Daughter  states  patient  is ill with  uri.  Coughing. Increased rate  of breathing.  Advised for her  to  have  him  seen  in  the  ER

## 2024-01-04 ENCOUNTER — TRANSITIONAL CARE MANAGEMENT (OUTPATIENT)
Dept: FAMILY MEDICINE CLINIC | Facility: CLINIC | Age: 79
End: 2024-01-04

## 2024-01-04 VITALS
HEIGHT: 69 IN | TEMPERATURE: 100.5 F | WEIGHT: 157 LBS | SYSTOLIC BLOOD PRESSURE: 125 MMHG | OXYGEN SATURATION: 91 % | HEART RATE: 79 BPM | RESPIRATION RATE: 16 BRPM | DIASTOLIC BLOOD PRESSURE: 67 MMHG | BODY MASS INDEX: 23.25 KG/M2

## 2024-01-04 PROBLEM — J96.21 ACUTE ON CHRONIC RESPIRATORY FAILURE WITH HYPOXIA (HCC): Status: ACTIVE | Noted: 2024-01-04

## 2024-01-04 PROBLEM — R06.89 ACUTE RESPIRATORY INSUFFICIENCY: Status: ACTIVE | Noted: 2024-01-04

## 2024-01-04 LAB
4HR DELTA HS TROPONIN: -12 NG/L
ANION GAP SERPL CALCULATED.3IONS-SCNC: 11 MMOL/L
ATRIAL RATE: 326 BPM
ATRIAL RATE: 330 BPM
BASOPHILS # BLD AUTO: 0.01 THOUSANDS/ÂΜL (ref 0–0.1)
BASOPHILS NFR BLD AUTO: 0 % (ref 0–1)
BUN SERPL-MCNC: 46 MG/DL (ref 5–25)
CALCIUM SERPL-MCNC: 7.1 MG/DL (ref 8.4–10.2)
CARDIAC TROPONIN I PNL SERPL HS: 30 NG/L
CHLORIDE SERPL-SCNC: 101 MMOL/L (ref 96–108)
CO2 SERPL-SCNC: 28 MMOL/L (ref 21–32)
CREAT SERPL-MCNC: 1.24 MG/DL (ref 0.6–1.3)
DIGOXIN SERPL-MCNC: 1.3 NG/ML (ref 0.8–2)
EOSINOPHIL # BLD AUTO: 0.04 THOUSAND/ÂΜL (ref 0–0.61)
EOSINOPHIL NFR BLD AUTO: 0 % (ref 0–6)
ERYTHROCYTE [DISTWIDTH] IN BLOOD BY AUTOMATED COUNT: 14.6 % (ref 11.6–15.1)
GFR SERPL CREATININE-BSD FRML MDRD: 55 ML/MIN/1.73SQ M
GLUCOSE SERPL-MCNC: 102 MG/DL (ref 65–140)
GLUCOSE SERPL-MCNC: 123 MG/DL (ref 65–140)
GLUCOSE SERPL-MCNC: 173 MG/DL (ref 65–140)
HCT VFR BLD AUTO: 34.6 % (ref 36.5–49.3)
HGB BLD-MCNC: 11.3 G/DL (ref 12–17)
IMM GRANULOCYTES # BLD AUTO: 0.09 THOUSAND/UL (ref 0–0.2)
IMM GRANULOCYTES NFR BLD AUTO: 1 % (ref 0–2)
L PNEUMO1 AG UR QL IA.RAPID: NEGATIVE
LYMPHOCYTES # BLD AUTO: 0.58 THOUSANDS/ÂΜL (ref 0.6–4.47)
LYMPHOCYTES NFR BLD AUTO: 6 % (ref 14–44)
MCH RBC QN AUTO: 32.9 PG (ref 26.8–34.3)
MCHC RBC AUTO-ENTMCNC: 32.7 G/DL (ref 31.4–37.4)
MCV RBC AUTO: 101 FL (ref 82–98)
MONOCYTES # BLD AUTO: 0.39 THOUSAND/ÂΜL (ref 0.17–1.22)
MONOCYTES NFR BLD AUTO: 4 % (ref 4–12)
NEUTROPHILS # BLD AUTO: 7.89 THOUSANDS/ÂΜL (ref 1.85–7.62)
NEUTS SEG NFR BLD AUTO: 89 % (ref 43–75)
NRBC BLD AUTO-RTO: 0 /100 WBCS
PLATELET # BLD AUTO: 104 THOUSANDS/UL (ref 149–390)
PMV BLD AUTO: 10.8 FL (ref 8.9–12.7)
POTASSIUM SERPL-SCNC: 3.5 MMOL/L (ref 3.5–5.3)
PROCALCITONIN SERPL-MCNC: 0.88 NG/ML
QRS AXIS: 97 DEGREES
QRS AXIS: 98 DEGREES
QRSD INTERVAL: 96 MS
QRSD INTERVAL: 96 MS
QT INTERVAL: 370 MS
QT INTERVAL: 384 MS
QTC INTERVAL: 455 MS
QTC INTERVAL: 474 MS
RBC # BLD AUTO: 3.43 MILLION/UL (ref 3.88–5.62)
S PNEUM AG UR QL: NEGATIVE
SODIUM SERPL-SCNC: 140 MMOL/L (ref 135–147)
T WAVE AXIS: 66 DEGREES
T WAVE AXIS: 76 DEGREES
VENTRICULAR RATE: 91 BPM
VENTRICULAR RATE: 92 BPM
WBC # BLD AUTO: 9 THOUSAND/UL (ref 4.31–10.16)

## 2024-01-04 PROCEDURE — 84145 PROCALCITONIN (PCT): CPT | Performed by: PHYSICIAN ASSISTANT

## 2024-01-04 PROCEDURE — NC001 PR NO CHARGE: Performed by: INTERNAL MEDICINE

## 2024-01-04 PROCEDURE — 99236 HOSP IP/OBS SAME DATE HI 85: CPT | Performed by: INTERNAL MEDICINE

## 2024-01-04 PROCEDURE — 80048 BASIC METABOLIC PNL TOTAL CA: CPT | Performed by: PHYSICIAN ASSISTANT

## 2024-01-04 PROCEDURE — 82948 REAGENT STRIP/BLOOD GLUCOSE: CPT

## 2024-01-04 PROCEDURE — 85025 COMPLETE CBC W/AUTO DIFF WBC: CPT | Performed by: PHYSICIAN ASSISTANT

## 2024-01-04 PROCEDURE — 36415 COLL VENOUS BLD VENIPUNCTURE: CPT | Performed by: PHYSICIAN ASSISTANT

## 2024-01-04 RX ORDER — CEFDINIR 300 MG/1
300 CAPSULE ORAL EVERY 12 HOURS SCHEDULED
Qty: 10 CAPSULE | Refills: 0 | Status: SHIPPED | OUTPATIENT
Start: 2024-01-04 | End: 2024-01-09

## 2024-01-04 RX ORDER — OSELTAMIVIR PHOSPHATE 30 MG/1
30 CAPSULE ORAL EVERY 12 HOURS SCHEDULED
Qty: 8 CAPSULE | Refills: 0 | Status: SHIPPED | OUTPATIENT
Start: 2024-01-04 | End: 2024-01-08

## 2024-01-04 RX ADMIN — DILTIAZEM HYDROCHLORIDE 30 MG: 30 TABLET ORAL at 01:31

## 2024-01-04 RX ADMIN — DEXAMETHASONE 2 MG: 4 TABLET ORAL at 08:22

## 2024-01-04 RX ADMIN — FOLIC ACID 1 MG: 1 TABLET ORAL at 08:22

## 2024-01-04 RX ADMIN — DILTIAZEM HYDROCHLORIDE 30 MG: 30 TABLET ORAL at 08:48

## 2024-01-04 RX ADMIN — METOPROLOL TARTRATE 50 MG: 50 TABLET, FILM COATED ORAL at 12:03

## 2024-01-04 RX ADMIN — METOPROLOL TARTRATE 50 MG: 50 TABLET, FILM COATED ORAL at 08:21

## 2024-01-04 RX ADMIN — LEVOTHYROXINE SODIUM 25 MCG: 25 TABLET ORAL at 05:16

## 2024-01-04 RX ADMIN — OSELTAMIVIR PHOSPHATE 30 MG: 30 CAPSULE ORAL at 08:21

## 2024-01-04 RX ADMIN — BRIMONIDINE TARTRATE 1 DROP: 2 SOLUTION/ DROPS OPHTHALMIC at 08:24

## 2024-01-04 RX ADMIN — INSULIN LISPRO 1 UNITS: 100 INJECTION, SOLUTION INTRAVENOUS; SUBCUTANEOUS at 11:08

## 2024-01-04 RX ADMIN — FUROSEMIDE 40 MG: 40 TABLET ORAL at 08:22

## 2024-01-04 RX ADMIN — FINASTERIDE 5 MG: 5 TABLET, FILM COATED ORAL at 08:22

## 2024-01-04 RX ADMIN — TIMOLOL MALEATE 1 DROP: 5 SOLUTION/ DROPS OPHTHALMIC at 08:24

## 2024-01-04 RX ADMIN — DIGOXIN 125 MCG: 250 TABLET ORAL at 08:22

## 2024-01-04 NOTE — ASSESSMENT & PLAN NOTE
Will give renally adjusted Tamiflu 30 mg p.o. twice daily x 5 days  Give Robitussin DM as needed  Placed on O2 and respiratory protocol with incentive spirometry

## 2024-01-04 NOTE — ED NOTES
Patient placed into hospital bed for comfort. Admitting PA-C at bedside.     Sophie Melgar RN  01/03/24 2051

## 2024-01-04 NOTE — RESPIRATORY THERAPY NOTE
RT Protocol Note  Glen Patino 78 y.o. male MRN: [id number]  Unit/Bed#: ED 12 Encounter: [id number]    Assessment    Active Problems:  There are no active Hospital Problems.      Home Pulmonary Medications:    Home Devices/Therapy: Home O2 (2 l/m chronic cont no other resp services)    Past Medical History:   Diagnosis Date    Diabetes mellitus (HCC)     Hyperlipidemia     Hypertension     Lung cancer (HCC)     Prostate cancer (HCC) 2005    S/P prostate ca-2005 had radiation tx.     Social History     Socioeconomic History    Marital status: /Civil Union     Spouse name: None    Number of children: None    Years of education: None    Highest education level: None   Occupational History    None   Tobacco Use    Smoking status: Never    Smokeless tobacco: Never   Vaping Use    Vaping status: Never Used   Substance and Sexual Activity    Alcohol use: Yes     Comment: Occasional    Drug use: Never    Sexual activity: Not Currently   Other Topics Concern    None   Social History Narrative    None     Social Determinants of Health     Financial Resource Strain: Low Risk  (7/25/2023)    Overall Financial Resource Strain (CARDIA)     Difficulty of Paying Living Expenses: Not very hard   Food Insecurity: No Food Insecurity (11/13/2023)    Hunger Vital Sign     Worried About Running Out of Food in the Last Year: Never true     Ran Out of Food in the Last Year: Never true   Transportation Needs: No Transportation Needs (11/13/2023)    PRAPARE - Transportation     Lack of Transportation (Medical): No     Lack of Transportation (Non-Medical): No   Physical Activity: Not on file   Stress: Not on file   Social Connections: Not on file   Intimate Partner Violence: Not on file   Housing Stability: Low Risk  (11/13/2023)    Housing Stability Vital Sign     Unable to Pay for Housing in the Last Year: No     Number of Places Lived in the Last Year: 1     Unstable Housing in the Last Year: No       Subjective      "    Objective    Physical Exam:   Assessment Type: Assess only  General Appearance: Alert, Awake  Respiratory Pattern: Normal  Chest Assessment: Chest expansion symmetrical  Bilateral Breath Sounds: Diminished  R Breath Sounds: Diminished, Scattered, Expiratory wheezes  L Breath Sounds: Diminished, Clear  O2 Device: nc    Vitals:  Blood pressure 122/74, pulse 86, temperature 100.5 °F (38.1 °C), temperature source Tympanic, resp. rate 16, height 5' 9\" (1.753 m), weight 71.2 kg (157 lb), SpO2 92%.          Imaging and other studies: I have personally reviewed pertinent reports.      O2 Device: nc     Plan    Respiratory Plan: No distress/Pulmonary history        Resp Comments: pt admitted for Flu, no resp distress, pt has pulm hx in way of lung CA, no COPD hx no smoking hx, pt was seen by Dr Lolita alegria last admit for chest tube and plueral fluid drainage, pt wears 2 l/m cont o2 at home all times, no home pulm meds, will order MDI alb prn while inpatient, pox on 2 l/m 92% HR 86 will cont to monitor   "

## 2024-01-04 NOTE — NURSING NOTE
Went over discharge info with pt and pt daughter. All questions answered. Daughter and Rn wheel patient to main lobby.

## 2024-01-04 NOTE — ASSESSMENT & PLAN NOTE
Lactic acid improved.  Procalcitonin stable  Cultures are currently pending  Strep pneumo and urine Legionella negative  Patient was started on IV ceftriaxone on admission  Clinically improved today  Plan to transition to oral cefdinir for discharge  Patient met sepsis criteria and that he was febrile with a Tmax of 100.5, tachycardic with a heart rate as high as 99, tachypnea with respiratory rate as high as 22 and a known source of infection being left lower lobe pneumonia

## 2024-01-04 NOTE — H&P
Critical access hospital  H&P  Name: Glen Patino 78 y.o. male I MRN: [id number]  Unit/Bed#: ED 12 I Date of Admission: 1/3/2024   Date of Service: 1/4/2024 I Hospital Day: 1      Assessment/Plan   * Sepsis due to pneumonia (HCC)  Assessment & Plan  Admit to medicine  Trend lactic acid and procalcitonin  Cultures are currently pending  Obtain urine for strep pneumo and Legionella  Placed on O2 and respiratory protocol  Will give Rocephin 1 g IV daily  Patient met sepsis criteria and that he was febrile with a Tmax of 100.5, tachycardic with a heart rate as high as 99, tachypnea with respiratory rate as high as 22 and a known source of infection being left lower lobe pneumonia    Acute respiratory insufficiency  Assessment & Plan  Patient's O2 sats as low as 84% on room air  Patient does wear O2 2 L nasal cannula at home  On baseline O2 patient's saturations are 92 but will desat with minimal activity    Influenza A  Assessment & Plan  Will give renally adjusted Tamiflu 30 mg p.o. twice daily x 5 days  Give Robitussin DM as needed  Placed on O2 and respiratory protocol with incentive spirometry    Pleural effusion  Assessment & Plan  Patient has history of recurrent pleural effusions  PleurX in place  Patient's daughter states that she drains this every 3 days    Anemia  Assessment & Plan  Hemoglobin appears to be at baseline, will continue to monitor with repeat labs in a.m. continue prehospital folic acid 1 mg p.o. daily    BPH associated with nocturia  Assessment & Plan  Continue prehospital Proscar 5 mg p.o. daily    Glaucoma  Assessment & Plan  Continue prehospital eyedrops    Hypercholesterolemia  Assessment & Plan  Substitute Pravachol 20 mg p.o. daily for prehospital Zocor    Type 2 diabetes mellitus without complication, without long-term current use of insulin (HCC)  Assessment & Plan  Lab Results   Component Value Date    HGBA1C 6.2 (H) 11/25/2023       Recent Labs     01/03/24  2353   POCGLU 135  "      Blood Sugar Average: Last 72 hrs:  (P) 135    Placed on [facility] type II diet  Hold prehospital metformin  Obtain Accu-Cheks before meals and at bedtime with Humalog correction dose before meals and at bedtime    Benign essential HTN  Assessment & Plan  Continue prehospital Lopressor 40 mg p.o. 4 times daily and Cardizem 30 mg p.o. every 8 hours         VTE Prophylaxis: Warfarin (Coumadin)  Code Status: Level 3  Discussion with family: Daughter who was present at bedside at time of exam, diagnosis and treatment plan were reviewed and all questions answered to their satisfaction    Anticipated Length of Stay:  Patient will be admitted on an Inpatient basis with an anticipated length of stay of  > 2 midnights.   Justification for Hospital Stay: This is secondary to pneumonia and respiratory insufficiency quiring O2 support and IV antibiotics    Total Time for Visit, including Counseling / Coordination of Care: 1 hour.  Greater than 50% of this total time spent on direct patient counseling and coordination of care.    Chief Complaint:   Shortness of breath and cough x 3 days    History of Present Illness:    Glen Patino is a 78 y.o. male who presents with shortness of breath and cough x 3 days.  Patient with a complicated past medical history to include prostate lung cancer with brain metastasis presents to the ER for further evaluation and treatment 3-day history of Hartness of breath and cough.  Patient caregiver is his daughter was present at bedside at time of exam he states that she was recently diagnosed with influenza and her father is unvaccinated for same.  Patient complains of generalized bodyaches and cough which is productive of what he describes as \"caramel colored\" sputum the patient normally requires O2 2 L nasal cannula at home but was noted to be hypoxic with an O2 saturation 84% on his baseline oxygen.  Tested positive for influenza A in ER.    Review of Systems:    Review of Systems "   Constitutional:  Positive for chills and fever.   Respiratory:  Positive for cough and shortness of breath. Negative for wheezing.    Cardiovascular:  Negative for chest pain and palpitations.   Gastrointestinal:  Negative for abdominal pain, diarrhea, nausea and vomiting.   Genitourinary:  Negative for dysuria, frequency, hematuria and urgency.   Neurological:  Negative for weakness, light-headedness and headaches.   All other systems reviewed and are negative.      Past Medical and Surgical History:     Past Medical History:   Diagnosis Date    Diabetes mellitus (HCC)     Hyperlipidemia     Hypertension     Lung cancer (HCC)     Prostate cancer (HCC) 2005    S/P prostate ca-2005 had radiation tx.       Past Surgical History:   Procedure Laterality Date    COLONOSCOPY      IR BIOPSY LYMPH NODE  9/15/2022    IR CHEST TUBE PLACEMENT  11/14/2023    IR MIDLINE PLACEMENT  11/13/2023    IR PLEURAL EFFUSION LONG-TERM CATHETER PLACEMENT  11/16/2023    IR THORACENTESIS  9/13/2022    IR THORACENTESIS  10/5/2023    IR THORACENTESIS  10/13/2023    IR THORACENTESIS  10/30/2023    IR THORACENTESIS  11/9/2023    IR THORACENTESIS  11/13/2023    IR THORACENTESIS  11/20/2023    AR ARTHRODESIS ANKLE OPEN Right 7/10/2020    Procedure: ARTHRODESIS/ TIBIAL-TALAR ANKLE FUSION;  Surgeon: Kristin Davis MD;  Location: [facility];  Service: Orthopedics    AR LAPAROSCOPY SURG RPR INITIAL INGUINAL HERNIA Bilateral 12/16/2021    Procedure: LAPAROSCOPIC REPAIR HERNIA INGUINAL WITH MESH;  Surgeon: Glen Crockett MD;  Location: [facility];  Service: General       Meds/Allergies:    Prior to Admission medications    Medication Sig Start Date End Date Taking? Authorizing Provider   Accu-Chek FastClix Lancets MISC Use daily E11.9  Check  fbs  daily 4/24/23   Margaret Mendoza PA-C   brimonidine tartrate 0.2 % ophthalmic solution Administer 1 drop into the left eye 2 (two) times a day    Historical Provider, MD   dexamethasone (DECADRON) 2 mg  tablet Take 1 tablet (2 mg total) by mouth 2 (two) times a day with meals 1/3/24 2/2/24  Ellen Mckoy MD   dexamethasone (DECADRON) 2 mg tablet Take 1 tablet (2 mg total) by mouth 2 (two) times a day with meals 1/1/24   Althea Church MD   digoxin (LANOXIN) 0.125 mg tablet Take 1 tablet (125 mcg total) by mouth daily 12/19/23   Margaret Mendoza PA-C   diltiazem (CARDIZEM) 30 mg tablet Take 1 tablet (30 mg total) by mouth every 8 (eight) hours 11/22/23 12/22/23  Willy Mccurdy PA-C   finasteride (PROSCAR) 5 mg tablet Take 1 tablet (5 mg total) by mouth daily 12/4/23   Margaret Mendoza PA-C   folic acid (KP Folic Acid) 1 mg tablet Take 1 tablet (1 mg total) by mouth daily 12/11/23 1/10/24  Ellen Mckoy MD   furosemide (LASIX) 40 mg tablet Take 1 tablet (40 mg total) by mouth 2 (two) times a day 12/19/23   Margaret Mendoza PA-C   glucose blood (Accu-Chek Jackie Plus) test strip Use as instructed 4/24/23   Margaret Mendoza PA-C   levothyroxine (Euthyrox) 25 mcg tablet Take 1 tablet (25 mcg total) by mouth daily in the early morning 11/30/23   Margaret Mendoza PA-C   metFORMIN (GLUCOPHAGE-XR) 500 mg 24 hr tablet Take 2 tablets (1,000 mg total) by mouth daily 12/15/23   Margaret Mendoza PA-C   metoprolol tartrate (LOPRESSOR) 50 mg tablet Take 1 tablet (50 mg total) by mouth 4 (four) times a day 12/19/23   Margaret Mendoza PA-C   ondansetron (ZOFRAN) 8 mg tablet Take 1 tablet (8 mg total) by mouth every 8 (eight) hours as needed for nausea or vomiting 9/19/23   Ellen Mckoy MD   Osimertinib Mesylate 80 MG TABS Take 80 mg by mouth in the morning  Patient not taking: Reported on 12/12/2023    Historical Provider, MD   simvastatin (ZOCOR) 10 mg tablet TAKE 1 TABLET BY MOUTH EVERY DAY 12/13/23   Margaret Mendoza PA-C   timolol (TIMOPTIC-XE) 0.5 % ophthalmic gel-forming 1 drop daily    Historical Provider, MD   warfarin (COUMADIN) 5 mg tablet Take 1 tablet (5 mg total) by mouth daily Alternate between 2.5 mg  "and 5 mg every other day 12/19/23   Margaret Mnedoza PA-C     all medications and allergies reviewed    Allergies: No Known Allergies    Social History:     Marital Status: /Civil Union   Occupation: Retired  Patient Pre-hospital Living Situation: Resides at home with daughter  Patient Pre-hospital Level of Mobility: Full with assist  Patient Pre-hospital Diet Restrictions: Diabetic    Social History     Substance and Sexual Activity   Alcohol Use Yes    Comment: Occasional     Social History     Tobacco Use   Smoking Status Never   Smokeless Tobacco Never     Social History     Substance and Sexual Activity   Drug Use Never       Family History:  I have reviewed the patient's family history    Physical Exam:     Vitals:   Blood Pressure: 122/74 (01/03/24 2000)  Pulse: 86 (01/03/24 2153)  Temperature: 100.5 °F (38.1 °C) (01/03/24 1654)  Temp Source: Tympanic (01/03/24 1654)  Respirations: 16 (01/03/24 2153)  Height: 5' 9\" (175.3 cm) (01/03/24 1654)  Weight - Scale: 71.2 kg (157 lb) (01/03/24 1654)  SpO2: 92 % (01/03/24 2153)    Physical Exam  Vitals and nursing note reviewed.   Constitutional:       General: He is not in acute distress.     Appearance: Normal appearance. He is ill-appearing.   HENT:      Head: Normocephalic and atraumatic.      Right Ear: Tympanic membrane normal.      Left Ear: Tympanic membrane normal.      Nose: Nose normal.      Mouth/Throat:      Mouth: Mucous membranes are moist.      Pharynx: No oropharyngeal exudate or posterior oropharyngeal erythema.   Eyes:      Extraocular Movements: Extraocular movements intact.      Pupils: Pupils are equal, round, and reactive to light.   Cardiovascular:      Rate and Rhythm: Normal rate and regular rhythm.      Pulses: Normal pulses.      Heart sounds: Normal heart sounds.   Pulmonary:      Effort: Pulmonary effort is normal. No respiratory distress.      Breath sounds: Rhonchi present.   Abdominal:      General: Abdomen is flat. Bowel sounds " are normal.      Palpations: Abdomen is soft.   Musculoskeletal:         General: Normal range of motion.      Cervical back: Normal range of motion and neck supple.      Right lower leg: Edema present.      Left lower leg: Edema present.   Skin:     General: Skin is warm and dry.      Capillary Refill: Capillary refill takes less than 2 seconds.   Neurological:      General: No focal deficit present.      Mental Status: He is alert and oriented to person, place, and time.         Additional Data:     Lab Results: I have personally reviewed pertinent reports.      Results from last 7 days   Lab Units 01/03/24  1751   WBC Thousand/uL 9.05   HEMOGLOBIN g/dL 11.3*   HEMATOCRIT % 35.6*   PLATELETS Thousands/uL 117*   NEUTROS PCT % 88*   LYMPHS PCT % 6*   MONOS PCT % 5   EOS PCT % 0     Results from last 7 days   Lab Units 01/03/24  1751   SODIUM mmol/L 138   POTASSIUM mmol/L 3.5   CHLORIDE mmol/L 98   CO2 mmol/L 28   BUN mg/dL 50*   CREATININE mg/dL 1.50*   ANION GAP mmol/L 12   CALCIUM mg/dL 7.5*   ALBUMIN g/dL 2.5*   TOTAL BILIRUBIN mg/dL 0.92   ALK PHOS U/L 76   ALT U/L 31   AST U/L 30   GLUCOSE RANDOM mg/dL 163*     Results from last 7 days   Lab Units 01/03/24  1748   INR  2.61*     Results from last 7 days   Lab Units 01/03/24  2353   POC GLUCOSE mg/dl 135         Results from last 7 days   Lab Units 01/03/24  2047 01/03/24  1748   LACTIC ACID mmol/L 1.3 2.1*   PROCALCITONIN ng/ml  --  0.88*       Imaging: I have personally reviewed pertinent reports.    XR chest 1 view portable   Final Result by Pamela Rocha MD (01/03 1839)   Addendum (preliminary) 1 of 1 by Pamela Rocha MD (01/03 1839)   ADDENDUM:      Nodular opacity over the left base not visible with certainty on the prior    chest radiograph, question acute infectious/inflammatory process.      Final      Bilateral chest tubes with no pneumothorax.      Small right pleural effusion, decreased from 12/1/2023 with trace left effusion.       Right greater than left bibasilar atelectasis.      Redemonstration of multiple lung nodules.            Workstation performed: [id number]               EKG, Pathology, and Other Studies Reviewed on Admission:   EKG: N/A    Epic / Care Everywhere Records Reviewed: Yes    ** Please Note: This note has been constructed using a voice recognition system. **

## 2024-01-04 NOTE — ASSESSMENT & PLAN NOTE
Will give renally adjusted Tamiflu 30 mg p.o. twice daily x 5 days  Give Robitussin DM as needed  Patient with brief episode of acute on chronic hypoxic respiratory failure which has since resolved  Plan to discharge to complete Tamiflu for 5 days.  Advised to follow-up with PCP as outpatient

## 2024-01-04 NOTE — ASSESSMENT & PLAN NOTE
Patient's O2 sats as low as 84% on room air  Patient does wear O2 2 L nasal cannula at home  On baseline O2 patient's saturations are 92 but will desat with minimal activity

## 2024-01-04 NOTE — ASSESSMENT & PLAN NOTE
Lab Results   Component Value Date    HGBA1C 6.2 (H) 11/25/2023       Recent Labs     01/03/24  2353   POCGLU 135       Blood Sugar Average: Last 72 hrs:  (P) 135    Placed on [facility] type II diet  Hold prehospital metformin  Obtain Accu-Cheks before meals and at bedtime with Humalog correction dose before meals and at bedtime

## 2024-01-04 NOTE — PLAN OF CARE
Problem: Potential for Falls  Goal: Patient will remain free of falls  Description: INTERVENTIONS:  - Educate patient/family on patient safety including physical limitations  - Instruct patient to call for assistance with activity   - Consult OT/PT to assist with strengthening/mobility   - Keep Call bell within reach  - Keep bed low and locked with side rails adjusted as appropriate  - Keep care items and personal belongings within reach  - Initiate and maintain comfort rounds  - Make Fall Risk Sign visible to staff  - Apply yellow socks and bracelet for high fall risk patients  - Consider moving patient to room near nurses station  Outcome: Progressing   Call bell within reach. Pt will call when in need of assistance

## 2024-01-04 NOTE — DISCHARGE SUMMARY
[facility]  Discharge- Glen Patino 1945, 78 y.o. male MRN: [id number]  Unit/Bed#: ED 12 Encounter: [id number]  Primary Care Provider: Margaret Mendoza PA-C   Date and time admitted to hospital: 1/3/2024  5:07 PM    * Sepsis due to pneumonia (HCC)  Assessment & Plan  Lactic acid improved.  Procalcitonin stable  Cultures are currently pending  Strep pneumo and urine Legionella negative  Patient was started on IV ceftriaxone on admission  Clinically improved today  Plan to transition to oral cefdinir for discharge  Patient met sepsis criteria and that he was febrile with a Tmax of 100.5, tachycardic with a heart rate as high as 99, tachypnea with respiratory rate as high as 22 and a known source of infection being left lower lobe pneumonia    Acute on chronic respiratory failure with hypoxia (HCC)  Assessment & Plan  Likely multifactorial, secondary to underlying pleural effusions as well as component of bacterial pneumonia with influenza  After treatment patient is back to home oxygen requirement  Bilateral Pleurx catheters are in place  Plan to discharge on oral antibiotics and Tamiflu to complete 5 days of therapy    Influenza A  Assessment & Plan  Will give renally adjusted Tamiflu 30 mg p.o. twice daily x 5 days  Give Robitussin DM as needed  Patient with brief episode of acute on chronic hypoxic respiratory failure which has since resolved  Plan to discharge to complete Tamiflu for 5 days.  Advised to follow-up with PCP as outpatient    Pleural effusion  Assessment & Plan  Patient has history of recurrent pleural effusions  PleurX in place  Patient's daughter states that she drains this every 3 days    Anemia  Assessment & Plan  Hemoglobin level stable.  Continue home folic acid    BPH associated with nocturia  Assessment & Plan  Continue prehospital Proscar 5 mg p.o. daily    Glaucoma  Assessment & Plan  Continue prehospital eyedrops    Hypercholesterolemia  Assessment &  Plan  Continue home statin    Type 2 diabetes mellitus without complication, without long-term current use of insulin (HCC)  Assessment & Plan  Lab Results   Component Value Date    HGBA1C 6.2 (H) 11/25/2023       Recent Labs     01/03/24  2353 01/04/24  0750 01/04/24  1052   POCGLU 135 102 173*         Blood Sugar Average: Last 72 hrs:  (P) 136.8804394855154774    Continue home diabetic regimen    Benign essential HTN  Assessment & Plan  Continue prehospital Lopressor 40 mg p.o. 4 times daily and Cardizem 30 mg p.o. every 8 hours      Medical Problems       Resolved Problems  Date Reviewed: 1/3/2024   None       Discharging Physician / Practitioner: Carol Cutler MD  PCP: Margaret Mendoza PA-C  Admission Date:   Admission Orders (From admission, onward)       Ordered        01/03/24 1928  INPATIENT ADMISSION  Once                          Discharge Date: 01/04/24    Consultations During Hospital Stay:  None    Procedures Performed:   None    Significant Findings / Test Results:   Influenza infection    Incidental Findings:   None    Test Results Pending at Discharge (will require follow up):   Blood cultures     Outpatient Tests Requested:  Routine labs with PCP in 1 week    Complications: None    Reason for Admission: Influenza infection    Hospital Course:   Glen Patino is a 78 y.o. male patient who originally presented to the hospital on 1/3/2024 due to shortness of breath.  Patient found to have influenza infection with possible superimposed bacterial infection.  Patient was started on Tamiflu and IV antibiotics.  Patient also with acute on chronic hypoxic respiratory failure secondary to influenza and pneumonia.  Patient doing well today.  Back to home oxygen requirement.  Chest x-ray with stable findings.  Spoke to patient's daughter at bedside regarding patient's current clinical condition.  She states that he appears to be more or less at baseline aside from his cough.  Offered to keep patient in  "the hospital for another 24 hours or discharged home.  Daughter prefers patient to be discharged home.  Patient and daughter are both in agreement with discharge plan.  Patient discharged home on Tamiflu and cefdinir to complete course of therapy.  Advised to return with any worsening symptoms.  Patient can follow-up with PCP in 1 week for routine posthospital follow-up    The patient, initially admitted to the hospital as inpatient, was discharged earlier than expected given the following: Patient now back to home oxygen requirement and feels better.    Please see above list of diagnoses and related plan for additional information.     Condition at Discharge: stable    Discharge Day Visit / Exam:   Subjective: No complaints at this time  Vitals: Blood Pressure: 125/67 (01/04/24 1203)  Pulse: 79 (01/04/24 1203)  Temperature: 100.5 °F (38.1 °C) (01/03/24 1654)  Temp Source: Tympanic (01/03/24 1654)  Respirations: 16 (01/03/24 2153)  Height: 5' 9\" (175.3 cm) (01/03/24 1654)  Weight - Scale: 71.2 kg (157 lb) (01/03/24 1654)  SpO2: 91 % (01/04/24 0131)  Exam:   Physical Exam  Constitutional:       General: He is not in acute distress.     Comments: Chronically ill-appearing elderly male   HENT:      Head: Normocephalic and atraumatic.      Ears:      Comments: Hard of hearing     Nose: Nose normal.      Mouth/Throat:      Mouth: Mucous membranes are moist.   Eyes:      Extraocular Movements: Extraocular movements intact.      Conjunctiva/sclera: Conjunctivae normal.   Cardiovascular:      Rate and Rhythm: Normal rate and regular rhythm.   Pulmonary:      Effort: Pulmonary effort is normal. No respiratory distress.      Breath sounds: Rhonchi present.      Comments: Bilateral Pleurx catheters in place  Abdominal:      Palpations: Abdomen is soft.      Tenderness: There is no abdominal tenderness.   Musculoskeletal:         General: Normal range of motion.      Cervical back: Normal range of motion and neck supple.      " Comments: Generalized weakness   Skin:     General: Skin is warm and dry.   Neurological:      General: No focal deficit present.      Mental Status: He is alert. Mental status is at baseline.      Cranial Nerves: No cranial nerve deficit.   Psychiatric:         Mood and Affect: Mood normal.         Behavior: Behavior normal.          Discussion with Family: Updated  (daughter) at bedside.    Discharge instructions/Information to patient and family:   See after visit summary for information provided to patient and family.      Provisions for Follow-Up Care:  See after visit summary for information related to follow-up care and any pertinent home health orders.      Mobility at time of Discharge:   Basic Mobility Inpatient Raw Score: 17  JH-HLM Goal: 5: Stand one or more mins  JH-HLM Achieved: 3: Sit at edge of bed  HLM Goal achieved. Continue to encourage appropriate mobility.     Disposition:   Home    Planned Readmission: no     Discharge Statement:  I spent 35 minutes discharging the patient. This time was spent on the day of discharge. I had direct contact with the patient on the day of discharge. Greater than 50% of the total time was spent examining patient, answering all patient questions, arranging and discussing plan of care with patient as well as directly providing post-discharge instructions.  Additional time then spent on discharge activities.    Discharge Medications:  See after visit summary for reconciled discharge medications provided to patient and/or family.      **Please Note: This note may have been constructed using a voice recognition system**

## 2024-01-04 NOTE — ASSESSMENT & PLAN NOTE
Lab Results   Component Value Date    HGBA1C 6.2 (H) 11/25/2023       Recent Labs     01/03/24  2353 01/04/24  0750 01/04/24  1052   POCGLU 135 102 173*         Blood Sugar Average: Last 72 hrs:  (P) 136.3725548338131258    Continue home diabetic regimen

## 2024-01-04 NOTE — ASSESSMENT & PLAN NOTE
Hemoglobin appears to be at baseline, will continue to monitor with repeat labs in a.m. continue prehospital folic acid 1 mg p.o. daily

## 2024-01-04 NOTE — ASSESSMENT & PLAN NOTE
Patient has history of recurrent pleural effusions  PleurX in place  Patient's daughter states that she drains this every 3 days

## 2024-01-04 NOTE — ASSESSMENT & PLAN NOTE
Admit to medicine  Trend lactic acid and procalcitonin  Cultures are currently pending  Obtain urine for strep pneumo and Legionella  Placed on O2 and respiratory protocol  Will give Rocephin 1 g IV daily  Patient met sepsis criteria and that he was febrile with a Tmax of 100.5, tachycardic with a heart rate as high as 99, tachypnea with respiratory rate as high as 22 and a known source of infection being left lower lobe pneumonia

## 2024-01-04 NOTE — ASSESSMENT & PLAN NOTE
Likely multifactorial, secondary to underlying pleural effusions as well as component of bacterial pneumonia with influenza  After treatment patient is back to home oxygen requirement  Bilateral Pleurx catheters are in place  Plan to discharge on oral antibiotics and Tamiflu to complete 5 days of therapy

## 2024-01-05 DIAGNOSIS — I48.91 ATRIAL FIBRILLATION, UNSPECIFIED TYPE (HCC): ICD-10-CM

## 2024-01-05 NOTE — TELEPHONE ENCOUNTER
Patient's daughter called to check status of diltiazem refill. She initially requested refill on 01/02/24. Med is discontinued as of yesterday, which may have been due to overnight stay in ED for flu. Daughter says this med is still listed on his discharge papers.     Reason for call:   [x] Refill   [] Prior Auth  [] Other:     Office:   [x] PCP/Provider - George MORELOS / Kelly  [] Specialty/Provider -     Medication: diltiazem    Dose/Frequency: 30mg q8    Quantity: 270    Pharmacy: [id number] [address] [address]     Does the patient have enough for 3 days?   [x] Yes   [] No - Send as HP to POD

## 2024-01-07 ENCOUNTER — RA CDI HCC (OUTPATIENT)
Dept: OTHER | Facility: HOSPITAL | Age: 79
End: 2024-01-07

## 2024-01-07 DIAGNOSIS — C34.11 MALIGNANT NEOPLASM OF UPPER LOBE OF RIGHT LUNG (HCC): ICD-10-CM

## 2024-01-08 PROBLEM — E03.9 HYPOTHYROIDISM: Status: ACTIVE | Noted: 2024-01-08

## 2024-01-08 LAB
BACTERIA BLD CULT: NORMAL
BACTERIA BLD CULT: NORMAL

## 2024-01-08 PROCEDURE — 77386 HB NTSTY MODUL RAD TX DLVR CPLX: CPT | Performed by: RADIOLOGY

## 2024-01-08 PROCEDURE — 77014 CHG CT GUIDANCE RADIATION THERAPY FLDS PLACEMENT: CPT | Performed by: RADIOLOGY

## 2024-01-08 RX ORDER — FOLIC ACID 1 MG/1
1 TABLET ORAL DAILY
Qty: 30 TABLET | Refills: 0 | Status: SHIPPED | OUTPATIENT
Start: 2024-01-08 | End: 2024-02-07

## 2024-01-09 ENCOUNTER — TELEMEDICINE (OUTPATIENT)
Dept: FAMILY MEDICINE CLINIC | Facility: CLINIC | Age: 79
End: 2024-01-09
Payer: MEDICARE

## 2024-01-09 DIAGNOSIS — C77.8 MALIGNANT NEOPLASM METASTATIC TO LYMPH NODES OF MULTIPLE SITES (HCC): ICD-10-CM

## 2024-01-09 DIAGNOSIS — J10.1 INFLUENZA A: ICD-10-CM

## 2024-01-09 DIAGNOSIS — J90 PLEURAL EFFUSION: ICD-10-CM

## 2024-01-09 DIAGNOSIS — J44.1 COPD WITH ACUTE EXACERBATION (HCC): ICD-10-CM

## 2024-01-09 DIAGNOSIS — J18.9 SEPSIS DUE TO PNEUMONIA (HCC): Primary | ICD-10-CM

## 2024-01-09 DIAGNOSIS — A41.9 SEPSIS DUE TO PNEUMONIA (HCC): Primary | ICD-10-CM

## 2024-01-09 DIAGNOSIS — N18.30 ANEMIA DUE TO STAGE 3 CHRONIC KIDNEY DISEASE, UNSPECIFIED WHETHER STAGE 3A OR 3B CKD: ICD-10-CM

## 2024-01-09 DIAGNOSIS — D63.1 ANEMIA DUE TO STAGE 3 CHRONIC KIDNEY DISEASE, UNSPECIFIED WHETHER STAGE 3A OR 3B CKD: ICD-10-CM

## 2024-01-09 DIAGNOSIS — E11.9 TYPE 2 DIABETES MELLITUS WITHOUT COMPLICATION, WITHOUT LONG-TERM CURRENT USE OF INSULIN (HCC): ICD-10-CM

## 2024-01-09 DIAGNOSIS — E03.9 HYPOTHYROIDISM, UNSPECIFIED TYPE: ICD-10-CM

## 2024-01-09 DIAGNOSIS — J96.21 ACUTE ON CHRONIC RESPIRATORY FAILURE WITH HYPOXIA (HCC): ICD-10-CM

## 2024-01-09 LAB
BACTERIA BLD CULT: NORMAL
BACTERIA BLD CULT: NORMAL

## 2024-01-09 PROCEDURE — 77014 CHG CT GUIDANCE RADIATION THERAPY FLDS PLACEMENT: CPT | Performed by: RADIOLOGY

## 2024-01-09 PROCEDURE — 99496 TRANSJ CARE MGMT HIGH F2F 7D: CPT | Performed by: PHYSICIAN ASSISTANT

## 2024-01-09 PROCEDURE — 77386 HB NTSTY MODUL RAD TX DLVR CPLX: CPT | Performed by: RADIOLOGY

## 2024-01-09 NOTE — PROGRESS NOTES
HCC coding opportunities          Chart Reviewed number of suggestions sent to Provider: 1     Patients Insurance   I11.0  Medicare Insurance: Medicare

## 2024-01-09 NOTE — PROGRESS NOTES
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    01/02/24 Telephone Margaret Mendoza PA-C [facility]   Showing recent visits within past 7 days and meeting all other requirements  Today's Visits  Date Type Provider Dept   01/09/24 Telemedicine Margaret Mendoza PA-C [facility]   Showing today's visits and meeting all other requirements  Future Appointments  No visits were found meeting these conditions.  Showing future appointments within next 150 days and meeting all other requirements       After connecting through televideo, the patient was identified by name and date of birth. Glen Patino was informed that this is a telemedicine visit and that the visit is being conducted through the Epic Embedded platform. He agrees to proceed..  My office door was closed. The patient was notified the following individuals were present in the room [place].  He acknowledged consent and understanding of privacy and security of the video platform. The patient has agreed to participate and understands they can  discontinue the visit at any time.    Patient is aware this is a billable service.    Transitional Care Management Review:  Glen Patino is a 78 y.o. male here for TCM follow up.     During the TCM phone call patient stated:    TCM Call       Date and time call was made  1/5/2024  1:03 PM    Hospital care reviewed  Records reviewed    Patient was hospitialized at  [facility]    Date of Admission  01/03/24    Date of discharge  01/04/24    Diagnosis  Hypoxia +4 more    Disposition  Home    Were the patients medications reviewed and updated  Yes    Current Symptoms  None          TCM Call       Post hospital issues  None    Should patient be enrolled in anticoag monitoring?  No    Scheduled for follow up?  Yes    Not clinically warranted  Following with orthopedics    Patients specialists  Cardiologist    Did you obtain your prescribed medications  Yes    Do you need help managing your prescriptions or medications  No    Is transportation to your appointment needed  No    I have advised the patient to call PCP with any new or worsening symptoms  Angelia Hastings MR    Living Arrangements  Children    Are you recieving any outpatient services  No    What type of services  PT OT    Are you recieving home care services  No    Types of home care services  Nurse visit    Are you using any community resources  No    Current waiver services  No    Have you fallen in the last 12 months  No    How many times  1    Interperter language line needed  No    Counseling  Patient    Counseling topics  Importance of RX compliance          Subjective:     Patient ID: Glen Patino is a 78 y.o. male.    Patient being seen by video visit for transition of care management.  Patient was hospitalized January 3 through January 4.  He was diagnosed with influenza and sepsis due to pneumonia.  Sent home the next day on Tamiflu and oral Omnicef.  Was present for today.  Has extensive medical history which includes lung cancer with pleural  effusion and metastasis to the brain.  Daughter states he is back to baseline.  His vision level has been good.  He is on O2.  No more fever.  He has been sleeping well.  He has been eating regularly.  He has no nausea or vomiting.  No problem with bowel movements.  He is currently undergoing deviation to the brain for brain mets and he is on Decadron to prevent brain swelling.  He also has bilateral catheter tubes in the lungs for effusions.  Daughter is trained on to training them 3 times a week.      Review of Systems   Constitutional:  Negative for activity change, appetite change, chills, fatigue and fever.   HENT:  Negative for ear pain, postnasal drip, rhinorrhea, sinus pressure, sinus pain, sneezing and sore throat.    Respiratory:  Negative for cough and shortness of breath.    Gastrointestinal:  Negative for diarrhea, nausea and vomiting.   Genitourinary:  Negative for difficulty urinating.   Psychiatric/Behavioral:  Negative for sleep disturbance.          Objective:    There were no vitals filed for this visit.    Physical Exam  Constitutional:       Appearance: He is ill-appearing. He is not toxic-appearing.   HENT:      Right Ear: External ear normal.      Left Ear: External ear normal.   Eyes:      Conjunctiva/sclera: Conjunctivae normal.   Skin:     Coloration: Skin is not pale.      Findings: No erythema.   Neurological:      Mental Status: He is oriented to person, place, and time.         Medications have been reviewed by provider in current encounter    I spent 6 minutes with the patient during this visit.    Margaret Mendoza PA-C      VIRTUAL VISIT DISCLAIMER    Glen Patino verbally agrees to participate in Virtual Care Services. Pt is aware that Virtual Care Services could be limited without vital signs or the ability to perform a full hands-on physical exam. Glen Patino understands he or the provider may request at any time to terminate the video visit and request the patient to seek care or  treatment in person.

## 2024-01-10 ENCOUNTER — TELEPHONE (OUTPATIENT)
Age: 79
End: 2024-01-10

## 2024-01-10 ENCOUNTER — PATIENT OUTREACH (OUTPATIENT)
Dept: CASE MANAGEMENT | Facility: HOSPITAL | Age: 79
End: 2024-01-10

## 2024-01-10 PROCEDURE — 77386 HB NTSTY MODUL RAD TX DLVR CPLX: CPT | Performed by: INTERNAL MEDICINE

## 2024-01-10 PROCEDURE — 77014 CHG CT GUIDANCE RADIATION THERAPY FLDS PLACEMENT: CPT | Performed by: INTERNAL MEDICINE

## 2024-01-10 NOTE — PROGRESS NOTES
Closing OncSW episode at this time, no contact with pt since 11/2023.  MSW will remain available to pt as needed moving forward.

## 2024-01-10 NOTE — TELEPHONE ENCOUNTER
The patients wife called the patient is coming back home to be with his wife    she would like to know how she goes about getting him nursing care  a hospital bed and a portable potty for the home .  Please call her thank you

## 2024-01-11 PROCEDURE — 77386 HB NTSTY MODUL RAD TX DLVR CPLX: CPT | Performed by: RADIOLOGY

## 2024-01-14 DIAGNOSIS — I48.91 ATRIAL FIBRILLATION, UNSPECIFIED TYPE (HCC): ICD-10-CM

## 2024-01-15 DIAGNOSIS — J90 PLEURAL EFFUSION: ICD-10-CM

## 2024-01-15 DIAGNOSIS — C34.90 LUNG CANCER (HCC): ICD-10-CM

## 2024-01-15 DIAGNOSIS — J96.01 ACUTE RESPIRATORY FAILURE WITH HYPOXIA (HCC): ICD-10-CM

## 2024-01-15 RX ORDER — DIGOXIN 125 MCG
125 TABLET ORAL DAILY
Qty: 30 TABLET | Refills: 0 | Status: SHIPPED | OUTPATIENT
Start: 2024-01-15 | End: 2024-01-25

## 2024-01-16 RX ORDER — FUROSEMIDE 40 MG/1
40 TABLET ORAL 2 TIMES DAILY
Qty: 60 TABLET | Refills: 5 | Status: SHIPPED | OUTPATIENT
Start: 2024-01-16

## 2024-01-17 ENCOUNTER — TELEPHONE (OUTPATIENT)
Age: 79
End: 2024-01-17

## 2024-01-17 ENCOUNTER — TELEPHONE (OUTPATIENT)
Dept: PALLIATIVE MEDICINE | Facility: CLINIC | Age: 79
End: 2024-01-17

## 2024-01-17 NOTE — TELEPHONE ENCOUNTER
Candelaria left a  on lines to schedule appointment with [facility] , last referral was closed from 10/20/22 . NEW PATIENT appointment ??

## 2024-01-17 NOTE — TELEPHONE ENCOUNTER
Received call from Diana Galaviz RN to report patient is down another 8 lbs in past 7-8 days;   appetite not good since start of radiation therapy   Daughter is providing Ensure drinks. Radiation has since stopped so looking for appetite to return soon. Please fu with patient if provider has additional concerns or questions.

## 2024-01-18 ENCOUNTER — TELEPHONE (OUTPATIENT)
Dept: FAMILY MEDICINE CLINIC | Facility: CLINIC | Age: 79
End: 2024-01-18

## 2024-01-18 ENCOUNTER — TELEPHONE (OUTPATIENT)
Dept: LAB | Facility: HOSPITAL | Age: 79
End: 2024-01-18

## 2024-01-18 NOTE — TELEPHONE ENCOUNTER
Spoke with [facility]. They will contact Glen's daughter Michelle at [phone] to set up home draw for PT/INR

## 2024-01-21 DIAGNOSIS — I48.91 ATRIAL FIBRILLATION, UNSPECIFIED TYPE (HCC): ICD-10-CM

## 2024-01-22 ENCOUNTER — TELEPHONE (OUTPATIENT)
Dept: PALLIATIVE MEDICINE | Facility: CLINIC | Age: 79
End: 2024-01-22

## 2024-01-22 ENCOUNTER — HOME CARE VISIT (OUTPATIENT)
Dept: HOME HEALTH SERVICES | Facility: HOME HEALTHCARE | Age: 79
End: 2024-01-22
Payer: MEDICARE

## 2024-01-22 DIAGNOSIS — R91.8 MULTIPLE LUNG NODULES ON CT: ICD-10-CM

## 2024-01-22 DIAGNOSIS — C77.8 MALIGNANT NEOPLASM METASTATIC TO LYMPH NODES OF MULTIPLE SITES (HCC): Primary | ICD-10-CM

## 2024-01-22 DIAGNOSIS — J96.21 ACUTE ON CHRONIC RESPIRATORY FAILURE WITH HYPOXIA (HCC): ICD-10-CM

## 2024-01-22 DIAGNOSIS — C34.11 MALIGNANT NEOPLASM OF UPPER LOBE OF RIGHT LUNG (HCC): ICD-10-CM

## 2024-01-22 RX ORDER — METOPROLOL TARTRATE 50 MG/1
50 TABLET, FILM COATED ORAL 4 TIMES DAILY
Qty: 120 TABLET | Refills: 0 | Status: SHIPPED | OUTPATIENT
Start: 2024-01-22 | End: 2024-01-25

## 2024-01-22 NOTE — TELEPHONE ENCOUNTER
Spoke with daughter shweta and wife paty. They report patient's health as been declining and he is becoming increasingly confused and unable to ambulate or perform ADLs. They feel it is time for hospice care, preferably at home. Requesting [facility] hospice consult be placed. Reviewed hospice services and symptom management. Patient does not have any pain. They feel they will need additional services to help care for him at home as they are struggling to provide care. Family nor patient are interested in hospital evaluation d/t home safety concerns. States they can continue to manage patient for now at home. All questions and concerns addressed. Advised to call back with any new concerns or questions.

## 2024-01-22 NOTE — Clinical Note
Hi Dr. Germain and Dr. Bender, seeking RLOC approval for pt referred this am by Logan Yang PAC. Glen Patino 79 yo  8.. PMHX: prostate Ca with brain mets. RUL Lung ca with mets to lymph acute on chronic resp failure COPD Afib PE DVT T2DM. Recently seen in ED [facility] 1.3.24 for Influenza A, LLL PNA, met sepsis criteria. DC home 24. Lives with wife and dtr who report he has continued to decline. Increased confusion no longer able to ambulate or perform ADL's. O2 2lnc baseline. They do not want him to return to the hospital and want comfort measures at home. PPS 30. Labs: Hb 11.3 plat 104 Bun 50 Cr 1.50 Gfr 44 Gluc 167 Ca 7.5 Alb 2.5. Appears appropriate for RLOC.

## 2024-01-25 ENCOUNTER — HOME CARE VISIT (OUTPATIENT)
Dept: HOME HEALTH SERVICES | Facility: HOME HEALTHCARE | Age: 79
End: 2024-01-25
Payer: MEDICARE

## 2024-01-25 ENCOUNTER — HOME CARE VISIT (OUTPATIENT)
Dept: HOME HOSPICE | Facility: HOSPICE | Age: 79
End: 2024-01-25
Payer: MEDICARE

## 2024-01-25 VITALS
WEIGHT: 157 LBS | SYSTOLIC BLOOD PRESSURE: 93 MMHG | HEART RATE: 78 BPM | BODY MASS INDEX: 23.18 KG/M2 | TEMPERATURE: 97.3 F | DIASTOLIC BLOOD PRESSURE: 56 MMHG | RESPIRATION RATE: 22 BRPM

## 2024-01-25 PROCEDURE — G0299 HHS/HOSPICE OF RN EA 15 MIN: HCPCS

## 2024-01-26 ENCOUNTER — HOME CARE VISIT (OUTPATIENT)
Dept: HOME HEALTH SERVICES | Facility: HOME HEALTHCARE | Age: 79
End: 2024-01-26
Payer: MEDICARE

## 2024-01-26 ENCOUNTER — HOME CARE VISIT (OUTPATIENT)
Dept: HOME HOSPICE | Facility: HOSPICE | Age: 79
End: 2024-01-26
Payer: MEDICARE

## 2024-01-26 PROCEDURE — G0156 HHCP-SVS OF AIDE,EA 15 MIN: HCPCS

## 2024-01-27 ENCOUNTER — HOME CARE VISIT (OUTPATIENT)
Dept: HOME HOSPICE | Facility: HOSPICE | Age: 79
End: 2024-01-27
Payer: MEDICARE

## 2024-01-27 DIAGNOSIS — Z51.5 HOSPICE CARE PATIENT: Primary | ICD-10-CM

## 2024-01-27 RX ORDER — HALOPERIDOL 2 MG/ML
2 SOLUTION ORAL EVERY 4 HOURS PRN
Qty: 15 ML | Refills: 0 | Status: SHIPPED | OUTPATIENT
Start: 2024-01-27 | End: 2024-01-30

## 2024-01-27 RX ORDER — LORAZEPAM 2 MG/ML
CONCENTRATE ORAL
Qty: 30 ML | Refills: 0 | Status: SHIPPED | OUTPATIENT
Start: 2024-01-27 | End: 2024-01-30

## 2024-01-27 RX ORDER — MORPHINE SULFATE 100 MG/5ML
5 SOLUTION, CONCENTRATE ORAL
Qty: 30 ML | Refills: 0 | Status: SHIPPED | OUTPATIENT
Start: 2024-01-27 | End: 2024-01-30

## 2024-01-29 VITALS
TEMPERATURE: 99.5 F | RESPIRATION RATE: 22 BRPM | HEART RATE: 124 BPM | DIASTOLIC BLOOD PRESSURE: 56 MMHG | SYSTOLIC BLOOD PRESSURE: 98 MMHG

## 2024-01-31 ENCOUNTER — HOME CARE VISIT (OUTPATIENT)
Dept: HOME HOSPICE | Facility: HOSPICE | Age: 79
End: 2024-01-31
Payer: MEDICARE

## 2024-02-09 NOTE — PROGRESS NOTES
Called and left message for Dr. Jarrett about pt having anxiety attack.   Subsequent Outreach: Attempted to reach patient today to re-assess for any barriers to care and offer any supportive services needed  I was unable to leave a VM as voice mailbox is full  Will re-attempt to outreach at a later time/date

## 2024-07-02 NOTE — ASSESSMENT & PLAN NOTE
Xavier Rodriguez presented for a  Medicare AWV and comprehensive Health Risk Assessment today. The following components were reviewed and updated:    Medical history  Family History  Social history  Allergies and Current Medications  Health Risk Assessment  Health Maintenance  Care Team         ** See Completed Assessments for Annual Wellness Visit within the encounter summary.**         The following assessments were completed:  Living Situation  CAGE  Depression Screening  Timed Get Up and Go  Whisper Test  Cognitive Function Screening  Nutrition Screening  ADL Screening  PAQ Screening        Opioid documentation:      Patient does have a current opioid prescription.      Patient accepted further discussion regarding opioid medication use.      Patient is currently taking hydrocodone narcotic for back and occasional headache pain.        Pain level today is 2/10.       In addition to narcotic pain medications, patient is also using occasional heating pad for pain control.       Patient is followed by a specialist currently for their pain and will not be referred today.       Patient's opioid risk potential based on ORT-OUD tool:       Robert each box that applies   No   Yes     Family history of substance abuse   Alcohol [x] []   Illegal drugs [x] []   Rx drugs [x] []     Personal history of substance abuse   Alcohol [x] []   Illegal drugs [x] []   Rx drugs [x] []     Age between 16-45 years   [x]   []     Patient with ADD, OCD, Bipolar disorder, schizoprenia   [x]   []     Patient with depression   []   [x]                         Scoring total                        1                                                     Non-opioid treatment options have been discussed today and added to the patient's after visit summary.        Vitals:    07/02/24 1338   BP: (!) 150/78   BP Location: Left arm   Patient Position: Sitting   BP Method: Large (Manual)   Pulse: (!) 56   Temp: 99 °F (37.2 °C)   TempSrc: Oral   Weight:  Pt with hx of prostate Ca s/p radiation therapy in 2005 "79.7 kg (175 lb 12.8 oz)   Height: 5' 4" (1.626 m)     Body mass index is 30.18 kg/m².  Physical Exam  Constitutional:       Appearance: Normal appearance.   Eyes:      Extraocular Movements: Extraocular movements intact.   Cardiovascular:      Rate and Rhythm: Normal rate and regular rhythm.      Pulses: Normal pulses.      Heart sounds: Normal heart sounds.   Pulmonary:      Effort: Pulmonary effort is normal.      Breath sounds: Normal breath sounds.   Abdominal:      Palpations: Abdomen is soft.      Tenderness: There is no abdominal tenderness. There is no guarding or rebound.   Musculoskeletal:      Right lower leg: No edema.      Left lower leg: No edema.      Comments: cane   Skin:     General: Skin is warm and dry.   Neurological:      Mental Status: She is alert. Mental status is at baseline. She is disoriented.      Gait: Gait abnormal.   Psychiatric:         Mood and Affect: Mood normal.           Diagnoses and health risks identified today and associated recommendations/orders:    1. Encounter for subsequent annual wellness visit (AWV) in Medicare patient  -recommend annual wellness exam    2. Hyperlipidemia, unspecified hyperlipidemia type  -continue current treatment plan  -low cholesterol diet and exercise    3. BMI 30.0-30.9,adult  -diet and exercise        -spoke with daughter on the phone. Daughter agreeable to dexa and mammogram order. Daughter request referral to gi. Patient has chronic constipation. Daughter states she thinks patient is taking linzess 290mcg daily  -bp is elevated. Recommend 4 week follow up. Recommend monitor bp at home and record. Granddaughter states her mom has cuff to check bp  -unable to draw clock. Patient is established with neuro and has f/u visit      Provided Xavier with a 5-10 year written screening schedule and personal prevention plan. Recommendations were developed using the USPSTF age appropriate recommendations. Education, counseling, and referrals were " provided as needed. After Visit Summary printed and given to patient which includes a list of additional screenings\tests needed.    Follow up in about 1 year (around 7/3/2025) for in 1 year for annual wellness visit at 11:00 a.m..    ALLIE GA RN                  Provided Xavier with a 5-10 year written screening schedule and personal prevention plan. Recommendations were developed using the USPSTF age appropriate recommendations. Education, counseling, and referrals were provided as needed. After Visit Summary printed and given to patient which includes a list of additional screenings\tests needed.    Follow up in about 1 year (around 7/3/2025) for in 1 year for annual wellness visit at 11:00 a.m..    ALLIE GA RN

## 2025-02-11 NOTE — PROGRESS NOTES
[address]  Progress Note  Name: Matthew Alas  MRN: [de-identified]  Unit/Bed#:  I Date of Admission: 10/3/2023   Date of Service: 10/4/2023 I Hospital Day: 1    Assessment/Plan   Atrial fibrillation with RVR ([address])  Assessment & Plan  · Presented with AFib RVR, new onset  · Slightly elevated BNP, trop negative thusfar  · Patient denies history though is on metoprolol  mg QD  · He has been on stable dose for many years  · Has not missed dose  · On CT has large effusion and PE  · Initiated on cardizem gtt in ED without any change in HR; tiraled IV lopressor overnight with success    Plan:  · Continue home does lopressor (converted to short acting form)  · Follow up ECHO  · [id number]; will need AC for this and PE    * Acute pulmonary embolism ([address])  Assessment & Plan  · He has a history of PE in 9/2022 and was on rivaroxaban at discharge but was stopped due to financial reasons and did not want to be on coumadin so he was on aspirin 81 mg BID  · Patient has been having worsening shortness of breath, specifically with exercise and right LE swelling for the last 2 weeks when he started his new chemo regimen  · Duplex obtained:  · "Evidence suggestive of Acute occlusive deep vein thrombosis noted in the [employer], proximal-distal femoral, popliteal, gastrocnemius, posterior tibial, and peroneal veins"  · He had a CT PE study:  · "Acute pulmonary emboli in the left distal pulmonary artery extending into the left lower lobar, and some of the segmental and subsegmental pulmonary arteries with additional segmental and subsegmental pulmonary emboli in the lingula'  · "Cardiomegaly.  Although the RV/LV ratio is normal, reflux of IV contrast into the dilated IVC and hepatic veins may suggest right heart failure"  · "Main pulmonary artery dilated up to 3.7 cm, suggestive of pulmonary arterial hypertension"  · Above findings new as he had normal RV and no mention of pulmonary HTN on last Outgoing call to patient. Updated patient with committee's decision to move forward. Will present patient for formal reactivation next week after check list review and approval from finance.     Queried patient about medical condition changes. She denied any. Queried patient of pain. Per patient, she has pain on her foot that is acute and started over the weekend but is starting to improve. Patient denied open skin, wound, swelling, redness. Patient stated it is not infected. Advised patient to contact her PCP for in person assessment. She verbalized understanding.    ECHO  · Biomarkers show ; Troponin 5 -> 5  · PESI V  · Due to concern for new pulmonary hypertension, elevated BNP, high risk PESI score, Intermediate High Risk; PERT alert called  · Patient is a tPA candidate if he worsens so patient to be kept at [facility] for now    Plan:  · UFH infusion, VTE protocol  · Priority PERT ECHO  · Watch hemodynamics closely   · Will need AC lifelong going forward    Abnormal CT of the abdomen  Assessment & Plan  · CT Abdomen  · "Mild fat stranding adjacent to head of the pancreas and the osbaldo hepatis/gallbladder. Recommend correlation with lipase level to exclude pancreatitis"  · No abdominal pain  · Lipase normal    HILARY (acute kidney injury) ([address])  Assessment & Plan  Baseline Cr: 1.0-1.1  Admission Cr: 1.99  Etiology: Hypoperfusion from PE/Afib vs dehydration  Avoid nephrotoxic medications/hypotension.     Pleural effusion  Assessment & Plan  · Patient with history of malignant pleural effusions  · Per CT scan:  · "Moderate to large right pleural effusion with likely loculation at the apex, and small to moderate left pleural effusion, new since August 2023"  · Due to loculation would consider IR drainage tomorrow morning    Plan:  · IR consult  · NPO for procedure      Malignant neoplasm of upper lobe of right lung [facility])  Assessment & Plan  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/15 s/p 5 cycles of XRT and had been on osimerinib   · He had rescan on 8/8 that unfortunately showed increased size of mass  · He was recently restarted on carboplastin + pemetrexed; last had cycle about 2 weeks ago  · Unfortunately on CT today:  · "Luminal narrowing in the region of the bifurcation of the bronchus intermedius, which is new since August 2023, possibly secondary to enlarging hilar lymphadenopathy or increasing postradiation fibrosis"  · "Airspace consolidations in the right middle and lower lobes, new since August 2023, likely at least in part representing atelectasis, likely secondary to the aforementioned partial bronchial occlusion."  · "Significant progression of metastatic disease throughout the left lung, with new enlarged nodules/masses"    Plan:  · Due to high association with pemetrexed and VTE as well as progressive disease on CT scan, may consider consulting hemon    Controlled type 2 diabetes mellitus without complication, without long-term current use of insulin [facility])  Assessment & Plan  Lab Results   Component Value Date    HGBA1C 5.1 07/07/2023       Recent Labs     10/03/23  2156   POCGLU 130       Blood Sugar Average: Last 72 hrs:  (P) 130   · On metformin at home  · Will convert to SSI here             ICU Core Measures     A: Assess, Prevent, and Manage Pain · Has pain been assessed? Yes  · Need for changes to pain regimen? No   B: Both SAT/SAT  · N/A   C: Choice of Sedation · RASS Goal: N/A patient not on sedation  · Need for changes to sedation or analgesia regimen? NA   D: Delirium · CAM-ICU: Negative   E: Early Mobility  · Plan for early mobility? Yes   F: Family Engagement · Plan for family engagement today? Yes         Prophylaxis:  VTE VTE covered by:  heparin (porcine), Intravenous, 15 Units/kg/hr at 10/04/23 0108  heparin (porcine), Intravenous  heparin (porcine), Intravenous       Stress Ulcer  not ordered       Subjective   HPI:  "69 y.o. who presents with 2 weeks of progressive shortness of breath and right leg swelling. He has a PMH of lung adenoCa on chemo and s/p radiation, history of PE, DM II, Essential hypertension, prostate Ca s/p radiation in 2005. Patient had his last session  of chemotherapy about 2 weeks ago and had progressive shortness of breath with exertion and right leg swelling. Symptoms became more severe so he presented today and was found to be in new onset AFib with RVR.  He had a + duplex for DVT in the right lower extremity and a CTA PE study + for PE in the "left distal pulmonary artery extending into the left lower lobar, and some of the segmental and subsegmental pulmonary arteries with additional segmental and subsegmental pulmonary emboli in the lingula." Of note patient with history PE but was not on [facility] due to financial reasons. RV/LV ratio normal but given high BNP, signs suggestive of right sided heart failure with cor pulmonale on CTA, PESI score of V PERT alert was called. Patient started on cardizem gtt, VTE protocol Hep gtt and referred for [facility] admission."    24hr events:   · Responded well to IV lopressor  · Weaning off cardizem    Review of Systems   Respiratory: Positive for shortness of breath. Cardiovascular: Positive for palpitations and leg swelling. Objective                            Vitals I/O      Most Recent Min/Max in 24hrs   Temp (!) 97.4 °F (36.3 °C) Temp  Min: 97.1 °F (36.2 °C)  Max: 98.1 °F (36.7 °C)   Pulse (!) 106 Pulse  Min: 106  Max: 141   Resp 18 Resp  Min: 17  Max: 25   /74 BP  Min: 110/72  Max: 145/80   O2 Sat 92 % SpO2  Min: 92 %  Max: 97 %    No intake or output data in the 24 hours ending 10/04/23 0112      Diet NPO     Invasive Monitoring Physical exam    Physical Exam  Eyes:      Conjunctiva/sclera: Conjunctivae normal.   Skin:     General: Skin is warm and dry. Capillary Refill: Capillary refill takes less than 2 seconds. HENT:      Head: Normocephalic and atraumatic. Mouth/Throat:      Mouth: Mucous membranes are moist.   Cardiovascular:      Rate and Rhythm: Tachycardia present. Rhythm irregular. Pulses: Normal pulses. Musculoskeletal:         General: No swelling. Abdominal:      General: There is no distension. Palpations: Abdomen is soft. Tenderness: There is no abdominal tenderness. Constitutional:       Appearance: He is well-developed and well-nourished. Pulmonary:      Effort: Pulmonary effort is normal. No respiratory distress. Breath sounds: No wheezing, rhonchi or rales. Neurological:      General: No focal deficit present. Mental Status: He is alert and oriented to person, place and time. Mental status is at baseline. Motor: Strength full and intact in all extremities. Vitals and nursing note reviewed.             Diagnostic Studies      EKG:   Imaging:  I have personally reviewed pertinent films in PACS     Medications:  Scheduled PRN   brimonidine tartrate, 1 drop, BID  finasteride, 5 mg, Daily  folic acid, 1 mg, Daily  insulin lispro, 1-6 Units, TID AC  insulin lispro, 1-6 Units, HS  metoprolol tartrate, 50 mg, Q12H SALVATORE  pravastatin, 20 mg, Daily With Dinner  timolol, 1 drop, Daily  travoprost, 1 drop, HS      heparin (porcine), 2,800 Units, Q6H PRN  heparin (porcine), 5,600 Units, Q6H PRN  influenza vaccine, 0.7 mL, Prior to discharge       Continuous    diltiazem, 1-15 mg/hr, Last Rate: 10 mg/hr (10/03/23 2235)  heparin (porcine), 3-30 Units/kg/hr (Order-Specific), Last Rate: 15 Units/kg/hr (10/04/23 0108)         Labs:    CBC    Recent Labs     10/03/23  1603   WBC 2.02*   HGB 11.4*   HCT 34.0*   PLT 67*     BMP    Recent Labs     10/03/23  1436   SODIUM 138   K 4.0      CO2 19*   AGAP 12   BUN 48*   CREATININE 1.99*   CALCIUM 8.8       Coags    Recent Labs     10/03/23  1556 10/03/23  2320   INR 1.24*  --    PTT 34 121*        Additional Electrolytes  Recent Labs     10/03/23  1556   MG 2.4          Blood Gas    No recent results  No recent results LFTs  Recent Labs     10/03/23  1436   ALT 25   AST 20   ALKPHOS 86   ALB 3.7   TBILI 0.50       Infectious  No recent results  Glucose  Recent Labs     10/03/23  1436   GLUC 169*                   Deana Horton PA-C

## 2025-04-17 NOTE — ASSESSMENT & PLAN NOTE
Heart rate currently controlled   Continue Lopressor and amiodarone  INR currently therapeutic  Continue Coumadin No qualified resident/fellow available to assist

## 2025-07-02 NOTE — QUICK NOTE
Progress Note - Palliative & Supportive Care       10/10/2023 10:08 AM -  Josh Rivas chart and case were reviewed by Alexandru Bains DO. Mode of review included electronic chart check. Per review, symptoms remain controlled on current regimen and no changes are made at this time. Please continue the regimen in place, and review our last note for details. For dispo plan, please review Case Management notes. Patient tentative for d/c today or tomorrow. Palliative will sign off at this time. Patient is appropriate for outpatient follow-up. We will make arrangements through our office. For urgent issues or any questions/concerns, please notify on-call provider via [phone]. You may also call our answering service 24/7 at [phone]. Alexandru Bains DO  Palliative and Supportive Care  Clinic/Answering Service: [phone]  You can find me on [employer]! no

## (undated) DEVICE — ADHESIVE SKIN HIGH VISCOSITY EXOFIN 1ML

## (undated) DEVICE — 3.2MM GUIDE WIRE 400MM

## (undated) DEVICE — 3M™ IOBAN™ 2 ANTIMICROBIAL INCISE DRAPE 6650EZ: Brand: IOBAN™ 2

## (undated) DEVICE — INTENDED FOR TISSUE SEPARATION, AND OTHER PROCEDURES THAT REQUIRE A SHARP SURGICAL BLADE TO PUNCTURE OR CUT.: Brand: BARD-PARKER SAFETY BLADES SIZE 11, STERILE

## (undated) DEVICE — SPONGE PVP SCRUB WING STERILE

## (undated) DEVICE — ABDOMINAL PAD: Brand: DERMACEA

## (undated) DEVICE — CHLORAPREP HI-LITE 26ML ORANGE

## (undated) DEVICE — DRAPE LAPAROTOMY W/POUCHES

## (undated) DEVICE — SUT VICRYL PLUS 1 CTB-1 36 IN VCPB947H

## (undated) DEVICE — ACE WRAP 6 IN STERILE

## (undated) DEVICE — GAUZE SPONGES,16 PLY: Brand: CURITY

## (undated) DEVICE — GLOVE INDICATOR PI UNDERGLOVE SZ 8 BLUE

## (undated) DEVICE — SUT ETHILON 3-0 FS-1 18 IN 663G

## (undated) DEVICE — GLOVE SRG BIOGEL ORTHOPEDIC 7.5

## (undated) DEVICE — SUT VICRYL PLUS 2-0 CTB-1 27 IN VCPB259H

## (undated) DEVICE — GLOVE SRG BIOGEL 8

## (undated) DEVICE — PACK PBDS LAP CHOLE RF

## (undated) DEVICE — CUFF TOURNIQUET 30 X 4 IN QUICK CONNECT DISP 1BLA

## (undated) DEVICE — BURR  OVAL 4MM 13CM 8 FLUTE COOLCUT

## (undated) DEVICE — NEEDLE 25G X 1 1/2

## (undated) DEVICE — ACE WRAP 6 IN UNSTERILE

## (undated) DEVICE — SUT MONOCRYL 4-0 PS-2 18 IN Y496G

## (undated) DEVICE — TROCAR: Brand: KII FIOS FIRST ENTRY

## (undated) DEVICE — SUT VICRYL PLUS 0 UR-6 27IN VCP603H

## (undated) DEVICE — INTENDED FOR TISSUE SEPARATION, AND OTHER PROCEDURES THAT REQUIRE A SHARP SURGICAL BLADE TO PUNCTURE OR CUT.: Brand: BARD-PARKER SAFETY BLADES SIZE 15, STERILE

## (undated) DEVICE — BLADE SAGITTAL 25.6 X 9.5MM

## (undated) DEVICE — 2.5MM REAMING ROD W/BALL TIP 650MM-STERILE

## (undated) DEVICE — PADDING CAST 6IN COTTON STRL

## (undated) DEVICE — 3.2MM CANNULATED DRILL BIT/QC 170MM

## (undated) DEVICE — TUBING SMOKE EVAC W/FILTRATION DEVICE PLUMEPORT ACTIV

## (undated) DEVICE — INTENDED FOR TISSUE SEPARATION, AND OTHER PROCEDURES THAT REQUIRE A SHARP SURGICAL BLADE TO PUNCTURE OR CUT.: Brand: BARD-PARKER SAFETY BLADES SIZE 10, STERILE

## (undated) DEVICE — PENCIL ELECTROSURG E-Z CLEAN -0035H

## (undated) DEVICE — 3.2MM GUIDE WIRE 290MM

## (undated) DEVICE — GLOVE INDICATOR PI UNDERGLOVE SZ 8.5 BLUE

## (undated) DEVICE — UNIVERSAL MAJOR EXTREMITY,KIT: Brand: CARDINAL HEALTH

## (undated) RX ORDER — BRIMONIDINE TARTRATE 2 MG/MG: 1 SOLUTION/ DROPS OPHTHALMIC TWICE A DAY